# Patient Record
Sex: MALE | Race: WHITE | NOT HISPANIC OR LATINO | Employment: OTHER | URBAN - METROPOLITAN AREA
[De-identification: names, ages, dates, MRNs, and addresses within clinical notes are randomized per-mention and may not be internally consistent; named-entity substitution may affect disease eponyms.]

---

## 2017-01-04 ENCOUNTER — HOSPITAL ENCOUNTER (EMERGENCY)
Facility: CLINIC | Age: 82
Discharge: HOME | End: 2017-01-04
Attending: EMERGENCY MEDICINE | Admitting: EMERGENCY MEDICINE
Payer: COMMERCIAL

## 2017-01-04 ENCOUNTER — APPOINTMENT (OUTPATIENT)
Dept: GENERAL RADIOLOGY | Facility: CLINIC | Age: 82
End: 2017-01-04
Attending: EMERGENCY MEDICINE
Payer: COMMERCIAL

## 2017-01-04 VITALS
DIASTOLIC BLOOD PRESSURE: 71 MMHG | HEIGHT: 72 IN | TEMPERATURE: 98 F | HEART RATE: 71 BPM | OXYGEN SATURATION: 95 % | BODY MASS INDEX: 30.61 KG/M2 | RESPIRATION RATE: 20 BRPM | SYSTOLIC BLOOD PRESSURE: 128 MMHG | WEIGHT: 226 LBS

## 2017-01-04 DIAGNOSIS — J18.9 PNEUMONIA OF LEFT LOWER LOBE DUE TO INFECTIOUS ORGANISM: ICD-10-CM

## 2017-01-04 DIAGNOSIS — R07.9 CHEST PAIN, UNSPECIFIED TYPE: ICD-10-CM

## 2017-01-04 LAB
ANION GAP SERPL CALCULATED.3IONS-SCNC: 9 MMOL/L (ref 3–14)
BASOPHILS # BLD AUTO: 0.1 10E9/L (ref 0–0.2)
BASOPHILS NFR BLD AUTO: 0.8 %
BUN SERPL-MCNC: 21 MG/DL (ref 7–30)
CALCIUM SERPL-MCNC: 8.7 MG/DL (ref 8.5–10.1)
CHLORIDE SERPL-SCNC: 105 MMOL/L (ref 94–109)
CO2 SERPL-SCNC: 26 MMOL/L (ref 20–32)
CREAT SERPL-MCNC: 0.94 MG/DL (ref 0.66–1.25)
DIFFERENTIAL METHOD BLD: NORMAL
EOSINOPHIL # BLD AUTO: 0.4 10E9/L (ref 0–0.7)
EOSINOPHIL NFR BLD AUTO: 4.4 %
ERYTHROCYTE [DISTWIDTH] IN BLOOD BY AUTOMATED COUNT: 13.1 % (ref 10–15)
GFR SERPL CREATININE-BSD FRML MDRD: 77 ML/MIN/1.7M2
GLUCOSE SERPL-MCNC: 227 MG/DL (ref 70–99)
HCT VFR BLD AUTO: 48.1 % (ref 40–53)
HGB BLD-MCNC: 16.2 G/DL (ref 13.3–17.7)
IMM GRANULOCYTES # BLD: 0 10E9/L (ref 0–0.4)
IMM GRANULOCYTES NFR BLD: 0.2 %
INTERPRETATION ECG - MUSE: NORMAL
LYMPHOCYTES # BLD AUTO: 2.3 10E9/L (ref 0.8–5.3)
LYMPHOCYTES NFR BLD AUTO: 27.1 %
MCH RBC QN AUTO: 32 PG (ref 26.5–33)
MCHC RBC AUTO-ENTMCNC: 33.7 G/DL (ref 31.5–36.5)
MCV RBC AUTO: 95 FL (ref 78–100)
MONOCYTES # BLD AUTO: 1 10E9/L (ref 0–1.3)
MONOCYTES NFR BLD AUTO: 12.1 %
NEUTROPHILS # BLD AUTO: 4.6 10E9/L (ref 1.6–8.3)
NEUTROPHILS NFR BLD AUTO: 55.4 %
NRBC # BLD AUTO: 0 10*3/UL
NRBC BLD AUTO-RTO: 0 /100
PLATELET # BLD AUTO: 204 10E9/L (ref 150–450)
POTASSIUM SERPL-SCNC: 4.1 MMOL/L (ref 3.4–5.3)
RBC # BLD AUTO: 5.06 10E12/L (ref 4.4–5.9)
SODIUM SERPL-SCNC: 140 MMOL/L (ref 133–144)
TROPONIN I BLD-MCNC: 0 UG/L (ref 0–0.1)
TROPONIN I BLD-MCNC: 0.01 UG/L (ref 0–0.1)
WBC # BLD AUTO: 8.3 10E9/L (ref 4–11)

## 2017-01-04 PROCEDURE — 25000132 ZZH RX MED GY IP 250 OP 250 PS 637: Performed by: EMERGENCY MEDICINE

## 2017-01-04 PROCEDURE — 93005 ELECTROCARDIOGRAM TRACING: CPT

## 2017-01-04 PROCEDURE — 99285 EMERGENCY DEPT VISIT HI MDM: CPT | Mod: 25

## 2017-01-04 PROCEDURE — 84484 ASSAY OF TROPONIN QUANT: CPT

## 2017-01-04 PROCEDURE — 85025 COMPLETE CBC W/AUTO DIFF WBC: CPT | Performed by: EMERGENCY MEDICINE

## 2017-01-04 PROCEDURE — 80048 BASIC METABOLIC PNL TOTAL CA: CPT | Performed by: EMERGENCY MEDICINE

## 2017-01-04 PROCEDURE — 71020 XR CHEST 2 VW: CPT

## 2017-01-04 RX ORDER — LEVOFLOXACIN 750 MG/1
750 TABLET, FILM COATED ORAL DAILY
Qty: 5 TABLET | Refills: 0 | Status: SHIPPED | OUTPATIENT
Start: 2017-01-04 | End: 2017-03-18

## 2017-01-04 RX ORDER — ASPIRIN 325 MG
325 TABLET ORAL ONCE
Status: DISCONTINUED | OUTPATIENT
Start: 2017-01-04 | End: 2017-01-04 | Stop reason: HOSPADM

## 2017-01-04 RX ORDER — NITROGLYCERIN 0.4 MG/1
0.4 TABLET SUBLINGUAL EVERY 5 MIN PRN
Status: DISCONTINUED | OUTPATIENT
Start: 2017-01-04 | End: 2017-01-04 | Stop reason: HOSPADM

## 2017-01-04 RX ADMIN — NITROGLYCERIN 0.4 MG: 0.4 TABLET SUBLINGUAL at 13:43

## 2017-01-04 ASSESSMENT — ENCOUNTER SYMPTOMS
VOMITING: 0
SHORTNESS OF BREATH: 0
ABDOMINAL PAIN: 0
COUGH: 1
NAUSEA: 0

## 2017-01-04 NOTE — ED AVS SNAPSHOT
[facility] Emergency Department    [address]    [facility] [address]    Phone:  [phone]    Fax:  [phone]                                       Adam Huber   MRN: [id number]    Department:  [facility] Emergency Department   Date of Visit:  1/4/2017           After Visit Summary Signature Page     I have received my discharge instructions, and my questions have been answered. I have discussed any challenges I see with this plan with the nurse or doctor.    ..........................................................................................................................................  Patient/Patient Representative Signature      ..........................................................................................................................................  Patient Representative Print Name and Relationship to Patient    ..................................................               ................................................  Date                                            Time    ..........................................................................................................................................  Reviewed by Signature/Title    ...................................................              ..............................................  Date                                                            Time

## 2017-01-04 NOTE — ED PROVIDER NOTES
History     Chief Complaint:  Chest Pain      HPI   The patient's daughter in law is serving as a interpretor at the bedside as the patient's primary language is French    Adam Huber is an 81 year old male with a history of coronary artery disease, acute coronary syndrome, and NSTEMI who presents to the emergency department via EMS for evaluation of chest pain. The patient states that he has had constant anterior substernal chest pain for the last two days, which he describes as a pressure. There are no exacerbating or alleviating factors. He notes that he has had chest pain in the past, but notes that his chest pain today is more intense than it has been in the past. The patient states that he has not taken any medication for this chest pain. The patient notes that he has had a nonbloody dry cough and chest congestion for the last two days as well. Given the patient's history, he was concerned and decided to seek evaluation at [facility]. At this visit, he was given aspirin and one dose of Nitro. No response to nitro.  He was then referred here for further evaluation.  He denies any shortness of breath, pain with deep breathing, abdominal pain, nausea, vomiting, leg swelling, or calf pain as of late. He denies any changes in his bladder or bowel habits as of late and notes that he has been eating and drinking well.     Cardiac/PE/DVT Risk Factors:  The patient has a history of hypertension, hyperlipidemia, coronary artery disease, and acute coronary syndrome. The patient denies any personal or familial history of PE, DVT, or clotting disorder. The patient reports no recent travel, surgery, or other immobilizations.     Allergies:  Lisinopril     Medications:    clopidogrel (PLAVIX) 75 MG tablet  aspirin 81 MG EC tablet  Docusate Sodium (DOC-Q-LACE PO)  losartan (COZAAR) 25 MG tablet  atorvastatin (LIPITOR) 40 MG tablet  carvedilol (COREG) 6.25 MG tablet  nitroglycerin (NITROSTAT) 0.4 MG  SL tablet  TRAMADOL HCL PO  butalbital-acetaminophen-caffeine (FIORICET, ESGIC) -40 MG per tablet  AMITRIPTYLINE HCL PO    Past Medical History:    coronary artery disease  NSTEMI  CVA  hypertension  hyperlipidemia  acute coronary syndrome  Depression     Past Surgical History:    Heart catheterization x2  Stent Placement  cholecystectomy  Prostatectomy     Family History:    Unknown family history     Social History:  Presents with his daughter in law and wife.  Negative for tobacco use.  Negative for alcohol use.  Marital Status:   [2]    Review of Systems   HENT: Positive for congestion.    Respiratory: Positive for cough. Negative for shortness of breath.    Cardiovascular: Positive for chest pain. Negative for leg swelling.   Gastrointestinal: Negative for nausea, vomiting and abdominal pain.   All other systems reviewed and are negative.    Physical Exam     Patient Vitals for the past 24 hrs:   BP Temp Temp src Pulse Heart Rate Resp SpO2 Height Weight   01/04/17 1606 128/71 mmHg - - - 68 - - - -   01/04/17 1600 - - - - 68 - 94 % - -   01/04/17 1545 - - - - 69 - 95 % - -   01/04/17 1530 154/70 mmHg - - - 72 - 95 % - -   01/04/17 1515 128/83 mmHg - - - 69 - 95 % - -   01/04/17 1500 145/83 mmHg - - - 73 - 97 % - -   01/04/17 1445 115/78 mmHg - - - 72 - 95 % - -   01/04/17 1430 113/73 mmHg - - - 70 - 92 % - -   01/04/17 1423 - - - - 72 - 96 % - -   01/04/17 1422 114/70 mmHg - - - 72 - - - -   01/04/17 1401 - - - - 78 - 93 % - -   01/04/17 1400 128/73 mmHg - - - 76 - - - -   01/04/17 1345 95/58 mmHg - - - 76 - 97 % - -   01/04/17 1339 - - - - 70 - 96 % - -   01/04/17 1336 148/75 mmHg - - - 72 - - - -   01/04/17 1321 - - - - - - - 1.829 m (6') 102.513 kg (226 lb)   01/04/17 1320 128/78 mmHg 98  F (36.7  C) Temporal 81 - 22 96 % - -       Physical Exam  Gen: alert  HEENT: PERRL, oropharynx clear  Neck: normal ROM  CV: RRR, no murmurs, 2+ distal pulses in all 4 extremities  Chest: no tenderness over the  chest wall  Pulm: breath sounds equal, lungs clear  Abd: Soft, nontender  Back: no evidence of injury  MSK: no lower extremity edema, no calf tenderness  Skin: no rash  Neuro: alert, appropriate conversation and interaction    Emergency Department Course   ECG:  Indication: Chest Pain  Time: 1313  Vent. Rate 71 bpm. MO interval 164. QRS duration 98. QT/QTc 398/432. P-R-T axis 67 85 96.  Normal sinus rhythm. Cannot rule out inferior infarct, age undetermined. Abnormal ECG. No significant change compared to EKG dated 11/6/2015. Read time: 1327    Imaging:  Radiographic findings were communicated with the patient who voiced understanding of the findings.    XR Chest 2 views:   Cardiac silhouette and pulmonary vasculature are within  normal limits. Mild left basilar atelectasis/consolidation. No  significant pleural effusion. No pneumothorax. As per radiology.     Laboratory:  CBC: WBC: 8.3, HGB: 16.2, PLT: 204  BMP: Glucose 227 (H), o/w WNL (Creatinine: 0.94)    1338 Troponin POCT:0.01  1555 Troponin POCT: 0.00    Interventions:  1343 Nitroglycerin 0.4 mg Sublingual    Emergency Department Course:  Nursing notes and vitals reviewed. I performed an exam of the patient as documented above.     EKG obtained in the ED, see results above.     IV inserted. Medicine administered as documented above. Blood drawn. This was sent to the lab for further testing, results above.    The patient was sent for a Chest XR while in the emergency department, findings above.     1614 I reevaluated the patient and provided an update in regards to his ED course.      Findings and plan explained to the Patient. Patient discharged home with instructions regarding supportive care, medications, and reasons to return. The importance of close follow-up was reviewed. The patient was prescribed Levaquin.    I personally reviewed the laboratory results with the Patient and answered all related questions prior to discharge.     Impression & Plan     Medical Decision Making:  Adam Huber is a 81 year old male who presents for chest pain. Patient has a past cardiac history of NSTEMI, stent placement noted. He has had two days of constant chest discomfort. Pain is atypical in that it is not exertional. It has not responded to nitroglycerin at urgent care nor here. The patient reports cough and chest congestion. XR shows left lower lobe infiltrate. This is consistent with pneumonia. No leucocytosis, fever, tachycardia, hypoxia suggestive of respiratory failure. Patient does requite close outpatient follow up. Levaquin prescribed. Follow up with primary care in two days for recheck. EKG shows no change from previous. Troponin and delta troponin both negative in the setting of two days of ongoing symptoms. This is more likely pneumonia. Therefore, no further cardiac evaluation indicated at this time.  Will defer stress testing to PCP pending course of PNA. Plan for discharge home.  Fluoroquinolone warning and tendinopathy discussed.    Diagnosis:    ICD-10-CM    1. Pneumonia of left lower lobe due to infectious organism J18.9    2. Chest pain, unspecified type R07.9        Discharge Medications:  New Prescriptions    LEVOFLOXACIN (LEVAQUIN) 750 MG TABLET    Take 1 tablet (750 mg) by mouth daily       Luz MATA, am serving as a scribe on 1/4/2017 at 1:24 PM to personally document services performed by Darline Ortega MD based on my observations and the provider's statements to me.     Luz Santos  1/4/2017   [facility] EMERGENCY DEPARTMENT        Darline Ortega MD  01/04/17 0548

## 2017-01-04 NOTE — ED AVS SNAPSHOT
[facility] Emergency Department    [address] [facility] [address]    Phone:  [phone]    Fax:  [phone]                                       Adam Huber   MRN: [id number]    Department:  [facility] Emergency Department   Date of Visit:  1/4/2017           Patient Information     Date Of Birth          1935        Your diagnoses for this visit were:     Pneumonia of left lower lobe due to infectious organism     Chest pain, unspecified type        You were seen by Darline Ortega MD.      Follow-up Information     Follow up with Oralia Forrest In 2 days.    Specialty:  Family Practice    Why:  recheck of lungs    Contact information:    [facility]  [address] [facility] [id number] [phone]          Follow up with [facility] Emergency Department.    Specialty:  EMERGENCY MEDICINE    Why:  If symptoms worsen    Contact information:    [address] [facility] [address] [phone]        Discharge Instructions       Discharge Instructions  Bronchitis, Pneumonia, Bronchospasm    You were seen today for a chest infection or inflammation. If your doctor decided this was due to a bacterial infection, you may need an antibiotic. Sometimes these are caused by a virus, and then an antibiotic will not help.     Return to the Emergency Department if:    Your breathing gets much worse.    You are very weak, or feel much more ill.    You develop new symptoms, such as chest pain.    You cough up blood.    You are vomiting enough that you can t keep fluids or your medicine down.    What can I do to help myself?    Fill any prescriptions the doctor gave you and take them right away--especially antibiotics. Be sure to finish the whole antibiotic prescription.    You may be given a prescription for an inhaler, which can help loosen tight air passages.  Use this as needed, but not more often than  "directed. Inhalers work much better when used with a spacer.     You may be given a prescription for a steroid to reduce inflammation. Used long-term, these can have many serious side effects, but for short courses these do not happen. You may notice restlessness or increased appetite.        You may use non-prescription cough or cold medicines. Cough medicines may help, but don t make the cough go away completely.     Avoid smoke, because this can make your symptoms worse. If you smoke, this may be a good time to quit! Consider using nicotine lozenges, gum, or patches to reduce cravings.     If you have a fever, Tylenol  (acetaminophen), Motrin  (ibuprofen), or Advil  (ibuprofen) may help bring fever down and may help you feel more comfortable. Be sure to read and follow the package directions, and ask your doctor if you have questions.    Be sure to get your flu shot each year.  The pneumonia shot can help prevent pneumonia.  Probiotics: If you have been given an antibiotic, you may want to also take a probiotic pill or eat yogurt with live cultures. Probiotics have \"good bacteria\" to help your intestines stay healthy. Studies have shown that probiotics help prevent diarrhea and other intestine problems (including C. diff infection) when you take antibiotics. You can buy these without a prescription in the pharmacy section of the store.     If your doctor has told you to follow-up at your clinic, be sure to call right away and go to your appointment.  If there is any problem with keeping your appointment, call your doctor or return to the Emergency Department.    If you were given a prescription for medicine here today, be sure to read all of the information (including the package insert) that comes with your prescription.  This will include important information about the medicine, its side effects, and any warnings that you need to know about.  The pharmacist who fills the prescription can provide more " information and answer questions you may have about the medicine.  If you have questions or concerns that the pharmacist cannot address, please call or return to the Emergency Department.     Opioid Medication Information    Pain medications are among the most commonly prescribed medicines, so we are including this information for all our patients. If you did not receive pain medication or get a prescription for pain medicine, you can ignore it.     You may have been given a prescription for an opioid (narcotic) pain medicine and/or have received a pain medicine while here in the Emergency Department. These medicines can make you drowsy or impaired. You must not drive, operate dangerous equipment, or engage in any other dangerous activities while taking these medications. If you drive while taking these medications, you could be arrested for DUI, or driving under the influence. Do not drink any alcohol while you are taking these medications.     Opioid pain medications can cause addiction. If you have a history of chemical dependency of any type, you are at a higher risk of becoming addicted to pain medications.  Only take these prescribed medications to treat your pain when all other options have been tried. Take it for as short a time and as few doses as possible. Store your pain pills in a secure place, as they are frequently stolen and provide a dangerous opportunity for children or visitors in your house to start abusing these powerful medications. We will not replace any lost or stolen medicine.  As soon as your pain is better, you should flush all your remaining medication.     Many prescription pain medications contain Tylenol  (acetaminophen), including Vicodin , Tylenol #3 , Norco , Lortab , and Percocet .  You should not take any extra pills of Tylenol  if you are using these prescription medications or you can get very sick.  Do not ever take more than 3000 mg of acetaminophen in any 24 hour  period.    All opioids tend to cause constipation. Drink plenty of water and eat foods that have a lot of fiber, such as fruits, vegetables, prune juice, apple juice and high fiber cereal.  Take a laxative if you don t move your bowels at least every other day. Miralax , Milk of Magnesia, Colace , or Senna  can be used to keep you regular.      Remember that you can always come back to the Emergency Department if you are not able to see your regular doctor in the amount of time listed above, if you get any new symptoms, or if there is anything that worries you.      For your infection,an antibiotic from the fluoroquinolone family was prescribed. All medications can cause side effects or adverse reactions.  It is thought that there is a small chance that this medication can affect your nerves.  If you experience numbness, tingling or weakness, stop taking this medication and call your doctor.  It is thought that there is a small chance of tendon injuries/inflammation/rupture associated with this particular family of medications. It is concerning enough that the FDA has a specific warning about it. In choosing this medication, I ve considered alternatives but, given your infection, past history, allergies, and other factors, I think this is the best possible choice and that the risk to you is small. If you were to develop pain around your tendons/joints, stop taking the medication and contact a doctor.  If you develop joint or extremity pain, avoid doing heavy lifting or strenuous activities with the affected area.  Secondly, all antibiotics increase your risk of diarrhea by changing that bacteria in the intestines. This family of medications also increases your risk of specific bacteral diarrheal infections such as C. dificile.  If you develop diarrhea or bloody stools, contact your doctor as you may need stool testing.      24 Hour Appointment Hotline       To make an appointment at any [facility], call  [phone] ([phone]). If you don't have a family doctor or clinic, we will help you find one. [place] clinics are conveniently located to serve the needs of you and your family.             Review of your medicines      START taking        Dose / Directions Last dose taken    levofloxacin 750 MG tablet   Commonly known as:  LEVAQUIN   Dose:  750 mg   Quantity:  5 tablet        Take 1 tablet (750 mg) by mouth daily   Refills:  0          Our records show that you are taking the medicines listed below. If these are incorrect, please call your family doctor or clinic.        Dose / Directions Last dose taken    AMITRIPTYLINE HCL PO   Dose:  150 mg        Take 150 mg by mouth At Bedtime   Refills:  0        aspirin 81 MG EC tablet   Quantity:  90 tablet        Take one tablet daily   Refills:  3        atorvastatin 40 MG tablet   Commonly known as:  LIPITOR   Dose:  40 mg   Quantity:  90 tablet        Take 1 tablet (40 mg) by mouth daily   Refills:  3        butalbital-acetaminophen-caffeine -40 MG per tablet   Commonly known as:  FIORICET/ESGIC   Dose:  1-2 tablet        Take 1-2 tablets by mouth every 4 hours as needed for headaches   Refills:  0        carvedilol 6.25 MG tablet   Commonly known as:  COREG   Dose:  6.25 mg   Quantity:  180 tablet        Take 1 tablet (6.25 mg) by mouth 2 times daily   Refills:  3        clopidogrel 75 MG tablet   Commonly known as:  PLAVIX   Dose:  75 mg   Quantity:  90 tablet        Take 1 tablet (75 mg) by mouth daily   Refills:  1        DOC-Q-LACE PO   Dose:  100 mg        Take 100 mg by mouth 2 times daily   Refills:  0        losartan 25 MG tablet   Commonly known as:  COZAAR   Dose:  25 mg   Quantity:  90 tablet        Take 1 tablet (25 mg) by mouth daily   Refills:  3        nitroglycerin 0.4 MG sublingual tablet   Commonly known as:  NITROSTAT   Dose:  0.4 mg   Quantity:  25 tablet        Place 1 tablet (0.4 mg) under the tongue every 5 minutes as needed for  chest pain   Refills:  1        polyethylene glycol powder   Commonly known as:  MIRALAX/GLYCOLAX   Dose:  1 capful        Take 1 capful by mouth daily as needed   Refills:  0        TRAMADOL HCL PO   Dose:  100 mg        Take 100 mg by mouth 2 times daily as needed   Refills:  0                Prescriptions were sent or printed at these locations (1 Prescription)                   Other Prescriptions                Printed at Department/Unit printer (1 of 1)         levofloxacin (LEVAQUIN) 750 MG tablet                Procedures and tests performed during your visit     Procedure/Test Number of Times Performed    Basic metabolic panel 1    CBC + differential 1    EKG 12 lead 1    ISTAT troponin 2    Troponin POCT 2    XR Chest 2 Views 1      Orders Needing Specimen Collection     None      Pending Results     No orders found from 1/3/2017 to 1/5/2017.            Pending Culture Results     No orders found from 1/3/2017 to 1/5/2017.       Test Results from your hospital stay           1/4/2017  1:47 PM - Interface, Chip Estimate Results      Component Results     Component Value Ref Range & Units Status    WBC 8.3 4.0 - 11.0 10e9/L Final    RBC Count 5.06 4.4 - 5.9 10e12/L Final    Hemoglobin 16.2 13.3 - 17.7 g/dL Final    Hematocrit 48.1 40.0 - 53.0 % Final    MCV 95 78 - 100 fl Final    MCH 32.0 26.5 - 33.0 pg Final    MCHC 33.7 31.5 - 36.5 g/dL Final    RDW 13.1 10.0 - 15.0 % Final    Platelet Count 204 150 - 450 10e9/L Final    Diff Method Automated Method  Final    % Neutrophils 55.4 % Final    % Lymphocytes 27.1 % Final    % Monocytes 12.1 % Final    % Eosinophils 4.4 % Final    % Basophils 0.8 % Final    % Immature Granulocytes 0.2 % Final    Nucleated RBCs 0 0 /100 Final    Absolute Neutrophil 4.6 1.6 - 8.3 10e9/L Final    Absolute Lymphocytes 2.3 0.8 - 5.3 10e9/L Final    Absolute Monocytes 1.0 0.0 - 1.3 10e9/L Final    Absolute Eosinophils 0.4 0.0 - 0.7 10e9/L Final    Absolute Basophils 0.1 0.0 - 0.2 10e9/L  Final    Abs Immature Granulocytes 0.0 0 - 0.4 10e9/L Final    Absolute Nucleated RBC 0.0  Final         1/4/2017  2:03 PM - Interface, Flexilab Results      Component Results     Component Value Ref Range & Units Status    Sodium 140 133 - 144 mmol/L Final    Potassium 4.1 3.4 - 5.3 mmol/L Final    Chloride 105 94 - 109 mmol/L Final    Carbon Dioxide 26 20 - 32 mmol/L Final    Anion Gap 9 3 - 14 mmol/L Final    Glucose 227 (H) 70 - 99 mg/dL Final    Urea Nitrogen 21 7 - 30 mg/dL Final    Creatinine 0.94 0.66 - 1.25 mg/dL Final    GFR Estimate 77 >60 mL/min/1.7m2 Final    Non  GFR Calc    GFR Estimate If Black >90   GFR Calc   >60 mL/min/1.7m2 Final    Calcium 8.7 8.5 - 10.1 mg/dL Final         1/4/2017  2:40 PM - Interface, Radiant Ib      Narrative     XR CHEST 2 VW 1/4/2017 2:19 PM    COMPARISON: 10/9/2015    HISTORY: Cough and chest pain.        Impression     IMPRESSION: Cardiac silhouette and pulmonary vasculature are within  normal limits. Mild left basilar atelectasis/consolidation. No  significant pleural effusion. No pneumothorax.    DERIC FRIEDMAN         1/4/2017  1:51 PM - Interface, Flexilab Results      Component Results     Component Value Ref Range & Units Status    Troponin I 0.01 0.00 - 0.10 ug/L Final         1/4/2017  3:55 PM - Interface, Flexilab Results      Component Results     Component Value Ref Range & Units Status    Troponin I 0.00 0.00 - 0.10 ug/L Final                Clinical Quality Measure: Blood Pressure Screening     Your blood pressure was checked while you were in the emergency department today. The last reading we obtained was  BP: 128/71 mmHg . Please read the guidelines below about what these numbers mean and what you should do about them.  If your systolic blood pressure (the top number) is less than 120 and your diastolic blood pressure (the bottom number) is less than 80, then your blood pressure is normal. There is nothing more that you need  "to do about it.  If your systolic blood pressure (the top number) is 120-139 or your diastolic blood pressure (the bottom number) is 80-89, your blood pressure may be higher than it should be. You should have your blood pressure rechecked within a year by a primary care provider.  If your systolic blood pressure (the top number) is 140 or greater or your diastolic blood pressure (the bottom number) is 90 or greater, you may have high blood pressure. High blood pressure is treatable, but if left untreated over time it can put you at risk for heart attack, stroke, or kidney failure. You should have your blood pressure rechecked by a primary care provider within the next 4 weeks.  If your provider in the emergency department today gave you specific instructions to follow-up with your doctor or provider even sooner than that, you should follow that instruction and not wait for up to 4 weeks for your follow-up visit.        Thank you for choosing [facility]       Thank you for choosing [facility] for your care. Our goal is always to provide you with excellent care. Hearing back from our patients is one way we can continue to improve our services. Please take a few minutes to complete the written survey that you may receive in the mail after you visit with us. Thank you!        [facility] Information     [facility] lets you send messages to your doctor, view your test results, renew your prescriptions, schedule appointments and more. To sign up, go to [email or web address] . Click on \"Log in\" on the left side of the screen, which will take you to the Welcome page. Then click on \"Sign up Now\" on the right side of the page.     You will be asked to enter the access code listed below, as well as some personal information. Please follow the directions to create your username and password.     Your access code is: [email or web address]  Expires: 2017  4:39 PM     Your access code will  in 90 days. If you need help or a new code, " please call your [place] clinic or [phone].        Care EveryWhere ID     This is your Care EveryWhere ID. This could be used by other organizations to access your [place] medical records  [id number]        After Visit Summary       This is your record. Keep this with you and show to your community pharmacist(s) and doctor(s) at your next visit.

## 2017-01-04 NOTE — DISCHARGE INSTRUCTIONS
Discharge Instructions  Bronchitis, Pneumonia, Bronchospasm    You were seen today for a chest infection or inflammation. If your doctor decided this was due to a bacterial infection, you may need an antibiotic. Sometimes these are caused by a virus, and then an antibiotic will not help.     Return to the Emergency Department if:    Your breathing gets much worse.    You are very weak, or feel much more ill.    You develop new symptoms, such as chest pain.    You cough up blood.    You are vomiting enough that you can t keep fluids or your medicine down.    What can I do to help myself?    Fill any prescriptions the doctor gave you and take them right away--especially antibiotics. Be sure to finish the whole antibiotic prescription.    You may be given a prescription for an inhaler, which can help loosen tight air passages.  Use this as needed, but not more often than directed. Inhalers work much better when used with a spacer.     You may be given a prescription for a steroid to reduce inflammation. Used long-term, these can have many serious side effects, but for short courses these do not happen. You may notice restlessness or increased appetite.        You may use non-prescription cough or cold medicines. Cough medicines may help, but don t make the cough go away completely.     Avoid smoke, because this can make your symptoms worse. If you smoke, this may be a good time to quit! Consider using nicotine lozenges, gum, or patches to reduce cravings.     If you have a fever, Tylenol  (acetaminophen), Motrin  (ibuprofen), or Advil  (ibuprofen) may help bring fever down and may help you feel more comfortable. Be sure to read and follow the package directions, and ask your doctor if you have questions.    Be sure to get your flu shot each year.  The pneumonia shot can help prevent pneumonia.  Probiotics: If you have been given an antibiotic, you may want to also take a probiotic pill or eat yogurt with live cultures.  "Probiotics have \"good bacteria\" to help your intestines stay healthy. Studies have shown that probiotics help prevent diarrhea and other intestine problems (including C. diff infection) when you take antibiotics. You can buy these without a prescription in the pharmacy section of the store.     If your doctor has told you to follow-up at your clinic, be sure to call right away and go to your appointment.  If there is any problem with keeping your appointment, call your doctor or return to the Emergency Department.    If you were given a prescription for medicine here today, be sure to read all of the information (including the package insert) that comes with your prescription.  This will include important information about the medicine, its side effects, and any warnings that you need to know about.  The pharmacist who fills the prescription can provide more information and answer questions you may have about the medicine.  If you have questions or concerns that the pharmacist cannot address, please call or return to the Emergency Department.     Opioid Medication Information    Pain medications are among the most commonly prescribed medicines, so we are including this information for all our patients. If you did not receive pain medication or get a prescription for pain medicine, you can ignore it.     You may have been given a prescription for an opioid (narcotic) pain medicine and/or have received a pain medicine while here in the Emergency Department. These medicines can make you drowsy or impaired. You must not drive, operate dangerous equipment, or engage in any other dangerous activities while taking these medications. If you drive while taking these medications, you could be arrested for DUI, or driving under the influence. Do not drink any alcohol while you are taking these medications.     Opioid pain medications can cause addiction. If you have a history of chemical dependency of any type, you are at a " higher risk of becoming addicted to pain medications.  Only take these prescribed medications to treat your pain when all other options have been tried. Take it for as short a time and as few doses as possible. Store your pain pills in a secure place, as they are frequently stolen and provide a dangerous opportunity for children or visitors in your house to start abusing these powerful medications. We will not replace any lost or stolen medicine.  As soon as your pain is better, you should flush all your remaining medication.     Many prescription pain medications contain Tylenol  (acetaminophen), including Vicodin , Tylenol #3 , Norco , Lortab , and Percocet .  You should not take any extra pills of Tylenol  if you are using these prescription medications or you can get very sick.  Do not ever take more than 3000 mg of acetaminophen in any 24 hour period.    All opioids tend to cause constipation. Drink plenty of water and eat foods that have a lot of fiber, such as fruits, vegetables, prune juice, apple juice and high fiber cereal.  Take a laxative if you don t move your bowels at least every other day. Miralax , Milk of Magnesia, Colace , or Senna  can be used to keep you regular.      Remember that you can always come back to the Emergency Department if you are not able to see your regular doctor in the amount of time listed above, if you get any new symptoms, or if there is anything that worries you.      For your infection,an antibiotic from the fluoroquinolone family was prescribed. All medications can cause side effects or adverse reactions.  It is thought that there is a small chance that this medication can affect your nerves.  If you experience numbness, tingling or weakness, stop taking this medication and call your doctor.  It is thought that there is a small chance of tendon injuries/inflammation/rupture associated with this particular family of medications. It is concerning enough that the FDA has a  specific warning about it. In choosing this medication, I ve considered alternatives but, given your infection, past history, allergies, and other factors, I think this is the best possible choice and that the risk to you is small. If you were to develop pain around your tendons/joints, stop taking the medication and contact a doctor.  If you develop joint or extremity pain, avoid doing heavy lifting or strenuous activities with the affected area.  Secondly, all antibiotics increase your risk of diarrhea by changing that bacteria in the intestines. This family of medications also increases your risk of specific bacteral diarrheal infections such as C. dificile.  If you develop diarrhea or bloody stools, contact your doctor as you may need stool testing.

## 2017-01-04 NOTE — ED NOTES
Bed: ED30  Expected date: 1/4/17  Expected time: 1:21 PM  Means of arrival: Ambulance  Comments:  HIRA 52M

## 2017-01-24 DIAGNOSIS — I21.4 NSTEMI (NON-ST ELEVATED MYOCARDIAL INFARCTION) (H): Primary | ICD-10-CM

## 2017-01-27 DIAGNOSIS — I21.4 NSTEMI (NON-ST ELEVATED MYOCARDIAL INFARCTION) (H): Primary | ICD-10-CM

## 2017-01-27 RX ORDER — ATORVASTATIN CALCIUM 40 MG/1
40 TABLET, FILM COATED ORAL DAILY
Qty: 90 TABLET | Refills: 1 | Status: SHIPPED | OUTPATIENT
Start: 2017-01-27 | End: 2017-07-19

## 2017-03-18 ENCOUNTER — APPOINTMENT (OUTPATIENT)
Dept: GENERAL RADIOLOGY | Facility: CLINIC | Age: 82
DRG: 193 | End: 2017-03-18
Attending: EMERGENCY MEDICINE
Payer: COMMERCIAL

## 2017-03-18 ENCOUNTER — HOSPITAL ENCOUNTER (INPATIENT)
Facility: CLINIC | Age: 82
LOS: 13 days | Discharge: NURSING, REHAB OR LONG-TERM CARE | DRG: 193 | End: 2017-03-31
Attending: EMERGENCY MEDICINE | Admitting: INTERNAL MEDICINE
Payer: COMMERCIAL

## 2017-03-18 DIAGNOSIS — M54.9 BACK PAIN, UNSPECIFIED BACK LOCATION, UNSPECIFIED BACK PAIN LATERALITY, UNSPECIFIED CHRONICITY: ICD-10-CM

## 2017-03-18 DIAGNOSIS — Z86.73 HISTORY OF CVA (CEREBROVASCULAR ACCIDENT): ICD-10-CM

## 2017-03-18 DIAGNOSIS — I21.4 NSTEMI (NON-ST ELEVATED MYOCARDIAL INFARCTION) (H): ICD-10-CM

## 2017-03-18 DIAGNOSIS — G93.41 ACUTE METABOLIC ENCEPHALOPATHY: ICD-10-CM

## 2017-03-18 DIAGNOSIS — E11.8 TYPE 2 DIABETES MELLITUS WITH COMPLICATION, WITHOUT LONG-TERM CURRENT USE OF INSULIN (H): ICD-10-CM

## 2017-03-18 DIAGNOSIS — E86.0 DEHYDRATION: ICD-10-CM

## 2017-03-18 DIAGNOSIS — M62.81 GENERALIZED MUSCLE WEAKNESS: Primary | ICD-10-CM

## 2017-03-18 DIAGNOSIS — R79.89 ELEVATED TROPONIN: ICD-10-CM

## 2017-03-18 PROBLEM — J10.1 INFLUENZA B: Status: ACTIVE | Noted: 2017-03-18

## 2017-03-18 PROBLEM — R53.1 GENERALIZED WEAKNESS: Status: ACTIVE | Noted: 2017-03-18

## 2017-03-18 LAB
ALBUMIN UR-MCNC: 30 MG/DL
ANION GAP SERPL CALCULATED.3IONS-SCNC: 11 MMOL/L (ref 3–14)
APPEARANCE UR: ABNORMAL
BACTERIA #/AREA URNS HPF: ABNORMAL /HPF
BASOPHILS # BLD AUTO: 0 10E9/L (ref 0–0.2)
BASOPHILS NFR BLD AUTO: 0.3 %
BILIRUB UR QL STRIP: NEGATIVE
BUN SERPL-MCNC: 26 MG/DL (ref 7–30)
CALCIUM SERPL-MCNC: 8 MG/DL (ref 8.5–10.1)
CHLORIDE SERPL-SCNC: 97 MMOL/L (ref 94–109)
CO2 SERPL-SCNC: 24 MMOL/L (ref 20–32)
COLOR UR AUTO: YELLOW
CREAT SERPL-MCNC: 1.5 MG/DL (ref 0.66–1.25)
DIFFERENTIAL METHOD BLD: ABNORMAL
EOSINOPHIL # BLD AUTO: 0 10E9/L (ref 0–0.7)
EOSINOPHIL NFR BLD AUTO: 0 %
ERYTHROCYTE [DISTWIDTH] IN BLOOD BY AUTOMATED COUNT: 12.9 % (ref 10–15)
FLUAV+FLUBV AG SPEC QL: ABNORMAL
FLUAV+FLUBV AG SPEC QL: NEGATIVE
GFR SERPL CREATININE-BSD FRML MDRD: 45 ML/MIN/1.7M2
GLUCOSE BLDC GLUCOMTR-MCNC: 241 MG/DL (ref 70–99)
GLUCOSE SERPL-MCNC: 269 MG/DL (ref 70–99)
GLUCOSE UR STRIP-MCNC: >499 MG/DL
HBA1C MFR BLD: 7.1 % (ref 4.3–6)
HCT VFR BLD AUTO: 46.8 % (ref 40–53)
HGB BLD-MCNC: 16.1 G/DL (ref 13.3–17.7)
HGB UR QL STRIP: ABNORMAL
IMM GRANULOCYTES # BLD: 0 10E9/L (ref 0–0.4)
IMM GRANULOCYTES NFR BLD: 0.3 %
INTERPRETATION ECG - MUSE: NORMAL
KETONES UR STRIP-MCNC: NEGATIVE MG/DL
LEUKOCYTE ESTERASE UR QL STRIP: ABNORMAL
LYMPHOCYTES # BLD AUTO: 1.5 10E9/L (ref 0.8–5.3)
LYMPHOCYTES NFR BLD AUTO: 23.5 %
MCH RBC QN AUTO: 31.8 PG (ref 26.5–33)
MCHC RBC AUTO-ENTMCNC: 34.4 G/DL (ref 31.5–36.5)
MCV RBC AUTO: 93 FL (ref 78–100)
MONOCYTES # BLD AUTO: 0.9 10E9/L (ref 0–1.3)
MONOCYTES NFR BLD AUTO: 13.2 %
MUCOUS THREADS #/AREA URNS LPF: PRESENT /LPF
NEUTROPHILS # BLD AUTO: 4.1 10E9/L (ref 1.6–8.3)
NEUTROPHILS NFR BLD AUTO: 62.7 %
NITRATE UR QL: POSITIVE
NRBC # BLD AUTO: 0 10*3/UL
NRBC BLD AUTO-RTO: 0 /100
NT-PROBNP SERPL-MCNC: 367 PG/ML (ref 0–1800)
PH UR STRIP: 5 PH (ref 5–7)
PLATELET # BLD AUTO: 142 10E9/L (ref 150–450)
POTASSIUM SERPL-SCNC: 3.9 MMOL/L (ref 3.4–5.3)
RBC # BLD AUTO: 5.06 10E12/L (ref 4.4–5.9)
RBC #/AREA URNS AUTO: 80 /HPF (ref 0–2)
SODIUM SERPL-SCNC: 132 MMOL/L (ref 133–144)
SP GR UR STRIP: 1.01 (ref 1–1.03)
SPECIMEN SOURCE: ABNORMAL
SQUAMOUS #/AREA URNS AUTO: <1 /HPF (ref 0–1)
TROPONIN I SERPL-MCNC: 0.05 UG/L (ref 0–0.04)
TROPONIN I SERPL-MCNC: 0.06 UG/L (ref 0–0.04)
URN SPEC COLLECT METH UR: ABNORMAL
UROBILINOGEN UR STRIP-MCNC: 0 MG/DL (ref 0–2)
WBC # BLD AUTO: 6.5 10E9/L (ref 4–11)
WBC #/AREA URNS AUTO: 109 /HPF (ref 0–2)
WBC CLUMPS #/AREA URNS HPF: PRESENT /HPF

## 2017-03-18 PROCEDURE — 87186 SC STD MICRODIL/AGAR DIL: CPT | Performed by: PHYSICIAN ASSISTANT

## 2017-03-18 PROCEDURE — 25000131 ZZH RX MED GY IP 250 OP 636 PS 637: Performed by: PHYSICIAN ASSISTANT

## 2017-03-18 PROCEDURE — 80048 BASIC METABOLIC PNL TOTAL CA: CPT | Performed by: EMERGENCY MEDICINE

## 2017-03-18 PROCEDURE — 25000132 ZZH RX MED GY IP 250 OP 250 PS 637: Performed by: PHYSICIAN ASSISTANT

## 2017-03-18 PROCEDURE — 25000125 ZZHC RX 250: Performed by: EMERGENCY MEDICINE

## 2017-03-18 PROCEDURE — 71020 XR CHEST 2 VW: CPT

## 2017-03-18 PROCEDURE — 96374 THER/PROPH/DIAG INJ IV PUSH: CPT

## 2017-03-18 PROCEDURE — 36415 COLL VENOUS BLD VENIPUNCTURE: CPT | Performed by: EMERGENCY MEDICINE

## 2017-03-18 PROCEDURE — 84484 ASSAY OF TROPONIN QUANT: CPT | Performed by: PHYSICIAN ASSISTANT

## 2017-03-18 PROCEDURE — 12000007 ZZH R&B INTERMEDIATE

## 2017-03-18 PROCEDURE — 94640 AIRWAY INHALATION TREATMENT: CPT

## 2017-03-18 PROCEDURE — 00000146 ZZHCL STATISTIC GLUCOSE BY METER IP

## 2017-03-18 PROCEDURE — 87086 URINE CULTURE/COLONY COUNT: CPT | Performed by: PHYSICIAN ASSISTANT

## 2017-03-18 PROCEDURE — G0378 HOSPITAL OBSERVATION PER HR: HCPCS

## 2017-03-18 PROCEDURE — 87804 INFLUENZA ASSAY W/OPTIC: CPT | Performed by: PHYSICIAN ASSISTANT

## 2017-03-18 PROCEDURE — 85025 COMPLETE CBC W/AUTO DIFF WBC: CPT | Performed by: EMERGENCY MEDICINE

## 2017-03-18 PROCEDURE — 99223 1ST HOSP IP/OBS HIGH 75: CPT | Mod: AI | Performed by: PHYSICIAN ASSISTANT

## 2017-03-18 PROCEDURE — 25000128 H RX IP 250 OP 636: Performed by: PHYSICIAN ASSISTANT

## 2017-03-18 PROCEDURE — 36415 COLL VENOUS BLD VENIPUNCTURE: CPT | Performed by: PHYSICIAN ASSISTANT

## 2017-03-18 PROCEDURE — 87088 URINE BACTERIA CULTURE: CPT | Performed by: PHYSICIAN ASSISTANT

## 2017-03-18 PROCEDURE — 81001 URINALYSIS AUTO W/SCOPE: CPT | Performed by: EMERGENCY MEDICINE

## 2017-03-18 PROCEDURE — 83036 HEMOGLOBIN GLYCOSYLATED A1C: CPT | Performed by: PHYSICIAN ASSISTANT

## 2017-03-18 PROCEDURE — 93005 ELECTROCARDIOGRAM TRACING: CPT

## 2017-03-18 PROCEDURE — 83880 ASSAY OF NATRIURETIC PEPTIDE: CPT | Performed by: EMERGENCY MEDICINE

## 2017-03-18 PROCEDURE — 25000128 H RX IP 250 OP 636: Performed by: EMERGENCY MEDICINE

## 2017-03-18 PROCEDURE — 99285 EMERGENCY DEPT VISIT HI MDM: CPT | Mod: 25

## 2017-03-18 PROCEDURE — 84484 ASSAY OF TROPONIN QUANT: CPT | Performed by: EMERGENCY MEDICINE

## 2017-03-18 RX ORDER — LOSARTAN POTASSIUM 25 MG/1
50 TABLET ORAL DAILY
Status: DISCONTINUED | OUTPATIENT
Start: 2017-03-19 | End: 2017-03-31 | Stop reason: HOSPADM

## 2017-03-18 RX ORDER — ASPIRIN 81 MG/1
81 TABLET ORAL DAILY
Status: DISCONTINUED | OUTPATIENT
Start: 2017-03-19 | End: 2017-03-31 | Stop reason: HOSPADM

## 2017-03-18 RX ORDER — ALBUTEROL SULFATE 0.83 MG/ML
2.5 SOLUTION RESPIRATORY (INHALATION)
Status: DISCONTINUED | OUTPATIENT
Start: 2017-03-18 | End: 2017-03-18

## 2017-03-18 RX ORDER — DEXTROSE MONOHYDRATE 25 G/50ML
25-50 INJECTION, SOLUTION INTRAVENOUS
Status: DISCONTINUED | OUTPATIENT
Start: 2017-03-18 | End: 2017-03-31 | Stop reason: HOSPADM

## 2017-03-18 RX ORDER — LEVOFLOXACIN 5 MG/ML
750 INJECTION, SOLUTION INTRAVENOUS EVERY 24 HOURS
Status: DISCONTINUED | OUTPATIENT
Start: 2017-03-19 | End: 2017-03-18

## 2017-03-18 RX ORDER — SODIUM CHLORIDE 9 MG/ML
INJECTION, SOLUTION INTRAVENOUS CONTINUOUS
Status: DISCONTINUED | OUTPATIENT
Start: 2017-03-18 | End: 2017-03-20

## 2017-03-18 RX ORDER — TRAMADOL HYDROCHLORIDE 50 MG/1
50 TABLET ORAL 2 TIMES DAILY PRN
Status: DISCONTINUED | OUTPATIENT
Start: 2017-03-18 | End: 2017-03-31 | Stop reason: HOSPADM

## 2017-03-18 RX ORDER — NICOTINE POLACRILEX 4 MG
15-30 LOZENGE BUCCAL
Status: DISCONTINUED | OUTPATIENT
Start: 2017-03-18 | End: 2017-03-31 | Stop reason: HOSPADM

## 2017-03-18 RX ORDER — NITROGLYCERIN 0.4 MG/1
0.4 TABLET SUBLINGUAL EVERY 5 MIN PRN
Status: DISCONTINUED | OUTPATIENT
Start: 2017-03-18 | End: 2017-03-31 | Stop reason: HOSPADM

## 2017-03-18 RX ORDER — NORTRIPTYLINE HYDROCHLORIDE 50 MG/1
50 CAPSULE ORAL DAILY
Status: DISCONTINUED | OUTPATIENT
Start: 2017-03-19 | End: 2017-03-31 | Stop reason: HOSPADM

## 2017-03-18 RX ORDER — LEVOFLOXACIN 5 MG/ML
750 INJECTION, SOLUTION INTRAVENOUS
Status: DISCONTINUED | OUTPATIENT
Start: 2017-03-20 | End: 2017-03-19

## 2017-03-18 RX ORDER — LOSARTAN POTASSIUM 25 MG/1
50 TABLET ORAL DAILY
Status: DISCONTINUED | OUTPATIENT
Start: 2017-03-19 | End: 2017-03-18

## 2017-03-18 RX ORDER — ACETAMINOPHEN 325 MG/1
650 TABLET ORAL EVERY 4 HOURS PRN
Status: DISCONTINUED | OUTPATIENT
Start: 2017-03-18 | End: 2017-03-31 | Stop reason: HOSPADM

## 2017-03-18 RX ORDER — NALOXONE HYDROCHLORIDE 0.4 MG/ML
.1-.4 INJECTION, SOLUTION INTRAMUSCULAR; INTRAVENOUS; SUBCUTANEOUS
Status: DISCONTINUED | OUTPATIENT
Start: 2017-03-18 | End: 2017-03-31 | Stop reason: HOSPADM

## 2017-03-18 RX ORDER — CARVEDILOL 25 MG/1
25 TABLET ORAL 2 TIMES DAILY
Status: DISCONTINUED | OUTPATIENT
Start: 2017-03-19 | End: 2017-03-31 | Stop reason: HOSPADM

## 2017-03-18 RX ORDER — ALUMINA, MAGNESIA, AND SIMETHICONE 2400; 2400; 240 MG/30ML; MG/30ML; MG/30ML
15-30 SUSPENSION ORAL EVERY 4 HOURS PRN
Status: DISCONTINUED | OUTPATIENT
Start: 2017-03-18 | End: 2017-03-31 | Stop reason: HOSPADM

## 2017-03-18 RX ORDER — ATORVASTATIN CALCIUM 40 MG/1
40 TABLET, FILM COATED ORAL DAILY
Status: DISCONTINUED | OUTPATIENT
Start: 2017-03-19 | End: 2017-03-31 | Stop reason: HOSPADM

## 2017-03-18 RX ORDER — LEVOFLOXACIN 5 MG/ML
750 INJECTION, SOLUTION INTRAVENOUS ONCE
Status: COMPLETED | OUTPATIENT
Start: 2017-03-18 | End: 2017-03-18

## 2017-03-18 RX ORDER — ACETAMINOPHEN 650 MG/1
650 SUPPOSITORY RECTAL EVERY 4 HOURS PRN
Status: DISCONTINUED | OUTPATIENT
Start: 2017-03-18 | End: 2017-03-31 | Stop reason: HOSPADM

## 2017-03-18 RX ORDER — LIDOCAINE 40 MG/G
CREAM TOPICAL
Status: DISCONTINUED | OUTPATIENT
Start: 2017-03-18 | End: 2017-03-31 | Stop reason: HOSPADM

## 2017-03-18 RX ORDER — CLOPIDOGREL BISULFATE 75 MG/1
75 TABLET ORAL DAILY
Status: DISCONTINUED | OUTPATIENT
Start: 2017-03-19 | End: 2017-03-31 | Stop reason: HOSPADM

## 2017-03-18 RX ORDER — CARVEDILOL 25 MG/1
25 TABLET ORAL 2 TIMES DAILY
Status: DISCONTINUED | OUTPATIENT
Start: 2017-03-18 | End: 2017-03-18

## 2017-03-18 RX ADMIN — SODIUM CHLORIDE: 9 INJECTION, SOLUTION INTRAVENOUS at 20:47

## 2017-03-18 RX ADMIN — LEVOFLOXACIN 750 MG: 5 INJECTION, SOLUTION INTRAVENOUS at 18:36

## 2017-03-18 RX ADMIN — CARVEDILOL 25 MG: 25 TABLET, FILM COATED ORAL at 20:47

## 2017-03-18 RX ADMIN — INSULIN ASPART 1 UNITS: 100 INJECTION, SOLUTION INTRAVENOUS; SUBCUTANEOUS at 23:26

## 2017-03-18 RX ADMIN — ALBUTEROL SULFATE 2.5 MG: 2.5 SOLUTION RESPIRATORY (INHALATION) at 16:28

## 2017-03-18 ASSESSMENT — ENCOUNTER SYMPTOMS
DYSURIA: 0
NAUSEA: 0
HEADACHES: 0
LIGHT-HEADEDNESS: 0
COUGH: 1
BLOOD IN STOOL: 0
WEAKNESS: 1
ABDOMINAL PAIN: 0
MYALGIAS: 1
VOMITING: 0
HEMATURIA: 0
CHILLS: 0
SHORTNESS OF BREATH: 1
DIFFICULTY URINATING: 0
SORE THROAT: 0
DIARRHEA: 0
FEVER: 0
NUMBNESS: 0

## 2017-03-18 NOTE — ED NOTES
Patient presents with generalized weakness, specifically in his legs.  These symptoms have been occurring for the past three days.    Patient also has a frequent cough and some difficulty breathing.

## 2017-03-18 NOTE — IP AVS SNAPSHOT
` `     [place] [address] MEDICAL SURGICAL: [phone]            Medication Administration Report for Adam Huber as of 03/31/17 1505   Legend:    Given Hold Not Given Due Canceled Entry Other Actions    Time Time (Time) Time  Time-Action       Inactive    Active    Linked        Medications 03/25/17 03/26/17 03/27/17 03/28/17 03/29/17 03/30/17 03/31/17    acetaminophen (TYLENOL) Suppository 650 mg  Dose: 650 mg Freq: EVERY 4 HOURS PRN Route: RE  PRN Reason: mild pain  Start: 03/18/17 1957   Admin Instructions: Alternate ibuprofen (if ordered) with acetaminophen.  Not to exceed 4 gram/day.  Maximum acetaminophen dose from all sources = 75 mg/kg/day not to exceed 4 grams/day.               acetaminophen (TYLENOL) tablet 650 mg  Dose: 650 mg Freq: EVERY 4 HOURS PRN Route: PO  PRN Reason: mild pain  Start: 03/18/17 1957   Admin Instructions: Alternate ibuprofen (if ordered) with acetaminophen  Maximum acetaminophen dose from all sources = 75 mg/kg/day not to exceed 4 grams/day.     1127 (650 mg)-Given        1642 (650 mg)-Given         1753 (650 mg)-Given        1052 (650 mg)-Given       2040 (650 mg)-Given        2055 (650 mg)-Given            alum & mag hydroxide-simethicone (MYLANTA ES/MAALOX  ES) suspension 15-30 mL  Dose: 15-30 mL Freq: EVERY 4 HOURS PRN Route: PO  PRN Reasons: indigestion,heartburn  Start: 03/18/17 1957   Admin Instructions: Shake well.               amitriptyline (ELAVIL) tablet 100 mg  Dose: 100 mg Freq: AT BEDTIME PRN Route: PO  PRN Reason: sleep  Start: 03/26/17 1802     1944 (100 mg)-Given [C]        1924 (100 mg)-Given       (1942)-Not Given [C]        2251 (100 mg)-Given        2235 (100 mg)-Given             aspirin EC EC tablet 81 mg  Dose: 81 mg Freq: DAILY Route: PO  Start: 03/19/17 0900   Admin Instructions: DO NOT CRUSH.     0904 (81 mg)-Given        0747 (81 mg)-Given               1102 (81 mg)-Given        1056 (81 mg)-Given        0846 (81 mg)-Given        0835 (81  mg)-Given        0849 (81 mg)-Given           atorvastatin (LIPITOR) tablet 40 mg  Dose: 40 mg Freq: DAILY Route: PO  Start: 03/19/17 0900    0904 (40 mg)-Given        0747 (40 mg)-Given               1102 (40 mg)-Given        1106 (40 mg)-Given        0846 (40 mg)-Given        0835 (40 mg)-Given        0849 (40 mg)-Given           benzocaine-menthol (CHLORASEPTIC) 6-10 MG lozenge 1 lozenge  Dose: 1 lozenge Freq: EVERY 1 HOUR PRN Route: BU  PRN Reason: sore throat  Start: 03/24/17 1403    1127 (1 lozenge)-Given                 benztropine (COGENTIN) tablet 1-2 mg  Dose: 1-2 mg Freq: 3 TIMES DAILY PRN Route: PO  PRN Reason: other  PRN Comment: for extrapyramidal effects  Start: 03/27/17 1127              bisacodyl (DULCOLAX) Suppository 10 mg  Dose: 10 mg Freq: DAILY PRN Route: RE  PRN Reason: constipation  Start: 03/21/17 1148              carvedilol (COREG) tablet 25 mg  Dose: 25 mg Freq: 2 TIMES DAILY Route: PO  Start: 03/19/17 0900   Admin Instructions: Hold for sBP <110     0904 (25 mg)-Given       2052 (25 mg)-Given [C]        0747 (25 mg)-Given              1944 (25 mg)-Given [C]               1104 (25 mg)-Given       1924 (25 mg)-Given               1056 (25 mg)-Given       1937 (25 mg)-Given               0846 (25 mg)-Given       2031 (25 mg)-Given        0835 (25 mg)-Given       2051 (25 mg)-Given        0849 (25 mg)-Given       [ ] 2100           clopidogrel (PLAVIX) tablet 75 mg  Dose: 75 mg Freq: DAILY Route: PO  Start: 03/19/17 0900    0904 (75 mg)-Given        0748 (75 mg)-Given               1104 (75 mg)-Given        1055 (75 mg)-Given        0846 (75 mg)-Given        0835 (75 mg)-Given        0849 (75 mg)-Given           glipiZIDE (GLUCOTROL) tablet 5 mg  Dose: 5 mg Freq: EVERY MORNING BEFORE BREAKFAST Route: PO  Start: 03/30/17 0730   Admin Instructions: Take before or with meals.          0835 (5 mg)-Given        0849 (5 mg)-Given           glucose 40 % gel 15-30 g  Dose: 15-30 g Freq: EVERY 15 MIN  PRN Route: PO  PRN Reason: low blood sugar  Start: 03/18/17 2219   Admin Instructions: Give 15 g for BG 51 to 69 mg/dL IF patient is conscious and able to swallow. Give 30 g for BG less than or equal to 50 mg/dL IF patient is conscious and able to swallow. Do NOT give glucose gel via enteral tube.  IF patient has enteral tube: give apple juice 120 mL (4 oz or 15 g of CHO) via enteral tube for BG 51 to 69 mg/dL.  Give apple juice 240 mL (8 oz or 30 g of CHO) via enteral tube for BG less than or equal to 50 mg/dL.              Or  dextrose 50 % injection 25-50 mL  Dose: 25-50 mL Freq: EVERY 15 MIN PRN Route: IV  PRN Reason: low blood sugar  Start: 03/18/17 2219   Admin Instructions: Use if have IV access, BG less than 70 mg/dL and meet dose criteria below:  Dose if conscious and alert (or disorientated) and NPO = 25 mL  Dose if unconscious / not alert = 50 mL  Vesicant.              Or  glucagon injection 1 mg  Dose: 1 mg Freq: EVERY 15 MIN PRN Route: SC  PRN Reason: low blood sugar  PRN Comment: May repeat x 1 only  Start: 03/18/17 2219   Admin Instructions: May give SQ or IM. IF BG less than or equal to 50 mg/dL and no IV access.  ONLY use glucagon IF patient has NO IV access AND is UNABLE to swallow.               haloperidol lactate (HALDOL) injection 2 mg  Dose: 2 mg Freq: EVERY 6 HOURS PRN Route: IV/IM  PRN Reason: agitation  Start: 03/27/17 0318              insulin aspart (NovoLOG) inj (RAPID ACTING)  Dose: 1-5 Units Freq: AT BEDTIME Route: SC  Start: 03/18/17 2230   Admin Instructions: MEDIUM INSULIN RESISTANCE DOSING    Do Not give Bedtime Correction Insulin if BG less than  200.   For  - 249 give 1 units.   For  - 299 give 2 units.   For  - 349 give 3 units.   For  -399 give 4 units.   For BG greater than or equal to 400 give 5 units.  Notify provider if glucose greater than or equal to 350 mg/dL after administration of correction dose.  If given at mealtime, must be administered 5  "min before meal or immediately after.             (2120)-Not Given [C]        (2232)-Not Given [C]        (2106)-Not Given        (2245)-Not Given        2223 (2 Units)-Given [C]        [ ] 2200           insulin aspart (NovoLOG) inj (RAPID ACTING)  Dose: 1-7 Units Freq: 3 TIMES DAILY BEFORE MEALS Route: SC  Start: 03/19/17 0730   Admin Instructions: Correction Scale - MEDIUM INSULIN RESISTANCE DOSING     Do Not give Correction Insulin if Pre-Meal BG less than 140.   For Pre-Meal  - 189 give 1 unit.   For Pre-Meal  - 239 give 2 units.   For Pre-Meal  - 289 give 3 units.   For Pre-Meal  - 339 give 4 units.   For Pre-Meal - 399 give 5 units.   For Pre-Meal -449 give 6 units  For Pre-Meal BG greater than or equal to 450 give 7 units.   To be given with prandial insulin, and based on pre-meal blood glucose.    Notify provider if glucose greater than or equal to 350 mg/dL after administration of correction dose.  If given at mealtime, must be administered 5 min before meal or immediately after.     (0922)-Not Given       (1312)-Not Given       (1708)-Not Given [C]        (0811)-Not Given       (1241)-Not Given [C]       (1705)-Not Given [C]        (1053)-Not Given [C]       (1344)-Not Given [C]       (1728)-Not Given [C]        (0911)-Not Given [C]       (1227)-Not Given [C]       (1708)-Not Given [C]        (0848)-Not Given       1309 (1 Units)-Given       1753 (1 Units)-Given        (0829)-Not Given [C]       1253 (1 Units)-Given [C]       (1945)-Not Given        (0850)-Not Given [C]       1159 (2 Units)-Given [C]       [ ] 1700           lidocaine (LMX4) kit  Freq: EVERY 1 HOUR PRN Route: Top  PRN Reason: mild pain  PRN Comment: with VAD insertion or accessing implanted port,  Start: 03/18/17 1957   Admin Instructions: Do NOT give if patient has a history of allergy to any local anesthetic or any \"cassi\" product.   Apply 30 min prior to VAD insertion or port access.  MAX Dose:  2.5 " "gm (  of 5 gm tube)               lidocaine 1 % 1 mL  Dose: 1 mL Freq: EVERY 1 HOUR PRN Route: OTHER  PRN Comment: mild pain with VAD insertion or accessing implanted port,  Start: 03/18/17 1957   Admin Instructions: Do NOT give if patient has a history of allergy to any local anesthetic or any \"cassi\" product. MAX dose 1 mL subcutaneous OR intradermal in divided doses.               LORazepam (ATIVAN) tablet 0.5 mg  Dose: 0.5 mg Freq: EVERY 6 HOURS PRN Route: PO  PRN Reason: other  PRN Comment: restlessness  Start: 03/22/17 1215    0101 (0.5 mg)-Given        0207 (0.5 mg)-Given       2338 (0.5 mg)-Given        0710 (0.5 mg)-Given         1706 (0.5 mg)-Given             losartan (COZAAR) tablet 50 mg  Dose: 50 mg Freq: DAILY Route: PO  Start: 03/19/17 0900   Admin Instructions: Hold for SBP <120     0904 (50 mg)-Given        0747 (50 mg)-Given               1102 (50 mg)-Given        1055 (50 mg)-Given        0846 (50 mg)-Given        0835 (50 mg)-Given        0850 (50 mg)-Given           magnesium hydroxide (MILK OF MAGNESIA) suspension 15-30 mL  Dose: 15-30 mL Freq: DAILY PRN Route: PO  PRN Reason: constipation  Start: 03/21/17 1148         1023 (30 mL)-Given            melatonin tablet 1 mg  Dose: 1 mg Freq: AT BEDTIME PRN Route: PO  PRN Reasons: sleep,Insomnia  Start: 03/23/17 1830    0101 (1 mg)-Given                 naloxone (NARCAN) injection 0.1-0.4 mg  Dose: 0.1-0.4 mg Freq: EVERY 2 MIN PRN Route: IV  PRN Reason: opioid reversal  Start: 03/18/17 1957   Admin Instructions: For respiratory rate LESS than or EQUAL to 8.  Partial reversal dose:  0.1 mg titrated q 2 minutes for Analgesia Side Effects Monitoring Sedation Level of 3 (frequently drowsy, arousable, drifts to sleep during conversation).Full reversal dose:  0.4 mg bolus for Analgesia Side Effects Monitoring Sedation Level of 4 (somnolent, minimal or no response to stimulation).               nitroglycerin (NITROSTAT) sublingual tablet 0.4 mg  Dose: 0.4 " mg Freq: EVERY 5 MIN PRN Route: SL  PRN Reason: chest pain  Start: 03/18/17 1957   Admin Instructions: Maximum 3 doses in 15 minutes.  Notify MD if no relief after 3 doses.    Do NOT give nitroglycerin SL IF patient has received sildenafil (Viagra/Revatio) within the last 8 hours, avanafil (Stendra) within the last 8 hours, vardenafil (Levitra/Staxyn) with the last 18 hours, or tadalafil (Cialis/Adcirca) within the last 36 hours               nortriptyline (PAMELOR) capsule 50 mg  Dose: 50 mg Freq: DAILY Route: PO  Start: 03/19/17 0900    0935 (50 mg)-Given        0747 (50 mg)-Given               1101 (50 mg)-Given        1056 (50 mg)-Given        0846 (50 mg)-Given        0835 (50 mg)-Given        0849 (50 mg)-Given           QUEtiapine (SEROquel) quarter-tab 6.25 mg  Dose: 6.25 mg Freq: 2 TIMES DAILY PRN Route: PO  PRN Comment: agitation  Start: 03/30/17 1600              QUEtiapine (SEROquel) tablet 25 mg  Dose: 25 mg Freq: AT BEDTIME Route: PO  Start: 03/25/17 2200    2052 (25 mg)-Given [C]                       2132 (25 mg)-Given        1938 (25 mg)-Given [C]               2032 (25 mg)-Given               2051 (25 mg)-Given               [ ] 2200           sodium chloride (PF) 0.9% PF flush 3 mL  Dose: 3 mL Freq: EVERY 8 HOURS Route: IK  Start: 03/18/17 2015   Admin Instructions: And Q1H PRN, to lock peripheral IV dormant line.     (0248)-Not Given [C]       (0509)-Not Given       (0922)-Not Given       (1711)-Not Given        (0251)-Not Given [C]       (0840)-Not Given       (1642)-Not Given        (0049)-Not Given       (1109)-Not Given       (1654)-Not Given               (0912)-Not Given               (0441)-Not Given       (0847)-Not Given               (0211)-Not Given       (0735)-Not Given       (1650)-Not Given        (0301)-Not Given       0850 (3 mL)-Given       [ ] 1600           sodium chloride (PF) 0.9% PF flush 3 mL  Dose: 3 mL Freq: EVERY 1 HOUR PRN Route: IK  PRN Reasons: line flush,post meds  or blood draw  Start: 03/18/17 1957   Admin Instructions: for peripheral IV flush post IV meds               traMADol (ULTRAM) tablet 50 mg  Dose: 50 mg Freq: 2 TIMES DAILY PRN Route: PO  PRN Reason: moderate pain  Start: 03/18/17 1956             Discontinued Medications  Medications 03/25/17 03/26/17 03/27/17 03/28/17 03/29/17 03/30/17 03/31/17         Dose: 5 mg Freq: 2 TIMES DAILY BEFORE MEALS Route: PO  Start: 03/19/17 1630   End: 03/29/17 1352   Admin Instructions: Take before or with meals.     0904 (5 mg)-Given       (1559)-Not Given [C]       1604 (5 mg)-Given [C]        0746 (5 mg)-Given       1642 (5 mg)-Given        1103 (5 mg)-Given       1537 (5 mg)-Given        1056 (5 mg)-Given       1629 (5 mg)-Given        0846 (5 mg)-Given       1352-Med Discontinued           Dose: 2-4 mg Freq: EVERY 6 HOURS PRN Route: IV  PRN Reason: agitation  Start: 03/21/17 0133   End: 03/30/17 0847         0847-Med Discontinued          Dose: 6.25 mg Freq: 2 TIMES DAILY Route: PO  Start: 03/27/17 1130   End: 03/30/17 1545      1419 (6.25 mg)-Given       1725 (6.25 mg)-Given [C]               1108 (6.25 mg)-Given       1936 (6.25 mg)-Given               0846 (6.25 mg)-Given       2031 (6.25 mg)-Given        0835 (6.25 mg)-Given       1545-Med Discontinued

## 2017-03-18 NOTE — IP AVS SNAPSHOT
` [place] [address] MEDICAL SURGICAL: [phone]                                              INTERAGENCY TRANSFER FORM - NURSING   3/18/2017                    Hospital Admission Date: 3/18/2017  CHANI ELENA   : 1935  Sex: Male        Attending Provider: Eugene George MD     Allergies:  Lisinopril    Infection:  None   Service:  GENERAL MEDI    Ht:  1.829 m (6')   Wt:  97.6 kg (215 lb 3.2 oz)   Admission Wt:  97 kg (213 lb 13.5 oz)    BMI:  29.19 kg/m 2   BSA:  2.23 m 2            Patient PCP Information     Provider PCP Type    [facility]      Current Code Status     Date Active Code Status Order ID Comments User Context       Prior      Code Status History     Date Active Date Inactive Code Status Order ID Comments User Context    3/28/2017  3:16 PM  Full Code [id number]  Elias Boswell MD Outpatient    3/18/2017  8:00 PM 3/28/2017  3:16 PM Full Code [id number]  Ana Michelle PA-C Inpatient    10/9/2015 10:23 PM 10/13/2015  3:34 PM Full Code [id number]  Magdalena Braga PA-C Inpatient      Advance Directives        Does patient have a scanned Advance Directive/ACP document in EPIC?           No        Hospital Problems as of 3/31/2017              Priority Class Noted POA    Dehydration Medium  3/18/2017 Unknown    Elevated troponin Medium  3/18/2017 Unknown    Generalized muscle weakness Medium  3/18/2017 Unknown    Generalized weakness Medium  3/18/2017 Yes    Influenza B Medium  3/18/2017 Yes    Physical deconditioning Medium  3/28/2017 Unknown    Acute metabolic encephalopathy Medium  3/28/2017 Unknown      Non-Hospital Problems as of 3/31/2017              Priority Class Noted    ACS (acute coronary syndrome) (H) Medium  10/9/2015    NSTEMI (non-ST elevated myocardial infarction) (H) Medium  10/23/2015    History of CVA (cerebrovascular accident) Medium  10/23/2015    Essential hypertension Medium  10/23/2015    Acute chest pain Medium  10/23/2015    CAD  (coronary artery disease)~LEXI x2 placed proximal to mid RCA Medium  10/23/2015    Bleeding of blood vessel Medium  11/6/2015      Immunizations     Name Date      Influenza (High Dose) 3 valent vaccine 03/23/17     Pneumococcal 23 valent 03/23/17          END      ASSESSMENT     Discharge Profile Flowsheet     EXPECTED DISCHARGE      services offered to the patient? (Install  services phone or TTY, if applicable)  yes 03/18/17 2042    Expected Discharge Date  03/29/17 (OVER<DRG 2.7<home/wife- apartment) 03/27/17 1300   Did the patient decline [place]  and sign paper waiver form? (Place signed waiver form on chart)  no 03/18/17 2042    DISCHARGE NEEDS ASSESSMENT     FINAL RESOURCES      Patient/family verbalizes understanding of discharge plan recommendations?  Yes 03/21/17 1500   PAS Number  [id number] 03/26/17 0949    Anticipated Changes Related to Illness  -- (Inability to ambulate due to weakness) 03/18/17 2042   Referrals Placed  TCU 03/26/17 0949    Equipment Currently Used at Home  cane, straight 03/19/17 1842   SKIN      Transportation Available  car;family or friend will provide 03/19/17 1842   Inspection  Full 03/31/17 0945    GASTROINTESTINAL (ADULT,PEDIATRIC,OB)     Skin WDL  ex 03/31/17 0945    GI WDL  WDL 03/31/17 0945   Skin Color/Characteristics  bruised (ecchymotic) 03/30/17 1658    Abdominal Appearance  obese 03/31/17 0945   Skin Temperature  warm 03/31/17 0945    Last Bowel Movement  03/30/17 03/31/17 0945   Skin Moisture  dry 03/31/17 0945    GI Signs/Symptoms  constipation 03/21/17 2112   Skin Integrity  bruise(s) 03/30/17 1658    Passing flatus  yes 03/30/17 1658   Skin areas NOT inspected  Coccyx;Sacrum (pt refused) 03/30/17 1417    COMMUNICATION ASSESSMENT     SAFETY      Patient's communication style  spoken language (non-English) (Barbadian speaking) 03/18/17 2042   Safety WDL  WDL 03/31/17 0945    Patient's primary language  Barbadian 03/18/17 2042   Safety  "Factors  bed in low position 03/30/17 2225                 Assessment WDL (Within Defined Limits) Definitions           Safety WDL     Effective: 09/28/15    Row Information: <b>WDL Definition:</b> Bed in low position, wheels locked; call light in reach; upper side rails up x 2; ID band on<br> <font color=\"gray\"><i>Item=AS safety wdl>>List=AS safety wdl>>Version=F14</i></font>      Skin WDL     Effective: 09/28/15    Row Information: <b>WDL Definition:</b> Warm; dry; intact; elastic; without discoloration; pressure points without redness<br> <font color=\"gray\"><i>Item=AS skin wdl>>List=AS skin wdl>>Version=F14</i></font>      Vitals     Vital Signs Flowsheet     VITAL SIGNS     ANALGESIA SIDE EFFECTS MONITORING      Temp  98.6  F (37  C) 03/31/17 0830   Side Effects Monitoring: Respiratory Quality  R 03/31/17 0835    Temp src  Oral 03/31/17 0830   Side Effects Monitoring: Respiratory Depth  N 03/31/17 0835    Resp  16 03/31/17 0830   Side Effects Monitoring: Sedation Level  1 03/31/17 0835    Pulse  81 03/29/17 2337   POINT OF CARE TESTING      Heart Rate  94 03/31/17 0830   Puncture Site  fingertip 03/28/17 0923    Pulse/Heart Rate Source  Monitor 03/31/17 0830   Bedside Glucose (mg/dl )   69 mg/dl 03/28/17 0923    BP  164/86 03/31/17 0830   HEIGHT AND WEIGHT      BP Location  Right arm 03/31/17 0830   Height  1.829 m (6') 03/18/17 1532    OXYGEN THERAPY     Height Method  Stated 03/18/17 1532    SpO2  95 % 03/31/17 0830   Weight  97.6 kg (215 lb 3.2 oz) (bedscale) 03/31/17 0640    O2 Device  None (Room air) 03/31/17 0830   BSA (Calculated - sq m)  2.22 03/18/17 1532    Oxygen Delivery  2 LPM 03/26/17 1943   BMI (Calculated)  29.06 03/18/17 1532    PAIN/COMFORT     POSITIONING      Patient Currently in Pain  no 03/31/17 0835   Body Position  independently positioning 03/31/17 0640    Preferred Pain Scale  word (verbal rating pain scale) 03/31/17 0835   Head of Bed (HOB)  HOB at 15 degrees 03/31/17 0640    " Patient's Stated Pain Goal  No pain 03/31/17 0835   Positioning/Transfer Devices  pillows;in use 03/30/17 2225    0-10 Pain Scale  0 03/31/17 0835   Chair  Recline and up in chair 03/28/17 0921    Word Pain Scale  0 03/28/17 1012   DAILY CARE      Pain Location  Head 03/26/17 1642   Activity Type  ambulated to bathroom;up in chair 03/31/17 0945    Pain Descriptors  Headache 03/24/17 1548   Activity Level of Assistance  assistance, 1 person 03/31/17 0945    Pain Intervention(s)  Medication (See eMAR) 03/24/17 2049   Activity Assistive Device  gait belt;walker 03/31/17 0945    Response to Interventions  Relief 03/24/17 2049                 Patient Lines/Drains/Airways Status    Active LINES/DRAINS/AIRWAYS     Name: Placement date: Placement time: Site: Days: Last dressing change:    Incision/Surgical Site 10/13/15 Right Groin 10/13/15   0751    535             Patient Lines/Drains/Airways Status    Active PICC/CVC     None            Intake/Output Detail Report     Date Intake     Output Net    Shift P.O. I.V. IV Piggyback Total Urine Total       Day 03/30/17 0700 - 03/30/17 1459 -- -- -- -- -- -- 0    Ute 03/30/17 1500 - 03/30/17 2259 200 -- -- 200 -- -- 200    Noc 03/30/17 2300 - 03/31/17 0659 -- -- -- -- 100 100 -100    Day 03/31/17 0700 - 03/31/17 1459 120 -- -- 120 -- -- 120    Ute 03/31/17 1500 - 03/31/17 2259 -- -- -- -- -- -- 0      Last Void/BM       Most Recent Value    Urine Occurrence 1 at 03/31/2017 1227    Stool Occurrence 1 at 03/30/2017 0838      Case Management/Discharge Planning     Case Management/Discharge Planning Flowsheet     REFERRAL INFORMATION     Patient/family verbalizes understanding of discharge plan recommendations?  Yes 03/21/17 1500    Did the Initial Social Work Assessment result in a Social Work Case?  Yes 03/21/17 1445   Anticipated Changes Related to Illness  -- (Inability to ambulate due to weakness) 03/18/17 2042    Arrived From  home or self-care 03/18/17 2042   Transportation  Available  car;family or friend will provide 03/19/17 1842     Assigned to Case  Raissa Cabral 03/21/17 1445   PATIENT PLACEMENT INFORMATION      LIVING ENVIRONMENT     Did the patient choose [facility]?  No 03/26/17 0922    Lives With  spouse 03/19/17 1842   Placement Choice Reason  location 03/26/17 0922    Living Arrangements  apartment 03/19/17 1842   [facility]  [facility] 03/26/17 0924    Quality Of Family Relationships  supportive;involved 03/21/17 1500   [facility] 2 Name  [facility] 03/26/17 0922    ASSESSMENT OF FAMILY/SOCIAL SUPPORT     Facility 3 Name  [facility] 03/26/17 0922    Marital Status   03/21/17 1500   Facility 3 Reason for Decline  Bed not available 03/26/17 0922    Who is your support system?  Wife;Children 03/21/17 1500   FINAL RESOURCES      Description of Support System  Supportive;Involved 03/21/17 1500   Equipment Currently Used at Home  cane, straight 03/19/17 1842    Quality of Family Relationships  supportive;involved 03/21/17 1500   PAS Number  [id number] 03/26/17 0949    EMPLOYMENT     Referrals Placed  [facility] 03/26/17 0949    Do you work full or part-time?  no 03/21/17 1500   ABUSE RISK SCREEN      COPING/STRESS     QUESTION TO PATIENT:  Has a member of your family or a partner(now or in the past) intimidated, hurt, manipulated, or controlled you in any way?  no 03/18/17 2042    Major Change/Loss/Stressor  medical condition/diagnosis 03/18/17 2046   QUESTION TO PATIENT: Do you feel safe going back to the place where you are living?  yes 03/18/17 2042    COPING/STRESS CAREGIVER     OBSERVATION: Is there reason to believe there has been maltreatment of a vulnerable adult (ie. Physical/Sexual/Emotional abuse, self neglect, lack of adequate food, shelter, medical care, or financial exploitation)?  no 03/18/17 2042    Sources Of Support  adult child(isabella) 03/21/17 1500   (R) MENTAL HEALTH SUICIDE RISK      EXPECTED DISCHARGE     Are you  depressed or being treated for depression?  No 03/18/17 2042    Expected Discharge Date  03/29/17 (OVER<DRG 2.7<home/wife- apartment) 03/27/17 1300   HOMICIDE RISK      DISCHARGE PLANNING     Homicidal Ideation  no 03/18/17 8672

## 2017-03-18 NOTE — IP AVS SNAPSHOT
[place] [address] [facility]    [address]    [facility] [address]    Phone:  [phone]    Fax:  [phone]                                       After Visit Summary   3/18/2017    Adam Huber    MRN: [id number]           After Visit Summary Signature Page     I have received my discharge instructions, and my questions have been answered. I have discussed any challenges I see with this plan with the nurse or doctor.    ..........................................................................................................................................  Patient/Patient Representative Signature      ..........................................................................................................................................  Patient Representative Print Name and Relationship to Patient    ..................................................               ................................................  Date                                            Time    ..........................................................................................................................................  Reviewed by Signature/Title    ...................................................              ..............................................  Date                                                            Time

## 2017-03-18 NOTE — IP AVS SNAPSHOT
[place] [address] [facility]: [phone]                                              INTERAGENCY TRANSFER FORM - PHYSICIAN ORDERS   3/18/2017                    Hospital Admission Date: 3/18/2017  CHANI ELENA   : 1935  Sex: Male        Attending Provider: Eugene George MD     Allergies:  Lisinopril    Infection:  None   Service:  GENERAL MEDI    Ht:  1.829 m (6')   Wt:  97.6 kg (215 lb 3.2 oz)   Admission Wt:  97 kg (213 lb 13.5 oz)    BMI:  29.19 kg/m 2   BSA:  2.23 m 2            Patient PCP Information     Provider PCP Type    [facility]      ED Clinical Impression     Diagnosis Description Comment Added By Time Added    Generalized muscle weakness [M62.81] Generalized muscle weakness [M62.81]  Javy Krishnamurthy MD 3/18/2017  5:43 PM    Dehydration [E86.0] Dehydration [E86.0]  Javy Krishnamurthy MD 3/18/2017  5:58 PM    Elevated troponin [R79.89] Elevated troponin [R79.89]  Javy Krishnamurthy MD 3/18/2017  5:59 PM      Hospital Problems as of 3/31/2017              Priority Class Noted POA    Dehydration Medium  3/18/2017 Unknown    Elevated troponin Medium  3/18/2017 Unknown    Generalized muscle weakness Medium  3/18/2017 Unknown    Generalized weakness Medium  3/18/2017 Yes    Influenza B Medium  3/18/2017 Yes    Physical deconditioning Medium  3/28/2017 Unknown    Acute metabolic encephalopathy Medium  3/28/2017 Unknown      Non-Hospital Problems as of 3/31/2017              Priority Class Noted    ACS (acute coronary syndrome) (H) Medium  10/9/2015    NSTEMI (non-ST elevated myocardial infarction) (H) Medium  10/23/2015    History of CVA (cerebrovascular accident) Medium  10/23/2015    Essential hypertension Medium  10/23/2015    Acute chest pain Medium  10/23/2015    CAD (coronary artery disease)~LEXI x2 placed proximal to mid RCA Medium  10/23/2015    Bleeding of blood vessel Medium  2015      Code Status History     Date Active Date Inactive Code Status  Order ID Comments User Context    3/28/2017  3:16 PM  Full Code [id number]  Elias Boswell MD Outpatient    3/18/2017  8:00 PM 3/28/2017  3:16 PM Full Code [id number]  Ana Michelle PA-C Inpatient    10/9/2015 10:23 PM 10/13/2015  3:34 PM Full Code [id number]  Magdalena Braga PA-C Inpatient         Medication Review      START taking        Dose / Directions Comments    acetaminophen 325 MG tablet   Commonly known as:  TYLENOL   Used for:  History of CVA (cerebrovascular accident)        Dose:  650 mg   Take 2 tablets (650 mg) by mouth every 4 hours as needed for mild pain   Quantity:  100 tablet   Refills:  0        glipiZIDE 2.5 MG 24 hr tablet   Commonly known as:  glipiZIDE XL   Used for:  Type 2 diabetes mellitus with complication, without long-term current use of insulin (H)        Dose:  2.5 mg   Take 1 tablet (2.5 mg) by mouth daily   Refills:  0        melatonin 1 MG Tabs tablet        Dose:  1 mg   Take 1 tablet (1 mg) by mouth nightly as needed   Quantity:  30 tablet   Refills:  0        * QUEtiapine 25 MG tablet   Commonly known as:  SEROquel        Dose:  25 mg   Take 1 tablet (25 mg) by mouth At Bedtime for 10 days   Refills:  0        * QUEtiapine 25 MG tablet   Commonly known as:  SEROquel        Dose:  6.25 mg   Take 0.25 tablets (6.25 mg) by mouth 2 times daily as needed   Refills:  0        * Notice:  This list has 2 medication(s) that are the same as other medications prescribed for you. Read the directions carefully, and ask your doctor or other care provider to review them with you.      CONTINUE these medications which may have CHANGED, or have new prescriptions. If we are uncertain of the size of tablets/capsules you have at home, strength may be listed as something that might have changed.        Dose / Directions Comments    amitriptyline 50 MG tablet   Commonly known as:  ELAVIL   This may have changed:  how much to take        Dose:  100 mg   Take 2 tablets (100 mg) by mouth At  Bedtime Per Family, usually takes 1 or 2 tabs every night.  Rarely 3 tabs - if 3 tabs are used pt is too drowsy following morning.   Quantity:  30 tablet   Refills:  0        carvedilol 25 MG tablet   Commonly known as:  COREG   This may have changed:    - medication strength  - Another medication with the same name was removed. Continue taking this medication, and follow the directions you see here.   Used for:  NSTEMI (non-ST elevated myocardial infarction) (H)        Dose:  25 mg   Take 1 tablet (25 mg) by mouth 2 times daily   Quantity:  60 tablet   Refills:  0        traMADol 50 MG tablet   Commonly known as:  ULTRAM   This may have changed:    - how much to take  - additional instructions   Used for:  Back pain, unspecified back location, unspecified back pain laterality, unspecified chronicity        Dose:  25 mg   Take 0.5 tablets (25 mg) by mouth 2 times daily as needed For headache   Quantity:  15 tablet   Refills:  0          CONTINUE these medications which have NOT CHANGED        Dose / Directions Comments    aspirin 81 MG EC tablet   Used for:  NSTEMI (non-ST elevated myocardial infarction) (H)        Take one tablet daily   Quantity:  90 tablet   Refills:  3        atorvastatin 40 MG tablet   Commonly known as:  LIPITOR   Used for:  NSTEMI (non-ST elevated myocardial infarction) (H)        Dose:  40 mg   Take 1 tablet (40 mg) by mouth daily   Quantity:  90 tablet   Refills:  1        clopidogrel 75 MG tablet   Commonly known as:  PLAVIX   Used for:  NSTEMI (non-ST elevated myocardial infarction) (H)        Dose:  75 mg   Take 1 tablet (75 mg) by mouth daily   Quantity:  90 tablet   Refills:  1        COZAAR PO        Dose:  50 mg   Take 50 mg by mouth daily   Refills:  0        DOC-Q-LACE PO        Dose:  100 mg   Take 100 mg by mouth 2 times daily   Refills:  0        nitroglycerin 0.4 MG sublingual tablet   Commonly known as:  NITROSTAT   Used for:  NSTEMI (non-ST elevated myocardial infarction)  (H), Essential hypertension, Postsurgical percutaneous transluminal coronary angioplasty status        Dose:  0.4 mg   Place 1 tablet (0.4 mg) under the tongue every 5 minutes as needed for chest pain   Quantity:  25 tablet   Refills:  1        NORTRIPTYLINE HCL PO        Dose:  50 mg   Take 50 mg by mouth daily   Refills:  0        polyethylene glycol powder   Commonly known as:  MIRALAX/GLYCOLAX        Dose:  1 capful   Take 1 capful by mouth daily as needed   Refills:  0                Summary of Visit     Reason for your hospital stay       Influenza B infection, generalized deconditioning, dysphagia             After Care     Activity - Up with nursing assistance           Advance Diet as Tolerated       Follow this diet upon discharge: Orders Placed This Encounter      Room Service      Combination Diet Dysphagia Diet Level 2: Mechan Altered; Thin Liquids (water, ice chips, juice, milk gelatin, ice cream, etc); Low Saturated Fat Na <2400mg Diet, No Caffeine Diet       Fall precautions           General info for SNF       Length of Stay Estimate: Short Term Care: Estimated # of Days <30  Condition at Discharge: Stable  Level of care:skilled   Rehabilitation Potential: Good  Admission H&P remains valid and up-to-date: Yes  Recent Chemotherapy: N/A  Use Nursing Home Standing Orders: Yes       Glucose monitor nursing POCT       Before meals and at bedtime       Mantoux instructions       Give two-step Mantoux (PPD) Per Facility Policy Yes       Mantoux instructions       Give two-step Mantoux (PPD) Per Facility Policy Yes             Referrals     Occupational Therapy Adult Consult       Evaluate and treat as clinically indicated.       Physical Therapy Adult Consult       Evaluate and treat as clinically indicated.       Speech Language Path Adult Consult       Evaluate and treat as clinically indicated.             Follow-Up Appointment Instructions     Future Labs/Procedures    Follow Up and recommended labs  and tests     Comments:    Follow up with Nursing home physician.  No follow up labs or test are needed.  Follow up with primary care provider in 14 days.  No follow up labs or test are needed.      Follow-Up Appointment Instructions     Follow Up and recommended labs and tests       Follow up with Nursing home physician.  No follow up labs or test are needed.  Follow up with primary care provider in 14 days.  No follow up labs or test are needed.             Statement of Approval     Ordered          03/31/17 1141  I have reviewed and agree with all the recommendations and orders detailed in this document.  EFFECTIVE NOW     Approved and electronically signed by:  Eveline Nix MD           03/28/17 1516  I have reviewed and agree with all the recommendations and orders detailed in this document.  EFFECTIVE NOW     Approved and electronically signed by:  Elias Boswell MD

## 2017-03-18 NOTE — IP AVS SNAPSHOT
` ` Patient Information     Patient Name Sex Adam Liao ([id number]) Male 1935       Room Bed    347      Patient Demographics     Address Phone    [address]  [place] [address] [phone] (Home) *Preferred*  none (Work)  none (Mobile)      Patient Ethnicity & Race     Ethnic Group Patient Race    Croatian White      Emergency Contact(s)     Name Relation Home Work Mobile    Josefa Maldonado [phone] none [phone]      Documents on File        Status Date Received Description       Documents for the Patient    Affiliate Privacy placeholder   phase3    Consent for Services - Hospital/Clinic Received () 10/09/15     Consent for EHR Access Received 10/09/15     Privacy Notice - [place] Received 10/09/15     Insurance Card Received () 10/09/15     External Medication Information Consent Accepted 10/26/15     Patient ID Received 17 [place] ID Lifetime    [facility] Specified Other       Consent for Services/Privacy Notice - Hospital/Clinic Received () 16     Insurance Card Received 17     HIM MEL Authorization  16 [facility]    HIM MEL Authorization  10/06/16 [facility]    Consent for Services/Privacy Notice - Hospital/Clinic Received 17     Insurance Card Received (Deleted) 16        Documents for the Encounter    CMS IM for Patient Signature         Admission Information     Attending Provider Admitting Provider Admission Type Admission Date/Time    Eugene George MD Galindez, Al Gilbert, MD Emergency 17  1524    Discharge Date Hospital Service Auth/Cert Status Service Area     General Medicine [facility]    Unit Room/Bed Admission Status        3 MEDICAL SURGICAL  Admission (Confirmed)       Admission     Complaint    Generalized weakness, Influenza B      Hospital Account     Name Acct ID Class Status Primary Coverage     Adam Huber [id number] Inpatient [facility]            Guarantor Account (for Hospital Account #[id number])     Name Relation to Pt Service Area Active? Acct Type    Adam Huber  FCS Yes Personal/Family    Address Phone          [address]  [place] [address] [phone](H)  none(O)              Coverage Information (for Hospital Account #[id number])     F/O Payor/Plan Precert #    [employer]     Subscriber Subscriber #    Adma Huber [id number]    Address Phone    [address]  [place] [address] [phone]

## 2017-03-18 NOTE — PHARMACY-ADMISSION MEDICATION HISTORY
ADDENDUM #2    Please note, pt takes Carvedilol 6.25mg BID    _________________________________________________________________________________________________________________    ADDENDUM:    Post clarification with family, pt does take Amitriptyline 50mg tabs: Per Family, usually takes 1 or 2 tabs every night.  Rarely 3 tabs - if 3 tabs are used pt is too drowsy following morning.      Gabe Jlaloh, [facility]    __________________________________________________________________________________________________________    Admission medication history interview status for this patient is complete. See [facility] admission navigator for allergy information, prior to admission medications and immunization status.     Medication history interview source(s):Family  Medication history resources (including written lists, pill bottles, clinic record): written list  Primary pharmacy: [facility]    Changes made to [facility] medication list:  Added: nortriptyline  Deleted: amitriptyline, fioricet, levofloxacin  Changed: carvedilol (inc from 6.25 mg -> 25 mg), losartan (inc from 25 mg -> 50 mg)    Actions taken by pharmacist (provider contacted, etc): Reviewed med list with wife using [employer]  services.     Additional medication history information:None    Medication reconciliation/reorder completed by provider prior to medication history? No    Prior to Admission medications    Medication Sig Last Dose Taking? Auth Provider   Carvedilol (COREG PO) Take 25 mg by mouth 2 times daily 3/18/2017 at 1100 Yes Reported, Patient   Losartan Potassium (COZAAR PO) Take 50 mg by mouth daily 3/18/2017 at 1100 Yes Reported, Patient   NORTRIPTYLINE HCL PO Take 50 mg by mouth daily 3/18/2017 at 1100 Yes Reported, Patient   atorvastatin (LIPITOR) 40 MG tablet Take 1 tablet (40 mg) by mouth daily 3/18/2017 at 1100 Yes Eric Avalos MD   aspirin 81 MG EC tablet Take one tablet daily 3/18/2017 at 1100 Yes Eric Avalos MD    clopidogrel (PLAVIX) 75 MG tablet Take 1 tablet (75 mg) by mouth daily 3/18/2017 at 1100 Yes Eric Avalos MD   Docusate Sodium (DOC-Q-LACE PO) Take 100 mg by mouth 2 times daily Unknown  Reported, Patient   nitroglycerin (NITROSTAT) 0.4 MG SL tablet Place 1 tablet (0.4 mg) under the tongue every 5 minutes as needed for chest pain More than a month at Unknown time  Laura Valerio, APRN CNP   TRAMADOL HCL PO Take 100 mg by mouth 2 times daily as needed  Unknown  Reported, Patient   polyethylene glycol (MIRALAX/GLYCOLAX) powder Take 1 capful by mouth daily as needed  Unknown  Unknown, Entered By History

## 2017-03-18 NOTE — Clinical Note
Admitting Physician: WILBERT AVILA [[id number]]   [id number]   Bed Type: Adult Med/Surg [46]   Special needs: Fall Risk [8]   Bed request comments: 2/PER TX, [place] SPEAKING

## 2017-03-18 NOTE — ED PROVIDER NOTES
History     Chief Complaint:  Generalized Weakness and Cough    HPI   Patient history translated by the patient's grandson present at bedside.     Adam Huber is a 81 year old male with a history of stoke in November 2015 with residual generalized weakness and NSTEMI s/p stent placement x2 who presents with a cough and generalized weakness. The patient's wife reports that the patient developed a cough and some difficulty breathing a week ago. She states that his cough has been gradually worsening and now he is generally weak, prompting him to come to the ED for evaluation. On arrival to the ED, the patient's wife reports that the patient has a wet productive cough and has been a little short of breath. She states that the patient is generally weak, worse in his lower extremities. He has had difficulty getting around and has not been ambulatory due to the weakness. The patient's wife notes that the patient had pneumonia in January and had similar cough and breathing difficulties. The patient denies any chest pain, fever, diarrhea, headache, or numbness.    Allergies:  Lisinopril     Medications:    atorvastatin (LIPITOR) 40 MG tablet  aspirin 81 MG EC tablet  clopidogrel (PLAVIX) 75 MG tablet  Docusate Sodium (DOC-Q-LACE PO)  losartan (COZAAR) 25 MG tablet  carvedilol (COREG) 6.25 MG tablet  TRAMADOL HCL PO  butalbital-acetaminophen-caffeine (FIORICET, ESGIC) -40 MG per tablet  polyethylene glycol (MIRALAX/GLYCOLAX) powder  AMITRIPTYLINE HCL PO  nitroglycerin (NITROSTAT) 0.4 MG SL tablet    Past Medical History:    CAD  Hypertension  CVA  Hyperlipidemia  NSTEMI    Past Surgical History:    Heart cath right and left   Cholecystectomy  Prostate removal     Family History:    History reviewed. No pertinent family history.     Social History:  Smoking status: No  Alcohol use: No  Marital Status:   [2]     Review of Systems   Constitutional: Negative for chills and fever.   HENT: Negative for sore  throat.    Respiratory: Positive for cough and shortness of breath.    Cardiovascular: Negative for chest pain and leg swelling.   Gastrointestinal: Negative for abdominal pain, blood in stool, diarrhea, nausea and vomiting.   Genitourinary: Negative for difficulty urinating, dysuria and hematuria.   Musculoskeletal: Positive for myalgias.   Neurological: Positive for weakness. Negative for light-headedness, numbness and headaches.   All other systems reviewed and are negative.      Physical Exam     Patient Vitals for the past 24 hrs:   Temp Temp src Pulse Heart Rate Resp SpO2 Height Weight   03/18/17 1530 98.1  F (36.7  C) Oral 89 89 18 95 % 1.829 m (6') 97 kg (213 lb 13.5 oz)       Physical Exam  Constitutional: Patient is well appearing. No distress.  Head: Atraumatic.  Mouth/Throat: Oropharynx is clear and moist. No oropharyngeal exudate.  Eyes: Conjunctivae and EOM are normal. No scleral icterus.  Neck: Normal range of motion. Neck supple.   Cardiovascular: Normal rate, regular rhythm, normal heart sounds and intact distal pulses.   Pulmonary/Chest: Diminished right lung base. No respiratory distress.  Abdominal: Soft. Bowel sounds are normal. No distension. No tenderness. No rebound or guarding.   Musculoskeletal: Normal range of motion. No edema or tenderness.   Neurological: Alert and orientated to person, place, and time. No observable focal neuro deficit  Skin: Warm and dry. No rash noted. Not diaphoretic.     Emergency Department Course   ECG (15:57:56):  Rate 83 bpm. ND interval 160. QRS duration 94. QT/QTc 378/444. P-R-T axes 45 84 80. Normal sins rhythm.Normal ECG   Interpreted at 1601 by Javy Krishnamurthy MD.    Imaging:  Radiographic findings were communicated with the patient who voiced understanding of the findings.    X-ray Chest, 2 views:  No acute infiltrates identified. No significant change.    Result per radiology.     Laboratory:  Troponin 0.051(H)  Nt probnp: 367  CBC: (L), o/w WNL  (WBC 6.5, HGB 16.1)   BMP: (L), Glucose 269(H), Creatinine 1.50(H), GFR 45(L), Calcium 8.0(L),o/w WNL     Interventions:  1628: Albuterol neb 2.5 mg nebulization   Levaquin 750 mg IV    Emergency Department Course:  Past medical records, nursing notes, and vitals reviewed.  1534: I performed an exam of the patient and obtained history, as documented above.  IV inserted and blood drawn.  ECG ordered, results above.  The patient was sent for a chest x-ray while in the emergency department, findings above.    1742: I spoke to ROZINA Saeed of the hospitalist service who accepts the patient for admission on behalf of Dr. Jacob.     1751: I rechecked the patient. Explained findings to the patient and family.    Findings and plan explained to the Patient who consents to admission.  Discussed the patient with ROZINA Saeed, who will admit the patient to a med tele bed for further monitoring, evaluation, and treatment.     Impression & Plan      Medical Decision Making:  Deconditioned smells of incontinence of urine with reported hematuria at times per wife and a possible source of infection to attribute to weakness.  Also cough for one week without any chest pain.  Dehydration and acute renal failure with elevated trop.  Already on daily aspirin.  Needs admit for multisystem monitoring and fluid hydration and IV abx.    I spoke to Ana Michelle of the hospitalist service who agrees to admit the patient for continued evaluation and treatment.     Diagnosis:  1.Generalized muscle weakness  2.Dehydration  3.Elevated troponin     Disposition: Admitted to a med tele bed under the care of Dr. Jacob and ROZINA Saeed.    Damaris Dunn  3/18/2017   [facility] EMERGENCY DEPARTMENT    I, Damaris Dunn, am serving as a scribe at 3:34 PM on 3/18/2017 to document services personally performed by Javy Krishnamurthy MD based on my observations and the provider's statements to me.        Raghu  Javy ROSEN MD  03/18/17 8105

## 2017-03-18 NOTE — IP AVS SNAPSHOT
[place] [address] [facility]: [phone]                                              INTERAGENCY TRANSFER FORM - LAB / IMAGING / EKG / EMG RESULTS   3/18/2017                    Hospital Admission Date: 3/18/2017  CHANI ELENA   : 1935  Sex: Male        Attending Provider: Eugene George MD     Allergies:  Lisinopril    Infection:  None   Service:  GENERAL MEDI    Ht:  1.829 m (6')   Wt:  97.6 kg (215 lb 3.2 oz)   Admission Wt:  97 kg (213 lb 13.5 oz)    BMI:  29.19 kg/m 2   BSA:  2.23 m 2            Patient PCP Information     Provider PCP Type    [facility]         Lab Results - 3 Days      Glucose by meter [[id number]] (Abnormal)  Resulted: 17 1241, Result status: Final result    Ordering provider: Eugene George MD  17 1149 Resulting lab: POINT OF CARE TEST, GLUCOSE    Specimen Information    Type Source Collected On     17 1149          Components       Value Reference Range Flag Lab   Glucose 228 70 - 99 mg/dL H 170            Glucose by meter [[id number]]  Resulted: 17 0846, Result status: Final result    Ordering provider: Eugene George MD  17 0842 Resulting lab: POINT OF CARE TEST, GLUCOSE    Specimen Information    Type Source Collected On     17 0842          Components       Value Reference Range Flag Lab   Glucose 75 70 - 99 mg/dL  170            Glucose by meter [[id number]] (Abnormal)  Resulted: 17 2216, Result status: Final result    Ordering provider: Eugene George MD  17 Resulting lab: POINT OF CARE TEST, GLUCOSE    Specimen Information    Type Source Collected On     17          Components       Value Reference Range Flag Lab   Glucose 255 70 - 99 mg/dL H 170            Glucose by meter [[id number]]  Resulted: 17 1731, Result status: Final result    Ordering provider: Eugene George MD  17 172 Resulting lab: POINT OF CARE TEST, GLUCOSE    Specimen  Information    Type Source Collected On     03/30/17 1721          Components       Value Reference Range Flag Lab   Glucose 77 70 - 99 mg/dL  170            Glucose by meter [[id number]] (Abnormal)  Resulted: 03/30/17 1301, Result status: Final result    Ordering provider: Eugene George MD  03/30/17 1252 Resulting lab: POINT OF CARE TEST, GLUCOSE    Specimen Information    Type Source Collected On     03/30/17 1252          Components       Value Reference Range Flag Lab   Glucose 174 70 - 99 mg/dL H 170            Glucose by meter [[id number]] (Abnormal)  Resulted: 03/30/17 0846, Result status: Final result    Ordering provider: Eugene George MD  03/30/17 0824 Resulting lab: POINT OF CARE TEST, GLUCOSE    Specimen Information    Type Source Collected On     03/30/17 0824          Components       Value Reference Range Flag Lab   Glucose 100 70 - 99 mg/dL H 170            Glucose by meter [[id number]] (Abnormal)  Resulted: 03/30/17 0241, Result status: Final result    Ordering provider: Eugene George MD  03/30/17 0234 Resulting lab: POINT OF CARE TEST, GLUCOSE    Specimen Information    Type Source Collected On     03/30/17 0234          Components       Value Reference Range Flag Lab   Glucose 100 70 - 99 mg/dL H 170            Glucose by meter [[id number]] (Abnormal)  Resulted: 03/29/17 2041, Result status: Final result    Ordering provider: Eugene George MD  03/29/17 2037 Resulting lab: POINT OF CARE TEST, GLUCOSE    Specimen Information    Type Source Collected On     03/29/17 2037          Components       Value Reference Range Flag Lab   Glucose 118 70 - 99 mg/dL H 170            Glucose by meter [[id number]] (Abnormal)  Resulted: 03/29/17 1721, Result status: Final result    Ordering provider: Eugene George MD  03/29/17 1714 Resulting lab: POINT OF CARE TEST, GLUCOSE    Specimen Information    Type Source Collected On     03/29/17 1714          Components       Value  Reference Range Flag Lab   Glucose 144 70 - 99 mg/dL H 170            Glucose by meter [[id number]] (Abnormal)  Resulted: 03/29/17 1316, Result status: Final result    Ordering provider: Eugene George MD  03/29/17 1308 Resulting lab: POINT OF CARE TEST, GLUCOSE    Specimen Information    Type Source Collected On     03/29/17 1308          Components       Value Reference Range Flag Lab   Glucose 155 70 - 99 mg/dL H 170            Glucose by meter [[id number]] (Abnormal)  Resulted: 03/29/17 1050, Result status: Final result    Ordering provider: Eugene George MD  03/29/17 1046 Resulting lab: POINT OF CARE TEST, GLUCOSE    Specimen Information    Type Source Collected On     03/29/17 1046          Components       Value Reference Range Flag Lab   Glucose 230 70 - 99 mg/dL H 170   Comment:  /RN Notified            Glucose by meter [[id number]] (Abnormal)  Resulted: 03/29/17 0901, Result status: Final result    Ordering provider: Eugene George MD  03/29/17 0848 Resulting lab: POINT OF CARE TEST, GLUCOSE    Specimen Information    Type Source Collected On     03/29/17 0848          Components       Value Reference Range Flag Lab   Glucose 65 70 - 99 mg/dL L 170            Glucose by meter [[id number]]  Resulted: 03/29/17 0310, Result status: Final result    Ordering provider: Eugene George MD  03/29/17 0236 Resulting lab: POINT OF CARE TEST, GLUCOSE    Specimen Information    Type Source Collected On     03/29/17 0236          Components       Value Reference Range Flag Lab   Glucose 77 70 - 99 mg/dL  170            Glucose by meter [[id number]]  Resulted: 03/29/17 0130, Result status: Final result    Ordering provider: Eugene George MD  03/29/17 0117 Resulting lab: POINT OF CARE TEST, GLUCOSE    Specimen Information    Type Source Collected On     03/29/17 0117          Components       Value Reference Range Flag Lab   Glucose 75 70 - 99 mg/dL  170            Glucose by meter  [[id number]] (Abnormal)  Resulted: 03/28/17 2111, Result status: Final result    Ordering provider: Eugene George MD  03/28/17 2102 Resulting lab: POINT OF CARE TEST, GLUCOSE    Specimen Information    Type Source Collected On     03/28/17 2102          Components       Value Reference Range Flag Lab   Glucose 104 70 - 99 mg/dL H 170            Glucose by meter [[id number]] (Abnormal)  Resulted: 03/28/17 1711, Result status: Final result    Ordering provider: Eugene George MD  03/28/17 1706 Resulting lab: POINT OF CARE TEST, GLUCOSE    Specimen Information    Type Source Collected On     03/28/17 1706          Components       Value Reference Range Flag Lab   Glucose 117 70 - 99 mg/dL H 170            Glucose by meter [[id number]] (Abnormal)  Resulted: 03/28/17 1231, Result status: Final result    Ordering provider: Eugene George MD  03/28/17 1224 Resulting lab: POINT OF CARE TEST, GLUCOSE    Specimen Information    Type Source Collected On     03/28/17 1224          Components       Value Reference Range Flag Lab   Glucose 136 70 - 99 mg/dL H 170            Testing Performed By     Lab - Abbreviation Name Director Address Valid Date Range    170 - Unknown POINT OF CARE TEST, GLUCOSE Unknown Unknown 10/31/11 1114 - Present            Unresulted Labs     None      Encounter-Level Documents:     There are no encounter-level documents.      Order-Level Documents:     There are no order-level documents.

## 2017-03-18 NOTE — IP AVS SNAPSHOT
` `     [place] [address] MEDICAL SURGICAL: [phone]                 INTERAGENCY TRANSFER FORM - NOTES (H&P, Discharge Summary, Consults, Procedures, Therapies)   3/18/2017                    Hospital Admission Date: 3/18/2017  CHANI ELENA   : 1935  Sex: Male        Patient PCP Information     Provider PCP Type    [facility]         History & Physicals      H&P by Ana Michelle PA-C at 3/18/2017  7:55 PM     Author:  Ana Michelle PA-C Service:  Internal Medicine Author Type:  Physician Assistant - C    Filed:  3/18/2017 10:24 PM Date of Service:  3/18/2017  7:55 PM Note Created:  3/18/2017  7:55 PM    Status:  Attested :  Ana Michelle PA-C (Physician Assistant - C)    Cosigner:  Eugene George MD at 3/19/2017 12:12 AM        Attestation signed by Eugene George MD at 3/19/2017 12:12 AM        Physician Attestation   IEugene MD, have reviewed and discussed with the advanced practice provider their history, physical and plan for Chani Elena. I did not participate in a shared visit by interviewing or examining the patient and this should be billed as an advanced practice provider only visit.    Earlier under OBS status but changed to inpatient  Troponin trend  Monitor infection markers.  Continue his DAPT for CAD.  Tele-monitor  Agree with outlined plan of RENETTA Sims MD  Date of Service (when I saw the patient): I did not personally see this patient today.                               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   Work up in ED reveals:[AK1.1] VSS. BMP - Na 132, Cr 1.5, glucose 269. , troponin 0.051. CBC unremarkable. UA - positive nitrites, moderate LE, 109 WBCs in clumps, large blood, 80 RBCs, and >499 glucose. CXR clear. EKG SR, unchanged, urine culture pending. Received IV Levaquin and albuterol neb in ED.[AK1.2]  Patient is being registered to observation for further evaluation and to rule out possible ACS.     1. Generalized weakness[AK1.1] - likely due to UTI. Does complain of cough, subjective fever, chills and body aches. Afebrile initially here, CXR clear. Will add on influenza swab. Will rehydration, supportive cares and management per below. Unable to ambulate at home, PT consult for discharge planning.[AK1.2]   2. Elevated troponin - 0.051. Possibly due to YOVANA, pt denies chest pain. Has complained of cough and subjective fever and chills. Hx of NSTEMI in 10/2015, continues on ASA and Plavix per cardiology recommendations. Will monitor on tele and trend troponins. Hold off on heparin at this time. If troponin continues to elevate, consider heparin and switch to IP status.  3. YOVANA - Cr 1.50 today, previously normal. Likely due to UTI and dehydration. IVFs and recheck in AM. If does not improve tomorrow, consider alternative abx and consider holding Losartan.  4. UTI - UA grossly abnormal, positive nitrites, moderate LE, 109 WBCs in clumps, large blood and 80 RBCs. Given IV Levaquin in ED, will continue.   5. Hyperglycemia - borderline DM in past, last HgbA1c was 6.2 in 2015[AK1.1]. Glucose 269 on arrival, >499 glucose in urine. Likely diabetic. Will add on HgbA1c, treat accordingly.[AK1.3]  6. Hyponatremia[AK1.1] - Na 132. Rehydrate and recheck in AM  7. CAD - NSTEMI in 10/2015. Stents in staged procedure, RCA x2 on 10/12/15 then circumflex stent on 11/6/15. Had other areas of disease that were felt to  be best managed medically. Denies chest pain at this time, EKG is unchanged, minimal troponin elevation on admission. Work up per above.   8. HTN - resume home meds with parameters  9. HLP - on statin, resume  10. Hx of TBI - memory difficulties, R sided deficits   11. Hx of CVA - mild right sided weakness and speech is somewhat slow.[AK1.2]         Plan:     1. [place] to Observation  2. Continue telemetry  3. Follow serial troponins  4.[AK1.1] IV Levaquin[AK1.2]  5.[AK1.1] Rapid influenza swab[AK1.2]  6.[AK1.1] IVFs NS at 100 mL/hr[AK1.2]  7.[AK1.1] Add on rapid influenza swab[AK1.2]    8.[AK1.1] Cardiac[AK1.2] diet, No caffeine if Nuclear testing selected  9. DVT prophylaxis: pt at low risk, encourage ambulation  10. Code Status:[AK1.1] full code[AK1.2]  11. Dispo:[AK1.1] initially admitted as OBS. Before completing H&P, rapid influenza returned positive for Influenza B and he spiked a fever. 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 The patient's wife reports that the patient developed a cough and some difficulty breathing a week ago. She states that his cough has been gradually worsening and now he is generally weak, prompting him to come to the ED for evaluation. On arrival to the ED, the patient's wife reports that the patient has a wet productive cough and has been a little short of breath. She states that the patient is generally weak, worse in his lower extremities.  He has had difficulty getting around and has not been ambulatory due to the weakness. The patient's wife notes that the patient had pneumonia in January and had similar cough and breathing difficulties. She also notes incontinence of urine for the past week or so which is new. She reports subjective fever and chills but nothing measured, and endorses body aches. The patient denies any chest pain, abd pain, nausea, vomiting, diarrhea, headache, numbness, or dysuria.[AK1.4]             Past Medical History:     Past Medical History   Diagnosis Date     Acute chest pain 10/23/2015     CAD (coronary artery disease)      Essential hypertension 10/23/2015     History of CVA (cerebrovascular accident) 10/23/2015     HLD (hyperlipidemia)      HTN (hypertension)      NSTEMI (non-ST elevated myocardial infarction) (H) 10/23/2015     Post PTCA 10-     Successful PCI of culprit proximal to mid RCA with placement of a     Post PTCA 11-6-2015     pci of complex mid CFX-hector               Past Surgical History:     Past Surgical History   Procedure Laterality Date     Heart cath right and left heart cath  11/6/2015     PCI w/ HECTOR to mid-CFX     Cholecystectomy       Genitourinary surgery       prostate removal      Heart cath left heart cath  10/12/2015     PCI w/ HECTOR to RCA               Social History:     Social History     Social History     Marital status:      Spouse name: N/A     Number of children: N/A     Years of education: N/A     Occupational History     Not on file.     Social History Main Topics     Smoking status: Never Smoker     Smokeless tobacco: Not on file     Alcohol use No     Drug use: No     Sexual activity: Not on file     Other Topics Concern     Caffeine Concern No     seldom     Sleep Concern Yes     does not sleep well     Special Diet Yes     lower sodium and fat     Exercise No     Social History Narrative               Family History:     Family History   Problem Relation Age of Onset      Unknown/Adopted No family hx of               Allergies:      Allergies   Allergen Reactions     Lisinopril                Medications:     Prior to Admission medications    Medication Sig Last Dose Taking? Auth Provider   Carvedilol (COREG PO) Take 25 mg by mouth 2 times daily 3/18/2017 at 1100 Yes Reported, Patient   Losartan Potassium (COZAAR PO) Take 50 mg by mouth daily 3/18/2017 at 1100 Yes Reported, Patient   NORTRIPTYLINE HCL PO Take 50 mg by mouth daily 3/18/2017 at 1100 Yes Reported, Patient   atorvastatin (LIPITOR) 40 MG tablet Take 1 tablet (40 mg) by mouth daily 3/18/2017 at 1100 Yes Eric Avalos MD   aspirin 81 MG EC tablet Take one tablet daily 3/18/2017 at 1100 Yes Eric Avalos MD   clopidogrel (PLAVIX) 75 MG tablet Take 1 tablet (75 mg) by mouth daily 3/18/2017 at 1100 Yes Eric Avalos MD   Docusate Sodium (DOC-Q-LACE PO) Take 100 mg by mouth 2 times daily Unknown  Reported, Patient   nitroglycerin (NITROSTAT) 0.4 MG SL tablet Place 1 tablet (0.4 mg) under the tongue every 5 minutes as needed for chest pain More than a month at Unknown time  Laura Valerio, APRN CNP   TRAMADOL HCL PO Take 100 mg by mouth 2 times daily as needed  Unknown  Reported, Patient   polyethylene glycol (MIRALAX/GLYCOLAX) powder Take 1 capful by mouth daily as needed  Unknown  Unknown, Entered By History              Review of Systems:   A Comprehensive greater than 10 system review of systems was carried out.  Pertinent positives and negatives are noted above.  Otherwise negative for contributory information.[AK1.1]     Constitutional, neuro, ENT, endocrine, pulmonary, cardiac, gastrointestinal, genitourinary, musculoskeletal, integument and psychiatric systems are negative, except as otherwise noted.[AK1.2]           Physical Exam:   Blood pressure 138/88, pulse 91, temperature 98.1  F (36.7  C), temperature source Oral, resp. rate 18, height 1.829 m (6'), weight 97 kg (213 lb 13.5 oz), SpO2 95  %.[AK1.1]    GENERAL:  Comfortable.  PSYCH:  No acute distress.  HEENT:  Atraumatic, normocephalic. Normal conjunctiva, normal hearing, and oropharynx is normal.  NECK:  Supple, no neck vein distention  HEART:  Normal S1, S2 with no murmur, no pericardial rub, gallops or S3 or S4.  LUNGS:  Course cough, clear to auscultation, appears somewhat dyspneic. No wheezing, rales or ronchi.  GI:  Soft, normal bowel sounds. Non-tender, non distended.   EXTREMITIES:  No pedal edema, +2 pulses bilateral and equal.  SKIN:  Dry to touch, No rash, wound or ulcerations.  NEUROLOGIC:  CN 2-12 intact, BL 5/5 symmetric upper and lower extremity strength, sensation is intact with no focal deficits.[AK1.2]              Data:     EKG demonstrates:[AK1.1]  Sinus Rhythm, unchanged from previous tracings[AK1.2].[AK1.1]      Recent Labs  Lab 03/18/17  1700   WBC 6.5   HGB 16.1   HCT 46.8   MCV 93   *       Recent Labs  Lab 03/18/17  1700   *   POTASSIUM 3.9   CHLORIDE 97   CO2 24   ANIONGAP 11   *   BUN 26   CR 1.50*   GFRESTIMATED 45*   GFRESTBLACK 54*   LUZ 8.0*       Recent Labs  Lab 03/18/17  1700   NTBNPI 367       Recent Labs  Lab 03/18/17  1700   TROPI 0.051*       Recent Labs  Lab 03/18/17  1908   COLOR Yellow   APPEARANCE Cloudy   URINEGLC >499*   URINEBILI Negative   URINEKETONE Negative   SG 1.011   UBLD Large*   URINEPH 5.0   PROTEIN 30*   NITRITE Positive*   LEUKEST Moderate*   RBCU 80*   WBCU 109*       Recent Results (from the past 48 hour(s))   XR Chest 2 Views    Narrative    XR CHEST 2 VW   3/18/2017 5:41 PM     HISTORY: cough SOB    COMPARISON: Film dated 1/4/2017    FINDINGS: The heart is negative.  Mild linear fibrotic changes again  seen at the left base. No new focal alveolar-type infiltrates. The  pulmonary vasculature is normal.  The bones and soft tissues are  unremarkable.      Impression    IMPRESSION: No acute infiltrates identified. No significant change.         BULMARO SAMS MD[AK1.5]          Ana Michelle PA-C[AK1.1]     Revision History        User Key Date/Time User Provider Type Action    > AK1.4 3/18/2017 10:24 PM Ana Michelle PA-C Physician Assistant - SILAS Sign     AK1.5 3/18/2017  8:49 PM Ana Michelle PA-C Physician Assistant - C      AK1.2 3/18/2017  8:48 PM Ana Micehlle PA-C Physician Assistant - C      AK1.3 3/18/2017  8:11 PM Ana Michelle PA-C Physician Assistant - C      AK1.1 3/18/2017  7:55 PM Ana Michelle PA-C Physician Assistant - SILAS                      Discharge Summaries      Discharge Summaries by Elias Boswell MD at 3/18/2017  3:24 PM     Author:  Elias Boswell MD Service:  Hospitalist Author Type:  Physician    Filed:  3/28/2017  3:27 PM Date of Service:  3/18/2017  3:24 PM Note Created:  3/28/2017  3:19 PM    Status:  Signed :  Elias Boswell MD (Physician)         Physician Discharge Summary     Name: Adam Huber    MRN: [id number]     YOB: 1935    Age: 81 year old                                                 Primary care provider: Oralia Forrest      Admit date:  3/18/2017      Discharge date and time: 03/29/2017       Discharge Physician:  Elias Boswell        Discharge Diagnosis:       #1.  Influenza B infection    #2.  Acute bacterial bronchitis    #3.  Generalized physical deconditioning    #4.  Mild elevated troponin due to demand ischemia    #5.  Hyperglycemia: Suspect type 2 diabetes    #6.  Acute encephalopathy: Acute delirium likely toxic metabolic and infectious in the setting of baseline cognitive dysfunction    #7.  Dysphagia    #8.  Urinary tract infection    #9.  Mild acute renal failure improved      Past Medical History and comorbid conditions:     Past Medical History:   Diagnosis Date     Acute chest pain 10/23/2015     CAD (coronary artery disease)      Essential hypertension 10/23/2015     History of CVA (cerebrovascular accident) 10/23/2015     HLD (hyperlipidemia)       HTN (hypertension)      NSTEMI (non-ST elevated myocardial infarction) (H) 10/23/2015     Post PTCA 10-    Successful PCI of culprit proximal to mid RCA with placement of a     Post PTCA 11-6-2015    pci of complex mid CFX-hector       Past Surgical History:  Past Surgical History:   Procedure Laterality Date     CHOLECYSTECTOMY       GENITOURINARY SURGERY      prostate removal      HEART CATH LEFT HEART CATH  10/12/2015    PCI w/ HECTOR to RCA     HEART CATH RIGHT AND LEFT HEART CATH  11/6/2015    PCI w/ HECTOR to mid-CFX                   Brief Summary of Hospital stay :       Please refer to  Admission H&P note for full details of patient presentation.    Admission Condition: poor    Discharged Condition: stable    /75 (BP Location: Left arm)  Pulse 69  Temp 97.3  F (36.3  C) (Oral)  Resp 16  Ht 1.829 m (6')  Wt 90.7 kg (200 lb)  SpO2 91%  BMI 27.12 kg/m2       Presenting problem/signs and symptoms:    Generalized weakness    Brief Hospital Summary:    Patient is 81-year-old male with significant past medical history including coronary artery disease, non-ST segment elevation MI in 2015 status post two stents, hypertension, dementia, benign prostatic hypertrophy, depression, history of CVA, traumatic brain injury in 1987 who presented with generalized weakness, cough and urinary incontinence.  Patient was admitted to [facility] and closely monitored.  Patient was treated for influenza B infection, urinary tract infection and acute bronchitis with antibiotic and completed treatment.  Patient was initially dehydrated with acute kidney injury which has resolved.  Patient had no evidence of superimposed pneumonia but apparently had bacterial superimposed infection with acute bronchitis.  Elevated troponin was three and there was no evidence of acute coronary syndrome.  Patient's pain problem stay was that patient was agitated and delirious.  This was likely secondary to acute  encephalopathy in the setting of acute infection.  His condition improved with close monitoring, Seroquel and improved a day before discharge.  Patient continues to have generalized physical deconditioning requiring rehabilitation placement for rehabilitation.  Patient had hyperglycemia and concern for possible underlying diabetes but as patient was not eating here at risk of getting hypoglycemia and metformin and glipizide which was initially started with discontinued on discharge.  Patient blood sugar needs to be closely monitored and patient may benefit from low-dose oral diabetic medication.            Consultations during hospital stay       Physical therapy, question of therapy, social service        Major procedure performed/  Significant Diagnostic Studies[MA1.1]              Results for orders placed or performed during the hospital encounter of 03/18/17   XR Chest 2 Views    Narrative    XR CHEST 2 VW   3/18/2017 5:41 PM     HISTORY: cough SOB    COMPARISON: Film dated 1/4/2017    FINDINGS: The heart is negative.  Mild linear fibrotic changes again  seen at the left base. No new focal alveolar-type infiltrates. The  pulmonary vasculature is normal.  The bones and soft tissues are  unremarkable.      Impression    IMPRESSION: No acute infiltrates identified. No significant change.         BULMARO SAMS MD   XR Chest Port 1 View    Narrative    CHEST ONE VIEW PORTABLE   3/23/2017 2:33 PM     HISTORY: Bronchitis. Rule out pneumonia.    COMPARISON: 3/18/2017.      Impression    IMPRESSION: No acute cardiopulmonary disease.    TOBIAS JACKSON MD   CT Head w/o Contrast    Narrative    CT SCAN OF THE HEAD WITHOUT CONTRAST March 25, 2017 1:35 PM     HISTORY: Confusion, loss of balance history of brain in jury in the  past.    TECHNIQUE: 4 mm thick axial images of the head without IV contrast  material. Radiation dose for this scan was reduced using automated  exposure  control, adjustment of the mA and/or kV according to  patient size, or  iterative reconstruction technique.    COMPARISON: CT/PET dated 10/11/2015.    FINDINGS: Encephalomalacia in the bilateral medial anterior frontal  lobes and in the anterior right temporal lobe are stable in appearance  since the prior study dated 10/11/2015. Focal lacunar infarct is seen  in the right basal ganglia, similar to the prior study. Probable focal  lacunar infarct in the left centrum semiovale is stable in appearance.  No new areas of abnormal decreased attenuation to suggest acute  infarct are seen. No acute intracranial hemorrhage, mass effect, or  mass is identified. There is mild ex vacuo dilatation of the anterior  horns of the bilateral lateral ventricles. There is generalized  atrophy of the brain. Areas of low attenuation are present in the  white matter of the cerebral hemispheres that are consistent with  small vessel ischemic disease in this age patient.    Probable mucus retention cyst is seen in the medial right maxillary  sinus. This is only seen on one image. Otherwise, the visualized  portions of the orbits, paranasal sinuses, mastoid air spaces, and  calvarium are grossly unremarkable.      Impression    IMPRESSION:  1. Age-related atrophy and small vessel white matter ischemic changes.  2. There are areas of encephalization in the bilateral frontal lobes  and in the right temporal lobe are similar to the prior study and  consistent with chronic infarct or traumatic injury.  3. Probable lacunar infarcts in the right basal ganglia and possibly  in the left centrum semiovale are again noted.  4. No acute infarct, mass, or intracranial hemorrhage is identified.  If there is clinical concern for acute extension of chronic infarcts,  further evaluation with MRI maybe helpful.    HOWARD REMY MD[MA1.2]       No results for input(s): WBC, HGB, HCT, MCV, PLT in the last 168 hours.  No results for input(s): CULT in the last 168 hours.    Recent Labs  Lab 03/25/17  4815  03/24/17  0958     --    POTASSIUM 4.2  --    CHLORIDE 107  --    CO2 23  --    ANIONGAP 9  --    GLC 98  --    BUN 15  --    CR 0.94 0.88   GFRESTIMATED 77 82   GFRESTBLACK >90African American GFR Calc >90African American GFR Calc   LUZ 8.5  --          Recent Labs  Lab 03/28/17  1224 03/28/17  0209 03/27/17  2210 03/27/17  1728 03/27/17  1340  03/25/17  1344   GLC  --   --   --   --   --   --  98   * 113* 85 114* 132*  < >  --    < > = values in this interval not displayed.        No results for input(s): INR in the last 168 hours.        No results for input(s): TROPONIN, TROPI, TROPR in the last 168 hours.    Invalid input(s): TROP, TROPONINIES              Pending Results           Unresulted Labs Ordered in the Past 30 Days of this Admission     No orders found from 1/17/2017 to 3/19/2017.              Disposition         SNF      Allergies       Allergies   Allergen Reactions     Lisinopril             Patient Instructions and Discharge Medications              Review of your medicines      START taking       Dose / Directions    acetaminophen 325 MG tablet   Commonly known as:  TYLENOL   Used for:  History of CVA (cerebrovascular accident)        Dose:  650 mg   Take 2 tablets (650 mg) by mouth every 4 hours as needed for mild pain   Quantity:  100 tablet   Refills:  0       melatonin 1 MG Tabs tablet        Dose:  1 mg   Take 1 tablet (1 mg) by mouth nightly as needed   Quantity:  30 tablet   Refills:  0       * QUEtiapine 25 MG tablet   Commonly known as:  SEROquel        Dose:  6.25 mg   Take 0.25 tablets (6.25 mg) by mouth 2 times daily   Refills:  0       * QUEtiapine 25 MG tablet   Commonly known as:  SEROquel        Dose:  25 mg   Take 1 tablet (25 mg) by mouth At Bedtime   Quantity:  60 tablet   Refills:  0       * Notice:  This list has 2 medication(s) that are the same as other medications prescribed for you. Read the directions carefully, and ask your doctor or other care provider to  review them with you.      CONTINUE these medicines which may have CHANGED, or have new prescriptions. If we are uncertain of the size of tablets/capsules you have at home, strength may be listed as something that might have changed.       Dose / Directions    carvedilol 25 MG tablet   Commonly known as:  COREG   This may have changed:    - medication strength  - Another medication with the same name was removed. Continue taking this medication, and follow the directions you see here.   Used for:  NSTEMI (non-ST elevated myocardial infarction) (H)        Dose:  25 mg   Take 1 tablet (25 mg) by mouth 2 times daily   Quantity:  60 tablet   Refills:  0         CONTINUE these medicines which have NOT CHANGED       Dose / Directions    amitriptyline 50 MG tablet   Commonly known as:  ELAVIL        Dose:   mg   Take  mg by mouth At Bedtime Per Family, usually takes 1 or 2 tabs every night.  Rarely 3 tabs - if 3 tabs are used pt is too drowsy following morning.   Refills:  0       aspirin 81 MG EC tablet   Used for:  NSTEMI (non-ST elevated myocardial infarction) (H)        Take one tablet daily   Quantity:  90 tablet   Refills:  3       atorvastatin 40 MG tablet   Commonly known as:  LIPITOR   Used for:  NSTEMI (non-ST elevated myocardial infarction) (H)        Dose:  40 mg   Take 1 tablet (40 mg) by mouth daily   Quantity:  90 tablet   Refills:  1       clopidogrel 75 MG tablet   Commonly known as:  PLAVIX   Used for:  NSTEMI (non-ST elevated myocardial infarction) (H)        Dose:  75 mg   Take 1 tablet (75 mg) by mouth daily   Quantity:  90 tablet   Refills:  1       COZAAR PO        Dose:  50 mg   Take 50 mg by mouth daily   Refills:  0       DOC-Q-LACE PO        Dose:  100 mg   Take 100 mg by mouth 2 times daily   Refills:  0       nitroglycerin 0.4 MG sublingual tablet   Commonly known as:  NITROSTAT   Used for:  NSTEMI (non-ST elevated myocardial infarction) (H), Essential hypertension, Postsurgical  percutaneous transluminal coronary angioplasty status        Dose:  0.4 mg   Place 1 tablet (0.4 mg) under the tongue every 5 minutes as needed for chest pain   Quantity:  25 tablet   Refills:  1       NORTRIPTYLINE HCL PO        Dose:  50 mg   Take 50 mg by mouth daily   Refills:  0       polyethylene glycol powder   Commonly known as:  MIRALAX/GLYCOLAX        Dose:  1 capful   Take 1 capful by mouth daily as needed   Refills:  0       TRAMADOL HCL PO        Dose:  100 mg   Take 100 mg by mouth 2 times daily as needed   Refills:  0            Where to get your medicines      Some of these will need a paper prescription and others can be bought over the counter. Ask your nurse if you have questions.     You don't need a prescription for these medications      acetaminophen 325 MG tablet     carvedilol 25 MG tablet     melatonin 1 MG Tabs tablet     QUEtiapine 25 MG tablet     QUEtiapine 25 MG tablet              Discharge diet:  Active Diet Order      Combination Diet Dysphagia Diet Level 2: Mechan Altered; Thin Liquids (water, ice chips, juice, milk gelatin, ice cream, etc); Low Saturated Fat Na <2400mg Diet, No Caffeine Diet      Advance Diet as Tolerated        Discharge activity:Activity as tolerated        Discharge follow-up:    Follow up with primary care provider in 14 days or earlier if symptoms return or gets worse.    Follow up with consultant as instructed     Other instructions:    We discussed with Patient/family about detail discharge instructions as well as discharge medications above including potential risks,side effects and benefits.Patient/family understood benefits and potential serious side effects of taking these medications and need to follow up with PCP if the patient develops complications.  Patient is also advised to see a doctor immediately for severe symptoms.        I saw and evaluated the patient today and I also reviewed the discharge instructions and answered all the patient  questions.Over 30 minutes spend on discharge and coordination of discharge process for this patient.          Disclaimer: This note consists of symbols derived from keyboarding, dictation and/or voice recognition software. As a result, there may be errors in the script that have gone undetected. Please consider this when interpreting information found in this martín[MA1.1]       Revision History        User Key Date/Time User Provider Type Action    > MA1.2 3/28/2017  3:27 PM Elias Boswell MD Physician Sign     MA1.1 3/28/2017  3:19 PM Elias Boswell MD Physician                      Consult Notes      Consults by Laurie Mills RD at 3/24/2017  1:30 PM     Author:  Laurie Mills RD Service:  Nutrition Author Type:  Registered Dietitian    Filed:  3/24/2017  1:30 PM Date of Service:  3/24/2017  1:30 PM Note Created:  3/24/2017  1:21 PM    Status:  Signed :  Laurie Mills RD ([place] Dietitian)         CLINICAL NUTRITION SERVICES  -  ASSESSMENT NOTE      Recommendations Ordered by Registered Dietitian (RD):   -Low saturated fat / <2400 mg sodium diet  -Room service not appropriate     Malnutrition: Patient does not meet criteria for malnutrition at this time          REASON FOR ASSESSMENT  Adam Huber is a 81 year old male seen by Registered Dietitian for [facility]      NUTRITION HISTORY  - Information obtained from chart review (pt Kuwaiti speaking / no interpretor)  - Patient is on a ?regular diet at home  - Patient with PMH of CAD, STEMI x 2 stents, HTN, CVA and TBI    CURRENT NUTRITION ORDERS  Diet Order:     Low Saturated Fat/2400 mg Sodium + no caffeine diet   Not appropriate for room service     Current Intake/Tolerance:  -Patient with fair appetite/intake over the course of admission. Per nutrition flowsheet patient eating % of standard meals TID       PHYSICAL FINDINGS  Observed  No nutrition-related physical findings observed  Obtained from Chart/Interdisciplinary  "Team  -Right-sided deficits from previous CVA  -Obese   -Last BM = 3/21/2017    ANTHROPOMETRICS  Height: 6' 0\"  Weight: 217 lbs 3.2 oz (98.5 kg)  Body mass index is 29.46 kg/(m^2).  Weight Status:  Overweight BMI 25-29.9  IBW: 80.91 kg   % IBW: 122%  Weight History: The data below suggests that patient's weight has trended down 4 kg (3.9%) x 2 months (not significant)  Wt Readings from Last 10 Encounters:   03/24/17 98.5 kg (217 lb 3.2 oz)   01/04/17 102.5 kg (226 lb)   06/21/16 102.7 kg (226 lb 8 oz)   12/16/15 103.9 kg (229 lb)   11/13/15 104.8 kg (231 lb)   11/07/15 102.1 kg (225 lb 1.4 oz)   10/26/15 103.3 kg (227 lb 12.8 oz)   10/13/15 103 kg (227 lb 1.2 oz)   ]    LABS  Labs reviewed    MEDICATIONS  Medications reviewed  -Medium SSI before meals TID + at bedtime  -Metformin- 500 mg BID, glipizide     Dosing Weight: 85.3 kg (adjusted weight)    ASSESSED NUTRITION NEEDS:  Estimated Energy Needs: 9300-9380 kcals (25-30 Kcal/Kg)  Justification: maintenance  Estimated Protein Needs:  grams protein (1-1.2 g pro/Kg)  Justification: maintenance  Estimated Fluid Needs: 0529-4253  mL (1 mL/Kcal)  Justification: maintenance    MALNUTRITION:  % Weight Loss:  Weight loss does not meet criteria for malnutrition   % Intake:  Unable to adequately assess   Subcutaneous Fat Loss:  None observed  Muscle Loss:  None observed  Fluid Retention:  None noted    Malnutrition Diagnosis: Patient does not meet two of the above criteria necessary for diagnosing malnutrition    NUTRITION DIAGNOSIS:  No nutrition diagnosis identified at this time     NUTRITION INTERVENTIONS  Recommendations / Nutrition Prescription  1. Continue low saturated fat / <2400 mg sodium diet. Caffeine restriction per Cardiology / MD    2. Room service not appropriate     Implementation  Nutrition education: Per Provider order if indicated     Collaboration and Referral of Nutrition care - Discussed patient during interdisciplinary rounds. Also spoke with RN " regarding intake/appetite   .  Nutrition Goals  Pt to consistently consume >/=75% of meals TID       MONITORING AND EVALUATION:  Food intake - Adequacy of intake (% of meals)    Laurie Mills RD, LD  Clinical Dietitian  3rd Floor/ICU Pager: [phone]  All Other Floors Pager: [phone]  Weekend/Holiday Pager: [phone][KJ1.1]                     Revision History        User Key Date/Time User Provider Type Action    > KJ1.1 3/24/2017  1:30 PM Laurie Mills RD Registered Dietitian Sign                     Progress Notes - Physician (Notes from 03/28/17 through 03/31/17)      Progress Notes by Ellie Baron LSW at 3/31/2017 12:07 PM     Author:  Ellie Baron LSW Service:  (none) Author Type:      Filed:  3/31/2017 12:10 PM Date of Service:  3/31/2017 12:07 PM Note Created:  3/31/2017 12:07 PM    Status:  Signed :  Ellie Baron LSW ()         SWS:  D: discharge planning  A/P: Pt accepted at [facility]. Family requesting wc transport. [facility] contacted and transport will be here at 1800.  informed pt. Wife will be here later this afternoon.  RN updated.[LH1.1]     Revision History        User Key Date/Time User Provider Type Action    > LH1.1 3/31/2017 12:10 PM Ellie Baron LSW  Sign            Progress Notes by Reilly Taylor RN at 3/31/2017  3:12 AM     Author:  Reilly Taylor RN Service:  (none) Author Type:  Registered Nurse    Filed:  3/31/2017  3:12 AM Date of Service:  3/31/2017  3:12 AM Note Created:  3/31/2017  3:12 AM    Status:  Signed :  Reilly Taylor RN (Registered Nurse)         Pt refusing vitals and blood sugar this shift.[TE1.1]     Revision History        User Key Date/Time User Provider Type Action    > TE1.1 3/31/2017  3:12 AM Reilly Taylor RN Registered Nurse Sign            Progress Notes by Ellie Baron LSW at 3/30/2017  1:48 PM     Author:  Ellie Baron LSW Service:  (none) Author Type:  Social  Worker    Filed:  3/30/2017  1:49 PM Date of Service:  3/30/2017  1:48 PM Note Created:  3/30/2017  1:48 PM    Status:  Signed :  Ellie Baron LSW ()         SWS:  SW received phone call from [facility].  They have accepted pt and can admit to their facility tomorrow.[LH1.1]     Revision History        User Key Date/Time User Provider Type Action    > LH1.1 3/30/2017  1:49 PM Ellie Baron LSW  Sign            Progress Notes by Eveline Nix MD at 3/30/2017  1:05 PM     Author:  Eveline Nix MD Service:  (none) Author Type:  Physician    Filed:  3/30/2017  1:08 PM Date of Service:  3/30/2017  1:05 PM Note Created:  3/30/2017  1:05 PM    Status:  Signed :  Eveline Nix MD (Physician)         [facility]  Hospitalist Progress  Eveline Nix MD   03/30/2017    Reason for Stay (Diagnosis): Influenza and acute delirium            Assessment and Plan:      Adam Huber is a 81 year old male with a PMH significant for CAD, NSTEMI 2015 with stent x2 in staged procedure, HTN, HLP, BPH, depression hx 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 Found to have UTI and influenza B.       1. Influenza B and suspicion for bronchitis with possible superimposed bacterial infection -  -- Completed course of Oseltamivir and also Augmentin.   -- Has mild cough which will likely persist for some time, no hypoxia, denies SOB     2. Trivial troponin elevation.   --Likely due to demand ischemia from dehydration and acute illness in a patient with known CAD. Has had no chest pain or clinical signs of angina. Defer further work up for now      4. UTI - UA grossly abnormal, positive nitrites, UC grew E.coli, pansensitive. Received, Rocephin and Levaquin initially, completed course of Augmentin      5. Hyperglycemia - Borderline DM in past, and not on meds but HgbA1c is now 7.1,  Glipizide 5 mg  BID started.  -- Decreased glipizide to 5 mg daily to avoid hypoglycemia in this elderly frail gentleman      6. Generalized weakness- Likely due to UTI and influenza. Reports his legs feel weak- PT/OT DAILY.     7. HTN -  home meds with parameters     8. HLP - On statin     9. Hx of TBI and CVA - Memory difficulties, R sided deficits      10. Delirium on top of underlying dementia - improving now  -- started on Seroquel HS and BID, continue, awake and alert during today's conversation     11.Dysphagia , Speech and swallow evaluation done and recommended  diet to dysphagia diet level 2 w/ thin liquids. Pt to take small bites/sips, alternate solids and liquids be fully alert and upright for all PO.       DVT Prophylaxis: Pneumatic Compression Devices  Code Status: Full Code  Discharge Dispo: TCU pending bed availability , medically stable         Interval History (Subjective):      Patient is seen and examined and medical record reviewed .Overnight events noted and care discussed with nursing staff.  Patient seen with professional russian . Pt denies any complaints, strength is coming back, no cp, sob, mild cough. He feels improving day by day                        Physical Exam:      Vital Signs:  Temp: 96.7  F (35.9  C) Temp src: Oral BP: 160/80 Pulse: 81 Heart Rate: 86 Resp: 16 SpO2: 97 % O2 Device: None (Room air)    Vitals:    03/24/17 0604 03/28/17 0441 03/29/17 0700   Weight: 98.5 kg (217 lb 3.2 oz) 90.7 kg (200 lb) 91.6 kg (202 lb)     Vital Signs with Ranges  Temp:  [96.1  F (35.6  C)-97.7  F (36.5  C)] 96.7  F (35.9  C)  Pulse:  [81] 81  Heart Rate:  [81-86] 86  Resp:  [16] 16  BP: (126-160)/(69-80) 160/80  SpO2:  [93 %-97 %] 97 %  I/O last 3 completed shifts:  In: 240 [P.O.:240]  Out: -     GENERAL: Awake, calm, NAD  CVS: regular rate and rhythm, no loud murmurs or rubs  CHEST:  Clear bilaterally,  normal respiratory effort  HEENT: neck is supple  ABD: Soft, nontender,  nondistended  EXT: No pitting edema  NEUR: diffuse weakness, able to lift his legs         Medications:      All current medications were reviewed with changes reflected in problem list.         Data:      All new lab, EKGs, telemetry strips, and imaging data was personally reviewed.   Labs:    Recent Labs  Lab 03/25/17  1344 03/24/17  0958     --    POTASSIUM 4.2  --    CHLORIDE 107  --    CO2 23  --    ANIONGAP 9  --    GLC 98  --    BUN 15  --    CR 0.94 0.88   GFRESTIMATED 77 82   GFRESTBLACK >90African American GFR Calc >90African American GFR Calc   LUZ 8.5  --      Imaging (past 24 hours):   No results found for this or any previous visit (from the past 24 hour(s)).[TR1.1]       Revision History        User Key Date/Time User Provider Type Action    > TR1.1 3/30/2017  1:08 PM Eveline Nix MD Physician Sign            Progress Notes by Eveline Nix MD at 3/29/2017  1:48 PM     Author:  Eveline Nix MD Service:  (none) Author Type:  Physician    Filed:  3/29/2017  1:58 PM Date of Service:  3/29/2017  1:48 PM Note Created:  3/29/2017  1:48 PM    Status:  Signed :  Eveline Nix MD (Physician)         [facility]  Hospitalist Progress[TR1.1]  Eveline Nix MD[TR1.2]   03/29/2017    Reason for Stay (Diagnosis): Influenza and acute delirium            Assessment and Plan:      Adam Huber is a 81 year old male with a PMH significant for CAD, NSTEMI 2015 with stent x2 in staged procedure, HTN, HLP, BPH, depression hx 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 Found to have UTI and influenza B.       1. Influenza B and suspicion for bronchitis with possible superimposed bacterial infection -  -- Completed course of Oseltamivir and also Augmentin.   -- Has mild cough which likely persist for some time, no hypoxia, deconditioned     2. Trivial troponin elevation.   --Likely due to  demand ischemia from dehydration and acute illness in a patient with known CAD. Has had no chest pain or clinical signs of angina. Defer further work up for now      4. UTI - UA grossly abnormal, positive nitrites, UC grew E.coli, pansensitive. Received, Rocephin and Levaquin initially, completed course of Augmentin      5. Hyperglycemia - Borderline DM in past, and not on meds but HgbA1c is now 7.1,  Glipizide 5 mg BID started.  -- Decreased glipizide to 5 mg daily to avoid hypoglycemia in this elderly frail gentleman      6. Generalized weakness- Likely due to UTI and influenza. Reports his legs feel weak- PT/OT DAILY.     7. HTN -  home meds with parameters     8. HLP - On statin     9. Hx of TBI and CVA - Memory difficulties, R sided deficits      10. Delirium on top of underlying dementia - improving now  -- started on Seroquel HS and BID, continue, awake and alert during today's conversation     11.Dysphagia , Speech and swallow evaluation done and recommended  diet to dysphagia diet level 2 w/ thin liquids. Pt to take small bites/sips, alternate solids and liquids be fully alert and upright for all PO.       DVT Prophylaxis: Pneumatic Compression Devices  Code Status: Full Code  Discharge Dispo: TCU pending bed availability         Interval History (Subjective):      Patient is seen and examined and medical record reviewed .Overnight events noted and care discussed with nursing staff.  Patient seen with professional russian . Family present at bedside and their questions were answered.   Pt denies any complaints, strength is coming back, no cp, sob, mild cough.                        Physical Exam:      Vital Signs:  Temp: 96.5  F (35.8  C) Temp src: Axillary BP: 118/60 Pulse: 79 Heart Rate: 76 Resp: 16 SpO2: 95 % O2 Device: None (Room air)    Vitals:    03/24/17 0604 03/28/17 0441 03/29/17 0700   Weight: 98.5 kg (217 lb 3.2 oz) 90.7 kg (200 lb) 91.6 kg (202 lb)     Vital Signs with  Ranges  Temp:  [95.2  F (35.1  C)-98.5  F (36.9  C)] 96.5  F (35.8  C)  Pulse:  [79] 79  Heart Rate:  [74-82] 76  Resp:  [16] 16  BP: (118-167)/(60-94) 118/60  SpO2:  [92 %-96 %] 95 %  I/O last 3 completed shifts:  In: 1200 [P.O.:1200]  Out: -     GENERAL: Awake, calm, NAD  CVS: regular rate and rhythm, no loud murmurs or rubs  CHEST: some coarse sounds at lung base, mostly clear, normal respiratory effort  HEENT: neck is supple  ABD: Soft, nontender, nondistended  EXT: No pitting edema  NEUR: diffuse weakness         Medications:      All current medications were reviewed with changes reflected in problem list.         Data:      All new lab, EKGs, telemetry strips, and imaging data was personally reviewed.   Labs:    Recent Labs  Lab 03/25/17  1344 03/24/17  0958     --    POTASSIUM 4.2  --    CHLORIDE 107  --    CO2 23  --    ANIONGAP 9  --    GLC 98  --    BUN 15  --    CR 0.94 0.88   GFRESTIMATED 77 82   GFRESTBLACK >90African American GFR Calc >90African American GFR Calc   LUZ 8.5  --      Imaging (past 24 hours):   No results found for this or any previous visit (from the past 24 hour(s)).[TR1.1]       Revision History        User Key Date/Time User Provider Type Action    > TR1.2 3/29/2017  1:58 PM Eveline Nix MD Physician Sign     TR1.1 3/29/2017  1:48 PM Eveline Nix MD Physician             Progress Notes by Ellie Baron LSW at 3/29/2017 10:22 AM     Author:  Ellie Baron LSW Service:  (none) Author Type:      Filed:  3/29/2017 10:26 AM Date of Service:  3/29/2017 10:22 AM Note Created:  3/29/2017 10:22 AM    Status:  Signed :  Ellie Baron LSW ()         SWS:  D: discharge planning  A: SW notified that [facility] is no longer able to accept at this time. [facility] may have a bed on Friday but cannot guarantee bed at this time. [facility]ATA met with pt wife and Mongolian . Wife agreeable to [facility] sending TCU referrals to [facility], [place]  [facility] and [facility].  Wife requests a private room and states that family will pay the private room fee.    P: Referrals sent. SW following.[LH1.1]     Revision History        User Key Date/Time User Provider Type Action    > LH1.1 3/29/2017 10:26 AM Ellie Baron LSW  Sign            Progress Notes by Elias Boswell MD at 3/28/2017  3:28 PM     Author:  Elias Boswell MD Service:  Hospitalist Author Type:  Physician    Filed:  3/28/2017  3:29 PM Date of Service:  3/28/2017  3:28 PM Note Created:  3/28/2017  3:28 PM    Status:  Signed :  Elias Boswell MD (Physician)         Patient was seen and examined by me today.  He is Ghanaian only speaking and  was used to communicate with him.  He appears to be comfortable, no new complaints.  Patient is weak and has been following therapy.  Afebrile, hemodynamically stable and was stable for discharge to TCU when bed is available[MA1.1]     Revision History        User Key Date/Time User Provider Type Action    > MA1.1 3/28/2017  3:29 PM Elias Boswell MD Physician Sign                  Procedure Notes     No notes of this type exist for this encounter.      Progress Notes - Therapies (Notes from 03/28/17 through 03/31/17)     No notes of this type exist for this encounter.

## 2017-03-18 NOTE — IP AVS SNAPSHOT
MRN:[id number]                      After Visit Summary   3/18/2017    Adam Huber    MRN: [id number]           Thank you!     Thank you for choosing [facility] for your care. Our goal is always to provide you with excellent care. Hearing back from our patients is one way we can continue to improve our services. Please take a few minutes to complete the written survey that you may receive in the mail after you visit. If you would like to speak to someone directly about your visit please contact Patient Relations at [phone]. Thank you!          Patient Information     Date Of Birth          1935        About your hospital stay     You were admitted on:  March 18, 2017 You last received care in the:  [place] [facility] [address] [facility]    You were discharged on:  March 31, 2017        Reason for your hospital stay       Influenza B infection, generalized deconditioning, dysphagia                  Who to Call     For medical emergencies, please call 911.  For non-urgent questions about your medical care, please call your primary care provider or clinic, [phone]          Attending Provider     Provider Specialty    Javy Krishnamurthy MD Emergency Medicine    Cecil, Remy Cortes MD Internal Medicine    Eugene George MD Internal Medicine       Primary Care Provider Office Phone # Fax #    Oralia Forrest [phone]       [facility] [id number] [place] [id number]        After Care Instructions     Activity - Up with nursing assistance           Advance Diet as Tolerated       Follow this diet upon discharge: Orders Placed This Encounter      Room Service      Combination Diet Dysphagia Diet Level 2: Mechan Altered; Thin Liquids (water, ice chips, juice, milk gelatin, ice cream, etc); Low Saturated Fat Na <2400mg Diet, No Caffeine Diet            Fall precautions           General info for SNF       Length of Stay Estimate: Short  Term Care: Estimated # of Days <30  Condition at Discharge: Stable  Level of care:skilled   Rehabilitation Potential: Good  Admission H&P remains valid and up-to-date: Yes  Recent Chemotherapy: N/A  Use Nursing Home Standing Orders: Yes            Glucose monitor nursing POCT       Before meals and at bedtime            Mantoux instructions       Give two-step Mantoux (PPD) Per Facility Policy Yes            Mantoux instructions       Give two-step Mantoux (PPD) Per Facility Policy Yes                  Follow-up Appointments     Follow Up and recommended labs and tests       Follow up with Nursing home physician.  No follow up labs or test are needed.  Follow up with primary care provider in 14 days.  No follow up labs or test are needed.                  Additional Services     Occupational Therapy Adult Consult       Evaluate and treat as clinically indicated.            Physical Therapy Adult Consult       Evaluate and treat as clinically indicated.            Speech Language Path Adult Consult       Evaluate and treat as clinically indicated.                  Pending Results     No orders found from 3/16/2017 to 3/19/2017.            Statement of Approval     Ordered          03/31/17 1141  I have reviewed and agree with all the recommendations and orders detailed in this document.  EFFECTIVE NOW     Approved and electronically signed by:  Eveline Nix MD           03/28/17 9876  I have reviewed and agree with all the recommendations and orders detailed in this document.  EFFECTIVE NOW     Approved and electronically signed by:  Elias Boswell MD             Admission Information     Date & Time Provider Department Dept. Phone    3/18/2017 Eugene George MD [place] [address] [facility] [phone]      Your Vitals Were     Blood Pressure Pulse Temperature Respirations Height Weight    127/69 (BP Location: Left arm) 81 98  F (36.7  C) (Oral) 16 1.829 m (6') 97.6 kg (215 lb 3.2 oz)     "Pulse Oximetry BMI (Body Mass Index)                93% 29.19 kg/m2          LP Aminah[employer] Key Information     [employer] lets you send messages to your doctor, view your test results, renew your prescriptions, schedule appointments and more. To sign up, go to [email or web address] . Click on \"Log in\" on the left side of the screen, which will take you to the Welcome page. Then click on \"Sign up Now\" on the right side of the page.     You will be asked to enter the access code listed below, as well as some personal information. Please follow the directions to create your username and password.     Your access code is: [email or web address]  Expires: 2017  5:39 PM     Your access code will  in 90 days. If you need help or a new code, please call your [place] clinic or [phone].        Care EveryWhere ID     This is your Care EveryWhere ID. This could be used by other organizations to access your [place] medical records  [id number]           Review of your medicines      START taking        Dose / Directions    acetaminophen 325 MG tablet   Commonly known as:  TYLENOL   Used for:  History of CVA (cerebrovascular accident)        Dose:  650 mg   Take 2 tablets (650 mg) by mouth every 4 hours as needed for mild pain   Quantity:  100 tablet   Refills:  0       glipiZIDE 2.5 MG 24 hr tablet   Commonly known as:  glipiZIDE XL   Used for:  Type 2 diabetes mellitus with complication, without long-term current use of insulin (H)        Dose:  2.5 mg   Take 1 tablet (2.5 mg) by mouth daily   Refills:  0       melatonin 1 MG Tabs tablet        Dose:  1 mg   Take 1 tablet (1 mg) by mouth nightly as needed   Quantity:  30 tablet   Refills:  0       * QUEtiapine 25 MG tablet   Commonly known as:  SEROquel        Dose:  25 mg   Take 1 tablet (25 mg) by mouth At Bedtime for 10 days   Refills:  0       * QUEtiapine 25 MG tablet   Commonly known as:  SEROquel        Dose:  6.25 mg   Take 0.25 tablets (6.25 mg) by mouth 2 times daily as " needed   Refills:  0       * Notice:  This list has 2 medication(s) that are the same as other medications prescribed for you. Read the directions carefully, and ask your doctor or other care provider to review them with you.      CONTINUE these medicines which may have CHANGED, or have new prescriptions. If we are uncertain of the size of tablets/capsules you have at home, strength may be listed as something that might have changed.        Dose / Directions    amitriptyline 50 MG tablet   Commonly known as:  ELAVIL   This may have changed:  how much to take        Dose:  100 mg   Take 2 tablets (100 mg) by mouth At Bedtime Per Family, usually takes 1 or 2 tabs every night.  Rarely 3 tabs - if 3 tabs are used pt is too drowsy following morning.   Quantity:  30 tablet   Refills:  0       carvedilol 25 MG tablet   Commonly known as:  COREG   This may have changed:    - medication strength  - Another medication with the same name was removed. Continue taking this medication, and follow the directions you see here.   Used for:  NSTEMI (non-ST elevated myocardial infarction) (H)        Dose:  25 mg   Take 1 tablet (25 mg) by mouth 2 times daily   Quantity:  60 tablet   Refills:  0       traMADol 50 MG tablet   Commonly known as:  ULTRAM   This may have changed:    - how much to take  - additional instructions   Used for:  Back pain, unspecified back location, unspecified back pain laterality, unspecified chronicity        Dose:  25 mg   Take 0.5 tablets (25 mg) by mouth 2 times daily as needed For headache   Quantity:  15 tablet   Refills:  0         CONTINUE these medicines which have NOT CHANGED        Dose / Directions    aspirin 81 MG EC tablet   Used for:  NSTEMI (non-ST elevated myocardial infarction) (H)        Take one tablet daily   Quantity:  90 tablet   Refills:  3       atorvastatin 40 MG tablet   Commonly known as:  LIPITOR   Used for:  NSTEMI (non-ST elevated myocardial infarction) (H)        Dose:  40 mg    Take 1 tablet (40 mg) by mouth daily   Quantity:  90 tablet   Refills:  1       clopidogrel 75 MG tablet   Commonly known as:  PLAVIX   Used for:  NSTEMI (non-ST elevated myocardial infarction) (H)        Dose:  75 mg   Take 1 tablet (75 mg) by mouth daily   Quantity:  90 tablet   Refills:  1       COZAAR PO        Dose:  50 mg   Take 50 mg by mouth daily   Refills:  0       DOC-Q-LACE PO        Dose:  100 mg   Take 100 mg by mouth 2 times daily   Refills:  0       nitroglycerin 0.4 MG sublingual tablet   Commonly known as:  NITROSTAT   Used for:  NSTEMI (non-ST elevated myocardial infarction) (H), Essential hypertension, Postsurgical percutaneous transluminal coronary angioplasty status        Dose:  0.4 mg   Place 1 tablet (0.4 mg) under the tongue every 5 minutes as needed for chest pain   Quantity:  25 tablet   Refills:  1       NORTRIPTYLINE HCL PO        Dose:  50 mg   Take 50 mg by mouth daily   Refills:  0       polyethylene glycol powder   Commonly known as:  MIRALAX/GLYCOLAX        Dose:  1 capful   Take 1 capful by mouth daily as needed   Refills:  0            Where to get your medicines      Some of these will need a paper prescription and others can be bought over the counter. Ask your nurse if you have questions.     Bring a paper prescription for each of these medications     traMADol 50 MG tablet       You don't need a prescription for these medications     acetaminophen 325 MG tablet    amitriptyline 50 MG tablet    carvedilol 25 MG tablet    glipiZIDE 2.5 MG 24 hr tablet    melatonin 1 MG Tabs tablet    QUEtiapine 25 MG tablet    QUEtiapine 25 MG tablet                Protect others around you: Learn how to safely use, store and throw away your medicines at www.disposemymeds.org.             Medication List: This is a list of all your medications and when to take them. Check marks below indicate your daily home schedule. Keep this list as a reference.      Medications           Morning Afternoon  Evening Bedtime As Needed    acetaminophen 325 MG tablet   Commonly known as:  TYLENOL   Take 2 tablets (650 mg) by mouth every 4 hours as needed for mild pain   Last time this was given:  650 mg on 3/30/2017  8:55 PM                                   amitriptyline 50 MG tablet   Commonly known as:  ELAVIL   Take 2 tablets (100 mg) by mouth At Bedtime Per Family, usually takes 1 or 2 tabs every night.  Rarely 3 tabs - if 3 tabs are used pt is too drowsy following morning.   Last time this was given:  100 mg on 3/29/2017 10:35 PM                                   aspirin 81 MG EC tablet   Take one tablet daily   Last time this was given:  81 mg on 3/31/2017  8:49 AM                                   atorvastatin 40 MG tablet   Commonly known as:  LIPITOR   Take 1 tablet (40 mg) by mouth daily   Last time this was given:  40 mg on 3/31/2017  8:49 AM                                   carvedilol 25 MG tablet   Commonly known as:  COREG   Take 1 tablet (25 mg) by mouth 2 times daily   Last time this was given:  25 mg on 3/31/2017  8:49 AM                                      clopidogrel 75 MG tablet   Commonly known as:  PLAVIX   Take 1 tablet (75 mg) by mouth daily   Last time this was given:  75 mg on 3/31/2017  8:49 AM                                   COZAAR PO   Take 50 mg by mouth daily   Last time this was given:  50 mg on 3/31/2017  8:50 AM                                   DOC-Q-LACE PO   Take 100 mg by mouth 2 times daily                                      glipiZIDE 2.5 MG 24 hr tablet   Commonly known as:  glipiZIDE XL   Take 1 tablet (2.5 mg) by mouth daily                                   melatonin 1 MG Tabs tablet   Take 1 tablet (1 mg) by mouth nightly as needed   Last time this was given:  1 mg on 3/25/2017  1:01 AM                                   nitroglycerin 0.4 MG sublingual tablet   Commonly known as:  NITROSTAT   Place 1 tablet (0.4 mg) under the tongue every 5 minutes as needed for chest pain                                    NORTRIPTYLINE HCL PO   Take 50 mg by mouth daily   Last time this was given:  50 mg on 3/31/2017  8:49 AM                                   polyethylene glycol powder   Commonly known as:  MIRALAX/GLYCOLAX   Take 1 capful by mouth daily as needed                                   * QUEtiapine 25 MG tablet   Commonly known as:  SEROquel   Take 1 tablet (25 mg) by mouth At Bedtime for 10 days   Last time this was given:  25 mg on 3/30/2017  8:51 PM                                   * QUEtiapine 25 MG tablet   Commonly known as:  SEROquel   Take 0.25 tablets (6.25 mg) by mouth 2 times daily as needed   Last time this was given:  25 mg on 3/30/2017  8:51 PM                                   traMADol 50 MG tablet   Commonly known as:  ULTRAM   Take 0.5 tablets (25 mg) by mouth 2 times daily as needed For headache   Last time this was given:  50 mg on 3/22/2017  8:03 PM                                   * Notice:  This list has 2 medication(s) that are the same as other medications prescribed for you. Read the directions carefully, and ask your doctor or other care provider to review them with you.              More Information        ???????? ????????????? ??????????? [Uri, Viral Respiratory Illness, Adult, No Abx]  ? ??? ?????????? ??????????? ??????? ??????????? ????? (Upper Respiratory Illness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ylenol ( ???????????? ) ??? ????????? (Motrin, Advil) ??????? ??????? ??? , ???????? ? ???????? ???? . ( ????? ?????? 18 ??? ?? ??????? ????????? ??????? ??? ??????????? , ????????? ??? ????? ???????? ? ????????????? ?????? .)  4. ???????? ???????? ???????? , ??????? ????????? ?? ????? ?????? ???? . ???????????? 6-8 ???????? ???????? ( ???? , ?????????????? ???????? , ???? , ??? , ???? ? ? . ? .) ? ???? , ????? ???????? ????????????? . ?????????????? ???????? ???????????? ?????????? ????????? ?? ???? ? ?????? .  5. ????????? ?? ???????? , ??????????? ??? ??????? , ?? ???????? ????????????????? ??????? , ?? ??????? ????????? ????????? ???????? : ?????? (Robitussin DM), ???? ? ????? (Chloraseptic lozenges ??? spray), ????????? ????? ? ???? ? ? ??????? (Actifed, Sudafed, Chlortrimeton).  ?????????? ?????? ??????????  ? ?????? ????? ??? ? ???????????? ? ??????????? ??????????, ???? ? ??????? ????????? ?????? ?? ?????????? ?????????.  ??????????????? ?????????? ?? ??????????? ???????  ??? ????? ?? ????????????? ???? ?????????:  -- ?????? ? ??????? ??????????? ?????????? ??????? ??? ??????? ????? ? ???????  -- ???? ? ????? , ?????? , ????????? ??? ???????????? ???????  -- ?????? ???????? ????, ???? ? ??????? ????, ??? ??? ????  -- ??????????? ???? 100,4 ? F (38,0 ?C) ? ??????? ????? ??? ???? ????  -- ????????? ??? ???????? ??-?? ???? ? ?????    [id number] [employer]. [address] [place] [address], [place] [id number]. All rights reserved. This information is not intended as a substitute for professional medical care. Always follow your healthcare professional's instructions.                ??? ????? ?????? 2-?? ????? (What Is Type 2 Diabetes?)  ?????? 2-?? ???? ???????? ??????????? (?????????????? ??? ?????) ????????????. ??? ??????? ?????????? ????? ??????? ??????? ?????? ?  ?????. ??? ??????????? ???????????? ???????? ??????????? ??????? ?? ????. ?????? ??????? ?? ?????? ??????????? ????????? ? ????????, ???????? ????? ???. ????? ?????????????? ??????, ??? ??? ???????? ?????? ? ???? ????? ????????? ?????????, ? ??????? ???????? ????? ?????? ??????????. ? ???? ??? ??????? ??????????? ?????????.  ?????????? ??????? ?????? ?????    ?????? ????? ??? ???????? ?????? ???? ????????? ??????? ?????? ?????. ??? ???????? ??? ?????, ????????? ??????? ?????? ????? ? ??????????? ???????? ??? ???.    ??????????? ????????? ???????, ??? ????? ? ? ????? ??????? ??? ?????????? ????????? ????????.    ??? ?????????? ?????? ?????? ? ??????????? ???????? ?????, ???????? ?????????? ?????????? ? ????????? ???????????? ?????????? ????????? ?????????.    ??? ??????? ?????? ??? ??????? ??????? ?????? ?????? ????? ??????????? ????????? ????? ?????? ????????? ? ???? ?????, ???????? ?????????? ?????????? ??? ??????????????? ????????????? ???????.  ?????????? ?????  ?????????? ????? ??????? ??????? ??? ????????? ??????? ?????? ?????. ??? ????? ??????? ??? ?????????????? ???? ???. ?????????? ??? ?????? ????????? ???????????? ??????????? ??????? ??? ??????????? ??????? ?? ????.    ????? ??????? ????????? ?????????? ??? ??? ?????.    ??? ?? ????? ???????????? ?? ???? ??????? ????. ?????? ???, ????????, ???????? ?????????? ???????????? ???????????? ????. ????? ???????????????? ????, ?????????? ?????, ??????, ???????? ???? ? ???????? ?? ???????? ?????, ??????? ??????? ??? ????????? ??????? ?????? ?????.    ??? ??????? ??????????? ???????????? ?????????? ????. ????? ?????? ???? ???????? ? ???? ? ?? ?? ?????. ?? ??????????? ?????? ????.  ?????? ????????? ?????????  ?????????? ?????????? ???????????? ???????? ?????? ?????? ?????. ??? ???????????? ????????????? ???????? ?????????? ??? ??????????? ??????? ?? ????. ?????????? ?????????? ????? ??????? ??? ?????????????? ???? ???.    ??????????? ????????? ??????? ?????????  ?????????? ??? ??? ???????? ?????????? ??????????.    ????? ???????? ?????? ???? ????????? ? ?????? ?????? ????????, ?????? ????????? ???????? ? ????? ????????????. ?????? ????? ??? ?????? ??????? ???????? ???? ?? ???????? ????? ?????? ????.  ??????????? ?????????? ? ?????? ????????  ??? ??????? ??????? ?????????? ???? ???????? ? ??? ?????? ???????????. ? ??? ????????? ??????????? ????, ????, ?????? ? ?????.    ??????????? ????????? ????? ????????????, ??????? ??????? ??? ????????????? ???????? ???? ???????????.    ??? ???????? ????????? ?????? ????????, ??????? ???????????? ???? ? ????, ? ????? ??????? ??????? ?????. ??????? ????? ????????? ??? ???? A1C ?? ????? ???? ??? ? ???. ???? ???? ???????????? ????? ?????? ?????, ??????? ??????????, ????????? ?????? ?? ????????? 2 ??? 3 ?????? ????????????? ???????? ?????? ?????.    ???? ?? ??????, ???????! ??????? ???????? ? ????????? ??????? ??????? ? ???????????, ????????? ? ???. ??????????????????? ? ?????? ???????????? ??????? ?????? ?? ???????.    [id number] [employer], [place]. [address] [place] [address], [place] [id number]. All rights reserved. This information is not intended as a substitute for professional medical care. Always follow your healthcare professional's instructions.                * ???????? ???????? ??????: ? ?????? [ Bladder Infection: Male, adult]  ???????? ???????? ?????? (?????? (cystitis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yridium ( ?????? - ????????????? ). ??? ????????????? ????? ???????? ???? ????? ?????????? ???? - ????????? ???? , ??? ????? ???????? ? ?????? ?? ?????? .  ?????????? ?????? ?????????? ? ?????? ????? ??? ? ?????? ??????????? ?????????? , ???? ? ??????? ???? ???? ?? ??????? ??? ???????? . ????? ?? ?????????? ??????????? ????? ? ????? , ????? ???????? ????????????? ??????????? ???????????? ??????? ????? .  ??????????????? ?????????? ?? ??????????? ??????? ??? ????? ?? ????????????? ???? ?????????:    ??? ??????????? 101?F (38.3?C) ??? ???? ??? ? ???????????? ? ?????????????? ????? ;    ?????????? ????????? ? ???????? ??? ??????? ;    ????????????? ???? ? ????? ??? ?????? ;    ???????????????? ????? ; ????????????? ?????? ????????????? ???????;    ????????, ?????????????? ??? ?????? ???????? .    [id number] [facility], [address] [place] [address] Road, [place] [address]. All rights reserved. This information is not intended as a substitute for professional medical care. Always follow your healthcare professional's instructions.

## 2017-03-19 ENCOUNTER — APPOINTMENT (OUTPATIENT)
Dept: PHYSICAL THERAPY | Facility: CLINIC | Age: 82
DRG: 193 | End: 2017-03-19
Attending: PHYSICIAN ASSISTANT
Payer: COMMERCIAL

## 2017-03-19 LAB
ANION GAP SERPL CALCULATED.3IONS-SCNC: 11 MMOL/L (ref 3–14)
BASOPHILS # BLD AUTO: 0 10E9/L (ref 0–0.2)
BASOPHILS NFR BLD AUTO: 0.2 %
BUN SERPL-MCNC: 23 MG/DL (ref 7–30)
CALCIUM SERPL-MCNC: 7.5 MG/DL (ref 8.5–10.1)
CHLORIDE SERPL-SCNC: 102 MMOL/L (ref 94–109)
CO2 SERPL-SCNC: 22 MMOL/L (ref 20–32)
CREAT SERPL-MCNC: 1.15 MG/DL (ref 0.66–1.25)
DIFFERENTIAL METHOD BLD: ABNORMAL
EOSINOPHIL # BLD AUTO: 0 10E9/L (ref 0–0.7)
EOSINOPHIL NFR BLD AUTO: 0 %
ERYTHROCYTE [DISTWIDTH] IN BLOOD BY AUTOMATED COUNT: 12.8 % (ref 10–15)
GFR SERPL CREATININE-BSD FRML MDRD: 61 ML/MIN/1.7M2
GLUCOSE BLDC GLUCOMTR-MCNC: 115 MG/DL (ref 70–99)
GLUCOSE BLDC GLUCOMTR-MCNC: 164 MG/DL (ref 70–99)
GLUCOSE BLDC GLUCOMTR-MCNC: 189 MG/DL (ref 70–99)
GLUCOSE BLDC GLUCOMTR-MCNC: 222 MG/DL (ref 70–99)
GLUCOSE BLDC GLUCOMTR-MCNC: 225 MG/DL (ref 70–99)
GLUCOSE SERPL-MCNC: 211 MG/DL (ref 70–99)
HCT VFR BLD AUTO: 43.6 % (ref 40–53)
HGB BLD-MCNC: 14.9 G/DL (ref 13.3–17.7)
IMM GRANULOCYTES # BLD: 0 10E9/L (ref 0–0.4)
IMM GRANULOCYTES NFR BLD: 0.2 %
LYMPHOCYTES # BLD AUTO: 1.8 10E9/L (ref 0.8–5.3)
LYMPHOCYTES NFR BLD AUTO: 40 %
MCH RBC QN AUTO: 31.3 PG (ref 26.5–33)
MCHC RBC AUTO-ENTMCNC: 34.2 G/DL (ref 31.5–36.5)
MCV RBC AUTO: 92 FL (ref 78–100)
MONOCYTES # BLD AUTO: 0.7 10E9/L (ref 0–1.3)
MONOCYTES NFR BLD AUTO: 14.8 %
NEUTROPHILS # BLD AUTO: 2 10E9/L (ref 1.6–8.3)
NEUTROPHILS NFR BLD AUTO: 44.8 %
NRBC # BLD AUTO: 0 10*3/UL
NRBC BLD AUTO-RTO: 0 /100
PLATELET # BLD AUTO: 104 10E9/L (ref 150–450)
POTASSIUM SERPL-SCNC: 3.7 MMOL/L (ref 3.4–5.3)
RBC # BLD AUTO: 4.76 10E12/L (ref 4.4–5.9)
SODIUM SERPL-SCNC: 135 MMOL/L (ref 133–144)
TROPONIN I SERPL-MCNC: 0.07 UG/L (ref 0–0.04)
WBC # BLD AUTO: 4.5 10E9/L (ref 4–11)

## 2017-03-19 PROCEDURE — 25000128 H RX IP 250 OP 636: Performed by: INTERNAL MEDICINE

## 2017-03-19 PROCEDURE — 12000007 ZZH R&B INTERMEDIATE

## 2017-03-19 PROCEDURE — 40000193 ZZH STATISTIC PT WARD VISIT

## 2017-03-19 PROCEDURE — 84484 ASSAY OF TROPONIN QUANT: CPT | Performed by: PHYSICIAN ASSISTANT

## 2017-03-19 PROCEDURE — 25000132 ZZH RX MED GY IP 250 OP 250 PS 637: Performed by: PHYSICIAN ASSISTANT

## 2017-03-19 PROCEDURE — 00000146 ZZHCL STATISTIC GLUCOSE BY METER IP

## 2017-03-19 PROCEDURE — 97530 THERAPEUTIC ACTIVITIES: CPT | Mod: GP

## 2017-03-19 PROCEDURE — 25000132 ZZH RX MED GY IP 250 OP 250 PS 637: Performed by: INTERNAL MEDICINE

## 2017-03-19 PROCEDURE — 97110 THERAPEUTIC EXERCISES: CPT | Mod: GP

## 2017-03-19 PROCEDURE — 85025 COMPLETE CBC W/AUTO DIFF WBC: CPT | Performed by: PHYSICIAN ASSISTANT

## 2017-03-19 PROCEDURE — 97161 PT EVAL LOW COMPLEX 20 MIN: CPT | Mod: GP

## 2017-03-19 PROCEDURE — 36415 COLL VENOUS BLD VENIPUNCTURE: CPT | Performed by: PHYSICIAN ASSISTANT

## 2017-03-19 PROCEDURE — 25000128 H RX IP 250 OP 636: Performed by: PHYSICIAN ASSISTANT

## 2017-03-19 PROCEDURE — 97116 GAIT TRAINING THERAPY: CPT | Mod: GP

## 2017-03-19 PROCEDURE — 99232 SBSQ HOSP IP/OBS MODERATE 35: CPT | Performed by: INTERNAL MEDICINE

## 2017-03-19 PROCEDURE — 80048 BASIC METABOLIC PNL TOTAL CA: CPT | Performed by: PHYSICIAN ASSISTANT

## 2017-03-19 RX ORDER — GLIPIZIDE 5 MG/1
5 TABLET ORAL
Status: DISCONTINUED | OUTPATIENT
Start: 2017-03-19 | End: 2017-03-29

## 2017-03-19 RX ORDER — OSELTAMIVIR PHOSPHATE 75 MG/1
75 CAPSULE ORAL 2 TIMES DAILY
Status: DISCONTINUED | OUTPATIENT
Start: 2017-03-19 | End: 2017-03-24

## 2017-03-19 RX ORDER — LEVOFLOXACIN 5 MG/ML
750 INJECTION, SOLUTION INTRAVENOUS EVERY 24 HOURS
Status: DISCONTINUED | OUTPATIENT
Start: 2017-03-19 | End: 2017-03-20

## 2017-03-19 RX ADMIN — LOSARTAN POTASSIUM 50 MG: 25 TABLET, FILM COATED ORAL at 10:35

## 2017-03-19 RX ADMIN — GLIPIZIDE 5 MG: 5 TABLET ORAL at 16:49

## 2017-03-19 RX ADMIN — CLOPIDOGREL 75 MG: 75 TABLET, FILM COATED ORAL at 10:35

## 2017-03-19 RX ADMIN — OSELTAMIVIR PHOSPHATE 75 MG: 75 CAPSULE ORAL at 10:45

## 2017-03-19 RX ADMIN — CARVEDILOL 25 MG: 25 TABLET, FILM COATED ORAL at 10:35

## 2017-03-19 RX ADMIN — ASPIRIN 81 MG: 81 TABLET, COATED ORAL at 10:34

## 2017-03-19 RX ADMIN — TRAMADOL HYDROCHLORIDE 50 MG: 50 TABLET, COATED ORAL at 16:03

## 2017-03-19 RX ADMIN — SODIUM CHLORIDE: 9 INJECTION, SOLUTION INTRAVENOUS at 16:03

## 2017-03-19 RX ADMIN — ATORVASTATIN CALCIUM 40 MG: 40 TABLET, FILM COATED ORAL at 10:35

## 2017-03-19 RX ADMIN — NORTRIPTYLINE HYDROCHLORIDE 50 MG: 50 CAPSULE ORAL at 10:35

## 2017-03-19 RX ADMIN — CARVEDILOL 25 MG: 25 TABLET, FILM COATED ORAL at 21:10

## 2017-03-19 RX ADMIN — LEVOFLOXACIN 750 MG: 5 INJECTION, SOLUTION INTRAVENOUS at 17:27

## 2017-03-19 RX ADMIN — OSELTAMIVIR PHOSPHATE 75 MG: 75 CAPSULE ORAL at 21:10

## 2017-03-19 RX ADMIN — SODIUM CHLORIDE: 9 INJECTION, SOLUTION INTRAVENOUS at 05:59

## 2017-03-19 NOTE — PLAN OF CARE
Problem: Goal Outcome Summary  Goal: Goal Outcome Summary  Outcome: No Change  VSS. IVF infusing. Tele: SR/SA with occasional PVC's. HR 60-80. Up with assist x2 and walker-PT eval this afternoon with  present. , 222-wife refuses for pt to receive insulin. Tamiflu started. Trops elevated-demand.   IV Levaquin for UTI.

## 2017-03-19 NOTE — ED NOTES
Patient and spoke kept updated regarding bed availability with the aid of an interpretor.  VSS.  LS:  Diminished right bases.  Telephone report to receiving RN on 3rd Floor.   Plan:  Admit to 3rd Floor for further care.

## 2017-03-19 NOTE — PLAN OF CARE
Problem: Goal Outcome Summary  Goal: Goal Outcome Summary  Outcome: No Change  Patient admitted for cough, weakness +trop. Found to have UTI and + flu. Droplet iso. Used  phone and [place]une[place] speaking LPN to explain safety, POC with wife /pt. Pt confused and has some difficulty following commands and is impulsive. Bed alarm on. Incontinent but occ tries to sit up/stand to void. Pt is very weak and legs can buckle. Levaquin in ED. Iv fluids, taking small amt of po flds. Skin intact, small amt of redness in groin area. POC reviewed with son also.    MELANIE page to RENETTA for trop 0.057. Recd order to insulin/BG monitoring d/t elevated BG in ED

## 2017-03-19 NOTE — PROGRESS NOTES
Troponin trending up but still lower   -- no cp  -- suspect demand ischemia  -- defer to am rounder to consider echo/ cards

## 2017-03-19 NOTE — PLAN OF CARE
Problem: Goal Outcome Summary  Goal: Goal Outcome Summary  Outcome: No Change  Pt. A&O, Trinidadian speaking. Son here at bedside providing much of care and translation. VSS, afebrile, RA. Denies pain. Small scratches to lower extremities. Pt. Used urinal at bedside. Assist x2, possible teresita steady. Tele- NSR. NS infusing at 100ml/hr. PT to consult today.

## 2017-03-19 NOTE — PLAN OF CARE
Problem: Goal Outcome Summary  Goal: Goal Outcome Summary     PT: Patient seen by physical therapy for evaluation and treatment.  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  Patient s wife present, participating in session.   present for session.  Patient lives in apartment with wife, no stairs present.  Patient uses SEC at baseline for mobility.  Patient has a history of balance issues with falls (per wife, last fall several months ago) after CVAs.  Patient with right sided weakness from CVA.  Patient and wife report sudden onset weakness over past week coinciding with onset of illness; required 2 person assist to stand from chair at home, max A x2 to bring patient to car to come to ED.  Patient transferred to sitting at EOB with min A x1.  Repeated sit to stand transfers from EOB with 2WW with graded assist from Min A x1 to CGA.  Patient experienced one LOB posteriorly in standing, required mod A to recover.  Patient amb 25, 60 feet with 2WW min A x 2 (to advance walker and for balance).  Patient with very slow gait speed with shuffling steps; minimal heel strike and limited foot clearance.  Patient fatigued at end of session.  Recommend assist x2 with 2WW with nursing to access bathroom and bedside chair.  Recommend DC to TCU secondary to current level of assist required with all mobility.

## 2017-03-19 NOTE — PROGRESS NOTES
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   Found to have UTI and influenza B.       1.  Influenza B-  Likely causing cough and weakness.  Tx with tamiflu..     2. Elevated troponin - likely due to demand ischemia from dehydration and acute illness.  Has had no chest pain.  Defer further work up for now.  Has hx of CAD with previous stents- did have other areas of disease that were managed medically.    4. UTI - UA grossly abnormal, positive nitrites, moderate LE, 109 WBCs in clumps.  E coli in urine cx.  Continue IV Levaquin and await sensitivities.     5. Hyperglycemia - borderline DM in past, and not on meds but HgbA1c is now 7.1 in 2015. Glucose 269 on arrival, >499 glucose in urine.  On ISS.  Will add glipizide.       6.  Generalized weakness-  Likely due to UTI and influenza.  Reports his legs feel weak- no focal neurologic findings.  PT/OT.    7. HTN - resume home meds with parameters    8. HLP - on statin, resume    9. Hx of TBI - memory difficulties, R sided deficits     10. Hx of CVA - mild right sided weakness and speech is somewhat slow.    DVT Prophylaxis: Pneumatic Compression Devices  Code Status: Full Code  Discharge Dispo: home vs TCU depending on progress- likely 2 days more          Interval History (Subjective):      Son helped interpret at patient's request.  Patient feels tired and legs are weak.  Has rattling cough                   Physical Exam:      Last Vital Signs:  /79  Pulse 91  Temp 98.7  F (37.1  C) (Oral)  Resp 20  Ht 1.829 m (6')  Wt 97 kg (213 lb 13.5 oz)  SpO2 93%  BMI 29  kg/m2      Vital signs reviewed  General:  Alert, calm, NAD  CV: regular rate and rhythm, no murmurs or rubs  Lungs:  Clear to ascultation bilaterally, normal respiratory effort  HEENT:  Pupil round, equal, conjuctivae, sclerae and lids normal, neck is supple  Abdomen:  Soft, nontender, nondistended, no masses, normal bowel sounds  Extremities:  No edema  Neuro: normal strength and sensation in all 4 extremities, cranial nerves grossly intact  Psychiatric:  Mood and affect within normal limits           Medications:      All current medications were reviewed with changes reflected in problem list.         Data:      All new lab and imaging data was reviewed.   Labs:  bg 211  Trop .068 from 0.05 from 0.05  hgb A1C 7.1  Creatinine 1.1 from 1.5

## 2017-03-19 NOTE — PROGRESS NOTES
03/19/17 1523   Quick Adds   Type of Visit Initial PT Evaluation   Living Environment   Lives With spouse   Living Arrangements apartment   Home Accessibility no concerns   Number of Stairs to Enter Home 0   Number of Stairs Within Home 0   Transportation Available car;family or friend will provide   Living Environment Comment Lives with wife in apartment with elevator.  Children and grandchildren live near by   Self-Care   Usual Activity Tolerance moderate   Current Activity Tolerance poor   Regular Exercise no   Equipment Currently Used at Home cane, straight   Activity/Exercise/Self-Care Comment Uses SEC for all ambulation   Functional Level Prior   Ambulation 1-->assistive equipment   Transferring 1-->assistive equipment   Toileting 1-->assistive equipment   Bathing 1-->assistive equipment   Dressing 2-->assistive person   Eating 0-->independent   Communication 0-->understands/communicates without difficulty   Swallowing 0-->swallows foods/liquids without difficulty   Cognition 0 - no cognition issues reported   Fall history within last six months yes   Number of times patient has fallen within last six months 2   Which of the above functional risks had a recent onset or change? ambulation;transferring   Prior Functional Level Comment Patient with right LE weakness after CVA.  Decreased balance reported   General Information   Patient/Family Goals Statement Increase strength and endurance.   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    Precautions/Limitations fall precautions   Cognitive Status Examination   Orientation orientation to person, place and time   Personal Safety and Judgment intact   Pain Assessment   Patient Currently in Pain No  "  Integumentary/Edema   Integumentary/Edema no deficits were identifed   Posture    Posture Forward head position;Protracted shoulders   Range of Motion (ROM)   ROM Comment WFL   Strength   Strength Comments moderate strength deficits   Bed Mobility   Bed Mobility Comments min A   Transfer Skills   Transfer Comments Repeated sit to stand transfers from EOB with 2WW with graded assist from Min A x1 to CGA.     Gait   Gait Comments Patient amb 25, 60 feet with 2WW min A x 2 (to advance walker and for balance).  Patient with very slow gait speed with shuffling steps; minimal heel strike and limited foot clearance.     Balance   Balance Comments Patient experienced one LOB posteriorly in standing, required mod A to recover.  Provided min A during amb for safety   Coordination   Coordination Comments shuffling steps   Muscle Tone   Muscle Tone no deficits were identified   General Therapy Interventions   Planned Therapy Interventions balance training;bed mobility training;gait training;strengthening;transfer training;home program guidelines;progressive activity/exercise   Clinical Impression   Criteria for Skilled Therapeutic Intervention yes, treatment indicated   PT Diagnosis decreased independence with mobility   Clinical Presentation Stable/Uncomplicated   Clinical Decision Making (Complexity) Low complexity   Therapy Frequency` daily   Predicted Duration of Therapy Intervention (days/wks) 5 days   Anticipated Equipment Needs at Discharge (possibly a walker)   Anticipated Discharge Disposition Transitional Care Facility   Risk & Benefits of therapy have been explained Yes   Patient, Family & other staff in agreement with plan of care Yes   [facility] [employer] TM \"6 Clicks\"   2016, Trustees of [facility], under license to [employer].  All rights reserved.   6 Clicks Short Forms Basic Mobility Inpatient Short Form   [facility] AM-PAC  \"6 Clicks\" V.2 Basic Mobility Inpatient Short Form   1. Turning " from your back to your side while in a flat bed without using bedrails? 3 - A Little   2. Moving from lying on your back to sitting on the side of a flat bed without using bedrails? 3 - A Little   3. Moving to and from a bed to a chair (including a wheelchair)? 3 - A Little   4. Standing up from a chair using your arms (e.g., wheelchair, or bedside chair)? 3 - A Little   5. To walk in hospital room? 2 - A Lot   6. Climbing 3-5 steps with a railing? 2 - A Lot   Basic Mobility Raw Score (Score out of 24.Lower scores equate to lower levels of function) 16   Total Evaluation Time   Total Evaluation Time (Minutes) 5

## 2017-03-19 NOTE — H&P
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   Work up in ED reveals: VSS. BMP - Na 132, Cr 1.5, glucose 269. , troponin 0.051. CBC unremarkable. UA - positive nitrites, moderate LE, 109 WBCs in clumps, large blood, 80 RBCs, and >499 glucose. CXR clear. EKG SR, unchanged, urine culture pending. Received IV Levaquin and albuterol neb in ED.  Patient is being registered to observation for further evaluation and to rule out possible ACS.     1. Generalized weakness - likely due to UTI. Does complain of cough, subjective fever, chills and body aches. Afebrile initially here, CXR clear. Will add on influenza swab. Will rehydration, supportive cares and management per below. Unable to ambulate at home, PT consult for discharge planning.   2. Elevated troponin - 0.051. Possibly due to YOVANA, pt denies chest pain. Has complained of cough and subjective fever and chills. Hx of NSTEMI in 10/2015, continues on ASA and Plavix per cardiology recommendations. Will monitor on tele and trend troponins. Hold off on heparin at this time. If troponin continues to elevate, consider heparin and switch to IP status.  3. YOVANA - Cr 1.50 today, previously normal. Likely due to UTI and dehydration. IVFs and recheck in AM. If does not improve tomorrow, consider alternative abx and consider holding Losartan.  4. UTI - UA grossly abnormal, positive nitrites, moderate LE, 109 WBCs in clumps, large blood and 80 RBCs. Given IV Levaquin in ED, will continue.   5. Hyperglycemia - borderline DM in  past, last HgbA1c was 6.2 in 2015. Glucose 269 on arrival, >499 glucose in urine. Likely diabetic. Will add on HgbA1c, treat accordingly.  6. Hyponatremia - Na 132. Rehydrate and recheck in AM  7. CAD - NSTEMI in 10/2015. Stents in staged procedure, RCA x2 on 10/12/15 then circumflex stent on 11/6/15. Had other areas of disease that were felt to be best managed medically. Denies chest pain at this time, EKG is unchanged, minimal troponin elevation on admission. Work up per above.   8. HTN - resume home meds with parameters  9. HLP - on statin, resume  10. Hx of TBI - memory difficulties, R sided deficits   11. Hx of CVA - mild right sided weakness and speech is somewhat slow.         Plan:     1. [place] to Observation  2. Continue telemetry  3. Follow serial troponins  4. IV Levaquin  5. Rapid influenza swab  6. IVFs NS at 100 mL/hr  7. Add on rapid influenza swab    8. Cardiac diet, No caffeine if Nuclear testing selected  9. DVT prophylaxis: pt at low risk, encourage ambulation  10. Code Status: full code  11. Dispo: initially admitted as OBS. Before completing H&P, rapid influenza returned positive for Influenza B and he spiked a fever. Will flip to IP as we anticipate greater than 2 night stay.    Add on SSI as HgbA1c is 7.1.                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 the patient's wife provides much of the history. The patient's wife reports that the patient developed a cough and some difficulty breathing a week ago. She states that his cough has been gradually worsening and now he is generally weak, prompting him to come to the ED for evaluation. On arrival to the ED, the  patient's wife reports that the patient has a wet productive cough and has been a little short of breath. She states that the patient is generally weak, worse in his lower extremities. He has had difficulty getting around and has not been ambulatory due to the weakness. The patient's wife notes that the patient had pneumonia in January and had similar cough and breathing difficulties. She also notes incontinence of urine for the past week or so which is new. She reports subjective fever and chills but nothing measured, and endorses body aches. The patient denies any chest pain, abd pain, nausea, vomiting, diarrhea, headache, numbness, or dysuria.             Past Medical History:     Past Medical History   Diagnosis Date     Acute chest pain 10/23/2015     CAD (coronary artery disease)      Essential hypertension 10/23/2015     History of CVA (cerebrovascular accident) 10/23/2015     HLD (hyperlipidemia)      HTN (hypertension)      NSTEMI (non-ST elevated myocardial infarction) (H) 10/23/2015     Post PTCA 10-     Successful PCI of culprit proximal to mid RCA with placement of a     Post PTCA 11-6-2015     pci of complex mid CFX-hector               Past Surgical History:     Past Surgical History   Procedure Laterality Date     Heart cath right and left heart cath  11/6/2015     PCI w/ HECTOR to mid-CFX     Cholecystectomy       Genitourinary surgery       prostate removal      Heart cath left heart cath  10/12/2015     PCI w/ HECTOR to RCA               Social History:     Social History     Social History     Marital status:      Spouse name: N/A     Number of children: N/A     Years of education: N/A     Occupational History     Not on file.     Social History Main Topics     Smoking status: Never Smoker     Smokeless tobacco: Not on file     Alcohol use No     Drug use: No     Sexual activity: Not on file     Other Topics Concern     Caffeine Concern No     seldom     Sleep Concern Yes     does not sleep  well     Special Diet Yes     lower sodium and fat     Exercise No     Social History Narrative               Family History:     Family History   Problem Relation Age of Onset     Unknown/Adopted No family hx of               Allergies:      Allergies   Allergen Reactions     Lisinopril                Medications:     Prior to Admission medications    Medication Sig Last Dose Taking? Auth Provider   Carvedilol (COREG PO) Take 25 mg by mouth 2 times daily 3/18/2017 at 1100 Yes Reported, Patient   Losartan Potassium (COZAAR PO) Take 50 mg by mouth daily 3/18/2017 at 1100 Yes Reported, Patient   NORTRIPTYLINE HCL PO Take 50 mg by mouth daily 3/18/2017 at 1100 Yes Reported, Patient   atorvastatin (LIPITOR) 40 MG tablet Take 1 tablet (40 mg) by mouth daily 3/18/2017 at 1100 Yes Eric Avalos MD   aspirin 81 MG EC tablet Take one tablet daily 3/18/2017 at 1100 Yes Eric Avalos MD   clopidogrel (PLAVIX) 75 MG tablet Take 1 tablet (75 mg) by mouth daily 3/18/2017 at 1100 Yes Eric Avalos MD   Docusate Sodium (DOC-Q-LACE PO) Take 100 mg by mouth 2 times daily Unknown  Reported, Patient   nitroglycerin (NITROSTAT) 0.4 MG SL tablet Place 1 tablet (0.4 mg) under the tongue every 5 minutes as needed for chest pain More than a month at Unknown time  Laura Valerio, APRN CNP   TRAMADOL HCL PO Take 100 mg by mouth 2 times daily as needed  Unknown  Reported, Patient   polyethylene glycol (MIRALAX/GLYCOLAX) powder Take 1 capful by mouth daily as needed  Unknown  Unknown, Entered By History              Review of Systems:   A Comprehensive greater than 10 system review of systems was carried out.  Pertinent positives and negatives are noted above.  Otherwise negative for contributory information.     Constitutional, neuro, ENT, endocrine, pulmonary, cardiac, gastrointestinal, genitourinary, musculoskeletal, integument and psychiatric systems are negative, except as otherwise noted.           Physical Exam:   Blood pressure  138/88, pulse 91, temperature 98.1  F (36.7  C), temperature source Oral, resp. rate 18, height 1.829 m (6'), weight 97 kg (213 lb 13.5 oz), SpO2 95 %.    GENERAL:  Comfortable.  PSYCH:  No acute distress.  HEENT:  Atraumatic, normocephalic. Normal conjunctiva, normal hearing, and oropharynx is normal.  NECK:  Supple, no neck vein distention  HEART:  Normal S1, S2 with no murmur, no pericardial rub, gallops or S3 or S4.  LUNGS:  Course cough, clear to auscultation, appears somewhat dyspneic. No wheezing, rales or ronchi.  GI:  Soft, normal bowel sounds. Non-tender, non distended.   EXTREMITIES:  No pedal edema, +2 pulses bilateral and equal.  SKIN:  Dry to touch, No rash, wound or ulcerations.  NEUROLOGIC:  CN 2-12 intact, BL 5/5 symmetric upper and lower extremity strength, sensation is intact with no focal deficits.              Data:     EKG demonstrates:  Sinus Rhythm, unchanged from previous tracings.      Recent Labs  Lab 03/18/17  1700   WBC 6.5   HGB 16.1   HCT 46.8   MCV 93   *       Recent Labs  Lab 03/18/17  1700   *   POTASSIUM 3.9   CHLORIDE 97   CO2 24   ANIONGAP 11   *   BUN 26   CR 1.50*   GFRESTIMATED 45*   GFRESTBLACK 54*   LUZ 8.0*       Recent Labs  Lab 03/18/17  1700   NTBNPI 367       Recent Labs  Lab 03/18/17  1700   TROPI 0.051*       Recent Labs  Lab 03/18/17  1908   COLOR Yellow   APPEARANCE Cloudy   URINEGLC >499*   URINEBILI Negative   URINEKETONE Negative   SG 1.011   UBLD Large*   URINEPH 5.0   PROTEIN 30*   NITRITE Positive*   LEUKEST Moderate*   RBCU 80*   WBCU 109*       Recent Results (from the past 48 hour(s))   XR Chest 2 Views    Narrative    XR CHEST 2 VW   3/18/2017 5:41 PM     HISTORY: cough SOB    COMPARISON: Film dated 1/4/2017    FINDINGS: The heart is negative.  Mild linear fibrotic changes again  seen at the left base. No new focal alveolar-type infiltrates. The  pulmonary vasculature is normal.  The bones and soft tissues are  unremarkable.       Impression    IMPRESSION: No acute infiltrates identified. No significant change.         MD Ana LINDER PA-C

## 2017-03-20 ENCOUNTER — APPOINTMENT (OUTPATIENT)
Dept: PHYSICAL THERAPY | Facility: CLINIC | Age: 82
DRG: 193 | End: 2017-03-20
Payer: COMMERCIAL

## 2017-03-20 LAB
BACTERIA SPEC CULT: ABNORMAL
GLUCOSE BLDC GLUCOMTR-MCNC: 135 MG/DL (ref 70–99)
GLUCOSE BLDC GLUCOMTR-MCNC: 161 MG/DL (ref 70–99)
GLUCOSE BLDC GLUCOMTR-MCNC: 166 MG/DL (ref 70–99)
GLUCOSE BLDC GLUCOMTR-MCNC: 175 MG/DL (ref 70–99)
GLUCOSE BLDC GLUCOMTR-MCNC: 250 MG/DL (ref 70–99)
Lab: ABNORMAL
MICRO REPORT STATUS: ABNORMAL
MICROORGANISM SPEC CULT: ABNORMAL
SPECIMEN SOURCE: ABNORMAL

## 2017-03-20 PROCEDURE — 25000128 H RX IP 250 OP 636: Performed by: INTERNAL MEDICINE

## 2017-03-20 PROCEDURE — 25000132 ZZH RX MED GY IP 250 OP 250 PS 637: Performed by: PHYSICIAN ASSISTANT

## 2017-03-20 PROCEDURE — 00000146 ZZHCL STATISTIC GLUCOSE BY METER IP

## 2017-03-20 PROCEDURE — 97116 GAIT TRAINING THERAPY: CPT | Mod: GP | Performed by: PHYSICAL THERAPY ASSISTANT

## 2017-03-20 PROCEDURE — 25000132 ZZH RX MED GY IP 250 OP 250 PS 637: Performed by: INTERNAL MEDICINE

## 2017-03-20 PROCEDURE — 12000007 ZZH R&B INTERMEDIATE

## 2017-03-20 PROCEDURE — 40000914 ZZH STATISTIC SITTER, DAY HOURS

## 2017-03-20 PROCEDURE — 99232 SBSQ HOSP IP/OBS MODERATE 35: CPT | Performed by: INTERNAL MEDICINE

## 2017-03-20 PROCEDURE — 97530 THERAPEUTIC ACTIVITIES: CPT | Mod: GP | Performed by: PHYSICAL THERAPY ASSISTANT

## 2017-03-20 PROCEDURE — 40000193 ZZH STATISTIC PT WARD VISIT: Performed by: PHYSICAL THERAPY ASSISTANT

## 2017-03-20 PROCEDURE — 40000916 ZZH STATISTIC SITTER, NIGHT HOURS

## 2017-03-20 PROCEDURE — 40000915 ZZH STATISTIC SITTER, EVENING HOURS

## 2017-03-20 RX ORDER — CEFTRIAXONE 1 G/1
1 INJECTION, POWDER, FOR SOLUTION INTRAMUSCULAR; INTRAVENOUS EVERY 24 HOURS
Status: DISCONTINUED | OUTPATIENT
Start: 2017-03-20 | End: 2017-03-22

## 2017-03-20 RX ORDER — QUETIAPINE FUMARATE 25 MG/1
25 TABLET, FILM COATED ORAL ONCE
Status: COMPLETED | OUTPATIENT
Start: 2017-03-20 | End: 2017-03-20

## 2017-03-20 RX ADMIN — CARVEDILOL 25 MG: 25 TABLET, FILM COATED ORAL at 21:40

## 2017-03-20 RX ADMIN — ASPIRIN 81 MG: 81 TABLET, COATED ORAL at 09:25

## 2017-03-20 RX ADMIN — ATORVASTATIN CALCIUM 40 MG: 40 TABLET, FILM COATED ORAL at 09:25

## 2017-03-20 RX ADMIN — LOSARTAN POTASSIUM 50 MG: 25 TABLET, FILM COATED ORAL at 09:24

## 2017-03-20 RX ADMIN — NORTRIPTYLINE HYDROCHLORIDE 50 MG: 50 CAPSULE ORAL at 09:24

## 2017-03-20 RX ADMIN — ACETAMINOPHEN 650 MG: 325 TABLET, FILM COATED ORAL at 00:00

## 2017-03-20 RX ADMIN — ACETAMINOPHEN 650 MG: 325 TABLET, FILM COATED ORAL at 19:44

## 2017-03-20 RX ADMIN — OSELTAMIVIR PHOSPHATE 75 MG: 75 CAPSULE ORAL at 09:24

## 2017-03-20 RX ADMIN — GLIPIZIDE 5 MG: 5 TABLET ORAL at 09:25

## 2017-03-20 RX ADMIN — CARVEDILOL 25 MG: 25 TABLET, FILM COATED ORAL at 09:24

## 2017-03-20 RX ADMIN — QUETIAPINE FUMARATE 25 MG: 25 TABLET, FILM COATED ORAL at 03:24

## 2017-03-20 RX ADMIN — OSELTAMIVIR PHOSPHATE 75 MG: 75 CAPSULE ORAL at 21:40

## 2017-03-20 RX ADMIN — CEFTRIAXONE 1 G: 1 INJECTION, POWDER, FOR SOLUTION INTRAMUSCULAR; INTRAVENOUS at 15:20

## 2017-03-20 RX ADMIN — GLIPIZIDE 5 MG: 5 TABLET ORAL at 17:27

## 2017-03-20 RX ADMIN — CLOPIDOGREL 75 MG: 75 TABLET, FILM COATED ORAL at 09:25

## 2017-03-20 NOTE — PLAN OF CARE
Problem: Goal Outcome Summary  Goal: Goal Outcome Summary  Outcome: No Change   VS stable, denies pain. Patient confused to situation and forgetful, BA on. Marysol Bermudez used to up patient to BR. Continue IV fluid and Levaquin IV. Continue current POC.

## 2017-03-20 NOTE — PLAN OF CARE
"Problem: Goal Outcome Summary  Goal: Goal Outcome Summary    PT - Pt transfers supine to sit with mod - min assist with pt initially sitting on EOB with min - CGA - note suddenly pt to lean posteriorly requiring mod assist to maintain balance with vcs thru the  to put weight thru both UEs. Pt sat on EOB x 5 min with mod - min assist. Pt transfers sit to stand with mod assist of 2 with vcs along with tactile cues for hand placement to push off and to place on ww. Pt transfers stand to sit with min assist of 1 with vcs for hand placement. Pt was able to stand up with min assist and sidestep up towards HOB with CGA. Pt transfers sit to supine with mod assist of 2. Pt amb 80' with ww with mod assist of 1 and min assist of 1 with mod assist with manuvering ww for safer distance for pt. Note pt to push ww too far ahead during amb. Note pt to amb with shuffle gait pattern majority of the time with vcs for pt to take \"BIG\" steps. Recommend assist x2 with 2WW with nursing to access bathroom and bedside chair. Recommend DC to TCU secondary to current level of assist required with all mobility.                  "

## 2017-03-20 NOTE — PLAN OF CARE
Problem: Goal Outcome Summary  Goal: Goal Outcome Summary  Outcome: No Change  Pt. Increased confusion, restlessness, anxiety throughout shift. Climbing out of bed every 30-45min throughout the night. MD paged and one time dose Seroquel 25 mg ordered and administered, with little effectiveness. Pt. continued restless behaviors.  phone used with some effectiveness. Transfers with heavy assist of x2, gait belt, attempted to use urinal , and bedside commode but pt. Refused. Incontinent of urine x2. . Tele - NSR. Abx- Rocephin. LS congested upon assessment, pt increased work of breathing requiring oxygen for sats at 85%. MD paged, fluids on hold currently. Initially pt. Complained of leg pain, PRN tylenol administered. Pt. Pulled IV line out at 0700, bleeding moderately, pressure dressing applied.

## 2017-03-20 NOTE — PROGRESS NOTES
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   Found to have e. Coli UTI and influenza B.       1.  Influenza B-  Likely causing cough and weakness.  Tx with tamiflu..     2. Elevated troponin - likely due to demand ischemia from dehydration and acute illness.  Has had no chest pain and remains asymptomatic.  Defer further work up for now.  Has hx of CAD with previous stents- did have other areas of disease that were managed medically.    4. UTI - UA grossly abnormal, positive nitrites, moderate LE, 109 WBCs in clumps.  E coli in urine cx which is resistant to quinolones.  Was on empiric IV Levaquin and switched to ceftriaxone.      5. Hyperglycemia - borderline DM in past, and not on meds but HgbA1c is now 7.1 in 2015. Glucose 269 on arrival, >499 glucose in urine.  On ISS.  Added glipizide and bg better.       6.  Generalized weakness-  Likely due to UTI and influenza.  Reports his legs feel weak- no focal neurologic findings.  PT consulting and currently recommending TCU.  Will see if strength improves as infections resolve.    7. HTN - resume home meds with parameters    8. HLP - on statin, resume    9. Hx of TBI - memory difficulties, R sided deficits.  Oriented to self and city only.      10. Hx of CVA - mild right sided weakness and speech is somewhat slow.    DVT Prophylaxis: Pneumatic Compression Devices  Code Status: Full Code  Discharge Dispo: home vs TCU depending on progress- 1-2  days more          Interval History (Subjective):      Interviewed with professional . He says he feels ok.                      Physical Exam:      Last Vital Signs:  /51 (BP Location: Right arm)  Pulse 91  Temp 97.7  F (36.5  C) (Oral)  Resp 18  Ht 1.829 m (6')  Wt 97 kg (213 lb 13.5 oz)  SpO2 92%  BMI 29 kg/m2      Vital signs reviewed  General:  Alert, calm, NAD  CV: regular rate and rhythm, no murmurs or rubs  Lungs:  Clear to ascultation bilaterally, normal respiratory effort  HEENT:  Pupil round, equal, conjuctivae, sclerae and lids normal, neck is supple  Abdomen:  Soft, nontender, nondistended, no masses, normal bowel sounds  Extremities:  No edema  Neuro: normal strength and sensation in all 4 extremities, cranial nerves grossly intact  Psychiatric:  Mood and affect within normal limits.  Oriented to self and city only.             Medications:      All current medications were reviewed with changes reflected in problem list.         Data:      All new lab and imaging data was reviewed.   Labs:  hgb A1C 7.1

## 2017-03-20 NOTE — PLAN OF CARE
Problem: Goal Outcome Summary  Goal: Goal Outcome Summary  Outcome: No Change  VSS. Tele: SB/ST with rare PVC, rare PAC. PSC at bedside-pt impulsive, restless, high fall risk and Nepalese speaking-VPM not appropriate to redirect pt for safety concerns. Continues on abx. New PIV placed. SW consult ordered. Continues on Tamiflu.

## 2017-03-20 NOTE — PROVIDER NOTIFICATION
"MD paged \"Pt. here with +Influenza B, generalized weakness. Tonight pt. LS are gurgly, pt. also has increased O2 demand, requiring 2lpm NC. NS infusing at 100ml/hr. Please advise.\"   "

## 2017-03-21 ENCOUNTER — APPOINTMENT (OUTPATIENT)
Dept: PHYSICAL THERAPY | Facility: CLINIC | Age: 82
DRG: 193 | End: 2017-03-21
Payer: COMMERCIAL

## 2017-03-21 LAB
GLUCOSE BLDC GLUCOMTR-MCNC: 121 MG/DL (ref 70–99)
GLUCOSE BLDC GLUCOMTR-MCNC: 129 MG/DL (ref 70–99)
GLUCOSE BLDC GLUCOMTR-MCNC: 139 MG/DL (ref 70–99)
GLUCOSE BLDC GLUCOMTR-MCNC: 158 MG/DL (ref 70–99)
GLUCOSE BLDC GLUCOMTR-MCNC: 167 MG/DL (ref 70–99)

## 2017-03-21 PROCEDURE — 40000915 ZZH STATISTIC SITTER, EVENING HOURS

## 2017-03-21 PROCEDURE — 25000132 ZZH RX MED GY IP 250 OP 250 PS 637: Performed by: INTERNAL MEDICINE

## 2017-03-21 PROCEDURE — 97530 THERAPEUTIC ACTIVITIES: CPT | Mod: GP | Performed by: PHYSICAL THERAPY ASSISTANT

## 2017-03-21 PROCEDURE — 40000916 ZZH STATISTIC SITTER, NIGHT HOURS

## 2017-03-21 PROCEDURE — 25000132 ZZH RX MED GY IP 250 OP 250 PS 637: Performed by: PHYSICIAN ASSISTANT

## 2017-03-21 PROCEDURE — 25000128 H RX IP 250 OP 636: Performed by: INTERNAL MEDICINE

## 2017-03-21 PROCEDURE — 12000007 ZZH R&B INTERMEDIATE

## 2017-03-21 PROCEDURE — 25000128 H RX IP 250 OP 636: Performed by: HOSPITALIST

## 2017-03-21 PROCEDURE — 40000193 ZZH STATISTIC PT WARD VISIT: Performed by: PHYSICAL THERAPY ASSISTANT

## 2017-03-21 PROCEDURE — 00000146 ZZHCL STATISTIC GLUCOSE BY METER IP

## 2017-03-21 PROCEDURE — 40000914 ZZH STATISTIC SITTER, DAY HOURS

## 2017-03-21 PROCEDURE — 99233 SBSQ HOSP IP/OBS HIGH 50: CPT | Performed by: INTERNAL MEDICINE

## 2017-03-21 PROCEDURE — 97116 GAIT TRAINING THERAPY: CPT | Mod: GP | Performed by: PHYSICAL THERAPY ASSISTANT

## 2017-03-21 RX ORDER — HALOPERIDOL 5 MG/ML
2-4 INJECTION INTRAMUSCULAR EVERY 6 HOURS PRN
Status: DISCONTINUED | OUTPATIENT
Start: 2017-03-21 | End: 2017-03-30

## 2017-03-21 RX ORDER — BISACODYL 10 MG
10 SUPPOSITORY, RECTAL RECTAL DAILY PRN
Status: DISCONTINUED | OUTPATIENT
Start: 2017-03-21 | End: 2017-03-31 | Stop reason: HOSPADM

## 2017-03-21 RX ADMIN — CARVEDILOL 25 MG: 25 TABLET, FILM COATED ORAL at 09:09

## 2017-03-21 RX ADMIN — ATORVASTATIN CALCIUM 40 MG: 40 TABLET, FILM COATED ORAL at 09:10

## 2017-03-21 RX ADMIN — BISACODYL 10 MG: 10 SUPPOSITORY RECTAL at 12:31

## 2017-03-21 RX ADMIN — ASPIRIN 81 MG: 81 TABLET, COATED ORAL at 09:10

## 2017-03-21 RX ADMIN — NORTRIPTYLINE HYDROCHLORIDE 50 MG: 50 CAPSULE ORAL at 09:09

## 2017-03-21 RX ADMIN — LOSARTAN POTASSIUM 50 MG: 25 TABLET, FILM COATED ORAL at 09:10

## 2017-03-21 RX ADMIN — GLIPIZIDE 5 MG: 5 TABLET ORAL at 16:38

## 2017-03-21 RX ADMIN — CLOPIDOGREL 75 MG: 75 TABLET, FILM COATED ORAL at 09:09

## 2017-03-21 RX ADMIN — OSELTAMIVIR PHOSPHATE 75 MG: 75 CAPSULE ORAL at 09:10

## 2017-03-21 RX ADMIN — GLIPIZIDE 5 MG: 5 TABLET ORAL at 09:09

## 2017-03-21 RX ADMIN — TRAMADOL HYDROCHLORIDE 50 MG: 50 TABLET, COATED ORAL at 01:13

## 2017-03-21 RX ADMIN — OSELTAMIVIR PHOSPHATE 75 MG: 75 CAPSULE ORAL at 22:23

## 2017-03-21 RX ADMIN — HALOPERIDOL LACTATE 2 MG: 5 INJECTION, SOLUTION INTRAMUSCULAR at 04:19

## 2017-03-21 RX ADMIN — HALOPERIDOL LACTATE 2 MG: 5 INJECTION, SOLUTION INTRAMUSCULAR at 21:57

## 2017-03-21 RX ADMIN — CEFTRIAXONE 1 G: 1 INJECTION, POWDER, FOR SOLUTION INTRAMUSCULAR; INTRAVENOUS at 15:00

## 2017-03-21 RX ADMIN — ACETAMINOPHEN 650 MG: 325 TABLET, FILM COATED ORAL at 22:23

## 2017-03-21 RX ADMIN — CARVEDILOL 25 MG: 25 TABLET, FILM COATED ORAL at 22:23

## 2017-03-21 NOTE — PLAN OF CARE
Problem: Goal Outcome Summary  Goal: Goal Outcome Summary  Oriented to person, speaks Mauritanian. Used  phone bedside to communicate, pt aggressive/combative with staff, pushing away phone. PRN 2mg haldol given with no change in behavior. Needing frequent redirection. Tele SR. Tx: IV rocephin and Tamiflu. Transfers heavy assist of 2-3 to bedside commode using gait belt and walker, unsteady on feet. Incontinent of urine and used urinal. [facility] bedside for safety.

## 2017-03-21 NOTE — PROVIDER NOTIFICATION
Pt oriented to person, combative, trying to climb out of bed, falls risk, unable to reorient, could we try PRN anti anxiety?    -New orders PRN haldol.

## 2017-03-21 NOTE — PLAN OF CARE
Problem: Goal Outcome Summary  Goal: Goal Outcome Summary  PT- continue to plan for TCU per conversation 2nd hand spouse feels he is doing better today and may not need TCU, may do better at home vs TCU but is a falls risk currently as needs assist for standing and appears to not be fully aware of his safety issues.

## 2017-03-21 NOTE — PROGRESS NOTES
Infection Prevention:    Patient requires Droplet precautions because of Influenza. Please contact Infection Prevention with any questions/concerns at [phone].    Luzma Lyon, ICP

## 2017-03-21 NOTE — PLAN OF CARE
Problem: Goal Outcome Summary  Goal: Goal Outcome Summary  Outcome: No Change     VSS, except low grade temp this evening. PRN Tylenol given for headache, assessed via  phone. Pt is alert to self only, Somali speaking/no english. /phone utilized for communication. Up to beside for dinner, refused transfer to chair. Ambulated in halls with staff/PT.  PSC at bedside. Pt requires frequent redirecting. On rocephin for UTI, Tamiflu for Influenza B.  scheduled for 9am tomorrow morning.

## 2017-03-21 NOTE — PROGRESS NOTES
Care Transition Initial Assessment - SW     Met with: Pt's spouse and [place]ese .    Active Problems:    Dehydration    Elevated troponin    Generalized muscle weakness    Generalized weakness    Influenza B         DATA  Pt lives in apartment with spouse with adult children living locally.  Spouse states doctor talked with her about TCU for spouse and she is in agreement with the plan.  Spouse said she is not familiar with any TCU's so she prefers something near [place] or [place].  SW discussed and answered questions spouse had regarding rehab and assured her SW is available on ongoing basis while  is hospitalized.           Transportation Available: car, family or friend will provide      ASSESSMENT  Cognitive Status:  Pt currently has a sitter due to confusion.   PLAN  Referrals made to ER and [facility] in [place] per spouse request for location.    Pt's spouse in agreement with plan.  Pt's goal is to go to TCU and then return home.  Patient anticipates discharging to:  TCU.  Phone contact can be made to pt's son Mario at [phone] for follow up on TCU.

## 2017-03-21 NOTE — PROGRESS NOTES
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   Found to have UTI and influenza B.       1.  Influenza B - Likely causing cough and generalized weakness.  Continue Tamiflu, treat for total of 5 days.     2. Elevated troponin -    Likely due to demand ischemia from dehydration and acute illness.  Has had no chest pain.  Defer further work up for now.  Has hx of CAD with previous stents, he did have other areas of disease that were managed medically.    4. UTI - UA grossly abnormal, positive nitrites, UC grew E.coli, pansensitive.  On Rocephin 1 gm IV daily( Started on 03/20). Noted he was on Levaquin for two days.     5. Hyperglycemia - Borderline DM in past, and not on meds but HgbA1c is now 7.1, no change from 2 year ago.  Glucose 167 on arrival. On ISS. Glipizide 5 mg BID started. He may benefit from metformin, will restart it if glucose remains high.       6.  Generalized weakness-  Likely due to UTI and influenza.  Reports his legs feel weak- no focal neurologic findings.  PT/OT.    7. HTN - Resume home meds with parameters    8. HLP - On statin, resume    9. Hx of TBI - Memory difficulties, R sided deficits     10. Hx of CVA - Mild right sided weakness and speech is somewhat slow.    DVT Prophylaxis: Pneumatic Compression Devices  Code Status: Full Code  Discharge Dispo: Likely needs TCU if no improvement in 1-2 days.    I discussed with patient, his wife and nursing staff.        Interval History (Subjective):      Patient seen and examined, assumed care today. Used [employer]  at bedside. His wife at bedside. Patient  feels tired and legs are weak.  Cough better.                    Physical Exam:      Last Vital Signs:  /71 (BP Location: Right arm)  Pulse 91  Temp 98.4  F (36.9  C) (Oral)  Resp 20  Ht 1.829 m (6')  Wt 102.1 kg (225 lb)  SpO2 94%  BMI 30.52 kg/m2    Vital signs reviewed  GENERAL:  Alert, calm, NAD  CVS: regular rate and rhythm, no murmurs or rubs  CHEST:  Clear to ascultation bilaterally, normal respiratory effort  HEENT:  Pupil round, equal, conjuctivae, sclerae and lids normal, neck is supple  ABD:  Soft, nontender, nondistended, no masses, normal bowel sounds  EXT:  No edema  NEUR: normal strength and sensation in all 4 extremities, cranial nerves grossly intact  PSY:  Mood and affect within normal limits         Medications:      All current medications were reviewed with changes reflected in problem list.  Current Facility-Administered Medications   Medication     haloperidol lactate (HALDOL) injection 2-4 mg     magnesium hydroxide (MILK OF MAGNESIA) suspension 15-30 mL     bisacodyl (DULCOLAX) Suppository 10 mg     cefTRIAXone (ROCEPHIN) 1 g vial to attach to  mL bag for ADULTS or NS 50 mL bag for PEDS     oseltamivir (TAMIFLU) capsule 75 mg     glipiZIDE (GLUCOTROL) tablet 5 mg     aspirin EC EC tablet 81 mg     atorvastatin (LIPITOR) tablet 40 mg     clopidogrel (PLAVIX) tablet 75 mg     nortriptyline (PAMELOR) capsule 50 mg     traMADol (ULTRAM) tablet 50 mg     lidocaine 1 % 1 mL     lidocaine (LMX4) kit     sodium chloride (PF) 0.9% PF flush 3 mL     sodium chloride (PF) 0.9% PF flush 3 mL     nitroglycerin (NITROSTAT) sublingual tablet 0.4 mg     alum & mag hydroxide-simethicone (MYLANTA ES/MAALOX  ES) suspension 15-30 mL     acetaminophen (TYLENOL) tablet 650 mg     acetaminophen (TYLENOL) Suppository 650 mg     naloxone (NARCAN) injection 0.1-0.4 mg     pneumococcal vaccine (PNEUMOVAX 23-evelyn) injection 0.5 mL     influenza Vac Split High-Dose (FLUZONE) injection 0.5 mL     carvedilol  (COREG) tablet 25 mg     losartan (COZAAR) tablet 50 mg     glucose 40 % gel 15-30 g    Or     dextrose 50 % injection 25-50 mL    Or     glucagon injection 1 mg     insulin aspart (NovoLOG) inj (RAPID ACTING)     insulin aspart (NovoLOG) inj (RAPID ACTING)          Data:      All new lab and imaging data was reviewed.   Labs:    Recent Labs  Lab 03/19/17  0615 03/18/17  1700   WBC 4.5 6.5   HGB 14.9 16.1   HCT 43.6 46.8   MCV 92 93   * 142*       Recent Labs  Lab 03/19/17  0615 03/18/17  1700    132*   POTASSIUM 3.7 3.9   CHLORIDE 102 97   CO2 22 24   ANIONGAP 11 11   * 269*   BUN 23 26   CR 1.15 1.50*   GFRESTIMATED 61 45*   GFRESTBLACK 74 54*   ULZ 7.5* 8.0*       Recent Labs  Lab 03/21/17  1226 03/21/17  0922 03/21/17  0254 03/20/17  2147 03/20/17  1655  03/19/17  0615  03/18/17  1700   GLC  --   --   --   --   --   --  211*  --  269*   * 139* 129* 161* 250*  < >  --   < >  --    < > = values in this interval not displayed.

## 2017-03-22 ENCOUNTER — OFFICE VISIT (OUTPATIENT)
Dept: INTERPRETER SERVICES | Facility: CLINIC | Age: 82
End: 2017-03-22

## 2017-03-22 ENCOUNTER — APPOINTMENT (OUTPATIENT)
Dept: PHYSICAL THERAPY | Facility: CLINIC | Age: 82
DRG: 193 | End: 2017-03-22
Payer: COMMERCIAL

## 2017-03-22 LAB
GLUCOSE BLDC GLUCOMTR-MCNC: 109 MG/DL (ref 70–99)
GLUCOSE BLDC GLUCOMTR-MCNC: 111 MG/DL (ref 70–99)
GLUCOSE BLDC GLUCOMTR-MCNC: 199 MG/DL (ref 70–99)
GLUCOSE BLDC GLUCOMTR-MCNC: 256 MG/DL (ref 70–99)

## 2017-03-22 PROCEDURE — 40000914 ZZH STATISTIC SITTER, DAY HOURS

## 2017-03-22 PROCEDURE — 99232 SBSQ HOSP IP/OBS MODERATE 35: CPT | Performed by: INTERNAL MEDICINE

## 2017-03-22 PROCEDURE — T1013 SIGN LANG/ORAL INTERPRETER: HCPCS | Mod: U3

## 2017-03-22 PROCEDURE — 25000132 ZZH RX MED GY IP 250 OP 250 PS 637: Performed by: INTERNAL MEDICINE

## 2017-03-22 PROCEDURE — 12000007 ZZH R&B INTERMEDIATE

## 2017-03-22 PROCEDURE — 97116 GAIT TRAINING THERAPY: CPT | Mod: GP | Performed by: PHYSICAL THERAPY ASSISTANT

## 2017-03-22 PROCEDURE — 00000146 ZZHCL STATISTIC GLUCOSE BY METER IP

## 2017-03-22 PROCEDURE — 25000132 ZZH RX MED GY IP 250 OP 250 PS 637: Performed by: PHYSICIAN ASSISTANT

## 2017-03-22 PROCEDURE — 97530 THERAPEUTIC ACTIVITIES: CPT | Mod: GP | Performed by: PHYSICAL THERAPY ASSISTANT

## 2017-03-22 PROCEDURE — 40000193 ZZH STATISTIC PT WARD VISIT: Performed by: PHYSICAL THERAPY ASSISTANT

## 2017-03-22 RX ORDER — LORAZEPAM 2 MG/ML
0.5 INJECTION INTRAMUSCULAR EVERY 6 HOURS PRN
Status: DISCONTINUED | OUTPATIENT
Start: 2017-03-22 | End: 2017-03-22

## 2017-03-22 RX ORDER — LEVOFLOXACIN 500 MG/1
500 TABLET, FILM COATED ORAL DAILY
Status: DISCONTINUED | OUTPATIENT
Start: 2017-03-22 | End: 2017-03-23

## 2017-03-22 RX ORDER — LORAZEPAM 0.5 MG/1
0.5 TABLET ORAL EVERY 6 HOURS PRN
Status: DISCONTINUED | OUTPATIENT
Start: 2017-03-22 | End: 2017-03-31 | Stop reason: HOSPADM

## 2017-03-22 RX ADMIN — OSELTAMIVIR PHOSPHATE 75 MG: 75 CAPSULE ORAL at 09:10

## 2017-03-22 RX ADMIN — LORAZEPAM 0.5 MG: 0.5 TABLET ORAL at 20:04

## 2017-03-22 RX ADMIN — LEVOFLOXACIN 500 MG: 500 TABLET, FILM COATED ORAL at 13:52

## 2017-03-22 RX ADMIN — TRAMADOL HYDROCHLORIDE 50 MG: 50 TABLET, COATED ORAL at 06:20

## 2017-03-22 RX ADMIN — GLIPIZIDE 5 MG: 5 TABLET ORAL at 15:51

## 2017-03-22 RX ADMIN — OSELTAMIVIR PHOSPHATE 75 MG: 75 CAPSULE ORAL at 20:03

## 2017-03-22 RX ADMIN — NORTRIPTYLINE HYDROCHLORIDE 50 MG: 50 CAPSULE ORAL at 09:10

## 2017-03-22 RX ADMIN — GLIPIZIDE 5 MG: 5 TABLET ORAL at 09:10

## 2017-03-22 RX ADMIN — LORAZEPAM 0.5 MG: 0.5 TABLET ORAL at 13:52

## 2017-03-22 RX ADMIN — CLOPIDOGREL 75 MG: 75 TABLET, FILM COATED ORAL at 09:10

## 2017-03-22 RX ADMIN — LOSARTAN POTASSIUM 50 MG: 25 TABLET, FILM COATED ORAL at 09:10

## 2017-03-22 RX ADMIN — ATORVASTATIN CALCIUM 40 MG: 40 TABLET, FILM COATED ORAL at 09:10

## 2017-03-22 RX ADMIN — CARVEDILOL 25 MG: 25 TABLET, FILM COATED ORAL at 20:04

## 2017-03-22 RX ADMIN — CARVEDILOL 25 MG: 25 TABLET, FILM COATED ORAL at 09:10

## 2017-03-22 RX ADMIN — TRAMADOL HYDROCHLORIDE 50 MG: 50 TABLET, COATED ORAL at 20:03

## 2017-03-22 RX ADMIN — ASPIRIN 81 MG: 81 TABLET, COATED ORAL at 09:10

## 2017-03-22 NOTE — PLAN OF CARE
Problem: Goal Outcome Summary  Goal: Goal Outcome Summary  PT- continue to plan for TCU once able, is weaker at current status than normal per family. Was able to amb with RW 75 feet with CGA.

## 2017-03-22 NOTE — PLAN OF CARE
Problem: Goal Outcome Summary  Goal: Goal Outcome Summary  Outcome: Improving  VSS. Oriented to person, place, and situation. Answers questions appropriately. Behaves appropriately. Sitter discontinued. Oral Ativan given for anticipates behaviors. Lung sounds diminished with crackles in bases. Sats are 89% while sleeping, and 95% while awake.  utilized this morning for assessment and rounds. Pt ambulates with assist of one and walker. Blood sugars were 109 and 256. IV rocephin discontinued and switched to oral Levaquin.

## 2017-03-22 NOTE — PLAN OF CARE
Problem: Goal Outcome Summary  Goal: Goal Outcome Summary  Oriented to person. PRN tramadol given for headache and backache. Continues Tamiflu, IV Rocephin for UTI.  PSC bedside, frequently redirecting patient. SW following, plan to DC to TCU.

## 2017-03-22 NOTE — PROGRESS NOTES
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   Found to have UTI and influenza B.       1. Influenza B and bronchitis- possible superimposed bacterial infection - Likely causing cough and generalized weakness.  Continue Tamiflu, treat for total of 5 days. Levaquin started which could also cover UTI.    2. Elevated troponin -    Likely due to demand ischemia from dehydration and acute illness.  Has had no chest pain.  Defer further work up for now.  Has hx of CAD with previous stents, he did have other areas of disease that were managed medically.    4. UTI - UA grossly abnormal, positive nitrites, UC grew E.coli, pansensitive.  Stopped Rocephin 1 gm IV daily( Started on 03/20). Noted he was on Levaquin for two days, restart Levaquin which could cover the infection.     5. Hyperglycemia - Borderline DM in past, and not on meds but HgbA1c is now 7.1, no change from 2 year ago.  Glucose 167 on arrival. On ISS. Glipizide 5 mg BID started. Start Metformin today at 500 mg BID.       6.  Generalized weakness-  Likely due to UTI and influenza.  Reports his legs feel weak- no focal neurologic findings.  PT/OT.    7. HTN - Resume home meds with parameters    8. HLP - On statin, resume    9. Hx of TBI - Memory difficulties, R sided deficits     10. Hx of CVA - Mild right sided weakness and speech is somewhat slow.    11. Encephalopathy- Metabolic vs infectious- Better today, but impulsive.    DVT Prophylaxis: Pneumatic Compression Devices  Code Status: Full Code  Discharge Dispo: Likely needs TCU if no improvement in 1-2 days.    I discussed  with patient with the help of an . All his questions and concerns addressed.        Interval History (Subjective):      Patient seen and examined, no new issues, feels better, more alert, not in distress. Used [employer]  at bedside. Patient feels tired and legs are weak. Cough still there.                    Physical Exam:      Last Vital Signs:  /56 (BP Location: Left arm)  Pulse 91  Temp 97.4  F (36.3  C) (Oral)  Resp 16  Ht 1.829 m (6')  Wt 102.6 kg (226 lb 1.6 oz)  SpO2 95%  BMI 30.66 kg/m2    Vital signs reviewed  GENERAL:  Alert, calm, NAD  CVS: regular rate and rhythm, no murmurs or rubs  CHEST:  Clear to ascultation bilaterally, normal respiratory effort  HEENT:  Pupil round, equal, conjuctivae, sclerae and lids normal, neck is supple  ABD:  Soft, nontender, nondistended, no masses, normal bowel sounds  EXT:  No edema  NEUR: normal strength and sensation in all 4 extremities, cranial nerves grossly intact  PSY:  Mood and affect within normal limits         Medications:      All current medications were reviewed with changes reflected in problem list.  Current Facility-Administered Medications   Medication     levofloxacin (LEVAQUIN) tablet 500 mg     LORazepam (ATIVAN) tablet 0.5 mg     haloperidol lactate (HALDOL) injection 2-4 mg     magnesium hydroxide (MILK OF MAGNESIA) suspension 15-30 mL     bisacodyl (DULCOLAX) Suppository 10 mg     oseltamivir (TAMIFLU) capsule 75 mg     glipiZIDE (GLUCOTROL) tablet 5 mg     aspirin EC EC tablet 81 mg     atorvastatin (LIPITOR) tablet 40 mg     clopidogrel (PLAVIX) tablet 75 mg     nortriptyline (PAMELOR) capsule 50 mg     traMADol (ULTRAM) tablet 50 mg     lidocaine 1 % 1 mL     lidocaine (LMX4) kit     sodium chloride (PF) 0.9% PF flush 3 mL     sodium chloride (PF) 0.9% PF flush 3 mL     nitroglycerin (NITROSTAT) sublingual tablet 0.4 mg     alum & mag hydroxide-simethicone (MYLANTA ES/MAALOX  ES) suspension 15-30 mL      acetaminophen (TYLENOL) tablet 650 mg     acetaminophen (TYLENOL) Suppository 650 mg     naloxone (NARCAN) injection 0.1-0.4 mg     pneumococcal vaccine (PNEUMOVAX 23-evelyn) injection 0.5 mL     influenza Vac Split High-Dose (FLUZONE) injection 0.5 mL     carvedilol (COREG) tablet 25 mg     losartan (COZAAR) tablet 50 mg     glucose 40 % gel 15-30 g    Or     dextrose 50 % injection 25-50 mL    Or     glucagon injection 1 mg     insulin aspart (NovoLOG) inj (RAPID ACTING)     insulin aspart (NovoLOG) inj (RAPID ACTING)          Data:      All new lab and imaging data was reviewed.   Labs:    Recent Labs  Lab 03/19/17  0615 03/18/17  1700   WBC 4.5 6.5   HGB 14.9 16.1   HCT 43.6 46.8   MCV 92 93   * 142*       Recent Labs  Lab 03/19/17  0615 03/18/17  1700    132*   POTASSIUM 3.7 3.9   CHLORIDE 102 97   CO2 22 24   ANIONGAP 11 11   * 269*   BUN 23 26   CR 1.15 1.50*   GFRESTIMATED 61 45*   GFRESTBLACK 74 54*   LUZ 7.5* 8.0*       Recent Labs  Lab 03/22/17  1216 03/22/17  0813 03/21/17  2222 03/21/17  1714 03/21/17  1226  03/19/17  0615  03/18/17  1700   GLC  --   --   --   --   --   --  211*  --  269*   * 109* 121* 158* 167*  < >  --   < >  --    < > = values in this interval not displayed.

## 2017-03-22 NOTE — PLAN OF CARE
Problem: Infection, Risk/Actual (Adult)  Goal: Identify Related Risk Factors and Signs and Symptoms  Related risk factors and signs and symptoms are identified upon initiation of Human Response Clinical Practice Guideline (CPG)   Outcome: No Change  VSS, afebrile and O2 sats low 90's on 2 L.  Pt c./o HA-given prn Tylenol, results pending.  Pt confused and speaks only Czech.  Sitter with pt.  Pt can be uncooperative.  Pt knelt down on floor and woundn't get up.  We tried to speak to pt with  phone, but were unsuccessful.  There is a nurse in the hospital who speaks Czech and was able to help us get pt up and back to bed.  Haldol IV x 1.  Resting in bed at this time.  Son was here earlier and POC reviewed, questions answered.

## 2017-03-23 ENCOUNTER — APPOINTMENT (OUTPATIENT)
Dept: PHYSICAL THERAPY | Facility: CLINIC | Age: 82
DRG: 193 | End: 2017-03-23
Payer: COMMERCIAL

## 2017-03-23 ENCOUNTER — OFFICE VISIT (OUTPATIENT)
Dept: INTERPRETER SERVICES | Facility: CLINIC | Age: 82
End: 2017-03-23

## 2017-03-23 ENCOUNTER — APPOINTMENT (OUTPATIENT)
Dept: GENERAL RADIOLOGY | Facility: CLINIC | Age: 82
DRG: 193 | End: 2017-03-23
Attending: INTERNAL MEDICINE
Payer: COMMERCIAL

## 2017-03-23 LAB
GLUCOSE BLDC GLUCOMTR-MCNC: 121 MG/DL (ref 70–99)
GLUCOSE BLDC GLUCOMTR-MCNC: 170 MG/DL (ref 70–99)
GLUCOSE BLDC GLUCOMTR-MCNC: 244 MG/DL (ref 70–99)
GLUCOSE BLDC GLUCOMTR-MCNC: 68 MG/DL (ref 70–99)
GLUCOSE BLDC GLUCOMTR-MCNC: 78 MG/DL (ref 70–99)
GLUCOSE BLDC GLUCOMTR-MCNC: 83 MG/DL (ref 70–99)

## 2017-03-23 PROCEDURE — 90732 PPSV23 VACC 2 YRS+ SUBQ/IM: CPT | Performed by: INTERNAL MEDICINE

## 2017-03-23 PROCEDURE — 71010 XR CHEST PORT 1 VW: CPT

## 2017-03-23 PROCEDURE — 25000128 H RX IP 250 OP 636: Performed by: INTERNAL MEDICINE

## 2017-03-23 PROCEDURE — 99232 SBSQ HOSP IP/OBS MODERATE 35: CPT | Performed by: INTERNAL MEDICINE

## 2017-03-23 PROCEDURE — 97116 GAIT TRAINING THERAPY: CPT | Mod: GP | Performed by: PHYSICAL THERAPY ASSISTANT

## 2017-03-23 PROCEDURE — 25000132 ZZH RX MED GY IP 250 OP 250 PS 637: Performed by: INTERNAL MEDICINE

## 2017-03-23 PROCEDURE — 25000132 ZZH RX MED GY IP 250 OP 250 PS 637: Performed by: PHYSICIAN ASSISTANT

## 2017-03-23 PROCEDURE — 40000193 ZZH STATISTIC PT WARD VISIT: Performed by: PHYSICAL THERAPY ASSISTANT

## 2017-03-23 PROCEDURE — 00000146 ZZHCL STATISTIC GLUCOSE BY METER IP

## 2017-03-23 PROCEDURE — 97530 THERAPEUTIC ACTIVITIES: CPT | Mod: GP | Performed by: PHYSICAL THERAPY ASSISTANT

## 2017-03-23 PROCEDURE — T1013 SIGN LANG/ORAL INTERPRETER: HCPCS | Mod: U3

## 2017-03-23 PROCEDURE — 25000132 ZZH RX MED GY IP 250 OP 250 PS 637: Performed by: HOSPITALIST

## 2017-03-23 PROCEDURE — 90662 IIV NO PRSV INCREASED AG IM: CPT | Performed by: INTERNAL MEDICINE

## 2017-03-23 PROCEDURE — 12000000 ZZH R&B MED SURG/OB

## 2017-03-23 RX ORDER — AMOXICILLIN AND CLAVULANATE POTASSIUM 500; 125 MG/1; MG/1
1 TABLET, FILM COATED ORAL EVERY 8 HOURS SCHEDULED
Status: DISCONTINUED | OUTPATIENT
Start: 2017-03-23 | End: 2017-03-27

## 2017-03-23 RX ADMIN — OSELTAMIVIR PHOSPHATE 75 MG: 75 CAPSULE ORAL at 21:31

## 2017-03-23 RX ADMIN — METFORMIN HYDROCHLORIDE 500 MG: 500 TABLET ORAL at 15:46

## 2017-03-23 RX ADMIN — DEXTROSE 15 G: 15 GEL ORAL at 23:08

## 2017-03-23 RX ADMIN — Medication 12.5 MG: at 22:15

## 2017-03-23 RX ADMIN — LORAZEPAM 0.5 MG: 0.5 TABLET ORAL at 01:57

## 2017-03-23 RX ADMIN — CARVEDILOL 25 MG: 25 TABLET, FILM COATED ORAL at 10:34

## 2017-03-23 RX ADMIN — LORAZEPAM 0.5 MG: 0.5 TABLET ORAL at 19:41

## 2017-03-23 RX ADMIN — CLOPIDOGREL 75 MG: 75 TABLET, FILM COATED ORAL at 10:33

## 2017-03-23 RX ADMIN — ACETAMINOPHEN 650 MG: 325 TABLET, FILM COATED ORAL at 01:57

## 2017-03-23 RX ADMIN — ATORVASTATIN CALCIUM 40 MG: 40 TABLET, FILM COATED ORAL at 10:33

## 2017-03-23 RX ADMIN — ASPIRIN 81 MG: 81 TABLET, COATED ORAL at 10:34

## 2017-03-23 RX ADMIN — Medication 1 MG: at 21:31

## 2017-03-23 RX ADMIN — Medication 12.5 MG: at 03:58

## 2017-03-23 RX ADMIN — OSELTAMIVIR PHOSPHATE 75 MG: 75 CAPSULE ORAL at 10:33

## 2017-03-23 RX ADMIN — AMOXICILLIN AND CLAVULANATE POTASSIUM 1 TABLET: 500; 125 TABLET, FILM COATED ORAL at 13:37

## 2017-03-23 RX ADMIN — GLIPIZIDE 5 MG: 5 TABLET ORAL at 17:57

## 2017-03-23 RX ADMIN — AMOXICILLIN AND CLAVULANATE POTASSIUM 1 TABLET: 500; 125 TABLET, FILM COATED ORAL at 21:31

## 2017-03-23 RX ADMIN — LOSARTAN POTASSIUM 50 MG: 25 TABLET, FILM COATED ORAL at 10:33

## 2017-03-23 RX ADMIN — PNEUMOCOCCAL VACCINE POLYVALENT 0.5 ML
25; 25; 25; 25; 25; 25; 25; 25; 25; 25; 25; 25; 25; 25; 25; 25; 25; 25; 25; 25; 25; 25; 25 INJECTION, SOLUTION INTRAMUSCULAR; SUBCUTANEOUS at 12:48

## 2017-03-23 RX ADMIN — CARVEDILOL 25 MG: 25 TABLET, FILM COATED ORAL at 21:31

## 2017-03-23 RX ADMIN — METFORMIN HYDROCHLORIDE 500 MG: 500 TABLET ORAL at 17:57

## 2017-03-23 RX ADMIN — INFLUENZA A VIRUS A/CALIFORNIA/7/2009 X-179A (H1N1) ANTIGEN (FORMALDEHYDE INACTIVATED), INFLUENZA A VIRUS A/HONG KONG/4801/2014 X-263B (H3N2) ANTIGEN (FORMALDEHYDE INACTIVATED), AND INFLUENZA B VIRUS B/BRISBANE/60/2008 ANTIGEN (FORMALDEHYDE INACTIVATED) 0.5 ML: 60; 60; 60 INJECTION, SUSPENSION INTRAMUSCULAR at 12:49

## 2017-03-23 RX ADMIN — GLIPIZIDE 5 MG: 5 TABLET ORAL at 10:34

## 2017-03-23 RX ADMIN — ACETAMINOPHEN 650 MG: 325 TABLET, FILM COATED ORAL at 16:10

## 2017-03-23 RX ADMIN — LEVOFLOXACIN 500 MG: 500 TABLET, FILM COATED ORAL at 10:33

## 2017-03-23 RX ADMIN — NORTRIPTYLINE HYDROCHLORIDE 50 MG: 50 CAPSULE ORAL at 10:34

## 2017-03-23 NOTE — PLAN OF CARE
Problem: Goal Outcome Summary  Goal: Goal Outcome Summary  Outcome: Improving  VSS. A&OX3. Disoriented to situation. Lung sounds diminished. Non-productive cough. Shortness of breath when walking. Complains of mid-sternal chest pain. MD is aware. Oral Levaquin switched or oral Augmentin. Remains on droplet isolation. Tamiflu for influenza B. Blood sugars were 121 and 244. Ambulates with assist of 1-2 and walker.

## 2017-03-23 NOTE — PLAN OF CARE
Problem: Goal Outcome Summary  Goal: Goal Outcome Summary  Pt- continue to plan for TCU once able. Gait pattern is similar to Parkinson shuffle and does beter with cues for bigger steps but need frequent cues to do this.

## 2017-03-23 NOTE — PLAN OF CARE
Problem: Goal Outcome Summary  Goal: Goal Outcome Summary  Outcome: Improving  Assist of one to BR, Tramadol for R leg pain given. Ativan per PRN order given. Confused, bed alarm on. Continue to monitor.

## 2017-03-23 NOTE — PLAN OF CARE
Problem: Goal Outcome Summary  Goal: Goal Outcome Summary  Outcome: No Change  Pt. Alert to self and place, but confused to situation and time, Nigerian speaking,  phone used but pt. Has difficulty following directions. Pt. Up several times tonight, not able to state what he needs even with  phone used. Pt. Incontinent of urine, up with assist of 2 and walker. Ativan was given due to restlessness but did not help. New x1 order for seroquel given with decreased in restlessness. Lung sounds diminished with infrequent nonproductive cough, Room air sat's at 91%. Trace LE edema present. Plan: Continue Droplet Isolation for Influenza, Tamiflu, Levaquin for UTI, DC to TCU soon. Pt. Denies any needs at this time. Will continue with POC.

## 2017-03-23 NOTE — PROGRESS NOTES
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   Found to have UTI and influenza B.       1. Influenza B and bronchitis-  Possible superimposed bacterial infection - Likely causing cough and generalized weakness.  Continue Tamiflu, treat for total of 5 days. Levaquin started which could also cover UTI.    2. Elevated troponin -  Likely due to demand ischemia from dehydration and acute illness.  Has had no chest pain.  Defer further work up for now.  Has hx of CAD with previous stents, he did have other areas of disease that were managed medically.    4. UTI - UA grossly abnormal, positive nitrites, UC grew E.coli, pansensitive.  Stopped Rocephin 1 gm IV daily( Started on 03/20). Noted he was on Levaquin for two days, Augmentin started which could cover the infection.     5. Hyperglycemia - Borderline DM in past, and not on meds but HgbA1c is now 7.1, no change from 2 year ago.  Glucose 167 on arrival. On ISS. Glipizide 5 mg BID started. Started Metformin today at 500 mg BID (03/23).       6.  Generalized weakness-  Likely due to UTI and influenza.  Reports his legs feel weak- no focal neurologic findings.  PT/OT DAILY.    7. HTN - Resume home meds with parameters    8. HLP - On statin, resume    9. Hx of TBI - Memory difficulties, R sided deficits     10. Hx of CVA - Mild right sided weakness and speech is somewhat slow.    11. Encephalopathy- Metabolic vs infectious- Better today, but impulsive.    DVT Prophylaxis: Pneumatic Compression Devices  Code Status: Full Code  Discharge Dispo: TCU in 1 days if placement is available for  him.    I discussed with patient and his wife and daughter at bedside with the help of an . All his questions and concerns addressed.        Interval History (Subjective):      Patient seen and examined, mild SOB, claims he feel weaker today. Alert, sitting on a chair, not in distress. Used [email or web address] . Patient feels tired and legs are weak. Cough still there.                    Physical Exam:      Last Vital Signs:  /53  Pulse 61  Temp 97.8  F (36.6  C) (Oral)  Resp 18  Ht 1.829 m (6')  Wt 102.6 kg (226 lb 1.6 oz)  SpO2 92%  BMI 30.66 kg/m2    Vital signs reviewed  GENERAL:  Alert, calm, NAD  CVS: regular rate and rhythm, no murmurs or rubs  CHEST:  scattered crackles bilaterally, normal respiratory effort  HEENT:  Pupil round, equal, conjuctivae, sclerae and lids normal, neck is supple  ABD:  Soft, nontender, nondistended, no masses, normal bowel sounds  EXT:  No edema  NEUR: normal strength and sensation in all 4 extremities, cranial nerves grossly intact  PSY:  Mood and affect within normal limits         Medications:      All current medications were reviewed with changes reflected in problem list.  Current Facility-Administered Medications   Medication     amoxicillin-clavulanate (AUGMENTIN) 500-125 MG per tablet 1 tablet     LORazepam (ATIVAN) tablet 0.5 mg     haloperidol lactate (HALDOL) injection 2-4 mg     magnesium hydroxide (MILK OF MAGNESIA) suspension 15-30 mL     bisacodyl (DULCOLAX) Suppository 10 mg     oseltamivir (TAMIFLU) capsule 75 mg     glipiZIDE (GLUCOTROL) tablet 5 mg     aspirin EC EC tablet 81 mg     atorvastatin (LIPITOR) tablet 40 mg     clopidogrel (PLAVIX) tablet 75 mg     nortriptyline (PAMELOR) capsule 50 mg     traMADol (ULTRAM) tablet 50 mg     lidocaine 1 % 1 mL     lidocaine (LMX4) kit     sodium chloride (PF) 0.9% PF flush 3 mL     sodium chloride (PF) 0.9% PF flush 3 mL     nitroglycerin (NITROSTAT) sublingual tablet 0.4 mg     alum & mag  hydroxide-simethicone (MYLANTA ES/MAALOX  ES) suspension 15-30 mL     acetaminophen (TYLENOL) tablet 650 mg     acetaminophen (TYLENOL) Suppository 650 mg     naloxone (NARCAN) injection 0.1-0.4 mg     carvedilol (COREG) tablet 25 mg     losartan (COZAAR) tablet 50 mg     glucose 40 % gel 15-30 g    Or     dextrose 50 % injection 25-50 mL    Or     glucagon injection 1 mg     insulin aspart (NovoLOG) inj (RAPID ACTING)     insulin aspart (NovoLOG) inj (RAPID ACTING)          Data:      All new lab and imaging data was reviewed.   Labs:    Recent Labs  Lab 03/19/17  0615 03/18/17  1700   WBC 4.5 6.5   HGB 14.9 16.1   HCT 43.6 46.8   MCV 92 93   * 142*       Recent Labs  Lab 03/19/17  0615 03/18/17  1700    132*   POTASSIUM 3.7 3.9   CHLORIDE 102 97   CO2 22 24   ANIONGAP 11 11   * 269*   BUN 23 26   CR 1.15 1.50*   GFRESTIMATED 61 45*   GFRESTBLACK 74 54*   LUZ 7.5* 8.0*       Recent Labs  Lab 03/23/17  1325 03/23/17  0846 03/23/17  0138 03/22/17  2138 03/22/17  1752  03/19/17  0615  03/18/17  1700   GLC  --   --   --   --   --   --  211*  --  269*   * 121* 170* 199* 111*  < >  --   < >  --    < > = values in this interval not displayed.

## 2017-03-24 ENCOUNTER — APPOINTMENT (OUTPATIENT)
Dept: PHYSICAL THERAPY | Facility: CLINIC | Age: 82
DRG: 193 | End: 2017-03-24
Payer: COMMERCIAL

## 2017-03-24 LAB
CREAT SERPL-MCNC: 0.88 MG/DL (ref 0.66–1.25)
GFR SERPL CREATININE-BSD FRML MDRD: 82 ML/MIN/1.7M2
GLUCOSE BLDC GLUCOMTR-MCNC: 118 MG/DL (ref 70–99)
GLUCOSE BLDC GLUCOMTR-MCNC: 121 MG/DL (ref 70–99)
GLUCOSE BLDC GLUCOMTR-MCNC: 156 MG/DL (ref 70–99)
GLUCOSE BLDC GLUCOMTR-MCNC: 80 MG/DL (ref 70–99)
GLUCOSE BLDC GLUCOMTR-MCNC: 84 MG/DL (ref 70–99)

## 2017-03-24 PROCEDURE — 25000132 ZZH RX MED GY IP 250 OP 250 PS 637: Performed by: PHYSICIAN ASSISTANT

## 2017-03-24 PROCEDURE — 12000000 ZZH R&B MED SURG/OB

## 2017-03-24 PROCEDURE — 40000193 ZZH STATISTIC PT WARD VISIT: Performed by: PHYSICAL THERAPY ASSISTANT

## 2017-03-24 PROCEDURE — 99232 SBSQ HOSP IP/OBS MODERATE 35: CPT | Performed by: INTERNAL MEDICINE

## 2017-03-24 PROCEDURE — 82565 ASSAY OF CREATININE: CPT | Performed by: INTERNAL MEDICINE

## 2017-03-24 PROCEDURE — 00000146 ZZHCL STATISTIC GLUCOSE BY METER IP

## 2017-03-24 PROCEDURE — 97530 THERAPEUTIC ACTIVITIES: CPT | Mod: GP | Performed by: PHYSICAL THERAPY ASSISTANT

## 2017-03-24 PROCEDURE — 36415 COLL VENOUS BLD VENIPUNCTURE: CPT | Performed by: INTERNAL MEDICINE

## 2017-03-24 PROCEDURE — 25000132 ZZH RX MED GY IP 250 OP 250 PS 637: Performed by: INTERNAL MEDICINE

## 2017-03-24 RX ORDER — CARVEDILOL 6.25 MG/1
6.25 TABLET ORAL 2 TIMES DAILY WITH MEALS
Status: ON HOLD | COMMUNITY
End: 2017-03-28

## 2017-03-24 RX ORDER — AMITRIPTYLINE HYDROCHLORIDE 50 MG/1
50-150 TABLET ORAL AT BEDTIME
Status: ON HOLD | COMMUNITY
End: 2017-03-31

## 2017-03-24 RX ORDER — AMITRIPTYLINE HYDROCHLORIDE 50 MG/1
50 TABLET ORAL
Status: DISCONTINUED | OUTPATIENT
Start: 2017-03-24 | End: 2017-03-26

## 2017-03-24 RX ADMIN — ACETAMINOPHEN 650 MG: 325 TABLET, FILM COATED ORAL at 15:49

## 2017-03-24 RX ADMIN — CLOPIDOGREL 75 MG: 75 TABLET, FILM COATED ORAL at 08:23

## 2017-03-24 RX ADMIN — LORAZEPAM 0.5 MG: 0.5 TABLET ORAL at 01:32

## 2017-03-24 RX ADMIN — Medication 1 LOZENGE: at 15:54

## 2017-03-24 RX ADMIN — LOSARTAN POTASSIUM 50 MG: 25 TABLET, FILM COATED ORAL at 08:23

## 2017-03-24 RX ADMIN — GLIPIZIDE 5 MG: 5 TABLET ORAL at 15:49

## 2017-03-24 RX ADMIN — AMOXICILLIN AND CLAVULANATE POTASSIUM 1 TABLET: 500; 125 TABLET, FILM COATED ORAL at 13:12

## 2017-03-24 RX ADMIN — NORTRIPTYLINE HYDROCHLORIDE 50 MG: 50 CAPSULE ORAL at 08:23

## 2017-03-24 RX ADMIN — METFORMIN HYDROCHLORIDE 500 MG: 500 TABLET ORAL at 08:24

## 2017-03-24 RX ADMIN — GLIPIZIDE 5 MG: 5 TABLET ORAL at 08:23

## 2017-03-24 RX ADMIN — AMOXICILLIN AND CLAVULANATE POTASSIUM 1 TABLET: 500; 125 TABLET, FILM COATED ORAL at 21:07

## 2017-03-24 RX ADMIN — AMITRIPTYLINE HYDROCHLORIDE 50 MG: 50 TABLET, FILM COATED ORAL at 21:07

## 2017-03-24 RX ADMIN — CARVEDILOL 25 MG: 25 TABLET, FILM COATED ORAL at 21:07

## 2017-03-24 RX ADMIN — ATORVASTATIN CALCIUM 40 MG: 40 TABLET, FILM COATED ORAL at 08:23

## 2017-03-24 RX ADMIN — ASPIRIN 81 MG: 81 TABLET, COATED ORAL at 08:23

## 2017-03-24 RX ADMIN — CARVEDILOL 25 MG: 25 TABLET, FILM COATED ORAL at 08:23

## 2017-03-24 RX ADMIN — OSELTAMIVIR PHOSPHATE 75 MG: 75 CAPSULE ORAL at 08:23

## 2017-03-24 NOTE — PLAN OF CARE
Problem: Goal Outcome Summary  Goal: Goal Outcome Summary  Outcome: No Change  Restless most of the night, out of bed numerous times.  Redirectable.  Does try to sit on the floor.  Uses walker if you remind him and set him in front of it.  Did not sleep until 0400.  PRN ativan given.

## 2017-03-24 NOTE — CONSULTS
"CLINICAL NUTRITION SERVICES  -  ASSESSMENT NOTE      Recommendations Ordered by Registered Dietitian (RD):   -Low saturated fat / <2400 mg sodium diet  -Room service not appropriate     Malnutrition: Patient does not meet criteria for malnutrition at this time          REASON FOR ASSESSMENT  Adam Huber is a 81 year old male seen by Registered Dietitian for LOS      NUTRITION HISTORY  - Information obtained from chart review (pt [place]inean speaking / no interpretor)  - Patient is on a ?regular diet at home  - Patient with PMH of CAD, STEMI x 2 stents, HTN, CVA and TBI    CURRENT NUTRITION ORDERS  Diet Order:     Low Saturated Fat/2400 mg Sodium + no caffeine diet   Not appropriate for room service     Current Intake/Tolerance:  -Patient with fair appetite/intake over the course of admission. Per nutrition flowsheet patient eating % of standard meals TID       PHYSICAL FINDINGS  Observed  No nutrition-related physical findings observed  Obtained from Chart/Interdisciplinary Team  -Right-sided deficits from previous CVA  -Obese   -Last BM = 3/21/2017    ANTHROPOMETRICS  Height: 6' 0\"  Weight: 217 lbs 3.2 oz (98.5 kg)  Body mass index is 29.46 kg/(m^2).  Weight Status:  Overweight BMI 25-29.9  IBW: 80.91 kg   % IBW: 122%  Weight History: The data below suggests that patient's weight has trended down 4 kg (3.9%) x 2 months (not significant)  Wt Readings from Last 10 Encounters:   03/24/17 98.5 kg (217 lb 3.2 oz)   01/04/17 102.5 kg (226 lb)   06/21/16 102.7 kg (226 lb 8 oz)   12/16/15 103.9 kg (229 lb)   11/13/15 104.8 kg (231 lb)   11/07/15 102.1 kg (225 lb 1.4 oz)   10/26/15 103.3 kg (227 lb 12.8 oz)   10/13/15 103 kg (227 lb 1.2 oz)   ]    LABS  Labs reviewed    MEDICATIONS  Medications reviewed  -Medium SSI before meals TID + at bedtime  -Metformin- 500 mg BID, glipizide     Dosing Weight: 85.3 kg (adjusted weight)    ASSESSED NUTRITION NEEDS:  Estimated Energy Needs: 8419-4942 kcals (25-30 " Kcal/Kg)  Justification: maintenance  Estimated Protein Needs:  grams protein (1-1.2 g pro/Kg)  Justification: maintenance  Estimated Fluid Needs: 7438-1088  mL (1 mL/Kcal)  Justification: maintenance    MALNUTRITION:  % Weight Loss:  Weight loss does not meet criteria for malnutrition   % Intake:  Unable to adequately assess   Subcutaneous Fat Loss:  None observed  Muscle Loss:  None observed  Fluid Retention:  None noted    Malnutrition Diagnosis: Patient does not meet two of the above criteria necessary for diagnosing malnutrition    NUTRITION DIAGNOSIS:  No nutrition diagnosis identified at this time     NUTRITION INTERVENTIONS  Recommendations / Nutrition Prescription  1. Continue low saturated fat / <2400 mg sodium diet. Caffeine restriction per Cardiology / MD    2. Room service not appropriate     Implementation  Nutrition education: Per Provider order if indicated     Collaboration and Referral of Nutrition care - Discussed patient during interdisciplinary rounds. Also spoke with RN regarding intake/appetite   .  Nutrition Goals  Pt to consistently consume >/=75% of meals TID       MONITORING AND EVALUATION:  Food intake - Adequacy of intake (% of meals)    Laurie Mills RD, LD  Clinical Dietitian  3rd Floor/ICU Pager: [phone]  All Other Floors Pager: [phone]  Weekend/Holiday Pager: [phone]

## 2017-03-24 NOTE — PLAN OF CARE
Problem: Goal Outcome Summary  Goal: Goal Outcome Summary  Outcome: No Change  Pain: No c/o pain  LOC: Restless and confused at times. Micronesian-speaking.   Mobility: Unsteady gait.  Assist of 1-2 and walker  Lungs: coarse crackles.  94% RA  Tele: No tele  GI: low fat low chol diet. <2400mg NA.  No caffeine.  : Icont  IV: No IV access  Other: Tamiflu was d/c today.  D/C to TCU when ready.  Blood sugar checks 121 and 84 this shift.

## 2017-03-24 NOTE — PLAN OF CARE
Problem: Individualization  Goal: Patient Preferences  Outcome: No Change  Pt confused, oriented to self. Frequent attempts to GOB.  Assist x 1/2/walker to transfer, unsteady gait. BA PA activated.   LS coarse with crackles VSS afebrile.  Frequent void

## 2017-03-24 NOTE — PLAN OF CARE
Problem: Goal Outcome Summary  Goal: Goal Outcome Summary  Pt- on arrival Adam was up in the bathroom with the alarm sounding. Assist for mobility in bathroom as a falls risk due to cognition. May do better and home with 24/7 assist and supervision vs TCU as he is frequently getting him self up and OOB. Sw alerted and will speak with spouse.

## 2017-03-24 NOTE — PROGRESS NOTES
[facility]  Hospitalist Progress Note  David Ovalle MD 03/24/2017    Reason for Stay (Diagnosis): General weakness         Assessment and Plan:      Adam Huber is a 81 year old male with a PMH significant for CAD, NSTEMI 2015 with stent x2 in staged procedure, HTN, HLP, BPH, depression hx 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   Found to have UTI and influenza B.       1. Influenza B and bronchitis-  Possible superimposed bacterial infection - Likely causing cough and generalized weakness.  Continue Tamiflu, treated for  total of 5 days and stopped today. Levaquin started which could also cover UTI, and changed to Augmentin to cover both infection. CXR done no infiltrate seen.    2. Elevated troponin -  Likely due to demand ischemia from dehydration and acute illness.  Has had no chest pain.  Defer further work up for now.  Has hx of CAD with previous stents, he did have other areas of disease that were managed medically.    4. UTI - UA grossly abnormal, positive nitrites, UC grew E.coli, pansensitive.  Stopped Rocephin 1 gm IV daily( Started on 03/20). Noted he was on Levaquin for two days, Augmentin started which could cover the infection.     5. Hyperglycemia - Borderline DM in past, and not on meds but HgbA1c is now 7.1, no change from 2 year ago.  Glucose 167 on arrival. On ISS. Glipizide 5 mg BID started. Started Metformin today at 500 mg BID (03/23).       6.  Generalized weakness-  Likely due to UTI and influenza.  Reports his legs feel weak- no focal neurologic findings.  PT/OT DAILY.    7. HTN - Resume home meds with parameters    8. HLP - On statin, resume    9. Hx of TBI - Memory difficulties, R sided deficits     10. Hx of CVA - Mild right sided weakness and speech is somewhat slow.    11. Encephalopathy- Patient has on and off confusion, decreased cognitive function, restless earlier this  morning, now stable. He was conversing and answering questions when  was in the room. This is likely Metabolic vs infectious in the setting of possible underlying cognitive dysfunction.    DVT Prophylaxis: Pneumatic Compression Devices  Code Status: Full Code  Discharge Dispo: TCU when placement is available likely tomorrow..    I discussed with patient at bedside, no family member today but discussed with his wife and daughter yesterday.  All his questions and concerns addressed.        Interval History (Subjective):      Patient seen and examined, mild SOB, claims he feel weaker today. Alert, sitting on a chair, not in distress. Used IM-Sense . Patient feels tired and legs are weak. Cough still there.                    Physical Exam:      Last Vital Signs:  /69 (BP Location: Right arm)  Pulse 75  Temp 98.7  F (37.1  C) (Oral)  Resp 18  Ht 1.829 m (6')  Wt 98.5 kg (217 lb 3.2 oz)  SpO2 94%  BMI 29.46 kg/m2    Vital signs reviewed  GENERAL:  Alert, calm, NAD  CVS: regular rate and rhythm, no murmurs or rubs  CHEST:  scattered crackles bilaterally, normal respiratory effort  HEENT:  Pupil round, equal, conjuctivae, sclerae and lids normal, neck is supple  ABD:  Soft, nontender, nondistended, no masses, normal bowel sounds  EXT:  No edema  NEUR: normal strength and sensation in all 4 extremities, cranial nerves grossly intact  PSY:  Mood and affect within normal limits         Medications:      All current medications were reviewed with changes reflected in problem list.  Current Facility-Administered Medications   Medication     amoxicillin-clavulanate (AUGMENTIN) 500-125 MG per tablet 1 tablet     metFORMIN (GLUCOPHAGE) tablet 500 mg     melatonin tablet 1 mg     LORazepam (ATIVAN) tablet 0.5 mg     haloperidol lactate (HALDOL) injection 2-4 mg     magnesium hydroxide (MILK OF MAGNESIA) suspension 15-30 mL     bisacodyl (DULCOLAX) Suppository 10 mg     glipiZIDE (GLUCOTROL) tablet  5 mg     aspirin EC EC tablet 81 mg     atorvastatin (LIPITOR) tablet 40 mg     clopidogrel (PLAVIX) tablet 75 mg     nortriptyline (PAMELOR) capsule 50 mg     traMADol (ULTRAM) tablet 50 mg     lidocaine 1 % 1 mL     lidocaine (LMX4) kit     sodium chloride (PF) 0.9% PF flush 3 mL     sodium chloride (PF) 0.9% PF flush 3 mL     nitroglycerin (NITROSTAT) sublingual tablet 0.4 mg     alum & mag hydroxide-simethicone (MYLANTA ES/MAALOX  ES) suspension 15-30 mL     acetaminophen (TYLENOL) tablet 650 mg     acetaminophen (TYLENOL) Suppository 650 mg     naloxone (NARCAN) injection 0.1-0.4 mg     carvedilol (COREG) tablet 25 mg     losartan (COZAAR) tablet 50 mg     glucose 40 % gel 15-30 g    Or     dextrose 50 % injection 25-50 mL    Or     glucagon injection 1 mg     insulin aspart (NovoLOG) inj (RAPID ACTING)     insulin aspart (NovoLOG) inj (RAPID ACTING)          Data:      All new lab and imaging data was reviewed.   Labs:    Recent Labs  Lab 03/19/17  0615 03/18/17  1700   WBC 4.5 6.5   HGB 14.9 16.1   HCT 43.6 46.8   MCV 92 93   * 142*       Recent Labs  Lab 03/24/17  0958 03/19/17  0615 03/18/17  1700   NA  --  135 132*   POTASSIUM  --  3.7 3.9   CHLORIDE  --  102 97   CO2  --  22 24   ANIONGAP  --  11 11   GLC  --  211* 269*   BUN  --  23 26   CR 0.88 1.15 1.50*   GFRESTIMATED 82 61 45*   GFRESTBLACK >90African American GFR Calc 74 54*   LUZ  --  7.5* 8.0*       Recent Labs  Lab 03/24/17  0751 03/24/17  0302 03/23/17  2337 03/23/17  2217 03/23/17  1639  03/19/17  0615  03/18/17  1700   GLC  --   --   --   --   --   --  211*  --  269*   * 156* 78 68* 83  < >  --   < >  --    < > = values in this interval not displayed.

## 2017-03-24 NOTE — PROGRESS NOTES
SWS:  D: discharge planning  A/P: SW continues to follow to assist with dc planning needs. Oscar Platt is assessing for possible admission tomorrow.  SW attempted to meet with pt wife to discuss dc plan but wife states that she does not speak English and  is not available at this time. Per RN  will be back tomorrow morning     ADDENDUM- [facility] declined. SW also received message from [employer] at [facility]. Requesting return call. SW attempted to reach [employer] but unable to speak with her at this time. SW left message requesting return call.

## 2017-03-25 ENCOUNTER — APPOINTMENT (OUTPATIENT)
Dept: CT IMAGING | Facility: CLINIC | Age: 82
DRG: 193 | End: 2017-03-25
Attending: INTERNAL MEDICINE
Payer: COMMERCIAL

## 2017-03-25 ENCOUNTER — OFFICE VISIT (OUTPATIENT)
Dept: INTERPRETER SERVICES | Facility: CLINIC | Age: 82
End: 2017-03-25

## 2017-03-25 ENCOUNTER — APPOINTMENT (OUTPATIENT)
Dept: PHYSICAL THERAPY | Facility: CLINIC | Age: 82
DRG: 193 | End: 2017-03-25
Payer: COMMERCIAL

## 2017-03-25 LAB
ANION GAP SERPL CALCULATED.3IONS-SCNC: 9 MMOL/L (ref 3–14)
BUN SERPL-MCNC: 15 MG/DL (ref 7–30)
CALCIUM SERPL-MCNC: 8.5 MG/DL (ref 8.5–10.1)
CHLORIDE SERPL-SCNC: 107 MMOL/L (ref 94–109)
CO2 SERPL-SCNC: 23 MMOL/L (ref 20–32)
CREAT SERPL-MCNC: 0.94 MG/DL (ref 0.66–1.25)
GFR SERPL CREATININE-BSD FRML MDRD: 77 ML/MIN/1.7M2
GLUCOSE BLDC GLUCOMTR-MCNC: 100 MG/DL (ref 70–99)
GLUCOSE BLDC GLUCOMTR-MCNC: 109 MG/DL (ref 70–99)
GLUCOSE BLDC GLUCOMTR-MCNC: 112 MG/DL (ref 70–99)
GLUCOSE BLDC GLUCOMTR-MCNC: 126 MG/DL (ref 70–99)
GLUCOSE BLDC GLUCOMTR-MCNC: 129 MG/DL (ref 70–99)
GLUCOSE SERPL-MCNC: 98 MG/DL (ref 70–99)
POTASSIUM SERPL-SCNC: 4.2 MMOL/L (ref 3.4–5.3)
SODIUM SERPL-SCNC: 139 MMOL/L (ref 133–144)

## 2017-03-25 PROCEDURE — 25000132 ZZH RX MED GY IP 250 OP 250 PS 637: Performed by: INTERNAL MEDICINE

## 2017-03-25 PROCEDURE — T1013 SIGN LANG/ORAL INTERPRETER: HCPCS | Mod: U3

## 2017-03-25 PROCEDURE — 36415 COLL VENOUS BLD VENIPUNCTURE: CPT | Performed by: INTERNAL MEDICINE

## 2017-03-25 PROCEDURE — 97116 GAIT TRAINING THERAPY: CPT | Mod: GP | Performed by: PHYSICAL THERAPY ASSISTANT

## 2017-03-25 PROCEDURE — 70450 CT HEAD/BRAIN W/O DYE: CPT

## 2017-03-25 PROCEDURE — 00000146 ZZHCL STATISTIC GLUCOSE BY METER IP

## 2017-03-25 PROCEDURE — 40000916 ZZH STATISTIC SITTER, NIGHT HOURS

## 2017-03-25 PROCEDURE — 80048 BASIC METABOLIC PNL TOTAL CA: CPT | Performed by: INTERNAL MEDICINE

## 2017-03-25 PROCEDURE — 25000132 ZZH RX MED GY IP 250 OP 250 PS 637: Performed by: PHYSICIAN ASSISTANT

## 2017-03-25 PROCEDURE — 12000000 ZZH R&B MED SURG/OB

## 2017-03-25 PROCEDURE — 40000193 ZZH STATISTIC PT WARD VISIT: Performed by: PHYSICAL THERAPY ASSISTANT

## 2017-03-25 PROCEDURE — 99232 SBSQ HOSP IP/OBS MODERATE 35: CPT | Performed by: INTERNAL MEDICINE

## 2017-03-25 RX ORDER — QUETIAPINE FUMARATE 25 MG/1
25 TABLET, FILM COATED ORAL AT BEDTIME
Status: DISCONTINUED | OUTPATIENT
Start: 2017-03-25 | End: 2017-03-31 | Stop reason: HOSPADM

## 2017-03-25 RX ORDER — CARVEDILOL 3.12 MG/1
6.25 TABLET ORAL 2 TIMES DAILY WITH MEALS
Status: DISCONTINUED | OUTPATIENT
Start: 2017-03-25 | End: 2017-03-25

## 2017-03-25 RX ADMIN — AMITRIPTYLINE HYDROCHLORIDE 50 MG: 50 TABLET, FILM COATED ORAL at 20:53

## 2017-03-25 RX ADMIN — AMOXICILLIN AND CLAVULANATE POTASSIUM 1 TABLET: 500; 125 TABLET, FILM COATED ORAL at 13:48

## 2017-03-25 RX ADMIN — CLOPIDOGREL 75 MG: 75 TABLET, FILM COATED ORAL at 09:04

## 2017-03-25 RX ADMIN — Medication 1 LOZENGE: at 11:27

## 2017-03-25 RX ADMIN — ACETAMINOPHEN 650 MG: 325 TABLET, FILM COATED ORAL at 11:27

## 2017-03-25 RX ADMIN — METFORMIN HYDROCHLORIDE 500 MG: 500 TABLET ORAL at 09:04

## 2017-03-25 RX ADMIN — GLIPIZIDE 5 MG: 5 TABLET ORAL at 09:04

## 2017-03-25 RX ADMIN — LOSARTAN POTASSIUM 50 MG: 25 TABLET, FILM COATED ORAL at 09:04

## 2017-03-25 RX ADMIN — AMOXICILLIN AND CLAVULANATE POTASSIUM 1 TABLET: 500; 125 TABLET, FILM COATED ORAL at 20:53

## 2017-03-25 RX ADMIN — LORAZEPAM 0.5 MG: 0.5 TABLET ORAL at 01:01

## 2017-03-25 RX ADMIN — Medication 1 MG: at 01:01

## 2017-03-25 RX ADMIN — AMOXICILLIN AND CLAVULANATE POTASSIUM 1 TABLET: 500; 125 TABLET, FILM COATED ORAL at 05:09

## 2017-03-25 RX ADMIN — ATORVASTATIN CALCIUM 40 MG: 40 TABLET, FILM COATED ORAL at 09:04

## 2017-03-25 RX ADMIN — CARVEDILOL 25 MG: 25 TABLET, FILM COATED ORAL at 20:52

## 2017-03-25 RX ADMIN — GLIPIZIDE 5 MG: 5 TABLET ORAL at 16:04

## 2017-03-25 RX ADMIN — CARVEDILOL 25 MG: 25 TABLET, FILM COATED ORAL at 09:04

## 2017-03-25 RX ADMIN — QUETIAPINE FUMARATE 25 MG: 25 TABLET, FILM COATED ORAL at 20:52

## 2017-03-25 RX ADMIN — NORTRIPTYLINE HYDROCHLORIDE 50 MG: 50 CAPSULE ORAL at 09:35

## 2017-03-25 RX ADMIN — ASPIRIN 81 MG: 81 TABLET, COATED ORAL at 09:04

## 2017-03-25 NOTE — PROGRESS NOTES
[facility]  Hospitalist Progress Note  David Ovalle MD 03/25/2017    Reason for Stay (Diagnosis): General weakness         Assessment and Plan:      Adam Huber is a 81 year old male with a PMH significant for CAD, NSTEMI 2015 with stent x2 in staged procedure, HTN, HLP, BPH, depression hx 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   Found to have UTI and influenza B.       1. Influenza B and bronchitis-  Possible superimposed bacterial infection - Likely causing cough and generalized weakness.  Continue Tamiflu, treated for  total of 5 days and stopped today. Levaquin started which could also cover UTI, and changed to Augmentin to cover both infection. CXR done no infiltrate seen.    2. Elevated troponin -  Likely due to demand ischemia from dehydration and acute illness.  Has had no chest pain.  Defer further work up for now.  Has hx of CAD with previous stents, he did have other areas of disease that were managed medically.    4. UTI - UA grossly abnormal, positive nitrites, UC grew E.coli, pansensitive.  Stopped Rocephin 1 gm IV daily( Started on 03/20). Noted he was on Levaquin for two days, Augmentin started which could cover the infection.   - Treat for 2 more days and stop the antibiotics on 03/27.    5. Hyperglycemia - Borderline DM in past, and not on meds but HgbA1c is now 7.1, no change from 2 year ago.  Glucose 167 on arrival. On ISS. Glipizide 5 mg BID started. Started Metformin today at 500 mg BID (03/23).     - Glucose better controlled, he is no eating enough and coughing. Monitor closely.    6.  Generalized weakness-  Likely due to UTI and influenza.  Reports his legs feel weak- no focal neurologic findings.  PT/OT DAILY.    7. HTN - Resume home meds with parameters    8. HLP - On statin, resume    9. Hx of TBI - Memory difficulties, R sided deficits     10. Hx of CVA - Mild right sided weakness  and speech is somewhat slow.    11. Encephalopathy- Patient has on and off confusion, decreased cognitive function, restless two days ago, now stable. He was conversing and answering questions when  was in the room. This is likely Metabolic vs infectious in the setting of possible underlying cognitive dysfunction. Patient has significant encephalomalacia on CT scan 2 years ago. Family concerned about this and also issues with his weakness and loss of balance.  - CT head repeated today, follow up the result.  - Seroquel 25 mg at bedtime.  - Speech and swallow evaluation, to see if there is any subtle aspiration, as he coughs intermittently.    DVT Prophylaxis: Pneumatic Compression Devices  Code Status: Full Code  Discharge Dispo: TCU when placement is available.    I discussed with patient at bedside, His son and wife at bedside agreed with the plan of care. All their question and concerns addressed.        Interval History (Subjective):      Patient seen and examined, mild SOB, claims he feel weaker today. Alert, sitting on a chair, not in distress. Used [employer] . Patient feels tired and legs are weak. Cough still there.                    Physical Exam:      Last Vital Signs:  /84 (BP Location: Right arm)  Pulse 75  Temp 98.6  F (37  C) (Oral)  Resp 18  Ht 1.829 m (6')  Wt 98.5 kg (217 lb 3.2 oz)  SpO2 95%  BMI 29.46 kg/m2    Vital signs reviewed  GENERAL:  Awake, calm, NAD  CVS: regular rate and rhythm, no murmurs or rubs  CHEST:  scattered crackles bilaterally, normal respiratory effort  HEENT:  Pupil round, equal, conjuctivae, sclerae and lids normal, neck is supple  ABD:  Soft, nontender, nondistended, no masses, normal bowel sounds  EXT:  No edema  NEUR: normal strength and sensation in all 4 extremities, cranial nerves grossly intact  PSY:  Mood and affect within normal limits         Medications:      All current medications were reviewed with changes reflected in problem  list.  Current Facility-Administered Medications   Medication     QUEtiapine (SEROquel) tablet 25 mg     benzocaine-menthol (CHLORASEPTIC) 6-10 MG lozenge 1 lozenge     amitriptyline (ELAVIL) tablet 50 mg     amoxicillin-clavulanate (AUGMENTIN) 500-125 MG per tablet 1 tablet     metFORMIN (GLUCOPHAGE) tablet 500 mg     melatonin tablet 1 mg     LORazepam (ATIVAN) tablet 0.5 mg     haloperidol lactate (HALDOL) injection 2-4 mg     magnesium hydroxide (MILK OF MAGNESIA) suspension 15-30 mL     bisacodyl (DULCOLAX) Suppository 10 mg     glipiZIDE (GLUCOTROL) tablet 5 mg     aspirin EC EC tablet 81 mg     atorvastatin (LIPITOR) tablet 40 mg     clopidogrel (PLAVIX) tablet 75 mg     nortriptyline (PAMELOR) capsule 50 mg     traMADol (ULTRAM) tablet 50 mg     lidocaine 1 % 1 mL     lidocaine (LMX4) kit     sodium chloride (PF) 0.9% PF flush 3 mL     sodium chloride (PF) 0.9% PF flush 3 mL     nitroglycerin (NITROSTAT) sublingual tablet 0.4 mg     alum & mag hydroxide-simethicone (MYLANTA ES/MAALOX  ES) suspension 15-30 mL     acetaminophen (TYLENOL) tablet 650 mg     acetaminophen (TYLENOL) Suppository 650 mg     naloxone (NARCAN) injection 0.1-0.4 mg     carvedilol (COREG) tablet 25 mg     losartan (COZAAR) tablet 50 mg     glucose 40 % gel 15-30 g    Or     dextrose 50 % injection 25-50 mL    Or     glucagon injection 1 mg     insulin aspart (NovoLOG) inj (RAPID ACTING)     insulin aspart (NovoLOG) inj (RAPID ACTING)          Data:      All new lab and imaging data was reviewed.   Labs:    Recent Labs  Lab 03/19/17  0615 03/18/17  1700   WBC 4.5 6.5   HGB 14.9 16.1   HCT 43.6 46.8   MCV 92 93   * 142*       Recent Labs  Lab 03/24/17  0958 03/19/17  0615 03/18/17  1700   NA  --  135 132*   POTASSIUM  --  3.7 3.9   CHLORIDE  --  102 97   CO2  --  22 24   ANIONGAP  --  11 11   GLC  --  211* 269*   BUN  --  23 26   CR 0.88 1.15 1.50*   GFRESTIMATED 82 61 45*   GFRESTBLACK >90African American GFR Calc 74 54*   LUZ  --   7.5* 8.0*       Recent Labs  Lab 03/25/17  0855 03/25/17  0508 03/24/17  2058 03/24/17  1708 03/24/17  1203  03/19/17  0615  03/18/17  1700   GLC  --   --   --   --   --   --  211*  --  269*   * 126* 118* 80 84  < >  --   < >  --    < > = values in this interval not displayed.

## 2017-03-25 NOTE — PROGRESS NOTES
D: BRIDGETTE is following to coordinate d/c to TCU. [facility]   I: SW went to meet with pt. Pt's son Anuj was present. SW informed them of the TCU status and inquired about additional preferences.  A: Anuj would like a TCU nearby. His preference is a private room in [place] or [place]. 1. [facility]. CHATA discussed transportation options and cost, its likely that CHATA will arrange through Health system.   CHATA sent the referral to [facility], [facility] and [facility].   P: SWS will continue to follow to coordinate d/c.     Addendum: [facility] has no beds available. Pt was clinically accepted at [facility], but can't be admitted until Jar[place] 3/26. [facility] will call SW on 3/26 re: bed availability.     KOFI Kelley  Casual  [phone]

## 2017-03-25 NOTE — PLAN OF CARE
Problem: Goal Outcome Summary  Goal: Goal Outcome Summary  Value Information       PT - Pt transfers sit to/from stand with supervision with vcs for hand placement. Pt transfers bed to bed with ww with min assist. Pt amb 75' with ww with min assist with vcs for amb closer to ww for safety. Min assist was needed for manuvering ww korin. with turns. Vcs for pt not to let go of ww when turning to sit into chair. Continue to recommend TCU.

## 2017-03-25 NOTE — PLAN OF CARE
Problem: Goal Outcome Summary  Goal: Goal Outcome Summary  Alert, confused. Cook Islander speaking. Very restless and set off alarms, melatonin and ativan given with results. Assist of 1 and walker. Incont of urine but up to the bathroom several times. Lung sounds coarse- on RA.

## 2017-03-25 NOTE — PROGRESS NOTES
X-cover    Called to resume amitriptyline at bedtime.  Home dose 50 mg-100 mg.  Resumed 50 mg qhs prn due to episodic infectious encephalopathy in the setting of influenza b

## 2017-03-25 NOTE — PLAN OF CARE
Problem: Individualization  Goal: Patient Preferences  Outcome: No Change  Confused. Family at bedside updated. Family has concerns, pt not sleeping. Requesting pt restart Amitriptyline, MD notified. Orders for Amitriptyline.  Assist x 1/walker to transfer.  LS coarse with crackles.  91% RA.  VSS afebrile.

## 2017-03-25 NOTE — PLAN OF CARE
Problem: Goal Outcome Summary  Goal: Goal Outcome Summary  Outcome: No Change  Pain: No c/o pain  LOC: Restless and confused at times. Greek-speaking.   Mobility: Unsteady gait.  Assist of 1-2 and walker  Lungs: coarse crackles.  95% RA  Tele: No tele  GI: low fat low chol diet. <2400mg NA.  No caffeine.  : Icont  IV: No IV access  Other:   D/C to TCU when bed available.  Blood sugar checks 129 and 100 this shift. PT/ST following.  CT ordered today for increased confusion. Tylenol and lozenge given for dry/sore throat

## 2017-03-26 ENCOUNTER — APPOINTMENT (OUTPATIENT)
Dept: SPEECH THERAPY | Facility: CLINIC | Age: 82
DRG: 193 | End: 2017-03-26
Payer: COMMERCIAL

## 2017-03-26 ENCOUNTER — APPOINTMENT (OUTPATIENT)
Dept: PHYSICAL THERAPY | Facility: CLINIC | Age: 82
DRG: 193 | End: 2017-03-26
Payer: COMMERCIAL

## 2017-03-26 LAB
GLUCOSE BLDC GLUCOMTR-MCNC: 119 MG/DL (ref 70–99)
GLUCOSE BLDC GLUCOMTR-MCNC: 123 MG/DL (ref 70–99)
GLUCOSE BLDC GLUCOMTR-MCNC: 134 MG/DL (ref 70–99)
GLUCOSE BLDC GLUCOMTR-MCNC: 182 MG/DL (ref 70–99)
GLUCOSE BLDC GLUCOMTR-MCNC: 92 MG/DL (ref 70–99)

## 2017-03-26 PROCEDURE — 97116 GAIT TRAINING THERAPY: CPT | Mod: GP | Performed by: PHYSICAL THERAPY ASSISTANT

## 2017-03-26 PROCEDURE — 40000193 ZZH STATISTIC PT WARD VISIT: Performed by: PHYSICAL THERAPY ASSISTANT

## 2017-03-26 PROCEDURE — 25000132 ZZH RX MED GY IP 250 OP 250 PS 637: Performed by: PHYSICIAN ASSISTANT

## 2017-03-26 PROCEDURE — 25000132 ZZH RX MED GY IP 250 OP 250 PS 637: Performed by: HOSPITALIST

## 2017-03-26 PROCEDURE — 12000000 ZZH R&B MED SURG/OB

## 2017-03-26 PROCEDURE — 00000146 ZZHCL STATISTIC GLUCOSE BY METER IP

## 2017-03-26 PROCEDURE — 25000132 ZZH RX MED GY IP 250 OP 250 PS 637: Performed by: INTERNAL MEDICINE

## 2017-03-26 PROCEDURE — 92526 ORAL FUNCTION THERAPY: CPT | Mod: GN | Performed by: SPEECH-LANGUAGE PATHOLOGIST

## 2017-03-26 PROCEDURE — 97530 THERAPEUTIC ACTIVITIES: CPT | Mod: GP | Performed by: PHYSICAL THERAPY ASSISTANT

## 2017-03-26 PROCEDURE — 92610 EVALUATE SWALLOWING FUNCTION: CPT | Mod: GN | Performed by: SPEECH-LANGUAGE PATHOLOGIST

## 2017-03-26 PROCEDURE — 40000225 ZZH STATISTIC SLP WARD VISIT: Performed by: SPEECH-LANGUAGE PATHOLOGIST

## 2017-03-26 PROCEDURE — 99232 SBSQ HOSP IP/OBS MODERATE 35: CPT | Performed by: HOSPITALIST

## 2017-03-26 RX ORDER — AMITRIPTYLINE HYDROCHLORIDE 50 MG/1
100 TABLET ORAL
Status: DISCONTINUED | OUTPATIENT
Start: 2017-03-26 | End: 2017-03-31 | Stop reason: HOSPADM

## 2017-03-26 RX ADMIN — AMOXICILLIN AND CLAVULANATE POTASSIUM 1 TABLET: 500; 125 TABLET, FILM COATED ORAL at 14:31

## 2017-03-26 RX ADMIN — GLIPIZIDE 5 MG: 5 TABLET ORAL at 07:46

## 2017-03-26 RX ADMIN — AMOXICILLIN AND CLAVULANATE POTASSIUM 1 TABLET: 500; 125 TABLET, FILM COATED ORAL at 19:44

## 2017-03-26 RX ADMIN — AMITRIPTYLINE HYDROCHLORIDE 100 MG: 50 TABLET, FILM COATED ORAL at 19:44

## 2017-03-26 RX ADMIN — GLIPIZIDE 5 MG: 5 TABLET ORAL at 16:42

## 2017-03-26 RX ADMIN — NORTRIPTYLINE HYDROCHLORIDE 50 MG: 50 CAPSULE ORAL at 07:47

## 2017-03-26 RX ADMIN — AMOXICILLIN AND CLAVULANATE POTASSIUM 1 TABLET: 500; 125 TABLET, FILM COATED ORAL at 07:46

## 2017-03-26 RX ADMIN — CARVEDILOL 25 MG: 25 TABLET, FILM COATED ORAL at 07:47

## 2017-03-26 RX ADMIN — LORAZEPAM 0.5 MG: 0.5 TABLET ORAL at 23:38

## 2017-03-26 RX ADMIN — ASPIRIN 81 MG: 81 TABLET, COATED ORAL at 07:47

## 2017-03-26 RX ADMIN — CARVEDILOL 25 MG: 25 TABLET, FILM COATED ORAL at 19:44

## 2017-03-26 RX ADMIN — CLOPIDOGREL 75 MG: 75 TABLET, FILM COATED ORAL at 07:48

## 2017-03-26 RX ADMIN — LOSARTAN POTASSIUM 50 MG: 25 TABLET, FILM COATED ORAL at 07:47

## 2017-03-26 RX ADMIN — LORAZEPAM 0.5 MG: 0.5 TABLET ORAL at 02:07

## 2017-03-26 RX ADMIN — ATORVASTATIN CALCIUM 40 MG: 40 TABLET, FILM COATED ORAL at 07:47

## 2017-03-26 RX ADMIN — METFORMIN HYDROCHLORIDE 500 MG: 500 TABLET ORAL at 07:47

## 2017-03-26 RX ADMIN — ACETAMINOPHEN 650 MG: 325 TABLET, FILM COATED ORAL at 16:42

## 2017-03-26 NOTE — PLAN OF CARE
Problem: Individualization  Goal: Patient Preferences  Outcome: No Change  Confused.  Family at bedside, updated.  , 109,  Metformin held.   LS coarse,diminished with crackles.  VSS afebrile.  FR, unsteady gait, assist x 1-2/walker. BA Activated.

## 2017-03-26 NOTE — PROGRESS NOTES
Your information has been submitted on March 26th, 2017 at 09:46:50 AM CDT. The confirmation number is [id number]

## 2017-03-26 NOTE — PLAN OF CARE
Problem: Goal Outcome Summary  Goal: Goal Outcome Summary  Outcome: No Change  Pain: No c/o pain  LOC: Restless and confused at times. Turkish-speaking. PSC at bedside early this morning, then family was here so sitter was d/c.  Mobility: Unsteady gait. Assist of 1 and walker.    Lungs: coarse crackles. 94% RA  Tele: No tele  GI: Seen by speech.  Diet was changed to DD1 with thin liquids.  : Icont  IV: No IV access  Other: D/C to TCU when bed available and is sitter-free for 48 hours. Blood sugar checks 114 and 134 this shift. PT/ST following.

## 2017-03-26 NOTE — PROGRESS NOTES
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 Found to have UTI and influenza B.       1. Influenza B and bronchitis- Possible superimposed bacterial infection - Likely causing cough and generalized weakness. Continue Tamiflu, treated for total of 5 days. Levaquin started which could also cover UTI, and changed to Augmentin to cover both infection. CXR done no infiltrate seen.     2. Elevated troponin - Likely due to demand ischemia from dehydration and acute illness. Has had no chest pain. Defer further work up for now. Has hx of CAD with previous stents, he did have other areas of disease that were managed medically.     4. UTI - UA grossly abnormal, positive nitrites, UC grew E.coli, pansensitive. Stopped Rocephin 1 gm IV daily (Started on 03/20). Noted he was on Levaquin for two days, Augmentin started which could cover the infection.   - Treat for 2 more days and stop the antibiotics on 03/27.     5. Hyperglycemia - Borderline DM in past, and not on meds but HgbA1c is now 7.1, no change from 2 year ago. Glucose 167 on arrival. On ISS. Glipizide 5 mg BID started. Started Metformin today at 500 mg BID (03/23).   - Glucose better controlled, he is no eating enough and coughing. Monitor closely.     6. Generalized weakness- Likely due to UTI and influenza. Reports his legs feel weak- no focal neurologic findings. PT/OT DAILY.     7. HTN - Resume home meds with parameters     8. HLP - On statin, resume     9. Hx of TBI - Memory difficulties, R sided deficits      10. Hx of CVA - Mild right sided weakness and speech is somewhat slow.     11.  Encephalopathy- Patient has on and off confusion, decreased cognitive function, restlessness at times in context of only speaking Chinese. He was conversing and answering questions appropriately today when  was in the room. This is likely metabolic vs infectious in the setting of possible underlying cognitive dysfunction. Patient has significant encephalomalacia and small vessel disease.   - CT head repeated and no significant change  - Seroquel 25 mg at bedtime.  - Speech and swallow evaluation, to see if there is any subtle aspiration, as he coughs intermittently.     DVT Prophylaxis: Pneumatic Compression Devices  Code Status: Full Code  Discharge Dispo: TCU when placement is available.        Interval History (Subjective):      Assumed care from previous hospitalist. The history was fully reviewed.  Pt doing well. No chest pain or shortness of breath. No nausea, vomiting, diarrhea, constipation. No fevers. No other complaints identified. Denies confusion. Overnight had fall, resulting in placement of sitter.  Formal  used.                  Physical Exam:      Vital Signs:  Temp: 97.2  F (36.2  C) Temp src: Oral BP: 162/87   Heart Rate: 88 Resp: 16 SpO2: 94 % O2 Device: None (Room air)    Vitals:    03/21/17 0548 03/22/17 0701 03/24/17 0604   Weight: 102.1 kg (225 lb) 102.6 kg (226 lb 1.6 oz) 98.5 kg (217 lb 3.2 oz)     Vital Signs with Ranges  Temp:  [95.2  F (35.1  C)-98.8  F (37.1  C)] 97.2  F (36.2  C)  Heart Rate:  [76-94] 88  Resp:  [16-20] 16  BP: (136-169)/(66-87) 162/87  SpO2:  [93 %-95 %] 94 %  I/O last 3 completed shifts:  In: 360 [P.O.:360]  Out: -     GENERAL: Awake, calm, NAD  CVS: regular rate and rhythm, no murmurs or rubs  CHEST: scattered crackles bilaterally, normal respiratory effort  HEENT: Pupil round, equal, conjuctivae, sclerae and lids normal, neck is supple  ABD: Soft, nontender, nondistended, no masses, normal bowel sounds  EXT: No edema  NEUR: normal strength and  sensation in all 4 extremities, cranial nerves grossly intact  PSY: Mood and affect within normal limits         Medications:      All current medications were reviewed with changes reflected in problem list.         Data:      All new lab, EKGs, telemetry strips, and imaging data was personally reviewed.   Labs:    Recent Labs  Lab 03/25/17  1344 03/24/17  0958     --    POTASSIUM 4.2  --    CHLORIDE 107  --    CO2 23  --    ANIONGAP 9  --    GLC 98  --    BUN 15  --    CR 0.94 0.88   GFRESTIMATED 77 82   GFRESTBLACK >90African American GFR Calc >90African American GFR Calc   LUZ 8.5  --      Imaging (past 24 hours):   Recent Results (from the past 24 hour(s))   CT Head w/o Contrast    Narrative    CT SCAN OF THE HEAD WITHOUT CONTRAST March 25, 2017 1:35 PM     HISTORY: Confusion, loss of balance history of brain in jury in the  past.    TECHNIQUE: 4 mm thick axial images of the head without IV contrast  material. Radiation dose for this scan was reduced using automated  exposure  control, adjustment of the mA and/or kV according to patient size, or  iterative reconstruction technique.    COMPARISON: CT/PET dated 10/11/2015.    FINDINGS: Encephalomalacia in the bilateral medial anterior frontal  lobes and in the anterior right temporal lobe are stable in appearance  since the prior study dated 10/11/2015. Focal lacunar infarct is seen  in the right basal ganglia, similar to the prior study. Probable focal  lacunar infarct in the left centrum semiovale is stable in appearance.  No new areas of abnormal decreased attenuation to suggest acute  infarct are seen. No acute intracranial hemorrhage, mass effect, or  mass is identified. There is mild ex vacuo dilatation of the anterior  horns of the bilateral lateral ventricles. There is generalized  atrophy of the brain. Areas of low attenuation are present in the  white matter of the cerebral hemispheres that are consistent with  small vessel ischemic disease in  this age patient.    Probable mucus retention cyst is seen in the medial right maxillary  sinus. This is only seen on one image. Otherwise, the visualized  portions of the orbits, paranasal sinuses, mastoid air spaces, and  calvarium are grossly unremarkable.      Impression    IMPRESSION:  1. Age-related atrophy and small vessel white matter ischemic changes.  2. There are areas of encephalization in the bilateral frontal lobes  and in the right temporal lobe are similar to the prior study and  consistent with chronic infarct or traumatic injury.  3. Probable lacunar infarcts in the right basal ganglia and possibly  in the left centrum semiovale are again noted.  4. No acute infarct, mass, or intracranial hemorrhage is identified.  If there is clinical concern for acute extension of chronic infarcts,  further evaluation with MRI maybe helpful.    MD Enrique BECKER DO MPH  [facility] Hospitalist  [address] [place] [address]  Pager: [phone]  03/26/2017

## 2017-03-26 NOTE — PLAN OF CARE
Problem: Goal Outcome Summary  Goal: Goal Outcome Summary  SLP-  Pt seen with Bermudian .  He sat on edge of bed for evaluation.  He reports he has a sore throat.  He also has Influenza B.  Pt independent with eating.  Has a sitter now as he can get confused and difficult to redirect, impulsive.  Pt did not want to participate in oral [facility] evaluation, was able to pucker but refused to smile as he says he never smiles.  Unable or refused to follow tongue directions cannot demonstrate due to wearing mask and  not sure if refusal or unable.  Pt sipped water from cup lip with good seal and oral transit times were wfl.  No overt s/s aspiration with thin liquids from cup.  Pt ate pudding in 1/2 t boluses with mildly increased oral prep and transit times due to sensitive teeth.  No oral residue and no overt s/s aspiration on pureed.  Pt chewed cracker with severely increased oral prep time and was able to swallow it without overt s/s aspiration.  This was due to very sensitive teeth.  He had oral residue throughout his mouth and automatically drank several sips of water to clear it.  Spouse came into room and observed.  Discussed best food texture for him, they agreed that pureed would be easiest to swallow due to not having to chew it.  Pt has refused food for 2 days but today ate all pureed breakfast cereal and applesauce.  Due to patient comfort and safe swallowing will recommend DD1 with thin liquids, up in chair or edge of bed all meals, small bites and sips and alternate liquids and solids, upright one hour after oral intake.  SLP to follow for diet tolerance and training on safe swallow strategies.  Pt may only need 1-2 SLP sessions as dentition is his problem for chewing and he would stay on DD1 for comfort.

## 2017-03-26 NOTE — PROGRESS NOTES
D: SW has been following pt to coordinate d/c planning. Pt was clinically accepted at [facility] TCU.   I/A: CHATA spoke with Charge RN and MD. Pt had a fall overnight and now has a sitter due to the fall.   CHATA spoke to Tamiko at [facility], pt can be admitted when he is sitter free for 48 hours. CHATA informed MD.   P: SWS will continue to follow to coordinate d/c.     KOFI Kelley  Casual SW [id number]

## 2017-03-26 NOTE — PROGRESS NOTES
03/26/17 1100   General Information   Onset Date 03/25/17   Start of Care Date 03/26/17   Referring Physician Vasquez   Patient Profile Review/OT: Additional Occupational Profile Info See Profile for full history and prior level of function   Patient/Family Goals Statement eat food that doesn't hurt his teeth   Swallowing Evaluation Bedside swallow evaluation   Behaviorial Observations Confused   Mode of current nutrition Oral diet   Type of oral diet Regular;Thin liquid   Respiratory Status Room air   Comments Per chart:Influenza B and bronchitis- Possible superimposed bacterial infection - Likely causing cough and generalized weakness. Continue Tamiflu, treated for total of 5 days. Levaquin started which could also cover UTI, and changed to Augmentin to cover both infection. CXR done no infiltrate seen.   Clinical Swallow Evaluation   Oral Musculature other (see comments)  (not cooperative)   Dentition other (see comments)  (poor dentition, very sensitive, limits oral intake)   Mandibular Strength and Mobility intact   Oral Labial Strength and Mobility WFL   Buccal Strength and Mobility intact   Laryngeal Function Swallow;Voicing initiated   Oral Musculature Comments limited eval due to non participation   Clinical Swallow Eval: Thin Liquid Texture Trial   Mode of Presentation, Thin Liquids cup;self-fed   Volume of Liquid or Food Presented 1/2 c   Oral Phase of Swallow WFL   Pharyngeal Phase of Swallow intact   Diagnostic Statement No overt s/s aspiration   Clinical Swallow Eval: Puree Solid Texture Trial   Mode of Presentation, Puree spoon;fed by clinician   Volume of Puree Presented 8 t   Oral Phase, Puree other (see comments)  (mildly increased oral prep and transit due to teeth sensitiv)   Pharyngeal Phase, Puree intact   Diagnostic Statement No overt s/s aspiration with pureed   Clinical Swallow Eval: Solid Food Texture Trial   Mode of Presentation, Solid self-fed   Volume of Solid Food Presented syed  cracker 1 piece   Oral Phase, Solid other (see comments)  (difficulty chewing due to sensitive teeth very long prep mary)   Oral Residue, Solid other (see comments)  (sever all over mouth, stuck in teeth)   Pharyngeal Phase, Solid intact   Diagnostic Statement RIsk of aspiration due to difficulty with chewing   General Therapy Interventions   Planned Therapy Interventions Dysphagia Treatment   Dysphagia treatment Modified diet education;Instruction of safe swallow strategies;Compensatory strategies for swallowing   Intervention Comments does not follow instruction well, strategy use, diet tolerande   Swallow Eval: Clinical Impressions   Skilled Criteria for Therapy Intervention Skilled criteria met.  Treatment indicated.   Functional Assessment Scale (FAS) 4   Treatment Diagnosis oral dyshagia   Diet texture recommendations Dysphagia diet level 1;Thin liquids   Recommended Feeding/Eating Techniques alternate between small bites and sips of food/liquid;maintain upright posture during/after eating for 30 mins;small sips/bites   Therapy Frequency other (see comments)  (1-2/week)   Predicted Duration of Therapy Intervention (days/wks) 3 days   Anticipated Discharge Disposition other (see comments)  (Per PT TCU.  Do not anticipate he will need SLP at next leve)   Risks and Benefits of Treatment have been explained. Yes   Patient, family and/or staff in agreement with Plan of Care Yes   Clinical Impression Comments SLP-  Pt seen with Azerbaijani .  He sat on edge of bed for evaluation.  He reports he has a sore throat.  He also has Influenza B.  Pt independent with eating.  Has a sitter now as he can get confused and difficult to redirect, impulsive.  Pt did not want to participate in oral [facility] evaluation, was able to pucker but refused to smile as he says he never smiles.  Unable or refused to follow tongue directions cannot demonstrate due to wearing mask and  not sure if refusal or unable.  Pt sipped  water from cup lip with good seal and oral transit times were wfl.  No overt s/s aspiration with thin liquids from cup.  Pt ate pudding in 1/2 t boluses with mildly increased oral prep and transit times due to sensitive teeth.  No oral residue and no overt s/s aspiration on pureed.  Pt chewed cracker with severely increased oral prep time and was able to swallow it without overt s/s aspiration.  This was due to very sensitive teeth.  He had oral residue throughout his mouth and automatically drank several sips of water to clear it.  Spouse came into room and observed.  Discussed best food texture for him, they agreed that pureed would be easiest to swallow due to not having to chew it.  Pt has refused food for 2 days but today ate all pureed breakfast cereal and applesauce.  Due to patient comfort and safe swallowing will recommend DD1 with thin liquids, up in chair or edge of bed all meals, small bites and sips and alternate liquids and solids, upright one hour after oral intake.  SLP to follow for diet tolerance and training on safe swallow strategies.  Pt may only need 1-2 SLP sessions as dentition is his problem for chewing and he would stay on DD1 for comfort   Total Evaluation Time   Total Evaluation Time (Minutes) 23

## 2017-03-26 NOTE — PLAN OF CARE
Problem: Goal Outcome Summary  Goal: Goal Outcome Summary  PT - Pt transfers supine to/from sit with min assist with vcs for direction. Pt transfers sit to/from stand with min assist with vcs for hand placement. Pt transfers bed to/from bed with use of ww with mod assist. Pt amb 80' with ww with mod assist & mod assist with manuvering ww. Vcs were needed for ambulating closer ww. Note pt to push ww out too far in front making gait unsafe. Note pt to amb with flexed B hips and knees. Continue to recommend TCU.

## 2017-03-26 NOTE — PLAN OF CARE
Problem: Goal Outcome Summary  Goal: Goal Outcome Summary  Pt set off bed alarm, when staff entered room the pt was on his knees next to his bed. Pt unable to speak English. No sign of injury. Pt was helped back into bed. Bed alarm was reactivated. MD notified. PSC at bedside.     Pt confused and restless during shift. Assist of 2 with gait belt and walker. Incont of urine. Lung sounds coarse with crackles. . Droplet iso.

## 2017-03-27 ENCOUNTER — APPOINTMENT (OUTPATIENT)
Dept: SPEECH THERAPY | Facility: CLINIC | Age: 82
DRG: 193 | End: 2017-03-27
Payer: COMMERCIAL

## 2017-03-27 ENCOUNTER — APPOINTMENT (OUTPATIENT)
Dept: PHYSICAL THERAPY | Facility: CLINIC | Age: 82
DRG: 193 | End: 2017-03-27
Payer: COMMERCIAL

## 2017-03-27 LAB
GLUCOSE BLDC GLUCOMTR-MCNC: 114 MG/DL (ref 70–99)
GLUCOSE BLDC GLUCOMTR-MCNC: 132 MG/DL (ref 70–99)
GLUCOSE BLDC GLUCOMTR-MCNC: 173 MG/DL (ref 70–99)
GLUCOSE BLDC GLUCOMTR-MCNC: 85 MG/DL (ref 70–99)

## 2017-03-27 PROCEDURE — 25000132 ZZH RX MED GY IP 250 OP 250 PS 637: Performed by: INTERNAL MEDICINE

## 2017-03-27 PROCEDURE — 40000225 ZZH STATISTIC SLP WARD VISIT: Performed by: SPEECH-LANGUAGE PATHOLOGIST

## 2017-03-27 PROCEDURE — 40000193 ZZH STATISTIC PT WARD VISIT: Performed by: PHYSICAL THERAPY ASSISTANT

## 2017-03-27 PROCEDURE — 25000132 ZZH RX MED GY IP 250 OP 250 PS 637: Performed by: PHYSICIAN ASSISTANT

## 2017-03-27 PROCEDURE — 99232 SBSQ HOSP IP/OBS MODERATE 35: CPT | Performed by: INTERNAL MEDICINE

## 2017-03-27 PROCEDURE — 25000132 ZZH RX MED GY IP 250 OP 250 PS 637: Performed by: HOSPITALIST

## 2017-03-27 PROCEDURE — 92526 ORAL FUNCTION THERAPY: CPT | Mod: GN | Performed by: SPEECH-LANGUAGE PATHOLOGIST

## 2017-03-27 PROCEDURE — 12000000 ZZH R&B MED SURG/OB

## 2017-03-27 PROCEDURE — 97530 THERAPEUTIC ACTIVITIES: CPT | Mod: GP | Performed by: PHYSICAL THERAPY ASSISTANT

## 2017-03-27 PROCEDURE — 00000146 ZZHCL STATISTIC GLUCOSE BY METER IP

## 2017-03-27 PROCEDURE — 97116 GAIT TRAINING THERAPY: CPT | Mod: GP | Performed by: PHYSICAL THERAPY ASSISTANT

## 2017-03-27 RX ORDER — BENZTROPINE MESYLATE 1 MG/1
1-2 TABLET ORAL 3 TIMES DAILY PRN
Status: DISCONTINUED | OUTPATIENT
Start: 2017-03-27 | End: 2017-03-31 | Stop reason: HOSPADM

## 2017-03-27 RX ORDER — HALOPERIDOL 5 MG/ML
2 INJECTION INTRAMUSCULAR EVERY 6 HOURS PRN
Status: DISCONTINUED | OUTPATIENT
Start: 2017-03-27 | End: 2017-03-31 | Stop reason: HOSPADM

## 2017-03-27 RX ADMIN — CARVEDILOL 25 MG: 25 TABLET, FILM COATED ORAL at 19:24

## 2017-03-27 RX ADMIN — ATORVASTATIN CALCIUM 40 MG: 40 TABLET, FILM COATED ORAL at 11:02

## 2017-03-27 RX ADMIN — AMOXICILLIN AND CLAVULANATE POTASSIUM 1 TABLET: 500; 125 TABLET, FILM COATED ORAL at 07:10

## 2017-03-27 RX ADMIN — Medication 6.25 MG: at 14:19

## 2017-03-27 RX ADMIN — GLIPIZIDE 5 MG: 5 TABLET ORAL at 11:03

## 2017-03-27 RX ADMIN — Medication 6.25 MG: at 17:25

## 2017-03-27 RX ADMIN — LOSARTAN POTASSIUM 50 MG: 25 TABLET, FILM COATED ORAL at 11:02

## 2017-03-27 RX ADMIN — CLOPIDOGREL 75 MG: 75 TABLET, FILM COATED ORAL at 11:04

## 2017-03-27 RX ADMIN — AMOXICILLIN AND CLAVULANATE POTASSIUM 1 TABLET: 500; 125 TABLET, FILM COATED ORAL at 14:19

## 2017-03-27 RX ADMIN — ASPIRIN 81 MG: 81 TABLET, COATED ORAL at 11:02

## 2017-03-27 RX ADMIN — LORAZEPAM 0.5 MG: 0.5 TABLET ORAL at 07:10

## 2017-03-27 RX ADMIN — QUETIAPINE FUMARATE 25 MG: 25 TABLET, FILM COATED ORAL at 21:32

## 2017-03-27 RX ADMIN — NORTRIPTYLINE HYDROCHLORIDE 50 MG: 50 CAPSULE ORAL at 11:01

## 2017-03-27 RX ADMIN — GLIPIZIDE 5 MG: 5 TABLET ORAL at 15:37

## 2017-03-27 RX ADMIN — AMITRIPTYLINE HYDROCHLORIDE 100 MG: 50 TABLET, FILM COATED ORAL at 19:24

## 2017-03-27 RX ADMIN — CARVEDILOL 25 MG: 25 TABLET, FILM COATED ORAL at 11:04

## 2017-03-27 RX ADMIN — METFORMIN HYDROCHLORIDE 500 MG: 500 TABLET ORAL at 11:03

## 2017-03-27 NOTE — PLAN OF CARE
Problem: Goal Outcome Summary  Goal: Goal Outcome Summary  Outcome: No Change  Dx: Generalized weakness, influenza B+, UTI     VS: See VS flowsheet      Pain: Constant chronic HA-tolerable per pt  Neuro: Oriented x4 this shift.   Cardiac: No telemetry ordered.   Resp: RA  GI/: Voiding-incontinent at times, multiple BM's today.   Diet: Speech treatment today-diet changed from DD1 to DD2 with thin liquids.   Labs: , 132  Skin: WNL  Activity: Up with assist x2, gait belt and walker.   IV: No PIV in place.      PLAN: SW following for dc planning. Continue POC. Safety.

## 2017-03-27 NOTE — PROGRESS NOTES
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 Found to have UTI and influenza B.       1. Influenza B and bronchitis-   --Possible superimposed bacterial infection - Likely causing cough and generalized weakness. Continue Tamiflu, treated for total of 5 days.   --Levaquin started which could also cover UTI, and changed to Augmentin to cover both infection. CXR done no infiltrate seen.     2. Elevated troponin -   --Likely due to demand ischemia from dehydration and acute illness. Has had no chest pain. Defer further work up for now. Has hx of CAD with previous stents, he did have other areas of disease that were managed medically.     4. UTI - UA grossly abnormal, positive nitrites, UC grew E.coli, pansensitive. Stopped Rocephin 1 gm IV daily (Started on 03/20). Noted he was on Levaquin for two days, Augmentin started which could cover the infection.   - may stop abx      5. Hyperglycemia - Borderline DM in past, and not on meds but HgbA1c is now 7.1, no change from 2 year ago. Glucose 167 on arrival. On ISS.   Glipizide 5 mg BID started.  -- may  Not need  metformin        6. Generalized weakness- Likely due to UTI and influenza. Reports his legs feel weak- no focal neurologic findings. PT/OT DAILY.     7. HTN -  home meds with parameters     8. HLP - On statin     9. Hx of TBI - Memory difficulties, R sided deficits      10. Hx of CVA - Mild right sided weakness and speech is somewhat slow.     11. Encephalopathy-   Patient has on and off confusion, decreased cognitive function, restlessness at times in context of only  speaking New Zealander. He was conversing and answering questions appropriately today when  was in the room. This is likely metabolic vs infectious in the setting of possible underlying cognitive dysfunction. Patient has significant encephalomalacia and small vessel disease.   - CT head repeated and no significant change  - Seroquel 25 mg at bedtime.  -- agitation at times   -- will schedule Seroquel  Bid     12.Dysphagia     - Speech and swallow evaluation done and recommended  diet to dysphagia diet level 2 w/ thin liquids. Pt to take small bites/sips, alternate solids and liquids be fully alert and upright for all PO.       DVT Prophylaxis: Pneumatic Compression Devices  Code Status: Full Code  Discharge Dispo: TCU  If he remains sitter free for 48h         Interval History (Subjective):      Patient is seen and examined by me today and medical record reviewed.Overnight events noted and care discussed with nursing staff.  Agitation last night   This morning calm and comfortable                        Physical Exam:      Vital Signs:  Temp: 98.6  F (37  C) Temp src: Oral BP: 154/82   Heart Rate: 93 Resp: 18 SpO2: 95 % O2 Device: None (Room air) Oxygen Delivery: 2 LPM  Vitals:    03/21/17 0548 03/22/17 0701 03/24/17 0604   Weight: 102.1 kg (225 lb) 102.6 kg (226 lb 1.6 oz) 98.5 kg (217 lb 3.2 oz)     Vital Signs with Ranges  Temp:  [97.1  F (36.2  C)-98.7  F (37.1  C)] 98.6  F (37  C)  Heart Rate:  [76-93] 93  Resp:  [16-18] 18  BP: (112-154)/(56-82) 154/82  SpO2:  [91 %-95 %] 95 %  I/O last 3 completed shifts:  In: 240 [P.O.:240]  Out: -     GENERAL: Awake, calm, NAD  CVS: regular rate and rhythm, no murmurs or rubs  CHEST: scattered crackles bilaterally, normal respiratory effort  HEENT: Pupil round, equal, conjuctivae, sclerae and lids normal, neck is supple  ABD: Soft, nontender, nondistended, no masses, normal bowel sounds  EXT: No edema  NEUR: normal strength and sensation in all 4 extremities, cranial  nerves grossly intact  PSY: Mood and affect within normal limits         Medications:      All current medications were reviewed with changes reflected in problem list.         Data:      All new lab, EKGs, telemetry strips, and imaging data was personally reviewed.   Labs:    Recent Labs  Lab 03/25/17  1344 03/24/17  0958     --    POTASSIUM 4.2  --    CHLORIDE 107  --    CO2 23  --    ANIONGAP 9  --    GLC 98  --    BUN 15  --    CR 0.94 0.88   GFRESTIMATED 77 82   GFRESTBLACK >90African American GFR Calc >90African American GFR Calc   LUZ 8.5  --      Imaging (past 24 hours):   No results found for this or any previous visit (from the past 24 hour(s)).

## 2017-03-27 NOTE — PLAN OF CARE
Problem: Goal Outcome Summary  Goal: Goal Outcome Summary  Code 21 called on pt after pt set of bed alarm. Staff responded, attempted to assist pt to bathroom to change brief. Pt became agitated, squeezing staff's wrist, trying to sit on floor, and biting at staff.  phone was attempted but pt pushed phone away. When security arrived, able to get patient to bathroom and back to bed. Pt has continued to be restless, getting out of bed and setting off bed alarm. MD paged about code 21- IM Haldol ordered but not given.    Alert. Vietnamese speaking, refusing  phone. Assist x2, walker and gait belt. Very unsteady, has been trying to sit on floor. Lung sounds coarse. Dysphagia pureed diet. Incont of bowel and bladder this shift. No IV access. PRN ativan given for restlessness. Droplet iso.

## 2017-03-27 NOTE — PLAN OF CARE
Problem: Goal Outcome Summary  Goal: Goal Outcome Summary  Outcome: Therapy, progress toward functional goals as expected  SLP: Pt seen to f/u for swallowing with  and family present.  Pt tolerating current diet well w/o outward s/sx of aspiration.  Pt seen w/ trial of adv diet textures. Pt note to take extra time for mastication due to teeth pain but it was adequate. Pt requesting his diet be advanced if safe to do so.  No outward s/sx of aspiration with soft solids or with thin liquids.  Recommend advancing Pt's diet to dysphagia diet level 2 w/ thin liquids. Pt to take small bites/sips, alternate solids and liquids be fully alert and upright for all PO.  ST to f/u for diet tolerance.  Teeth pain w/ solids is primary barrier with progress.

## 2017-03-27 NOTE — PLAN OF CARE
Problem: Goal Outcome Summary  Goal: Goal Outcome Summary  PT - Pt in bed upon arrival. Pt transfers supine to sit with CGA & CGA with sit to/from stand with vcs for hand placement. Vcs for pt to back all the way up to the chair prior to sitting. Pt transfers bed to bathroom to chair with ww with min assist of 1 and SBA of 1. Pt required assist with doffing and donning attends along with hygiene.  Pt amb 50' with ww with min assist of 1 and SBA of 1 with vcs for pt to stand erect. Pt reported during the walk that he needed to use the bathroom. Continue to recommend TCU.

## 2017-03-27 NOTE — PLAN OF CARE
Problem: Individualization  Goal: Patient Preferences  Outcome: No Change  Confused. Family at bedside, updated. Family notified of pt fall on 3-25-17.   VSS afebrile.  HFR, unsteady gait. Pt does not use call light to make needs known. BA activated. Frequent attempts to GOB. Restless.  Seroquel held, caused increased agitation on 2-25-17.  Amitriptyline 100mg given for sleep, pt less agitated. Incontinent of stool, urine.   BS 92, Metformin held.   LS clear, diminished with crackles.  Tylenol given for HA.  Increased agitation at 2300, pt threatening staff, attempting to hit staff.

## 2017-03-28 ENCOUNTER — APPOINTMENT (OUTPATIENT)
Dept: SPEECH THERAPY | Facility: CLINIC | Age: 82
DRG: 193 | End: 2017-03-28
Payer: COMMERCIAL

## 2017-03-28 PROBLEM — G93.41 ACUTE METABOLIC ENCEPHALOPATHY: Status: ACTIVE | Noted: 2017-03-28

## 2017-03-28 PROBLEM — R53.81 PHYSICAL DECONDITIONING: Status: ACTIVE | Noted: 2017-03-28

## 2017-03-28 LAB
GLUCOSE BLDC GLUCOMTR-MCNC: 104 MG/DL (ref 70–99)
GLUCOSE BLDC GLUCOMTR-MCNC: 113 MG/DL (ref 70–99)
GLUCOSE BLDC GLUCOMTR-MCNC: 117 MG/DL (ref 70–99)
GLUCOSE BLDC GLUCOMTR-MCNC: 136 MG/DL (ref 70–99)

## 2017-03-28 PROCEDURE — 25000132 ZZH RX MED GY IP 250 OP 250 PS 637: Performed by: INTERNAL MEDICINE

## 2017-03-28 PROCEDURE — 40000225 ZZH STATISTIC SLP WARD VISIT: Performed by: SPEECH-LANGUAGE PATHOLOGIST

## 2017-03-28 PROCEDURE — 00000146 ZZHCL STATISTIC GLUCOSE BY METER IP

## 2017-03-28 PROCEDURE — 99232 SBSQ HOSP IP/OBS MODERATE 35: CPT | Performed by: INTERNAL MEDICINE

## 2017-03-28 PROCEDURE — 25000132 ZZH RX MED GY IP 250 OP 250 PS 637: Performed by: HOSPITALIST

## 2017-03-28 PROCEDURE — 92526 ORAL FUNCTION THERAPY: CPT | Mod: GN | Performed by: SPEECH-LANGUAGE PATHOLOGIST

## 2017-03-28 PROCEDURE — 12000000 ZZH R&B MED SURG/OB

## 2017-03-28 PROCEDURE — 25000132 ZZH RX MED GY IP 250 OP 250 PS 637: Performed by: PHYSICIAN ASSISTANT

## 2017-03-28 RX ORDER — ACETAMINOPHEN 325 MG/1
650 TABLET ORAL EVERY 4 HOURS PRN
Qty: 100 TABLET | Status: ON HOLD | COMMUNITY
Start: 2017-03-28 | End: 2021-09-24

## 2017-03-28 RX ORDER — QUETIAPINE FUMARATE 25 MG/1
6.25 TABLET, FILM COATED ORAL 2 TIMES DAILY
DISCHARGE
Start: 2017-03-28 | End: 2017-03-31

## 2017-03-28 RX ORDER — QUETIAPINE FUMARATE 25 MG/1
25 TABLET, FILM COATED ORAL AT BEDTIME
Qty: 60 TABLET | DISCHARGE
Start: 2017-03-28 | End: 2017-03-31

## 2017-03-28 RX ORDER — CARVEDILOL 25 MG/1
25 TABLET ORAL 2 TIMES DAILY
Qty: 60 TABLET | Status: ON HOLD | DISCHARGE
Start: 2017-03-28 | End: 2018-05-19

## 2017-03-28 RX ADMIN — NORTRIPTYLINE HYDROCHLORIDE 50 MG: 50 CAPSULE ORAL at 10:56

## 2017-03-28 RX ADMIN — ATORVASTATIN CALCIUM 40 MG: 40 TABLET, FILM COATED ORAL at 11:06

## 2017-03-28 RX ADMIN — CLOPIDOGREL 75 MG: 75 TABLET, FILM COATED ORAL at 10:55

## 2017-03-28 RX ADMIN — QUETIAPINE FUMARATE 25 MG: 25 TABLET, FILM COATED ORAL at 19:38

## 2017-03-28 RX ADMIN — ASPIRIN 81 MG: 81 TABLET, COATED ORAL at 10:56

## 2017-03-28 RX ADMIN — CARVEDILOL 25 MG: 25 TABLET, FILM COATED ORAL at 19:37

## 2017-03-28 RX ADMIN — ACETAMINOPHEN 650 MG: 325 TABLET, FILM COATED ORAL at 17:53

## 2017-03-28 RX ADMIN — Medication 6.25 MG: at 11:08

## 2017-03-28 RX ADMIN — LOSARTAN POTASSIUM 50 MG: 25 TABLET, FILM COATED ORAL at 10:55

## 2017-03-28 RX ADMIN — GLIPIZIDE 5 MG: 5 TABLET ORAL at 16:29

## 2017-03-28 RX ADMIN — Medication 6.25 MG: at 19:36

## 2017-03-28 RX ADMIN — CARVEDILOL 25 MG: 25 TABLET, FILM COATED ORAL at 10:56

## 2017-03-28 RX ADMIN — GLIPIZIDE 5 MG: 5 TABLET ORAL at 10:56

## 2017-03-28 RX ADMIN — AMITRIPTYLINE HYDROCHLORIDE 100 MG: 50 TABLET, FILM COATED ORAL at 22:51

## 2017-03-28 NOTE — DISCHARGE SUMMARY
Physician Discharge Summary     Name: Adam Huber    MRN: [id number]     YOB: 1935    Age: 81 year old                                                 Primary care provider: Oralia Forrest      Admit date:  3/18/2017      Discharge date and time: 03/29/2017       Discharge Physician:  Elias Boswell        Discharge Diagnosis:       #1.  Influenza B infection    #2.  Acute bacterial bronchitis    #3.  Generalized physical deconditioning    #4.  Mild elevated troponin due to demand ischemia    #5.  Hyperglycemia: Suspect type 2 diabetes    #6.  Acute encephalopathy: Acute delirium likely toxic metabolic and infectious in the setting of baseline cognitive dysfunction    #7.  Dysphagia    #8.  Urinary tract infection    #9.  Mild acute renal failure improved      Past Medical History and comorbid conditions:     Past Medical History:   Diagnosis Date     Acute chest pain 10/23/2015     CAD (coronary artery disease)      Essential hypertension 10/23/2015     History of CVA (cerebrovascular accident) 10/23/2015     HLD (hyperlipidemia)      HTN (hypertension)      NSTEMI (non-ST elevated myocardial infarction) (H) 10/23/2015     Post PTCA 10-    Successful PCI of culprit proximal to mid RCA with placement of a     Post PTCA 11-6-2015    pci of complex mid CFX-hector       Past Surgical History:  Past Surgical History:   Procedure Laterality Date     CHOLECYSTECTOMY       GENITOURINARY SURGERY      prostate removal      HEART CATH LEFT HEART CATH  10/12/2015    PCI w/ HECTOR to RCA     HEART CATH RIGHT AND LEFT HEART CATH  11/6/2015    PCI w/ HECTOR to mid-CFX                   Brief Summary of Hospital stay :       Please refer to  Admission H&P note for full details of patient presentation.    Admission Condition: poor    Discharged Condition: stable    /75 (BP Location: Left arm)  Pulse 69  Temp 97.3  F (36.3  C) (Oral)  Resp 16  Ht 1.829 m (6')  Wt 90.7 kg (200 lb)  SpO2 91%  BMI 27.12  kg/m2       Presenting problem/signs and symptoms:    Generalized weakness    Brief Hospital Summary:    Patient is 81-year-old male with significant past medical history including coronary artery disease, non-ST segment elevation MI in 2015 status post two stents, hypertension, dementia, benign prostatic hypertrophy, depression, history of CVA, traumatic brain injury in 1987 who presented with generalized weakness, cough and urinary incontinence.  Patient was admitted to [facility] and closely monitored.  Patient was treated for influenza B infection, urinary tract infection and acute bronchitis with antibiotic and completed treatment.  Patient was initially dehydrated with acute kidney injury which has resolved.  Patient had no evidence of superimposed pneumonia but apparently had bacterial superimposed infection with acute bronchitis.  Elevated troponin was three and there was no evidence of acute coronary syndrome.  Patient's pain problem stay was that patient was agitated and delirious.  This was likely secondary to acute encephalopathy in the setting of acute infection.  His condition improved with close monitoring, Seroquel and improved a day before discharge.  Patient continues to have generalized physical deconditioning requiring rehabilitation placement for rehabilitation.  Patient had hyperglycemia and concern for possible underlying diabetes but as patient was not eating here at risk of getting hypoglycemia and metformin and glipizide which was initially started with discontinued on discharge.  Patient blood sugar needs to be closely monitored and patient may benefit from low-dose oral diabetic medication.            Consultations during hospital stay       Physical therapy, question of therapy, social service        Major procedure performed/  Significant Diagnostic Studies              Results for orders placed or performed during the hospital encounter of 03/18/17   XR Chest 2 Views     Narrative    XR CHEST 2 VW   3/18/2017 5:41 PM     HISTORY: cough SOB    COMPARISON: Film dated 1/4/2017    FINDINGS: The heart is negative.  Mild linear fibrotic changes again  seen at the left base. No new focal alveolar-type infiltrates. The  pulmonary vasculature is normal.  The bones and soft tissues are  unremarkable.      Impression    IMPRESSION: No acute infiltrates identified. No significant change.         BULMARO SAMS MD   XR Chest Port 1 View    Narrative    CHEST ONE VIEW PORTABLE   3/23/2017 2:33 PM     HISTORY: Bronchitis. Rule out pneumonia.    COMPARISON: 3/18/2017.      Impression    IMPRESSION: No acute cardiopulmonary disease.    TOBIAS JACKSON MD   CT Head w/o Contrast    Narrative    CT SCAN OF THE HEAD WITHOUT CONTRAST March 25, 2017 1:35 PM     HISTORY: Confusion, loss of balance history of brain in jury in the  past.    TECHNIQUE: 4 mm thick axial images of the head without IV contrast  material. Radiation dose for this scan was reduced using automated  exposure  control, adjustment of the mA and/or kV according to patient size, or  iterative reconstruction technique.    COMPARISON: CT/PET dated 10/11/2015.    FINDINGS: Encephalomalacia in the bilateral medial anterior frontal  lobes and in the anterior right temporal lobe are stable in appearance  since the prior study dated 10/11/2015. Focal lacunar infarct is seen  in the right basal ganglia, similar to the prior study. Probable focal  lacunar infarct in the left centrum semiovale is stable in appearance.  No new areas of abnormal decreased attenuation to suggest acute  infarct are seen. No acute intracranial hemorrhage, mass effect, or  mass is identified. There is mild ex vacuo dilatation of the anterior  horns of the bilateral lateral ventricles. There is generalized  atrophy of the brain. Areas of low attenuation are present in the  white matter of the cerebral hemispheres that are consistent with  small vessel ischemic disease in this  age patient.    Probable mucus retention cyst is seen in the medial right maxillary  sinus. This is only seen on one image. Otherwise, the visualized  portions of the orbits, paranasal sinuses, mastoid air spaces, and  calvarium are grossly unremarkable.      Impression    IMPRESSION:  1. Age-related atrophy and small vessel white matter ischemic changes.  2. There are areas of encephalization in the bilateral frontal lobes  and in the right temporal lobe are similar to the prior study and  consistent with chronic infarct or traumatic injury.  3. Probable lacunar infarcts in the right basal ganglia and possibly  in the left centrum semiovale are again noted.  4. No acute infarct, mass, or intracranial hemorrhage is identified.  If there is clinical concern for acute extension of chronic infarcts,  further evaluation with MRI maybe helpful.    HOWARD REMY MD       No results for input(s): WBC, HGB, HCT, MCV, PLT in the last 168 hours.  No results for input(s): CULT in the last 168 hours.    Recent Labs  Lab 03/25/17  1344 03/24/17  0958     --    POTASSIUM 4.2  --    CHLORIDE 107  --    CO2 23  --    ANIONGAP 9  --    GLC 98  --    BUN 15  --    CR 0.94 0.88   GFRESTIMATED 77 82   GFRESTBLACK >90African American GFR Calc >90African American GFR Calc   LUZ 8.5  --          Recent Labs  Lab 03/28/17  1224 03/28/17  0209 03/27/17  2210 03/27/17  1728 03/27/17  1340  03/25/17  1344   GLC  --   --   --   --   --   --  98   * 113* 85 114* 132*  < >  --    < > = values in this interval not displayed.        No results for input(s): INR in the last 168 hours.        No results for input(s): TROPONIN, TROPI, TROPR in the last 168 hours.    Invalid input(s): TROP, TROPONINIES              Pending Results           Unresulted Labs Ordered in the Past 30 Days of this Admission     No orders found from 1/17/2017 to 3/19/2017.              Disposition         SNF      Allergies       Allergies   Allergen Reactions      Lisinopril             Patient Instructions and Discharge Medications              Review of your medicines      START taking       Dose / Directions    acetaminophen 325 MG tablet   Commonly known as:  TYLENOL   Used for:  History of CVA (cerebrovascular accident)        Dose:  650 mg   Take 2 tablets (650 mg) by mouth every 4 hours as needed for mild pain   Quantity:  100 tablet   Refills:  0       melatonin 1 MG Tabs tablet        Dose:  1 mg   Take 1 tablet (1 mg) by mouth nightly as needed   Quantity:  30 tablet   Refills:  0       * QUEtiapine 25 MG tablet   Commonly known as:  SEROquel        Dose:  6.25 mg   Take 0.25 tablets (6.25 mg) by mouth 2 times daily   Refills:  0       * QUEtiapine 25 MG tablet   Commonly known as:  SEROquel        Dose:  25 mg   Take 1 tablet (25 mg) by mouth At Bedtime   Quantity:  60 tablet   Refills:  0       * Notice:  This list has 2 medication(s) that are the same as other medications prescribed for you. Read the directions carefully, and ask your doctor or other care provider to review them with you.      CONTINUE these medicines which may have CHANGED, or have new prescriptions. If we are uncertain of the size of tablets/capsules you have at home, strength may be listed as something that might have changed.       Dose / Directions    carvedilol 25 MG tablet   Commonly known as:  COREG   This may have changed:    - medication strength  - Another medication with the same name was removed. Continue taking this medication, and follow the directions you see here.   Used for:  NSTEMI (non-ST elevated myocardial infarction) (H)        Dose:  25 mg   Take 1 tablet (25 mg) by mouth 2 times daily   Quantity:  60 tablet   Refills:  0         CONTINUE these medicines which have NOT CHANGED       Dose / Directions    amitriptyline 50 MG tablet   Commonly known as:  ELAVIL        Dose:   mg   Take  mg by mouth At Bedtime Per Family, usually takes 1 or 2 tabs every night.   Rarely 3 tabs - if 3 tabs are used pt is too drowsy following morning.   Refills:  0       aspirin 81 MG EC tablet   Used for:  NSTEMI (non-ST elevated myocardial infarction) (H)        Take one tablet daily   Quantity:  90 tablet   Refills:  3       atorvastatin 40 MG tablet   Commonly known as:  LIPITOR   Used for:  NSTEMI (non-ST elevated myocardial infarction) (H)        Dose:  40 mg   Take 1 tablet (40 mg) by mouth daily   Quantity:  90 tablet   Refills:  1       clopidogrel 75 MG tablet   Commonly known as:  PLAVIX   Used for:  NSTEMI (non-ST elevated myocardial infarction) (H)        Dose:  75 mg   Take 1 tablet (75 mg) by mouth daily   Quantity:  90 tablet   Refills:  1       COZAAR PO        Dose:  50 mg   Take 50 mg by mouth daily   Refills:  0       DOC-Q-LACE PO        Dose:  100 mg   Take 100 mg by mouth 2 times daily   Refills:  0       nitroglycerin 0.4 MG sublingual tablet   Commonly known as:  NITROSTAT   Used for:  NSTEMI (non-ST elevated myocardial infarction) (H), Essential hypertension, Postsurgical percutaneous transluminal coronary angioplasty status        Dose:  0.4 mg   Place 1 tablet (0.4 mg) under the tongue every 5 minutes as needed for chest pain   Quantity:  25 tablet   Refills:  1       NORTRIPTYLINE HCL PO        Dose:  50 mg   Take 50 mg by mouth daily   Refills:  0       polyethylene glycol powder   Commonly known as:  MIRALAX/GLYCOLAX        Dose:  1 capful   Take 1 capful by mouth daily as needed   Refills:  0       TRAMADOL HCL PO        Dose:  100 mg   Take 100 mg by mouth 2 times daily as needed   Refills:  0            Where to get your medicines      Some of these will need a paper prescription and others can be bought over the counter. Ask your nurse if you have questions.     You don't need a prescription for these medications      acetaminophen 325 MG tablet     carvedilol 25 MG tablet     melatonin 1 MG Tabs tablet     QUEtiapine 25 MG tablet     QUEtiapine 25 MG tablet               Discharge diet:  Active Diet Order      Combination Diet Dysphagia Diet Level 2: Mechan Altered; Thin Liquids (water, ice chips, juice, milk gelatin, ice cream, etc); Low Saturated Fat Na <2400mg Diet, No Caffeine Diet      Advance Diet as Tolerated        Discharge activity:Activity as tolerated        Discharge follow-up:    Follow up with primary care provider in 14 days or earlier if symptoms return or gets worse.    Follow up with consultant as instructed     Other instructions:    We discussed with Patient/family about detail discharge instructions as well as discharge medications above including potential risks,side effects and benefits.Patient/family understood benefits and potential serious side effects of taking these medications and need to follow up with PCP if the patient develops complications.  Patient is also advised to see a doctor immediately for severe symptoms.        I saw and evaluated the patient today and I also reviewed the discharge instructions and answered all the patient questions.Over 30 minutes spend on discharge and coordination of discharge process for this patient.          Disclaimer: This note consists of symbols derived from keyboarding, dictation and/or voice recognition software. As a result, there may be errors in the script that have gone undetected. Please consider this when interpreting information found in this martín

## 2017-03-28 NOTE — PLAN OF CARE
Problem: Goal Outcome Summary  Goal: Goal Outcome Summary  Outcome: Therapy, progress toward functional goals as expected  SLP: Pt seen to f/u for swallowing while eating breakfast. Pt noted to continue to have tooth pain when eating and drink but pt indicated was tolerable w/ use of .  Pt tolerated solids well w/o outward s/sx of aspiration. Pt noted to have difficulty with thin liquids when drinking from a carton with head extended. Pt indicated he likes to drink w/ head extended as it helps reduced incidence of pain in his teeth. Pt given education re: keeping head in neutral position to avoid aspiration risks. Recommend continue with current diet of dysphagia diet level 2 with thin liquids, pt to take small bites/sips, keep head in neutral or tuck position, no straws.  This is likely pt's baseline diet. ST will plan to f/u for diet tolerance over the next few days.

## 2017-03-28 NOTE — PLAN OF CARE
Problem: Goal Outcome Summary  Goal: Goal Outcome Summary     PT: Attempted to see patient at set time, however no  present; limited ability to use  phone for ambulation in hallway.  Will reschedule for tomorrow when  can be present for improved patient participation.

## 2017-03-28 NOTE — PROGRESS NOTES
Patient was seen and examined by me today.  He is Bahamian only speaking and  was used to communicate with him.  He appears to be comfortable, no new complaints.  Patient is weak and has been following therapy.  Afebrile, hemodynamically stable and was stable for discharge to TCU when bed is available

## 2017-03-29 ENCOUNTER — APPOINTMENT (OUTPATIENT)
Dept: PHYSICAL THERAPY | Facility: CLINIC | Age: 82
DRG: 193 | End: 2017-03-29
Payer: COMMERCIAL

## 2017-03-29 LAB
GLUCOSE BLDC GLUCOMTR-MCNC: 118 MG/DL (ref 70–99)
GLUCOSE BLDC GLUCOMTR-MCNC: 144 MG/DL (ref 70–99)
GLUCOSE BLDC GLUCOMTR-MCNC: 155 MG/DL (ref 70–99)
GLUCOSE BLDC GLUCOMTR-MCNC: 230 MG/DL (ref 70–99)
GLUCOSE BLDC GLUCOMTR-MCNC: 65 MG/DL (ref 70–99)
GLUCOSE BLDC GLUCOMTR-MCNC: 75 MG/DL (ref 70–99)
GLUCOSE BLDC GLUCOMTR-MCNC: 77 MG/DL (ref 70–99)

## 2017-03-29 PROCEDURE — 97110 THERAPEUTIC EXERCISES: CPT | Mod: GP

## 2017-03-29 PROCEDURE — 25000132 ZZH RX MED GY IP 250 OP 250 PS 637: Performed by: INTERNAL MEDICINE

## 2017-03-29 PROCEDURE — 97530 THERAPEUTIC ACTIVITIES: CPT | Mod: GP

## 2017-03-29 PROCEDURE — 00000146 ZZHCL STATISTIC GLUCOSE BY METER IP

## 2017-03-29 PROCEDURE — 25000132 ZZH RX MED GY IP 250 OP 250 PS 637: Performed by: PHYSICIAN ASSISTANT

## 2017-03-29 PROCEDURE — 25000132 ZZH RX MED GY IP 250 OP 250 PS 637: Performed by: HOSPITALIST

## 2017-03-29 PROCEDURE — 12000000 ZZH R&B MED SURG/OB

## 2017-03-29 PROCEDURE — 40000193 ZZH STATISTIC PT WARD VISIT

## 2017-03-29 PROCEDURE — 99231 SBSQ HOSP IP/OBS SF/LOW 25: CPT | Performed by: INTERNAL MEDICINE

## 2017-03-29 PROCEDURE — 97116 GAIT TRAINING THERAPY: CPT | Mod: GP

## 2017-03-29 RX ORDER — GLIPIZIDE 5 MG/1
5 TABLET ORAL
Status: DISCONTINUED | OUTPATIENT
Start: 2017-03-30 | End: 2017-03-31 | Stop reason: HOSPADM

## 2017-03-29 RX ADMIN — AMITRIPTYLINE HYDROCHLORIDE 100 MG: 50 TABLET, FILM COATED ORAL at 22:35

## 2017-03-29 RX ADMIN — LOSARTAN POTASSIUM 50 MG: 25 TABLET, FILM COATED ORAL at 08:46

## 2017-03-29 RX ADMIN — GLIPIZIDE 5 MG: 5 TABLET ORAL at 08:46

## 2017-03-29 RX ADMIN — ACETAMINOPHEN 650 MG: 325 TABLET, FILM COATED ORAL at 10:52

## 2017-03-29 RX ADMIN — CARVEDILOL 25 MG: 25 TABLET, FILM COATED ORAL at 08:46

## 2017-03-29 RX ADMIN — ATORVASTATIN CALCIUM 40 MG: 40 TABLET, FILM COATED ORAL at 08:46

## 2017-03-29 RX ADMIN — CARVEDILOL 25 MG: 25 TABLET, FILM COATED ORAL at 20:31

## 2017-03-29 RX ADMIN — Medication 6.25 MG: at 08:46

## 2017-03-29 RX ADMIN — NORTRIPTYLINE HYDROCHLORIDE 50 MG: 50 CAPSULE ORAL at 08:46

## 2017-03-29 RX ADMIN — ACETAMINOPHEN 650 MG: 325 TABLET, FILM COATED ORAL at 20:40

## 2017-03-29 RX ADMIN — Medication 6.25 MG: at 20:31

## 2017-03-29 RX ADMIN — LORAZEPAM 0.5 MG: 0.5 TABLET ORAL at 17:06

## 2017-03-29 RX ADMIN — ASPIRIN 81 MG: 81 TABLET, COATED ORAL at 08:46

## 2017-03-29 RX ADMIN — CLOPIDOGREL 75 MG: 75 TABLET, FILM COATED ORAL at 08:46

## 2017-03-29 RX ADMIN — QUETIAPINE FUMARATE 25 MG: 25 TABLET, FILM COATED ORAL at 20:32

## 2017-03-29 NOTE — PROGRESS NOTES
[facility]  Hospitalist Progress  Eveline Nix MD   03/29/2017    Reason for Stay (Diagnosis): Influenza and acute delirium            Assessment and Plan:      Adam Huber is a 81 year old male with a PMH significant for CAD, NSTEMI 2015 with stent x2 in staged procedure, HTN, HLP, BPH, depression hx 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 Found to have UTI and influenza B.       1. Influenza B and suspicion for bronchitis with possible superimposed bacterial infection -  -- Completed course of Oseltamivir and also Augmentin.   -- Has mild cough which likely persist for some time, no hypoxia, deconditioned     2. Trivial troponin elevation.   --Likely due to demand ischemia from dehydration and acute illness in a patient with known CAD. Has had no chest pain or clinical signs of angina. Defer further work up for now      4. UTI - UA grossly abnormal, positive nitrites, UC grew E.coli, pansensitive. Received, Rocephin and Levaquin initially, completed course of Augmentin      5. Hyperglycemia - Borderline DM in past, and not on meds but HgbA1c is now 7.1,  Glipizide 5 mg BID started.  -- Decreased glipizide to 5 mg daily to avoid hypoglycemia in this elderly frail gentleman      6. Generalized weakness- Likely due to UTI and influenza. Reports his legs feel weak- PT/OT DAILY.     7. HTN -  home meds with parameters     8. HLP - On statin     9. Hx of TBI and CVA - Memory difficulties, R sided deficits      10. Delirium on top of underlying dementia - improving now  -- started on Seroquel HS and BID, continue, awake and alert during today's conversation     11.Dysphagia , Speech and swallow evaluation done and recommended  diet to dysphagia diet level 2 w/ thin liquids. Pt to take small bites/sips, alternate solids and liquids be fully alert and upright for all PO.       DVT Prophylaxis: Pneumatic Compression  Devices  Code Status: Full Code  Discharge Dispo: TCU pending bed availability         Interval History (Subjective):      Patient is seen and examined and medical record reviewed .Overnight events noted and care discussed with nursing staff.  Patient seen with professional russian . Family present at bedside and their questions were answered.   Pt denies any complaints, strength is coming back, no cp, sob, mild cough.                        Physical Exam:      Vital Signs:  Temp: 96.5  F (35.8  C) Temp src: Axillary BP: 118/60 Pulse: 79 Heart Rate: 76 Resp: 16 SpO2: 95 % O2 Device: None (Room air)    Vitals:    03/24/17 0604 03/28/17 0441 03/29/17 0700   Weight: 98.5 kg (217 lb 3.2 oz) 90.7 kg (200 lb) 91.6 kg (202 lb)     Vital Signs with Ranges  Temp:  [95.2  F (35.1  C)-98.5  F (36.9  C)] 96.5  F (35.8  C)  Pulse:  [79] 79  Heart Rate:  [74-82] 76  Resp:  [16] 16  BP: (118-167)/(60-94) 118/60  SpO2:  [92 %-96 %] 95 %  I/O last 3 completed shifts:  In: 1200 [P.O.:1200]  Out: -     GENERAL: Awake, calm, NAD  CVS: regular rate and rhythm, no loud murmurs or rubs  CHEST: some coarse sounds at lung base, mostly clear, normal respiratory effort  HEENT: neck is supple  ABD: Soft, nontender, nondistended  EXT: No pitting edema  NEUR: diffuse weakness         Medications:      All current medications were reviewed with changes reflected in problem list.         Data:      All new lab, EKGs, telemetry strips, and imaging data was personally reviewed.   Labs:    Recent Labs  Lab 03/25/17  1344 03/24/17  0958     --    POTASSIUM 4.2  --    CHLORIDE 107  --    CO2 23  --    ANIONGAP 9  --    GLC 98  --    BUN 15  --    CR 0.94 0.88   GFRESTIMATED 77 82   GFRESTBLACK >90African American GFR Calc >90African American GFR Calc   LUZ 8.5  --      Imaging (past 24 hours):   No results found for this or any previous visit (from the past 24 hour(s)).

## 2017-03-29 NOTE — PROGRESS NOTES
SWS:  D: discharge planning  A: [facility] notified that [facility] is no longer able to accept at this time. [facility] may have a bed on Friday but cannot guarantee bed at this time. CHATA met with pt wife and Lebanese . Wife agreeable to SW sending TCU referrals to [facility], [facility] and [facility].  Wife requests a private room and states that family will pay the private room fee.    P: Referrals sent. SW following.

## 2017-03-29 NOTE — PLAN OF CARE
Problem: Goal Outcome Summary  Goal: Goal Outcome Summary     PT: Patient seen by physical therapy.  Patient sitting at bedside chair upon arrival.  Patient participated in seated LE exercises; amb 250 feet with 2WW with CGA and verbal cueing for posture; required 2 seated rest breaks.  Rest breaks requested by patient secondary to increased head ache with dizziness.  Patient required significant verbal and tactile cueing (hand over hand) for correct technique for safe transfer to chair; patient attempting to push walker out of the way and side sitting on edge of chair, improved with repeated transfers.  Continue to recommend DC to TCU for strengthening, to increase activity tolerance and to improve safety with transfers.

## 2017-03-29 NOTE — PLAN OF CARE
Problem: Goal Outcome Summary  Goal: Goal Outcome Summary  Outcome: Improving  VSS, up with A-2 with walker and gait belt, Slovak speaking, phone  used at assessment, prn Tylenol for Headache with relief, falls precaution in place r/t trying to get up intermittently setting alarm tabs off, on scheduled Seroquel, plan to discharge to TCU when bed available, will continue with POC.

## 2017-03-29 NOTE — PLAN OF CARE
Alert, VSS, up with Assist of 2, walker and gait belt for toiletting. Bed alarm for safety on gets out of bed on own setting of bed alarm despite call light within reach. Afghan speaking. Easily redirected.

## 2017-03-29 NOTE — PLAN OF CARE
Problem: Goal Outcome Summary  Goal: Goal Outcome Summary  Alert and disoriented to situation, A1 w/walker, LS dim, BS+, skin C/D/I, CMS intact, incontinent at times, PT/OT following. BG 65 and 155. Daily glipizide increased to BID before meals. Plan is D/C to TCU pending placement. POC reviewed with patient and family at bedside with assistance of , questions answered.

## 2017-03-30 ENCOUNTER — APPOINTMENT (OUTPATIENT)
Dept: SPEECH THERAPY | Facility: CLINIC | Age: 82
DRG: 193 | End: 2017-03-30
Payer: COMMERCIAL

## 2017-03-30 LAB
GLUCOSE BLDC GLUCOMTR-MCNC: 100 MG/DL (ref 70–99)
GLUCOSE BLDC GLUCOMTR-MCNC: 100 MG/DL (ref 70–99)
GLUCOSE BLDC GLUCOMTR-MCNC: 174 MG/DL (ref 70–99)
GLUCOSE BLDC GLUCOMTR-MCNC: 255 MG/DL (ref 70–99)
GLUCOSE BLDC GLUCOMTR-MCNC: 77 MG/DL (ref 70–99)

## 2017-03-30 PROCEDURE — 25000132 ZZH RX MED GY IP 250 OP 250 PS 637: Performed by: INTERNAL MEDICINE

## 2017-03-30 PROCEDURE — 40000225 ZZH STATISTIC SLP WARD VISIT

## 2017-03-30 PROCEDURE — 99231 SBSQ HOSP IP/OBS SF/LOW 25: CPT | Performed by: INTERNAL MEDICINE

## 2017-03-30 PROCEDURE — 25000132 ZZH RX MED GY IP 250 OP 250 PS 637: Performed by: PHYSICIAN ASSISTANT

## 2017-03-30 PROCEDURE — 12000000 ZZH R&B MED SURG/OB

## 2017-03-30 PROCEDURE — 92526 ORAL FUNCTION THERAPY: CPT | Mod: GN

## 2017-03-30 PROCEDURE — 00000146 ZZHCL STATISTIC GLUCOSE BY METER IP

## 2017-03-30 RX ADMIN — ATORVASTATIN CALCIUM 40 MG: 40 TABLET, FILM COATED ORAL at 08:35

## 2017-03-30 RX ADMIN — CARVEDILOL 25 MG: 25 TABLET, FILM COATED ORAL at 20:51

## 2017-03-30 RX ADMIN — ACETAMINOPHEN 650 MG: 325 TABLET, FILM COATED ORAL at 20:55

## 2017-03-30 RX ADMIN — CARVEDILOL 25 MG: 25 TABLET, FILM COATED ORAL at 08:35

## 2017-03-30 RX ADMIN — ASPIRIN 81 MG: 81 TABLET, COATED ORAL at 08:35

## 2017-03-30 RX ADMIN — Medication 6.25 MG: at 08:35

## 2017-03-30 RX ADMIN — QUETIAPINE FUMARATE 25 MG: 25 TABLET, FILM COATED ORAL at 20:51

## 2017-03-30 RX ADMIN — NORTRIPTYLINE HYDROCHLORIDE 50 MG: 50 CAPSULE ORAL at 08:35

## 2017-03-30 RX ADMIN — INSULIN ASPART 2 UNITS: 100 INJECTION, SOLUTION INTRAVENOUS; SUBCUTANEOUS at 22:23

## 2017-03-30 RX ADMIN — GLIPIZIDE 5 MG: 5 TABLET ORAL at 08:35

## 2017-03-30 RX ADMIN — MAGNESIUM HYDROXIDE 30 ML: 400 SUSPENSION ORAL at 10:23

## 2017-03-30 RX ADMIN — LOSARTAN POTASSIUM 50 MG: 25 TABLET, FILM COATED ORAL at 08:35

## 2017-03-30 RX ADMIN — CLOPIDOGREL 75 MG: 75 TABLET, FILM COATED ORAL at 08:35

## 2017-03-30 NOTE — PLAN OF CARE
Problem: Goal Outcome Summary  Goal: Goal Outcome Summary  Alert but disoriented to situation, up with A-1 with gait belt and walker, prn Ativan for restless, Tylenol for Headache, tolerating DD2 thin liquid diet, prn Amitriptyline @hs for sleep, paln to dishcarge to TCU pending bed availability.

## 2017-03-30 NOTE — PLAN OF CARE
Alert, VSS, up with Assist of 1, walker and gait belt for toiletting. Does not use call light. Bed alarm for safety on gets out of bed on own setting off alarm serveral times night.  Easily redirected.

## 2017-03-30 NOTE — PLAN OF CARE
Problem: Goal Outcome Summary  Goal: Goal Outcome Summary  PT - PT cx'd d/t pt not having Interpretor present.  Will see pt for PT on 3/31/17.

## 2017-03-30 NOTE — PHARMACY
Pharmacy Delirium Chart Review    Upon chart review, the following medications may contribute to possible patient delirium: Amitriptyline, nortriptyline.  Please consult unit pharmacist with further questions.

## 2017-03-30 NOTE — PHARMACY - DISCHARGE MEDICATION RECONCILIATION
Discharge medication review for this patient is complete.   Patient was not counseled or given any education materials as discharged to LTC, TCU facility, Memory Care Facility, etc.  See EPIC for allergy information, prior to admission medications and immunization status.   Pharmacist assisted with medication reconciliation of discharge medications with PTA medications.    MD was contacted with any questions/concerns:None    Additional medication history information:None    Discharge Medication List     Review of your medicines      START taking       Dose / Directions    acetaminophen 325 MG tablet   Commonly known as:  TYLENOL   Used for:  History of CVA (cerebrovascular accident)        Dose:  650 mg   Take 2 tablets (650 mg) by mouth every 4 hours as needed for mild pain   Quantity:  100 tablet   Refills:  0       melatonin 1 MG Tabs tablet        Dose:  1 mg   Take 1 tablet (1 mg) by mouth nightly as needed   Quantity:  30 tablet   Refills:  0       * QUEtiapine 25 MG tablet   Commonly known as:  SEROquel        Dose:  6.25 mg   Take 0.25 tablets (6.25 mg) by mouth 2 times daily   Refills:  0       * QUEtiapine 25 MG tablet   Commonly known as:  SEROquel        Dose:  25 mg   Take 1 tablet (25 mg) by mouth At Bedtime   Quantity:  60 tablet   Refills:  0       * Notice:  This list has 2 medication(s) that are the same as other medications prescribed for you. Read the directions carefully, and ask your doctor or other care provider to review them with you.      CONTINUE these medicines which may have CHANGED, or have new prescriptions. If we are uncertain of the size of tablets/capsules you have at home, strength may be listed as something that might have changed.       Dose / Directions    carvedilol 25 MG tablet   Commonly known as:  COREG   This may have changed:    - medication strength  - Another medication with the same name was removed. Continue taking this medication, and follow the directions you see  here.   Used for:  NSTEMI (non-ST elevated myocardial infarction) (H)        Dose:  25 mg   Take 1 tablet (25 mg) by mouth 2 times daily   Quantity:  60 tablet   Refills:  0         CONTINUE these medicines which have NOT CHANGED       Dose / Directions    amitriptyline 50 MG tablet   Commonly known as:  ELAVIL        Dose:   mg   Take  mg by mouth At Bedtime Per Family, usually takes 1 or 2 tabs every night.  Rarely 3 tabs - if 3 tabs are used pt is too drowsy following morning.   Refills:  0       aspirin 81 MG EC tablet   Used for:  NSTEMI (non-ST elevated myocardial infarction) (H)        Take one tablet daily   Quantity:  90 tablet   Refills:  3       atorvastatin 40 MG tablet   Commonly known as:  LIPITOR   Used for:  NSTEMI (non-ST elevated myocardial infarction) (H)        Dose:  40 mg   Take 1 tablet (40 mg) by mouth daily   Quantity:  90 tablet   Refills:  1       clopidogrel 75 MG tablet   Commonly known as:  PLAVIX   Used for:  NSTEMI (non-ST elevated myocardial infarction) (H)        Dose:  75 mg   Take 1 tablet (75 mg) by mouth daily   Quantity:  90 tablet   Refills:  1       COZAAR PO        Dose:  50 mg   Take 50 mg by mouth daily   Refills:  0       DOC-Q-LACE PO        Dose:  100 mg   Take 100 mg by mouth 2 times daily   Refills:  0       nitroglycerin 0.4 MG sublingual tablet   Commonly known as:  NITROSTAT   Used for:  NSTEMI (non-ST elevated myocardial infarction) (H), Essential hypertension, Postsurgical percutaneous transluminal coronary angioplasty status        Dose:  0.4 mg   Place 1 tablet (0.4 mg) under the tongue every 5 minutes as needed for chest pain   Quantity:  25 tablet   Refills:  1       NORTRIPTYLINE HCL PO        Dose:  50 mg   Take 50 mg by mouth daily   Refills:  0       polyethylene glycol powder   Commonly known as:  MIRALAX/GLYCOLAX        Dose:  1 capful   Take 1 capful by mouth daily as needed   Refills:  0       TRAMADOL HCL PO        Dose:  100 mg   Take  100 mg by mouth 2 times daily as needed   Refills:  0            Where to get your medicines      Some of these will need a paper prescription and others can be bought over the counter. Ask your nurse if you have questions.     You don't need a prescription for these medications      acetaminophen 325 MG tablet     carvedilol 25 MG tablet     melatonin 1 MG Tabs tablet     QUEtiapine 25 MG tablet     QUEtiapine 25 MG tablet

## 2017-03-30 NOTE — PLAN OF CARE
Problem: Individualization  Goal: Patient Preferences  Outcome: Improving  BM x 1. Denies pain. Unsteady gait, up with 1-2 assist in room. Alarms on and active. Denies shortness of breath. Lungs diminished. Assessment done with [place]  present.

## 2017-03-30 NOTE — PROGRESS NOTES
[facility]  Hospitalist Progress  Eveline Nix MD   03/30/2017    Reason for Stay (Diagnosis): Influenza and acute delirium            Assessment and Plan:      Adam Huber is a 81 year old male with a PMH significant for CAD, NSTEMI 2015 with stent x2 in staged procedure, HTN, HLP, BPH, depression hx 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 Found to have UTI and influenza B.       1. Influenza B and suspicion for bronchitis with possible superimposed bacterial infection -  -- Completed course of Oseltamivir and also Augmentin.   -- Has mild cough which will likely persist for some time, no hypoxia, denies SOB     2. Trivial troponin elevation.   --Likely due to demand ischemia from dehydration and acute illness in a patient with known CAD. Has had no chest pain or clinical signs of angina. Defer further work up for now      4. UTI - UA grossly abnormal, positive nitrites, UC grew E.coli, pansensitive. Received, Rocephin and Levaquin initially, completed course of Augmentin      5. Hyperglycemia - Borderline DM in past, and not on meds but HgbA1c is now 7.1,  Glipizide 5 mg BID started.  -- Decreased glipizide to 5 mg daily to avoid hypoglycemia in this elderly frail gentleman      6. Generalized weakness- Likely due to UTI and influenza. Reports his legs feel weak- PT/OT DAILY.     7. HTN -  home meds with parameters     8. HLP - On statin     9. Hx of TBI and CVA - Memory difficulties, R sided deficits      10. Delirium on top of underlying dementia - improving now  -- started on Seroquel HS and BID, continue, awake and alert during today's conversation     11.Dysphagia , Speech and swallow evaluation done and recommended  diet to dysphagia diet level 2 w/ thin liquids. Pt to take small bites/sips, alternate solids and liquids be fully alert and upright for all PO.       DVT Prophylaxis: Pneumatic Compression  Devices  Code Status: Full Code  Discharge Dispo: TCU pending bed availability , medically stable         Interval History (Subjective):      Patient is seen and examined and medical record reviewed .Overnight events noted and care discussed with nursing staff.  Patient seen with professional russian . Pt denies any complaints, strength is coming back, no cp, sob, mild cough. He feels improving day by day                        Physical Exam:      Vital Signs:  Temp: 96.7  F (35.9  C) Temp src: Oral BP: 160/80 Pulse: 81 Heart Rate: 86 Resp: 16 SpO2: 97 % O2 Device: None (Room air)    Vitals:    03/24/17 0604 03/28/17 0441 03/29/17 0700   Weight: 98.5 kg (217 lb 3.2 oz) 90.7 kg (200 lb) 91.6 kg (202 lb)     Vital Signs with Ranges  Temp:  [96.1  F (35.6  C)-97.7  F (36.5  C)] 96.7  F (35.9  C)  Pulse:  [81] 81  Heart Rate:  [81-86] 86  Resp:  [16] 16  BP: (126-160)/(69-80) 160/80  SpO2:  [93 %-97 %] 97 %  I/O last 3 completed shifts:  In: 240 [P.O.:240]  Out: -     GENERAL: Awake, calm, NAD  CVS: regular rate and rhythm, no loud murmurs or rubs  CHEST:  Clear bilaterally,  normal respiratory effort  HEENT: neck is supple  ABD: Soft, nontender, nondistended  EXT: No pitting edema  NEUR: diffuse weakness, able to lift his legs         Medications:      All current medications were reviewed with changes reflected in problem list.         Data:      All new lab, EKGs, telemetry strips, and imaging data was personally reviewed.   Labs:    Recent Labs  Lab 03/25/17  1344 03/24/17  0958     --    POTASSIUM 4.2  --    CHLORIDE 107  --    CO2 23  --    ANIONGAP 9  --    GLC 98  --    BUN 15  --    CR 0.94 0.88   GFRESTIMATED 77 82   GFRESTBLACK >90African American GFR Calc >90African American GFR Calc   LUZ 8.5  --      Imaging (past 24 hours):   No results found for this or any previous visit (from the past 24 hour(s)).

## 2017-03-30 NOTE — PLAN OF CARE
Problem: Goal Outcome Summary  Goal: Goal Outcome Summary  Outcome: Therapy, progress toward functional goals as expected  SLP: Pt seen for dysphagia f/u with  present. Pt tolerated thin liquids via cup and straw and semisolid textures with no overt s/sx of aspiration. Pt required prolonged but functional time for mastication on semisolid textures. Recommend continue dysphagia diet 2 and thin liquids. Pt should be fully upright for all PO, take small sips/bties, and alternate consistencies. Per discussion with pt, pt current diet is likely pts baseline diet. ST to sign off as pt is tolerating baseline diet level.      Speech Language Therapy Discharge Summary     Reason for therapy discharge:    All goals and outcomes met, no further needs identified.     Progress towards therapy goal(s). See goals on Care Plan in [facility] electronic health record for goal details.  Goals met     Therapy recommendation(s):    No further therapy is recommended.

## 2017-03-30 NOTE — PROGRESS NOTES
SWS:  SW received phone call from [facility].  They have accepted pt and can admit to their facility tomorrow.

## 2017-03-31 ENCOUNTER — APPOINTMENT (OUTPATIENT)
Dept: PHYSICAL THERAPY | Facility: CLINIC | Age: 82
DRG: 193 | End: 2017-03-31
Payer: COMMERCIAL

## 2017-03-31 VITALS
RESPIRATION RATE: 16 BRPM | HEART RATE: 81 BPM | OXYGEN SATURATION: 93 % | TEMPERATURE: 98 F | BODY MASS INDEX: 29.15 KG/M2 | SYSTOLIC BLOOD PRESSURE: 127 MMHG | HEIGHT: 72 IN | DIASTOLIC BLOOD PRESSURE: 69 MMHG | WEIGHT: 215.2 LBS

## 2017-03-31 LAB
GLUCOSE BLDC GLUCOMTR-MCNC: 117 MG/DL (ref 70–99)
GLUCOSE BLDC GLUCOMTR-MCNC: 228 MG/DL (ref 70–99)
GLUCOSE BLDC GLUCOMTR-MCNC: 75 MG/DL (ref 70–99)

## 2017-03-31 PROCEDURE — 25000132 ZZH RX MED GY IP 250 OP 250 PS 637: Performed by: PHYSICIAN ASSISTANT

## 2017-03-31 PROCEDURE — 25000132 ZZH RX MED GY IP 250 OP 250 PS 637: Performed by: INTERNAL MEDICINE

## 2017-03-31 PROCEDURE — 97116 GAIT TRAINING THERAPY: CPT | Mod: GP

## 2017-03-31 PROCEDURE — 97110 THERAPEUTIC EXERCISES: CPT | Mod: GP

## 2017-03-31 PROCEDURE — 97530 THERAPEUTIC ACTIVITIES: CPT | Mod: GP

## 2017-03-31 PROCEDURE — 99238 HOSP IP/OBS DSCHRG MGMT 30/<: CPT | Performed by: INTERNAL MEDICINE

## 2017-03-31 PROCEDURE — 40000193 ZZH STATISTIC PT WARD VISIT

## 2017-03-31 PROCEDURE — 00000146 ZZHCL STATISTIC GLUCOSE BY METER IP

## 2017-03-31 RX ORDER — QUETIAPINE FUMARATE 25 MG/1
6.25 TABLET, FILM COATED ORAL 2 TIMES DAILY PRN
Status: ON HOLD | DISCHARGE
Start: 2017-03-31 | End: 2018-05-19

## 2017-03-31 RX ORDER — AMITRIPTYLINE HYDROCHLORIDE 50 MG/1
100 TABLET ORAL AT BEDTIME
Qty: 30 TABLET | DISCHARGE
Start: 2017-03-31 | End: 2017-07-19

## 2017-03-31 RX ORDER — QUETIAPINE FUMARATE 25 MG/1
25 TABLET, FILM COATED ORAL AT BEDTIME
DISCHARGE
Start: 2017-03-31 | End: 2017-04-10

## 2017-03-31 RX ORDER — GLIPIZIDE 2.5 MG/1
2.5 TABLET, EXTENDED RELEASE ORAL DAILY
DISCHARGE
Start: 2017-03-31 | End: 2017-07-19

## 2017-03-31 RX ORDER — TRAMADOL HYDROCHLORIDE 50 MG/1
25 TABLET ORAL 2 TIMES DAILY PRN
Qty: 15 TABLET | Refills: 0 | Status: ON HOLD | OUTPATIENT
Start: 2017-03-31 | End: 2018-05-19

## 2017-03-31 RX ADMIN — CLOPIDOGREL 75 MG: 75 TABLET, FILM COATED ORAL at 08:49

## 2017-03-31 RX ADMIN — ASPIRIN 81 MG: 81 TABLET, COATED ORAL at 08:49

## 2017-03-31 RX ADMIN — ATORVASTATIN CALCIUM 40 MG: 40 TABLET, FILM COATED ORAL at 08:49

## 2017-03-31 RX ADMIN — CARVEDILOL 25 MG: 25 TABLET, FILM COATED ORAL at 08:49

## 2017-03-31 RX ADMIN — GLIPIZIDE 5 MG: 5 TABLET ORAL at 08:49

## 2017-03-31 RX ADMIN — NORTRIPTYLINE HYDROCHLORIDE 50 MG: 50 CAPSULE ORAL at 08:49

## 2017-03-31 RX ADMIN — LOSARTAN POTASSIUM 50 MG: 25 TABLET, FILM COATED ORAL at 08:50

## 2017-03-31 NOTE — PLAN OF CARE
Problem: Infection, Risk/Actual (Adult)  Goal: Identify Related Risk Factors and Signs and Symptoms  Related risk factors and signs and symptoms are identified upon initiation of Human Response Clinical Practice Guideline (CPG)   Outcome: Improving  Ambulation improving from previous report. Does well with 1, gait belt and walker to bathroom and chair. Wife at bedside through the afternoon and left with patient transporting to [facility]. Spoke with their son Adolfo-to verify someone would pick her up at [facility].

## 2017-03-31 NOTE — PLAN OF CARE
Problem: Goal Outcome Summary  Goal: Goal Outcome Summary     PT: Patient seen by physical therapy.  Patient sitting at bedside chair upon arrival.  Patient participated in seated LE exercises; amb 250 feet with 2WW with CGA and verbal cueing for posture; required 2 seated rest breaks.  Rest breaks requested by patient secondary to increased head ache with dizziness.  Patient required significant verbal and tactile cueing (hand over hand) for correct technique for safe transfer to chair; patient attempting to push walker out of the way and side sitting on edge of chair, improved with repeated transfers.  Continue to recommend DC to TCU for strengthening, to increase activity tolerance and to improve safety with transfers.    Physical Therapy Discharge Summary    Reason for therapy discharge:    Anticipated DC to TCU    Progress towards therapy goal(s). See goals on Care Plan in [facility] electronic health record for goal details.  Goals partially met.  Barriers to achieving goals:   Anticipated to DC to TCU.    Therapy recommendation(s):    Continued therapy is recommended.  Rationale/Recommendations:  decreased independence with mobility.

## 2017-03-31 NOTE — PROGRESS NOTES
SWS:  D: discharge planning  A/P: Pt accepted at [facility]. Family requesting wc transport. [facility] contacted and transport will be here at 1800.  informed pt. Wife will be here later this afternoon.  RN updated.

## 2017-03-31 NOTE — DISCHARGE SUMMARY
PRIMARY CARE PHYSICIAN:  Dr. Oralia Forrest.      DATE OF ADMISSION:  03/18/2017.       DATE OF DISCHARGE:  03/31/2017.       DISCHARGE DISPOSITION:  Transitional care unit.      DISCHARGE CONDITION:  Stable.      PHYSICAL EXAMINATION:   GENERAL:  The patient is awake, he is alert, comfortable appearing in no acute distress.   LUNGS:  Clear to auscultation bilaterally with good air entry on both sides.     EXTREMITIES:  He has quite an improvement in strength in his lower extremities, up to 5/5.     NEUROLOGIC:  There are no gross focal deficits.   HEART:  Reveals regular rate and rhythm, normal S1, S2.   ABDOMEN:  Soft and nontender.      DISCHARGE DIAGNOSES:   1.  Acute influenza B infection.   2.  Suspicion for superimposed bacterial bronchitis.   3.  Urinary tract infection with Escherichia coli.   4.  Trivial troponin elevation, likely demand supply mismatch ischemia.   5.  Hyperglycemia, diabetes mellitus type 2 diagnosis.   6.  Generalized weakness.   7.  Deconditioning.   8.  Hypertension.   9.  Hyperlipidemia.   10.  Previous history of traumatic brain injury and stroke.   11.  Delirium on top of underlying dementia.   12.  Dysphagia.      CONSULTATIONS:   1.  Physical therapy.    2.  Occupational therapy.   3.  Speech Language Pathology.      IMAGING:  Chest x-ray, CT head.      PENDING LAB TESTS:  None.      HISTORY OF PRESENTING ILLNESS:  Please refer the severe full details.  In brief, Adam Huber is an 81-year-old gentleman who presented to the hospital with concerns for generalized weakness, cough.      HOSPITAL COURSE:  The patient was found to have acute influenza infection, which is likely the major culprit here.  There was also concern for superimposed bacterial bronchitis and urinary tract infection.  There is no obvious pneumonia.  He completed a course of antibiotic treatment with initially IV agents and was subsequently narrowed to Augmentin.  He also received a course of Tamiflu.      Over  the course of the hospital stay, the major issue has been his delirium and altered mental status.  Likely worsened by these acute infectious issues.  He was started on Seroquel with good benefit from that.  Over the course of the hospital stay, his symptoms of weakness have improved.  He is progressing well with therapy and will be discharged to a skilled nursing facility for ongoing rehabilitation.  We will continue the Seroquel for a few more days, although do not anticipate he will need that longer term with improvement in his mentation and improvement in his delirium.      The patient also was noted to have elevated blood sugars and A1C of 7.1 with likely diagnosis of type 2 diabetes mellitus.  Given his age and risk of hypoglycemia; for now we will start him on very low dose of glipizide 2.5 mg with further monitoring at nursing facility and adjustment of medication as needed.  I anticipate that as his oral intake improves, his sugars may actually go further up.  So far they have been very well controlled with the oral glipizide.      Overall, the patient is doing much better at this time.  He appears stable for discharge to a skilled nursing facility for ongoing rehabilitation.      DISCHARGE DIET:  Dysphagia level 2 diet with thin liquids.  Continuous SLP evaluation in the nursing facility.      DISCHARGE FOLLOWUP:   1.  Follow up with the nursing home physician within next week.   2.  Follow up with the primary care provider in 14 days.      Total time spent in face-to-face contact with the patient and coordinating discharge was less than 30 minutes.        ALLERGIES:  Lisinopril.      Plan of care has been discussed with the patient in great detail with the help of an .  All of his questions were answered.      Discharge Medication List as of 3/31/2017  5:23 PM      START taking these medications    Details   glipiZIDE (GLIPIZIDE XL) 2.5 MG 24 hr tablet Take 1 tablet (2.5 mg) by mouth daily,  Transitional      melatonin 1 MG TABS tablet Take 1 tablet (1 mg) by mouth nightly as needed, Disp-30 tablet, Transitional      acetaminophen (TYLENOL) 325 MG tablet Take 2 tablets (650 mg) by mouth every 4 hours as needed for mild pain, Disp-100 tablet, OTC         CONTINUE these medications which have CHANGED    Details   amitriptyline (ELAVIL) 50 MG tablet Take 2 tablets (100 mg) by mouth At Bedtime Per Family, usually takes 1 or 2 tabs every night.  Rarely 3 tabs - if 3 tabs are used pt is too drowsy following morning., Disp-30 tablet, Transitional      !! QUEtiapine (SEROQUEL) 25 MG tablet Take 1 tablet (25 mg) by mouth At Bedtime for 10 days, Transitional      traMADol (ULTRAM) 50 MG tablet Take 0.5 tablets (25 mg) by mouth 2 times daily as needed For headache, Disp-15 tablet, R-0, Local Print      !! QUEtiapine (SEROQUEL) 25 MG tablet Take 0.25 tablets (6.25 mg) by mouth 2 times daily as needed, Transitional      carvedilol (COREG) 25 MG tablet Take 1 tablet (25 mg) by mouth 2 times daily, Disp-60 tablet, Transitional       !! - Potential duplicate medications found. Please discuss with provider.      CONTINUE these medications which have NOT CHANGED    Details   Losartan Potassium (COZAAR PO) Take 50 mg by mouth daily, Historical      NORTRIPTYLINE HCL PO Take 50 mg by mouth daily, Historical      atorvastatin (LIPITOR) 40 MG tablet Take 1 tablet (40 mg) by mouth daily, Disp-90 tablet, R-1, E-Prescribe      aspirin 81 MG EC tablet Take one tablet daily, Disp-90 tablet, R-3, E-Prescribe      clopidogrel (PLAVIX) 75 MG tablet Take 1 tablet (75 mg) by mouth daily, Disp-90 tablet, R-1, E-Prescribe      Docusate Sodium (DOC-Q-LACE PO) Take 100 mg by mouth 2 times daily, Historical      nitroglycerin (NITROSTAT) 0.4 MG SL tablet Place 1 tablet (0.4 mg) under the tongue every 5 minutes as needed for chest pain, Disp-25 tablet, R-1, E-Prescribe      polyethylene glycol (MIRALAX/GLYCOLAX) powder Take 1 capful by  mouth daily as needed , Historical           Results for orders placed or performed during the hospital encounter of 03/18/17   XR Chest 2 Views    Narrative    XR CHEST 2 VW   3/18/2017 5:41 PM     HISTORY: cough SOB    COMPARISON: Film dated 1/4/2017    FINDINGS: The heart is negative.  Mild linear fibrotic changes again  seen at the left base. No new focal alveolar-type infiltrates. The  pulmonary vasculature is normal.  The bones and soft tissues are  unremarkable.      Impression    IMPRESSION: No acute infiltrates identified. No significant change.         BULMARO SAMS MD   XR Chest Port 1 View    Narrative    CHEST ONE VIEW PORTABLE   3/23/2017 2:33 PM     HISTORY: Bronchitis. Rule out pneumonia.    COMPARISON: 3/18/2017.      Impression    IMPRESSION: No acute cardiopulmonary disease.    TOBIAS JACKSON MD   CT Head w/o Contrast    Narrative    CT SCAN OF THE HEAD WITHOUT CONTRAST March 25, 2017 1:35 PM     HISTORY: Confusion, loss of balance history of brain in jury in the  past.    TECHNIQUE: 4 mm thick axial images of the head without IV contrast  material. Radiation dose for this scan was reduced using automated  exposure  control, adjustment of the mA and/or kV according to patient size, or  iterative reconstruction technique.    COMPARISON: CT/PET dated 10/11/2015.    FINDINGS: Encephalomalacia in the bilateral medial anterior frontal  lobes and in the anterior right temporal lobe are stable in appearance  since the prior study dated 10/11/2015. Focal lacunar infarct is seen  in the right basal ganglia, similar to the prior study. Probable focal  lacunar infarct in the left centrum semiovale is stable in appearance.  No new areas of abnormal decreased attenuation to suggest acute  infarct are seen. No acute intracranial hemorrhage, mass effect, or  mass is identified. There is mild ex vacuo dilatation of the anterior  horns of the bilateral lateral ventricles. There is generalized  atrophy of the brain.  Areas of low attenuation are present in the  white matter of the cerebral hemispheres that are consistent with  small vessel ischemic disease in this age patient.    Probable mucus retention cyst is seen in the medial right maxillary  sinus. This is only seen on one image. Otherwise, the visualized  portions of the orbits, paranasal sinuses, mastoid air spaces, and  calvarium are grossly unremarkable.      Impression    IMPRESSION:  1. Age-related atrophy and small vessel white matter ischemic changes.  2. There are areas of encephalization in the bilateral frontal lobes  and in the right temporal lobe are similar to the prior study and  consistent with chronic infarct or traumatic injury.  3. Probable lacunar infarcts in the right basal ganglia and possibly  in the left centrum semiovale are again noted.  4. No acute infarct, mass, or intracranial hemorrhage is identified.  If there is clinical concern for acute extension of chronic infarcts,  further evaluation with MRI maybe helpful.    HOWARD REMY MD          Allergies   Allergen Reactions     Lisinopril               LITO CORTEZ MD             D: 2017 14:06   T: 2017 14:23   MT: [id number]      Name:     CHANI ELENA   MRN:      [id number]        Account:        [id number]   :      1935           Admit Date:                                       Discharge Date:       Document: [id number]       cc: Oralia Forrest MD

## 2017-03-31 NOTE — PLAN OF CARE
Problem: Goal Outcome Summary  Goal: Goal Outcome Summary  Outcome: Improving  VSS, up with A-2 to toilet, Tylenol for Headache, LS clear, scheduled Seroquel at , plan to d/c tomorrow to [facility].

## 2017-04-14 DIAGNOSIS — I21.4 NSTEMI (NON-ST ELEVATED MYOCARDIAL INFARCTION) (H): ICD-10-CM

## 2017-04-17 NOTE — TELEPHONE ENCOUNTER
"Received faxed refill request from [facility] [address] for Coreg 6.25mg tabs Take 1 tablet BID. Pt last seen in clinic 6/2016, has recently been hospitalized and was discharged to either TCU/LTCF on higher dose of Coreg 25mg BID per EPIC discharge note on 3/31/17. Pt has not been seen here since dose was increased and no future appointment scheduled. Returned refill request via fax to [phone] as \"denied\" (since the TCU is managing the pt and his meds).  "

## 2017-07-14 ENCOUNTER — HOSPITAL ENCOUNTER (OUTPATIENT)
Dept: CARDIOLOGY | Facility: CLINIC | Age: 82
Discharge: HOME | End: 2017-07-14
Attending: INTERNAL MEDICINE | Admitting: INTERNAL MEDICINE
Payer: COMMERCIAL

## 2017-07-14 DIAGNOSIS — I71.20 THORACIC AORTIC ANEURYSM WITHOUT RUPTURE (H): ICD-10-CM

## 2017-07-14 DIAGNOSIS — I21.4 NSTEMI (NON-ST ELEVATED MYOCARDIAL INFARCTION) (H): ICD-10-CM

## 2017-07-14 LAB
ALT SERPL W P-5'-P-CCNC: 13 U/L (ref 5–30)
ANION GAP SERPL CALCULATED.3IONS-SCNC: 11.4 MMOL/L (ref 6–17)
BUN SERPL-MCNC: 18 MG/DL (ref 7–30)
CALCIUM SERPL-MCNC: 9.5 MG/DL (ref 8.5–10.5)
CHLORIDE SERPL-SCNC: 103 MMOL/L (ref 98–107)
CHOLEST SERPL-MCNC: 120 MG/DL
CO2 SERPL-SCNC: 27 MMOL/L (ref 23–29)
CREAT SERPL-MCNC: 0.96 MG/DL (ref 0.7–1.3)
GFR SERPL CREATININE-BSD FRML MDRD: 75 ML/MIN/1.7M2
GLUCOSE SERPL-MCNC: 149 MG/DL (ref 70–105)
HDLC SERPL-MCNC: 51 MG/DL
LDLC SERPL CALC-MCNC: 51 MG/DL
NONHDLC SERPL-MCNC: 69 MG/DL
POTASSIUM SERPL-SCNC: 4.4 MMOL/L (ref 3.5–5.1)
SODIUM SERPL-SCNC: 137 MMOL/L (ref 136–145)
TRIGL SERPL-MCNC: 90 MG/DL

## 2017-07-14 PROCEDURE — 93306 TTE W/DOPPLER COMPLETE: CPT | Mod: 26 | Performed by: INTERNAL MEDICINE

## 2017-07-14 PROCEDURE — 25500064 ZZH RX 255 OP 636: Performed by: INTERNAL MEDICINE

## 2017-07-14 PROCEDURE — 40000264 ECHO COMPLETE WITH LUMASON

## 2017-07-14 PROCEDURE — 80048 BASIC METABOLIC PNL TOTAL CA: CPT | Performed by: INTERNAL MEDICINE

## 2017-07-14 PROCEDURE — 80061 LIPID PANEL: CPT | Performed by: INTERNAL MEDICINE

## 2017-07-14 PROCEDURE — 84460 ALANINE AMINO (ALT) (SGPT): CPT | Performed by: INTERNAL MEDICINE

## 2017-07-14 PROCEDURE — 36415 COLL VENOUS BLD VENIPUNCTURE: CPT | Performed by: INTERNAL MEDICINE

## 2017-07-14 RX ADMIN — SULFUR HEXAFLUORIDE 5 ML: KIT at 11:18

## 2017-07-19 ENCOUNTER — OFFICE VISIT (OUTPATIENT)
Dept: CARDIOLOGY | Facility: CLINIC | Age: 82
End: 2017-07-19
Attending: INTERNAL MEDICINE
Payer: COMMERCIAL

## 2017-07-19 VITALS
HEART RATE: 80 BPM | WEIGHT: 220 LBS | BODY MASS INDEX: 29.8 KG/M2 | HEIGHT: 72 IN | SYSTOLIC BLOOD PRESSURE: 136 MMHG | DIASTOLIC BLOOD PRESSURE: 77 MMHG

## 2017-07-19 DIAGNOSIS — I21.4 NSTEMI (NON-ST ELEVATED MYOCARDIAL INFARCTION) (H): ICD-10-CM

## 2017-07-19 DIAGNOSIS — I71.20 THORACIC AORTIC ANEURYSM WITHOUT RUPTURE (H): ICD-10-CM

## 2017-07-19 DIAGNOSIS — I25.10 CORONARY ARTERY DISEASE INVOLVING NATIVE CORONARY ARTERY OF NATIVE HEART WITHOUT ANGINA PECTORIS: Primary | ICD-10-CM

## 2017-07-19 PROCEDURE — 99214 OFFICE O/P EST MOD 30 MIN: CPT | Performed by: INTERNAL MEDICINE

## 2017-07-19 NOTE — LETTER
7/19/2017    Oralia Forrest  [facility]   [address] [facility] [address]    RE: Adam Huber       Dear Colleague,    I had the pleasure of seeing Adam Huber in the [facility].    HPI and Plan:    Mr. Huber is a very pleasant 82-year-old Omani-speaking gentleman who I saw in 10/2015 when he was admitted at [facility] with chest discomfort with elevated troponin and was found to have non-ST elevation myocardial infarction.  Coronary angiogram showed 3-vessel coronary artery disease and patient preferred percutaneous intervention, initially underwent successful drug-eluting stent PCI of the culprit proximal to mid RCA with 3.5x12 and 3x33 mm Alpine drug-eluting stent.  Additionally, he was also found to have long stenosis of the mid circumflex, which was intervened in a staged manner about 4 weeks later with 2.7x30 mm and 2.5x18 mm Alpine drug-eluting stent. He had moderate LAD disease with severe diagonal disease involving both the diagonal branches.  The first diagonal had 99% ostial stenosis and second diagonal had 70% proximal followed by 80% mid stenosis and they both appeared small and it was felt that they are best managed medically, especially in the absence of symptoms.  He was also found to have mildly dilated ascending aorta and aortic root and underwent a repeat echocardiogram a few days ago that showed stable size of the aorta and normal LV function. In March 2017 patient had influenza bronchitis and UTI. Today's coming for routine follow-up accompanied by his wife. An official O[place]i  is used. Patient tells me cardiac status-wise he feels quite well. No chest discomfort or shortness of breath or dizziness or presyncope or syncope. Patient is on aspirin 81 mg q. daily, Crestor 5 mg q. daily, losartan and carvedilol. To be noted he was on Lipitor and at some point since last clinic visit was changed to Crestor I'm  not sure the reason but could be due to leg cramps/leg weakness patient was complaining in the past. He had a repeat lipid panel done a few days ago that shows LDL continues to be quite well controlled at 51. BMP is also normal. To be noted patient has completed more than a year of dual antiplatelet therapy and recently there wmild thrombocytopenia and I think is reasonable to continue only single antiplatelet therapy that is aspirin 81 mg q. daily    Assessment and plan  A very delightful 82-year-old gentleman with history of non-STEMI status post recent PCI of the RCA followed by circumflex in a staged manner.  He has residual moderate LAD disease with severe disease of the diagonal which are small vessels and best managed medically.  Clinically, he does not appear to have any anginal symptoms. LV function is normal, blood pressure and LDL are both well controlled. Aorta size is stable and is mildly enlarged. He is an appropriate CAD medical regimen therapy of aspirin, statin, beta blocker and angiotensin receptor blocker. If he continues to feel well cardiac status-wise we can see him back in one year, sooner if any change in clinical status especially exertion related symptoms.    Orders Placed This Encounter   Procedures     Lipid Profile     ALT     Basic metabolic panel     Follow-Up with Cardiologist       Orders Placed This Encounter   Medications     ROSUVASTATIN CALCIUM PO     Sig: Take 5 mg by mouth       Medications Discontinued During This Encounter   Medication Reason     amitriptyline (ELAVIL) 50 MG tablet Discontinued by another Health Care Provider     atorvastatin (LIPITOR) 40 MG tablet Discontinued by another Health Care Provider     glipiZIDE (GLIPIZIDE XL) 2.5 MG 24 hr tablet Discontinued by another Health Care Provider     clopidogrel (PLAVIX) 75 MG tablet          Encounter Diagnoses   Name Primary?     NSTEMI (non-ST elevated myocardial infarction) (H)      Thoracic aortic aneurysm without  rupture (H)      Coronary artery disease involving native coronary artery of native heart without angina pectoris Yes       CURRENT MEDICATIONS:  Current Outpatient Prescriptions   Medication Sig Dispense Refill     ROSUVASTATIN CALCIUM PO Take 5 mg by mouth       traMADol (ULTRAM) 50 MG tablet Take 0.5 tablets (25 mg) by mouth 2 times daily as needed For headache 15 tablet 0     melatonin 1 MG TABS tablet Take 1 tablet (1 mg) by mouth nightly as needed (Patient taking differently: Take 3 mg by mouth nightly as needed ) 30 tablet      acetaminophen (TYLENOL) 325 MG tablet Take 2 tablets (650 mg) by mouth every 4 hours as needed for mild pain 100 tablet      Losartan Potassium (COZAAR PO) Take 50 mg by mouth daily       NORTRIPTYLINE HCL PO Take 50 mg by mouth daily       aspirin 81 MG EC tablet Take one tablet daily 90 tablet 3     Docusate Sodium (DOC-Q-LACE PO) Take 100 mg by mouth 2 times daily       nitroglycerin (NITROSTAT) 0.4 MG SL tablet Place 1 tablet (0.4 mg) under the tongue every 5 minutes as needed for chest pain 25 tablet 1     polyethylene glycol (MIRALAX/GLYCOLAX) powder Take 1 capful by mouth daily as needed        QUEtiapine (SEROQUEL) 25 MG tablet Take 0.25 tablets (6.25 mg) by mouth 2 times daily as needed (Patient not taking: Reported on 7/19/2017)       carvedilol (COREG) 25 MG tablet Take 1 tablet (25 mg) by mouth 2 times daily 60 tablet        ALLERGIES     Allergies   Allergen Reactions     Lisinopril        PAST MEDICAL HISTORY:  Past Medical History:   Diagnosis Date     Acute chest pain 10/23/2015     CAD (coronary artery disease)      Essential hypertension 10/23/2015     History of CVA (cerebrovascular accident) 10/23/2015     HLD (hyperlipidemia)      HTN (hypertension)      NSTEMI (non-ST elevated myocardial infarction) (H) 10/23/2015     Post PTCA 10-    Successful PCI of culprit proximal to mid RCA with placement of a     Post PTCA 11-6-2015    pci of complex mid CFX-hector  "      PAST SURGICAL HISTORY:  Past Surgical History:   Procedure Laterality Date     CHOLECYSTECTOMY       GENITOURINARY SURGERY      prostate removal      HEART CATH LEFT HEART CATH  10/12/2015    PCI w/ LEXI to RCA     HEART CATH RIGHT AND LEFT HEART CATH  11/6/2015    PCI w/ LEXI to mid-CFX       FAMILY HISTORY:  Family History   Problem Relation Age of Onset     Unknown/Adopted No family hx of        SOCIAL HISTORY:  Social History     Social History     Marital status:      Spouse name: N/A     Number of children: N/A     Years of education: N/A     Social History Main Topics     Smoking status: Never Smoker     Smokeless tobacco: None     Alcohol use No     Drug use: No     Sexual activity: Not Asked     Other Topics Concern     Caffeine Concern No     seldom     Sleep Concern Yes     does not sleep well     Special Diet Yes     lower sodium and fat     Exercise No     Social History Narrative       Review of Systems:  Skin:  Negative       Eyes:  Positive for glasses no changes in vision  ENT:  Positive for hearing loss    Respiratory:  Negative       Cardiovascular:    Positive for;chest pain;fatigue off and on pains in chest ,   Gastroenterology: Positive for constipation    Genitourinary:  Positive for dysuria    Musculoskeletal:  Positive for   leg pain  Neurologic:  Positive for headaches;stroke    Psychiatric:  Positive for depression    Heme/Lymph/Imm:  Negative      Endocrine:  Negative        Physical Exam:  Vitals: /77  Pulse 80  Ht 1.829 m (6' 0.01\")  Wt 99.8 kg (220 lb)  BMI 29.83 kg/m2    On examination  Vitals- see chart  General- appears comfortable  Neck- normal JVP, no bruit  Cardiovascular system- s1s2 normal, no m/r/g  Respiratory system- CTA b/l  Abdomen- soft, non tender  Extremities- no pedal edema  Neurological - alert, oriented  Psych- normal affect  HEENT- no pallor  Thank you for allowing me to participate in the care of your patient.    Sincerely,     Eric Avalos, " MD     [facility]

## 2017-07-19 NOTE — MR AVS SNAPSHOT
After Visit Summary   7/19/2017    Adam Huber    MRN: [id number]           Patient Information     Date Of Birth          1935        Visit Information        Provider Department      7/19/2017 11:15 AM Eric Avalos MD; LANGUAGE BANC [facility]        Today's Diagnoses     Coronary artery disease involving native coronary artery of native heart without angina pectoris    -  1    NSTEMI (non-ST elevated myocardial infarction) (H)        Thoracic aortic aneurysm without rupture (H)           Follow-ups after your visit        Additional Services     Follow-Up with Cardiologist                 Future tests that were ordered for you today     Open Future Orders        Priority Expected Expires Ordered    Lipid Profile Routine 7/19/2018 8/23/2018 7/19/2017    ALT Routine 7/19/2018 8/23/2018 7/19/2017    Basic metabolic panel Routine 7/19/2018 8/23/2018 7/19/2017    Follow-Up with Cardiologist Routine 7/19/2018 12/1/2018 7/19/2017            Who to contact     If you have questions or need follow up information about today's clinic visit or your schedule please contact [facility] directly at [phone].  Normal or non-critical lab and imaging results will be communicated to you by MyChart, letter or phone within 4 business days after the clinic has received the results. If you do not hear from us within 7 days, please contact the clinic through [employer] or phone. If you have a critical or abnormal lab result, we will notify you by phone as soon as possible.  Submit refill requests through [employer] or call your pharmacy and they will forward the refill request to us. Please allow 3 business days for your refill to be completed.          Additional Information About Your Visit        MyChart Information     [employer] lets you send messages to your doctor, view your test results, renew your prescriptions, schedule  "appointments and more. To sign up, go to [email or web address] . Click on \"Log in\" on the left side of the screen, which will take you to the Welcome page. Then click on \"Sign up Now\" on the right side of the page.     You will be asked to enter the access code listed below, as well as some personal information. Please follow the directions to create your username and password.     Your access code is: [email or web address]  Expires: 10/17/2017 11:45 AM     Your access code will  in 90 days. If you need help or a new code, please call your [place] clinic or [phone].        Care EveryWhere ID     This is your Care EveryWhere ID. This could be used by other organizations to access your [place] medical records  [id number]        Your Vitals Were     Pulse Height BMI (Body Mass Index)             80 1.829 m (6' 0.01\") 29.83 kg/m2          Blood Pressure from Last 3 Encounters:   17 136/77   17 127/69   17 128/71    Weight from Last 3 Encounters:   17 99.8 kg (220 lb)   17 97.6 kg (215 lb 3.2 oz)   17 102.5 kg (226 lb)              We Performed the Following     Follow-Up with Cardiologist          Today's Medication Changes          These changes are accurate as of: 17 11:45 AM.  If you have any questions, ask your nurse or doctor.               These medicines have changed or have updated prescriptions.        Dose/Directions    melatonin 1 MG Tabs tablet   This may have changed:  how much to take   Used for:  Generalized muscle weakness        Dose:  1 mg   Take 1 tablet (1 mg) by mouth nightly as needed   Quantity:  30 tablet   Refills:  0         Stop taking these medicines if you haven't already. Please contact your care team if you have questions.     clopidogrel 75 MG tablet   Commonly known as:  PLAVIX   Stopped by:  Eric Avalos MD                    Primary Care Provider Office Phone # Fax #    Oralia MONK Forrest [phone]       [place] " [facility] [address] [facility] [address]        Equal Access to Services     MICHELLEKANDICE NICOLAS : Hadii chintan beltrán hadnona Socarissaali, waaxda luqadaha, qaybta kaalmada jaunjanicesyd, waxay idiin hayfayvipul blantonsaidaveronique plascencia. So [facility] [phone].    ATENCIÓN: Si habla español, tiene a banks disposición servicios gratuitos de asistencia lingüística. [facility] [phone].    We comply with applicable federal civil rights laws and [place] laws. We do not discriminate on the basis of race, color, national origin, age, disability sex, sexual orientation or gender identity.            Thank you!     Thank you for choosing [facility]  for your care. Our goal is always to provide you with excellent care. Hearing back from our patients is one way we can continue to improve our services. Please take a few minutes to complete the written survey that you may receive in the mail after your visit with us. Thank you!             Your Updated Medication List - Protect others around you: Learn how to safely use, store and throw away your medicines at www.disposemymeds.org.          This list is accurate as of: 7/19/17 11:45 AM.  Always use your most recent med list.                   Brand Name Dispense Instructions for use Diagnosis    acetaminophen 325 MG tablet    TYLENOL    100 tablet    Take 2 tablets (650 mg) by mouth every 4 hours as needed for mild pain    History of CVA (cerebrovascular accident)       aspirin 81 MG EC tablet     90 tablet    Take one tablet daily    NSTEMI (non-ST elevated myocardial infarction) (H)       carvedilol 25 MG tablet    COREG    60 tablet    Take 1 tablet (25 mg) by mouth 2 times daily    NSTEMI (non-ST elevated myocardial infarction) (H)       COZAAR PO      Take 50 mg by mouth daily        DOC-Q-LACE PO      Take 100 mg by mouth 2 times daily        melatonin 1 MG Tabs tablet     30 tablet    Take 1 tablet (1 mg) by mouth nightly as needed    Generalized muscle  weakness       nitroGLYcerin 0.4 MG sublingual tablet    NITROSTAT    25 tablet    Place 1 tablet (0.4 mg) under the tongue every 5 minutes as needed for chest pain    NSTEMI (non-ST elevated myocardial infarction) (H), Essential hypertension, Postsurgical percutaneous transluminal coronary angioplasty status       NORTRIPTYLINE HCL PO      Take 50 mg by mouth daily        polyethylene glycol powder    MIRALAX/GLYCOLAX     Take 1 capful by mouth daily as needed        QUEtiapine 25 MG tablet    SEROquel     Take 0.25 tablets (6.25 mg) by mouth 2 times daily as needed    Acute metabolic encephalopathy       ROSUVASTATIN CALCIUM PO      Take 5 mg by mouth        traMADol 50 MG tablet    ULTRAM    15 tablet    Take 0.5 tablets (25 mg) by mouth 2 times daily as needed For headache    Back pain, unspecified back location, unspecified back pain laterality, unspecified chronicity

## 2017-07-19 NOTE — PROGRESS NOTES
HPI and Plan:    Mr. Huber is a very pleasant 82-year-old Martiniquais-speaking gentleman who I saw in 10/2015 when he was admitted at [facility] with chest discomfort with elevated troponin and was found to have non-ST elevation myocardial infarction.  Coronary angiogram showed 3-vessel coronary artery disease and patient preferred percutaneous intervention, initially underwent successful drug-eluting stent PCI of the culprit proximal to mid RCA with 3.5x12 and 3x33 mm Alpine drug-eluting stent.  Additionally, he was also found to have long stenosis of the mid circumflex, which was intervened in a staged manner about 4 weeks later with 2.7x30 mm and 2.5x18 mm Alpine drug-eluting stent. He had moderate LAD disease with severe diagonal disease involving both the diagonal branches.  The first diagonal had 99% ostial stenosis and second diagonal had 70% proximal followed by 80% mid stenosis and they both appeared small and it was felt that they are best managed medically, especially in the absence of symptoms.  He was also found to have mildly dilated ascending aorta and aortic root and underwent a repeat echocardiogram a few days ago that showed stable size of the aorta and normal LV function. In March 2017 patient had influenza bronchitis and UTI. Today's coming for routine follow-up accompanied by his wife. An official Martiniquais  is used. Patient tells me cardiac status-wise he feels quite well. No chest discomfort or shortness of breath or dizziness or presyncope or syncope. Patient is on aspirin 81 mg q. daily, Crestor 5 mg q. daily, losartan and carvedilol. To be noted he was on Lipitor and at some point since last clinic visit was changed to Crestor I'm not sure the reason but could be due to leg cramps/leg weakness patient was complaining in the past. He had a repeat lipid panel done a few days ago that shows LDL continues to be quite well controlled at 51. BMP is also normal. To be noted  patient has completed more than a year of dual antiplatelet therapy and recently there wmild thrombocytopenia and I think is reasonable to continue only single antiplatelet therapy that is aspirin 81 mg q. daily    Assessment and plan  A very delightful 82-year-old gentleman with history of non-STEMI status post recent PCI of the RCA followed by circumflex in a staged manner.  He has residual moderate LAD disease with severe disease of the diagonal which are small vessels and best managed medically.  Clinically, he does not appear to have any anginal symptoms. LV function is normal, blood pressure and LDL are both well controlled. Aorta size is stable and is mildly enlarged. He is an appropriate CAD medical regimen therapy of aspirin, statin, beta blocker and angiotensin receptor blocker. If he continues to feel well cardiac status-wise we can see him back in one year, sooner if any change in clinical status especially exertion related symptoms.    Orders Placed This Encounter   Procedures     Lipid Profile     ALT     Basic metabolic panel     Follow-Up with Cardiologist       Orders Placed This Encounter   Medications     ROSUVASTATIN CALCIUM PO     Sig: Take 5 mg by mouth       Medications Discontinued During This Encounter   Medication Reason     amitriptyline (ELAVIL) 50 MG tablet Discontinued by another Health Care Provider     atorvastatin (LIPITOR) 40 MG tablet Discontinued by another Health Care Provider     glipiZIDE (GLIPIZIDE XL) 2.5 MG 24 hr tablet Discontinued by another Health Care Provider     clopidogrel (PLAVIX) 75 MG tablet          Encounter Diagnoses   Name Primary?     NSTEMI (non-ST elevated myocardial infarction) (H)      Thoracic aortic aneurysm without rupture (H)      Coronary artery disease involving native coronary artery of native heart without angina pectoris Yes       CURRENT MEDICATIONS:  Current Outpatient Prescriptions   Medication Sig Dispense Refill     ROSUVASTATIN CALCIUM PO  Take 5 mg by mouth       traMADol (ULTRAM) 50 MG tablet Take 0.5 tablets (25 mg) by mouth 2 times daily as needed For headache 15 tablet 0     melatonin 1 MG TABS tablet Take 1 tablet (1 mg) by mouth nightly as needed (Patient taking differently: Take 3 mg by mouth nightly as needed ) 30 tablet      acetaminophen (TYLENOL) 325 MG tablet Take 2 tablets (650 mg) by mouth every 4 hours as needed for mild pain 100 tablet      Losartan Potassium (COZAAR PO) Take 50 mg by mouth daily       NORTRIPTYLINE HCL PO Take 50 mg by mouth daily       aspirin 81 MG EC tablet Take one tablet daily 90 tablet 3     Docusate Sodium (DOC-Q-LACE PO) Take 100 mg by mouth 2 times daily       nitroglycerin (NITROSTAT) 0.4 MG SL tablet Place 1 tablet (0.4 mg) under the tongue every 5 minutes as needed for chest pain 25 tablet 1     polyethylene glycol (MIRALAX/GLYCOLAX) powder Take 1 capful by mouth daily as needed        QUEtiapine (SEROQUEL) 25 MG tablet Take 0.25 tablets (6.25 mg) by mouth 2 times daily as needed (Patient not taking: Reported on 7/19/2017)       carvedilol (COREG) 25 MG tablet Take 1 tablet (25 mg) by mouth 2 times daily 60 tablet        ALLERGIES     Allergies   Allergen Reactions     Lisinopril        PAST MEDICAL HISTORY:  Past Medical History:   Diagnosis Date     Acute chest pain 10/23/2015     CAD (coronary artery disease)      Essential hypertension 10/23/2015     History of CVA (cerebrovascular accident) 10/23/2015     HLD (hyperlipidemia)      HTN (hypertension)      NSTEMI (non-ST elevated myocardial infarction) (H) 10/23/2015     Post PTCA 10-    Successful PCI of culprit proximal to mid RCA with placement of a     Post PTCA 11-6-2015    pci of complex mid CFX-hector       PAST SURGICAL HISTORY:  Past Surgical History:   Procedure Laterality Date     CHOLECYSTECTOMY       GENITOURINARY SURGERY      prostate removal      HEART CATH LEFT HEART CATH  10/12/2015    PCI w/ HECTOR to RCA     HEART CATH RIGHT AND  "LEFT HEART CATH  11/6/2015    PCI w/ LEXI to mid-CFX       FAMILY HISTORY:  Family History   Problem Relation Age of Onset     Unknown/Adopted No family hx of        SOCIAL HISTORY:  Social History     Social History     Marital status:      Spouse name: N/A     Number of children: N/A     Years of education: N/A     Social History Main Topics     Smoking status: Never Smoker     Smokeless tobacco: None     Alcohol use No     Drug use: No     Sexual activity: Not Asked     Other Topics Concern     Caffeine Concern No     seldom     Sleep Concern Yes     does not sleep well     Special Diet Yes     lower sodium and fat     Exercise No     Social History Narrative       Review of Systems:  Skin:  Negative       Eyes:  Positive for glasses no changes in vision  ENT:  Positive for hearing loss    Respiratory:  Negative       Cardiovascular:    Positive for;chest pain;fatigue off and on pains in chest ,   Gastroenterology: Positive for constipation    Genitourinary:  Positive for dysuria    Musculoskeletal:  Positive for   leg pain  Neurologic:  Positive for headaches;stroke    Psychiatric:  Positive for depression    Heme/Lymph/Imm:  Negative      Endocrine:  Negative        Physical Exam:  Vitals: /77  Pulse 80  Ht 1.829 m (6' 0.01\")  Wt 99.8 kg (220 lb)  BMI 29.83 kg/m2    On examination  Vitals- see chart  General- appears comfortable  Neck- normal JVP, no bruit  Cardiovascular system- s1s2 normal, no m/r/g  Respiratory system- CTA b/l  Abdomen- soft, non tender  Extremities- no pedal edema  Neurological - alert, oriented  Psych- normal affect  HEENT- no pallor          LAMAR Avalos MD   [facility]  [address]  [place] [address]              "

## 2017-11-07 ENCOUNTER — TRANSFERRED RECORDS (OUTPATIENT)
Dept: HEALTH INFORMATION MANAGEMENT | Facility: CLINIC | Age: 82
End: 2017-11-07

## 2018-05-08 DIAGNOSIS — I21.4 NSTEMI (NON-ST ELEVATED MYOCARDIAL INFARCTION) (H): ICD-10-CM

## 2018-05-18 ENCOUNTER — HOSPITAL ENCOUNTER (INPATIENT)
Facility: CLINIC | Age: 83
LOS: 13 days | Discharge: NURSING, REHAB OR LONG-TERM CARE | DRG: 552 | End: 2018-06-01
Attending: EMERGENCY MEDICINE | Admitting: INTERNAL MEDICINE
Payer: COMMERCIAL

## 2018-05-18 ENCOUNTER — APPOINTMENT (OUTPATIENT)
Dept: GENERAL RADIOLOGY | Facility: CLINIC | Age: 83
DRG: 552 | End: 2018-05-18
Attending: EMERGENCY MEDICINE
Payer: COMMERCIAL

## 2018-05-18 DIAGNOSIS — F02.80 LATE ONSET ALZHEIMER'S DISEASE WITHOUT BEHAVIORAL DISTURBANCE (H): ICD-10-CM

## 2018-05-18 DIAGNOSIS — S22.080A T12 COMPRESSION FRACTURE (H): Primary | ICD-10-CM

## 2018-05-18 DIAGNOSIS — S22.080A CLOSED WEDGE COMPRESSION FRACTURE OF TWELFTH THORACIC VERTEBRA, INITIAL ENCOUNTER: ICD-10-CM

## 2018-05-18 DIAGNOSIS — N39.0 URINARY TRACT INFECTION WITHOUT HEMATURIA, SITE UNSPECIFIED: ICD-10-CM
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
## 2018-05-18 DIAGNOSIS — G30.1 LATE ONSET ALZHEIMER'S DISEASE WITHOUT BEHAVIORAL DISTURBANCE (H): ICD-10-CM

## 2018-05-18 DIAGNOSIS — I10 ESSENTIAL HYPERTENSION: ICD-10-CM

## 2018-05-18 DIAGNOSIS — R41.0 ACUTE DELIRIUM: ICD-10-CM

## 2018-05-18 LAB
ALBUMIN UR-MCNC: NEGATIVE MG/DL
ANION GAP SERPL CALCULATED.3IONS-SCNC: 8 MMOL/L (ref 3–14)
APPEARANCE UR: CLEAR
BACTERIA #/AREA URNS HPF: ABNORMAL /HPF
BASOPHILS # BLD AUTO: 0 10E9/L (ref 0–0.2)
BASOPHILS NFR BLD AUTO: 0.3 %
BILIRUB UR QL STRIP: NEGATIVE
BUN SERPL-MCNC: 23 MG/DL (ref 7–30)
CALCIUM SERPL-MCNC: 8.9 MG/DL (ref 8.5–10.1)
CHLORIDE SERPL-SCNC: 105 MMOL/L (ref 94–109)
CO2 SERPL-SCNC: 23 MMOL/L (ref 20–32)
COLOR UR AUTO: YELLOW
CREAT SERPL-MCNC: 0.97 MG/DL (ref 0.66–1.25)
DIFFERENTIAL METHOD BLD: ABNORMAL
EOSINOPHIL # BLD AUTO: 0.1 10E9/L (ref 0–0.7)
EOSINOPHIL NFR BLD AUTO: 0.4 %
ERYTHROCYTE [DISTWIDTH] IN BLOOD BY AUTOMATED COUNT: 12.9 % (ref 10–15)
GFR SERPL CREATININE-BSD FRML MDRD: 74 ML/MIN/1.7M2
GLUCOSE SERPL-MCNC: 247 MG/DL (ref 70–99)
GLUCOSE UR STRIP-MCNC: >499 MG/DL
HCT VFR BLD AUTO: 48.4 % (ref 40–53)
HGB BLD-MCNC: 16.5 G/DL (ref 13.3–17.7)
HGB UR QL STRIP: ABNORMAL
HYALINE CASTS #/AREA URNS LPF: 1 /LPF (ref 0–2)
IMM GRANULOCYTES # BLD: 0.1 10E9/L (ref 0–0.4)
IMM GRANULOCYTES NFR BLD: 0.6 %
KETONES UR STRIP-MCNC: 5 MG/DL
LEUKOCYTE ESTERASE UR QL STRIP: ABNORMAL
LYMPHOCYTES # BLD AUTO: 0.9 10E9/L (ref 0.8–5.3)
LYMPHOCYTES NFR BLD AUTO: 7.9 %
MCH RBC QN AUTO: 32 PG (ref 26.5–33)
MCHC RBC AUTO-ENTMCNC: 34.1 G/DL (ref 31.5–36.5)
MCV RBC AUTO: 94 FL (ref 78–100)
MONOCYTES # BLD AUTO: 0.8 10E9/L (ref 0–1.3)
MONOCYTES NFR BLD AUTO: 6.6 %
MUCOUS THREADS #/AREA URNS LPF: PRESENT /LPF
NEUTROPHILS # BLD AUTO: 10.1 10E9/L (ref 1.6–8.3)
NEUTROPHILS NFR BLD AUTO: 84.2 %
NITRATE UR QL: POSITIVE
NRBC # BLD AUTO: 0 10*3/UL
NRBC BLD AUTO-RTO: 0 /100
PH UR STRIP: 5 PH (ref 5–7)
PLATELET # BLD AUTO: 211 10E9/L (ref 150–450)
POTASSIUM SERPL-SCNC: 5.2 MMOL/L (ref 3.4–5.3)
RBC # BLD AUTO: 5.15 10E12/L (ref 4.4–5.9)
RBC #/AREA URNS AUTO: 79 /HPF (ref 0–2)
SODIUM SERPL-SCNC: 136 MMOL/L (ref 133–144)
SOURCE: ABNORMAL
SP GR UR STRIP: 1.02 (ref 1–1.03)
SQUAMOUS #/AREA URNS AUTO: <1 /HPF (ref 0–1)
UROBILINOGEN UR STRIP-MCNC: 4 MG/DL (ref 0–2)
WBC # BLD AUTO: 12 10E9/L (ref 4–11)
WBC #/AREA URNS AUTO: 58 /HPF (ref 0–5)

## 2018-05-18 PROCEDURE — 72100 X-RAY EXAM L-S SPINE 2/3 VWS: CPT

## 2018-05-18 PROCEDURE — 99285 EMERGENCY DEPT VISIT HI MDM: CPT | Mod: 25

## 2018-05-18 PROCEDURE — 71045 X-RAY EXAM CHEST 1 VIEW: CPT

## 2018-05-18 PROCEDURE — 87186 SC STD MICRODIL/AGAR DIL: CPT | Performed by: INTERNAL MEDICINE

## 2018-05-18 PROCEDURE — 80048 BASIC METABOLIC PNL TOTAL CA: CPT | Performed by: EMERGENCY MEDICINE

## 2018-05-18 PROCEDURE — 85025 COMPLETE CBC W/AUTO DIFF WBC: CPT | Performed by: EMERGENCY MEDICINE

## 2018-05-18 PROCEDURE — 72072 X-RAY EXAM THORAC SPINE 3VWS: CPT

## 2018-05-18 PROCEDURE — 51798 US URINE CAPACITY MEASURE: CPT

## 2018-05-18 PROCEDURE — 81001 URINALYSIS AUTO W/SCOPE: CPT | Performed by: EMERGENCY MEDICINE

## 2018-05-18 PROCEDURE — 87086 URINE CULTURE/COLONY COUNT: CPT | Performed by: INTERNAL MEDICINE

## 2018-05-18 PROCEDURE — 87088 URINE BACTERIA CULTURE: CPT | Performed by: INTERNAL MEDICINE

## 2018-05-18 PROCEDURE — 96365 THER/PROPH/DIAG IV INF INIT: CPT

## 2018-05-18 PROCEDURE — 25000128 H RX IP 250 OP 636: Performed by: EMERGENCY MEDICINE

## 2018-05-18 RX ORDER — CEFTRIAXONE 1 G/1
1 INJECTION, POWDER, FOR SOLUTION INTRAMUSCULAR; INTRAVENOUS ONCE
Status: COMPLETED | OUTPATIENT
Start: 2018-05-18 | End: 2018-05-18

## 2018-05-18 RX ADMIN — SODIUM CHLORIDE 1000 ML: 9 INJECTION, SOLUTION INTRAVENOUS at 19:12

## 2018-05-18 RX ADMIN — CEFTRIAXONE SODIUM 1 G: 1 INJECTION, POWDER, FOR SOLUTION INTRAMUSCULAR; INTRAVENOUS at 17:29

## 2018-05-18 ASSESSMENT — ENCOUNTER SYMPTOMS
PALPITATIONS: 0
APPETITE CHANGE: 1
RESPIRATORY NEGATIVE: 1
ABDOMINAL PAIN: 0
DIFFICULTY URINATING: 1
VOMITING: 0
NAUSEA: 0
CONSTIPATION: 1
BACK PAIN: 1
NECK STIFFNESS: 0
WOUND: 0
HEADACHES: 1
NECK PAIN: 0
DIARRHEA: 0
NUMBNESS: 1

## 2018-05-18 NOTE — IP AVS SNAPSHOT
MRN:[id number]                      After Visit Summary   5/18/2018    Adam Huber    MRN: [id number]           Thank you!     Thank you for choosing [facility] for your care. Our goal is always to provide you with excellent care. Hearing back from our patients is one way we can continue to improve our services. Please take a few minutes to complete the written survey that you may receive in the mail after you visit. If you would like to speak to someone directly about your visit please contact Patient Relations at [phone]. Thank you!          Patient Information     Date Of Birth          1935        Designated Caregiver       Most Recent Value    Caregiver    Will someone help with your care after discharge? yes    Name of designated caregiver Anya     Phone number of caregiver [phone]    Caregiver address [place]      About your hospital stay     You were admitted on:  May 19, 2018 You last received care in the:  [place] [address] [facility]    You were discharged on:  June 1, 2018       Who to Call     For medical emergencies, please call 911.  For non-urgent questions about your medical care, please call your primary care provider or clinic, [phone]          Attending Provider     Provider Specialty    Liza Melissa MD Emergency Medicine    [facility],  Internal Medicine       Primary Care Provider Office Phone # Fax #    Oralia Forrest [phone]      After Care Instructions     Activity       Your activity upon discharge:    - continue to wear TLSO at all times when out of bed  - Repeat XR of thoracic spine AP and Lateral in 6 weeks.   - Return to clinic following repeat XR spine in 6 weeks   - Call the [facility] for any questions or concerns during interim, at [phone]  - Seek medical attention immediately if any paresthesias, weakness, gait instability, bowel/bladder incontinence,  new pain.            Activity - Up with assistive device           Activity - Up with nursing assistance           Additional Discharge Instructions       Wear TLSO whenever out of bed.            Advance Diet as Tolerated       Follow this diet upon discharge:       High Consistent CHO Diet            General info for SNF       Length of Stay Estimate: Short Term Care: Estimated # of Days <30  Condition at Discharge: Stable  Level of care:skilled   Rehabilitation Potential: Fair  Admission H&P remains valid and up-to-date: Yes  Recent Chemotherapy: N/A  Use Nursing Home Standing Orders: Yes            Mantoux instructions       Give two-step Mantoux (PPD) Per Facility Policy Yes                  Follow-up Appointments     Follow Up and recommended labs and tests       As needed with NH Physician.            Follow-up and recommended labs and tests        Please follow up at the [facility] in 6 weeks with a thoracic xray .  Please call the clinic at [phone] to schedule your appointment with Sangeetha Mireles CNP or Sachi Tolentino CNP                  Additional Services     Occupational Therapy Adult Consult       Evaluate and treat as clinically indicated.    Reason:   Generalized weakness, confusion.            Physical Therapy Adult Consult       Evaluate and treat as clinically indicated.    Reason:  Generalized weakness, confusion.                  Future tests that were ordered for you     XR Thoracic Spine 2 Views                 Pending Results     No orders found from 5/16/2018 to 5/19/2018.            Statement of Approval     Ordered          06/01/18 1244  I have reviewed and agree with all the recommendations and orders detailed in this document.  EFFECTIVE NOW     Approved and electronically signed by:  Papo Lopez MD           05/30/18 2564  I have reviewed and agree with all the recommendations and orders detailed in this document.  EFFECTIVE NOW     Approved and electronically  "signed by:  Papo Lopez MD             Admission Information     Date & Time Provider Department Dept. Phone    2018 Jose Manuel Wade, DO [place] [address] [facility] [phone]      Your Vitals Were     Blood Pressure Pulse Temperature Respirations Height Weight    165/73 (BP Location: Right arm) 71 97  F (36.1  C) (Oral) 16 1.829 m (6') 101.5 kg (223 lb 12.3 oz)    Pulse Oximetry BMI (Body Mass Index)                94% 30.35 kg/m2          [employer] Information     [employer] lets you send messages to your doctor, view your test results, renew your prescriptions, schedule appointments and more. To sign up, go to [email or web address] . Click on \"Log in\" on the left side of the screen, which will take you to the Welcome page. Then click on \"Sign up Now\" on the right side of the page.     You will be asked to enter the access code listed below, as well as some personal information. Please follow the directions to create your username and password.     Your access code is: [email or web address]  Expires: 2018  7:53 AM     Your access code will  in 90 days. If you need help or a new code, please call your [place] clinic or [phone].        Care EveryWhere ID     This is your Care EveryWhere ID. This could be used by other organizations to access your [place] medical records  [id number]        Equal Access to Services     BOB VALENZUELA AH: Hadii chintan beltrán hadkobio Socarissaali, waaxda luqadaha, qaybta kaalmada marcia, belén nolasco ademino keating . So [facility] [phone].    ATENCIÓN: Si habla español, tiene a banks disposición servicios gratuitos de asistencia lingüística. Llame al [phone].    We comply with applicable federal civil rights laws and [place] laws. We do not discriminate on the basis of race, color, national origin, age, disability, sex, sexual orientation, or gender identity.               Review of your medicines      START taking        Dose / Directions    carvedilol " 25 MG tablet   Commonly known as:  COREG   Used for:  Essential hypertension        Dose:  25 mg   Take 1 tablet (25 mg) by mouth 2 times daily (with meals)   Quantity:  60 tablet   Refills:  0       gabapentin 100 MG capsule   Commonly known as:  NEURONTIN        Dose:  100 mg   Take 1 capsule (100 mg) by mouth 3 times daily   Quantity:  90 capsule   Refills:  0       * QUEtiapine 25 MG tablet   Commonly known as:  SEROquel        Dose:  25 mg   Take 1 tablet (25 mg) by mouth At Bedtime   Quantity:  60 tablet   Refills:  0       * QUEtiapine 25 MG tablet   Commonly known as:  SEROquel        Dose:  6.25 mg   Take 0.25 tablets (6.25 mg) by mouth 2 times daily (with meals)   Quantity:  60 tablet   Refills:  0       traMADol 50 MG tablet   Commonly known as:  ULTRAM        Dose:  50 mg   Take 1 tablet (50 mg) by mouth every 4 hours as needed for moderate pain   Quantity:  10 tablet   Refills:  0       * Notice:  This list has 2 medication(s) that are the same as other medications prescribed for you. Read the directions carefully, and ask your doctor or other care provider to review them with you.      CONTINUE these medicines which may have CHANGED, or have new prescriptions. If we are uncertain of the size of tablets/capsules you have at home, strength may be listed as something that might have changed.        Dose / Directions    losartan 100 MG tablet   Commonly known as:  COZAAR   This may have changed:  medication strength   Used for:  Essential hypertension        Dose:  50 mg   Take 0.5 tablets (50 mg) by mouth daily   Quantity:  30 tablet   Refills:  0       nortriptyline 50 MG capsule   Commonly known as:  PAMELOR   This may have changed:    - medication strength  - how much to take        Dose:  50 mg   Take 1 capsule (50 mg) by mouth At Bedtime   Quantity:  30 capsule   Refills:  0         CONTINUE these medicines which have NOT CHANGED        Dose / Directions    acetaminophen 325 MG tablet   Commonly  known as:  TYLENOL   Used for:  History of CVA (cerebrovascular accident)        Dose:  650 mg   Take 2 tablets (650 mg) by mouth every 4 hours as needed for mild pain   Quantity:  100 tablet   Refills:  0       aspirin 81 MG EC tablet   Used for:  NSTEMI (non-ST elevated myocardial infarction) (H)        Take one tablet daily   Quantity:  90 tablet   Refills:  0       melatonin 3 MG tablet        Dose:  3-6 mg   Take 3-6 mg by mouth At Bedtime   Refills:  0       nitroGLYcerin 0.4 MG sublingual tablet   Commonly known as:  NITROSTAT   Used for:  NSTEMI (non-ST elevated myocardial infarction) (H), Essential hypertension, Postsurgical percutaneous transluminal coronary angioplasty status        Dose:  0.4 mg   Place 1 tablet (0.4 mg) under the tongue every 5 minutes as needed for chest pain   Quantity:  25 tablet   Refills:  1       olopatadine HCl 0.2 % Soln   Commonly known as:  PATADAY        Dose:  1 drop   Place 1 drop into both eyes daily as needed   Refills:  0       polyethylene glycol powder   Commonly known as:  MIRALAX/GLYCOLAX        Dose:  1 capful   Take 1 capful by mouth daily as needed   Refills:  0            Where to get your medicines      Some of these will need a paper prescription and others can be bought over the counter. Ask your nurse if you have questions.     You don't need a prescription for these medications     carvedilol 25 MG tablet    gabapentin 100 MG capsule    losartan 100 MG tablet    nortriptyline 50 MG capsule    QUEtiapine 25 MG tablet    QUEtiapine 25 MG tablet         Information about where to get these medications is not yet available     ! Ask your nurse or doctor about these medications     traMADol 50 MG tablet                Protect others around you: Learn how to safely use, store and throw away your medicines at www.disposemymeds.org.        Information about OPIOIDS     PRESCRIPTION OPIOIDS: WHAT YOU NEED TO KNOW   You have a prescription for an opioid (narcotic)  pain medicine. Opioids can cause addiction. If you have a history of chemical dependency of any type, you are at a higher risk of becoming addicted to opioids. Only take this medicine after all other options have been tried. Take it for as short a time and as few doses as possible.     Do not:    Drive. If you drive while taking these medicines, you could be arrested for driving under the influence (DUI).    Operate heavy machinery    Do any other dangerous activities while taking these medicines.     Drink any alcohol while taking these medicines.      Take with any other medicines that contain acetaminophen. Read all labels carefully. Look for the word  acetaminophen  or  Tylenol.  Ask your pharmacist if you have questions or are unsure.    Store your pills in a secure place, locked if possible. We will not replace any lost or stolen medicine. If you don t finish your medicine, please throw away (dispose) as directed by your pharmacist. The [place] Pollution [employer] has more information about safe disposal: https://www.pca.[facility].[place].us/living-green/managing-unwanted-medications    All opioids tend to cause constipation. Drink plenty of water and eat foods that have a lot of fiber, such as fruits, vegetables, prune juice, apple juice and high-fiber cereal. Take a laxative (Miralax, milk of magnesia, Colace, Senna) if you don t move your bowels at least every other day.              Medication List: This is a list of all your medications and when to take them. Check marks below indicate your daily home schedule. Keep this list as a reference.      Medications           Morning Afternoon Evening Bedtime As Needed    acetaminophen 325 MG tablet   Commonly known as:  TYLENOL   Take 2 tablets (650 mg) by mouth every 4 hours as needed for mild pain   Last time this was given:  975 mg on 6/1/2018  8:11 AM                                aspirin 81 MG EC tablet   Take one tablet daily   Last time this was given:  81  mg on 6/1/2018  8:11 AM                                carvedilol 25 MG tablet   Commonly known as:  COREG   Take 1 tablet (25 mg) by mouth 2 times daily (with meals)   Last time this was given:  25 mg on 6/1/2018  8:11 AM                                gabapentin 100 MG capsule   Commonly known as:  NEURONTIN   Take 1 capsule (100 mg) by mouth 3 times daily   Last time this was given:  100 mg on 6/1/2018  8:12 AM                                losartan 100 MG tablet   Commonly known as:  COZAAR   Take 0.5 tablets (50 mg) by mouth daily   Last time this was given:  100 mg on 6/1/2018  8:11 AM                                melatonin 3 MG tablet   Take 3-6 mg by mouth At Bedtime   Last time this was given:  6 mg on 5/31/2018  9:27 PM                                nitroGLYcerin 0.4 MG sublingual tablet   Commonly known as:  NITROSTAT   Place 1 tablet (0.4 mg) under the tongue every 5 minutes as needed for chest pain                                nortriptyline 50 MG capsule   Commonly known as:  PAMELOR   Take 1 capsule (50 mg) by mouth At Bedtime   Last time this was given:  50 mg on 5/31/2018  9:26 PM                                olopatadine HCl 0.2 % Soln   Commonly known as:  PATADAY   Place 1 drop into both eyes daily as needed                                polyethylene glycol powder   Commonly known as:  MIRALAX/GLYCOLAX   Take 1 capful by mouth daily as needed                                * QUEtiapine 25 MG tablet   Commonly known as:  SEROquel   Take 1 tablet (25 mg) by mouth At Bedtime   Last time this was given:  6.25 mg on 6/1/2018  8:12 AM                                * QUEtiapine 25 MG tablet   Commonly known as:  SEROquel   Take 0.25 tablets (6.25 mg) by mouth 2 times daily (with meals)   Last time this was given:  6.25 mg on 6/1/2018  8:12 AM                                traMADol 50 MG tablet   Commonly known as:  ULTRAM   Take 1 tablet (50 mg) by mouth every 4 hours as needed for moderate  pain   Last time this was given:  50 mg on 5/29/2018  6:52 PM                                * Notice:  This list has 2 medication(s) that are the same as other medications prescribed for you. Read the directions carefully, and ask your doctor or other care provider to review them with you.

## 2018-05-18 NOTE — ED PROVIDER NOTES
"  History     Chief Complaint:  Fall and Back Pain    History limited secondary to the language barrier subsequently provided by the    HPI   Adam Huber is a 82 year old male who presents after a fall last night (12 hours prior to arrival). The patient's grandson states he fell backwards after standing up from bed, falling to the floor and striking his back and head. No loss of consciousness. He has been \"having trouble with his legs like something is pulling him backwards\" for the last week, and it takes great concentration for him to walk. 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 No other new neuro sxs.. There have been no changes to these symptoms. He states his back hurt \"in his kidneys\" after the fall, and he continues to have reproducible pain with movement of his back. Breathing causes no pain. He also has not urinated since the fall, but feels he needs to here. He normally has no problem with urination. His wife states he has been drinking less than normal. No BM for the last 3 days, but this is normal. The patient denies loss of consciousness, abdominal pain, and states no other concerns at this time.      Allergies:  Lisinopril     Medications:    acetaminophen (TYLENOL) 325 MG tablet  aspirin 81 MG EC tablet  carvedilol (COREG) 25 MG tablet  Docusate Sodium (DOC-Q-LACE PO)  Losartan Potassium (COZAAR PO)  melatonin 1 MG TABS tablet  nitroglycerin (NITROSTAT) 0.4 MG SL tablet  NORTRIPTYLINE HCL PO  polyethylene glycol (MIRALAX/GLYCOLAX) powder  QUEtiapine (SEROQUEL) 25 MG tablet  ROSUVASTATIN CALCIUM PO  traMADol (ULTRAM) 50 MG tablet     Past Medical History:    Acute chest pain   CAD (coronary artery disease)   Essential hypertension   History of CVA (cerebrovascular accident)   HLD (hyperlipidemia)   HTN (hypertension)   NSTEMI (non-ST elevated " myocardial infarction) (H)  Post PTCA     Past Surgical History:    Cholecystectomy  Prostate removal  Heart cath left 10/12/2015  Heart cath right and left 11/6/2015    Family History:    Adopted/unknown     Social History:  Presents to the emergency department with his wife and grandson.    Marital Status:     Smoking status: Never smoker  Alcohol use: No     Review of Systems   Constitutional: Positive for appetite change (decreased liquids).   HENT: Negative.    Respiratory: Negative.    Cardiovascular: Negative for chest pain, palpitations and leg swelling.   Gastrointestinal: Positive for constipation. Negative for abdominal pain, diarrhea, nausea and vomiting.   Genitourinary: Positive for decreased urine volume and difficulty urinating.   Musculoskeletal: Positive for back pain and gait problem. Negative for neck pain and neck stiffness.   Skin: Negative for wound.   Neurological: Positive for numbness (chronic) and headaches. Negative for syncope.   All other systems reviewed and are negative.    Physical Exam     Patient Vitals for the past 24 hrs:   BP Temp Temp src Heart Rate Resp SpO2   05/18/18 2015 168/89 - - - - -   05/18/18 2000 (!) 167/93 - - - - 93 %   05/18/18 1830 164/80 - - - - 93 %   05/18/18 1815 160/80 - - - - 92 %   05/18/18 1800 156/78 - - - - 92 %   05/18/18 1730 161/82 - - - - 93 %   05/18/18 1715 179/88 - - - - 92 %   05/18/18 1645 - - - - - 93 %   05/18/18 1630 151/76 - - - - 93 %   05/18/18 1615 159/82 - - - - 91 %   05/18/18 1545 149/84 - - - - 96 %   05/18/18 1438 144/73 97.7  F (36.5  C) Temporal 92 24 94 %      Physical Exam  General/Appearance: appears stated age, well-groomed, appears mildly uncomfortable especially with movement  Eyes: EOMI, no scleral injection, no icterus  ENT: MMM  Neck: supple, nl ROM, no stiffness  Cardiovascular: RRR, nl S1S2, no m/r/g, 2+ pulses in all 4 extremities, cap refill <2sec  Respiratory: CTAB, good air movement throughout, no  wheezes/rhonchi/rales, no increased WOB, no retractions  Back: no lesions, midline lumbar>thoracic ttp with reproducible discomfort over bilateral flanks and paraspinous mm to lumbar region  GI: abd soft, non-distended though slight fullness in suprapubic region, nttp,  no HSM, no rebound, no guarding, nl BS  MSK: HERNANDEZ, good tone, no bony abnormality  Skin: warm and well-perfused, no rash, no edema, no ecchymosis, nl turgor  Neuro: GCS 15, alert and oriented, no gross focal neuro deficits -- nl strenth  Psych: interacts appropriately  Heme: no petechia, no purpura, no active bleeding        Emergency Department Course     Imaging:  Radiology findings were communicated with the patient and family and admitting MD who voiced understanding of the findings.   XR Chest 1 View   Final Result   IMPRESSION: Minimal linear scarring or atelectasis in left base. No   infiltrates. Heart size and pulmonary vasculature are normal. No   evidence for pneumothorax.      JENNY DOTY MD      Thoracic spine XR, 3 views   Preliminary Result   IMPRESSION: Minimal anterior wedging of T12, fracture here not   excluded. Otherwise no acute process demonstrated.      Lumbar spine XR, 2-3 views   Preliminary Result   IMPRESSION: Very mild anterior wedging of T12, fracture here are not   excluded. Otherwise no acute process demonstrated        Laboratory:  Laboratory findings were communicated with the patient, family and Admitting MD who voiced understanding of the findings.  Labs Ordered and Resulted from Time of ED Arrival Up to the Time of Departure from the ED   CBC WITH PLATELETS DIFFERENTIAL - Abnormal; Notable for the following:        Result Value    WBC 12.0 (*)     Absolute Neutrophil 10.1 (*)     All other components within normal limits   BASIC METABOLIC PANEL - Abnormal; Notable for the following:     Glucose 247 (*)     All other components within normal limits   ROUTINE UA WITH MICROSCOPIC - Abnormal; Notable for the following:      Glucose Urine >499 (*)     Ketones Urine 5 (*)     Blood Urine Moderate (*)     Urobilinogen mg/dL 4.0 (*)     Nitrite Urine Positive (*)     Leukocyte Esterase Urine Moderate (*)     WBC Urine 58 (*)     RBC Urine 79 (*)     Bacteria Urine Many (*)     Mucous Urine Present (*)     All other components within normal limits     Interventions:  Medications   cefTRIAXone (ROCEPHIN) 1 g vial to attach to  mL bag for ADULTS or NS 50 mL bag for PEDS (0 g Intravenous Stopped 5/18/18 1801)   0.9% sodium chloride BOLUS (1,000 mLs Intravenous New Bag 5/18/18 1912)      Emergency Department Course:  Nursing notes and vitals reviewed.  I performed an exam of the patient as documented above.   1840: Patient rechecked and updated using an .      2125: Patient rechecked and updated. Patient was able to ambulate with use of a walker. He and his wife feel he should come in.   2204: I spoke with Dr. Wade of the hospitalist service regarding patient's presentation, findings, and plan of care.  I discussed the treatment plan with the patient. They expressed understanding of this plan and consented to admission. I discussed the patient with Dr. Wade, who will admit the patient to a monitored bed for further evaluation and treatment.     I personally reviewed the laboratory and imaging results with the patient and his family and answered all related questions prior to admission.     Impression & Plan      Medical Decision Making:   Adam Huber is a 82 year old male who presents to the emergency department for evaluation of pain following a mechanical fall.  There is no chest pain, lightheadedness, shortness of breath, palpitations prior to or following the fall.  He had significant increased weakness, which led to difficulty fully standing up from his bed.  He did hit his head he thinks however has had no headache, dizziness, neurologic abnormalities.  He has no neurologic deficit on physical exam.  I  have low suspicion for intracranial bleed or fracture that would benefit from imaging.  He did have significant back pain in both midline as well as in his bilateral flanks.  I was concerned for possible kidney injury from the fall, fracture.  Additionally complained of inability to urinate since the fall.  He did not have any significant abdominal pain that did have a little bit of suprapubic fullness.  Neurologically he was intact to his lower extremities.  I considered cauda equina however without significant fracture feel this to be very unlikely.  Similarly the urine came back as grossly positive for a UTI.  I suspect that this is the cause of both his increasing weakness and mechanical fall, as well as his bladder retention.  He has been started on ceftriaxone.  I have low concern at this point that there is kidney injury given that there was not significant louisa blood in the urine.  X-rays do show a likely T12 compression fracture, also likely contributing to the pain.  Despite pain control, fluids, IV antibiotics the patient was really too weak to get out of bed without a large amount of assistance that is not present at home.  He also was unable to ambulate well without a walker and normally does not use a walker.  He will be brought in the hospital for pain control, IV antibiotics, IV fluids.    Diagnosis:    ICD-10-CM    1. Urinary tract infection without hematuria, site unspecified N39.0    2. Closed wedge compression fracture of twelfth thoracic vertebra, initial encounter (H) S22.080A    3. Fall, initial encounter W19.XXXA       Disposition:  Admitted     Scribe Disclosure:  I, Stan Noland, am serving as a scribe at 3:11 PM on 5/18/2018 to document services personally performed by Liza Melissa*, based on my observations and the provider's statements to me.       Liza Melissa MD  05/18/18 3186

## 2018-05-18 NOTE — ED TRIAGE NOTES
Patient had a fall last night.  Complaining of low back pain that is hindering walking and ability to stand today.  Has been unable to get up and urinate today so hasn't urinated today at all but feels like he has to go.  Also complaining of bilateral flank pain.      ABCs intact.  Alert and oriented x 3.

## 2018-05-18 NOTE — IP AVS SNAPSHOT
` ` Patient Information     Patient Name Sex     Adam Elena ([id number]) Male 1935       Room Bed    1180 0350-42      Patient Demographics     Address Phone    [address]  [place] [address] [phone] (Home) *Preferred*      Patient Ethnicity & Race     Ethnic Group Patient Race    Green[place]ic White      Emergency Contact(s)     Name Relation Home Work Mobile    Reynaldo Bowers [phone]    KASH ELENA [phone] NONE [phone]      Documents on File        Status Date Received Description       Documents for the Patient    Affiliate Privacy placeholder   [email or web address]    Consent for Services - Hospital/Clinic Received () 10/09/15     Consent for EHR Access Received 10/09/15     Privacy Notice - [place] Received 10/09/15     Insurance Card Received () 10/09/15     External Medication Information Consent Accepted 10/26/15     Patient ID Received 18 [place] ID Lifetime    [facility] Specified Other       Consent for Services/Privacy Notice - Hospital/Clinic Received () 16     Insurance Card Received 17 HP    HIM MEL Authorization  16 [facility]    HIM MEL Authorization  10/06/16 [facility]    Consent for Services/Privacy Notice - Hospital/Clinic Received () 17     HIM MEL Authorization  17 [facility]    Consent for Services - Hospital and Clinic Received 18     HIE Auth Received 18     Insurance Card Received 18 hp    Insurance Card Received (Deleted) 16        Documents for the Encounter    CMS IM for Patient Signature Received 18     CMS IM for Patient Signature Received 18 2nd      Admission Information     Attending Provider Admitting Provider Admission Type Admission Date/Time    Jose Manuel Wade, DO Jose Manuel Wade, DO Emergency 18  1451    Discharge Date Hospital Service Auth/Cert Status Service Area      General Medicine Incomplete [facility]    Unit Room/Bed Admission Status        3 MEDICAL SURGICAL 0350/0350-01 Admission (Confirmed)       Admission     Complaint    UTI (urinary tract infection)      Hospital Account     Name Acct ID Class Status Primary Coverage    Adam Huber [id number] Inpatient [facility] [employer]            Guarantor Account (for Hospital Account #[id number])     Name Relation to Pt Service Area Active? Acct Type    Adam Huber  FCS Yes Personal/Family    Address Phone          [address]  [place] [address] [phone](H)  none(O)              Coverage Information (for Hospital Account #[id number])     F/O Payor/Plan Precert #    [employer]/[employer] [facility]     Subscriber Subscriber #    Adam Huber [id number]    Address Phone    [address]  [place] [address] [phone]

## 2018-05-18 NOTE — IP AVS SNAPSHOT
` `     [place] [address] MEDICAL SURGICAL: [phone]                 INTERAGENCY TRANSFER FORM - NOTES (H&P, Discharge Summary, Consults, Procedures, Therapies)   2018                    Hospital Admission Date: 2018  CHANI ELENA   : 1935  Sex: Male        Patient PCP Information     Provider PCP Type    [facility]         History & Physicals      H&P by Jose Manuel Wade DO at 2018  1:08 AM     Author:  Jose Manuel Wade DO Service:  Hospitalist Author Type:  Physician    Filed:  2018  1:08 AM Date of Service:  2018  1:08 AM Creation Time:  2018 12:59 AM    Status:  Signed :  Jose Manuel Wade DO (Physician)         [facility]  Hospitalist H&P    Name: Chani Elena      MRN: [id number]  YOB: 1935    Age: 82 year old  Date of admission: 2018  Primary care provider: Oralia Forrest            Assessment and Plan:   Chani Elena is a 82 year old male with a history of hypertension, hyperlipidemia, coronary artery disease, and stroke who presents with back pain after a fall.    1.  Urinary tract infection.  Continue IV ceftriaxone.  Continue IV fluids.  Add urine culture.    2.  T12 compression fracture.  Pain medications as needed.  Work with therapy.    3.  Fall.  Check CT scan of the head and neck for fractures.  Monitor on telemetry.  Work with physical therapy.  Pain medications as needed.    4.  Hyperglycemia.  Most recent hemoglobin A1c elevated at 7.1.  Start moderate consistent carbohydrate diet.  NovoLog sliding scale.    5.  Hypertension.  IV hydralazine as needed.  Coreg 12.5 mg twice a day.  Losartan 25 mg daily.  Doses will likely need to be adjusted once pharmacy has reconciled home medications.    6.  Atelectasis.  Use incentive spirometry.      Code status: Full code.  Admit to inpatient.  Prophylaxis: Pneumatic compression devices.              Chief Complaint:   Back pain.         History of  Present Illness:   Adam Huber is a 82 year old male who presents with back pain after a fall.  All information is obtained through , through chart review, and in discussion with Dr. Melissa of the emergency room.  Patient is complaining of back pain.  Apparently had a fall on 5/17/18.  Immediately having some pain in his back after fall.  Pain seems to have worsened on 5/18/18.  He presented to emergency room for further evaluation.  Pain medications at home are not helping pain.  Was having trouble ambulating because of pain.  Medical history is difficult to get from the patient at this time even with the help of .  Does not have any other complaints at this time.            Past Medical History:     Past Medical History:   Diagnosis Date     Acute chest pain 10/23/2015     CAD (coronary artery disease)      Essential hypertension 10/23/2015     History of CVA (cerebrovascular accident) 10/23/2015     HLD (hyperlipidemia)      HTN (hypertension)      NSTEMI (non-ST elevated myocardial infarction) (H) 10/23/2015     Post PTCA 10-    Successful PCI of culprit proximal to mid RCA with placement of a     Post PTCA 11-6-2015    pci of complex mid CFX-hector             Past Surgical History:     Past Surgical History:   Procedure Laterality Date     CHOLECYSTECTOMY       GENITOURINARY SURGERY      prostate removal      HEART CATH LEFT HEART CATH  10/12/2015    PCI w/ HECTOR to RCA     HEART CATH RIGHT AND LEFT HEART CATH  11/6/2015    PCI w/ HECTOR to mid-CFX             Social History:     Social History   Substance Use Topics     Smoking status: Never Smoker     Smokeless tobacco: Not on file     Alcohol use No             Family History:   The family history was fully reviewed and non-contributory in this case.         Allergies:     Allergies   Allergen Reactions     Lisinopril              Medications:     Prior to Admission medications    Medication Sig Last Dose Taking? Auth Provider    acetaminophen (TYLENOL) 325 MG tablet Take 2 tablets (650 mg) by mouth every 4 hours as needed for mild pain   Elias Boswell MD   aspirin 81 MG EC tablet Take one tablet daily   Eric Avalos MD   carvedilol (COREG) 25 MG tablet Take 1 tablet (25 mg) by mouth 2 times daily   Elias Bosewll MD   Docusate Sodium (DOC-Q-LACE PO) Take 100 mg by mouth 2 times daily   Reported, Patient   Losartan Potassium (COZAAR PO) Take 50 mg by mouth daily   Reported, Patient   melatonin 1 MG TABS tablet Take 1 tablet (1 mg) by mouth nightly as needed  Patient taking differently: Take 3 mg by mouth nightly as needed    Elias Boswell MD   nitroglycerin (NITROSTAT) 0.4 MG SL tablet Place 1 tablet (0.4 mg) under the tongue every 5 minutes as needed for chest pain   Laura Valerio, APRN CNP   NORTRIPTYLINE HCL PO Take 50 mg by mouth daily   Reported, Patient   polyethylene glycol (MIRALAX/GLYCOLAX) powder Take 1 capful by mouth daily as needed    Unknown, Entered By History   QUEtiapine (SEROQUEL) 25 MG tablet Take 0.25 tablets (6.25 mg) by mouth 2 times daily as needed  Patient not taking: Reported on 7/19/2017   Eveline Nix MD   ROSUVASTATIN CALCIUM PO Take 5 mg by mouth   Reported, Patient   traMADol (ULTRAM) 50 MG tablet Take 0.5 tablets (25 mg) by mouth 2 times daily as needed For headache   Eveline Nix MD             Review of Systems:   A Comprehensive greater than 10 system review of systems was carried out.  Pertinent positives and negatives are noted above.  Otherwise negative for contributory information.           Physical Exam:   Blood pressure 189/85, temperature 97.1  F (36.2  C), temperature source Oral, resp. rate 22, height 1.829 m (6'), weight 101.5 kg (223 lb 12.3 oz), SpO2 93 %.  Wt Readings from Last 1 Encounters:   05/19/18 101.5 kg (223 lb 12.3 oz)     Exam:  GENERAL: All information through .  No apparent distress. Awake, alert, and seems oriented to person and place.   Difficult to determine his orientation to time.  HEENT: Normocephalic, atraumatic. Extraocular movements intact.  CARDIOVASCULAR: Regular rate and rhythm without murmurs or rubs. No S3.  PULMONARY: Clear to auscultation bilaterally.  ABDOMINAL: Soft, non-tender, non-distended. Bowel sounds normoactive.   EXTREMITIES: No cyanosis or clubbing. No appreciable edema.  NEUROLOGICAL: CN 2-12 grossly intact, no focal neurological deficits.  DERMATOLOGICAL: No rash, ulcer, bruising, nor jaundice.          Data:       Laboratory:[KR1.1]    Recent Labs  Lab 05/18/18  1540   WBC 12.0*   HGB 16.5   HCT 48.4   MCV 94          Recent Labs  Lab 05/18/18  1540      POTASSIUM 5.2   CHLORIDE 105   CO2 23   ANIONGAP 8   *   BUN 23   CR 0.97   GFRESTIMATED 74   GFRESTBLACK 90   LUZ 8.9[KR1.2]     No results for input(s): CULT in the last 168 hours.    Imaging:  Recent Results (from the past 24 hour(s))   Lumbar spine XR, 2-3 views    Narrative    LUMBAR SPINE TWO-THREE VIEWS  5/18/2018 5:17 PM     HISTORY: Fall with midline pain    COMPARISON: None.    FINDINGS: Mild multilevel degenerative changes. Normal lumbar  lordosis. No spondylolysis or spondylolisthesis. Minimal anterior  wedging of T12, a very mild compression deformity would be difficult  to exclude in this setting. There are no worrisome bony lesions.      Impression    IMPRESSION: Very mild anterior wedging of T12, fracture here are not  excluded. Otherwise no acute process demonstrated   Thoracic spine XR, 3 views    Narrative    THORACIC SPINE THREE VIEWS 5/18/2018 5:17 PM     HISTORY: Fall with midline pain.    COMPARISON: None.    FINDINGS: Mild multilevel degenerative change. Unremarkable thoracic  kyphosis. Minimal anterior wedging of T12, fracture here would be  difficult to exclude. There are no destructive or worrisome sclerotic  bony lesions.      Impression    IMPRESSION: Minimal anterior wedging of T12, fracture here not  excluded. Otherwise  no acute process demonstrated.   XR Chest 1 View    Narrative    CHEST ONE VIEW  5/18/2018 5:19 PM     HISTORY: Fall. Pain in posterior lower ribs.    COMPARISON: 3/23/2017      Impression    IMPRESSION: Minimal linear scarring or atelectasis in left base. No  infiltrates. Heart size and pulmonary vasculature are normal. No  evidence for pneumothorax.    JENNY DOTY MD[KR1.1]          Revision History        User Key Date/Time User Provider Type Action    > KR1.2 5/19/2018  1:08 AM Jose Manuel Wade DO Physician Sign     KR1.1 5/19/2018 12:59 AM Jose Manuel Wade DO Physician                   Discharge Summaries     No notes of this type exist for this encounter.         Consult Notes      Consults by Alva Barrera APRN CNS at 5/21/2018 10:17 AM     Author:  Alva Barrera APRN CNS Service:  Pain Service Author Type:  Clinical Nurse Specialist    Filed:  5/29/2018 11:03 AM Date of Service:  5/21/2018 10:17 AM Creation Time:  5/21/2018  8:36 AM    Status:  Addendum :  Alva Barrera APRN CNS (Clinical Nurse Specialist)         [facility]  Pain Service Consultation   Text Page    Date of Admission:  5/18/2018    Assessment & Plan   Adam Huber is a 82 year old male who was admitted on 5/18/2018. I was asked by Hospitalist Babar Morfin DO to see the patient for[AM1.1] back pain, compression fracture[AM1.2].[AM1.1]    Patient resting in bed staring at the ceiling during assessment. His wife and daughter at bedside. Daughter acting as  for both parents indicates her father has been having hallucinations for the past day. In asking about previous opiate use his wife offered that he had morphine about seven years ago with a urological procedure and also had confusion. She was able to explain that he has chronic headaches for which he takes Nortriptyline nightly. She offered her concern that he is taking more medication as she has not known him to take  any medication for his heart.[AM1.3]       1)[AM1.1] Acute back pain s/p fall Thoracic spine X-ray demonstrate minimal anterior wedging of T12[AM1.2]    2)  Patient[AM1.1] does not have history of[AM1.2] chronic pain[AM1.1] and is not on[AM1.2] chronic opioid therapy   [place] [facility] database review:  No prescription in the past year   0 mg Daily Morphine Equivalent  Patient[AM1.1] is[AM1.2] opioid[AM1.1] naive[AM1.3].     Patient's opioid use thus far:[AM1.1] 5/19/2018 used 0.2 mg IV Dilaudid and 20 mg Oxycodone and yesterday use 12.5 mg Oxycodone[AM1.2] =[AM1.1] 34 to 18.75[AM1.2] mg Daily Morphine Equivalent    3)  Opioid induced side-effects:[AM1.1]  -Constipation[AM1.2] - No -[AM1.3] per family input[AM1.4]  -Nausea/Vomit[AM1.2] - No[AM1.3]  -Sedation[AM1.2] - patient having hallucinations[AM1.3]  -Urinary Retention[AM1.2]- no[AM1.3]    4[AM1.2]) Other/Related:[AM1.1]  History of CAD with non STEMI and LEXI x 2 and CVA[AM1.2]       PLAN:   1)[AM1.1]  Reasonable to rotate opiate given the patient is having hallucinations. Schedule Dilaudid 1 mg every 4 hours and use additional doses for pain flare.[AM1.3]     2)[AM1.1] Tylenol 975 mg TID[AM1.2]    3)  Multimodal Medication Therapy  Topical:[AM1.1] Lidocaine and Icy Hot patch[AM1.2]  NSAIDS:   Muscle Relaxants:  Adjuvants:[AM1.1]Atarax 25 mg every 6 hours prn[AM1.2]  Antidepressants[AM1.3]/anxiolytics:[AM1.1] Chronic use of[AM1.3] Nortriptyline 50 mg at bedtime[AM1.2] for chronic headache[AM1.3]  Opioids:[AM1.1] Dilaudid 1 mg every 4 hours and use additional doses for pain flare.[AM1.3]     4) Non-medication interventions[AM1.1]  TLSO at all time out of bed  Rest alternating with physical therapy[AM1.2]    5)Constipation Prophylaxis[AM1.1]  Senna-S at bedtime and[AM1.3] Shiraz lax prn[AM1.2]    6) DC safety[AM1.1]  -Patient discharge to a controlled setting for medication dispensing such as TCU     or  -Discharge with family member or friend managing pain  medications[AM1.3]    Time Spent on this Encounter   I spent[AM1.1] from 3:55 until 4:30 PM[AM1.3] in assessment of the patient and discussion with the family.  Another[AM1.1] 35[AM1.3] minutes in review of chart, documentation and discussion with the health care team.[AM1.1]    Alva Barrera MS, RN, CNS, APRN, ACHPN, FAACVPR  Pain and Palliative Care  Pager [phone]  Office [phone][AM1.2]     Reason for Consult   Reason for consult:[AM1.1] I was asked by[AM1.2] Hospitalist Babar Morfin DO to see the patient for[AM1.1] back pain, compression fracture[AM1.2].    Primary Care Physician   Primary Care Physician:Oralia Forrest  Pain Specialist:[AM1.1] None[AM1.3]    Chief Complaint[AM1.1]   Fall and Back Pain[AM1.5]    History is obtained from the electronic health record, patient's daughter and spouse[AM1.3]    History of Present Illness   Adam Huber is a 82 year old male who[AM1.1] was admitted on 5/18/2018 following a fall and admitted for back pain and UTI found to be Klebsiella pneumoniae and was started on antibiotics. The patient has been seen in consultation by Neurosurgery and TLSO brace recommended.[AM1.3]     CURRENT PAIN:[AM1.1]  Unfortunately the patient is having hallucinations and unable to give insight into current discomfort. Assumed to be in mid back given T12 fracture.[AM1.3]     Past Medical History   I have reviewed this patient's medical history and updated it with pertinent information if needed.   Past Medical History:   Diagnosis Date     Acute chest pain 10/23/2015     CAD (coronary artery disease)      Essential hypertension 10/23/2015     History of CVA (cerebrovascular accident) 10/23/2015     HLD (hyperlipidemia)      HTN (hypertension)      NSTEMI (non-ST elevated myocardial infarction) (H) 10/23/2015     Post PTCA 10-    Successful PCI of culprit proximal to mid RCA with placement of a     Post PTCA 11-6-2015    pci of complex mid CFX-hector       Past Surgical  History[AM1.1]   I have reviewed this patient's surgical history and updated it with pertinent information if needed.[AM1.2]  Past Surgical History:   Procedure Laterality Date     CHOLECYSTECTOMY       GENITOURINARY SURGERY      prostate removal      HEART CATH LEFT HEART CATH  10/12/2015    PCI w/ LEXI to RCA     HEART CATH RIGHT AND LEFT HEART CATH  11/6/2015    PCI w/ LEXI to mid-CFX[AM1.6]         Prior to Admission Medications   Prior to Admission Medications   Prescriptions Last Dose Informant Patient Reported? Taking?   Losartan Potassium (COZAAR PO) 5/17/2018 Spouse/Significant Other Yes No   Sig: Take 50 mg by mouth daily   Nortriptyline HCl (NORTRIPYTLINE HCL PO) 5/17/2018  Yes Yes   Sig: Take 100 mg by mouth At Bedtime   acetaminophen (TYLENOL) 325 MG tablet prn  No No   Sig: Take 2 tablets (650 mg) by mouth every 4 hours as needed for mild pain   aspirin 81 MG EC tablet 5/17/2018  No No   Sig: Take one tablet daily   melatonin 3 MG tablet 5/17/2018  Yes Yes   Sig: Take 3-6 mg by mouth At Bedtime    nitroglycerin (NITROSTAT) 0.4 MG SL tablet prn  No No   Sig: Place 1 tablet (0.4 mg) under the tongue every 5 minutes as needed for chest pain   olopatadine HCl (PATADAY) 0.2 % SOLN prn  Yes Yes   Sig: Place 1 drop into both eyes daily as needed   polyethylene glycol (MIRALAX/GLYCOLAX) powder prn Pharmacy Yes No   Sig: Take 1 capful by mouth daily as needed       Facility-Administered Medications: None     Allergies   Allergies   Allergen Reactions     Lisinopril        Social History[AM1.1]   I have reviewed this patient's social history and updated it with pertinent information if needed. Adam Huber[AM1.2]  reports that he has never smoked. He does not have any smokeless tobacco history on file. He reports that he does not drink alcohol or use illicit drugs.[AM1.6]    Family History[AM1.1]   I have reviewed this patient's family history and updated it with pertinent information if needed.[AM1.2]    Family History   Problem Relation Age of Onset     Unknown/Adopted No family hx of[AM1.6]      Family history of addiction[AM1.1]  Unable to obtain[AM1.2]    Review of Systems[AM1.1]   Patient unable to give insights due to hallucinations.[AM1.3]     Physical Exam   Temp:  [96.2  F (35.7  C)-98.3  F (36.8  C)] 97.4  F (36.3  C)  Heart Rate:  [77-83] 80  Resp:  [18] 18  BP: (114-151)/(57-81) 151/78  SpO2:  [92 %] 92 %  223 lbs 12.27 oz  GEN:[AM1.1]  Elderly well nourished confused male. Wife and daughter translating and both indicating he has been watching hallucinations above his head. A[AM1.3]ppears comfortable.  HEENT:  Normocephalic/atraumatic, no scleral icterus, no nasal discharge, mouth moist.  CV:  RRR, S1, S2; no murmurs or other irregularities noted.  +3 DP/PT pulses[AM1.1] bilaterally[AM1.3]; no edema BLE.  RESP:  Clear to auscultation bilaterally without rales/rhonchi/wheezing/retractions.  Symmetric chest rise on inhalation noted.  Normal respiratory effort.  ABD:  Rounded, soft, non-tender/non-distended.  +BS  EXT:[AM1.1]  Spontaneously moves all extremities, becomes irritated in asking to perform one step commands (per wife and daughter translating)[AM1.3] .     M/S:[AM1.1]   Mid back is t[AM1.3]sam to palpation[AM1.1] (patient grimaces with touch)[AM1.3]   SKIN:[AM1.1]  Warm and d[AM1.3]ry to touch, no exanthems noted in the visualized areas.    NEURO:[AM1.1] Deferred as patient does not follow commands.[AM1.3]  PAIN BEHAVIOR: C[AM1.1]onfused an d hallucinating.[AM1.3]   Psych:[AM1.1]  F[AM1.3]l[AM1.1]at[AM1.3] affect[AM1.1], tangential[AM1.3] conversati[AM1.1]on per family.[AM1.3]       Data   Results for orders placed or performed during the hospital encounter of 05/18/18   Lumbar spine XR, 2-3 views    Narrative    LUMBAR SPINE TWO-THREE VIEWS  5/18/2018 5:17 PM     HISTORY: Fall with midline pain    COMPARISON: None.    FINDINGS: Mild multilevel degenerative changes. Normal lumbar  lordosis.  No spondylolysis or spondylolisthesis. Minimal anterior  wedging of T12, a very mild compression deformity would be difficult  to exclude in this setting. There are no worrisome bony lesions.      Impression    IMPRESSION: Very mild anterior wedging of T12, fracture here are not  excluded. Otherwise no acute process demonstrated    KIKI MARTINEZ MD   Thoracic spine XR, 3 views    Narrative    THORACIC SPINE THREE VIEWS 5/18/2018 5:17 PM     HISTORY: Fall with midline pain.    COMPARISON: None.    FINDINGS: Mild multilevel degenerative change. Unremarkable thoracic  kyphosis. Minimal anterior wedging of T12, fracture here would be  difficult to exclude. There are no destructive or worrisome sclerotic  bony lesions.      Impression    IMPRESSION: Minimal anterior wedging of T12, fracture here not  excluded. Otherwise no acute process demonstrated.    KIKI MARTINEZ MD   XR Chest 1 View    Narrative    CHEST ONE VIEW  5/18/2018 5:19 PM     HISTORY: Fall. Pain in posterior lower ribs.    COMPARISON: 3/23/2017      Impression    IMPRESSION: Minimal linear scarring or atelectasis in left base. No  infiltrates. Heart size and pulmonary vasculature are normal. No  evidence for pneumothorax.    JENNY DOTY MD   CT Head w/o Contrast    Narrative    CT HEAD W/O CONTRAST 5/19/2018 2:24 AM    HISTORY: Fall.    TECHNIQUE: CT imaging of the head is performed without IV contrast.    Routine assessment includes evaluation for acute intracranial  hemorrhage, midline shift, mass, acute cortical infarct, abnormal  extra-axial collection, and hydrocephalus. The calvarium and  visualized paranasal sinuses are also assessed.    COMPARISON: March 25, 2017.    FINDINGS: No significant change from prior examination. Stable  bilateral anterior frontal and right temporal encephalomalacia along  with focal right basal ganglia infarct. No evidence of acute  hemorrhage or infarct. Mild cerebral atrophy.    The visualized bilateral paranasal  sinuses are clear. Normal mastoid  air cells.      Impression    IMPRESSION: No evidence of acute intracranial hemorrhage or infarct.  Stable encephalomalacia, as above.    SONIA LAIRD MD   CT Cervical Spine w/o Contrast    Narrative    CT OF THE CERVICAL SPINE WITHOUT CONTRAST   5/19/2018 2:25 AM     HISTORY: Fall.     TECHNIQUE: Axial images of the cervical spine were acquired without  intravenous contrast. Multiplanar reformations were created.   Radiation dose for this scan was reduced using automated exposure  control, adjustment of the mA and/or kV according to patient size, or  iterative reconstruction technique.    COMPARISON: None.    FINDINGS: Lateral view extends down to the level of T2. No evidence of  acute fracture or malalignment. The prevertebral soft tissues are  unremarkable. Mild degenerative changes.    SONIA LAIRD MD   CBC with platelets differential   Result Value Ref Range    WBC 12.0 (H) 4.0 - 11.0 10e9/L    RBC Count 5.15 4.4 - 5.9 10e12/L    Hemoglobin 16.5 13.3 - 17.7 g/dL    Hematocrit 48.4 40.0 - 53.0 %    MCV 94 78 - 100 fl    MCH 32.0 26.5 - 33.0 pg    MCHC 34.1 31.5 - 36.5 g/dL    RDW 12.9 10.0 - 15.0 %    Platelet Count 211 150 - 450 10e9/L    Diff Method Automated Method     % Neutrophils 84.2 %    % Lymphocytes 7.9 %    % Monocytes 6.6 %    % Eosinophils 0.4 %    % Basophils 0.3 %    % Immature Granulocytes 0.6 %    Nucleated RBCs 0 0 /100    Absolute Neutrophil 10.1 (H) 1.6 - 8.3 10e9/L    Absolute Lymphocytes 0.9 0.8 - 5.3 10e9/L    Absolute Monocytes 0.8 0.0 - 1.3 10e9/L    Absolute Eosinophils 0.1 0.0 - 0.7 10e9/L    Absolute Basophils 0.0 0.0 - 0.2 10e9/L    Abs Immature Granulocytes 0.1 0 - 0.4 10e9/L    Absolute Nucleated RBC 0.0    Basic metabolic panel   Result Value Ref Range    Sodium 136 133 - 144 mmol/L    Potassium 5.2 3.4 - 5.3 mmol/L    Chloride 105 94 - 109 mmol/L    Carbon Dioxide 23 20 - 32 mmol/L    Anion Gap 8 3 - 14 mmol/L    Glucose 247 (H) 70 - 99 mg/dL     Urea Nitrogen 23 7 - 30 mg/dL    Creatinine 0.97 0.66 - 1.25 mg/dL    GFR Estimate 74 >60 mL/min/1.7m2    GFR Estimate If Black 90 >60 mL/min/1.7m2    Calcium 8.9 8.5 - 10.1 mg/dL   UA with Microscopic   Result Value Ref Range    Color Urine Yellow     Appearance Urine Clear     Glucose Urine >499 (A) NEG^Negative mg/dL    Bilirubin Urine Negative NEG^Negative    Ketones Urine 5 (A) NEG^Negative mg/dL    Specific Gravity Urine 1.017 1.003 - 1.035    Blood Urine Moderate (A) NEG^Negative    pH Urine 5.0 5.0 - 7.0 pH    Protein Albumin Urine Negative NEG^Negative mg/dL    Urobilinogen mg/dL 4.0 (H) 0.0 - 2.0 mg/dL    Nitrite Urine Positive (A) NEG^Negative    Leukocyte Esterase Urine Moderate (A) NEG^Negative    Source Midstream Urine     WBC Urine 58 (H) 0 - 5 /HPF    RBC Urine 79 (H) 0 - 2 /HPF    Bacteria Urine Many (A) NEG^Negative /HPF    Squamous Epithelial /HPF Urine <1 0 - 1 /HPF    Mucous Urine Present (A) NEG^Negative /LPF    Hyaline Casts 1 0 - 2 /LPF   Basic metabolic panel   Result Value Ref Range    Sodium 138 133 - 144 mmol/L    Potassium 4.2 3.4 - 5.3 mmol/L    Chloride 107 94 - 109 mmol/L    Carbon Dioxide 24 20 - 32 mmol/L    Anion Gap 7 3 - 14 mmol/L    Glucose 180 (H) 70 - 99 mg/dL    Urea Nitrogen 16 7 - 30 mg/dL    Creatinine 0.82 0.66 - 1.25 mg/dL    GFR Estimate >90 >60 mL/min/1.7m2    GFR Estimate If Black >90 >60 mL/min/1.7m2    Calcium 8.1 (L) 8.5 - 10.1 mg/dL   Glucose by meter   Result Value Ref Range    Glucose 171 (H) 70 - 99 mg/dL   Glucose by meter   Result Value Ref Range    Glucose 236 (H) 70 - 99 mg/dL   Glucose by meter   Result Value Ref Range    Glucose 161 (H) 70 - 99 mg/dL   Glucose by meter   Result Value Ref Range    Glucose 190 (H) 70 - 99 mg/dL   Glucose by meter   Result Value Ref Range    Glucose 139 (H) 70 - 99 mg/dL   Glucose by meter   Result Value Ref Range    Glucose 159 (H) 70 - 99 mg/dL   Glucose by meter   Result Value Ref Range    Glucose 137 (H) 70 - 99 mg/dL    Glucose by meter   Result Value Ref Range    Glucose 276 (H) 70 - 99 mg/dL   Glucose by meter   Result Value Ref Range    Glucose 259 (H) 70 - 99 mg/dL   Glucose by meter   Result Value Ref Range    Glucose 248 (H) 70 - 99 mg/dL   Glucose by meter   Result Value Ref Range    Glucose 201 (H) 70 - 99 mg/dL   Spine Surgery Adult IP Consult: Comp Fracture, pain; Consultant may enter orders: Yes; Patient to be seen: Routine - within 24 hours    Jose Alberto Jimenez MD     5/20/2018 10:25 AM  82M w/ fall, back pain, T12 compression fracture with minimal   loss of height, reported neuro intact    Recommend Orthotics consult for TLSO brace  OK to be OOB in brace  Full consult and recs to follow tomorrow   Urine Culture Aerobic Bacterial   Result Value Ref Range    Specimen Description Midstream Urine     Special Requests Specimen received in preservative     Culture Micro (A)      50,000 to 100,000 colonies/mL  Klebsiella pneumoniae         Susceptibility    Klebsiella pneumoniae - PERRY     AMPICILLIN* >=32 Resistant ug/mL      * Intrinsically Resistant     CEFAZOLIN* <=4 Sensitive ug/mL      * Cefazolin PERRY breakpoints are for the treatment of uncomplicated urinary tract infections.  For the treatment of systemic infections, please contact the laboratory for additional testing.     CEFOXITIN 8 Sensitive ug/mL     CEFTAZIDIME <=1 Sensitive ug/mL     CEFTRIAXONE <=1 Sensitive ug/mL     CIPROFLOXACIN <=0.25 Sensitive ug/mL     GENTAMICIN <=1 Sensitive ug/mL     LEVOFLOXACIN <=0.12 Sensitive ug/mL     NITROFURANTOIN 64 Intermediate ug/mL     TOBRAMYCIN <=1 Sensitive ug/mL     Trimethoprim/Sulfa <=1/19 Sensitive ug/mL     AMPICILLIN/SULBACTAM 8 Sensitive ug/mL     Piperacillin/Tazo <=4 Sensitive ug/mL     CEFEPIME <=1 Sensitive ug/mL[AM1.1]                  Revision History        User Key Date/Time User Provider Type Action    > [N/A] 5/29/2018 11:03 AM Alva Barrera APRN CNS Clinical Nurse Specialist  Addend     AM1.4 5/22/2018 10:25 AM Alva Barrera APRN CNS Clinical Nurse Specialist Addend     AM1.5 5/22/2018 10:21 AM Alva Barrera APRN CNS Clinical Nurse Specialist Sign     AM1.3 5/22/2018  9:50 AM Alva Barrera APRN CNS Clinical Nurse Specialist      AM1.6 5/21/2018  2:15 PM Alva Barrera APRN CNS Clinical Nurse Specialist      AM1.2 5/21/2018  1:57 PM Alva Barrera APRN CNS Clinical Nurse Specialist      AM1.1 5/21/2018  8:36 AM Alva Barrera APRN CNS Clinical Nurse Specialist             Consults signed by Devika Martinez MD at 5/25/2018  4:07 PM      Author:  Devika Martinez MD Service:  Psychiatry Author Type:  Physician    Filed:  5/25/2018  4:07 PM Date of Service:  5/25/2018  3:08 PM Creation Time:  5/25/2018  3:57 PM    Status:  Signed :  Devika Martinez MD (Physician)         Consult Date:  05/25/2018      IDENTIFICATION:  The patient is an 82-year-old  [place] immigrant who was seen via  and seen at request of Dr. Nix for psychiatric medication evaluation.      HISTORY OF PRESENT ILLNESS:  The patient was admitted from his home where he lives with his wife in [place] with acute back pain following a fall.  He had a CT scan of head and neck that showed a compression fracture of T12.  He was admitted to general medical floor.  Pain management and supportive care.  He was initially hypertensive and started on IV hydralazine as well as b.i.d. Coreg and daily Losartan.  He had received opioid regimen and was noted to be intermittently acutely confused, combative, tried at least once to bite a nurse.  He does not speak English, has had  daily.  He was started on Seroquel and followed by palliative care who has been managing his acute on chronic pain, although he is not on opioid regimen at home.  He had begun having hallucinations 24 hours at admission.  He is currently being  "weaned.  Family did note he has some chronic memory problems, generally been able to be managed at home, although they state concern about him going to a nearby nursing home or rehab as wife is unable to manage him any further.  They have been frustrated in the past with extended hospitalization, not looking for a bed.  He is a limited historian, but nursing reports he is doing much better.  He was eating with the .  He states he had difficulty sleeping and apparently was hallucinating last night and trying to get out of bed.  He was not taking Seroquel at home, just nortriptyline for headaches per daughter.  Adamantly denies any alcohol use and wife had confirmed they are Taoist and do not drink or smoke.  He is still hypertensive.  He has variable complaints of pain in abdomen, \"kidney\".  He does appear to have spontaneous stabbing pain at times with movement.      REVIEW OF SYSTEMS:  Negative for vomiting, diarrhea, chest pain, shortness of breath.  Reports pain is \"better\".       PAST PSYCHIATRIC HISTORY:  No hospitalizations, suicide attempts, manic or psychotic episodes.  He does have preexisting history of stroke and likely vascular dementia.  No chemical dependency.      FAMILY HISTORY:  No known mental illness, chemical dependency or suicide.      SOCIAL HISTORY:  The patient is a retired [place] immigrant with 3 sons and 2 daughters.  Reports some of his sons are priests.  He and his wife reside in [facility].      PAST MEDICAL HISTORY:  No known history of closed head trauma or seizure.  He does have history of CVA with unclear sequellae,  Hypertension and history of falls. He has a history of prior delirium.     LABORATORY:  CT scan of head showed no acute bleed or change.  Only mild cerebral atrophy.  He has normal creatinine and electrolyte panel currently normal CBC.      ALLERGIES:  LISINOPRIL AND MORPHINE SULFATE.      MENTAL STATUS EXAM:  The patient is a tall, large-boned " "[place] immigrant male with no English fluency.  He had accepted being fed by .  Appeared to have good ability to chew and swallow.  Appetite reasonable.  He thanked me and was polite.  He did give the month as February and year as \"18\".  He gave day of week as Tuesday rather than Friday.  He was able to be reoriented to the summer weather and looking out the window.  He knew he was in hospital.  He has no recall of hallucinations.  Longterm memory more intact.  At least moderate short-term memory difficulty, although more difficult to gauge given recent delirium.  Thought content negative for current hallucinations, suicidal or homicidal ideation, hopelessness or death wish.  Denied depressed mood.  Denies panic attack symptoms.  Insight and judgment limited, although agrees to abide by  medical recommendations.  He had limited attention span and concentration, but improved.  No obvious tic or tremor.  His gait has been improved with staff.  History of falls.  Poor historian of recent events.  His affect appeared in no distress and somewhat blunted. He is Religion and reports he leaves \"all to God\" and is resigned, denying worries.  He is proud of him family.     PSYCHIATRIC DIAGNOSES:  Delirium, likely secondary to urinary tract infection and opioid use, chronic pain disorder, neurocognitive disorder, unspecified (dementia).      RECOMMENDATION:  The patient is currently improving as his infection has improved and pain regimen minimized.  He had been continued on his Seroquel 6.25 mg b.i.d. and 25 mg at bedtime.  He may not be taking this at home.  If he continues to improve, would advise that he be discharged on this regimen.  Family is requesting placement and  following to look at TCU or longterm care placement.  If his symptoms do not continue to improve or confusion worsens, may change his daytime Seroquel to Haldol 1 mg t.i.d.with meals and for agitation may be given gabapentin 100 " mg t.i.d.  He may be continued on his Seroquel at night, which could be increased to 50 mg if he has further problems.  Call as needed until released.  Thank you for consultation.         KIT HOWARD MD             D: 2018   T: 2018   MT: MORRO      Name:     CHANI ELENA   MRN:      [id number]        Account:       [id number]   :      1935           Consult Date:  2018      Document: [id number][KW1.1]         Revision History        User Key Date/Time User Provider Type Action    > KW1.1 2018  4:07 PM Kit Howard MD Physician Sign     [N/A] 2018  3:57 PM Kit Howard MD Physician Edit            Consults by Kit Howard MD at 2018  3:51 PM     Author:  Kit Howard MD Service:  Psychiatry Author Type:  Physician    Filed:  2018  3:51 PM Date of Service:  2018  3:51 PM Creation Time:  2018  3:45 PM    Status:  Signed :  Kit Howard MD (Physician)     Consult Orders:    1. Psychiatry IP Consult: ongoing hallucination, delirium; Consultant may enter orders: Yes; Patient to be seen: Routine; Call back #: [phone] [[id number]] ordered by Eveline Nix MD at 18 0743                See dictation. #[id number]   Initial Consultation Psychiatry  Patient seen with . He is diagnosed with Delirium (due to UTI and opiods)  superimposed on senile onset Dementia, likely vascular vs concomittant alzheimer's. His pain intermittent and with movement.  Per nursing improved today, has walked, is eating, poor historian, is polite and calm. Oriented to place and year only. Longterm memory mostly preserved.  Tolerating seroquel currently.  As in dictation if he does not continue to improve, can shift to haldol 1mgTID and may use neurontin 100mg TID for augmentation/anxiolytic.  Seroquel can be used at hs 25-50mg.  Please call prn.  He needs placement, family unable to manage  him at home now.   Devika Martinez MD  5/25/2018[KW1.1]         Revision History        User Key Date/Time User Provider Type Action    > KW1.1 5/25/2018  3:51 PM Devika Martinez MD Physician Sign            Consults by Ellie Baron LSW at 5/25/2018  3:12 PM     Author:  Ellie Baron LSW Service:  (none) Author Type:      Filed:  5/25/2018  3:12 PM Date of Service:  5/25/2018  3:12 PM Creation Time:  5/25/2018  3:07 PM    Status:  Signed :  Ellie Baron LSW ()     Consult Orders:    1. Care Transition RN/SW IP Consult [[id number]] ordered by Eugene George MD at 05/21/18 1258                Care Transition Initial Assessment - SW  Reason For Consult: discharge planning  Met with: Patient and daughters   Active Problems:    UTI (urinary tract infection)    Pain    T12 compression fracture (H)    Acute delirium       DATA  Lives With: spouse  Living Arrangements:  ([facility] home)  Description of Support System: Supportive, Involved  Who is your support system?: Children, Wife   Resources List: Transitional Care     Quality Of Family Relationships: supportive, helpful, involved  Transportation Available: van, wheelchair accessible    ASSESSMENT  Concerns to be addressed: CTS RN/SW consult placed to assist with dc planning. Pt admitted from home where he lives with his wife. Chart reviewed and therapies are recommending TCU. SW met with pt daughters who report that pt lives with his wife and that she is unable to care for him at this time.  Pt has a new TLSO brace and family feels that pt will need to be stronger before returning home.  Daughter hopeful that pt can stay in the [place] area and request that SW send TCU referrals to [facility].  Daughters also agreeable to SW sending T[facility] referrals to [facility].  SW sent referrals to: [facility], [facility] [place] [facility] and [facility].  Daughters report that they do not feel that pt will  "be able to transport by car and at this time requesting wc transport.     PLAN  Patient/Family given options and choices for discharge: Yes .  Patient/family is agreeable to the plan? Yes  Patient/Family Goals and Preferences: Home .  Patient/Family anticipates discharging to:  TCU .[LH1.1]             Revision History        User Key Date/Time User Provider Type Action    > LH1.1 5/25/2018  3:12 PM Ellie Baron LSW  Sign            Consults by Kimmie Fisher RD, LD at 5/25/2018  9:57 AM     Author:  Kimmie Fisher RD, LD Service:  Nutrition Author Type:  Registered Dietitian    Filed:  5/25/2018  1:29 PM Date of Service:  5/25/2018  9:57 AM Creation Time:  5/25/2018  9:52 AM    Status:  Signed :  Kimmie Fisher RD, LD (Registered Dietitian)         CLINICAL NUTRITION SERVICES  -  ASSESSMENT NOTE      Malnutrition:[MS1.1] Patient does not meet malnutrition criteria at this time[MS1.2]        REASON FOR ASSESSMENT  Adam Huber is a 82 year old male seen by Registered Dietitian for LOS.      NUTRITION HISTORY[MS1.1]  - Information obtained from sitter in room.[MS1.2]    - Patient with a h/o CAD, DMII, dementia.  Admitted 2/2 fall.[MS1.1]  - NKFA.[MS1.2]      CURRENT NUTRITION ORDERS  Diet Order:[MS1.1]     Mod CHO[MS1.3]    Current Intake/Tolerance:[MS1.1]  Noted to be confused at times and also biting/swinging at nursing staff.  Also noted that family bringing food at times.  % meal consumption since admission.[MS1.3]  Sitter observed breakfast meal, 100% consumption.[MS1.2]        PHYSICAL FINDINGS  Observed[MS1.1]  No fat or muscle los observed[MS1.2]  Obtained from Chart/Interdisciplinary Team  [facility]    ANTHROPOMETRICS  Height: 6' 0\"  Weight:[MS1.1] 101.4 kg ([MS1.3]223[MS1.1]#)[MS1.3]  Body mass index is 30.35 kg/(m^2).  Weight Status:[MS1.1]  Obesity Grade I BMI 30-34.9[MS1.3]  IBW:[MS1.1] 80.9 kg (178#)[MS1.3]  % IBW:[MS1.1] 125%[MS1.3]  Weight " History:[MS1.1]  Wt Readings from Last 10 Encounters:   05/19/18 101.5 kg (223 lb 12.3 oz)   07/19/17 99.8 kg (220 lb)   03/31/17 97.6 kg (215 lb 3.2 oz)   01/04/17 102.5 kg (226 lb)   06/21/16 102.7 kg (226 lb 8 oz)   12/16/15 103.9 kg (229 lb)   11/13/15 104.8 kg (231 lb)   11/07/15 102.1 kg (225 lb 1.4 oz)   10/26/15 103.3 kg (227 lb 12.8 oz)   10/13/15 103 kg (227 lb 1.2 oz)[MS1.4]   - Current wt consistent with trending over the past ~1 year.[MS1.3]      LABS  Labs reviewed    MEDICATIONS  Medications reviewed      ASSESSED NUTRITION NEEDS PER APPROVED PRACTICE GUIDELINES:    Dosing Weight[MS1.1] 86 kg[MS1.3]   Estimated Energy Needs:[MS1.1] 4826-8810[MS1.3] kcals ([MS1.1]25-30 Kcal/Kg[MS1.3])  Justification:[MS1.1] maintenance[MS1.3]  Estimated Protein Needs:[MS1.1] 103-129[MS1.3] grams protein ([MS1.1]1.2-1.5 g pro/Kg[MS1.3])  Justification:[MS1.1] preservation of lean body mass[MS1.3]  Estimated Fluid Needs:[MS1.1] 4186-0403[MS1.3] mL ([MS1.1]1 mL/Kcal[MS1.3])  Justification:[MS1.1] maintenance and per provider pending fluid status[MS1.3]    MALNUTRITION:  % Weight Loss:[MS1.1]  None noted[MS1.3]  % Intake:[MS1.1]  Decreased intake does not meet criteria for malnutrition[MS1.3]   Subcutaneous Fat Loss:[MS1.1]  None observed[MS1.2]  Muscle Loss:[MS1.1]  None observed[MS1.2]  Fluid Retention:[MS1.1]  None noted[MS1.2]    Malnutrition Diagnosis:[MS1.1] Patient does not meet two of the above criteria necessary for diagnosing malnutrition[MS1.2]    NUTRITION DIAGNOSIS:[MS1.1]  No nutrition diagnosis at this time.[MS1.2]        NUTRITION INTERVENTIONS  Recommendations / Nutrition Prescription[MS1.1]  Change to high CHO diet order to meet assessed needs as above.[MS1.3]       Implementation  Nutrition education:[MS1.1] Per Provider order if indicated.    Collaboration and Referral of Nutrition care: Discussed POC with team during rounds, oral intakes with sitter.[MS1.2]       MONITORING AND  "EVALUATION:[MS1.1]  Progress towards goals will be monitored and evaluated per protocol and Practice Guidelines[MS1.2]        Kimmie Fisher RD, MATI  Clinical Dietitian  3rd floor/ICU: [phone]  All other floors: [phone]  Weekend/holiday: [phone][MS1.1]     Revision History        User Key Date/Time User Provider Type Action    > MS1.2 5/25/2018  1:29 PM Kimmie Fisher RD, LD Registered Dietitian Sign     MS1.4 5/25/2018 12:59 PM Kimmie Fisher RD, LD Registered Dietitian      MS1.3 5/25/2018 12:55 PM Kimmie Fisher RD, LD Registered Dietitian      MS1.1 5/25/2018  9:52 AM Kimmie Fisher RD, LD Registered Dietitian             Consults by Sangeetha Mireles APRN CNP at 5/21/2018  8:46 AM     Author:  Sangeetha Mireles APRN CNP Service:  Neurosurgery Author Type:  Nurse Practitioner    Filed:  5/21/2018  8:52 AM Date of Service:  5/21/2018  8:46 AM Creation Time:  5/21/2018  8:46 AM    Status:  Attested :  Sangeetha Mireles APRN CNP (Nurse Practitioner)    Cosigner:  Jose Alberto Hubbard MD at 5/21/2018  9:23 AM        Attestation signed by Jose Alberto Hubbard MD at 5/21/2018  9:23 AM        Physician Attestation   I agree with the information in this note.    Jose Alberto Hubbard                               [facility]    Neurosurgery Consultation     Date of Admission:  5/18/2018  Date of Consult (When I saw the patient): 05/21/18    Assessment & Plan   Adam Huber is a 82 year old male who was admitted on 5/18/2018. I was asked to see the patient for T12 compression fracture post fall.  The pt is sitting up in bed today eating breakfast. He is Albanian speaking. He has a sitter in the room due to being impulsive.  The pt is refusing to use the  phone. He is able to point to his back and say \"pain\". He does move both legs when asked.  Per nursing he does moan when asked to reposition in bed or roll over.  The pts thoracic xray was also reviewed " by Dr. Jose Alberto Hubbard.  Pt has a T12 compression fracture with minimal loss of height. Recommend TLSO for a minimum of 12 weeks when out of bed.  We will have him return to clinic in 6 weeks with a repeat xray.    Active Problems:    UTI (urinary tract infection)    Assessment: ongoing    Plan: per hospital service      T12 compression fracture    Assessment: stable   Plan: -  wear TLSO at all times when out of bed  - Repeat XR of Thoracic spine AP and Lateral in 6 weeks.   - Return to clinic following repeat XR spine in 6 weeks   - Call the [facility] for any questions or concerns during interim, at [phone]  - Seek medical attention immediately if any paresthesias, weakness, gait instability, bowel/bladder incontinence, new pain.          I have discussed the following assessment and plan with Dr. Jose Alberto Hubbard  who is in agreement with initial plan and will follow up with further consultation recommendations.    Sangeetha Mireles [facility]  [facility]  [address] [address]    Tel [phone]  Pager [phone]        Code Status    Full Code    Reason for Consult   Reason for consult: I was asked by Dr. Morfin to evaluate this patient for T12 compression fracture.    Primary Care Physician   Oralia Forrest    Chief Complaint   Back pain     History is obtained from the EMR and RN    History of Present Illness   Adam Huber is a 82 year old male who presents with thoracic fracture post fall     Past Medical History   I have reviewed this patient's medical history and updated it with pertinent information if needed.[NS1.1]   Past Medical History:   Diagnosis Date     Acute chest pain 10/23/2015     CAD (coronary artery disease)      Essential hypertension 10/23/2015     History of CVA (cerebrovascular accident) 10/23/2015     HLD (hyperlipidemia)      HTN (hypertension)      NSTEMI (non-ST elevated myocardial infarction) (H)  10/23/2015     Post PTCA 10-    Successful PCI of culprit proximal to mid RCA with placement of a     Post PTCA 11-6-2015    pci of complex mid CFX-hector[NS1.2]       Past Surgical History   I have reviewed this patient's surgical history and updated it with pertinent information if needed.[NS1.1]  Past Surgical History:   Procedure Laterality Date     CHOLECYSTECTOMY       GENITOURINARY SURGERY      prostate removal      HEART CATH LEFT HEART CATH  10/12/2015    PCI w/ HECTOR to RCA     HEART CATH RIGHT AND LEFT HEART CATH  11/6/2015    PCI w/ HECTOR to mid-CFX[NS1.2]       Prior to Admission Medications   Prior to Admission Medications   Prescriptions Last Dose Informant Patient Reported? Taking?   Losartan Potassium (COZAAR PO) 5/17/2018 Spouse/Significant Other Yes No   Sig: Take 50 mg by mouth daily   Nortriptyline HCl (NORTRIPYTLINE HCL PO) 5/17/2018  Yes Yes   Sig: Take 100 mg by mouth At Bedtime   acetaminophen (TYLENOL) 325 MG tablet prn  No No   Sig: Take 2 tablets (650 mg) by mouth every 4 hours as needed for mild pain   aspirin 81 MG EC tablet 5/17/2018  No No   Sig: Take one tablet daily   melatonin 3 MG tablet 5/17/2018  Yes Yes   Sig: Take 3-6 mg by mouth At Bedtime    nitroglycerin (NITROSTAT) 0.4 MG SL tablet prn  No No   Sig: Place 1 tablet (0.4 mg) under the tongue every 5 minutes as needed for chest pain   olopatadine HCl (PATADAY) 0.2 % SOLN prn  Yes Yes   Sig: Place 1 drop into both eyes daily as needed   polyethylene glycol (MIRALAX/GLYCOLAX) powder prn Pharmacy Yes No   Sig: Take 1 capful by mouth daily as needed       Facility-Administered Medications: None     Allergies   Allergies   Allergen Reactions     Lisinopril        Social History   I have reviewed this patient's social history and updated it with pertinent information if needed. Adam Huber[NS1.1]  reports that he has never smoked. He does not have any smokeless tobacco history on file. He reports that he does not drink  alcohol or use illicit drugs.[NS1.2]    Family History   I have reviewed this patient's family history and updated it with pertinent information if needed.[NS1.1]   Family History   Problem Relation Age of Onset     Unknown/Adopted No family hx of[NS1.2]        Review of Systems   CONSTITUTIONAL: NEGATIVE for fever, chills, change in weight  INTEGUMENTARY/SKIN: NEGATIVE for worrisome rashes, moles or lesions  EYES: NEGATIVE for vision changes or irritation  ENT/MOUTH: NEGATIVE for ear, mouth and throat problems  RESP: NEGATIVE for significant cough or SOB  CV: NEGATIVE for chest pain, palpitations or peripheral edema  GI: NEGATIVE for nausea, abdominal pain, heartburn, or change in bowel habits  : UTI  MUSCULOSKELETAL:T12 compression fracture   NEURO: recent fall  ENDOCRINE: NEGATIVE for temperature intolerance, skin/hair changes  HEME: NEGATIVE for bleeding problems  PSYCHIATRIC: impulsive  Physical Exam   Temp: 97.4  F (36.3  C) Temp src: Oral BP: 151/78   Heart Rate: 80 Resp: 18 SpO2: 92 % O2 Device: None (Room air)    Vital Signs with Ranges  Temp:  [96.2  F (35.7  C)-98.3  F (36.8  C)] 97.4  F (36.3  C)  Heart Rate:  [77-83] 80  Resp:  [18] 18  BP: (114-151)/(57-81) 151/78  SpO2:  [92 %] 92 %  223 lbs 12.27 oz    Heart Rate: 80, Blood pressure 151/78, temperature 97.4  F (36.3  C), temperature source Oral, resp. rate 18, height 6' (1.829 m), weight 223 lb 12.3 oz (101.5 kg), SpO2 92 %.  223 lbs 12.27 oz  HEENT:  Normocephalic, atraumatic.  PERRLA.  EOM s intact.   Neck:  Supple, non-tender, without lymphadenopathy.  Heart:  No peripheral edema  Lungs:  No SOB  Abdomen:  Soft, non-tender, non-distended.  Normal bowel sounds.  Skin:  Warm and dry, good capillary refill.  Extremities:  Good radial and dorsalis pedis pulses bilaterally, no edema, cyanosis or clubbing.    NEUROLOGICAL EXAMINATION:     Mental status:  Alert and Oriented x 3, speech is fluent.  Cranial nerves:  II-XII intact.   Motor:  Strength is  5/5 throughout the upper and lower extremities  Shoulder Abduction:  Right:  5/5   Left:  5/5  Biceps:                      Right:  5/5   Left:  5/5  Triceps:                     Right:  5/5   Left:  5/5  Wrist Extensors:       Right:  5/5   Left:  5/5  Wrist Flexors:           Right:  5/5   Left:  5/5  interosseus :            Right:  5/5   Left:  5/5   Hip Flexor:                Right: 5/5  Left:  5/5  Hip Adductor:             Right:  5/5  Left:  5/5  Hip Abductor:             Right:  5/5  Left:  5/5  Gastroc Soleus:        Right:  5/5  Left:  5/5  Tib/Ant:                      Right:  5/5  Left:  5/5  EHL:                     Right:  5/5  Left:  5/5  Sensation:  intact  Reflexes:   Negative Babinski.  Negative Clonus.      Data   All new lab and imaging data was personally reviewed by me.  Thoracic Xray:    IMPRESSION: Minimal anterior wedging of T12, fracture here not  excluded. Otherwise no acute process demonstrated.       CBC RESULTS:   Recent Labs   Lab Test  05/18/18   1540   WBC  12.0*   RBC  5.15   HGB  16.5   HCT  48.4   MCV  94   MCH  32.0   MCHC  34.1   RDW  12.9   PLT  211     Basic Metabolic Panel:  Lab Results   Component Value Date     05/19/2018      Lab Results   Component Value Date    POTASSIUM 4.2 05/19/2018     Lab Results   Component Value Date    CHLORIDE 107 05/19/2018     Lab Results   Component Value Date    LUZ 8.1 05/19/2018     Lab Results   Component Value Date    CO2 24 05/19/2018     Lab Results   Component Value Date    BUN 16 05/19/2018     Lab Results   Component Value Date    CR 0.82 05/19/2018     Lab Results   Component Value Date     05/19/2018     INR:  Lab Results   Component Value Date    INR 1.07 11/02/2015    INR 1.12 10/09/2015[NS1.1]            Revision History        User Key Date/Time User Provider Type Action    > NS1.2 5/21/2018  8:52 AM Sangeetha Mireles APRN CNP Nurse Practitioner Sign     NS1.1 5/21/2018  8:46 AM Sangeetha [place] APRN CNP  Nurse Practitioner             Consults by Jose Alberto Hubbard MD at 5/20/2018 10:25 AM     Author:  Jose Alberto Hubbard MD Service:  Neurosurgery Author Type:  Physician    Filed:  5/20/2018 10:25 AM Date of Service:  5/20/2018 10:25 AM Creation Time:  5/20/2018 10:24 AM    Status:  Signed :  Jose Alberto Hubbard MD (Physician)     Consult Orders:    1. Spine Surgery Adult IP Consult: Comp Fracture, pain; Consultant may enter orders: Yes; Patient to be seen: Routine - within 24 hours [[id number]] ordered by Babar Morfin DO at 05/20/18 0952                82M w/ fall, back pain, T12 compression fracture with minimal loss of height, reported neuro intact    Recommend Orthotics consult for TLSO brace  OK to be OOB in brace  Full consult and recs to follow tomorrow[RL1.1]     Revision History        User Key Date/Time User Provider Type Action    > RL1.1 5/20/2018 10:25 AM Jose Alberto Hubbard MD Physician Sign                     Progress Notes - Physician (Notes from 05/29/18 through 06/01/18)      Progress Notes by Gambucci, Gerladine, RN at 6/1/2018 10:30 AM     Author:  Gambucci, Gerladine, RN Service:  (none) Author Type:      Filed:  6/1/2018 10:32 AM Date of Service:  6/1/2018 10:30 AM Creation Time:  6/1/2018 10:30 AM    Status:  Signed :  Gambucci, Gerladine, RN ()         Discharge Planner   Discharge Plans in progress: T[facility] pending insurance authorization. HE transport arranged for 1545 6/1/18.  Barriers to discharge plan: Insurance authorization  Follow up plan: will continue to follow with ins auth for TCU placement.       Entered by: Gerladine Gambucci 06/01/2018 10:30 AM[GG1.1]          Revision History        User Key Date/Time User Provider Type Action    > GG1.1 6/1/2018 10:32 AM Gambucci, Gerladine, RN Case Manager Sign            Progress Notes by Gambucci, Gerladine, RN at 6/1/2018 10:26 AM     Author:  Gambucci, Gerladine, RN Service:  (none) Author  Type:      Filed:  6/1/2018 10:30 AM Date of Service:  6/1/2018 10:26 AM Creation Time:  6/1/2018 10:26 AM    Status:  Signed :  Gambucci, Gerladine, RN ()         Pt accepted at [facility] pending insurance auth. pts spouse requesting transportation to be arranged. [facility] has [facility]. [facility] was called [phone] SW spoke with Keegan at [facility] who arranged HE WC transport for 1545 today to [facility]. Spouse is aware that insurance will cover transportation cost.    Joyce Pham RN, BSN CTS  Care Coordinator  [phone][GG1.1]         Revision History        User Key Date/Time User Provider Type Action    > GG1.1 6/1/2018 10:30 AM Gambucci, Gerladine, RN Case Manager Sign            Progress Notes by Papo Lopez MD at 5/31/2018  9:45 PM     Author:  Papo Lopez MD Service:  Hospitalist Author Type:  Physician    Filed:  5/31/2018  9:49 PM Date of Service:  5/31/2018  9:45 PM Creation Time:  5/31/2018  9:45 PM    Status:  Signed :  Papo Lopez MD (Physician)         [facility]  Hospitalist Progress Note  Papo Lopez MD   05/31/2018    Reason for Stay (Diagnosis): UTI, mechanical fall with T12 compression fracture         Assessment and Plan:      Summary of Stay: Adam Huber is a 82 year old (non-English-speaking) Romansh man with prior history of dementia admitted on 5/18/2018 with fall, back pain and UTI.     Problem List:   1. UTI present on admission with isolated Klebsiella pneumoniae sensitive to current ceftriaxone  2. Recent mechanical fall with T12 compression fracture earlier issues with pain control  3. Delirium in the setting of underlying dementia.  After about 2 weeks of hospitalization patient clearly is significantly demented.  We are waiting for disposition to transitional care.  I have discussed with the patient's family that they have to start looking for a more permanent solution for his care as he is likely  to decline from this point.  4. Diabetes mellitus non-insulin-requiring, not on meds, A1c is less than 7. Pt is not a good candidate for insulin now, and he would not be considered a candidate for that upon discharge. Will monitor without ac/qhs glucoses.  5. History of coronary artery disease  6. Hypertension, coreg 25 BID, Losartan 50 mg daily    I have been anticipating discharge today.  However 1 of the daughters apparently visited the TCU where the patient had been off of the room and she refused.  For that reason  again sought additional options.  The patient is evidently accepted at the [facility] pending insurance authorizations.    I again spoke with the family directly about the need for them to make better arrangements for Mr. Huber in anticipation of further cognitive decline.        Interval History (Subjective):      Mr. Huber is alert and interactive today. I interviewed him with the nurse, Anya Cummings.  He is very positive and anticipating discharge tomorrow to TCU, as has been discussed.     # Pain Assessment:  Current Pain Score 5/31/2018   Patient currently in pain? -   Pain score (0-10) 0   Pain location -   Pain descriptors -   - Adam is experiencing pain due to T12 compression fracture secondary to recent mechanical fall. Pain management was discussed and the plan was created in a collaborative fashion.  Adam's response to the current recommendations: unable to completely comprehend due to the delirium   We will defer pain regimen with pain service              Physical Exam:      Last Vital Signs:  /70 (BP Location: Left arm)  Pulse 71  Temp 97.6  F (36.4  C) (Oral)  Resp 20  Ht 1.829 m (6')  Wt 101.5 kg (223 lb 12.3 oz)  SpO2 97%  BMI 30.35 kg/m2    I/O last 3 completed shifts:  In: -   Out: 325 [Urine:325]  Wt Readings from Last 1 Encounters:   05/19/18 101.5 kg (223 lb 12.3 oz)       Constitutional: Awake, alert, no apparent distress.   The patient is pleasant and interactive with his family, the  and with myself.   Respiratory: Clear to auscultation bilaterally, no crackles or wheezing   Cardiovascular: Regular rate and rhythm, normal S1 and S2, and no murmur noted   Abdomen: Normal bowel sounds, soft, non-distended, non-tender   Skin: No rashes, no cyanosis, dry to touch   Neuro: Alert and oriented to self and situation. No focal weakness, spontaneous speech   Extremities: Nonpitting edema at both ankles, normal range of motion   Other(s): Cooperative, calm       All other systems: Negative          Medications:      All current medications were reviewed with changes reflected in problem list.         Data:      All new lab and imaging data was reviewed.   Labs:  No results for input(s): CULT in the last 168 hours.  No results for input(s): NA, POTASSIUM, CHLORIDE, CO2, ANIONGAP, GLC, BUN, CR, GFRESTIMATED, GFRESTBLACK, LUZ in the last 168 hours.    Recent Labs  Lab 05/30/18  0644 05/27/18  0602    215     No results for input(s): CR in the last 168 hours.    Recent Labs  Lab 05/31/18  0852 05/30/18  1211 05/29/18  0825   * 224* 152*     No results for input(s): INR in the last 168 hours.  No results for input(s): COLOR, APPEARANCE, URINEGLC, URINEBILI, URINEKETONE, SG, UBLD, URINEPH, PROTEIN, UROBILINOGEN, NITRITE, LEUKEST, RBCU, WBCU in the last 168 hours.   Imaging:   No results found for this or any previous visit (from the past 48 hour(s)).[KP1.1]       Revision History        User Key Date/Time User Provider Type Action    > KP1.1 5/31/2018  9:49 PM Papo Lopez MD Physician Sign            Progress Notes by Ellie Baron LSW at 5/31/2018  8:45 AM     Author:  Ellie Baron LSW Service:  (none) Author Type:      Filed:  5/31/2018  3:42 PM Date of Service:  5/31/2018  8:45 AM Creation Time:  5/31/2018  8:45 AM    Status:  Addendum :  Ellie Baron LSW ()         SWS:  D:  discharge planning  A: SW received call from daughter Hattie stating that family is requesting SW cancel dc and send referrals to [facility] and other facilities close to home.  SW discussed other locations and sent referrals to [facility], [facility], [facility], [facility] and [facility].  SW cancelled dc to [facility] and updated RN and MD.  P: SW following.[LH1.1]    ADDENDUM- SW notified that pt has been declined at [facility], [facility]. [facility] and [facility].  Pt daughters later present at the hospital and they toured [facility] and request that SW send information to facility. SW explained that referral had already been sent. SW contacted [facility] and they have a bed available. Referral resent. SW later received a call that facility has accepted pending insurance authorizations.  Anticipate dc tomorrow.  Family updated. Family requested private room and is aware and agreeable to private room cost. [facility] updated.[LH1.2]     Revision History        User Key Date/Time User Provider Type Action    > LH1.2 5/31/2018  3:42 PM Ellie Baron LSW  Addend     LH1.1 5/31/2018  8:52 AM Ellie Baron LSW  Sign            Progress Notes by Papo Lopez MD at 5/30/2018  7:03 AM     Author:  Papo Lopez MD Service:  Hospitalist Author Type:  Physician    Filed:  5/30/2018 10:16 PM Date of Service:  5/30/2018  7:03 AM Creation Time:  5/30/2018  7:03 AM    Status:  Signed :  Papo Lopez MD (Physician)         [facility]  Hospitalist Progress Note[KP1.1]  Papo Lopez MD   05/30/2018[KP1.2]    Reason for Stay (Diagnosis): UTI, mechanical fall with T12 compression fracture         Assessment and Plan:      Summary of Stay: Adam Huber is a 82 year old (non-English-speaking) Italian man with prior history of dementia admitted on 5/18/2018 with fall, back pain and UTI.     Problem List:    1. UTI present on admission with isolated Klebsiella pneumoniae sensitive to current ceftriaxone  2. Recent mechanical fall with T12 compression fracture earlier issues with pain control  3. Delirium in the setting of underlying dementia.  After about 2 weeks of hospitalization patient clearly is significantly demented.  We are waiting for disposition to transitional care.  I have discussed with the patient's family that they have to start looking for a more permanent solution for his care as he is likely to decline from this point.  4. Diabetes mellitus non-insulin-requiring, not on meds, A1c is less than 7. Pt is not a good candidate for insulin now, and he would not be considered a candidate for that upon discharge. Will monitor without ac/qhs glucoses.  5. History of coronary artery disease  6. Hypertension, coreg 25 BID, Losartan 50 mg daily    Continue inpatient care  -- Completed 7 days of IV ceftriaxone  -- Appreciate neurosurgery evaluation - orthotic brace  -- major ongoing issues here is the delirium, had significant issues with delirium during previous hospitalization as well, On Seroquel 6.25 mg BID scheduled during day and 25 mg HS. PRN available , increase PRN to 12.5 mg BID PRN,  -- Delirium protocol, frequent re-direction, minimize interruptions at night  -- Stopped scheduled narcotics and change to PRN, added gabapentin 100 mg TID. With these measures he seems to be better now. Sitter off since 5/26  -- Appreciate psychiatry consult.  -- Elevated BP, increase losartan to 100 mg, BP better now     DVT Prophylaxis: Enoxaparin (Lovenox) SQ and Pneumatic Compression Devices  Code Status: Full Code  Discharge Dispo: Likely TCU[KP1.1] tomorrow.[KP1.3]  Estimated Disch Date / # of Days until Disch: pending placement.  Patient has been stable without a sitter now for 48 hours.        Interval History (Subjective):[KP1.1]      Mr. Huber is alert and interactive today. I interviewed him with the nurse,  Vera Peripelitsa.  He is very positive and anticipating discharge tomorrow to TCU, as has been discussed.[KP1.3]     # Pain Assessment:[KP1.1]  Current Pain Score 5/29/2018   Patient currently in pain? sleeping: patient not able to self report   Pain score (0-10) -   Pain location -   Pain descriptors -[KP1.2]   - Adam is experiencing pain due to T12 compression fracture secondary to recent mechanical fall. Pain management was discussed and the plan was created in a collaborative fashion.  Adam's response to the current recommendations: unable to completely comprehend due to the delirium   We will defer pain regimen with pain service              Physical Exam:      Last Vital Signs:[KP1.1]  /69 (BP Location: Left arm)  Pulse 71  Temp 96.1  F (35.6  C) (Oral)  Resp 18  Ht 1.829 m (6')  Wt 101.5 kg (223 lb 12.3 oz)  SpO2 95%  BMI 30.35 kg/m2    I/O last 3 completed shifts:  In: 720 [P.O.:720]  Out: 1325 [Urine:1325]  Wt Readings from Last 1 Encounters:   05/19/18 101.5 kg (223 lb 12.3 oz)[KP1.2]       Constitutional: Awake, alert, no apparent distress.  The patient is pleasant and interactive with his family, the  and with myself.   Respiratory: Clear to auscultation bilaterally, no crackles or wheezing   Cardiovascular: Regular rate and rhythm, normal S1 and S2, and no murmur noted   Abdomen: Normal bowel sounds, soft, non-distended, non-tender   Skin: No rashes, no cyanosis, dry to touch   Neuro: Alert and oriented to self and situation. No focal weakness, spontaneous speech   Extremities: Nonpitting edema at both ankles, normal range of motion   Other(s): Cooperative, calm       All other systems: Negative          Medications:      All current medications were reviewed with changes reflected in problem list.         Data:      All new lab and imaging data was reviewed.   Labs:[KP1.1]  No results for input(s): CULT in the last 168 hours.    Recent Labs  Lab 05/24/18  0700       POTASSIUM 4.8   CHLORIDE 105   CO2 27   ANIONGAP 6   *   BUN 19   CR 0.82   GFRESTIMATED 90   GFRESTBLACK >90   LUZ 8.9       Recent Labs  Lab 05/30/18  0644 05/27/18  0602 05/24/18  0700   WBC  --   --  9.1   HGB  --   --  16.5   HCT  --   --  47.8   MCV  --   --  94    215 208  208       Recent Labs  Lab 05/24/18  0700   CR 0.82       Recent Labs  Lab 05/29/18  0825 05/24/18  1639 05/24/18  0936 05/24/18  0700 05/23/18  1649 05/23/18  1219   GLC  --   --   --  158*  --   --    * 160* 270*  --  185* 225*     No results for input(s): INR in the last 168 hours.  No results for input(s): COLOR, APPEARANCE, URINEGLC, URINEBILI, URINEKETONE, SG, UBLD, URINEPH, PROTEIN, UROBILINOGEN, NITRITE, LEUKEST, RBCU, WBCU in the last 168 hours.[KP1.2]   Imaging:[KP1.1]   No results found for this or any previous visit (from the past 48 hour(s)).[KP1.2]       Revision History        User Key Date/Time User Provider Type Action    > KP1.3 5/30/2018 10:16 PM Papo Lopez MD Physician Sign     KP1.2 5/30/2018  7:04 AM Papo Lopez MD Physician      KP1.1 5/30/2018  7:03 AM Papo Lopez MD Physician             Progress Notes by Ellie Baron LSW at 5/30/2018  2:50 PM     Author:  Ellie Baron LSW Service:  (none) Author Type:      Filed:  5/30/2018  2:51 PM Date of Service:  5/30/2018  2:50 PM Creation Time:  5/30/2018  2:50 PM    Status:  Signed :  Ellie Baron LSW ()         Discharge Planner   Discharge Plans in progress:   Pt discharging to [facility].   Barriers to discharge plan:  services.  Facility will have  available on 5/31/18.         Entered by: KENNETH Fitzgerald 05/30/2018 2:50 PM[LH1.1]            Revision History        User Key Date/Time User Provider Type Action    > LH1.1 5/30/2018  2:51 PM Ellie Baron LSW  Sign            Progress Notes by Ellie Baron LSW at 5/30/2018 11:05 AM      Author:  Ellie Baron LSW Service:  (none) Author Type:      Filed:  5/30/2018  2:26 PM Date of Service:  5/30/2018 11:05 AM Creation Time:  5/30/2018 11:05 AM    Status:  Addendum :  Ellie Baron LSW ()         SWS:  SW following to assist with dc planning needs. Pt has been without a sitter for 48 hours and ready for DC. [facility] is assessing.  SW also resent referrals to [facility] and [facility].  Anticipate pt may be able to dc later today.[LH1.1]      ADDENDUM- pt has been declined at [facility] and [facility]. Pt has been accepted at [facility].  CHATA updated pt son Mario. Mario states that he is contacting his mother re: dc plan and will contact this writer later this afternoon to finalize.[LH1.2]      ADDENDUM- CHATA received call from Mario. Family unable to transport and request SW arrange transport.  Mario also reports that family is unable to interpret for pt on admission. [facility] calling  Services to arrange .  [facility] transport will be here at 5:00pm to transport. CHATA later received phone call stating that [facility] is unable to get an  this evening and it was decided to cancel dc today and have pt dc tomorrow when  available.  Irene from [facility] will contact this writer once  time is know and this writer will arrange transport.  CHATA updated son Mario who is agreeable to plan.[LH1.3]       Revision History        User Key Date/Time User Provider Type Action    > LH1.3 5/30/2018  2:26 PM Ellie Baron LSW  Addend     LH1.2 5/30/2018 11:32 AM Ellie Baron LSW  Addend     LH1.1 5/30/2018 11:14 AM Ellie Baron LSW  Sign            Progress Notes by Darline Perla at 5/30/2018 12:08 PM     Author:  Darline Perla Service:  (none) Author Type:  Coordinator    Filed:  5/30/2018 12:08 PM Date of Service:  5/30/2018 12:08 PM Creation Time:   5/30/2018 12:08 PM    Status:  Signed :  Darline Perla (Coordinator)         Your information has been submitted on May 30th, 2018 at 12:08:01 PM CDT. The confirmation number is [id number][KO1.1]       Revision History        User Key Date/Time User Provider Type Action    > KO1.1 5/30/2018 12:08 PM Darline Perla Coordinator Sign            Progress Notes by Papo Lopez MD at 5/29/2018  7:24 AM     Author:  Papo Lopez MD Service:  Hospitalist Author Type:  Physician    Filed:  5/29/2018  9:43 PM Date of Service:  5/29/2018  7:24 AM Creation Time:  5/29/2018  7:24 AM    Status:  Signed :  Papo Lopez MD (Physician)         [facility]  Hospitalist Progress Note[KP1.1]  Papo Lopez MD   05/29/2018[KP1.2]    Reason for Stay (Diagnosis): UTI, mechanical fall with T12 compression fracture         Assessment and Plan:      Summary of Stay: Adam Huber is a 82 year old (non-English-speaking) Haitian man with prior history of dementia admitted on 5/18/2018 with fall, back pain and UTI.     Problem List:   1. UTI present on admission with isolated Klebsiella pneumoniae sensitive to current ceftriaxone  2. Recent mechanical fall with T12 compression fracture earlier issues with pain control  3. Delirium in the setting of underlying dementia.[KP1.1]  After about 2 weeks of hospitalization patient clearly is significantly demented.  We are waiting for disposition to transitional care.  I have discussed with the patient's family that they have to start looking for a more permanent solution for his care as he is likely to decline from this point.[KP1.3]  4. Diabetes mellitus non-insulin-requiring, not on meds, A1c is less than 7[KP1.1]. Pt is not a good candidate for insulin now, and he would not be considered a candidate for that upon discharge. Will monitor without ac/qhs glucoses.[KP1.4]  5. History of coronary artery disease  6. Hypertension, coreg 25 BID,  Losartan 50 mg daily    Continue inpatient care  -- Completed 7 days of IV ceftriaxone  -- Appreciate neurosurgery evaluation - orthotic brace  -- major ongoing issues here is the delirium, had significant issues with delirium during previous hospitalization as well, On Seroquel 6.25 mg BID scheduled during day and 25 mg HS. PRN available , increase PRN to 12.5 mg BID PRN,  -- Delirium protocol, frequent re-direction, minimize interruptions at night  -- Stopped scheduled narcotics and change to PRN, added gabapentin 100 mg TID. With these measures he seems to be better now. Sitter off since 5/26  -- Appreciate psychiatry consult[KP1.1].[KP1.3]  -- Elevated BP, increase losartan to 100 mg, BP better now   -- PT/OT evaluation     DVT Prophylaxis: Enoxaparin (Lovenox) SQ and Pneumatic Compression Devices  Code Status: Full Code  Discharge Dispo: Likely TCU[KP1.1].  Awaiting further review from various care centers.[KP1.3]  Estimated Disch Date / # of Days until Disch: pending placement[KP1.1].  Patient has been stable without a sitter now for 48 hours.[KP1.3]        Interval History (Subjective):[KP1.1]      The patient remains pleasant and interactive today.  The  is present is clearly helping the patient to remain active and oriented.  He is able to behave appropriately if he is in the presence of other people who are helping him to remain calm.    As noted I spoke with the family yesterday including the son Adolfo about anticipated needs for this gentleman.  I think reasonably soon he will need to be an a memory care in order to keep him safe at night.[KP1.3]     # Pain Assessment:[KP1.1]  Current Pain Score 5/29/2018   Patient currently in pain? yes   Pain score (0-10) -   Pain location Head   Pain descriptors -[KP1.2]   - Adam is experiencing pain due to T12 compression fracture secondary to recent mechanical fall. Pain management was discussed and the plan was created in a collaborative fashion.   Adam's response to the current recommendations: unable to completely comprehend due to the delirium   We will defer pain regimen with pain service              Physical Exam:      Last Vital Signs:[KP1.1]  /58 (BP Location: Left arm)  Pulse 71  Temp 97.4  F (36.3  C) (Oral)  Resp 18  Ht 1.829 m (6')  Wt 101.5 kg (223 lb 12.3 oz)  SpO2 93%  BMI 30.35 kg/m2    I/O last 3 completed shifts:  In: 480 [P.O.:480]  Out: 700 [Urine:700]  Wt Readings from Last 1 Encounters:   05/19/18 101.5 kg (223 lb 12.3 oz)[KP1.2]       Constitutional: Awake, alert, no apparent distress[KP1.1].  The patient is pleasant and interactive with his family, the  and with myself.[KP1.3]   Respiratory: Clear to auscultation bilaterally, no crackles or wheezing   Cardiovascular: Regular rate and rhythm, normal S1 and S2, and no murmur noted   Abdomen: Normal bowel sounds, soft, non-distended, non-tender   Skin: No rashes, no cyanosis, dry to touch   Neuro: Alert and oriented to self and situation. No focal weakness, spontaneous speech   Extremities: Nonpitting edema at both ankles, normal range of motion   Other(s): Cooperative, calm       All other systems: Negative          Medications:      All current medications were reviewed with changes reflected in problem list.         Data:      All new lab and imaging data was reviewed.   Labs:[KP1.1]  No results for input(s): CULT in the last 168 hours.    Recent Labs  Lab 05/24/18  0700      POTASSIUM 4.8   CHLORIDE 105   CO2 27   ANIONGAP 6   *   BUN 19   CR 0.82   GFRESTIMATED 90   GFRESTBLACK >90   LUZ 8.9       Recent Labs  Lab 05/27/18  0602 05/24/18  0700   WBC  --  9.1   HGB  --  16.5   HCT  --  47.8   MCV  --  94    208  208       Recent Labs  Lab 05/24/18  0700   CR 0.82       Recent Labs  Lab 05/29/18  0825 05/24/18  1639 05/24/18  0936 05/24/18  0700 05/23/18  1649 05/23/18  1219   GLC  --   --   --  158*  --   --    * 160* 270*  --   185* 225*     No results for input(s): INR in the last 168 hours.  No results for input(s): COLOR, APPEARANCE, URINEGLC, URINEBILI, URINEKETONE, SG, UBLD, URINEPH, PROTEIN, UROBILINOGEN, NITRITE, LEUKEST, RBCU, WBCU in the last 168 hours.[KP1.2]   Imaging:[KP1.1]   No results found for this or any previous visit (from the past 48 hour(s)).[KP1.2]       Revision History        User Key Date/Time User Provider Type Action    > KP1.2 5/29/2018  9:43 PM Papo Lopez MD Physician Sign     KP1.3 5/29/2018  9:39 PM Papo Lopez MD Physician      KP1.4 5/29/2018  7:40 AM Papo Lopez MD Physician      KP1.1 5/29/2018  7:24 AM Papo Lopez MD Physician             Progress Notes by Alva Barrera APRN CNS at 5/29/2018 11:04 AM     Author:  Alva Barrera APRN CNS Service:  Pain Service Author Type:  Clinical Nurse Specialist    Filed:  5/29/2018 11:33 AM Date of Service:  5/29/2018 11:04 AM Creation Time:  5/29/2018 11:04 AM    Status:  Signed :  Alva Barrera APRN CNS (Clinical Nurse Specialist)         [facility]  Pain Management Progress Note  Text Page     Assessment & Plan   Adam Huber is a 82 year old male who was admitted on 5/18/2018.[AM1.1]     Appreciate assistance from our  in meeting with the patient as he was resting in bed. Adam indicates his pain is improved. He has not used an opiate during the past day. Would be reasonable to continue with Tramadol PRN. Given the patient's underlining dementia any medications should be given by responsible family or medical provider.[AM1.2]    1) Acute back pain s/p fall Thoracic spine X-ray demonstrates minimal anterior wedging of T12.       2)  Patient does not have history of chronic pain and is not on chronic opioid therapy   [place] [facility] database review:  No prescription in the past year   0 mg Daily Morphine Equivalent  Patient is opioid naive.       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   Rest alternating with physical therapy      5)  Constipation Prophylaxis  Senna-S at bedtime and[AM1.1] Elaine lax[AM1.2] prn      6) DC safety  -Patient discharge to a controlled setting for medication dispensing such as TCU     or  -Discharge with family member or friend managing pain medications    Alva Barrera MS, RN, CNS, APRN, ACHPN, FAACVPR  Pain and Palliative Care  Pager [phone]  Office [phone]       Time Spent on this Encounter   I spent[AM1.1] from 11:15 until 11:30 AM in[AM1.2] assessment and discussion with the patien[AM1.1]t[AM1.2].  Another[AM1.1] 15[AM1.2] minutes in review of chart, documentation and discussion with the health care team.      Interval History[AM1.1]   Chart reviewed[AM1.2]    Review of Systems[AM1.1]    CONSTITUTIONAL: NEGATIVE for fever, chills, change in weight  ENT/MOUTH: NEGATIVE for ear, mouth and throat problems  RESP: NEGATIVE for significant cough or SOB  CV: NEGATIVE for chest pain, palpitations or peripheral edema[AM1.2]    Physical Exam   Temp:  [96.8  F (36  C)-98.1  F (36.7  C)] 98.1  F (36.7  C)  Heart Rate:  [59-87] 87  Resp:  [20-24] 24  BP: (150-171)/(68-74) 171/74  SpO2:  [93 %-96 %] 96 %  223 lbs 12.27  oz  GEN:  Alert, oriented[AM1.1] self,[AM1.2] appears comfortable[AM1.1] and anting to rest in bed[AM1.2].  HEENT:  Normocephalic/atraumatic, no scleral icterus, no nasal discharge, mouth moist.  RESP:  Symmetric chest rise on inhalation noted.  Normal respiratory effort.  PAIN BEHAVIOR: Cooperative  Psych:  Normal affect.  Calm, cooperative, conversant appropriately.    Medications       acetaminophen  975 mg Oral TID     aspirin  81 mg Oral Daily     bacitracin   Topical BID     carvedilol  25 mg Oral BID w/meals     enoxaparin  40 mg Subcutaneous Q24H     gabapentin  100 mg Oral TID     lidocaine  1 patch Transdermal Q24h    And     lidocaine   Transdermal Q24H    And     lidocaine   Transdermal Q8H     losartan (COZAAR) tablet 100 mg  100 mg Oral Daily     melatonin  3-6 mg Oral At Bedtime     menthol  1 each Transdermal Q8H     nortriptyline  50 mg Oral At Bedtime     QUEtiapine  6.25 mg Oral BID w/meals     QUEtiapine  25 mg Oral At Bedtime     senna-docusate  1 tablet Oral At Bedtime       Data   Results for orders placed or performed during the hospital encounter of 05/18/18 (from the past 24 hour(s))   Glucose by meter   Result Value Ref Range    Glucose 152 (H) 70 - 99 mg/dL[AM1.1]          Revision History        User Key Date/Time User Provider Type Action    > AM1.2 5/29/2018 11:33 AM Alva Barrera APRN CNS Clinical Nurse Specialist Sign     AM1.1 5/29/2018 11:04 AM Alva Barrera APRN CNS Clinical Nurse Specialist             Progress Notes by Alva Barrera APRN CNS at 5/22/2018 10:21 AM     Author:  Alva Barrera APRN CNS Service:  Pain Service Author Type:  Clinical Nurse Specialist    Filed:  5/29/2018 11:03 AM Date of Service:  5/22/2018 10:21 AM Creation Time:  5/22/2018 10:21 AM    Status:  Addendum :  Alva Barrera APRN CNS (Clinical Nurse Specialist)         [facility]  Pain Management Progress Note  Text Page     Assessment & Plan    Adam Huber is a 82 year old male who was admitted on 5/18/2018.[AM1.1]     Appreciate assistance of  in meeting with the patient. Fortunately the patient was able to give some insight into his pain experience. He did have a dose of 6.25 mg Seroquel at 09:32 this morning which is probably improving his behavior. Continue to observe with scheduled Dilaudid for pain, as his most recent dose of Oxycodone was at 13:38 yesterday.[AM1.2]        1) Acute back pain s/p fall Thoracic spine X-ray demonstrate[AM1.1]s[AM1.2] minimal anterior wedging of T12     2)  Patient does not have history of chronic pain and is not on chronic opioid therapy   [place] [facility] database review:  No prescription in the past year   0 mg Daily Morphine Equivalent  Patient is opioid naive.      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      2)  Tylenol 975 mg TID     3)  Multimodal Medication Therapy  Topical: Lidocaine and Icy Hot patch  NSAIDS:   Muscle Relaxants:  Adjuvants:Atarax 25 mg every 6 hours prn  Antidepressants/anxiolytics: Chronic use of Nortriptyline 50 mg at bedtime for chronic headache  Opioids: Dilaudid 1 mg every 4 hours and use additional doses for pain flare.      4) Non-medication interventions  TLSO at all time out of bed  Rest alternating with physical therapy     5)  Constipation Prophylaxis  Senna-S at bedtime and Shiraz lax prn     6) DC safety  -Patient discharge to a controlled setting for  medication dispensing such as TCU     or  -Discharge with family member or friend managing pain medications    Alva Barrera MS, RN, CNS, APRN, ACHPN, FAACVPR  Pain and Palliative Care  Pager [phone]  Office [phone]       Time Spent on this Encounter   I spen[AM1.1]t from 10:35 until 10:55 AM in[AM1.2] assessment and discussion with the patient.  Another[AM1.1] 20[AM1.2] minutes in review of chart, documentation and discussion with the health care team.      Interval History[AM1.1]   Chart reviewed[AM1.2]    Physical Exam   Temp:  [95.3  F (35.2  C)-97.3  F (36.3  C)] 97.3  F (36.3  C)  Heart Rate:  [66-87] 87  Resp:  [20] 20  BP: (134-185)/(69-91) 178/91  SpO2:  [91 %-92 %] 91 %  223 lbs 12.27 oz  GEN:  Alert, oriented[AM1.1] to self[AM1.2], appears comfortable[AM1.1] resting in bed.[AM1.2]  HEENT:  Normocephalic/atraumatic, no scleral icterus, no nasal discharge, mouth moist.  CV:  RRR, S1, S2; no murmurs or other irregularities noted.  +3 DP/PT pulses[AM1.1] bilaterally[AM1.2]; no edema BLE.  RESP:  Clear to auscultation bilaterally without rales/rhonchi/wheezing/retractions.  Symmetric chest rise on inhalation noted.  Normal respiratory effort.  ABD:  Rounded, soft, non-tender/non-distended.  +BS  EXT:  Edema & pulses as noted above.  CMS intact x 4.     M/S:[AM1.1]   Mid back is t[AM1.2]sam to palpation.    SKIN:[AM1.1]  Warm and d[AM1.2]ry to touch, no exanthems noted in the visualized areas.    NEURO: Symmetric strength +5/5.  Sensation to touch intact all extremities.   There is no area of allodynia or hyperesthesia.  PAIN BEHAVIOR: Cooperative  Psych:  Normal affect.  Calm, cooperative, conversant[AM1.1], but confused and changing the subject with many questions[AM1.2].    Medications       acetaminophen  975 mg Oral TID     aspirin  81 mg Oral Daily     bacitracin   Topical BID     carvedilol  25 mg Oral BID w/meals     cefTRIAXone  1 g Intravenous Q24H     enoxaparin  40 mg Subcutaneous Q24H      HYDROmorphone  1 mg Oral Q4H     insulin aspart  1-7 Units Subcutaneous TID AC     insulin aspart  1-5 Units Subcutaneous At Bedtime     lidocaine  1 patch Transdermal Q24h    And     lidocaine   Transdermal Q24H    And     lidocaine   Transdermal Q8H     losartan (COZAAR) tablet 50 mg  50 mg Oral Daily     melatonin  3-6 mg Oral At Bedtime     menthol  1 each Transdermal Q8H     nortriptyline  50 mg Oral At Bedtime     QUEtiapine  25 mg Oral At Bedtime     senna-docusate  1 tablet Oral At Bedtime     sodium chloride (PF)  3 mL Intracatheter Q8H       Data   Results for orders placed or performed during the hospital encounter of 05/18/18 (from the past 24 hour(s))   Glucose by meter   Result Value Ref Range    Glucose 175 (H) 70 - 99 mg/dL   Glucose by meter   Result Value Ref Range    Glucose 252 (H) 70 - 99 mg/dL   Glucose by meter   Result Value Ref Range    Glucose 250 (H) 70 - 99 mg/dL   Glucose by meter   Result Value Ref Range    Glucose 120 (H) 70 - 99 mg/dL   Glucose by meter   Result Value Ref Range    Glucose 134 (H) 70 - 99 mg/dL[AM1.1]          Revision History        User Key Date/Time User Provider Type Action    > [N/A] 5/29/2018 11:03 AM Alva Barrera APRN CNS Clinical Nurse Specialist Addend     AM1.2 5/22/2018 11:07 AM Alva Barrera APRN CNS Clinical Nurse Specialist Sign     AM1.1 5/22/2018 10:21 AM Alva Barrera APRN CNS Clinical Nurse Specialist             Progress Notes by Ellei Baron LSW at 5/29/2018 10:49 AM     Author:  Ellie Baron LSW Service:  (none) Author Type:      Filed:  5/29/2018 10:51 AM Date of Service:  5/29/2018 10:49 AM Creation Time:  5/29/2018 10:49 AM    Status:  Signed :  Ellie Baron LSW ()         SWS:  D: discharge planning  A: [facility] received call from Irene at [facility]. Irene reports that they may have a bed for pt at their [place] location. Irene requests that CHATA send over new referral  information and they will assess for admission tomorrow.    P: SW sent referral information and will continue to follow.[LH1.1]     Revision History        User Key Date/Time User Provider Type Action    > LH1.1 5/29/2018 10:51 AM Ellie Baron LSW  Sign                  Procedure Notes     No notes of this type exist for this encounter.      Progress Notes - Therapies (Notes from 05/29/18 through 06/01/18)     No notes of this type exist for this encounter.

## 2018-05-18 NOTE — IP AVS SNAPSHOT
[place] [address] MEDICAL SURGICAL: [phone]                                              INTERAGENCY TRANSFER FORM - LAB / IMAGING / EKG / EMG RESULTS   2018                    Hospital Admission Date: 2018  CHANI ELENA   : 1935  Sex: Male        Attending Provider: Jose Manuel Wade DO     Allergies:  Lisinopril, Morphine    Infection:  None   Service:  GENERAL MEDI    Ht:  1.829 m (6')   Wt:  101.5 kg (223 lb 12.3 oz)   Admission Wt:  101.5 kg (223 lb 12.3 oz)    BMI:  30.35 kg/m 2   BSA:  2.27 m 2            Patient PCP Information     Provider PCP Type    [facility]         Lab Results - 3 Days      Glucose by meter [[id number]] (Abnormal)  Resulted: 18 0909, Result status: Final result    Ordering provider: Jose Manuel Wade,   18 0852 Resulting lab: POINT OF CARE TEST, GLUCOSE    Specimen Information    Type Source Collected On     18 0852          Components       Value Reference Range Flag Lab   Glucose 211 70 - 99 mg/dL H 170            Glucose by meter [[id number]] (Abnormal)  Resulted: 18 1254, Result status: Final result    Ordering provider: Jose Manuel Wade,   18 1211 Resulting lab: POINT OF CARE TEST, GLUCOSE    Specimen Information    Type Source Collected On     18 1211          Components       Value Reference Range Flag Lab   Glucose 224 70 - 99 mg/dL H 170            Platelet count [[id number]]  Resulted: 18 0658, Result status: Final result    Ordering provider: Eugene George MD  18 0000 Resulting lab: [facility]    Specimen Information    Type Source Collected On   Blood  18 0644          Components       Value Reference Range Flag Lab   Platelet Count 246 150 - 450 10e9/L  FrRdHs            Glucose by meter [[id number]] (Abnormal)  Resulted: 18 0901, Result status: Final result    Ordering provider: Jose Manuel Wade,   18 0825 Resulting lab: POINT OF  CARE TEST, GLUCOSE    Specimen Information    Type Source Collected On     05/29/18 0825          Components       Value Reference Range Flag Lab   Glucose 152 70 - 99 mg/dL H 170            Testing Performed By     Lab - Abbreviation Name Director Address Valid Date Range    12 - [facility] Unknown [address] [facility] [address] 05/08/15 1057 - Present    170 - Unknown POINT OF CARE TEST, GLUCOSE Unknown Unknown 10/31/11 1114 - Present            Unresulted Labs (24h ago through future)    Start       Ordered    05/24/18 0600  Platelet count  EVERY THREE DAYS,   Routine     Comments:  If no result is listed, this lab has not been done the past 365 days. LATEST LAB RESULT: Platelet Count (10e9/L)       Date                     Value                 05/18/2018               211              ----------    05/21/18 1027      Encounter-Level Documents:     There are no encounter-level documents.      Order-Level Documents:     There are no order-level documents.

## 2018-05-18 NOTE — IP AVS SNAPSHOT
` `     [place] [address] [facility]: [phone]            Medication Administration Report for Adam Huber as of 06/01/18 1353   Legend:    Given Hold Not Given Due Canceled Entry Other Actions    Time Time (Time) Time  Time-Action       Inactive    Active    Linked        Medications 05/26/18 05/27/18 05/28/18 05/29/18 05/30/18 05/31/18 06/01/18    acetaminophen (TYLENOL) tablet 975 mg  Dose: 975 mg  Freq: 3 TIMES DAILY Route: PO  Start: 05/20/18 0800   Admin Instructions: Maximum acetaminophen dose from all sources = 75 mg/kg/day not to exceed 4 grams/day.    Admin. Amount: 3 tablet (3 × 325 mg tablet)  Last Admin: 06/01/18 0811  Dispense Loc:  ADS MS3[facility]     0934 (975 mg)-Given       1659 (975 mg)-Given       2024 (975 mg)-Given        0944 (975 mg)-Given       1716 (975 mg)-Given       2056 (975 mg)-Given        0838 (975 mg)-Given       1724 (975 mg)-Given       2145 (975 mg)-Given        0902 (975 mg)-Given       1606 (975 mg)-Given       2055 (975 mg)-Given        0844 (975 mg)-Given       1633 (975 mg)-Given       2023 (975 mg)-Given        1002 (975 mg)-Given       1622 (975 mg)-Given       2127 (975 mg)-Given        0811 (975 mg)-Given       [ ] 1600       [ ] 2100           aspirin EC tablet 81 mg  Dose: 81 mg  Freq: DAILY Route: PO  Start: 05/19/18 1045   Admin Instructions: DO NOT CRUSH.    Admin. Amount: 1 tablet (1 × 81 mg tablet)  Last Admin: 06/01/18 0811  Dispense Loc:  [facility]     0933 (81 mg)-Given        0944 (81 mg)-Given        0840 (81 mg)-Given        0902 (81 mg)-Given        0845 (81 mg)-Given        1002 (81 mg)-Given        0811 (81 mg)-Given           bacitracin ointment  Freq: 2 TIMES DAILY Route: Top  Start: 05/21/18 2100   Admin Instructions: Apply to affected area L arm    Last Admin: 06/01/18 0812  Dispense Loc:  [facility]     0934 ( )-Given       2024 ( )-Given        0944 ( )-Given       2056 ( )-Given        0840 ( )-Given       (2150)-Not Given        0906  ( )-Given       (2055)-Not Given        0847 ( )-Given       2022 ( )-Given        1002 ( )-Given       2127 ( )-Given        0812 ( )-Given       [ ] 2100           bisacodyl (DULCOLAX) Suppository 10 mg  Dose: 10 mg  Freq: DAILY PRN Route: RE  PRN Reason: constipation  Start: 05/19/18 0059   Admin Instructions: Hold for loose stools.  This is the third step of a three step constipation treatment.    Admin. Amount: 1 suppository (1 × 10 mg suppository)  Dispense Loc: [facility] ADS [facility]               carvedilol (COREG) tablet 25 mg  Dose: 25 mg  Freq: 2 TIMES DAILY WITH MEALS Route: PO  Start: 05/20/18 1800   Admin. Amount: 1 tablet (1 × 25 mg tablet)  Last Admin: 06/01/18 0811  Dispense Loc: [facility] ADS [facility]     0933 (25 mg)-Given       1909 (25 mg)-Given        0944 (25 mg)-Given       1716 (25 mg)-Given        0840 (25 mg)-Given       1724 (25 mg)-Given        0906 (25 mg)-Given       1832 (25 mg)-Given        0844 (25 mg)-Given       1845 (25 mg)-Given        1002 (25 mg)-Given       1846 (25 mg)-Given        0811 (25 mg)-Given       [ ] 1800           cloNIDine (CATAPRES) tablet 0.1 mg  Dose: 0.1 mg  Freq: EVERY 6 HOURS PRN Route: PO  PRN Comment: for SBP>180  Start: 05/31/18 2005   Admin. Amount: 1 tablet (1 × 0.1 mg tablet)  Dispense Loc: [facility]               enoxaparin (LOVENOX) injection 40 mg  Dose: 40 mg  Freq: EVERY 24 HOURS Route: SC  Start: 05/21/18 1030   Admin. Amount: 40 mg = 0.4 mL Conc: 40 mg/0.4 mL  Last Admin: 06/01/18 1054  Dispense Loc: [facility] ADS [facility]  Volume: 0.4 mL     0933 (40 mg)-Given        1102 (40 mg)-Given        1115 (40 mg)-Given        1022 (40 mg)-Given        1130 (40 mg)-Given        1002 (40 mg)-Given        1054 (40 mg)-Given           gabapentin (NEURONTIN) capsule 100 mg  Dose: 100 mg  Freq: 3 TIMES DAILY Route: PO  Start: 05/25/18 1630   Admin. Amount: 1 capsule (1 × 100 mg capsule)  Last Admin: 06/01/18 0812  Dispense Loc:  [facility]     0933 (100 mg)-Given       4350 (100  mg)-Given       2024 (100 mg)-Given        0944 (100 mg)-Given       1610 (100 mg)-Given       2057 (100 mg)-Given        0839 (100 mg)-Given       1724 (100 mg)-Given       2145 (100 mg)-Given        0902 (100 mg)-Given       1606 (100 mg)-Given       2055 (100 mg)-Given        0845 (100 mg)-Given       1633 (100 mg)-Given       2023 (100 mg)-Given        1002 (100 mg)-Given       1623 (100 mg)-Given       2127 (100 mg)-Given        0812 (100 mg)-Given       [ ] 1600       [ ] 2100           glucose gel 15-30 g  Dose: 15-30 g  Freq: EVERY 15 MIN PRN Route: PO  PRN Reason: low blood sugar  Start: 05/19/18 0057   Admin Instructions: Give 15 g for BG 51 to 69 mg/dL IF patient is conscious and able to swallow. Give 30 g for BG less than or equal to 50 mg/dL IF patient is conscious and able to swallow. Do NOT give glucose gel via enteral tube.  IF patient has enteral tube: give apple juice 120 mL (4 oz or 15 g of CHO) via enteral tube for BG 51 to 69 mg/dL.  Give apple juice 240 mL (8 oz or 30 g of CHO) via enteral tube for BG less than or equal to 50 mg/dL.    ~Oral gel is preferable for conscious and able to swallow patient.   ~IF gel unavailable or patient refuses may provide apple juice 120 mL (4 oz or 15 g of CHO). Document juice on I and O flowsheet.    Admin. Amount: 15-30 g  Dispense Loc: [facility] ADS [facility]  Volume: 93.75 mL              Or  dextrose 50 % injection 25-50 mL  Dose: 25-50 mL  Freq: EVERY 15 MIN PRN Route: IV  PRN Reason: low blood sugar  Start: 05/19/18 0057   Admin Instructions: Use if have IV access, BG less than 70 mg/dL and meet dose criteria below:  Dose if conscious and alert (or disorientated) and NPO = 25 mL  Dose if unconscious / not alert = 50 mL  Vesicant. For ordered doses up to 25 g, give IV Push undiluted. Give each 5g over 1 minute.    Admin. Amount: 25-50 mL  Dispense Loc: [facility] ADS [facility]  Infused Over: 1-5 Minutes  Volume: 50 mL              Or  glucagon injection 1 mg  Dose: 1 mg  Freq:  EVERY 15 MIN PRN Route: SC  PRN Reason: low blood sugar  PRN Comment: May repeat x 1 only  Start: 05/19/18 0057   Admin Instructions: May give SQ or IM. ONLY use glucagon IF patient has NO IV access AND is UNABLE to swallow AND blood glucose is LESS than or EQUAL to 50 mg/dL.  If ordered IV, give IV Push over 1 minute. Reconstitute with 1mL sterile water.    Admin. Amount: 1 mg  Dispense Loc: [facility] ADS [facility]               hydrALAZINE (APRESOLINE) injection 10 mg  Dose: 10 mg  Freq: EVERY 4 HOURS PRN Route: IV  PRN Reason: high blood pressure  PRN Comment: give for SBP > 180  Start: 05/19/18 0102   Admin Instructions: For ordered doses up to 40 mg, give IV Push undiluted over 1 minute.    Admin. Amount: 10 mg = 0.5 mL Conc: 20 mg/mL  Last Admin: 05/24/18 0819  Dispense Loc: [facility] ADS [facility]  Volume: 0.5 mL               Lidocaine (LIDOCARE) 4 % Patch 1 patch  Dose: 1 patch  Freq: EVERY 24 HOURS 2000 Route: TD  Start: 05/20/18 2000   Admin Instructions: Apply patch(s) to painful back area. To prevent lidocaine toxicity, patient should be patch free for 12 hrs daily. Patches may be cut to smaller size prior to removing release liner.  NEVER APPLY HEAT OVER PATCH which increases absorption and risk of local anesthetic toxicity. Do not apply over area where liposomal bupivacaine was injected for 96 hours post injection.    Admin. Amount: 1 patch  Last Admin: 05/31/18 2128  Dispense Loc: [facility] ADS [facility]  Infused Over: 12 Hours     (2012)-Not Given        2055 (1 patch)-Given        (2149)-Not Given        (2056)-Not Given        2022 (1 patch)-Given [C]        2128 (1 patch)-Given [C]        [ ] 2000          And  lidocaine patch REMOVAL  Freq: EVERY 24 HOURS 0800 Route: TD  Start: 05/21/18 0800   Admin Instructions: Remove lidocaine Patch.    Last Admin: 06/01/18 0817  Dispense Loc:  Main Pharm[facility]     0938 ( )-Negative        0945 ( )-Negative        0840 ( )-Patch Removed        0920 ( )-Negative [C]        0846 ( )-Negative      "   1003 ( )-Negative        0817 ( )-Negative          And  lidocaine patch in PLACE  Freq: EVERY 8 HOURS Route: TD  Start: 05/20/18 2000   Admin Instructions: Chart every shift, confirming that patch is still in place on patient (no barcode scan needed). See patch order for dose information.  NEVER APPLY HEAT OVER PATCH which will increase absorption and may lead to risk of local anesthetic toxicity. Do not apply over area where liposomal bupivacaine injected for 96 hours.    Last Admin: 06/01/18 1122  Dispense Loc:  Main Pharmacy     0308 ( )-Patch in Place       1259 ( )-Negative       2027 ( )-Negative        0333 ( )-Negative       1349 ( )-Negative       2108 ( )-Patch in Place        0524 ( )-Patch in Place       (1111)-Not Given [C]               0338 ( )-Negative       1106 ( )-Negative               0411 ( )-Negative [C]       1226 ( )-Negative       2043 ( )-Patch in Place        0339 ( )-Negative       1136 ( )-Negative       2134 ( )-Given        0327 ( )-Patch in Place       1122 ( )-Negative       [ ] 2000           lidocaine 1 % 1 mL  Dose: 1 mL  Freq: EVERY 1 HOUR PRN Route: OTHER  PRN Comment: mild pain with VAD insertion or accessing implanted port  Start: 05/19/18 0057   Admin Instructions: Do NOT give if patient has a history of allergy to any local anesthetic or any \"cassi\" product. MAX dose 1 mL subcutaneous OR intradermal in divided doses.    Admin. Amount: 1 mL  Dispense Loc: [facility]  Volume: 2 mL               losartan (COZAAR) tablet 100 mg  Dose: 100 mg  Freq: DAILY Route: PO  Start: 05/25/18 0900   Admin. Amount: 1 tablet (1 × 100 mg tablet)  Last Admin: 06/01/18 0811  Dispense Loc:  [facility]     0934 (100 mg)-Given        0944 (100 mg)-Given        0839 (100 mg)-Given        0903 (100 mg)-Given        0845 (100 mg)-Given        1002 (100 mg)-Given        0811 (100 mg)-Given           melatonin tablet 3-6 mg  Dose: 3-6 mg  Freq: AT BEDTIME Route: PO  Start: 05/19/18 2015 "   Admin. Amount: 1-2 tablet (1-2 × 3 mg tablet)  Last Admin: 05/31/18 2127  Dispense Loc:  [facility]     2024 (6 mg)-Given        2054 (6 mg)-Given        2145 (6 mg)-Given        2055 (6 mg)-Given        2022 (6 mg)-Given        2127 (6 mg)-Given        [ ] 2000           menthol (ICY HOT) 5 % patch 1 patch  Dose: 1 patch  Freq: EVERY 8 HOURS PRN Route: Top  PRN Reason: muscle soreness  Start: 05/20/18 1354   Admin Instructions: Apply to Skin.  Remove 'old patch' and chart on Medication Patch Removal order when new patch is applied. Avoid placing heating pad over the patch.    Admin. Amount: 1 patch  Dispense Loc:  Main Pharm[facility]              And  menthol (ICY HOT) Patch in Place  Dose: 1 each  Freq: EVERY 8 HOURS Route: TD  Start: 05/20/18 1400   Admin Instructions: Chart every shift, confirming that patch is still in place on patient (no barcode scan needed). See patch order for dose information.    Admin. Amount: 1 each  Last Admin: 06/01/18 1315  Dispense Loc:  Main Pharm[facility]     0555 (1 each)-Negative                      0545 (1 each)-Negative       1350 (1 each)-Negative       2223 (1 each)-Patch Removed        0525 (1 each)-Patch Removed       (1444)-Not Given [C]               0657 (1 each)-Negative       1323 (1 each)-Negative [C]               0538 (1 each)-Negative [C]       1356 (1 each)-Negative       2206 (1 each)-Negative [C]        0626 (1 each)-Negative       1401 (1 each)-Negative       2138 (1 each)-Negative [C]        0508 (1 each)-Negative       1315 (1 each)-Negative       [ ] 2200          And  menthol (ICY HOT) patch REMOVAL  Freq: EVERY 8 HOURS PRN Route: TD  PRN Comment: for patch removal  Start: 05/20/18 1354   Admin Instructions: Remove patch when new patch is applied or patch is discontinued.    Dispense Loc:  Main Pharm[facility]               naloxone (NARCAN) injection 0.1-0.4 mg  Dose: 0.1-0.4 mg  Freq: EVERY 2 MIN PRN Route: IV  PRN Reason: opioid reversal  Start: 05/19/18 0057    Admin Instructions: For respiratory rate LESS than or EQUAL to 8.  Partial reversal dose:  0.1 mg titrated q 2 minutes for Analgesia Side Effects Monitoring Sedation Level of 3 (frequently drowsy, arousable, drifts to sleep during conversation).Full reversal dose:  0.4 mg bolus for Analgesia Side Effects Monitoring Sedation Level of 4 (somnolent, minimal or no response to stimulation).  For ordered doses up to 2mg give IVP. Give each 0.4mg over 15 seconds in emergency situations. For non-emergent situations further dilute in 9mL of NS to facilitate titration of response.    Admin. Amount: 0.1-0.4 mg = 0.25-1 mL Conc: 0.4 mg/mL  Dispense Loc: [facility] ADS [facility]  Volume: 1 mL               nitroGLYcerin (NITROSTAT) sublingual tablet 0.4 mg  Dose: 0.4 mg  Freq: EVERY 5 MIN PRN Route: SL  PRN Reason: chest pain  Start: 05/19/18 0057   Admin Instructions: Maximum 3 doses in 15 minutes.  Notify provider if no relief after 3 doses.    Do NOT give nitroglycerin SL IF the patient has taken avanafil (STENDRA), sildenafil (VIAGRA) or (REVATIO) within the last 8 hours, vardenafil (LEVITRA) or (STAXYN) within the last 18 hours, tadalafil (CIALIS) or (ADCIRCA) within the last 36 hours. Inform provider if patient has taken one of these medications.  If patient is still having acute angina requiring treatment, an alternative treatment option may be used such as: IV beta-blocker [2.5 mg - 5 mg metoprolol (LOPRESSOR)] if ordered by a provider.    Admin. Amount: 1 tablet (1 × 0.4 mg tablet)  Dispense Loc: RH ADS MS3[facility]               nortriptyline (PAMELOR) capsule 50 mg  Dose: 50 mg  Freq: AT BEDTIME Route: PO  Start: 05/19/18 2045   Admin. Amount: 2 capsule (2 × 25 mg capsule)  Last Admin: 05/31/18 2126  Dispense Loc: [facility] ADS MS3[facility]     2024 (50 mg)-Given        2054 (50 mg)-Given        2145 (50 mg)-Given        2055 (50 mg)-Given        2022 (50 mg)-Given        2126 (50 mg)-Given        [ ] 2000           ondansetron (ZOFRAN-ODT) ODT tab 4  mg  Dose: 4 mg  Freq: EVERY 6 HOURS PRN Route: PO  PRN Reasons: nausea,vomiting  Start: 05/19/18 0059   Admin Instructions: This is Step 1 of nausea and vomiting management.  If nausea not resolved in 15 minutes, go to Step 2 prochlorperazine (COMPAZINE). Do not push through foil backing. Peel back foil and gently remove. Place on tongue immediately. Administration with liquid unnecessary  With dry hands, peel back foil backing and gently remove tablet; do not push oral disintegrating tablet through foil backing; administer immediately on tongue and oral disintegrating tablet dissolves in seconds; then swallow with saliva; liquid not required.    Admin. Amount: 1 tablet (1 × 4 mg tablet)  Dispense Loc: RH ADS MS3[facility]              Or  ondansetron (ZOFRAN) injection 4 mg  Dose: 4 mg  Freq: EVERY 6 HOURS PRN Route: IV  PRN Reasons: nausea,vomiting  Start: 05/19/18 0059   Admin Instructions: This is Step 1 of nausea and vomiting management.  If nausea not resolved in 15 minutes, go to Step 2 prochlorperazine (COMPAZINE).  Irritant. For ordered doses up to 4 mg, give IV Push undiluted over 2-5 minutes.    Admin. Amount: 4 mg = 2 mL Conc: 4 mg/2 mL  Dispense Loc: RH ADS [facility]  Infused Over: 2-5 Minutes  Volume: 2 mL               polyethylene glycol (MIRALAX/GLYCOLAX) Packet 17 g  Dose: 17 g  Freq: DAILY PRN Route: PO  PRN Reason: constipation  Start: 05/19/18 0059   Admin Instructions: Give in 8oz of  water, juice, or soda. Hold for loose stools.  This is the second step of a three step constipation treatment.  1 Packet = 17 grams. Mixed prescribed dose in 8 ounces of water. Follow with 8 oz. of water.    Admin. Amount: 17 g  Last Admin: 05/22/18 1320  Dispense Loc: RH ADS MS3W               QUEtiapine (SEROquel) quarter-tab 12.5 mg  Dose: 12.5 mg  Freq: 2 TIMES DAILY PRN Route: PO  PRN Comment: Agitation  Start: 05/24/18 1556   Admin. Amount: 2 quarter-tab (2 × 6.25 mg quarter-tab)  Last Admin: 06/01/18 0043  Dispense Loc:  [facility] ADS [facility]      0946 (12.5 mg)-Given            0043 (12.5 mg)-Given           QUEtiapine (SEROquel) quarter-tab 6.25 mg  Dose: 6.25 mg  Freq: 2 TIMES DAILY WITH MEALS Route: PO  Start: 05/23/18 1800   Admin. Amount: 1 quarter-tab (1 × 6.25 mg quarter-tab)  Last Admin: 06/01/18 0812  Dispense Loc: RH ADS MS3W     0933 (6.25 mg)-Given       1909 (6.25 mg)-Given        0945 (6.25 mg)-Given       1717 (6.25 mg)-Given        0838 (6.25 mg)-Given       1724 (6.25 mg)-Given        0902 (6.25 mg)-Given       1832 (6.25 mg)-Given        0845 (6.25 mg)-Given       1844 (6.25 mg)-Given        1002 (6.25 mg)-Given       1846 (6.25 mg)-Given        0812 (6.25 mg)-Given       [ ] 1800           QUEtiapine (SEROquel) tablet 25 mg  Dose: 25 mg  Freq: AT BEDTIME Route: PO  Start: 05/20/18 2200   Admin. Amount: 1 tablet (1 × 25 mg tablet)  Last Admin: 05/31/18 2126  Dispense Loc: RH ADS MS3W     2024 (25 mg)-Given        2056 (25 mg)-Given        2145 (25 mg)-Given        2055 (25 mg)-Given        2023 (25 mg)-Given        2126 (25 mg)-Given        [ ] 2100           senna-docusate (SENOKOT-S;PERICOLACE) 8.6-50 MG per tablet 1 tablet  Dose: 1 tablet  Freq: AT BEDTIME Route: PO  Start: 05/21/18 2200   Admin. Amount: 1 tablet  Last Admin: 05/30/18 2023  Dispense Loc: RH ADS MS3W     2024 (1 tablet)-Given        2057 (1 tablet)-Given        2145 (1 tablet)-Given        2055 (1 tablet)-Given        2023 (1 tablet)-Given        (2117)-Not Given [C]        [ ] 2100           senna-docusate (SENOKOT-S;PERICOLACE) 8.6-50 MG per tablet 1-2 tablet  Dose: 1-2 tablet  Freq: 2 TIMES DAILY PRN Route: PO  PRN Reason: constipation  Start: 05/24/18 1217   Admin Instructions: If the patient has multiple PO bowel stimulant agents ordered PRN, offer in the following order per policy.  Move to the next available step if the earlier step is ineffective.<br>Step 1 - senna; Step 2 - bisacodyl; Step 3 - milk of magnesia; Step 4 - polyethylene glycol; Step  5 - magnesium citrate.<br>    Admin. Amount: 1-2 tablet  Last Admin: 05/24/18 1348  Dispense Loc:  ADS MS3[facility]               sodium chloride (PF) 0.9% PF flush 3 mL  Dose: 3 mL  Freq: EVERY 1 HOUR PRN Route: IK  PRN Reason: line flush  PRN Comment: for peripheral IV flush post IV meds  Start: 05/19/18 0057   Admin. Amount: 3 mL  Dispense Loc: [facility]  Volume: 4 mL               traMADol (ULTRAM) tablet 50 mg  Dose: 50 mg  Freq: EVERY 4 HOURS PRN Route: PO  PRN Reason: moderate pain  Start: 05/29/18 0900   Admin. Amount: 1 tablet (1 × 50 mg tablet)  Last Admin: 05/29/18 1852  Dispense Loc:  ADS MS3[facility]        1852 (50 mg)-Given           Medications 05/26/18 05/27/18 05/28/18 05/29/18 05/30/18 05/31/18 06/01/18

## 2018-05-18 NOTE — IP AVS SNAPSHOT
[place] [address] [facility]    [address]    [facility] [address]    Phone:  [phone]    Fax:  [phone]                                       After Visit Summary   5/18/2018    Adam Huber    MRN: [id number]           After Visit Summary Signature Page     I have received my discharge instructions, and my questions have been answered. I have discussed any challenges I see with this plan with the nurse or doctor.    ..........................................................................................................................................  Patient/Patient Representative Signature      ..........................................................................................................................................  Patient Representative Print Name and Relationship to Patient    ..................................................               ................................................  Date                                            Time    ..........................................................................................................................................  Reviewed by Signature/Title    ...................................................              ..............................................  Date                                                            Time

## 2018-05-18 NOTE — IP AVS SNAPSHOT
[place] [address] MEDICAL SURGICAL: [phone]                                              INTERAGENCY TRANSFER FORM - PHYSICIAN ORDERS   2018                    Hospital Admission Date: 2018  CHANI ELENA   : 1935  Sex: Male        Attending Provider: Jose Manuel Wade DO     Allergies:  Lisinopril, Morphine    Infection:  None   Service:  GENERAL MEDI    Ht:  1.829 m (6')   Wt:  101.5 kg (223 lb 12.3 oz)   Admission Wt:  101.5 kg (223 lb 12.3 oz)    BMI:  30.35 kg/m 2   BSA:  2.27 m 2            Patient PCP Information     Provider PCP Type    [facility]      ED Clinical Impression     Diagnosis Description Comment Added By Time Added    Urinary tract infection without hematuria, site unspecified [N39.0] Urinary tract infection without hematuria, site unspecified [N39.0]  Liza Melissa MD 2018  9:27 PM    Closed wedge compression fracture of twelfth thoracic vertebra, initial encounter (H) [S22.080A] Closed wedge compression fracture of twelfth thoracic vertebra, initial encounter (H) [S22.080A]  Liza Melissa MD 2018  9:27 PM    Fall, initial encounter [W19.XXXA] Fall, initial encounter [W19.XXXA]  Liza Melissa MD 2018  9:27 PM      Hospital Problems as of 2018              Priority Class Noted POA    Generalized muscle weakness Medium  3/18/2017 Yes    * (Principal)UTI (urinary tract infection) Medium  2018 Yes    T12 compression fracture (H) Medium  Unknown Unknown    Acute delirium Medium  Unknown Unknown    Late 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 behavioral disturbance Medium  2018 Unknown    Type 2 diabetes mellitus without complication (H) Medium  2018 Unknown    Closed wedge compression fracture of twelfth thoracic vertebra, initial encounter (H) Medium  2018 Unknown      Non-Hospital Problems as of 2018              Priority Class Noted    ACS (acute coronary syndrome) (H) Medium   10/9/2015    NSTEMI (non-ST elevated myocardial infarction) (H) Medium  10/23/2015    History of CVA (cerebrovascular accident) Medium  10/23/2015    Essential hypertension Medium  10/23/2015    Acute chest pain Medium  10/23/2015    CAD (coronary artery disease)~LEXI x2 placed proximal to mid RCA Medium  10/23/2015    Bleeding of blood vessel Medium  11/6/2015    Dehydration Medium  3/18/2017    Elevated troponin Medium  3/18/2017    Generalized weakness Medium  3/18/2017    Influenza B Medium  3/18/2017    Physical deconditioning Medium  3/28/2017    Acute metabolic encephalopathy Medium  3/28/2017    Pain Medium  Unknown      Code Status History     Date Active Date Inactive Code Status Order ID Comments User Context    5/30/2018  1:02 PM  Full Code [id number]  Papo Lopez MD Outpatient    5/19/2018 12:59 AM 5/30/2018  1:02 PM Full Code [id number]  Jose Manuel Wade DO Inpatient    3/28/2017  3:16 PM 5/19/2018 12:59 AM Full Code [id number]  Elias Boswell MD Outpatient    3/18/2017  8:00 PM 3/28/2017  3:16 PM Full Code [id number]  Ana Michelle PA-C Inpatient    10/9/2015 10:23 PM 10/13/2015  3:34 PM Full Code [id number]  Magdalena Braga PA-C Inpatient         Medication Review      START taking        Dose / Directions Comments    carvedilol 25 MG tablet   Commonly known as:  COREG   Used for:  Essential hypertension        Dose:  25 mg   Take 1 tablet (25 mg) by mouth 2 times daily (with meals)   Quantity:  60 tablet   Refills:  0        gabapentin 100 MG capsule   Commonly known as:  NEURONTIN        Dose:  100 mg   Take 1 capsule (100 mg) by mouth 3 times daily   Quantity:  90 capsule   Refills:  0        * QUEtiapine 25 MG tablet   Commonly known as:  SEROquel        Dose:  25 mg   Take 1 tablet (25 mg) by mouth At Bedtime   Quantity:  60 tablet   Refills:  0        * QUEtiapine 25 MG tablet   Commonly known as:  SEROquel        Dose:  6.25 mg   Take 0.25 tablets (6.25 mg) by mouth 2 times  daily (with meals)   Quantity:  60 tablet   Refills:  0        traMADol 50 MG tablet   Commonly known as:  ULTRAM        Dose:  50 mg   Take 1 tablet (50 mg) by mouth every 4 hours as needed for moderate pain   Quantity:  10 tablet   Refills:  0        * Notice:  This list has 2 medication(s) that are the same as other medications prescribed for you. Read the directions carefully, and ask your doctor or other care provider to review them with you.      CONTINUE these medications which may have CHANGED, or have new prescriptions. If we are uncertain of the size of tablets/capsules you have at home, strength may be listed as something that might have changed.        Dose / Directions Comments    losartan 100 MG tablet   Commonly known as:  COZAAR   This may have changed:  medication strength   Used for:  Essential hypertension        Dose:  50 mg   Take 0.5 tablets (50 mg) by mouth daily   Quantity:  30 tablet   Refills:  0        nortriptyline 50 MG capsule   Commonly known as:  PAMELOR   This may have changed:    - medication strength  - how much to take        Dose:  50 mg   Take 1 capsule (50 mg) by mouth At Bedtime   Quantity:  30 capsule   Refills:  0          CONTINUE these medications which have NOT CHANGED        Dose / Directions Comments    acetaminophen 325 MG tablet   Commonly known as:  TYLENOL   Used for:  History of CVA (cerebrovascular accident)        Dose:  650 mg   Take 2 tablets (650 mg) by mouth every 4 hours as needed for mild pain   Quantity:  100 tablet   Refills:  0        aspirin 81 MG EC tablet   Used for:  NSTEMI (non-ST elevated myocardial infarction) (H)        Take one tablet daily   Quantity:  90 tablet   Refills:  0        melatonin 3 MG tablet        Dose:  3-6 mg   Take 3-6 mg by mouth At Bedtime   Refills:  0        nitroGLYcerin 0.4 MG sublingual tablet   Commonly known as:  NITROSTAT   Used for:  NSTEMI (non-ST elevated myocardial infarction) (H), Essential hypertension,  Postsurgical percutaneous transluminal coronary angioplasty status        Dose:  0.4 mg   Place 1 tablet (0.4 mg) under the tongue every 5 minutes as needed for chest pain   Quantity:  25 tablet   Refills:  1        olopatadine HCl 0.2 % Soln   Commonly known as:  PATADAY        Dose:  1 drop   Place 1 drop into both eyes daily as needed   Refills:  0        polyethylene glycol powder   Commonly known as:  MIRALAX/GLYCOLAX        Dose:  1 capful   Take 1 capful by mouth daily as needed   Refills:  0                After Care     Activity       Your activity upon discharge:    - continue to wear TLSO at all times when out of bed  - Repeat XR of thoracic spine AP and Lateral in 6 weeks.   - Return to clinic following repeat XR spine in 6 weeks   - Call the [facility] for any questions or concerns during interim, at [phone]  - Seek medical attention immediately if any paresthesias, weakness, gait instability, bowel/bladder incontinence, new pain.       Activity - Up with assistive device           Activity - Up with nursing assistance           Additional Discharge Instructions       Wear TLSO whenever out of bed.       Advance Diet as Tolerated       Follow this diet upon discharge:       High Consistent CHO Diet       General info for SNF       Length of Stay Estimate: Short Term Care: Estimated # of Days <30  Condition at Discharge: Stable  Level of care:skilled   Rehabilitation Potential: Fair  Admission H&P remains valid and up-to-date: Yes  Recent Chemotherapy: N/A  Use Nursing Home Standing Orders: Yes       Mantoux instructions       Give two-step Mantoux (PPD) Per Facility Policy Yes             Referrals     Occupational Therapy Adult Consult       Evaluate and treat as clinically indicated.    Reason:   Generalized weakness, confusion.       Physical Therapy Adult Consult       Evaluate and treat as clinically indicated.    Reason:  Generalized weakness, confusion.             Radiology  & Cardiology Orders     Future Labs/Procedures Complete By Expires    XR Thoracic Spine 2 Views  7/2/2018 (Approximate) 5/21/2019      Radiology & Cardiology Orders     XR Thoracic Spine 2 Views                 Follow-Up Appointment Instructions     Future Labs/Procedures    Follow Up and recommended labs and tests     Comments:    As needed with NH Physician.    Follow-up and recommended labs and tests      Comments:    Please follow up at the [facility] in 6 weeks with a thoracic xray .  Please call the clinic at [phone] to schedule your appointment with Sangeetha Mireles CNP or Sachi Tolentino CNP      Follow-Up Appointment Instructions     Follow Up and recommended labs and tests       As needed with NH Physician.       Follow-up and recommended labs and tests        Please follow up at the [facility] in 6 weeks with a thoracic xray .  Please call the clinic at [phone] to schedule your appointment with Sangeetha Mireles CNP or Sachi Tolentino CNP             Statement of Approval     Ordered          06/01/18 1244  I have reviewed and agree with all the recommendations and orders detailed in this document.  EFFECTIVE NOW     Approved and electronically signed by:  Papo Lopez MD           05/30/18 130  I have reviewed and agree with all the recommendations and orders detailed in this document.  EFFECTIVE NOW     Approved and electronically signed by:  Papo Lopez MD

## 2018-05-19 ENCOUNTER — APPOINTMENT (OUTPATIENT)
Dept: CT IMAGING | Facility: CLINIC | Age: 83
DRG: 552 | End: 2018-05-19
Attending: INTERNAL MEDICINE
Payer: COMMERCIAL

## 2018-05-19 ENCOUNTER — APPOINTMENT (OUTPATIENT)
Dept: PHYSICAL THERAPY | Facility: CLINIC | Age: 83
DRG: 552 | End: 2018-05-19
Attending: INTERNAL MEDICINE
Payer: COMMERCIAL

## 2018-05-19 PROBLEM — N39.0 UTI (URINARY TRACT INFECTION): Status: ACTIVE | Noted: 2018-05-19

## 2018-05-19 LAB
ANION GAP SERPL CALCULATED.3IONS-SCNC: 7 MMOL/L (ref 3–14)
BUN SERPL-MCNC: 16 MG/DL (ref 7–30)
CALCIUM SERPL-MCNC: 8.1 MG/DL (ref 8.5–10.1)
CHLORIDE SERPL-SCNC: 107 MMOL/L (ref 94–109)
CO2 SERPL-SCNC: 24 MMOL/L (ref 20–32)
CREAT SERPL-MCNC: 0.82 MG/DL (ref 0.66–1.25)
GFR SERPL CREATININE-BSD FRML MDRD: >90 ML/MIN/1.7M2
GLUCOSE BLDC GLUCOMTR-MCNC: 161 MG/DL (ref 70–99)
GLUCOSE BLDC GLUCOMTR-MCNC: 171 MG/DL (ref 70–99)
GLUCOSE BLDC GLUCOMTR-MCNC: 190 MG/DL (ref 70–99)
GLUCOSE BLDC GLUCOMTR-MCNC: 236 MG/DL (ref 70–99)
GLUCOSE SERPL-MCNC: 180 MG/DL (ref 70–99)
POTASSIUM SERPL-SCNC: 4.2 MMOL/L (ref 3.4–5.3)
SODIUM SERPL-SCNC: 138 MMOL/L (ref 133–144)

## 2018-05-19 PROCEDURE — 40000193 ZZH STATISTIC PT WARD VISIT: Performed by: PHYSICAL THERAPIST

## 2018-05-19 PROCEDURE — 97162 PT EVAL MOD COMPLEX 30 MIN: CPT | Mod: GP | Performed by: PHYSICAL THERAPIST

## 2018-05-19 PROCEDURE — 25000128 H RX IP 250 OP 636: Performed by: INTERNAL MEDICINE

## 2018-05-19 PROCEDURE — 72125 CT NECK SPINE W/O DYE: CPT

## 2018-05-19 PROCEDURE — 00000146 ZZHCL STATISTIC GLUCOSE BY METER IP

## 2018-05-19 PROCEDURE — 80048 BASIC METABOLIC PNL TOTAL CA: CPT | Performed by: INTERNAL MEDICINE

## 2018-05-19 PROCEDURE — 12000007 ZZH R&B INTERMEDIATE

## 2018-05-19 PROCEDURE — 36415 COLL VENOUS BLD VENIPUNCTURE: CPT | Performed by: INTERNAL MEDICINE

## 2018-05-19 PROCEDURE — 97530 THERAPEUTIC ACTIVITIES: CPT | Mod: GP | Performed by: PHYSICAL THERAPIST

## 2018-05-19 PROCEDURE — 25000132 ZZH RX MED GY IP 250 OP 250 PS 637: Performed by: INTERNAL MEDICINE

## 2018-05-19 PROCEDURE — 25000131 ZZH RX MED GY IP 250 OP 636 PS 637: Performed by: INTERNAL MEDICINE

## 2018-05-19 PROCEDURE — 70450 CT HEAD/BRAIN W/O DYE: CPT

## 2018-05-19 PROCEDURE — 97116 GAIT TRAINING THERAPY: CPT | Mod: GP | Performed by: PHYSICAL THERAPIST

## 2018-05-19 PROCEDURE — 99223 1ST HOSP IP/OBS HIGH 75: CPT | Mod: AI | Performed by: INTERNAL MEDICINE

## 2018-05-19 PROCEDURE — 83036 HEMOGLOBIN GLYCOSYLATED A1C: CPT | Performed by: INTERNAL MEDICINE

## 2018-05-19 RX ORDER — AMOXICILLIN 250 MG
2 CAPSULE ORAL 2 TIMES DAILY PRN
Status: DISCONTINUED | OUTPATIENT
Start: 2018-05-19 | End: 2018-05-21

## 2018-05-19 RX ORDER — ACETAMINOPHEN 325 MG/1
650 TABLET ORAL EVERY 4 HOURS PRN
Status: DISCONTINUED | OUTPATIENT
Start: 2018-05-19 | End: 2018-05-20

## 2018-05-19 RX ORDER — CARVEDILOL 12.5 MG/1
12.5 TABLET ORAL 2 TIMES DAILY WITH MEALS
Status: DISCONTINUED | OUTPATIENT
Start: 2018-05-19 | End: 2018-05-20

## 2018-05-19 RX ORDER — PROCHLORPERAZINE MALEATE 5 MG
5 TABLET ORAL EVERY 6 HOURS PRN
Status: DISCONTINUED | OUTPATIENT
Start: 2018-05-19 | End: 2018-05-29 | Stop reason: ALTCHOICE

## 2018-05-19 RX ORDER — DEXTROSE MONOHYDRATE 25 G/50ML
25-50 INJECTION, SOLUTION INTRAVENOUS
Status: DISCONTINUED | OUTPATIENT
Start: 2018-05-19 | End: 2018-06-01 | Stop reason: HOSPADM

## 2018-05-19 RX ORDER — ASPIRIN 81 MG/1
81 TABLET ORAL DAILY
Status: DISCONTINUED | OUTPATIENT
Start: 2018-05-19 | End: 2018-06-01 | Stop reason: HOSPADM

## 2018-05-19 RX ORDER — HYDROMORPHONE HYDROCHLORIDE 1 MG/ML
0.2 INJECTION, SOLUTION INTRAMUSCULAR; INTRAVENOUS; SUBCUTANEOUS
Status: DISCONTINUED | OUTPATIENT
Start: 2018-05-19 | End: 2018-05-24

## 2018-05-19 RX ORDER — LANOLIN ALCOHOL/MO/W.PET/CERES
3-6 CREAM (GRAM) TOPICAL AT BEDTIME
COMMUNITY
End: 2018-11-08

## 2018-05-19 RX ORDER — NORTRIPTYLINE HCL 25 MG
50 CAPSULE ORAL AT BEDTIME
Status: DISCONTINUED | OUTPATIENT
Start: 2018-05-19 | End: 2018-05-19

## 2018-05-19 RX ORDER — BISACODYL 10 MG
10 SUPPOSITORY, RECTAL RECTAL DAILY PRN
Status: DISCONTINUED | OUTPATIENT
Start: 2018-05-19 | End: 2018-06-01 | Stop reason: HOSPADM

## 2018-05-19 RX ORDER — NITROGLYCERIN 0.4 MG/1
0.4 TABLET SUBLINGUAL EVERY 5 MIN PRN
Status: DISCONTINUED | OUTPATIENT
Start: 2018-05-19 | End: 2018-05-19

## 2018-05-19 RX ORDER — LANOLIN ALCOHOL/MO/W.PET/CERES
3-6 CREAM (GRAM) TOPICAL AT BEDTIME
Status: DISCONTINUED | OUTPATIENT
Start: 2018-05-19 | End: 2018-06-01 | Stop reason: HOSPADM

## 2018-05-19 RX ORDER — SODIUM CHLORIDE 9 MG/ML
INJECTION, SOLUTION INTRAVENOUS CONTINUOUS
Status: DISCONTINUED | OUTPATIENT
Start: 2018-05-19 | End: 2018-05-21

## 2018-05-19 RX ORDER — MELATONIN 3 MG
3 TABLET ORAL AT BEDTIME
Status: DISCONTINUED | OUTPATIENT
Start: 2018-05-19 | End: 2018-05-19

## 2018-05-19 RX ORDER — LIDOCAINE 40 MG/G
CREAM TOPICAL
Status: DISCONTINUED | OUTPATIENT
Start: 2018-05-19 | End: 2018-05-29

## 2018-05-19 RX ORDER — HYDROXYZINE HYDROCHLORIDE 25 MG/1
25 TABLET, FILM COATED ORAL EVERY 6 HOURS PRN
Status: DISCONTINUED | OUTPATIENT
Start: 2018-05-19 | End: 2018-05-24

## 2018-05-19 RX ORDER — OLOPATADINE HYDROCHLORIDE 2 MG/ML
1 SOLUTION/ DROPS OPHTHALMIC DAILY PRN
Status: ON HOLD | COMMUNITY
End: 2021-09-23

## 2018-05-19 RX ORDER — PROCHLORPERAZINE 25 MG
12.5 SUPPOSITORY, RECTAL RECTAL EVERY 12 HOURS PRN
Status: DISCONTINUED | OUTPATIENT
Start: 2018-05-19 | End: 2018-05-29 | Stop reason: ALTCHOICE

## 2018-05-19 RX ORDER — AMOXICILLIN 250 MG
1 CAPSULE ORAL 2 TIMES DAILY PRN
Status: DISCONTINUED | OUTPATIENT
Start: 2018-05-19 | End: 2018-05-21

## 2018-05-19 RX ORDER — CEFTRIAXONE 1 G/1
1 INJECTION, POWDER, FOR SOLUTION INTRAMUSCULAR; INTRAVENOUS EVERY 24 HOURS
Status: DISCONTINUED | OUTPATIENT
Start: 2018-05-19 | End: 2018-05-25

## 2018-05-19 RX ORDER — ONDANSETRON 2 MG/ML
4 INJECTION INTRAMUSCULAR; INTRAVENOUS EVERY 6 HOURS PRN
Status: DISCONTINUED | OUTPATIENT
Start: 2018-05-19 | End: 2018-06-01 | Stop reason: HOSPADM

## 2018-05-19 RX ORDER — NORTRIPTYLINE HCL 25 MG
50 CAPSULE ORAL AT BEDTIME
Status: DISCONTINUED | OUTPATIENT
Start: 2018-05-19 | End: 2018-06-01 | Stop reason: HOSPADM

## 2018-05-19 RX ORDER — NALOXONE HYDROCHLORIDE 0.4 MG/ML
.1-.4 INJECTION, SOLUTION INTRAMUSCULAR; INTRAVENOUS; SUBCUTANEOUS
Status: DISCONTINUED | OUTPATIENT
Start: 2018-05-19 | End: 2018-06-01 | Stop reason: HOSPADM

## 2018-05-19 RX ORDER — LOSARTAN POTASSIUM 25 MG/1
50 TABLET ORAL DAILY
Status: DISCONTINUED | OUTPATIENT
Start: 2018-05-19 | End: 2018-05-24

## 2018-05-19 RX ORDER — POLYETHYLENE GLYCOL 3350 17 G/17G
17 POWDER, FOR SOLUTION ORAL DAILY PRN
Status: DISCONTINUED | OUTPATIENT
Start: 2018-05-19 | End: 2018-06-01 | Stop reason: HOSPADM

## 2018-05-19 RX ORDER — NICOTINE POLACRILEX 4 MG
15-30 LOZENGE BUCCAL
Status: DISCONTINUED | OUTPATIENT
Start: 2018-05-19 | End: 2018-06-01 | Stop reason: HOSPADM

## 2018-05-19 RX ORDER — ONDANSETRON 4 MG/1
4 TABLET, ORALLY DISINTEGRATING ORAL EVERY 6 HOURS PRN
Status: DISCONTINUED | OUTPATIENT
Start: 2018-05-19 | End: 2018-06-01 | Stop reason: HOSPADM

## 2018-05-19 RX ORDER — HYDRALAZINE HYDROCHLORIDE 20 MG/ML
10 INJECTION INTRAMUSCULAR; INTRAVENOUS EVERY 4 HOURS PRN
Status: DISCONTINUED | OUTPATIENT
Start: 2018-05-19 | End: 2018-06-01 | Stop reason: HOSPADM

## 2018-05-19 RX ORDER — OXYCODONE HYDROCHLORIDE 5 MG/1
5-10 TABLET ORAL
Status: DISCONTINUED | OUTPATIENT
Start: 2018-05-19 | End: 2018-05-20

## 2018-05-19 RX ORDER — NITROGLYCERIN 0.4 MG/1
0.4 TABLET SUBLINGUAL EVERY 5 MIN PRN
Status: DISCONTINUED | OUTPATIENT
Start: 2018-05-19 | End: 2018-06-01 | Stop reason: HOSPADM

## 2018-05-19 RX ORDER — ACETAMINOPHEN 650 MG/1
650 SUPPOSITORY RECTAL EVERY 4 HOURS PRN
Status: DISCONTINUED | OUTPATIENT
Start: 2018-05-19 | End: 2018-05-20

## 2018-05-19 RX ADMIN — INSULIN ASPART 1 UNITS: 100 INJECTION, SOLUTION INTRAVENOUS; SUBCUTANEOUS at 16:58

## 2018-05-19 RX ADMIN — OXYCODONE HYDROCHLORIDE 5 MG: 5 TABLET ORAL at 14:53

## 2018-05-19 RX ADMIN — HYDROMORPHONE HYDROCHLORIDE 0.2 MG: 1 INJECTION, SOLUTION INTRAMUSCULAR; INTRAVENOUS; SUBCUTANEOUS at 01:34

## 2018-05-19 RX ADMIN — ASPIRIN 81 MG: 81 TABLET, COATED ORAL at 11:48

## 2018-05-19 RX ADMIN — Medication 1 MG: at 21:04

## 2018-05-19 RX ADMIN — OXYCODONE HYDROCHLORIDE 5 MG: 5 TABLET ORAL at 16:32

## 2018-05-19 RX ADMIN — SODIUM CHLORIDE: 9 INJECTION, SOLUTION INTRAVENOUS at 14:37

## 2018-05-19 RX ADMIN — SODIUM CHLORIDE: 9 INJECTION, SOLUTION INTRAVENOUS at 03:43

## 2018-05-19 RX ADMIN — ACETAMINOPHEN 650 MG: 325 TABLET ORAL at 14:53

## 2018-05-19 RX ADMIN — LOSARTAN POTASSIUM 50 MG: 25 TABLET, FILM COATED ORAL at 11:48

## 2018-05-19 RX ADMIN — NORTRIPTYLINE HYDROCHLORIDE 50 MG: 25 CAPSULE ORAL at 21:03

## 2018-05-19 RX ADMIN — OXYCODONE HYDROCHLORIDE 5 MG: 5 TABLET ORAL at 19:36

## 2018-05-19 RX ADMIN — INSULIN ASPART 1 UNITS: 100 INJECTION, SOLUTION INTRAVENOUS; SUBCUTANEOUS at 09:19

## 2018-05-19 RX ADMIN — CARVEDILOL 12.5 MG: 12.5 TABLET, FILM COATED ORAL at 08:31

## 2018-05-19 RX ADMIN — CARVEDILOL 12.5 MG: 12.5 TABLET, FILM COATED ORAL at 19:36

## 2018-05-19 RX ADMIN — CEFTRIAXONE SODIUM 1 G: 1 INJECTION, POWDER, FOR SOLUTION INTRAMUSCULAR; INTRAVENOUS at 16:33

## 2018-05-19 RX ADMIN — INSULIN ASPART 2 UNITS: 100 INJECTION, SOLUTION INTRAVENOUS; SUBCUTANEOUS at 11:49

## 2018-05-19 RX ADMIN — HYDROMORPHONE HYDROCHLORIDE 0.2 MG: 1 INJECTION, SOLUTION INTRAMUSCULAR; INTRAVENOUS; SUBCUTANEOUS at 09:16

## 2018-05-19 RX ADMIN — HYDROMORPHONE HYDROCHLORIDE 0.2 MG: 1 INJECTION, SOLUTION INTRAMUSCULAR; INTRAVENOUS; SUBCUTANEOUS at 03:42

## 2018-05-19 ASSESSMENT — ACTIVITIES OF DAILY LIVING (ADL)
ADLS_ACUITY_SCORE: 20
ADLS_ACUITY_SCORE: 22
ADLS_ACUITY_SCORE: 20
ADLS_ACUITY_SCORE: 20
ADLS_ACUITY_SCORE: 22

## 2018-05-19 NOTE — PLAN OF CARE
Problem: Patient Care Overview  Goal: Plan of Care/Patient Progress Review  Outcome: No Change  Pt admitted from ED. Orientated to room and safety check complete. VSS, pain in back. o2 sats down to 88%, put on 2L via NC, sats back up to 92%. Dilaudid. IV NS at 100ml/hr. Pt unable to turn or move without crying out. CT scan. [place] speaking. Interp phone at bedside. DM SS. Assist x2 w/lift. Tele shows SR. Will continue with current POC. Possible discharge 2 to 3 days.

## 2018-05-19 NOTE — PROGRESS NOTES
" 05/19/18 0900   Quick Adds   Type of Visit Initial PT Evaluation   Living Environment   Lives With spouse   Living Arrangements (town home)   Number of Stairs to Enter Home 4   Number of Stairs Within Home 0   Transportation Available family or friend will provide   Self-Care   Usual Activity Tolerance good   Current Activity Tolerance fair   Equipment Currently Used at Home cane, straight   Activity/Exercise/Self-Care Comment states he has been falling a lot.  No exercise program   Functional Level Prior   Ambulation 1-->assistive equipment  (cane)   Transferring 0-->independent   Toileting 0-->independent   Bathing 1-->assistive equipment   Fall history within last six months yes   Number of times patient has fallen within last six months (\"a lot\"  not able to state how many)   Which of the above functional risks had a recent onset or change? ambulation;transferring   Prior Functional Level Comment uses a cane, reports having several falls   General Information   Onset of Illness/Injury or Date of Surgery - Date 05/18/18   Referring Physician Dr. Wade   Patient/Family Goals Statement states he feels like he is being pulled backwards when standing   Pertinent History of Current Problem (include personal factors and/or comorbidities that impact the POC) Pt fell 5/17/18 at home per notes son had stated pt fell backwards when going sit to stnad from his bed, pt states he fell on the stairs.  Imaging negative neck and head,  thoracic spine T12 compressino fracture. UTI noted and felt to be cause of back pain and urinary retention.  Reports several falls.  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    Precautions/Limitations fall precautions  (oxygen as needed to keep sats 92% or greater)   General Observations pt Georgian " speaking,  present   Cognitive Status Examination   Orientation person   Level of Consciousness (come possible confusion)   Follows Commands and Answers Questions 75% of the time;able to follow single-step instructions   Personal Safety and Judgment at risk behaviors demonstrated   Pain Assessment   Patient Currently in Pain Yes, see Vital Sign flowsheet   Posture    Posture Comments flat back, tightness hamstrings   Range of Motion (ROM)   ROM Comment seated back pain increase with R knee extension, AA knee extension WNL sitting, supine ROM WFL, dorsiflexion to neutral B.    Strength   Strength Comments able to support self with LE B , move LE independently.  weakness generalized   Bed Mobility   Bed Mobility Comments min assist supine to log roll to sit.    Transfer Skills   Transfer Comments sit to and from stand with min assist.    Gait   Gait Comments ambulation with FWW,difficulty with coordinating use of walker   Balance   Balance Comments posterior COM , loses balance posterior   Sensory Examination   Sensory Perception Comments denies numbness or tingling B LE   Coordination   Coordination Comments decreased motor coordination with walker and motor planning   Muscle Tone   Muscle Tone no deficits were identified   Modality Interventions   Planned Modality Interventions Comments per therapist discretion   General Therapy Interventions   Planned Therapy Interventions balance training;gait training;neuromuscular re-education;strengthening;stretching;transfer training;home program guidelines;progressive activity/exercise   Clinical Impression   Criteria for Skilled Therapeutic Intervention yes, treatment indicated   PT Diagnosis decline in functional independence , fall risk   Influenced by the following impairments impaired balance, generalized weakness, pain, muscle tightness/hamstrings and gastrocs   Functional limitations due to impairments fall risk, assist with mobility and gait, limited  "ability to perform stairs.    Clinical Presentation Evolving/Changing   Clinical Presentation Rationale co morbidities, UTI   Clinical Decision Making (Complexity) Moderate complexity   Therapy Frequency` 5 times/week   Predicted Duration of Therapy Intervention (days/wks) 5   Anticipated Equipment Needs at Discharge (to be determined)   Anticipated Discharge Disposition Home with Home Therapy;Home with Assist;Transitional Care Facility   Risk & Benefits of therapy have been explained Yes   Patient, Family & other staff in agreement with plan of care Other (comment)  (unable to fully determine due to language barrier )   [facility] AM-PAC  \"6 Clicks\" V.2 Basic Mobility Inpatient Short Form   1. Turning from your back to your side while in a flat bed without using bedrails? 3 - A Little   2. Moving from lying on your back to sitting on the side of a flat bed without using bedrails? 3 - A Little   3. Moving to and from a bed to a chair (including a wheelchair)? 3 - A Little   4. Standing up from a chair using your arms (e.g., wheelchair, or bedside chair)? 3 - A Little   5. To walk in hospital room? 3 - A Little   6. Climbing 3-5 steps with a railing? 2 - A Lot   Basic Mobility Raw Score (Score out of 24.Lower scores equate to lower levels of function) 17   Total Evaluation Time   Total Evaluation Time (Minutes) 10     "

## 2018-05-19 NOTE — ED NOTES
Pt ambulated with walker and one person assist. Pt had difficulty getting into and out of bed, needed a lot of help. Ambulated slowly with a steady gait. Pt denied dizziness but complained of lower back pain.

## 2018-05-19 NOTE — PROGRESS NOTES
Seen and examined.  Bedside [place]  utilized.Adam is feeling better with not much back pain.  He is hard of hearing but conversant, cooperative during the time exam.  He is currently afebrile.  Blood pressure still being optimized remained elevated.  On Rocephin for accompanying UTI.  Had multiple falls at home follow with compression fracture, physical therapy evaluation requested earlier during H&P process.  Working diagnosis, plan of care and ongoing hospitalization processes were discussed in detail with her patient and he was given the opportunity to ask questions and have answered the best of my ability.    Ariel

## 2018-05-19 NOTE — PLAN OF CARE
Problem: Patient Care Overview  Goal: Plan of Care/Patient Progress Review  Outcome: No Change  Vss ex elevated BP (see MAR for meds given), no co pain/cp/sob.   Tele SR.  , 236.  IVF/JAIME continue, alarm on for safety, up with Ax2, PT following, up to chair this shift.  DC to TCU vs home with therapy when cleared by md.  Continue poc and monitoring.

## 2018-05-19 NOTE — ED NOTES
"[facility]  ED Nurse Handoff Report    Adam Huber is a 82 year old male   ED Chief complaint: Fall and Back Pain  . ED Diagnosis:   Final diagnoses:   Urinary tract infection without hematuria, site unspecified   Closed wedge compression fracture of twelfth thoracic vertebra, initial encounter (H)   Fall, initial encounter     Allergies:   Allergies   Allergen Reactions     Lisinopril        Code Status: Full Code  Activity level - Baseline/Home:  Independent. Activity Level - Current:   Stand with Assist. Lift room needed: No. Bariatric: No   Needed: Yes   Isolation: No. Infection: Not Applicable.     Vital Signs:   Vitals:    05/18/18 1815 05/18/18 1830 05/18/18 2000 05/18/18 2015   BP: 160/80 164/80 (!) 167/93 168/89   Resp:       Temp:       TempSrc:       SpO2: 92% 93% 93%        Cardiac Rhythm:  ,      Pain level: 0-10 Pain Scale: 8  Patient confused: No. Patient Falls Risk: Yes.   Elimination Status: Has voided   Patient Report - Initial Complaint: Adam Huber is a 82 year old male who presents after a fall last night (12 hours prior to arrival). The patient's grandson states he fell backwards after standing up from bed, falling to the floor and striking his back and head. No loss of consciousness. He has been \"having trouble with his legs like something is pulling him backwards\" for the last week, and it takes great concentration for him to walk. 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 No other new neuro sxs.. There have been no changes to these symptoms. He states his back hurt \"in his kidneys\" after the fall, and he continues to have reproducible pain with movement of his back. Breathing causes no pain. He also has not urinated since the fall, but feels he needs to here. He normally has no problem with urination. His wife states he has " been drinking less than normal. No BM for the last 3 days, but this is normal. The patient denies loss of consciousness, abdominal pain, and states no other concerns at this time. Focused Assessment:   Musculoskeletal Musculoskeletal - Musculoskeletal WDL:  WDL except Musculoskeletal Comment: low back pain after fall. Difficutly ambulating per pt.        Tests Performed: labs, Scans. Abnormal Results:   Labs Ordered and Resulted from Time of ED Arrival Up to the Time of Departure from the ED   CBC WITH PLATELETS DIFFERENTIAL - Abnormal; Notable for the following:        Result Value    WBC 12.0 (*)     Absolute Neutrophil 10.1 (*)     All other components within normal limits   BASIC METABOLIC PANEL - Abnormal; Notable for the following:     Glucose 247 (*)     All other components within normal limits   ROUTINE UA WITH MICROSCOPIC - Abnormal; Notable for the following:     Glucose Urine >499 (*)     Ketones Urine 5 (*)     Blood Urine Moderate (*)     Urobilinogen mg/dL 4.0 (*)     Nitrite Urine Positive (*)     Leukocyte Esterase Urine Moderate (*)     WBC Urine 58 (*)     RBC Urine 79 (*)     Bacteria Urine Many (*)     Mucous Urine Present (*)     All other components within normal limits   BLADDER SCAN   PERIPHERAL IV CATHETER     XR Chest 1 View   Final Result   IMPRESSION: Minimal linear scarring or atelectasis in left base. No   infiltrates. Heart size and pulmonary vasculature are normal. No   evidence for pneumothorax.      JENNY DOTY MD      Thoracic spine XR, 3 views   Preliminary Result   IMPRESSION: Minimal anterior wedging of T12, fracture here not   excluded. Otherwise no acute process demonstrated.      Lumbar spine XR, 2-3 views   Preliminary Result   IMPRESSION: Very mild anterior wedging of T12, fracture here are not   excluded. Otherwise no acute process demonstrated         Treatments provided: Antibiotics and Fluids  Family Comments: NA  OBS brochure/video discussed/provided to patient:   N/A  ED Medications:   Medications   cefTRIAXone (ROCEPHIN) 1 g vial to attach to  mL bag for ADULTS or NS 50 mL bag for PEDS (0 g Intravenous Stopped 5/18/18 1801)   0.9% sodium chloride BOLUS (1,000 mLs Intravenous New Bag 5/18/18 1912)     Drips infusing:  No  For the majority of the shift, the patient's behavior Green. Interventions performed were NA.     Severe Sepsis OR Septic Shock Diagnosis Present: No      ED Nurse Name/Phone Number: Mario Crouch,   10:29 PM    RECEIVING UNIT ED HANDOFF REVIEW    Above ED Nurse Handoff Report was reviewed: Yes  Reviewed by: Mary Knowles on May 18, 2018 at 11:44 PM

## 2018-05-19 NOTE — ED NOTES
Pt c/o of increased back pain with movement. Attempted to stand pt at end of bed with urinal. Pt states hurt to much to stand. Able to slide back in bed with Ax1. Hung fluids.

## 2018-05-19 NOTE — PHARMACY-ADMISSION MEDICATION HISTORY
Admission medication history interview status for this patient is complete. See [facility] admission navigator for allergy information, prior to admission medications and immunization status.     Medication history interview source(s):Family (wife Tanika over the phone [phone]) through  in room.  Medication history resources (including written lists, pill bottles, clinic record): Care Everywhere records.  Primary pharmacy: [facility] in [place]    Changes made to PTA medication list:  Added:  Pataday  Deleted:  Coreg, Docusate, Seroquel, Crestor  Changed:   - Melatonin 1mg qhs prn ---> 3mg qhs  - Nortriptyline 50mg daily ---> 100mg qhs    Actions taken by pharmacist (provider contacted, etc):None     Additional medication history information:None    Medication reconciliation/reorder completed by provider prior to medication history? No    Do you take OTC medications (eg tylenol, ibuprofen, fish oil, eye/ear drops, etc)? Yes, as listed.    For patients on insulin therapy:  No     Prior to Admission medications    Medication Sig Last Dose Taking? Auth Provider   melatonin 3 MG tablet Take 3 mg by mouth At Bedtime 5/17/2018 Yes Unknown, Entered By History   Nortriptyline HCl (NORTRIPYTLINE HCL PO) Take 100 mg by mouth At Bedtime 5/17/2018 Yes Unknown, Entered By History   olopatadine HCl (PATADAY) 0.2 % SOLN Place 1 drop into both eyes daily as needed prn Yes Unknown, Entered By History   acetaminophen (TYLENOL) 325 MG tablet Take 2 tablets (650 mg) by mouth every 4 hours as needed for mild pain prn  Elias Boswell MD   aspirin 81 MG EC tablet Take one tablet daily 5/17/2018  Eric Avalos MD   Losartan Potassium (COZAAR PO) Take 50 mg by mouth daily 5/17/2018  Reported, Patient   nitroglycerin (NITROSTAT) 0.4 MG SL tablet Place 1 tablet (0.4 mg) under the tongue every 5 minutes as needed for chest pain prn  Laura Valerio, APRN CNP   polyethylene glycol (MIRALAX/GLYCOLAX) powder Take 1 capful by  mouth daily as needed  prn  Unknown, Entered By History

## 2018-05-19 NOTE — PLAN OF CARE
"Problem: Patient Care Overview  Goal: Plan of Care/Patient Progress Review  Discharge Planner PT   Patient plan for discharge: home  Current status: Pt s/p fall , UTI.  See chart for history.  Pt currently needing min assist to transfer supine to sit via log roll, sit to and from stand, Gait with FWW with assist 2 with assist with walker management.  Difficulty coordinating use of walker. Uses cane at baseline.  Intermittent c/o back pain with gait.   Poor balance with posterior LOB and resistance to bring COM forward.  Possibly preexisting with history of Head Trauma many years ago. Pt mentions prior head trauma several times during the session.    Difficult to fully assess baseline as pt not certain on how long has had issues with balance and falls.     Pt states that he has fallen \"a lot\"  He has had difficulties with balance, states he is being pulled backwards.      Barriers to return to prior living situation: Lives with wife.  4 stairs to enter the home.  He has reported falling on the stairs.   Recommendations for discharge: Possible need for TCU vs home with HHPT assessment  Rationale for recommendations: recurrent falls, poor balance raising safety concern for more falls.  Uses cane at baseline, may need walker for assist.    Recommend ambulation with nursing staff with walker or HHA, assist of 2 at this time for safety primarily.        Entered by: Sachi Wells 05/19/2018 10:36 AM         "

## 2018-05-19 NOTE — H&P
[facility]  Hospitalist H&P    Name: Adam Huber      MRN: [id number]  YOB: 1935    Age: 82 year old  Date of admission: 5/18/2018  Primary care provider: Oralia Forrest            Assessment and Plan:   Adam Huber is a 82 year old male with a history of hypertension, hyperlipidemia, coronary artery disease, and stroke who presents with back pain after a fall.    1.  Urinary tract infection.  Continue IV ceftriaxone.  Continue IV fluids.  Add urine culture.    2.  T12 compression fracture.  Pain medications as needed.  Work with therapy.    3.  Fall.  Check CT scan of the head and neck for fractures.  Monitor on telemetry.  Work with physical therapy.  Pain medications as needed.    4.  Hyperglycemia.  Most recent hemoglobin A1c elevated at 7.1.  Start moderate consistent carbohydrate diet.  NovoLog sliding scale.    5.  Hypertension.  IV hydralazine as needed.  Coreg 12.5 mg twice a day.  Losartan 25 mg daily.  Doses will likely need to be adjusted once pharmacy has reconciled home medications.    6.  Atelectasis.  Use incentive spirometry.      Code status: Full code.  Admit to inpatient.  Prophylaxis: Pneumatic compression devices.              Chief Complaint:   Back pain.         History of Present Illness:   Adam Huber is a 82 year old male who presents with back pain after a fall.  All information is obtained through , through chart review, and in discussion with Dr. Melissa of the emergency room.  Patient is complaining of back pain.  Apparently had a fall on 5/17/18.  Immediately having some pain in his back after fall.  Pain seems to have worsened on 5/18/18.  He presented to emergency room for further evaluation.  Pain medications at home are not helping pain.  Was having trouble ambulating because of pain.  Medical history is difficult to get from the patient at this time even with the help of .  Does not have any other complaints at  this time.            Past Medical History:     Past Medical History:   Diagnosis Date     Acute chest pain 10/23/2015     CAD (coronary artery disease)      Essential hypertension 10/23/2015     History of CVA (cerebrovascular accident) 10/23/2015     HLD (hyperlipidemia)      HTN (hypertension)      NSTEMI (non-ST elevated myocardial infarction) (H) 10/23/2015     Post PTCA 10-    Successful PCI of culprit proximal to mid RCA with placement of a     Post PTCA 11-6-2015    pci of complex mid CFX-hector             Past Surgical History:     Past Surgical History:   Procedure Laterality Date     CHOLECYSTECTOMY       GENITOURINARY SURGERY      prostate removal      HEART CATH LEFT HEART CATH  10/12/2015    PCI w/ HECTOR to RCA     HEART CATH RIGHT AND LEFT HEART CATH  11/6/2015    PCI w/ HECTOR to mid-CFX             Social History:     Social History   Substance Use Topics     Smoking status: Never Smoker     Smokeless tobacco: Not on file     Alcohol use No             Family History:   The family history was fully reviewed and non-contributory in this case.         Allergies:     Allergies   Allergen Reactions     Lisinopril              Medications:     Prior to Admission medications    Medication Sig Last Dose Taking? Auth Provider   acetaminophen (TYLENOL) 325 MG tablet Take 2 tablets (650 mg) by mouth every 4 hours as needed for mild pain   Elias Boswell MD   aspirin 81 MG EC tablet Take one tablet daily   Eric Avalos MD   carvedilol (COREG) 25 MG tablet Take 1 tablet (25 mg) by mouth 2 times daily   Elias Boswell MD   Docusate Sodium (DOC-Q-LACE PO) Take 100 mg by mouth 2 times daily   Reported, Patient   Losartan Potassium (COZAAR PO) Take 50 mg by mouth daily   Reported, Patient   melatonin 1 MG TABS tablet Take 1 tablet (1 mg) by mouth nightly as needed  Patient taking differently: Take 3 mg by mouth nightly as needed    Elias Boswell MD   nitroglycerin (NITROSTAT) 0.4 MG SL tablet Place  1 tablet (0.4 mg) under the tongue every 5 minutes as needed for chest pain   Laura Valerio Sm, APRN CNP   NORTRIPTYLINE HCL PO Take 50 mg by mouth daily   Reported, Patient   polyethylene glycol (MIRALAX/GLYCOLAX) powder Take 1 capful by mouth daily as needed    Unknown, Entered By History   QUEtiapine (SEROQUEL) 25 MG tablet Take 0.25 tablets (6.25 mg) by mouth 2 times daily as needed  Patient not taking: Reported on 7/19/2017   Eveline Nix MD   ROSUVASTATIN CALCIUM PO Take 5 mg by mouth   Reported, Patient   traMADol (ULTRAM) 50 MG tablet Take 0.5 tablets (25 mg) by mouth 2 times daily as needed For headache   Eveline Nix MD             Review of Systems:   A Comprehensive greater than 10 system review of systems was carried out.  Pertinent positives and negatives are noted above.  Otherwise negative for contributory information.           Physical Exam:   Blood pressure 189/85, temperature 97.1  F (36.2  C), temperature source Oral, resp. rate 22, height 1.829 m (6'), weight 101.5 kg (223 lb 12.3 oz), SpO2 93 %.  Wt Readings from Last 1 Encounters:   05/19/18 101.5 kg (223 lb 12.3 oz)     Exam:  GENERAL: All information through .  No apparent distress. Awake, alert, and seems oriented to person and place.  Difficult to determine his orientation to time.  HEENT: Normocephalic, atraumatic. Extraocular movements intact.  CARDIOVASCULAR: Regular rate and rhythm without murmurs or rubs. No S3.  PULMONARY: Clear to auscultation bilaterally.  ABDOMINAL: Soft, non-tender, non-distended. Bowel sounds normoactive.   EXTREMITIES: No cyanosis or clubbing. No appreciable edema.  NEUROLOGICAL: CN 2-12 grossly intact, no focal neurological deficits.  DERMATOLOGICAL: No rash, ulcer, bruising, nor jaundice.          Data:       Laboratory:    Recent Labs  Lab 05/18/18  1540   WBC 12.0*   HGB 16.5   HCT 48.4   MCV 94          Recent Labs  Lab 05/18/18  1540      POTASSIUM 5.2   CHLORIDE 105    CO2 23   ANIONGAP 8   *   BUN 23   CR 0.97   GFRESTIMATED 74   GFRESTBLACK 90   LUZ 8.9     No results for input(s): CULT in the last 168 hours.    Imaging:  Recent Results (from the past 24 hour(s))   Lumbar spine XR, 2-3 views    Narrative    LUMBAR SPINE TWO-THREE VIEWS  5/18/2018 5:17 PM     HISTORY: Fall with midline pain    COMPARISON: None.    FINDINGS: Mild multilevel degenerative changes. Normal lumbar  lordosis. No spondylolysis or spondylolisthesis. Minimal anterior  wedging of T12, a very mild compression deformity would be difficult  to exclude in this setting. There are no worrisome bony lesions.      Impression    IMPRESSION: Very mild anterior wedging of T12, fracture here are not  excluded. Otherwise no acute process demonstrated   Thoracic spine XR, 3 views    Narrative    THORACIC SPINE THREE VIEWS 5/18/2018 5:17 PM     HISTORY: Fall with midline pain.    COMPARISON: None.    FINDINGS: Mild multilevel degenerative change. Unremarkable thoracic  kyphosis. Minimal anterior wedging of T12, fracture here would be  difficult to exclude. There are no destructive or worrisome sclerotic  bony lesions.      Impression    IMPRESSION: Minimal anterior wedging of T12, fracture here not  excluded. Otherwise no acute process demonstrated.   XR Chest 1 View    Narrative    CHEST ONE VIEW  5/18/2018 5:19 PM     HISTORY: Fall. Pain in posterior lower ribs.    COMPARISON: 3/23/2017      Impression    IMPRESSION: Minimal linear scarring or atelectasis in left base. No  infiltrates. Heart size and pulmonary vasculature are normal. No  evidence for pneumothorax.    JENNY DOTY MD

## 2018-05-20 LAB
GLUCOSE BLDC GLUCOMTR-MCNC: 137 MG/DL (ref 70–99)
GLUCOSE BLDC GLUCOMTR-MCNC: 139 MG/DL (ref 70–99)
GLUCOSE BLDC GLUCOMTR-MCNC: 159 MG/DL (ref 70–99)
GLUCOSE BLDC GLUCOMTR-MCNC: 248 MG/DL (ref 70–99)
GLUCOSE BLDC GLUCOMTR-MCNC: 259 MG/DL (ref 70–99)
GLUCOSE BLDC GLUCOMTR-MCNC: 276 MG/DL (ref 70–99)

## 2018-05-20 PROCEDURE — 99207 ZZC CDG-MDM COMPONENT: MEETS LOW - DOWN CODED: CPT | Performed by: INTERNAL MEDICINE

## 2018-05-20 PROCEDURE — 25000132 ZZH RX MED GY IP 250 OP 250 PS 637: Performed by: INTERNAL MEDICINE

## 2018-05-20 PROCEDURE — 25000128 H RX IP 250 OP 636: Performed by: INTERNAL MEDICINE

## 2018-05-20 PROCEDURE — 00000146 ZZHCL STATISTIC GLUCOSE BY METER IP

## 2018-05-20 PROCEDURE — 99232 SBSQ HOSP IP/OBS MODERATE 35: CPT | Performed by: INTERNAL MEDICINE

## 2018-05-20 PROCEDURE — 12000007 ZZH R&B INTERMEDIATE

## 2018-05-20 RX ORDER — OLANZAPINE 2.5 MG/1
2.5 TABLET, FILM COATED ORAL AT BEDTIME
Status: DISCONTINUED | OUTPATIENT
Start: 2018-05-20 | End: 2018-05-20

## 2018-05-20 RX ORDER — LIDOCAINE 4 G/G
1 PATCH TOPICAL
Status: DISCONTINUED | OUTPATIENT
Start: 2018-05-20 | End: 2018-06-01 | Stop reason: HOSPADM

## 2018-05-20 RX ORDER — QUETIAPINE FUMARATE 25 MG/1
25 TABLET, FILM COATED ORAL AT BEDTIME
Status: DISCONTINUED | OUTPATIENT
Start: 2018-05-20 | End: 2018-06-01 | Stop reason: HOSPADM

## 2018-05-20 RX ORDER — CARVEDILOL 25 MG/1
25 TABLET ORAL 2 TIMES DAILY WITH MEALS
Status: DISCONTINUED | OUTPATIENT
Start: 2018-05-20 | End: 2018-06-01 | Stop reason: HOSPADM

## 2018-05-20 RX ORDER — ACETAMINOPHEN 325 MG/1
975 TABLET ORAL 3 TIMES DAILY
Status: DISCONTINUED | OUTPATIENT
Start: 2018-05-20 | End: 2018-06-01 | Stop reason: HOSPADM

## 2018-05-20 RX ADMIN — ACETAMINOPHEN 975 MG: 325 TABLET ORAL at 08:05

## 2018-05-20 RX ADMIN — OXYCODONE HYDROCHLORIDE 5 MG: 5 TABLET ORAL at 08:11

## 2018-05-20 RX ADMIN — CEFTRIAXONE SODIUM 1 G: 1 INJECTION, POWDER, FOR SOLUTION INTRAMUSCULAR; INTRAVENOUS at 16:41

## 2018-05-20 RX ADMIN — OXYCODONE HYDROCHLORIDE 2.5 MG: 5 TABLET ORAL at 21:03

## 2018-05-20 RX ADMIN — OXYCODONE HYDROCHLORIDE 5 MG: 5 TABLET ORAL at 01:33

## 2018-05-20 RX ADMIN — QUETIAPINE FUMARATE 25 MG: 25 TABLET ORAL at 20:40

## 2018-05-20 RX ADMIN — HYDROXYZINE HYDROCHLORIDE 25 MG: 25 TABLET ORAL at 21:03

## 2018-05-20 RX ADMIN — CARVEDILOL 25 MG: 25 TABLET, FILM COATED ORAL at 18:54

## 2018-05-20 RX ADMIN — OLANZAPINE 2.5 MG: 2.5 TABLET, FILM COATED ORAL at 02:19

## 2018-05-20 RX ADMIN — ASPIRIN 81 MG: 81 TABLET, COATED ORAL at 08:05

## 2018-05-20 RX ADMIN — SODIUM CHLORIDE: 9 INJECTION, SOLUTION INTRAVENOUS at 18:44

## 2018-05-20 RX ADMIN — CARVEDILOL 12.5 MG: 12.5 TABLET, FILM COATED ORAL at 08:05

## 2018-05-20 RX ADMIN — ACETAMINOPHEN 975 MG: 325 TABLET ORAL at 16:04

## 2018-05-20 RX ADMIN — ACETAMINOPHEN 975 MG: 325 TABLET ORAL at 20:38

## 2018-05-20 RX ADMIN — LIDOCAINE 1 PATCH: 560 PATCH PERCUTANEOUS; TOPICAL; TRANSDERMAL at 20:36

## 2018-05-20 RX ADMIN — MELATONIN TAB 3 MG 3 MG: 3 TAB at 20:39

## 2018-05-20 RX ADMIN — INSULIN ASPART 3 UNITS: 100 INJECTION, SOLUTION INTRAVENOUS; SUBCUTANEOUS at 18:38

## 2018-05-20 RX ADMIN — MELATONIN TAB 3 MG 6 MG: 3 TAB at 01:33

## 2018-05-20 RX ADMIN — LOSARTAN POTASSIUM 50 MG: 25 TABLET, FILM COATED ORAL at 08:05

## 2018-05-20 RX ADMIN — NORTRIPTYLINE HYDROCHLORIDE 50 MG: 25 CAPSULE ORAL at 20:37

## 2018-05-20 RX ADMIN — POLYETHYLENE GLYCOL 3350 17 G: 17 POWDER, FOR SOLUTION ORAL at 14:07

## 2018-05-20 ASSESSMENT — ACTIVITIES OF DAILY LIVING (ADL)
ADLS_ACUITY_SCORE: 21
ADLS_ACUITY_SCORE: 20
ADLS_ACUITY_SCORE: 21
ADLS_ACUITY_SCORE: 21

## 2018-05-20 NOTE — CONSULTS
82M w/ fall, back pain, T12 compression fracture with minimal loss of height, reported neuro intact    Recommend Orthotics consult for TLSO brace  OK to be OOB in brace  Full consult and recs to follow tomorrow
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
## 2018-05-20 NOTE — PLAN OF CARE
Problem: Patient Care Overview  Goal: Plan of Care/Patient Progress Review  Outcome: Improving  Pt A/O, Iraqi speaking, family at bedside interpreting for him, C/O low back pain and medicated with Oxycodone for pain, alarm in place due to impulsivity. HR reg, SR on tele, pulses palpable, +1 edema, tingling to BLE's, HERNANDEZ weak. RA, no SOB, lungs dim, afebrile. BS+, no N/V, tolerating diet, passing gas, LBM x days-per pt, this is normal, educated narcotics may cause constipation, BG stable. Incontinet of urine and uses urinal, no ABX for UTI, IVF infusing in new IV to L arm. Up to chair with assist of 2 and walker.

## 2018-05-20 NOTE — PLAN OF CARE
Problem: Patient Care Overview  Goal: Plan of Care/Patient Progress Review  Outcome: No Change  VSS, no IV access. Ax2 with belt and walker. Patient is Marshallese speaking, refuses to use  line, however, his Marshallese speaking wife will use the  line, and translate to him. Patient states abdominal discomfort. Miralax administered at 1415.    Has pulled out 4 IV's during this stay. Per MD, a new IV may need to be put in for 1600 Rocephen administration, depending on lab cultures. Gave 5mg Oxy at 8am due to c/o pain in back. Pain relief was achieved as evidenced by being able to turn side to side without too much difficulty. Spine surgery consulted to fit a TLSO. Lidocaine patches ordered for pain at night.   Incontinent of urine.   Alarms on while in bed and chair.

## 2018-05-21 LAB
BACTERIA SPEC CULT: ABNORMAL
GLUCOSE BLDC GLUCOMTR-MCNC: 173 MG/DL (ref 70–99)
GLUCOSE BLDC GLUCOMTR-MCNC: 175 MG/DL (ref 70–99)
GLUCOSE BLDC GLUCOMTR-MCNC: 201 MG/DL (ref 70–99)
GLUCOSE BLDC GLUCOMTR-MCNC: 250 MG/DL (ref 70–99)
GLUCOSE BLDC GLUCOMTR-MCNC: 252 MG/DL (ref 70–99)
HBA1C MFR BLD: 6.8 % (ref 0–5.6)
Lab: ABNORMAL
SPECIMEN SOURCE: ABNORMAL

## 2018-05-21 PROCEDURE — 00000146 ZZHCL STATISTIC GLUCOSE BY METER IP

## 2018-05-21 PROCEDURE — 99223 1ST HOSP IP/OBS HIGH 75: CPT | Performed by: CLINICAL NURSE SPECIALIST

## 2018-05-21 PROCEDURE — 99232 SBSQ HOSP IP/OBS MODERATE 35: CPT | Performed by: INTERNAL MEDICINE

## 2018-05-21 PROCEDURE — 25000132 ZZH RX MED GY IP 250 OP 250 PS 637: Performed by: INTERNAL MEDICINE

## 2018-05-21 PROCEDURE — 12000007 ZZH R&B INTERMEDIATE

## 2018-05-21 PROCEDURE — 25000128 H RX IP 250 OP 636: Performed by: INTERNAL MEDICINE

## 2018-05-21 PROCEDURE — 25000125 ZZHC RX 250: Performed by: INTERNAL MEDICINE

## 2018-05-21 PROCEDURE — 40000916 ZZH STATISTIC SITTER, NIGHT HOURS

## 2018-05-21 PROCEDURE — 25000132 ZZH RX MED GY IP 250 OP 250 PS 637: Performed by: CLINICAL NURSE SPECIALIST

## 2018-05-21 PROCEDURE — 99221 1ST HOSP IP/OBS SF/LOW 40: CPT | Performed by: NURSE PRACTITIONER

## 2018-05-21 PROCEDURE — 40000914 ZZH STATISTIC SITTER, DAY HOURS

## 2018-05-21 RX ORDER — GINSENG 100 MG
CAPSULE ORAL 2 TIMES DAILY
Status: DISCONTINUED | OUTPATIENT
Start: 2018-05-21 | End: 2018-06-01 | Stop reason: HOSPADM

## 2018-05-21 RX ORDER — AMOXICILLIN 250 MG
1 CAPSULE ORAL AT BEDTIME
Status: DISCONTINUED | OUTPATIENT
Start: 2018-05-21 | End: 2018-06-01 | Stop reason: HOSPADM

## 2018-05-21 RX ADMIN — CEFTRIAXONE SODIUM 1 G: 1 INJECTION, POWDER, FOR SOLUTION INTRAMUSCULAR; INTRAVENOUS at 16:34

## 2018-05-21 RX ADMIN — ENOXAPARIN SODIUM 40 MG: 40 INJECTION SUBCUTANEOUS at 10:53

## 2018-05-21 RX ADMIN — QUETIAPINE FUMARATE 25 MG: 25 TABLET ORAL at 20:48

## 2018-05-21 RX ADMIN — ACETAMINOPHEN 975 MG: 325 TABLET ORAL at 21:52

## 2018-05-21 RX ADMIN — OXYCODONE HYDROCHLORIDE 2.5 MG: 5 TABLET ORAL at 13:38

## 2018-05-21 RX ADMIN — ACETAMINOPHEN 975 MG: 325 TABLET ORAL at 16:34

## 2018-05-21 RX ADMIN — LOSARTAN POTASSIUM 50 MG: 25 TABLET, FILM COATED ORAL at 08:19

## 2018-05-21 RX ADMIN — BACITRACIN: 500 OINTMENT TOPICAL at 21:21

## 2018-05-21 RX ADMIN — HYDROMORPHONE HYDROCHLORIDE 1 MG: 2 TABLET ORAL at 23:44

## 2018-05-21 RX ADMIN — OXYCODONE HYDROCHLORIDE 2.5 MG: 5 TABLET ORAL at 02:32

## 2018-05-21 RX ADMIN — CARVEDILOL 25 MG: 25 TABLET, FILM COATED ORAL at 18:38

## 2018-05-21 RX ADMIN — NORTRIPTYLINE HYDROCHLORIDE 50 MG: 25 CAPSULE ORAL at 20:48

## 2018-05-21 RX ADMIN — OXYCODONE HYDROCHLORIDE 2.5 MG: 5 TABLET ORAL at 10:53

## 2018-05-21 RX ADMIN — OXYCODONE HYDROCHLORIDE 2.5 MG: 5 TABLET ORAL at 06:35

## 2018-05-21 RX ADMIN — ACETAMINOPHEN 975 MG: 325 TABLET ORAL at 08:19

## 2018-05-21 RX ADMIN — HYDROXYZINE HYDROCHLORIDE 25 MG: 25 TABLET ORAL at 08:23

## 2018-05-21 RX ADMIN — INSULIN ASPART 3 UNITS: 100 INJECTION, SOLUTION INTRAVENOUS; SUBCUTANEOUS at 18:33

## 2018-05-21 RX ADMIN — LIDOCAINE 1 PATCH: 560 PATCH PERCUTANEOUS; TOPICAL; TRANSDERMAL at 20:47

## 2018-05-21 RX ADMIN — SENNOSIDES AND DOCUSATE SODIUM 1 TABLET: 8.6; 5 TABLET ORAL at 21:52

## 2018-05-21 RX ADMIN — HYDROMORPHONE HYDROCHLORIDE 1 MG: 2 TABLET ORAL at 20:48

## 2018-05-21 RX ADMIN — INSULIN ASPART 1 UNITS: 100 INJECTION, SOLUTION INTRAVENOUS; SUBCUTANEOUS at 13:52

## 2018-05-21 RX ADMIN — MELATONIN TAB 3 MG 6 MG: 3 TAB at 20:48

## 2018-05-21 RX ADMIN — CARVEDILOL 25 MG: 25 TABLET, FILM COATED ORAL at 08:19

## 2018-05-21 RX ADMIN — HYDROMORPHONE HYDROCHLORIDE 1 MG: 2 TABLET ORAL at 16:35

## 2018-05-21 RX ADMIN — INSULIN ASPART 1 UNITS: 100 INJECTION, SOLUTION INTRAVENOUS; SUBCUTANEOUS at 08:18

## 2018-05-21 RX ADMIN — ASPIRIN 81 MG: 81 TABLET, COATED ORAL at 08:19

## 2018-05-21 ASSESSMENT — ACTIVITIES OF DAILY LIVING (ADL)
ADLS_ACUITY_SCORE: 21
ADLS_ACUITY_SCORE: 20

## 2018-05-21 NOTE — PLAN OF CARE
Problem: Patient Care Overview  Goal: Plan of Care/Patient Progress Review  PT - PT cx'd d/t neurosurgery ordering TLSO. Arrived @ pt's room with Orthotics there to measure for custom TLSO.  Brace will not be ready until tomorrow. Orthotics will call PT to inform when brace will arrive. Orders to wear TLSO @ all times when up per note.

## 2018-05-21 NOTE — PLAN OF CARE
Problem: Patient Care Overview  Goal: Plan of Care/Patient Progress Review  Outcome: No Change  VSS, Alert, oriented to self and month/year. Australian speaking, pt does not use  phone but wife has been very helpful in communicating with him and she uses  phone to communicate with us. Assist x2 with walker and gait belt.  Pt incontinent of urine, some hematuria noted today.  Pt on IV rocephin for  UTI, agreed to having IV inserted this afternoon and tolerating NS @ 100ml/h.  Family stated that in the past MD has approved to not disturb patient and allow him to rest to help with confusion at night.  Would like IVF d/c'd at bedtime so patient can rest.       Oxy dose decreased due to confusion last night.  Pt on scheduled tylenol and lidocaine patch scheduled for this evening.  Heating pad ordered for pt.  Pt appears to be more comfortable and confirmed pain has improved.  NEuro sx consulted and orthotics consulted for TLSO.  Waiting on UCX susceptibilities to possible transition to PO abx.     Pt has been up in chair.  Pt has been calm and cooperative today.

## 2018-05-21 NOTE — CONSULTS
[facility]  Pain Service Consultation   Text Page    Date of Admission:  5/18/2018    Assessment & Plan   Adam Huber is a 82 year old male who was admitted on 5/18/2018. I was asked by Hospitalist Babar Morfin DO to see the patient for back pain, compression fracture.    Patient resting in bed staring at the ceiling during assessment. His wife and daughter at bedside. Daughter acting as  for both parents indicates her father has been having hallucinations for the past day. In asking about previous opiate use his wife offered that he had morphine about seven years ago with a urological procedure and also had confusion. She was able to explain that he has chronic headaches for which he takes Nortriptyline nightly. She offered her concern that he is taking more medication as she has not known him to take any medication for his heart.       1) Acute back pain s/p fall Thoracic spine X-ray demonstrate minimal anterior wedging of T12    2)  Patient does not have history of chronic pain and is not on chronic opioid therapy   [facility] database review:  No prescription in the past year   0 mg Daily Morphine Equivalent  Patient is opioid naive.     Patient's opioid use thus far: 5/19/2018 used 0.2 mg IV Dilaudid and 20 mg Oxycodone and yesterday use 12.5 mg Oxycodone = 34 to 18.75 mg Daily Morphine Equivalent    3)  Opioid induced side-effects:  -Constipation - No - per family input  -Nausea/Vomit - No  -Sedation - patient having hallucinations  -Urinary Retention- no    4) Other/Related:  History of CAD with non STEMI and LEXI x 2 and CVA       PLAN:   1)  Reasonable to rotate opiate given the patient is having hallucinations. Schedule Dilaudid 1 mg every 4 hours and use additional doses for pain flare.     2) Tylenol 975 mg TID    3)  Multimodal Medication Therapy  Topical: Lidocaine and Icy Hot patch  NSAIDS:   Muscle Relaxants:  Adjuvants:Atarax 25 mg every 6 hours  prn  Antidepressants/anxiolytics: Chronic use of Nortriptyline 50 mg at bedtime for chronic headache  Opioids: Dilaudid 1 mg every 4 hours and use additional doses for pain flare.     4) Non-medication interventions  TLSO at all time out of bed  Rest alternating with physical therapy    5)Constipation Prophylaxis  Senna-S at bedtime and Shiraz lax prn    6) DC safety  -Patient discharge to a controlled setting for medication dispensing such as TCU     or  -Discharge with family member or friend managing pain medications    Time Spent on this Encounter   I spent from 3:55 until 4:30 PM in assessment of the patient and discussion with the family.  Another 35 minutes in review of chart, documentation and discussion with the health care team.    Alva Barrera MS, RN, CNS, APRN, ACHPN, FAACVPR  Pain and Palliative Care  Pager [phone]  Office [phone]     Reason for Consult   Reason for consult: I was asked by Hospitalist Babar Morfin DO to see the patient for back pain, compression fracture.    Primary Care Physician   Primary Care Physician:Oralia Forrest  Pain Specialist: None    Chief Complaint   Fall and Back Pain    History is obtained from the electronic health record, patient's daughter and spouse    History of Present Illness   Adam Huber is a 82 year old male who was admitted on 5/18/2018 following a fall and admitted for back pain and UTI found to be Klebsiella pneumoniae and was started on antibiotics. The patient has been seen in consultation by Neurosurgery and TLSO brace recommended.     CURRENT PAIN:  Unfortunately the patient is having hallucinations and unable to give insight into current discomfort. Assumed to be in mid back given T12 fracture.     Past Medical History   I have reviewed this patient's medical history and updated it with pertinent information if needed.   Past Medical History:   Diagnosis Date     Acute chest pain 10/23/2015     CAD (coronary artery disease)      Essential  hypertension 10/23/2015     History of CVA (cerebrovascular accident) 10/23/2015     HLD (hyperlipidemia)      HTN (hypertension)      NSTEMI (non-ST elevated myocardial infarction) (H) 10/23/2015     Post PTCA 10-    Successful PCI of culprit proximal to mid RCA with placement of a     Post PTCA 11-6-2015    pci of complex mid CFX-hector       Past Surgical History   I have reviewed this patient's surgical history and updated it with pertinent information if needed.  Past Surgical History:   Procedure Laterality Date     CHOLECYSTECTOMY       GENITOURINARY SURGERY      prostate removal      HEART CATH LEFT HEART CATH  10/12/2015    PCI w/ HECTOR to RCA     HEART CATH RIGHT AND LEFT HEART CATH  11/6/2015    PCI w/ HECTOR to mid-CFX         Prior to Admission Medications   Prior to Admission Medications   Prescriptions Last Dose Informant Patient Reported? Taking?   Losartan Potassium (COZAAR PO) 5/17/2018 Spouse/Significant Other Yes No   Sig: Take 50 mg by mouth daily   Nortriptyline HCl (NORTRIPYTLINE HCL PO) 5/17/2018  Yes Yes   Sig: Take 100 mg by mouth At Bedtime   acetaminophen (TYLENOL) 325 MG tablet prn  No No   Sig: Take 2 tablets (650 mg) by mouth every 4 hours as needed for mild pain   aspirin 81 MG EC tablet 5/17/2018  No No   Sig: Take one tablet daily   melatonin 3 MG tablet 5/17/2018  Yes Yes   Sig: Take 3-6 mg by mouth At Bedtime    nitroglycerin (NITROSTAT) 0.4 MG SL tablet prn  No No   Sig: Place 1 tablet (0.4 mg) under the tongue every 5 minutes as needed for chest pain   olopatadine HCl (PATADAY) 0.2 % SOLN prn  Yes Yes   Sig: Place 1 drop into both eyes daily as needed   polyethylene glycol (MIRALAX/GLYCOLAX) powder prn Pharmacy Yes No   Sig: Take 1 capful by mouth daily as needed       Facility-Administered Medications: None     Allergies   Allergies   Allergen Reactions     Lisinopril        Social History   I have reviewed this patient's social history and updated it with pertinent information  if needed. Adam Huber  reports that he has never smoked. He does not have any smokeless tobacco history on file. He reports that he does not drink alcohol or use illicit drugs.    Family History   I have reviewed this patient's family history and updated it with pertinent information if needed.   Family History   Problem Relation Age of Onset     Unknown/Adopted No family hx of      Family history of addiction  Unable to obtain    Review of Systems   Patient unable to give insights due to hallucinations.     Physical Exam   Temp:  [96.2  F (35.7  C)-98.3  F (36.8  C)] 97.4  F (36.3  C)  Heart Rate:  [77-83] 80  Resp:  [18] 18  BP: (114-151)/(57-81) 151/78  SpO2:  [92 %] 92 %  223 lbs 12.27 oz  GEN:  Elderly well nourished confused male. Wife and daughter translating and both indicating he has been watching hallucinations above his head. Appears comfortable.  HEENT:  Normocephalic/atraumatic, no scleral icterus, no nasal discharge, mouth moist.  CV:  RRR, S1, S2; no murmurs or other irregularities noted.  +3 DP/PT pulses bilaterally; no edema BLE.  RESP:  Clear to auscultation bilaterally without rales/rhonchi/wheezing/retractions.  Symmetric chest rise on inhalation noted.  Normal respiratory effort.  ABD:  Rounded, soft, non-tender/non-distended.  +BS  EXT:  Spontaneously moves all extremities, becomes irritated in asking to perform one step commands (per wife and daughter translating) .     M/S:   Mid back is tender to palpation (patient grimaces with touch)   SKIN:  Warm and dry to touch, no exanthems noted in the visualized areas.    NEURO: Deferred as patient does not follow commands.  PAIN BEHAVIOR: Confused an d hallucinating.   Psych:  Flat affect, tangential conversation per family.       Data   Results for orders placed or performed during the hospital encounter of 05/18/18   Lumbar spine XR, 2-3 views    Narrative    LUMBAR SPINE TWO-THREE VIEWS  5/18/2018 5:17 PM     HISTORY: Fall with midline  pain    COMPARISON: None.    FINDINGS: Mild multilevel degenerative changes. Normal lumbar  lordosis. No spondylolysis or spondylolisthesis. Minimal anterior  wedging of T12, a very mild compression deformity would be difficult  to exclude in this setting. There are no worrisome bony lesions.      Impression    IMPRESSION: Very mild anterior wedging of T12, fracture here are not  excluded. Otherwise no acute process demonstrated    KIKI MARTINEZ MD   Thoracic spine XR, 3 views    Narrative    THORACIC SPINE THREE VIEWS 5/18/2018 5:17 PM     HISTORY: Fall with midline pain.    COMPARISON: None.    FINDINGS: Mild multilevel degenerative change. Unremarkable thoracic  kyphosis. Minimal anterior wedging of T12, fracture here would be  difficult to exclude. There are no destructive or worrisome sclerotic  bony lesions.      Impression    IMPRESSION: Minimal anterior wedging of T12, fracture here not  excluded. Otherwise no acute process demonstrated.    KIKI MARTINEZ MD   XR Chest 1 View    Narrative    CHEST ONE VIEW  5/18/2018 5:19 PM     HISTORY: Fall. Pain in posterior lower ribs.    COMPARISON: 3/23/2017      Impression    IMPRESSION: Minimal linear scarring or atelectasis in left base. No  infiltrates. Heart size and pulmonary vasculature are normal. No  evidence for pneumothorax.    JENNY DOTY MD   CT Head w/o Contrast    Narrative    CT HEAD W/O CONTRAST 5/19/2018 2:24 AM    HISTORY: Fall.    TECHNIQUE: CT imaging of the head is performed without IV contrast.    Routine assessment includes evaluation for acute intracranial  hemorrhage, midline shift, mass, acute cortical infarct, abnormal  extra-axial collection, and hydrocephalus. The calvarium and  visualized paranasal sinuses are also assessed.    COMPARISON: March 25, 2017.    FINDINGS: No significant change from prior examination. Stable  bilateral anterior frontal and right temporal encephalomalacia along  with focal right basal ganglia infarct. No  evidence of acute  hemorrhage or infarct. Mild cerebral atrophy.    The visualized bilateral paranasal sinuses are clear. Normal mastoid  air cells.      Impression    IMPRESSION: No evidence of acute intracranial hemorrhage or infarct.  Stable encephalomalacia, as above.    SONIA LAIRD MD   CT Cervical Spine w/o Contrast    Narrative    CT OF THE CERVICAL SPINE WITHOUT CONTRAST   5/19/2018 2:25 AM     HISTORY: Fall.     TECHNIQUE: Axial images of the cervical spine were acquired without  intravenous contrast. Multiplanar reformations were created.   Radiation dose for this scan was reduced using automated exposure  control, adjustment of the mA and/or kV according to patient size, or  iterative reconstruction technique.    COMPARISON: None.    FINDINGS: Lateral view extends down to the level of T2. No evidence of  acute fracture or malalignment. The prevertebral soft tissues are  unremarkable. Mild degenerative changes.    SONIA LAIRD MD   CBC with platelets differential   Result Value Ref Range    WBC 12.0 (H) 4.0 - 11.0 10e9/L    RBC Count 5.15 4.4 - 5.9 10e12/L    Hemoglobin 16.5 13.3 - 17.7 g/dL    Hematocrit 48.4 40.0 - 53.0 %    MCV 94 78 - 100 fl    MCH 32.0 26.5 - 33.0 pg    MCHC 34.1 31.5 - 36.5 g/dL    RDW 12.9 10.0 - 15.0 %    Platelet Count 211 150 - 450 10e9/L    Diff Method Automated Method     % Neutrophils 84.2 %    % Lymphocytes 7.9 %    % Monocytes 6.6 %    % Eosinophils 0.4 %    % Basophils 0.3 %    % Immature Granulocytes 0.6 %    Nucleated RBCs 0 0 /100    Absolute Neutrophil 10.1 (H) 1.6 - 8.3 10e9/L    Absolute Lymphocytes 0.9 0.8 - 5.3 10e9/L    Absolute Monocytes 0.8 0.0 - 1.3 10e9/L    Absolute Eosinophils 0.1 0.0 - 0.7 10e9/L    Absolute Basophils 0.0 0.0 - 0.2 10e9/L    Abs Immature Granulocytes 0.1 0 - 0.4 10e9/L    Absolute Nucleated RBC 0.0    Basic metabolic panel   Result Value Ref Range    Sodium 136 133 - 144 mmol/L    Potassium 5.2 3.4 - 5.3 mmol/L    Chloride 105 94 - 109  mmol/L    Carbon Dioxide 23 20 - 32 mmol/L    Anion Gap 8 3 - 14 mmol/L    Glucose 247 (H) 70 - 99 mg/dL    Urea Nitrogen 23 7 - 30 mg/dL    Creatinine 0.97 0.66 - 1.25 mg/dL    GFR Estimate 74 >60 mL/min/1.7m2    GFR Estimate If Black 90 >60 mL/min/1.7m2    Calcium 8.9 8.5 - 10.1 mg/dL   UA with Microscopic   Result Value Ref Range    Color Urine Yellow     Appearance Urine Clear     Glucose Urine >499 (A) NEG^Negative mg/dL    Bilirubin Urine Negative NEG^Negative    Ketones Urine 5 (A) NEG^Negative mg/dL    Specific Gravity Urine 1.017 1.003 - 1.035    Blood Urine Moderate (A) NEG^Negative    pH Urine 5.0 5.0 - 7.0 pH    Protein Albumin Urine Negative NEG^Negative mg/dL    Urobilinogen mg/dL 4.0 (H) 0.0 - 2.0 mg/dL    Nitrite Urine Positive (A) NEG^Negative    Leukocyte Esterase Urine Moderate (A) NEG^Negative    Source Midstream Urine     WBC Urine 58 (H) 0 - 5 /HPF    RBC Urine 79 (H) 0 - 2 /HPF    Bacteria Urine Many (A) NEG^Negative /HPF    Squamous Epithelial /HPF Urine <1 0 - 1 /HPF    Mucous Urine Present (A) NEG^Negative /LPF    Hyaline Casts 1 0 - 2 /LPF   Basic metabolic panel   Result Value Ref Range    Sodium 138 133 - 144 mmol/L    Potassium 4.2 3.4 - 5.3 mmol/L    Chloride 107 94 - 109 mmol/L    Carbon Dioxide 24 20 - 32 mmol/L    Anion Gap 7 3 - 14 mmol/L    Glucose 180 (H) 70 - 99 mg/dL    Urea Nitrogen 16 7 - 30 mg/dL    Creatinine 0.82 0.66 - 1.25 mg/dL    GFR Estimate >90 >60 mL/min/1.7m2    GFR Estimate If Black >90 >60 mL/min/1.7m2    Calcium 8.1 (L) 8.5 - 10.1 mg/dL   Glucose by meter   Result Value Ref Range    Glucose 171 (H) 70 - 99 mg/dL   Glucose by meter   Result Value Ref Range    Glucose 236 (H) 70 - 99 mg/dL   Glucose by meter   Result Value Ref Range    Glucose 161 (H) 70 - 99 mg/dL   Glucose by meter   Result Value Ref Range    Glucose 190 (H) 70 - 99 mg/dL   Glucose by meter   Result Value Ref Range    Glucose 139 (H) 70 - 99 mg/dL   Glucose by meter   Result Value Ref Range     Glucose 159 (H) 70 - 99 mg/dL   Glucose by meter   Result Value Ref Range    Glucose 137 (H) 70 - 99 mg/dL   Glucose by meter   Result Value Ref Range    Glucose 276 (H) 70 - 99 mg/dL   Glucose by meter   Result Value Ref Range    Glucose 259 (H) 70 - 99 mg/dL   Glucose by meter   Result Value Ref Range    Glucose 248 (H) 70 - 99 mg/dL   Glucose by meter   Result Value Ref Range    Glucose 201 (H) 70 - 99 mg/dL   Spine Surgery Adult IP Consult: Comp Fracture, pain; Consultant may enter orders: Yes; Patient to be seen: Routine - within 24 hours    Jose Alberto Jimenez MD     5/20/2018 10:25 AM  82M w/ fall, back pain, T12 compression fracture with minimal   loss of height, reported neuro intact    Recommend Orthotics consult for TLSO brace  OK to be OOB in brace  Full consult and recs to follow tomorrow   Urine Culture Aerobic Bacterial   Result Value Ref Range    Specimen Description Midstream Urine     Special Requests Specimen received in preservative     Culture Micro (A)      50,000 to 100,000 colonies/mL  Klebsiella pneumoniae         Susceptibility    Klebsiella pneumoniae - PERRY     AMPICILLIN* >=32 Resistant ug/mL      * Intrinsically Resistant     CEFAZOLIN* <=4 Sensitive ug/mL      * Cefazolin PERRY breakpoints are for the treatment of uncomplicated urinary tract infections.  For the treatment of systemic infections, please contact the laboratory for additional testing.     CEFOXITIN 8 Sensitive ug/mL     CEFTAZIDIME <=1 Sensitive ug/mL     CEFTRIAXONE <=1 Sensitive ug/mL     CIPROFLOXACIN <=0.25 Sensitive ug/mL     GENTAMICIN <=1 Sensitive ug/mL     LEVOFLOXACIN <=0.12 Sensitive ug/mL     NITROFURANTOIN 64 Intermediate ug/mL     TOBRAMYCIN <=1 Sensitive ug/mL     Trimethoprim/Sulfa <=1/19 Sensitive ug/mL     AMPICILLIN/SULBACTAM 8 Sensitive ug/mL     Piperacillin/Tazo <=4 Sensitive ug/mL     CEFEPIME <=1 Sensitive ug/mL

## 2018-05-21 NOTE — PLAN OF CARE
Problem: Patient Care Overview  Goal: Plan of Care/Patient Progress Review  Outcome: No Change  Pt VSS, c/o pain in back. Oxy. Lidoderm patches. Pt was more lucid this evening. Turkmen speaking. Was able to communicate with communication board as pt refuses  phone. Telemetry box was removed, due to pt repeatedly removing it. IV fluids are to be restarted in AM when pt fully wakes up. Inc of urine. No BM. DM, SS. . Assist x2 w/mechanical lift or walker. PT following. Spine consult ordered. Tele showed SR. Will continue with current POC. Discharge TBD.    At end of shift, pt became increasingly agitated. Yelling out and wanting to leave. Sitter has been assigned for AM shift.

## 2018-05-21 NOTE — CONSULTS
"[facility]    Neurosurgery Consultation     Date of Admission:  5/18/2018  Date of Consult (When I saw the patient): 05/21/18    Assessment & Plan   Adam Huber is a 82 year old male who was admitted on 5/18/2018. I was asked to see the patient for T12 compression fracture post fall.  The pt is sitting up in bed today eating breakfast. He is Barbadian speaking. He has a sitter in the room due to being impulsive.  The pt is refusing to use the  phone. He is able to point to his back and say \"pain\". He does move both legs when asked.  Per nursing he does moan when asked to reposition in bed or roll over.  The pts thoracic xray was also reviewed by Dr. Jose Alberto Hubbard.  Pt has a T12 compression fracture with minimal loss of height. Recommend TLSO for a minimum of 12 weeks when out of bed.  We will have him return to clinic in 6 weeks with a repeat xray.    Active Problems:    UTI (urinary tract infection)    Assessment: ongoing    Plan: per hospital service      T12 compression fracture    Assessment: stable   Plan: -  wear TLSO at all times when out of bed  - Repeat XR of Thoracic spine AP and Lateral in 6 weeks.   - Return to clinic following repeat XR spine in 6 weeks   - Call the [facility] clinic for any questions or concerns during interim, at [phone]  - Seek medical attention immediately if any paresthesias, weakness, gait instability, bowel/bladder incontinence, new pain.          I have discussed the following assessment and plan with Dr. Jose Alberto Hubbard  who is in agreement with initial plan and will follow up with further consultation recommendations.    [facility]  [facility]  [address] [address]    Tel [phone]  Pager [phone]        Code Status    Full Code    Reason for Consult   Reason for consult: I was asked by Dr. Morfin to evaluate this patient for T12 compression " fracture.    Primary Care Physician   Oralia Forrest    Chief Complaint   Back pain     History is obtained from the EMR and RN    History of Present Illness   Adam Huber is a 82 year old male who presents with thoracic fracture post fall     Past Medical History   I have reviewed this patient's medical history and updated it with pertinent information if needed.   Past Medical History:   Diagnosis Date     Acute chest pain 10/23/2015     CAD (coronary artery disease)      Essential hypertension 10/23/2015     History of CVA (cerebrovascular accident) 10/23/2015     HLD (hyperlipidemia)      HTN (hypertension)      NSTEMI (non-ST elevated myocardial infarction) (H) 10/23/2015     Post PTCA 10-    Successful PCI of culprit proximal to mid RCA with placement of a     Post PTCA 11-6-2015    pci of complex mid CFX-hector       Past Surgical History   I have reviewed this patient's surgical history and updated it with pertinent information if needed.  Past Surgical History:   Procedure Laterality Date     CHOLECYSTECTOMY       GENITOURINARY SURGERY      prostate removal      HEART CATH LEFT HEART CATH  10/12/2015    PCI w/ HECTOR to RCA     HEART CATH RIGHT AND LEFT HEART CATH  11/6/2015    PCI w/ HECTOR to mid-CFX       Prior to Admission Medications   Prior to Admission Medications   Prescriptions Last Dose Informant Patient Reported? Taking?   Losartan Potassium (COZAAR PO) 5/17/2018 Spouse/Significant Other Yes No   Sig: Take 50 mg by mouth daily   Nortriptyline HCl (NORTRIPYTLINE HCL PO) 5/17/2018  Yes Yes   Sig: Take 100 mg by mouth At Bedtime   acetaminophen (TYLENOL) 325 MG tablet prn  No No   Sig: Take 2 tablets (650 mg) by mouth every 4 hours as needed for mild pain   aspirin 81 MG EC tablet 5/17/2018  No No   Sig: Take one tablet daily   melatonin 3 MG tablet 5/17/2018  Yes Yes   Sig: Take 3-6 mg by mouth At Bedtime    nitroglycerin (NITROSTAT) 0.4 MG SL tablet prn  No No   Sig: Place 1 tablet (0.4 mg) under  the tongue every 5 minutes as needed for chest pain   olopatadine HCl (PATADAY) 0.2 % SOLN prn  Yes Yes   Sig: Place 1 drop into both eyes daily as needed   polyethylene glycol (MIRALAX/GLYCOLAX) powder prn Pharmacy Yes No   Sig: Take 1 capful by mouth daily as needed       Facility-Administered Medications: None     Allergies   Allergies   Allergen Reactions     Lisinopril        Social History   I have reviewed this patient's social history and updated it with pertinent information if needed. Adam Huber  reports that he has never smoked. He does not have any smokeless tobacco history on file. He reports that he does not drink alcohol or use illicit drugs.    Family History   I have reviewed this patient's family history and updated it with pertinent information if needed.   Family History   Problem Relation Age of Onset     Unknown/Adopted No family hx of        Review of Systems   CONSTITUTIONAL: NEGATIVE for fever, chills, change in weight  INTEGUMENTARY/SKIN: NEGATIVE for worrisome rashes, moles or lesions  EYES: NEGATIVE for vision changes or irritation  ENT/MOUTH: NEGATIVE for ear, mouth and throat problems  RESP: NEGATIVE for significant cough or SOB  CV: NEGATIVE for chest pain, palpitations or peripheral edema  GI: NEGATIVE for nausea, abdominal pain, heartburn, or change in bowel habits  : UTI  MUSCULOSKELETAL:T12 compression fracture   NEURO: recent fall  ENDOCRINE: NEGATIVE for temperature intolerance, skin/hair changes  HEME: NEGATIVE for bleeding problems  PSYCHIATRIC: impulsive  Physical Exam   Temp: 97.4  F (36.3  C) Temp src: Oral BP: 151/78   Heart Rate: 80 Resp: 18 SpO2: 92 % O2 Device: None (Room air)    Vital Signs with Ranges  Temp:  [96.2  F (35.7  C)-98.3  F (36.8  C)] 97.4  F (36.3  C)  Heart Rate:  [77-83] 80  Resp:  [18] 18  BP: (114-151)/(57-81) 151/78  SpO2:  [92 %] 92 %  223 lbs 12.27 oz    Heart Rate: 80, Blood pressure 151/78, temperature 97.4  F (36.3  C), temperature  source Oral, resp. rate 18, height 6' (1.829 m), weight 223 lb 12.3 oz (101.5 kg), SpO2 92 %.  223 lbs 12.27 oz  HEENT:  Normocephalic, atraumatic.  PERRLA.  EOM s intact.   Neck:  Supple, non-tender, without lymphadenopathy.  Heart:  No peripheral edema  Lungs:  No SOB  Abdomen:  Soft, non-tender, non-distended.  Normal bowel sounds.  Skin:  Warm and dry, good capillary refill.  Extremities:  Good radial and dorsalis pedis pulses bilaterally, no edema, cyanosis or clubbing.    NEUROLOGICAL EXAMINATION:     Mental status:  Alert and Oriented x 3, speech is fluent.  Cranial nerves:  II-XII intact.   Motor:  Strength is 5/5 throughout the upper and lower extremities  Shoulder Abduction:  Right:  5/5   Left:  5/5  Biceps:                      Right:  5/5   Left:  5/5  Triceps:                     Right:  5/5   Left:  5/5  Wrist Extensors:       Right:  5/5   Left:  5/5  Wrist Flexors:           Right:  5/5   Left:  5/5  interosseus :            Right:  5/5   Left:  5/5   Hip Flexor:                Right: 5/5  Left:  5/5  Hip Adductor:             Right:  5/5  Left:  5/5  Hip Abductor:             Right:  5/5  Left:  5/5  Gastroc Soleus:        Right:  5/5  Left:  5/5  Tib/Ant:                      Right:  5/5  Left:  5/5  EHL:                     Right:  5/5  Left:  5/5  Sensation:  intact  Reflexes:   Negative Babinski.  Negative Clonus.      Data   All new lab and imaging data was personally reviewed by me.  Thoracic Xray:    IMPRESSION: Minimal anterior wedging of T12, fracture here not  excluded. Otherwise no acute process demonstrated.       CBC RESULTS:   Recent Labs   Lab Test  05/18/18   1540   WBC  12.0*   RBC  5.15   HGB  16.5   HCT  48.4   MCV  94   MCH  32.0   MCHC  34.1   RDW  12.9   PLT  211     Basic Metabolic Panel:  Lab Results   Component Value Date     05/19/2018      Lab Results   Component Value Date    POTASSIUM 4.2 05/19/2018     Lab Results   Component Value Date    CHLORIDE 107 05/19/2018      Lab Results   Component Value Date    LUZ 8.1 05/19/2018     Lab Results   Component Value Date    CO2 24 05/19/2018     Lab Results   Component Value Date    BUN 16 05/19/2018     Lab Results   Component Value Date    CR 0.82 05/19/2018     Lab Results   Component Value Date     05/19/2018     INR:  Lab Results   Component Value Date    INR 1.07 11/02/2015    INR 1.12 10/09/2015

## 2018-05-21 NOTE — PROGRESS NOTES
S: Patient with  were seen today at [facility] with an order from Dr. Babar Morfin DO to have the patient fitted for a TLSO. Patient suffered a T12 compression fracture due to a fall.    O: Patient has dementia and had trouble answer general health history information. Patient was in bed for the evaluation appointment.    A: Multiple measurements were taken of the patient's torso. Measurements will be sent to [employer] to fabricate a custom bivalve TLSO , lined. Custom TLSO is needed due to patient's injury being unsupported by an OTS TLSO and patient's body structure (long torso) not being accommodated by an OTS TLSO     G: Custom TLSO will treat patient's current condition by restricting flexion/extension/rotation of the thoracolumbar spine and providing abdominal compression to offload the fracture site to facilitate healing.    P: Patient will be seen tomorrow to be fitted for the TLSO.

## 2018-05-21 NOTE — PROGRESS NOTES
[facility]  Hospitalist Progress Note  Eugene George MD, MD 05/21/2018  (Text Page)  Reason for Stay (Diagnosis): UTI, mechanical fall with T12 compression fracture         Assessment and Plan:      Summary of Stay: Adam Huber is a 82 year old male with prior history of dementia admitted on 5/18/2018 with fall, back pain and UTI    Problem List:   1. UTI present on admission with isolated Klebsiella pneumoniae sensitive to current ceftriaxone  2. Recent mechanical fall with T12 compression fracture earlier issues with pain control  3. Delirium in the setting of history of dementia.  Likely multifactorial from ongoing UTI, uncontrolled pain earlier.  4. Diabetes mellitus non-insulin-requiring  5. History of coronary artery disease  6. Hypertension    Continue inpatient care.  Remain on antibiotics.  Stop IV fluids.  Appreciate neurosurgery evaluation with orthosis brace pending.  He is pain medication at all.  This was decreased earlier given concerns with confusion and agitation.  Pain service formal evaluation requested.  Seroquel was started earlier.    DVT Prophylaxis: Enoxaparin (Lovenox) SQ and Pneumatic Compression Devices  Code Status: Full Code  Discharge Dispo: To be determined, stable placement given numerous fall events  Estimated Disch Date / # of Days until Disch: Likely will be needing at least 1-2 more days.        Interval History (Subjective):      Seen and examined.  Bedside  utilized.  Case discussed with nursing service.  Earlier Maynor had episodes of agitation and was requiring a bedside .  Over night he seems to be much more cooperative, redirectable.  This morning Nicolay is very pleasant, following simple commands, and stated no current complaints of abdominal pain, chest pain, shortness of breath, palpitations.  He still has intermittent back pain.     # Pain Assessment:  Current Pain Score 5/21/2018   Patient currently in pain? yes    Pain score (0-10) -   Pain location -   Pain descriptors -   - Adam is experiencing pain due to T12 compression fracture secondary to recent mechanical fall. Pain management was discussed and the plan was created in a collaborative fashion.  Adam's response to the current recommendations: engaged  -Currently has scheduled Tylenol, Lidoderm patches, prior oxycodone was decreased.  Pending pain service evaluation.              Physical Exam:      Last Vital Signs:  /78 (BP Location: Right arm)  Temp 97.4  F (36.3  C) (Oral)  Resp 18  Ht 1.829 m (6')  Wt 101.5 kg (223 lb 12.3 oz)  SpO2 92%  BMI 30.35 kg/m2    I/O last 3 completed shifts:  In: 830 [P.O.:830]  Out: -   Wt Readings from Last 1 Encounters:   05/19/18 101.5 kg (223 lb 12.3 oz)       Constitutional: Awake, alert, cooperative, no apparent distress   Respiratory: Clear to auscultation bilaterally, no crackles or wheezing   Cardiovascular: Regular rate and rhythm, normal S1 and S2, and no murmur noted   Abdomen: Normal bowel sounds, soft, non-distended, non-tender   Skin: No rashes, no cyanosis, dry to touch   Neuro: Alert and oriented x2, no weakness, spontaneous and coherent speech   Extremities:  Nonpitting edema at both ankles, normal range of motion   Other(s): Euthymic mood, not agitated       All other systems: Negative          Medications:      All current medications were reviewed with changes reflected in problem list.         Data:      All new lab and imaging data was reviewed.   Labs:    Recent Labs  Lab 05/18/18  1547   CULT 50,000 to 100,000 colonies/mLKlebsiella pneumoniae*       Recent Labs  Lab 05/19/18  0726 05/18/18  1540    136   POTASSIUM 4.2 5.2   CHLORIDE 107 105   CO2 24 23   ANIONGAP 7 8   * 247*   BUN 16 23   CR 0.82 0.97   GFRESTIMATED >90 74   GFRESTBLACK >90 90   LUZ 8.1* 8.9       Recent Labs  Lab 05/18/18  1540   WBC 12.0*   HGB 16.5   HCT 48.4   MCV 94          Recent Labs  Lab  05/19/18  0726 05/18/18  1540   CR 0.82 0.97       Recent Labs  Lab 05/21/18  0809 05/21/18  0218 05/20/18  2045 05/20/18  1837 05/20/18  1655  05/19/18  0726  05/18/18  1540   GLC  --   --   --   --   --   --  180*  --  247*   * 201* 248* 259* 276*  < >  --   < >  --    < > = values in this interval not displayed.  No results for input(s): INR in the last 168 hours.    Recent Labs  Lab 05/18/18  1547   COLOR Yellow   APPEARANCE Clear   URINEGLC >499*   URINEBILI Negative   URINEKETONE 5*   SG 1.017   UBLD Moderate*   URINEPH 5.0   PROTEIN Negative   NITRITE Positive*   LEUKEST Moderate*   RBCU 79*   WBCU 58*      Imaging:   Results for orders placed or performed during the hospital encounter of 05/18/18   Lumbar spine XR, 2-3 views    Narrative    LUMBAR SPINE TWO-THREE VIEWS  5/18/2018 5:17 PM     HISTORY: Fall with midline pain    COMPARISON: None.    FINDINGS: Mild multilevel degenerative changes. Normal lumbar  lordosis. No spondylolysis or spondylolisthesis. Minimal anterior  wedging of T12, a very mild compression deformity would be difficult  to exclude in this setting. There are no worrisome bony lesions.      Impression    IMPRESSION: Very mild anterior wedging of T12, fracture here are not  excluded. Otherwise no acute process demonstrated    KIKI MARTINEZ MD   Thoracic spine XR, 3 views    Narrative    THORACIC SPINE THREE VIEWS 5/18/2018 5:17 PM     HISTORY: Fall with midline pain.    COMPARISON: None.    FINDINGS: Mild multilevel degenerative change. Unremarkable thoracic  kyphosis. Minimal anterior wedging of T12, fracture here would be  difficult to exclude. There are no destructive or worrisome sclerotic  bony lesions.      Impression    IMPRESSION: Minimal anterior wedging of T12, fracture here not  excluded. Otherwise no acute process demonstrated.    KIKI MARTINEZ MD   XR Chest 1 View    Narrative    CHEST ONE VIEW  5/18/2018 5:19 PM     HISTORY: Fall. Pain in posterior lower  ribs.    COMPARISON: 3/23/2017      Impression    IMPRESSION: Minimal linear scarring or atelectasis in left base. No  infiltrates. Heart size and pulmonary vasculature are normal. No  evidence for pneumothorax.    JENNY DOTY MD   CT Head w/o Contrast    Narrative    CT HEAD W/O CONTRAST 5/19/2018 2:24 AM    HISTORY: Fall.    TECHNIQUE: CT imaging of the head is performed without IV contrast.    Routine assessment includes evaluation for acute intracranial  hemorrhage, midline shift, mass, acute cortical infarct, abnormal  extra-axial collection, and hydrocephalus. The calvarium and  visualized paranasal sinuses are also assessed.    COMPARISON: March 25, 2017.    FINDINGS: No significant change from prior examination. Stable  bilateral anterior frontal and right temporal encephalomalacia along  with focal right basal ganglia infarct. No evidence of acute  hemorrhage or infarct. Mild cerebral atrophy.    The visualized bilateral paranasal sinuses are clear. Normal mastoid  air cells.      Impression    IMPRESSION: No evidence of acute intracranial hemorrhage or infarct.  Stable encephalomalacia, as above.    SONIA LAIRD MD   CT Cervical Spine w/o Contrast    Narrative    CT OF THE CERVICAL SPINE WITHOUT CONTRAST   5/19/2018 2:25 AM     HISTORY: Fall.     TECHNIQUE: Axial images of the cervical spine were acquired without  intravenous contrast. Multiplanar reformations were created.   Radiation dose for this scan was reduced using automated exposure  control, adjustment of the mA and/or kV according to patient size, or  iterative reconstruction technique.    COMPARISON: None.    FINDINGS: Lateral view extends down to the level of T2. No evidence of  acute fracture or malalignment. The prevertebral soft tissues are  unremarkable. Mild degenerative changes.    SONIA LAIRD MD

## 2018-05-21 NOTE — PLAN OF CARE
Problem: Patient Care Overview  Goal: Plan of Care/Patient Progress Review  Outcome: No Change  Oriented to self, Fijian speaking. Refusing to use  phone. VSS on RA. Recent T-12 fracture, assist X2 to reposition/turn. Neurosurgery consult today, fitted for TLSO brace. To remain in bed until brace is placed tomorrow. PRN Oxycodone throughout shift. Pain consult, switched Oxycodone to PO Dilaudid. Patient refusing repositioning and most cares, agitated throughout shift with sitter at bedside. IVFs discontinued, IV Rocephin continued for UTI. , 175. Tele SR. PT following. Discharge 1-2 days to TCU.

## 2018-05-22 ENCOUNTER — APPOINTMENT (OUTPATIENT)
Dept: PHYSICAL THERAPY | Facility: CLINIC | Age: 83
DRG: 552 | End: 2018-05-22
Payer: COMMERCIAL

## 2018-05-22 LAB
GLUCOSE BLDC GLUCOMTR-MCNC: 120 MG/DL (ref 70–99)
GLUCOSE BLDC GLUCOMTR-MCNC: 122 MG/DL (ref 70–99)
GLUCOSE BLDC GLUCOMTR-MCNC: 134 MG/DL (ref 70–99)
GLUCOSE BLDC GLUCOMTR-MCNC: 142 MG/DL (ref 70–99)
GLUCOSE BLDC GLUCOMTR-MCNC: 224 MG/DL (ref 70–99)

## 2018-05-22 PROCEDURE — 25000128 H RX IP 250 OP 636: Performed by: INTERNAL MEDICINE

## 2018-05-22 PROCEDURE — 40000914 ZZH STATISTIC SITTER, DAY HOURS

## 2018-05-22 PROCEDURE — 25000125 ZZHC RX 250: Performed by: INTERNAL MEDICINE

## 2018-05-22 PROCEDURE — 40000193 ZZH STATISTIC PT WARD VISIT: Performed by: PHYSICAL THERAPY ASSISTANT

## 2018-05-22 PROCEDURE — 40000915 ZZH STATISTIC SITTER, EVENING HOURS

## 2018-05-22 PROCEDURE — 00000146 ZZHCL STATISTIC GLUCOSE BY METER IP

## 2018-05-22 PROCEDURE — 99233 SBSQ HOSP IP/OBS HIGH 50: CPT | Performed by: CLINICAL NURSE SPECIALIST

## 2018-05-22 PROCEDURE — L0486 TLSO RIGIDLINED CUST FAB TWO: HCPCS

## 2018-05-22 PROCEDURE — 25000132 ZZH RX MED GY IP 250 OP 250 PS 637: Performed by: INTERNAL MEDICINE

## 2018-05-22 PROCEDURE — 99232 SBSQ HOSP IP/OBS MODERATE 35: CPT | Performed by: INTERNAL MEDICINE

## 2018-05-22 PROCEDURE — 25000132 ZZH RX MED GY IP 250 OP 250 PS 637: Performed by: CLINICAL NURSE SPECIALIST

## 2018-05-22 PROCEDURE — 97530 THERAPEUTIC ACTIVITIES: CPT | Mod: GP | Performed by: PHYSICAL THERAPY ASSISTANT

## 2018-05-22 PROCEDURE — 12000007 ZZH R&B INTERMEDIATE

## 2018-05-22 PROCEDURE — 40000916 ZZH STATISTIC SITTER, NIGHT HOURS

## 2018-05-22 RX ADMIN — BACITRACIN: 500 OINTMENT TOPICAL at 07:51

## 2018-05-22 RX ADMIN — LOSARTAN POTASSIUM 50 MG: 25 TABLET, FILM COATED ORAL at 07:52

## 2018-05-22 RX ADMIN — Medication 6.25 MG: at 09:32

## 2018-05-22 RX ADMIN — ENOXAPARIN SODIUM 40 MG: 40 INJECTION SUBCUTANEOUS at 09:32

## 2018-05-22 RX ADMIN — CARVEDILOL 25 MG: 25 TABLET, FILM COATED ORAL at 17:47

## 2018-05-22 RX ADMIN — ACETAMINOPHEN 975 MG: 325 TABLET ORAL at 17:47

## 2018-05-22 RX ADMIN — CARVEDILOL 25 MG: 25 TABLET, FILM COATED ORAL at 07:53

## 2018-05-22 RX ADMIN — SENNOSIDES AND DOCUSATE SODIUM 1 TABLET: 8.6; 5 TABLET ORAL at 19:37

## 2018-05-22 RX ADMIN — POLYETHYLENE GLYCOL 3350 17 G: 17 POWDER, FOR SOLUTION ORAL at 13:20

## 2018-05-22 RX ADMIN — NORTRIPTYLINE HYDROCHLORIDE 50 MG: 25 CAPSULE ORAL at 19:37

## 2018-05-22 RX ADMIN — CEFTRIAXONE SODIUM 1 G: 1 INJECTION, POWDER, FOR SOLUTION INTRAMUSCULAR; INTRAVENOUS at 17:47

## 2018-05-22 RX ADMIN — ASPIRIN 81 MG: 81 TABLET, COATED ORAL at 07:53

## 2018-05-22 RX ADMIN — ACETAMINOPHEN 975 MG: 325 TABLET ORAL at 07:52

## 2018-05-22 RX ADMIN — HYDROMORPHONE HYDROCHLORIDE 1 MG: 2 TABLET ORAL at 07:52

## 2018-05-22 RX ADMIN — ACETAMINOPHEN 975 MG: 325 TABLET ORAL at 22:14

## 2018-05-22 RX ADMIN — MELATONIN TAB 3 MG 6 MG: 3 TAB at 19:38

## 2018-05-22 RX ADMIN — HYDROMORPHONE HYDROCHLORIDE 1 MG: 2 TABLET ORAL at 10:29

## 2018-05-22 RX ADMIN — HYDROMORPHONE HYDROCHLORIDE 1 MG: 2 TABLET ORAL at 02:54

## 2018-05-22 RX ADMIN — QUETIAPINE FUMARATE 25 MG: 25 TABLET ORAL at 19:38

## 2018-05-22 RX ADMIN — HYDROMORPHONE HYDROCHLORIDE 1 MG: 2 TABLET ORAL at 22:14

## 2018-05-22 RX ADMIN — HYDROMORPHONE HYDROCHLORIDE 1 MG: 2 TABLET ORAL at 06:33

## 2018-05-22 RX ADMIN — HYDROXYZINE HYDROCHLORIDE 25 MG: 25 TABLET ORAL at 14:27

## 2018-05-22 RX ADMIN — HYDROMORPHONE HYDROCHLORIDE 1 MG: 2 TABLET ORAL at 14:27

## 2018-05-22 RX ADMIN — HYDROXYZINE HYDROCHLORIDE 25 MG: 25 TABLET ORAL at 07:53

## 2018-05-22 RX ADMIN — LIDOCAINE 1 PATCH: 560 PATCH PERCUTANEOUS; TOPICAL; TRANSDERMAL at 20:45

## 2018-05-22 RX ADMIN — HYDROMORPHONE HYDROCHLORIDE 1 MG: 2 TABLET ORAL at 19:37

## 2018-05-22 ASSESSMENT — ACTIVITIES OF DAILY LIVING (ADL)
ADLS_ACUITY_SCORE: 13.5
ADLS_ACUITY_SCORE: 21
ADLS_ACUITY_SCORE: 13.5
ADLS_ACUITY_SCORE: 21

## 2018-05-22 ASSESSMENT — PAIN DESCRIPTION - DESCRIPTORS: DESCRIPTORS: PATIENT UNABLE TO DESCRIBE

## 2018-05-22 NOTE — PLAN OF CARE
Problem: Patient Care Overview  Goal: Plan of Care/Patient Progress Review  Patient  Goal: Individualization & Mutuality  Outcome: Improving  Patient confused to situation, restless, anxious. VS stable, Dilaudid PO and Tylenol for pain. Incontinent of urine.PSC in room.

## 2018-05-22 NOTE — PROGRESS NOTES
I trimmed orthosis and checked fit with therapy again.  Tlso is ok to be on.  I will follow tomorrow.

## 2018-05-22 NOTE — PLAN OF CARE
Problem: Patient Care Overview  Goal: Plan of Care/Patient Progress Review  PT-TLSO arrived but unable to be placed by Orthotics as very sleepy. Will plan to see later today as able per staff grand son will be able to translate if needed this aftternoon

## 2018-05-22 NOTE — PROGRESS NOTES
Fit patient with tlso.  Was able to sit patient up with staff to establish trim lines.  I made adjustments and returned with orthosis.  Tlso is ok to wear at this point but may need more adjustments. Please call if questions.

## 2018-05-22 NOTE — PROGRESS NOTES
[facility]  Pain Management Progress Note  Text Page     Assessment & Plan   Adam Huber is a 82 year old male who was admitted on 5/18/2018.     Appreciate assistance of  in meeting with the patient. Fortunately the patient was able to give some insight into his pain experience. He did have a dose of 6.25 mg Seroquel at 09:32 this morning which is probably improving his behavior. Continue to observe with scheduled Dilaudid for pain, as his most recent dose of Oxycodone was at 13:38 yesterday.        1) Acute back pain s/p fall Thoracic spine X-ray demonstrates minimal anterior wedging of T12     2)  Patient does not have history of chronic pain and is not on chronic opioid therapy   [facility] database review:  No prescription in the past year   0 mg Daily Morphine Equivalent  Patient is opioid naive.      Patient's opioid use during the past day: 5 mg oral Dilaudid and 5 mg Oxycodone = 27.5 mg Daily Morphine Equivalent     3)  Opioid induced side-effects:  -Constipation - Patient denies, yet has no documented stool during the past 3 days (since admission)  -Nausea/Vomit - No  -Sedation - Not hallucinating but continues to be impulsive and difficult to redirect  -Urinary Retention- no     4) Other/Related:  History of CAD with non STEMI and LEXI x 2 and CVA       PLAN:   1)  Dilaudid 1 mg every 4 hours and use additional doses for pain flare.      2)  Tylenol 975 mg TID     3)  Multimodal Medication Therapy  Topical: Lidocaine and Icy Hot patch  NSAIDS:   Muscle Relaxants:  Adjuvants:Atarax 25 mg every 6 hours prn  Antidepressants/anxiolytics: Chronic use of Nortriptyline 50 mg at bedtime for chronic headache  Opioids: Dilaudid 1 mg every 4 hours and use additional doses for pain flare.      4) Non-medication interventions  TLSO at all time out of bed  Rest alternating with physical therapy     5)  Constipation Prophylaxis  Senna-S at bedtime and Shiraz lax prn     6) DC safety  -Patient  discharge to a controlled setting for medication dispensing such as TCU     or  -Discharge with family member or friend managing pain medications    Alva Barrera MS, RN, CNS, APRN, ACHPN, FAACVPR  Pain and Palliative Care  Pager [phone]  Office [phone]       Time Spent on this Encounter   I spent from 10:35 until 10:55 AM in assessment and discussion with the patient.  Another 20 minutes in review of chart, documentation and discussion with the health care team.      Interval History   Chart reviewed    Physical Exam   Temp:  [95.3  F (35.2  C)-97.3  F (36.3  C)] 97.3  F (36.3  C)  Heart Rate:  [66-87] 87  Resp:  [20] 20  BP: (134-185)/(69-91) 178/91  SpO2:  [91 %-92 %] 91 %  223 lbs 12.27 oz  GEN:  Alert, oriented to self, appears comfortable resting in bed.  HEENT:  Normocephalic/atraumatic, no scleral icterus, no nasal discharge, mouth moist.  CV:  RRR, S1, S2; no murmurs or other irregularities noted.  +3 DP/PT pulses bilaterally; no edema BLE.  RESP:  Clear to auscultation bilaterally without rales/rhonchi/wheezing/retractions.  Symmetric chest rise on inhalation noted.  Normal respiratory effort.  ABD:  Rounded, soft, non-tender/non-distended.  +BS  EXT:  Edema & pulses as noted above.  CMS intact x 4.     M/S:   Mid back is tender to palpation.    SKIN:  Warm and dry to touch, no exanthems noted in the visualized areas.    NEURO: Symmetric strength +5/5.  Sensation to touch intact all extremities.   There is no area of allodynia or hyperesthesia.  PAIN BEHAVIOR: Cooperative  Psych:  Normal affect.  Calm, cooperative, conversant, but confused and changing the subject with many questions.    Medications       acetaminophen  975 mg Oral TID     aspirin  81 mg Oral Daily     bacitracin   Topical BID     carvedilol  25 mg Oral BID w/meals     cefTRIAXone  1 g Intravenous Q24H     enoxaparin  40 mg Subcutaneous Q24H     HYDROmorphone  1 mg Oral Q4H     insulin aspart  1-7 Units Subcutaneous TID AC      insulin aspart  1-5 Units Subcutaneous At Bedtime     lidocaine  1 patch Transdermal Q24h    And     lidocaine   Transdermal Q24H    And     lidocaine   Transdermal Q8H     losartan (COZAAR) tablet 50 mg  50 mg Oral Daily     melatonin  3-6 mg Oral At Bedtime     menthol  1 each Transdermal Q8H     nortriptyline  50 mg Oral At Bedtime     QUEtiapine  25 mg Oral At Bedtime     senna-docusate  1 tablet Oral At Bedtime     sodium chloride (PF)  3 mL Intracatheter Q8H       Data   Results for orders placed or performed during the hospital encounter of 05/18/18 (from the past 24 hour(s))   Glucose by meter   Result Value Ref Range    Glucose 175 (H) 70 - 99 mg/dL   Glucose by meter   Result Value Ref Range    Glucose 252 (H) 70 - 99 mg/dL   Glucose by meter   Result Value Ref Range    Glucose 250 (H) 70 - 99 mg/dL   Glucose by meter   Result Value Ref Range    Glucose 120 (H) 70 - 99 mg/dL   Glucose by meter   Result Value Ref Range    Glucose 134 (H) 70 - 99 mg/dL

## 2018-05-22 NOTE — PLAN OF CARE
Problem: Patient Care Overview  Goal: Plan of Care/Patient Progress Review  Outcome: No Change  Alert, disoriented to time and situation, confused. Language barrier present, Gabonese speaking. VSS on RA. Tele SR. LS clear. Restless with moaning observed, scheduled dilaudid given. Incontinent. . Sitter at bedside for safety. Up with assist of 2 with mechanical lift. PT, neurosurgery, care transitions following. Plan for TLSO fitting today. Continue with POC.

## 2018-05-22 NOTE — PLAN OF CARE
Problem: Patient Care Overview  Goal: Plan of Care/Patient Progress Review  Outcome: No Change  Oriented to self. Confused, impulsive. Pulled out IV this AM, attempting to climb out of bed. Seroquel given,  at bedside improved confusion and impulsiveness. Agitation and frustration appears to be mainly due to language barrier. Sitter at bedside. Oral Dilaudid Q4H for recent T-12 fracture. Pain following, brace to be placed this afternoon with wife/PT present. Tele SR. IV Rocephin for UTI. , only compliant with eating when /family is present. PT/OT to consult to decide TCU vs. Home. Discharge 1-2 days.

## 2018-05-22 NOTE — PROGRESS NOTES
[facility]  Hospitalist Progress Note  Eugene George MD, MD 05/22/2018  (Text Page)  Reason for Stay (Diagnosis): UTI, mechanical fall with T12 compression fracture         Assessment and Plan:      Summary of Stay: Adam Huber is a 82 year old male with prior history of dementia admitted on 5/18/2018 with fall, back pain and UTI    Problem List:   1. UTI present on admission with isolated Klebsiella pneumoniae sensitive to current ceftriaxone  2. Recent mechanical fall with T12 compression fracture earlier issues with pain control  3. Delirium in the setting of history of dementia.  Likely multifactorial from ongoing UTI, uncontrolled pain earlier.  4. Diabetes mellitus non-insulin-requiring  5. History of coronary artery disease  6. Hypertension    Continue inpatient care.  Remain on antibiotics.  Stop IV fluids.  Appreciate neurosurgery evaluation with orthosis brace pending.  Plan for fitting today   Seroquel was started earlier.  We will need physical therapy/occupational therapy evaluation once TLSO is in place and see what is going to be ultimately his final disposition.    DVT Prophylaxis: Enoxaparin (Lovenox) SQ and Pneumatic Compression Devices  Code Status: Full Code  Discharge Dispo: To be determined, stable placement given numerous fall events  Estimated Disch Date / # of Days until Disch: Likely will be needing at least 1-2 more days.        Interval History (Subjective):      Seen and examined.  Bedside  utilized.  Case discussed with nursing service.  Earlier Maynor had another episodes of agitation and was still requiring a bedside .  This morning he is cooperative during exam is bedside Iraqi  is aiding us.  He is tolerating oral diet with prodding and assistance from the nursing staff and the Iraqi .  Remained afebrile.  No episodes of reported nausea or vomiting.     # Pain Assessment:  Current Pain Score 5/22/2018    Patient currently in pain? yes   Pain score (0-10) -   Pain location -   Pain descriptors -   - Adam is experiencing pain due to T12 compression fracture secondary to recent mechanical fall. Pain management was discussed and the plan was created in a collaborative fashion.  Adam's response to the current recommendations: engaged  We will defer pain regimen with pain service              Physical Exam:      Last Vital Signs:  BP (!) 178/91 (BP Location: Left arm)  Temp 97.3  F (36.3  C) (Axillary)  Resp 20  Ht 1.829 m (6')  Wt 101.5 kg (223 lb 12.3 oz)  SpO2 91%  BMI 30.35 kg/m2    I/O last 3 completed shifts:  In: 300 [P.O.:300]  Out: 200 [Urine:200]  Wt Readings from Last 1 Encounters:   05/19/18 101.5 kg (223 lb 12.3 oz)       Constitutional: Awake, alert, cooperative, no apparent distress   Respiratory: Clear to auscultation bilaterally, no crackles or wheezing   Cardiovascular: Regular rate and rhythm, normal S1 and S2, and no murmur noted   Abdomen: Normal bowel sounds, soft, non-distended, non-tender   Skin: No rashes, no cyanosis, dry to touch   Neuro: Alert and oriented x2, no weakness, spontaneous and coherent speech   Extremities:  Nonpitting edema at both ankles, normal range of motion   Other(s): Euthymic mood, not agitated       All other systems: Negative          Medications:      All current medications were reviewed with changes reflected in problem list.         Data:      All new lab and imaging data was reviewed.   Labs:    Recent Labs  Lab 05/18/18  1547   CULT 50,000 to 100,000 colonies/mLKlebsiella pneumoniae*       Recent Labs  Lab 05/19/18  0726 05/18/18  1540    136   POTASSIUM 4.2 5.2   CHLORIDE 107 105   CO2 24 23   ANIONGAP 7 8   * 247*   BUN 16 23   CR 0.82 0.97   GFRESTIMATED >90 74   GFRESTBLACK >90 90   LUZ 8.1* 8.9       Recent Labs  Lab 05/18/18  1540   WBC 12.0*   HGB 16.5   HCT 48.4   MCV 94          Recent Labs  Lab 05/19/18  0726 05/18/18  1540    CR 0.82 0.97       Recent Labs  Lab 05/22/18  0753 05/22/18  0205 05/21/18  2107 05/21/18  1658 05/21/18  1351  05/19/18  0726  05/18/18  1540   GLC  --   --   --   --   --   --  180*  --  247*   * 120* 250* 252* 175*  < >  --   < >  --    < > = values in this interval not displayed.  No results for input(s): INR in the last 168 hours.    Recent Labs  Lab 05/18/18  1547   COLOR Yellow   APPEARANCE Clear   URINEGLC >499*   URINEBILI Negative   URINEKETONE 5*   SG 1.017   UBLD Moderate*   URINEPH 5.0   PROTEIN Negative   NITRITE Positive*   LEUKEST Moderate*   RBCU 79*   WBCU 58*      Imaging:   Results for orders placed or performed during the hospital encounter of 05/18/18   Lumbar spine XR, 2-3 views    Narrative    LUMBAR SPINE TWO-THREE VIEWS  5/18/2018 5:17 PM     HISTORY: Fall with midline pain    COMPARISON: None.    FINDINGS: Mild multilevel degenerative changes. Normal lumbar  lordosis. No spondylolysis or spondylolisthesis. Minimal anterior  wedging of T12, a very mild compression deformity would be difficult  to exclude in this setting. There are no worrisome bony lesions.      Impression    IMPRESSION: Very mild anterior wedging of T12, fracture here are not  excluded. Otherwise no acute process demonstrated    KIKI MARTINEZ MD   Thoracic spine XR, 3 views    Narrative    THORACIC SPINE THREE VIEWS 5/18/2018 5:17 PM     HISTORY: Fall with midline pain.    COMPARISON: None.    FINDINGS: Mild multilevel degenerative change. Unremarkable thoracic  kyphosis. Minimal anterior wedging of T12, fracture here would be  difficult to exclude. There are no destructive or worrisome sclerotic  bony lesions.      Impression    IMPRESSION: Minimal anterior wedging of T12, fracture here not  excluded. Otherwise no acute process demonstrated.    KIKI MARTINEZ MD   XR Chest 1 View    Narrative    CHEST ONE VIEW  5/18/2018 5:19 PM     HISTORY: Fall. Pain in posterior lower ribs.    COMPARISON: 3/23/2017       Impression    IMPRESSION: Minimal linear scarring or atelectasis in left base. No  infiltrates. Heart size and pulmonary vasculature are normal. No  evidence for pneumothorax.    JENNY DOTY MD   CT Head w/o Contrast    Narrative    CT HEAD W/O CONTRAST 5/19/2018 2:24 AM    HISTORY: Fall.    TECHNIQUE: CT imaging of the head is performed without IV contrast.    Routine assessment includes evaluation for acute intracranial  hemorrhage, midline shift, mass, acute cortical infarct, abnormal  extra-axial collection, and hydrocephalus. The calvarium and  visualized paranasal sinuses are also assessed.    COMPARISON: March 25, 2017.    FINDINGS: No significant change from prior examination. Stable  bilateral anterior frontal and right temporal encephalomalacia along  with focal right basal ganglia infarct. No evidence of acute  hemorrhage or infarct. Mild cerebral atrophy.    The visualized bilateral paranasal sinuses are clear. Normal mastoid  air cells.      Impression    IMPRESSION: No evidence of acute intracranial hemorrhage or infarct.  Stable encephalomalacia, as above.    SONIA LAIRD MD   CT Cervical Spine w/o Contrast    Narrative    CT OF THE CERVICAL SPINE WITHOUT CONTRAST   5/19/2018 2:25 AM     HISTORY: Fall.     TECHNIQUE: Axial images of the cervical spine were acquired without  intravenous contrast. Multiplanar reformations were created.   Radiation dose for this scan was reduced using automated exposure  control, adjustment of the mA and/or kV according to patient size, or  iterative reconstruction technique.    COMPARISON: None.    FINDINGS: Lateral view extends down to the level of T2. No evidence of  acute fracture or malalignment. The prevertebral soft tissues are  unremarkable. Mild degenerative changes.    SONIA LAIRD MD

## 2018-05-22 NOTE — PLAN OF CARE
Problem: Patient Care Overview  Goal: Plan of Care/Patient Progress Review  Discharge Planner PT   Patient plan for discharge: not stated  Current status: 1-2 assist for mobility with TLSO on unable to stand this date due to pain  Barriers to return to prior living situation: mobility  Recommendations for discharge: PT- Per plan established by the Physical Therapist, the discharge recommendation isTCU vs home with HHPT     Rationale for recommendations: currently would need medical transport for TCU if not able to transfer or walk and sit in chair       Entered by: Luciana Steward 05/22/2018 3:44 PM

## 2018-05-23 ENCOUNTER — APPOINTMENT (OUTPATIENT)
Dept: PHYSICAL THERAPY | Facility: CLINIC | Age: 83
DRG: 552 | End: 2018-05-23
Payer: COMMERCIAL

## 2018-05-23 LAB
GLUCOSE BLDC GLUCOMTR-MCNC: 124 MG/DL (ref 70–99)
GLUCOSE BLDC GLUCOMTR-MCNC: 148 MG/DL (ref 70–99)
GLUCOSE BLDC GLUCOMTR-MCNC: 185 MG/DL (ref 70–99)
GLUCOSE BLDC GLUCOMTR-MCNC: 225 MG/DL (ref 70–99)

## 2018-05-23 PROCEDURE — 99233 SBSQ HOSP IP/OBS HIGH 50: CPT | Performed by: NURSE PRACTITIONER

## 2018-05-23 PROCEDURE — 00000146 ZZHCL STATISTIC GLUCOSE BY METER IP

## 2018-05-23 PROCEDURE — 40000916 ZZH STATISTIC SITTER, NIGHT HOURS

## 2018-05-23 PROCEDURE — 25000125 ZZHC RX 250: Performed by: INTERNAL MEDICINE

## 2018-05-23 PROCEDURE — 25000132 ZZH RX MED GY IP 250 OP 250 PS 637: Performed by: CLINICAL NURSE SPECIALIST

## 2018-05-23 PROCEDURE — 25000128 H RX IP 250 OP 636: Performed by: INTERNAL MEDICINE

## 2018-05-23 PROCEDURE — 40000914 ZZH STATISTIC SITTER, DAY HOURS

## 2018-05-23 PROCEDURE — 12000007 ZZH R&B INTERMEDIATE

## 2018-05-23 PROCEDURE — 97530 THERAPEUTIC ACTIVITIES: CPT | Mod: GP | Performed by: PHYSICAL THERAPY ASSISTANT

## 2018-05-23 PROCEDURE — 25000132 ZZH RX MED GY IP 250 OP 250 PS 637: Performed by: INTERNAL MEDICINE

## 2018-05-23 PROCEDURE — 97116 GAIT TRAINING THERAPY: CPT | Mod: GP | Performed by: PHYSICAL THERAPY ASSISTANT

## 2018-05-23 PROCEDURE — 99232 SBSQ HOSP IP/OBS MODERATE 35: CPT | Performed by: INTERNAL MEDICINE

## 2018-05-23 PROCEDURE — 40000193 ZZH STATISTIC PT WARD VISIT: Performed by: PHYSICAL THERAPY ASSISTANT

## 2018-05-23 PROCEDURE — 40000915 ZZH STATISTIC SITTER, EVENING HOURS

## 2018-05-23 RX ADMIN — SENNOSIDES AND DOCUSATE SODIUM 1 TABLET: 8.6; 5 TABLET ORAL at 21:04

## 2018-05-23 RX ADMIN — LOSARTAN POTASSIUM 50 MG: 25 TABLET, FILM COATED ORAL at 09:13

## 2018-05-23 RX ADMIN — BACITRACIN: 500 OINTMENT TOPICAL at 09:15

## 2018-05-23 RX ADMIN — BACITRACIN: 500 OINTMENT TOPICAL at 21:05

## 2018-05-23 RX ADMIN — ACETAMINOPHEN 975 MG: 325 TABLET ORAL at 09:13

## 2018-05-23 RX ADMIN — HYDROMORPHONE HYDROCHLORIDE 1 MG: 2 TABLET ORAL at 23:31

## 2018-05-23 RX ADMIN — HYDROMORPHONE HYDROCHLORIDE 1 MG: 2 TABLET ORAL at 20:21

## 2018-05-23 RX ADMIN — LIDOCAINE 1 PATCH: 560 PATCH PERCUTANEOUS; TOPICAL; TRANSDERMAL at 21:05

## 2018-05-23 RX ADMIN — QUETIAPINE FUMARATE 25 MG: 25 TABLET ORAL at 21:04

## 2018-05-23 RX ADMIN — HYDROMORPHONE HYDROCHLORIDE 1 MG: 2 TABLET ORAL at 06:21

## 2018-05-23 RX ADMIN — Medication 6.25 MG: at 16:56

## 2018-05-23 RX ADMIN — HYDROMORPHONE HYDROCHLORIDE 1 MG: 2 TABLET ORAL at 14:34

## 2018-05-23 RX ADMIN — CARVEDILOL 25 MG: 25 TABLET, FILM COATED ORAL at 09:13

## 2018-05-23 RX ADMIN — HYDROMORPHONE HYDROCHLORIDE 1 MG: 2 TABLET ORAL at 10:54

## 2018-05-23 RX ADMIN — Medication 6.25 MG: at 01:02

## 2018-05-23 RX ADMIN — INSULIN ASPART 1 UNITS: 100 INJECTION, SOLUTION INTRAVENOUS; SUBCUTANEOUS at 16:55

## 2018-05-23 RX ADMIN — ACETAMINOPHEN 975 MG: 325 TABLET ORAL at 16:16

## 2018-05-23 RX ADMIN — ASPIRIN 81 MG: 81 TABLET, COATED ORAL at 09:13

## 2018-05-23 RX ADMIN — ACETAMINOPHEN 975 MG: 325 TABLET ORAL at 21:03

## 2018-05-23 RX ADMIN — NORTRIPTYLINE HYDROCHLORIDE 50 MG: 25 CAPSULE ORAL at 20:21

## 2018-05-23 RX ADMIN — CEFTRIAXONE SODIUM 1 G: 1 INJECTION, POWDER, FOR SOLUTION INTRAMUSCULAR; INTRAVENOUS at 16:16

## 2018-05-23 RX ADMIN — ENOXAPARIN SODIUM 40 MG: 40 INJECTION SUBCUTANEOUS at 09:42

## 2018-05-23 RX ADMIN — INSULIN ASPART 1 UNITS: 100 INJECTION, SOLUTION INTRAVENOUS; SUBCUTANEOUS at 09:12

## 2018-05-23 RX ADMIN — CARVEDILOL 25 MG: 25 TABLET, FILM COATED ORAL at 16:56

## 2018-05-23 RX ADMIN — HYDROMORPHONE HYDROCHLORIDE 1 MG: 2 TABLET ORAL at 16:55

## 2018-05-23 RX ADMIN — HYDROMORPHONE HYDROCHLORIDE 0.2 MG: 1 INJECTION, SOLUTION INTRAMUSCULAR; INTRAVENOUS; SUBCUTANEOUS at 01:16

## 2018-05-23 RX ADMIN — MELATONIN TAB 3 MG 6 MG: 3 TAB at 20:21

## 2018-05-23 RX ADMIN — INSULIN ASPART 2 UNITS: 100 INJECTION, SOLUTION INTRAVENOUS; SUBCUTANEOUS at 12:37

## 2018-05-23 ASSESSMENT — ACTIVITIES OF DAILY LIVING (ADL)
ADLS_ACUITY_SCORE: 13.5

## 2018-05-23 NOTE — PLAN OF CARE
Problem: Patient Care Overview  Goal: Plan of Care/Patient Progress Review  Outcome: No Change  Alert, disoriented to situation and time. Language barrier present, Citizen of the [place] speaking, refuses  phone at bedside. VSS on RA. Tele SR. LS diminished. Pt restless, agitated at 0055-PRN seroquel given. Code 21 called at 0110 and PRN IV dilaudid given-see previous note. Pt calm and sleeping at this time. Sitter at bedside for safety. Up with assist of 2 with TLSO brace, mechanical lift. Pt removed lidocaine patch this shift. . PT, pain management, and care transitions following. Continue with POC.

## 2018-05-23 NOTE — PLAN OF CARE
Problem: Patient Care Overview  Goal: Plan of Care/Patient Progress Review  Discharge Planner PT   Patient plan for discharge:not stated  Current status: better alertness and cooperation with session today, agreeable to session still Mod A for bed mobility but able to assist in placement of TLSO gait with Rw to chair 15 feet and Mod A for balance ith gait did use RW  Barriers to return to prior living situation: mobility and safety  Recommendations for discharge: PT- Per plan established by the Physical Therapist, the discharge recommendation is TCU vs home with HHPT     Rationale for recommendations: better mobility today but per chart was walking much further and less assist a few days ago.       Entered by: Luciana Steward 05/23/2018 12:25 PM

## 2018-05-23 NOTE — PROGRESS NOTES
[facility]  Hospitalist Progress Note  Eveline Nix MD 05/23/2018    Reason for Stay (Diagnosis): UTI, mechanical fall with T12 compression fracture         Assessment and Plan:      Summary of Stay: Adam Huber is a 82 year old male with prior history of dementia admitted on 5/18/2018 with fall, back pain and UTI    Problem List:   1. UTI present on admission with isolated Klebsiella pneumoniae sensitive to current ceftriaxone  2. Recent mechanical fall with T12 compression fracture earlier issues with pain control  3. Delirium in the setting of history of dementia.  Likely exacerbated from ongoing UTI, uncontrolled pain earlier.  4. Diabetes mellitus non-insulin-requiring, not on meds, A1c is less than 7   5. History of coronary artery disease  6. Hypertension    Continue inpatient care.  Remain on antibiotics.  Stop IV fluids.  Appreciate neurosurgery evaluation with orthosis brace  -- Seroquel was started earlier for delirium. Patient had issues with delirium during previous hospitalization as well, increase Seroquel to 6.25 mg BID scheduled during day and 25 mg HS. PRN available   -- Will need physical therapy/occupational therapy evaluation once TLSO is in place and see what is going to be ultimately his final disposition.    DVT Prophylaxis: Enoxaparin (Lovenox) SQ and Pneumatic Compression Devices  Code Status: Full Code  Discharge Dispo: To be determined, stable placement given numerous fall events  Estimated Disch Date / # of Days until Disch: Likely will be needing at least 1-2 more days.        Interval History (Subjective):      Seen and examined.  Bedside  utilized.  Case discussed with nursing service.  Had issues with agitation last night and still requiring a bedside . Cooperative during this exam, knows he is in hospital and seems a lot more clear, reports back pain, tolerating PO, Remained afebrile.  No episodes of reported nausea or  vomiting.     # Pain Assessment:  Current Pain Score 5/23/2018   Patient currently in pain? yes   Pain score (0-10) 7   Pain location Back   Pain descriptors -   - Adam is experiencing pain due to T12 compression fracture secondary to recent mechanical fall. Pain management was discussed and the plan was created in a collaborative fashion.  Adam's response to the current recommendations: compliant  We will defer pain regimen with pain service              Physical Exam:      Last Vital Signs:  /78 (BP Location: Left arm)  Pulse 71  Temp 98.5  F (36.9  C) (Oral)  Resp 18  Ht 1.829 m (6')  Wt 101.5 kg (223 lb 12.3 oz)  SpO2 93%  BMI 30.35 kg/m2    I/O last 3 completed shifts:  In: 803 [P.O.:800; I.V.:3]  Out: 950 [Urine:950]  Wt Readings from Last 1 Encounters:   05/19/18 101.5 kg (223 lb 12.3 oz)       Constitutional: Awake, alert, cooperative, no apparent distress   Respiratory: Clear to auscultation bilaterally, no crackles or wheezing   Cardiovascular: Regular rate and rhythm, normal S1 and S2, and no murmur noted   Abdomen: Normal bowel sounds, soft, non-distended, non-tender   Skin: No rashes, no cyanosis, dry to touch   Neuro: Alert and oriented x2, no focal weakness, spontaneous and clear speech   Extremities:  Nonpitting edema at both ankles, normal range of motion   Other(s): Euthymic mood, not agitated       All other systems: Negative          Medications:      All current medications were reviewed with changes reflected in problem list.         Data:      All new lab and imaging data was reviewed.   Labs:    Recent Labs  Lab 05/18/18  1547   CULT 50,000 to 100,000 colonies/mLKlebsiella pneumoniae*       Recent Labs  Lab 05/19/18  0726 05/18/18  1540    136   POTASSIUM 4.2 5.2   CHLORIDE 107 105   CO2 24 23   ANIONGAP 7 8   * 247*   BUN 16 23   CR 0.82 0.97   GFRESTIMATED >90 74   GFRESTBLACK >90 90   LUZ 8.1* 8.9       Recent Labs  Lab 05/18/18  1540   WBC 12.0*   HGB 16.5    HCT 48.4   MCV 94          Recent Labs  Lab 05/19/18  0726 05/18/18  1540   CR 0.82 0.97       Recent Labs  Lab 05/23/18  1219 05/23/18  0811 05/23/18  0149 05/22/18  2059 05/22/18  1544  05/19/18  0726  05/18/18  1540   GLC  --   --   --   --   --   --  180*  --  247*   * 148* 124* 224* 122*  < >  --   < >  --    < > = values in this interval not displayed.  No results for input(s): INR in the last 168 hours.    Recent Labs  Lab 05/18/18  1547   COLOR Yellow   APPEARANCE Clear   URINEGLC >499*   URINEBILI Negative   URINEKETONE 5*   SG 1.017   UBLD Moderate*   URINEPH 5.0   PROTEIN Negative   NITRITE Positive*   LEUKEST Moderate*   RBCU 79*   WBCU 58*      Imaging:   No results found for this or any previous visit (from the past 48 hour(s)).

## 2018-05-23 NOTE — PROGRESS NOTES
"0110: [facility] requested assistance in patient's room. Pt found standing next to bed and swinging arms at sitter. 3 staff members attempted to assist patient back to bed, and pt began attempting to bite staff and swing arms. Pt stated, \"pain\" and attempted to sit down. Staff slowly assisted pt onto knees on floor. Pt still attempting to bite staff and swinging arms. Code 21 called. IV dilaudid PRN given at 0116 for complaints of pain. Pt refused  phone, [place] speaking staff able to calm patient down and TLSO brace applied. Patient assisted back to bed with 4 staff members, pt currently laying in bed. Sitter at bedside for safety.   "

## 2018-05-23 NOTE — PROGRESS NOTES
[facility]  Pain Management Progress Note  Text Page     Assessment & Plan   Adam Huber is a 82 year old male who was admitted on 5/18/2018.     Appreciate assistance of  in meeting with the patient. Fortunately the patient was able to give some insight into his pain experience. He did have a dose of 6.25 mg Seroquel at 09:32 this morning which is probably improving his behavior. Continue to observe with scheduled Dilaudid for pain, as his most recent dose of Oxycodone was at 13:38 yesterday.        1) Acute back pain s/p fall Thoracic spine X-ray demonstrates minimal anterior wedging of T12     2)  Patient does not have history of chronic pain and is not on chronic opioid therapy   [facility] database review:  No prescription in the past year   0 mg Daily Morphine Equivalent  Patient is opioid naive.      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      2)  Tylenol 975 mg TID     3)  Multimodal Medication Therapy  Topical: Lidocaine and Icy Hot patch  NSAIDS:   Muscle Relaxants:  Adjuvants: Atarax 25 mg every 6 hours prn  Antidepressants/anxiolytics: Chronic use of Nortriptyline 50 mg at bedtime for chronic headache  Opioids: Dilaudid 1 mg every 4 hours and use additional doses for pain flare.      4) Non-medication interventions  TLSO at all time out of bed  Rest alternating with physical therapy     5)  Constipation Prophylaxis  Senna-S at bedtime and Mirlax prn     6) DC safety  -Patient discharge to a controlled setting for medication dispensing such as  TCU     or  -Discharge with family member or friend managing pain medications    Kimmie Ballard, ЮЛИЯ,CNP    Pain and Palliative Care  Pager [phone]  Office [phone]       Time Spent on this Encounter   I spent from 30  in assessment and discussion with the patient via interpretor.  Another 5 minutes in review of chart, documentation and discussion with the health care team.      Interval History   No constipation,  No pain with TLSO on   Pain 7-10/10 with brace off   Pain focal,   C/o feet numbness  No urinary retention  Sitter, Seroquel prn       Physical Exam   Temp:  [93.6  F (34.2  C)-98.5  F (36.9  C)] 97.9  F (36.6  C)  Pulse:  [71] 71  Heart Rate:  [62-82] 82  Resp:  [16-20] 20  BP: (129-197)/(69-92) 170/82  SpO2:  [92 %-93 %] 93 %  223 lbs 12.27 oz  GEN:  Alert, oriented to self and place appears comfortable in chair.  HEENT:  Normocephalic/atraumatic, no scleral icterus, no nasal discharge, mouth moist.  CV:  RRR, S1, S2; no murmurs or other irregularities noted.  +3 DP/PT pulses bilaterally; no edema BLE.  RESP:  Clear to auscultation bilaterally without rales/rhonchi/wheezing/retractions.  Symmetric chest rise on inhalation noted.  Normal respiratory effort.  ABD:  Rounded, soft, non-tender/non-distended.  +BS  EXT:  Edema & pulses as noted above.  CMS intact x 4.     M/S:   Mid back is tender to palpation.    SKIN:  Warm and dry to touch, no exanthems noted in the visualized areas.    NEURO: Symmetric strength +5/5.  Sensation to touch intact all extremities.   There is no area of allodynia or hyperesthesia.  PAIN BEHAVIOR: Cooperative  Psych:  Normal affect.  Calm, cooperative, conversant, but confused and changing the subject with many questions.    Medications       acetaminophen  975 mg Oral TID     aspirin  81 mg Oral Daily     bacitracin   Topical BID     carvedilol  25 mg Oral BID w/meals     cefTRIAXone  1 g Intravenous Q24H     enoxaparin  40 mg Subcutaneous Q24H      HYDROmorphone  1 mg Oral Q4H     insulin aspart  1-7 Units Subcutaneous TID AC     insulin aspart  1-5 Units Subcutaneous At Bedtime     lidocaine  1 patch Transdermal Q24h    And     lidocaine   Transdermal Q24H    And     lidocaine   Transdermal Q8H     losartan (COZAAR) tablet 50 mg  50 mg Oral Daily     melatonin  3-6 mg Oral At Bedtime     menthol  1 each Transdermal Q8H     nortriptyline  50 mg Oral At Bedtime     QUEtiapine  6.25 mg Oral BID w/meals     QUEtiapine  25 mg Oral At Bedtime     senna-docusate  1 tablet Oral At Bedtime     sodium chloride (PF)  3 mL Intracatheter Q8H       Data   Results for orders placed or performed during the hospital encounter of 05/18/18 (from the past 24 hour(s))   Glucose by meter   Result Value Ref Range    Glucose 224 (H) 70 - 99 mg/dL   Glucose by meter   Result Value Ref Range    Glucose 124 (H) 70 - 99 mg/dL   Glucose by meter   Result Value Ref Range    Glucose 148 (H) 70 - 99 mg/dL   Glucose by meter   Result Value Ref Range    Glucose 225 (H) 70 - 99 mg/dL

## 2018-05-23 NOTE — PLAN OF CARE
Problem: Patient Care Overview  Goal: Plan of Care/Patient Progress Review  Pt AOx3-4 w/ noted forgetfulness. Observed pt being calm and cooperative throughout shift with all staff.  present. Discomfort present in lower back r/t fall. Pain managed w/ scheduled tylenol, prn dilaudid and TLSO brace on when OOB. Pt ambulated w/ PT using walker to recliner today. Abrasion to left ancillary area cleansed w/ wound cleanser, bacitracin applied and left ZION.

## 2018-05-24 ENCOUNTER — APPOINTMENT (OUTPATIENT)
Dept: GENERAL RADIOLOGY | Facility: CLINIC | Age: 83
DRG: 552 | End: 2018-05-24
Attending: INTERNAL MEDICINE
Payer: COMMERCIAL

## 2018-05-24 LAB
ANION GAP SERPL CALCULATED.3IONS-SCNC: 6 MMOL/L (ref 3–14)
BASOPHILS # BLD AUTO: 0.1 10E9/L (ref 0–0.2)
BASOPHILS NFR BLD AUTO: 1 %
BUN SERPL-MCNC: 19 MG/DL (ref 7–30)
CALCIUM SERPL-MCNC: 8.9 MG/DL (ref 8.5–10.1)
CHLORIDE SERPL-SCNC: 105 MMOL/L (ref 94–109)
CO2 SERPL-SCNC: 27 MMOL/L (ref 20–32)
CREAT SERPL-MCNC: 0.82 MG/DL (ref 0.66–1.25)
DIFFERENTIAL METHOD BLD: NORMAL
EOSINOPHIL # BLD AUTO: 0.5 10E9/L (ref 0–0.7)
EOSINOPHIL NFR BLD AUTO: 5.4 %
ERYTHROCYTE [DISTWIDTH] IN BLOOD BY AUTOMATED COUNT: 12.7 % (ref 10–15)
GFR SERPL CREATININE-BSD FRML MDRD: 90 ML/MIN/1.7M2
GLUCOSE BLDC GLUCOMTR-MCNC: 160 MG/DL (ref 70–99)
GLUCOSE SERPL-MCNC: 158 MG/DL (ref 70–99)
HCT VFR BLD AUTO: 47.8 % (ref 40–53)
HGB BLD-MCNC: 16.5 G/DL (ref 13.3–17.7)
IMM GRANULOCYTES # BLD: 0 10E9/L (ref 0–0.4)
IMM GRANULOCYTES NFR BLD: 0.2 %
LYMPHOCYTES # BLD AUTO: 1.6 10E9/L (ref 0.8–5.3)
LYMPHOCYTES NFR BLD AUTO: 17.1 %
MCH RBC QN AUTO: 32.4 PG (ref 26.5–33)
MCHC RBC AUTO-ENTMCNC: 34.5 G/DL (ref 31.5–36.5)
MCV RBC AUTO: 94 FL (ref 78–100)
MONOCYTES # BLD AUTO: 1 10E9/L (ref 0–1.3)
MONOCYTES NFR BLD AUTO: 10.9 %
NEUTROPHILS # BLD AUTO: 5.9 10E9/L (ref 1.6–8.3)
NEUTROPHILS NFR BLD AUTO: 65.4 %
NRBC # BLD AUTO: 0 10*3/UL
NRBC BLD AUTO-RTO: 0 /100
PLATELET # BLD AUTO: 208 10E9/L (ref 150–450)
PLATELET # BLD AUTO: 208 10E9/L (ref 150–450)
POTASSIUM SERPL-SCNC: 4.8 MMOL/L (ref 3.4–5.3)
RBC # BLD AUTO: 5.09 10E12/L (ref 4.4–5.9)
SODIUM SERPL-SCNC: 138 MMOL/L (ref 133–144)
WBC # BLD AUTO: 9.1 10E9/L (ref 4–11)

## 2018-05-24 PROCEDURE — 80048 BASIC METABOLIC PNL TOTAL CA: CPT | Performed by: INTERNAL MEDICINE

## 2018-05-24 PROCEDURE — 12000007 ZZH R&B INTERMEDIATE

## 2018-05-24 PROCEDURE — 25000128 H RX IP 250 OP 636: Performed by: INTERNAL MEDICINE

## 2018-05-24 PROCEDURE — 71045 X-RAY EXAM CHEST 1 VIEW: CPT

## 2018-05-24 PROCEDURE — 93010 ELECTROCARDIOGRAM REPORT: CPT | Performed by: INTERNAL MEDICINE

## 2018-05-24 PROCEDURE — 25000132 ZZH RX MED GY IP 250 OP 250 PS 637: Performed by: NURSE PRACTITIONER

## 2018-05-24 PROCEDURE — 25000132 ZZH RX MED GY IP 250 OP 250 PS 637: Performed by: INTERNAL MEDICINE

## 2018-05-24 PROCEDURE — 40000915 ZZH STATISTIC SITTER, EVENING HOURS

## 2018-05-24 PROCEDURE — 40000275 ZZH STATISTIC RCP TIME EA 10 MIN

## 2018-05-24 PROCEDURE — 25000132 ZZH RX MED GY IP 250 OP 250 PS 637: Performed by: CLINICAL NURSE SPECIALIST

## 2018-05-24 PROCEDURE — 99207 ZZC CDG-MDM COMPONENT: MEETS LOW - DOWN CODED: CPT | Performed by: INTERNAL MEDICINE

## 2018-05-24 PROCEDURE — 00000146 ZZHCL STATISTIC GLUCOSE BY METER IP

## 2018-05-24 PROCEDURE — 99232 SBSQ HOSP IP/OBS MODERATE 35: CPT | Performed by: INTERNAL MEDICINE

## 2018-05-24 PROCEDURE — 40000916 ZZH STATISTIC SITTER, NIGHT HOURS

## 2018-05-24 PROCEDURE — 85025 COMPLETE CBC W/AUTO DIFF WBC: CPT | Performed by: INTERNAL MEDICINE

## 2018-05-24 PROCEDURE — 99233 SBSQ HOSP IP/OBS HIGH 50: CPT | Performed by: NURSE PRACTITIONER

## 2018-05-24 PROCEDURE — 36415 COLL VENOUS BLD VENIPUNCTURE: CPT | Performed by: INTERNAL MEDICINE

## 2018-05-24 PROCEDURE — 93005 ELECTROCARDIOGRAM TRACING: CPT

## 2018-05-24 PROCEDURE — 40000914 ZZH STATISTIC SITTER, DAY HOURS

## 2018-05-24 PROCEDURE — 25000125 ZZHC RX 250: Performed by: INTERNAL MEDICINE

## 2018-05-24 RX ORDER — LOSARTAN POTASSIUM 100 MG/1
100 TABLET ORAL DAILY
Status: DISCONTINUED | OUTPATIENT
Start: 2018-05-25 | End: 2018-06-01 | Stop reason: HOSPADM

## 2018-05-24 RX ORDER — LOSARTAN POTASSIUM 25 MG/1
50 TABLET ORAL ONCE
Status: COMPLETED | OUTPATIENT
Start: 2018-05-24 | End: 2018-05-24

## 2018-05-24 RX ORDER — CLONIDINE HYDROCHLORIDE 0.1 MG/1
0.2 TABLET ORAL ONCE
Status: COMPLETED | OUTPATIENT
Start: 2018-05-24 | End: 2018-05-24

## 2018-05-24 RX ORDER — AMOXICILLIN 250 MG
1-2 CAPSULE ORAL 2 TIMES DAILY PRN
Status: DISCONTINUED | OUTPATIENT
Start: 2018-05-24 | End: 2018-06-01 | Stop reason: HOSPADM

## 2018-05-24 RX ORDER — POLYETHYLENE GLYCOL 3350 17 G/17G
17 POWDER, FOR SOLUTION ORAL DAILY PRN
Status: DISCONTINUED | OUTPATIENT
Start: 2018-05-24 | End: 2018-05-24

## 2018-05-24 RX ADMIN — LOSARTAN POTASSIUM 50 MG: 25 TABLET, FILM COATED ORAL at 08:18

## 2018-05-24 RX ADMIN — CLONIDINE HYDROCHLORIDE 0.2 MG: 0.1 TABLET ORAL at 09:00

## 2018-05-24 RX ADMIN — QUETIAPINE FUMARATE 25 MG: 25 TABLET ORAL at 21:11

## 2018-05-24 RX ADMIN — CEFTRIAXONE SODIUM 1 G: 1 INJECTION, POWDER, FOR SOLUTION INTRAMUSCULAR; INTRAVENOUS at 16:39

## 2018-05-24 RX ADMIN — ENOXAPARIN SODIUM 40 MG: 40 INJECTION SUBCUTANEOUS at 11:11

## 2018-05-24 RX ADMIN — CARVEDILOL 25 MG: 25 TABLET, FILM COATED ORAL at 18:39

## 2018-05-24 RX ADMIN — HYDRALAZINE HYDROCHLORIDE 10 MG: 20 INJECTION INTRAMUSCULAR; INTRAVENOUS at 08:19

## 2018-05-24 RX ADMIN — CARVEDILOL 25 MG: 25 TABLET, FILM COATED ORAL at 08:17

## 2018-05-24 RX ADMIN — NORTRIPTYLINE HYDROCHLORIDE 50 MG: 25 CAPSULE ORAL at 21:10

## 2018-05-24 RX ADMIN — ACETAMINOPHEN 975 MG: 325 TABLET ORAL at 08:17

## 2018-05-24 RX ADMIN — HYDROMORPHONE HYDROCHLORIDE 0.2 MG: 1 INJECTION, SOLUTION INTRAMUSCULAR; INTRAVENOUS; SUBCUTANEOUS at 05:56

## 2018-05-24 RX ADMIN — SENNOSIDES AND DOCUSATE SODIUM 2 TABLET: 8.6; 5 TABLET ORAL at 13:48

## 2018-05-24 RX ADMIN — LOSARTAN POTASSIUM 50 MG: 25 TABLET, FILM COATED ORAL at 16:40

## 2018-05-24 RX ADMIN — ACETAMINOPHEN 975 MG: 325 TABLET ORAL at 21:11

## 2018-05-24 RX ADMIN — SENNOSIDES AND DOCUSATE SODIUM 1 TABLET: 8.6; 5 TABLET ORAL at 21:15

## 2018-05-24 RX ADMIN — BACITRACIN: 500 OINTMENT TOPICAL at 21:11

## 2018-05-24 RX ADMIN — ACETAMINOPHEN 975 MG: 325 TABLET ORAL at 16:40

## 2018-05-24 RX ADMIN — Medication 6.25 MG: at 18:39

## 2018-05-24 RX ADMIN — ASPIRIN 81 MG: 81 TABLET, COATED ORAL at 08:18

## 2018-05-24 RX ADMIN — HYDROMORPHONE HYDROCHLORIDE 1 MG: 2 TABLET ORAL at 22:29

## 2018-05-24 RX ADMIN — Medication 6.25 MG: at 08:17

## 2018-05-24 RX ADMIN — MELATONIN TAB 3 MG 6 MG: 3 TAB at 21:11

## 2018-05-24 RX ADMIN — HYDROMORPHONE HYDROCHLORIDE 1 MG: 2 TABLET ORAL at 06:31

## 2018-05-24 RX ADMIN — BACITRACIN: 500 OINTMENT TOPICAL at 08:18

## 2018-05-24 ASSESSMENT — ACTIVITIES OF DAILY LIVING (ADL)
RETIRED_COMMUNICATION: 0-->UNDERSTANDS/COMMUNICATES WITHOUT DIFFICULTY
AMBULATION: 1-->ASSISTIVE EQUIPMENT
ADLS_ACUITY_SCORE: 25
SWALLOWING: 0-->SWALLOWS FOODS/LIQUIDS WITHOUT DIFFICULTY
TOILETING: 1-->ASSISTIVE EQUIPMENT
RETIRED_EATING: 2-->ASSISTIVE PERSON
ADLS_ACUITY_SCORE: 13.5
BATHING: 3-->ASSISTIVE EQUIPMENT AND PERSON
ADLS_ACUITY_SCORE: 13.5
COGNITION: 0 - NO COGNITION ISSUES REPORTED
ADLS_ACUITY_SCORE: 21
ADLS_ACUITY_SCORE: 24
WHICH_OF_THE_ABOVE_FUNCTIONAL_RISKS_HAD_A_RECENT_ONSET_OR_CHANGE?: AMBULATION;TRANSFERRING;TOILETING;BATHING;DRESSING;EATING;COGNITION
DRESS: 3-->ASSISTIVE EQUIPMENT AND PERSON
FALL_HISTORY_WITHIN_LAST_SIX_MONTHS: YES
ADLS_ACUITY_SCORE: 13.5
TRANSFERRING: 1-->ASSISTIVE EQUIPMENT

## 2018-05-24 NOTE — PROGRESS NOTES
[facility]  Hospitalist Progress Note  Eveline Nix MD 05/24/2018    Reason for Stay (Diagnosis): UTI, mechanical fall with T12 compression fracture         Assessment and Plan:      Summary of Stay: Adam Huber is a 82 year old male with prior history of dementia admitted on 5/18/2018 with fall, back pain and UTI    Problem List:   1. UTI present on admission with isolated Klebsiella pneumoniae sensitive to current ceftriaxone  2. Recent mechanical fall with T12 compression fracture earlier issues with pain control  3. Delirium in the setting of history of dementia.  Likely exacerbated from ongoing UTI, uncontrolled pain earlier.  4. Diabetes mellitus non-insulin-requiring, not on meds, A1c is less than 7   5. History of coronary artery disease  6. Hypertension, coreg 25 BID, Losartan 50 mg daily    Continue inpatient care  -- On IV ceftriaxone, day 7 today, stop after today   -- Appreciate neurosurgery evaluation - orthosis brace  -- major ongoing issues here is the delirium, had significant issues with delirium during previous hospitalization as well, On Seroquel 6.25 mg BID scheduled during day and 25 mg HS. PRN available , increase PRN to 12.5 mg BID PRN  -- Delirium protocol, frequent re-direction, minimize interruptions at night  -- Stop scheduled narcotics and change to PRN to see if helps mentation   -- Elevated BP, increase losartan to 100 mg   -- PT/OT evaluation once TLSO is in place and see what is going to be ultimately his final disposition.    DVT Prophylaxis: Enoxaparin (Lovenox) SQ and Pneumatic Compression Devices  Code Status: Full Code  Discharge Dispo: To be determined, probably will need TCU   Estimated Disch Date / # of Days until Disch: Likely will be needing at least 2 more days.until delirium is better         Interval History (Subjective):      Seen and examined.  Bedside  utilized.  Case discussed with nursing service.  Ongoing issues with agitation  last night, not as conversant today in day time, denies any pain to me, had high BP in morning, expressed paranoia to other staff numbers,  Remained afebrile.  No episodes of reported nausea or vomiting.     # Pain Assessment:  Current Pain Score 5/24/2018   Patient currently in pain? yes   Pain score (0-10) 4   Pain location Back   Pain descriptors Patient unable to describe   - Adam is experiencing pain due to T12 compression fracture secondary to recent mechanical fall. Pain management was discussed and the plan was created in a collaborative fashion.  Adam's response to the current recommendations: unable to completely comprehend due to the delirium   We will defer pain regimen with pain service              Physical Exam:      Last Vital Signs:  /79 (BP Location: Right arm)  Pulse 71  Temp 97  F (36.1  C) (Axillary)  Resp 20  Ht 1.829 m (6')  Wt 101.5 kg (223 lb 12.3 oz)  SpO2 94%  BMI 30.35 kg/m2    I/O last 3 completed shifts:  In: 366 [P.O.:360; I.V.:6]  Out: 1625 [Urine:1625]  Wt Readings from Last 1 Encounters:   05/19/18 101.5 kg (223 lb 12.3 oz)       Constitutional: Awake, alert, no apparent distress   Respiratory: Clear to auscultation bilaterally, no crackles or wheezing   Cardiovascular: Regular rate and rhythm, normal S1 and S2, and no murmur noted   Abdomen: Normal bowel sounds, soft, non-distended, non-tender   Skin: No rashes, no cyanosis, dry to touch   Neuro: Alert and oriented x2, no focal weakness, spontaneous and clear speech   Extremities:  Nonpitting edema at both ankles, normal range of motion   Other(s): Euthymic mood, flat affect today       All other systems: Negative          Medications:      All current medications were reviewed with changes reflected in problem list.         Data:      All new lab and imaging data was reviewed.   Labs:    Recent Labs  Lab 05/18/18  1547   CULT 50,000 to 100,000 colonies/mLKlebsiella pneumoniae*       Recent Labs  Lab  05/24/18  0700 05/19/18  0726 05/18/18  1540    138 136   POTASSIUM 4.8 4.2 5.2   CHLORIDE 105 107 105   CO2 27 24 23   ANIONGAP 6 7 8   * 180* 247*   BUN 19 16 23   CR 0.82 0.82 0.97   GFRESTIMATED 90 >90 74   GFRESTBLACK >90 >90 90   LUZ 8.9 8.1* 8.9       Recent Labs  Lab 05/24/18  0700 05/18/18  1540   WBC 9.1 12.0*   HGB 16.5 16.5   HCT 47.8 48.4   MCV 94 94     208 211       Recent Labs  Lab 05/24/18  0700 05/19/18  0726 05/18/18  1540   CR 0.82 0.82 0.97       Recent Labs  Lab 05/24/18  0700 05/23/18  1649 05/23/18  1219 05/23/18  0811 05/23/18  0149 05/22/18  2059  05/19/18  0726 05/18/18  1540   *  --   --   --   --   --   --  180*  --  247*   BGM  --  185* 225* 148* 124* 224*  < >  --   < >  --    < > = values in this interval not displayed.  No results for input(s): INR in the last 168 hours.    Recent Labs  Lab 05/18/18  1547   COLOR Yellow   APPEARANCE Clear   URINEGLC >499*   URINEBILI Negative   URINEKETONE 5*   SG 1.017   UBLD Moderate*   URINEPH 5.0   PROTEIN Negative   NITRITE Positive*   LEUKEST Moderate*   RBCU 79*   WBCU 58*      Imaging:   Recent Results (from the past 48 hour(s))   XR Chest Port 1 View    Narrative    XR CHEST PORT 1 VW 5/24/2018 1:35 PM     HISTORY: hypoxia;     COMPARISON: 5/18/2018      Impression    IMPRESSION: The cardiac size and pulmonary vasculature are normal.  There is increasing opacity in the left lung base which could be  atelectasis or infiltrate. The right lung is clear.    BRYON VASQUEZ MD

## 2018-05-24 NOTE — PROGRESS NOTES
[facility]  Pain Management Progress Note  Text Page     Assessment & Plan   Adam Huber is a 82 year old male who was admitted on 5/18/2018.     Appreciate assistance of  in meeting with the patient. Unfortunately the patient was unable to give some insight into his pain experience given his current state of delirium and paranoid thoughts.  It is unlikely related to his opioid use, but will change opioids to prn and d/c IV route. It is more likely his underlying memory problems have accelerated with new environment. Periodic desaturations in O2 may also be contributing and metabolic causes should be excluded.  If his behavior continues to be problematic should consider a mental health consult for medication management.   His lack of independence with mobility will make it challenging for d/c to home and TCU should be considered.   Discussed with nursing and Dr. Nix today      1) Acute back pain s/p fall Thoracic spine X-ray demonstrates minimal anterior wedging of T12     2)  Patient does not have history of chronic pain and is not on chronic opioid therapy   [place] [facility] database review:  No prescription in the past year   0 mg Daily Morphine Equivalent  Patient is opioid naive.      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      2)  Tylenol 975 mg TID     3)  Multimodal Medication Therapy  Topical: Lidocaine and Icy Hot patch  NSAIDS:   Muscle Relaxants:  Adjuvants: d/c  Antidepressants/anxiolytics: Chronic use of  Nortriptyline 50 mg at bedtime for chronic headache  Opioids: Dilaudid 1 mg every 4 hours prn      4) Non-medication interventions  TLSO at all time out of bed with abdominal binder when in bed.   Rest alternating with physical therapy     5)  Constipation Prophylaxis  Senna-S at bedtime and Mirlax prn     6) DC safety  -Patient discharge to a controlled setting for medication dispensing such as TCU     or  -Discharge with family member or friend managing pain medications    ЮЛИЯ Pittman,CNP    Pain and Palliative Care  Pager [phone]  Office [phone]       Time Spent on this Encounter   I spent from 25  in assessment and discussion with the patient via interpretor.  Another 15 minutes in review of chart, documentation and discussion with the health care team.      Interval History   No constipation LAST bm 5/23.  No walking well with brace on or independent with BRP's  No pain with TLSO on   Pain 7-10/10 with brace off   Pain focal,   Paranoid, thinks he is a spy all of you are against the [place].  I have plan, it it different and I know all the exits. Refusing to get oob  Sitter, Seroquel prn increased on Delirium protocol  Norman Martin states episodic confusion at home. Poorly controlled confusion with last hospital stay.  He does not feel patient has ever been paranoid      Physical Exam   Temp:  [97.2  F (36.2  C)-97.9  F (36.6  C)] 97.2  F (36.2  C)  Heart Rate:  [64-82] 64  Resp:  [16-20] 20  BP: (123-203)/(52-94) 123/52  SpO2:  [90 %-96 %] 90 %  223 lbs 12.27 oz  GEN:  Alert, oriented to self and place appears comfortable in chair.  HEENT:  Normocephalic/atraumatic, no scleral icterus, no nasal discharge, mouth moist.  CV:  RRR, S1, S2; no murmurs or other irregularities noted.  +3 DP/PT pulses bilaterally; no edema BLE.  RESP:  Clear to auscultation bilaterally without rales/rhonchi/wheezing/retractions.  Symmetric chest rise on inhalation noted.  Normal respiratory effort.  ABD:   Rounded, soft, non-tender/non-distended.  +BS  EXT:  Edema & pulses as noted above.  CMS intact x 4.     M/S:   Mid back is tender to palpation.    SKIN:  Warm and dry to touch, no exanthems noted in the visualized areas.    NEURO: Symmetric strength +5/5.  Sensation to touch intact all extremities.   There is no area of allodynia or hyperesthesia.  PAIN BEHAVIOR: Cooperative  Psych:  Normal affect.  Calm, cooperative, conversant, but confused and changing the subject with many questions.    Medications       acetaminophen  975 mg Oral TID     aspirin  81 mg Oral Daily     bacitracin   Topical BID     carvedilol  25 mg Oral BID w/meals     cefTRIAXone  1 g Intravenous Q24H     enoxaparin  40 mg Subcutaneous Q24H     lidocaine  1 patch Transdermal Q24h    And     lidocaine   Transdermal Q24H    And     lidocaine   Transdermal Q8H     losartan (COZAAR) tablet 50 mg  50 mg Oral Daily     melatonin  3-6 mg Oral At Bedtime     menthol  1 each Transdermal Q8H     nortriptyline  50 mg Oral At Bedtime     QUEtiapine  6.25 mg Oral BID w/meals     QUEtiapine  25 mg Oral At Bedtime     senna-docusate  1 tablet Oral At Bedtime     sodium chloride (PF)  3 mL Intracatheter Q8H       Data   Results for orders placed or performed during the hospital encounter of 05/18/18 (from the past 24 hour(s))   Glucose by meter   Result Value Ref Range    Glucose 185 (H) 70 - 99 mg/dL   Platelet count   Result Value Ref Range    Platelet Count 208 150 - 450 10e9/L

## 2018-05-24 NOTE — PLAN OF CARE
"Problem: Patient Care Overview  Goal: Plan of Care/Patient Progress Review  Outcome: Improving  Disorientated to time place and situation, sitter at bedside for patient safety, pt is Senegalese speaking - refused  and repeatedly stated \"I speak [place]e not english\", VSS, Tele NSR, Asst x2 mechanical lift, moderate carb diet, IV rocefin for UTI, occasional incontinence - tolerates urinal well, R PIV SL, intermittent complaints of back pain, lidocaine patch in place, patient aggressive with staff when attempting to administer PO dilaudid - swinging at staff and swatted medication from med cup onto floor - med wasted, PT and OT following, patient refusing tele - Pulled off leads/stickers 3+ times, discharge 1-2 more days, continue with POC.      "

## 2018-05-24 NOTE — PLAN OF CARE
Problem: Patient Care Overview  Goal: Plan of Care/Patient Progress Review  PT- attempted to see, had higher BP this AM, will hold and recheck later today as able. Per staff and  will now have  In person translation services 24/7.

## 2018-05-24 NOTE — PLAN OF CARE
Problem: Infection, Risk/Actual (Adult)  Goal: Identify Related Risk Factors and Signs and Symptoms  Related risk factors and signs and symptoms are identified upon initiation of Human Response Clinical Practice Guideline (CPG).   Outcome: Declining  B/p elevated this am 203/86 hr 81 SR. Pt diaphoretic and fatigued. Denies cp. Dr. Nix paged with these results. He came to see pt. Pt loss iv access so po clonidine was ordered and given with sched po b/p meds. B/p did come down to 123/52. No other orders at that time. Pt very confused today and paranoid. desats 88-92% on RA. Appears apneic while falling asleep.  MD notified cxr was ordered. Pain team consulted here and changed narc to prn. Pt told pain team he had bm 5/22. Smear today. Laxatives ordered. Incont. Back pain with movement. Appears comfortable at rest.  at bedside all day. Pt bs elevated. Confirmed With Hospitalist  No need to continue glucose checks. md paged to see cxr results. incont at times bladder scan 191. Voided after. Senna started

## 2018-05-24 NOTE — PLAN OF CARE
RN Shift Summary:  Diagnosis/Presentation: Pt admitted 5/18 due to fall and back pain. Pt diagnosed with T12 fracture and UTI.   History: DM 2, chronic headaches, CAD, CVA, HTN, NSTEMI, HLD, dementia.   Labs/Protocols: Glucose: 185 and .   Telemetry: Normal sinus rhythm.   Assessment: A&Ox1. Pt is French speaking.  used to assess pt at beginning of shift. VSS on RA. Pt complains of mild pain to back at fracture site. Pt has scheduled tylenol, dilaudid, lidocaine, and menthol. PRN PO dilaudid 1mg also available, given once this shift. Lidocaine patch in place to back. PIV to R FA, SL. Moderate carb diet in place, good appetite. Pt Ax2 with lift for transfers and ambulation. TLSO brace must be in place when out of bed. Family requesting that brace remains in place during day even when pt is in bed. Pt not out of bed this shift. Sitter in place for safety. Pt became agitated and attempting to get out of bed around 2130, pt able to be redirected verbally. Will continue to monitor.   Teaching: Pt educated on plan of care with assistance of .   Plan: IV rocephin for UTI. TLSO brace with ambulation. PT/OT to consult to determine discharge plan.

## 2018-05-25 ENCOUNTER — APPOINTMENT (OUTPATIENT)
Dept: PHYSICAL THERAPY | Facility: CLINIC | Age: 83
DRG: 552 | End: 2018-05-25
Payer: COMMERCIAL

## 2018-05-25 LAB — INTERPRETATION ECG - MUSE: NORMAL

## 2018-05-25 PROCEDURE — 25000132 ZZH RX MED GY IP 250 OP 250 PS 637: Performed by: INTERNAL MEDICINE

## 2018-05-25 PROCEDURE — 12000007 ZZH R&B INTERMEDIATE

## 2018-05-25 PROCEDURE — 40000193 ZZH STATISTIC PT WARD VISIT: Performed by: PHYSICAL THERAPY ASSISTANT

## 2018-05-25 PROCEDURE — 97530 THERAPEUTIC ACTIVITIES: CPT | Mod: GP | Performed by: PHYSICAL THERAPY ASSISTANT

## 2018-05-25 PROCEDURE — 25000128 H RX IP 250 OP 636: Performed by: INTERNAL MEDICINE

## 2018-05-25 PROCEDURE — 25000132 ZZH RX MED GY IP 250 OP 250 PS 637: Performed by: CLINICAL NURSE SPECIALIST

## 2018-05-25 PROCEDURE — 25000125 ZZHC RX 250: Performed by: INTERNAL MEDICINE

## 2018-05-25 PROCEDURE — 99232 SBSQ HOSP IP/OBS MODERATE 35: CPT | Performed by: INTERNAL MEDICINE

## 2018-05-25 PROCEDURE — 25000132 ZZH RX MED GY IP 250 OP 250 PS 637: Performed by: NURSE PRACTITIONER

## 2018-05-25 PROCEDURE — 99221 1ST HOSP IP/OBS SF/LOW 40: CPT | Performed by: PSYCHIATRY & NEUROLOGY

## 2018-05-25 PROCEDURE — 99232 SBSQ HOSP IP/OBS MODERATE 35: CPT | Performed by: NURSE PRACTITIONER

## 2018-05-25 RX ORDER — GABAPENTIN 100 MG/1
100 CAPSULE ORAL 3 TIMES DAILY
Status: DISCONTINUED | OUTPATIENT
Start: 2018-05-25 | End: 2018-06-01 | Stop reason: HOSPADM

## 2018-05-25 RX ADMIN — CARVEDILOL 25 MG: 25 TABLET, FILM COATED ORAL at 09:17

## 2018-05-25 RX ADMIN — HYDROMORPHONE HYDROCHLORIDE 1 MG: 2 TABLET ORAL at 03:38

## 2018-05-25 RX ADMIN — SENNOSIDES AND DOCUSATE SODIUM 1 TABLET: 8.6; 5 TABLET ORAL at 21:27

## 2018-05-25 RX ADMIN — Medication 6.25 MG: at 09:17

## 2018-05-25 RX ADMIN — NORTRIPTYLINE HYDROCHLORIDE 50 MG: 25 CAPSULE ORAL at 20:29

## 2018-05-25 RX ADMIN — HYDROMORPHONE HYDROCHLORIDE 1 MG: 2 TABLET ORAL at 09:17

## 2018-05-25 RX ADMIN — ACETAMINOPHEN 975 MG: 325 TABLET ORAL at 16:30

## 2018-05-25 RX ADMIN — GABAPENTIN 100 MG: 100 CAPSULE ORAL at 16:38

## 2018-05-25 RX ADMIN — BACITRACIN: 500 OINTMENT TOPICAL at 20:36

## 2018-05-25 RX ADMIN — ASPIRIN 81 MG: 81 TABLET, COATED ORAL at 09:17

## 2018-05-25 RX ADMIN — ENOXAPARIN SODIUM 40 MG: 40 INJECTION SUBCUTANEOUS at 11:38

## 2018-05-25 RX ADMIN — HYDROMORPHONE HYDROCHLORIDE 1 MG: 2 TABLET ORAL at 13:57

## 2018-05-25 RX ADMIN — ACETAMINOPHEN 975 MG: 325 TABLET ORAL at 21:32

## 2018-05-25 RX ADMIN — MELATONIN TAB 3 MG 6 MG: 3 TAB at 20:33

## 2018-05-25 RX ADMIN — GABAPENTIN 100 MG: 100 CAPSULE ORAL at 21:31

## 2018-05-25 RX ADMIN — QUETIAPINE FUMARATE 25 MG: 25 TABLET ORAL at 21:31

## 2018-05-25 RX ADMIN — Medication 6.25 MG: at 17:35

## 2018-05-25 RX ADMIN — ACETAMINOPHEN 975 MG: 325 TABLET ORAL at 09:16

## 2018-05-25 RX ADMIN — LIDOCAINE 1 PATCH: 560 PATCH PERCUTANEOUS; TOPICAL; TRANSDERMAL at 20:37

## 2018-05-25 RX ADMIN — LOSARTAN POTASSIUM 100 MG: 100 TABLET, FILM COATED ORAL at 09:16

## 2018-05-25 RX ADMIN — BACITRACIN: 500 OINTMENT TOPICAL at 09:17

## 2018-05-25 RX ADMIN — CARVEDILOL 25 MG: 25 TABLET, FILM COATED ORAL at 17:34

## 2018-05-25 ASSESSMENT — ACTIVITIES OF DAILY LIVING (ADL)
ADLS_ACUITY_SCORE: 24

## 2018-05-25 NOTE — PROGRESS NOTES
Discharge Planner   Discharge Plans in progress: Anticipate TCU at dc. SW sent referrals.   Barriers to discharge plan: pt recent behaviors may be a barrier to his dc.  Follow up plan: SW will continue to follow.       Entered by: KENNETH Fitzgerald 05/25/2018 3:13 PM

## 2018-05-25 NOTE — PLAN OF CARE
Problem: Patient Care Overview  Goal: Plan of Care/Patient Progress Review  Outcome: No Change  /83 (BP Location: Left arm)  Pulse 71  Temp 96.1  F (35.6  C) (Oral)  Resp 20  Ht 1.829 m (6')  Wt 101.5 kg (223 lb 12.3 oz)  SpO2 94%  BMI 30.35 kg/m2    Neuro: Disoriented x3  Pain: 10/10 - Mid back bi lateral due to T12 fx. Dilaudid has been increased by pain & palliative   Resp: WDL   Cardiac: WDL  GI/: WDL  Diet: Mod Carb  Skin/Mobility: 2w/ walker. Abd binder and TSL brace when walking  Plan: Social consult/Pain and Palliative consult. Family wants pt to go to TCU. Pt did well walking in hallways today. Continue with POC.

## 2018-05-25 NOTE — CONSULTS
See dictation. #[id number]   Initial Consultation Psychiatry  Patient seen with . He is diagnosed with Delirium (due to UTI and opiods)  superimposed on senile onset Dementia, likely vascular vs concomittant alzheimer's. His pain intermittent and with movement.  Per nursing improved today, has walked, is eating, poor historian, is polite and calm. Oriented to place and year only. Longterm memory mostly preserved.  Tolerating seroquel currently.  As in dictation if he does not continue to improve, can shift to haldol 1mgTID and may use neurontin 100mg TID for augmentation/anxiolytic.  Seroquel can be used at hs 25-50mg.  Please call prn.  He needs placement, family unable to manage him at home now.   Devika Martinez MD  5/25/2018

## 2018-05-25 NOTE — CONSULTS
Care Transition Initial Assessment - SW  Reason For Consult: discharge planning  Met with: Patient and daughters   Active Problems:    UTI (urinary tract infection)    Pain    T12 compression fracture (H)    Acute delirium       DATA  Lives With: spouse  Living Arrangements:  (town home)  Description of Support System: Supportive, Involved  Who is your support system?: Children, Wife   Resources List: Transitional Care     Quality Of Family Relationships: supportive, helpful, involved  Transportation Available: van, wheelchair accessible    ASSESSMENT  Concerns to be addressed: CTS RN/SW consult placed to assist with dc planning. Pt admitted from home where he lives with his wife. Chart reviewed and therapies are recommending TCU. SW met with pt daughters who report that pt lives with his wife and that she is unable to care for him at this time.  Pt has a new TLSO brace and family feels that pt will need to be stronger before returning home.  Daughter hopeful that pt can stay in the [place] area and request that SW send TCU referrals to [facility].  Daughters also agreeable to SW sending TCU referrals to [facility].  SW sent referrals to: [facility], [place], [place] [facility].  Daughters report that they do not feel that pt will be able to transport by car and at this time requesting wc transport.     PLAN  Patient/Family given options and choices for discharge: Yes .  Patient/family is agreeable to the plan? Yes  Patient/Family Goals and Preferences: Home .  Patient/Family anticipates discharging to:  TCU .

## 2018-05-25 NOTE — PLAN OF CARE
"Problem: Patient Care Overview  Goal: Plan of Care/Patient Progress Review  Pt disoriented/confused - alert to self. Up with assist x2, walker and with TLSO brace on. Abd binder on for comfort. Pt became restless overnight and trying to get out to bed and pulling at clothing. On-call  called to talk with patient. Per  the pt stated \"he was afraid he was stuck in a suitcase\" and he had pain in his back. Pt repositioned in bed and PRN dilaudid given. Pt currently resting comfortably. Will continue to monitor.        "

## 2018-05-25 NOTE — CONSULTS
"Consult Date:  05/25/2018      IDENTIFICATION:  The patient is an 82-year-old  [place] immigrant who was seen via  and seen at request of Dr. Nix for psychiatric medication evaluation.      HISTORY OF PRESENT ILLNESS:  The patient was admitted from his home where he lives with his wife in [place] with acute back pain following a fall.  He had a CT scan of head and neck that showed a compression fracture of T12.  He was admitted to general medical floor.  Pain management and supportive care.  He was initially hypertensive and started on IV hydralazine as well as b.i.d. Coreg and daily Losartan.  He had received opioid regimen and was noted to be intermittently acutely confused, combative, tried at least once to bite a nurse.  He does not speak English, has had  daily.  He was started on Seroquel and followed by palliative care who has been managing his acute on chronic pain, although he is not on opioid regimen at home.  He had begun having hallucinations 24 hours at admission.  He is currently being weaned.  Family did note he has some chronic memory problems, generally been able to be managed at home, although they state concern about him going to a nearby nursing home or rehab as wife is unable to manage him any further.  They have been frustrated in the past with extended hospitalization, not looking for a bed.  He is a limited historian, but nursing reports he is doing much better.  He was eating with the .  He states he had difficulty sleeping and apparently was hallucinating last night and trying to get out of bed.  He was not taking Seroquel at home, just nortriptyline for headaches per daughter.  Adamantly denies any alcohol use and wife had confirmed they are [employer] and do not drink or smoke.  He is still hypertensive.  He has variable complaints of pain in abdomen, \"kidney\".  He does appear to have spontaneous stabbing pain at times with movement.    " "  REVIEW OF SYSTEMS:  Negative for vomiting, diarrhea, chest pain, shortness of breath.  Reports pain is \"better\".       PAST PSYCHIATRIC HISTORY:  No hospitalizations, suicide attempts, manic or psychotic episodes.  He does have preexisting history of stroke and likely vascular dementia.  No chemical dependency.      FAMILY HISTORY:  No known mental illness, chemical dependency or suicide.      SOCIAL HISTORY:  The patient is a retired [place] immigrant with 3 sons and 2 daughters.  Reports some of his sons are priests.  He and his wife reside in [place].   off.      PAST MEDICAL HISTORY:  No known history of closed head trauma or seizure.  He does have history of CVA with unclear sequellae,  Hypertension and history of falls. He has a history of prior delirium.     LABORATORY:  CT scan of head showed no acute bleed or change.  Only mild cerebral atrophy.  He has normal creatinine and electrolyte panel currently normal CBC.      ALLERGIES:  LISINOPRIL AND MORPHINE SULFATE.      MENTAL STATUS EXAM:  The patient is a tall, large-boned [place] immigrant male with no English fluency.  He had accepted being fed by .  Appeared to have good ability to chew and swallow.  Appetite reasonable.  He thanked me and was polite.  He did give the month as February and year as \"18\".  He gave day of week as Tuesday rather than Friday.  He was able to be reoriented to the summer weather and looking out the window.  He knew he was in hospital.  He has no recall of hallucinations.  Longterm memory more intact.  At least moderate short-term memory difficulty, although more difficult to gauge given recent delirium.  Thought content negative for current hallucinations, suicidal or homicidal ideation, hopelessness or death wish.  Denied depressed mood.  Denies panic attack symptoms.  Insight and judgment limited, although agrees to abide by  medical recommendations.  He had limited attention span and concentration, but " "improved.  No obvious tic or tremor.  His gait has been improved with staff.  History of falls.  Poor historian of recent events.  His affect appeared in no distress and somewhat blunted. He is Jew and reports he leaves \"all to God\" and is resigned, denying worries.  He is proud of him family.     PSYCHIATRIC DIAGNOSES:  Delirium, likely secondary to urinary tract infection and opioid use, chronic pain disorder, neurocognitive disorder, unspecified (dementia).      RECOMMENDATION:  The patient is currently improving as his infection has improved and pain regimen minimized.  He had been continued on his Seroquel 6.25 mg b.i.d. and 25 mg at bedtime.  He may not be taking this at home.  If he continues to improve, would advise that he be discharged on this regimen.  Family is requesting placement and  following to look at TCU or longterm care placement.  If his symptoms do not continue to improve or confusion worsens, may change his daytime Seroquel to Haldol 1 mg t.i.d.with meals and for agitation may be given gabapentin 100 mg t.i.d.  He may be continued on his Seroquel at night, which could be increased to 50 mg if he has further problems.  Call as needed until released.  Thank you for consultation.         KIT HOWARD MD             D: 2018   T: 2018   MT: MORRO      Name:     CHANI ELENA   MRN:      [id number]        Account:       [id number]   :      1935           Consult Date:  2018      Document: [id number]      "

## 2018-05-25 NOTE — PLAN OF CARE
"Problem: Infection, Risk/Actual (Adult)  Goal: Identify Related Risk Factors and Signs and Symptoms  Related risk factors and signs and symptoms are identified upon initiation of Human Response Clinical Practice Guideline (CPG).   Outcome: No change  Oriented to self and year only. BP elevated Losartan given, otherwise vitally stable. Assist of 2 with TLSO brace on. Abdominal binder in place for comfort with good results. Ghanaian speaking with  on call for 24 hour interpreting. PCS at bedside for safety. , PRN Glucose checks only. Continue POC. Plan to discharge to TCU.     Pt very confused and trying to get out of bed,  called and pt claims \"wants to pray\", pt heard voices in the room saying it was \"time to get up and pray\". Pt was not receptive to the  phone,  came back from offsite to the pt room to help calm. Dilaudid given for back pain.   "

## 2018-05-25 NOTE — PROGRESS NOTES
[facility]  Hospitalist Progress Note  Eveline Nix MD 05/25/2018    Reason for Stay (Diagnosis): UTI, mechanical fall with T12 compression fracture         Assessment and Plan:      Summary of Stay: Adam Huber is a 82 year old male with prior history of dementia admitted on 5/18/2018 with fall, back pain and UTI    Problem List:   1. UTI present on admission with isolated Klebsiella pneumoniae sensitive to current ceftriaxone  2. Recent mechanical fall with T12 compression fracture earlier issues with pain control  3. Delirium in the setting of history of dementia.  Likely exacerbated from ongoing UTI, uncontrolled pain earlier.  4. Diabetes mellitus non-insulin-requiring, not on meds, A1c is less than 7   5. History of coronary artery disease  6. Hypertension, coreg 25 BID, Losartan 50 mg daily    Continue inpatient care  -- Completed 7 days of IV ceftriaxone  -- Appreciate neurosurgery evaluation - orthosis brace  -- major ongoing issues here is the delirium, had significant issues with delirium during previous hospitalization as well, On Seroquel 6.25 mg BID scheduled during day and 25 mg HS. PRN available , increase PRN to 12.5 mg BID PRN,  -- Delirium protocol, frequent re-direction, minimize interruptions at night  -- Stop scheduled narcotics and change to PRN to see if helps mentation  --> slightly better today  -- Will ask psychiatry to see   -- Elevated BP, increase losartan to 100 mg   -- PT/OT evaluation once TLSO is in place and see what is going to be ultimately his final disposition.    DVT Prophylaxis: Enoxaparin (Lovenox) SQ and Pneumatic Compression Devices  Code Status: Full Code  Discharge Dispo: To be determined, probably will need TCU   Estimated Disch Date / # of Days until Disch: Until delirium is better, and not needing sitter if going to TCU, 2-3 days           Interval History (Subjective):      Seen and examined.  Bedside  utilized.  Case discussed  with nursing service.  Ongoing issues with agitation, although more alert and cooperative today, slightly better, still having issues with confusion and paranoia, BP better, denies back pain, some pain in side , Remained afebrile.  No episodes of reported nausea or vomiting.     # Pain Assessment:  Current Pain Score 5/25/2018   Patient currently in pain? yes   Pain score (0-10) -   Pain location Back   Pain descriptors Sharp   - Adam is experiencing pain due to T12 compression fracture secondary to recent mechanical fall. Pain management was discussed and the plan was created in a collaborative fashion.  Adam's response to the current recommendations: unable to completely comprehend due to the delirium   We will defer pain regimen with pain service              Physical Exam:      Last Vital Signs:  /83 (BP Location: Left arm)  Pulse 71  Temp 96.1  F (35.6  C) (Oral)  Resp 20  Ht 1.829 m (6')  Wt 101.5 kg (223 lb 12.3 oz)  SpO2 94%  BMI 30.35 kg/m2    I/O last 3 completed shifts:  In: 663 [P.O.:660; I.V.:3]  Out: 765 [Urine:765]  Wt Readings from Last 1 Encounters:   05/19/18 101.5 kg (223 lb 12.3 oz)       Constitutional: Awake, alert, no apparent distress   Respiratory: Clear to auscultation bilaterally, no crackles or wheezing   Cardiovascular: Regular rate and rhythm, normal S1 and S2, and no murmur noted   Abdomen: Normal bowel sounds, soft, non-distended, non-tender   Skin: No rashes, no cyanosis, dry to touch   Neuro: Alert and oriented x2, no focal weakness, spontaneous speech   Extremities:  Nonpitting edema at both ankles, normal range of motion   Other(s): confused, some paranoia        All other systems: Negative          Medications:      All current medications were reviewed with changes reflected in problem list.         Data:      All new lab and imaging data was reviewed.   Labs:    Recent Labs  Lab 05/18/18  1547   CULT 50,000 to 100,000 colonies/mLKlebsiella pneumoniae*        Recent Labs  Lab 05/24/18  0700 05/19/18  0726 05/18/18  1540    138 136   POTASSIUM 4.8 4.2 5.2   CHLORIDE 105 107 105   CO2 27 24 23   ANIONGAP 6 7 8   * 180* 247*   BUN 19 16 23   CR 0.82 0.82 0.97   GFRESTIMATED 90 >90 74   GFRESTBLACK >90 >90 90   LUZ 8.9 8.1* 8.9       Recent Labs  Lab 05/24/18  0700 05/18/18  1540   WBC 9.1 12.0*   HGB 16.5 16.5   HCT 47.8 48.4   MCV 94 94     208 211       Recent Labs  Lab 05/24/18  0700 05/19/18  0726 05/18/18  1540   CR 0.82 0.82 0.97       Recent Labs  Lab 05/24/18  1639 05/24/18  0700 05/23/18  1649 05/23/18  1219 05/23/18  0811 05/23/18  0149  05/19/18  0726  05/18/18  1540   GLC  --  158*  --   --   --   --   --  180*  --  247*   *  --  185* 225* 148* 124*  < >  --   < >  --    < > = values in this interval not displayed.  No results for input(s): INR in the last 168 hours.    Recent Labs  Lab 05/18/18  1547   COLOR Yellow   APPEARANCE Clear   URINEGLC >499*   URINEBILI Negative   URINEKETONE 5*   SG 1.017   UBLD Moderate*   URINEPH 5.0   PROTEIN Negative   NITRITE Positive*   LEUKEST Moderate*   RBCU 79*   WBCU 58*      Imaging:   Recent Results (from the past 48 hour(s))   XR Chest Port 1 View    Narrative    XR CHEST PORT 1 VW 5/24/2018 1:35 PM     HISTORY: hypoxia;     COMPARISON: 5/18/2018      Impression    IMPRESSION: The cardiac size and pulmonary vasculature are normal.  There is increasing opacity in the left lung base which could be  atelectasis or infiltrate. The right lung is clear.    BRYON VASQUEZ MD

## 2018-05-25 NOTE — PLAN OF CARE
"Problem: Patient Care Overview  Goal: Plan of Care/Patient Progress Review  Discharge Planner PT   Patient plan for discharge: not stated by him but understand need to \"rehab\"  Current status: much improved mobility still 1 assist but easier not Mod A. Able to get to EOB with cues and CGA and TLSO donned without vocal co pain. Amb with Rw and CGA to chair 20 feet declined further due to wanting to eat.  Barriers to return to prior living situation: safety with mobility and inconsistent physical performance  Recommendations for discharge: PT- Per plan established by the Physical Therapist, the discharge recommendation is TCU vs home with HHPT     Rationale for recommendations: currently TCU would be better choice and per  family does not feel they can manage his needs       Entered by: Luciana Steward 05/25/2018 9:18 AM           "

## 2018-05-25 NOTE — CONSULTS
"CLINICAL NUTRITION SERVICES  -  ASSESSMENT NOTE      Malnutrition: Patient does not meet malnutrition criteria at this time        REASON FOR ASSESSMENT  Adam Huber is a 82 year old male seen by Registered Dietitian for LOS.      NUTRITION HISTORY  - Information obtained from sitter in room.    - Patient with a h/o CAD, DMII, dementia.  Admitted 2/2 fall.  - NKFA.      CURRENT NUTRITION ORDERS  Diet Order:     Mod CHO    Current Intake/Tolerance:  Noted to be confused at times and also biting/swinging at nursing staff.  Also noted that family bringing food at times.  % meal consumption since admission.  Sitter observed breakfast meal, 100% consumption.        PHYSICAL FINDINGS  Observed  No fat or muscle los observed  Obtained from Chart/Interdisciplinary Team  [facility]    ANTHROPOMETRICS  Height: 6' 0\"  Weight: 101.4 kg (223#)  Body mass index is 30.35 kg/(m^2).  Weight Status:  Obesity Grade I BMI 30-34.9  IBW: 80.9 kg (178#)  % IBW: 125%  Weight History:  Wt Readings from Last 10 Encounters:   05/19/18 101.5 kg (223 lb 12.3 oz)   07/19/17 99.8 kg (220 lb)   03/31/17 97.6 kg (215 lb 3.2 oz)   01/04/17 102.5 kg (226 lb)   06/21/16 102.7 kg (226 lb 8 oz)   12/16/15 103.9 kg (229 lb)   11/13/15 104.8 kg (231 lb)   11/07/15 102.1 kg (225 lb 1.4 oz)   10/26/15 103.3 kg (227 lb 12.8 oz)   10/13/15 103 kg (227 lb 1.2 oz)   - Current wt consistent with trending over the past ~1 year.      LABS  Labs reviewed    MEDICATIONS  Medications reviewed      ASSESSED NUTRITION NEEDS PER APPROVED PRACTICE GUIDELINES:    Dosing Weight 86 kg   Estimated Energy Needs: 3920-0618 kcals (25-30 Kcal/Kg)  Justification: maintenance  Estimated Protein Needs: 103-129 grams protein (1.2-1.5 g pro/Kg)  Justification: preservation of lean body mass  Estimated Fluid Needs: 1695-8914 mL (1 mL/Kcal)  Justification: maintenance and per provider pending fluid status    MALNUTRITION:  % Weight Loss:  None noted  % Intake:  Decreased intake " does not meet criteria for malnutrition   Subcutaneous Fat Loss:  None observed  Muscle Loss:  None observed  Fluid Retention:  None noted    Malnutrition Diagnosis: Patient does not meet two of the above criteria necessary for diagnosing malnutrition    NUTRITION DIAGNOSIS:  No nutrition diagnosis at this time.        NUTRITION INTERVENTIONS  Recommendations / Nutrition Prescription  Change to high CHO diet order to meet assessed needs as above.       Implementation  Nutrition education: Per Provider order if indicated.    Collaboration and Referral of Nutrition care: Discussed POC with team during rounds, oral intakes with sitter.       MONITORING AND EVALUATION:  Progress towards goals will be monitored and evaluated per protocol and Practice Guidelines        Kimmie Fisher RD, LD  Clinical Dietitian  3rd floor/ICU: [phone]  All other floors: [phone]  Weekend/holiday: [phone]

## 2018-05-25 NOTE — PROGRESS NOTES
"SPIRITUAL HEALTH SERVICES Progress Note  [facility]    Visited pt Adam in response to pt's request for chaplaincy support. His interpretor, Susu was present. Pt Adam is originally from [facility]. He lived in [place] for 50 years, and then [place] the [place] been [place]. He named his wife, five sons, and two daughters. Pt Adam expressed his love toward his sons and daughters. Especially, he said that \"My three sons are priests in the [place].\" He also added that his wife's father was a . He met his wife when he was [age] years old. He shared a story about his wife's father who knew his last day. I brought a Peruvian Caodaism song to pt, \"All to [place] I surrender.\" He expressed thankfulness after the song. The patient welcomed reading the scripture. I informed him how to request other  visit.     I and other chaplains remain available per pt's request.         La Hernandez   Intern  [phone]  "

## 2018-05-25 NOTE — PROGRESS NOTES
[facility]  Pain Management Progress Note  Text Page     Assessment & Plan   Adam Huber is a 82 year old male who was admitted on 5/18/2018.     Appreciate assistance of  in meeting with the patient.Fortunately the patient was able to give some insight into his pain experience today.  He is less confused and combative, but very tearful  His pain is worse today knife like and around the rib cage. After 40 minute post oral narcotics pain is better but still at 7-8/10.  He was up once w/o his brace.  He was cautioned to no be oob w/o hard brace.  It is more likely his underlying memory problems have accelerated with new environment. Periodic desaturations in O2 may also be contributing.  If his behavior continues to be problematic should consider a mental health consult for medication management.   His lack of independence with mobility will make it challenging for d/c to home and TCU should be considered.   Discussed with nursing and Dr. Nix today      1) Acute back pain s/p fall Thoracic spine X-ray demonstrates minimal anterior wedging of T12. Poor pain control with uscheduled oral opioids      2)  Patient does not have history of chronic pain and is not on chronic opioid therapy   [facility] database review:  No prescription in the past year   0 mg Daily Morphine Equivalent  Patient is opioid naive.      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   Rest alternating with physical therapy     5)  Constipation Prophylaxis  Senna-S at bedtime and Mirlax prn     6) DC safety  -Patient discharge to a controlled setting for medication dispensing such as TCU     or  -Discharge with family member or friend managing pain medications    ЮЛИЯ Pittman,CNP    Pain and Palliative Care  Pager [phone]  Office [phone]       Time Spent on this Encounter   I spent from 20  in assessment and discussion with the patient via interpretor.  Another 5 minutes in review of chart, documentation and discussion with the health care team.      Interval History   No constipation LAST bm 5/22.  Not walking well independantly  pain with TLSO on or off 6-8/10   Pain focal, knife like under inertcostal  Confused, not paranoid, tearful   Sitter, Seroquel prn increased on Delirium protocol  Daughters in room     Physical Exam   Temp:  [96.1  F (35.6  C)-97.6  F (36.4  C)] 96.1  F (35.6  C)  Heart Rate:  [62-73] 73  Resp:  [20] 20  BP: (146-184)/(58-83) 184/83  SpO2:  [94 %-95 %] 94 %  223 lbs 12.27 oz  GEN:  Alert, oriented to self and place appears comfortable in chair.  HEENT:  Normocephalic/atraumatic, no scleral icterus, no nasal discharge, mouth moist.  CV:  RRR, S1, S2; no murmurs or other irregularities noted.  +3 DP/PT pulses bilaterally; no edema BLE.  RESP:  Clear to auscultation bilaterally without rales/rhonchi/wheezing/retractions.  Symmetric chest rise on inhalation noted.  Normal respiratory effort.  ABD:  Rounded, soft, non-tender/non-distended.  +BS  EXT:  Edema & pulses as noted above.  CMS intact x 4.   M/S:   Mid back is tender to palpation.    SKIN:  Warm and dry to touch, no exanthems  noted in the visualized areas.    NEURO: Symmetric strength +5/5.  Sensation to touch intact all extremities.   There is no area of allodynia or hyperesthesia.  PAIN BEHAVIOR: Cooperative  Psych:  Normal affect.  Calm, cooperative, conversant, but confused and changing the subject with many questions.    Medications       acetaminophen  975 mg Oral TID     aspirin  81 mg Oral Daily     bacitracin   Topical BID     carvedilol  25 mg Oral BID w/meals     enoxaparin  40 mg Subcutaneous Q24H     lidocaine  1 patch Transdermal Q24h    And     lidocaine   Transdermal Q24H    And     lidocaine   Transdermal Q8H     losartan (COZAAR) tablet 100 mg  100 mg Oral Daily     melatonin  3-6 mg Oral At Bedtime     menthol  1 each Transdermal Q8H     nortriptyline  50 mg Oral At Bedtime     QUEtiapine  6.25 mg Oral BID w/meals     QUEtiapine  25 mg Oral At Bedtime     senna-docusate  1 tablet Oral At Bedtime     sodium chloride (PF)  3 mL Intracatheter Q8H       Data   Results for orders placed or performed during the hospital encounter of 05/18/18 (from the past 24 hour(s))   Glucose by meter   Result Value Ref Range    Glucose 160 (H) 70 - 99 mg/dL

## 2018-05-26 ENCOUNTER — APPOINTMENT (OUTPATIENT)
Dept: PHYSICAL THERAPY | Facility: CLINIC | Age: 83
DRG: 552 | End: 2018-05-26
Payer: COMMERCIAL

## 2018-05-26 PROCEDURE — 40000916 ZZH STATISTIC SITTER, NIGHT HOURS

## 2018-05-26 PROCEDURE — 40000193 ZZH STATISTIC PT WARD VISIT: Performed by: PHYSICAL THERAPY ASSISTANT

## 2018-05-26 PROCEDURE — 25000125 ZZHC RX 250: Performed by: INTERNAL MEDICINE

## 2018-05-26 PROCEDURE — 25000132 ZZH RX MED GY IP 250 OP 250 PS 637: Performed by: INTERNAL MEDICINE

## 2018-05-26 PROCEDURE — 25000132 ZZH RX MED GY IP 250 OP 250 PS 637: Performed by: NURSE PRACTITIONER

## 2018-05-26 PROCEDURE — 40000914 ZZH STATISTIC SITTER, DAY HOURS

## 2018-05-26 PROCEDURE — 99232 SBSQ HOSP IP/OBS MODERATE 35: CPT | Performed by: INTERNAL MEDICINE

## 2018-05-26 PROCEDURE — 12000007 ZZH R&B INTERMEDIATE

## 2018-05-26 PROCEDURE — 97116 GAIT TRAINING THERAPY: CPT | Mod: GP | Performed by: PHYSICAL THERAPY ASSISTANT

## 2018-05-26 PROCEDURE — 25000132 ZZH RX MED GY IP 250 OP 250 PS 637: Performed by: CLINICAL NURSE SPECIALIST

## 2018-05-26 PROCEDURE — 25000128 H RX IP 250 OP 636: Performed by: INTERNAL MEDICINE

## 2018-05-26 PROCEDURE — 97530 THERAPEUTIC ACTIVITIES: CPT | Mod: GP | Performed by: PHYSICAL THERAPY ASSISTANT

## 2018-05-26 RX ADMIN — GABAPENTIN 100 MG: 100 CAPSULE ORAL at 09:33

## 2018-05-26 RX ADMIN — GABAPENTIN 100 MG: 100 CAPSULE ORAL at 20:24

## 2018-05-26 RX ADMIN — ACETAMINOPHEN 975 MG: 325 TABLET ORAL at 20:24

## 2018-05-26 RX ADMIN — SENNOSIDES AND DOCUSATE SODIUM 1 TABLET: 8.6; 5 TABLET ORAL at 20:24

## 2018-05-26 RX ADMIN — ACETAMINOPHEN 975 MG: 325 TABLET ORAL at 16:59

## 2018-05-26 RX ADMIN — CARVEDILOL 25 MG: 25 TABLET, FILM COATED ORAL at 19:09

## 2018-05-26 RX ADMIN — CARVEDILOL 25 MG: 25 TABLET, FILM COATED ORAL at 09:33

## 2018-05-26 RX ADMIN — HYDROMORPHONE HYDROCHLORIDE 1 MG: 2 TABLET ORAL at 03:48

## 2018-05-26 RX ADMIN — NORTRIPTYLINE HYDROCHLORIDE 50 MG: 25 CAPSULE ORAL at 20:24

## 2018-05-26 RX ADMIN — Medication 6.25 MG: at 19:09

## 2018-05-26 RX ADMIN — Medication 6.25 MG: at 09:33

## 2018-05-26 RX ADMIN — BACITRACIN: 500 OINTMENT TOPICAL at 20:24

## 2018-05-26 RX ADMIN — GABAPENTIN 100 MG: 100 CAPSULE ORAL at 16:59

## 2018-05-26 RX ADMIN — HYDROMORPHONE HYDROCHLORIDE 1 MG: 2 TABLET ORAL at 04:51

## 2018-05-26 RX ADMIN — BACITRACIN: 500 OINTMENT TOPICAL at 09:34

## 2018-05-26 RX ADMIN — QUETIAPINE FUMARATE 25 MG: 25 TABLET ORAL at 20:24

## 2018-05-26 RX ADMIN — ASPIRIN 81 MG: 81 TABLET, COATED ORAL at 09:33

## 2018-05-26 RX ADMIN — MELATONIN TAB 3 MG 6 MG: 3 TAB at 20:24

## 2018-05-26 RX ADMIN — ACETAMINOPHEN 975 MG: 325 TABLET ORAL at 09:34

## 2018-05-26 RX ADMIN — LOSARTAN POTASSIUM 100 MG: 100 TABLET, FILM COATED ORAL at 09:34

## 2018-05-26 RX ADMIN — ENOXAPARIN SODIUM 40 MG: 40 INJECTION SUBCUTANEOUS at 09:33

## 2018-05-26 ASSESSMENT — ACTIVITIES OF DAILY LIVING (ADL)
ADLS_ACUITY_SCORE: 25
ADLS_ACUITY_SCORE: 25
ADLS_ACUITY_SCORE: 24
ADLS_ACUITY_SCORE: 26

## 2018-05-26 NOTE — PROGRESS NOTES
[facility]  Hospitalist Progress Note  Eveline Nix MD   05/26/2018    Reason for Stay (Diagnosis): UTI, mechanical fall with T12 compression fracture         Assessment and Plan:      Summary of Stay: Adam Huber is a 82 year old male with prior history of dementia admitted on 5/18/2018 with fall, back pain and UTI    Problem List:   1. UTI present on admission with isolated Klebsiella pneumoniae sensitive to current ceftriaxone  2. Recent mechanical fall with T12 compression fracture earlier issues with pain control  3. Delirium in the setting of history of dementia.  Likely exacerbated from ongoing UTI, uncontrolled pain earlier.  4. Diabetes mellitus non-insulin-requiring, not on meds, A1c is less than 7   5. History of coronary artery disease  6. Hypertension, coreg 25 BID, Losartan 50 mg daily    Continue inpatient care  -- Completed 7 days of IV ceftriaxone  -- Appreciate neurosurgery evaluation - orthosis brace  -- major ongoing issues here is the delirium, had significant issues with delirium during previous hospitalization as well, On Seroquel 6.25 mg BID scheduled during day and 25 mg HS. PRN available , increase PRN to 12.5 mg BID PRN,  -- Delirium protocol, frequent re-direction, minimize interruptions at night  -- Stop scheduled narcotics and change to PRN   -- Appreciate psychiatry consult, added gabapentin 100 mg TID   -- Elevated BP, increase losartan to 100 mg, BP better now   -- PT/OT evaluation once TLSO is in place and see what is going to be ultimately his final disposition.    DVT Prophylaxis: Enoxaparin (Lovenox) SQ and Pneumatic Compression Devices  Code Status: Full Code  Discharge Dispo: To be determined, probably will need TCU   Estimated Disch Date / # of Days until Disch: Until delirium is better, and not needing sitter, 1-2d    Updated dtr at bedside           Interval History (Subjective):      Seen and examined.  Bedside  utilized.  Case discussed  with nursing service.  Much better today, agitation resolved, cooperative, had a good night after a while as well, BP better, denies back pain, , Remained afebrile.  No episodes of reported nausea or vomiting.     # Pain Assessment:  Current Pain Score 5/26/2018   Patient currently in pain? sleeping: patient not able to self report   Pain score (0-10) -   Pain location -   Pain descriptors -   - Adam is experiencing pain due to T12 compression fracture secondary to recent mechanical fall. Pain management was discussed and the plan was created in a collaborative fashion.  Adam's response to the current recommendations: unable to completely comprehend due to the delirium   We will defer pain regimen with pain service              Physical Exam:      Last Vital Signs:  /52  Pulse 71  Temp 97.7  F (36.5  C) (Oral)  Resp 18  Ht 1.829 m (6')  Wt 101.5 kg (223 lb 12.3 oz)  SpO2 92%  BMI 30.35 kg/m2    I/O last 3 completed shifts:  In: 240 [P.O.:240]  Out: 225 [Urine:225]  Wt Readings from Last 1 Encounters:   05/19/18 101.5 kg (223 lb 12.3 oz)       Constitutional: Awake, alert, no apparent distress   Respiratory: Clear to auscultation bilaterally, no crackles or wheezing   Cardiovascular: Regular rate and rhythm, normal S1 and S2, and no murmur noted   Abdomen: Normal bowel sounds, soft, non-distended, non-tender   Skin: No rashes, no cyanosis, dry to touch   Neuro: Alert and oriented x2, no focal weakness, spontaneous speech   Extremities:  Nonpitting edema at both ankles, normal range of motion   Other(s): confused, but cooperative         All other systems: Negative          Medications:      All current medications were reviewed with changes reflected in problem list.         Data:      All new lab and imaging data was reviewed.   Labs:  No results for input(s): CULT in the last 168 hours.    Recent Labs  Lab 05/24/18  0700      POTASSIUM 4.8   CHLORIDE 105   CO2 27   ANIONGAP 6   *    BUN 19   CR 0.82   GFRESTIMATED 90   GFRESTBLACK >90   LUZ 8.9       Recent Labs  Lab 05/24/18  0700   WBC 9.1   HGB 16.5   HCT 47.8   MCV 94     208       Recent Labs  Lab 05/24/18  0700   CR 0.82       Recent Labs  Lab 05/24/18  1639 05/24/18  0700 05/23/18  1649 05/23/18  1219 05/23/18  0811 05/23/18  0149   GLC  --  158*  --   --   --   --    *  --  185* 225* 148* 124*     No results for input(s): INR in the last 168 hours.  No results for input(s): COLOR, APPEARANCE, URINEGLC, URINEBILI, URINEKETONE, SG, UBLD, URINEPH, PROTEIN, UROBILINOGEN, NITRITE, LEUKEST, RBCU, WBCU in the last 168 hours.   Imaging:   No results found for this or any previous visit (from the past 48 hour(s)).

## 2018-05-26 NOTE — PLAN OF CARE
Problem: Patient Care Overview  Goal: Plan of Care/Patient Progress Review  Outcome: Improving  /52  Pulse 71  Temp 97.7  F (36.5  C) (Oral)  Resp 18  Ht 1.829 m (6')  Wt 101.5 kg (223 lb 12.3 oz)  SpO2 92%  BMI 30.35 kg/m2    Neuro: WDL  Pain: Diluadid/Tylenol PRN for pain. Currently pain is tolerable according to pt.  Resp: WDL  Cardiac: WDL  GI/: WDL - urinal at bedside  Diet: Mod Carb - Do not order food unless  is here or available via phone  Skin/Mobility: Assist 1 w/ walker - pt walked hallways today and tolerated walking very well  Plan: Continue with POC. Do not play music in room - per family request.

## 2018-05-26 NOTE — PLAN OF CARE
Problem: Patient Care Overview  Goal: Plan of Care/Patient Progress Review  Discharge Planner PT   Patient plan for discharge: per daughter to Rehab    Current status: able to walk with RW to 150 feet with TLSO on and CGA cues for step length and pattern. Bed mobility to supine with TLSO on and loose as he does not adhere to precautions with bed mobility  Barriers to return to prior living situation: safety and support needed at home for safe mobility  Recommendations for discharge: PT- Per plan established by the Physical Therapist, the discharge recommendation is  TCU vs home with HHPT     Rationale for recommendations: if continues to progress could return home from mobility standpoint but per conversation with family they are wishing to continue with TCU recommendation.       Entered by: Luciana Steward 05/26/2018 11:35 AM

## 2018-05-26 NOTE — PLAN OF CARE
Problem: Patient Care Overview  Goal: Plan of Care/Patient Progress Review  Outcome: Improving  Pt A&O to self only, pt has been calm and pleasant this shift, sitter at bedside. Congolese speaking. VSS. Ax1 w/walker when oob. LS diminished. No tele. BS audible/active x4, tolerating PO. Voiding, incontinent. Dilaudid given for back pain, lidocaine patch in place. Resting most the night. Plan for TCU at d/c. Continue with plan of care.

## 2018-05-26 NOTE — PLAN OF CARE
Problem: Patient Care Overview  Goal: Plan of Care/Patient Progress Review  Outcome: No Change  VSS, pt has sitter in room,  available at all times, phone number on white board, pt c/o mid lower back pain but has not requested his prn pain meds, pt walked in maki twice this shift, pt up with his abd binder and TSL brace on.

## 2018-05-27 LAB — PLATELET # BLD AUTO: 215 10E9/L (ref 150–450)

## 2018-05-27 PROCEDURE — 99232 SBSQ HOSP IP/OBS MODERATE 35: CPT | Performed by: INTERNAL MEDICINE

## 2018-05-27 PROCEDURE — 36415 COLL VENOUS BLD VENIPUNCTURE: CPT | Performed by: INTERNAL MEDICINE

## 2018-05-27 PROCEDURE — 25000132 ZZH RX MED GY IP 250 OP 250 PS 637: Performed by: CLINICAL NURSE SPECIALIST

## 2018-05-27 PROCEDURE — 25000132 ZZH RX MED GY IP 250 OP 250 PS 637: Performed by: INTERNAL MEDICINE

## 2018-05-27 PROCEDURE — 12000000 ZZH R&B MED SURG/OB

## 2018-05-27 PROCEDURE — 25000125 ZZHC RX 250: Performed by: INTERNAL MEDICINE

## 2018-05-27 PROCEDURE — 25000128 H RX IP 250 OP 636: Performed by: INTERNAL MEDICINE

## 2018-05-27 PROCEDURE — 25000132 ZZH RX MED GY IP 250 OP 250 PS 637: Performed by: NURSE PRACTITIONER

## 2018-05-27 PROCEDURE — 85049 AUTOMATED PLATELET COUNT: CPT | Performed by: INTERNAL MEDICINE

## 2018-05-27 RX ORDER — BUTALBITAL, ACETAMINOPHEN AND CAFFEINE 50; 325; 40 MG/1; MG/1; MG/1
2 TABLET ORAL ONCE
Status: COMPLETED | OUTPATIENT
Start: 2018-05-27 | End: 2018-05-27

## 2018-05-27 RX ADMIN — Medication 6.25 MG: at 09:45

## 2018-05-27 RX ADMIN — BACITRACIN: 500 OINTMENT TOPICAL at 09:44

## 2018-05-27 RX ADMIN — LIDOCAINE 1 PATCH: 560 PATCH PERCUTANEOUS; TOPICAL; TRANSDERMAL at 20:55

## 2018-05-27 RX ADMIN — GABAPENTIN 100 MG: 100 CAPSULE ORAL at 09:44

## 2018-05-27 RX ADMIN — ENOXAPARIN SODIUM 40 MG: 40 INJECTION SUBCUTANEOUS at 11:02

## 2018-05-27 RX ADMIN — GABAPENTIN 100 MG: 100 CAPSULE ORAL at 20:57

## 2018-05-27 RX ADMIN — Medication 12.5 MG: at 09:46

## 2018-05-27 RX ADMIN — NORTRIPTYLINE HYDROCHLORIDE 50 MG: 25 CAPSULE ORAL at 20:54

## 2018-05-27 RX ADMIN — HYDROMORPHONE HYDROCHLORIDE 2 MG: 2 TABLET ORAL at 11:02

## 2018-05-27 RX ADMIN — GABAPENTIN 100 MG: 100 CAPSULE ORAL at 16:10

## 2018-05-27 RX ADMIN — LOSARTAN POTASSIUM 100 MG: 100 TABLET, FILM COATED ORAL at 09:44

## 2018-05-27 RX ADMIN — CARVEDILOL 25 MG: 25 TABLET, FILM COATED ORAL at 17:16

## 2018-05-27 RX ADMIN — MELATONIN TAB 3 MG 6 MG: 3 TAB at 20:54

## 2018-05-27 RX ADMIN — Medication 6.25 MG: at 17:17

## 2018-05-27 RX ADMIN — BACITRACIN: 500 OINTMENT TOPICAL at 20:56

## 2018-05-27 RX ADMIN — ASPIRIN 81 MG: 81 TABLET, COATED ORAL at 09:44

## 2018-05-27 RX ADMIN — HYDROMORPHONE HYDROCHLORIDE 2 MG: 2 TABLET ORAL at 02:27

## 2018-05-27 RX ADMIN — SENNOSIDES AND DOCUSATE SODIUM 1 TABLET: 8.6; 5 TABLET ORAL at 20:57

## 2018-05-27 RX ADMIN — BUTALBITAL, ACETAMINOPHEN, AND CAFFEINE 2 TABLET: 50; 325; 40 TABLET ORAL at 12:45

## 2018-05-27 RX ADMIN — ACETAMINOPHEN 975 MG: 325 TABLET ORAL at 09:44

## 2018-05-27 RX ADMIN — CARVEDILOL 25 MG: 25 TABLET, FILM COATED ORAL at 09:44

## 2018-05-27 RX ADMIN — ACETAMINOPHEN 975 MG: 325 TABLET ORAL at 20:56

## 2018-05-27 RX ADMIN — QUETIAPINE FUMARATE 25 MG: 25 TABLET ORAL at 20:56

## 2018-05-27 RX ADMIN — ACETAMINOPHEN 975 MG: 325 TABLET ORAL at 17:16

## 2018-05-27 ASSESSMENT — ACTIVITIES OF DAILY LIVING (ADL)
ADLS_ACUITY_SCORE: 25

## 2018-05-27 NOTE — PLAN OF CARE
Problem: Patient Care Overview  Goal: Plan of Care/Patient Progress Review  PT-attempted to see did not want to be bothered. Waved me off. Will try to see later today as able. Encourage him to walk if safe with TLSO on yesterday did to elevators and back with RW and 1 assist

## 2018-05-27 NOTE — PLAN OF CARE
"Problem: Patient Care Overview  Goal: Plan of Care/Patient Progress Review  Outcome: Improving  BP (P) 161/70  Pulse 71  Temp 94.8  F (34.9  C) (Axillary)  Resp 20  Ht 1.829 m (6')  Wt 101.5 kg (223 lb 12.3 oz)  SpO2 (P) 96%  BMI 30.35 kg/m2    Neuro: Disoriented x3  Pain: PRN dilaudid @ 1102 for R. Side back pain. PRN fioricet @ 1245 for \"severe headache\".  Resp: WDL  Cardiac: WDL  GI/: WDL  Diet: Mod Carb  Skin/Mobility: Walked hallway today - tolerated very well  Plan: Continue with plan of care.           "

## 2018-05-27 NOTE — PLAN OF CARE
Problem: Patient Care Overview  Goal: Plan of Care/Patient Progress Review  Outcome: No Change  RN- VSS, afeb. Denies pain. Up in room and maki with walker and TLSO. Tolerated activity well. PSC DC'd during day shift today in prep for DC to TCU. Cooperative with staff. Continue current POC.

## 2018-05-27 NOTE — PLAN OF CARE
Problem: Patient Care Overview  Goal: Plan of Care/Patient Progress Review  Outcome: No Change  Alert, disoriented to situation, confused. Language barrier present-24/7  available via phone,  phone at bedside. VSS on RA. LS diminished. C/o back pain at 0227, PRN dilaudid given. Incontinent at times. Up with SBA with gait belt and walker. Pain management and PT following. Pt found kneeling on floor at 0430, bruise to R arm, VSS, no injuried noted, denies pain-see previous note. Continue with POC.

## 2018-05-27 NOTE — PROGRESS NOTES
[facility]  Hospitalist Progress Note  Eveline Nix MD   05/27/2018    Reason for Stay (Diagnosis): UTI, mechanical fall with T12 compression fracture         Assessment and Plan:      Summary of Stay: Adam Huber is a 82 year old male with prior history of dementia admitted on 5/18/2018 with fall, back pain and UTI    Problem List:   1. UTI present on admission with isolated Klebsiella pneumoniae sensitive to current ceftriaxone  2. Recent mechanical fall with T12 compression fracture earlier issues with pain control  3. Delirium in the setting of underlying dementia.  Likely exacerbated from ongoing UTI, uncontrolled pain earlier.  4. Diabetes mellitus non-insulin-requiring, not on meds, A1c is less than 7   5. History of coronary artery disease  6. Hypertension, coreg 25 BID, Losartan 50 mg sarath    Continue inpatient care    -- Completed 7 days of IV ceftriaxone  -- Appreciate neurosurgery evaluation - orthosis brace  -- major ongoing issues here is the delirium, had significant issues with delirium during previous hospitalization as well, On Seroquel 6.25 mg BID scheduled during day and 25 mg HS. PRN available , increase PRN to 12.5 mg BID PRN,  -- Delirium protocol, frequent re-direction, minimize interruptions at night  -- Stopped scheduled narcotics and change to PRN, added gabapentin 100 mg TID. With these measures he seems to be better now. Sitter off since 5/26  -- Appreciate psychiatry consult,   -- Elevated BP, increase losartan to 100 mg, BP better now   -- PT/OT evaluation     DVT Prophylaxis: Enoxaparin (Lovenox) SQ and Pneumatic Compression Devices  Code Status: Full Code  Discharge Dispo: Likely TCU , family feels that his wife will not be able to take care of him in current condition  Estimated Disch Date / # of Days until Disch: pending placement, able to do ok without sitter, 1-2d            Interval History (Subjective):      Seen and examined.  Bedside   "utilized.  Case discussed with nursing service.  Mentation better, did try to get out of bed last night on his own as he was \"trying to save a baby\", found kneeling on floor by staff, no obvious injuries, doing well with ambulation, seems calm this morning, states he feels good, and not much pain. No reported nausea or vomiting.     # Pain Assessment:  Current Pain Score 5/27/2018   Patient currently in pain? yes   Pain score (0-10) -   Pain location -   Pain descriptors -   - Adam is experiencing pain due to T12 compression fracture secondary to recent mechanical fall. Pain management was discussed and the plan was created in a collaborative fashion.  Adam's response to the current recommendations: unable to completely comprehend due to the delirium   We will defer pain regimen with pain service              Physical Exam:      Last Vital Signs:  /70  Pulse 71  Temp 94.8  F (34.9  C) (Axillary)  Resp 20  Ht 1.829 m (6')  Wt 101.5 kg (223 lb 12.3 oz)  SpO2 96%  BMI 30.35 kg/m2    I/O last 3 completed shifts:  In: 240 [P.O.:240]  Out: 250 [Urine:250]  Wt Readings from Last 1 Encounters:   05/19/18 101.5 kg (223 lb 12.3 oz)       Constitutional: Awake, alert, no apparent distress   Respiratory: Clear to auscultation bilaterally, no crackles or wheezing   Cardiovascular: Regular rate and rhythm, normal S1 and S2, and no murmur noted   Abdomen: Normal bowel sounds, soft, non-distended, non-tender   Skin: No rashes, no cyanosis, dry to touch   Neuro: Alert and oriented x2, no focal weakness, spontaneous speech   Extremities:  Nonpitting edema at both ankles, normal range of motion   Other(s): confused, but cooperative , calmer       All other systems: Negative          Medications:      All current medications were reviewed with changes reflected in problem list.         Data:      All new lab and imaging data was reviewed.   Labs:  No results for input(s): CULT in the last 168 hours.    Recent " Labs  Lab 05/24/18  0700      POTASSIUM 4.8   CHLORIDE 105   CO2 27   ANIONGAP 6   *   BUN 19   CR 0.82   GFRESTIMATED 90   GFRESTBLACK >90   LUZ 8.9       Recent Labs  Lab 05/27/18  0602 05/24/18  0700   WBC  --  9.1   HGB  --  16.5   HCT  --  47.8   MCV  --  94    208  208       Recent Labs  Lab 05/24/18  0700   CR 0.82       Recent Labs  Lab 05/24/18  1639 05/24/18  0700 05/23/18  1649 05/23/18  1219 05/23/18  0811 05/23/18  0149   GLC  --  158*  --   --   --   --    *  --  185* 225* 148* 124*     No results for input(s): INR in the last 168 hours.  No results for input(s): COLOR, APPEARANCE, URINEGLC, URINEBILI, URINEKETONE, SG, UBLD, URINEPH, PROTEIN, UROBILINOGEN, NITRITE, LEUKEST, RBCU, WBCU in the last 168 hours.   Imaging:   No results found for this or any previous visit (from the past 48 hour(s)).

## 2018-05-27 NOTE — PROVIDER NOTIFICATION
0430: Patient's bed alarm went off and this RN went to patient's room to assess patient. One staff member and this RN entered patient's room and found patient kneeling on floor next to bed with R arm resting on bedside table and L arm gripping side rail. VSS, T 95.6, RR 20, HR 81, /78, O2 sat 95% on RA. Bruise observed to R forearm, no other injuries observed. Denies pain at this time. Staff member and this RN assisted patient from kneeling position to standing position and ambulated to bathroom with gait belt and walker. Pt ambulated back to bed with gait belt and walker by staff and this RN. Patient currently laying in bed with bed alarm on and call light within reach.     0442: MD notified, no new orders at this time. Charge nurse notified.    0609: Patient's son, Mario notified.

## 2018-05-28 LAB — GLUCOSE BLDC GLUCOMTR-MCNC: 270 MG/DL (ref 70–99)

## 2018-05-28 PROCEDURE — 12000000 ZZH R&B MED SURG/OB

## 2018-05-28 PROCEDURE — 25000132 ZZH RX MED GY IP 250 OP 250 PS 637: Performed by: CLINICAL NURSE SPECIALIST

## 2018-05-28 PROCEDURE — 25000132 ZZH RX MED GY IP 250 OP 250 PS 637: Performed by: INTERNAL MEDICINE

## 2018-05-28 PROCEDURE — 99207 ZZC CDG-MDM COMPONENT: MEETS LOW - DOWN CODED: CPT | Performed by: INTERNAL MEDICINE

## 2018-05-28 PROCEDURE — 25000128 H RX IP 250 OP 636: Performed by: INTERNAL MEDICINE

## 2018-05-28 PROCEDURE — 25000125 ZZHC RX 250: Performed by: INTERNAL MEDICINE

## 2018-05-28 PROCEDURE — 99232 SBSQ HOSP IP/OBS MODERATE 35: CPT | Performed by: INTERNAL MEDICINE

## 2018-05-28 RX ADMIN — ASPIRIN 81 MG: 81 TABLET, COATED ORAL at 08:40

## 2018-05-28 RX ADMIN — Medication 6.25 MG: at 17:24

## 2018-05-28 RX ADMIN — ACETAMINOPHEN 975 MG: 325 TABLET ORAL at 17:24

## 2018-05-28 RX ADMIN — ACETAMINOPHEN 975 MG: 325 TABLET ORAL at 08:38

## 2018-05-28 RX ADMIN — CARVEDILOL 25 MG: 25 TABLET, FILM COATED ORAL at 08:40

## 2018-05-28 RX ADMIN — GABAPENTIN 100 MG: 100 CAPSULE ORAL at 17:24

## 2018-05-28 RX ADMIN — GABAPENTIN 100 MG: 100 CAPSULE ORAL at 21:45

## 2018-05-28 RX ADMIN — LOSARTAN POTASSIUM 100 MG: 100 TABLET, FILM COATED ORAL at 08:39

## 2018-05-28 RX ADMIN — GABAPENTIN 100 MG: 100 CAPSULE ORAL at 08:39

## 2018-05-28 RX ADMIN — MELATONIN TAB 3 MG 6 MG: 3 TAB at 21:45

## 2018-05-28 RX ADMIN — ENOXAPARIN SODIUM 40 MG: 40 INJECTION SUBCUTANEOUS at 11:15

## 2018-05-28 RX ADMIN — CARVEDILOL 25 MG: 25 TABLET, FILM COATED ORAL at 17:24

## 2018-05-28 RX ADMIN — Medication 6.25 MG: at 08:38

## 2018-05-28 RX ADMIN — NORTRIPTYLINE HYDROCHLORIDE 50 MG: 25 CAPSULE ORAL at 21:45

## 2018-05-28 RX ADMIN — BACITRACIN: 500 OINTMENT TOPICAL at 08:40

## 2018-05-28 RX ADMIN — SENNOSIDES AND DOCUSATE SODIUM 1 TABLET: 8.6; 5 TABLET ORAL at 21:45

## 2018-05-28 RX ADMIN — QUETIAPINE FUMARATE 25 MG: 25 TABLET ORAL at 21:45

## 2018-05-28 RX ADMIN — ACETAMINOPHEN 975 MG: 325 TABLET ORAL at 21:45

## 2018-05-28 ASSESSMENT — ACTIVITIES OF DAILY LIVING (ADL)
ADLS_ACUITY_SCORE: 21

## 2018-05-28 NOTE — PLAN OF CARE
Problem: Patient Care Overview  Goal: Plan of Care/Patient Progress Review  Outcome: Improving  Patient A&Ox3, VSS ex hypertensive, LS diminished, BS audible and active - BM 5/23. Up A1 with walker, high CHO diet. ABx completed.  at bedside until 6pm. Possible DC to TCU in 1-2 days.

## 2018-05-28 NOTE — PLAN OF CARE
Problem: Patient Care Overview  Goal: Plan of Care/Patient Progress Review  Outcome: Improving  Patient is alert to self, assist of one with a walker. Denies chest pain and shortness of breath. Patient will frequently set off bed alarm to go to the bathroom; flash cards made to help with communication; patient is redirectable. Lidocaine patch in place, vital signs stable. Plan for discharge to TCU when placement is found.

## 2018-05-28 NOTE — PLAN OF CARE
Problem: Patient Care Overview  Goal: Plan of Care/Patient Progress Review  Outcome: Improving  Up with assist of one and walker with TSLO brace. Voids frequently. Appetite good. C/o right hip/low back pain, scheduled tylenol given. Walked in maki with staff.     , Susu, is available 24/7 at [phone].

## 2018-05-28 NOTE — PROGRESS NOTES
[facility]  Hospitalist Progress Note  Papo Lopez MD   05/28/2018    Reason for Stay (Diagnosis): UTI, mechanical fall with T12 compression fracture         Assessment and Plan:      Summary of Stay: Adam Huber is a 82 year old (non-English-speaking) [place] man with prior history of dementia admitted on 5/18/2018 with fall, back pain and UTI.     Problem List:   1. UTI present on admission with isolated Klebsiella pneumoniae sensitive to current ceftriaxone  2. Recent mechanical fall with T12 compression fracture earlier issues with pain control  3. Delirium in the setting of underlying dementia.  Likely exacerbated from ongoing UTI, uncontrolled pain earlier. However, he is now on hospital day #11 and no longer is requiring acute care.   4. Diabetes mellitus non-insulin-requiring, not on meds, A1c is less than 7   5. History of coronary artery disease  6. Hypertension, coreg 25 BID, Losartan 50 mg daily    Continue inpatient care  -- Completed 7 days of IV ceftriaxone  -- Appreciate neurosurgery evaluation - orthotic brace  -- major ongoing issues here is the delirium, had significant issues with delirium during previous hospitalization as well, On Seroquel 6.25 mg BID scheduled during day and 25 mg HS. PRN available , increase PRN to 12.5 mg BID PRN,  -- Delirium protocol, frequent re-direction, minimize interruptions at night  -- Stopped scheduled narcotics and change to PRN, added gabapentin 100 mg TID. With these measures he seems to be better now. Sitter off since 5/26  -- Appreciate psychiatry consult,   -- Elevated BP, increase losartan to 100 mg, BP better now   -- PT/OT evaluation     DVT Prophylaxis: Enoxaparin (Lovenox) SQ and Pneumatic Compression Devices  Code Status: Full Code  Discharge Dispo: Likely TCU , family feels that his wife will not be able to take care of him in current condition  Estimated Disch Date / # of Days until Disch: pending placement, unclear that pt  will be stable without sitter        Interval History (Subjective):      Chart reviewed, pt interviewed with .     Pt continues to be somewhat impulsive, does not follow directions (particularly in the nights), getting up without calling. At risk of falls. Pt has been declined from both [facility] and [facility].     I put a call out to daughter Anya. Pt is no longer acutely ill and needs a dispo plan. It seems likely that either pt will need higher level of care at discharge, or home with care. I was told then to talk with the pt's wife, who has previously also been reluctant to commit to discharge plan. They will come later this afternoon for conversation.      # Pain Assessment:  Current Pain Score 5/28/2018   Patient currently in pain? -   Pain score (0-10) 8   Pain location -   Pain descriptors -   - Adam is experiencing pain due to T12 compression fracture secondary to recent mechanical fall. Pain management was discussed and the plan was created in a collaborative fashion.  Adam's response to the current recommendations: unable to completely comprehend due to the delirium   We will defer pain regimen with pain service              Physical Exam:      Last Vital Signs:  BP (!) 206/93 (BP Location: Left arm)  Pulse 71  Temp 97.7  F (36.5  C) (Oral)  Resp 20  Ht 1.829 m (6')  Wt 101.5 kg (223 lb 12.3 oz)  SpO2 93%  BMI 30.35 kg/m2    I/O last 3 completed shifts:  In: 400 [P.O.:400]  Out: -   Wt Readings from Last 1 Encounters:   05/19/18 101.5 kg (223 lb 12.3 oz)       Constitutional: Awake, alert, no apparent distress   Respiratory: Clear to auscultation bilaterally, no crackles or wheezing   Cardiovascular: Regular rate and rhythm, normal S1 and S2, and no murmur noted   Abdomen: Normal bowel sounds, soft, non-distended, non-tender   Skin: No rashes, no cyanosis, dry to touch   Neuro: Alert and oriented to self and situation. No focal weakness, spontaneous speech   Extremities: Nonpitting edema  at both ankles, normal range of motion   Other(s): Cooperative, calm       All other systems: Negative          Medications:      All current medications were reviewed with changes reflected in problem list.         Data:      All new lab and imaging data was reviewed.   Labs:  No results for input(s): CULT in the last 168 hours.    Recent Labs  Lab 05/24/18  0700      POTASSIUM 4.8   CHLORIDE 105   CO2 27   ANIONGAP 6   *   BUN 19   CR 0.82   GFRESTIMATED 90   GFRESTBLACK >90   LUZ 8.9       Recent Labs  Lab 05/27/18  0602 05/24/18  0700   WBC  --  9.1   HGB  --  16.5   HCT  --  47.8   MCV  --  94    208  208       Recent Labs  Lab 05/24/18  0700   CR 0.82       Recent Labs  Lab 05/24/18  1639 05/24/18  0936 05/24/18  0700 05/23/18  1649 05/23/18  1219 05/23/18  0811   GLC  --   --  158*  --   --   --    * 270*  --  185* 225* 148*     No results for input(s): INR in the last 168 hours.  No results for input(s): COLOR, APPEARANCE, URINEGLC, URINEBILI, URINEKETONE, SG, UBLD, URINEPH, PROTEIN, UROBILINOGEN, NITRITE, LEUKEST, RBCU, WBCU in the last 168 hours.   Imaging:   No results found for this or any previous visit (from the past 48 hour(s)).

## 2018-05-29 ENCOUNTER — APPOINTMENT (OUTPATIENT)
Dept: PHYSICAL THERAPY | Facility: CLINIC | Age: 83
DRG: 552 | End: 2018-05-29
Payer: COMMERCIAL

## 2018-05-29 LAB — GLUCOSE BLDC GLUCOMTR-MCNC: 152 MG/DL (ref 70–99)

## 2018-05-29 PROCEDURE — 25000132 ZZH RX MED GY IP 250 OP 250 PS 637: Performed by: INTERNAL MEDICINE

## 2018-05-29 PROCEDURE — 25000128 H RX IP 250 OP 636: Performed by: INTERNAL MEDICINE

## 2018-05-29 PROCEDURE — 99232 SBSQ HOSP IP/OBS MODERATE 35: CPT | Performed by: CLINICAL NURSE SPECIALIST

## 2018-05-29 PROCEDURE — 00000146 ZZHCL STATISTIC GLUCOSE BY METER IP

## 2018-05-29 PROCEDURE — 99232 SBSQ HOSP IP/OBS MODERATE 35: CPT | Performed by: INTERNAL MEDICINE

## 2018-05-29 PROCEDURE — 40000193 ZZH STATISTIC PT WARD VISIT: Performed by: PHYSICAL THERAPY ASSISTANT

## 2018-05-29 PROCEDURE — 25000132 ZZH RX MED GY IP 250 OP 250 PS 637: Performed by: CLINICAL NURSE SPECIALIST

## 2018-05-29 PROCEDURE — 97530 THERAPEUTIC ACTIVITIES: CPT | Mod: GP | Performed by: PHYSICAL THERAPY ASSISTANT

## 2018-05-29 PROCEDURE — 97116 GAIT TRAINING THERAPY: CPT | Mod: GP | Performed by: PHYSICAL THERAPY ASSISTANT

## 2018-05-29 PROCEDURE — 12000000 ZZH R&B MED SURG/OB

## 2018-05-29 PROCEDURE — 25000125 ZZHC RX 250: Performed by: INTERNAL MEDICINE

## 2018-05-29 RX ORDER — TRAMADOL HYDROCHLORIDE 50 MG/1
50 TABLET ORAL EVERY 4 HOURS PRN
Status: DISCONTINUED | OUTPATIENT
Start: 2018-05-29 | End: 2018-06-01 | Stop reason: HOSPADM

## 2018-05-29 RX ADMIN — CARVEDILOL 25 MG: 25 TABLET, FILM COATED ORAL at 09:06

## 2018-05-29 RX ADMIN — LOSARTAN POTASSIUM 100 MG: 100 TABLET, FILM COATED ORAL at 09:03

## 2018-05-29 RX ADMIN — GABAPENTIN 100 MG: 100 CAPSULE ORAL at 16:06

## 2018-05-29 RX ADMIN — GABAPENTIN 100 MG: 100 CAPSULE ORAL at 09:02

## 2018-05-29 RX ADMIN — GABAPENTIN 100 MG: 100 CAPSULE ORAL at 20:55

## 2018-05-29 RX ADMIN — BACITRACIN: 500 OINTMENT TOPICAL at 09:06

## 2018-05-29 RX ADMIN — TRAMADOL HYDROCHLORIDE 50 MG: 50 TABLET, COATED ORAL at 18:52

## 2018-05-29 RX ADMIN — MELATONIN TAB 3 MG 6 MG: 3 TAB at 20:55

## 2018-05-29 RX ADMIN — Medication 6.25 MG: at 18:32

## 2018-05-29 RX ADMIN — CARVEDILOL 25 MG: 25 TABLET, FILM COATED ORAL at 18:32

## 2018-05-29 RX ADMIN — NORTRIPTYLINE HYDROCHLORIDE 50 MG: 25 CAPSULE ORAL at 20:55

## 2018-05-29 RX ADMIN — ACETAMINOPHEN 975 MG: 325 TABLET ORAL at 16:06

## 2018-05-29 RX ADMIN — Medication 6.25 MG: at 09:02

## 2018-05-29 RX ADMIN — ACETAMINOPHEN 975 MG: 325 TABLET ORAL at 09:02

## 2018-05-29 RX ADMIN — ACETAMINOPHEN 975 MG: 325 TABLET ORAL at 20:55

## 2018-05-29 RX ADMIN — ASPIRIN 81 MG: 81 TABLET, COATED ORAL at 09:02

## 2018-05-29 RX ADMIN — QUETIAPINE FUMARATE 25 MG: 25 TABLET ORAL at 20:55

## 2018-05-29 RX ADMIN — ENOXAPARIN SODIUM 40 MG: 40 INJECTION SUBCUTANEOUS at 10:22

## 2018-05-29 RX ADMIN — SENNOSIDES AND DOCUSATE SODIUM 1 TABLET: 8.6; 5 TABLET ORAL at 20:55

## 2018-05-29 ASSESSMENT — ACTIVITIES OF DAILY LIVING (ADL)
ADLS_ACUITY_SCORE: 25
ADLS_ACUITY_SCORE: 25
ADLS_ACUITY_SCORE: 24
ADLS_ACUITY_SCORE: 21
ADLS_ACUITY_SCORE: 25
ADLS_ACUITY_SCORE: 24

## 2018-05-29 ASSESSMENT — PAIN DESCRIPTION - DESCRIPTORS: DESCRIPTORS: DISCOMFORT

## 2018-05-29 NOTE — PLAN OF CARE
Problem: Infection, Risk/Actual (Adult)  Goal: Identify Related Risk Factors and Signs and Symptoms  Related risk factors and signs and symptoms are identified upon initiation of Human Response Clinical Practice Guideline (CPG).   Outcome: Improving  VSS, afeb., LS are clear,95% on RA. Pt has not c/o pain all shift, refused his lido patches. Good appetite, ate 100% of dinner. Pt up in room, ambulated to BR, and ambulated in hallways this shift w assist x1 and walker.Brace on when up. Family and pt updated on POC. Plan to dc to TCU for therapies.

## 2018-05-29 NOTE — PROGRESS NOTES
SWS:  D: discharge planning  A: CHATA received call from Irene at [facility]. Irene reports that they may have a bed for pt at their [place] location. Irene requests that CHATA send over new referral information and they will assess for admission tomorrow.    P: CHATA sent referral information and will continue to follow.

## 2018-05-29 NOTE — PROGRESS NOTES
[facility]  Pain Management Progress Note  Text Page     Assessment & Plan   Adam Huber is a 82 year old male who was admitted on 5/18/2018.     Appreciate assistance from our  in meeting with the patient as he was resting in bed. Adam indicates his pain is improved. He has not used an opiate during the past day. Would be reasonable to continue with Tramadol PRN. Given the patient's underlining dementia any medications should be given by responsible family or medical provider.    1) Acute back pain s/p fall Thoracic spine X-ray demonstrates minimal anterior wedging of T12.       2)  Patient does not have history of chronic pain and is not on chronic opioid therapy   [place]  database review:  No prescription in the past year   0 mg Daily Morphine Equivalent  Patient is opioid naive.       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   Rest alternating with physical therapy      5)  Constipation Prophylaxis  Senna-S at bedtime and Elaine lax prn      6) DC safety  -Patient discharge to a controlled setting for medication dispensing such as TCU     or  -Discharge with family member or friend managing pain medications    Alva Barrera  MS, RN, CNS, APRN, ACHPN, FAACVPR  Pain and Palliative Care  Pager [phone]  Office [phone]       Time Spent on this Encounter   I spent from 11:15 until 11:30 AM in assessment and discussion with the patient.  Another 15 minutes in review of chart, documentation and discussion with the health care team.      Interval History   Chart reviewed    Review of Systems    CONSTITUTIONAL: NEGATIVE for fever, chills, change in weight  ENT/MOUTH: NEGATIVE for ear, mouth and throat problems  RESP: NEGATIVE for significant cough or SOB  CV: NEGATIVE for chest pain, palpitations or peripheral edema    Physical Exam   Temp:  [96.8  F (36  C)-98.1  F (36.7  C)] 98.1  F (36.7  C)  Heart Rate:  [59-87] 87  Resp:  [20-24] 24  BP: (150-171)/(68-74) 171/74  SpO2:  [93 %-96 %] 96 %  223 lbs 12.27 oz  GEN:  Alert, oriented self, appears comfortable and anting to rest in bed.  HEENT:  Normocephalic/atraumatic, no scleral icterus, no nasal discharge, mouth moist.  RESP:  Symmetric chest rise on inhalation noted.  Normal respiratory effort.  PAIN BEHAVIOR: Cooperative  Psych:  Normal affect.  Calm, cooperative, conversant appropriately.    Medications       acetaminophen  975 mg Oral TID     aspirin  81 mg Oral Daily     bacitracin   Topical BID     carvedilol  25 mg Oral BID w/meals     enoxaparin  40 mg Subcutaneous Q24H     gabapentin  100 mg Oral TID     lidocaine  1 patch Transdermal Q24h    And     lidocaine   Transdermal Q24H    And     lidocaine   Transdermal Q8H     losartan (COZAAR) tablet 100 mg  100 mg Oral Daily     melatonin  3-6 mg Oral At Bedtime     menthol  1 each Transdermal Q8H     nortriptyline  50 mg Oral At Bedtime     QUEtiapine  6.25 mg Oral BID w/meals     QUEtiapine  25 mg Oral At Bedtime     senna-docusate  1 tablet Oral At Bedtime       Data   Results for orders placed or performed during the hospital encounter of 05/18/18 (from the past 24 hour(s))   Glucose by meter   Result Value Ref Range     Glucose 152 (H) 70 - 99 mg/dL

## 2018-05-29 NOTE — PLAN OF CARE
Problem: Patient Care Overview  Goal: Plan of Care/Patient Progress Review  Outcome: Improving  Patient is alert to self, assist of one with a walker. Denies chest pain and shortness of breath. Patient will frequently set off bed alarm to go to the bathroom, but did better with waiting for staff this shift before getting up; flash cards made to help with communication; patient is redirectable. Vital signs stable. Plan for discharge to TCU when placement is found.

## 2018-05-29 NOTE — PLAN OF CARE
Problem: Patient Care Overview  Goal: Plan of Care/Patient Progress Review  Outcome: Improving  /54 (BP Location: Left arm)  Pulse 71  Temp 96  F (35.6  C) (Oral)  Resp 20  Ht 1.829 m (6')  Wt 101.5 kg (223 lb 12.3 oz)  SpO2 93%  BMI 30.35 kg/m2    No telemetry.  present. Spouse here to visit. Up with assist x1 and walker-TLSO brace on when OOB. Pain medications changed by pain management-PRN tramadol and scheduled Tylenol ordered. Disoriented to rmqizqkzs-fhhifwqsr-uxmfzz on. PPX: Lovenox. PO Seroquel BID with meals and at bedtime. Has completed antibiotics for UTI. Continue current POC.

## 2018-05-29 NOTE — PROGRESS NOTES
[facility]  Hospitalist Progress Note  Papo Lopez MD   05/29/2018    Reason for Stay (Diagnosis): UTI, mechanical fall with T12 compression fracture         Assessment and Plan:      Summary of Stay: Adam Huber is a 82 year old (non-English-speaking) [place] man with prior history of dementia admitted on 5/18/2018 with fall, back pain and UTI.     Problem List:   1. UTI present on admission with isolated Klebsiella pneumoniae sensitive to current ceftriaxone  2. Recent mechanical fall with T12 compression fracture earlier issues with pain control  3. Delirium in the setting of underlying dementia.  After about 2 weeks of hospitalization patient clearly is significantly demented.  We are waiting for disposition to transitional care.  I have discussed with the patient's family that they have to start looking for a more permanent solution for his care as he is likely to decline from this point.  4. Diabetes mellitus non-insulin-requiring, not on meds, A1c is less than 7. Pt is not a good candidate for insulin now, and he would not be considered a candidate for that upon discharge. Will monitor without ac/qhs glucoses.  5. History of coronary artery disease  6. Hypertension, coreg 25 BID, Losartan 50 mg daily    Continue inpatient care  -- Completed 7 days of IV ceftriaxone  -- Appreciate neurosurgery evaluation - orthotic brace  -- major ongoing issues here is the delirium, had significant issues with delirium during previous hospitalization as well, On Seroquel 6.25 mg BID scheduled during day and 25 mg HS. PRN available , increase PRN to 12.5 mg BID PRN,  -- Delirium protocol, frequent re-direction, minimize interruptions at night  -- Stopped scheduled narcotics and change to PRN, added gabapentin 100 mg TID. With these measures he seems to be better now. Sitter off since 5/26  -- Appreciate psychiatry consult.  -- Elevated BP, increase losartan to 100 mg, BP better now   -- PT/OT  evaluation     DVT Prophylaxis: Enoxaparin (Lovenox) SQ and Pneumatic Compression Devices  Code Status: Full Code  Discharge Dispo: Likely TCU.  Awaiting further review from various care centers.  Estimated Disch Date / # of Days until Disch: pending placement.  Patient has been stable without a sitter now for 48 hours.        Interval History (Subjective):      The patient remains pleasant and interactive today.  The  is present is clearly helping the patient to remain active and oriented.  He is able to behave appropriately if he is in the presence of other people who are helping him to remain calm.    As noted I spoke with the family yesterday including the son Adolfo about anticipated needs for this gentleman.  I think reasonably soon he will need to be an a memory care in order to keep him safe at night.     # Pain Assessment:  Current Pain Score 5/29/2018   Patient currently in pain? yes   Pain score (0-10) -   Pain location Head   Pain descriptors -   - Adam is experiencing pain due to T12 compression fracture secondary to recent mechanical fall. Pain management was discussed and the plan was created in a collaborative fashion.  Adam's response to the current recommendations: unable to completely comprehend due to the delirium   We will defer pain regimen with pain service              Physical Exam:      Last Vital Signs:  /58 (BP Location: Left arm)  Pulse 71  Temp 97.4  F (36.3  C) (Oral)  Resp 18  Ht 1.829 m (6')  Wt 101.5 kg (223 lb 12.3 oz)  SpO2 93%  BMI 30.35 kg/m2    I/O last 3 completed shifts:  In: 480 [P.O.:480]  Out: 700 [Urine:700]  Wt Readings from Last 1 Encounters:   05/19/18 101.5 kg (223 lb 12.3 oz)       Constitutional: Awake, alert, no apparent distress.  The patient is pleasant and interactive with his family, the  and with myself.   Respiratory: Clear to auscultation bilaterally, no crackles or wheezing   Cardiovascular: Regular rate and rhythm,  normal S1 and S2, and no murmur noted   Abdomen: Normal bowel sounds, soft, non-distended, non-tender   Skin: No rashes, no cyanosis, dry to touch   Neuro: Alert and oriented to self and situation. No focal weakness, spontaneous speech   Extremities: Nonpitting edema at both ankles, normal range of motion   Other(s): Cooperative, calm       All other systems: Negative          Medications:      All current medications were reviewed with changes reflected in problem list.         Data:      All new lab and imaging data was reviewed.   Labs:  No results for input(s): CULT in the last 168 hours.    Recent Labs  Lab 05/24/18  0700      POTASSIUM 4.8   CHLORIDE 105   CO2 27   ANIONGAP 6   *   BUN 19   CR 0.82   GFRESTIMATED 90   GFRESTBLACK >90   LUZ 8.9       Recent Labs  Lab 05/27/18  0602 05/24/18  0700   WBC  --  9.1   HGB  --  16.5   HCT  --  47.8   MCV  --  94    208  208       Recent Labs  Lab 05/24/18  0700   CR 0.82       Recent Labs  Lab 05/29/18  0825 05/24/18  1639 05/24/18  0936 05/24/18  0700 05/23/18  1649 05/23/18  1219   GLC  --   --   --  158*  --   --    * 160* 270*  --  185* 225*     No results for input(s): INR in the last 168 hours.  No results for input(s): COLOR, APPEARANCE, URINEGLC, URINEBILI, URINEKETONE, SG, UBLD, URINEPH, PROTEIN, UROBILINOGEN, NITRITE, LEUKEST, RBCU, WBCU in the last 168 hours.   Imaging:   No results found for this or any previous visit (from the past 48 hour(s)).

## 2018-05-29 NOTE — PLAN OF CARE
Problem: Patient Care Overview  Goal: Plan of Care/Patient Progress Review  Discharge Planner PT   Patient plan for discharge: per family TCU  Current status: better gait and transfer ability, excellent pattern for gait  TLSO still fitting well and per him is more  comfortable  Barriers to return to prior living situation: safety and need for 1 person assist, although beginning to trend to less assist  Recommendations for discharge: PT- Per plan established by the Physical Therapist, the discharge recommendation is TCU vs home with HHPT    Rationale for recommendations: continuing to make progress in all mobility still would benefit from TCU if discharge is in next 1-2 days.       Entered by: Luciana Steward 05/29/2018 9:24 AM

## 2018-05-30 PROBLEM — F02.80 LATE ONSET ALZHEIMER'S DISEASE WITHOUT BEHAVIORAL DISTURBANCE (H): Status: ACTIVE | Noted: 2018-05-30

## 2018-05-30 PROBLEM — G30.1 LATE ONSET ALZHEIMER'S DISEASE WITHOUT BEHAVIORAL DISTURBANCE (H): Status: ACTIVE | Noted: 2018-05-30

## 2018-05-30 PROBLEM — E11.9 TYPE 2 DIABETES MELLITUS WITHOUT COMPLICATION (H): Status: ACTIVE | Noted: 2018-05-30

## 2018-05-30 LAB
GLUCOSE BLDC GLUCOMTR-MCNC: 224 MG/DL (ref 70–99)
PLATELET # BLD AUTO: 246 10E9/L (ref 150–450)

## 2018-05-30 PROCEDURE — 99207 ZZC CDG-MDM COMPONENT: MEETS MODERATE - UP CODED: CPT | Performed by: INTERNAL MEDICINE

## 2018-05-30 PROCEDURE — 25000132 ZZH RX MED GY IP 250 OP 250 PS 637: Performed by: INTERNAL MEDICINE

## 2018-05-30 PROCEDURE — 99232 SBSQ HOSP IP/OBS MODERATE 35: CPT | Performed by: INTERNAL MEDICINE

## 2018-05-30 PROCEDURE — 00000146 ZZHCL STATISTIC GLUCOSE BY METER IP

## 2018-05-30 PROCEDURE — 85049 AUTOMATED PLATELET COUNT: CPT | Performed by: INTERNAL MEDICINE

## 2018-05-30 PROCEDURE — 25000132 ZZH RX MED GY IP 250 OP 250 PS 637: Performed by: CLINICAL NURSE SPECIALIST

## 2018-05-30 PROCEDURE — 36415 COLL VENOUS BLD VENIPUNCTURE: CPT | Performed by: INTERNAL MEDICINE

## 2018-05-30 PROCEDURE — 25000128 H RX IP 250 OP 636: Performed by: INTERNAL MEDICINE

## 2018-05-30 PROCEDURE — 25000125 ZZHC RX 250: Performed by: INTERNAL MEDICINE

## 2018-05-30 PROCEDURE — 12000000 ZZH R&B MED SURG/OB

## 2018-05-30 RX ORDER — GABAPENTIN 100 MG/1
100 CAPSULE ORAL 3 TIMES DAILY
Qty: 90 CAPSULE | Status: ON HOLD | DISCHARGE
Start: 2018-05-30 | End: 2019-01-09

## 2018-05-30 RX ORDER — TRAMADOL HYDROCHLORIDE 50 MG/1
50 TABLET ORAL EVERY 4 HOURS PRN
Qty: 10 TABLET | Refills: 0 | Status: ON HOLD | DISCHARGE
Start: 2018-05-30 | End: 2021-09-23

## 2018-05-30 RX ORDER — CARVEDILOL 25 MG/1
25 TABLET ORAL 2 TIMES DAILY WITH MEALS
Qty: 60 TABLET | DISCHARGE
Start: 2018-05-30 | End: 2018-11-08

## 2018-05-30 RX ORDER — NORTRIPTYLINE HYDROCHLORIDE 50 MG/1
50 CAPSULE ORAL AT BEDTIME
Qty: 30 CAPSULE | Status: ON HOLD | DISCHARGE
Start: 2018-05-30 | End: 2019-01-10

## 2018-05-30 RX ORDER — LOSARTAN POTASSIUM 100 MG/1
50 TABLET ORAL DAILY
Qty: 30 TABLET | Status: ON HOLD | DISCHARGE
Start: 2018-05-30 | End: 2019-01-10

## 2018-05-30 RX ORDER — QUETIAPINE FUMARATE 25 MG/1
25 TABLET, FILM COATED ORAL AT BEDTIME
Qty: 60 TABLET | Status: ON HOLD | DISCHARGE
Start: 2018-05-30 | End: 2019-01-09

## 2018-05-30 RX ADMIN — QUETIAPINE FUMARATE 25 MG: 25 TABLET ORAL at 20:23

## 2018-05-30 RX ADMIN — CARVEDILOL 25 MG: 25 TABLET, FILM COATED ORAL at 18:45

## 2018-05-30 RX ADMIN — ASPIRIN 81 MG: 81 TABLET, COATED ORAL at 08:45

## 2018-05-30 RX ADMIN — LIDOCAINE 1 PATCH: 560 PATCH PERCUTANEOUS; TOPICAL; TRANSDERMAL at 20:22

## 2018-05-30 RX ADMIN — BACITRACIN: 500 OINTMENT TOPICAL at 08:47

## 2018-05-30 RX ADMIN — LOSARTAN POTASSIUM 100 MG: 100 TABLET, FILM COATED ORAL at 08:45

## 2018-05-30 RX ADMIN — GABAPENTIN 100 MG: 100 CAPSULE ORAL at 20:23

## 2018-05-30 RX ADMIN — Medication 6.25 MG: at 18:44

## 2018-05-30 RX ADMIN — GABAPENTIN 100 MG: 100 CAPSULE ORAL at 08:45

## 2018-05-30 RX ADMIN — ACETAMINOPHEN 975 MG: 325 TABLET ORAL at 08:44

## 2018-05-30 RX ADMIN — MELATONIN TAB 3 MG 6 MG: 3 TAB at 20:22

## 2018-05-30 RX ADMIN — ACETAMINOPHEN 975 MG: 325 TABLET ORAL at 20:23

## 2018-05-30 RX ADMIN — ACETAMINOPHEN 975 MG: 325 TABLET ORAL at 16:33

## 2018-05-30 RX ADMIN — Medication 6.25 MG: at 08:45

## 2018-05-30 RX ADMIN — BACITRACIN: 500 OINTMENT TOPICAL at 20:22

## 2018-05-30 RX ADMIN — CARVEDILOL 25 MG: 25 TABLET, FILM COATED ORAL at 08:44

## 2018-05-30 RX ADMIN — SENNOSIDES AND DOCUSATE SODIUM 1 TABLET: 8.6; 5 TABLET ORAL at 20:23

## 2018-05-30 RX ADMIN — GABAPENTIN 100 MG: 100 CAPSULE ORAL at 16:33

## 2018-05-30 RX ADMIN — NORTRIPTYLINE HYDROCHLORIDE 50 MG: 25 CAPSULE ORAL at 20:22

## 2018-05-30 RX ADMIN — ENOXAPARIN SODIUM 40 MG: 40 INJECTION SUBCUTANEOUS at 11:30

## 2018-05-30 ASSESSMENT — ACTIVITIES OF DAILY LIVING (ADL)
ADLS_ACUITY_SCORE: 26
ADLS_ACUITY_SCORE: 25

## 2018-05-30 NOTE — PLAN OF CARE
Problem: Patient Care Overview  Goal: Plan of Care/Patient Progress Review  Outcome: No Change  /59 (BP Location: Left arm)  Pulse 71  Temp 97.8  F (36.6  C) (Oral)  Resp 18  Ht 1.829 m (6')  Wt 101.5 kg (223 lb 12.3 oz)  SpO2 95%  BMI 30.35 kg/m2     present. No tele. Denies pain. Up with assist x1 and walker. TLSO on when OOB. Impulsive-alarms activated. Pills administered whole in applesauce. Discharge pending placement-see SW note.

## 2018-05-30 NOTE — PROGRESS NOTES
Discharge Planner   Discharge Plans in progress:   Pt discharging to [facility].   Barriers to discharge plan:  services.  Facility will have  available on 5/31/18.         Entered by: KENNETH Fitzgerald 05/30/2018 2:50 PM

## 2018-05-30 NOTE — PROGRESS NOTES
SWS:  SW following to assist with dc planning needs. Pt has been without a sitter for 48 hours and ready for DC. [facility] is assessing.  SW also resent referrals to [facility] and [facility].  Anticipate pt may be able to dc later today.      ADDENDUM- pt has been declined at [facility] and [facility]. Pt has been accepted at [facility].  CHATA updated pt son Mario. Mario states that he is contacting his mother re: dc plan and will contact this writer later this afternoon to finalize.      ADDENDUM- CHATA received call from Mario. Family unable to transport and request SW arrange transport.  Mario also reports that family is unable to interpret for pt on admission. hospitals calling  Services to arrange .  [facility] transport will be here at 5:00pm to transport. CHATA later received phone call stating that [facility] is unable to get an  this evening and it was decided to cancel dc today and have pt dc tomorrow when  available.  Irene from [place] will contact this writer once  time is know and this writer will arrange transport.  CHATA updated son Mario who is agreeable to plan.

## 2018-05-30 NOTE — PROGRESS NOTES
[facility]  Hospitalist Progress Note  Papo Lopez MD   05/30/2018    Reason for Stay (Diagnosis): UTI, mechanical fall with T12 compression fracture         Assessment and Plan:      Summary of Stay: Adam Huber is a 82 year old (non-English-speaking) [place] man with prior history of dementia admitted on 5/18/2018 with fall, back pain and UTI.     Problem List:   1. UTI present on admission with isolated Klebsiella pneumoniae sensitive to current ceftriaxone  2. Recent mechanical fall with T12 compression fracture earlier issues with pain control  3. Delirium in the setting of underlying dementia.  After about 2 weeks of hospitalization patient clearly is significantly demented.  We are waiting for disposition to transitional care.  I have discussed with the patient's family that they have to start looking for a more permanent solution for his care as he is likely to decline from this point.  4. Diabetes mellitus non-insulin-requiring, not on meds, A1c is less than 7. Pt is not a good candidate for insulin now, and he would not be considered a candidate for that upon discharge. Will monitor without ac/qhs glucoses.  5. History of coronary artery disease  6. Hypertension, coreg 25 BID, Losartan 50 mg daily    Continue inpatient care  -- Completed 7 days of IV ceftriaxone  -- Appreciate neurosurgery evaluation - orthotic brace  -- major ongoing issues here is the delirium, had significant issues with delirium during previous hospitalization as well, On Seroquel 6.25 mg BID scheduled during day and 25 mg HS. PRN available , increase PRN to 12.5 mg BID PRN,  -- Delirium protocol, frequent re-direction, minimize interruptions at night  -- Stopped scheduled narcotics and change to PRN, added gabapentin 100 mg TID. With these measures he seems to be better now. Sitter off since 5/26  -- Appreciate psychiatry consult.  -- Elevated BP, increase losartan to 100 mg, BP better now     DVT  Prophylaxis: Enoxaparin (Lovenox) SQ and Pneumatic Compression Devices  Code Status: Full Code  Discharge Dispo: Likely TCU tomorrow.  Estimated Disch Date / # of Days until Disch: pending placement.  Patient has been stable without a sitter now for 48 hours.        Interval History (Subjective):      Mr. Huber is alert and interactive today. I interviewed him with the nurse, Anya Cummings.  He is very positive and anticipating discharge tomorrow to TCU, as has been discussed.     # Pain Assessment:  Current Pain Score 5/29/2018   Patient currently in pain? sleeping: patient not able to self report   Pain score (0-10) -   Pain location -   Pain descriptors -   - Adam is experiencing pain due to T12 compression fracture secondary to recent mechanical fall. Pain management was discussed and the plan was created in a collaborative fashion.  Adam's response to the current recommendations: unable to completely comprehend due to the delirium   We will defer pain regimen with pain service              Physical Exam:      Last Vital Signs:  /69 (BP Location: Left arm)  Pulse 71  Temp 96.1  F (35.6  C) (Oral)  Resp 18  Ht 1.829 m (6')  Wt 101.5 kg (223 lb 12.3 oz)  SpO2 95%  BMI 30.35 kg/m2    I/O last 3 completed shifts:  In: 720 [P.O.:720]  Out: 1325 [Urine:1325]  Wt Readings from Last 1 Encounters:   05/19/18 101.5 kg (223 lb 12.3 oz)       Constitutional: Awake, alert, no apparent distress.  The patient is pleasant and interactive with his family, the  and with myself.   Respiratory: Clear to auscultation bilaterally, no crackles or wheezing   Cardiovascular: Regular rate and rhythm, normal S1 and S2, and no murmur noted   Abdomen: Normal bowel sounds, soft, non-distended, non-tender   Skin: No rashes, no cyanosis, dry to touch   Neuro: Alert and oriented to self and situation. No focal weakness, spontaneous speech   Extremities: Nonpitting edema at both ankles, normal range of motion    Other(s): Cooperative, calm       All other systems: Negative          Medications:      All current medications were reviewed with changes reflected in problem list.         Data:      All new lab and imaging data was reviewed.   Labs:  No results for input(s): CULT in the last 168 hours.    Recent Labs  Lab 05/24/18  0700      POTASSIUM 4.8   CHLORIDE 105   CO2 27   ANIONGAP 6   *   BUN 19   CR 0.82   GFRESTIMATED 90   GFRESTBLACK >90   LUZ 8.9       Recent Labs  Lab 05/30/18  0644 05/27/18  0602 05/24/18  0700   WBC  --   --  9.1   HGB  --   --  16.5   HCT  --   --  47.8   MCV  --   --  94    215 208  208       Recent Labs  Lab 05/24/18  0700   CR 0.82       Recent Labs  Lab 05/29/18  0825 05/24/18  1639 05/24/18  0936 05/24/18  0700 05/23/18  1649 05/23/18  1219   GLC  --   --   --  158*  --   --    * 160* 270*  --  185* 225*     No results for input(s): INR in the last 168 hours.  No results for input(s): COLOR, APPEARANCE, URINEGLC, URINEBILI, URINEKETONE, SG, UBLD, URINEPH, PROTEIN, UROBILINOGEN, NITRITE, LEUKEST, RBCU, WBCU in the last 168 hours.   Imaging:   No results found for this or any previous visit (from the past 48 hour(s)).

## 2018-05-30 NOTE — PLAN OF CARE
Problem: Patient Care Overview  Goal: Plan of Care/Patient Progress Review  PT - Pt declined PT d/t wanting to finish his breakfast. Possible discharge to TCU today.

## 2018-05-30 NOTE — PROGRESS NOTES
Your information has been submitted on May 30th, 2018 at 12:08:01 PM CDT. The confirmation number is [id number]

## 2018-05-30 NOTE — DISCHARGE SUMMARY
[facility] Discharge Summary    Adam Huber MRN# [id number]   Age: 82 year old YOB: 1935     Date of Admission:  5/18/2018  Date of Discharge::  6/1/2018  Admitting Physician:  Jose Manuel Wade DO  Discharge Physician:  Papo Lopez MD     Home clinic: [facility]          Admission Diagnoses:   Fall, initial encounter [W19.XXXA]  Urinary tract infection without hematuria, site unspecified [N39.0]  Closed wedge compression fracture of twelfth thoracic vertebra, initial encounter (H) [S22.080A]          Discharge Diagnosis:   Principal Problem:    UTI (urinary tract infection)  Active Problems:    Generalized muscle weakness    T12 compression fracture (H)    Acute delirium    Late 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 behavioral disturbance    Type 2 diabetes mellitus without complication (H)    Closed wedge compression fracture of twelfth thoracic vertebra, initial encounter (H)            Procedures:   CXR  CT Cervical Spine  CT Head without contrast  Thoracic and Lumbar spine films.          Medications Prior to Admission:     Prescriptions Prior to Admission   Medication Sig Dispense Refill Last Dose     melatonin 3 MG tablet Take 3-6 mg by mouth At Bedtime    5/17/2018     olopatadine HCl (PATADAY) 0.2 % SOLN Place 1 drop into both eyes daily as needed   prn     acetaminophen (TYLENOL) 325 MG tablet Take 2 tablets (650 mg) by mouth every 4 hours as needed for mild pain 100 tablet  prn     aspirin 81 MG EC tablet Take one tablet daily 90 tablet 0 5/17/2018     nitroglycerin (NITROSTAT) 0.4 MG SL tablet Place 1 tablet (0.4 mg) under the tongue every 5 minutes as needed for chest pain 25 tablet 1 prn     polyethylene glycol (MIRALAX/GLYCOLAX) powder Take 1 capful by mouth daily as needed    prn     [DISCONTINUED] Nortriptyline HCl (NORTRIPYTLINE HCL PO) Take 100 mg by mouth At Bedtime   5/17/2018             Discharge Medications:     Current Discharge Medication List       START taking these medications    Details   carvedilol (COREG) 25 MG tablet Take 1 tablet (25 mg) by mouth 2 times daily (with meals)  Qty: 60 tablet    Associated Diagnoses: Essential hypertension      gabapentin (NEURONTIN) 100 MG capsule Take 1 capsule (100 mg) by mouth 3 times daily  Qty: 90 capsule    Associated Diagnoses: T12 compression fracture (H)      !! 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 QUEtiapine (SEROQUEL) 25 MG tablet Take 1 tablet (25 mg) by mouth At Bedtime  Qty: 60 tablet    Associated Diagnoses: Acute delirium; Late 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 behavioral disturbance      traMADol (ULTRAM) 50 MG tablet Take 1 tablet (50 mg) by mouth every 4 hours as needed for moderate pain  Qty: 10 tablet, Refills: 0    Associated Diagnoses: T12 compression fracture (H)       !! - Potential duplicate medications found. Please discuss with provider.      CONTINUE these medications which have CHANGED    Details   losartan (COZAAR) 100 MG tablet Take 0.5 tablets (50 mg) by mouth daily  Qty: 30 tablet    Associated Diagnoses: Essential hypertension      nortriptyline (PAMELOR) 50 MG capsule Take 1 capsule (50 mg) by mouth At Bedtime  Qty: 30 capsule    Associated Diagnoses: T12 compression fracture (H)         CONTINUE these medications which have NOT CHANGED    Details   melatonin 3 MG tablet Take 3-6 mg by mouth At Bedtime       olopatadine HCl (PATADAY) 0.2 % SOLN Place 1 drop into both eyes daily as needed      acetaminophen (TYLENOL) 325 MG tablet Take 2 tablets (650 mg) by mouth every 4 hours as needed for mild pain  Qty: 100 tablet    Associated Diagnoses: History of CVA (cerebrovascular accident)      aspirin 81 MG EC tablet Take one tablet daily  Qty: 90 tablet, Refills: 0    Associated Diagnoses: NSTEMI (non-ST elevated  myocardial infarction) (H)      nitroglycerin (NITROSTAT) 0.4 MG SL tablet Place 1 tablet (0.4 mg) under the tongue every 5 minutes as needed for chest pain  Qty: 25 tablet, Refills: 1    Associated Diagnoses: NSTEMI (non-ST elevated myocardial infarction) (H); Essential hypertension; Postsurgical percutaneous transluminal coronary angioplasty status      polyethylene glycol (MIRALAX/GLYCOLAX) powder Take 1 capful by mouth daily as needed                 Consultations:   Consultation during this admission received from Neurosurgery.           Hospital Course:   Adam Huber is a 82 year old (non-English-speaking) [place]ral[place] man with prior history of dementia admitted on 5/18/2018 with fall and backpain. He was found to have a UTI and was admitted to a medical bed on ceftriaxone.  Ultimately Klebsiella pneumoniae sensitive to ceftriaxone was identified.    Unfortunately Mr. Huber's hospitalization was complicated by significant delirium.  For this reason he required a bedside  and close monitoring.  This also slowed his discharge disposition as he needed to not have a bedside  in order to be placed in transitional care.      The patient was also seen by neurosurgery after he was found to have a T12 compression fracture.  A TLSO was recommended and fabricated for him.  He was asked to return to neurosurgery in about 6 weeks.    Please note that a significant amount of time during hospitalization was spent meeting with the family and arranging for follow-up. We had a couple of devoted interpreters who also helped with communicating with the family.  The patient's dementia makes it difficult for his wife to take care of him, and I did indicate to them that this would probably get worse.  They need to be thinking had about getting him into a memory care unit.    /73 (BP Location: Right arm)  Pulse 71  Temp 97  F (36.1  C) (Oral)  Resp 16  Ht 1.829 m (6')  Wt 101.5 kg (223 lb 12.3 oz)   SpO2 94%  BMI 30.35 kg/m2  At the time of discharge, Mr. Huber is alert and pleasant.  He is easily redirectable.  HEENT: Speech is articulate.  No facial muscular asymmetry.  Chest: Clear to auscultation.  No increased work of breathing room air.  Heart: Regular rate and rhythm without rubs, murmurs or gallops.  Abdomen: Soft, nontender, nondistended without mass.  Extremities: No remarkable pitting edema.  Distal perfusion is intact.    Neurologic: The patient stands and ambulates with a walker.          Discharge Instructions and Follow-Up:   Discharge diet: Diabetic (2000 ADA)   Discharge activity: Activity as tolerated with assist and walker (risk of wandering as well as falls)   Discharge follow-up: With Neurosurgery ([facility]) in about 6 weeks.            Discharge Disposition:   Discharged to short-term care facility.  I have discussed at length with multiple family members that pt is likely to need memory care level of assistance very soon.       Attestation:  I have reviewed today's vital signs, notes, medications, labs and imaging.  Total time: 35 minutes    Papo Lopez MD

## 2018-05-30 NOTE — PLAN OF CARE
Problem: Patient Care Overview  Goal: Plan of Care/Patient Progress Review  Outcome: No Change  Alert, disoriented to situation, language barrier present, Kyrgyz speaking.  phone at bedside and  available via phone 24/7. /69, parameters for PRN hydralazine not met. LS clear. Denies pain. Pt calm and cooperative this shift, easily redirected this shift. Pt up in chair at this time with chair alarm on. Pain management, PT following. Up with assist of 1 with gait belt and walker, TLSO brace on when out of bed. Plan to d/c to TCU, continue with POC.

## 2018-05-31 LAB — GLUCOSE BLDC GLUCOMTR-MCNC: 211 MG/DL (ref 70–99)

## 2018-05-31 PROCEDURE — 25000132 ZZH RX MED GY IP 250 OP 250 PS 637: Performed by: INTERNAL MEDICINE

## 2018-05-31 PROCEDURE — 25000128 H RX IP 250 OP 636: Performed by: INTERNAL MEDICINE

## 2018-05-31 PROCEDURE — 00000146 ZZHCL STATISTIC GLUCOSE BY METER IP

## 2018-05-31 PROCEDURE — 99231 SBSQ HOSP IP/OBS SF/LOW 25: CPT | Performed by: INTERNAL MEDICINE

## 2018-05-31 PROCEDURE — 25000125 ZZHC RX 250: Performed by: INTERNAL MEDICINE

## 2018-05-31 PROCEDURE — 12000000 ZZH R&B MED SURG/OB

## 2018-05-31 RX ORDER — CLONIDINE HYDROCHLORIDE 0.1 MG/1
0.1 TABLET ORAL EVERY 6 HOURS PRN
Status: DISCONTINUED | OUTPATIENT
Start: 2018-05-31 | End: 2018-06-01 | Stop reason: HOSPADM

## 2018-05-31 RX ADMIN — LIDOCAINE 1 PATCH: 560 PATCH PERCUTANEOUS; TOPICAL; TRANSDERMAL at 21:28

## 2018-05-31 RX ADMIN — CARVEDILOL 25 MG: 25 TABLET, FILM COATED ORAL at 10:02

## 2018-05-31 RX ADMIN — ACETAMINOPHEN 975 MG: 325 TABLET ORAL at 16:22

## 2018-05-31 RX ADMIN — ASPIRIN 81 MG: 81 TABLET, COATED ORAL at 10:02

## 2018-05-31 RX ADMIN — BACITRACIN: 500 OINTMENT TOPICAL at 21:27

## 2018-05-31 RX ADMIN — GABAPENTIN 100 MG: 100 CAPSULE ORAL at 21:27

## 2018-05-31 RX ADMIN — GABAPENTIN 100 MG: 100 CAPSULE ORAL at 16:23

## 2018-05-31 RX ADMIN — GABAPENTIN 100 MG: 100 CAPSULE ORAL at 10:02

## 2018-05-31 RX ADMIN — QUETIAPINE FUMARATE 25 MG: 25 TABLET ORAL at 21:26

## 2018-05-31 RX ADMIN — ACETAMINOPHEN 975 MG: 325 TABLET ORAL at 21:27

## 2018-05-31 RX ADMIN — Medication 6.25 MG: at 10:02

## 2018-05-31 RX ADMIN — NORTRIPTYLINE HYDROCHLORIDE 50 MG: 25 CAPSULE ORAL at 21:26

## 2018-05-31 RX ADMIN — Medication 6.25 MG: at 18:46

## 2018-05-31 RX ADMIN — LOSARTAN POTASSIUM 100 MG: 100 TABLET, FILM COATED ORAL at 10:02

## 2018-05-31 RX ADMIN — MELATONIN TAB 3 MG 6 MG: 3 TAB at 21:27

## 2018-05-31 RX ADMIN — ACETAMINOPHEN 975 MG: 325 TABLET ORAL at 10:02

## 2018-05-31 RX ADMIN — ENOXAPARIN SODIUM 40 MG: 40 INJECTION SUBCUTANEOUS at 10:02

## 2018-05-31 RX ADMIN — CARVEDILOL 25 MG: 25 TABLET, FILM COATED ORAL at 18:46

## 2018-05-31 RX ADMIN — BACITRACIN: 500 OINTMENT TOPICAL at 10:02

## 2018-05-31 ASSESSMENT — ACTIVITIES OF DAILY LIVING (ADL)
ADLS_ACUITY_SCORE: 25
ADLS_ACUITY_SCORE: 24

## 2018-05-31 NOTE — PLAN OF CARE
Problem: Patient Care Overview  Goal: Individualization & Mutuality  Outcome: Improving  Slept through night. Refuses BS checks.  High calorie carb diet.  Assist x 1 with walker.  Shuffling gait.  TSLO brace with ambulation.  Takes pills whole in applesauce.  No IV.   Plan is to discharge to TCU today pending placement.

## 2018-05-31 NOTE — PLAN OF CARE
Problem: Patient Care Overview  Goal: Plan of Care/Patient Progress Review  Outcome: Adequate for Discharge Date Met: 05/31/18  VSS, Palauan speaking, Wife and daughters at bedside, denies pain, incontinent, TLSO brace but pt ok to ambulated without it per MD and pt request, Ax1 gait belt, SW following, Plan to d/c to TCU tonight/tomorrow once paperwork is in order. Family on board with plan and daughters will return in the morning at time for d/c.

## 2018-05-31 NOTE — PROGRESS NOTES
SWS:  D: discharge planning  A: SW received call from daughter Hattie stating that family is requesting SW cancel dc and send referrals to [facility] and other facilities close to home.  SW discussed other locations and sent referrals to [facility], [facility], [facility], [facility] and [facility].  SW cancelled dc to [facility] and updated RN and MD.  P: SW following.    ADDENDUM- CHATA notified that pt has been declined at [facility]. [facility] and [facility].  Pt daughters later present at the hospital and they toured [facility] and request that SW send information to facility. SW explained that referral had already been sent. SW contacted [facility] and they have a bed available. Referral resent. CHATA later received a call that facility has accepted pending insurance authorizations.  Anticipate dc tomorrow.  Family updated. Family requested private room and is aware and agreeable to private room cost. [facility] updated.

## 2018-05-31 NOTE — PLAN OF CARE
Problem: Patient Care Overview  Goal: Plan of Care/Patient Progress Review  Outcome: Adequate for Discharge Date Met: 05/30/18  Tele: none.  Denies pain. Up with assist x1 and walker. TLSO on when OOB. Impulsive/confused at times-alarms activated. Pills administered whole in applesauce one at a time. Discharge pending placement-see SW note. Family here on eves and supportive.  New lidocaine patch placed to lt lateral low back. Up to BR with walker and assist of 1.

## 2018-05-31 NOTE — PLAN OF CARE
Problem: Infection, Risk/Actual (Adult)  Goal: Identify Related Risk Factors and Signs and Symptoms  Related risk factors and signs and symptoms are identified upon initiation of Human [employer] Clinical Practice Guideline (CPG).   Outcome: Adequate for Discharge Date Met: 05/31/18  Antibiotics completed, no IV access. Wears TLSO brace when up. Daughters x 2 in spoke to  and myself. Given handout for facilities. Daughters toured facilities. Waiting to hear back on acceptance from family approved facilities. No behavior issues, cooperative and pleasant. O x 3.  here for 2 hours this am.

## 2018-06-01 VITALS
TEMPERATURE: 97 F | OXYGEN SATURATION: 94 % | WEIGHT: 223.77 LBS | HEART RATE: 71 BPM | DIASTOLIC BLOOD PRESSURE: 73 MMHG | BODY MASS INDEX: 30.31 KG/M2 | HEIGHT: 72 IN | SYSTOLIC BLOOD PRESSURE: 165 MMHG | RESPIRATION RATE: 16 BRPM

## 2018-06-01 PROCEDURE — 25000128 H RX IP 250 OP 636: Performed by: INTERNAL MEDICINE

## 2018-06-01 PROCEDURE — 99239 HOSP IP/OBS DSCHRG MGMT >30: CPT | Performed by: INTERNAL MEDICINE

## 2018-06-01 PROCEDURE — 25000132 ZZH RX MED GY IP 250 OP 250 PS 637: Performed by: INTERNAL MEDICINE

## 2018-06-01 PROCEDURE — 25000125 ZZHC RX 250: Performed by: INTERNAL MEDICINE

## 2018-06-01 RX ORDER — QUETIAPINE FUMARATE 25 MG/1
6.25 TABLET, FILM COATED ORAL 2 TIMES DAILY WITH MEALS
Qty: 60 TABLET | Status: ON HOLD | DISCHARGE
Start: 2018-06-01 | End: 2019-01-09

## 2018-06-01 RX ADMIN — CARVEDILOL 25 MG: 25 TABLET, FILM COATED ORAL at 08:11

## 2018-06-01 RX ADMIN — BACITRACIN: 500 OINTMENT TOPICAL at 08:12

## 2018-06-01 RX ADMIN — ACETAMINOPHEN 975 MG: 325 TABLET ORAL at 08:11

## 2018-06-01 RX ADMIN — Medication 6.25 MG: at 08:12

## 2018-06-01 RX ADMIN — ASPIRIN 81 MG: 81 TABLET, COATED ORAL at 08:11

## 2018-06-01 RX ADMIN — GABAPENTIN 100 MG: 100 CAPSULE ORAL at 08:12

## 2018-06-01 RX ADMIN — ENOXAPARIN SODIUM 40 MG: 40 INJECTION SUBCUTANEOUS at 10:54

## 2018-06-01 RX ADMIN — LOSARTAN POTASSIUM 100 MG: 100 TABLET, FILM COATED ORAL at 08:11

## 2018-06-01 RX ADMIN — Medication 12.5 MG: at 00:43

## 2018-06-01 ASSESSMENT — ACTIVITIES OF DAILY LIVING (ADL)
ADLS_ACUITY_SCORE: 25

## 2018-06-01 NOTE — PLAN OF CARE
Problem: Patient Care Overview  Goal: Plan of Care/Patient Progress Review  Outcome: No Change  Non-english speaking -  was available 9-10.30 am. Diet - high CHO. No IV access. Pt is wearing an abdominal binder for T12 compression after be. DC today at 1545 with HE transport to [facility]. Pt's wife would like to ride with the transport - [facility] contacted and a note was placed for the transport team.

## 2018-06-01 NOTE — PROGRESS NOTES
Discharge Planner   Discharge Plans in progress: TCU [facility] pending insurance authorization. HE transport arranged for 1545 6/1/18.  Barriers to discharge plan: Insurance authorization  Follow up plan: will continue to follow with ins auth for TCU placement.       Entered by: Gerladine Gambucci 06/01/2018 10:30 AM

## 2018-06-01 NOTE — PLAN OF CARE
Problem: Patient Care Overview  Goal: Plan of Care/Patient Progress Review  Physical Therapy Discharge Summary     Reason for therapy discharge:    Discharged to transitional care facility.     Progress towards therapy goal(s). See goals on Care Plan in Psychiatric electronic health record for goal details.  Goals not met.  Barriers to achieving goals: continued need for A with transitions, good progress 125 feet with FWW     Therapy recommendation(s):    Continued therapy is recommended.  Rationale/Recommendations:  Pt is below baseline with functional mobility and strength and would benefit from continued PT to progress skills.

## 2018-06-01 NOTE — PLAN OF CARE
Problem: Patient Care Overview  Goal: Plan of Care/Patient Progress Review  PT- discharging to TCU later this date goals not met able to amb with TLSO and Rw to 125 feet bed mobility with assist of 1 due to pain with transitions to sitting up.

## 2018-06-01 NOTE — PLAN OF CARE
Pt. Alert, Puerto Rican speaking, up with assist of 1, incontinent of urine.  BP elevated this shift, did get orders for clonidine PO, since pt. Did not have IV access, but upon recheck of BP. BP had improved to 140/70. Pt. To possibly discharge to [facility] tomorrow. Pt. Denies any needs at this time. Will continue with POC.

## 2018-06-01 NOTE — PROGRESS NOTES
Pt accepted at [facility] pending insurance auth. pts spouse requesting transportation to be arranged. Pt has [employer] for insurance. [employer] was called [phone] SW spoke with Keegan at [facility] who arranged HE WC transport for 1545 today to [facility]. Spouse is aware that insurance will cover transportation cost.    Joyce Pham RN, BSN CTS  Care Coordinator  [phone]

## 2018-06-01 NOTE — PROGRESS NOTES
[facility]  Hospitalist Progress Note  Papo Lopez MD   05/31/2018    Reason for Stay (Diagnosis): UTI, mechanical fall with T12 compression fracture         Assessment and Plan:      Summary of Stay: Adam Huber is a 82 year old (non-English-speaking) [place] man with prior history of dementia admitted on 5/18/2018 with fall, back pain and UTI.     Problem List:   1. UTI present on admission with isolated Klebsiella pneumoniae sensitive to current ceftriaxone  2. Recent mechanical fall with T12 compression fracture earlier issues with pain control  3. Delirium in the setting of underlying dementia.  After about 2 weeks of hospitalization patient clearly is significantly demented.  We are waiting for disposition to transitional care.  I have discussed with the patient's family that they have to start looking for a more permanent solution for his care as he is likely to decline from this point.  4. Diabetes mellitus non-insulin-requiring, not on meds, A1c is less than 7. Pt is not a good candidate for insulin now, and he would not be considered a candidate for that upon discharge. Will monitor without ac/qhs glucoses.  5. History of coronary artery disease  6. Hypertension, coreg 25 BID, Losartan 50 mg daily    I have been anticipating discharge today.  However 1 of the daughters apparently visited the TCU where the patient had been off of the room and she refused.  For that reason  again sought additional options.  The patient is evidently accepted at the [facility] pending insurance authorizations.    I again spoke with the family directly about the need for them to make better arrangements for Mr. Huber in anticipation of further cognitive decline.        Interval History (Subjective):      Mr. Huber is alert and interactive today. I interviewed him with the nurse, Anya Cummings.  He is very positive and anticipating discharge tomorrow to TCU, as has been  discussed.     # Pain Assessment:  Current Pain Score 5/31/2018   Patient currently in pain? -   Pain score (0-10) 0   Pain location -   Pain descriptors -   - Adam is experiencing pain due to T12 compression fracture secondary to recent mechanical fall. Pain management was discussed and the plan was created in a collaborative fashion.  Adam's response to the current recommendations: unable to completely comprehend due to the delirium   We will defer pain regimen with pain service              Physical Exam:      Last Vital Signs:  /70 (BP Location: Left arm)  Pulse 71  Temp 97.6  F (36.4  C) (Oral)  Resp 20  Ht 1.829 m (6')  Wt 101.5 kg (223 lb 12.3 oz)  SpO2 97%  BMI 30.35 kg/m2    I/O last 3 completed shifts:  In: -   Out: 325 [Urine:325]  Wt Readings from Last 1 Encounters:   05/19/18 101.5 kg (223 lb 12.3 oz)       Constitutional: Awake, alert, no apparent distress.  The patient is pleasant and interactive with his family, the  and with myself.   Respiratory: Clear to auscultation bilaterally, no crackles or wheezing   Cardiovascular: Regular rate and rhythm, normal S1 and S2, and no murmur noted   Abdomen: Normal bowel sounds, soft, non-distended, non-tender   Skin: No rashes, no cyanosis, dry to touch   Neuro: Alert and oriented to self and situation. No focal weakness, spontaneous speech   Extremities: Nonpitting edema at both ankles, normal range of motion   Other(s): Cooperative, calm       All other systems: Negative          Medications:      All current medications were reviewed with changes reflected in problem list.         Data:      All new lab and imaging data was reviewed.   Labs:  No results for input(s): CULT in the last 168 hours.  No results for input(s): NA, POTASSIUM, CHLORIDE, CO2, ANIONGAP, GLC, BUN, CR, GFRESTIMATED, GFRESTBLACK, LUZ in the last 168 hours.    Recent Labs  Lab 05/30/18  0644 05/27/18  0602    215     No results for input(s): CR in the  last 168 hours.    Recent Labs  Lab 05/31/18  0852 05/30/18  1211 05/29/18  0825   * 224* 152*     No results for input(s): INR in the last 168 hours.  No results for input(s): COLOR, APPEARANCE, URINEGLC, URINEBILI, URINEKETONE, SG, UBLD, URINEPH, PROTEIN, UROBILINOGEN, NITRITE, LEUKEST, RBCU, WBCU in the last 168 hours.   Imaging:   No results found for this or any previous visit (from the past 48 hour(s)).

## 2018-06-01 NOTE — PLAN OF CARE
Problem: Patient Care Overview  Goal: Individualization & Mutuality  Outcome: Improving  Assist x 1. Diet carb count, high calorie. Alarms activated.  Shuffling gait.  Non English speaking.  Refusing TSLO brace when ambulating. Refuses BS checks.  No IV access.  Discharge to [facility] TCU today.

## 2018-06-01 NOTE — DISCHARGE SUMMARY
Pt transferred via [facility] wheelchair transport to [facility]. All belongings sent with pt. Pt info packet sent with [facility] staff.

## 2018-06-29 ENCOUNTER — OFFICE VISIT (OUTPATIENT)
Dept: NEUROSURGERY | Facility: CLINIC | Age: 83
End: 2018-06-29
Attending: NURSE PRACTITIONER
Payer: COMMERCIAL

## 2018-06-29 ENCOUNTER — RADIANT APPOINTMENT (OUTPATIENT)
Dept: GENERAL RADIOLOGY | Facility: CLINIC | Age: 83
End: 2018-06-29
Attending: NURSE PRACTITIONER
Payer: COMMERCIAL

## 2018-06-29 VITALS
HEIGHT: 72 IN | BODY MASS INDEX: 29.8 KG/M2 | DIASTOLIC BLOOD PRESSURE: 87 MMHG | SYSTOLIC BLOOD PRESSURE: 165 MMHG | OXYGEN SATURATION: 95 % | HEART RATE: 97 BPM | WEIGHT: 220 LBS

## 2018-06-29 DIAGNOSIS — S22.080A T12 COMPRESSION FRACTURE (H): ICD-10-CM

## 2018-06-29 DIAGNOSIS — S22.080A T12 COMPRESSION FRACTURE (H): Primary | ICD-10-CM

## 2018-06-29 PROCEDURE — 99213 OFFICE O/P EST LOW 20 MIN: CPT | Performed by: NURSE PRACTITIONER

## 2018-06-29 PROCEDURE — G0463 HOSPITAL OUTPT CLINIC VISIT: HCPCS | Performed by: NURSE PRACTITIONER

## 2018-06-29 PROCEDURE — 72080 X-RAY EXAM THORACOLMB 2/> VW: CPT

## 2018-06-29 ASSESSMENT — PAIN SCALES - GENERAL: PAINLEVEL: SEVERE PAIN (6)

## 2018-06-29 NOTE — MR AVS SNAPSHOT
After Visit Summary   6/29/2018    Adam Huber    MRN: [id number]           Patient Information     Date Of Birth          1935        Visit Information        Provider Department      6/29/2018 11:05 AM Sangeetha Mireles APRN CNP; [place] [employer] [facility]        Today's Diagnoses     T12 compression fracture (H)    -  1      Care Instructions    - continue to wear TLSO at all times when out of bed  - Repeat XR of lumbar spine AP and Lateral in 6 weeks.   - Return to clinic following repeat XR spine in 6 weeks   - Call the [facility] clinic for any questions or concerns during interim, at [phone]  - Seek medical attention immediately if any paresthesias, weakness, gait instability, bowel/bladder incontinence, new pain.              Follow-ups after your visit        Your next 10 appointments already scheduled     Jun 29, 2018 11:05 AM CDT   Return Visit with ЮЛИЯ Horan CNP   [facility] ([facility])    [address] [address] [address] [phone]              Future tests that were ordered for you today     Open Future Orders        Priority Expected Expires Ordered    XR Thoracolumbar Spine 2 Views Routine 8/10/2018 6/29/2019 6/29/2018            Who to contact     If you have questions or need follow up information about today's clinic visit or your schedule please contact [facility] directly at [phone].  Normal or non-critical lab and imaging results will be communicated to you by MyChart, letter or phone within 4 business days after the clinic has received the results. If you do not hear from us within 7 days, please contact the clinic through MyChart or phone. If you have a critical or abnormal lab result, we will notify you by phone as soon as possible.  Submit refill requests through [employer] or call your pharmacy and they  "will forward the refill request to us. Please allow 3 business days for your refill to be completed.          Additional Information About Your Visit        Care EveryWhere ID     This is your Care EveryWhere ID. This could be used by other organizations to access your [facility] medical records  [id number]        Your Vitals Were     Pulse Height Pulse Oximetry BMI (Body Mass Index)          97 6' 0.01\" (1.829 m) 95% 29.83 kg/m2         Blood Pressure from Last 3 Encounters:   06/29/18 (!) 175/92   06/01/18 165/73   07/19/17 136/77    Weight from Last 3 Encounters:   06/29/18 220 lb (99.8 kg)   05/19/18 223 lb 12.3 oz (101.5 kg)   07/19/17 220 lb (99.8 kg)               Primary Care Provider Office Phone # Fax #    Oralia Forrest [phone]       [address] [address]        Equal Access to Services     BOB VALENZUELA : Hadii aad ku hadasho Soomaali, waaxda luqadaha, qaybta kaalmada adeegyada, waxay idiin hayaan adeeg kelsi lajosé miguel . So [facility] [phone].    ATENCIÓN: Si habla español, tiene a banks disposición servicios gratuitos de asistencia lingüística. Llame al [phone].    We comply with applicable federal civil rights laws and [place] laws. We do not discriminate on the basis of race, color, national origin, age, disability, sex, sexual orientation, or gender identity.            Thank you!     Thank you for choosing [facility]  for your care. Our goal is always to provide you with excellent care. Hearing back from our patients is one way we can continue to improve our services. Please take a few minutes to complete the written survey that you may receive in the mail after your visit with us. Thank you!             Your Updated Medication List - Protect others around you: Learn how to safely use, store and throw away your medicines at www.disposemymeds.org.          This list is accurate as of 6/29/18 10:47 AM.  Always use your " most recent med list.                   Brand Name Dispense Instructions for use Diagnosis    acetaminophen 325 MG tablet    TYLENOL    100 tablet    Take 2 tablets (650 mg) by mouth every 4 hours as needed for mild pain    History of CVA (cerebrovascular accident)       aspirin 81 MG EC tablet     90 tablet    Take one tablet daily    NSTEMI (non-ST elevated myocardial infarction) (H)       carvedilol 25 MG tablet    COREG    60 tablet    Take 1 tablet (25 mg) by mouth 2 times daily (with meals)    Essential hypertension       gabapentin 100 MG capsule    NEURONTIN    90 capsule    Take 1 capsule (100 mg) by mouth 3 times daily    T12 compression fracture (H)       losartan 100 MG tablet    COZAAR    30 tablet    Take 0.5 tablets (50 mg) by mouth daily    Essential hypertension       melatonin 3 MG tablet      Take 3-6 mg by mouth At Bedtime        nitroGLYcerin 0.4 MG sublingual tablet    NITROSTAT    25 tablet    Place 1 tablet (0.4 mg) under the tongue every 5 minutes as needed for chest pain    NSTEMI (non-ST elevated myocardial infarction) (H), Essential hypertension, Postsurgical percutaneous transluminal coronary angioplasty status       nortriptyline 50 MG capsule    PAMELOR    30 capsule    Take 1 capsule (50 mg) by mouth At Bedtime    T12 compression fracture (H)       olopatadine HCl 0.2 % Soln    [facility]     Place 1 drop into both eyes daily as needed        polyethylene glycol powder    MIRALAX/GLYCOLAX     Take 1 capful by mouth daily as needed        * QUEtiapine 25 MG tablet    SEROquel    60 tablet    Take 1 tablet (25 mg) by mouth At Bedtime    Acute delirium, Late 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 behavioral disturbance       * QUEtiapine 25 MG tablet    SEROquel    60 tablet    Take 0.25 tablets (6.25 mg) by mouth 2 times daily (with meals)    Late 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 behavioral disturbance       traMADol 50 MG tablet    ULTRAM    10 tablet    Take 1 tablet (50 mg) by mouth every 4  hours as needed for moderate pain    T12 compression fracture (H)       * Notice:  This list has 2 medication(s) that are the same as other medications prescribed for you. Read the directions carefully, and ask your doctor or other care provider to review them with you.

## 2018-06-29 NOTE — NURSING NOTE
"Adam Huber is a 82 year old male who presents for:  Chief Complaint   Patient presents with     Neurologic Problem     T12 compression fracture, ED visit on 05/18/2018, no brace on today he continues to have pain, leg weakness        Vitals:    Vitals:    06/29/18 1037 06/29/18 1050   BP: (!) 175/92 165/87   BP Location: Left arm Left arm   Patient Position: Sitting Sitting   Cuff Size: Adult Regular Adult Regular   Pulse: 97    SpO2: 95%    Weight: 220 lb (99.8 kg)    Height: 6' 0.01\" (1.829 m)        BMI:  Estimated body mass index is 29.83 kg/(m^2) as calculated from the following:    Height as of this encounter: 6' 0.01\" (1.829 m).    Weight as of this encounter: 220 lb (99.8 kg).    Pain Score:  Severe Pain (6)      Do you feel safe in your environment?  Yes      Yvette Aldrich          "

## 2018-06-29 NOTE — LETTER
6/29/2018         RE: Adam Huber  [address]  [place] [address]        Dear Colleague,    Thank you for referring your patient, Adam Huber, to the [facility]. Please see a copy of my visit note below.    [facility]  Neurosurgery followup:    HPI: Mr. Huber is a 82 year old male that returns today 6 weeks post fall and T 12 compression fracture.  He is here with his wife and an . The pt continues to have back pain without radicular pain.  They are adamant they do not want to continue to wear the brace. It was explained mutiple times the reasoning for the brace and the risk of further injury or paralysis if he does not wear it as recommended.       Exam:  Constitutional:  Alert, well nourished, NAD.  HEENT: Normocephalic, atraumatic.   Pulm:  Without shortness of breath   CV:  No pitting edema of BLE.      Neurological:  Awake  Alert  Oriented x 3  Motor exam:        IP Q DF PF EHL  R   5  5   5   5    5  L   5  5   5   5    5     Able to spontaneously move L/E bilaterally  Sensation intact throughout all L/E dermatomes  Pain with palpation to the spine      Imaging: Noted T12 compression fracture    A/P: Mr. Huber is a 82 year old male that returns today 6 weeks post fall and T 12 compression fracture.  He is here with his wife and an . The pt continues to have back pain without radicular pain.  They are adamant they do not want to continue to wear the brace. It was explained mutiple times the reasoning for the brace and the risk of further injury or paralysis if he does not wear it as recommended. They verbalized understanding.  We will have him continue to wear the brace for another 6 weeks.    Patient Instructions   - continue to wear TLSO at all times when out of bed  - Repeat XR of lumbar spine AP and Lateral in 6 weeks.   - Return to clinic following repeat XR spine in 6 weeks   - Call the [facility]  clinic for any questions or concerns during interim, at [phone]  - Seek medical attention immediately if any paresthesias, weakness, gait instability, bowel/bladder incontinence, new pain.             Sangeetha Mireles CNP  [facility]  [address] [address]    Tel [phone]  Pager [phone]        Again, thank you for allowing me to participate in the care of your patient.        Sincerely,        ЮЛИЯ Horan CNP

## 2018-06-29 NOTE — PATIENT INSTRUCTIONS
- continue to wear TLSO at all times when out of bed  - Repeat XR of lumbar spine AP and Lateral in 6 weeks.   - Return to clinic following repeat XR spine in 6 weeks   - Call the [facility] for any questions or concerns during interim, at [phone]  - Seek medical attention immediately if any paresthesias, weakness, gait instability, bowel/bladder incontinence, new pain.

## 2018-06-29 NOTE — PROGRESS NOTES
[facility]  Neurosurgery followup:    HPI: Mr. Huber is a 82 year old male that returns today 6 weeks post fall and T 12 compression fracture.  He is here with his wife and an . The pt continues to have back pain without radicular pain.  They are adamant they do not want to continue to wear the brace. It was explained mutiple times the reasoning for the brace and the risk of further injury or paralysis if he does not wear it as recommended.       Exam:  Constitutional:  Alert, well nourished, NAD.  HEENT: Normocephalic, atraumatic.   Pulm:  Without shortness of breath   CV:  No pitting edema of BLE.      Neurological:  Awake  Alert  Oriented x 3  Motor exam:        IP Q DF PF EHL  R   5  5   5   5    5  L   5  5   5   5    5     Able to spontaneously move L/E bilaterally  Sensation intact throughout all L/E dermatomes  Pain with palpation to the spine      Imaging: Noted T12 compression fracture    A/P: Mr. Huber is a 82 year old male that returns today 6 weeks post fall and T 12 compression fracture.  He is here with his wife and an . The pt continues to have back pain without radicular pain.  They are adamant they do not want to continue to wear the brace. It was explained mutiple times the reasoning for the brace and the risk of further injury or paralysis if he does not wear it as recommended. They verbalized understanding.  We will have him continue to wear the brace for another 6 weeks.    Patient Instructions   - continue to wear TLSO at all times when out of bed  - Repeat XR of lumbar spine AP and Lateral in 6 weeks.   - Return to clinic following repeat XR spine in 6 weeks   - Call the [facility] clinic for any questions or concerns during interim, at [phone]  - Seek medical attention immediately if any paresthesias, weakness, gait instability, bowel/bladder incontinence, new pain.             Sangeetha Mireles [facility]  [facility]  [place]  [address]  [address]  [place] [address]    Tel [phone]  Pager [phone]

## 2018-08-10 ENCOUNTER — RADIANT APPOINTMENT (OUTPATIENT)
Dept: GENERAL RADIOLOGY | Facility: CLINIC | Age: 83
End: 2018-08-10
Attending: NURSE PRACTITIONER
Payer: COMMERCIAL

## 2018-08-10 ENCOUNTER — OFFICE VISIT (OUTPATIENT)
Dept: NEUROSURGERY | Facility: CLINIC | Age: 83
End: 2018-08-10
Attending: NURSE PRACTITIONER
Payer: COMMERCIAL

## 2018-08-10 VITALS
SYSTOLIC BLOOD PRESSURE: 155 MMHG | WEIGHT: 216 LBS | HEIGHT: 73 IN | DIASTOLIC BLOOD PRESSURE: 78 MMHG | OXYGEN SATURATION: 95 % | HEART RATE: 71 BPM | BODY MASS INDEX: 28.63 KG/M2

## 2018-08-10 DIAGNOSIS — S22.080A T12 COMPRESSION FRACTURE (H): ICD-10-CM

## 2018-08-10 DIAGNOSIS — S22.080A T12 COMPRESSION FRACTURE (H): Primary | ICD-10-CM

## 2018-08-10 PROCEDURE — 72080 X-RAY EXAM THORACOLMB 2/> VW: CPT

## 2018-08-10 PROCEDURE — G0463 HOSPITAL OUTPT CLINIC VISIT: HCPCS

## 2018-08-10 PROCEDURE — 99213 OFFICE O/P EST LOW 20 MIN: CPT | Performed by: NURSE PRACTITIONER

## 2018-08-10 ASSESSMENT — PAIN SCALES - GENERAL: PAINLEVEL: MODERATE PAIN (5)

## 2018-08-10 NOTE — PROGRESS NOTES
[facility]  Neurosurgery followup:    HPI: Mr. Huber is a 82 year old male that returns today 12 weeks post fall and T 12 compression fracture.  He is here with his wife and an .  The pts wife admits that he has not been wearing the brace.  He notes that he still has pain 5/10 to his back but denies any radicular pain or weakness.        Exam:  Constitutional:  Alert, well nourished, NAD.  HEENT: Normocephalic, atraumatic.   Pulm:  Without shortness of breath   CV:  No pitting edema of BLE.      Neurological:  Awake  Alert  Oriented x 3  Motor exam:        IP Q DF PF EHL  R   5  5   5   5    5  L   5  5   5   5    5     Able to spontaneously move L/E bilaterally  Sensation intact throughout all L/E dermatomes       Imaging: noted once again T12 compression fracture.      A/P: Mr. Huber is a 82 year old male that returns today 12 weeks post fall and T 12 compression fracture.  He is here with his wife and an .  The pts wife admits that he has not been wearing the brace.  He notes that he still has pain 5/10 to his back but denies any radicular pain or weakness.  We did discuss the use of vertebroplasty and that he would need an MRI of the thoracic spine to see if he is a candidate.  The pt and his wife do not feel he needs it and that he has been active.  We will have him call if he would like to proceed.     Patient Instructions   - Wean from brace per patient comfort. May use as needed for pain.     - followup as needed             Sangeetha Mireles [facility]  [facility]  [address] [address]    Tel [phone]  Pager [phone]

## 2018-08-10 NOTE — NURSING NOTE
"Adam Huber is a 83 year old male who presents for:  Chief Complaint   Patient presents with     Neurologic Problem     12 week follow up T12 compression fracutre         Vitals:    Vitals:    08/10/18 1041   BP: 155/78   BP Location: Right arm   Patient Position: Sitting   Cuff Size: Adult Regular   Pulse: 71   SpO2: 95%   Weight: 216 lb (98 kg)   Height: 6' 0.5\" (1.842 m)       BMI:  Estimated body mass index is 28.89 kg/(m^2) as calculated from the following:    Height as of this encounter: 6' 0.5\" (1.842 m).    Weight as of this encounter: 216 lb (98 kg).    Pain Score:  Moderate Pain (5)      Do you feel safe in your environment?  Yes      Yvette Aldrich          "

## 2018-08-10 NOTE — LETTER
8/10/2018         RE: Adam Huber  [address]  [place] [address]        Dear Colleague,    Thank you for referring your patient, Adam Huber, to the [facility]. Please see a copy of my visit note below.    [facility]  Neurosurgery followup:    HPI: Mr. Huber is a 82 year old male that returns today 12 weeks post fall and T 12 compression fracture.  He is here with his wife and an .  The pts wife admits that he has not been wearing the brace.  He notes that he still has pain 5/10 to his back but denies any radicular pain or weakness.        Exam:  Constitutional:  Alert, well nourished, NAD.  HEENT: Normocephalic, atraumatic.   Pulm:  Without shortness of breath   CV:  No pitting edema of BLE.      Neurological:  Awake  Alert  Oriented x 3  Motor exam:        IP Q DF PF EHL  R   5  5   5   5    5  L   5  5   5   5    5     Able to spontaneously move L/E bilaterally  Sensation intact throughout all L/E dermatomes       Imaging: noted once again T12 compression fracture.      A/P: Mr. Huber is a 82 year old male that returns today 12 weeks post fall and T 12 compression fracture.  He is here with his wife and an .  The pts wife admits that he has not been wearing the brace.  He notes that he still has pain 5/10 to his back but denies any radicular pain or weakness.  We did discuss the use of vertebroplasty and that he would need an MRI of the thoracic spine to see if he is a candidate.  The pt and his wife do not feel he needs it and that he has been active.  We will have him call if he would like to proceed.     Patient Instructions   - Wean from brace per patient comfort. May use as needed for pain.     - followup as needed             Sangeetha Mireles [facility]  [facility]  [address]  [address] [place] [address]    Tel [phone]  Pager [phone]        Again,  thank you for allowing me to participate in the care of your patient.        Sincerely,        ЮЛИЯ Horan CNP

## 2018-08-10 NOTE — MR AVS SNAPSHOT
After Visit Summary   8/10/2018    Adam Huber    MRN: [id number]           Patient Information     Date Of Birth          1935        Visit Information        Provider Department      8/10/2018 10:25 AM Sangeetha Mireles APRN CNP; [place] [employer] [facility]        Care Instructions    - Wean from brace per patient comfort. May use as needed for pain.     - followup as needed            Follow-ups after your visit        Your next 10 appointments already scheduled     Nov 08, 2018  9:50 AM CST   LAB with [facility]   [facility] ([facility])    [address]  [place] [address]   [phone]           Please do not eat 10-12 hours before your appointment if you are coming in fasting for labs on lipids, cholesterol, or glucose (sugar). This does not apply to pregnant women. Water, hot tea and black coffee (with nothing added) are okay. Do not drink other fluids, diet soda or chew gum.            Nov 08, 2018 10:45 AM CST   Return Visit with Eric Avalos MD   [facility] ([facility])    [facility]  [place] [address]   [phone] OPT 2              Who to contact     If you have questions or need follow up information about today's clinic visit or your schedule please contact [facility] directly at [phone].  Normal or non-critical lab and imaging results will be communicated to you by MyChart, letter or phone within 4 business days after the clinic has received the results. If you do not hear from us within 7 days, please contact the clinic through MyChart or phone. If you have a critical or abnormal lab result, we will notify you by phone as soon as possible.  Submit refill requests through [employer] or call your pharmacy and they will forward the refill request to us. Please allow 3 business  "days for your refill to be completed.          Additional Information About Your Visit        Care EveryWhere ID     This is your Care EveryWhere ID. This could be used by other organizations to access your [place] medical records  [id number]        Your Vitals Were     Pulse Height Pulse Oximetry BMI (Body Mass Index)          71 6' 0.5\" (1.842 m) 95% 28.89 kg/m2         Blood Pressure from Last 3 Encounters:   08/10/18 155/78   06/29/18 165/87   06/01/18 165/73    Weight from Last 3 Encounters:   08/10/18 216 lb (98 kg)   06/29/18 220 lb (99.8 kg)   05/19/18 223 lb 12.3 oz (101.5 kg)              Today, you had the following     No orders found for display       Primary Care Provider Office Phone # Fax #    Oralia Forrest [phone]       [facility] [id number] [place] [address]        Equal Access to Services     BOB VALENZUELA : Hadii aad ku hadasho Soomaali, waaxda luqadaha, qaybta kaalmada adeegyada, waxay josrin hayfayn jaun keating . So [facility] [phone].    ATENCIÓN: Si habla español, tiene a banks disposición servicios gratuitos de asistencia lingüística. Llame al [phone].    We comply with applicable federal civil rights laws and [place] laws. We do not discriminate on the basis of race, color, national origin, age, disability, sex, sexual orientation, or gender identity.            Thank you!     Thank you for choosing [facility]  for your care. Our goal is always to provide you with excellent care. Hearing back from our patients is one way we can continue to improve our services. Please take a few minutes to complete the written survey that you may receive in the mail after your visit with us. Thank you!             Your Updated Medication List - Protect others around you: Learn how to safely use, store and throw away your medicines at www.disposemymeds.org.          This list is accurate as of 8/10/18 10:49 AM.  Always use your most " recent med list.                   Brand Name Dispense Instructions for use Diagnosis    acetaminophen 325 MG tablet    TYLENOL    100 tablet    Take 2 tablets (650 mg) by mouth every 4 hours as needed for mild pain    History of CVA (cerebrovascular accident)       aspirin 81 MG EC tablet     90 tablet    Take one tablet daily    NSTEMI (non-ST elevated myocardial infarction) (H)       carvedilol 25 MG tablet    COREG    60 tablet    Take 1 tablet (25 mg) by mouth 2 times daily (with meals)    Essential hypertension       gabapentin 100 MG capsule    NEURONTIN    90 capsule    Take 1 capsule (100 mg) by mouth 3 times daily    T12 compression fracture (H)       losartan 100 MG tablet    COZAAR    30 tablet    Take 0.5 tablets (50 mg) by mouth daily    Essential hypertension       melatonin 3 MG tablet      Take 3-6 mg by mouth At Bedtime        nitroGLYcerin 0.4 MG sublingual tablet    NITROSTAT    25 tablet    Place 1 tablet (0.4 mg) under the tongue every 5 minutes as needed for chest pain    NSTEMI (non-ST elevated myocardial infarction) (H), Essential hypertension, Postsurgical percutaneous transluminal coronary angioplasty status       nortriptyline 50 MG capsule    PAMELOR    30 capsule    Take 1 capsule (50 mg) by mouth At Bedtime    T12 compression fracture (H)       olopatadine HCl 0.2 % Soln    [facility]     Place 1 drop into both eyes daily as needed        polyethylene glycol powder    MIRALAX/GLYCOLAX     Take 1 capful by mouth daily as needed        * QUEtiapine 25 MG tablet    SEROquel    60 tablet    Take 1 tablet (25 mg) by mouth At Bedtime    Acute delirium, Late 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 behavioral disturbance       * QUEtiapine 25 MG tablet    SEROquel    60 tablet    Take 0.25 tablets (6.25 mg) by mouth 2 times daily (with meals)    Late 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 behavioral disturbance       traMADol 50 MG tablet    ULTRAM    10 tablet    Take 1 tablet (50 mg) by mouth every 4  hours as needed for moderate pain    T12 compression fracture (H)       * Notice:  This list has 2 medication(s) that are the same as other medications prescribed for you. Read the directions carefully, and ask your doctor or other care provider to review them with you.

## 2018-11-06 DIAGNOSIS — I25.10 CAD (CORONARY ARTERY DISEASE): Primary | ICD-10-CM

## 2018-11-08 ENCOUNTER — OFFICE VISIT (OUTPATIENT)
Dept: CARDIOLOGY | Facility: CLINIC | Age: 83
End: 2018-11-08
Attending: INTERNAL MEDICINE
Payer: COMMERCIAL

## 2018-11-08 VITALS
HEIGHT: 72 IN | DIASTOLIC BLOOD PRESSURE: 76 MMHG | SYSTOLIC BLOOD PRESSURE: 136 MMHG | HEART RATE: 80 BPM | BODY MASS INDEX: 29.26 KG/M2 | WEIGHT: 216 LBS

## 2018-11-08 DIAGNOSIS — I10 ESSENTIAL HYPERTENSION: Primary | ICD-10-CM

## 2018-11-08 DIAGNOSIS — I21.4 NSTEMI (NON-ST ELEVATED MYOCARDIAL INFARCTION) (H): ICD-10-CM

## 2018-11-08 DIAGNOSIS — S22.080A CLOSED WEDGE COMPRESSION FRACTURE OF TWELFTH THORACIC VERTEBRA, INITIAL ENCOUNTER: ICD-10-CM

## 2018-11-08 DIAGNOSIS — I25.10 CAD (CORONARY ARTERY DISEASE): ICD-10-CM

## 2018-11-08 DIAGNOSIS — I25.10 CORONARY ARTERY DISEASE INVOLVING NATIVE CORONARY ARTERY OF NATIVE HEART WITHOUT ANGINA PECTORIS: ICD-10-CM

## 2018-11-08 DIAGNOSIS — I24.9 ACS (ACUTE CORONARY SYNDROME) (H): ICD-10-CM

## 2018-11-08 LAB
ALT SERPL W P-5'-P-CCNC: 26 U/L (ref 5–30)
ANION GAP SERPL CALCULATED.3IONS-SCNC: 11.3 MMOL/L (ref 6–17)
BUN SERPL-MCNC: 17 MG/DL (ref 7–30)
CALCIUM SERPL-MCNC: 9.1 MG/DL (ref 8.5–10.5)
CHLORIDE SERPL-SCNC: 102 MMOL/L (ref 98–107)
CHOLEST SERPL-MCNC: 174 MG/DL
CO2 SERPL-SCNC: 29 MMOL/L (ref 23–29)
CREAT SERPL-MCNC: 1.07 MG/DL (ref 0.7–1.3)
GFR SERPL CREATININE-BSD FRML MDRD: 66 ML/MIN/1.7M2
GLUCOSE SERPL-MCNC: 182 MG/DL (ref 70–105)
HDLC SERPL-MCNC: 56 MG/DL
LDLC SERPL CALC-MCNC: 91 MG/DL
NONHDLC SERPL-MCNC: 118 MG/DL
POTASSIUM SERPL-SCNC: 4.3 MMOL/L (ref 3.5–5.1)
SODIUM SERPL-SCNC: 138 MMOL/L (ref 136–145)
TRIGL SERPL-MCNC: 133 MG/DL

## 2018-11-08 PROCEDURE — 84460 ALANINE AMINO (ALT) (SGPT): CPT | Performed by: INTERNAL MEDICINE

## 2018-11-08 PROCEDURE — 80061 LIPID PANEL: CPT | Performed by: INTERNAL MEDICINE

## 2018-11-08 PROCEDURE — 99213 OFFICE O/P EST LOW 20 MIN: CPT | Performed by: INTERNAL MEDICINE

## 2018-11-08 PROCEDURE — 80048 BASIC METABOLIC PNL TOTAL CA: CPT | Performed by: INTERNAL MEDICINE

## 2018-11-08 PROCEDURE — 36415 COLL VENOUS BLD VENIPUNCTURE: CPT | Performed by: INTERNAL MEDICINE

## 2018-11-08 RX ORDER — CARVEDILOL 25 MG/1
25 TABLET ORAL 2 TIMES DAILY WITH MEALS
Qty: 180 TABLET | Refills: 3 | Status: SHIPPED | OUTPATIENT
Start: 2018-11-08

## 2018-11-08 RX ORDER — ROSUVASTATIN CALCIUM 5 MG/1
5 TABLET, COATED ORAL DAILY
Qty: 90 TABLET | Refills: 3 | Status: ON HOLD | OUTPATIENT
Start: 2018-11-08 | End: 2019-01-10

## 2018-11-08 NOTE — PROGRESS NOTES
HPI and Plan:   Mr. Huber is a very pleasant 83-year-old Kuwaiti-speaking gentleman who I saw in 10/2015 when he was admitted at [facility] with chest discomfort with elevated troponin and was found to have non-ST elevation myocardial infarction.  Coronary angiogram showed 3-vessel coronary artery disease and patient preferred percutaneous intervention, initially underwent successful drug-eluting stent PCI of the culprit proximal to mid RCA with 3.5x12 and 3x33 mm Alpine drug-eluting stent.  Additionally, he was also found to have long stenosis of the mid circumflex, which was intervened in a staged manner about 4 weeks later with 2.7x30 mm and 2.5x18 mm Alpine drug-eluting stent. He had moderate LAD disease with severe diagonal disease involving both the diagonal branches.  The first diagonal had 99% ostial stenosis and second diagonal had 70% proximal followed by 80% mid stenosis and they both appeared small and it was felt that they are best managed medically, especially in the absence of symptoms.  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  Today patient is coming for routine follow-up.  He is accompanied by his wife.  An official [place]i  is used for this evaluation.  Patient does not have any cardiac complaints.  No chest discomfort or shortness of breath.  He does have underlying dementia and according to wife some days are worse than others sometimes is not able to remember the name of the kids.  Today he was oriented x3 and was appropriately conversant and was comfortable and denies feeling any discomfort like shortness of breath or chest pain.  For some unknown reason patient was on Crestor 5 mg daily and it was discontinued a few months ago probably it looks like patient may have out of prescription.  His LDL was quite  well controlled previously recent LDL she was increased from 51-91.  He is on aspirin, carvedilol, losartan.    Assessment and plan  A pleasant 83-year-old gentleman Kazakh-speaking with history of CAD with residual coronary disease as noted above  In terms of diagonal, small in size overall best managed medically especially in the absence of symptoms and underlying dementia.  Blood pressure is well controlled.  Clinically does not have any anginal symptoms.  Cardiac auscultation was benign.  I do recommend patient restart Crestor 5 mg daily which he was on and on which his LDL was quite well controlled.  He should continue rest of the cardiac medications that include aspirin beta blocker angiotensin was beta-blocker.  At patient request I renewed the prescription of beta-blocker.  We will be happy to see him in cardiology clinic on as needed or annual basis or sooner if he notes any change in clinical symptoms especially if any exertional related symptoms.    Orders Placed This Encounter   Procedures     Lipid Profile     ALT     Follow-Up with Cardiologist       Orders Placed This Encounter   Medications     rosuvastatin (CRESTOR) 5 MG tablet     Sig: Take 1 tablet (5 mg) by mouth daily     Dispense:  90 tablet     Refill:  3     carvedilol (COREG) 25 MG tablet     Sig: Take 1 tablet (25 mg) by mouth 2 times daily (with meals)     Dispense:  180 tablet     Refill:  3       Medications Discontinued During This Encounter   Medication Reason     melatonin 3 MG tablet Stopped by Patient     carvedilol (COREG) 25 MG tablet Reorder         Encounter Diagnoses   Name Primary?     Coronary artery disease involving native coronary artery of native heart without angina pectoris      Essential hypertension Yes     NSTEMI (non-ST elevated myocardial infarction) (H)      ACS (acute coronary syndrome) (H)      Closed wedge compression fracture of twelfth thoracic vertebra, initial encounter (H)        CURRENT  MEDICATIONS:  Current Outpatient Prescriptions   Medication Sig Dispense Refill     acetaminophen (TYLENOL) 325 MG tablet Take 2 tablets (650 mg) by mouth every 4 hours as needed for mild pain 100 tablet      aspirin 81 MG EC tablet Take one tablet daily 90 tablet 0     carvedilol (COREG) 25 MG tablet Take 1 tablet (25 mg) by mouth 2 times daily (with meals) 180 tablet 3     losartan (COZAAR) 100 MG tablet Take 0.5 tablets (50 mg) by mouth daily 30 tablet      nortriptyline (PAMELOR) 50 MG capsule Take 1 capsule (50 mg) by mouth At Bedtime (Patient taking differently: Take 75 mg by mouth At Bedtime ) 30 capsule      rosuvastatin (CRESTOR) 5 MG tablet Take 1 tablet (5 mg) by mouth daily 90 tablet 3     gabapentin (NEURONTIN) 100 MG capsule Take 1 capsule (100 mg) by mouth 3 times daily (Patient not taking: Reported on 11/8/2018) 90 capsule      nitroglycerin (NITROSTAT) 0.4 MG SL tablet Place 1 tablet (0.4 mg) under the tongue every 5 minutes as needed for chest pain 25 tablet 1     olopatadine HCl (PATADAY) 0.2 % SOLN Place 1 drop into both eyes daily as needed       polyethylene glycol (MIRALAX/GLYCOLAX) powder Take 1 capful by mouth daily as needed        QUEtiapine (SEROQUEL) 25 MG tablet Take 0.25 tablets (6.25 mg) by mouth 2 times daily (with meals) (Patient not taking: Reported on 11/8/2018) 60 tablet      QUEtiapine (SEROQUEL) 25 MG tablet Take 1 tablet (25 mg) by mouth At Bedtime (Patient not taking: Reported on 11/8/2018) 60 tablet      traMADol (ULTRAM) 50 MG tablet Take 1 tablet (50 mg) by mouth every 4 hours as needed for moderate pain (Patient not taking: Reported on 11/8/2018) 10 tablet 0     [DISCONTINUED] carvedilol (COREG) 25 MG tablet Take 1 tablet (25 mg) by mouth 2 times daily (with meals) 60 tablet        ALLERGIES     Allergies   Allergen Reactions     Lisinopril      Morphine Other (See Comments)     confusion       PAST MEDICAL HISTORY:  Past Medical History:   Diagnosis Date     Acute chest  pain 10/23/2015     CAD (coronary artery disease)      Essential hypertension 10/23/2015     History of CVA (cerebrovascular accident) 10/23/2015     HLD (hyperlipidemia)      HTN (hypertension)      NSTEMI (non-ST elevated myocardial infarction) (H) 10/23/2015     Post PTCA 10-    Successful PCI of culprit proximal to mid RCA with placement of a     Post PTCA 11-6-2015    pci of complex mid CFX-hector       PAST SURGICAL HISTORY:  Past Surgical History:   Procedure Laterality Date     CHOLECYSTECTOMY       GENITOURINARY SURGERY      prostate removal      HEART CATH LEFT HEART CATH  10/12/2015    PCI w/ HECTOR to RCA     HEART CATH RIGHT AND LEFT HEART CATH  11/6/2015    PCI w/ HECTOR to mid-CFX       FAMILY HISTORY:  Family History   Problem Relation Age of Onset     Unknown/Adopted No family hx of        SOCIAL HISTORY:  Social History     Social History     Marital status:      Spouse name: N/A     Number of children: N/A     Years of education: N/A     Social History Main Topics     Smoking status: Never Smoker     Smokeless tobacco: Never Used     Alcohol use No     Drug use: No     Sexual activity: Not Asked     Other Topics Concern     Caffeine Concern No     seldom     Sleep Concern Yes     does not sleep well     Special Diet Yes     lower sodium and fat     Exercise No     Social History Narrative       Review of Systems:  Skin:  Negative       Eyes:  Positive for glasses    ENT:  Positive for hearing loss    Respiratory:  Negative       Cardiovascular:    Positive for;chest pain (discomfort 2 weeks ago x 1 day)    Gastroenterology: Negative      Genitourinary:  not assessed      Musculoskeletal:  Positive for joint pain;joint stiffness;back pain    Neurologic:  Positive for headaches;stroke    Psychiatric:  Positive for depression    Heme/Lymph/Imm:  Negative      Endocrine:  Negative        Physical Exam:  Vitals: /76  Pulse 80  Ht 1.829 m (6')  Wt 98 kg (216 lb)  BMI 29.29 kg/m2  General  patient appears comfortable  Neck normal JVP  Cardiovascular system S1-S2 normal no murmur rub or gallop  Respiratory system clear to auscultate bilaterally  GI system abdomen soft nontender  Extremities no pitting edema  Neurological alert, oriented x3  Psych normal affect   skin no obvious rash  HEENT no pallor icterus        CC  Eric Avalos MD  [address]  [place] [id number]

## 2018-11-08 NOTE — LETTER
11/8/2018    Oralia Forrest  [facility] [address] [facility] [address]    RE: Adam Huber       Dear Colleague,    I had the pleasure of seeing Adam Huber in the [facility].    HPI and Plan:   Mr. Huber is a very pleasant 83-year-old Greenlandic-speaking gentleman who I saw in 10/2015 when he was admitted at [facility] with chest discomfort with elevated troponin and was found to have non-ST elevation myocardial infarction.  Coronary angiogram showed 3-vessel coronary artery disease and patient preferred percutaneous intervention, initially underwent successful drug-eluting stent PCI of the culprit proximal to mid RCA with 3.5x12 and 3x33 mm Alpine drug-eluting stent.  Additionally, he was also found to have long stenosis of the mid circumflex, which was intervened in a staged manner about 4 weeks later with 2.7x30 mm and 2.5x18 mm Alpine drug-eluting stent. He had moderate LAD disease with severe diagonal disease involving both the diagonal branches.  The first diagonal had 99% ostial stenosis and second diagonal had 70% proximal followed by 80% mid stenosis and they both appeared small and it was felt that they are best managed medically, especially in the absence of symptoms.  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  Today patient is coming for routine follow-up.  He is accompanied by his wife.  An official Green[place]ic  is used for this evaluation.  Patient does not have any cardiac complaints.  No chest discomfort or shortness of breath.  He does have underlying dementia and according to wife some days are worse than others sometimes is not able to remember the name of the kids.  Today he was oriented x3 and was appropriately conversant and was comfortable and denies  feeling any discomfort like shortness of breath or chest pain.  For some unknown reason patient was on Crestor 5 mg daily and it was discontinued a few months ago probably it looks like patient may have out of prescription.  His LDL was quite well controlled previously recent LDL she was increased from 51-91.  He is on aspirin, carvedilol, losartan.    Assessment and plan  A pleasant 83-year-old gentleman Nigerien-speaking with history of CAD with residual coronary disease as noted above  In terms of diagonal, small in size overall best managed medically especially in the absence of symptoms and underlying dementia.  Blood pressure is well controlled.  Clinically does not have any anginal symptoms.  Cardiac auscultation was benign.  I do recommend patient restart Crestor 5 mg daily which he was on and on which his LDL was quite well controlled.  He should continue rest of the cardiac medications that include aspirin beta blocker angiotensin was beta-blocker.  At patient request I renewed the prescription of beta-blocker.  We will be happy to see him in cardiology clinic on as needed or annual basis or sooner if he notes any change in clinical symptoms especially if any exertional related symptoms.    Orders Placed This Encounter   Procedures     Lipid Profile     ALT     Follow-Up with Cardiologist       Orders Placed This Encounter   Medications     rosuvastatin (CRESTOR) 5 MG tablet     Sig: Take 1 tablet (5 mg) by mouth daily     Dispense:  90 tablet     Refill:  3     carvedilol (COREG) 25 MG tablet     Sig: Take 1 tablet (25 mg) by mouth 2 times daily (with meals)     Dispense:  180 tablet     Refill:  3       Medications Discontinued During This Encounter   Medication Reason     melatonin 3 MG tablet Stopped by Patient     carvedilol (COREG) 25 MG tablet Reorder         Encounter Diagnoses   Name Primary?     Coronary artery disease involving native coronary artery of native heart without angina pectoris       Essential hypertension Yes     NSTEMI (non-ST elevated myocardial infarction) (H)      ACS (acute coronary syndrome) (H)      Closed wedge compression fracture of twelfth thoracic vertebra, initial encounter (H)        CURRENT MEDICATIONS:  Current Outpatient Prescriptions   Medication Sig Dispense Refill     acetaminophen (TYLENOL) 325 MG tablet Take 2 tablets (650 mg) by mouth every 4 hours as needed for mild pain 100 tablet      aspirin 81 MG EC tablet Take one tablet daily 90 tablet 0     carvedilol (COREG) 25 MG tablet Take 1 tablet (25 mg) by mouth 2 times daily (with meals) 180 tablet 3     losartan (COZAAR) 100 MG tablet Take 0.5 tablets (50 mg) by mouth daily 30 tablet      nortriptyline (PAMELOR) 50 MG capsule Take 1 capsule (50 mg) by mouth At Bedtime (Patient taking differently: Take 75 mg by mouth At Bedtime ) 30 capsule      rosuvastatin (CRESTOR) 5 MG tablet Take 1 tablet (5 mg) by mouth daily 90 tablet 3     gabapentin (NEURONTIN) 100 MG capsule Take 1 capsule (100 mg) by mouth 3 times daily (Patient not taking: Reported on 11/8/2018) 90 capsule      nitroglycerin (NITROSTAT) 0.4 MG SL tablet Place 1 tablet (0.4 mg) under the tongue every 5 minutes as needed for chest pain 25 tablet 1     olopatadine HCl (PATADAY) 0.2 % SOLN Place 1 drop into both eyes daily as needed       polyethylene glycol (MIRALAX/GLYCOLAX) powder Take 1 capful by mouth daily as needed        QUEtiapine (SEROQUEL) 25 MG tablet Take 0.25 tablets (6.25 mg) by mouth 2 times daily (with meals) (Patient not taking: Reported on 11/8/2018) 60 tablet      QUEtiapine (SEROQUEL) 25 MG tablet Take 1 tablet (25 mg) by mouth At Bedtime (Patient not taking: Reported on 11/8/2018) 60 tablet      traMADol (ULTRAM) 50 MG tablet Take 1 tablet (50 mg) by mouth every 4 hours as needed for moderate pain (Patient not taking: Reported on 11/8/2018) 10 tablet 0     [DISCONTINUED] carvedilol (COREG) 25 MG tablet Take 1 tablet (25 mg) by mouth 2 times  daily (with meals) 60 tablet        ALLERGIES     Allergies   Allergen Reactions     Lisinopril      Morphine Other (See Comments)     confusion       PAST MEDICAL HISTORY:  Past Medical History:   Diagnosis Date     Acute chest pain 10/23/2015     CAD (coronary artery disease)      Essential hypertension 10/23/2015     History of CVA (cerebrovascular accident) 10/23/2015     HLD (hyperlipidemia)      HTN (hypertension)      NSTEMI (non-ST elevated myocardial infarction) (H) 10/23/2015     Post PTCA 10-    Successful PCI of culprit proximal to mid RCA with placement of a     Post PTCA 11-6-2015    pci of complex mid CFX-hector       PAST SURGICAL HISTORY:  Past Surgical History:   Procedure Laterality Date     CHOLECYSTECTOMY       GENITOURINARY SURGERY      prostate removal      HEART CATH LEFT HEART CATH  10/12/2015    PCI w/ HECTOR to RCA     HEART CATH RIGHT AND LEFT HEART CATH  11/6/2015    PCI w/ HECTOR to mid-CFX       FAMILY HISTORY:  Family History   Problem Relation Age of Onset     Unknown/Adopted No family hx of        SOCIAL HISTORY:  Social History     Social History     Marital status:      Spouse name: N/A     Number of children: N/A     Years of education: N/A     Social History Main Topics     Smoking status: Never Smoker     Smokeless tobacco: Never Used     Alcohol use No     Drug use: No     Sexual activity: Not Asked     Other Topics Concern     Caffeine Concern No     seldom     Sleep Concern Yes     does not sleep well     Special Diet Yes     lower sodium and fat     Exercise No     Social History Narrative       Review of Systems:  Skin:  Negative       Eyes:  Positive for glasses    ENT:  Positive for hearing loss    Respiratory:  Negative       Cardiovascular:    Positive for;chest pain (discomfort 2 weeks ago x 1 day)    Gastroenterology: Negative      Genitourinary:  not assessed      Musculoskeletal:  Positive for joint pain;joint stiffness;back pain    Neurologic:  Positive for  headaches;stroke    Psychiatric:  Positive for depression    Heme/Lymph/Imm:  Negative      Endocrine:  Negative        Physical Exam:  Vitals: /76  Pulse 80  Ht 1.829 m (6')  Wt 98 kg (216 lb)  BMI 29.29 kg/m2  General patient appears comfortable  Neck normal JVP  Cardiovascular system S1-S2 normal no murmur rub or gallop  Respiratory system clear to auscultate bilaterally  GI system abdomen soft nontender  Extremities no pitting edema  Neurological alert, oriented x3  Psych normal affect   skin no obvious rash  HEENT no pallor icterus      Thank you for allowing me to participate in the care of your patient.    Sincerely,     Eric Avalos MD     [facility]

## 2018-11-08 NOTE — LETTER
11/8/2018    Oralia Forrest  [facility] [address] [facility] [address]    RE: Adam Huber       Dear Colleague,    I had the pleasure of seeing Adam Huber in the [facility].    HPI and Plan:   Mr. Huber is a very pleasant 83-year-old [place]ian-speaking gentleman who I saw in 10/2015 when he was admitted at [facility] with chest discomfort with elevated troponin and was found to have non-ST elevation myocardial infarction.  Coronary angiogram showed 3-vessel coronary artery disease and patient preferred percutaneous intervention, initially underwent successful drug-eluting stent PCI of the culprit proximal to mid RCA with 3.5x12 and 3x33 mm Alpine drug-eluting stent.  Additionally, he was also found to have long stenosis of the mid circumflex, which was intervened in a staged manner about 4 weeks later with 2.7x30 mm and 2.5x18 mm Alpine drug-eluting stent. He had moderate LAD disease with severe diagonal disease involving both the diagonal branches.  The first diagonal had 99% ostial stenosis and second diagonal had 70% proximal followed by 80% mid stenosis and they both appeared small and it was felt that they are best managed medically, especially in the absence of symptoms.  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  Today patient is coming for routine follow-up.  He is accompanied by his wife.  An official [place]  is used for this evaluation.  Patient does not have any cardiac complaints.  No chest discomfort or shortness of breath.  He does have underlying dementia and according to wife some days are worse than others sometimes is not able to remember the name of the kids.  Today he was oriented x3 and was appropriately conversant and was comfortable and denies  feeling any discomfort like shortness of breath or chest pain.  For some unknown reason patient was on Crestor 5 mg daily and it was discontinued a few months ago probably it looks like patient may have out of prescription.  His LDL was quite well controlled previously recent LDL she was increased from 51-91.  He is on aspirin, carvedilol, losartan.    Assessment and plan  A pleasant 83-year-old gentleman Spanish-speaking with history of CAD with residual coronary disease as noted above  In terms of diagonal, small in size overall best managed medically especially in the absence of symptoms and underlying dementia.  Blood pressure is well controlled.  Clinically does not have any anginal symptoms.  Cardiac auscultation was benign.  I do recommend patient restart Crestor 5 mg daily which he was on and on which his LDL was quite well controlled.  He should continue rest of the cardiac medications that include aspirin beta blocker angiotensin was beta-blocker.  At patient request I renewed the prescription of beta-blocker.  We will be happy to see him in cardiology clinic on as needed or annual basis or sooner if he notes any change in clinical symptoms especially if any exertional related symptoms.    Orders Placed This Encounter   Procedures     Lipid Profile     ALT     Follow-Up with Cardiologist       Orders Placed This Encounter   Medications     rosuvastatin (CRESTOR) 5 MG tablet     Sig: Take 1 tablet (5 mg) by mouth daily     Dispense:  90 tablet     Refill:  3     carvedilol (COREG) 25 MG tablet     Sig: Take 1 tablet (25 mg) by mouth 2 times daily (with meals)     Dispense:  180 tablet     Refill:  3       Medications Discontinued During This Encounter   Medication Reason     melatonin 3 MG tablet Stopped by Patient     carvedilol (COREG) 25 MG tablet Reorder         Encounter Diagnoses   Name Primary?     Coronary artery disease involving native coronary artery of native heart without angina pectoris       Essential hypertension Yes     NSTEMI (non-ST elevated myocardial infarction) (H)      ACS (acute coronary syndrome) (H)      Closed wedge compression fracture of twelfth thoracic vertebra, initial encounter (H)        CURRENT MEDICATIONS:  Current Outpatient Prescriptions   Medication Sig Dispense Refill     acetaminophen (TYLENOL) 325 MG tablet Take 2 tablets (650 mg) by mouth every 4 hours as needed for mild pain 100 tablet      aspirin 81 MG EC tablet Take one tablet daily 90 tablet 0     carvedilol (COREG) 25 MG tablet Take 1 tablet (25 mg) by mouth 2 times daily (with meals) 180 tablet 3     losartan (COZAAR) 100 MG tablet Take 0.5 tablets (50 mg) by mouth daily 30 tablet      nortriptyline (PAMELOR) 50 MG capsule Take 1 capsule (50 mg) by mouth At Bedtime (Patient taking differently: Take 75 mg by mouth At Bedtime ) 30 capsule      rosuvastatin (CRESTOR) 5 MG tablet Take 1 tablet (5 mg) by mouth daily 90 tablet 3     gabapentin (NEURONTIN) 100 MG capsule Take 1 capsule (100 mg) by mouth 3 times daily (Patient not taking: Reported on 11/8/2018) 90 capsule      nitroglycerin (NITROSTAT) 0.4 MG SL tablet Place 1 tablet (0.4 mg) under the tongue every 5 minutes as needed for chest pain 25 tablet 1     olopatadine HCl (PATADAY) 0.2 % SOLN Place 1 drop into both eyes daily as needed       polyethylene glycol (MIRALAX/GLYCOLAX) powder Take 1 capful by mouth daily as needed        QUEtiapine (SEROQUEL) 25 MG tablet Take 0.25 tablets (6.25 mg) by mouth 2 times daily (with meals) (Patient not taking: Reported on 11/8/2018) 60 tablet      QUEtiapine (SEROQUEL) 25 MG tablet Take 1 tablet (25 mg) by mouth At Bedtime (Patient not taking: Reported on 11/8/2018) 60 tablet      traMADol (ULTRAM) 50 MG tablet Take 1 tablet (50 mg) by mouth every 4 hours as needed for moderate pain (Patient not taking: Reported on 11/8/2018) 10 tablet 0     [DISCONTINUED] carvedilol (COREG) 25 MG tablet Take 1 tablet (25 mg) by mouth 2 times  daily (with meals) 60 tablet        ALLERGIES     Allergies   Allergen Reactions     Lisinopril      Morphine Other (See Comments)     confusion       PAST MEDICAL HISTORY:  Past Medical History:   Diagnosis Date     Acute chest pain 10/23/2015     CAD (coronary artery disease)      Essential hypertension 10/23/2015     History of CVA (cerebrovascular accident) 10/23/2015     HLD (hyperlipidemia)      HTN (hypertension)      NSTEMI (non-ST elevated myocardial infarction) (H) 10/23/2015     Post PTCA 10-    Successful PCI of culprit proximal to mid RCA with placement of a     Post PTCA 11-6-2015    pci of complex mid CFX-hector       PAST SURGICAL HISTORY:  Past Surgical History:   Procedure Laterality Date     CHOLECYSTECTOMY       GENITOURINARY SURGERY      prostate removal      HEART CATH LEFT HEART CATH  10/12/2015    PCI w/ HECTOR to RCA     HEART CATH RIGHT AND LEFT HEART CATH  11/6/2015    PCI w/ HECTOR to mid-CFX       FAMILY HISTORY:  Family History   Problem Relation Age of Onset     Unknown/Adopted No family hx of        SOCIAL HISTORY:  Social History     Social History     Marital status:      Spouse name: N/A     Number of children: N/A     Years of education: N/A     Social History Main Topics     Smoking status: Never Smoker     Smokeless tobacco: Never Used     Alcohol use No     Drug use: No     Sexual activity: Not Asked     Other Topics Concern     Caffeine Concern No     seldom     Sleep Concern Yes     does not sleep well     Special Diet Yes     lower sodium and fat     Exercise No     Social History Narrative       Review of Systems:  Skin:  Negative       Eyes:  Positive for glasses    ENT:  Positive for hearing loss    Respiratory:  Negative       Cardiovascular:    Positive for;chest pain (discomfort 2 weeks ago x 1 day)    Gastroenterology: Negative      Genitourinary:  not assessed      Musculoskeletal:  Positive for joint pain;joint stiffness;back pain    Neurologic:  Positive for  headaches;stroke    Psychiatric:  Positive for depression    Heme/Lymph/Imm:  Negative      Endocrine:  Negative        Physical Exam:  Vitals: /76  Pulse 80  Ht 1.829 m (6')  Wt 98 kg (216 lb)  BMI 29.29 kg/m2  General patient appears comfortable  Neck normal JVP  Cardiovascular system S1-S2 normal no murmur rub or gallop  Respiratory system clear to auscultate bilaterally  GI system abdomen soft nontender  Extremities no pitting edema  Neurological alert, oriented x3  Psych normal affect   skin no obvious rash  HEENT no pallor icterus        CC  Eric Avalos MD  [address]  [place] [address]                    Thank you for allowing me to participate in the care of your patient.      Sincerely,     Eric Avalos MD     [facility]    cc:   Eric Avalos MD  [address]  [place] [address]

## 2018-11-08 NOTE — MR AVS SNAPSHOT
After Visit Summary   11/8/2018    Adam Huber    MRN: [id number]           Patient Information     Date Of Birth          1935        Visit Information        Provider Department      11/8/2018 10:45 AM Eric Avalos MD; [place] [employer] [facility]        Today's Diagnoses     Essential hypertension    -  1    Coronary artery disease involving native coronary artery of native heart without angina pectoris        NSTEMI (non-ST elevated myocardial infarction) (H)        ACS (acute coronary syndrome) (H)        Closed wedge compression fracture of twelfth thoracic vertebra, initial encounter (H)           Follow-ups after your visit        Additional Services     Follow-Up with Cardiologist                 Future tests that were ordered for you today     Open Future Orders        Priority Expected Expires Ordered    Lipid Profile Routine 11/8/2019 11/8/2019 11/8/2018    ALT Routine 11/8/2019 11/8/2019 11/8/2018    Follow-Up with Cardiologist Routine 11/8/2019 11/9/2019 11/8/2018            Who to contact     If you have questions or need follow up information about today's clinic visit or your schedule please contact [facility] directly at [phone].  Normal or non-critical lab and imaging results will be communicated to you by MyChart, letter or phone within 4 business days after the clinic has received the results. If you do not hear from us within 7 days, please contact the clinic through MyChart or phone. If you have a critical or abnormal lab result, we will notify you by phone as soon as possible.  Submit refill requests through [employer] or call your pharmacy and they will forward the refill request to us. Please allow 3 business days for your refill to be completed.          Additional Information About Your Visit        Care EveryWhere ID     This is your Care EveryWhere ID. This could be  used by other organizations to access your [facility] medical records  [id number]        Your Vitals Were     Pulse Height BMI (Body Mass Index)             80 1.829 m (6') 29.29 kg/m2          Blood Pressure from Last 3 Encounters:   11/08/18 136/76   08/10/18 155/78   06/29/18 165/87    Weight from Last 3 Encounters:   11/08/18 98 kg (216 lb)   08/10/18 98 kg (216 lb)   06/29/18 99.8 kg (220 lb)              We Performed the Following     Follow-Up with Cardiologist          Today's Medication Changes          These changes are accurate as of 11/8/18 10:58 AM.  If you have any questions, ask your nurse or doctor.               Start taking these medicines.        Dose/Directions    rosuvastatin 5 MG tablet   Commonly known as:  CRESTOR   Used for:  Coronary artery disease involving native coronary artery of native heart without angina pectoris   Started by:  Eric Avalos MD        Dose:  5 mg   Take 1 tablet (5 mg) by mouth daily   Quantity:  90 tablet   Refills:  3         These medicines have changed or have updated prescriptions.        Dose/Directions    nortriptyline 50 MG capsule   Commonly known as:  PAMELOR   This may have changed:  how much to take   Used for:  T12 compression fracture (H)        Dose:  50 mg   Take 1 capsule (50 mg) by mouth At Bedtime   Quantity:  30 capsule   Refills:  0            Where to get your medicines      [facility] [address] [address] AT [facility] [address]  [address] [place] [address] E, [facility] [address]     Phone:  [phone]     carvedilol 25 MG tablet    rosuvastatin 5 MG tablet                Primary Care Provider Office Phone # Fax #    Oralia Delcidd [phone]       [facility] [address] [place] [id number]        Equal Access to Services     BOB VALENZUELA AH: Belem Holt, colleen luqadaha, qaybta kaalelda kennedy, belén dolan  la'fayn temo. So [facility] [phone].    ATENCIÓN: Si habla shona, tiene a banks disposición servicios gratuitos de asistencia lingüística. Sarah hart [phone].    We comply with applicable federal civil rights laws and [place] laws. We do not discriminate on the basis of race, color, national origin, age, disability, sex, sexual orientation, or gender identity.            Thank you!     Thank you for choosing [facility]  for your care. Our goal is always to provide you with excellent care. Hearing back from our patients is one way we can continue to improve our services. Please take a few minutes to complete the written survey that you may receive in the mail after your visit with us. Thank you!             Your Updated Medication List - Protect others around you: Learn how to safely use, store and throw away your medicines at www.disposemymeds.org.          This list is accurate as of 11/8/18 10:58 AM.  Always use your most recent med list.                   Brand Name Dispense Instructions for use Diagnosis    acetaminophen 325 MG tablet    TYLENOL    100 tablet    Take 2 tablets (650 mg) by mouth every 4 hours as needed for mild pain    History of CVA (cerebrovascular accident)       aspirin 81 MG EC tablet     90 tablet    Take one tablet daily    NSTEMI (non-ST elevated myocardial infarction) (H)       carvedilol 25 MG tablet    COREG    180 tablet    Take 1 tablet (25 mg) by mouth 2 times daily (with meals)    Essential hypertension       gabapentin 100 MG capsule    NEURONTIN    90 capsule    Take 1 capsule (100 mg) by mouth 3 times daily    T12 compression fracture (H)       losartan 100 MG tablet    COZAAR    30 tablet    Take 0.5 tablets (50 mg) by mouth daily    Essential hypertension       nitroGLYcerin 0.4 MG sublingual tablet    NITROSTAT    25 tablet    Place 1 tablet (0.4 mg) under the tongue every 5 minutes as needed for chest pain    NSTEMI (non-ST elevated  myocardial infarction) (H), Essential hypertension, Postsurgical percutaneous transluminal coronary angioplasty status       nortriptyline 50 MG capsule    PAMELOR    30 capsule    Take 1 capsule (50 mg) by mouth At Bedtime    T12 compression fracture (H)       olopatadine HCl 0.2 % Soln    [facility]     Place 1 drop into both eyes daily as needed        polyethylene glycol powder    MIRALAX/GLYCOLAX     Take 1 capful by mouth daily as needed        * QUEtiapine 25 MG tablet    SEROquel    60 tablet    Take 1 tablet (25 mg) by mouth At Bedtime    Acute delirium, Late 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 behavioral disturbance       * QUEtiapine 25 MG tablet    SEROquel    60 tablet    Take 0.25 tablets (6.25 mg) by mouth 2 times daily (with meals)    Late 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 behavioral disturbance       rosuvastatin 5 MG tablet    CRESTOR    90 tablet    Take 1 tablet (5 mg) by mouth daily    Coronary artery disease involving native coronary artery of native heart without angina pectoris       traMADol 50 MG tablet    ULTRAM    10 tablet    Take 1 tablet (50 mg) by mouth every 4 hours as needed for moderate pain    T12 compression fracture (H)       * Notice:  This list has 2 medication(s) that are the same as other medications prescribed for you. Read the directions carefully, and ask your doctor or other care provider to review them with you.

## 2018-11-13 ENCOUNTER — TRANSFERRED RECORDS (OUTPATIENT)
Dept: HEALTH INFORMATION MANAGEMENT | Facility: CLINIC | Age: 83
End: 2018-11-13

## 2018-12-31 ENCOUNTER — HOSPITAL ENCOUNTER (EMERGENCY)
Facility: CLINIC | Age: 83
Discharge: HOME | End: 2018-12-31
Attending: EMERGENCY MEDICINE | Admitting: EMERGENCY MEDICINE
Payer: COMMERCIAL

## 2018-12-31 ENCOUNTER — APPOINTMENT (OUTPATIENT)
Dept: CT IMAGING | Facility: CLINIC | Age: 83
End: 2018-12-31
Attending: EMERGENCY MEDICINE
Payer: COMMERCIAL

## 2018-12-31 ENCOUNTER — APPOINTMENT (OUTPATIENT)
Dept: GENERAL RADIOLOGY | Facility: CLINIC | Age: 83
End: 2018-12-31
Attending: EMERGENCY MEDICINE
Payer: COMMERCIAL

## 2018-12-31 VITALS
SYSTOLIC BLOOD PRESSURE: 166 MMHG | RESPIRATION RATE: 18 BRPM | TEMPERATURE: 97.3 F | DIASTOLIC BLOOD PRESSURE: 80 MMHG | OXYGEN SATURATION: 96 % | HEART RATE: 71 BPM

## 2018-12-31 DIAGNOSIS — S32.010A CLOSED COMPRESSION FRACTURE OF FIRST LUMBAR VERTEBRA, INITIAL ENCOUNTER: ICD-10-CM

## 2018-12-31 DIAGNOSIS — R73.9 HYPERGLYCEMIA: ICD-10-CM

## 2018-12-31 DIAGNOSIS — R53.83 OTHER FATIGUE: ICD-10-CM

## 2018-12-31 LAB
ALBUMIN UR-MCNC: NEGATIVE MG/DL
ANION GAP SERPL CALCULATED.3IONS-SCNC: 6 MMOL/L (ref 3–14)
APPEARANCE UR: CLEAR
BASOPHILS # BLD AUTO: 0.1 10E9/L (ref 0–0.2)
BASOPHILS NFR BLD AUTO: 0.6 %
BILIRUB UR QL STRIP: NEGATIVE
BUN SERPL-MCNC: 25 MG/DL (ref 7–30)
CALCIUM SERPL-MCNC: 8.7 MG/DL (ref 8.5–10.1)
CHLORIDE SERPL-SCNC: 104 MMOL/L (ref 94–109)
CO2 SERPL-SCNC: 28 MMOL/L (ref 20–32)
COLOR UR AUTO: YELLOW
CREAT SERPL-MCNC: 0.98 MG/DL (ref 0.66–1.25)
DIFFERENTIAL METHOD BLD: NORMAL
EOSINOPHIL # BLD AUTO: 0.3 10E9/L (ref 0–0.7)
EOSINOPHIL NFR BLD AUTO: 2.6 %
ERYTHROCYTE [DISTWIDTH] IN BLOOD BY AUTOMATED COUNT: 12.8 % (ref 10–15)
GFR SERPL CREATININE-BSD FRML MDRD: 71 ML/MIN/{1.73_M2}
GLUCOSE SERPL-MCNC: 307 MG/DL (ref 70–99)
GLUCOSE UR STRIP-MCNC: >499 MG/DL
HCT VFR BLD AUTO: 50.8 % (ref 40–53)
HGB BLD-MCNC: 16.8 G/DL (ref 13.3–17.7)
HGB UR QL STRIP: NEGATIVE
IMM GRANULOCYTES # BLD: 0 10E9/L (ref 0–0.4)
IMM GRANULOCYTES NFR BLD: 0.2 %
KETONES UR STRIP-MCNC: NEGATIVE MG/DL
LEUKOCYTE ESTERASE UR QL STRIP: NEGATIVE
LYMPHOCYTES # BLD AUTO: 2.4 10E9/L (ref 0.8–5.3)
LYMPHOCYTES NFR BLD AUTO: 25 %
MAGNESIUM SERPL-MCNC: 2 MG/DL (ref 1.6–2.3)
MCH RBC QN AUTO: 32.2 PG (ref 26.5–33)
MCHC RBC AUTO-ENTMCNC: 33.1 G/DL (ref 31.5–36.5)
MCV RBC AUTO: 98 FL (ref 78–100)
MONOCYTES # BLD AUTO: 1.1 10E9/L (ref 0–1.3)
MONOCYTES NFR BLD AUTO: 11.1 %
MUCOUS THREADS #/AREA URNS LPF: PRESENT /LPF
NEUTROPHILS # BLD AUTO: 5.7 10E9/L (ref 1.6–8.3)
NEUTROPHILS NFR BLD AUTO: 60.5 %
NITRATE UR QL: NEGATIVE
NRBC # BLD AUTO: 0 10*3/UL
NRBC BLD AUTO-RTO: 0 /100
NT-PROBNP SERPL-MCNC: 129 PG/ML (ref 0–1800)
PH UR STRIP: 5 PH (ref 5–7)
PLATELET # BLD AUTO: 217 10E9/L (ref 150–450)
POTASSIUM SERPL-SCNC: 4.3 MMOL/L (ref 3.4–5.3)
RBC # BLD AUTO: 5.21 10E12/L (ref 4.4–5.9)
RBC #/AREA URNS AUTO: 1 /HPF (ref 0–2)
SODIUM SERPL-SCNC: 138 MMOL/L (ref 133–144)
SOURCE: ABNORMAL
SP GR UR STRIP: 1.03 (ref 1–1.03)
TROPONIN I SERPL-MCNC: <0.015 UG/L (ref 0–0.04)
UROBILINOGEN UR STRIP-MCNC: 0 MG/DL (ref 0–2)
WBC # BLD AUTO: 9.5 10E9/L (ref 4–11)
WBC #/AREA URNS AUTO: 3 /HPF (ref 0–5)

## 2018-12-31 PROCEDURE — 99285 EMERGENCY DEPT VISIT HI MDM: CPT | Mod: 25

## 2018-12-31 PROCEDURE — 74176 CT ABD & PELVIS W/O CONTRAST: CPT

## 2018-12-31 PROCEDURE — 81001 URINALYSIS AUTO W/SCOPE: CPT | Performed by: EMERGENCY MEDICINE

## 2018-12-31 PROCEDURE — 83880 ASSAY OF NATRIURETIC PEPTIDE: CPT | Performed by: EMERGENCY MEDICINE

## 2018-12-31 PROCEDURE — 93005 ELECTROCARDIOGRAM TRACING: CPT

## 2018-12-31 PROCEDURE — 71046 X-RAY EXAM CHEST 2 VIEWS: CPT

## 2018-12-31 PROCEDURE — 83735 ASSAY OF MAGNESIUM: CPT | Performed by: EMERGENCY MEDICINE

## 2018-12-31 PROCEDURE — 84484 ASSAY OF TROPONIN QUANT: CPT | Performed by: EMERGENCY MEDICINE

## 2018-12-31 PROCEDURE — 85025 COMPLETE CBC W/AUTO DIFF WBC: CPT | Performed by: EMERGENCY MEDICINE

## 2018-12-31 PROCEDURE — 80048 BASIC METABOLIC PNL TOTAL CA: CPT | Performed by: EMERGENCY MEDICINE

## 2018-12-31 ASSESSMENT — ENCOUNTER SYMPTOMS
DIARRHEA: 0
NAUSEA: 0
ABDOMINAL PAIN: 0
FEVER: 0
VOMITING: 0
WEAKNESS: 1
FATIGUE: 1
SHORTNESS OF BREATH: 0

## 2018-12-31 NOTE — ED TRIAGE NOTES
Patient presents to the ED reporting began having weakness in his legs yesterday. Also report patient has fatigue and some confusion. State patient has had similar symptoms in the past with a UTI. Reports some urinary retention.

## 2018-12-31 NOTE — ED AVS SNAPSHOT
[facility]  [address]  [facility] [address]  Phone:  [phone]  Fax:  [phone]                                    Adam Huber   MRN: [id number]    Department:  [facility]   Date of Visit:  12/31/2018           After Visit Summary Signature Page    I have received my discharge instructions, and my questions have been answered. I have discussed any challenges I see with this plan with the nurse or doctor.    ..........................................................................................................................................  Patient/Patient Representative Signature      ..........................................................................................................................................  Patient Representative Print Name and Relationship to Patient    ..................................................               ................................................  Date                                   Time    ..........................................................................................................................................  Reviewed by Signature/Title    ...................................................              ..............................................  Date                                               Time          22EPIC Rev 08/18

## 2018-12-31 NOTE — ED PROVIDER NOTES
History     Chief Complaint:  Generalized Weakness      The history is provided by a relative. A  was used (Patient's daughter, [place] speaking).      Adam Huber is a 83 year old male with a history of coronary artery disease, hypertension, Alzheimers who presents to the emergency department today with generalized weakness. The patient reports having increased weakness in his legs over the past day as well as some urinary urgency.  Patient and family report this happens on occasion when patient has a UTI.  No reported fever, chest pain, vomiting, black/bloody stools.  No reported falls or increasing confusion.    Allergies:  Lisinopril  Morphine     Medications:    Aspirin   Coreg  Neurontin   Cozaar  Nitrostat   Pamelor   Miralax  Seroquel   Crestor   Ultram     Past Medical History:    Acute chest pain   Coronary artery disease  Hypertension  Cerebrovascular accident   Hyperlipidemia   Non-ST elevated myocardial infarction   Metabolic encephalopathy   Urinary tract infection   Delirium   Late onset alzheimer's disease    Past Surgical History:    Cholecystectomy   Prostate removal   Heart cath left heart cath   Heart cath right and left heart cath     Family History:    History reviewed. No pertinent family history.     Social History:  The patient was alone in the emergency department.  Smoking Status: Never smoker   Smokeless Tobacco: Never used   Alcohol Use: No   Drug use: No   Marital Status:       Review of Systems   Constitutional: Positive for fatigue. Negative for fever.   Respiratory: Negative for shortness of breath.    Cardiovascular: Negative for chest pain.   Gastrointestinal: Negative for abdominal pain, diarrhea, nausea and vomiting.   Genitourinary: Positive for urgency.   Neurological: Positive for weakness.       Physical Exam   First Vitals:  Patient Vitals for the past 24 hrs:   BP Temp Temp src Pulse Resp SpO2   12/31/18 1800 166/80 -- -- 71 -- --    12/31/18 1745 170/76 -- -- 71 -- --   12/31/18 1730 153/86 -- -- 72 -- --   12/31/18 1715 161/78 -- -- 70 -- --   12/31/18 1700 149/80 -- -- 71 -- 96 %   12/31/18 1650 164/82 -- -- -- 18 97 %   12/31/18 1530 134/74 97.3  F (36.3  C) Temporal 75 18 95 %       Physical Exam  Nursing note and vitals reviewed.  Constitutional: Well nourished. Resting comfortably. French speaking  Eyes: Conjunctiva normal.  Pupils are equal, round, and reactive to light.   ENT: Nose normal. Mucous membranes pink and moist.    Neck: Normal range of motion.  CVS: Normal rate, regular rhythm.  Normal heart sounds.  No murmur.  Pulmonary: Lungs clear to auscultation bilaterally. No wheezes/rales/rhonchi.  GI: Abdomen soft, mild lower abdominal tenderness. No rigidity or guarding.    MSK: No calf tenderness or swelling.  Neuro: Alert. Follows simple commands.  Moves all extremities equally or symmetrically. Ambulates with minimal assistance, typically uses walker.   Skin: Skin is warm and dry. No rash noted.   Psychiatric: Blunt affect.     Emergency Department Course   ECG:  Indication: Generalized weakness  Completed at 1636.    Normal sinus rhythm   Rate 70 bpm. HI interval 152. QRS duration 98. QT/QTc 422/455. P-R-T axes 60 76 99.    Imaging:  Radiology findings were communicated with the patient who voiced understanding of the findings.    XR Chest:   IMPRESSION: Lungs are slightly hypoinflated with basilar opacities on  the lateral view which are favored to represent atelectasis. No  evidence of pleural effusion. Heart size is unchanged. Upper lumbar  compression fracture (likely L1) is present with approximately 50%  vertebral body height loss, new compared to 3/18/2017.  Report per radiology     Abdomen/Pelvis CT no contrast:   IMPRESSION:  1. No urinary stones or hydronephrosis.  2. No other acute findings within limits of a noncontrast CT.  3. Colonic diverticulosis.  Report per radiology     Laboratory:  Laboratory findings were  communicated with the patient who voiced understanding of the findings.    CBC: WBC 9.5, HGB 16.8,      BMP:  Glucose 307 (H), creatinine 0.98 o/WWNL     Troponin (Collected 1647): <0.015    BNP: 129    Magnesium: 2.0     UA: urine glucose >499, mucous urine present o/w WNL    Emergency Department Course:  Nursing notes and vitals reviewed.  1640: I performed an exam of the patient as documented above.     Findings and plan explained to the Patient. Patient discharged home with instructions regarding supportive care, medications, and reasons to return. The importance of close follow-up was reviewed.    I personally reviewed the laboratory and imaging  results with the Patient and answered all related questions prior to discharge.    Impression & Plan    Medical Decision Making:  Patient is an 83-year-old male presenting with reported fatigue and urinary complaint.  Triage note stated increasing confusion as well though family denies this on my questioning.  They note he has a history of Alzheimer's and is not having increasing confusion.  The patient is nontoxic, well hydrated on arrival.  There are no obvious focal neuro deficits and I discussed with family no indication for emergent headt CT at this time.  He underwent an extensive workup in the ED.  EKG without focal ischemia or underlying arrhythmia.  Screening troponin negative.  Labs without evidence of infection or significant electrolyte abnormality.  Noted mild hyperglycemia though family does report that the patient just ate.  The patient is not in DKA.  H/H stable.  UA without gross infection.  Patient also underwent a CT abdomen which was essentially unremarkable.  On reevaluation, patient appears comfortable.  Family requesting discharge home.  I did discuss my recommendation for close PCP follow-up 2-3 days for reevaluation.  Instructed to return for fever, increasing pain or should symptoms worsen or change.    Diagnosis:    ICD-10-CM    1.  Hyperglycemia R73.9    2. Other fatigue R53.83    3. Closed compression fracture of first lumbar vertebra, initial encounter (H) S32.010A        Disposition:  discharged to home    Rivera Raza  12/31/2018   [facility] EMERGENCY DEPARTMENT  Scribe Disclosure:  I, Rivera Raza, am serving as a scribe at 4:36 PM on 12/31/2018 to document services personally performed by Kiya Borja DO based on my observations and the provider's statements to me.        Kiya Borja DO  12/31/18 7027

## 2019-01-02 LAB — INTERPRETATION ECG - MUSE: NORMAL

## 2019-01-09 ENCOUNTER — HOSPITAL ENCOUNTER (INPATIENT)
Facility: CLINIC | Age: 84
LOS: 10 days | Discharge: HOME HEALTH | DRG: 565 | End: 2019-01-19
Attending: EMERGENCY MEDICINE | Admitting: INTERNAL MEDICINE
Payer: COMMERCIAL

## 2019-01-09 ENCOUNTER — APPOINTMENT (OUTPATIENT)
Dept: CT IMAGING | Facility: CLINIC | Age: 84
DRG: 565 | End: 2019-01-09
Payer: COMMERCIAL

## 2019-01-09 DIAGNOSIS — M62.81 GENERALIZED MUSCLE WEAKNESS: ICD-10-CM

## 2019-01-09 DIAGNOSIS — R41.0 ACUTE DELIRIUM: ICD-10-CM

## 2019-01-09 DIAGNOSIS — R45.1 AGITATION: ICD-10-CM

## 2019-01-09 DIAGNOSIS — R74.8 ELEVATED CK: ICD-10-CM

## 2019-01-09 DIAGNOSIS — R41.0 DELIRIUM: ICD-10-CM

## 2019-01-09 DIAGNOSIS — E11.9 TYPE 2 DIABETES MELLITUS WITHOUT COMPLICATION, UNSPECIFIED WHETHER LONG TERM INSULIN USE (H): ICD-10-CM

## 2019-01-09 DIAGNOSIS — Z86.73 HISTORY OF CVA (CEREBROVASCULAR ACCIDENT): Primary | ICD-10-CM

## 2019-01-09 PROBLEM — M62.82 RHABDOMYOLYSIS: Status: ACTIVE | Noted: 2019-01-09

## 2019-01-09 LAB
ALBUMIN SERPL-MCNC: 3.1 G/DL (ref 3.4–5)
ALBUMIN UR-MCNC: NEGATIVE MG/DL
ALP SERPL-CCNC: 138 U/L (ref 40–150)
ALT SERPL W P-5'-P-CCNC: 59 U/L (ref 0–70)
ANION GAP SERPL CALCULATED.3IONS-SCNC: 9 MMOL/L (ref 3–14)
APPEARANCE UR: CLEAR
AST SERPL W P-5'-P-CCNC: 106 U/L (ref 0–45)
BASOPHILS # BLD AUTO: 0.1 10E9/L (ref 0–0.2)
BASOPHILS NFR BLD AUTO: 0.6 %
BILIRUB SERPL-MCNC: 0.9 MG/DL (ref 0.2–1.3)
BILIRUB UR QL STRIP: NEGATIVE
BUN SERPL-MCNC: 18 MG/DL (ref 7–30)
CALCIUM SERPL-MCNC: 8.6 MG/DL (ref 8.5–10.1)
CHLORIDE SERPL-SCNC: 103 MMOL/L (ref 94–109)
CK SERPL-CCNC: 1275 U/L (ref 30–300)
CO2 SERPL-SCNC: 25 MMOL/L (ref 20–32)
COLOR UR AUTO: YELLOW
CREAT SERPL-MCNC: 0.86 MG/DL (ref 0.66–1.25)
DIFFERENTIAL METHOD BLD: NORMAL
EOSINOPHIL # BLD AUTO: 0.3 10E9/L (ref 0–0.7)
EOSINOPHIL NFR BLD AUTO: 3.2 %
ERYTHROCYTE [DISTWIDTH] IN BLOOD BY AUTOMATED COUNT: 12.6 % (ref 10–15)
ETHANOL SERPL-MCNC: <0.01 G/DL
GFR SERPL CREATININE-BSD FRML MDRD: 80 ML/MIN/{1.73_M2}
GLUCOSE SERPL-MCNC: 194 MG/DL (ref 70–99)
GLUCOSE UR STRIP-MCNC: >499 MG/DL
HBA1C MFR BLD: 7.9 % (ref 0–5.6)
HCT VFR BLD AUTO: 49.3 % (ref 40–53)
HGB BLD-MCNC: 17.1 G/DL (ref 13.3–17.7)
HGB UR QL STRIP: NEGATIVE
IMM GRANULOCYTES # BLD: 0.1 10E9/L (ref 0–0.4)
IMM GRANULOCYTES NFR BLD: 0.5 %
INR PPP: 1.01 (ref 0.86–1.14)
INTERPRETATION ECG - MUSE: NORMAL
KETONES UR STRIP-MCNC: 5 MG/DL
LEUKOCYTE ESTERASE UR QL STRIP: NEGATIVE
LYMPHOCYTES # BLD AUTO: 2.1 10E9/L (ref 0.8–5.3)
LYMPHOCYTES NFR BLD AUTO: 21.6 %
MCH RBC QN AUTO: 32.3 PG (ref 26.5–33)
MCHC RBC AUTO-ENTMCNC: 34.7 G/DL (ref 31.5–36.5)
MCV RBC AUTO: 93 FL (ref 78–100)
MONOCYTES # BLD AUTO: 0.9 10E9/L (ref 0–1.3)
MONOCYTES NFR BLD AUTO: 9.2 %
MUCOUS THREADS #/AREA URNS LPF: PRESENT /LPF
NEUTROPHILS # BLD AUTO: 6.4 10E9/L (ref 1.6–8.3)
NEUTROPHILS NFR BLD AUTO: 64.9 %
NITRATE UR QL: NEGATIVE
NRBC # BLD AUTO: 0 10*3/UL
NRBC BLD AUTO-RTO: 0 /100
PH UR STRIP: 6 PH (ref 5–7)
PLATELET # BLD AUTO: 201 10E9/L (ref 150–450)
POTASSIUM SERPL-SCNC: 4.2 MMOL/L (ref 3.4–5.3)
PROT SERPL-MCNC: 7.3 G/DL (ref 6.8–8.8)
RBC # BLD AUTO: 5.29 10E12/L (ref 4.4–5.9)
RBC #/AREA URNS AUTO: 1 /HPF (ref 0–2)
SODIUM SERPL-SCNC: 137 MMOL/L (ref 133–144)
SOURCE: ABNORMAL
SP GR UR STRIP: 1.03 (ref 1–1.03)
T4 FREE SERPL-MCNC: 1.14 NG/DL (ref 0.76–1.46)
TROPONIN I SERPL-MCNC: 0.02 UG/L (ref 0–0.04)
TSH SERPL DL<=0.005 MIU/L-ACNC: 4.57 MU/L (ref 0.4–4)
UROBILINOGEN UR STRIP-MCNC: 0 MG/DL (ref 0–2)
WBC # BLD AUTO: 9.8 10E9/L (ref 4–11)
WBC #/AREA URNS AUTO: 2 /HPF (ref 0–5)

## 2019-01-09 PROCEDURE — 96372 THER/PROPH/DIAG INJ SC/IM: CPT

## 2019-01-09 PROCEDURE — 83036 HEMOGLOBIN GLYCOSYLATED A1C: CPT | Performed by: EMERGENCY MEDICINE

## 2019-01-09 PROCEDURE — 25000128 H RX IP 250 OP 636: Performed by: PHYSICIAN ASSISTANT

## 2019-01-09 PROCEDURE — 80053 COMPREHEN METABOLIC PANEL: CPT | Performed by: EMERGENCY MEDICINE

## 2019-01-09 PROCEDURE — 70450 CT HEAD/BRAIN W/O DYE: CPT

## 2019-01-09 PROCEDURE — 99207 ZZC CDG-CHARGE REQUIRED MANUAL ENTRY: CPT | Performed by: INTERNAL MEDICINE

## 2019-01-09 PROCEDURE — 71260 CT THORAX DX C+: CPT

## 2019-01-09 PROCEDURE — 80320 DRUG SCREEN QUANTALCOHOLS: CPT | Performed by: EMERGENCY MEDICINE

## 2019-01-09 PROCEDURE — 99222 1ST HOSP IP/OBS MODERATE 55: CPT | Mod: AI | Performed by: INTERNAL MEDICINE

## 2019-01-09 PROCEDURE — 82550 ASSAY OF CK (CPK): CPT | Performed by: EMERGENCY MEDICINE

## 2019-01-09 PROCEDURE — 85025 COMPLETE CBC W/AUTO DIFF WBC: CPT | Performed by: EMERGENCY MEDICINE

## 2019-01-09 PROCEDURE — 84484 ASSAY OF TROPONIN QUANT: CPT | Performed by: EMERGENCY MEDICINE

## 2019-01-09 PROCEDURE — 84439 ASSAY OF FREE THYROXINE: CPT | Performed by: EMERGENCY MEDICINE

## 2019-01-09 PROCEDURE — 25000132 ZZH RX MED GY IP 250 OP 250 PS 637: Performed by: INTERNAL MEDICINE

## 2019-01-09 PROCEDURE — 74177 CT ABD & PELVIS W/CONTRAST: CPT

## 2019-01-09 PROCEDURE — 99207 ZZC APP CREDIT; MD BILLING SHARED VISIT: CPT | Performed by: PHYSICIAN ASSISTANT

## 2019-01-09 PROCEDURE — 81001 URINALYSIS AUTO W/SCOPE: CPT | Performed by: EMERGENCY MEDICINE

## 2019-01-09 PROCEDURE — 96360 HYDRATION IV INFUSION INIT: CPT | Mod: 59

## 2019-01-09 PROCEDURE — 99285 EMERGENCY DEPT VISIT HI MDM: CPT | Mod: 25

## 2019-01-09 PROCEDURE — 84443 ASSAY THYROID STIM HORMONE: CPT | Performed by: EMERGENCY MEDICINE

## 2019-01-09 PROCEDURE — 85610 PROTHROMBIN TIME: CPT | Performed by: EMERGENCY MEDICINE

## 2019-01-09 PROCEDURE — 25000128 H RX IP 250 OP 636: Performed by: EMERGENCY MEDICINE

## 2019-01-09 PROCEDURE — 93005 ELECTROCARDIOGRAM TRACING: CPT

## 2019-01-09 PROCEDURE — 12000000 ZZH R&B MED SURG/OB

## 2019-01-09 RX ORDER — SODIUM CHLORIDE 9 MG/ML
INJECTION, SOLUTION INTRAVENOUS CONTINUOUS
Status: DISCONTINUED | OUTPATIENT
Start: 2019-01-09 | End: 2019-01-11

## 2019-01-09 RX ORDER — BISACODYL 10 MG
10 SUPPOSITORY, RECTAL RECTAL DAILY PRN
Status: DISCONTINUED | OUTPATIENT
Start: 2019-01-09 | End: 2019-01-19 | Stop reason: HOSPADM

## 2019-01-09 RX ORDER — OLANZAPINE 10 MG/2ML
10 INJECTION, POWDER, FOR SOLUTION INTRAMUSCULAR ONCE
Status: COMPLETED | OUTPATIENT
Start: 2019-01-09 | End: 2019-01-10

## 2019-01-09 RX ORDER — OLANZAPINE 10 MG/2ML
5 INJECTION, POWDER, FOR SOLUTION INTRAMUSCULAR ONCE
Status: DISCONTINUED | OUTPATIENT
Start: 2019-01-09 | End: 2019-01-09

## 2019-01-09 RX ORDER — NALOXONE HYDROCHLORIDE 0.4 MG/ML
.1-.4 INJECTION, SOLUTION INTRAMUSCULAR; INTRAVENOUS; SUBCUTANEOUS
Status: DISCONTINUED | OUTPATIENT
Start: 2019-01-09 | End: 2019-01-19 | Stop reason: HOSPADM

## 2019-01-09 RX ORDER — OLANZAPINE 10 MG/2ML
5 INJECTION, POWDER, FOR SOLUTION INTRAMUSCULAR DAILY PRN
Status: DISCONTINUED | OUTPATIENT
Start: 2019-01-09 | End: 2019-01-11

## 2019-01-09 RX ORDER — IOPAMIDOL 755 MG/ML
500 INJECTION, SOLUTION INTRAVASCULAR ONCE
Status: COMPLETED | OUTPATIENT
Start: 2019-01-09 | End: 2019-01-09

## 2019-01-09 RX ORDER — ONDANSETRON 2 MG/ML
4 INJECTION INTRAMUSCULAR; INTRAVENOUS EVERY 6 HOURS PRN
Status: DISCONTINUED | OUTPATIENT
Start: 2019-01-09 | End: 2019-01-19 | Stop reason: HOSPADM

## 2019-01-09 RX ORDER — AMOXICILLIN 250 MG
2 CAPSULE ORAL 2 TIMES DAILY PRN
Status: DISCONTINUED | OUTPATIENT
Start: 2019-01-09 | End: 2019-01-19 | Stop reason: HOSPADM

## 2019-01-09 RX ORDER — AMOXICILLIN 250 MG
1 CAPSULE ORAL 2 TIMES DAILY PRN
Status: DISCONTINUED | OUTPATIENT
Start: 2019-01-09 | End: 2019-01-19 | Stop reason: HOSPADM

## 2019-01-09 RX ORDER — ONDANSETRON 4 MG/1
4 TABLET, ORALLY DISINTEGRATING ORAL EVERY 6 HOURS PRN
Status: DISCONTINUED | OUTPATIENT
Start: 2019-01-09 | End: 2019-01-19 | Stop reason: HOSPADM

## 2019-01-09 RX ORDER — POLYETHYLENE GLYCOL 3350 17 G/17G
17 POWDER, FOR SOLUTION ORAL DAILY PRN
Status: DISCONTINUED | OUTPATIENT
Start: 2019-01-09 | End: 2019-01-19 | Stop reason: HOSPADM

## 2019-01-09 RX ORDER — LIDOCAINE 40 MG/G
CREAM TOPICAL
Status: DISCONTINUED | OUTPATIENT
Start: 2019-01-09 | End: 2019-01-19 | Stop reason: HOSPADM

## 2019-01-09 RX ORDER — DIPHENHYDRAMINE HYDROCHLORIDE 50 MG/ML
25 INJECTION INTRAMUSCULAR; INTRAVENOUS ONCE
Status: DISCONTINUED | OUTPATIENT
Start: 2019-01-09 | End: 2019-01-11

## 2019-01-09 RX ORDER — ACETAMINOPHEN 325 MG/1
650 TABLET ORAL EVERY 4 HOURS PRN
Status: DISCONTINUED | OUTPATIENT
Start: 2019-01-09 | End: 2019-01-10

## 2019-01-09 RX ADMIN — IOPAMIDOL 100 ML: 755 INJECTION, SOLUTION INTRAVENOUS at 11:05

## 2019-01-09 RX ADMIN — OLANZAPINE 5 MG: 10 INJECTION, POWDER, FOR SOLUTION INTRAMUSCULAR at 10:01

## 2019-01-09 RX ADMIN — SODIUM CHLORIDE 65 ML: 9 INJECTION, SOLUTION INTRAVENOUS at 11:05

## 2019-01-09 RX ADMIN — SODIUM CHLORIDE 1000 ML: 9 INJECTION, SOLUTION INTRAVENOUS at 11:18

## 2019-01-09 RX ADMIN — SODIUM CHLORIDE, PRESERVATIVE FREE: 5 INJECTION INTRAVENOUS at 14:38

## 2019-01-09 ASSESSMENT — ACTIVITIES OF DAILY LIVING (ADL)
ADLS_ACUITY_SCORE: 18
ADLS_ACUITY_SCORE: 18

## 2019-01-09 NOTE — ED NOTES
[facility]  ED Nurse Handoff Report    Adam Huber is a 83 year old male   ED Chief complaint: Altered Mental Status  . ED Diagnosis:   Final diagnoses:   Delirium   Agitation   Elevated CK     Allergies:   Allergies   Allergen Reactions     Lisinopril      Morphine Other (See Comments)     confusion       Code Status: Full Code  Activity level - Baseline/Home:  Ind . Activity Level - Current:   Stand with Assist. Lift room needed: No. Bariatric: No   Needed: Yes   Isolation: No. Infection: Not Applicable.     Vital Signs:   Vitals:    01/09/19 0946   BP: (!) 171/91   Resp: 20   Temp: 97.9  F (36.6  C)   TempSrc: Temporal   SpO2: 98%       Cardiac Rhythm:  ,      Pain level:    Patient confused: Yes. Patient Falls Risk: Yes.   Elimination Status: Has voided   Patient Report - Initial Complaint: Son brought patient in after wife states he was found on the ground outside for over 30 mins and hit his head. Son reports pt more confused than normal. Mal[place]ian speaking, interp phone and staff used. Focused Assessment: Confused. Using [place] citizen  staff. Up stand by assist to bedside to use urinal.    Tests Performed: IV, labs, CT, UA. Abnormal Results:   Labs Ordered and Resulted from Time of ED Arrival Up to the Time of Departure from the ED   COMPREHENSIVE METABOLIC PANEL - Abnormal; Notable for the following components:       Result Value    Glucose 194 (*)     Albumin 3.1 (*)      (*)     All other components within normal limits   TSH WITH FREE T4 REFLEX - Abnormal; Notable for the following components:    TSH 4.57 (*)     All other components within normal limits   ROUTINE UA WITH MICROSCOPIC - Abnormal; Notable for the following components:    Glucose Urine >499 (*)     Ketones Urine 5 (*)     Mucous Urine Present (*)     All other components within normal limits   CK TOTAL - Abnormal; Notable for the following components:    CK Total 1,275 (*)     All other components  within normal limits   CBC WITH PLATELETS DIFFERENTIAL   TROPONIN I   ALCOHOL ETHYL   INR   T4 FREE   T4 FREE   .   Treatments provided:   Family Comments: Son at bedside, number on chart.  OBS brochure/video discussed/provided to patient:  N/A  ED Medications:   Medications   OLANZapine (zyPREXA) injection 5 mg (5 mg Intramuscular Given 1/9/19 1001)   iopamidol (ISOVUE-370) solution 500 mL (100 mLs Intravenous Given 1/9/19 1105)   0.9% sodium chloride BOLUS (0 mLs Intravenous Stopped 1/9/19 1108)   0.9% sodium chloride BOLUS (0 mLs Intravenous Stopped 1/9/19 1240)     Drips infusing:  No  For the majority of the shift, the patient's behavior Green. Interventions performed were .     Severe Sepsis OR Septic Shock Diagnosis Present: No      ED Nurse Name/Phone Number: Nano Mota,   1:21 PM  \d      RECEIVING UNIT ED HANDOFF REVIEW    Above ED Nurse Handoff Report was reviewed: Yes  Reviewed by: Radha Rogers on January 9, 2019 at 1:50 PM

## 2019-01-09 NOTE — PROGRESS NOTES
New admit at 1420: Pt is confused but was still oriented to room, use of call light, calling for assistance, safety, poc, etc with French  at bedside.  Pt arrived soaked in urine and was cleaned up.  Vss ex SBP 160s, no co pain/cp/sob, IVF started, code status marked and allergy/falls risk bands on.   Up with Ax2 as pt is weak and his knees bucked on the way into the room. BA on for safety as pt is unsteady and impulsive.  Report to be given, will continue to monitor until evening shift RN assumes care.

## 2019-01-09 NOTE — H&P
[facility]  History and Physical      Patient Name: Adam Huber MRN# [id number]   Age: 83 year old YOB: 1935     Date of Admission:1/9/2019    Primary care provider: Oralia Forrest  Date of Service: 1/9/2019         Assessment and Plan:   Adam Huber is a 83 year old male with a history of T12 compression fracture, HLD, DM Type 2, HTN, CAD, BPH, Gait Instability, Depression, Insomnia, CVA, Prior Head Trauma, Alzheimers Disease, Hx of Delerium who presents to the ED today 2/2 confusion and s/p fall.      S/p Fall, Mild Rhabdomyolysis - unclear if this was a mechanical or syncopal event as it is reported patient fell outside while walking to the store.  He denies any pain and CT head, chest, abd/pelvis negative for acute injuries.  CK 1275.  EKG with no evidence of arhythmia, no signs of infection.  Troponin wnl.  - IVF hydration and repeat CK in am  - check orthostatics  - monitor on telemetry  - serial neuro exams  - PT evaluation    Confusion - pt agitated on arrival to the ED requiring IM Zyprexa.  Now calm and cooperative, but only oriented to self.  Patient does have a hx of Traumatic Brain Injury, prior CVA and Alzheimer's Dementia at baseline.  Per ED staff report, family found him confused after his fall.  Pt denies hitting his head, but per ED documentation family reported he had hit his head.  Work up thus far with no acute abnormality on CT head and no signs of infection.  Possibly related to underlying dementia or post concussive state?   - serial neuro exams  - consider Psych consult    Hx of CAD - NSTEMI in 10/2015. Stents in staged procedure, RCA x2 on 10/12/15 then circumflex stent on 11/6/15. Had other areas of disease that were felt to be best managed medically. Denies chest pain at this time, EKG with no acute ischemic changes.  Troponin wnl.  - pharmacy consulted for assistance with med rec.    HTN - bp elevated on arrival.    Hx of TBI - memory  "difficulties and R sided deficits following that injury in 1987.    Hx of CVA - hx CVA 1999 and 2003 with baseline mild right sided weakness and speech is somewhat slow.    Unclear at this time what home medications patient has been taking.  Pharmacy consulted for assistance with med rec.    CODE: Full  Diet/IVF: regular, NS  DVT ppx: SCD    Patient discussed with Dr. Boyd Jones MS PA-C  Physician Assistant   Hospitalist Service  Pager: [phone]           Chief Complaint:   Fall         HPI:   83 year old male with a history of T12 compression fracture, HLD, DM Type 2, HTN, CAD, BPH, Gait Instability, Depression, Insomnia, CVA, Prior Head Trauma, Alzheimers Disease, Hx of Delerium who presents to the ED today 2/2 confusion and s/p fall.    Interview was conducted with a professional Haitian Interpretor present in the room.  Patient is oriented to person only.  He thinks he is at a fire station and is unable to recall the date or his birthday.  He tells me he is  to Mya and has 5 children.  He reports he fell outside his home today and was picked up by the police at brought here.  He denies any pain at this time.  He denies hitting his head or losing consciousness.  He denies any chest pain, shortness of breath, lightheadedness prior to his fall.  He is unable to provide any past medical history.  I am unable to reach the patient's son at this time.  Patient's son was present in the ED earlier and per ED staff report \"Patient's son reports that he went to walk to the store earlier today for which he fell, and was down for roughly 30 minutes outside. His son found him confused, but says this is not unusual\".     ED work up revealed patient hypertensive 171/91, HR 90, afebrile on room air.  Laboratory work up revealed albumin 3.1, , CK 1275, TSH 4.57, glucose 197 with otherwise normal CMP, FT4, Troponin, CBC, INR, Etoh.  UA with no signs of infection.  EKG NSR with Qtc 483ms.  CT head " w/out contrast revealed no acute pathology with areas of encephalomalcia.  CT chest/abd/pelvis with no acute abnormality.  Stable, subacute T12 compression deformity.  Patient received IVF, Zyprexa and admitted for further management.         Past Medical History:     Past Medical History:   Diagnosis Date     Acute chest pain 10/23/2015     CAD (coronary artery disease)      Essential hypertension 10/23/2015     History of CVA (cerebrovascular accident) 10/23/2015     HLD (hyperlipidemia)      HTN (hypertension)      NSTEMI (non-ST elevated myocardial infarction) (H) 10/23/2015     Post PTCA 10-    Successful PCI of culprit proximal to mid RCA with placement of a     Post PTCA 11-6-2015    pci of complex mid CFX-hector          Past Surgical History:     Past Surgical History:   Procedure Laterality Date     CHOLECYSTECTOMY       GENITOURINARY SURGERY      prostate removal      HEART CATH LEFT HEART CATH  10/12/2015    PCI w/ HECTOR to RCA     HEART CATH RIGHT AND LEFT HEART CATH  11/6/2015    PCI w/ HECTOR to mid-CFX          Social History:     Social History     Socioeconomic History     Marital status:      Spouse name: Not on file     Number of children: Not on file     Years of education: Not on file     Highest education level: Not on file   Social Needs     Financial resource strain: Not on file     Food insecurity - worry: Not on file     Food insecurity - inability: Not on file     Transportation needs - medical: Not on file     Transportation needs - non-medical: Not on file   Occupational History     Not on file   Tobacco Use     Smoking status: Never Smoker     Smokeless tobacco: Never Used   Substance and Sexual Activity     Alcohol use: No     Alcohol/week: 0.0 oz     Drug use: No     Sexual activity: Not on file   Other Topics Concern      Service Not Asked     Blood Transfusions Not Asked     Caffeine Concern No     Comment: seldom     Occupational Exposure Not Asked     Hobby Hazards  Not Asked     Sleep Concern Yes     Comment: does not sleep well     Stress Concern Not Asked     Weight Concern Not Asked     Special Diet Yes     Comment: lower sodium and fat     Back Care Not Asked     Exercise No     Bike Helmet Not Asked     Seat Belt Not Asked     Self-Exams Not Asked     Parent/sibling w/ CABG, MI or angioplasty before 65F 55M? Not Asked   Social History Narrative     Not on file          Family History:     Family History   Problem Relation Age of Onset     Unknown/Adopted No family hx of           Allergies:      Allergies   Allergen Reactions     Lisinopril      Morphine Other (See Comments)     confusion          Medications:     Prior to Admission medications    Medication Sig Last Dose Taking? Auth Provider   acetaminophen (TYLENOL) 325 MG tablet Take 2 tablets (650 mg) by mouth every 4 hours as needed for mild pain   Elias Boswell MD   aspirin 81 MG EC tablet Take one tablet daily   Eric Avalos MD   carvedilol (COREG) 25 MG tablet Take 1 tablet (25 mg) by mouth 2 times daily (with meals)   Eric Avalos MD   gabapentin (NEURONTIN) 100 MG capsule Take 1 capsule (100 mg) by mouth 3 times daily  Patient not taking: Reported on 11/8/2018   Papo Lopez MD   losartan (COZAAR) 100 MG tablet Take 0.5 tablets (50 mg) by mouth daily   Papo Loepz MD   nitroglycerin (NITROSTAT) 0.4 MG SL tablet Place 1 tablet (0.4 mg) under the tongue every 5 minutes as needed for chest pain   Laura Valerio, APRN CNP   nortriptyline (PAMELOR) 50 MG capsule Take 1 capsule (50 mg) by mouth At Bedtime  Patient taking differently: Take 75 mg by mouth At Bedtime    Papo Lopez MD   olopatadine HCl (PATADAY) 0.2 % SOLN Place 1 drop into both eyes daily as needed   Unknown, Entered By History   polyethylene glycol (MIRALAX/GLYCOLAX) powder Take 1 capful by mouth daily as needed    Unknown, Entered By History   QUEtiapine (SEROQUEL) 25 MG tablet Take 0.25 tablets (6.25 mg) by mouth 2 times daily  (with meals)  Patient not taking: Reported on 11/8/2018   Papo Lopez MD   QUEtiapine (SEROQUEL) 25 MG tablet Take 1 tablet (25 mg) by mouth At Bedtime  Patient not taking: Reported on 11/8/2018   Papo Lopez MD   rosuvastatin (CRESTOR) 5 MG tablet Take 1 tablet (5 mg) by mouth daily   Eric Avalos MD   traMADol (ULTRAM) 50 MG tablet Take 1 tablet (50 mg) by mouth every 4 hours as needed for moderate pain  Patient not taking: Reported on 11/8/2018   Papo Lopez MD          Review of Systems:   A complete ROS was performed and is negative other than what is stated in the HPI.       Physical Exam:   Blood pressure (!) 171/91, temperature 97.9  F (36.6  C), temperature source Temporal, resp. rate 20, SpO2 98 %. on room air  General: Alert, interactive, NAD, sitting up in bed, calm and cooperative with [place]ian interpretor at the bedside  HEENT: AT/NC, sclera anicteric, PERRL, EOMI  Chest/Resp: clear to auscultation bilaterally, no crackles or wheezes  Heart/CV: regular rate and rhythm, no murmur  Abdomen/GI: Soft, nontender, nondistended. +BS.  No rebound or guarding.  Extremities/MSK: No LE edema  Skin: Warm and dry  Neuro: Alert & oriented x 1, no focal deficits, moves all extremities equally         Labs:   ROUTINE ICU LABS (Last four results)  CMP  Recent Labs   Lab 01/09/19  1008      POTASSIUM 4.2   CHLORIDE 103   CO2 25   ANIONGAP 9   *   BUN 18   CR 0.86   GFRESTIMATED 80   GFRESTBLACK >90   LUZ 8.6   PROTTOTAL 7.3   ALBUMIN 3.1*   BILITOTAL 0.9   ALKPHOS 138   *   ALT 59     CBC  Recent Labs   Lab 01/09/19  1008   WBC 9.8   RBC 5.29   HGB 17.1   HCT 49.3   MCV 93   MCH 32.3   MCHC 34.7   RDW 12.6        INR  Recent Labs   Lab 01/09/19  1008   INR 1.01     Arterial Blood GasNo lab results found in last 7 days.       Imaging/Procedures:     Results for orders placed or performed during the hospital encounter of 01/09/19   CT Head w/o Contrast    Narrative    CT SCAN OF  THE HEAD WITHOUT CONTRAST   1/9/2019 11:15 AM     HISTORY: Altered level of consciousness (LOC), unexplained    TECHNIQUE:  Axial images of the head and coronal reformations without  IV contrast material. Radiation dose for this scan was reduced using  automated exposure control, adjustment of the mA and/or kV according  to patient size, or iterative reconstruction technique.    COMPARISON: Scan dated 5/19/2018    FINDINGS:  There is diffuse parenchymal volume loss.  White matter  changes are present in the cerebral hemispheres that are consistent  with small vessel ischemic disease in this age patient. Chronic areas  of encephalomalacia are seen in the anterior aspect of the right  temporal lobe and inferior aspects of both frontal lobes. There is no  evidence of intracranial hemorrhage, mass, acute infarct or anomaly.  The visualized portions of the sinuses and mastoids appear normal.  There is no evidence of trauma.      Impression    IMPRESSION:   1. No acute pathology. No bleed, mass, or acute infarcts. No change.  2. Chronic areas of encephalomalacia are seen in the anterior aspect  of the right temporal lobe and inferior aspects of both frontal lobes.  There is also an area of encephalomalacia in the right basal ganglia.      BULMARO SAMS MD   CT Chest/Abdomen/Pelvis w Contrast    Narrative    CT CHEST, ABDOMEN AND PELVIS WITH CONTRAST  1/9/2019 11:20 AM    HISTORY: Chest, abdomen and pelvis trauma, minor, blunt.    TECHNIQUE: CT scan obtained of the chest, abdomen, and pelvis with  oral and IV contrast. 100 mL Isovue-370 injected. Radiation dose for  this scan was reduced using automated exposure control, adjustment of  the mA and/or kV according to patient size, or iterative  reconstruction technique.    COMPARISON: CT abdomen and pelvis 12/31/2018. Lumbar spine radiograph  5/18/2018.    FINDINGS:  Chest: No pneumothorax. No acute airspace disease. Mild bibasilar  atelectasis. No acute thoracic aortic  abnormality. Diffuse coronary  artery calcifications. No effusions. This exam was not tailored for  specific assessment of pulmonary embolism. No large central pulmonary  embolism is seen, but the small branch vessels are nondiagnostic. No  enlarged lymph nodes. No convincing acute displaced fracture noted.  There is a T12 compression deformity that is stable compared to the  prior exams.    Abdomen/pelvis: No convincing acute displaced fracture is seen in the  abdomen or pelvis. Both hips are located. Vascular calcifications. No  acute abdominal aortic abnormality is seen. Cholecystectomy. Liver,  adrenals, spleen, pancreas, and kidneys show no acute abnormalities. A  few renal cysts noted bilaterally. No hydronephrosis. No acute bowel  abnormality. Distal colonic diverticula. No free fluid. No suspicious  enlarged lymph nodes are seen.      Impression    IMPRESSION:  1. No acute abnormality is seen.  2. Diffuse vascular calcifications.  3. Compression deformity of T12 is stable and is subacute.

## 2019-01-09 NOTE — ED TRIAGE NOTES
Pt arrives to ed with son in his truck after report that his wife found him outside on the ground after falling and hitting his head. Son reports it took multiple people to lift him in his truck to get him here and that he is confused which is not his baseline.

## 2019-01-09 NOTE — ED PROVIDER NOTES
History     Chief Complaint:  Altered Mental Status and Fall     History limited due to language barrier and Alzheimer's disease. History obtained by son and interpreted using Haitian iPad .     BRUNA Huber is a 83 year old male with a history of Alzheimer's, type II diabetes, hypertension, coronary artery disease, and previous CVA who presents to the emergency department today for evaluation of altered mental status after a fall.  Per report, patient lives at home with his wife.  He left his home earlier this morning to walk to the store.  His wife reportedly found him lying down next to the side of the road, seeming confused.  Son was contacted.  They required multiple personnel to lift the patient to his truck.  Son subsequently brought the patient to the emergency department for further evaluation.  Son describes the patient's behavior to be confused from his baseline.  He does acknowledge periods of waxing and waning mental status in the past.  Son reports a previous history of TBI as well as previous brain surgery.  He also acknowledges a history of dementia as well as previous CVA.  Son does not believe patient is on chronic anticoagulation.  Here in the emergency department, through the use of a professional telephone Haitian , patient denies any complaints.  He does report he does not wish to be here.  He denies any symptoms of pain.  He states he needs to get home as he does have 2 plane tickets to [employer].  His son confirms that this is not true.    Allergies:  Lisinopril   Morphine      Medications:    Aspirin   Coreg  Gabapentin  Cozaar   Pamelor   Seroquel  Crestor     Past Medical History:    Alzheimer's   CAD (coronary artery disease)   Essential hypertension   CVA (cerebrovascular accident)   HLD (hyperlipidemia)   HTN (hypertension)   NSTEMI (non-ST elevated myocardial infarction)    Type II diabetes     Past Surgical History:    Cholecystectomy  Prostatectomy    Heart cath x2    Family History:    No family history of eye disorders.     Social History:  The patient was accompanied to the ED by son.  Smoking Status: Never Smoker  Smokeless Tobacco: Never Used  Alcohol Use: Negative  Drug Use: Negative    Marital Status:       Review of Systems   Unable to perform ROS: Mental status change     Physical Exam     Patient Vitals for the past 24 hrs:   BP Temp Temp src Heart Rate Resp SpO2   01/09/19 0946 (!) 171/91 97.9  F (36.6  C) Temporal 90 20 98 %     Physical Exam  General:              Well-nourished              Swazi speaking              Confused speech (through )              Agitated  Head:              Well healed right sided surgical scar              No hematoma or step-off  Eyes:              Conjunctiva without injection or scleral icterus              Pupils 3 mm bilaterally  ENT:              Moist mucous membranes              Nares patent              Pinnae normal  Neck:              Full ROM              No stiffness appreciated  Resp:              Lungs CTAB              No crackles, wheezing or audible rubs              Good air movement  CV:                    Normal rate, regular rhythm              S1 and S2 present              No murmur, gallop or rub  GI:              BS present              Abdomen soft without distention              Non-tender to light and deep palpation              No guarding or rebound tenderness  Skin:              Warm, dry, well perfused              No rashes or open wounds on exposed skin  MSK:              Moves all extremities              No focal deformities or swelling  Neuro:              Alert              Answers questions appropriately              Moves all extremities equally   Weightbearing  Psych:              Confused              Difficult to assess     Emergency Department Course     ECG:  ECG taken at 1135, ECG read at 1146  Normal sinus rhythm  Prolonged QT  Abnormal ECG  No  significant change compared to EKG dated 12/31/18.  Rate 79 bpm. OH interval 184 ms. QRS duration 108 ms. QT/QTc 422/483 ms. P-R-T axes 56 86 64.    Imaging:  Radiology findings were communicated with the patient and patient's son who voiced understanding of the findings.    CT Chest/Abdomen/Pelvis w Contrast  1. No acute abnormality is seen.  2. Diffuse vascular calcifications.  3. Compression deformity of T12 is stable and is subacute.  Reading per radiology     CT Head w/o Contrast  1. No acute pathology. No bleed, mass, or acute infarcts. No change.  2. Chronic areas of encephalomalacia are seen in the anterior aspect of the right temporal lobe and inferior aspects of both frontal lobes. There is also an area of encephalomalacia in the right basal ganglia.  BULMARO SAMS MD  Reading per radiology     Laboratory:  Laboratory findings were communicated with the patient and patient's son who voiced understanding of the findings.    CBC: WBC 9.8, HGB 17.1,   CMP: Glucose 194(H), Albumin 3.1(L), (H) o/w WNL (Creatinine 0.86)  Troponin (Collected 1008): <0.015   TSH with free T4 reflex: 4.57(H)   Alcohol ethyl: <0.01  INR: 1.01  CK total: 1,275(HH)   T4 free: 1.14   UA with Microscopic: Glucose >499(A), Ketones 5(A), Mucous Present(A) o/w WNL     Interventions:  1001 Zyprexa 5 mg IM   1118 NS 1,000 mL IV    Emergency Department Course:    0936 I performed an exam of the patient as documented above.     0951 Nursing notes and vitals reviewed.    1008 IV was inserted and blood was drawn for laboratory testing, results above.    1042 The patient was sent for a CT while in the emergency department, results above.     1135 EKG taken as noted above.     1200 The patient provided a urine sample here in the emergency department. This was sent for laboratory testing, findings above.    1210 Rechecked and updated.      1234 I spoke with Nano Jones PA-C of the hospitalist service from [facility] regarding  patient's presentation, findings, and plan of care.    1320 I personally reviewed the lab and imaging results with the patient and patient's son and answered all related questions prior to admit.    Impression & Plan      Medical Decision Making:  Adam Huber is a 83 year old male who presents to the emergency department today for evaluation of altered mental status.  VS on presentation reveal elevated BP though otherwise are unremarkable.  Differential diagnosis includes infectious etiologies, electrolyte/metabolic derangements, trauma, hypo/hyperthermia, hypoxia, ischemia, uremia, acidosis, substance abuse, toxicologic etiologies, CVA/TIA, intra-cranial hemorrhage, intracranial mass, medication side effect / polypharmacy, as well as primary psychiatric causes.    Multiple staff members assisted son and helping the patient out of son's truck, part in the ambulance bay.  On my initial evaluation, through the use of a professional telephone Guinean  as well as utilizing the patient's son, who also speaks fluent English, the patient was noted to be confused.  Given reports of fall and potential head injury, advanced imaging was performed.  Given the patient's underlying agitation, he was provided 5 mg of IM Zyprexa.  This resulted in improvements in the patient's degree of combativeness, and facilitated further workup.  Patient was sent for a CT of the head, which reveals no evidence of acute intracranial hemorrhage, or acute abnormality.  Patient is noted to have areas of chronic appearing encephalomalacia, particularly to the inferior aspects of bilateral frontal lobes.  I question if this is contributed the patient's underlying dementia.  CT of the chest abdomen and pelvis was additionally obtained, which does not reveal evidence of acute traumatic injury or other acute pathology.  He is noted to have a T12 compression fracture, which appears stable and has been noted previously.  By history and  exam, I do not appreciate evidence of acute infectious process.  Specifically, there is no evidence of pneumonia, nor urinary tract infection.  WBC count is presently normal.  There is no history of reported fever.  Suspicion for meningitis/encephalitis is very low, and at this point I feel lumbar puncture can be deferred safely.  EKG demonstrates sinus rhythm without findings of acute ischemia compared with previous.  Troponin returned within normal limits.  Patient's TSH is found to be elevated though free T4 is within normal limits.  Patient's CK is noted to be elevated at 1275, consistent with rhabdomyolysis, likely secondary to having been found down.  IV fluids were provided, though this will require further monitoring.  Here in the emergency department, patient has been without cardiac dysrhythmia.  EKG demonstrates mildly prolonged QTc interval, which will need to be monitored closely, especially should patient require additional pharmacologic intervention for underlying agitation.  During the remainder of patient's emergency department course, he remained cooperative.  A professional Algerian  was present at bedside to assist with updating the patient and evaluation.  Given the patient's persistence and confusion, I do feel he requires hospitalization for close monitoring.  Family members were updated via telephone.  Case discussed with the accepting hospitalist team.  He will require a sitter on the inpatient unit.  Questions were answered prior to admission.    Diagnosis:    ICD-10-CM    1. Delirium R41.0    2. Agitation R45.1    3. Elevated CK R74.8      Disposition:   The patient is admitted into the care of Dr. Nix.    Scribe Disclosure:  Kavita MATA, am serving as a scribe at 9:51 AM on 1/9/2019 to document services personally performed by Kai Mello MD based on my observations and the provider's statements to me.       [facility] EMERGENCY DEPARTMENT        Kai Mello MD  01/09/19 1822

## 2019-01-10 ENCOUNTER — APPOINTMENT (OUTPATIENT)
Dept: PHYSICAL THERAPY | Facility: CLINIC | Age: 84
DRG: 565 | End: 2019-01-10
Payer: COMMERCIAL

## 2019-01-10 LAB
ANION GAP SERPL CALCULATED.3IONS-SCNC: 8 MMOL/L (ref 3–14)
BUN SERPL-MCNC: 11 MG/DL (ref 7–30)
CALCIUM SERPL-MCNC: 8.4 MG/DL (ref 8.5–10.1)
CHLORIDE SERPL-SCNC: 108 MMOL/L (ref 94–109)
CK SERPL-CCNC: 404 U/L (ref 30–300)
CO2 SERPL-SCNC: 23 MMOL/L (ref 20–32)
CREAT SERPL-MCNC: 0.71 MG/DL (ref 0.66–1.25)
ERYTHROCYTE [DISTWIDTH] IN BLOOD BY AUTOMATED COUNT: 12.6 % (ref 10–15)
GFR SERPL CREATININE-BSD FRML MDRD: 87 ML/MIN/{1.73_M2}
GLUCOSE SERPL-MCNC: 153 MG/DL (ref 70–99)
HCT VFR BLD AUTO: 49.1 % (ref 40–53)
HGB BLD-MCNC: 17.1 G/DL (ref 13.3–17.7)
MCH RBC QN AUTO: 32.6 PG (ref 26.5–33)
MCHC RBC AUTO-ENTMCNC: 34.8 G/DL (ref 31.5–36.5)
MCV RBC AUTO: 94 FL (ref 78–100)
PLATELET # BLD AUTO: 189 10E9/L (ref 150–450)
POTASSIUM SERPL-SCNC: 3.9 MMOL/L (ref 3.4–5.3)
RBC # BLD AUTO: 5.25 10E12/L (ref 4.4–5.9)
SODIUM SERPL-SCNC: 139 MMOL/L (ref 133–144)
WBC # BLD AUTO: 8.9 10E9/L (ref 4–11)

## 2019-01-10 PROCEDURE — 36415 COLL VENOUS BLD VENIPUNCTURE: CPT | Performed by: PHYSICIAN ASSISTANT

## 2019-01-10 PROCEDURE — 25000132 ZZH RX MED GY IP 250 OP 250 PS 637: Performed by: INTERNAL MEDICINE

## 2019-01-10 PROCEDURE — 25000128 H RX IP 250 OP 636: Performed by: INTERNAL MEDICINE

## 2019-01-10 PROCEDURE — 40000556 ZZH STATISTIC PERIPHERAL IV START W US GUIDANCE

## 2019-01-10 PROCEDURE — 85027 COMPLETE CBC AUTOMATED: CPT | Performed by: PHYSICIAN ASSISTANT

## 2019-01-10 PROCEDURE — 80048 BASIC METABOLIC PNL TOTAL CA: CPT | Performed by: PHYSICIAN ASSISTANT

## 2019-01-10 PROCEDURE — 40000193 ZZH STATISTIC PT WARD VISIT

## 2019-01-10 PROCEDURE — 99232 SBSQ HOSP IP/OBS MODERATE 35: CPT | Performed by: INTERNAL MEDICINE

## 2019-01-10 PROCEDURE — 25000128 H RX IP 250 OP 636: Performed by: PHYSICIAN ASSISTANT

## 2019-01-10 PROCEDURE — 82550 ASSAY OF CK (CPK): CPT | Performed by: PHYSICIAN ASSISTANT

## 2019-01-10 PROCEDURE — 97116 GAIT TRAINING THERAPY: CPT | Mod: GP

## 2019-01-10 PROCEDURE — 97161 PT EVAL LOW COMPLEX 20 MIN: CPT | Mod: GP

## 2019-01-10 PROCEDURE — 12000000 ZZH R&B MED SURG/OB

## 2019-01-10 RX ORDER — ACETAMINOPHEN 325 MG/1
650 TABLET ORAL EVERY 4 HOURS PRN
Status: DISCONTINUED | OUTPATIENT
Start: 2019-01-10 | End: 2019-01-19 | Stop reason: HOSPADM

## 2019-01-10 RX ORDER — OLANZAPINE 5 MG/1
5 TABLET, ORALLY DISINTEGRATING ORAL 2 TIMES DAILY
Status: DISCONTINUED | OUTPATIENT
Start: 2019-01-10 | End: 2019-01-14

## 2019-01-10 RX ORDER — ASPIRIN 81 MG/1
81 TABLET ORAL DAILY
Status: DISCONTINUED | OUTPATIENT
Start: 2019-01-10 | End: 2019-01-19 | Stop reason: HOSPADM

## 2019-01-10 RX ORDER — CARVEDILOL 25 MG/1
25 TABLET ORAL 2 TIMES DAILY WITH MEALS
Status: DISCONTINUED | OUTPATIENT
Start: 2019-01-10 | End: 2019-01-14

## 2019-01-10 RX ORDER — LOSARTAN POTASSIUM 50 MG/1
50 TABLET ORAL DAILY
Status: ON HOLD | COMMUNITY
End: 2023-06-05

## 2019-01-10 RX ORDER — LOSARTAN POTASSIUM 50 MG/1
50 TABLET ORAL DAILY
Status: DISCONTINUED | OUTPATIENT
Start: 2019-01-10 | End: 2019-01-19 | Stop reason: HOSPADM

## 2019-01-10 RX ORDER — OLANZAPINE 5 MG/1
5 TABLET, ORALLY DISINTEGRATING ORAL AT BEDTIME
Status: DISCONTINUED | OUTPATIENT
Start: 2019-01-10 | End: 2019-01-10

## 2019-01-10 RX ORDER — NORTRIPTYLINE HYDROCHLORIDE 75 MG/1
75 CAPSULE ORAL AT BEDTIME
Status: ON HOLD | COMMUNITY
End: 2019-01-19

## 2019-01-10 RX ADMIN — SODIUM CHLORIDE, PRESERVATIVE FREE: 5 INJECTION INTRAVENOUS at 09:35

## 2019-01-10 RX ADMIN — CARVEDILOL 25 MG: 25 TABLET, FILM COATED ORAL at 17:18

## 2019-01-10 RX ADMIN — ASPIRIN 81 MG: 81 TABLET, COATED ORAL at 14:44

## 2019-01-10 RX ADMIN — OLANZAPINE 10 MG: 10 INJECTION, POWDER, FOR SOLUTION INTRAMUSCULAR at 00:47

## 2019-01-10 RX ADMIN — LOSARTAN POTASSIUM 50 MG: 50 TABLET, FILM COATED ORAL at 14:43

## 2019-01-10 RX ADMIN — Medication 12.5 MG: at 15:38

## 2019-01-10 RX ADMIN — OLANZAPINE 5 MG: 5 TABLET, ORALLY DISINTEGRATING ORAL at 20:18

## 2019-01-10 RX ADMIN — ACETAMINOPHEN 650 MG: 325 TABLET, FILM COATED ORAL at 21:01

## 2019-01-10 RX ADMIN — NORTRIPTYLINE HYDROCHLORIDE 75 MG: 50 CAPSULE ORAL at 20:17

## 2019-01-10 ASSESSMENT — ACTIVITIES OF DAILY LIVING (ADL)
ADLS_ACUITY_SCORE: 14
ADLS_ACUITY_SCORE: 15
ADLS_ACUITY_SCORE: 14

## 2019-01-10 NOTE — PLAN OF CARE
VSS. Alert but confused to place time and situation. Pt became very aggressive and agitated at around 1700. Pulled off tele and Iv and all clothing. Tried to give Seroquel but refused anything by mouth. Given IM Zyprexa and after about an hour he started to calm down. PRN IM's available. Sitter present. Less agitation and aggression once Zyprexa started working.

## 2019-01-10 NOTE — PLAN OF CARE
VSS, tele SR when monitor on.  Pt refusing tele most of shift.  Required IM Zyprexa, did not respond to this dose.  Pt agitated most of shift, attempting to stand and requiring staff assistance to sit in chair and then needing lift back to bed.  Pt verbalized to staff that he is uncomfortable in crowds and does not like the sensation of lift.  LS dim.  Denying pain through  service.  Pt resting intermittently.  PSC at bedside for impulsivity.

## 2019-01-10 NOTE — PHARMACY-ADMISSION MEDICATION HISTORY
Admission medication history interview status for this patient is complete. See [facility] admission navigator for allergy information, prior to admission medications and immunization status.     Medication history interview source(s):Patient & wife with   Medication history resources (including written lists, pill bottles, clinic record): Verified carvedilol + losartan with darion  [facility]    Changes made to PTA medication list:  Added: none  Deleted: gabapentin, seroquel, rosuvastatin  Changed: nortriptyline 50mg --> 75mg    Actions taken by pharmacist (provider contacted, etc):None     Additional medication history information:None    Medication reconciliation/reorder completed by provider prior to medication history? Yes    Do you take OTC medications (eg tylenol, ibuprofen, fish oil, eye/ear drops, etc)? N(Y/N)    For patients on insulin therapy: N (Y/N)      Prior to Admission medications    Medication Sig Last Dose Taking? Auth Provider   acetaminophen (TYLENOL) 325 MG tablet Take 2 tablets (650 mg) by mouth every 4 hours as needed for mild pain Past Month at Unknown time Yes Elias Boswell MD   aspirin 81 MG EC tablet Take one tablet daily 1/9/2019 at Unknown time Yes Eric Avalos MD   carvedilol (COREG) 25 MG tablet Take 1 tablet (25 mg) by mouth 2 times daily (with meals) 1/9/2019 at Unknown time Yes Eric Avaols MD   losartan (COZAAR) 50 MG tablet Take 50 mg by mouth daily 1/9/2019 at Unknown time Yes Unknown, Entered By History   nortriptyline (PAMELOR) 75 MG capsule Take 75 mg by mouth At Bedtime Past Week at Unknown time Yes Unknown, Entered By History   olopatadine HCl (PATADAY) 0.2 % SOLN Place 1 drop into both eyes daily as needed Past Month at Unknown time Yes Unknown, Entered By History   polyethylene glycol (MIRALAX/GLYCOLAX) powder Take 1 capful by mouth daily as needed  Past Month at Unknown time Yes Unknown, Entered By History   traMADol  (ULTRAM) 50 MG tablet Take 1 tablet (50 mg) by mouth every 4 hours as needed for moderate pain Past Month at Unknown time Yes Papo Lopez MD   nitroglycerin (NITROSTAT) 0.4 MG SL tablet Place 1 tablet (0.4 mg) under the tongue every 5 minutes as needed for chest pain Unknown at Unknown time  Laura Valerio, APRN CNP

## 2019-01-10 NOTE — PROGRESS NOTES
01/10/19 1000   Quick Adds   Type of Visit Initial PT Evaluation       Present yes   Living Environment   Lives With spouse   Living Arrangements house   Living Environment Comment Per chart review from previous admission (5/2018) pt has 4 stairs to enter. Also attempted to call english speaking son, Adolfo. He reports there are 10 stairs to enter the home and pt will alternate between using a cane/walker. Adolfo reports he is not sure of pt's typical mobility status or if there have been recent falls.    Self-Care   Equipment Currently Used at Home cane, straight;walker, rolling   Functional Level Prior   Ambulation 1-->assistive equipment   Transferring 1-->assistive equipment   Cognition 1 - attention or memory deficits  (Baseline dementia, prior CVA/TBI)   General Information   Onset of Illness/Injury or Date of Surgery - Date 01/09/19   Referring Physician Karen HUBBARD   Patient/Family Goals Statement Return home   Pertinent History of Current Problem (include personal factors and/or comorbidities that impact the POC) 83 year old male admitted after a fall with confusion. PMH significant for dementia, CVA and TBI.    Precautions/Limitations fall precautions   Cognitive Status Examination   Orientation person   Level of Consciousness alert;confused   Follows Commands and Answers Questions able to follow single-step instructions   Personal Safety and Judgment impaired;impulsive  (Needs assist and cues for safe mobility)   Memory impaired  (Baseline dementia )   Pain Assessment   Patient Currently in Pain No   Posture    Posture Forward head position;Protracted shoulders   Range of Motion (ROM)   ROM Comment UE/LE AROM WFLs.    Strength   Strength Comments Decreased generally; needs physical assist with mobility as noted below.    Bed Mobility   Bed Mobility Comments Supine to sit with mod A.    Transfer Skills   Transfer Comments Sit to/from stand with min to CGA A   Gait   Gait Comments  "Ambulates 5' with FWW and CGA.    Balance   Balance Comments Requires bilat UE support on FWW for safe dynamic mobility with FWW    General Therapy Interventions   Planned Therapy Interventions bed mobility training;gait training;strengthening;transfer training;progressive activity/exercise   Clinical Impression   Criteria for Skilled Therapeutic Intervention yes, treatment indicated   PT Diagnosis Impaired gait   Influenced by the following impairments Decreased activity tolerance, weakness, impaired balance   Functional limitations due to impairments Decreased IND with bed mobility, transfers and ambulation   Clinical Presentation Evolving/Changing   Clinical Presentation Rationale Unclear PLOF, confusion/cognition   Clinical Decision Making (Complexity) Low complexity   Therapy Frequency` daily   Predicted Duration of Therapy Intervention (days/wks) 3 days   Anticipated Discharge Disposition Transitional Care Facility  (vs. Home with 24 hour supervision/assist)   Risk & Benefits of therapy have been explained Yes   Patient, Family & other staff in agreement with plan of care Yes   [facility] TM \"6 Clicks\"   2016, Trustees of [facility], under license to [employer].  All rights reserved.   6 Clicks Short Forms Basic Mobility Inpatient Short Form   [facility]  \"6 Clicks\" V.2 Basic Mobility Inpatient Short Form   1. Turning from your back to your side while in a flat bed without using bedrails? 3 - A Little   2. Moving from lying on your back to sitting on the side of a flat bed without using bedrails? 2 - A Lot   3. Moving to and from a bed to a chair (including a wheelchair)? 3 - A Little   4. Standing up from a chair using your arms (e.g., wheelchair, or bedside chair)? 3 - A Little   5. To walk in hospital room? 3 - A Little   6. Climbing 3-5 steps with a railing? 3 - A Little   Basic Mobility Raw Score (Score out of 24.Lower scores equate to lower levels of function) 17 "   Total Evaluation Time   Total Evaluation Time (Minutes) 10

## 2019-01-10 NOTE — PROGRESS NOTES
[facility]    Hospitalist Progress Note    Date of Service (when I saw the patient): 01/10/2019    Assessment & Plan     Adam Huber is a 83 year old male with a history of T12 compression fracture, HLD, DM Type 2, HTN, CAD, BPH, Gait Instability, Depression, Insomnia, CVA, Prior Head Trauma, Alzheimers Disease, hx of Delerium who presents to the ED 2/2 confusion and s/p fall.        # S/P Fall, Mild Rhabdomyolysis - suspect mechanical fall. it is reported patient fell outside while walking to the store.  He denies any pain and CT head, chest, abd/pelvis negative for acute injuries.  CK 1275 on admission  EKG with no evidence of arhythmia, no signs of infection.  Troponin wnl.  - IVF hydration, repeat CK is improving   - check orthostatics  - monitor on telemetry  - serial neuro exams  - PT evaluation     # Confusion - pt agitated on arrival to the ED requiring IM Zyprexa.  Now somewhat better, although did require another dose of IM zyprexa overnight. Patient does have a hx of Traumatic Brain Injury, prior CVA and Alzheimer's Dementia at baseline.  Work up thus far with no acute abnormality on CT head and no signs of infection.  Suspect most likely related to underlying dementia   - Discussed with family, lives at home with his wife.  She does mention that the patient has been having increasing issues with agitation and getting angry.  They are wondering about placement to TCU.  Will request social work consult  --We will schedule Zyprexa 5 mg BID      Hx of CAD - NSTEMI in 10/2015. Stents in staged procedure, RCA x2 on 10/12/15 then circumflex stent on 11/6/15. Had other areas of disease that were felt to be best managed medically. Denies chest pain at this time, EKG with no acute ischemic changes.  Troponin wnl.  - resume asa, losartan, Coreg     HTN - resume home meds      Hx of TBI - memory difficulties and R sided deficits following that injury in 1987.     Hx of CVA - hx CVA 1999 and 2003  with baseline mild right sided weakness and speech is somewhat slow.    DVT Prophylaxis: Pneumatic Compression Devices    Code Status: Full Code    Disposition: Disposition Plan   Expected discharge in 1-2 days to transitional care unit once agitation and behavioral symptoms better.     Entered: Eveline Nix 01/10/2019, 4:09 PM       Updated wife at bedside with      Eveline Nix    Interval History   Chart reviewed and patient seen. Case discussed with nursing staff. Patient seen with help of professional Belizean      Patient feels well, Denies any chest pain, shortness of breath, some confusion, had episode of agitation last night but cooperative currently, No other complaints voiced.     -Data reviewed today: I reviewed all new labs and imaging results over the last 24 hours. I personally reviewed     Physical Exam   Temp: 97.3  F (36.3  C) Temp src: Oral BP: 149/68 Pulse: 80 Heart Rate: 84 Resp: 16 SpO2: 95 % O2 Device: None (Room air)    There were no vitals filed for this visit.  Vital Signs with Ranges  Temp:  [96.4  F (35.8  C)-97.8  F (36.6  C)] 97.3  F (36.3  C)  Pulse:  [80] 80  Heart Rate:  [74-84] 84  Resp:  [16-20] 16  BP: (142-179)/(68-87) 149/68  SpO2:  [93 %-96 %] 95 %  I/O last 3 completed shifts:  In: 120 [P.O.:120]  Out: 1100 [Urine:1100]    GENERAL:  Awake and alert, No acute distress.  PSYCH: no acute agitation  HEENT:  Neck is Supple, trachea is midline, EOMI, conjunctiva clear  CARDIOVASCULAR: Regular rate and rhythm, Normal S1, S2, no loud murmurs  PULMONARY:  Clear to auscultation bilaterally with good entry on both sides  GI: Abdomen is soft, non tender, non-distended, bowel sounds present. No rebound or guarding   SKIN:  No cyanosis or clubbing, no obvious exanthems on exposed areas   MSK: Extremities are warm and well perfused. No pitting edema   Neuro: Awake, oriented to place, conversant, Moving all extremities      Medications     sodium chloride 100  mL/hr at 01/10/19 0935       aspirin  81 mg Oral Daily     carvedilol  25 mg Oral BID w/meals     diphenhydrAMINE  25 mg Intramuscular Once     losartan  50 mg Oral Daily     nortriptyline  75 mg Oral At Bedtime     sodium chloride (PF)  3 mL Intracatheter Q8H       Data   All new lab data and imaging results from today have been reviewed     Recent Labs   Lab 01/10/19  0801 01/09/19  1008   WBC 8.9 9.8   HGB 17.1 17.1   MCV 94 93    201   INR  --  1.01    137   POTASSIUM 3.9 4.2   CHLORIDE 108 103   CO2 23 25   BUN 11 18   CR 0.71 0.86   ANIONGAP 8 9   LUZ 8.4* 8.6   * 194*   ALBUMIN  --  3.1*   PROTTOTAL  --  7.3   BILITOTAL  --  0.9   ALKPHOS  --  138   ALT  --  59   AST  --  106*   TROPI  --  0.018       No results found for this or any previous visit (from the past 24 hour(s)).

## 2019-01-10 NOTE — PLAN OF CARE
Pt alert to self only, sitter in room. VSS, pt denies pain, headache, dizziness, n/v & SOB. Pt very confused, but is cooperative. New IV established, NS @100 mL/hr. Tele: SR. Will continue with plan of care.

## 2019-01-10 NOTE — PLAN OF CARE
PT: Orders received, evaluation completed and treatment initiated. 83 year old male admitted after a fall with confusion. PMH significant for dementia, CVA and TBI. Pt not able to provide PLOF information other than that he uses a cane at home. Per chart review from previous admission (5/2018) pt has 4 stairs to enter. Also attempted to call english speaking son, Adolfo. He reports there are 10 stairs to enter the home and pt will alternate between using a cane/walker. Adolfo reports he is not sure of pt's typical mobility status or if there have been recent falls. At this time it is unclear of how much supervision/assist pt typically needs or what the pt's wife can provide.     Discharge Planner PT   Patient plan for discharge: Not stated.   Current status: Alert to self. Able to follow one step commands. Supine to sit with mod A x 1. STS initially with min A x 1, progresses to CGA x 1 (but is bracing LEs on the bed). Pt needs CGA for standing static balance with UE support on FWW to use the urinal. Ambulates 150' with FWW and CGA to min A. PT cues to stay close to walker, assist needed to keep walker turning during ambulation.   Barriers to return to prior living situation: Unclear supports, confusion, fall risk  Recommendations for discharge: Hard to state as PLOF/supports are not clear. Anticipate pt will need TCU. If he were to return home would need 24/7 supervision/assist and HH PT/OT likely.   Rationale for recommendations: At this time pt/son are not able to provide all PLOF information. Pt reports his wife does not speak english and she is currently not present in the hospital to assist via interpretor. Will continue to follow and assist with discharge planning as able.        Entered by: Regina Vick 01/10/2019 10:57 AM

## 2019-01-11 LAB — GLUCOSE BLDC GLUCOMTR-MCNC: 177 MG/DL (ref 70–99)

## 2019-01-11 PROCEDURE — 00000146 ZZHCL STATISTIC GLUCOSE BY METER IP

## 2019-01-11 PROCEDURE — 25000132 ZZH RX MED GY IP 250 OP 250 PS 637: Performed by: INTERNAL MEDICINE

## 2019-01-11 PROCEDURE — 25000132 ZZH RX MED GY IP 250 OP 250 PS 637: Performed by: PHYSICIAN ASSISTANT

## 2019-01-11 PROCEDURE — 25000128 H RX IP 250 OP 636: Performed by: INTERNAL MEDICINE

## 2019-01-11 PROCEDURE — 25000128 H RX IP 250 OP 636

## 2019-01-11 PROCEDURE — 25000128 H RX IP 250 OP 636: Performed by: HOSPITALIST

## 2019-01-11 PROCEDURE — 25000128 H RX IP 250 OP 636: Performed by: EMERGENCY MEDICINE

## 2019-01-11 PROCEDURE — 12000000 ZZH R&B MED SURG/OB

## 2019-01-11 PROCEDURE — 99232 SBSQ HOSP IP/OBS MODERATE 35: CPT | Performed by: INTERNAL MEDICINE

## 2019-01-11 RX ORDER — LORAZEPAM 2 MG/ML
.5-1 INJECTION INTRAMUSCULAR EVERY 4 HOURS PRN
Status: COMPLETED | OUTPATIENT
Start: 2019-01-11 | End: 2019-01-11

## 2019-01-11 RX ORDER — LORAZEPAM 2 MG/ML
INJECTION INTRAMUSCULAR
Status: COMPLETED
Start: 2019-01-11 | End: 2019-01-11

## 2019-01-11 RX ORDER — OLANZAPINE 10 MG/2ML
10 INJECTION, POWDER, FOR SOLUTION INTRAMUSCULAR DAILY PRN
Status: DISCONTINUED | OUTPATIENT
Start: 2019-01-11 | End: 2019-01-14

## 2019-01-11 RX ORDER — LANOLIN ALCOHOL/MO/W.PET/CERES
6 CREAM (GRAM) TOPICAL AT BEDTIME
Status: DISCONTINUED | OUTPATIENT
Start: 2019-01-11 | End: 2019-01-19 | Stop reason: HOSPADM

## 2019-01-11 RX ADMIN — MELATONIN TAB 3 MG 6 MG: 3 TAB at 21:24

## 2019-01-11 RX ADMIN — OLANZAPINE 5 MG: 5 TABLET, ORALLY DISINTEGRATING ORAL at 21:24

## 2019-01-11 RX ADMIN — CARVEDILOL 25 MG: 25 TABLET, FILM COATED ORAL at 09:05

## 2019-01-11 RX ADMIN — ASPIRIN 81 MG: 81 TABLET, COATED ORAL at 09:06

## 2019-01-11 RX ADMIN — ACETAMINOPHEN 650 MG: 325 TABLET, FILM COATED ORAL at 09:06

## 2019-01-11 RX ADMIN — NORTRIPTYLINE HYDROCHLORIDE 75 MG: 50 CAPSULE ORAL at 21:24

## 2019-01-11 RX ADMIN — OLANZAPINE 10 MG: 10 INJECTION, POWDER, FOR SOLUTION INTRAMUSCULAR at 23:02

## 2019-01-11 RX ADMIN — Medication 1 MG: at 00:16

## 2019-01-11 RX ADMIN — LORAZEPAM 1 MG: 2 INJECTION, SOLUTION INTRAMUSCULAR; INTRAVENOUS at 09:05

## 2019-01-11 RX ADMIN — LORAZEPAM 1 MG: 2 INJECTION, SOLUTION INTRAMUSCULAR; INTRAVENOUS at 03:52

## 2019-01-11 RX ADMIN — OLANZAPINE 5 MG: 5 TABLET, ORALLY DISINTEGRATING ORAL at 09:06

## 2019-01-11 RX ADMIN — LOSARTAN POTASSIUM 50 MG: 50 TABLET, FILM COATED ORAL at 09:06

## 2019-01-11 RX ADMIN — ACETAMINOPHEN 650 MG: 325 TABLET, FILM COATED ORAL at 15:58

## 2019-01-11 RX ADMIN — OLANZAPINE 5 MG: 10 INJECTION, POWDER, FOR SOLUTION INTRAMUSCULAR at 01:58

## 2019-01-11 ASSESSMENT — ACTIVITIES OF DAILY LIVING (ADL)
ADLS_ACUITY_SCORE: 16
ADLS_ACUITY_SCORE: 26
ADLS_ACUITY_SCORE: 16
ADLS_ACUITY_SCORE: 26
ADLS_ACUITY_SCORE: 16
ADLS_ACUITY_SCORE: 26

## 2019-01-11 NOTE — PROVIDER NOTIFICATION
MD paged: Pt continues to be agitated and combative trying to get out of bed, PRN IM zyprexa given @ 0200, no other PRNs available. Please advise.      Orders for IV ativan PRN - given.

## 2019-01-11 NOTE — PLAN OF CARE
VVS. Pt alert to self. Reg diet, good appetite. Up with one assist, GB and walker. Sitter at bedside. Saline locked. Tylenol for pain. K 3.9. Tele SR.

## 2019-01-11 NOTE — PLAN OF CARE
Pt Cypriot speaking and speaks no english, will not use the blue phone to communicate with staff, Cypriot speaking staff assisted with patient communication this shift, Pt only alert to self and place, q4hr neuro checks completed, up w/ A2 walker, sitter at bedside, Tele SR, VSS on RA, right PIV SL - orders for NS infusion, but pt refused fluids even after explanation of why fluids were needed, pt was alert at this time, incontinent of urine, pt became agitated and combative - trying get out of bed and tried hit staff with his cane, PRN IM zyprexa given x1 with no improvement, MD paged and IV ativan given x1 with improvement, plan for discharge 1-2 days pending TCU placement, continue with current POC.

## 2019-01-11 NOTE — PLAN OF CARE
Pt Samoan speaking, refuses to use blue phone. Need to have  present whenever possible.  Pt alert to self only, sitter in room. VSS, pt denies pain, headache, dizziness, n/v & SOB. Pt up w/asst 2 w/Marysol steady. Pt very confused, more agitated today, but is more cooperative with  present. Tele: SR.  Will continue with plan of care.

## 2019-01-11 NOTE — PLAN OF CARE
PT- attempted was sleeping, per chart review has been more confused this date, up with 2 and teresita Steady but per conversation was leaning heavily to one side, may need to change to safe mode of transfer ( LIKO sling)

## 2019-01-12 LAB
ANION GAP SERPL CALCULATED.3IONS-SCNC: 5 MMOL/L (ref 3–14)
BUN SERPL-MCNC: 14 MG/DL (ref 7–30)
CALCIUM SERPL-MCNC: 8.6 MG/DL (ref 8.5–10.1)
CHLORIDE SERPL-SCNC: 106 MMOL/L (ref 94–109)
CO2 SERPL-SCNC: 27 MMOL/L (ref 20–32)
CREAT SERPL-MCNC: 0.76 MG/DL (ref 0.66–1.25)
ERYTHROCYTE [DISTWIDTH] IN BLOOD BY AUTOMATED COUNT: 12.5 % (ref 10–15)
GFR SERPL CREATININE-BSD FRML MDRD: 84 ML/MIN/{1.73_M2}
GLUCOSE BLDC GLUCOMTR-MCNC: 191 MG/DL (ref 70–99)
GLUCOSE BLDC GLUCOMTR-MCNC: 193 MG/DL (ref 70–99)
GLUCOSE SERPL-MCNC: 186 MG/DL (ref 70–99)
HCT VFR BLD AUTO: 53.3 % (ref 40–53)
HGB BLD-MCNC: 18 G/DL (ref 13.3–17.7)
MCH RBC QN AUTO: 32 PG (ref 26.5–33)
MCHC RBC AUTO-ENTMCNC: 33.8 G/DL (ref 31.5–36.5)
MCV RBC AUTO: 95 FL (ref 78–100)
PLATELET # BLD AUTO: 197 10E9/L (ref 150–450)
POTASSIUM SERPL-SCNC: 4 MMOL/L (ref 3.4–5.3)
RBC # BLD AUTO: 5.62 10E12/L (ref 4.4–5.9)
SODIUM SERPL-SCNC: 138 MMOL/L (ref 133–144)
WBC # BLD AUTO: 9.7 10E9/L (ref 4–11)

## 2019-01-12 PROCEDURE — 12000000 ZZH R&B MED SURG/OB

## 2019-01-12 PROCEDURE — 99223 1ST HOSP IP/OBS HIGH 75: CPT | Performed by: PSYCHIATRY & NEUROLOGY

## 2019-01-12 PROCEDURE — 99232 SBSQ HOSP IP/OBS MODERATE 35: CPT | Performed by: INTERNAL MEDICINE

## 2019-01-12 PROCEDURE — 80048 BASIC METABOLIC PNL TOTAL CA: CPT | Performed by: INTERNAL MEDICINE

## 2019-01-12 PROCEDURE — 99207 ZZC CDG-MDM COMPONENT: MEETS LOW - DOWN CODED: CPT | Performed by: INTERNAL MEDICINE

## 2019-01-12 PROCEDURE — 25000132 ZZH RX MED GY IP 250 OP 250 PS 637: Performed by: INTERNAL MEDICINE

## 2019-01-12 PROCEDURE — 25000132 ZZH RX MED GY IP 250 OP 250 PS 637: Performed by: PSYCHIATRY & NEUROLOGY

## 2019-01-12 PROCEDURE — 36415 COLL VENOUS BLD VENIPUNCTURE: CPT | Performed by: INTERNAL MEDICINE

## 2019-01-12 PROCEDURE — 85027 COMPLETE CBC AUTOMATED: CPT | Performed by: INTERNAL MEDICINE

## 2019-01-12 PROCEDURE — 00000146 ZZHCL STATISTIC GLUCOSE BY METER IP

## 2019-01-12 RX ORDER — MIRTAZAPINE 15 MG/1
15 TABLET, ORALLY DISINTEGRATING ORAL AT BEDTIME
Status: DISCONTINUED | OUTPATIENT
Start: 2019-01-12 | End: 2019-01-19 | Stop reason: HOSPADM

## 2019-01-12 RX ORDER — GABAPENTIN 300 MG/1
300 CAPSULE ORAL AT BEDTIME
Status: DISCONTINUED | OUTPATIENT
Start: 2019-01-12 | End: 2019-01-19 | Stop reason: HOSPADM

## 2019-01-12 RX ORDER — GABAPENTIN 100 MG/1
100 CAPSULE ORAL 2 TIMES DAILY PRN
Status: DISCONTINUED | OUTPATIENT
Start: 2019-01-12 | End: 2019-01-19 | Stop reason: HOSPADM

## 2019-01-12 RX ORDER — HYDRALAZINE HYDROCHLORIDE 20 MG/ML
10 INJECTION INTRAMUSCULAR; INTRAVENOUS EVERY 4 HOURS PRN
Status: DISCONTINUED | OUTPATIENT
Start: 2019-01-12 | End: 2019-01-19 | Stop reason: HOSPADM

## 2019-01-12 RX ADMIN — OLANZAPINE 5 MG: 5 TABLET, ORALLY DISINTEGRATING ORAL at 07:54

## 2019-01-12 RX ADMIN — MELATONIN TAB 3 MG 6 MG: 3 TAB at 19:18

## 2019-01-12 RX ADMIN — ASPIRIN 81 MG: 81 TABLET, COATED ORAL at 07:55

## 2019-01-12 RX ADMIN — OLANZAPINE 5 MG: 5 TABLET, ORALLY DISINTEGRATING ORAL at 19:19

## 2019-01-12 RX ADMIN — GABAPENTIN 100 MG: 100 CAPSULE ORAL at 15:17

## 2019-01-12 RX ADMIN — CARVEDILOL 25 MG: 25 TABLET, FILM COATED ORAL at 07:54

## 2019-01-12 RX ADMIN — ACETAMINOPHEN 650 MG: 325 TABLET, FILM COATED ORAL at 07:54

## 2019-01-12 RX ADMIN — ACETAMINOPHEN 650 MG: 325 TABLET, FILM COATED ORAL at 14:46

## 2019-01-12 RX ADMIN — MIRTAZAPINE 15 MG: 15 TABLET, ORALLY DISINTEGRATING ORAL at 19:19

## 2019-01-12 RX ADMIN — CARVEDILOL 25 MG: 25 TABLET, FILM COATED ORAL at 18:21

## 2019-01-12 RX ADMIN — GABAPENTIN 300 MG: 300 CAPSULE ORAL at 19:19

## 2019-01-12 RX ADMIN — LOSARTAN POTASSIUM 50 MG: 50 TABLET, FILM COATED ORAL at 07:54

## 2019-01-12 ASSESSMENT — ACTIVITIES OF DAILY LIVING (ADL)
ADLS_ACUITY_SCORE: 28
ADLS_ACUITY_SCORE: 17
ADLS_ACUITY_SCORE: 28
ADLS_ACUITY_SCORE: 17
ADLS_ACUITY_SCORE: 26
ADLS_ACUITY_SCORE: 17

## 2019-01-12 NOTE — PLAN OF CARE
Pt oriented to self only. Speaks Central African,  used while available and to update wife last night. Pt continues to be restless and attempting to get out of bed at times. Did attempt to swing and kick staff with cares at times. Tele SR. Refused to keep telemetry on most of this shift. Pt incontinent multiple times. Blood pressures elevated 180's-190s systolic.   Leisa John RN on 1/12/2019 at 5:26 AM

## 2019-01-12 NOTE — PLAN OF CARE
ORIENTATION: Alert to self  VS: Stable  NEURO: Intact  CV: Tele SR  LUNGS: 97% RA; Diminished  : Incontinent  GI: Last BM 1/9/19 - per notes, unable to get from pt  IV: Saline Locked  PAIN: Denies  PLAN: Pt stable; Assist x 2; Sitter at bedside; zyprexa x 1 for agitation; SW following; poss discharge 2-3 days; continue w/POC

## 2019-01-12 NOTE — CONSULTS
See dictation. #[id number]  Psychiatry   Initial Consultation  Orders changed include change of nortriptyline to remeron for better insomnia and anxiety coverage.  It has tricyclic like muscle relaxant effects also.  I will reinstate gabapentin 200mgqhs with a qday prn.  Continue zyprexa BID. Has prn zyprexa and seroquel ordered.   Devika Martinez MD  1/12/2019

## 2019-01-12 NOTE — PROGRESS NOTES
[facility]    Hospitalist Progress Note    Date of Service (when I saw the patient): 01/11/2019    Assessment & Plan     Adam Huber is a 83 year old male with a history of T12 compression fracture, HLD, DM Type 2, HTN, CAD, BPH, Gait Instability, Depression, Insomnia, CVA, Prior Head Trauma, Alzheimers Disease, hx of Delerium who presents to the ED 2/2 confusion and s/p fall.        # S/P Fall, Mild Rhabdomyolysis - suspect mechanical fall. it is reported patient fell outside while walking to the store.  He denies any pain and CT head, chest, abd/pelvis negative for acute injuries.  CK 1275 on admission  EKG with no evidence of arhythmia, no signs of infection.  Troponin wnl.  -  improving , stop ivf  - PT evaluation     # Confusion/Delirium in the setting of underlying dementia - pt agitated on arrival to the ED requiring IM Zyprexa.  Patient does have a hx of Traumatic Brain Injury, prior CVA and Alzheimer's Dementia at baseline.  Work up thus far with no acute abnormality on CT head and no signs of infection.  Suspect most likely related to underlying dementia . Discussed with family, lives at home with his wife.  She does mention that the patient has been having increasing issues with agitation and getting angry.  He was in a TCU for about 3 weeks after a previous hospitalization for compression fracture.   --   Will request social work consult  -- We will schedule Zyprexa 5 mg BID , increase PRN zyprexa to 10 mg IM daily PRN  -- If symptoms not better then consider psych consult, had similar significant issues with delirium in past as well      Hx of CAD - NSTEMI in 10/2015. Stents in staged procedure, RCA x2 on 10/12/15 then circumflex stent on 11/6/15. Had other areas of disease that were felt to be best managed medically. Denies chest pain at this time, EKG with no acute ischemic changes.  Troponin wnl.  - resume asa, losartan, Coreg     HTN - resume home meds      Hx of TBI - memory  difficulties and R sided deficits following that injury in 1987.     Hx of CVA - hx CVA 1999 and 2003 with baseline mild right sided weakness and speech is somewhat slow.    DVT Prophylaxis: Pneumatic Compression Devices    Code Status: Full Code    Disposition: Disposition Plan   Expected discharge in 1-2 days to transitional care unit or memory care vs home with family assist once agitation and behavioral symptoms better.     Entered: Eveline Nogueira Boyd 01/11/2019, 7:34 PM       Updated daughter Gianna, discussed with her that it will be helpful to have one family spokesperson who can coordinate and communicate with rest of family. They are not sure about what they want for placement and encouraged to discuss with CHATA Nix    Interval History   Chart reviewed and patient seen. Case discussed with nursing staff. Patient seen with help of professional Northern Irish      Patient feels well, Denies any chest pain, shortness of breath, some confusion, had episode of agitation last night but cooperative currently, a little sedated from meds, no back pain, No other complaints voiced.     -Data reviewed today: I reviewed all new labs and imaging results over the last 24 hours. I personally reviewed     Physical Exam   Temp: 97.5  F (36.4  C) Temp src: Oral BP: 117/56 Pulse: 65 Heart Rate: 72 Resp: 20 SpO2: 92 % O2 Device: None (Room air)    There were no vitals filed for this visit.  Vital Signs with Ranges  Temp:  [96.1  F (35.6  C)-97.5  F (36.4  C)] 97.5  F (36.4  C)  Pulse:  [65] 65  Heart Rate:  [71-81] 72  Resp:  [18-20] 20  BP: (117-148)/(56-62) 117/56  SpO2:  [90 %-98 %] 92 %  I/O last 3 completed shifts:  In: 1066 [P.O.:1060; I.V.:6]  Out: -     GENERAL:  Awake, No acute distress.  PSYCH: no acute agitation  HEENT:  Neck is Supple, trachea is midline, EOMI, conjunctiva clear  CARDIOVASCULAR: Regular rate and rhythm, Normal S1, S2, no loud murmurs  PULMONARY:  Clear to auscultation bilaterally  with good entry on both sides  GI: Abdomen is soft, non tender, non-distended, bowel sounds present. No rebound or guarding   SKIN:  No cyanosis or clubbing, no obvious exanthems on exposed areas   MSK: Extremities are warm and well perfused. No pitting edema   Neuro: Awake, oriented to place, conversant, Moving all extremities      Medications       aspirin  81 mg Oral Daily     carvedilol  25 mg Oral BID w/meals     losartan  50 mg Oral Daily     nortriptyline  75 mg Oral At Bedtime     OLANZapine zydis  5 mg Oral BID     sodium chloride (PF)  3 mL Intracatheter Q8H       Data   All new lab data and imaging results from today have been reviewed     Recent Labs   Lab 01/10/19  0801 01/09/19  1008   WBC 8.9 9.8   HGB 17.1 17.1   MCV 94 93    201   INR  --  1.01    137   POTASSIUM 3.9 4.2   CHLORIDE 108 103   CO2 23 25   BUN 11 18   CR 0.71 0.86   ANIONGAP 8 9   LUZ 8.4* 8.6   * 194*   ALBUMIN  --  3.1*   PROTTOTAL  --  7.3   BILITOTAL  --  0.9   ALKPHOS  --  138   ALT  --  59   AST  --  106*   TROPI  --  0.018       No results found for this or any previous visit (from the past 24 hour(s)).

## 2019-01-12 NOTE — CONSULTS
"Consult Date:  01/12/2019      IDENTIFICATION:  The patient is an 83-year-old  Nepalese immigrant male, previously known to service who is seen at request of Dr. Nix for medication evaluation in this patient admitted with recent fall, and increasing confusion.      HISTORY OF PRESENT ILLNESS:  The patient was admitted to general medical unit and has a Nepalese .  The history had been obtained by son, Adolfo, who had reported that he had fallen near his home, apparently trying to go to a store.  He had apparently returned to his home he shares with his wife in [place] after a 3-week TCU stay in May/June, in which he had had a fall at that time and a compression fracture.  He had been released on Seroquel, Gabapentin at that time.  His baseline has mild to moderate confusion.  He has been more acutely confused in hospital.   He had minimal ability to be interviewed with , he was pulling on his lines, is lying in diaper in bed, stating \"I don't want that.\"  He had been fed breakfast this morning per staff.  Family was unavailable for further interview after an attempt to call son.  He does have a sitter.  Apparently, is up with assistance with two in a Marysol Stedy.  He had been noted by staff to be hallucinating \"monkeys in the room.\"  His laboratory studies indicate a CT to head with no evidence of acute changes.  Chronic encephalomalacia seen consistent with his previous CVAs in area of the right basal ganglia, right temporal and bilateral inferior aspects of frontal lobes.  His chemistry panel was normal.  Mild elevation in his CK to 400.  He also had a CT to abdomen and pelvis looking for trauma and these were unremarkable.  His previous compression fracture of thoracic vertebra appeared stable.  He was noted to have a very mildly elevated TSH of 4.57, likely consistent with chronic health issues.  He had not been reported to be consuming alcohol or any abuse of substance, but does " have a history of refusing medications at times.      MEDICATIONS:  Aspirin 81 mg q. day, Coreg, Cozaar, Pamelor, recently increased from 50 to 75 mg at bedtime, Ultram p.r.n., nitroglycerin p.r.n. Tylenol Regular Strength p.r.n.      ALLERGIES:  LISINOPRIL AND MORPHINE SULFATE.      PAST PSYCHIATRIC HISTORY:  No history of alcoholism, psychiatric hospitalizations, suicide attempts, manic or psychotic episodes.  He does have a history of senile onset Alzheimer dementia, likely concomitant with vascular dementia.  He has shown delirium on previous hospitalizations.      PAST MEDICAL HISTORY:  Significant for recurrent falls, compression fracture with admission May 2018, hypertension, type 2 diabetes mellitus, cholecystectomy, hypercholesterolemia, ASCVD status post stent placement and MI.  He has history of unclear TBI in 1987.  He is also status post CVA in 1999 and in 2003.      VITAL SIGNS:  Temperature 96.2, respiration 18, pulse 84, /88 (down from 193/92).      REVIEW OF SYSTEMS:  No clear pain complaint, although there is recent fall.  He was ambulatory prior to admission.  Otherwise, systems negative.      MENTAL STATUS EXAM:  Appears well-nourished, confused, elderly , lying in bed in diaper, restless and confused and is disoriented.  No English fluency.  The  had minimal ability to have him answer any questions and thus appeared confused.  He has been noted to be hallucinating.  He is pulling at his IV and bed clothes.  Insight and judgment impaired.  He has been unsteady with staff also. Previously of normal intelligence. Currently nonambulatory, although ambulatory prior to admissioon with history of falls and unsteadiness.     PSYCHIATRIC  DIAGNOSES:   1.  Neurocognitive disorder, senile onset, Alzheimer's type with likely concomitant vascular insufficiency.   2.  Hypertensive.   3.  Rule out superimposed delirium.         IMPRESSION/PLAN:  The patient has had 2 significant falls  in the past in a little over a 6-month period.  He appears to have some progression in his dementia and cognitive status and is currently total nursing care.  He has poor rehabilitation potential and palliative care consultation may be helpful and  is following for possible longterm placement.  Agree with continuing the ordered Zyprexa at this time.  It is unclear as to why gabapentin was discontinued.  I will add this as p.r.n and lower evening dose.  Nortriptyline will be changed to Remeron.  Adjustment made in Seroquel.  Family conference may be helpful.  Consideration for hospice and nursing home placement also.        Please call with questions and concerns.         KIT HOWARD MD             D: 2019   T: 2019   MT: EFRAÍN      Name:     CHANI ELENA   MRN:      [id number]        Account:       [id number]   :      1935           Consult Date:  2019      Document: [id number]

## 2019-01-12 NOTE — PROGRESS NOTES
SWS is aware of the consult for discharge planning. SW awaiting PT/OT recommendations.     Krystal Callahan, SW  Casual SW [phone]

## 2019-01-12 NOTE — PLAN OF CARE
Pt Grenadian speaking, refuses to use blue phone. Need to have  present whenever possible.  Pt alert to self only, sitter in room. VSS, pt denies pain, headache, dizziness, n/v & SOB. Pt up w/asst 2 w/Marysol steady. Pt very confused, hallucinating (grabbing at the air, reports monkeys in the room) more agitated today, but is more cooperative with  present. Tele: SR/pt refused/removed. Social Work consult to discuss memory care placement with family. Will continue with plan of care.    Interpreters scheduled daily:  06-12 14-19 21-00

## 2019-01-12 NOTE — PROGRESS NOTES
[facility]    Hospitalist Progress Note    Date of Service (when I saw the patient): 01/12/2019    Assessment & Plan     Adam Huber is a 83 year old male with a history of T12 compression fracture, HLD, DM Type 2, HTN, CAD, BPH, Gait Instability, Depression, Insomnia, CVA, Prior Head Trauma, Alzheimers Disease, hx of Delerium who presents to the ED 2/2 confusion and s/p fall.     # S/P Fall, Mild Rhabdomyolysis - suspect mechanical fall. it is reported patient fell outside while walking to the store.  He denies any pain and CT head, chest, abd/pelvis negative for acute injuries.  CK 1275 on admission  EKG with no evidence of arhythmia, no signs of infection.  Troponin wnl.  -  Resolved , stop ivf     # Confusion/Delirium in the setting of underlying dementia - pt agitated on arrival to the ED requiring IM Zyprexa.  Patient does have a hx of Traumatic Brain Injury, prior CVA and Alzheimer's Dementia at baseline.  Work up thus far with no acute abnormality on CT head and no signs of infection.  Suspect most likely related to progress underlying dementia . Discussed with family, lives at home with his wife. He has many kids in the area who also help out and has PCA. Family mention that the patient has been having increasing issues with agitation and wife has been having more difficulty.  He was in a TCU for about 3 weeks after a previous hospitalization for compression fracture. Of note, he had similar significant issues with delirium in past as well during hospitalizations  --   Will request social work consult  -- We will schedule Zyprexa 5 mg BID , increase PRN zyprexa to 10 mg IM daily PRN  -- appreciate psych consult, stopped TCA and started on Remeron and gabapentin   -- Melatonin at bedtime    Hx of CAD - NSTEMI in 10/2015. Stents in staged procedure, RCA x2 on 10/12/15 then circumflex stent on 11/6/15. Had other areas of disease that were felt to be best managed medically. Denies chest pain  at this time, EKG with no acute ischemic changes.  Troponin wnl.  - resume asa, losartan, Coreg     HTN - resume home meds      Hx of TBI - memory difficulties and R sided deficits following injury in 1987.     Hx of CVA - hx CVA 1999 and 2003 with baseline mild right sided weakness and speech is somewhat slow.    DVT Prophylaxis: Pneumatic Compression Devices    Code Status: Full Code     Placement; I discussed this in detail with patient's daughter at bedside. I discussed with them at some point, as his behavioral symptoms worsen, they may need to look at placement into a memory care. So far, their preference is to take him home with increased PCA hours and additional help from family. (pt lives at home with his wife). I discussed with her that the plan is to try the new medications as recommended by psych to get a somewhat better control of his symptoms, promote better sleep pattern and then plan for discharge.     Disposition: Disposition Plan   Expected discharge in 2 days to Home vs memory care , with family assist once agitation and behavioral symptoms better.     Entered: Eveline Nix 01/12/2019, 4:44 PM           Eveline Nix    Interval History   Chart reviewed and patient seen. Case discussed with nursing staff. Patient seen with help of professional Cook Islander      Patient feels ok, more confused today, Denies any chest pain, shortness of breath, no back pain, No other complaints voiced.     -Data reviewed today: I reviewed all new labs and imaging results over the last 24 hours. I personally reviewed     Physical Exam   Temp: 95.6  F (35.3  C) Temp src: Oral BP: 161/68   Heart Rate: 75 Resp: 20 SpO2: 93 % O2 Device: None (Room air)    There were no vitals filed for this visit.  Vital Signs with Ranges  Temp:  [95.6  F (35.3  C)-97.7  F (36.5  C)] 95.6  F (35.3  C)  Heart Rate:  [63-99] 75  Resp:  [18-20] 20  BP: (133-193)/(61-92) 161/68  SpO2:  [91 %-97 %] 93 %  I/O last 3 completed  shifts:  In: 880 [P.O.:880]  Out: -     GENERAL:  Awake, No acute distress.  PSYCH: no acute agitation  HEENT:  Neck is Supple, trachea is midline, EOMI, conjunctiva clear  CARDIOVASCULAR: Regular rate and rhythm, Normal S1, S2, no loud murmurs  PULMONARY:  Clear to auscultation bilaterally with good entry on both sides  GI: Abdomen is soft, non tender, non-distended, bowel sounds present. No rebound or guarding   SKIN:  No cyanosis or clubbing, no obvious exanthems on exposed areas   MSK: Extremities are warm and well perfused. No pitting edema   Neuro: Awake, oriented to place, conversant, Moving all extremities, confused       Medications       aspirin  81 mg Oral Daily     carvedilol  25 mg Oral BID w/meals     gabapentin  300 mg Oral At Bedtime     losartan  50 mg Oral Daily     melatonin  6 mg Oral At Bedtime     mirtazapine  15 mg Orally disintegrating tablet At Bedtime     OLANZapine zydis  5 mg Oral BID     sodium chloride (PF)  3 mL Intracatheter Q8H       Data   All new lab data and imaging results from today have been reviewed     Recent Labs   Lab 01/12/19  0747 01/10/19  0801 01/09/19  1008   WBC 9.7 8.9 9.8   HGB 18.0* 17.1 17.1   MCV 95 94 93    189 201   INR  --   --  1.01    139 137   POTASSIUM 4.0 3.9 4.2   CHLORIDE 106 108 103   CO2 27 23 25   BUN 14 11 18   CR 0.76 0.71 0.86   ANIONGAP 5 8 9   LUZ 8.6 8.4* 8.6   * 153* 194*   ALBUMIN  --   --  3.1*   PROTTOTAL  --   --  7.3   BILITOTAL  --   --  0.9   ALKPHOS  --   --  138   ALT  --   --  59   AST  --   --  106*   TROPI  --   --  0.018       No results found for this or any previous visit (from the past 24 hour(s)).

## 2019-01-13 ENCOUNTER — APPOINTMENT (OUTPATIENT)
Dept: PHYSICAL THERAPY | Facility: CLINIC | Age: 84
DRG: 565 | End: 2019-01-13
Payer: COMMERCIAL

## 2019-01-13 LAB — GLUCOSE BLDC GLUCOMTR-MCNC: 202 MG/DL (ref 70–99)

## 2019-01-13 PROCEDURE — 25000132 ZZH RX MED GY IP 250 OP 250 PS 637: Performed by: INTERNAL MEDICINE

## 2019-01-13 PROCEDURE — 99207 ZZC CDG-MDM COMPONENT: MEETS LOW - DOWN CODED: CPT | Performed by: INTERNAL MEDICINE

## 2019-01-13 PROCEDURE — 25000128 H RX IP 250 OP 636: Performed by: INTERNAL MEDICINE

## 2019-01-13 PROCEDURE — 40000193 ZZH STATISTIC PT WARD VISIT: Performed by: PHYSICAL THERAPY ASSISTANT

## 2019-01-13 PROCEDURE — 25000132 ZZH RX MED GY IP 250 OP 250 PS 637: Performed by: PSYCHIATRY & NEUROLOGY

## 2019-01-13 PROCEDURE — 99232 SBSQ HOSP IP/OBS MODERATE 35: CPT | Performed by: INTERNAL MEDICINE

## 2019-01-13 PROCEDURE — 97530 THERAPEUTIC ACTIVITIES: CPT | Mod: GP | Performed by: PHYSICAL THERAPY ASSISTANT

## 2019-01-13 PROCEDURE — 00000146 ZZHCL STATISTIC GLUCOSE BY METER IP

## 2019-01-13 PROCEDURE — 12000000 ZZH R&B MED SURG/OB

## 2019-01-13 RX ADMIN — MIRTAZAPINE 15 MG: 15 TABLET, ORALLY DISINTEGRATING ORAL at 19:00

## 2019-01-13 RX ADMIN — CARVEDILOL 25 MG: 25 TABLET, FILM COATED ORAL at 19:00

## 2019-01-13 RX ADMIN — LOSARTAN POTASSIUM 50 MG: 50 TABLET, FILM COATED ORAL at 07:39

## 2019-01-13 RX ADMIN — MELATONIN TAB 3 MG 6 MG: 3 TAB at 19:00

## 2019-01-13 RX ADMIN — OLANZAPINE 5 MG: 5 TABLET, ORALLY DISINTEGRATING ORAL at 07:40

## 2019-01-13 RX ADMIN — GABAPENTIN 300 MG: 300 CAPSULE ORAL at 19:00

## 2019-01-13 RX ADMIN — OLANZAPINE 10 MG: 10 INJECTION, POWDER, FOR SOLUTION INTRAMUSCULAR at 16:48

## 2019-01-13 RX ADMIN — OLANZAPINE 5 MG: 5 TABLET, ORALLY DISINTEGRATING ORAL at 18:59

## 2019-01-13 RX ADMIN — CARVEDILOL 25 MG: 25 TABLET, FILM COATED ORAL at 07:40

## 2019-01-13 RX ADMIN — ACETAMINOPHEN 650 MG: 325 TABLET, FILM COATED ORAL at 07:39

## 2019-01-13 ASSESSMENT — ACTIVITIES OF DAILY LIVING (ADL)
ADLS_ACUITY_SCORE: 17
ADLS_ACUITY_SCORE: 16.5
ADLS_ACUITY_SCORE: 17.5
ADLS_ACUITY_SCORE: 17
ADLS_ACUITY_SCORE: 16.5
ADLS_ACUITY_SCORE: 17

## 2019-01-13 NOTE — CONSULTS
Care Transition Initial Assessment - SW  Met with: PATIENT  Active Problems:    Rhabdomyolysis       DATA  Lives With: facility resident, spouse   Living Arrangements: house  Quality of Family Relationships: involved, supportive  Description of Support System: Supportive, Involved  Who is your support system?: Wife, Children  Support Assessment: Adequate family and caregiver support.   Identified issues/concerns regarding health management: SW to assist with TCU vs MC placement at discharge.   Quality of Family Relationships: involved, supportive       ASSESSMENT  Cognitive Status:  Pt presented with confusion. Pt mumbled and interpretor wasn't able to make out what he was saying several times.   Concerns to be addressed: SW to assist with TCU vs MC placement at discharge.  Pt is declining and not doing well with PT. Pt still has a sitter so would not be able to go to TCU at this time. Pt must be sitter free for 24-48 hours in order for a TCU to accept. SW inquired about who to contact to discuss his discharge plan, pt stated not his wife. Pt's daughter Gianna and son Adolfo were listed on the board. SW called Gianna and basil requesting a return call to discuss a discharge plan.     PLAN  Patient anticipates discharging to: Memory care.    SHONDA Kelley  Casual SW [phone]

## 2019-01-13 NOTE — PROGRESS NOTES
[facility]    Hospitalist Progress Note    Date of Service (when I saw the patient): 01/13/2019    Assessment & Plan     Adam Huber is a 83 year old male with a history of T12 compression fracture, HLD, DM Type 2, HTN, CAD, BPH, Gait Instability, Depression, Insomnia, CVA, Prior Head Trauma, Alzheimers Disease, hx of Delerium who presents to the ED 2/2 confusion and s/p fall.     # S/P Fall, Mild Rhabdomyolysis - suspect mechanical fall. it is reported patient fell outside while walking to the store.  He denies any pain and CT head, chest, abd/pelvis negative for acute injuries.  CK 1275 on admission  EKG with no evidence of arhythmia, no signs of infection.  Troponin wnl.  -  Resolved , off IV fluids      # Confusion/Delirium in the setting of underlying dementia - pt agitated on arrival to the ED requiring IM Zyprexa.  Patient does have a hx of Traumatic Brain Injury, prior CVA and Alzheimer's Dementia at baseline.  Work up thus far with no acute abnormality on CT head and no signs of infection.  Suspect most likely related to progress underlying dementia . Discussed with family, lives at home with his wife. He has many kids in the area who also help out and has PCA. Family mention that the patient has been having increasing issues with agitation and wife has been having more difficulty.  He was in a TCU for about 3 weeks after a previous hospitalization for compression fracture. Of note, he had similar significant issues with delirium in past as well during hospitalizations  --  social work consult  -- We will schedule Zyprexa 5 mg BID , increase PRN zyprexa to 10 mg IM daily PRN.  Has 15 mg scheduled hs   -- appreciate psych consult, stopped TCA and started on Remeron and gabapentin   -- Melatonin at bedtime  -- currently a mod/max assist with most cares    Hx of CAD - NSTEMI in 10/2015. Stents in staged procedure, RCA x2 on 10/12/15 then circumflex stent on 11/6/15. Had other areas of  disease that were felt to be best managed medically. Denies chest pain at this time, EKG with no acute ischemic changes.  Troponin wnl.  - resume asa, losartan, Coreg     HTN - resume home meds      Hx of TBI - memory difficulties and R sided deficits following injury in 1987.     Hx of CVA - hx CVA 1999 and 2003 with baseline mild right sided weakness and speech is somewhat slow.    DIABETES MELLITUS TYPE 2:  Has had for over one year but on no treatment.  Current A1c is 7.9.  Glucoses are better and 190 or less.  Consider low dose metformin, will need to discussed with wife.     DVT Prophylaxis: Pneumatic Compression Devices    Code Status: Full Code     Placement; Dr. Nix discussed this in detail with patient's daughter at bedside. As his behavioral symptoms worsen, they may need to look at placement into a memory care. So far, their preference is to take him home with increased PCA hours and additional help from family. (pt lives at home with his wife).  Discussed with her that the plan is to try the new medications as recommended by psych to get a somewhat better control of his symptoms, promote better sleep pattern and then plan for discharge.     Disposition: Disposition Plan   Expected discharge in 1-2 days to Home vs memory care vs. Tranisitional care unit.  Symptoms need to improve and if going to facility needs to be off sitter.      Entered: Anuj Lozada 01/13/2019, 10:48 AM           Anuj Lozada    Interval History   Chart reviewed and patient seen. Case discussed with nursing staff. Patient seen with help of professional Trinidadian .    Patient feels ok, Denies any chest pain, shortness of breath, no back pain, No other complaints voiced. Appetite normal per patient.      -Data reviewed today: I reviewed all new labs and imaging results over the last 24 hours. I personally reviewed     Physical Exam   Temp: 95.4  F (35.2  C) Temp src: Axillary BP: (!) 115/92   Heart Rate: 86 Resp: 17  SpO2: 92 % O2 Device: None (Room air)    There were no vitals filed for this visit.  Vital Signs with Ranges  Temp:  [95.4  F (35.2  C)-96.7  F (35.9  C)] 95.4  F (35.2  C)  Heart Rate:  [63-86] 86  Resp:  [16-20] 17  BP: (115-170)/(51-92) 115/92  SpO2:  [92 %-94 %] 92 %  I/O last 3 completed shifts:  In: 480 [P.O.:480]  Out: -     GENERAL:  Awake, No acute distress. Sitting in chair.  PSYCH: no acute agitation  HEENT:  Neck is Supple, trachea is midline, EOMI, conjunctiva clear  CARDIOVASCULAR: Regular rate and rhythm, Normal S1, S2, no loud murmurs  PULMONARY:  Clear to auscultation bilaterallys  GI: Abdomen is soft, non tender, non-distended, bowel sounds present. No rebound or guarding   SKIN:  No cyanosis or clubbing, no obvious exanthems on exposed areas   MSK: Extremities are warm and well perfused. No pitting edema   Neuro: Awake,  confused       Medications       aspirin  81 mg Oral Daily     carvedilol  25 mg Oral BID w/meals     gabapentin  300 mg Oral At Bedtime     losartan  50 mg Oral Daily     melatonin  6 mg Oral At Bedtime     mirtazapine  15 mg Orally disintegrating tablet At Bedtime     OLANZapine zydis  5 mg Oral BID     sodium chloride (PF)  3 mL Intracatheter Q8H       Data   All new lab data and imaging results from today have been reviewed     Recent Labs   Lab 01/12/19  0747 01/10/19  0801 01/09/19  1008   WBC 9.7 8.9 9.8   HGB 18.0* 17.1 17.1   MCV 95 94 93    189 201   INR  --   --  1.01    139 137   POTASSIUM 4.0 3.9 4.2   CHLORIDE 106 108 103   CO2 27 23 25   BUN 14 11 18   CR 0.76 0.71 0.86   ANIONGAP 5 8 9   LUZ 8.6 8.4* 8.6   * 153* 194*   ALBUMIN  --   --  3.1*   PROTTOTAL  --   --  7.3   BILITOTAL  --   --  0.9   ALKPHOS  --   --  138   ALT  --   --  59   AST  --   --  106*   TROPI  --   --  0.018       No results found for this or any previous visit (from the past 24 hour(s)).

## 2019-01-13 NOTE — PLAN OF CARE
Discharge Planner PT   Patient plan for discharge: Not stated.   Current status: PT - Pt up in chair upon arrival. Vcs for pt to lean forward - mod assist for leaning forward of 1 and max assist for keeping trunk upright without support of chair. Pt was able to initiate flexing B knees in seated position. Mod assist with repositioning B LEs in seated position and pt dependent on donning slipper socks. Pt unable to scoot back in chair - liko lift was utilized to reposition pt in chair. Pillows were placed behind pt and under R UE for improved sitting posture. Note pt unable to assist in moving B LE in add/abd or flexion with LEs elevated.   Barriers to return to prior living situation: Unclear supports, confusion, fall risk  Recommendations for discharge: Hard to state as PLOF/supports are not clear. Anticipate pt will need TCU. per plan established by the PT.  Decrease frequency @ this time 3x's/wk    Rationale for recommendations: Note a decline with patient @ this time with pt unable &/or difficulty participating with PT. Pt is below baseline.        Entered by: Mercedez Lopez 01/13/2019 10:34 AM

## 2019-01-13 NOTE — PROGRESS NOTES
D: SW is following to coordinate discharge to TCU vs Memory Care.   A: SW spoke to pt's daughter Gianna. SW expressed that TCU is unlikely. Gianna and pt's wife would like to take pt home to the [facility] and increase services. There is a desire for pt and wife to live together. Gianna is considering LTC [facility] in the future. Pt currently gets 2 hours of PCA services per day. Gianna plans to increase services with HHA services.  CHATA left a list of facilities and HHA in pt's room.   P: SW will continue to coordinate discharge.     Krystal Callahan, SHONDA  Casual SW [id number]

## 2019-01-13 NOTE — PLAN OF CARE
/64 (BP Location: Left arm)   Pulse 65   Temp 97  F (36.1  C) (Oral)   Resp 20   SpO2 92%   Pt alert - disoriented x3. Up with A2 with lift.   Pt restless this morning - scheduled medications given.  Up to chair today - tolerated well.  No family present at bedside.   SW following for discharge plan.   Discharge TBD.

## 2019-01-13 NOTE — PHARMACY-CONSULT NOTE
Pharmacist reviewed pts meds for potentially high risk for development of delirium. None were identified specifically to cause delirium. However following meds may contribute to delirium by causing Dizziness, Incoordination, Somnolence, Abnormal gait:   - Gabapentin   - Tramadol    - Mirtazapine.     I addition, Nortriptyline (home med) may have following side effects:   -Confusional states with hallucinations, especially in the elderly, have been reported with tricyclic antidepressant use and should be considered when nortriptyline is administered.  - Insomnia, Peripheral neuropathy, Restlessness has been reported with tricyclic antidepressant use and should be considered when nortriptyline is administered.        Gabe Jalloh Rph      .

## 2019-01-13 NOTE — PLAN OF CARE
ORIENTATION: Alert to self  VS: Stable  NEURO: Intact  CV: WDL  LUNGS: 93% RA; diminished  : Incontinent  GI: Last BM 1/11/19  IV: Saline Locked  PAIN: Denies  PLAN: Pt stable; Assist x 2 w/lift; sitter at bedside; poss discharge 2 days; continue w/poc

## 2019-01-13 NOTE — PROVIDER NOTIFICATION
1911:Family is insistent that when pt is asleep they do not want him woken up for anything. Can we please get an order for ok to not do assessment or vitals if pt is sleeping, thanks!

## 2019-01-13 NOTE — PROGRESS NOTES
D: SWS is following pt to coordinate d.c to TCU vs Memory Care.   A: Pt still has a sitter. Pt will not be accepted at a TCU with a sitter.   P: SW will continue to follow to coordinate discharge.     SHONDA Kelley  Casual  [id number]

## 2019-01-13 NOTE — PLAN OF CARE
Pt restless at times. Sitter at bedside. VSS. Care clustered when pt awake per family. No PRN needed. Will continue to monitor

## 2019-01-14 LAB
GLUCOSE BLDC GLUCOMTR-MCNC: 168 MG/DL (ref 70–99)
GLUCOSE BLDC GLUCOMTR-MCNC: 191 MG/DL (ref 70–99)
GLUCOSE BLDC GLUCOMTR-MCNC: 194 MG/DL (ref 70–99)
GLUCOSE BLDC GLUCOMTR-MCNC: 219 MG/DL (ref 70–99)

## 2019-01-14 PROCEDURE — 00000146 ZZHCL STATISTIC GLUCOSE BY METER IP

## 2019-01-14 PROCEDURE — 25000132 ZZH RX MED GY IP 250 OP 250 PS 637: Performed by: INTERNAL MEDICINE

## 2019-01-14 PROCEDURE — 25000132 ZZH RX MED GY IP 250 OP 250 PS 637: Performed by: PHYSICIAN ASSISTANT

## 2019-01-14 PROCEDURE — 25000128 H RX IP 250 OP 636: Performed by: INTERNAL MEDICINE

## 2019-01-14 PROCEDURE — 99232 SBSQ HOSP IP/OBS MODERATE 35: CPT | Performed by: INTERNAL MEDICINE

## 2019-01-14 PROCEDURE — 99207 ZZC CDG-MDM COMPONENT: MEETS LOW - DOWN CODED: CPT | Performed by: INTERNAL MEDICINE

## 2019-01-14 PROCEDURE — 12000000 ZZH R&B MED SURG/OB

## 2019-01-14 PROCEDURE — 25000132 ZZH RX MED GY IP 250 OP 250 PS 637: Performed by: PSYCHIATRY & NEUROLOGY

## 2019-01-14 RX ORDER — CARVEDILOL 12.5 MG/1
12.5 TABLET ORAL 2 TIMES DAILY WITH MEALS
Status: DISCONTINUED | OUTPATIENT
Start: 2019-01-14 | End: 2019-01-15

## 2019-01-14 RX ORDER — OLANZAPINE 10 MG/2ML
5 INJECTION, POWDER, FOR SOLUTION INTRAMUSCULAR DAILY PRN
Status: DISCONTINUED | OUTPATIENT
Start: 2019-01-14 | End: 2019-01-15

## 2019-01-14 RX ADMIN — OLANZAPINE 5 MG: 10 INJECTION, POWDER, FOR SOLUTION INTRAMUSCULAR at 23:09

## 2019-01-14 RX ADMIN — GABAPENTIN 300 MG: 300 CAPSULE ORAL at 17:43

## 2019-01-14 RX ADMIN — POLYETHYLENE GLYCOL 3350 17 G: 17 POWDER, FOR SOLUTION ORAL at 08:02

## 2019-01-14 RX ADMIN — LOSARTAN POTASSIUM 50 MG: 50 TABLET, FILM COATED ORAL at 07:49

## 2019-01-14 RX ADMIN — OLANZAPINE 5 MG: 5 TABLET, ORALLY DISINTEGRATING ORAL at 07:48

## 2019-01-14 RX ADMIN — METFORMIN HYDROCHLORIDE 500 MG: 500 TABLET ORAL at 17:42

## 2019-01-14 RX ADMIN — ASPIRIN 81 MG: 81 TABLET, COATED ORAL at 07:47

## 2019-01-14 RX ADMIN — MELATONIN TAB 3 MG 6 MG: 3 TAB at 17:42

## 2019-01-14 RX ADMIN — PHENOL 1 ML: 1.5 LIQUID ORAL at 08:08

## 2019-01-14 RX ADMIN — CARVEDILOL 12.5 MG: 12.5 TABLET, FILM COATED ORAL at 17:42

## 2019-01-14 RX ADMIN — ACETAMINOPHEN 650 MG: 325 TABLET, FILM COATED ORAL at 07:49

## 2019-01-14 RX ADMIN — Medication 2.5 MG: at 20:06

## 2019-01-14 RX ADMIN — MIRTAZAPINE 15 MG: 15 TABLET, ORALLY DISINTEGRATING ORAL at 17:42

## 2019-01-14 RX ADMIN — CARVEDILOL 25 MG: 25 TABLET, FILM COATED ORAL at 07:48

## 2019-01-14 ASSESSMENT — ACTIVITIES OF DAILY LIVING (ADL)
ADLS_ACUITY_SCORE: 16.5
ADLS_ACUITY_SCORE: 17
ADLS_ACUITY_SCORE: 17
ADLS_ACUITY_SCORE: 16.5
ADLS_ACUITY_SCORE: 16.5
ADLS_ACUITY_SCORE: 17

## 2019-01-14 NOTE — PLAN OF CARE
With the help of Ivorian , assessed pt and educated pt /wife this shift.  Vitals stable, with exception of BP soft after am medications given.  Sleepy but easily awakens to voice.  Pt is confused, hallucinating at times.  He needs assistance with eating and personal cares.  Incontinent of urine.  Wife concerned about constipation and his sore throat.  Gave Miralax for constipation and Tylenol and Cepacol Spray for sore throat.  Pt unable to open mouth wide enough to get full exam of throat.  PJ if red or patchy.  Takes pills whole in apple sauce.  Bottom is red, blanchable.  Turned a minimum of q 2 hours and barrier cream applied.  When pt is more alert, he tries to climb out of bed.  Unsteady.  PJ gait at this time.  Ceiling lift available if needed.  Continue to monitor and implement plan of care.  Discharge disposition pending.  SW following for placement.

## 2019-01-14 NOTE — PROGRESS NOTES
[facility]    Hospitalist Progress Note    Date of Service (when I saw the patient): 01/14/2019    Assessment & Plan     Adam Huber is a 83 year old male with a history of T12 compression fracture, HLD, DM Type 2, HTN, CAD, BPH, Gait Instability, Depression, Insomnia, CVA, Prior Head Trauma, Alzheimers Disease, hx of Delerium who presents to the ED 2/2 confusion and s/p fall.     # S/P Fall, Mild Rhabdomyolysis - suspect mechanical fall. it is reported patient fell outside while walking to the store.  He denies any pain and CT head, chest, abd/pelvis negative for acute injuries.  CK 1275 on admission  EKG with no evidence of arhythmia, no signs of infection.  Troponin wnl.  -  Resolved , off IV fluids      # Confusion/Delirium in the setting of underlying dementia - pt agitated on arrival to the ED requiring IM Zyprexa.  Patient does have a hx of Traumatic Brain Injury, prior CVA and Alzheimer's Dementia at baseline.  Work up thus far with no acute abnormality on CT head and no signs of infection.  Suspect most likely related to progress underlying dementia . Discussed with family, lives at home with his wife. He has many kids in the area who also help out and has PCA. Family mention that the patient has been having increasing issues with agitation and wife has been having more difficulty.  He was in a TCU for about 3 weeks after a previous hospitalization for compression fracture. Of note, he had similar significant issues with delirium in past as well during hospitalizations  --  social work consult  -- We will schedule Zyprexa and decrease to 2.5 mg BID due to increased somnolence   -- appreciate psych consult, stopped TCA and started on Remeron and gabapentin   -- Melatonin at bedtime  -- currently a mod/max assist with most cares.     Hx of CAD - NSTEMI in 10/2015. Stents in staged procedure, RCA x2 on 10/12/15 then circumflex stent on 11/6/15. Had other areas of disease that were felt to  be best managed medically. Denies chest pain at this time, EKG with no acute ischemic changes.  Troponin wnl.  - resume asa, losartan, Coreg     HTN - resume home medications. Decrease Coreg to 12.5 mg b.i.d. Due to soft blood pressures.     Hx of TBI - memory difficulties and R sided deficits following injury in 1987.     Hx of CVA - hx CVA 1999 and 2003 with baseline mild right sided weakness and speech is somewhat slow.    DIABETES MELLITUS TYPE 2:  Has had for over one year but on no treatment.  Current A1c is 7.9.  Glucoses are better and 190 or less. - We'll start metformin 500 mg b.i.d. Discussed with wife    DVT Prophylaxis: Pneumatic Compression Devices    Code Status: Full Code     Placement; Dr. Nix discussed this in detail with patient's daughter at bedside. As his behavioral symptoms worsen, they may need to look at placement into a memory care. So far, their preference is to take him home with increased PCA hours and additional help from family. (pt lives at home with his wife).  Discussed with her that the plan is to try the new medications as recommended by psych to get a somewhat better control of his symptoms, promote better sleep pattern and then plan for discharge.     Disposition: Disposition Plan   Expected discharge in 1-2 days to Home vs memory care vs. Tranisitional care unit.  Symptoms need to improve and if going to facility needs to be off sitter.      Entered: Anuj Lozada 01/14/2019, 11:40 AM           Anuj Lozada    Interval History   Chart reviewed and patient seen. Case discussed with nursing staff. Patient seen with help of professional Cym[place] .    Patient feels ok, Denies shortness of breath or chest pain.  Appetite normal.  Wife states that he seemed to be more out of it after one of the medicines he received last night.    -Data reviewed today: I reviewed all new labs and imaging results over the last 24 hours. I personally reviewed     Physical Exam   Temp:  96.6  F (35.9  C) Temp src: Axillary BP: 96/45 Pulse: 70 Heart Rate: 68 Resp: 20 SpO2: 92 % O2 Device: None (Room air)    There were no vitals filed for this visit.  Vital Signs with Ranges  Temp:  [95.5  F (35.3  C)-97.2  F (36.2  C)] 96.6  F (35.9  C)  Pulse:  [70] 70  Heart Rate:  [64-84] 68  Resp:  [16-24] 20  BP: ()/(45-77) 96/45  SpO2:  [87 %-96 %] 92 %  I/O last 3 completed shifts:  In: 3 [I.V.:3]  Out: -     GENERAL:  Very drowsy this morning.  No acute distress.   PSYCH: no acute agitation  HEENT:  Neck is Supple, trachea is midline, EOMI, conjunctiva clear  CARDIOVASCULAR: Regular rate and rhythm, Normal S1, S2, no loud murmurs  PULMONARY:  Clear to auscultation bilaterallys  GI: Abdomen is soft, non tender, non-distended, bowel sounds present. No rebound or guarding   SKIN:  No cyanosis or clubbing, no obvious exanthems on exposed areas   MSK: Extremities are warm and well perfused. No pitting edema   Neuro: Awake,  confused       Medications       aspirin  81 mg Oral Daily     carvedilol  12.5 mg Oral BID w/meals     gabapentin  300 mg Oral At Bedtime     losartan  50 mg Oral Daily     melatonin  6 mg Oral At Bedtime     mirtazapine  15 mg Orally disintegrating tablet At Bedtime     OLANZapine zydis  2.5 mg Oral BID     sodium chloride (PF)  3 mL Intracatheter Q8H       Data   All new lab data and imaging results from today have been reviewed     Recent Labs   Lab 01/12/19  0747 01/10/19  0801 01/09/19  1008   WBC 9.7 8.9 9.8   HGB 18.0* 17.1 17.1   MCV 95 94 93    189 201   INR  --   --  1.01    139 137   POTASSIUM 4.0 3.9 4.2   CHLORIDE 106 108 103   CO2 27 23 25   BUN 14 11 18   CR 0.76 0.71 0.86   ANIONGAP 5 8 9   LUZ 8.6 8.4* 8.6   * 153* 194*   ALBUMIN  --   --  3.1*   PROTTOTAL  --   --  7.3   BILITOTAL  --   --  0.9   ALKPHOS  --   --  138   ALT  --   --  59   AST  --   --  106*   TROPI  --   --  0.018       No results found for this or any previous visit (from the past  24 hour(s)).

## 2019-01-14 NOTE — PLAN OF CARE
Orientation: Alert and oriented x self. Disoriented x 4.  VSS. 96% on RA.   HR 72.   LS: Diminished and clear  GI:  Passing gas. No BM. Denies N/V.   : Adequate urine output.   Skin: clean and dry, pale.  Activity: 2 assist. lift. Pt slept comfortably throughout shift.   Pain: 0/10. Denies  Plan: Continue with current cares.  Hx of DM, HTN, HLD. Bhutanese speaking, has . Incontinence. Pt has reported hallucinations. Placement is being figured out with SW to either memory care or home.

## 2019-01-14 NOTE — PLAN OF CARE
ORIENTATION: Disoriented x 4  VS: Stable  NEURO: Intact  CV: WDL  LUNGS: 93% RA; Clear, Diminished  : Incontinent  GI: Last BM 1/11/19  IV: Saline Locked  PAIN: Denies  PLAN: Pt stable; Assist x 2 w/lift; SW following; Zyprexa x 1 for agitation/anxiety; possible discharge 1-2 days, continue w/poc

## 2019-01-14 NOTE — PROGRESS NOTES
"D:  Per record review, pt's discharge recommendation is TCU vs [facility] vs Home.    I:  Sw met with pt, pt's wife, pt's dtr Anya, and pt's son Adolfo.  They have talked and reported that they would like the pt to discharge home with assistance.  Adolfo noted that the pt currently receives 6 PCA hours a day.  They are wondering if he could get more hours because the physician says the pt needs 24 hour care.  Sw asked if they have the information for the pt's county worker, they did not at the time of the visit, but they are going to call sw once they have that information.  Sw will see if the pt will qualify for any more PCA hours/day.  They did have the name and number of the pt's \"\" Yvrose Esposito [phone] in case it is needed.    A/P:  Sw will continue with discharge planning and will be available as needed until discharge.  "

## 2019-01-15 LAB
GLUCOSE BLDC GLUCOMTR-MCNC: 175 MG/DL (ref 70–99)
GLUCOSE BLDC GLUCOMTR-MCNC: 198 MG/DL (ref 70–99)

## 2019-01-15 PROCEDURE — 25000132 ZZH RX MED GY IP 250 OP 250 PS 637: Performed by: INTERNAL MEDICINE

## 2019-01-15 PROCEDURE — 99232 SBSQ HOSP IP/OBS MODERATE 35: CPT | Performed by: INTERNAL MEDICINE

## 2019-01-15 PROCEDURE — 00000146 ZZHCL STATISTIC GLUCOSE BY METER IP

## 2019-01-15 PROCEDURE — 25000132 ZZH RX MED GY IP 250 OP 250 PS 637: Performed by: PSYCHIATRY & NEUROLOGY

## 2019-01-15 PROCEDURE — 99207 ZZC CDG-MDM COMPONENT: MEETS LOW - DOWN CODED: CPT | Performed by: INTERNAL MEDICINE

## 2019-01-15 PROCEDURE — 25000132 ZZH RX MED GY IP 250 OP 250 PS 637: Performed by: PHYSICIAN ASSISTANT

## 2019-01-15 PROCEDURE — 12000000 ZZH R&B MED SURG/OB

## 2019-01-15 RX ORDER — CARVEDILOL 25 MG/1
25 TABLET ORAL 2 TIMES DAILY WITH MEALS
Status: DISCONTINUED | OUTPATIENT
Start: 2019-01-15 | End: 2019-01-19 | Stop reason: HOSPADM

## 2019-01-15 RX ORDER — QUETIAPINE FUMARATE 25 MG/1
25 TABLET, FILM COATED ORAL AT BEDTIME
Status: DISCONTINUED | OUTPATIENT
Start: 2019-01-15 | End: 2019-01-19 | Stop reason: HOSPADM

## 2019-01-15 RX ADMIN — CARVEDILOL 25 MG: 25 TABLET, FILM COATED ORAL at 20:03

## 2019-01-15 RX ADMIN — BISACODYL 10 MG: 10 SUPPOSITORY RECTAL at 17:47

## 2019-01-15 RX ADMIN — ASPIRIN 81 MG: 81 TABLET, COATED ORAL at 08:15

## 2019-01-15 RX ADMIN — MIRTAZAPINE 15 MG: 15 TABLET, ORALLY DISINTEGRATING ORAL at 20:04

## 2019-01-15 RX ADMIN — METFORMIN HYDROCHLORIDE 500 MG: 500 TABLET ORAL at 20:04

## 2019-01-15 RX ADMIN — GABAPENTIN 300 MG: 300 CAPSULE ORAL at 20:04

## 2019-01-15 RX ADMIN — MELATONIN TAB 3 MG 6 MG: 3 TAB at 21:59

## 2019-01-15 RX ADMIN — Medication 2.5 MG: at 20:04

## 2019-01-15 RX ADMIN — QUETIAPINE FUMARATE 25 MG: 25 TABLET ORAL at 21:59

## 2019-01-15 RX ADMIN — Medication 2.5 MG: at 08:15

## 2019-01-15 RX ADMIN — CARVEDILOL 12.5 MG: 12.5 TABLET, FILM COATED ORAL at 08:15

## 2019-01-15 RX ADMIN — LOSARTAN POTASSIUM 50 MG: 50 TABLET, FILM COATED ORAL at 08:15

## 2019-01-15 RX ADMIN — METFORMIN HYDROCHLORIDE 500 MG: 500 TABLET ORAL at 08:15

## 2019-01-15 ASSESSMENT — ACTIVITIES OF DAILY LIVING (ADL)
ADLS_ACUITY_SCORE: 27
ADLS_ACUITY_SCORE: 16.5

## 2019-01-15 NOTE — PROGRESS NOTES
D:  Per record review, pt's discharge recommendation is TCU.    I:  Candis spoke with Lauryn ([phone]. [phone]) the pt's county worker.  Lauryn confirmed that the pt receives 6 PCA hours/day. Candis informed Lauryn that the family would like to take the pt home and they would like to know if the pt will qualify for any more PCA hours to help with the cares.  Lauryn said that if there are any openings, they may be able to do an assessment of the pt on Thursday, but if not, the next available time would be next Thursday.  Lauryn was going to put in a request for the pt to see if the county will approve more hours.  Candis spoke with the pt, pt's wife, pt's dtr Anya, and the  was present.  The  did not speak much as Anya did most of talking and interpreting.  Anya said that the family needs to discuss if the family can take care of the pt at home if he does not qualify for more PCA services.  Candis informed her that the pt will likely be discharge ready tomorrow and that a back-up plan needs to be put in place in case the family decides they cannot take care of the pt and he needs placement.  Anya said that they do not want to make a decision until they know if the pt will get more PCA hours or not. Candis reminded Anya that the pt will likely be ready for discharge tomorrow and there may not be an answer by then if the pt will be approved for more hours on not and that is why a back-up plan needs to be put into place.  Anya said they will call candis and let them know.    A/P:  Sw will continue with discharge planning and will be available as needed until discharge.

## 2019-01-15 NOTE — PLAN OF CARE
Pt disoriented x3, up with A 1-2, sometimes lift depending on time and situation. Kittitian speaking,  currently here.  .  PT/SW/Psych following.  Wife currently at bedside.  Will continue POC.      Addendum: I was wrong to tell therapy not to wake pt if sleeping, it is important for him to have therapy while here, I did ask if therapy could come back.

## 2019-01-15 NOTE — PLAN OF CARE
PT- attempted to see, spoke with RN and if sleeping would prefer that he rest. He was sleeping at time of check in.

## 2019-01-15 NOTE — PROGRESS NOTES
[facility]    Hospitalist Progress Note    Date of Service (when I saw the patient): 01/15/2019    Assessment & Plan     Adam Huber is a 83 year old male with a history of T12 compression fracture, HLD, DM Type 2, HTN, CAD, BPH, Gait Instability, Depression, Insomnia, CVA, Prior Head Trauma, Alzheimers Disease, hx of Delerium who presents to the ED 2/2 confusion and s/p fall.     # S/P Fall, Mild Rhabdomyolysis - suspect mechanical fall. it is reported patient fell outside while walking to the store.  He denies any pain and CT head, chest, abd/pelvis negative for acute injuries.  CK 1275 on admission  EKG with no evidence of arhythmia, no signs of infection.  Troponin wnl.  -  Resolved , off IV fluids      # Confusion/Delirium in the setting of underlying dementia - pt agitated on arrival to the ED requiring IM Zyprexa.  Patient does have a hx of Traumatic Brain Injury, prior CVA and Alzheimer's Dementia at baseline.  Work up thus far with no acute abnormality on CT head and no signs of infection.  Suspect most likely related to progress underlying dementia . Discussed with family, lives at home with his wife. He has many kids in the area who also help out and has PCA. Family mention that the patient has been having increasing issues with agitation and wife has been having more difficulty.  He was in a TCU for about 3 weeks after a previous hospitalization for compression fracture. Of note, he had similar significant issues with delirium in past as well during hospitalizations  --  social work consult  -- We will schedule Zyprexa and decrease to 2.5 mg BID (am and 6 pm) due to increased somnolence.  Improved.   -- still very restless at night.  Add Seroquel 25 mg at bedtime.   -- appreciate psych consult, stopped TCA and started on Remeron and gabapentin   -- Melatonin at bedtime  -- currently a mod/max assist with most cares.  Family declining tranisitional care unit and wants to take home with  home care and PCA (wanting to increase hours)    Hx of CAD - NSTEMI in 10/2015. Stents in staged procedure, RCA x2 on 10/12/15 then circumflex stent on 11/6/15. Had other areas of disease that were felt to be best managed medically. Denies chest pain at this time, EKG with no acute ischemic changes.  Troponin wnl.  - resume asa, losartan, Coreg     HTN - resume home medications as above .     Hx of TBI - memory difficulties and R sided deficits following injury in 1987.     Hx of CVA - hx CVA 1999 and 2003 with baseline mild right sided weakness and speech is somewhat slow.    DIABETES MELLITUS TYPE 2:  Has had for over one year but on no treatment.  Current A1c is 7.9.  Glucoses are better and 190 or less.   - We'll start metformin 500 mg b.i.d. Discussed with wife    DVT Prophylaxis: Pneumatic Compression Devices    Code Status: Full Code     Placement; Dr. Nix discussed this in detail with patient's daughter at bedside. As his behavioral symptoms worsen, they may need to look at placement into a memory care. So far, their preference is to take him home with increased PCA hours and additional help from family. (pt lives at home with his wife).  I Discussed with her that the plan is to try the new medications as recommended by psych to get a somewhat better control of his symptoms, promote better sleep pattern and then plan for discharge.  I recommend tranisitional care unit, let wife know that this plan may fail and will then need to look at these other options.     Disposition: Disposition Plan   Expected discharge in 1-2 days to home with home care and PCA   Symptoms need to improve and if going to facility needs to be off sitter.   More alert today.  Still confused.      Entered: Anuj Lozada 01/15/2019, 2:16 PM       Anuj Lozada    Interval History   Chart reviewed and patient seen. Case discussed with nursing staff. Patient seen with help of professional Yemeni .    Patient feels ok,  Denies shortness of breath or chest pain.  Appetite good, ate 100% of breakfast.   Wife states at baseline he usually uses a cane, sometimes when his legs hurt he does not walk.  Wife states he is more alert today, still confused.  Talking about his time in [place].      -Data reviewed today: I reviewed all new labs and imaging results over the last 24 hours. I personally reviewed     Physical Exam   Temp: 97.6  F (36.4  C) Temp src: Oral BP: 143/68 Pulse: 62 Heart Rate: 92 Resp: 18 SpO2: 98 % O2 Device: None (Room air)    There were no vitals filed for this visit.  Vital Signs with Ranges  Temp:  [95.4  F (35.2  C)-97.6  F (36.4  C)] 97.6  F (36.4  C)  Pulse:  [62] 62  Heart Rate:  [75-92] 92  Resp:  [18-20] 18  BP: (138-143)/(62-76) 143/68  SpO2:  [93 %-98 %] 98 %  I/O last 3 completed shifts:  In: 360 [P.O.:360]  Out: -     GENERAL:  More alert this am.  Cooperative.   No acute distress.   PSYCH: no acute agitation  HEENT:  Neck is Supple,EOMI, conjunctiva clear  CARDIOVASCULAR: Regular rate and rhythm, Normal S1, S2, no loud murmurs  PULMONARY:  Clear to auscultation bilaterally  GI: Abdomen is soft, non tender, non-distended, bowel sounds present. No rebound or guarding   SKIN:  No cyanosis or clubbing, no obvious exanthems on exposed areas   MSK: Extremities are warm and well perfused. No pitting edema   Neuro: Awake,  Confused.      Medications       aspirin  81 mg Oral Daily     carvedilol  25 mg Oral BID w/meals     gabapentin  300 mg Oral At Bedtime     losartan  50 mg Oral Daily     melatonin  6 mg Oral At Bedtime     metFORMIN  500 mg Oral BID w/meals     mirtazapine  15 mg Orally disintegrating tablet At Bedtime     OLANZapine zydis  2.5 mg Oral two times daily     QUEtiapine  25 mg Oral At Bedtime     sodium chloride (PF)  3 mL Intracatheter Q8H       Data   All new lab data and imaging results from today have been reviewed     Recent Labs   Lab 01/12/19  0747 01/10/19  0801 01/09/19  1008   WBC 9.7 8.9  9.8   HGB 18.0* 17.1 17.1   MCV 95 94 93    189 201   INR  --   --  1.01    139 137   POTASSIUM 4.0 3.9 4.2   CHLORIDE 106 108 103   CO2 27 23 25   BUN 14 11 18   CR 0.76 0.71 0.86   ANIONGAP 5 8 9   LUZ 8.6 8.4* 8.6   * 153* 194*   ALBUMIN  --   --  3.1*   PROTTOTAL  --   --  7.3   BILITOTAL  --   --  0.9   ALKPHOS  --   --  138   ALT  --   --  59   AST  --   --  106*   TROPI  --   --  0.018       No results found for this or any previous visit (from the past 24 hour(s)).

## 2019-01-15 NOTE — PLAN OF CARE
RN - VSS. Pt does not appear in pain. Appears neurologically at baseline although family stating he sounds more garbled this afternoon. Noting increased swallowing difficulty with thin liquids this afternoon despite pt being upright in chair. Tolerating meal intake with assistance - appetite remains poor. BG remains elevated - pt taking first metformin dose tonight. Pt continues to be a heavy assist of 2-3/lift dependent for activity. Pt continues to deflect cares and assistance - reluctantly taking scheduled ordered medications. Remains incontinent. Bedside attendant remains. Pt intermittently restless in bed when asleep - difficult to re-establish back in bed due to pt's strength. Planning for ongoing follow up for home services. Family preferring pt return to home. Communication assistance provided by pt son.

## 2019-01-15 NOTE — PLAN OF CARE
RN - VSS. Pt does not appear in pain. Patient is intermittently restless in bed while sleeping.  Patient with elevated blood sugars yesterday, took first dose of metformin last night. Pt continues to be a heavy assist of 2-3/lift dependent for activity. Pt continues to deflect cares and assistance at times, but did allow bed side attendent to change and do perineal care. Remains incontinent.  Social work following for discharge planning.

## 2019-01-16 ENCOUNTER — APPOINTMENT (OUTPATIENT)
Dept: PHYSICAL THERAPY | Facility: CLINIC | Age: 84
DRG: 565 | End: 2019-01-16
Payer: COMMERCIAL

## 2019-01-16 LAB
ANION GAP SERPL CALCULATED.3IONS-SCNC: 5 MMOL/L (ref 3–14)
BUN SERPL-MCNC: 33 MG/DL (ref 7–30)
CALCIUM SERPL-MCNC: 8.5 MG/DL (ref 8.5–10.1)
CHLORIDE SERPL-SCNC: 105 MMOL/L (ref 94–109)
CO2 SERPL-SCNC: 27 MMOL/L (ref 20–32)
CREAT SERPL-MCNC: 0.93 MG/DL (ref 0.66–1.25)
ERYTHROCYTE [DISTWIDTH] IN BLOOD BY AUTOMATED COUNT: 12.4 % (ref 10–15)
GFR SERPL CREATININE-BSD FRML MDRD: 76 ML/MIN/{1.73_M2}
GLUCOSE BLDC GLUCOMTR-MCNC: 146 MG/DL (ref 70–99)
GLUCOSE BLDC GLUCOMTR-MCNC: 265 MG/DL (ref 70–99)
GLUCOSE SERPL-MCNC: 168 MG/DL (ref 70–99)
HCT VFR BLD AUTO: 52 % (ref 40–53)
HGB BLD-MCNC: 17.7 G/DL (ref 13.3–17.7)
MCH RBC QN AUTO: 32.2 PG (ref 26.5–33)
MCHC RBC AUTO-ENTMCNC: 34 G/DL (ref 31.5–36.5)
MCV RBC AUTO: 95 FL (ref 78–100)
PLATELET # BLD AUTO: 211 10E9/L (ref 150–450)
POTASSIUM SERPL-SCNC: 4.2 MMOL/L (ref 3.4–5.3)
RBC # BLD AUTO: 5.49 10E12/L (ref 4.4–5.9)
SODIUM SERPL-SCNC: 137 MMOL/L (ref 133–144)
WBC # BLD AUTO: 9.1 10E9/L (ref 4–11)

## 2019-01-16 PROCEDURE — 99232 SBSQ HOSP IP/OBS MODERATE 35: CPT | Performed by: INTERNAL MEDICINE

## 2019-01-16 PROCEDURE — 00000146 ZZHCL STATISTIC GLUCOSE BY METER IP

## 2019-01-16 PROCEDURE — 40000193 ZZH STATISTIC PT WARD VISIT: Performed by: PHYSICAL THERAPY ASSISTANT

## 2019-01-16 PROCEDURE — 80048 BASIC METABOLIC PNL TOTAL CA: CPT | Performed by: INTERNAL MEDICINE

## 2019-01-16 PROCEDURE — 25000132 ZZH RX MED GY IP 250 OP 250 PS 637: Performed by: INTERNAL MEDICINE

## 2019-01-16 PROCEDURE — 25000132 ZZH RX MED GY IP 250 OP 250 PS 637: Performed by: PSYCHIATRY & NEUROLOGY

## 2019-01-16 PROCEDURE — 97530 THERAPEUTIC ACTIVITIES: CPT | Mod: GP | Performed by: PHYSICAL THERAPY ASSISTANT

## 2019-01-16 PROCEDURE — 85027 COMPLETE CBC AUTOMATED: CPT | Performed by: INTERNAL MEDICINE

## 2019-01-16 PROCEDURE — 12000000 ZZH R&B MED SURG/OB

## 2019-01-16 PROCEDURE — 36415 COLL VENOUS BLD VENIPUNCTURE: CPT | Performed by: INTERNAL MEDICINE

## 2019-01-16 RX ADMIN — Medication 2.5 MG: at 17:50

## 2019-01-16 RX ADMIN — CARVEDILOL 25 MG: 25 TABLET, FILM COATED ORAL at 17:50

## 2019-01-16 RX ADMIN — ASPIRIN 81 MG: 81 TABLET, COATED ORAL at 09:19

## 2019-01-16 RX ADMIN — MIRTAZAPINE 15 MG: 15 TABLET, ORALLY DISINTEGRATING ORAL at 21:03

## 2019-01-16 RX ADMIN — QUETIAPINE FUMARATE 25 MG: 25 TABLET ORAL at 21:03

## 2019-01-16 RX ADMIN — METFORMIN HYDROCHLORIDE 500 MG: 500 TABLET ORAL at 09:19

## 2019-01-16 RX ADMIN — MELATONIN TAB 3 MG 6 MG: 3 TAB at 21:03

## 2019-01-16 RX ADMIN — CARVEDILOL 25 MG: 25 TABLET, FILM COATED ORAL at 09:19

## 2019-01-16 RX ADMIN — LOSARTAN POTASSIUM 50 MG: 50 TABLET, FILM COATED ORAL at 09:19

## 2019-01-16 RX ADMIN — METFORMIN HYDROCHLORIDE 500 MG: 500 TABLET ORAL at 17:50

## 2019-01-16 RX ADMIN — Medication 2.5 MG: at 07:44

## 2019-01-16 RX ADMIN — GABAPENTIN 300 MG: 300 CAPSULE ORAL at 21:03

## 2019-01-16 ASSESSMENT — ACTIVITIES OF DAILY LIVING (ADL)
ADLS_ACUITY_SCORE: 25
ADLS_ACUITY_SCORE: 25
ADLS_ACUITY_SCORE: 27
ADLS_ACUITY_SCORE: 27
ADLS_ACUITY_SCORE: 25
ADLS_ACUITY_SCORE: 25

## 2019-01-16 NOTE — PLAN OF CARE
Pt had a sitter for the shift. Complained of constipation and received suppository with good results. Used ceiling lift to commode as pt was unable to stand. Took meds with applesauce. Reynaldo Mora RN on 1/15/2019 at 11:11 PM

## 2019-01-16 NOTE — PROGRESS NOTES
D:  Per record review, pt's discharge recommendation is TCU.    I:  Pt was sleeping at time of visit.  Candis spoke with the pt's wife via the .  She said that they have decided that the pt should go to a TCU.  They want a referral sent to [facility] in [place] - candis sent the referral.  They have requested a shared room.    Candis spoke with [facility]. ([phone]) regarding pt's discharge.  They had left candis a vm asking when and where the pt will discharge.  Candis informed them that the pt will potentially discharge tomorrow to a TCU if placement is found.    A/P:  Sw will continue with discharge planning and will be available as needed until discharge.

## 2019-01-16 NOTE — PROGRESS NOTES
"BRIEF NUTRITION ASSESSMENT    REASON FOR ASSESSMENT:  LOS  History of T12 compression fracture, HLD, DM Type 2, HTN, CAD, BPH, Gait Instability, Depression, Insomnia, CVA, Prior Head Trauma, Alzheimer's Disease, hx of Delirium  CURRENT DIET AND NOURISHMENT ORDER:  Information obtained from chart review given medical history. TCU when placement found - ?Tomorrow  Food allergies/intolerances: NKFA    Diet: Regular  Current Intake/Tolerance: Per flow sheet review, % intake for majority of documented meals.    ANTHROPOMETRICS  Height: 6'0\"  Weight: 98 kg   BMI: 29.3 kg/m^2  Weight Status:  Overweight BMI 25-29.9  Ideal body weight: 80.9 kg +/- 10%, 121% of IBW   Weight History:  Weight appears relatively stable, as noted below  Wt Readings from Last 10 Encounters:   11/08/18 98 kg (216 lb)   08/10/18 98 kg (216 lb)   06/29/18 99.8 kg (220 lb)   05/19/18 101.5 kg (223 lb 12.3 oz)   07/19/17 99.8 kg (220 lb)   03/31/17 97.6 kg (215 lb 3.2 oz)   01/04/17 102.5 kg (226 lb)   06/21/16 102.7 kg (226 lb 8 oz)   12/16/15 103.9 kg (229 lb)   11/13/15 104.8 kg (231 lb)       LABS/MEDS/PHYSICAL FINDINGS:  Meds reviewed  Labs reviewed  Muscle loss consistent with sarcopenia    Malnutrition:  Patient does not meet two of the following criteria necessary for diagnosing malnutrition: significant weight loss, reduced intake, subcutaneous fat loss, muscle loss or fluid retention    INTERVENTION:  Nutrition Diagnosis:  No nutrition diagnosis at this time.    Implementation:  Nutrition Education: not appropriate for diet education  Collaboration and Referral of care: Discussed patient during interdisciplinary care rounds this morning    Follow Up/Monitoring:   Progress towards goals will be monitored and evaluated per protocol and Practice Guidelines        Radha Chavez RDN, MATI, CNSC  Pager - 3rd floor/ICU: [phone]  Pager - All other floors: [phone]  Pager - Weekend/holiday: [phone]  Office: [phone]  "

## 2019-01-16 NOTE — PLAN OF CARE
Pt. Is confused. Interpretor and family at bedside most of shift. Sitter removed at 11:00 am. Takes meds crushed in applesauce. Incontinent of urine. Awaiting placement for discharge. SW following. Up with lift. Will continue with POC.

## 2019-01-16 NOTE — PROGRESS NOTES
[facility]  Hospitalist Progress Note  Luz Mas MD 01/16/19  Text Page  Pager: [phone] (7am-6pm)    Reason for Stay (Diagnosis): Fall, confusion         Assessment and Plan:      Summary of Stay: Adam Huber is a 83 year old male with past medical history of T12 compression fracture, HLD, DM Type 2, HTN, CAD, BPH, Gait Instability, Depression, Insomnia, CVA, Prior Head Trauma, Alzheimer's Disease, hx of Delirium who was admitted on 1/9/2019 with confusion and s/p fall and found to have mild rhabdomyolysis in the setting of progressive confusion/delirium in the setting of known dementia.  Continues to be below baseline status, family agreeable to TCU.    Problem List/Assessment and Plan:     S/P Fall, Mild Rhabdomyolysis - Resolved - suspect mechanical fall. it is reported patient fell outside while walking to the store.  He denies any pain and CT head, chest, abd/pelvis negative for acute injuries.  CK 1275 on admission.  EKG with no evidence of arhythmia, no signs of infection.  Troponin WNL.  Treated with IVF and this has now resolved.     Confusion/Delirium in the setting of underlying dementia - pt agitated on arrival to the ED requiring IM Zyprexa.  Patient does have a hx of Traumatic Brain Injury, prior CVA and Alzheimer's Dementia at baseline.  Work up thus far with no acute abnormality on CT head and no signs of infection.  Suspect most likely related to progress underlying dementia . Discussed with family, lives at home with his wife. He has many kids in the area who also help out and has PCA. Family mention that the patient has been having increasing issues with agitation and wife has been having more difficulty.  He was in a TCU for about 3 weeks after a previous hospitalization for compression fracture. Of note, he had similar significant issues with delirium in past as well during hospitalizations.  Overall improving but still requiring significant cares.  - social work  following  - Zyprexa 2.5 mg BID (am and 6 pm)  - Seroquel 25 mg at bedtime. (added as he was still quite restless at night)  - appreciate psych consult, stopped TCA and started on Remeron and gabapentin   - Melatonin at bedtime  - still requiring significant support, family now agreeable to TCU, SW working on placement (family just agreed to TCU this afternoon)     Hx of CAD - NSTEMI in 10/2015. Stents in staged procedure, RCA x2 on 10/12/15 then circumflex stent on 11/6/15. Had other areas of disease that were felt to be best managed medically. Denies chest pain at this time, EKG with no acute ischemic changes.  Troponin WNL.  - resume asa, losartan, Coreg     HTN - resume home medications as above .     Hx of TBI - memory difficulties and R sided deficits following injury in 1987.     Hx of CVA - hx CVA 1999 and 2003 with baseline mild right sided weakness and speech is somewhat slow.     DIABETES MELLITUS TYPE 2:  Has had for over one year but on no treatment.  Current A1c is 7.9.  Glucoses are better and 190 or less.   - metformin 500 mg b.i.d. Was started this admission (this was discussed with wife)     DVT Prophylaxis: Pneumatic Compression Devices     Code Status: Full Code     Diet: Combination Diet Regular Diet Adult    DVT Prophylaxis: Pneumatic Compression Devices  Villa Catheter: not present  Code Status: Full Code      Disposition Plan   Expected discharge: Tomorrow, recommended to transitional care unit once safe disposition plan/ TCU bed available.  Entered: Luz Mas MD 01/16/2019, 2:04 PM       The patient's care was discussed with the Bedside Nurse, Care Coordinator/ and Patient.    Luz Mas MD  Hospitalist Service  [facility]          Interval History (Subjective):      Patient was seen with a English  today.  He states he is fine.  Denies any pain.      SW had several discussions with family today regarding discharge plans.  He is a heavy  assist of 2.  Family had originally wanted to bring him home but now are agreeable to TCU given the level of cares he is currently requiring.                    Physical Exam:      Last Vital Signs:  /48 (BP Location: Right arm)   Pulse 57   Temp 97.7  F (36.5  C) (Temporal)   Resp 18   SpO2 97%     General: Alert, awake, no acute distress. Answering questions through Finnish   HEENT: Normocephalic and atraumatic, eyes anicteric and without scleral injection, EOMI, face symmetric, MMM.  Cardiac: RRR, normal S1, S2. No m/g/r, no LE edema.  Pulmonary: Normal chest rise, normal work of breathing.  Lungs CTAB without crackles or wheezing.  Abdomen: soft, non-tender, non-distended.  Normoactive bowel sounds, no guarding or rebound tenderness.  Extremities: no deformities.  Warm, well perfused.  Skin: no rashes or lesions.  Warm and Dry.  Neuro: No focal deficits.  Speech clear. Moving extremities in bed  Psych: Alert and oriented to person, aware he is in the hospital but no aware of situation. Calm and no agitation         Medications:      All current medications were reviewed with changes reflected in problem list.         Data:      All new lab and imaging data was reviewed.   Labs:  Recent Labs   Lab 01/16/19  0644 01/12/19  0747 01/10/19  0801    138 139   POTASSIUM 4.2 4.0 3.9   CHLORIDE 105 106 108   CO2 27 27 23   ANIONGAP 5 5 8   * 186* 153*   BUN 33* 14 11   CR 0.93 0.76 0.71   GFRESTIMATED 76 84 87   GFRESTBLACK 88 >90 >90   LUZ 8.5 8.6 8.4*     Recent Labs   Lab 01/16/19  0644 01/12/19  0747 01/10/19  0801   WBC 9.1 9.7 8.9   HGB 17.7 18.0* 17.1   HCT 52.0 53.3* 49.1   MCV 95 95 94    197 189      Imaging:   No results found for this or any previous visit (from the past 24 hour(s)).    Luz Mas MD.

## 2019-01-16 NOTE — PLAN OF CARE
Discharge Planner PT   Patient plan for discharge: not able to state per notes family wants to take him home possibly  Current status: 2 assist for static standing unsafe to try gait was able to march in place but needed Max A to not fall backwards with any unsupported standing. Did stand x 2 multiple LOB noted and standing posture in somewhat hip flexed and shlrds forward posture  Barriers to return to prior living situation: 2 assist for mobility or lift dependants  Recommendations for discharge: TCU per plan established by the PT.    Rationale for recommendations: if were to return home would need 24/7 direct assist  as able to stand without need  of another but not retain balance and a huge falls risk  and 2 strong assist for any non mechanical mobility. RW, wheelchair, hospital bed, mechanical lift        Entered by: Luciana Steward 01/16/2019 10:28 AM

## 2019-01-16 NOTE — PLAN OF CARE
Pt slept well through the night, sitter at bedside. Hx of CVA, TBI. Pt takes meds whole in apple sauce. No complaints of pain on this shift. VSS, lung sounds diminished. PIV Right arm SL. Plan is for possible discharge today.

## 2019-01-17 LAB
GLUCOSE BLDC GLUCOMTR-MCNC: 157 MG/DL (ref 70–99)
GLUCOSE BLDC GLUCOMTR-MCNC: 164 MG/DL (ref 70–99)
GLUCOSE BLDC GLUCOMTR-MCNC: 332 MG/DL (ref 70–99)

## 2019-01-17 PROCEDURE — 25000132 ZZH RX MED GY IP 250 OP 250 PS 637: Performed by: INTERNAL MEDICINE

## 2019-01-17 PROCEDURE — 99232 SBSQ HOSP IP/OBS MODERATE 35: CPT | Performed by: INTERNAL MEDICINE

## 2019-01-17 PROCEDURE — 25000132 ZZH RX MED GY IP 250 OP 250 PS 637: Performed by: PSYCHIATRY & NEUROLOGY

## 2019-01-17 PROCEDURE — 00000146 ZZHCL STATISTIC GLUCOSE BY METER IP

## 2019-01-17 PROCEDURE — 12000000 ZZH R&B MED SURG/OB

## 2019-01-17 RX ADMIN — METFORMIN HYDROCHLORIDE 500 MG: 500 TABLET ORAL at 08:31

## 2019-01-17 RX ADMIN — ASPIRIN 81 MG: 81 TABLET, COATED ORAL at 08:31

## 2019-01-17 RX ADMIN — QUETIAPINE FUMARATE 25 MG: 25 TABLET ORAL at 20:12

## 2019-01-17 RX ADMIN — LOSARTAN POTASSIUM 50 MG: 50 TABLET, FILM COATED ORAL at 08:31

## 2019-01-17 RX ADMIN — CARVEDILOL 25 MG: 25 TABLET, FILM COATED ORAL at 18:48

## 2019-01-17 RX ADMIN — MIRTAZAPINE 15 MG: 15 TABLET, ORALLY DISINTEGRATING ORAL at 20:12

## 2019-01-17 RX ADMIN — Medication 2.5 MG: at 08:31

## 2019-01-17 RX ADMIN — MELATONIN TAB 3 MG 6 MG: 3 TAB at 20:12

## 2019-01-17 RX ADMIN — METFORMIN HYDROCHLORIDE 850 MG: 850 TABLET, FILM COATED ORAL at 18:48

## 2019-01-17 RX ADMIN — ACETAMINOPHEN 650 MG: 325 TABLET, FILM COATED ORAL at 02:57

## 2019-01-17 RX ADMIN — CARVEDILOL 25 MG: 25 TABLET, FILM COATED ORAL at 08:31

## 2019-01-17 RX ADMIN — GABAPENTIN 300 MG: 300 CAPSULE ORAL at 20:12

## 2019-01-17 RX ADMIN — Medication 2.5 MG: at 18:48

## 2019-01-17 ASSESSMENT — ACTIVITIES OF DAILY LIVING (ADL)
ADLS_ACUITY_SCORE: 29
ADLS_ACUITY_SCORE: 17.5
ADLS_ACUITY_SCORE: 17.5
ADLS_ACUITY_SCORE: 27
ADLS_ACUITY_SCORE: 31
ADLS_ACUITY_SCORE: 29

## 2019-01-17 NOTE — PROGRESS NOTES
Pt's bed alarm was on and 3 side rails up. Pt climbed over side rails and alarm went off. Pt was found on his knees by the bed between the rails. Pt was uninjured and helped back to bed. Charge RN notified. Reynaldo Mora RN on 1/16/2019 at 9:26 PM

## 2019-01-17 NOTE — PLAN OF CARE
Pt is incontinent of urine, able to turn in bed. Good PO intake, took his pills crushed in apple sauce. Possible discharge tomorrow to TCU. Reynaldo Mora RN on 1/16/2019 at 9:29 PM

## 2019-01-17 NOTE — PROVIDER NOTIFICATION
REASON FOR CONTACT: , do you want to add sliding scale insulin?   PROVIDER CONTACTED: Dr. Mckeon   MODE OF CONTACT: Talked in person with provider

## 2019-01-17 NOTE — PROGRESS NOTES
D:  Per record review, pt's discharge recommendation is TCU.  Referral was sent to [facility] yesterday.    I:  Pt was sleeping during visit.  Candis spoke with pt's spouse with the .  Sitter was present.  Candis updated the spouse that a TCU will not accept the pt until he is sitter free for at least 24 hours.  She asked when the sitter would be off because he slept all night without any issues.  Candis spoke with the bedside nurse who said they spoke with the charge nurse who said that they are going to leave the sitter on into the evening and see how the pt does.  The spouse identified [place], [place], [place], and [place] as areas to send TCU referrals to.  Candis updated pt's spouse that [facility] was contacted this morning by candis to see if they have any openings and that sw still hasn't gotten an answer from them.    A/P:  Sw will continue with discharge planning and will be available as needed until discharge.

## 2019-01-17 NOTE — PROGRESS NOTES
[facility]  Hospitalist Progress Note  Patient Name: Adam Huber    MRN: [id number]  Provider: Zacarias Mckeon MD  01/17/19    Initial presenting complaint/issue to hospital (Diagnosis): confusion and fall         Assessment and Plan:      Adam Huber is an 83 year old Kittitian speaking male with history of T12 compression fracture, HLD, DM Type 2, HTN, CAD, BPH, Gait Instability, Depression, Insomnia, CVA, Prior Head Trauma, Alzheimer's Disease, and hx of Delirium.  He presented to the ED on 1/9/2019 with confusion after a fall.  He was found to have mild rhabdomyolysis in the setting of progressive confusion/delirium with known underlying dementia.  Rhabdomyolysis resolved with conservative treatment.  Confusion and agitation persisted and was thought to be mostly due to progression of dementia.  He was seen by Psychiatry and his medications were adjusted.  Transitional care was recommended and family is agreeable.     Problem List/Assessment and Plan:      1.  Rhabdomyolysis after fall, resolved. CK was 1275 on admission.  EKG showed no evidence of arhythmia.  Work up showed no evidence of infection.  Troponin was within normal limits.  Adam was treated with IVF and this has now resolved.     2.  Confusion/Delirium in the setting of underlying dementia - pt was agitated on arrival to the ED requiring IM Zyprexa.  Adam does have a hx of Traumatic Brain Injury, prior CVA, and Alzheimer's Dementia at baseline.  Work up thus far has shown no acute abnormality on CT head and no signs of infection.  Suspect most likely related to progress underlying dementia . My colleague discussed with family- Adam lives at home with his wife. He has many kids in the area who also help out and he has a PCA. The patient has been having increasing issues with agitation and his wife has been having more difficulty managing him lately.  He was in a TCU for about 3 weeks after a previous hospitalization  for compression fracture. Of note, he had similar issues with delirium in past.  Family is agreeable to TCU at discharge. Social work is following. Continue Zyprexa 2.5 mg BID (am and 6 pm) and Seroquel 25 mg at bedtime. Appreciate psych consult (stopped TCA and started on Remeron and gabapentin).  Continue Melatonin at bedtime.      3.  Hx of CAD - Stable.  Continue PTA asa, losartan, and Coreg.     4.  HTN - Stable.  Continue PTA Coreg, Losartan, and prn Hydralazine.      5.  Hx of TBI      6.  Hx of CVA.   CVA in 1999 and 2003 with baseline mild right sided weakness and somewhat slow speech.      7.  Diabetes mellitus, type .   Metformin was started here- Increase from 500 to 850 mg BID.          DVT Prophylaxis:  -  PCD's  Code Status: Full Code  Discharge Dispo: TCU once bed found and no sitter for 24 hours (likely 1-2 days)         Interval History:      Some agitation last night- fell out of bed trying to crawl over rails.                    Physical Exam:      Last Vital Signs:  /62   Pulse 69   Temp 97.6  F (36.4  C) (Oral)   Resp 20   SpO2 91%     Intake/Output Summary (Last 24 hours) at 1/17/2019 1721  Last data filed at 1/17/2019 1211  Gross per 24 hour   Intake 720 ml   Output --   Net 720 ml     GENERAL:  Comfortable. Disoriented and confused  PSYCH: pleasant, oriented, No acute distress.  EYES: PERRLA, Normal conjunctiva.  HEART:  Regular rate and rhythm. No JVD. Pulses normal. No edema.  LUNGS:  Clear to auscultation, normal Respiratory effort.  ABDOMEN:  Soft, no hepatosplenomegaly, normal bowel sounds.  EXTREMETIES: No clubbing, cyanosis or ischemia  SKIN:  Dry to touch, No rash.           Medications:      All current medications were reviewed.         Data:      All new lab and imaging data was reviewed.   Labs:       Lab Results   Component Value Date     01/16/2019     01/12/2019     01/10/2019    Lab Results   Component Value Date    CHLORIDE 105 01/16/2019     CHLORIDE 106 01/12/2019    CHLORIDE 108 01/10/2019    Lab Results   Component Value Date    BUN 33 01/16/2019    BUN 14 01/12/2019    BUN 11 01/10/2019      Lab Results   Component Value Date    POTASSIUM 4.2 01/16/2019    POTASSIUM 4.0 01/12/2019    POTASSIUM 3.9 01/10/2019    Lab Results   Component Value Date    CO2 27 01/16/2019    CO2 27 01/12/2019    CO2 23 01/10/2019    Lab Results   Component Value Date    CR 0.93 01/16/2019    CR 0.76 01/12/2019    CR 0.71 01/10/2019        Recent Labs   Lab 01/16/19  0644 01/12/19  0747   WBC 9.1 9.7   HGB 17.7 18.0*   HCT 52.0 53.3*   MCV 95 95    197

## 2019-01-17 NOTE — PLAN OF CARE
VS: Temp: 97.1  F (36.2  C) Temp src: Oral BP: 120/60 Pulse: 69 Heart Rate: 59 Resp: 18 SpO2: 93 % O2 Device: None (Room air)    Cardio: SB/SR (50-60), denies chest pain   Neuro: Disoriented x4, CMS intact, unable to follow commands   Resp: LS clear/diminished, RA, denies SOB   GI/: Incontinent of bowel and bladder   Skin: Bruised   Activity: A2 w/ lift, up in chair   Diet: Reg, needs assist; ordered PM meal and breakfast 1/18  IVs/lines: SL   Labs: , 332; paged MD about adding sliding scale insulin  Misc: Sitter at bedside d/t being impulsive at times    Plan: Wean sitter as tolerated, discharge to TCU when sitter free for 24+ hrs; continue plan of care

## 2019-01-17 NOTE — PLAN OF CARE
VSS. Tylenol given x1 d/t increased agitation and rubbing head. Pt incontinent, turns self. VPM remains in room. Discharge pending pt placement.   Leisa John RN on 1/17/2019 at 5:08 AM

## 2019-01-18 LAB
GLUCOSE BLDC GLUCOMTR-MCNC: 124 MG/DL (ref 70–99)
GLUCOSE BLDC GLUCOMTR-MCNC: 158 MG/DL (ref 70–99)
GLUCOSE BLDC GLUCOMTR-MCNC: 303 MG/DL (ref 70–99)
GLUCOSE BLDC GLUCOMTR-MCNC: 58 MG/DL (ref 70–99)

## 2019-01-18 PROCEDURE — 00000146 ZZHCL STATISTIC GLUCOSE BY METER IP

## 2019-01-18 PROCEDURE — 25000132 ZZH RX MED GY IP 250 OP 250 PS 637: Performed by: INTERNAL MEDICINE

## 2019-01-18 PROCEDURE — 99232 SBSQ HOSP IP/OBS MODERATE 35: CPT | Performed by: INTERNAL MEDICINE

## 2019-01-18 PROCEDURE — 25000132 ZZH RX MED GY IP 250 OP 250 PS 637: Performed by: PSYCHIATRY & NEUROLOGY

## 2019-01-18 PROCEDURE — 25000131 ZZH RX MED GY IP 250 OP 636 PS 637: Performed by: INTERNAL MEDICINE

## 2019-01-18 PROCEDURE — 12000000 ZZH R&B MED SURG/OB

## 2019-01-18 RX ORDER — DEXTROSE MONOHYDRATE 25 G/50ML
25-50 INJECTION, SOLUTION INTRAVENOUS
Status: DISCONTINUED | OUTPATIENT
Start: 2019-01-18 | End: 2019-01-19 | Stop reason: HOSPADM

## 2019-01-18 RX ORDER — GLIPIZIDE 5 MG/1
5 TABLET, FILM COATED, EXTENDED RELEASE ORAL
Status: DISCONTINUED | OUTPATIENT
Start: 2019-01-18 | End: 2019-01-19 | Stop reason: HOSPADM

## 2019-01-18 RX ORDER — NICOTINE POLACRILEX 4 MG
15-30 LOZENGE BUCCAL
Status: DISCONTINUED | OUTPATIENT
Start: 2019-01-18 | End: 2019-01-19 | Stop reason: HOSPADM

## 2019-01-18 RX ADMIN — MIRTAZAPINE 15 MG: 15 TABLET, ORALLY DISINTEGRATING ORAL at 19:51

## 2019-01-18 RX ADMIN — GABAPENTIN 100 MG: 100 CAPSULE ORAL at 23:42

## 2019-01-18 RX ADMIN — Medication 2.5 MG: at 18:01

## 2019-01-18 RX ADMIN — QUETIAPINE FUMARATE 25 MG: 25 TABLET ORAL at 19:50

## 2019-01-18 RX ADMIN — CARVEDILOL 25 MG: 25 TABLET, FILM COATED ORAL at 10:02

## 2019-01-18 RX ADMIN — ASPIRIN 81 MG: 81 TABLET, COATED ORAL at 10:02

## 2019-01-18 RX ADMIN — MELATONIN TAB 3 MG 6 MG: 3 TAB at 19:50

## 2019-01-18 RX ADMIN — METFORMIN HYDROCHLORIDE 850 MG: 850 TABLET, FILM COATED ORAL at 10:02

## 2019-01-18 RX ADMIN — Medication 2.5 MG: at 10:01

## 2019-01-18 RX ADMIN — GABAPENTIN 300 MG: 300 CAPSULE ORAL at 19:50

## 2019-01-18 RX ADMIN — INSULIN ASPART 4 UNITS: 100 INJECTION, SOLUTION INTRAVENOUS; SUBCUTANEOUS at 12:56

## 2019-01-18 RX ADMIN — GLIPIZIDE 5 MG: 5 TABLET, FILM COATED, EXTENDED RELEASE ORAL at 12:54

## 2019-01-18 RX ADMIN — LOSARTAN POTASSIUM 50 MG: 50 TABLET, FILM COATED ORAL at 10:02

## 2019-01-18 RX ADMIN — CARVEDILOL 25 MG: 25 TABLET, FILM COATED ORAL at 18:01

## 2019-01-18 ASSESSMENT — ACTIVITIES OF DAILY LIVING (ADL)
ADLS_ACUITY_SCORE: 16.5
ADLS_ACUITY_SCORE: 16.5
ADLS_ACUITY_SCORE: 18.5
ADLS_ACUITY_SCORE: 31
ADLS_ACUITY_SCORE: 18.5
ADLS_ACUITY_SCORE: 16.5

## 2019-01-18 NOTE — PLAN OF CARE
Pt appeared to sundown and tried to get out of bed. Was very relaxed during the early part of the shift. Pt believes that he is in [place]. RN used blue phone and Lithuanian  to re orient the patient with minimal results. Pt had good PO intake, has been incontinent of urine. Was up in the chair for his supper. Staff used the lift to move the patient. Reynaldo Mora RN on 1/17/2019 at 9:41 PM

## 2019-01-18 NOTE — PROGRESS NOTES
D:  Per record review, pt's discharge recommendation is TCU vs home with PCA services and resumption of HC.    I:  Geoffery spoke with pt's spouse.   was not present, but bedside nurse was able to interpret.  Sw received a note that the pt's family would like to take the pt home at discharge and rent a hospital bed.  Geoffrey gave the pt's spouse the resources needed to get a hospital bed and suggested that they follow-up with the pt's county worker regarding the PCA hours.  Geoffrey spoke with Candy at [facility]. ([phone] F:[phone]) to update her that the pt will likely discharge home in the next 1-2 days.  Pt will need a resumption of HC orders.  Candy asked that the HC orders be faxed to them.    A/P:  Sw will continue to be available as needed until discharge.  Transportation will need to be discussed.

## 2019-01-18 NOTE — PLAN OF CARE
Pt restless and removed IV at start of shift. Not willing to let staff place a new one. Sitter remains at bedside. VSS. Incontinent. Plan to try without a sitter when appropriate to find TCU placement.   Leisa John RN on 1/18/2019 at 5:36 AM

## 2019-01-18 NOTE — PROGRESS NOTES
[facility]  Hospitalist Progress Note  Patient Name: Adam Huber    MRN: [id number]  Admit 1/9/2019  9:49 AM    Today's 01/17/19    Initial presenting complaint/issue to hospital (Diagnosis): confusion and fall         Assessment and Plan:      Adam Huber is an 83 year old Maltese speaking male with history of T12 compression fracture, HLD, DM Type 2, HTN, CAD, BPH, Gait Instability, Depression, Insomnia, CVA, Prior Head Trauma, Alzheimer's Disease, and hx of Delirium.  He presented to the ED on 1/9/2019 with confusion after a fall.  He was found to have mild rhabdomyolysis in the setting of progressive confusion/delirium with known underlying dementia.  Rhabdomyolysis resolved with conservative treatment.  Confusion and agitation persisted and was thought to be mostly due to progression of dementia.  He was seen by Psychiatry and his medications were adjusted.       Problem List/Assessment and Plan:      1.  Rhabdomyolysis after fall, resolved. CK was 1275 on admission.  EKG showed no evidence of arhythmia.  Work up showed no evidence of infection.  Troponin was within normal limits.  Adam was treated with IVF and this has now resolved.     2.  Confusion/Delirium in the setting of underlying dementia - pt was agitated on arrival to the ED requiring IM Zyprexa.  Adam does have a hx of Traumatic Brain Injury, prior CVA, and Alzheimer's Dementia at baseline.  Work up thus far has shown no acute abnormality on CT head and no signs of infection.  Suspect most likely related to progress underlying dementia . My colleague discussed with family- Adam lives at home with his wife. He has many kids in the area who also help out and he has a PCA. The patient has been having increasing issues with agitation and his wife has been having more difficulty managing him lately.  He was in a TCU for about 3 weeks after a previous hospitalization for compression fracture. Of note, he had similar  issues with delirium in past.   Continue Zyprexa 2.5 mg BID (am and 6 pm) and Seroquel 25 mg at bedtime. Appreciate psych consult (stopped TCA and started on Remeron and gabapentin).  Continue Melatonin at bedtime.  Care discussed with the wife and the son at the bedside, it seems last time patient went to transitional care he did not do well, his language barrier was a problem and the staff could not understand him even when he needed to go to the bathroom.  The family is exploring also possibility of a PCA at home but was social service.     3.  Hx of CAD - Stable.  Continue PTA asa, losartan, and Coreg.     4.  HTN - Stable.  Continue PTA Coreg, Losartan, and prn Hydralazine.      5.  Hx of TBI      6.  Hx of CVA.   CVA in 1999 and 2003 with baseline mild right sided weakness and somewhat slow speech.      7.  Diabetes mellitus, type .   Metformin was started here- Increase from 500 to 850 mg BID.  Blood sugar still elevated added the glipizide 5 mg XL daily and insulin sliding scale         DVT Prophylaxis:-  PCD's  Code Status: Full Code  Discharge Dispo: Hopefully in the next 1-2 days once a discharge plan is clear either transitional care or home with a PCA and family meanwhile will be working on long-term plan for their parent including possibility of a memory care unit or assisted living.        Interval History:        Pleasant sitting eating breakfast, seen with the help of an , denies any pain, denies any chest pain or shortness of breath.  He is confused he think is in 1919, wants to go home, seems comfortable                   Physical Exam:      Last Vital Signs:  /82 (BP Location: Right arm)   Pulse 56   Temp 96  F (35.6  C) (Axillary)   Resp 18   SpO2 94%     GENERAL:  Comfortable. Disoriented  PSYCH: pleasant, No acute distress.  EYES: PERRLA, Normal conjunctiva.  HEART:  Regular rate and rhythm. No JVD. Pulses normal. No edema.  LUNGS:  Clear to auscultation, normal  Respiratory effort.  ABDOMEN:  Soft, no hepatosplenomegaly, normal bowel sounds.  EXTREMETIES: No clubbing, cyanosis or ischemia  SKIN:  Dry to touch, No rash.           Medications:      All current medications were reviewed.         Data:      All new lab and imaging data was reviewed.   Labs:       Lab Results   Component Value Date     01/16/2019     01/12/2019     01/10/2019    Lab Results   Component Value Date    CHLORIDE 105 01/16/2019    CHLORIDE 106 01/12/2019    CHLORIDE 108 01/10/2019    Lab Results   Component Value Date    BUN 33 01/16/2019    BUN 14 01/12/2019    BUN 11 01/10/2019      Lab Results   Component Value Date    POTASSIUM 4.2 01/16/2019    POTASSIUM 4.0 01/12/2019    POTASSIUM 3.9 01/10/2019    Lab Results   Component Value Date    CO2 27 01/16/2019    CO2 27 01/12/2019    CO2 23 01/10/2019    Lab Results   Component Value Date    CR 0.93 01/16/2019    CR 0.76 01/12/2019    CR 0.71 01/10/2019        Recent Labs   Lab 01/16/19  0644 01/12/19  0747   WBC 9.1 9.7   HGB 17.7 18.0*   HCT 52.0 53.3*   MCV 95 95    197

## 2019-01-19 VITALS
SYSTOLIC BLOOD PRESSURE: 145 MMHG | RESPIRATION RATE: 18 BRPM | HEART RATE: 57 BPM | TEMPERATURE: 96.2 F | OXYGEN SATURATION: 96 % | DIASTOLIC BLOOD PRESSURE: 62 MMHG

## 2019-01-19 LAB
GLUCOSE BLDC GLUCOMTR-MCNC: 124 MG/DL (ref 70–99)
GLUCOSE BLDC GLUCOMTR-MCNC: 156 MG/DL (ref 70–99)
GLUCOSE BLDC GLUCOMTR-MCNC: 266 MG/DL (ref 70–99)

## 2019-01-19 PROCEDURE — 25000132 ZZH RX MED GY IP 250 OP 250 PS 637: Performed by: INTERNAL MEDICINE

## 2019-01-19 PROCEDURE — 00000146 ZZHCL STATISTIC GLUCOSE BY METER IP

## 2019-01-19 PROCEDURE — 99239 HOSP IP/OBS DSCHRG MGMT >30: CPT | Performed by: INTERNAL MEDICINE

## 2019-01-19 RX ORDER — MIRTAZAPINE 15 MG/1
15 TABLET, ORALLY DISINTEGRATING ORAL AT BEDTIME
Qty: 30 TABLET | Refills: 0 | Status: ON HOLD | OUTPATIENT
Start: 2019-01-19 | End: 2019-12-03

## 2019-01-19 RX ORDER — LANOLIN ALCOHOL/MO/W.PET/CERES
3 CREAM (GRAM) TOPICAL AT BEDTIME
Qty: 30 TABLET | Refills: 0 | Status: SHIPPED | OUTPATIENT
Start: 2019-01-19 | End: 2019-02-18

## 2019-01-19 RX ORDER — OLANZAPINE 2.5 MG/1
2.5 TABLET, FILM COATED ORAL 2 TIMES DAILY
Qty: 60 TABLET | Refills: 0 | Status: ON HOLD | OUTPATIENT
Start: 2019-01-19 | End: 2021-09-23

## 2019-01-19 RX ORDER — GLIPIZIDE 5 MG/1
5 TABLET, FILM COATED, EXTENDED RELEASE ORAL
Qty: 30 TABLET | Refills: 0 | Status: ON HOLD | OUTPATIENT
Start: 2019-01-20 | End: 2019-12-03

## 2019-01-19 RX ORDER — QUETIAPINE FUMARATE 25 MG/1
25 TABLET, FILM COATED ORAL AT BEDTIME
Qty: 30 TABLET | Refills: 0 | Status: ON HOLD | OUTPATIENT
Start: 2019-01-19 | End: 2021-09-23

## 2019-01-19 RX ORDER — GABAPENTIN 300 MG/1
300 CAPSULE ORAL AT BEDTIME
Qty: 30 CAPSULE | Refills: 0 | Status: ON HOLD | OUTPATIENT
Start: 2019-01-19 | End: 2019-12-03

## 2019-01-19 RX ADMIN — Medication 2.5 MG: at 09:23

## 2019-01-19 RX ADMIN — INSULIN ASPART 3 UNITS: 100 INJECTION, SOLUTION INTRAVENOUS; SUBCUTANEOUS at 13:20

## 2019-01-19 RX ADMIN — LOSARTAN POTASSIUM 50 MG: 50 TABLET, FILM COATED ORAL at 09:24

## 2019-01-19 RX ADMIN — ASPIRIN 81 MG: 81 TABLET, COATED ORAL at 09:24

## 2019-01-19 RX ADMIN — METFORMIN HYDROCHLORIDE 850 MG: 850 TABLET, FILM COATED ORAL at 09:24

## 2019-01-19 RX ADMIN — GLIPIZIDE 5 MG: 5 TABLET, FILM COATED, EXTENDED RELEASE ORAL at 09:24

## 2019-01-19 RX ADMIN — CARVEDILOL 25 MG: 25 TABLET, FILM COATED ORAL at 09:24

## 2019-01-19 ASSESSMENT — ACTIVITIES OF DAILY LIVING (ADL)
ADLS_ACUITY_SCORE: 27
ADLS_ACUITY_SCORE: 27
ADLS_ACUITY_SCORE: 16.5
ADLS_ACUITY_SCORE: 27
ADLS_ACUITY_SCORE: 27

## 2019-01-19 NOTE — PLAN OF CARE
VSS. Alert to person, hard to follow commands due to language barrier. Incontinent of urine x2 this shift. Poor appetite this PM. BG 58, apple juice given and eating dinner, recheck 124. Wife at bedside and spoke with SW about discharge plan. SW will follow up tomorrow.

## 2019-01-19 NOTE — DISCHARGE SUMMARY
[facility]  Discharge Summary  Hospitalist      Date of Admission:  1/9/2019  Date of Discharge:  1/19/2019  Provider:  Dillon Chatterjee MD. [facility]  Date of Service (when I last saw the patient): 01/19/19      Primary Provider: Oralia Forrest          Discharge Diagnosis:     Discharge Diagnoses   Rhabdomyolysis after fall  Confusion/Acute Delirium in the setting of underlying dementia   Coronary artery disease  Hypertension  History of TBI  History of CVA  Diabetes mellitus type 2            Other medical issues:  Past Medical History:   Diagnosis Date     Acute chest pain 10/23/2015     CAD (coronary artery disease)      Essential hypertension 10/23/2015     History of CVA (cerebrovascular accident) 10/23/2015     HLD (hyperlipidemia)      HTN (hypertension)      NSTEMI (non-ST elevated myocardial infarction) (H) 10/23/2015     Post PTCA 10-    Successful PCI of culprit proximal to mid RCA with placement of a     Post PTCA 11-6-2015    pci of complex mid CFX-hector         Please see the admission history and physical for full details.     Hospital Course     Adam Huber was admitted on 1/9/2019.      83 year old [place]ian speaking male with history of T12 compression fracture, HLD, DM Type 2, HTN, CAD, BPH, Gait Instability, Depression, Insomnia, CVA, Prior Head Trauma, Alzheimer's Disease, and hx of Delirium.  He presented to the ED on 1/9/2019 with confusion after a fall.  He was found to have mild rhabdomyolysis in the setting of progressive confusion/delirium with known underlying dementia.  Rhabdomyolysis resolved with conservative treatment.  Confusion and agitation persisted and was thought to be mostly due to progression of dementia.  He was seen by Psychiatry and his medications were adjusted.         1.  Rhabdomyolysis after fall, resolved. CK was 1275 on admission.  EKG showed no evidence of arhythmia.  Work up showed no evidence of infection.  Troponin was within normal  limits.  Adam was treated with IVF and this has now resolved.     2.  Confusion/Delirium in the setting of underlying dementia - pt was agitated on arrival to the ED requiring IM Zyprexa.  Adam does have a hx of Traumatic Brain Injury, prior CVA, and Alzheimer's Dementia at baseline.  Work up thus far has shown no acute abnormality on CT head and no signs of infection.  Suspect most likely related to progress underlying dementia . My colleague discussed with family- Adam lives at home with his wife. He has many kids in the area who also help out and he has a PCA. The patient has been having increasing issues with agitation and his wife has been having more difficulty managing him lately.  He was in a TCU for about 3 weeks after a previous hospitalization for compression fracture. Of note, he had similar issues with delirium in past.   Continue Zyprexa 2.5 mg BID (am and 6 pm) and Seroquel 25 mg at bedtime. Appreciate psych consult (stopped TCA and started on Remeron and gabapentin).  Continue Melatonin at bedtime.  Care discussed with the wife and the son at the bedside, it seems last time patient went to transitional care he did not do well, his language barrier was a problem and the staff could not understand him even when he needed to go to the bathroom.    I met with his wife with the help with an  and his grandson was present he wants to take him home, she feels she has enough help and his PCA would be at home today, I did order a bedside commode and hospital bed for him.  I do believe this is the best option for the patient is a high risk for falling and the hospitalization whether he goes home or he goes to a facility.  His chance of staying out of the hospital is much higher if he is at home with his family.    3.  Hx of CAD - Stable.  Continue PTA asa, losartan, and Coreg.   4.  HTN - Stable.  Continue PTA Coreg, Losartan, and prn Hydralazine.    5.  Hx of TBI    6.  Hx of CVA.   CVA in  1999 and 2003 with baseline mild right sided weakness and somewhat slow speech.    7.  Diabetes mellitus, type .   Metformin was started here- Increase from 500 to 850 mg BID.  Blood sugar still elevated added the glipizide 5 mg XL daily and insulin sliding scale        Pending Results   Unresulted Labs Ordered in the Past 30 Days of this Admission     Date and Time Order Name Status Description    1/9/2019 1008 T4 free In process           Discharge Orders      Reason for your hospital stay    Fall, rhabdomyolysis, acute delirium, severe dementia     Follow-up and recommended labs and tests     Follow-up with primary care physician in 1 week     Discharge Equipment: Bed (bed and bedside commode)    The patient's condition requires changes to the body position to i.e.: alleviate pain, promote good body alignment, prevent contractures, avoid respiratory infections, in a way not feasible in an ordinary bed.   A semi-electric bed would be required for patients who require frequent changes in body positioning or patients with immediate need for body changes.  Side rails or safety enclosures are needed due to (medical reason) severe dementia  Treatment Diagnosis: Severe advanced dementia     Full Code     Diet    Follow this diet upon discharge: Orders Placed This Encounter      Combination Diet Regular Diet Adult       Code Status   Full Code       Primary Care Physician   Oralia Forrest    Physical Exam   Temp: 96.2  F (35.7  C) Temp src: Axillary BP: 145/62 Pulse: 57 Heart Rate: 63 Resp: 18 SpO2: 96 % O2 Device: None (Room air)    There were no vitals filed for this visit.  Vital Signs with Ranges  Temp:  [95.7  F (35.4  C)-97.9  F (36.6  C)] 96.2  F (35.7  C)  Pulse:  [57] 57  Heart Rate:  [63-75] 63  Resp:  [16-18] 18  BP: (125-145)/(53-62) 145/62  SpO2:  [96 %-97 %] 96 %  I/O last 3 completed shifts:  In: 640 [P.O.:640]  Out: -     Constitutional:  alert, cooperative, no apparent distress  Respiratory: No increased  work of breathing, good air exchange, no crackles or wheezing.  Cardiovascular: apical impulse,normal S1 and S2  GI: bowel sounds present, soft, non-distended, non-tender      Discharge Disposition   Discharged to home    Consultations This Hospital Stay   PHYSICAL THERAPY ADULT IP CONSULT  PHARMACY IP CONSULT  SOCIAL WORK IP CONSULT  SOCIAL WORK IP CONSULT  PSYCHIATRY IP CONSULT  PHARMACY IP CONSULT    Time Spent on this Encounter   IDillon, personally saw the patient today and spent greater than 30 minutes discharging this patient.      Discharge Medications   Current Discharge Medication List      START taking these medications    Details   gabapentin (NEURONTIN) 300 MG capsule Take 1 capsule (300 mg) by mouth At Bedtime  Qty: 30 capsule, Refills: 0    Associated Diagnoses: History of CVA (cerebrovascular accident)      glipiZIDE (GLUCOTROL XL) 5 MG 24 hr tablet Take 1 tablet (5 mg) by mouth daily (with breakfast)  Qty: 30 tablet, Refills: 0    Associated Diagnoses: Type 2 diabetes mellitus without complication, unspecified whether long term insulin use (H)      melatonin 3 MG tablet Take 1 tablet (3 mg) by mouth At Bedtime  Qty: 30 tablet, Refills: 0    Associated Diagnoses: Acute delirium      metFORMIN (GLUCOPHAGE) 850 MG tablet Take 1 tablet (850 mg) by mouth 2 times daily (with meals)  Qty: 60 tablet, Refills: 0    Associated Diagnoses: Type 2 diabetes mellitus without complication, unspecified whether long term insulin use (H)      mirtazapine (REMERON SOL-TAB) 15 MG ODT 1 tablet (15 mg) by Orally disintegrating tablet route At Bedtime  Qty: 30 tablet, Refills: 0    Associated Diagnoses: Acute delirium      OLANZapine (ZYPREXA) 2.5 MG tablet Take 1 tablet (2.5 mg) by mouth 2 times daily 8AM AND 6PM  Qty: 60 tablet, Refills: 0    Associated Diagnoses: Acute delirium      order for DME Equipment being ordered: Hospital bed and bedside commode  Qty: 1 Units, Refills: 0    Associated Diagnoses:  Acute delirium      QUEtiapine (SEROQUEL) 25 MG tablet Take 1 tablet (25 mg) by mouth At Bedtime  Qty: 30 tablet, Refills: 0    Associated Diagnoses: Acute delirium         CONTINUE these medications which have NOT CHANGED    Details   acetaminophen (TYLENOL) 325 MG tablet Take 2 tablets (650 mg) by mouth every 4 hours as needed for mild pain  Qty: 100 tablet    Associated Diagnoses: History of CVA (cerebrovascular accident)      aspirin 81 MG EC tablet Take one tablet daily  Qty: 90 tablet, Refills: 0    Associated Diagnoses: NSTEMI (non-ST elevated myocardial infarction) (H)      carvedilol (COREG) 25 MG tablet Take 1 tablet (25 mg) by mouth 2 times daily (with meals)  Qty: 180 tablet, Refills: 3    Associated Diagnoses: Essential hypertension      losartan (COZAAR) 50 MG tablet Take 50 mg by mouth daily      olopatadine HCl (PATADAY) 0.2 % SOLN Place 1 drop into both eyes daily as needed      polyethylene glycol (MIRALAX/GLYCOLAX) powder Take 1 capful by mouth daily as needed       traMADol (ULTRAM) 50 MG tablet Take 1 tablet (50 mg) by mouth every 4 hours as needed for moderate pain  Qty: 10 tablet, Refills: 0    Associated Diagnoses: T12 compression fracture (H)      nitroglycerin (NITROSTAT) 0.4 MG SL tablet Place 1 tablet (0.4 mg) under the tongue every 5 minutes as needed for chest pain  Qty: 25 tablet, Refills: 1    Associated Diagnoses: NSTEMI (non-ST elevated myocardial infarction) (H); Essential hypertension; Postsurgical percutaneous transluminal coronary angioplasty status         STOP taking these medications       nortriptyline (PAMELOR) 75 MG capsule Comments:   Reason for Stopping:             Allergies   Allergies   Allergen Reactions     Lisinopril      Morphine Other (See Comments)     confusion     Data   Most Recent 3 CBC's:  Recent Labs   Lab Test 01/16/19  0644 01/12/19  0747 01/10/19  0801   WBC 9.1 9.7 8.9   HGB 17.7 18.0* 17.1   MCV 95 95 94    197 189      Most Recent 3  BMP's:  Recent Labs   Lab Test 01/16/19  0644 01/12/19  0747 01/10/19  0801    138 139   POTASSIUM 4.2 4.0 3.9   CHLORIDE 105 106 108   CO2 27 27 23   BUN 33* 14 11   CR 0.93 0.76 0.71   ANIONGAP 5 5 8   LUZ 8.5 8.6 8.4*   * 186* 153*     Most Recent 2 LFT's:  Recent Labs   Lab Test 01/09/19  1008 11/08/18  0913  10/11/15  0620   *  --   --  41   ALT 59 26   < > 48   ALKPHOS 138  --   --  130   BILITOTAL 0.9  --   --  0.7    < > = values in this interval not displayed.     Most Recent INR's and Anticoagulation Dosing History:  Anticoagulation Dose History     Recent Dosing and Labs Latest Ref Rng & Units 10/9/2015 11/2/2015 1/9/2019    INR 0.86 - 1.14 1.12 1.07 1.01        Most Recent 3 Troponin's:  Recent Labs   Lab Test 01/09/19  1008 12/31/18  1647 03/19/17  0050  01/04/17  1542 01/04/17  1338   TROPI 0.018 <0.015 0.068*   < >  --   --    TROPONIN  --   --   --   --  0.00 0.01    < > = values in this interval not displayed.     Most Recent Cholesterol Panel:  Recent Labs   Lab Test 11/08/18  0913   CHOL 174   LDL 91   HDL 56   TRIG 133     Most Recent 6 Bacteria Isolates From Any Culture (See EPIC Reports for Culture Details):  Recent Labs   Lab Test 05/18/18  1547 03/18/17  1908 10/12/15  1358 10/09/15  2259 10/09/15  2258   CULT 50,000 to 100,000 colonies/mL  Klebsiella pneumoniae  * >100,000 colonies/mL Escherichia coli* No MRSA isolated Cultured on the 2nd day of incubation: from anaerobic culture vial Streptococcus   constellatus  Critical Value/Significant Value, preliminary result only, called to and read   back by Shirley Riddle RN at 0643 10/11/15. hd  Cultured on the 3rd day of incubation: from aerobic culture vial Gram positive   bacilli resembling diphtheroids No further identification  Critical Value/Significant Value, preliminary result only, called to and read   back by Rachell Valerio RN at 7:45am 10/12/2015 ()  (Note)  From anaerobic culture vial:    POSITIVE for STREPTOCOCCUS  ANGINOSUS group by Verigene multiplex  nucleic acid test. Final identification and antimicrobial  susceptibility testing will be verified by standard methods.    Specimen tested with Verigene multiplex, gram-positive blood culture  nucleic acid test for the following targets: Staph aureus, Staph  epidermidis, Staph lugdunensis, other Staph species, Enterococcus  faecalis, Enterococcus faecium, Streptococcus species, S. agalactiae,  S.  anginosus grp., S. pneumoniae, S. pyogenes, Listeria sp., mecA  (methicillin resistance) and Marcos/B (vancomycin resistance).    Critical Value/Significant Value called to and read back by Remy Valerio RN on 10.11.15 at 0922. KG      From aerobic culture vial:    NEGATIVE for the following: Staphylococcus spp., Staph aureus, Staph  epidermidis, Staph lugdunensis, Streptococcus spp., Strep pneumoniae,  Strep pyogenes, Strep agalactiae, Strep anginosus group, Enterococcus  faecalis, Enterococcus faecium, and Listeria spp. by Verigene  multiplex nucleic acid test. Final identification and antimicrobial  susceptibility testing will be verified by standard methods.    Critical Value/Significant Value called to and read back by Jennifer Dennis RN at 10:55am 10/12/2015 ()    * No growth     Most Recent TSH, T4 and A1c Labs:  Recent Labs   Lab Test 01/09/19  1008   TSH 4.57*   T4 1.14   A1C 7.9*     Results for orders placed or performed during the hospital encounter of 01/09/19   CT Head w/o Contrast    Narrative    CT SCAN OF THE HEAD WITHOUT CONTRAST   1/9/2019 11:15 AM     HISTORY: Altered level of consciousness (LOC), unexplained    TECHNIQUE:  Axial images of the head and coronal reformations without  IV contrast material. Radiation dose for this scan was reduced using  automated exposure control, adjustment of the mA and/or kV according  to patient size, or iterative reconstruction technique.    COMPARISON: Scan dated 5/19/2018    FINDINGS:  There is diffuse parenchymal volume loss.   White matter  changes are present in the cerebral hemispheres that are consistent  with small vessel ischemic disease in this age patient. Chronic areas  of encephalomalacia are seen in the anterior aspect of the right  temporal lobe and inferior aspects of both frontal lobes. There is no  evidence of intracranial hemorrhage, mass, acute infarct or anomaly.  The visualized portions of the sinuses and mastoids appear normal.  There is no evidence of trauma.      Impression    IMPRESSION:   1. No acute pathology. No bleed, mass, or acute infarcts. No change.  2. Chronic areas of encephalomalacia are seen in the anterior aspect  of the right temporal lobe and inferior aspects of both frontal lobes.  There is also an area of encephalomalacia in the right basal ganglia.      BULMARO SAMS MD   CT Chest/Abdomen/Pelvis w Contrast    Narrative    CT CHEST, ABDOMEN AND PELVIS WITH CONTRAST  1/9/2019 11:20 AM    HISTORY: Chest, abdomen and pelvis trauma, minor, blunt.    TECHNIQUE: CT scan obtained of the chest, abdomen, and pelvis with  oral and IV contrast. 100 mL Isovue-370 injected. Radiation dose for  this scan was reduced using automated exposure control, adjustment of  the mA and/or kV according to patient size, or iterative  reconstruction technique.    COMPARISON: CT abdomen and pelvis 12/31/2018. Lumbar spine radiograph  5/18/2018.    FINDINGS:  Chest: No pneumothorax. No acute airspace disease. Mild bibasilar  atelectasis. No acute thoracic aortic abnormality. Diffuse coronary  artery calcifications. No effusions. This exam was not tailored for  specific assessment of pulmonary embolism. No large central pulmonary  embolism is seen, but the small branch vessels are nondiagnostic. No  enlarged lymph nodes. No convincing acute displaced fracture noted.  There is a T12 compression deformity that is stable compared to the  prior exams.    Abdomen/pelvis: No convincing acute displaced fracture is seen in the  abdomen or  pelvis. Both hips are located. Vascular calcifications. No  acute abdominal aortic abnormality is seen. Cholecystectomy. Liver,  adrenals, spleen, pancreas, and kidneys show no acute abnormalities. A  few renal cysts noted bilaterally. No hydronephrosis. No acute bowel  abnormality. Distal colonic diverticula. No free fluid. No suspicious  enlarged lymph nodes are seen.      Impression    IMPRESSION:  1. No acute abnormality is seen.  2. Diffuse vascular calcifications.  3. Compression deformity of T12 is stable and is subacute.    TOBIAS JACKSON MD           Disclaimer: This note consists of symbols derived from keyboarding, dictation and/or voice recognition software. As a result, there may be errors in the script that have gone undetected. Please consider this when interpreting information found in this chart.

## 2019-01-19 NOTE — PLAN OF CARE
Patient discharged home with his son and wife. Wife and son verbalized understanding of discharge instruction.

## 2019-01-19 NOTE — PLAN OF CARE
Orientation:Alert. Orientated to self and place.  Vss afebrile. 96%  LS:clear  GI: bs+. Tolerating regular diet.  :incont of urine  Skin: bruised.  Activity: up to chair with ceiling lift  Pain: denies pain  Plan: discharge to home with family this afternoon. Wife will need to get hospital bed/bsc on Monday.  present this morning.  phone at beside.

## 2019-01-19 NOTE — PLAN OF CARE
Citizen of the [place] speaking, A&O to self only, pt confused, bed alarm on, assist x 2, regular diet, , plan is to discharge 1-2 days to home with PCA or TCU, will continue with POC.

## 2019-01-20 NOTE — PROGRESS NOTES
Per home care need    Resume home care orders-->nursing to evaluate and treat  Fax to [phone]    Called answering service [phone] with above request.     informed

## 2019-12-03 ENCOUNTER — APPOINTMENT (OUTPATIENT)
Dept: GENERAL RADIOLOGY | Facility: CLINIC | Age: 84
End: 2019-12-03
Attending: EMERGENCY MEDICINE
Payer: COMMERCIAL

## 2019-12-03 ENCOUNTER — HOSPITAL ENCOUNTER (OUTPATIENT)
Facility: CLINIC | Age: 84
Setting detail: OBSERVATION
Discharge: HOME | End: 2019-12-04
Attending: EMERGENCY MEDICINE | Admitting: INTERNAL MEDICINE
Payer: COMMERCIAL

## 2019-12-03 ENCOUNTER — APPOINTMENT (OUTPATIENT)
Dept: CT IMAGING | Facility: CLINIC | Age: 84
End: 2019-12-03
Attending: EMERGENCY MEDICINE
Payer: COMMERCIAL

## 2019-12-03 ENCOUNTER — APPOINTMENT (OUTPATIENT)
Dept: MRI IMAGING | Facility: CLINIC | Age: 84
End: 2019-12-03
Attending: EMERGENCY MEDICINE
Payer: COMMERCIAL

## 2019-12-03 DIAGNOSIS — M62.81 GENERALIZED MUSCLE WEAKNESS: ICD-10-CM

## 2019-12-03 DIAGNOSIS — N30.00 ACUTE CYSTITIS WITHOUT HEMATURIA: Primary | ICD-10-CM

## 2019-12-03 DIAGNOSIS — R07.9 ACUTE CHEST PAIN: ICD-10-CM

## 2019-12-03 DIAGNOSIS — R41.89 EPISODE OF ALTERED COGNITION: ICD-10-CM

## 2019-12-03 DIAGNOSIS — N39.0 URINARY TRACT INFECTION IN MALE: ICD-10-CM

## 2019-12-03 PROBLEM — R41.82 ALTERED MENTAL STATUS: Status: ACTIVE | Noted: 2019-12-03

## 2019-12-03 LAB
ALBUMIN SERPL-MCNC: 3.1 G/DL (ref 3.4–5)
ALBUMIN UR-MCNC: 10 MG/DL
ALP SERPL-CCNC: 118 U/L (ref 40–150)
ALT SERPL W P-5'-P-CCNC: 40 U/L (ref 0–70)
ANION GAP SERPL CALCULATED.3IONS-SCNC: 7 MMOL/L (ref 3–14)
ANION GAP SERPL CALCULATED.3IONS-SCNC: 9 MMOL/L (ref 6–17)
APAP SERPL-MCNC: <2 MG/L (ref 10–20)
APPEARANCE UR: CLEAR
AST SERPL W P-5'-P-CCNC: 39 U/L (ref 0–45)
BASOPHILS # BLD AUTO: 0.1 10E9/L (ref 0–0.2)
BASOPHILS NFR BLD AUTO: 0.9 %
BILIRUB SERPL-MCNC: 0.3 MG/DL (ref 0.2–1.3)
BILIRUB UR QL STRIP: NEGATIVE
BUN SERPL-MCNC: 27 MG/DL (ref 7–30)
BUN SERPL-MCNC: 27 MG/DL (ref 7–30)
CA-I BLD-SCNC: 4.9 MG/DL (ref 4.4–5.2)
CALCIUM SERPL-MCNC: 9 MG/DL (ref 8.5–10.1)
CHLORIDE BLD-SCNC: 104 MMOL/L (ref 94–109)
CHLORIDE SERPL-SCNC: 106 MMOL/L (ref 94–109)
CO2 BLD-SCNC: 25 MMOL/L (ref 20–32)
CO2 BLDCOV-SCNC: 26 MMOL/L (ref 21–28)
CO2 SERPL-SCNC: 25 MMOL/L (ref 20–32)
COLOR UR AUTO: YELLOW
CREAT BLD-MCNC: 1.3 MG/DL (ref 0.66–1.25)
CREAT SERPL-MCNC: 1.15 MG/DL (ref 0.66–1.25)
DIFFERENTIAL METHOD BLD: NORMAL
EOSINOPHIL # BLD AUTO: 0.3 10E9/L (ref 0–0.7)
EOSINOPHIL NFR BLD AUTO: 3.5 %
ERYTHROCYTE [DISTWIDTH] IN BLOOD BY AUTOMATED COUNT: 13 % (ref 10–15)
ETHANOL SERPL-MCNC: <0.01 G/DL
GFR SERPL CREATININE-BSD FRML MDRD: 53 ML/MIN/{1.73_M2}
GFR SERPL CREATININE-BSD FRML MDRD: 58 ML/MIN/{1.73_M2}
GLUCOSE BLD-MCNC: 218 MG/DL (ref 70–99)
GLUCOSE BLDC GLUCOMTR-MCNC: 205 MG/DL (ref 70–99)
GLUCOSE BLDC GLUCOMTR-MCNC: 211 MG/DL (ref 70–99)
GLUCOSE SERPL-MCNC: 215 MG/DL (ref 70–99)
GLUCOSE UR STRIP-MCNC: 300 MG/DL
HCT VFR BLD AUTO: 43.7 % (ref 40–53)
HCT VFR BLD CALC: 44 %PCV (ref 40–53)
HGB BLD CALC-MCNC: 15 G/DL (ref 13.3–17.7)
HGB BLD-MCNC: 15 G/DL (ref 13.3–17.7)
HGB UR QL STRIP: NEGATIVE
HYALINE CASTS #/AREA URNS LPF: 8 /LPF (ref 0–2)
IMM GRANULOCYTES # BLD: 0 10E9/L (ref 0–0.4)
IMM GRANULOCYTES NFR BLD: 0.3 %
INR PPP: 1.08 (ref 0.86–1.14)
KETONES UR STRIP-MCNC: NEGATIVE MG/DL
LACTATE BLD-SCNC: 2.4 MMOL/L (ref 0.7–2.1)
LEUKOCYTE ESTERASE UR QL STRIP: ABNORMAL
LYMPHOCYTES # BLD AUTO: 2.9 10E9/L (ref 0.8–5.3)
LYMPHOCYTES NFR BLD AUTO: 31.1 %
MAGNESIUM SERPL-MCNC: 1.8 MG/DL (ref 1.6–2.3)
MCH RBC QN AUTO: 32.6 PG (ref 26.5–33)
MCHC RBC AUTO-ENTMCNC: 34.3 G/DL (ref 31.5–36.5)
MCV RBC AUTO: 95 FL (ref 78–100)
MONOCYTES # BLD AUTO: 1 10E9/L (ref 0–1.3)
MONOCYTES NFR BLD AUTO: 10.7 %
MUCOUS THREADS #/AREA URNS LPF: PRESENT /LPF
NEUTROPHILS # BLD AUTO: 4.9 10E9/L (ref 1.6–8.3)
NEUTROPHILS NFR BLD AUTO: 53.5 %
NITRATE UR QL: NEGATIVE
NRBC # BLD AUTO: 0 10*3/UL
NRBC BLD AUTO-RTO: 0 /100
PCO2 BLDV: 48 MM HG (ref 40–50)
PH BLDV: 7.34 PH (ref 7.32–7.43)
PH UR STRIP: 5 PH (ref 5–7)
PLATELET # BLD AUTO: 223 10E9/L (ref 150–450)
PO2 BLDV: 24 MM HG (ref 25–47)
POTASSIUM BLD-SCNC: 5 MMOL/L (ref 3.4–5.3)
POTASSIUM SERPL-SCNC: 5 MMOL/L (ref 3.4–5.3)
PROT SERPL-MCNC: 7.2 G/DL (ref 6.8–8.8)
RBC # BLD AUTO: 4.6 10E12/L (ref 4.4–5.9)
RBC #/AREA URNS AUTO: <1 /HPF (ref 0–2)
SAO2 % BLDV FROM PO2: 39 %
SODIUM BLD-SCNC: 138 MMOL/L (ref 133–144)
SODIUM SERPL-SCNC: 138 MMOL/L (ref 133–144)
SOURCE: ABNORMAL
SP GR UR STRIP: 1.01 (ref 1–1.03)
TROPONIN I SERPL-MCNC: <0.015 UG/L (ref 0–0.04)
UROBILINOGEN UR STRIP-MCNC: NORMAL MG/DL (ref 0–2)
WBC # BLD AUTO: 9.2 10E9/L (ref 4–11)
WBC #/AREA URNS AUTO: 27 /HPF (ref 0–5)
WBC CLUMPS #/AREA URNS HPF: PRESENT /HPF

## 2019-12-03 PROCEDURE — 87086 URINE CULTURE/COLONY COUNT: CPT | Performed by: EMERGENCY MEDICINE

## 2019-12-03 PROCEDURE — 93005 ELECTROCARDIOGRAM TRACING: CPT | Mod: 76

## 2019-12-03 PROCEDURE — 82803 BLOOD GASES ANY COMBINATION: CPT

## 2019-12-03 PROCEDURE — 25800030 ZZH RX IP 258 OP 636: Performed by: INTERNAL MEDICINE

## 2019-12-03 PROCEDURE — 96374 THER/PROPH/DIAG INJ IV PUSH: CPT | Mod: 59

## 2019-12-03 PROCEDURE — A9585 GADOBUTROL INJECTION: HCPCS | Performed by: EMERGENCY MEDICINE

## 2019-12-03 PROCEDURE — 93005 ELECTROCARDIOGRAM TRACING: CPT

## 2019-12-03 PROCEDURE — 12000000 ZZH R&B MED SURG/OB

## 2019-12-03 PROCEDURE — 25800030 ZZH RX IP 258 OP 636: Performed by: EMERGENCY MEDICINE

## 2019-12-03 PROCEDURE — 80047 BASIC METABLC PNL IONIZED CA: CPT

## 2019-12-03 PROCEDURE — 85025 COMPLETE CBC W/AUTO DIFF WBC: CPT | Performed by: EMERGENCY MEDICINE

## 2019-12-03 PROCEDURE — 80329 ANALGESICS NON-OPIOID 1 OR 2: CPT | Performed by: EMERGENCY MEDICINE

## 2019-12-03 PROCEDURE — 00000146 ZZHCL STATISTIC GLUCOSE BY METER IP

## 2019-12-03 PROCEDURE — 71275 CT ANGIOGRAPHY CHEST: CPT

## 2019-12-03 PROCEDURE — 85610 PROTHROMBIN TIME: CPT | Performed by: EMERGENCY MEDICINE

## 2019-12-03 PROCEDURE — 70450 CT HEAD/BRAIN W/O DYE: CPT

## 2019-12-03 PROCEDURE — 71045 X-RAY EXAM CHEST 1 VIEW: CPT

## 2019-12-03 PROCEDURE — 80053 COMPREHEN METABOLIC PANEL: CPT | Performed by: EMERGENCY MEDICINE

## 2019-12-03 PROCEDURE — 83605 ASSAY OF LACTIC ACID: CPT

## 2019-12-03 PROCEDURE — 25000132 ZZH RX MED GY IP 250 OP 250 PS 637: Performed by: EMERGENCY MEDICINE

## 2019-12-03 PROCEDURE — 25000131 ZZH RX MED GY IP 250 OP 636 PS 637: Performed by: INTERNAL MEDICINE

## 2019-12-03 PROCEDURE — 70553 MRI BRAIN STEM W/O & W/DYE: CPT

## 2019-12-03 PROCEDURE — 83735 ASSAY OF MAGNESIUM: CPT | Performed by: EMERGENCY MEDICINE

## 2019-12-03 PROCEDURE — 87088 URINE BACTERIA CULTURE: CPT | Performed by: EMERGENCY MEDICINE

## 2019-12-03 PROCEDURE — 85014 HEMATOCRIT: CPT

## 2019-12-03 PROCEDURE — 80320 DRUG SCREEN QUANTALCOHOLS: CPT | Performed by: EMERGENCY MEDICINE

## 2019-12-03 PROCEDURE — 84484 ASSAY OF TROPONIN QUANT: CPT | Mod: 91 | Performed by: INTERNAL MEDICINE

## 2019-12-03 PROCEDURE — 36415 COLL VENOUS BLD VENIPUNCTURE: CPT | Performed by: INTERNAL MEDICINE

## 2019-12-03 PROCEDURE — 84484 ASSAY OF TROPONIN QUANT: CPT | Performed by: EMERGENCY MEDICINE

## 2019-12-03 PROCEDURE — 99223 1ST HOSP IP/OBS HIGH 75: CPT | Mod: AI | Performed by: INTERNAL MEDICINE

## 2019-12-03 PROCEDURE — 25500064 ZZH RX 255 OP 636: Performed by: EMERGENCY MEDICINE

## 2019-12-03 PROCEDURE — 25000125 ZZHC RX 250: Performed by: EMERGENCY MEDICINE

## 2019-12-03 PROCEDURE — 81001 URINALYSIS AUTO W/SCOPE: CPT | Mod: XU | Performed by: EMERGENCY MEDICINE

## 2019-12-03 PROCEDURE — 99285 EMERGENCY DEPT VISIT HI MDM: CPT | Mod: 25

## 2019-12-03 PROCEDURE — 25000128 H RX IP 250 OP 636: Performed by: EMERGENCY MEDICINE

## 2019-12-03 PROCEDURE — 96375 TX/PRO/DX INJ NEW DRUG ADDON: CPT | Mod: 59

## 2019-12-03 PROCEDURE — 74177 CT ABD & PELVIS W/CONTRAST: CPT

## 2019-12-03 PROCEDURE — 87186 SC STD MICRODIL/AGAR DIL: CPT | Performed by: EMERGENCY MEDICINE

## 2019-12-03 RX ORDER — CARVEDILOL 25 MG/1
25 TABLET ORAL 2 TIMES DAILY WITH MEALS
Status: DISCONTINUED | OUTPATIENT
Start: 2019-12-03 | End: 2019-12-04 | Stop reason: HOSPADM

## 2019-12-03 RX ORDER — OLANZAPINE 2.5 MG/1
2.5 TABLET, FILM COATED ORAL 2 TIMES DAILY WITH MEALS
Status: DISCONTINUED | OUTPATIENT
Start: 2019-12-04 | End: 2019-12-04 | Stop reason: HOSPADM

## 2019-12-03 RX ORDER — AMOXICILLIN 250 MG
2 CAPSULE ORAL 2 TIMES DAILY PRN
Status: DISCONTINUED | OUTPATIENT
Start: 2019-12-03 | End: 2019-12-04 | Stop reason: HOSPADM

## 2019-12-03 RX ORDER — IOPAMIDOL 755 MG/ML
500 INJECTION, SOLUTION INTRAVASCULAR ONCE
Status: COMPLETED | OUTPATIENT
Start: 2019-12-03 | End: 2019-12-03

## 2019-12-03 RX ORDER — ACETAMINOPHEN 325 MG/1
650 TABLET ORAL EVERY 4 HOURS PRN
Status: DISCONTINUED | OUTPATIENT
Start: 2019-12-03 | End: 2019-12-04 | Stop reason: HOSPADM

## 2019-12-03 RX ORDER — ONDANSETRON 2 MG/ML
4 INJECTION INTRAMUSCULAR; INTRAVENOUS EVERY 6 HOURS PRN
Status: DISCONTINUED | OUTPATIENT
Start: 2019-12-03 | End: 2019-12-04 | Stop reason: HOSPADM

## 2019-12-03 RX ORDER — FINASTERIDE 5 MG/1
5 TABLET, FILM COATED ORAL DAILY
Status: ON HOLD | COMMUNITY
End: 2024-02-04

## 2019-12-03 RX ORDER — ONDANSETRON 4 MG/1
4 TABLET, ORALLY DISINTEGRATING ORAL EVERY 6 HOURS PRN
Status: DISCONTINUED | OUTPATIENT
Start: 2019-12-03 | End: 2019-12-04 | Stop reason: HOSPADM

## 2019-12-03 RX ORDER — CEFTRIAXONE 1 G/1
1 INJECTION, POWDER, FOR SOLUTION INTRAMUSCULAR; INTRAVENOUS EVERY 24 HOURS
Status: DISCONTINUED | OUTPATIENT
Start: 2019-12-04 | End: 2019-12-04 | Stop reason: HOSPADM

## 2019-12-03 RX ORDER — AMOXICILLIN 250 MG
1 CAPSULE ORAL 2 TIMES DAILY PRN
Status: DISCONTINUED | OUTPATIENT
Start: 2019-12-03 | End: 2019-12-04 | Stop reason: HOSPADM

## 2019-12-03 RX ORDER — NALOXONE HYDROCHLORIDE 1 MG/ML
1 INJECTION INTRAMUSCULAR; INTRAVENOUS; SUBCUTANEOUS ONCE
Status: COMPLETED | OUTPATIENT
Start: 2019-12-03 | End: 2019-12-03

## 2019-12-03 RX ORDER — TAMSULOSIN HYDROCHLORIDE 0.4 MG/1
0.4 CAPSULE ORAL DAILY
Status: ON HOLD | COMMUNITY
End: 2023-06-05

## 2019-12-03 RX ORDER — ASPIRIN 81 MG/1
81 TABLET ORAL DAILY
Status: DISCONTINUED | OUTPATIENT
Start: 2019-12-04 | End: 2019-12-04 | Stop reason: HOSPADM

## 2019-12-03 RX ORDER — LOSARTAN POTASSIUM 50 MG/1
50 TABLET ORAL DAILY
Status: DISCONTINUED | OUTPATIENT
Start: 2019-12-04 | End: 2019-12-04 | Stop reason: HOSPADM

## 2019-12-03 RX ORDER — GADOBUTROL 604.72 MG/ML
7.5 INJECTION INTRAVENOUS ONCE
Status: COMPLETED | OUTPATIENT
Start: 2019-12-03 | End: 2019-12-03

## 2019-12-03 RX ORDER — LIDOCAINE 40 MG/G
CREAM TOPICAL
Status: DISCONTINUED | OUTPATIENT
Start: 2019-12-03 | End: 2019-12-04 | Stop reason: HOSPADM

## 2019-12-03 RX ORDER — POTASSIUM CHLORIDE 7.45 MG/ML
10 INJECTION INTRAVENOUS
Status: DISCONTINUED | OUTPATIENT
Start: 2019-12-03 | End: 2019-12-04 | Stop reason: HOSPADM

## 2019-12-03 RX ORDER — POTASSIUM CHLORIDE 1500 MG/1
20-40 TABLET, EXTENDED RELEASE ORAL
Status: DISCONTINUED | OUTPATIENT
Start: 2019-12-03 | End: 2019-12-04 | Stop reason: HOSPADM

## 2019-12-03 RX ORDER — SODIUM CHLORIDE 9 MG/ML
INJECTION, SOLUTION INTRAVENOUS CONTINUOUS
Status: ACTIVE | OUTPATIENT
Start: 2019-12-03 | End: 2019-12-04

## 2019-12-03 RX ORDER — POTASSIUM CL/LIDO/0.9 % NACL 10MEQ/0.1L
10 INTRAVENOUS SOLUTION, PIGGYBACK (ML) INTRAVENOUS
Status: DISCONTINUED | OUTPATIENT
Start: 2019-12-03 | End: 2019-12-04 | Stop reason: HOSPADM

## 2019-12-03 RX ORDER — POTASSIUM CHLORIDE 1.5 G/1.58G
20-40 POWDER, FOR SOLUTION ORAL
Status: DISCONTINUED | OUTPATIENT
Start: 2019-12-03 | End: 2019-12-04 | Stop reason: HOSPADM

## 2019-12-03 RX ORDER — MAGNESIUM SULFATE HEPTAHYDRATE 40 MG/ML
4 INJECTION, SOLUTION INTRAVENOUS EVERY 4 HOURS PRN
Status: DISCONTINUED | OUTPATIENT
Start: 2019-12-03 | End: 2019-12-04 | Stop reason: HOSPADM

## 2019-12-03 RX ORDER — NICOTINE POLACRILEX 4 MG
15-30 LOZENGE BUCCAL
Status: DISCONTINUED | OUTPATIENT
Start: 2019-12-03 | End: 2019-12-04 | Stop reason: HOSPADM

## 2019-12-03 RX ORDER — DEXTROSE MONOHYDRATE 25 G/50ML
25-50 INJECTION, SOLUTION INTRAVENOUS
Status: DISCONTINUED | OUTPATIENT
Start: 2019-12-03 | End: 2019-12-04 | Stop reason: HOSPADM

## 2019-12-03 RX ORDER — CEFTRIAXONE 1 G/1
1 INJECTION, POWDER, FOR SOLUTION INTRAMUSCULAR; INTRAVENOUS ONCE
Status: COMPLETED | OUTPATIENT
Start: 2019-12-03 | End: 2019-12-03

## 2019-12-03 RX ORDER — MIRTAZAPINE 15 MG/1
15 TABLET, FILM COATED ORAL AT BEDTIME
Status: ON HOLD | COMMUNITY
End: 2023-09-18

## 2019-12-03 RX ORDER — GABAPENTIN 300 MG/1
300 CAPSULE ORAL 2 TIMES DAILY
Status: ON HOLD | COMMUNITY
End: 2023-09-18

## 2019-12-03 RX ORDER — POTASSIUM CHLORIDE 29.8 MG/ML
20 INJECTION INTRAVENOUS
Status: DISCONTINUED | OUTPATIENT
Start: 2019-12-03 | End: 2019-12-04 | Stop reason: HOSPADM

## 2019-12-03 RX ORDER — AMMONIA INHALANTS 0.04 G/.3ML
0.3 INHALANT RESPIRATORY (INHALATION) ONCE
Status: COMPLETED | OUTPATIENT
Start: 2019-12-03 | End: 2019-12-03

## 2019-12-03 RX ORDER — MIRTAZAPINE 15 MG/1
15 TABLET, FILM COATED ORAL AT BEDTIME
Status: DISCONTINUED | OUTPATIENT
Start: 2019-12-03 | End: 2019-12-04 | Stop reason: HOSPADM

## 2019-12-03 RX ORDER — QUETIAPINE FUMARATE 25 MG/1
25 TABLET, FILM COATED ORAL AT BEDTIME
Status: DISCONTINUED | OUTPATIENT
Start: 2019-12-03 | End: 2019-12-04 | Stop reason: HOSPADM

## 2019-12-03 RX ORDER — NALOXONE HYDROCHLORIDE 0.4 MG/ML
.1-.4 INJECTION, SOLUTION INTRAMUSCULAR; INTRAVENOUS; SUBCUTANEOUS
Status: DISCONTINUED | OUTPATIENT
Start: 2019-12-03 | End: 2019-12-04 | Stop reason: HOSPADM

## 2019-12-03 RX ADMIN — SODIUM CHLORIDE 90 ML: 9 INJECTION, SOLUTION INTRAVENOUS at 18:25

## 2019-12-03 RX ADMIN — SODIUM CHLORIDE: 9 INJECTION, SOLUTION INTRAVENOUS at 23:07

## 2019-12-03 RX ADMIN — SODIUM CHLORIDE, POTASSIUM CHLORIDE, SODIUM LACTATE AND CALCIUM CHLORIDE 1000 ML: 600; 310; 30; 20 INJECTION, SOLUTION INTRAVENOUS at 19:15

## 2019-12-03 RX ADMIN — INSULIN ASPART 2 UNITS: 100 INJECTION, SOLUTION INTRAVENOUS; SUBCUTANEOUS at 23:06

## 2019-12-03 RX ADMIN — CEFTRIAXONE SODIUM 1 G: 1 INJECTION, POWDER, FOR SOLUTION INTRAMUSCULAR; INTRAVENOUS at 20:58

## 2019-12-03 RX ADMIN — NALOXONE HYDROCHLORIDE 1 MG: 1 INJECTION PARENTERAL at 19:17

## 2019-12-03 RX ADMIN — GADOBUTROL 7.5 ML: 604.72 INJECTION INTRAVENOUS at 20:13

## 2019-12-03 RX ADMIN — AMMONIA INHALANTS 1 EACH: 0.04 INHALANT RESPIRATORY (INHALATION) at 18:05

## 2019-12-03 RX ADMIN — IOPAMIDOL 100 ML: 755 INJECTION, SOLUTION INTRAVENOUS at 18:25

## 2019-12-04 VITALS
BODY MASS INDEX: 29.5 KG/M2 | RESPIRATION RATE: 20 BRPM | SYSTOLIC BLOOD PRESSURE: 169 MMHG | OXYGEN SATURATION: 96 % | WEIGHT: 217.81 LBS | HEIGHT: 72 IN | TEMPERATURE: 97.8 F | DIASTOLIC BLOOD PRESSURE: 78 MMHG | HEART RATE: 69 BPM

## 2019-12-04 LAB
GLUCOSE BLDC GLUCOMTR-MCNC: 143 MG/DL (ref 70–99)
GLUCOSE BLDC GLUCOMTR-MCNC: 184 MG/DL (ref 70–99)
GLUCOSE SERPL-MCNC: 177 MG/DL (ref 70–99)
INTERPRETATION ECG - MUSE: NORMAL
TROPONIN I SERPL-MCNC: <0.015 UG/L (ref 0–0.04)
TROPONIN I SERPL-MCNC: <0.015 UG/L (ref 0–0.04)

## 2019-12-04 PROCEDURE — 84484 ASSAY OF TROPONIN QUANT: CPT | Mod: 91 | Performed by: INTERNAL MEDICINE

## 2019-12-04 PROCEDURE — G0378 HOSPITAL OBSERVATION PER HR: HCPCS

## 2019-12-04 PROCEDURE — 25000132 ZZH RX MED GY IP 250 OP 250 PS 637: Performed by: INTERNAL MEDICINE

## 2019-12-04 PROCEDURE — 00000146 ZZHCL STATISTIC GLUCOSE BY METER IP

## 2019-12-04 PROCEDURE — 96372 THER/PROPH/DIAG INJ SC/IM: CPT

## 2019-12-04 PROCEDURE — 36415 COLL VENOUS BLD VENIPUNCTURE: CPT | Performed by: INTERNAL MEDICINE

## 2019-12-04 PROCEDURE — 82947 ASSAY GLUCOSE BLOOD QUANT: CPT | Performed by: INTERNAL MEDICINE

## 2019-12-04 PROCEDURE — 40000893 ZZH STATISTIC PT IP EVAL DEFER: Performed by: PHYSICAL THERAPIST

## 2019-12-04 PROCEDURE — 99217 ZZC OBSERVATION CARE DISCHARGE: CPT | Performed by: INTERNAL MEDICINE

## 2019-12-04 RX ORDER — SULFAMETHOXAZOLE/TRIMETHOPRIM 800-160 MG
1 TABLET ORAL 2 TIMES DAILY
Qty: 14 TABLET | Refills: 0 | Status: SHIPPED | OUTPATIENT
Start: 2019-12-04 | End: 2019-12-11

## 2019-12-04 RX ADMIN — MIRTAZAPINE 15 MG: 15 TABLET, FILM COATED ORAL at 00:29

## 2019-12-04 RX ADMIN — ASPIRIN 81 MG: 81 TABLET, COATED ORAL at 09:38

## 2019-12-04 RX ADMIN — QUETIAPINE FUMARATE 25 MG: 25 TABLET ORAL at 00:29

## 2019-12-04 RX ADMIN — ACETAMINOPHEN 650 MG: 325 TABLET, FILM COATED ORAL at 01:42

## 2019-12-04 RX ADMIN — CARVEDILOL 25 MG: 25 TABLET, FILM COATED ORAL at 09:38

## 2019-12-04 RX ADMIN — LOSARTAN POTASSIUM 50 MG: 50 TABLET, FILM COATED ORAL at 09:41

## 2019-12-04 RX ADMIN — OLANZAPINE 2.5 MG: 2.5 TABLET, FILM COATED ORAL at 09:50

## 2019-12-04 RX ADMIN — INSULIN ASPART 1 UNITS: 100 INJECTION, SOLUTION INTRAVENOUS; SUBCUTANEOUS at 09:51

## 2019-12-04 RX ADMIN — METFORMIN HYDROCHLORIDE 500 MG: 500 TABLET, FILM COATED ORAL at 09:39

## 2019-12-04 RX ADMIN — INSULIN ASPART 1 UNITS: 100 INJECTION, SOLUTION INTRAVENOUS; SUBCUTANEOUS at 01:52

## 2019-12-04 RX ADMIN — CARVEDILOL 25 MG: 25 TABLET, FILM COATED ORAL at 00:29

## 2019-12-04 ASSESSMENT — ACTIVITIES OF DAILY LIVING (ADL)
ADLS_ACUITY_SCORE: 27

## 2019-12-04 ASSESSMENT — MIFFLIN-ST. JEOR: SCORE: 1710.51

## 2019-12-04 NOTE — DISCHARGE SUMMARY
[facility]  Discharge Summary  Name: Adam Huber    MRN: [id number]  YOB: 1935    Age: 84 year old  Date of Discharge:  12/4/2019  Date of Admission: 12/3/2019  Primary Care Provider: Oralia Forrest  Discharge Physician:  Segundo Lovett MD  Discharging Service:  Hospitalist      Hospital Course/Discharge Diagnoses:  Adam Huber is a 84 year old male with PMH including CAD with stents, Alzheimer's dementia, hypertension, CVA who presents with altered mental status as well as an episode of chest pain at home.  Currently he is chest pain-free and mental status is improving.  Extensive work-up in the ER suggested possible UTI and reassuringly negative troponin, negative CT head, negative MRI brain.  EKG showed what appears to be likely 1/2 to 1 mm of elevation in the inferior leads which resolved on subsequent check.  He has chest pain-free and serial troponins were negative.  He was started on ceftriaxone for suspected UTI.  Overnight he did have some issues with delirium similar to prior hospital stays.    The morning after admission Adam was calm and comfortable.  He denies any complaints at this time.  I met with his son at the bedside who feels his father is at baseline.  He is requesting that we discharge him home given recurrent issues with worsened delirium when in the hospital setting.  He feels his father is near the end of his life and notes his dementia has advanced significantly.  At this point he would prefer to transition to oral antibiotics get his father back home where he is more comfortable/familiar and less prone to delirium.  He notes that family will be able to keep a close eye on him and monitor for any recurrence of symptoms.  Given goals of care and the likelihood that the patient will have ongoing issues with delirium here in an unfamiliar hospital setting and that he is currently at his baseline per family I think it is reasonable to discharge him  home in their care.     Assessment and Plan:   1. Altered mental status:  Resolved to baseline.  Is prone to delirium at baseline in the setting of Alzheimer's.  Cause somewhat unclear, did have a chest pain episode which apparently is now resolved.  Troponins negative.  Urine analysis is abnormal so infection could be a cause of toxic/metabolic encephalopathy.  A relatively abrupt onset could suggest a seizure episode as well but no further activity was noted here in the hospital.  At this point would favor discharging home with family for close monitoring rather than adding any new medications aside from treatment for UTI.    --MRI negative for acute process as well as head CT  --His wife requested that we resume his usual home nighttime sedating medications including Seroquel, Zyprexa and Remeron.  She did bring these with to the ER and verify that he was taking them and has issues with agitation/delirium at night if he does not get them.  --Transition from ceftriaxone to empiric Bactrim for 7 days for UTI.     2.   Chest pain, now resolved: CT chest/abdomen/pelvis negative for acute pathology.  He does have known coronary disease and EKG upon presentation was abnormal though given normal troponin and resolution of chest pain would favor rechecking a troponin in the ER prior to admission and starting heparin if this is rising.  Certainly if he has a recurrent episode of chest pain he should be reevaluated.  Plan on medical management at this time as I do not think he would tolerate invasive procedures well due to dementia/intermittent delirium.  --Continue home cardiac medications as outlined below     3.   History of CAD with prior non-ST elevation MI:   --Continue home aspirin, blood pressure regimen including Coreg, losartan.     4.   Alzheimer's dementia:  Verified that his wife that he is chronically on Zyprexa, Seroquel, Remeron.  I note he did have issues with confusion and acute delirium during his last  stay here in January 2019 and again at night here in the hospital.  He was seen by psychiatry during his last stay.  Family reports that he often will not recognize even his son, is prone to delirium which is worse in the hospital and better at home.     5.   Hypertension: Resume home Coreg, losartan.     6.  History of CVA, TBI: Reportedly suffered strokes in 1999 and 2003 with some baseline mild residual right-sided weakness.  Speech reportedly slow at baseline.  No acute infarct noted.  Continue home aspirin.     7.  Type 2 diabetes:  Resume home oral medications upon discharge.     8.  BPH: Continue home meds.       Discharge Disposition:  Discharged to home     Allergies:  Allergies   Allergen Reactions     Lisinopril      Morphine Other (See Comments)     confusion        Discharge Medications:        Review of your medicines      START taking      Dose / Directions   sulfamethoxazole-trimethoprim 800-160 MG tablet  Commonly known as:  BACTRIM DS/SEPTRA DS  Used for:  Acute cystitis without hematuria      Dose:  1 tablet  Take 1 tablet by mouth 2 times daily for 7 days  Quantity:  14 tablet  Refills:  0        CONTINUE these medicines which have NOT CHANGED      Dose / Directions   acetaminophen 325 MG tablet  Commonly known as:  TYLENOL  Used for:  History of CVA (cerebrovascular accident)      Dose:  650 mg  Take 2 tablets (650 mg) by mouth every 4 hours as needed for mild pain  Quantity:  100 tablet  Refills:  0     aspirin 81 MG EC tablet  Commonly known as:  ASA  Used for:  NSTEMI (non-ST elevated myocardial infarction) (H)      Take one tablet daily  Quantity:  90 tablet  Refills:  0     carvedilol 25 MG tablet  Commonly known as:  COREG  Used for:  Essential hypertension      Dose:  25 mg  Take 1 tablet (25 mg) by mouth 2 times daily (with meals)  Quantity:  180 tablet  Refills:  3     finasteride 5 MG tablet  Commonly known as:  PROSCAR      Dose:  5 mg  Take 5 mg by mouth daily  Refills:  0     FLOMAX  0.4 MG capsule  Generic drug:  tamsulosin      Dose:  0.4 mg  Take 0.4 mg by mouth daily  Refills:  0     gabapentin 300 MG capsule  Commonly known as:  NEURONTIN      Dose:  300 mg  Take 300 mg by mouth 2 times daily  Refills:  0     losartan 50 MG tablet  Commonly known as:  COZAAR      Dose:  50 mg  Take 50 mg by mouth daily  Refills:  0     metFORMIN 500 MG tablet  Commonly known as:  GLUCOPHAGE      Dose:  500 mg  Take 500 mg by mouth 2 times daily (with meals)  Refills:  0     mirtazapine 15 MG tablet  Commonly known as:  REMERON      Dose:  15 mg  Take 15 mg by mouth At Bedtime  Refills:  0     nitroGLYcerin 0.4 MG sublingual tablet  Commonly known as:  NITROSTAT  Used for:  NSTEMI (non-ST elevated myocardial infarction) (H), Essential hypertension, Postsurgical percutaneous transluminal coronary angioplasty status      Dose:  0.4 mg  Place 1 tablet (0.4 mg) under the tongue every 5 minutes as needed for chest pain  Quantity:  25 tablet  Refills:  1     OLANZapine 2.5 MG tablet  Commonly known as:  ZYPREXA  Used for:  Acute delirium      Dose:  2.5 mg  Take 1 tablet (2.5 mg) by mouth 2 times daily 8AM AND 6PM  Quantity:  60 tablet  Refills:  0     olopatadine 0.2 % ophthalmic solution  Commonly known as:  PATADAY      Dose:  1 drop  Place 1 drop into both eyes daily as needed  Refills:  0     order for DME  Used for:  Acute delirium      Equipment being ordered: Hospital bed and bedside commode  Quantity:  1 Units  Refills:  0     polyethylene glycol powder  Commonly known as:  MIRALAX/GLYCOLAX      Dose:  1 capful  Take 1 capful by mouth daily as needed  Refills:  0     QUEtiapine 25 MG tablet  Commonly known as:  SEROquel  Used for:  Acute delirium      Dose:  25 mg  Take 1 tablet (25 mg) by mouth At Bedtime  Quantity:  30 tablet  Refills:  0     traMADol 50 MG tablet  Commonly known as:  ULTRAM  Used for:  T12 compression fracture (H)      Dose:  50 mg  Take 1 tablet (50 mg) by mouth every 4 hours as  needed for moderate pain  Quantity:  10 tablet  Refills:  0           Where to get your medicines      These medications were sent to [facility] [place], [place] - [address] [facility]  [id number] [place] [id number]    Phone:  [phone]     sulfamethoxazole-trimethoprim 800-160 MG tablet         Condition on Discharge:  Discharge condition: Stable       Code status on discharge: DNR     History of Illness:  See detailed admission note for full details.    Physical Exam:  Vital signs:  Temp: 97.8  F (36.6  C) Temp src: Temporal BP: 139/63(Laying) Pulse: 71 Heart Rate: 56 Resp: 20 SpO2: 96 % O2 Device: None (Room air)     Weight: 98.8 kg (217 lb 12.8 oz)  Estimated body mass index is 29.54 kg/m  as calculated from the following:    Height as of 11/8/18: 1.829 m (6').    Weight as of this encounter: 98.8 kg (217 lb 12.8 oz).    Wt Readings from Last 1 Encounters:   12/04/19 98.8 kg (217 lb 12.8 oz)     General: Alert, awake, no acute distress.  HEENT: NC/AT, eyes anicteric, external occular movements intact, face symmetric.  Dentition WNL, MM moist.  Cardiac: RRR, S1, S2.  No murmurs appreciated.  Pulmonary: Normal chest rise, normal work of breathing.  Lungs CTA BL  Abdomen: soft, non-tender, non-distended.  Bowel Sounds Present.  No guarding.  Extremities: no deformities.  Warm, well perfused.  Skin: no rashes or lesions noted.  Warm and Dry.  Neuro: No focal deficits noted.  Speech clear.  Coordination and strength grossly normal.  Psych: Appropriate affect.    Procedures other than Imaging:  None.     Imaging:  Results for orders placed or performed during the hospital encounter of 12/03/19   XR Chest Port 1 View    Narrative    EXAM: XR CHEST PORT 1 VW  LOCATION: [facility]  DATE/TIME: 12/3/2019 6:11 PM    INDICATION: Shortness of breath.  COMPARISON: None.      Impression    IMPRESSION: Prominence of the interstitial markings. Left lower lobe consolidation. Heart  size upper limits of normal. ASCVD aorta.   Head CT w/o contrast    Narrative    EXAM: CT HEAD W/O CONTRAST  LOCATION: [facility]  DATE/TIME: 12/3/2019 6:22 PM    INDICATION: Altered level of consciousness (LOC), unexplained.  COMPARISON: 01/09/2019.  TECHNIQUE: Routine without IV contrast. Multiplanar reformats. Dose reduction techniques were used.    FINDINGS:  INTRACRANIAL CONTENTS: No intracranial hemorrhage, extraaxial collection, or mass effect.  No CT evidence of acute infarct.     Continued extensive encephalomalacia most prominent in the frontal lobes anteriorly and the anterior aspect of the right temporal lobe with lesser changes in the anterior left temporal lobe consistent with old contusions.     Minimal diffuse white matter basal ganglia low-density in a pattern consistent with small vessel vascular disease and old lacunar-type infarcts. Normal ventricles and sulci.     VISUALIZED ORBITS/SINUSES/MASTOIDS: No intraorbital abnormality. No paranasal sinus mucosal disease. No middle ear or mastoid effusion.    BONES/SOFT TISSUES: No acute abnormality.      Impression    IMPRESSION:  1.  Extensive encephalomalacia from old contusions and minimal changes of small vessel vascular disease and old lacunar-type infarcts.   CT Chest (PE) Abdomen Pelvis w Contrast    Narrative    EXAM: CT CHEST PE ABDOMEN PELVIS W CONTRAST  LOCATION: [facility]  DATE/TIME: 12/3/2019 6:22 PM    INDICATION: Acute onset of chest pain, shortness of breath, abdominal pain and altered mental status.  COMPARISON: 01/09/2019.  TECHNIQUE: CT angiogram chest and routine CT abdomen pelvis with IV contrast. Arterial phase through the chest and venous phase through the abdomen and pelvis. 2D and 3D MIP reconstructions were preformed by the CT technologist. Dose reduction techniques   were used.   CONTRAST: 100mL Isovue-370.    FINDINGS:  ANGIOGRAM CHEST: Pulmonary arteries are normal caliber and negative for  pulmonary emboli. Thoracic aorta is negative for dissection. No CT evidence of right heart strain.     LUNGS AND PLEURA: Compressive atelectasis in the lung bases. No pleural accumulation.    MEDIASTINUM/AXILLAE: Dense coronary artery calcifications in the LAD and circumflex. No pericardial effusion. No mediastinal adenopathy.    HEPATOBILIARY: Cholecystectomy. No focal hepatic lesion.    PANCREAS: Normal.    SPLEEN: Normal.    ADRENAL GLANDS: Normal.    KIDNEYS/BLADDER: Right kidney is normal with no mass, stones, or hydronephrosis.   Left kidney is normal with no mass, stones, or hydronephrosis.  Bladder is normal.    BOWEL: Appendix is normal on image 167. Moderate amount of stool in the ascending colon. No inflammatory change demonstrated in the colon. The small bowel is of normal caliber without obstruction. Moderate fluid distended stomach.    LYMPH NODES: Normal.    PELVIC ORGANS: Moderate prostate gland enlargement.    OTHER: None.    MUSCULOSKELETAL: Normal.      Impression    IMPRESSION:  1.  Negative for pulmonary embolism.  2.  Compressive atelectasis in the lung bases.  3.  Coronary artery calcifications.  4.  Cholecystectomy.  5.  Normal appendix.       MR Brain w/o & w Contrast    Narrative    EXAM: MR BRAIN W/O and W CONTRAST  LOCATION: [facility]  DATE/TIME: 12/3/2019 8:13 PM    INDICATION: Altered mental status, history of CVA.  COMPARISON: Head CT from earlier in the evening  CONTRAST: 7 mL Gadavist  TECHNIQUE: Routine multiplanar multisequence head MRI without and with intravenous contrast.    FINDINGS:  INTRACRANIAL CONTENTS: No evidence for acute or subacute infarction based on diffusion-weighted imaging. There is encephalomalacia and gliosis in the anterior/inferior frontal lobes, right greater than left and anterior temporal lobes, right greater than   left, in a pattern most consistent with prior traumatic injury. Small amount of chronic hemosiderin deposition along the right  frontal and temporal lobes. Punctate chronic lacunar infarctions in the bilateral basal ganglia and corona radiata white   matter, with chronic hemosiderin deposition in the right basal ganglia. No mass, acute hemorrhage, or extra-axial fluid collections. Patchy nonspecific T2/FLAIR hyperintensities within the cerebral white matter most consistent with mild to moderate   chronic microvascular ischemic change. Mild generalized cerebral atrophy with ex vacuo dilatation of the frontal horn the right lateral ventricle. No hydrocephalus. Normal position of the cerebellar tonsils. No pathologic brain parenchymal or meningeal   contrast enhancement.    SELLA: No abnormality accounting for technique.    OSSEOUS STRUCTURES/SOFT TISSUES: Normal marrow signal. The major intracranial vascular flow voids are maintained.     ORBITS: No abnormality accounting for technique.     SINUSES/MASTOIDS: Mild mucosal thickening scattered about the paranasal sinuses greatest in the maxillary sinuses. Small right mastoid effusion.         Impression    IMPRESSION:  1.  No acute/subacute infarction, acute intracranial hemorrhage, or mass effect.  2.  Encephalomalacia and gliosis along the anterior/inferior frontal lobes and anterior temporal lobes, right greater than left, in a pattern most consistent with prior traumatic injury.  3.  Punctate chronic lacunar infarctions in the bilateral basal ganglia and corona radiata white matter.  4.  Mild global brain parenchymal volume loss with additional presumed sequelae of mild to moderate chronic small vessel ischemic disease.              Consultations:  No consultations were requested during this admission.       Recent Lab Results:  Recent Labs   Lab 12/03/19 1817 12/03/19 1810   WBC 9.2  --    HGB 15.0 15.0   HCT 43.7  --    MCV 95  --      --           Lab Results   Component Value Date     12/03/2019     12/03/2019     01/16/2019    Lab Results   Component Value  Date    CHLORIDE 106 12/03/2019    CHLORIDE 104 12/03/2019    CHLORIDE 105 01/16/2019    Lab Results   Component Value Date    BUN 27 12/03/2019    BUN 27 12/03/2019    BUN 33 01/16/2019      Lab Results   Component Value Date    POTASSIUM 5.0 12/03/2019    POTASSIUM 5.0 12/03/2019    POTASSIUM 4.2 01/16/2019    Lab Results   Component Value Date    CO2 25 12/03/2019    CO2 27 01/16/2019    CO2 27 01/12/2019    Lab Results   Component Value Date    CR 1.15 12/03/2019    CR 0.93 01/16/2019    CR 0.76 01/12/2019        Recent Labs   Lab 12/03/19  1909   CULT PENDING          Pending Results:    Unresulted Labs Ordered in the Past 30 Days of this Admission     Date and Time Order Name Status Description    12/3/2019 1931 Urine Culture Preliminary            Discharge Instructions and Follow-Up:   Discharge Procedure Orders   Reason for your hospital stay   Order Comments: Chest pain, confusion     Follow-up and recommended labs and tests    Order Comments: Follow up with primary care provider, Oralia Forrest, within 7 days for hospital follow- up.     Activity   Order Comments: Your activity upon discharge: activity as tolerated     Order Specific Question Answer Comments   Is discharge order? Yes      Diet   Order Comments: Follow this diet upon discharge:regular     Order Specific Question Answer Comments   Is discharge order? Yes        Total time spent in face to face contact with the patient and coordinating discharge was:  40 Minutes.

## 2019-12-04 NOTE — H&P
[facility]  Hospitalist Admission Note  Name: Adam Huber    MRN: [id number]  YOB: 1935    Age: 84 year old  Date of admission: 12/3/2019  Primary care provider: Oralia Forrest    Chief Complaint: Altered mental status, chest pain    Adam Huber is a 84 year old male with PMH including CAD with stents, Alzheimer's dementia, hypertension, CVA who presents with altered mental status as well as an episode of chest pain at home.  Currently he is chest pain-free and mental status is improving.  Extensive work-up in the ER suggested possible UTI and reassuringly negative troponin, negative CT head, negative MRI brain.  EKG showed what appears to be likely 1/2 to 1 mm of elevation in the inferior leads which resolved on subsequent check.  He is chest pain-free and given negative troponin no indication to activate the Cath Lab at this time though he is being admitted for close monitoring, further cardiac work-up and treatment of possible UTI.    Assessment and Plan:   1. Altered mental status: Improving.  Is prone to delirium at baseline in the setting of Alzheimer's.  Cause somewhat unclear, did have a chest pain episode which apparently is now resolved.  Troponins negative.  Urine analysis is abnormal so infection could be a cause of toxic/metabolic encephalopathy.  A relatively abrupt onset could suggest a seizure episode as well as will monitor closely and maintain seizure precautions.    --Admit inpatient status  --Neurochecks  --MRI negative for acute process as well as head CT  --His wife requests that we resume his usual home nighttime sedating medications including Seroquel, Zyprexa and Remeron.  She did bring these with to the ER and verify that he was taking them and has issues with agitation/delirium at night if he does not get them.  --Continue IV ceftriaxone for possible UTI, await culture results  --Consider EEG in the morning if not clearing.    2.   Chest pain, now  resolved: CT chest/abdomen/pelvis negative for acute pathology.  He does have known coronary disease and EKG upon presentation was abnormal though given normal troponin and resolution of chest pain would favor rechecking a troponin in the ER prior to admission and starting heparin if this is rising.  Certainly if he has a recurrent episode of chest pain he needs a stat EKG overnight.  --Serial troponins, in this case would start heparin if troponins rise  --Monitor on telemetry  --Stat EKG if chest pain returns  --Continue home cardiac medications as outlined below    3.   History of CAD with prior non-ST elevation MI:   --Continue home aspirin, blood pressure regimen including Coreg, losartan as tolerated.    4.   Alzheimer's dementia: Resume home meds once verified by pharmacy.  Previously has been on Zyprexa, Seroquel, Remeron.  I note he did have issues with confusion and acute delirium during his last stay here in January 2019.  He was seen by psychiatry.    5.   Hypertension: Resume home Coreg, losartan as tolerated.    6.  History of CVA, TBI: Reportedly suffered strokes in 1999 and 2003 with some baseline mild residual right-sided weakness.  Speech reportedly slow at baseline.  No acute infarct noted.  Continue home aspirin.    7.  Type 2 diabetes: Pending stability overnight we will treat with sliding scale insulin.  Eventually can likely resume home orals.    8.  BPH: Continue home meds, would monitor for retention.    DVT Prophylaxis: Pneumatic Compression Devices  Code Status: Full Code  Discharge Dispo: Admit inpatient status      History of Present Illness:  Adam Huber is a 84 year old male with PMH including CAD with stents, Alzheimer's dementia, hypertension, CVA who presents with altered mental status as well as an episode of chest pain at home.    His son reported in the ER while having dinner at around 4 PM pointing towards his chest and then seemed to develop confusion/decreased level of  consciousness.  They called EMS and upon arrival he was essentially unresponsive and only responsive to painful stimuli.  He was brought emergently to the ER and en route his blood pressure was stable and oxygen saturations were in the 90s.  Blood sugar was 276.  Family reported that he has been his usual self for the past few days until symptoms started this afternoon.    Here in the ER he underwent extensive work-up.  Lab work-up was notable for normal troponin, grossly normal basic labs and mild hyperglycemia.  Imaging was notable for noncontrast head CT showing extensive encephalomalacia and old lacunar type infarcts.  CT chest/abdomen/pelvis was negative for PE.  X-ray was concerning for left lower lobe consolidation but on CT scan this was noted to be compressive atelectasis.     Past Medical History:  Past Medical History:   Diagnosis Date     Acute chest pain 10/23/2015     CAD (coronary artery disease)      Essential hypertension 10/23/2015     History of CVA (cerebrovascular accident) 10/23/2015     HLD (hyperlipidemia)      HTN (hypertension)      NSTEMI (non-ST elevated myocardial infarction) (H) 10/23/2015     Post PTCA 10-    Successful PCI of culprit proximal to mid RCA with placement of a     Post PTCA 11-6-2015    pci of complex mid CFX-hector     Past Surgical History:  Past Surgical History:   Procedure Laterality Date     CHOLECYSTECTOMY       GENITOURINARY SURGERY      prostate removal      HEART CATH LEFT HEART CATH  10/12/2015    PCI w/ HECTOR to RCA     HEART CATH RIGHT AND LEFT HEART CATH  11/6/2015    PCI w/ HECTOR to mid-CFX     Social History:  Social History     Tobacco Use     Smoking status: Never Smoker     Smokeless tobacco: Never Used   Substance Use Topics     Alcohol use: No     Alcohol/week: 0.0 standard drinks     Social History     Social History Narrative     Not on file     Family History:  Family History   Problem Relation Age of Onset     Unknown/Adopted No family hx of       Allergies:  Allergies   Allergen Reactions     Lisinopril      Morphine Other (See Comments)     confusion     Medications:  No current facility-administered medications on file prior to encounter.   acetaminophen (TYLENOL) 325 MG tablet, Take 2 tablets (650 mg) by mouth every 4 hours as needed for mild pain  aspirin 81 MG EC tablet, Take one tablet daily  carvedilol (COREG) 25 MG tablet, Take 1 tablet (25 mg) by mouth 2 times daily (with meals)  gabapentin (NEURONTIN) 300 MG capsule, Take 1 capsule (300 mg) by mouth At Bedtime  glipiZIDE (GLUCOTROL XL) 5 MG 24 hr tablet, Take 1 tablet (5 mg) by mouth daily (with breakfast)  losartan (COZAAR) 50 MG tablet, Take 50 mg by mouth daily  metFORMIN (GLUCOPHAGE) 850 MG tablet, Take 1 tablet (850 mg) by mouth 2 times daily (with meals)  mirtazapine (REMERON SOL-TAB) 15 MG ODT, 1 tablet (15 mg) by Orally disintegrating tablet route At Bedtime  nitroglycerin (NITROSTAT) 0.4 MG SL tablet, Place 1 tablet (0.4 mg) under the tongue every 5 minutes as needed for chest pain  OLANZapine (ZYPREXA) 2.5 MG tablet, Take 1 tablet (2.5 mg) by mouth 2 times daily 8AM AND 6PM  olopatadine HCl (PATADAY) 0.2 % SOLN, Place 1 drop into both eyes daily as needed  order for DME, Equipment being ordered: Hospital bed and bedside commode  polyethylene glycol (MIRALAX/GLYCOLAX) powder, Take 1 capful by mouth daily as needed   QUEtiapine (SEROQUEL) 25 MG tablet, Take 1 tablet (25 mg) by mouth At Bedtime  traMADol (ULTRAM) 50 MG tablet, Take 1 tablet (50 mg) by mouth every 4 hours as needed for moderate pain        Review of Systems:  A Comprehensive greater than 10 system review of systems was carried out.  Pertinent positives and negatives are noted above.  Otherwise negative for contributory information.     Physical Exam:  Blood pressure (!) 140/83, pulse 71, temperature 98  F (36.7  C), temperature source Temporal, resp. rate 10, SpO2 98 %.  Wt Readings from Last 1 Encounters:   11/08/18 98  kg (216 lb)     Exam:  General: Alert, awake, no acute distress.  Pleasantly confused elderly man, nontoxic.  HEENT: NC/AT, eyes anicteric, external occular movements intact, face symmetric.  Dentition WNL, MM moist.  Cardiac: RRR, S1, S2.  No murmurs appreciated.  No tenderness on palpation of his precordium.  Pulmonary: Normal chest rise, normal work of breathing.  Lungs CTA BL  Abdomen: soft, non-tender, non-distended.  Bowel Sounds Present.  No guarding.  Extremities: no deformities.  Warm, well perfused.  Skin: no rashes or lesions noted.  Warm and Dry.  Neuro: No focal deficits noted.  Speech clear.  Coordination and strength grossly normal.  Psych: Appropriate affect.    Data:  EK.5-1 mm of ST elevation in leads II, III and AVF, resolved on subsequent EKG  Imaging:  Recent Results (from the past 48 hour(s))   XR Chest Port 1 View    Narrative    EXAM: XR CHEST PORT 1 VW  LOCATION: [facility]  DATE/TIME: 12/3/2019 6:11 PM    INDICATION: Shortness of breath.  COMPARISON: None.      Impression    IMPRESSION: Prominence of the interstitial markings. Left lower lobe consolidation. Heart size upper limits of normal. ASCVD aorta.   Head CT w/o contrast    Narrative    EXAM: CT HEAD W/O CONTRAST  LOCATION: [facility]  DATE/TIME: 12/3/2019 6:22 PM    INDICATION: Altered level of consciousness (LOC), unexplained.  COMPARISON: 2019.  TECHNIQUE: Routine without IV contrast. Multiplanar reformats. Dose reduction techniques were used.    FINDINGS:  INTRACRANIAL CONTENTS: No intracranial hemorrhage, extraaxial collection, or mass effect.  No CT evidence of acute infarct.     Continued extensive encephalomalacia most prominent in the frontal lobes anteriorly and the anterior aspect of the right temporal lobe with lesser changes in the anterior left temporal lobe consistent with old contusions.     Minimal diffuse white matter basal ganglia low-density in a pattern consistent with small  vessel vascular disease and old lacunar-type infarcts. Normal ventricles and sulci.     VISUALIZED ORBITS/SINUSES/MASTOIDS: No intraorbital abnormality. No paranasal sinus mucosal disease. No middle ear or mastoid effusion.    BONES/SOFT TISSUES: No acute abnormality.      Impression    IMPRESSION:  1.  Extensive encephalomalacia from old contusions and minimal changes of small vessel vascular disease and old lacunar-type infarcts.   CT Chest (PE) Abdomen Pelvis w Contrast    Narrative    EXAM: CT CHEST PE ABDOMEN PELVIS W CONTRAST  LOCATION: [facility]  DATE/TIME: 12/3/2019 6:22 PM    INDICATION: Acute onset of chest pain, shortness of breath, abdominal pain and altered mental status.  COMPARISON: 01/09/2019.  TECHNIQUE: CT angiogram chest and routine CT abdomen pelvis with IV contrast. Arterial phase through the chest and venous phase through the abdomen and pelvis. 2D and 3D MIP reconstructions were preformed by the CT technologist. Dose reduction techniques   were used.   CONTRAST: 100mL Isovue-370.    FINDINGS:  ANGIOGRAM CHEST: Pulmonary arteries are normal caliber and negative for pulmonary emboli. Thoracic aorta is negative for dissection. No CT evidence of right heart strain.     LUNGS AND PLEURA: Compressive atelectasis in the lung bases. No pleural accumulation.    MEDIASTINUM/AXILLAE: Dense coronary artery calcifications in the LAD and circumflex. No pericardial effusion. No mediastinal adenopathy.    HEPATOBILIARY: Cholecystectomy. No focal hepatic lesion.    PANCREAS: Normal.    SPLEEN: Normal.    ADRENAL GLANDS: Normal.    KIDNEYS/BLADDER: Right kidney is normal with no mass, stones, or hydronephrosis.   Left kidney is normal with no mass, stones, or hydronephrosis.  Bladder is normal.    BOWEL: Appendix is normal on image 167. Moderate amount of stool in the ascending colon. No inflammatory change demonstrated in the colon. The small bowel is of normal caliber without obstruction.  Moderate fluid distended stomach.    LYMPH NODES: Normal.    PELVIC ORGANS: Moderate prostate gland enlargement.    OTHER: None.    MUSCULOSKELETAL: Normal.      Impression    IMPRESSION:  1.  Negative for pulmonary embolism.  2.  Compressive atelectasis in the lung bases.  3.  Coronary artery calcifications.  4.  Cholecystectomy.  5.  Normal appendix.            Labs:  Recent Labs   Lab 12/03/19 1817 12/03/19  1810   WBC 9.2  --    HGB 15.0 15.0   HCT 43.7  --    MCV 95  --      --           Lab Results   Component Value Date     12/03/2019     12/03/2019     01/16/2019    Lab Results   Component Value Date    CHLORIDE 106 12/03/2019    CHLORIDE 104 12/03/2019    CHLORIDE 105 01/16/2019    Lab Results   Component Value Date    BUN 27 12/03/2019    BUN 27 12/03/2019    BUN 33 01/16/2019      Lab Results   Component Value Date    POTASSIUM 5.0 12/03/2019    POTASSIUM 5.0 12/03/2019    POTASSIUM 4.2 01/16/2019    Lab Results   Component Value Date    CO2 25 12/03/2019    CO2 27 01/16/2019    CO2 27 01/12/2019    Lab Results   Component Value Date    CR 1.15 12/03/2019    CR 0.93 01/16/2019    CR 0.76 01/12/2019        Recent Labs   Lab 12/03/19  1817   TROPI <0.015     Recent Labs   Lab 12/03/19  1909   COLOR Yellow   APPEARANCE Clear   URINEGLC 300*   URINEBILI Negative   URINEKETONE Negative   SG 1.015   UBLD Negative   URINEPH 5.0   PROTEIN 10*   NITRITE Negative   LEUKEST Small*   RBCU <1   WBCU 27*         Segundo Lovett MD  Hospitalist  [facility]

## 2019-12-04 NOTE — ED PROVIDER NOTES
History   Chief Complaint:  Altered Mental Status    HPI   History limited- patient is unresponsive. History provided by EMS and patient's son.     Adam Huber is a 84 year old male with a complex medical history notable for prior NSTEMI and Alzheimer's disease who presents for evaluation of altered mental status. The patient's son reports that while eating dinner at 1600 the patient suddenly pointed towards his chest after which he developed a decreased level of consciousness. The patient remained in the state, prompting family to contact EMS. EMS reports that upon arrival, the patient was unresponsive, responding only to pain. He felt febrile to them and had episodes of shallow breathing which resolved when they aroused him. En route, the patient's pressure was 109/54, his sugar was 276, and his sats were around 94 %. He was noted to have a MI one year ago. The patient's son notes that the patient has been normal the past few days and was normal today up until 1600 when his symptoms started. The patient does have a history of Alzheimer's dementia though his mental status is decreased from baseline. He does not use drugs.    Allergies:  Lisinopril  Morphine     Medications:    aspirin 81 MG EC tablet  carvedilol (COREG) 25 MG tablet  gabapentin (NEURONTIN) 300 MG capsule  glipiZIDE (GLUCOTROL XL) 5 MG 24 hr tablet  losartan (COZAAR) 50 MG tablet  metFORMIN (GLUCOPHAGE) 850 MG tablet  mirtazapine (REMERON SOL-TAB) 15 MG ODT  nitroglycerin (NITROSTAT) 0.4 MG SL tablet  OLANZapine (ZYPREXA) 2.5 MG tablet  polyethylene glycol (MIRALAX/GLYCOLAX) powder  QUEtiapine (SEROQUEL) 25 MG tablet  traMADol (ULTRAM) 50 MG tablet    Past Medical History:    CAD (coronary artery disease)   Essential hypertension  CVA (cerebrovascular accident)   HLD (hyperlipidemia)  HTN (hypertension)  NSTEMI (non-ST elevated myocardial infarction   Bleeding of blood vessel  Generalized weakness  UTI  T12 compression fracture  Type II  diabetes without complication  Late 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 behavioral disturbance  Rhabdomyolysis     Past Surgical History:    Cholecystectomy  Prostatectomy  Left heart cath (PCI with LEXI to RCA)  Right heart cath and left heart cath (PCI with LEXI to mid-CFX)    Family History:    History reviewed. No pertinent family history.     Social History:  Smoking status: Never smoker  Alcohol use: No  Drug use: No  Marital Status:   [2]     Review of Systems   Unable to perform ROS: Patient unresponsive       Physical Exam     Patient Vitals for the past 24 hrs:   BP Temp Temp src Pulse Heart Rate Resp SpO2   12/03/19 2000 -- -- -- -- 72 10 98 %   12/03/19 1945 (!) 140/83 -- -- 71 70 16 98 %   12/03/19 1930 (!) 141/81 -- -- 71 72 18 98 %   12/03/19 1915 138/74 -- -- 74 74 14 99 %   12/03/19 1900 (!) 142/74 -- -- 70 71 15 99 %   12/03/19 1845 132/66 -- -- 69 70 23 100 %   12/03/19 1815 135/65 -- -- -- 55 11 97 %   12/03/19 1814 127/65 98  F (36.7  C) Temporal 57 -- 15 98 %     Physical Exam  Gen: Lethargic, localizing to noxious stimuli on arrival but not participating in conversation  HEENT:  mmm, no rhinorrhea, 3 mm bilaterally no scleral icterus no nystagmus no gaze deviation  Neck: supple, no abnormal swelling  Lungs:  CTAB,  no resp distress  CV: rrr, no m/r/g, ppi  Abd: soft, nontender, nondistended, no rebound/masses/guarding/hsm  Ext: no peripheral edema, skeletal survey negative for obvious soft tissue injury or deformity and no bony tenderness palpation  Skin: warm, dry, well perfused, no rashes/bruising/lesions on exposed skin  Neuro: Initially lethargic and only localizing to noxious stimuli after he comes back from the CT scan he is more awake able to answer simple yes/no questions and follows simple commands without localizing motor or sensory deficits and cranial nerves grossly symmetric        Emergency Department Course   ECG (18:05:24):  Rate 57 bpm. [place] interval 160. QRS duration 94.  QT/QTc 452/439. P-R-T axes 31 76 92. Sinus bradycardia. Possible Inferior infarct, age undetermined is new compared to prior EKG obtained 01/09/2019. Interpreted at by Rene Moore MD.    ECG (18:07:14):  Rate 56 bpm. [place] interval 164. QRS duration 96. QT/QTc 454/438. P-R-T axes 24 77 94. Sinus bradycardia. Possible Inferior infarct, age undetermined. No significant changes compared to EKG obtained 12/03/2019. Interpreted at 1808 by Rene Moore MD.    ECG (18:39:53):  Rate 73 bpm. KS interval 172. QRS duration 96. QT/QTc 422/464. P-R-T axes 59 81 87. Sinus rhythm with PAC's. No significant changes compared to EKGs obtained 12/03/2019. Interpreted at 1840 by Rene Moore MD.    Imaging:  Radiographic findings were communicated with the patient who voiced understanding of the findings.    Head CT w/o Contrast:  Extensive encephalomalacia from old contusions and minimal changes of small vessel vascular disease and old lacunar-type infarcts.  As read by Radiology.    CT Chest (PE)/Abdomen/Pelvis w Contrast:  1.  Negative for pulmonary embolism.  2.  Compressive atelectasis in the lung bases.  3.  Coronary artery calcifications.  4.  Cholecystectomy.  5.  Normal appendix.  As read by Radiology.    XR Chest Port 1 View:  Prominence of the interstitial markings. Left lower lobe consolidation. Heart size upper limits of normal. ASCVD aorta.  As read by Radiology.    MR Brain w & w/o Contrast:  1. No acute/subacute infarction, acute intracranial hemorrhage, or mass effect.  2. Encephalomalacia and gliosis along the anterior/inferior frontal lobes and anterior temporal lobes, right greater than left, in a pattern most consistent with prior traumatic injury.  3. Punctate chronic lacunar infarctions in the bilateral basal ganglia and corona radiata white matter.  4. Mild global brain parenchymal volume loss with additional presumed sequelae of mild to moderate chronic small vessel ischemic  disease.  As read by Radiology.    Laboratory:  CBC: WNL (WBC 9.2, HGB 15.0, )  CMP: Glucose 215 (H), GFR 58 (L), Albumin 3.1 (L) o/w WNL (Creatinine 1.15)  INR: 1.08  Alcohol ethyl: <0.01  Acetaminophen Level: <2  UA with Microscopic: Glucose 300, Protein Albumin 10, Leukocyte Esterase- small, WBC 27 (H), WBC Clumps- present, Mucous- present, Hyaline Casts 8 (H) o/w negative    1810 ISTAT Basic Met ICa HCT: Glucose 218 (H), Creatinine 1.3 (H), GFR 53 (L) o/w WNL (HGB 15.0)   Glucose by Meter: 205 (H)  1811 ISTAT venous gases and lactate: pH 7.34, PCO2 48, PO2 24 (L), Bicarbonate 26, O2 sat 39, Lactic acid 2.4 (H)  1817 Troponin: <0.015  2050 Troponin: <0.015    Urine Culture: pending    Interventions:  1805 ammonia inhaler 1 each Inhalation  1915 LR 1L IV Bolus  1917 Narcan 1 mg IV  2013 Gadavist 7.5 mL IV  2058 Rocephin 1 g IV    Emergency Department Course:  1801 The patient presents to the ED via EMS. I performed an exam of the patient and obtained history, as documented above. Past medical records, nursing notes, and vitals reviewed.     1802 Patient placed on oxygen via nasal canula    1804 IV inserted and blood drawn. The patient was placed on continuous cardiac monitoring and pulse oximetry.    1805 Bedside ultrasound performed, results above.    1807 EKG obtained, results above.    1808 Narcan administered.    1839 EKG obtained, results above.    1902 I rechecked the patient. Patient's wife and grandchildren have now arrived in the ED. Grandchildren attemoted to translate for the patient. Explained findings to the patient's family.    The patient was sent for a head CT while in the emergency department, findings above. The patient provided a urine sample here in the emergency department. This was sent for laboratory testing, findings above. The patient was sent for a brain MRI while in the emergency department, findings above.    2034 I spoke to Dr. Lovett of the hospitalist service who  accepts the patient for admission.     Findings and plan explained to the patient who consents to admission. Discussed the patient with Dr. Lovett, who will admit the patient to a medical bed for further monitoring, evaluation, and treatment.    Impression & Plan      CMS Diagnoses: The Lactic acid level is elevated due to Dehydration, possible TIA versus atypical seizure, at this time there is no sign of severe sepsis or septic shock.    Medical Decision Makin-year-old gentleman presenting with multiple complaints found to have urinary tract infection.  Initial presenting symptoms were mental status change preceded by chest pain and shortness of breath.  Arrived as a critical patient via EMS and only alert to noxious stimuli initially.  This spontaneously cleared after he returned from imaging and no significant cerebrovascular etiology was found by CT or MR.  We also did a CT of the chest abdomen pelvis as well as serial EKGs and delta troponins all of which were negative for an acute occlusive coronary process PE or otherwise.  Plan at this point will be admission for monitoring of his mental status treatment of likely urinary tract infection.  Initial EKGs had similar 1/2 box elevation in the inferior leads and a T wave inversion in aVL.  This resolved on its own by the third EKG and showed no significant dynamic change and a fourth to meet STEMI criteria and then a 0 and 2-hour troponin were also negative.  He has had similar changes on reviewing previous EKGs.  Does not benefit from emergent revascularization.            Diagnosis:    ICD-10-CM    1. Urinary tract infection in male N39.0    2. Generalized muscle weakness M62.81    3. Episode of altered cognition R41.89        Disposition:  Admitted to medicine in the care of Dr. Slick Alvarez  12/3/2019   [facility] EMERGENCY DEPARTMENT  I, Enrique Alvarez, am serving as a scribe at 6:04 PM on 12/3/2019 to document  services personally performed by Rene Moore MD based on my observations and the provider's statements to me.        Rene Moore MD  12/03/19 [id number]

## 2019-12-04 NOTE — ED NOTES
Patient is now responding to voice and able to follow commands. Is able to point to chest when asked where he is having pain. ABCs intact.

## 2019-12-04 NOTE — PLAN OF CARE
Pt arrived to floor @ 2145. Ambulated into room with walker and A2 for safety. Faroese speaking.  phone in room and  scheduled for AM. VSS. NPO except meds. IVF @ 100 ml/hr. BG checks q4h, covered with sliding scale.  Denies chest pain at this time. Will be on tele monitoring. Seizure pads in place. Will continue to monitor.

## 2019-12-04 NOTE — ED NOTES
Bed: ED02  Expected date: 12/3/19  Expected time: 5:59 PM  Means of arrival: Ambulance  Comments:  BV- red pt

## 2019-12-04 NOTE — PLAN OF CARE
Pt confused and pulling off tele leads, ID band, pulled out IV, removing gown. Pulled seizure pads off bed.  phone used numerous times. Per  pt thinks he is in [place]. Attempts made to orient and explain that he needs to leave the IV and telemetry on. No-no  placed over IV site and pt left it alone. Pt c/o right back pain- Tylenol given and pt  did sleep for a while. Incontinent large amount urine. Assisted to bathroom- A2/walker- requires fair amount of guidance with walker. Pt awoke at 0400 and refused to go back to bed. Sat in chair for approx 1/2 hour and then wanted to get up. Ambulated in halls extensively with A1/walker. Tolerated well. Calls placed to family- messages left.

## 2019-12-04 NOTE — ED NOTES
[facility]  ED Nurse Handoff Report    Adam Huber is a 84 year old male   ED Chief complaint: Altered Mental Status  . ED Diagnosis:   Final diagnoses:   None     Allergies:   Allergies   Allergen Reactions     Lisinopril      Morphine Other (See Comments)     confusion       Code Status: Full Code  Activity level - Baseline/Home:  Independent with walker. Activity Level - Current:   Assist X 2. Lift room needed: No. Bariatric: No   Needed: Yes   Isolation: No. Infection: Not Applicable.     Vital Signs:   Vitals:    12/03/19 1915 12/03/19 1930 12/03/19 1945 12/03/19 2000   BP: 138/74 (!) 141/81 (!) 140/83    Pulse: 74 71 71    Resp: 14 18 16 10   Temp:       TempSrc:       SpO2: 99% 98% 98% 98%       Cardiac Rhythm:  ,      Pain level:    Patient confused: Yes. Patient Falls Risk: Yes.   Elimination Status: Has voided   Patient Report - Initial Complaint: altered mental status. Focused Assessment:Patient comes in via EMS for evaluation of change in mental status. Per report, patient was with son and clutched his heart, started having a decrease in level of consciousness and having trouble breathing. Upon arrival patient is responsive to painful stimuli and is maintaining own airway. ABCs intact. After return from CT patient was alert, answering questions and able to follow commands.   Tests Performed: labs, ct, mri. Abnormal Results:   Labs Ordered and Resulted from Time of ED Arrival Up to the Time of Departure from the ED   COMPREHENSIVE METABOLIC PANEL - Abnormal; Notable for the following components:       Result Value    Glucose 215 (*)     GFR Estimate 58 (*)     Albumin 3.1 (*)     All other components within normal limits   ROUTINE UA WITH MICROSCOPIC - Abnormal; Notable for the following components:    Glucose Urine 300 (*)     Protein Albumin Urine 10 (*)     Leukocyte Esterase Urine Small (*)     WBC Urine 27 (*)     WBC Clumps Present (*)     Mucous Urine Present (*)      Hyaline Casts 8 (*)     All other components within normal limits   GLUCOSE BY METER - Abnormal; Notable for the following components:    Glucose 205 (*)     All other components within normal limits   ISTAT BASIC MET ICA HCT POCT - Abnormal; Notable for the following components:    Glucose 218 (*)     Creatinine 1.3 (*)     GFR Estimate 53 (*)     All other components within normal limits   ISTAT  GASES LACTATE FLAVIA POCT - Abnormal; Notable for the following components:    PO2 Venous 24 (*)     Lactic Acid 2.4 (*)     All other components within normal limits   CBC WITH PLATELETS DIFFERENTIAL   TROPONIN I   INR   ACETAMINOPHEN LEVEL   ALCOHOL ETHYL   TROPONIN I   NURSING DRAW AND HOLD   URINE CULTURE AEROBIC BACTERIAL     MR Brain w/o & w Contrast   Final Result   IMPRESSION:   1.  No acute/subacute infarction, acute intracranial hemorrhage, or mass effect.   2.  Encephalomalacia and gliosis along the anterior/inferior frontal lobes and anterior temporal lobes, right greater than left, in a pattern most consistent with prior traumatic injury.   3.  Punctate chronic lacunar infarctions in the bilateral basal ganglia and corona radiata white matter.   4.  Mild global brain parenchymal volume loss with additional presumed sequelae of mild to moderate chronic small vessel ischemic disease.            CT Chest (PE) Abdomen Pelvis w Contrast   Final Result   IMPRESSION:   1.  Negative for pulmonary embolism.   2.  Compressive atelectasis in the lung bases.   3.  Coronary artery calcifications.   4.  Cholecystectomy.   5.  Normal appendix.            Head CT w/o contrast   Final Result   IMPRESSION:   1.  Extensive encephalomalacia from old contusions and minimal changes of small vessel vascular disease and old lacunar-type infarcts.      XR Chest Port 1 View   Final Result   IMPRESSION: Prominence of the interstitial markings. Left lower lobe consolidation. Heart size upper limits of normal. ASCVD aorta.        .    Treatments provided: meds per MAR  Family Comments: wife and grandsons at bedside  OBS brochure/video discussed/provided to patient:  N/A  ED Medications:   Medications   CT Scan Flush (90 mLs Intravenous Given 12/3/19 1825)   iopamidol (ISOVUE-370) solution 500 mL (100 mLs Intravenous Given 12/3/19 1825)   lactated ringers BOLUS 1,000 mL (1,000 mLs Intravenous New Bag 12/3/19 1915)   ammonia Inhaler 1 each (1 each Inhalation Given 12/3/19 1805)   naloxone (NARCAN) injection 1 mg (1 mg Intravenous Given 12/3/19 1917)   cefTRIAXone (ROCEPHIN) 1 g vial to attach to  mL bag for ADULTS or NS 50 mL bag for PEDS (1 g Intravenous New Bag 12/3/19 2058)   gadobutrol (GADAVIST) injection 7.5 mL (7.5 mLs Intravenous Given 12/3/19 2013)     Drips infusing:  No  For the majority of the shift, the patient's behavior Green. Interventions performed were standard.     Severe Sepsis OR Septic Shock Diagnosis Present: No      ED Nurse Name/Phone Number: Juliana CARTER RN,   9:25 PM    RECEIVING UNIT ED HANDOFF REVIEW    Above ED Nurse Handoff Report was reviewed: Yes  Reviewed by: Little Marrero RN on December 3, 2019 at 9:38 PM

## 2019-12-04 NOTE — ED NOTES
Patient comes in via EMS for evaluation of change in mental status. Per report, patient was with son and clutched his heart, started having a decrease in level of consciousness and having trouble breathing. Upon arrival patient is responsive to painful stimuli and is maintaining own airway. ABCs intact.

## 2019-12-04 NOTE — PLAN OF CARE
"Discharge Planner PT   Patient plan for discharge: patient has been discharged to home with family  Current status: PT: Order received; Per chart review, MD note reports \" I met with his son at the bedside who feels his father is at baseline.  He is requesting that we discharge him home. Nurse indicates patient was up all night and was mobilizing with nursing staff in hallways and up to bathroom. Chart indicates family able to provide cares needed. Patient has dementia; No PT needs identified at this time. Will complete order. Discussed with RN.  Barriers to return to prior living situation: none anticipated  Recommendations for discharge: Home with assist for all mobility and cares as prior  Rationale for recommendations: Patient has been discharged by MD as son felt patient is at his baseline  (per chart review) and family is able to manage cares at home. No current PT needs identified at this time.        Entered by: Kayleigh Solomon 12/04/2019 10:43 AM      "

## 2019-12-04 NOTE — ED NOTES
Assisted with patient triage (ambulance). Applied monitoring devices (EKG, BP, and pulse ox) onto Patient. Took blood glucose. Hooked Pt. Up to [employer] monitor to go to CT.

## 2019-12-05 LAB
BACTERIA SPEC CULT: ABNORMAL
Lab: ABNORMAL
SPECIMEN SOURCE: ABNORMAL

## 2021-08-05 ENCOUNTER — APPOINTMENT (OUTPATIENT)
Dept: GENERAL RADIOLOGY | Facility: CLINIC | Age: 86
End: 2021-08-05
Attending: EMERGENCY MEDICINE
Payer: COMMERCIAL

## 2021-08-05 ENCOUNTER — HOSPITAL ENCOUNTER (EMERGENCY)
Facility: CLINIC | Age: 86
Discharge: HOME | End: 2021-08-05
Attending: EMERGENCY MEDICINE | Admitting: EMERGENCY MEDICINE
Payer: COMMERCIAL

## 2021-08-05 VITALS
RESPIRATION RATE: 16 BRPM | DIASTOLIC BLOOD PRESSURE: 88 MMHG | SYSTOLIC BLOOD PRESSURE: 168 MMHG | OXYGEN SATURATION: 99 % | TEMPERATURE: 98.9 F | HEART RATE: 72 BPM

## 2021-08-05 DIAGNOSIS — J20.9 ACUTE BRONCHITIS, UNSPECIFIED ORGANISM: ICD-10-CM

## 2021-08-05 DIAGNOSIS — R73.9 HYPERGLYCEMIA: ICD-10-CM

## 2021-08-05 DIAGNOSIS — I10 ESSENTIAL HYPERTENSION: ICD-10-CM

## 2021-08-05 LAB
ANION GAP SERPL CALCULATED.3IONS-SCNC: 6 MMOL/L (ref 3–14)
BASOPHILS # BLD AUTO: 0.1 10E3/UL (ref 0–0.2)
BASOPHILS NFR BLD AUTO: 1 %
BUN SERPL-MCNC: 14 MG/DL (ref 7–30)
CALCIUM SERPL-MCNC: 9 MG/DL (ref 8.5–10.1)
CHLORIDE BLD-SCNC: 109 MMOL/L (ref 94–109)
CO2 SERPL-SCNC: 25 MMOL/L (ref 20–32)
CREAT SERPL-MCNC: 0.74 MG/DL (ref 0.66–1.25)
EOSINOPHIL # BLD AUTO: 0.2 10E3/UL (ref 0–0.7)
EOSINOPHIL NFR BLD AUTO: 3 %
ERYTHROCYTE [DISTWIDTH] IN BLOOD BY AUTOMATED COUNT: 12.8 % (ref 10–15)
GFR SERPL CREATININE-BSD FRML MDRD: 84 ML/MIN/1.73M2
GLUCOSE BLD-MCNC: 225 MG/DL (ref 70–99)
HCT VFR BLD AUTO: 49.4 % (ref 40–53)
HGB BLD-MCNC: 16.6 G/DL (ref 13.3–17.7)
IMM GRANULOCYTES # BLD: 0 10E3/UL
IMM GRANULOCYTES NFR BLD: 0 %
LYMPHOCYTES # BLD AUTO: 1.5 10E3/UL (ref 0.8–5.3)
LYMPHOCYTES NFR BLD AUTO: 24 %
MCH RBC QN AUTO: 32.4 PG (ref 26.5–33)
MCHC RBC AUTO-ENTMCNC: 33.6 G/DL (ref 31.5–36.5)
MCV RBC AUTO: 97 FL (ref 78–100)
MONOCYTES # BLD AUTO: 0.8 10E3/UL (ref 0–1.3)
MONOCYTES NFR BLD AUTO: 12 %
NEUTROPHILS # BLD AUTO: 3.8 10E3/UL (ref 1.6–8.3)
NEUTROPHILS NFR BLD AUTO: 60 %
NRBC # BLD AUTO: 0 10E3/UL
NRBC BLD AUTO-RTO: 0 /100
PLATELET # BLD AUTO: 138 10E3/UL (ref 150–450)
POTASSIUM BLD-SCNC: 4.8 MMOL/L (ref 3.4–5.3)
RBC # BLD AUTO: 5.12 10E6/UL (ref 4.4–5.9)
SARS-COV-2 RNA RESP QL NAA+PROBE: NEGATIVE
SODIUM SERPL-SCNC: 140 MMOL/L (ref 133–144)
WBC # BLD AUTO: 6.3 10E3/UL (ref 4–11)

## 2021-08-05 PROCEDURE — 80048 BASIC METABOLIC PNL TOTAL CA: CPT | Performed by: EMERGENCY MEDICINE

## 2021-08-05 PROCEDURE — 71046 X-RAY EXAM CHEST 2 VIEWS: CPT

## 2021-08-05 PROCEDURE — 87635 SARS-COV-2 COVID-19 AMP PRB: CPT | Performed by: EMERGENCY MEDICINE

## 2021-08-05 PROCEDURE — C9803 HOPD COVID-19 SPEC COLLECT: HCPCS

## 2021-08-05 PROCEDURE — 99284 EMERGENCY DEPT VISIT MOD MDM: CPT | Mod: 25

## 2021-08-05 PROCEDURE — 36415 COLL VENOUS BLD VENIPUNCTURE: CPT | Performed by: EMERGENCY MEDICINE

## 2021-08-05 PROCEDURE — 85025 COMPLETE CBC W/AUTO DIFF WBC: CPT | Performed by: EMERGENCY MEDICINE

## 2021-08-05 RX ORDER — BENZONATATE 200 MG/1
200 CAPSULE ORAL 3 TIMES DAILY PRN
Qty: 15 CAPSULE | Refills: 0 | Status: ON HOLD | OUTPATIENT
Start: 2021-08-05 | End: 2021-09-23

## 2021-08-05 ASSESSMENT — ENCOUNTER SYMPTOMS
SHORTNESS OF BREATH: 0
COUGH: 1
VOMITING: 0
FEVER: 0

## 2021-08-05 NOTE — ED PROVIDER NOTES
History     Chief Complaint:  Cough       HPI     Adma Huber is a 86 year old male who presents with cough.  Patient's family member is interpreting for the patient.  1 week ago the patient developed an intermittent nonproductive cough.  There is no associated shortness of breath, chest pain, fever, vomiting, diarrhea or body aches.  He has not been taking any medications for the cough.  He has no history of underlying lung disease including COPD or asthma.  There are no sick contacts.  Family member does not believe that he is vaccinated for COVID.  No other complaints or concerns at this time.    Allergies:  Lisinopril  Morphine    Medications:    Tylenol  ASA  Coreg  Proscar  Losartan   Metformin  Mirtazipine  Olanzapine  Olopatadine  Miralax  Quetiapine  Tamsulosin  Tramadol    Past Medical History:    CAD  HTN  HLD  Alzheimer's disease  CVA    Past Surgical History:    Cholecystectomy  Left heart cath    Family History:    Non-contributory    Social History:  Patient lives independently with his wife  PCP: Oralia Forrest     Review of Systems   Constitutional: Negative for fever.   Respiratory: Positive for cough. Negative for shortness of breath.    Cardiovascular: Negative for chest pain.   Gastrointestinal: Negative for vomiting.   Skin: Negative for rash.   All other systems reviewed and are negative.      Physical Exam     Patient Vitals for the past 24 hrs:   BP Temp Temp src Pulse Resp SpO2   08/05/21 1730 (!) 178/78 -- -- 76 16 97 %   08/05/21 1714 (!) 172/79 98.9  F (37.2  C) Oral 95 18 95 %        Physical Exam    Eyes:    Conjunctiva normal  Neck:    Supple, no meningismus.     CV:     Regular rate and rhythm.      No murmurs, rubs or gallops.       No unilateral leg swelling.       2+ radial pulses bilateral.       No lower extremity edema.  PULM:    Clear to auscultation bilateral.       No respiratory distress.      Good air exchange.     No rales or wheezing.     No  stridor.     Intermittent cough  ABD:    Soft, non-tender, non-distended.       No pulsatile masses.       No rebound, guarding or rigidity.  MSK:     No gross deformity to all four extremities.   LYMPH:   No cervical lymphadenopathy.  NEURO:   Alert. Good muscle tone, no atrophy.  Skin:    Warm, dry and intact.    Psych:    Mood is good and affect is appropriate.        Emergency Department Course       Imaging:  XR Chest 2 Views   Final Result   IMPRESSION: Negative chest.           Laboratory:    Labs Ordered and Resulted from Time of ED Arrival Up to the Time of Departure from the ED   BASIC METABOLIC PANEL - Abnormal; Notable for the following components:       Result Value    Glucose 225 (*)     All other components within normal limits   CBC WITH PLATELETS AND DIFFERENTIAL - Abnormal; Notable for the following components:    Platelet Count 138 (*)     All other components within normal limits   SARS-COV2 (COVID-19) VIRUS RT-PCR - Normal    Narrative:     Testing was performed using the [facility]rod  SARS-CoV-2 & Influenza A/B Assay on the [employer]  [place]  System.  This test should be ordered for the detection of SARS-COV-2 in individuals who meet SARS-CoV-2 clinical and/or epidemiological criteria. Test performance is unknown in asymptomatic patients.  This test is for in vitro diagnostic use under the FDA EUA for laboratories certified under CLIA to perform moderate and/or high complexity testing. This test has not been FDA cleared or approved.  A negative test does not rule out the presence of PCR inhibitors in the specimen or target RNA in concentration below the limit of detection for the assay. The possibility of a false negative should be considered if the patient's recent exposure or clinical presentation suggests COVID-19.  [facility] are certified under the Clinical Laboratory Improvement Amendments of 1988 (CLIA-88) as qualified to perform moderate and/or high complexity laboratory testing.    CBC WITH PLATELETS & DIFFERENTIAL    Narrative:     The following orders were created for panel order CBC + differential.  Procedure                               Abnormality         Status                     ---------                               -----------         ------                     CBC with platelets and d...[[id number]]  Abnormal            Final result                 Please view results for these tests on the individual orders.   COVID-19 VIRUS (CORONAVIRUS) BY PCR    Narrative:     The following orders were created for panel order Symptomatic COVID-19 Virus (Coronavirus) by PCR Nasopharyngeal.  Procedure                               Abnormality         Status                     ---------                               -----------         ------                     SARS-COV2 (COVID-19) Vir...[[id number]]  Normal              Final result                 Please view results for these tests on the individual orders.              Emergency Department Course:  Past medical records, nursing notes, and vitals reviewed.  I performed an exam of the patient and obtained history, as documented above.    I rechecked the patient. Findings and plan explained to the Patient and family member. Patient was discharged home.    Impression & Plan      Medical Decision Makin-year-old male presented to the ED with a 1 week history of cough but was otherwise asymptomatic.  No evidence of bronchospasm on exam.  Chest x-ray negative for pneumonia and pulmonary edema.  COVID swab negative.  Evaluation most consistent with viral bronchitis.  Supportive measures indicated.  Patient safe for discharge home with supportive measures and return to the ED for any worsening symptoms.    Diagnosis:    ICD-10-CM    1. Acute bronchitis, unspecified organism  J20.9    2. Hyperglycemia  R73.9    3. Essential hypertension  I10         Discharge Medications:  New Prescriptions    BENZONATATE (TESSALON) 200 MG CAPSULE    Take 1  capsule (200 mg) by mouth 3 times daily as needed for cough        8/5/2021   Slick Bailey MD Matthews, Jeremiah R, MD  08/05/21 0131

## 2021-08-05 NOTE — DISCHARGE INSTRUCTIONS
Your blood pressure and blood sugar were elevated in the ER. Please have this rechecked in the clinic to ensure improvement.    Discharge Instructions  Bronchitis, Pneumonia, Bronchospasm    You were seen today for a chest infection or inflammation. If your provider decided this was due to a bacterial infection, you may need an antibiotic. Sometimes these are caused by a virus, and then an antibiotic will not help.     Generally, every Emergency Department visit should have a follow-up clinic visit with either a primary or a specialty clinic/provider. Please follow-up as instructed by your emergency provider today.    Return to the Emergency Department if:  Your breathing gets much worse.  You are very weak, or feel much more ill.  You develop new symptoms, such as chest pain.  You cough up blood.  You are vomiting (throwing up) enough that you cannot keep fluids or your medicine down.    What can I do to help myself?  Fill any prescriptions the provider gave you and take them right away--especially antibiotics. Be sure to finish the whole antibiotic prescription.  You may be given a prescription for an inhaler, which can help loosen tight air passages.  Use this as needed, but not more often than directed. Inhalers work much better when used with a spacer.   You may be given a prescription for a steroid to reduce inflammation. Used long-term, these can have side effects, but for short-term use they are safe. You may notice restlessness or increased appetite.      You may use non-prescription cough or cold medicines. Cough medicines may help, but don t make the cough go away completely.   Avoid smoke, because this can make your symptoms worse. If you smoke, this may be a good time to quit! Consider using nicotine lozenges, gum, or patches to reduce cravings.   If you have a fever, Tylenol  (acetaminophen), Motrin  (ibuprofen), or Advil  (ibuprofen) may help bring fever down and may help you feel more comfortable. Be  sure to read and follow the package directions, and ask your provider if you have questions.  Be sure to get your flu shot each year.  For certain ages, the pneumonia shot can help prevent pneumonia.  If you were given a prescription for medicine here today, be sure to read all of the information (including the package insert) that comes with your prescription.  This will include important information about the medicine, its side effects, and any warnings that you need to know about.  The pharmacist who fills the prescription can provide more information and answer questions you may have about the medicine.  If you have questions or concerns that the pharmacist cannot address, please call or return to the Emergency Department.     Remember that you can always come back to the Emergency Department if you are not able to see your regular provider in the amount of time listed above, if you get any new symptoms, or if there is anything that worries you.

## 2021-09-22 ENCOUNTER — APPOINTMENT (OUTPATIENT)
Dept: CT IMAGING | Facility: CLINIC | Age: 86
DRG: 183 | End: 2021-09-22
Attending: EMERGENCY MEDICINE
Payer: COMMERCIAL

## 2021-09-22 ENCOUNTER — HOSPITAL ENCOUNTER (INPATIENT)
Facility: CLINIC | Age: 86
LOS: 2 days | Discharge: HOME HEALTH | DRG: 183 | End: 2021-09-24
Attending: EMERGENCY MEDICINE | Admitting: INTERNAL MEDICINE
Payer: COMMERCIAL

## 2021-09-22 DIAGNOSIS — R55 SYNCOPE, UNSPECIFIED SYNCOPE TYPE: ICD-10-CM

## 2021-09-22 DIAGNOSIS — J18.9 PNEUMONIA OF LEFT LOWER LOBE DUE TO INFECTIOUS ORGANISM: ICD-10-CM

## 2021-09-22 DIAGNOSIS — N40.0 PROSTATE ENLARGEMENT: ICD-10-CM

## 2021-09-22 DIAGNOSIS — R91.8 PULMONARY NODULES: ICD-10-CM

## 2021-09-22 DIAGNOSIS — F02.80 LATE ONSET ALZHEIMER'S DISEASE WITHOUT BEHAVIORAL DISTURBANCE (H): Primary | ICD-10-CM

## 2021-09-22 DIAGNOSIS — S22.42XA CLOSED FRACTURE OF MULTIPLE RIBS OF LEFT SIDE, INITIAL ENCOUNTER: ICD-10-CM

## 2021-09-22 DIAGNOSIS — G30.1 LATE ONSET ALZHEIMER'S DISEASE WITHOUT BEHAVIORAL DISTURBANCE (H): Primary | ICD-10-CM

## 2021-09-22 DIAGNOSIS — R53.81 PHYSICAL DECONDITIONING: ICD-10-CM

## 2021-09-22 DIAGNOSIS — S22.41XA CLOSED FRACTURE OF MULTIPLE RIBS OF RIGHT SIDE, INITIAL ENCOUNTER: ICD-10-CM

## 2021-09-22 DIAGNOSIS — R73.9 HYPERGLYCEMIA: ICD-10-CM

## 2021-09-22 LAB
ANION GAP SERPL CALCULATED.3IONS-SCNC: 8 MMOL/L (ref 3–14)
BASOPHILS # BLD AUTO: 0.1 10E3/UL (ref 0–0.2)
BASOPHILS NFR BLD AUTO: 1 %
BUN SERPL-MCNC: 17 MG/DL (ref 7–30)
CALCIUM SERPL-MCNC: 8.9 MG/DL (ref 8.5–10.1)
CHLORIDE BLD-SCNC: 107 MMOL/L (ref 94–109)
CO2 SERPL-SCNC: 24 MMOL/L (ref 20–32)
CREAT SERPL-MCNC: 0.71 MG/DL (ref 0.66–1.25)
EOSINOPHIL # BLD AUTO: 0.2 10E3/UL (ref 0–0.7)
EOSINOPHIL NFR BLD AUTO: 2 %
ERYTHROCYTE [DISTWIDTH] IN BLOOD BY AUTOMATED COUNT: 13.2 % (ref 10–15)
GFR SERPL CREATININE-BSD FRML MDRD: 85 ML/MIN/1.73M2
GLUCOSE BLD-MCNC: 416 MG/DL (ref 70–99)
GLUCOSE BLDC GLUCOMTR-MCNC: 410 MG/DL (ref 70–99)
HCT VFR BLD AUTO: 47.5 % (ref 40–53)
HGB BLD-MCNC: 16.3 G/DL (ref 13.3–17.7)
HOLD SPECIMEN: NORMAL
IMM GRANULOCYTES # BLD: 0 10E3/UL
IMM GRANULOCYTES NFR BLD: 0 %
LYMPHOCYTES # BLD AUTO: 1.4 10E3/UL (ref 0.8–5.3)
LYMPHOCYTES NFR BLD AUTO: 16 %
MCH RBC QN AUTO: 32.9 PG (ref 26.5–33)
MCHC RBC AUTO-ENTMCNC: 34.3 G/DL (ref 31.5–36.5)
MCV RBC AUTO: 96 FL (ref 78–100)
MONOCYTES # BLD AUTO: 0.9 10E3/UL (ref 0–1.3)
MONOCYTES NFR BLD AUTO: 10 %
NEUTROPHILS # BLD AUTO: 6.1 10E3/UL (ref 1.6–8.3)
NEUTROPHILS NFR BLD AUTO: 71 %
NRBC # BLD AUTO: 0 10E3/UL
NRBC BLD AUTO-RTO: 0 /100
PLATELET # BLD AUTO: 164 10E3/UL (ref 150–450)
POTASSIUM BLD-SCNC: 4.3 MMOL/L (ref 3.4–5.3)
RBC # BLD AUTO: 4.95 10E6/UL (ref 4.4–5.9)
SODIUM SERPL-SCNC: 139 MMOL/L (ref 133–144)
TROPONIN I SERPL-MCNC: <0.015 UG/L (ref 0–0.04)
WBC # BLD AUTO: 8.7 10E3/UL (ref 4–11)

## 2021-09-22 PROCEDURE — 84484 ASSAY OF TROPONIN QUANT: CPT | Performed by: EMERGENCY MEDICINE

## 2021-09-22 PROCEDURE — 71260 CT THORAX DX C+: CPT

## 2021-09-22 PROCEDURE — 120N000001 HC R&B MED SURG/OB

## 2021-09-22 PROCEDURE — 250N000011 HC RX IP 250 OP 636: Performed by: EMERGENCY MEDICINE

## 2021-09-22 PROCEDURE — 36415 COLL VENOUS BLD VENIPUNCTURE: CPT | Performed by: EMERGENCY MEDICINE

## 2021-09-22 PROCEDURE — 72125 CT NECK SPINE W/O DYE: CPT

## 2021-09-22 PROCEDURE — 96367 TX/PROPH/DG ADDL SEQ IV INF: CPT

## 2021-09-22 PROCEDURE — C9803 HOPD COVID-19 SPEC COLLECT: HCPCS

## 2021-09-22 PROCEDURE — 99285 EMERGENCY DEPT VISIT HI MDM: CPT | Mod: 25

## 2021-09-22 PROCEDURE — 80048 BASIC METABOLIC PNL TOTAL CA: CPT | Performed by: EMERGENCY MEDICINE

## 2021-09-22 PROCEDURE — 93005 ELECTROCARDIOGRAM TRACING: CPT

## 2021-09-22 PROCEDURE — 70450 CT HEAD/BRAIN W/O DYE: CPT

## 2021-09-22 PROCEDURE — 85025 COMPLETE CBC W/AUTO DIFF WBC: CPT | Performed by: EMERGENCY MEDICINE

## 2021-09-22 PROCEDURE — 96365 THER/PROPH/DIAG IV INF INIT: CPT | Mod: 59

## 2021-09-22 PROCEDURE — 250N000009 HC RX 250: Performed by: EMERGENCY MEDICINE

## 2021-09-22 PROCEDURE — 83036 HEMOGLOBIN GLYCOSYLATED A1C: CPT | Performed by: INTERNAL MEDICINE

## 2021-09-22 RX ORDER — CEFTRIAXONE 1 G/1
1 INJECTION, POWDER, FOR SOLUTION INTRAMUSCULAR; INTRAVENOUS ONCE
Status: COMPLETED | OUTPATIENT
Start: 2021-09-22 | End: 2021-09-23

## 2021-09-22 RX ORDER — IOPAMIDOL 755 MG/ML
500 INJECTION, SOLUTION INTRAVASCULAR ONCE
Status: COMPLETED | OUTPATIENT
Start: 2021-09-22 | End: 2021-09-22

## 2021-09-22 RX ORDER — LIDOCAINE 4 G/G
1 PATCH TOPICAL ONCE
Status: COMPLETED | OUTPATIENT
Start: 2021-09-22 | End: 2021-09-23

## 2021-09-22 RX ORDER — AZITHROMYCIN 500 MG/5ML
500 INJECTION, POWDER, LYOPHILIZED, FOR SOLUTION INTRAVENOUS ONCE
Status: COMPLETED | OUTPATIENT
Start: 2021-09-22 | End: 2021-09-23

## 2021-09-22 RX ORDER — ACETAMINOPHEN 325 MG/1
650 TABLET ORAL ONCE
Status: COMPLETED | OUTPATIENT
Start: 2021-09-22 | End: 2021-09-23

## 2021-09-22 RX ADMIN — CEFTRIAXONE 1 G: 1 INJECTION, POWDER, FOR SOLUTION INTRAMUSCULAR; INTRAVENOUS at 23:45

## 2021-09-22 RX ADMIN — SODIUM CHLORIDE 65 ML: 9 INJECTION, SOLUTION INTRAVENOUS at 21:58

## 2021-09-22 RX ADMIN — IOPAMIDOL 100 ML: 755 INJECTION, SOLUTION INTRAVENOUS at 21:58

## 2021-09-22 ASSESSMENT — ENCOUNTER SYMPTOMS
NUMBNESS: 0
ARTHRALGIAS: 1
WEAKNESS: 0
ABDOMINAL PAIN: 0

## 2021-09-23 ENCOUNTER — APPOINTMENT (OUTPATIENT)
Dept: CARDIOLOGY | Facility: CLINIC | Age: 86
DRG: 183 | End: 2021-09-23
Attending: INTERNAL MEDICINE
Payer: COMMERCIAL

## 2021-09-23 ENCOUNTER — APPOINTMENT (OUTPATIENT)
Dept: PHYSICAL THERAPY | Facility: CLINIC | Age: 86
DRG: 183 | End: 2021-09-23
Attending: INTERNAL MEDICINE
Payer: COMMERCIAL

## 2021-09-23 LAB
ALBUMIN UR-MCNC: 10 MG/DL
APPEARANCE UR: CLEAR
ATRIAL RATE - MUSE: 82 BPM
BILIRUB UR QL STRIP: NEGATIVE
COLOR UR AUTO: YELLOW
DIASTOLIC BLOOD PRESSURE - MUSE: NORMAL MMHG
GLUCOSE BLDC GLUCOMTR-MCNC: 159 MG/DL (ref 70–99)
GLUCOSE BLDC GLUCOMTR-MCNC: 221 MG/DL (ref 70–99)
GLUCOSE BLDC GLUCOMTR-MCNC: 241 MG/DL (ref 70–99)
GLUCOSE BLDC GLUCOMTR-MCNC: 264 MG/DL (ref 70–99)
GLUCOSE BLDC GLUCOMTR-MCNC: 323 MG/DL (ref 70–99)
GLUCOSE UR STRIP-MCNC: >=1000 MG/DL
HBA1C MFR BLD: 7.5 % (ref 0–5.6)
HGB UR QL STRIP: NEGATIVE
INTERPRETATION ECG - MUSE: NORMAL
KETONES UR STRIP-MCNC: NEGATIVE MG/DL
LEUKOCYTE ESTERASE UR QL STRIP: ABNORMAL
LVEF ECHO: NORMAL
MUCOUS THREADS #/AREA URNS LPF: PRESENT /LPF
NITRATE UR QL: NEGATIVE
P AXIS - MUSE: 41 DEGREES
PH UR STRIP: 5.5 [PH] (ref 5–7)
PR INTERVAL - MUSE: 162 MS
QRS DURATION - MUSE: 96 MS
QT - MUSE: 398 MS
QTC - MUSE: 464 MS
R AXIS - MUSE: 65 DEGREES
RBC URINE: 17 /HPF
SARS-COV-2 RNA RESP QL NAA+PROBE: NEGATIVE
SP GR UR STRIP: 1.03 (ref 1–1.03)
SQUAMOUS EPITHELIAL: <1 /HPF
SYSTOLIC BLOOD PRESSURE - MUSE: NORMAL MMHG
T AXIS - MUSE: 69 DEGREES
UROBILINOGEN UR STRIP-MCNC: NORMAL MG/DL
VENTRICULAR RATE- MUSE: 82 BPM
WBC URINE: 31 /HPF

## 2021-09-23 PROCEDURE — 250N000013 HC RX MED GY IP 250 OP 250 PS 637: Performed by: EMERGENCY MEDICINE

## 2021-09-23 PROCEDURE — 81001 URINALYSIS AUTO W/SCOPE: CPT | Performed by: INTERNAL MEDICINE

## 2021-09-23 PROCEDURE — 99223 1ST HOSP IP/OBS HIGH 75: CPT | Mod: AI | Performed by: INTERNAL MEDICINE

## 2021-09-23 PROCEDURE — 97110 THERAPEUTIC EXERCISES: CPT | Mod: GP | Performed by: PHYSICAL THERAPIST

## 2021-09-23 PROCEDURE — 999N000208 ECHOCARDIOGRAM COMPLETE

## 2021-09-23 PROCEDURE — 97116 GAIT TRAINING THERAPY: CPT | Mod: GP | Performed by: PHYSICAL THERAPIST

## 2021-09-23 PROCEDURE — 258N000003 HC RX IP 258 OP 636: Performed by: EMERGENCY MEDICINE

## 2021-09-23 PROCEDURE — 250N000013 HC RX MED GY IP 250 OP 250 PS 637: Performed by: INTERNAL MEDICINE

## 2021-09-23 PROCEDURE — 250N000012 HC RX MED GY IP 250 OP 636 PS 637: Performed by: INTERNAL MEDICINE

## 2021-09-23 PROCEDURE — 250N000011 HC RX IP 250 OP 636: Performed by: INTERNAL MEDICINE

## 2021-09-23 PROCEDURE — 258N000003 HC RX IP 258 OP 636: Performed by: INTERNAL MEDICINE

## 2021-09-23 PROCEDURE — 97161 PT EVAL LOW COMPLEX 20 MIN: CPT | Mod: GP | Performed by: PHYSICAL THERAPIST

## 2021-09-23 PROCEDURE — 87635 SARS-COV-2 COVID-19 AMP PRB: CPT | Performed by: EMERGENCY MEDICINE

## 2021-09-23 PROCEDURE — 250N000011 HC RX IP 250 OP 636: Performed by: EMERGENCY MEDICINE

## 2021-09-23 PROCEDURE — 93306 TTE W/DOPPLER COMPLETE: CPT | Mod: 26 | Performed by: INTERNAL MEDICINE

## 2021-09-23 PROCEDURE — 97530 THERAPEUTIC ACTIVITIES: CPT | Mod: GP | Performed by: PHYSICAL THERAPIST

## 2021-09-23 PROCEDURE — 255N000002 HC RX 255 OP 636: Performed by: INTERNAL MEDICINE

## 2021-09-23 PROCEDURE — 120N000001 HC R&B MED SURG/OB

## 2021-09-23 PROCEDURE — 99207 PR APP CREDIT; MD BILLING SHARED VISIT: CPT | Performed by: INTERNAL MEDICINE

## 2021-09-23 PROCEDURE — 87086 URINE CULTURE/COLONY COUNT: CPT | Performed by: INTERNAL MEDICINE

## 2021-09-23 PROCEDURE — 99204 OFFICE O/P NEW MOD 45 MIN: CPT | Performed by: SURGERY

## 2021-09-23 RX ORDER — QUETIAPINE FUMARATE 25 MG/1
25 TABLET, FILM COATED ORAL DAILY PRN
COMMUNITY
End: 2023-05-04

## 2021-09-23 RX ORDER — QUETIAPINE FUMARATE 25 MG/1
25 TABLET, FILM COATED ORAL AT BEDTIME
Status: ON HOLD | COMMUNITY
End: 2023-05-30

## 2021-09-23 RX ORDER — CARVEDILOL 12.5 MG/1
12.5 TABLET ORAL 2 TIMES DAILY WITH MEALS
Status: DISCONTINUED | OUTPATIENT
Start: 2021-09-23 | End: 2021-09-23

## 2021-09-23 RX ORDER — CEFTRIAXONE 1 G/1
1 INJECTION, POWDER, FOR SOLUTION INTRAMUSCULAR; INTRAVENOUS EVERY 24 HOURS
Status: DISCONTINUED | OUTPATIENT
Start: 2021-09-23 | End: 2021-09-24 | Stop reason: HOSPADM

## 2021-09-23 RX ORDER — LIDOCAINE 40 MG/G
CREAM TOPICAL
Status: DISCONTINUED | OUTPATIENT
Start: 2021-09-23 | End: 2021-09-24 | Stop reason: HOSPADM

## 2021-09-23 RX ORDER — AMOXICILLIN 250 MG
2 CAPSULE ORAL 2 TIMES DAILY PRN
Status: DISCONTINUED | OUTPATIENT
Start: 2021-09-23 | End: 2021-09-24 | Stop reason: HOSPADM

## 2021-09-23 RX ORDER — LOSARTAN POTASSIUM 50 MG/1
50 TABLET ORAL DAILY
Status: DISCONTINUED | OUTPATIENT
Start: 2021-09-23 | End: 2021-09-24 | Stop reason: HOSPADM

## 2021-09-23 RX ORDER — ACETAMINOPHEN 325 MG/1
975 TABLET ORAL 3 TIMES DAILY
Status: DISCONTINUED | OUTPATIENT
Start: 2021-09-23 | End: 2021-09-24 | Stop reason: HOSPADM

## 2021-09-23 RX ORDER — DEXTROSE MONOHYDRATE 25 G/50ML
25-50 INJECTION, SOLUTION INTRAVENOUS
Status: DISCONTINUED | OUTPATIENT
Start: 2021-09-23 | End: 2021-09-24 | Stop reason: HOSPADM

## 2021-09-23 RX ORDER — ONDANSETRON 4 MG/1
4 TABLET, ORALLY DISINTEGRATING ORAL EVERY 6 HOURS PRN
Status: DISCONTINUED | OUTPATIENT
Start: 2021-09-23 | End: 2021-09-24 | Stop reason: HOSPADM

## 2021-09-23 RX ORDER — TAMSULOSIN HYDROCHLORIDE 0.4 MG/1
0.4 CAPSULE ORAL DAILY
Status: DISCONTINUED | OUTPATIENT
Start: 2021-09-23 | End: 2021-09-24 | Stop reason: HOSPADM

## 2021-09-23 RX ORDER — ONDANSETRON 2 MG/ML
4 INJECTION INTRAMUSCULAR; INTRAVENOUS EVERY 6 HOURS PRN
Status: DISCONTINUED | OUTPATIENT
Start: 2021-09-23 | End: 2021-09-24 | Stop reason: HOSPADM

## 2021-09-23 RX ORDER — FINASTERIDE 5 MG/1
5 TABLET, FILM COATED ORAL DAILY
Status: DISCONTINUED | OUTPATIENT
Start: 2021-09-23 | End: 2021-09-24 | Stop reason: HOSPADM

## 2021-09-23 RX ORDER — NALOXONE HYDROCHLORIDE 0.4 MG/ML
0.2 INJECTION, SOLUTION INTRAMUSCULAR; INTRAVENOUS; SUBCUTANEOUS
Status: DISCONTINUED | OUTPATIENT
Start: 2021-09-23 | End: 2021-09-24 | Stop reason: HOSPADM

## 2021-09-23 RX ORDER — NALOXONE HYDROCHLORIDE 0.4 MG/ML
0.4 INJECTION, SOLUTION INTRAMUSCULAR; INTRAVENOUS; SUBCUTANEOUS
Status: DISCONTINUED | OUTPATIENT
Start: 2021-09-23 | End: 2021-09-24 | Stop reason: HOSPADM

## 2021-09-23 RX ORDER — NICOTINE POLACRILEX 4 MG
15-30 LOZENGE BUCCAL
Status: DISCONTINUED | OUTPATIENT
Start: 2021-09-23 | End: 2021-09-24 | Stop reason: HOSPADM

## 2021-09-23 RX ORDER — DEXAMETHASONE SODIUM PHOSPHATE 10 MG/ML
10 INJECTION, SOLUTION INTRAMUSCULAR; INTRAVENOUS EVERY 6 HOURS
Status: DISCONTINUED | OUTPATIENT
Start: 2021-09-23 | End: 2021-09-23

## 2021-09-23 RX ORDER — ASPIRIN 81 MG/1
81 TABLET ORAL DAILY
Status: DISCONTINUED | OUTPATIENT
Start: 2021-09-23 | End: 2021-09-24 | Stop reason: HOSPADM

## 2021-09-23 RX ORDER — QUETIAPINE FUMARATE 25 MG/1
25 TABLET, FILM COATED ORAL AT BEDTIME
Status: DISCONTINUED | OUTPATIENT
Start: 2021-09-23 | End: 2021-09-24 | Stop reason: HOSPADM

## 2021-09-23 RX ORDER — QUETIAPINE FUMARATE 25 MG/1
25 TABLET, FILM COATED ORAL DAILY PRN
Status: DISCONTINUED | OUTPATIENT
Start: 2021-09-23 | End: 2021-09-24 | Stop reason: HOSPADM

## 2021-09-23 RX ORDER — OLANZAPINE 5 MG/1
5 TABLET, ORALLY DISINTEGRATING ORAL AT BEDTIME
Status: DISCONTINUED | OUTPATIENT
Start: 2021-09-23 | End: 2021-09-23

## 2021-09-23 RX ORDER — GABAPENTIN 300 MG/1
300 CAPSULE ORAL 2 TIMES DAILY
Status: DISCONTINUED | OUTPATIENT
Start: 2021-09-23 | End: 2021-09-24 | Stop reason: HOSPADM

## 2021-09-23 RX ORDER — POLYETHYLENE GLYCOL 3350 17 G/17G
17 POWDER, FOR SOLUTION ORAL DAILY PRN
Status: DISCONTINUED | OUTPATIENT
Start: 2021-09-23 | End: 2021-09-24 | Stop reason: HOSPADM

## 2021-09-23 RX ORDER — AMOXICILLIN 250 MG
1 CAPSULE ORAL 2 TIMES DAILY PRN
Status: DISCONTINUED | OUTPATIENT
Start: 2021-09-23 | End: 2021-09-24 | Stop reason: HOSPADM

## 2021-09-23 RX ORDER — MIRTAZAPINE 15 MG/1
15 TABLET, FILM COATED ORAL AT BEDTIME
Status: DISCONTINUED | OUTPATIENT
Start: 2021-09-23 | End: 2021-09-24 | Stop reason: HOSPADM

## 2021-09-23 RX ORDER — BENZONATATE 100 MG/1
200 CAPSULE ORAL 3 TIMES DAILY PRN
Status: DISCONTINUED | OUTPATIENT
Start: 2021-09-23 | End: 2021-09-24 | Stop reason: HOSPADM

## 2021-09-23 RX ORDER — CARVEDILOL 25 MG/1
25 TABLET ORAL 2 TIMES DAILY WITH MEALS
Status: DISCONTINUED | OUTPATIENT
Start: 2021-09-23 | End: 2021-09-24 | Stop reason: HOSPADM

## 2021-09-23 RX ORDER — AZITHROMYCIN 250 MG/1
250 TABLET, FILM COATED ORAL DAILY
Status: DISCONTINUED | OUTPATIENT
Start: 2021-09-23 | End: 2021-09-24 | Stop reason: HOSPADM

## 2021-09-23 RX ORDER — SODIUM CHLORIDE 9 MG/ML
INJECTION, SOLUTION INTRAVENOUS CONTINUOUS
Status: ACTIVE | OUTPATIENT
Start: 2021-09-23 | End: 2021-09-23

## 2021-09-23 RX ADMIN — SODIUM CHLORIDE: 9 INJECTION, SOLUTION INTRAVENOUS at 11:31

## 2021-09-23 RX ADMIN — GABAPENTIN 300 MG: 300 CAPSULE ORAL at 08:31

## 2021-09-23 RX ADMIN — TRAMADOL HYDROCHLORIDE 25 MG: 50 TABLET ORAL at 05:28

## 2021-09-23 RX ADMIN — ACETAMINOPHEN 975 MG: 325 TABLET, FILM COATED ORAL at 19:57

## 2021-09-23 RX ADMIN — FINASTERIDE 5 MG: 5 TABLET, FILM COATED ORAL at 08:31

## 2021-09-23 RX ADMIN — ASPIRIN 81 MG: 81 TABLET ORAL at 08:31

## 2021-09-23 RX ADMIN — SODIUM CHLORIDE: 9 INJECTION, SOLUTION INTRAVENOUS at 02:13

## 2021-09-23 RX ADMIN — AZITHROMYCIN MONOHYDRATE 500 MG: 500 INJECTION, POWDER, LYOPHILIZED, FOR SOLUTION INTRAVENOUS at 00:48

## 2021-09-23 RX ADMIN — METFORMIN HYDROCHLORIDE 500 MG: 500 TABLET, FILM COATED ORAL at 18:47

## 2021-09-23 RX ADMIN — METFORMIN HYDROCHLORIDE 500 MG: 500 TABLET, FILM COATED ORAL at 08:31

## 2021-09-23 RX ADMIN — AZITHROMYCIN MONOHYDRATE 250 MG: 250 TABLET ORAL at 08:31

## 2021-09-23 RX ADMIN — QUETIAPINE FUMARATE 25 MG: 25 TABLET ORAL at 19:58

## 2021-09-23 RX ADMIN — TRAMADOL HYDROCHLORIDE 25 MG: 50 TABLET ORAL at 13:06

## 2021-09-23 RX ADMIN — CEFTRIAXONE 1 G: 1 INJECTION, POWDER, FOR SOLUTION INTRAMUSCULAR; INTRAVENOUS at 19:56

## 2021-09-23 RX ADMIN — TRAMADOL HYDROCHLORIDE 25 MG: 50 TABLET ORAL at 19:58

## 2021-09-23 RX ADMIN — MIRTAZAPINE 15 MG: 15 TABLET, FILM COATED ORAL at 01:56

## 2021-09-23 RX ADMIN — LIDOCAINE 1 PATCH: 560 PATCH PERCUTANEOUS; TOPICAL; TRANSDERMAL at 00:48

## 2021-09-23 RX ADMIN — CARVEDILOL 25 MG: 25 TABLET, FILM COATED ORAL at 22:00

## 2021-09-23 RX ADMIN — HUMAN ALBUMIN MICROSPHERES AND PERFLUTREN 3 ML: 10; .22 INJECTION, SOLUTION INTRAVENOUS at 11:05

## 2021-09-23 RX ADMIN — CARVEDILOL 12.5 MG: 12.5 TABLET, FILM COATED ORAL at 13:06

## 2021-09-23 RX ADMIN — INSULIN ASPART 2 UNITS: 100 INJECTION, SOLUTION INTRAVENOUS; SUBCUTANEOUS at 22:17

## 2021-09-23 RX ADMIN — ACETAMINOPHEN 975 MG: 325 TABLET, FILM COATED ORAL at 08:31

## 2021-09-23 RX ADMIN — LOSARTAN POTASSIUM 50 MG: 50 TABLET, FILM COATED ORAL at 13:06

## 2021-09-23 RX ADMIN — GABAPENTIN 300 MG: 300 CAPSULE ORAL at 19:58

## 2021-09-23 RX ADMIN — ACETAMINOPHEN 975 MG: 325 TABLET, FILM COATED ORAL at 13:30

## 2021-09-23 RX ADMIN — OLANZAPINE 5 MG: 5 TABLET, ORALLY DISINTEGRATING ORAL at 01:55

## 2021-09-23 RX ADMIN — INSULIN GLARGINE 10 UNITS: 100 INJECTION, SOLUTION SUBCUTANEOUS at 01:54

## 2021-09-23 RX ADMIN — ACETAMINOPHEN 650 MG: 325 TABLET, FILM COATED ORAL at 00:48

## 2021-09-23 RX ADMIN — INSULIN ASPART 1 UNITS: 100 INJECTION, SOLUTION INTRAVENOUS; SUBCUTANEOUS at 02:11

## 2021-09-23 RX ADMIN — TAMSULOSIN HYDROCHLORIDE 0.4 MG: 0.4 CAPSULE ORAL at 08:31

## 2021-09-23 RX ADMIN — MIRTAZAPINE 15 MG: 15 TABLET, FILM COATED ORAL at 19:58

## 2021-09-23 ASSESSMENT — ACTIVITIES OF DAILY LIVING (ADL)
ADLS_ACUITY_SCORE: 28
ADLS_ACUITY_SCORE: 30
ADLS_ACUITY_SCORE: 28

## 2021-09-23 NOTE — ED TRIAGE NOTES
Pt arrives via EMS from home multiple falls past 3 days, pt reports LOC and chest pain he arrives with c/o pain in left rib and up into upper chest and arm. Pt a/ox 4, speaks Tajik translation line used. Pt is hypertensive, Blood glucose 410. Hx cath NSTEMI 2015. Pt reports he hasnt taken meds today but usually does.

## 2021-09-23 NOTE — PLAN OF CARE
Pt oriented to person. VS stable on room air. HTN in 170's. Pain controlled with tramadol. CMS intact. Ax2 w/ GBW. Using urinal with assistance of 2 standing at bedside. Regular diet. Continue to monitor until provider can reasses in AM and make plan. Citizen of [place]  needed.

## 2021-09-23 NOTE — H&P
[facility]  Hospitalist Admission Note  Name: Adam Huber    MRN: [id number]  YOB: 1935    Age: 86 year old  Date of admission: 9/22/2021  Primary care provider: Oralia Forrest    Chief Complaint: Falls, rib fractures, pneumonia    Adam Huber is a 86 year old male with PMH including Alzheimer's dementia, CAD, hypertension, history of CVA and TBI who presents with falls and weakness found to have multiple left-sided rib fractures as well as evidence of pneumonia.      Here in the emergency room he was hyperglycemic to about 400.  Otherwise lab work-up including basic metabolic panel and complete blood count were relatively unremarkable.  EKG showed normal sinus rhythm.  CT head and cervical spine were negative for acute fractures.  CT chest abdomen pelvis however showed evidence of rib fractures of ribs 4 through 8 and some scattered areas of nodular infiltrate in the lingula and left lower lobe suggesting possible pneumonia.    He was given Tylenol and started on ceftriaxone and azithromycin and I am asked to admit him for further care.    Assessment and Plan:   1. Multiple falls, possible syncopal episodes: In the setting of deconditioning, severe hyperglycemia which may have led to dehydration.  He also has a history of coronary artery disease and is at risk for arrhythmias among other cardiac complications.  --Admit under inpatient status  --Monitor on telemetry  --Check echocardiogram  --PT consult  --IV hydration overnight  --Treat hyperglycemia and suspected pneumonia as below    2.   Left-sided rib fractures: Ribs 4 through 8.  We will schedule Tylenol and have tramadol available as needed.  Should be encouraged to do incentive spirometry.  Treating for pneumonia as below.    3.   Community-acquired versus aspiration pneumonia: Setting of multiple falls.  Possible he could have a component of pulmonary contusion.  Currently afebrile and not hypoxic.  --Tracks on  azithromycin, monitor on oximetry  --Follow up imaging recommended by radiology    4.   Type 2 diabetes with severe hyperglycemia: Sugar currently 400.  It is unclear to me if he is taking anything for his diabetes though I believe he is prescribed Metformin.  --Lantus 10 units once now  --Metformin 500 mg twice daily ordered  --Adding scale insulin  --We will saline 100 cc/h ordered for 20 hours, revisit fluids tomorrow.  --Check A1c    5.   Alzheimer's dementia:  previously was chronically on Zyprexa, Seroquel, Remeron.  Will await med rec.family reports he is prone to delirium and wandering.  --Schedule Zyprexa 5 mg in the evening and Remeron pending med rec.     6.   Hypertension: Restart home meds once verified.  I believe he is on Coreg, losartan.     7.  History of CVA, TBI: Reportedly suffered strokes in 1999 and 2003 with some baseline mild residual right-sided weakness.  Speech reportedly slow at baseline.  Continue home aspirin.     8.  BPH: Continue home meds including Flomax and finasteride.  Consider bladder scans if he is not voiding.  His bladder does appear trabeculated on CT scan which per radiology may suggest chronic outflow obstruction.    DVT Prophylaxis: Pneumatic Compression Devices  Code Status: DNR  Discharge Dispo: Need under inpatient status      History of Present Illness:  Adam Huber is a 86 year old male with PMH including Alzheimer's dementia, CAD, hypertension, history of CVA and TBI who presents with falls and weakness found to have multiple left-sided rib fractures as well as evidence of pneumonia.  He is accompanied by his son who primarily is serving to provide history and act as [place]  though I did offer to set up the [place]  as well which he declined.    The patient himself is generally apparently disoriented but does live with his wife independently in an apartment with some support from family.  Over the past few days he apparently has lost  consciousness and fell while walking.  He did fall on his knees and had some knee pain and also fell out of bed and was found by his wife.  When his son heard about all of these episodes he sent additional family to the apartment to check on them and EMS was called.  The patient was complaining of left-sided rib/chest pain.    Per his son the patient seems to be at his cognitive baseline at this time.  There is no history of recent illness, fevers, chills or other issues.    Here in the emergency room he was hyperglycemic to about 400.  Otherwise lab work-up including basic metabolic panel and complete blood count were relatively unremarkable.  EKG showed normal sinus rhythm.  CT head and cervical spine were negative for acute fractures.  CT chest abdomen pelvis however showed evidence of rib fractures of ribs 4 through 8 and some scattered areas of nodular infiltrate in the lingula and left lower lobe suggesting possible pneumonia.     Past Medical History:  Past Medical History:   Diagnosis Date     Acute chest pain 10/23/2015     CAD (coronary artery disease)      Essential hypertension 10/23/2015     History of CVA (cerebrovascular accident) 10/23/2015     HLD (hyperlipidemia)      HTN (hypertension)      NSTEMI (non-ST elevated myocardial infarction) (H) 10/23/2015     Post PTCA 10-    Successful PCI of culprit proximal to mid RCA with placement of a     Post PTCA 11-6-2015    pci of complex mid CFX-hector     Past Surgical History:  Past Surgical History:   Procedure Laterality Date     CHOLECYSTECTOMY       GENITOURINARY SURGERY      prostate removal      HEART CATH LEFT HEART CATH  10/12/2015    PCI w/ HECTOR to RCA     HEART CATH RIGHT AND LEFT HEART CATH  11/6/2015    PCI w/ HECTOR to mid-CFX     Social History:  Social History     Tobacco Use     Smoking status: Never Smoker     Smokeless tobacco: Never Used   Substance Use Topics     Alcohol use: No     Alcohol/week: 0.0 standard drinks     Social History      Social History Narrative     Not on file     Family History:  Family History   Problem Relation Age of Onset     Unknown/Adopted No family hx of      Allergies:  Allergies   Allergen Reactions     Lisinopril      Morphine Other (See Comments)     confusion     Medications:  (Not in a hospital admission)    Review of Systems:  A Comprehensive greater than 10 system review of systems was carried out.  Pertinent positives and negatives are noted above.  Otherwise negative for contributory information.     Physical Exam:  Blood pressure (!) 190/84, pulse 70, temperature 99.5  F (37.5  C), temperature source Oral, resp. rate 29, weight 96 kg (211 lb 10.3 oz), SpO2 94 %.  Wt Readings from Last 1 Encounters:   09/22/21 96 kg (211 lb 10.3 oz)     Exam:  General: Alert, awake, no acute distress.  Stoic appearing man lying in bed.  HEENT: NC/AT, eyes anicteric, external occular movements intact, face symmetric.   Cardiac: RRR, S1, S2.    Pulmonary: Left-sided fine crackles.  Normal chest rise, normal work of breathing.  Lungs CTA BL  Abdomen: soft, non-tender, non-distended.  Bowel Sounds Present.  No guarding.  Extremities: no deformities.  Warm, well perfused.  Skin: no rashes or lesions noted.  Warm and Dry.  Neuro: No focal deficits noted.  Speech clear.    Psych: Appropriate affect.    Data:  EKG: Normal sinus rhythm  Imaging:  Results for orders placed or performed during the hospital encounter of 09/22/21   Head CT w/o contrast    Narrative    EXAM: CT HEAD W/O CONTRAST, CT CERVICAL SPINE W/O CONTRAST  LOCATION: [facility]  DATE/TIME: 9/22/2021 9:54 PM    INDICATION: Loss of consciousness, falls, confusion.  COMPARISON: Brain MRI of 12/03/2019.  TECHNIQUE:   1) Routine CT Head without IV contrast. Multiplanar reformats. Dose reduction techniques were used.  2) Routine CT Cervical Spine without IV contrast. Multiplanar reformats. Dose reduction techniques were used.    FINDINGS:   HEAD CT:    INTRACRANIAL CONTENTS: No acute intracranial hemorrhage or mass effect. Bifrontal and right temporal encephalomalacia again noted. Moderate volume loss and presumed chronic small vessel ischemia. Small chronic lacunar infarcts in each basal ganglia again   noted. No CT evidence of acute infarct. Normal parenchymal attenuation. Normal ventricles and sulci.     VISUALIZED ORBITS/SINUSES/MASTOIDS: No intraorbital abnormality. No paranasal sinus mucosal disease. No middle ear or mastoid effusion.    BONES/SOFT TISSUES: No acute abnormality.    CERVICAL SPINE CT:   VERTEBRA: Patient is osteopenic. Alignment is normal. No acute fracture or posttraumatic subluxation. Moderate loss of disc space height C6-C7. Mild to moderate multilevel facet arthropathy.     CANAL/FORAMINA: No significant canal narrowing. Moderate to marked left C3-C4, C5-C6 and right C6-C7 degenerative foraminal narrowing.    PARASPINAL: No extraspinal abnormality. Visualized lung fields are clear.      Impression    IMPRESSION:  HEAD CT:  1. No acute intracranial abnormality.    2. Age-related and chronic ischemic changes again noted.    3. Bifrontal and right temporal encephalomalacia likely related to prior trauma.     CERVICAL SPINE CT:  1.  No CT evidence for acute fracture or post traumatic subluxation.   Cervical spine CT w/o contrast    Narrative    EXAM: CT HEAD W/O CONTRAST, CT CERVICAL SPINE W/O CONTRAST  LOCATION: [facility]  DATE/TIME: 9/22/2021 9:54 PM    INDICATION: Loss of consciousness, falls, confusion.  COMPARISON: Brain MRI of 12/03/2019.  TECHNIQUE:   1) Routine CT Head without IV contrast. Multiplanar reformats. Dose reduction techniques were used.  2) Routine CT Cervical Spine without IV contrast. Multiplanar reformats. Dose reduction techniques were used.    FINDINGS:   HEAD CT:   INTRACRANIAL CONTENTS: No acute intracranial hemorrhage or mass effect. Bifrontal and right temporal encephalomalacia again  noted. Moderate volume loss and presumed chronic small vessel ischemia. Small chronic lacunar infarcts in each basal ganglia again   noted. No CT evidence of acute infarct. Normal parenchymal attenuation. Normal ventricles and sulci.     VISUALIZED ORBITS/SINUSES/MASTOIDS: No intraorbital abnormality. No paranasal sinus mucosal disease. No middle ear or mastoid effusion.    BONES/SOFT TISSUES: No acute abnormality.    CERVICAL SPINE CT:   VERTEBRA: Patient is osteopenic. Alignment is normal. No acute fracture or posttraumatic subluxation. Moderate loss of disc space height C6-C7. Mild to moderate multilevel facet arthropathy.     CANAL/FORAMINA: No significant canal narrowing. Moderate to marked left C3-C4, C5-C6 and right C6-C7 degenerative foraminal narrowing.    PARASPINAL: No extraspinal abnormality. Visualized lung fields are clear.      Impression    IMPRESSION:  HEAD CT:  1. No acute intracranial abnormality.    2. Age-related and chronic ischemic changes again noted.    3. Bifrontal and right temporal encephalomalacia likely related to prior trauma.     CERVICAL SPINE CT:  1.  No CT evidence for acute fracture or post traumatic subluxation.   CT Chest/Abdomen/Pelvis w Contrast    Narrative    EXAM: CT CHEST/ABDOMEN/PELVIS W CONTRAST  LOCATION: [facility]  DATE/TIME: 9/22/2021 9:55 PM    INDICATION: Syncope, falls, pain.  COMPARISON: CT from 12/03/2019.  TECHNIQUE: CT scan of the chest, abdomen, and pelvis was performed following injection of IV contrast. Multiplanar reformats were obtained. Dose reduction techniques were used.   CONTRAST: 100mL [id number]-[id number]    FINDINGS:   LUNGS AND PLEURA: Scattered areas of subsegmental atelectasis in the lung bases. There are a few small pulmonary nodules in the inferior segment of the lingula and in the basal segments of the left lower lobe measuring up to 5 mm in the left lower lobe   on image 202 of series 5. No pneumothorax. Trace left pleural  thickening and effusion.    MEDIASTINUM/AXILLAE: Heart size is normal. Densely calcified mitral annulus. Extensive coronary artery calcifications. No adenopathy.    CORONARY ARTERY CALCIFICATION: Severe.    HEPATOBILIARY: Cirrhotic liver morphology. Central portal veins are patent. Gallbladder is absent.    PANCREAS: Severe parenchymal atrophy. No mass or inflammatory change.    SPLEEN: Normal.    ADRENAL GLANDS: Normal.    KIDNEYS/BLADDER: Multiple cortical cysts requiring no follow-up. No hydronephrosis. Mild bladder wall thickening and trabeculation.    BOWEL: Normal appendix. No bowel obstruction or free air. Colonic diverticulosis.    LYMPH NODES: Normal.    VASCULATURE: Moderate to severe aortoiliac atherosclerotic change.    PELVIC ORGANS: Mild prostatomegaly.    MUSCULOSKELETAL: There are 6 nonrib-bearing lumbar-type vertebral bodies. There is a stable chronic compression deformity at L1. There are minimally displaced fractures of the left lateral fourth, fifth, sixth, seventh, and eighth ribs.      Impression    IMPRESSION:  1.  Acute fractures of the left fourth-eighth ribs. Trace left pleural thickening and effusion.    2.  Scattered areas of nodular infiltrate in the lingula and left lower lobe. Clustered nature of infiltrate suggests infectious or inflammatory process. Follow-up chest CT could be performed as a conservative measure to ensure resolution if clinically   warranted.    3.  No solid organ injury.    4.  Cirrhosis.    5.  Colonic diverticulosis.    6.  Severe coronary artery disease.    7.  Mildly thick-walled and trabeculated urinary bladder potentially due to chronic outlet obstruction.     Labs:  Recent Labs   Lab 09/22/21 2116   WBC 8.7   HGB 16.3   HCT 47.5   MCV 96             Lab Results   Component Value Date     09/22/2021     08/05/2021     12/03/2019     12/03/2019     01/16/2019    Lab Results   Component Value Date    CHLORIDE 107 09/22/2021     CHLORIDE 109 08/05/2021    CHLORIDE 106 12/03/2019    CHLORIDE 104 12/03/2019    CHLORIDE 105 01/16/2019    Lab Results   Component Value Date    BUN 17 09/22/2021    BUN 14 08/05/2021    BUN 27 12/03/2019    BUN 27 12/03/2019    BUN 33 01/16/2019      Lab Results   Component Value Date    POTASSIUM 4.3 09/22/2021    POTASSIUM 4.8 08/05/2021    POTASSIUM 5.0 12/03/2019    POTASSIUM 5.0 12/03/2019    POTASSIUM 4.2 01/16/2019    Lab Results   Component Value Date    CO2 24 09/22/2021    CO2 25 08/05/2021    CO2 25 12/03/2019    CO2 27 01/16/2019    CO2 27 01/12/2019    Lab Results   Component Value Date    CR 0.71 09/22/2021    CR 0.74 08/05/2021    CR 1.15 12/03/2019    CR 0.93 01/16/2019    CR 0.76 01/12/2019            Segundo Lovett MD  Hospitalist  [facility]

## 2021-09-23 NOTE — ED PROVIDER NOTES
History     Chief Complaint:  Fall and Rib Pain      HPI The patient's primary language is [place] and his son was at bedside to translate his history. Declined professional .    Adam Huber is a 86 year old male with a medical history of CAD, hypertension, late onset Alzheimer's disease, physical deconditioning, and type 2 diabetes mellitus who presents with left sided rib pain after several falls. The patient is not oriented to date or year; he currently lives with his wife in an independent apartment. Over the past few days, the patient has experienced several episodes of loss of consciousness while walking and subsequently falling down. He reports an episode of left knee pain, though this has since resolved. Last night, the patient fell out of bed and he was found by his wife. Upon hearing of this fall, his son sent the patient's granddaughter to their apartment and she called EMS. Here, he describes left sided rib and chest pain that occurred after these falls. He denies any extremity numbness or weakness. The patient also denies any abdominal pain. Moreover, his son states that the patient does not seem altered at this time and otherwise appears to be at baseline.  He is not on anticoagulation.     Review of Systems   Cardiovascular: Positive for chest pain (right sided).   Gastrointestinal: Negative for abdominal pain.   Musculoskeletal: Positive for arthralgias (left knee).        Right sided rib pain   Neurological: Positive for syncope (while ambulating). Negative for weakness and numbness.   All other systems reviewed and are negative.    Allergies:  Lisinopril  Morphine      Medications:    acetaminophen (TYLENOL) 325 MG tablet  aspirin 81 MG EC tablet  benzonatate (TESSALON) 200 MG capsule  carvedilol (COREG) 25 MG tablet  finasteride (PROSCAR) 5 MG tablet  gabapentin (NEURONTIN) 300 MG capsule  losartan (COZAAR) 50 MG tablet  metFORMIN (GLUCOPHAGE) 500 MG tablet  mirtazapine  (REMERON) 15 MG tablet  nitroglycerin (NITROSTAT) 0.4 MG SL tablet  OLANZapine (ZYPREXA) 2.5 MG tablet  olopatadine HCl (PATADAY) 0.2 % SOLN  polyethylene glycol (MIRALAX/GLYCOLAX) powder  QUEtiapine (SEROQUEL) 25 MG tablet  tamsulosin (FLOMAX) 0.4 MG capsule  traMADol (ULTRAM) 50 MG tablet      Past Medical History:    CAD   Essential hypertension   History of CVA  Hyperlipidemia   Post PTCA x2  Encephalopathy   Altered mental status  Rhabdomyolysis   Closed wedge compression fracture of twelfth thoracic vertebra, initial encounter  Late 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 behavioral disturbance  Type 2 diabetes mellitus without complication  Acute delirium   Pain   T12 compression fracture  UTI (urinary tract infection)  Physical deconditioning  Acute metabolic encephalopathy  Dehydration  Elevated troponin  Generalized muscle weakness  Influenza B  Bleeding of blood vessel   NSTEMI (non-ST elevated myocardial infarction)  Essential hypertension  Acute chest pain   ACS (acute coronary syndrome)    Social History:  The patient's primary language is [place].     Physical Exam     Patient Vitals for the past 24 hrs:   BP Temp Temp src Pulse Resp SpO2 Weight   09/23/21 0138 (!) 171/83 97.9  F (36.6  C) Temporal 69 24 96 % --   09/23/21 0106 -- -- -- 72 26 -- --   09/23/21 0105 -- -- -- 68 17 94 % --   09/23/21 0104 -- -- -- -- -- 95 % --   09/23/21 0101 -- -- -- 72 25 95 % --   09/23/21 0100 (!) 186/77 -- -- 73 27 96 % --   09/23/21 0053 -- -- -- 73 19 95 % --   09/23/21 0052 -- -- -- 74 25 -- --   09/23/21 0039 -- -- -- 72 23 94 % --   09/23/21 0038 -- -- -- 72 16 94 % --   09/23/21 0030 (!) 187/87 -- -- 72 -- -- --   09/23/21 0025 -- -- -- 78 24 94 % --   09/23/21 0024 -- -- -- 72 24 96 % --   09/23/21 0023 -- -- -- 71 26 94 % --   09/23/21 0015 (!) 183/91 -- -- 71 24 95 % --   09/22/21 2348 -- -- -- 70 29 -- --   09/22/21 2347 -- -- -- 71 26 -- --   09/22/21 2346 -- -- -- 71 25 -- --   09/22/21 2345 (!) 190/84 -- --  71 24 -- --   09/22/21 2343 -- -- -- 70 19 -- --   09/22/21 2332 -- -- -- 71 25 -- --   09/22/21 2331 -- -- -- 73 27 -- --   09/22/21 2330 (!) 194/80 -- -- 74 -- -- --   09/22/21 2245 (!) 191/90 -- -- 76 22 94 % --   09/22/21 2120 (!) 170/82 -- -- 80 21 95 % --   09/22/21 2103 (!) 191/88 99.5  F (37.5  C) Oral 80 16 95 % 96 kg (211 lb 10.3 oz)   09/22/21 2100 (!) 191/88 -- -- 82 -- -- --       Physical Exam    General:              Well-nourished              Speaking in full sentences  Eyes:              Conjunctiva without injection or scleral icterus  ENT:              Moist mucous membranes              Nares patent              Pinnae normal  Neck:              Full ROM              No stiffness appreciated  Resp:              Lungs CTAB              No crackles, wheezing or audible rubs              Good air movement  CV:                    Normal rate, regular rhythm              S1 and S2 present              No murmur, gallop or rub  GI:              BS present              Abdomen soft without distention              Mild LUQ pain              No guarding or rebound tenderness  Skin:              Warm, dry, well perfused              No rashes or open wounds on exposed skin  MSK:              Moves all extremities              Tenderness to palpation over left chest wall              No crepitance              No flail segment  Neuro:              Alert              Answers questions appropriately              Moves all extremities equally              Gait stable  Psych:              Normal affect, normal mood    Emergency Department Course   ECG:  ECG taken at 2112, ECG read at 2116  Normal sinus rhythm  Normal ECG    Rate 82 bpm. DE interval 162 ms. QRS duration 96 ms. QT/QTc 398/464 ms. P-R-T axes 41 65 69.     Imaging:  CT Chest/Abdomen/Pelvis w Contrast   Final Result   IMPRESSION:   1.  Acute fractures of the left fourth-eighth ribs. Trace left pleural thickening and effusion.      2.  Scattered  areas of nodular infiltrate in the lingula and left lower lobe. Clustered nature of infiltrate suggests infectious or inflammatory process. Follow-up chest CT could be performed as a conservative measure to ensure resolution if clinically    warranted.      3.  No solid organ injury.      4.  Cirrhosis.      5.  Colonic diverticulosis.      6.  Severe coronary artery disease.      7.  Mildly thick-walled and trabeculated urinary bladder potentially due to chronic outlet obstruction.      Cervical spine CT w/o contrast   Final Result   IMPRESSION:   HEAD CT:   1. No acute intracranial abnormality.      2. Age-related and chronic ischemic changes again noted.      3. Bifrontal and right temporal encephalomalacia likely related to prior trauma.       CERVICAL SPINE CT:   1.  No CT evidence for acute fracture or post traumatic subluxation.      Head CT w/o contrast   Final Result   IMPRESSION:   HEAD CT:   1. No acute intracranial abnormality.      2. Age-related and chronic ischemic changes again noted.      3. Bifrontal and right temporal encephalomalacia likely related to prior trauma.       CERVICAL SPINE CT:   1.  No CT evidence for acute fracture or post traumatic subluxation.      Echocardiogram Complete    (Results Pending)       Laboratory:  UA with Microscopic Reflex to Culture: Acknowledged  Glucose by meter: 410 (high)   CBC: WNL (WBC 8.7, HGB 16.3, )  BMP: Glu 416 (high), o/w WNL (Cr 0.71)  2116: Troponin: <0.015 (negative)      Emergency Department Course:    Reviewed:  I reviewed nursing notes, vitals, past history and care everywhere    Assessments:  2118 I obtained history and examined the patient as noted above.   2321 I rechecked the patient and explained findings.     Interventions:  2345: cefTRIAXone (ROCEPHIN) 1 g vial, IV   2340: Azithromycin    Disposition:  The patient was admitted to the hospital under the care of Dr. Lovett.    Impression & Plan      Medical Decision  Making:  Adam Huber is an 86-year-old male with a complex PMH significant for CAD, CVA, dementia, presenting to the emergency department via EMS for evaluation of a fall and rib pain.  History obtained from the patient and son present at bedside.  VS on presentation reveal elevated BP though otherwise are unremarkable.  With regards to patient's fall, he exhibits tenderness to palpation about his left chest wall and upper abdomen.  Given advanced age, language barrier, and somewhat difficult history, further advanced imaging performed including CT of the head, cervical spine, as well as chest/abdomen.  CT of the head negative for acute intracranial hemorrhage nor skull fracture.  CT cervical spine with unremarkable alignment, and no evidence of acute fracture or posttraumatic subluxation.  CT of the chest abdomen and pelvis demonstrates acute fractures of the left fourth through eighth ribs, with a trace left-sided pleural effusion as well as scattered areas of nodular infiltrate and left lower lobe.  This is suspicious for possible infectious etiologies and in light of his rib fractures, concern for splinting and subsequent early pneumonia.  Patient will be started on antibiotics for treatment.  No other solid organ injuries are noted.  I discussed with the patient's son and patient the incidentally noted findings of liver cirrhosis, prostate enlargement, and pulmonary nodules, which should be followed as an outpatient.    Precise etiology for patient's recurrent falls not entirely clear.  It is unclear if patient experienced syncopal episodes resulting in the falls, or experience loss of consciousness following his falls.  He does have a history of known cardiac disease, with associated increased risk and cardiac arrhythmia.  EKG demonstrates sinus rhythm, without evidence of acute arrhythmia, or prolonged QT interval.  No findings of acute ischemia are noted, and troponin has returned normal, arguing against  ACS.  He is without tachycardia, hypoxia, and I feel this is unlikely to represent PE.  His neurologic exam is nonfocal arguing against acute CVA/TIA.  He likely has a component of deconditioning.  UA ordered though patient unable to provide sample.  Labs notable for hyperglycemia, though no evidence of concurrent acidosis.    Results and clinical impression discussed with patient and son at bedside.  Given his advanced age, identified injuries, concern for superimposed developing pneumonia, patient will require hospitalization for further treatment and care.  He will likely also require trauma evaluation given his associated rib fractures.  We will plan admission to the hospital service under the care of Dr. Lovett, who has seen and evaluated the patient in the ED.  Questions answered prior to admission.    Critical Care time:  none    Diagnosis:    ICD-10-CM    1. Syncope, unspecified syncope type  R55    2. Closed fracture of multiple ribs of right side, initial encounter  S22.41XA    3. Pulmonary nodules  R91.8    4. Prostate enlargement  N40.0    5. Hyperglycemia  R73.9        Scribe Disclosure:  I, Saundra Sanderson, am serving as a scribe at 9:18 PM on 9/22/2021 to document services personally performed by Kai Mello MD based on my observations and the provider's statements to me.      Kai Mello MD  09/23/21 2306

## 2021-09-23 NOTE — PLAN OF CARE
Afeb, 's systolic home BP meds finally approved by pharmacy so given at 1300. Left chest pain in rib area rated 7-8, improved with tramadol and ice. Up with assist of 2 and walker, up in chair for both meals, ambulated in the hallway with PT. Cardiac echo done this am. General surgery consult completed with no recommendation for any surgery. IVF continue though pt ate and drank well w/o any swallow problems. Abx continue for the pneumonia. Unsure of discharge plans yet.

## 2021-09-23 NOTE — PROGRESS NOTES
[facility]  Hospitalist Progress Note  Armando Rajan MD 09/23/21    Reason for Stay (Diagnosis): fall, rib fx's, pna         Assessment and Plan:      Summary of Stay:  Adam Huber is a 86 year old [place]ymra[place]-speaking male with history of Alzheimer's dementia, type II DM, HTN, CVA, TBI, and BPH who was admitted on 9/22/2021 after a fall.  Possible loss of consciousness.  Hypertensive in the ER otherwise vitally stable.  CT of the chest/ab/pelvis shows continuous rib fracture on the left 4-8th along with nodular opacities in the lingula and left lower lobe which could be infectious.  He was started on IV ceftriaxone and azithromycin for pneumonia.  He was provided IV fluids.  Initially hyperglycemic above 400 despite A1c 7.5.  PT has been consulted, initial recommendations for TCU.  Working on pain control.  Not hypoxic currently.  General surgery consulted as a part of trauma evaluation.    Problem List/Assessment and Plan:   Multiple falls, possible syncopal episodes: In the setting of deconditioning, severe hyperglycemia which may have led to dehydration.  He also has a history of coronary artery disease and is at risk for arrhythmias among other cardiac complications.  Few very short episodes of PSVT on telemetry lasting only a few seconds.  Troponin undetectable.  TTE shows preserved EF and no etiology for any potential syncope.  Received IV fluids for hydration.  -PT consult, current recommendation TCU     Left-sided rib fractures: Ribs 4 through 8.    No pneumothorax.  We will schedule Tylenol and have tramadol available as needed.  Should be encouraged to do incentive spirometry.  Treating for pneumonia as below.  General surgery consulted due to continuous rib fractures for trauma evaluation.     CAP:  CT chest shows scattered areas of nodular infiltrate in the lingula and left lower lobe possible infectious etiology, community-acquired pneumonia.  He does not have a leukocytosis.  He may  have a pulmonary contusion as alternate explanation.  Temp at 99 range on admission, not hypoxic currently.  COVID-19 PCR is negative.  -Continue ceftriaxone and azithromycin  -Monitor pulse oximetry  -Follow up imaging recommended by radiology    Abnormal urinalysis: 31 WBCs with small LE and 17 RBCs.  Denies any dysuria or frequency.  -Is on ceftriaxone for possible pneumonia  -Follow urine culture results     Type 2 diabetes with severe hyperglycemia:  Blood glucose 400 on admission.  he is on Metformin 500 mg daily at baseline.  A1c is only 7.5 so this degree of hyperglycemia surprising unless elevated due to stress from his fractures and possible pneumonia.  -Received 10 units Lantus admission, hold for now  -Continue PTA Metformin five 1 mg twice daily  -High dose sliding scale insulin     Alzheimer's dementia: Chronically on Remeron and Seroquel now, no longer on Zyprexa.    -Resumed PTA Remeron and Seroquel at night, as needed Seroquel for agitation     HTN: Hypertensive here likely exacerbated by pain.  Resume carvedilol 25 mg twice daily and losartan 50 mg daily.     History of CVA, TBI: Reportedly suffered strokes in 1999 and 2003 with some baseline mild residual right-sided weakness.  Speech reportedly slow at baseline.  Continue home aspirin.     BPH: Continue home meds including Flomax and finasteride.  Consider bladder scans if he is not voiding.  His bladder does appear trabeculated on CT scan which per radiology may suggest chronic outflow obstruction.    DVT Prophylaxis: Pneumatic Compression Devices  Code Status: DNR  FEN: regular diet  Discharge Dispo: PT/SW consult.  Lives with family.  Initial PT consult rec TCU  Estimated Disch Date / # of Days until Disch: likely by tomorrow if pain controlled, no hypoxia, stable on abx, and appropriate disposition    Entire encounter completed with assistance of professional  via iPad.        Interval History (Subjective):      Admitted overnight  for multiple rib fractures and possible pneumonia.  The patient reports moderate pain along the left side of his chest wall.  Denies any significant cough or shortness of breath.  Denies pain anywhere else.  Denies any recent nausea or vomiting.  Tolerating diet.                  Physical Exam:      Last Vital Signs:  BP (!) 158/70   Pulse 66   Temp 97.3  F (36.3  C) (Temporal)   Resp 18   Wt 96 kg (211 lb 10.3 oz)   SpO2 97%   BMI 28.98 kg/m        Intake/Output Summary (Last 24 hours) at 9/23/2021 1527  Last data filed at 9/23/2021 1448  Gross per 24 hour   Intake 1297 ml   Output 450 ml   Net 847 ml       Constitutional: Awake, NAD   Eyes: sclera white   HEENT:   MMM  Respiratory: Crackles left base, no wheeze, appears comfortable on room air  Cardiovascular: RRR.  1/6 systolic murmur  GI: non-tender, not distended, bowel sounds present  Skin: no rash    Musculoskeletal/extremities: atraumatic, no major deformities. No lower extremity edema  Neurologic: Alert, answering symptom based questions appropriately via   Psychiatric: calm, cooperative          Medications:      All current medications were reviewed with changes reflected in problem list.         Data:      All new lab and imaging data was reviewed.   Labs:  Recent Labs   Lab 09/23/21  1223 09/23/21  0735 09/23/21  0147 09/22/21 2116 09/22/21 2115   NA  --   --   --  139  --    POTASSIUM  --   --   --  4.3  --    CHLORIDE  --   --   --  107  --    CO2  --   --   --  24  --    ANIONGAP  --   --   --  8  --    * 159* 221* 416*   < >   BUN  --   --   --  17  --    CR  --   --   --  0.71  --    GFRESTIMATED  --   --   --  85  --    LUZ  --   --   --  8.9  --     < > = values in this interval not displayed.     Recent Labs   Lab 09/22/21 2116   WBC 8.7   HGB 16.3   HCT 47.5   MCV 96        Recent Labs   Lab 09/23/21  0523   COLOR Yellow   APPEARANCE Clear   URINEGLC >=1000*   URINEBILI Negative   URINEKETONE Negative   SG  1.030   UBLD Negative   URINEPH 5.5   PROTEIN 10 *   NITRITE Negative   LEUKEST Small*   RBCU 17*   WBCU 31*      Imaging:   Recent Results (from the past 24 hour(s))   Head CT w/o contrast    Narrative    EXAM: CT HEAD W/O CONTRAST, CT CERVICAL SPINE W/O CONTRAST  LOCATION: [facility]  DATE/TIME: 9/22/2021 9:54 PM    INDICATION: Loss of consciousness, falls, confusion.  COMPARISON: Brain MRI of 12/03/2019.  TECHNIQUE:   1) Routine CT Head without IV contrast. Multiplanar reformats. Dose reduction techniques were used.  2) Routine CT Cervical Spine without IV contrast. Multiplanar reformats. Dose reduction techniques were used.    FINDINGS:   HEAD CT:   INTRACRANIAL CONTENTS: No acute intracranial hemorrhage or mass effect. Bifrontal and right temporal encephalomalacia again noted. Moderate volume loss and presumed chronic small vessel ischemia. Small chronic lacunar infarcts in each basal ganglia again   noted. No CT evidence of acute infarct. Normal parenchymal attenuation. Normal ventricles and sulci.     VISUALIZED ORBITS/SINUSES/MASTOIDS: No intraorbital abnormality. No paranasal sinus mucosal disease. No middle ear or mastoid effusion.    BONES/SOFT TISSUES: No acute abnormality.    CERVICAL SPINE CT:   VERTEBRA: Patient is osteopenic. Alignment is normal. No acute fracture or posttraumatic subluxation. Moderate loss of disc space height C6-C7. Mild to moderate multilevel facet arthropathy.     CANAL/FORAMINA: No significant canal narrowing. Moderate to marked left C3-C4, C5-C6 and right C6-C7 degenerative foraminal narrowing.    PARASPINAL: No extraspinal abnormality. Visualized lung fields are clear.      Impression    IMPRESSION:  HEAD CT:  1. No acute intracranial abnormality.    2. Age-related and chronic ischemic changes again noted.    3. Bifrontal and right temporal encephalomalacia likely related to prior trauma.     CERVICAL SPINE CT:  1.  No CT evidence for acute fracture or  post traumatic subluxation.   Cervical spine CT w/o contrast    Narrative    EXAM: CT HEAD W/O CONTRAST, CT CERVICAL SPINE W/O CONTRAST  LOCATION: [facility]  DATE/TIME: 9/22/2021 9:54 PM    INDICATION: Loss of consciousness, falls, confusion.  COMPARISON: Brain MRI of 12/03/2019.  TECHNIQUE:   1) Routine CT Head without IV contrast. Multiplanar reformats. Dose reduction techniques were used.  2) Routine CT Cervical Spine without IV contrast. Multiplanar reformats. Dose reduction techniques were used.    FINDINGS:   HEAD CT:   INTRACRANIAL CONTENTS: No acute intracranial hemorrhage or mass effect. Bifrontal and right temporal encephalomalacia again noted. Moderate volume loss and presumed chronic small vessel ischemia. Small chronic lacunar infarcts in each basal ganglia again   noted. No CT evidence of acute infarct. Normal parenchymal attenuation. Normal ventricles and sulci.     VISUALIZED ORBITS/SINUSES/MASTOIDS: No intraorbital abnormality. No paranasal sinus mucosal disease. No middle ear or mastoid effusion.    BONES/SOFT TISSUES: No acute abnormality.    CERVICAL SPINE CT:   VERTEBRA: Patient is osteopenic. Alignment is normal. No acute fracture or posttraumatic subluxation. Moderate loss of disc space height C6-C7. Mild to moderate multilevel facet arthropathy.     CANAL/FORAMINA: No significant canal narrowing. Moderate to marked left C3-C4, C5-C6 and right C6-C7 degenerative foraminal narrowing.    PARASPINAL: No extraspinal abnormality. Visualized lung fields are clear.      Impression    IMPRESSION:  HEAD CT:  1. No acute intracranial abnormality.    2. Age-related and chronic ischemic changes again noted.    3. Bifrontal and right temporal encephalomalacia likely related to prior trauma.     CERVICAL SPINE CT:  1.  No CT evidence for acute fracture or post traumatic subluxation.   CT Chest/Abdomen/Pelvis w Contrast    Narrative    EXAM: CT CHEST/ABDOMEN/PELVIS W  CONTRAST  LOCATION: [facility]  DATE/TIME: 9/22/2021 9:55 PM    INDICATION: Syncope, falls, pain.  COMPARISON: CT from 12/03/2019.  TECHNIQUE: CT scan of the chest, abdomen, and pelvis was performed following injection of IV contrast. Multiplanar reformats were obtained. Dose reduction techniques were used.   CONTRAST: 100mL Isovue-370    FINDINGS:   LUNGS AND PLEURA: Scattered areas of subsegmental atelectasis in the lung bases. There are a few small pulmonary nodules in the inferior segment of the lingula and in the basal segments of the left lower lobe measuring up to 5 mm in the left lower lobe   on image 202 of series 5. No pneumothorax. Trace left pleural thickening and effusion.    MEDIASTINUM/AXILLAE: Heart size is normal. Densely calcified mitral annulus. Extensive coronary artery calcifications. No adenopathy.    CORONARY ARTERY CALCIFICATION: Severe.    HEPATOBILIARY: Cirrhotic liver morphology. Central portal veins are patent. Gallbladder is absent.    PANCREAS: Severe parenchymal atrophy. No mass or inflammatory change.    SPLEEN: Normal.    ADRENAL GLANDS: Normal.    KIDNEYS/BLADDER: Multiple cortical cysts requiring no follow-up. No hydronephrosis. Mild bladder wall thickening and trabeculation.    BOWEL: Normal appendix. No bowel obstruction or free air. Colonic diverticulosis.    LYMPH NODES: Normal.    VASCULATURE: Moderate to severe aortoiliac atherosclerotic change.    PELVIC ORGANS: Mild prostatomegaly.    MUSCULOSKELETAL: There are 6 nonrib-bearing lumbar-type vertebral bodies. There is a stable chronic compression deformity at L1. There are minimally displaced fractures of the left lateral fourth, fifth, sixth, seventh, and eighth ribs.      Impression    IMPRESSION:  1.  Acute fractures of the left fourth-eighth ribs. Trace left pleural thickening and effusion.    2.  Scattered areas of nodular infiltrate in the lingula and left lower lobe. Clustered nature of  infiltrate suggests infectious or inflammatory process. Follow-up chest CT could be performed as a conservative measure to ensure resolution if clinically   warranted.    3.  No solid organ injury.    4.  Cirrhosis.    5.  Colonic diverticulosis.    6.  Severe coronary artery disease.    7.  Mildly thick-walled and trabeculated urinary bladder potentially due to chronic outlet obstruction.   Echocardiogram Complete   Result Value    LVEF  60-65%    [facility]    [id number]  [id number]  [id number]  [id number]^CHIDI^PATRICIO^YOGI     [facility]  Echocardiography Laboratory  [address] [place] [address]     Name: CHANI ELENA  MRN: [id number]  : 1935  Study Date: 2021 10:47 AM  Age: 86 yrs  Gender: Male  Patient Location: [facility]  Reason For Study: Syncope  Ordering Physician: PATRICIO MURILLO  Performed By: Hattie Sepulveda     BSA: 2.2 m2  Height: 72 in  Weight: 211 lb  BP: 186/77 mmHg  ______________________________________________________________________________  Procedure  Complete Portable Echo Adult. Optison (NDC #[id number]) given intravenously.  ______________________________________________________________________________  Interpretation Summary     Left ventricular systolic function is normal.The visual ejection fraction is  60-65%.Proximal septal thickening is noted.Left ventricular hypertrophy:  asymmetric with no LVOT obstruction  The right ventricular systolic function is normal.  There is trace aortic regurgitation.Aortic valve sclerosis noted.No aortic  stenosis is present.  The ascending aorta is Mildly dilated.  The inferior vena cava was normal in size with preserved respiratory  variability.     Compared to echo dated 2017 no significant changes.  ______________________________________________________________________________  Left Ventricle  The left ventricle is normal in size. Left ventricular hypertrophy: asymmetric  with no LVOT  obstruction. Proximal septal thickening is noted. Diastolic  Doppler findings (E/E' ratio and/or other parameters) suggest left ventricular  filling pressures are increased. Left ventricular systolic function is normal.  The visual ejection fraction is 60-65%. No regional wall motion abnormalities  noted.     Right Ventricle  The right ventricle is normal size. The right ventricular systolic function is  normal.     Atria  The left atrium is mildly dilated. Right atrial size is normal. There is no  color Doppler evidence of an atrial shunt.     Mitral Valve  There is mild mitral annular calcification. There is trace mitral  regurgitation. There is no mitral valve stenosis.     Tricuspid Valve  There is trace tricuspid regurgitation. Right ventricular systolic pressure  could not be approximated due to inadequate tricuspid regurgitation.     Aortic Valve  Aortic valve sclerosis noted. There is trace aortic regurgitation. No aortic  stenosis is present.     Pulmonic Valve  The pulmonic valve is not well visualized. There is no pulmonic valvular  stenosis.     Vessels  Mild aortic root dilatation. 3.7 cm. The ascending aorta is Mildly dilated.  3.8 cm. The inferior vena cava was normal in size with preserved respiratory  variability.     Pericardium  There is no pericardial effusion.     ______________________________________________________________________________  MMode/2D Measurements & Calculations  IVSd: 1.4 cm  LVIDd: 4.4 cm  LVIDs: 3.0 cm  LVPWd: 0.76 cm     FS: 32.8 %  LV mass(C)d: 171.3 grams  LV mass(C)dI: 78.6 grams/m2  Ao root diam: 3.7 cm  asc Aorta Diam: 3.8 cm  LVOT diam: 2.1 cm  LVOT area: 3.5 cm2  LA Volume (BP): 78.2 ml  LA Volume Index (BP): 35.9 ml/m2  RWT: 0.34     Doppler Measurements & Calculations  MV E max jeevan: 83.6 cm/sec  MV A max jeevan: 117.8 cm/sec  MV E/A: 0.71  MV max P.1 mmHg  MV mean P.4 mmHg  MV V2 VTI: 31.9 cm  MV dec time: 0.29 sec  PA acc time: 0.14 sec  E/E' avg:  17.3  Lateral E/e': 15.7  Medial E/e': 18.8     ______________________________________________________________________________  Report approved by: Jodi Teague 09/23/2021 12:23 PM                 Armando Rajan MD

## 2021-09-23 NOTE — ED NOTES
Bed: ED24  Expected date: 9/22/21  Expected time: 8:47 PM  Means of arrival: Ambulance  Comments:  BV2-fall/rib pain

## 2021-09-23 NOTE — CONSULTS
Consult Date: 09/23/2021    SURGERY CONSULTATION    REASON FOR CONSULTATION:  Five left-sided rib fractures.    HISTORY OF PRESENT ILLNESS:  Mr. Huber is a [place]-speaking 86-year-old gentleman who was seen with an iPad  at the request of Dr. Lovett regarding left-sided chest wall injury.  He has a medical history notable for Alzheimer's dementia, coronary artery disease, hypertension, who had 2 episodes of a fall, which sounds like they may have been related to syncope.  He relates 1 event where he was walking in from the garage at his apartment, was going up some stairs and fell, and also had a similar episode falling out of bed not long after.  Because of the pain in his chest, the patient was brought to our ER for further evaluation and workup.  Trauma survey showed a normal head and neck CT. Subsequent chest CT showed 5 nondisplaced left-sided rib fractures without pneumothorax.  The patient reports no other sites of pain or discomfort at present.      PAST MEDICAL AND SURGICAL HISTORY:  This is reviewed from the chart and includes coronary artery disease, hypertension, prior CVA about 6 years ago, hyperlipidemia, prior MI in the same timeframe.  He has had a previous percutaneous stent.  He has had a prior cholecystectomy and prostatectomy.    CURRENT MEDICATIONS:  At the time of admission, the patient was on Seroquel, Tylenol as well as a baby aspirin, Coreg, Proscar, Neurontin, Cozaar, Glucophage, Remeron, MiraLax, and Flomax as well as Nitrostat p.r.n.    ALLERGIES:  THE PATIENT HAS 2 REPORTED DRUG ALLERGIES TO LISINOPRIL AND MORPHINE, THE LATTER OF WHICH CAUSES CONFUSION.    SOCIAL HISTORY:  The patient is . He and his wife live together in an apartment independently.  He was never a smoker.    PHYSICAL EXAMINATION:    VITAL SIGNS:  Mr. Huber is afebrile, current temperature is 97.3, pulse 66 with a blood pressure 158/70, respiratory rate is 18 with 97% saturations on room  air.  GENERAL:  He is alert and appropriate, in no acute distress.  He does have a slight slur to his speech/dysarthria.  He has no outward trauma to the occiput.  His cranial nerves are grossly intact.  He has no cervical spine tenderness.  CARDIOVASCULAR:  His heart sounds are regular.  LUNGS:  His breath sounds are clear.  CHEST:  He has some focal tenderness to the left chest wall with reduced excursion upon attempting deep inspiration.  There is no bruising/ecchymosis nor crepitus in the left chest wall.    ABDOMEN:  His abdomen is benign.  EXTREMITIES:  Without trauma or tenderness.    LABORATORY EXAM:  Notable for white blood cell count of 8,700, hemoglobin 16.3.  Electrolytes are normal.  Initial glucose was 416, currently 323.  Asymptomatic COVID swab was negative.    IMAGING:  The patient's head and neck CT were negative for acute trauma.  Chest CT was personally reviewed by me and shows 5 nondisplaced acute fractures of the ribs on the left laterally.  There is no underlying pneumothorax.  There do seem to be some infiltrates in the left lower lobe as well.    IMPRESSION AND RECOMMENDATIONS:  This is an 86-year-old gentleman who sustained at least 2 falls related to what may have been syncopal events, who now presents with left chest wall pain and multiple rib fractures without underlying pneumothorax.  He does appear to be with concomitant potential pneumonia as well.  He is currently nontoxic and has reasonable respiratory excursion.  He is being appropriately managed for his pain and pulmonary hygiene.  I agree with empiric treatment for pneumonia as well.  I will leave the additional medical workup for his syncope to the admitting hospitalist service.    Thank you very much for alerting us to his admission.    Jace Villalta MD        D: 2021   T: 2021   MT: GAURAV    Name:     CHANI ELENA  MRN:      [id number]        Account:      [id number]   :      1935            Consult Date: 09/23/2021     Document: [id number]    cc:  Oralia Forrest MD

## 2021-09-23 NOTE — PROGRESS NOTES
09/23/21 1000   Quick Adds   Type of Visit Initial PT Evaluation   Living Environment   People in home spouse   Current Living Arrangements apartment   Home Accessibility stairs to enter home   Number of Stairs, Main Entrance 5   Number of Stairs, Within Home, Primary 5   Stair Railings, Within Home, Primary railings safe and in good condition   Living Environment Comments lives with spouse who uses walker for mobility, pt states he has stairs, chart review indicates they live in apartment   Self-Care   Usual Activity Tolerance good   Current Activity Tolerance fair   Regular Exercise No   Equipment Currently Used at Home cane, straight;walker, standard   Activity/Exercise/Self-Care Comment chart reporting pt uses WW, pt denies, states his wife does, endorses using a cane   Disability/Function   Hearing Difficulty or Deaf yes   Patient's preferred means of communication    Describe hearing loss bilateral hearing loss   Use of hearing assistive devices bilateral hearing aids   Wear Glasses or Blind yes   Concentrating, Remembering or Making Decisions Difficulty yes   Difficulty Communicating no   Difficulty Eating/Swallowing yes   Eating/Swallowing eating   Walking or Climbing Stairs Difficulty yes   Walking or Climbing Stairs ambulation difficulty, requires equipment   Dressing/Bathing Difficulty no   Toileting issues no   Doing Errands Independently Difficulty (such as shopping) no   Fall history within last six months yes   Number of times patient has fallen within last six months 5  (within the past few days)   General Information   Onset of Illness/Injury or Date of Surgery 09/22/21   Referring Physician Jakub Lovett MD   Pertinent History of Current Problem (include personal factors and/or comorbidities that impact the POC) 86 year old male with PMH including Alzheimer's dementia, CAD, hypertension, history of CVA and TBI who presents with falls and weakness found to have multiple  left-sided rib fractures as well as evidence of pneumonia.     Existing Precautions/Restrictions fall   Cognition   Orientation Status (Cognition) oriented to;person;place   Affect/Mental Status (Cognition) confused;flat/blunted affect   Follows Commands (Cognition) follows one-step commands;50-74% accuracy;repetition of directions required;physical/tactile prompts required;verbal cues/prompting required   Safety Deficit (Cognition) moderate deficit;ability to follow commands;at risk behavior observed;insight into deficits/self-awareness;safety precautions follow-through/compliance;safety precautions awareness   Memory Deficit (Cognition) moderate deficit   Cognitive Status Comments [facility] Alzheimers dementia   Pain Assessment   Patient Currently in Pain Yes, see Vital Sign flowsheet   Posture    Posture Forward head position;Protracted shoulders   Range of Motion (ROM)   ROM Comment UE/ LE ROM is WFL   Strength   Strength Comments B HF 3+/5 remaining LE strength 4/5   Bed Mobility   Comment (Bed Mobility) mod A   Transfers   Transfer Safety Comments mod A w Ww   Gait/Stairs (Locomotion)   Comment (Gait/Stairs) mod A w/ WW 1+1 for equip/ipad, dec step length slow pace flexed posture, flexed knees through swing/stance   Balance   Balance Comments impaired, fall hx, need A and equip for mobility   Clinical Impression   Criteria for Skilled Therapeutic Intervention yes, treatment indicated   PT Diagnosis (PT) Weakness   Influenced by the following impairments impaired gait, dec indep w transfers   Functional limitations due to impairments impaired mobility   Clinical Presentation Evolving/Changing   Clinical Presentation Rationale [facility] clinical assessment   Clinical Decision Making (Complexity) low complexity   Therapy Frequency (PT) Daily   Predicted Duration of Therapy Intervention (days/wks) 4 days   Planned Therapy Interventions (PT) balance training;bed mobility training;gait training;strengthening;stair  training;transfer training   Anticipated Equipment Needs at Discharge (PT) walker, rolling   Risk & Benefits of therapy have been explained evaluation/treatment results reviewed;care plan/treatment goals reviewed;risks/benefits reviewed   PT Discharge Planning    PT Discharge Recommendation (DC Rec) Transitional Care Facility   PT Rationale for DC Rec Pt below reported baseline for mobility, pt poor historian, addition info gained from chart review, pt req mod A x 1 for mobility will benefiti from skilled intervention. Will need to confirm if there are stairs pt needs to negotiate to access home. At this point anticipate pt will need TCU placement for cont therapy, if family able to provide 24/7 support and there are no stairs home with home care could be considered, wife uses [facility] so she would be unable to provided assist pt will need. Recommend SW to consult family on needs for more supportive living situation for patient given Alzheimers.

## 2021-09-24 VITALS
DIASTOLIC BLOOD PRESSURE: 65 MMHG | HEART RATE: 58 BPM | TEMPERATURE: 97.6 F | OXYGEN SATURATION: 95 % | SYSTOLIC BLOOD PRESSURE: 147 MMHG | RESPIRATION RATE: 18 BRPM | BODY MASS INDEX: 28.98 KG/M2 | WEIGHT: 211.64 LBS

## 2021-09-24 LAB
ANION GAP SERPL CALCULATED.3IONS-SCNC: 8 MMOL/L (ref 3–14)
BUN SERPL-MCNC: 16 MG/DL (ref 7–30)
CALCIUM SERPL-MCNC: 8.4 MG/DL (ref 8.5–10.1)
CHLORIDE BLD-SCNC: 102 MMOL/L (ref 94–109)
CO2 SERPL-SCNC: 26 MMOL/L (ref 20–32)
CREAT SERPL-MCNC: 0.84 MG/DL (ref 0.66–1.25)
ERYTHROCYTE [DISTWIDTH] IN BLOOD BY AUTOMATED COUNT: 13.2 % (ref 10–15)
GFR SERPL CREATININE-BSD FRML MDRD: 79 ML/MIN/1.73M2
GLUCOSE BLD-MCNC: 354 MG/DL (ref 70–99)
GLUCOSE BLDC GLUCOMTR-MCNC: 226 MG/DL (ref 70–99)
HCT VFR BLD AUTO: 47.2 % (ref 40–53)
HGB BLD-MCNC: 16.1 G/DL (ref 13.3–17.7)
MCH RBC QN AUTO: 32.9 PG (ref 26.5–33)
MCHC RBC AUTO-ENTMCNC: 34.1 G/DL (ref 31.5–36.5)
MCV RBC AUTO: 96 FL (ref 78–100)
PLATELET # BLD AUTO: 163 10E3/UL (ref 150–450)
POTASSIUM BLD-SCNC: 5.2 MMOL/L (ref 3.4–5.3)
RBC # BLD AUTO: 4.9 10E6/UL (ref 4.4–5.9)
SODIUM SERPL-SCNC: 136 MMOL/L (ref 133–144)
WBC # BLD AUTO: 9.3 10E3/UL (ref 4–11)

## 2021-09-24 PROCEDURE — 85014 HEMATOCRIT: CPT | Performed by: INTERNAL MEDICINE

## 2021-09-24 PROCEDURE — 250N000013 HC RX MED GY IP 250 OP 250 PS 637: Performed by: INTERNAL MEDICINE

## 2021-09-24 PROCEDURE — 80048 BASIC METABOLIC PNL TOTAL CA: CPT | Performed by: INTERNAL MEDICINE

## 2021-09-24 PROCEDURE — 250N000009 HC RX 250: Performed by: INTERNAL MEDICINE

## 2021-09-24 PROCEDURE — 999N000157 HC STATISTIC RCP TIME EA 10 MIN

## 2021-09-24 PROCEDURE — 99239 HOSP IP/OBS DSCHRG MGMT >30: CPT | Performed by: INTERNAL MEDICINE

## 2021-09-24 PROCEDURE — 250N000011 HC RX IP 250 OP 636

## 2021-09-24 PROCEDURE — 94640 AIRWAY INHALATION TREATMENT: CPT

## 2021-09-24 PROCEDURE — 94640 AIRWAY INHALATION TREATMENT: CPT | Mod: 76

## 2021-09-24 PROCEDURE — 36415 COLL VENOUS BLD VENIPUNCTURE: CPT | Performed by: INTERNAL MEDICINE

## 2021-09-24 RX ORDER — IBUPROFEN 400 MG/1
400 TABLET, FILM COATED ORAL EVERY 8 HOURS PRN
Qty: 50 TABLET | Refills: 0 | Status: ON HOLD | OUTPATIENT
Start: 2021-09-24 | End: 2023-05-30

## 2021-09-24 RX ORDER — TRAMADOL HYDROCHLORIDE 50 MG/1
25 TABLET ORAL EVERY 6 HOURS PRN
Qty: 15 TABLET | Refills: 0 | Status: SHIPPED | OUTPATIENT
Start: 2021-09-24 | End: 2021-09-27

## 2021-09-24 RX ORDER — HYDROMORPHONE HCL IN WATER/PF 6 MG/30 ML
0.2 PATIENT CONTROLLED ANALGESIA SYRINGE INTRAVENOUS EVERY 4 HOURS PRN
Status: DISCONTINUED | OUTPATIENT
Start: 2021-09-24 | End: 2021-09-24 | Stop reason: HOSPADM

## 2021-09-24 RX ORDER — ACETAMINOPHEN 325 MG/1
975 TABLET ORAL EVERY 6 HOURS PRN
Qty: 100 TABLET | Refills: 0 | Status: ON HOLD | OUTPATIENT
Start: 2021-09-24 | End: 2023-05-30

## 2021-09-24 RX ORDER — IBUPROFEN 400 MG/1
400 TABLET, FILM COATED ORAL EVERY 6 HOURS PRN
Status: DISCONTINUED | OUTPATIENT
Start: 2021-09-24 | End: 2021-09-24 | Stop reason: HOSPADM

## 2021-09-24 RX ORDER — CEFDINIR 300 MG/1
300 CAPSULE ORAL 2 TIMES DAILY
Qty: 14 CAPSULE | Refills: 0 | Status: SHIPPED | OUTPATIENT
Start: 2021-09-24 | End: 2021-10-01

## 2021-09-24 RX ORDER — IPRATROPIUM BROMIDE AND ALBUTEROL SULFATE 2.5; .5 MG/3ML; MG/3ML
3 SOLUTION RESPIRATORY (INHALATION)
Status: DISCONTINUED | OUTPATIENT
Start: 2021-09-24 | End: 2021-09-24 | Stop reason: HOSPADM

## 2021-09-24 RX ORDER — HALOPERIDOL 5 MG/ML
INJECTION INTRAMUSCULAR
Status: COMPLETED
Start: 2021-09-24 | End: 2021-09-24

## 2021-09-24 RX ORDER — AZITHROMYCIN 250 MG/1
250 TABLET, FILM COATED ORAL DAILY
Qty: 2 TABLET | Refills: 0 | Status: SHIPPED | OUTPATIENT
Start: 2021-09-25 | End: 2021-09-27

## 2021-09-24 RX ORDER — HALOPERIDOL 0.5 MG/1
.5-1 TABLET ORAL EVERY 6 HOURS PRN
Status: DISCONTINUED | OUTPATIENT
Start: 2021-09-24 | End: 2021-09-24

## 2021-09-24 RX ORDER — HALOPERIDOL 5 MG/ML
2 INJECTION INTRAMUSCULAR EVERY 6 HOURS PRN
Status: DISCONTINUED | OUTPATIENT
Start: 2021-09-24 | End: 2021-09-24 | Stop reason: HOSPADM

## 2021-09-24 RX ADMIN — HALOPERIDOL 2 MG: 5 INJECTION INTRAMUSCULAR at 02:24

## 2021-09-24 RX ADMIN — HALOPERIDOL LACTATE 2 MG: 5 INJECTION, SOLUTION INTRAMUSCULAR at 02:24

## 2021-09-24 RX ADMIN — IPRATROPIUM BROMIDE AND ALBUTEROL SULFATE 3 ML: .5; 3 SOLUTION RESPIRATORY (INHALATION) at 11:58

## 2021-09-24 RX ADMIN — METFORMIN HYDROCHLORIDE 500 MG: 500 TABLET, FILM COATED ORAL at 07:57

## 2021-09-24 RX ADMIN — AZITHROMYCIN MONOHYDRATE 250 MG: 250 TABLET ORAL at 07:57

## 2021-09-24 RX ADMIN — GABAPENTIN 300 MG: 300 CAPSULE ORAL at 07:57

## 2021-09-24 RX ADMIN — ACETAMINOPHEN 975 MG: 325 TABLET, FILM COATED ORAL at 07:57

## 2021-09-24 RX ADMIN — TRAMADOL HYDROCHLORIDE 25 MG: 50 TABLET ORAL at 10:15

## 2021-09-24 RX ADMIN — TAMSULOSIN HYDROCHLORIDE 0.4 MG: 0.4 CAPSULE ORAL at 07:57

## 2021-09-24 RX ADMIN — IBUPROFEN 400 MG: 400 TABLET, FILM COATED ORAL at 11:49

## 2021-09-24 RX ADMIN — CARVEDILOL 25 MG: 25 TABLET, FILM COATED ORAL at 07:57

## 2021-09-24 RX ADMIN — FINASTERIDE 5 MG: 5 TABLET, FILM COATED ORAL at 07:57

## 2021-09-24 RX ADMIN — LOSARTAN POTASSIUM 50 MG: 50 TABLET, FILM COATED ORAL at 07:56

## 2021-09-24 RX ADMIN — ACETAMINOPHEN 975 MG: 325 TABLET, FILM COATED ORAL at 13:49

## 2021-09-24 RX ADMIN — ASPIRIN 81 MG: 81 TABLET ORAL at 07:57

## 2021-09-24 ASSESSMENT — ACTIVITIES OF DAILY LIVING (ADL)
ADLS_ACUITY_SCORE: 28

## 2021-09-24 NOTE — DISCHARGE SUMMARY
[facility]  Discharge Summary  Name: Adam Huber    MRN: [id number]  YOB: 1935    Age: 86 year old  Date of Discharge:  9/24/2021  2:21 PM  Date of Admission: 9/22/2021  Primary Care Provider: Oralia Forrest  Discharge Physician:  Armando Rajan MD  Discharging Service:  Hospitalist      Discharge Diagnoses:  Fall  Left-sided 4-eighth rib fractures  CAP  Possible UTI  Alzheimer's dementia  Type II DM, hyperglycemia  HTN  History of CVA  History of TBI  BPH     Hospital Course:  Summary of Stay:   Adam Huber is a 86 year old [place]i-speaking male with history of Alzheimer's dementia, type II DM, HTN, CVA, TBI, and BPH who was admitted on 9/22/2021 after a fall.  Possible loss of consciousness.  Hypertensive in the ER otherwise vitally stable.  CT of the chest/ab/pelvis shows continuous rib fracture on the left 4-8th along with nodular opacities in the lingula and left lower lobe which could be infectious.  He was started on IV ceftriaxone and azithromycin for pneumonia.  He was provided IV fluids.  Initially hyperglycemic above 400 despite A1c 7.5.  PT has been consulted, initial recommendations for TCU.  Working on pain control.  Not hypoxic currently.  General surgery consulted as a part of trauma evaluation.  Patient had issues with agitation overnight, sundowning.  Back to baseline during the day today.  Remains on room air.  Ambulated much better in the maki today.  Patient wants to go home.  Spoke with son Mario.  He agrees to have the patient go home to his spouse.  He reports they have people coming in already to help and home care RN/PT/OT/SW has been ordered for discharge.  Mario confirm that his father has not done well in TCU's in the past due to sundowning and would be better served at home.  Prescribed azithromycin and cefdinir for his pneumonia.  Also prescribed acetaminophen, ibuprofen as needed, and small supply of tramadol as needed at home.  Patient spouse  manages his medications.  They report he has a walker at home and they will encourage him to use it.     Problem List/Assessment and Plan:   Multiple falls, possible syncopal episodes: In the setting of deconditioning, severe hyperglycemia which may have led to dehydration.  He also has a history of coronary artery disease and is at risk for arrhythmias among other cardiac complications.  Few very short episodes of PSVT on telemetry lasting only a few seconds.  Troponin undetectable.  TTE shows preserved EF and no etiology for any potential syncope.  Received IV fluids for hydration.  -PT consult, initial recommendation TCU otherwise home with 24-hour supervision and home therapy  -Patient has not done well in TCU before in part due to sundowning.  Discussed with patient's son at length.  He will go home with home care including RN/PT/OT/SW and they will encourage him to use his walker.     Left-sided rib fractures: Ribs 4 through 8.    No pneumothorax.  We will schedule Tylenol and have tramadol available as needed.  Should be encouraged to do incentive spirometry.  Treating for pneumonia as below.  General surgery consulted due to continuous rib fractures for trauma evaluation.  Ambulating in the maki on room air without hypoxia.  Acetaminophen, ibuprofen, tramadol as needed for pain at home.     CAP:  CT chest shows scattered areas of nodular infiltrate in the lingula and left lower lobe possible infectious etiology, community-acquired pneumonia.  He does not have a leukocytosis.  He may have a pulmonary contusion as alternate explanation.  Temp at 99 range on admission, not hypoxic currently.  COVID-19 PCR is negative.  -Received IV ceftriaxone and azithromycin.  Oral cefdinir and azithromycin on discharge  -Follow up imaging recommended by radiology     Possible UTI: 31 WBCs with small LE and 17 RBCs.  Denies any dysuria or frequency.  Staph epidermidis grew in the urine.  Received ceftriaxone here, going home  on cefdinir.     Type 2 diabetes with severe hyperglycemia:  Blood glucose 400 on admission.  he is on Metformin 500 mg daily at baseline.  A1c is only 7.5 so this degree of hyperglycemia surprising unless elevated due to stress from his fractures and possible pneumonia.  Resume Metformin at home and follow-up with primary care doctor.     Alzheimer's dementia: Chronically on Remeron and Seroquel now, no longer on Zyprexa.  Home medications resumed.  Had some agitation overnight, likely sundowning.  Back to baseline mental status during the day.     HTN: Hypertensive here likely exacerbated by pain.  Resumed carvedilol 25 mg twice daily and losartan 50 mg daily.     History of CVA, TBI: Reportedly suffered strokes in 1999 and 2003 with some baseline mild residual right-sided weakness.  Speech reportedly slow at baseline.  Continue home aspirin.     BPH: Continue home meds including Flomax and finasteride.  Consider bladder scans if he is not voiding.  His bladder does appear trabeculated on CT scan which per radiology may suggest chronic outflow obstruction.     Discharge Disposition:  Discharged to home     Allergies:  Allergies   Allergen Reactions     Lisinopril      Morphine Other (See Comments)     confusion        Discharge Medications:   Discharge Medication List as of 9/24/2021  1:25 PM      START taking these medications    Details   azithromycin (ZITHROMAX) 250 MG tablet Take 1 tablet (250 mg) by mouth daily for 2 days, Disp-2 tablet, R-0, E-Prescribe      cefdinir (OMNICEF) 300 MG capsule Take 1 capsule (300 mg) by mouth 2 times daily for 7 days, Disp-14 capsule, R-0, E-Prescribe      ibuprofen (ADVIL/MOTRIN) 400 MG tablet Take 1 tablet (400 mg) by mouth every 8 hours as needed for moderate pain, Disp-50 tablet, R-0, E-Prescribe      traMADol (ULTRAM) 50 MG tablet Take 0.5 tablets (25 mg) by mouth every 6 hours as needed for severe pain, Disp-15 tablet, R-0, Local Print         CONTINUE these medications  which have CHANGED    Details   acetaminophen (TYLENOL) 325 MG tablet Take 3 tablets (975 mg) by mouth every 6 hours as needed for mild pain, Disp-100 tablet, R-0, E-Prescribe         CONTINUE these medications which have NOT CHANGED    Details   aspirin 81 MG EC tablet Take one tablet daily, Disp-90 tablet, R-0, E-Prescribe      carvedilol (COREG) 25 MG tablet Take 1 tablet (25 mg) by mouth 2 times daily (with meals), Disp-180 tablet, R-3, E-Prescribe      finasteride (PROSCAR) 5 MG tablet Take 5 mg by mouth daily, Historical      gabapentin (NEURONTIN) 300 MG capsule Take 300 mg by mouth 2 times daily, Historical      losartan (COZAAR) 50 MG tablet Take 50 mg by mouth daily, Historical      metFORMIN (GLUCOPHAGE) 500 MG tablet Take 500 mg by mouth 2 times daily (with meals), Historical      mirtazapine (REMERON) 15 MG tablet Take 15 mg by mouth At Bedtime, Historical      polyethylene glycol (MIRALAX/GLYCOLAX) powder Take 1 capful by mouth daily as needed , Historical      !! QUEtiapine (SEROQUEL) 25 MG tablet Take 25 mg by mouth At Bedtime, Historical      !! QUEtiapine (SEROQUEL) 25 MG tablet Take 25 mg by mouth daily as needed In addition to scheduled bedtime dose., Historical      tamsulosin (FLOMAX) 0.4 MG capsule Take 0.4 mg by mouth daily, Historical      nitroglycerin (NITROSTAT) 0.4 MG SL tablet Place 1 tablet (0.4 mg) under the tongue every 5 minutes as needed for chest pain, Disp-25 tablet, R-1, E-Prescribe       !! - Potential duplicate medications found. Please discuss with provider.      STOP taking these medications       benzonatate (TESSALON) 200 MG capsule Comments:   Reason for Stopping:                Condition on Discharge:  Discharge condition: Stable   Discharge vitals: Blood pressure (!) 147/65, pulse 58, temperature 97.6  F (36.4  C), temperature source Temporal, resp. rate 18, weight 96 kg (211 lb 10.3 oz), SpO2 95 %.   Code status on discharge: DNR     History of Illness:  See detailed  admission note for full details.    Physical Exam:  Blood pressure (!) 147/65, pulse 58, temperature 97.6  F (36.4  C), temperature source Temporal, resp. rate 18, weight 96 kg (211 lb 10.3 oz), SpO2 95 %.  Wt Readings from Last 1 Encounters:   09/22/21 96 kg (211 lb 10.3 oz)       Constitutional: Awake, NAD   Eyes: sclera white   HEENT:   MMM  Respiratory:  Few crackles left base, no wheeze, appears comfortable on room air  Cardiovascular: RRR.  1/6 systolic murmur  GI: non-tender, not distended, bowel sounds present  Skin: no rash    Musculoskeletal/extremities: Mild left chest wall tenderness to palpation, no lower extremity edema  Neurologic: Alert, answering symptom based questions appropriately via   Psychiatric: calm, cooperative     Procedures other than Imaging:  None     Imaging:  Results for orders placed or performed during the hospital encounter of 09/22/21   Head CT w/o contrast    Narrative    EXAM: CT HEAD W/O CONTRAST, CT CERVICAL SPINE W/O CONTRAST  LOCATION: [facility]  DATE/TIME: 9/22/2021 9:54 PM    INDICATION: Loss of consciousness, falls, confusion.  COMPARISON: Brain MRI of 12/03/2019.  TECHNIQUE:   1) Routine CT Head without IV contrast. Multiplanar reformats. Dose reduction techniques were used.  2) Routine CT Cervical Spine without IV contrast. Multiplanar reformats. Dose reduction techniques were used.    FINDINGS:   HEAD CT:   INTRACRANIAL CONTENTS: No acute intracranial hemorrhage or mass effect. Bifrontal and right temporal encephalomalacia again noted. Moderate volume loss and presumed chronic small vessel ischemia. Small chronic lacunar infarcts in each basal ganglia again   noted. No CT evidence of acute infarct. Normal parenchymal attenuation. Normal ventricles and sulci.     VISUALIZED ORBITS/SINUSES/MASTOIDS: No intraorbital abnormality. No paranasal sinus mucosal disease. No middle ear or mastoid effusion.    BONES/SOFT TISSUES: No acute  abnormality.    CERVICAL SPINE CT:   VERTEBRA: Patient is osteopenic. Alignment is normal. No acute fracture or posttraumatic subluxation. Moderate loss of disc space height C6-C7. Mild to moderate multilevel facet arthropathy.     CANAL/FORAMINA: No significant canal narrowing. Moderate to marked left C3-C4, C5-C6 and right C6-C7 degenerative foraminal narrowing.    PARASPINAL: No extraspinal abnormality. Visualized lung fields are clear.      Impression    IMPRESSION:  HEAD CT:  1. No acute intracranial abnormality.    2. Age-related and chronic ischemic changes again noted.    3. Bifrontal and right temporal encephalomalacia likely related to prior trauma.     CERVICAL SPINE CT:  1.  No CT evidence for acute fracture or post traumatic subluxation.   Cervical spine CT w/o contrast    Narrative    EXAM: CT HEAD W/O CONTRAST, CT CERVICAL SPINE W/O CONTRAST  LOCATION: [facility]  DATE/TIME: 9/22/2021 9:54 PM    INDICATION: Loss of consciousness, falls, confusion.  COMPARISON: Brain MRI of 12/03/2019.  TECHNIQUE:   1) Routine CT Head without IV contrast. Multiplanar reformats. Dose reduction techniques were used.  2) Routine CT Cervical Spine without IV contrast. Multiplanar reformats. Dose reduction techniques were used.    FINDINGS:   HEAD CT:   INTRACRANIAL CONTENTS: No acute intracranial hemorrhage or mass effect. Bifrontal and right temporal encephalomalacia again noted. Moderate volume loss and presumed chronic small vessel ischemia. Small chronic lacunar infarcts in each basal ganglia again   noted. No CT evidence of acute infarct. Normal parenchymal attenuation. Normal ventricles and sulci.     VISUALIZED ORBITS/SINUSES/MASTOIDS: No intraorbital abnormality. No paranasal sinus mucosal disease. No middle ear or mastoid effusion.    BONES/SOFT TISSUES: No acute abnormality.    CERVICAL SPINE CT:   VERTEBRA: Patient is osteopenic. Alignment is normal. No acute fracture or posttraumatic  subluxation. Moderate loss of disc space height C6-C7. Mild to moderate multilevel facet arthropathy.     CANAL/FORAMINA: No significant canal narrowing. Moderate to marked left C3-C4, C5-C6 and right C6-C7 degenerative foraminal narrowing.    PARASPINAL: No extraspinal abnormality. Visualized lung fields are clear.      Impression    IMPRESSION:  HEAD CT:  1. No acute intracranial abnormality.    2. Age-related and chronic ischemic changes again noted.    3. Bifrontal and right temporal encephalomalacia likely related to prior trauma.     CERVICAL SPINE CT:  1.  No CT evidence for acute fracture or post traumatic subluxation.   CT Chest/Abdomen/Pelvis w Contrast    Narrative    EXAM: CT CHEST/ABDOMEN/PELVIS W CONTRAST  LOCATION: [facility]  DATE/TIME: 9/22/2021 9:55 PM    INDICATION: Syncope, falls, pain.  COMPARISON: CT from 12/03/2019.  TECHNIQUE: CT scan of the chest, abdomen, and pelvis was performed following injection of IV contrast. Multiplanar reformats were obtained. Dose reduction techniques were used.   CONTRAST: 100mL Isovue-370    FINDINGS:   LUNGS AND PLEURA: Scattered areas of subsegmental atelectasis in the lung bases. There are a few small pulmonary nodules in the inferior segment of the lingula and in the basal segments of the left lower lobe measuring up to 5 mm in the left lower lobe   on image 202 of series 5. No pneumothorax. Trace left pleural thickening and effusion.    MEDIASTINUM/AXILLAE: Heart size is normal. Densely calcified mitral annulus. Extensive coronary artery calcifications. No adenopathy.    CORONARY ARTERY CALCIFICATION: Severe.    HEPATOBILIARY: Cirrhotic liver morphology. Central portal veins are patent. Gallbladder is absent.    PANCREAS: Severe parenchymal atrophy. No mass or inflammatory change.    SPLEEN: Normal.    ADRENAL GLANDS: Normal.    KIDNEYS/BLADDER: Multiple cortical cysts requiring no follow-up. No hydronephrosis. Mild bladder wall  thickening and trabeculation.    BOWEL: Normal appendix. No bowel obstruction or free air. Colonic diverticulosis.    LYMPH NODES: Normal.    VASCULATURE: Moderate to severe aortoiliac atherosclerotic change.    PELVIC ORGANS: Mild prostatomegaly.    MUSCULOSKELETAL: There are 6 nonrib-bearing lumbar-type vertebral bodies. There is a stable chronic compression deformity at L1. There are minimally displaced fractures of the left lateral fourth, fifth, sixth, seventh, and eighth ribs.      Impression    IMPRESSION:  1.  Acute fractures of the left fourth-eighth ribs. Trace left pleural thickening and effusion.    2.  Scattered areas of nodular infiltrate in the lingula and left lower lobe. Clustered nature of infiltrate suggests infectious or inflammatory process. Follow-up chest CT could be performed as a conservative measure to ensure resolution if clinically   warranted.    3.  No solid organ injury.    4.  Cirrhosis.    5.  Colonic diverticulosis.    6.  Severe coronary artery disease.    7.  Mildly thick-walled and trabeculated urinary bladder potentially due to chronic outlet obstruction.   Echocardiogram Complete     Value    LVEF  60-65%    Narrative    [id number]  [id number]  [id number]  [id number]^[email or web address]^PATRICIO^YOGI     [facility]  Echocardiography Laboratory  [address] [place] [address]     Name: CHANI ELENA  MRN: [id number]  : 1935  Study Date: 2021 10:47 AM  Age: 86 yrs  Gender: Male  Patient Location: [facility]  Reason For Study: Syncope  Ordering Physician: PATRICIO MURILLO  Performed By: Hattie Sepulveda     BSA: 2.2 m2  Height: 72 in  Weight: 211 lb  BP: 186/77 mmHg  ______________________________________________________________________________  Procedure  Complete Portable Echo Adult. Optison (NDC #[id number]) given intravenously.  ______________________________________________________________________________  Interpretation Summary     Left  ventricular systolic function is normal.The visual ejection fraction is  60-65%.Proximal septal thickening is noted.Left ventricular hypertrophy:  asymmetric with no LVOT obstruction  The right ventricular systolic function is normal.  There is trace aortic regurgitation.Aortic valve sclerosis noted.No aortic  stenosis is present.  The ascending aorta is Mildly dilated.  The inferior vena cava was normal in size with preserved respiratory  variability.     Compared to echo dated 07/14/2017 no significant changes.  ______________________________________________________________________________  Left Ventricle  The left ventricle is normal in size. Left ventricular hypertrophy: asymmetric  with no LVOT obstruction. Proximal septal thickening is noted. Diastolic  Doppler findings (E/E' ratio and/or other parameters) suggest left ventricular  filling pressures are increased. Left ventricular systolic function is normal.  The visual ejection fraction is 60-65%. No regional wall motion abnormalities  noted.     Right Ventricle  The right ventricle is normal size. The right ventricular systolic function is  normal.     Atria  The left atrium is mildly dilated. Right atrial size is normal. There is no  color Doppler evidence of an atrial shunt.     Mitral Valve  There is mild mitral annular calcification. There is trace mitral  regurgitation. There is no mitral valve stenosis.     Tricuspid Valve  There is trace tricuspid regurgitation. Right ventricular systolic pressure  could not be approximated due to inadequate tricuspid regurgitation.     Aortic Valve  Aortic valve sclerosis noted. There is trace aortic regurgitation. No aortic  stenosis is present.     Pulmonic Valve  The pulmonic valve is not well visualized. There is no pulmonic valvular  stenosis.     Vessels  Mild aortic root dilatation. 3.7 cm. The ascending aorta is Mildly dilated.  3.8 cm. The inferior vena cava was normal in size with preserved  respiratory  variability.     Pericardium  There is no pericardial effusion.     ______________________________________________________________________________  MMode/2D Measurements & Calculations  IVSd: 1.4 cm  LVIDd: 4.4 cm  LVIDs: 3.0 cm  LVPWd: 0.76 cm     FS: 32.8 %  LV mass(C)d: 171.3 grams  LV mass(C)dI: 78.6 grams/m2  Ao root diam: 3.7 cm  asc Aorta Diam: 3.8 cm  LVOT diam: 2.1 cm  LVOT area: 3.5 cm2  LA Volume (BP): 78.2 ml  LA Volume Index (BP): 35.9 ml/m2  RWT: 0.34     Doppler Measurements & Calculations  MV E max jeevan: 83.6 cm/sec  MV A max jeevan: 117.8 cm/sec  MV E/A: 0.71  MV max P.1 mmHg  MV mean P.4 mmHg  MV V2 VTI: 31.9 cm  MV dec time: 0.29 sec  PA acc time: 0.14 sec  E/E' av.3  Lateral E/e': 15.7  Medial E/e': 18.8     ______________________________________________________________________________  Report approved by: Jodi Teague 2021 12:23 PM                Consultations:  Consultation during this admission received from surgery for trauma evaluation.       Recent Lab Results:  Recent Labs   Lab 21  11121  2116   WBC 9.3 8.7   HGB 16.1 16.3   HCT 47.2 47.5   MCV 96 96    164     Recent Labs   Lab 21  1110 21  0818 0921  0147 21     --   --   --  139   POTASSIUM 5.2  --   --   --  4.3   CHLORIDE 102  --   --   --  107   CO2 26  --   --   --  24   ANIONGAP 8  --   --   --  8   * 226* 241*   < > 416*   BUN 16  --   --   --  17   CR 0.84  --   --   --  0.71   GFRESTIMATED 79  --   --   --  85   LUZ 8.4*  --   --   --  8.9    < > = values in this interval not displayed.     No results for input(s): CULT in the last 168 hours.  Recent Labs   Lab 21   TROPONIN <0.015     Recent Labs   Lab 21  0523   COLOR Yellow   APPEARANCE Clear   URINEGLC >=1000*   URINEBILI Negative   URINEKETONE Negative   SG 1.030   UBLD Negative   URINEPH 5.5   PROTEIN 10 *   NITRITE Negative   LEUKEST Small*    RBCU 17*   WBCU 31*     A1c 7.5       Pending Results:    Unresulted Labs Ordered in the Past 30 Days of this Admission     Date and Time Order Name Status Description    9/23/2021  5:52 AM Urine Culture Preliminary          These results will be followed up by patient's primary care provider.    Discharge Instructions and Follow-Up:   Discharge Procedure Orders   Home care nursing referral   Referral Priority: Routine Referral Type: Home Health Therapies & Aides   Number of Visits Requested: 1     Home Care PT Referral for Hospital Discharge   Referral Priority: Routine Referral Type: Home Health Therapies & Aides   Number of Visits Requested: 1     Home Care OT Referral for Hospital Discharge   Referral Priority: Routine Referral Type: Consultation   Number of Visits Requested: 1     Home Care Social Service Referral for Hospital Discharge   Referral Priority: Routine Referral Type: Consultation   Number of Visits Requested: 1     Reason for your hospital stay   Order Comments: You were hospitalized following a fall resulting in 5 rib fractures on the left.  These will heal in time and you can use acetaminophen and ibuprofen as needed for pain.  Additionally, tramadol has been prescribed for pain, do not take more than prescribed.  You will finish a course of antibiotics for pneumonia.  It is important to continue to use a walker to prevent future falls.   Home care therapy has been ordered to work on strengthening and for home safety.     Follow-up and recommended labs and tests    Order Comments: Follow up with primary care provider, Oralia Forrest, within 7 days for hospital follow- up.  No follow up labs or test are needed.     Activity   Order Comments: Your activity upon discharge: activity as tolerated with a walker     Order Specific Question Answer Comments   Is discharge order? Yes      Incentive Spirometry   Order Comments: Continue to use incentive spirometer 6 times a day For 1 month     MD face to  face encounter   Order Comments: Documentation of Face to Face and Certification for Home Health Services    I certify that patient: Adam Huber is under my care and that I, or a nurse practitioner or physician's assistant working with me, had a face-to-face encounter that meets the physician face-to-face encounter requirements with this patient on: 9/24/2021.    This encounter with the patient was in whole, or in part, for the following medical condition, which is the primary reason for home health care: alzheimer's dementia, physical deconditioning with falls, multiple rib fractures.    I certify that, based on my findings, the following services are medically necessary home health services: Nursing, Occupational Therapy, Physical Therapy, and Social Work.    My clinical findings support the need for the above services because: Nurse is needed: To provide assessment and oversight required in the home to assure adherence to the medical plan due to: dementia and recent fall with rib fractures, home safety evaluation, incentive spirometry use.., Occupational Therapy Services are needed to assess and treat cognitive ability and address ADL safety due to impairment in cognition from dementia, deconditioning and falls., Physical Therapy Services are needed to assess and treat the following functional impairments: deconditioning and falls resulting in rib fractures., and Social work is needed due to advancing dementia at home with spouse and having falls    Further, I certify that my clinical findings support that this patient is homebound (i.e. absences from home require considerable and taxing effort and are for medical reasons or Gn[employer] services or infrequently or of short duration when for other reasons) because: Requires assistance of another person or specialized equipment to access medical services because patient: Is prone to wander/get lost without assistance...    Based on the above findings. I certify  that this patient is confined to the home and needs intermittent skilled nursing care, physical therapy and/or speech therapy.  The patient is under my care, and I have initiated the establishment of the plan of care.  This patient will be followed by a physician who will periodically review the plan of care.  Physician/Provider to provide follow up care: Oralia Forrest    Attending hospital physician (the Medicare certified [facility] provider): Armando Rajan MD  Physician Signature: See electronic signature associated with these discharge orders.  Date: 9/24/2021     No CPR- Pre-arrest intubation OK     Order Specific Question Answer Comments   Code status determined by: Discussion with patient/ legal decision maker      Diet   Order Comments: Follow this diet upon discharge:  Regular Diet Adult     Order Specific Question Answer Comments   Is discharge order? Yes          I, Armando Rajan MD, personally saw the patient today and spent greater than 30 minutes discharging this patient.    Armando Rajan MD

## 2021-09-24 NOTE — CONSULTS
Care Management Discharge Note    Discharge Date: 09/24/2021       Discharge Disposition: Home Care    Discharge Services: HC RN/PT/OT/SW     Discharge DME:      Discharge Transportation: family or friend will provide      Persons Notified of Discharge Plans: Pt's sonMario  Patient/Family in Agreement with the Plan: yes    Handoff Referral Completed: No    Additional Information:  Pt discharging today with family. Home care RN/PT/OT/SW has been ordered. CM working on an excepting facility. Waiting for hear back from [facility]. CM also spoke to pts son Mario about the discharge plan and that he should be the point of contact for the home care agency. Mario is wondering what time his father can discharge, I told him I would have the bedside nurse call him when he is ready to be picked up.    Will follow up when accepting home care agency is found.     Joyce Pham RN, BSN CTS  Care Coordinator  [facility]   [phone]    Update 1313: [facility] will be able to service this pt as early as Monday or Tuesday of next. They will call Mario to arranged the intial visit.

## 2021-09-24 NOTE — PROVIDER NOTIFICATION
Paged provider re: pt refusing to wear tele, woke up agitated and removed tele. Pt tried to hit and bite staff when attempting to get tele box from patient. Used  to try to communicate w/pt but pt was not making sense at times. Family was called but did not help. Pt refused to take medication. Several staff were able to get pt safely back in bed and get tele box. Called tele tech, pt has be SR/SB 50-60's, nothing abnormal, no PVC's.     Spoke w/Dr. Mas, tele will be discontinued.

## 2021-09-24 NOTE — PROGRESS NOTES
Patient agitated and confused around 1730, trying to pull at lines and setting off alarms trying to get OOB. RN and support stayed with patient. Was incontinent of urine x1. Given meal tray and chatted with him and he noticeable relaxed. Patient has remains relaxed with no further interventions. Using Nigerien . Blood sugars remain elevated, given sliding scale insulin and scheduled metformin. Bps remain elevated. Will continue to monitor and follow plan of care.    Irene Juan RN on 9/23/2021 at 8:19 PM

## 2021-09-24 NOTE — DISCHARGE INSTRUCTIONS
Your home care referral was sent to [facility]. If you haven't heard from them within the next 24-48 hours,  Please call them at [phone]

## 2021-09-24 NOTE — PROGRESS NOTES
"Cross Cover    Patient was extremely agitated, he took his telemetry off and it took 4 nurses to get the tele box back from him.  He was sinus rhythm, sinus waqas in the 50s-60s.  I will discontinue the order for telemetry as I think this will lead to further agitation at this time.    Luz Mas MD     Addendum:    Code 21 called.  Patient was extremely agitated.  He was trying to get out of bed.  He developed hypoxia but was trying to pull the oxygen off repeatedly.  I evaluated him and he initially was quite agitated but did calm (had received Haldol shortly before I saw him).  He was hypoxic and had course breath sounds most notable in the upper airways.  Via  iPad he did apparently state \"I am dying, I am dying, help me, help me\".  He did respond yes when I asked if he was short of breath.  He was given a Duoneb.  Will schedule duonebs QID.  Continue oxygen support.  Patient was more calm when I left.    Luz Mas MD   "

## 2021-09-24 NOTE — PROVIDER NOTIFICATION
Paged Provider: Pt is trying to get up,  states pt is not making any sense. Pt will not take PO meds, can we get something IV?

## 2021-09-24 NOTE — PLAN OF CARE
Patient vital signs are at baseline: Yes  Patient able to ambulate as they were prior to admission or with assist devices provided by therapies during their stay:  Yes  Patient MUST void prior to discharge:  Yes  Patient able to tolerate oral intake:  Yes  Pain has adequate pain control using Oral analgesics:  Yes    Pt oriented to self, VSS, afebrile, on RA. LS range from diminished to wheezing throughout depending on pt's activity level. Pt removed telemetry and refused to put back on, paged provider, telemetry order d/c'd. Up A2, walkerCLEM. Regular diet w/carb count, denies nausea. Voiding in adequate amounts, incontinent at times. CMS intact, +1 pp, +2 radial pulses. Pain on left side upper body, administered 1 dose tramadol. Pt had several episodes of agitation and trying to climb out of bed which resulted in exacerbated SOB and wheezing. Provider was paged several times for behaviors and medications, Code 21 called one time. Plans to discharge to TCU.

## 2021-09-24 NOTE — PROGRESS NOTES
"[facility]  General Surgery Progress Note           Assessment and Plan:   Assessment:   -Fall with left-sided rib fractures (4/5/6/7/8, minimally displaced), no pneumothorax  -Scattered atelectasis bilateral lung bases, nodular infiltrate at LLL  -Pain management/pulmonary toilet  -occasional hypoxia, no oxygen needed; respiratory treatments      Plan:   -Pain management: multimodal: tramadol, ibuprofen, acetaminophen, neurontin, lidoderm patches, ice/heat prn  -oral azithromycin, IV ceftriaxone  -IS hourly, needs to work on this; [place]  -PT/OT following  -Will sign off, contact if needed         Interval History:   **Seen with Ipad **  Comfortable in bed, not moving.  Pain at left lower anterior chest with deep breaths and \"entire back\".  Discussed medications and adjustments.  Many attempts to get out of bed.  \"I need to leave this room\".  Demonstrates difficulty properly using IS.    RN notes he was OOB and walked halls this morning.  PT to see today.  Planning TCU placement.         Physical Exam:   Blood pressure (!) 147/65, pulse 66, temperature 97.6  F (36.4  C), temperature source Temporal, resp. rate 16, weight 96 kg (211 lb 10.3 oz), SpO2 91 %.    I/O last 3 completed shifts:  In: 840 [P.O.:840]  Out: 100 [Urine:100]    Constitutional:   awake, alert, semi-cooperative, no apparent distress, and appears stated age     Lungs:   No increased work of breathing, good air exchange, clear to auscultation bilaterally, no crackles or wheezing     Abdomen:   Normal bowel sounds, soft, non-distended, non-tender     No skin abrasions or bruising.  Chest tenderness at left anterior chest, no specific tenderness throughout the back.          Data:     Recent Labs   Lab 09/22/21 2116   WBC 8.7   HGB 16.3   HCT 47.5   MCV 96        Recent Labs   Lab 09/24/21  0818 09/23/21 2202 09/23/21  1818 09/23/21  0147 09/22/21 2116   NA  --   --   --   --  139   POTASSIUM  --   --   --   --  4.3 "   CHLORIDE  --   --   --   --  107   CO2  --   --   --   --  24   ANIONGAP  --   --   --   --  8   * 241* 264*   < > 416*   BUN  --   --   --   --  17   CR  --   --   --   --  0.71   GFRESTIMATED  --   --   --   --  85   LUZ  --   --   --   --  8.9    < > = values in this interval not displayed.     9/22 CT CHEST/ABDOMEN/PELVIS W CONTRAST  IMPRESSION:  1.  Acute fractures of the left fourth-eighth ribs. Trace left pleural thickening and effusion.  2.  Scattered areas of nodular infiltrate in the lingula and left lower lobe. Clustered nature of infiltrate suggests infectious or inflammatory process. Follow-up chest CT could be performed as a conservative measure to ensure resolution if clinically   warranted.  3.  No solid organ injury.  4.  Cirrhosis.  5.  Colonic diverticulosis.  6.  Severe coronary artery disease.  7.  Mildly thick-walled and trabeculated urinary bladder potentially due to chronic outlet obstruction.    9/22 CT HEAD W/O CONTRAST, CT CERVICAL SPINE W/O CONTRAST  IMPRESSION:  HEAD CT:  1. No acute intracranial abnormality.  2. Age-related and chronic ischemic changes again noted.  3. Bifrontal and right temporal encephalomalacia likely related to prior trauma.   CERVICAL SPINE CT:  1.  No CT evidence for acute fracture or post traumatic subluxation.    Eli Finley PA-C     Agree with above, please call with any questions or concerns.  [phone]

## 2021-09-24 NOTE — PLAN OF CARE
Physical Therapy Discharge Summary    Reason for therapy discharge:    Discharged to home with home therapy.    Progress towards therapy goal(s). See goals on Care Plan in [facility] electronic health record for goal details.  Goals partially met.  Barriers to achieving goals:   discharge from facility.    Therapy recommendation(s):    Continued therapy is recommended.  Rationale/Recommendations:  Pt will benefit from continued therapy to increase strength, endurance and independence with functional mobility.

## 2021-09-24 NOTE — SIGNIFICANT EVENT
Pt continued to be agitated, combative and restless. Pt had increase to work of breathing and drop in O2 Sats, attempted to place oxygen via NC on pt but pt was yelling, hitting and grabbing at staff. O2 sats were not improving, attempted oxy mask but pt continued to remove and hit at staff, called code 21 d/t increased aggression of pt and interference while attempting to get his O2 sats stable.

## 2021-09-25 ENCOUNTER — PATIENT OUTREACH (OUTPATIENT)
Dept: CARE COORDINATION | Facility: CLINIC | Age: 86
End: 2021-09-25

## 2021-09-25 DIAGNOSIS — Z71.89 OTHER SPECIFIED COUNSELING: ICD-10-CM

## 2021-09-25 NOTE — PROGRESS NOTES
"Clinic Care Coordination Contact  [facility]/Voicemail       Clinical Data: Care Coordinator Outreach  Outreach attempted x 1.   someone sounding to be a female picked up and refused to hand the phone over, even after I informed him/her I was calling from [facility]. The individual responded with a \"whatever\" when I said I would call back later if I am unable to speak with Adam at the time. I was unable to leave call back information.  Plan: Care Coordinator will try to reach patient again in 1-2 business days.    .Saige MONK Community Health Worker  Clinic Care Coordination  [facility]  Phone: [phone]    "

## 2021-09-26 LAB — BACTERIA UR CULT: ABNORMAL

## 2021-09-26 NOTE — PROGRESS NOTES
Clinic Care Coordination Contact  [facility]/Voicemail       Clinical Data: Care Coordinator Outreach  Outreach attempted x 2.  Unable to leave voicemail and leave call back information.  Plan:  Care Coordinator will do no further outreaches at this time.    Saige MONK Community Health Worker  Clinic Care Coordination  [facility]  Phone: [phone]

## 2022-02-17 PROBLEM — S22.080A: Status: ACTIVE | Noted: 2018-06-01

## 2022-11-29 ENCOUNTER — LAB REQUISITION (OUTPATIENT)
Dept: LAB | Facility: CLINIC | Age: 87
End: 2022-11-29
Payer: COMMERCIAL

## 2022-11-29 DIAGNOSIS — I25.10 ATHEROSCLEROTIC HEART DISEASE OF NATIVE CORONARY ARTERY WITHOUT ANGINA PECTORIS: ICD-10-CM

## 2022-11-29 DIAGNOSIS — I63.9 CEREBRAL INFARCTION, UNSPECIFIED (H): ICD-10-CM

## 2022-11-29 DIAGNOSIS — N40.1 BENIGN PROSTATIC HYPERPLASIA WITH LOWER URINARY TRACT SYMPTOMS: ICD-10-CM

## 2022-11-29 DIAGNOSIS — E11.9 TYPE 2 DIABETES MELLITUS WITHOUT COMPLICATIONS (H): ICD-10-CM

## 2022-11-29 LAB
ALBUMIN SERPL BCG-MCNC: 3.8 G/DL (ref 3.5–5.2)
ALP SERPL-CCNC: 173 U/L (ref 40–129)
ALT SERPL W P-5'-P-CCNC: 51 U/L (ref 10–50)
ANION GAP SERPL CALCULATED.3IONS-SCNC: 13 MMOL/L (ref 7–15)
AST SERPL W P-5'-P-CCNC: 48 U/L (ref 10–50)
BILIRUB SERPL-MCNC: 0.9 MG/DL
BUN SERPL-MCNC: 23.1 MG/DL (ref 8–23)
CALCIUM SERPL-MCNC: 9.2 MG/DL (ref 8.8–10.2)
CHLORIDE SERPL-SCNC: 100 MMOL/L (ref 98–107)
CHOLEST SERPL-MCNC: 189 MG/DL
CREAT SERPL-MCNC: 0.89 MG/DL (ref 0.67–1.17)
DEPRECATED HCO3 PLAS-SCNC: 21 MMOL/L (ref 22–29)
ERYTHROCYTE [DISTWIDTH] IN BLOOD BY AUTOMATED COUNT: 12.4 % (ref 10–15)
GFR SERPL CREATININE-BSD FRML MDRD: 83 ML/MIN/1.73M2
GLUCOSE SERPL-MCNC: 414 MG/DL (ref 70–99)
HBA1C MFR BLD: 8.7 %
HCT VFR BLD AUTO: 47.6 % (ref 40–53)
HDLC SERPL-MCNC: 63 MG/DL
HGB BLD-MCNC: 16.7 G/DL (ref 13.3–17.7)
LDLC SERPL CALC-MCNC: 89 MG/DL
MCH RBC QN AUTO: 32.3 PG (ref 26.5–33)
MCHC RBC AUTO-ENTMCNC: 35.1 G/DL (ref 31.5–36.5)
MCV RBC AUTO: 92 FL (ref 78–100)
NONHDLC SERPL-MCNC: 126 MG/DL
PLATELET # BLD AUTO: 221 10E3/UL (ref 150–450)
POTASSIUM SERPL-SCNC: 4.7 MMOL/L (ref 3.4–5.3)
PROT SERPL-MCNC: 7 G/DL (ref 6.4–8.3)
RBC # BLD AUTO: 5.17 10E6/UL (ref 4.4–5.9)
SODIUM SERPL-SCNC: 134 MMOL/L (ref 136–145)
TRIGL SERPL-MCNC: 186 MG/DL
WBC # BLD AUTO: 7.8 10E3/UL (ref 4–11)

## 2022-11-29 PROCEDURE — 80053 COMPREHEN METABOLIC PANEL: CPT | Mod: ORL | Performed by: REGISTERED NURSE

## 2022-11-29 PROCEDURE — 83036 HEMOGLOBIN GLYCOSYLATED A1C: CPT | Mod: ORL | Performed by: REGISTERED NURSE

## 2022-11-29 PROCEDURE — 80061 LIPID PANEL: CPT | Mod: ORL | Performed by: REGISTERED NURSE

## 2022-11-29 PROCEDURE — 85027 COMPLETE CBC AUTOMATED: CPT | Mod: ORL | Performed by: REGISTERED NURSE

## 2023-05-04 ENCOUNTER — APPOINTMENT (OUTPATIENT)
Dept: MRI IMAGING | Facility: CLINIC | Age: 88
DRG: 065 | End: 2023-05-04
Attending: EMERGENCY MEDICINE
Payer: COMMERCIAL

## 2023-05-04 ENCOUNTER — APPOINTMENT (OUTPATIENT)
Dept: CT IMAGING | Facility: CLINIC | Age: 88
DRG: 065 | End: 2023-05-04
Attending: EMERGENCY MEDICINE
Payer: COMMERCIAL

## 2023-05-04 ENCOUNTER — HOSPITAL ENCOUNTER (INPATIENT)
Facility: CLINIC | Age: 88
LOS: 32 days | Discharge: HOME | DRG: 065 | End: 2023-06-05
Attending: EMERGENCY MEDICINE | Admitting: HOSPITALIST
Payer: COMMERCIAL

## 2023-05-04 DIAGNOSIS — N40.0 BENIGN PROSTATIC HYPERPLASIA, UNSPECIFIED WHETHER LOWER URINARY TRACT SYMPTOMS PRESENT: ICD-10-CM

## 2023-05-04 DIAGNOSIS — R53.81 PHYSICAL DECONDITIONING: ICD-10-CM

## 2023-05-04 DIAGNOSIS — F02.80 LATE ONSET ALZHEIMER'S DISEASE WITHOUT BEHAVIORAL DISTURBANCE (H): ICD-10-CM

## 2023-05-04 DIAGNOSIS — I10 ESSENTIAL HYPERTENSION: ICD-10-CM

## 2023-05-04 DIAGNOSIS — Z86.73 HISTORY OF CVA (CEREBROVASCULAR ACCIDENT): Primary | ICD-10-CM

## 2023-05-04 DIAGNOSIS — G30.1 LATE ONSET ALZHEIMER'S DISEASE WITHOUT BEHAVIORAL DISTURBANCE (H): ICD-10-CM

## 2023-05-04 DIAGNOSIS — E11.9 TYPE 2 DIABETES MELLITUS WITHOUT COMPLICATION, UNSPECIFIED WHETHER LONG TERM INSULIN USE (H): ICD-10-CM

## 2023-05-04 DIAGNOSIS — R52 PAIN: ICD-10-CM

## 2023-05-04 DIAGNOSIS — R47.81 SLURRED SPEECH: ICD-10-CM

## 2023-05-04 LAB
ANION GAP SERPL CALCULATED.3IONS-SCNC: 9 MMOL/L (ref 7–15)
APTT PPP: 30 SECONDS (ref 22–38)
ATRIAL RATE - MUSE: 55 BPM
BASOPHILS # BLD AUTO: 0.1 10E3/UL (ref 0–0.2)
BASOPHILS NFR BLD AUTO: 1 %
BUN SERPL-MCNC: 22.4 MG/DL (ref 8–23)
CALCIUM SERPL-MCNC: 9.2 MG/DL (ref 8.8–10.2)
CHLORIDE SERPL-SCNC: 105 MMOL/L (ref 98–107)
CREAT SERPL-MCNC: 0.91 MG/DL (ref 0.67–1.17)
DEPRECATED HCO3 PLAS-SCNC: 24 MMOL/L (ref 22–29)
DIASTOLIC BLOOD PRESSURE - MUSE: NORMAL MMHG
EOSINOPHIL # BLD AUTO: 0.3 10E3/UL (ref 0–0.7)
EOSINOPHIL NFR BLD AUTO: 4 %
ERYTHROCYTE [DISTWIDTH] IN BLOOD BY AUTOMATED COUNT: 13.5 % (ref 10–15)
GFR SERPL CREATININE-BSD FRML MDRD: 82 ML/MIN/1.73M2
GLUCOSE BLDC GLUCOMTR-MCNC: 175 MG/DL (ref 70–99)
GLUCOSE SERPL-MCNC: 232 MG/DL (ref 70–99)
HBA1C MFR BLD: 9.6 %
HCT VFR BLD AUTO: 47.7 % (ref 40–53)
HGB BLD-MCNC: 16 G/DL (ref 13.3–17.7)
IMM GRANULOCYTES # BLD: 0 10E3/UL
IMM GRANULOCYTES NFR BLD: 0 %
INR PPP: 1.07 (ref 0.85–1.15)
INTERPRETATION ECG - MUSE: NORMAL
LYMPHOCYTES # BLD AUTO: 2.2 10E3/UL (ref 0.8–5.3)
LYMPHOCYTES NFR BLD AUTO: 31 %
MCH RBC QN AUTO: 31.7 PG (ref 26.5–33)
MCHC RBC AUTO-ENTMCNC: 33.5 G/DL (ref 31.5–36.5)
MCV RBC AUTO: 95 FL (ref 78–100)
MONOCYTES # BLD AUTO: 0.6 10E3/UL (ref 0–1.3)
MONOCYTES NFR BLD AUTO: 8 %
NEUTROPHILS # BLD AUTO: 3.9 10E3/UL (ref 1.6–8.3)
NEUTROPHILS NFR BLD AUTO: 56 %
NRBC # BLD AUTO: 0 10E3/UL
NRBC BLD AUTO-RTO: 0 /100
P AXIS - MUSE: 68 DEGREES
PLATELET # BLD AUTO: 192 10E3/UL (ref 150–450)
POTASSIUM SERPL-SCNC: 4.8 MMOL/L (ref 3.4–5.3)
PR INTERVAL - MUSE: 190 MS
QRS DURATION - MUSE: 96 MS
QT - MUSE: 458 MS
QTC - MUSE: 438 MS
R AXIS - MUSE: 78 DEGREES
RBC # BLD AUTO: 5.04 10E6/UL (ref 4.4–5.9)
SODIUM SERPL-SCNC: 138 MMOL/L (ref 136–145)
SYSTOLIC BLOOD PRESSURE - MUSE: NORMAL MMHG
T AXIS - MUSE: 91 DEGREES
TROPONIN T SERPL HS-MCNC: 17 NG/L
TROPONIN T SERPL HS-MCNC: 17 NG/L
VENTRICULAR RATE- MUSE: 55 BPM
WBC # BLD AUTO: 6.9 10E3/UL (ref 4–11)

## 2023-05-04 PROCEDURE — 85025 COMPLETE CBC W/AUTO DIFF WBC: CPT | Performed by: EMERGENCY MEDICINE

## 2023-05-04 PROCEDURE — 84484 ASSAY OF TROPONIN QUANT: CPT | Performed by: HOSPITALIST

## 2023-05-04 PROCEDURE — 85730 THROMBOPLASTIN TIME PARTIAL: CPT | Performed by: EMERGENCY MEDICINE

## 2023-05-04 PROCEDURE — 36415 COLL VENOUS BLD VENIPUNCTURE: CPT | Performed by: EMERGENCY MEDICINE

## 2023-05-04 PROCEDURE — 70450 CT HEAD/BRAIN W/O DYE: CPT

## 2023-05-04 PROCEDURE — 82962 GLUCOSE BLOOD TEST: CPT

## 2023-05-04 PROCEDURE — 250N000009 HC RX 250: Performed by: EMERGENCY MEDICINE

## 2023-05-04 PROCEDURE — 70498 CT ANGIOGRAPHY NECK: CPT

## 2023-05-04 PROCEDURE — 80061 LIPID PANEL: CPT | Performed by: HOSPITALIST

## 2023-05-04 PROCEDURE — 70496 CT ANGIOGRAPHY HEAD: CPT

## 2023-05-04 PROCEDURE — 250N000013 HC RX MED GY IP 250 OP 250 PS 637: Performed by: EMERGENCY MEDICINE

## 2023-05-04 PROCEDURE — 80048 BASIC METABOLIC PNL TOTAL CA: CPT | Performed by: EMERGENCY MEDICINE

## 2023-05-04 PROCEDURE — 36415 COLL VENOUS BLD VENIPUNCTURE: CPT | Performed by: HOSPITALIST

## 2023-05-04 PROCEDURE — 99292 CRITICAL CARE ADDL 30 MIN: CPT

## 2023-05-04 PROCEDURE — 85610 PROTHROMBIN TIME: CPT | Performed by: EMERGENCY MEDICINE

## 2023-05-04 PROCEDURE — 99222 1ST HOSP IP/OBS MODERATE 55: CPT | Performed by: HOSPITALIST

## 2023-05-04 PROCEDURE — 70551 MRI BRAIN STEM W/O DYE: CPT

## 2023-05-04 PROCEDURE — 84484 ASSAY OF TROPONIN QUANT: CPT | Performed by: EMERGENCY MEDICINE

## 2023-05-04 PROCEDURE — 258N000003 HC RX IP 258 OP 636: Performed by: HOSPITALIST

## 2023-05-04 PROCEDURE — 83036 HEMOGLOBIN GLYCOSYLATED A1C: CPT | Performed by: HOSPITALIST

## 2023-05-04 PROCEDURE — 93005 ELECTROCARDIOGRAM TRACING: CPT

## 2023-05-04 PROCEDURE — 120N000001 HC R&B MED SURG/OB

## 2023-05-04 PROCEDURE — 250N000011 HC RX IP 250 OP 636: Performed by: EMERGENCY MEDICINE

## 2023-05-04 PROCEDURE — 99291 CRITICAL CARE FIRST HOUR: CPT | Mod: 25

## 2023-05-04 RX ORDER — ASPIRIN 300 MG/1
300 SUPPOSITORY RECTAL ONCE
Status: COMPLETED | OUTPATIENT
Start: 2023-05-04 | End: 2023-05-04

## 2023-05-04 RX ORDER — HYDRALAZINE HYDROCHLORIDE 20 MG/ML
10-20 INJECTION INTRAMUSCULAR; INTRAVENOUS
Status: DISCONTINUED | OUTPATIENT
Start: 2023-05-04 | End: 2023-06-05 | Stop reason: HOSPADM

## 2023-05-04 RX ORDER — ACETAMINOPHEN 650 MG/1
650 SUPPOSITORY RECTAL EVERY 6 HOURS PRN
Status: DISCONTINUED | OUTPATIENT
Start: 2023-05-04 | End: 2023-06-05 | Stop reason: HOSPADM

## 2023-05-04 RX ORDER — AMOXICILLIN 250 MG
1 CAPSULE ORAL 2 TIMES DAILY PRN
Status: DISCONTINUED | OUTPATIENT
Start: 2023-05-04 | End: 2023-06-05 | Stop reason: HOSPADM

## 2023-05-04 RX ORDER — ONDANSETRON 2 MG/ML
4 INJECTION INTRAMUSCULAR; INTRAVENOUS EVERY 6 HOURS PRN
Status: DISCONTINUED | OUTPATIENT
Start: 2023-05-04 | End: 2023-06-05 | Stop reason: HOSPADM

## 2023-05-04 RX ORDER — IOPAMIDOL 755 MG/ML
75 INJECTION, SOLUTION INTRAVASCULAR ONCE
Status: COMPLETED | OUTPATIENT
Start: 2023-05-04 | End: 2023-05-04

## 2023-05-04 RX ORDER — ASPIRIN 81 MG/1
324 TABLET, CHEWABLE ORAL DAILY
Status: DISCONTINUED | OUTPATIENT
Start: 2023-05-05 | End: 2023-06-05 | Stop reason: HOSPADM

## 2023-05-04 RX ORDER — LABETALOL HYDROCHLORIDE 5 MG/ML
10-20 INJECTION, SOLUTION INTRAVENOUS EVERY 10 MIN PRN
Status: DISCONTINUED | OUTPATIENT
Start: 2023-05-04 | End: 2023-06-05 | Stop reason: HOSPADM

## 2023-05-04 RX ORDER — OLANZAPINE 2.5 MG/1
2.5 TABLET, FILM COATED ORAL 2 TIMES DAILY
Status: ON HOLD | COMMUNITY
End: 2023-05-30

## 2023-05-04 RX ORDER — ASPIRIN 300 MG/1
300 SUPPOSITORY RECTAL DAILY
Status: DISCONTINUED | OUTPATIENT
Start: 2023-05-05 | End: 2023-06-05 | Stop reason: HOSPADM

## 2023-05-04 RX ORDER — BISACODYL 10 MG
10 SUPPOSITORY, RECTAL RECTAL DAILY PRN
Status: DISCONTINUED | OUTPATIENT
Start: 2023-05-04 | End: 2023-06-05 | Stop reason: HOSPADM

## 2023-05-04 RX ORDER — AMOXICILLIN 250 MG
2 CAPSULE ORAL 2 TIMES DAILY PRN
Status: DISCONTINUED | OUTPATIENT
Start: 2023-05-04 | End: 2023-06-05 | Stop reason: HOSPADM

## 2023-05-04 RX ORDER — POLYETHYLENE GLYCOL 3350 17 G/17G
17 POWDER, FOR SOLUTION ORAL DAILY PRN
Status: DISCONTINUED | OUTPATIENT
Start: 2023-05-04 | End: 2023-06-05 | Stop reason: HOSPADM

## 2023-05-04 RX ORDER — FLUTICASONE PROPIONATE 50 MCG
1 SPRAY, SUSPENSION (ML) NASAL DAILY PRN
Status: ON HOLD | COMMUNITY
End: 2023-09-18

## 2023-05-04 RX ORDER — LIDOCAINE 40 MG/G
CREAM TOPICAL
Status: DISCONTINUED | OUTPATIENT
Start: 2023-05-04 | End: 2023-06-05 | Stop reason: HOSPADM

## 2023-05-04 RX ORDER — ONDANSETRON 4 MG/1
4 TABLET, ORALLY DISINTEGRATING ORAL EVERY 6 HOURS PRN
Status: DISCONTINUED | OUTPATIENT
Start: 2023-05-04 | End: 2023-06-05 | Stop reason: HOSPADM

## 2023-05-04 RX ORDER — SODIUM CHLORIDE 9 MG/ML
INJECTION, SOLUTION INTRAVENOUS CONTINUOUS
Status: DISCONTINUED | OUTPATIENT
Start: 2023-05-04 | End: 2023-05-05

## 2023-05-04 RX ORDER — ACETAMINOPHEN 325 MG/1
650 TABLET ORAL EVERY 6 HOURS PRN
Status: DISCONTINUED | OUTPATIENT
Start: 2023-05-04 | End: 2023-06-05 | Stop reason: HOSPADM

## 2023-05-04 RX ORDER — LIDOCAINE 40 MG/G
CREAM TOPICAL
Status: DISCONTINUED | OUTPATIENT
Start: 2023-05-04 | End: 2023-05-05

## 2023-05-04 RX ADMIN — IOPAMIDOL 75 ML: 755 INJECTION, SOLUTION INTRAVENOUS at 19:00

## 2023-05-04 RX ADMIN — ASPIRIN 300 MG: 300 SUPPOSITORY RECTAL at 19:59

## 2023-05-04 RX ADMIN — SODIUM CHLORIDE 100 ML: 9 INJECTION, SOLUTION INTRAVENOUS at 19:00

## 2023-05-04 RX ADMIN — SODIUM CHLORIDE: 9 INJECTION, SOLUTION INTRAVENOUS at 22:23

## 2023-05-04 ASSESSMENT — ACTIVITIES OF DAILY LIVING (ADL)
ADLS_ACUITY_SCORE: 37

## 2023-05-04 NOTE — ED NOTES
Bed: ST03  Expected date:   Expected time:   Means of arrival:   Comments:  Gabriela 87 m stroke alert eta 1850

## 2023-05-04 NOTE — ED PROVIDER NOTES
History     Chief Complaint:  Slurred speech    HPI   Adam Huber is a 87 year old male with a history of CAD, hypertension, CVA, hyperlipidemia, NSTEMI, type 2 diabetes, encephalopathy, and late onset Alzheimer's disease, who presents with onset of slurred speech and difficulty walking with a last known well time of 1600, around 3 hours ago. The patient had been seen before 1600 with no acute issues or symptoms. Family members stated the patient complained to them of intermittent headaches. BG en route was 170. Patient lives in an apartment with his wife. Patient denies pain. History is otherwise limited.    Independent Historian:   EMS - They report as above    Review of External Notes: I reviewed the patient's hospital discharge summary from 9/24/2021    ROS:  Review of Systems   Unable to perform ROS: Mental status change     Allergies:  Lisinopril  Morphine     Medications:    Aspirin 81 mg  Carvedilol  Finasteride  Gabapentin  Losartan  Metformin  Mirtazapine  Nitroglycerine  Polyethylene glycol  Quetiapine  Tamsulosin     Past Medical History:    CAD  Hypertension  CVA  Hyperlipidemia  NSTEMI  ACS  UTI  Type 2 diabetes  Encephalopathy   Late onset Alzheimer's disease    Past Surgical History:    Cholecystectomy  Prostate removal  PCTA     Social History:  Patient presents via EMS.  PCP: Oralia Forrest     Physical Exam     Patient Vitals for the past 24 hrs:   BP Temp Temp src Pulse Resp SpO2 Weight   05/04/23 2200 (!) 166/74 -- -- 55 24 96 % --   05/04/23 2030 (!) 179/82 97.8  F (36.6  C) Temporal 63 (!) 31 95 % --   05/04/23 1945 (!) 149/69 -- -- 53 10 94 % --   05/1935 (!) 159/75 -- -- 50 14 93 % --   05/04/23 1915 (!) 157/71 -- -- 55 20 96 % --   05/04/23 1914 (!) 145/73 97.3  F (36.3  C) Oral 53 20 97 % 96.7 kg (213 lb 3 oz)        Physical Exam  General: Alert and cooperative with exam. Patient in mild distress.   Head:  Scalp is NC/AT  Eyes:  No scleral icterus, PERRL, EOMI   ENT:  The  external nose and ears are normal. The oropharynx is normal and without erythema; mucus membranes are moist. Uvula midline, no evidence of deep space infection.  Neck:  Normal range of motion without rigidity.  CV:  Regular rate and rhythm  Resp:  Breath sounds are clear bilaterally    Non-labored, no retractions or accessory muscle use  GI:  Abdomen is soft, no distension, no tenderness. No peritoneal signs  MS:  No lower extremity edema   Skin:  Warm and dry, No rash or lesions noted.  Neuro: Slurred Speech    No gross motor deficits.    Strength and sensation grossly intact in all 4 extremities.      Cranial nerves 2-12 intact.      Emergency Department Course   ECG  ECG taken at 1911, ECG read at 1945  Sinus bradycardia  Otherwise normal ECG   Rate 55 bpm. OR interval 190 ms. QRS duration 96 ms. QT/QTc 458/438 ms. P-R-T axes 68 78 91.     Imaging:  MR Brain w/o Contrast   Final Result   IMPRESSION:   1.  Motion limited examination equivocal DWI hyperintensity at the right asia. This may represent artifact or acute ischemia.   2.  Generalized brain atrophy and presumed microvascular ischemic changes as detailed above.   3.  Severe bifrontal encephalomalacia.      CTA Head Neck with Contrast   Final Result   IMPRESSION:    HEAD CT:   1.  Extensive areas of encephalomalacic change and gliosis are again seen within the anterior aspects of both frontal lobes and right temporal lobe. These are stable compared to prior.      2.  Stable chronic infarcts are seen within both basal ganglia and corona radiata.      3.  Moderate volume loss and presumed sequela chronic microvascular ischemic change.      HEAD CTA:       1. Multifocal areas of moderate or high-grade stenosis are seen throughout the anterior and posterior circulation. Areas of narrowing are nonspecific but favored to be atherosclerotic in etiology. Heavy atherosclerotic calcifications are seen within both    carotid siphons. No vascular cutoff.      NECK CTA:    1.  Heavy atherosclerotic plaque left carotid bulb/bifurcation with at least 50% stenosis by modified NASCET criteria. Moderate predominantly noncalcified plaque right carotid bulb/bifurcation without significant stenosis by modified NASCET criteria. No    findings for vertebral artery dissection.         Noncontrast head CT findings and CTA vascular findings were called to Dr. Diaz at 1907 and 1919 hours respectively.      CT Head w/o Contrast   Final Result   IMPRESSION:    HEAD CT:   1.  Extensive areas of encephalomalacic change and gliosis are again seen within the anterior aspects of both frontal lobes and right temporal lobe. These are stable compared to prior.      2.  Stable chronic infarcts are seen within both basal ganglia and corona radiata.      3.  Moderate volume loss and presumed sequela chronic microvascular ischemic change.      HEAD CTA:       1. Multifocal areas of moderate or high-grade stenosis are seen throughout the anterior and posterior circulation. Areas of narrowing are nonspecific but favored to be atherosclerotic in etiology. Heavy atherosclerotic calcifications are seen within both    carotid siphons. No vascular cutoff.      NECK CTA:   1.  Heavy atherosclerotic plaque left carotid bulb/bifurcation with at least 50% stenosis by modified NASCET criteria. Moderate predominantly noncalcified plaque right carotid bulb/bifurcation without significant stenosis by modified NASCET criteria. No    findings for vertebral artery dissection.         Noncontrast head CT findings and CTA vascular findings were called to Dr. Diaz at 1907 and 1919 hours respectively.         Report per radiology    Laboratory:  Labs Ordered and Resulted from Time of ED Arrival to Time of ED Departure   BASIC METABOLIC PANEL - Abnormal       Result Value    Sodium 138      Potassium 4.8      Chloride 105      Carbon Dioxide (CO2) 24      Anion Gap 9      Urea Nitrogen 22.4      Creatinine 0.91      Calcium  9.2      Glucose 232 (*)     GFR Estimate 82     HEMOGLOBIN A1C - Abnormal    Hemoglobin A1C 9.6 (*)    INR - Normal    INR 1.07     PARTIAL THROMBOPLASTIN TIME - Normal    aPTT 30     TROPONIN T, HIGH SENSITIVITY - Normal    Troponin T, High Sensitivity 17     CBC WITH PLATELETS AND DIFFERENTIAL    WBC Count 6.9      RBC Count 5.04      Hemoglobin 16.0      Hematocrit 47.7      MCV 95      MCH 31.7      MCHC 33.5      RDW 13.5      Platelet Count 192      % Neutrophils 56      % Lymphocytes 31      % Monocytes 8      % Eosinophils 4      % Basophils 1      % Immature Granulocytes 0      NRBCs per 100 WBC 0      Absolute Neutrophils 3.9      Absolute Lymphocytes 2.2      Absolute Monocytes 0.6      Absolute Eosinophils 0.3      Absolute Basophils 0.1      Absolute Immature Granulocytes 0.0      Absolute NRBCs 0.0     GLUCOSE MONITOR NURSING POCT   UA MACROSCOPIC WITH REFLEX TO MICRO AND CULTURE   TROPONIN I   TROPONIN T, HIGH SENSITIVITY   LIPID PROFILE        Emergency Department Course & Assessments:       Interventions:  Medications   aspirin (ASA) Suppository 300 mg (300 mg Rectal $Given 5/4/23 1959)        Assessments:  1850 I entered the patient's room and obtained history from EMS.  1852 Tier 1 code stroke was called.    Independent Interpretation (X-rays, CTs, rhythm strip):  Head CT reviewed; no evidence of acute intracranial bleeding    Consultations/Discussion of Management or Tests:  1901    I consulted with Dr. Murray, neurosurgery, regarding the patient's history and presentation here in the emergency department.   1919   I consulted with [facility] regarding the patient's history and presentation here in the emergency department.  1937    I consulted with Dr. Murray, neurosurgery, regarding the patient's history and presentation here in the emergency department.  2020 I consulted with Dr. Chatman, hospitalist, regarding the patient's history and presentation here in the emergency department  who accepted the patient for admission.       Social Determinants of Health affecting care:   None    Disposition:  The patient was admitted to the hospital under the care of Dr. Chatman.     Impression & Plan      Medical Decision Making:  Patient is a 87-year-old male who presents with report of sudden onset slurred speech; Botswanan-speaking with history of dementia.  Patient's medical history and records were reviewed.  On evaluation patient was seen in the stabilization room and tier 1 code stroke was initiated.  CT/CTA imaging of the head and neck demonstrates multifocal areas of moderate to high-grade stenosis, heavy atherosclerotic calcifications, stable chronic infarcts, and extensive areas of encephalomalacia malacia and gliosis as noted above.  Basic labs and EKG without significant findings as above.  Case was discussed with stroke neurology service who determined last known well time was outside window for TNK.  Presentation concerning for potential CVA.  Patient was provided aspirin suppository will be admitted to the hospital service for MRI and further evaluation of slurred speech.  MRI later returned with evidence of artifact versus acute ischemia as above.  Remained stable throughout my care.    Diagnosis:    ICD-10-CM    1. Slurred speech  R47.81            Scribe Disclosure:  Chandu MATA Hired, am serving as a scribe at 7:06 PM on 5/4/2023 to document services personally performed by Hank Diaz DO based on my observations and the provider's statements to me.     5/4/2023   Hank Diaz DO O'Neill, Christopher Warren, DO  05/04/23 2223

## 2023-05-05 ENCOUNTER — APPOINTMENT (OUTPATIENT)
Dept: SPEECH THERAPY | Facility: CLINIC | Age: 88
DRG: 065 | End: 2023-05-05
Attending: HOSPITALIST
Payer: COMMERCIAL

## 2023-05-05 ENCOUNTER — APPOINTMENT (OUTPATIENT)
Dept: OCCUPATIONAL THERAPY | Facility: CLINIC | Age: 88
DRG: 065 | End: 2023-05-05
Attending: HOSPITALIST
Payer: COMMERCIAL

## 2023-05-05 ENCOUNTER — HOSPITAL ENCOUNTER (INPATIENT)
Dept: NEUROLOGY | Facility: CLINIC | Age: 88
Discharge: HOME | DRG: 065 | End: 2023-05-05
Attending: NURSE PRACTITIONER
Payer: COMMERCIAL

## 2023-05-05 ENCOUNTER — APPOINTMENT (OUTPATIENT)
Dept: CARDIOLOGY | Facility: CLINIC | Age: 88
DRG: 065 | End: 2023-05-05
Attending: HOSPITALIST
Payer: COMMERCIAL

## 2023-05-05 LAB
ALBUMIN UR-MCNC: NEGATIVE MG/DL
ANION GAP SERPL CALCULATED.3IONS-SCNC: 13 MMOL/L (ref 7–15)
APPEARANCE UR: CLEAR
BACTERIA #/AREA URNS HPF: ABNORMAL /HPF
BILIRUB UR QL STRIP: NEGATIVE
BUN SERPL-MCNC: 18.4 MG/DL (ref 8–23)
CALCIUM SERPL-MCNC: 9 MG/DL (ref 8.8–10.2)
CHLORIDE SERPL-SCNC: 104 MMOL/L (ref 98–107)
CHOLEST SERPL-MCNC: 185 MG/DL
COLOR UR AUTO: ABNORMAL
CREAT SERPL-MCNC: 0.8 MG/DL (ref 0.67–1.17)
DEPRECATED HCO3 PLAS-SCNC: 22 MMOL/L (ref 22–29)
ERYTHROCYTE [DISTWIDTH] IN BLOOD BY AUTOMATED COUNT: 13.2 % (ref 10–15)
GFR SERPL CREATININE-BSD FRML MDRD: 86 ML/MIN/1.73M2
GLUCOSE BLDC GLUCOMTR-MCNC: 165 MG/DL (ref 70–99)
GLUCOSE BLDC GLUCOMTR-MCNC: 188 MG/DL (ref 70–99)
GLUCOSE BLDC GLUCOMTR-MCNC: 190 MG/DL (ref 70–99)
GLUCOSE BLDC GLUCOMTR-MCNC: 205 MG/DL (ref 70–99)
GLUCOSE BLDC GLUCOMTR-MCNC: 238 MG/DL (ref 70–99)
GLUCOSE SERPL-MCNC: 170 MG/DL (ref 70–99)
GLUCOSE UR STRIP-MCNC: >=1000 MG/DL
HCT VFR BLD AUTO: 49.1 % (ref 40–53)
HDLC SERPL-MCNC: 66 MG/DL
HGB BLD-MCNC: 16.4 G/DL (ref 13.3–17.7)
HGB UR QL STRIP: NEGATIVE
KETONES UR STRIP-MCNC: NEGATIVE MG/DL
LDLC SERPL CALC-MCNC: 90 MG/DL
LEUKOCYTE ESTERASE UR QL STRIP: ABNORMAL
LVEF ECHO: NORMAL
MAGNESIUM SERPL-MCNC: 1.6 MG/DL (ref 1.7–2.3)
MCH RBC QN AUTO: 31.2 PG (ref 26.5–33)
MCHC RBC AUTO-ENTMCNC: 33.4 G/DL (ref 31.5–36.5)
MCV RBC AUTO: 93 FL (ref 78–100)
MUCOUS THREADS #/AREA URNS LPF: PRESENT /LPF
NITRATE UR QL: NEGATIVE
NONHDLC SERPL-MCNC: 119 MG/DL
PH UR STRIP: 5 [PH] (ref 5–7)
PLATELET # BLD AUTO: 192 10E3/UL (ref 150–450)
POTASSIUM SERPL-SCNC: 3.9 MMOL/L (ref 3.4–5.3)
RBC # BLD AUTO: 5.26 10E6/UL (ref 4.4–5.9)
RBC URINE: 0 /HPF
SODIUM SERPL-SCNC: 139 MMOL/L (ref 136–145)
SP GR UR STRIP: 1.01 (ref 1–1.03)
SQUAMOUS EPITHELIAL: <1 /HPF
TRIGL SERPL-MCNC: 146 MG/DL
UROBILINOGEN UR STRIP-MCNC: NORMAL MG/DL
WBC # BLD AUTO: 7.5 10E3/UL (ref 4–11)
WBC URINE: 26 /HPF

## 2023-05-05 PROCEDURE — 85027 COMPLETE CBC AUTOMATED: CPT | Performed by: HOSPITALIST

## 2023-05-05 PROCEDURE — 87086 URINE CULTURE/COLONY COUNT: CPT | Performed by: EMERGENCY MEDICINE

## 2023-05-05 PROCEDURE — 250N000013 HC RX MED GY IP 250 OP 250 PS 637: Performed by: NURSE PRACTITIONER

## 2023-05-05 PROCEDURE — 99207 PR APP CREDIT; MD BILLING SHARED VISIT: CPT | Performed by: NURSE PRACTITIONER

## 2023-05-05 PROCEDURE — 95718 EEG PHYS/QHP 2-12 HR W/VEEG: CPT | Performed by: PSYCHIATRY & NEUROLOGY

## 2023-05-05 PROCEDURE — 99231 SBSQ HOSP IP/OBS SF/LOW 25: CPT | Performed by: NURSE PRACTITIONER

## 2023-05-05 PROCEDURE — 255N000002 HC RX 255 OP 636: Performed by: HOSPITALIST

## 2023-05-05 PROCEDURE — 99222 1ST HOSP IP/OBS MODERATE 55: CPT | Mod: FS | Performed by: NURSE PRACTITIONER

## 2023-05-05 PROCEDURE — 120N000001 HC R&B MED SURG/OB

## 2023-05-05 PROCEDURE — 93306 TTE W/DOPPLER COMPLETE: CPT | Mod: 26 | Performed by: INTERNAL MEDICINE

## 2023-05-05 PROCEDURE — 97166 OT EVAL MOD COMPLEX 45 MIN: CPT | Mod: GO | Performed by: OCCUPATIONAL THERAPIST

## 2023-05-05 PROCEDURE — 250N000013 HC RX MED GY IP 250 OP 250 PS 637: Performed by: INTERNAL MEDICINE

## 2023-05-05 PROCEDURE — 250N000013 HC RX MED GY IP 250 OP 250 PS 637: Performed by: HOSPITALIST

## 2023-05-05 PROCEDURE — 92526 ORAL FUNCTION THERAPY: CPT | Mod: GN | Performed by: SPEECH-LANGUAGE PATHOLOGIST

## 2023-05-05 PROCEDURE — 81001 URINALYSIS AUTO W/SCOPE: CPT | Performed by: EMERGENCY MEDICINE

## 2023-05-05 PROCEDURE — 80048 BASIC METABOLIC PNL TOTAL CA: CPT | Performed by: HOSPITALIST

## 2023-05-05 PROCEDURE — 36415 COLL VENOUS BLD VENIPUNCTURE: CPT | Performed by: HOSPITALIST

## 2023-05-05 PROCEDURE — 999N000052 EEG VIDEO 2-12 HRS UNMONITORED

## 2023-05-05 PROCEDURE — 97530 THERAPEUTIC ACTIVITIES: CPT | Mod: GO | Performed by: OCCUPATIONAL THERAPIST

## 2023-05-05 PROCEDURE — 92610 EVALUATE SWALLOWING FUNCTION: CPT | Mod: GN | Performed by: SPEECH-LANGUAGE PATHOLOGIST

## 2023-05-05 PROCEDURE — 999N000208 ECHOCARDIOGRAM COMPLETE

## 2023-05-05 PROCEDURE — 83735 ASSAY OF MAGNESIUM: CPT | Performed by: STUDENT IN AN ORGANIZED HEALTH CARE EDUCATION/TRAINING PROGRAM

## 2023-05-05 PROCEDURE — 99207 EEG VIDEO 2-12 HRS UNMONITORED: CPT | Performed by: PSYCHIATRY & NEUROLOGY

## 2023-05-05 PROCEDURE — 250N000012 HC RX MED GY IP 250 OP 636 PS 637: Performed by: NURSE PRACTITIONER

## 2023-05-05 RX ORDER — CARVEDILOL 25 MG/1
25 TABLET ORAL 2 TIMES DAILY WITH MEALS
Status: DISCONTINUED | OUTPATIENT
Start: 2023-05-05 | End: 2023-06-05 | Stop reason: HOSPADM

## 2023-05-05 RX ORDER — DEXTROSE MONOHYDRATE 25 G/50ML
25-50 INJECTION, SOLUTION INTRAVENOUS
Status: DISCONTINUED | OUTPATIENT
Start: 2023-05-05 | End: 2023-06-05 | Stop reason: HOSPADM

## 2023-05-05 RX ORDER — ASPIRIN 81 MG/1
81 TABLET ORAL DAILY
Status: DISCONTINUED | OUTPATIENT
Start: 2023-05-05 | End: 2023-05-05

## 2023-05-05 RX ORDER — NICOTINE POLACRILEX 4 MG
15-30 LOZENGE BUCCAL
Status: DISCONTINUED | OUTPATIENT
Start: 2023-05-05 | End: 2023-06-05 | Stop reason: HOSPADM

## 2023-05-05 RX ORDER — OLANZAPINE 2.5 MG/1
2.5 TABLET, FILM COATED ORAL
Status: COMPLETED | OUTPATIENT
Start: 2023-05-05 | End: 2023-05-05

## 2023-05-05 RX ORDER — GABAPENTIN 300 MG/1
300 CAPSULE ORAL 2 TIMES DAILY
Status: DISCONTINUED | OUTPATIENT
Start: 2023-05-05 | End: 2023-06-05 | Stop reason: HOSPADM

## 2023-05-05 RX ORDER — FINASTERIDE 5 MG/1
5 TABLET, FILM COATED ORAL DAILY
Status: DISCONTINUED | OUTPATIENT
Start: 2023-05-05 | End: 2023-06-05 | Stop reason: HOSPADM

## 2023-05-05 RX ORDER — LOSARTAN POTASSIUM 50 MG/1
50 TABLET ORAL DAILY
Status: DISCONTINUED | OUTPATIENT
Start: 2023-05-05 | End: 2023-05-06

## 2023-05-05 RX ORDER — QUETIAPINE FUMARATE 25 MG/1
25 TABLET, FILM COATED ORAL AT BEDTIME
Status: DISCONTINUED | OUTPATIENT
Start: 2023-05-05 | End: 2023-05-09

## 2023-05-05 RX ORDER — OLANZAPINE 2.5 MG/1
2.5 TABLET, FILM COATED ORAL 2 TIMES DAILY
Status: DISCONTINUED | OUTPATIENT
Start: 2023-05-05 | End: 2023-05-11

## 2023-05-05 RX ADMIN — OLANZAPINE 2.5 MG: 2.5 TABLET, FILM COATED ORAL at 10:44

## 2023-05-05 RX ADMIN — LOSARTAN POTASSIUM 50 MG: 50 TABLET, FILM COATED ORAL at 10:44

## 2023-05-05 RX ADMIN — FINASTERIDE 5 MG: 5 TABLET, FILM COATED ORAL at 10:40

## 2023-05-05 RX ADMIN — OLANZAPINE 2.5 MG: 2.5 TABLET, FILM COATED ORAL at 20:00

## 2023-05-05 RX ADMIN — OLANZAPINE 2.5 MG: 2.5 TABLET, FILM COATED ORAL at 20:28

## 2023-05-05 RX ADMIN — CARVEDILOL 25 MG: 25 TABLET, FILM COATED ORAL at 18:09

## 2023-05-05 RX ADMIN — CARVEDILOL 25 MG: 25 TABLET, FILM COATED ORAL at 10:40

## 2023-05-05 RX ADMIN — INSULIN ASPART 2 UNITS: 100 INJECTION, SOLUTION INTRAVENOUS; SUBCUTANEOUS at 18:10

## 2023-05-05 RX ADMIN — HUMAN ALBUMIN MICROSPHERES AND PERFLUTREN 3 ML: 10; .22 INJECTION, SOLUTION INTRAVENOUS at 10:25

## 2023-05-05 RX ADMIN — GABAPENTIN 300 MG: 300 CAPSULE ORAL at 22:52

## 2023-05-05 RX ADMIN — GABAPENTIN 300 MG: 300 CAPSULE ORAL at 10:44

## 2023-05-05 RX ADMIN — ASPIRIN 81 MG CHEWABLE TABLET 324 MG: 81 TABLET CHEWABLE at 10:48

## 2023-05-05 ASSESSMENT — ACTIVITIES OF DAILY LIVING (ADL)
ADLS_ACUITY_SCORE: 47
ADLS_ACUITY_SCORE: 37
ADLS_ACUITY_SCORE: 47
ADLS_ACUITY_SCORE: 49
ADLS_ACUITY_SCORE: 39
ADLS_ACUITY_SCORE: 39
ADLS_ACUITY_SCORE: 47
ADLS_ACUITY_SCORE: 39
ADLS_ACUITY_SCORE: 49
ADLS_ACUITY_SCORE: 47
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
## 2023-05-05 NOTE — ED NOTES
[facility]  ED Nurse Handoff Report    ED Chief complaint: Slurred Speech      ED Diagnosis:   Final diagnoses:   Slurred speech       Code Status: Per admitting provider     Allergies:   Allergies   Allergen Reactions    Lisinopril     Morphine Other (See Comments)     confusion       Patient Story:    Per family, pt with  intermittent headaches,difficulty walking and  slurred speech  unclear last well-known time.  Code stroke called  and was deescalated. According to neurology team not a candidate for tPA. Hx CVA, DM Type II, CAD.     Focused Assessment:  Language services called:Spoke with [place]oc[place]  ID [id number] utilized. Pt denied pain , speech slurred,  he is confused to place and time, but was able to state his name. He denied pain and was informed regarding MRI. Lungs clear bilaterally.    Treatments and/or interventions provided: CT , MRI, LABs,   Results for orders placed or performed during the hospital encounter of 05/04/23   CT Head w/o Contrast     Status: None    Narrative    EXAM: CT HEAD W/O CONTRAST, CTA HEAD NECK W CONTRAST  LOCATION: [facility]  DATE/TIME: 5/4/2023 7:07 PM CDT    INDICATION: Code Stroke to evaluate for potential thrombolysis and thrombectomy. PLEASE READ IMMEDIATELY.  COMPARISON: CT brain 09/22/2021. MRI brain 12/03/2019.  CONTRAST: 75 mL Isovue 370  TECHNIQUE: Head and neck CT angiogram with IV contrast. Noncontrast head CT followed by axial helical CT images of the head and neck vessels obtained during the arterial phase of intravenous contrast administration. Axial 2D reconstructed images and   multiplanar 3D MIP reconstructed images of the head and neck vessels were performed by the technologist. Dose reduction techniques were used. All stenosis measurements made according to NASCET criteria unless otherwise specified.    FINDINGS:   NONCONTRAST HEAD CT:   INTRACRANIAL CONTENTS: No finding for intracranial hemorrhage, mass, or  convincing finding for acute infarct. Extensive areas of encephalomalacic change and gliosis are seen involving the anterior aspects of both frontal lobes and anterior right temporal   lobe. Distribution of areas of encephalomalacia favors sequela of prior trauma. Chronic infarcts are seen within both basal ganglia and corona radiata.    Moderate prominence lateral and third ventricles and sulci. Mild to moderate presumed sequela chronic microvascular ischemic change.    VISUALIZED ORBITS/SINUSES/MASTOIDS: Prior right-sided cataract surgery. Small retention cyst/mild maxillary sinus mucosal thickening. Middle ear cavities and mastoid air cells are clear.    BONES/SOFT TISSUES: Calvarium is intact, without suspicious lytic or blastic foci.    HEAD CTA:  ANTERIOR CIRCULATION: Heavy atherosclerotic plaque is seen within both carotid siphons with moderate associated luminal narrowing. There is irregularity and moderate to severe narrowing of the M1 segment of the right middle cerebral artery with   multifocal areas of more mild narrowing involving the posterior division of the right MCA. No vascular cutoff. Multifocal areas of high-grade narrowing are seen within the anterior cerebral arteries with good flow more distally within the vessels. No   vascular cutoff. Multifocal areas of moderate to severe narrowing are seen within the left middle cerebral artery without vascular cutoff.    POSTERIOR CIRCULATION: Right vertebral artery is dominant. Atherosclerotic plaque moderately narrows the proximal intradural right vertebral artery. Small caliber left vertebral artery with severe narrowing of the vessel after the takeoff of the   posteroinferior cerebellar artery. Multifocal areas of moderate to severe narrowing are seen within the proximal and mid basilar artery. Multifocal areas of moderate narrowing are seen within both posterior cerebral arteries. P1 segment of the right   posterior cerebral artery is diminutive and  not well seen with flow to the right posterior cerebral artery derived primarily from the right posterior communicating artery. Small caliber incompletely visualized left posterior communicating artery.    DURAL VENOUS SINUSES: No findings for dural venous sinus thrombosis.    NECK CTA:  RIGHT CAROTID: Moderate predominantly noncalcified atherosclerotic plaque right carotid bulb/bifurcation with mild associated luminal narrowing but no significant stenosis by modified NASCET criteria.    LEFT CAROTID: Heavy atherosclerotic plaque left carotid bulb/bifurcation with moderate associated luminal narrowing and at least 50% stenosis by modified NASCET criteria.    VERTEBRAL ARTERIES: Dominant right vertebral artery. Small caliber left vertebral artery. No significant stenosis or findings for dissection.    AORTIC ARCH: Coarse atherosclerotic plaque is seen within the aortic arch and proximal great vessels.    NONVASCULAR STRUCTURES: Visualized lung apices reveal a small amount of dependent atelectasis. Visualized osseous structures are without suspicious lytic or blastic foci.      Impression    IMPRESSION:   HEAD CT:  1.  Extensive areas of encephalomalacic change and gliosis are again seen within the anterior aspects of both frontal lobes and right temporal lobe. These are stable compared to prior.    2.  Stable chronic infarcts are seen within both basal ganglia and corona radiata.    3.  Moderate volume loss and presumed sequela chronic microvascular ischemic change.    HEAD CTA:     1. Multifocal areas of moderate or high-grade stenosis are seen throughout the anterior and posterior circulation. Areas of narrowing are nonspecific but favored to be atherosclerotic in etiology. Heavy atherosclerotic calcifications are seen within both   carotid siphons. No vascular cutoff.    NECK CTA:  1.  Heavy atherosclerotic plaque left carotid bulb/bifurcation with at least 50% stenosis by modified NASCET criteria. Moderate  predominantly noncalcified plaque right carotid bulb/bifurcation without significant stenosis by modified NASCET criteria. No   findings for vertebral artery dissection.      Noncontrast head CT findings and CTA vascular findings were called to Dr. Diaz at 1907 and 1919 hours respectively.   CTA Head Neck with Contrast     Status: None    Narrative    EXAM: CT HEAD W/O CONTRAST, CTA HEAD NECK W CONTRAST  LOCATION: [facility]  DATE/TIME: 5/4/2023 7:07 PM CDT    INDICATION: Code Stroke to evaluate for potential thrombolysis and thrombectomy. PLEASE READ IMMEDIATELY.  COMPARISON: CT brain 09/22/2021. MRI brain 12/03/2019.  CONTRAST: 75 mL Isovue 370  TECHNIQUE: Head and neck CT angiogram with IV contrast. Noncontrast head CT followed by axial helical CT images of the head and neck vessels obtained during the arterial phase of intravenous contrast administration. Axial 2D reconstructed images and   multiplanar 3D MIP reconstructed images of the head and neck vessels were performed by the technologist. Dose reduction techniques were used. All stenosis measurements made according to NASCET criteria unless otherwise specified.    FINDINGS:   NONCONTRAST HEAD CT:   INTRACRANIAL CONTENTS: No finding for intracranial hemorrhage, mass, or convincing finding for acute infarct. Extensive areas of encephalomalacic change and gliosis are seen involving the anterior aspects of both frontal lobes and anterior right temporal   lobe. Distribution of areas of encephalomalacia favors sequela of prior trauma. Chronic infarcts are seen within both basal ganglia and corona radiata.    Moderate prominence lateral and third ventricles and sulci. Mild to moderate presumed sequela chronic microvascular ischemic change.    VISUALIZED ORBITS/SINUSES/MASTOIDS: Prior right-sided cataract surgery. Small retention cyst/mild maxillary sinus mucosal thickening. Middle ear cavities and mastoid air cells are  clear.    BONES/SOFT TISSUES: Calvarium is intact, without suspicious lytic or blastic foci.    HEAD CTA:  ANTERIOR CIRCULATION: Heavy atherosclerotic plaque is seen within both carotid siphons with moderate associated luminal narrowing. There is irregularity and moderate to severe narrowing of the M1 segment of the right middle cerebral artery with   multifocal areas of more mild narrowing involving the posterior division of the right MCA. No vascular cutoff. Multifocal areas of high-grade narrowing are seen within the anterior cerebral arteries with good flow more distally within the vessels. No   vascular cutoff. Multifocal areas of moderate to severe narrowing are seen within the left middle cerebral artery without vascular cutoff.    POSTERIOR CIRCULATION: Right vertebral artery is dominant. Atherosclerotic plaque moderately narrows the proximal intradural right vertebral artery. Small caliber left vertebral artery with severe narrowing of the vessel after the takeoff of the   posteroinferior cerebellar artery. Multifocal areas of moderate to severe narrowing are seen within the proximal and mid basilar artery. Multifocal areas of moderate narrowing are seen within both posterior cerebral arteries. P1 segment of the right   posterior cerebral artery is diminutive and not well seen with flow to the right posterior cerebral artery derived primarily from the right posterior communicating artery. Small caliber incompletely visualized left posterior communicating artery.    DURAL VENOUS SINUSES: No findings for dural venous sinus thrombosis.    NECK CTA:  RIGHT CAROTID: Moderate predominantly noncalcified atherosclerotic plaque right carotid bulb/bifurcation with mild associated luminal narrowing but no significant stenosis by modified NASCET criteria.    LEFT CAROTID: Heavy atherosclerotic plaque left carotid bulb/bifurcation with moderate associated luminal narrowing and at least 50% stenosis by modified NASCET  criteria.    VERTEBRAL ARTERIES: Dominant right vertebral artery. Small caliber left vertebral artery. No significant stenosis or findings for dissection.    AORTIC ARCH: Coarse atherosclerotic plaque is seen within the aortic arch and proximal great vessels.    NONVASCULAR STRUCTURES: Visualized lung apices reveal a small amount of dependent atelectasis. Visualized osseous structures are without suspicious lytic or blastic foci.      Impression    IMPRESSION:   HEAD CT:  1.  Extensive areas of encephalomalacic change and gliosis are again seen within the anterior aspects of both frontal lobes and right temporal lobe. These are stable compared to prior.    2.  Stable chronic infarcts are seen within both basal ganglia and corona radiata.    3.  Moderate volume loss and presumed sequela chronic microvascular ischemic change.    HEAD CTA:     1. Multifocal areas of moderate or high-grade stenosis are seen throughout the anterior and posterior circulation. Areas of narrowing are nonspecific but favored to be atherosclerotic in etiology. Heavy atherosclerotic calcifications are seen within both   carotid siphons. No vascular cutoff.    NECK CTA:  1.  Heavy atherosclerotic plaque left carotid bulb/bifurcation with at least 50% stenosis by modified NASCET criteria. Moderate predominantly noncalcified plaque right carotid bulb/bifurcation without significant stenosis by modified NASCET criteria. No   findings for vertebral artery dissection.      Noncontrast head CT findings and CTA vascular findings were called to Dr. Diaz at 1907 and 1919 hours respectively.   Basic metabolic panel     Status: Abnormal   Result Value Ref Range    Sodium 138 136 - 145 mmol/L    Potassium 4.8 3.4 - 5.3 mmol/L    Chloride 105 98 - 107 mmol/L    Carbon Dioxide (CO2) 24 22 - 29 mmol/L    Anion Gap 9 7 - 15 mmol/L    Urea Nitrogen 22.4 8.0 - 23.0 mg/dL    Creatinine 0.91 0.67 - 1.17 mg/dL    Calcium 9.2 8.8 - 10.2 mg/dL    Glucose 232 (H)  70 - 99 mg/dL    GFR Estimate 82 >60 mL/min/1.73m2   INR     Status: Normal   Result Value Ref Range    INR 1.07 0.85 - 1.15   Partial thromboplastin time     Status: Normal   Result Value Ref Range    aPTT 30 22 - 38 Seconds   Troponin T, High Sensitivity     Status: Normal   Result Value Ref Range    Troponin T, High Sensitivity 17 <=22 ng/L   CBC with platelets and differential     Status: None   Result Value Ref Range    WBC Count 6.9 4.0 - 11.0 10e3/uL    RBC Count 5.04 4.40 - 5.90 10e6/uL    Hemoglobin 16.0 13.3 - 17.7 g/dL    Hematocrit 47.7 40.0 - 53.0 %    MCV 95 78 - 100 fL    MCH 31.7 26.5 - 33.0 pg    MCHC 33.5 31.5 - 36.5 g/dL    RDW 13.5 10.0 - 15.0 %    Platelet Count 192 150 - 450 10e3/uL    % Neutrophils 56 %    % Lymphocytes 31 %    % Monocytes 8 %    % Eosinophils 4 %    % Basophils 1 %    % Immature Granulocytes 0 %    NRBCs per 100 WBC 0 <1 /100    Absolute Neutrophils 3.9 1.6 - 8.3 10e3/uL    Absolute Lymphocytes 2.2 0.8 - 5.3 10e3/uL    Absolute Monocytes 0.6 0.0 - 1.3 10e3/uL    Absolute Eosinophils 0.3 0.0 - 0.7 10e3/uL    Absolute Basophils 0.1 0.0 - 0.2 10e3/uL    Absolute Immature Granulocytes 0.0 <=0.4 10e3/uL    Absolute NRBCs 0.0 10e3/uL   EKG 12-lead, tracing only     Status: None (Preliminary result)   Result Value Ref Range    Systolic Blood Pressure  mmHg    Diastolic Blood Pressure  mmHg    Ventricular Rate 55 BPM    Atrial Rate 55 BPM    OR Interval 190 ms    QRS Duration 96 ms     ms    QTc 438 ms    P Axis 68 degrees    R AXIS 78 degrees    T Axis 91 degrees    Interpretation ECG       Sinus bradycardia  Otherwise normal ECG  When compared with ECG of 22-SEP-2021 21:12,  Vent. rate has decreased BY  27 BPM     CBC with Platelets & Differential     Status: None    Narrative    The following orders were created for panel order CBC with Platelets & Differential.  Procedure                               Abnormality         Status                     ---------                                -----------         ------                     CBC with platelets and d...[[id number]]                      Final result                 Please view results for these tests on the individual orders.      Patient's response to treatments and/or interventions:  Stable     To be done/followed up on inpatient unit:  Monitor     Does this patient have any cognitive concerns?: Pt poor historian     Activity level - Baseline/Home:  Stand with Assist  Activity Level - Current:   Stand with assist x2    Patient's Preferred language: Russian   Needed?: No    Isolation: None  Infection: Not Applicable  Patient tested for COVID 19 prior to admission: YES  Bariatric?: No    Vital Signs:   Vitals:    05/04/23 1915 05/1935 05/04/23 1945 05/04/23 2030   BP: (!) 157/71 (!) 159/75 (!) 149/69 (!) 179/82   Pulse: 55 50 53 63   Resp: 20 14 10 (!) 31   Temp:    97.8  F (36.6  C)   TempSrc:    Temporal   SpO2: 96% 93% 94% 95%   Weight:           Cardiac Rhythm:     Was the PSS-3 completed:   Yes  What interventions are required if any?               Family Comments: Family not present in ED at the time of this documentation   OBS brochure/video discussed/provided to patient/family: Yes              Name of person given brochure if not patient: N/A              Relationship to patient: \N/A    For the majority of the shift this patient's behavior was Green.   Behavioral interventions performed were None .    ED NURSE PHONE NUMBER: [phone]

## 2023-05-05 NOTE — CONSULTS
[facility]    Stroke Consult Note    Reason for Consult:  stroke    Chief Complaint: Slurred Speech       HPI  Aadm Huber is a 87 year old male with PMHx significant for CAD, HTN, CVA (2015), HLD, TBI, dementia. He lives with his wife in an apartment. He is able to ambulate independently but needs assistance with ADLs. He receives PCA services. He is incontinent of bowel and bladder at baseline. He is hard of hearing; he tried hearing aids in the past but they worsened his tinnitus so he doesn't wear them.   He presented to the ED on 5/4 with encephalopathy, generalized weakness, and aphasia. His wife explains that he was sitting out on the balcony. When she went to check on him, he was unable to speak and had roving eye movements. She tried to get him in the apartment but he was weak all over.     Stroke Evaluation Summarized    MRI/Head CT MRI: Motion limited examination equivocal DWI hyperintensity at the right asia. This may represent artifact or acute ischemia. Generalized brain atrophy and presumed microvascular ischemic changes as detailed above. Severe bifrontal encephalomalacia.    CT: Extensive areas of encephalomalacic change and gliosis are again seen within the anterior aspects of both frontal lobes and right temporal lobe. These are stable compared to prior. Stable chronic infarcts are seen within both basal ganglia and corona radiata. Moderate volume loss and presumed sequela chronic microvascular ischemic change.   Intracranial Vasculature Multifocal areas of moderate or high-grade stenosis are seen throughout the anterior and posterior circulation. Areas of narrowing are nonspecific but favored to be atherosclerotic in etiology. Heavy atherosclerotic calcifications are seen within both carotid siphons. No vascular cutoff.   Cervical Vasculature Heavy atherosclerotic plaque left carotid bulb/bifurcation with at least 50% stenosis by modified NASCET criteria.  "Moderate predominantly noncalcified plaque right carotid bulb/bifurcation without significant stenosis by modified NASCET criteria. No   findings for vertebral artery dissection.     Echocardiogram No LV thrombus, mild concentric LVH, EF 60-65%, no WMA, mildly dilated left atrium, normal right atrial size    EKG/Telemetry SB   Other Testing EEG read pending     LDL  5/4/2023: 90 mg/dL   A1C  5/4/2023: 9.6 %   Troponin 5/4/2023: 17 ng/L       Impression  #ICAD  #transient aphasia, generalized weakness, roving eye movements: etiology encephalopathy vs seizure vs stroke/TIA     Recommendations  - q4h neuro checks  - normotension, continue PTA medications  - aspirin 325 mg daily   - atorvastatin 40 mg daily   - limited MRI tomorrow (will order)  - EEG     Patient Follow-up    - final recommendation pending work-up    Thank you for this consult. We will continue to follow.     Mckenzie Woodson NP  Vascular Neurology    To page me or covering stroke neurology team member, click here: [facility]  Choose \"On Call\" tab at top, then select \"NEUROLOGY/ALL SITES\" from middle drop-down box, press Enter, then look for \"stroke\" or \"telestroke\" for your site.    _____________________________________________________    Clinically Significant Risk Factors Present on Admission            # Hypomagnesemia: Lowest Mg = 1.6 mg/dL in last 2 days, will replace as needed       # Hypertension: home medication list includes antihypertensive(s)   # Dementia: noted on problem list   # DMII: A1C = 9.6 % (Ref range: <5.7 %) within past 6 months            Past Medical History   Past Medical History:   Diagnosis Date     Acute chest pain 10/23/2015     CAD (coronary artery disease)      Essential hypertension 10/23/2015     History of CVA (cerebrovascular accident) 10/23/2015     HLD (hyperlipidemia)      HTN (hypertension)      NSTEMI (non-ST elevated myocardial infarction) (H) 10/23/2015     Post PTCA 10-    Successful PCI of culprit proximal " to mid RCA with placement of a     Post PTCA 11-6-2015    pci of complex mid CFX-hector     Past Surgical History   Past Surgical History:   Procedure Laterality Date     CHOLECYSTECTOMY       GENITOURINARY SURGERY      prostate removal      HEART CATH LEFT HEART CATH  10/12/2015    PCI w/ HECTOR to RCA     HEART CATH RIGHT AND LEFT HEART CATH  11/6/2015    PCI w/ HECTOR to mid-CFX     Medications   Home Meds  Prior to Admission medications    Medication Sig Start Date End Date Taking? Authorizing Provider   acetaminophen (TYLENOL) 325 MG tablet Take 3 tablets (975 mg) by mouth every 6 hours as needed for mild pain 9/24/21  Yes Armando Rajan MD   aspirin 81 MG EC tablet Take one tablet daily 5/8/18  Yes Eric Avalos MD   carvedilol (COREG) 25 MG tablet Take 1 tablet (25 mg) by mouth 2 times daily (with meals) 11/8/18  Yes Eric Avalos MD   finasteride (PROSCAR) 5 MG tablet Take 5 mg by mouth daily   Yes Unknown, Entered By History   fluticasone (FLONASE) 50 MCG/ACT nasal spray Spray 1 spray into both nostrils daily as needed for rhinitis or allergies   Yes Unknown, Entered By History   gabapentin (NEURONTIN) 300 MG capsule Take 300 mg by mouth 2 times daily   Yes Unknown, Entered By History   ibuprofen (ADVIL/MOTRIN) 400 MG tablet Take 1 tablet (400 mg) by mouth every 8 hours as needed for moderate pain 9/24/21  Yes Armando Rajan MD   losartan (COZAAR) 50 MG tablet Take 50 mg by mouth daily   Yes Unknown, Entered By History   metFORMIN (GLUCOPHAGE) 500 MG tablet Take 500 mg with breakfast and 1000 mg with evening meal   Yes Unknown, Entered By History   mirtazapine (REMERON) 15 MG tablet Take 15 mg by mouth At Bedtime   Yes Unknown, Entered By History   nitroglycerin (NITROSTAT) 0.4 MG SL tablet Place 1 tablet (0.4 mg) under the tongue every 5 minutes as needed for chest pain 10/26/15  Yes Laura Valerio, APRN CNP   OLANZapine (ZYPREXA) 2.5 MG tablet Take 2.5 mg by mouth 2 times daily   Yes Unknown,  Entered By History   QUEtiapine (SEROQUEL) 25 MG tablet Take 25 mg by mouth At Bedtime   Yes Unknown, Entered By History   tamsulosin (FLOMAX) 0.4 MG capsule Take 0.4 mg by mouth daily   Yes Unknown, Entered By History       Scheduled Meds    aspirin  325 mg Oral Daily    Or     aspirin  324 mg Oral or NG Tube Daily    Or     aspirin  300 mg Rectal Daily     carvedilol  25 mg Oral BID w/meals     finasteride  5 mg Oral Daily     gabapentin  300 mg Oral BID     insulin aspart  1-7 Units Subcutaneous TID AC     insulin aspart  1-5 Units Subcutaneous At Bedtime     losartan  50 mg Oral Daily     OLANZapine  2.5 mg Oral BID     QUEtiapine  25 mg Oral At Bedtime     sodium chloride (PF)  3 mL Intracatheter Q8H       Infusion Meds    - MEDICATION INSTRUCTIONS -       - MEDICATION INSTRUCTIONS -         PRN Meds  acetaminophen **OR** acetaminophen, bisacodyl, glucose **OR** dextrose **OR** glucagon, labetalol **OR** hydrALAZINE, lidocaine 4%, lidocaine (buffered or not buffered), - MEDICATION INSTRUCTIONS -, - MEDICATION INSTRUCTIONS -, melatonin, ondansetron **OR** ondansetron, polyethylene glycol, senna-docusate **OR** senna-docusate, sodium chloride (PF)    Allergies   Allergies   Allergen Reactions     Lisinopril      Morphine Other (See Comments)     confusion     Family History   Family History   Problem Relation Age of Onset     Unknown/Adopted No family hx of      Social History   Social History     Tobacco Use     Smoking status: Never     Smokeless tobacco: Never   Substance Use Topics     Alcohol use: No     Alcohol/week: 0.0 standard drinks of alcohol     Drug use: No       Review of Systems   Review of systems not obtained due to patient factors - confusion       PHYSICAL EXAMINATION   Temp:  [97.3  F (36.3  C)-98.5  F (36.9  C)] 97.3  F (36.3  C)  Pulse:  [49-69] 55  Resp:  [10-31] 16  BP: (110-182)/(69-85) 146/74  SpO2:  [93 %-97 %] 94 %    Neurologic  Mental Status:  alert, follows commands   Cranial  Nerves:  Blinks to threat, PERRL, EOMI with normal smooth pursuit, facial movements symmetric, hard of hearing, mild dysarthria   Motor:  normal muscle tone and bulk, no abnormal movements, able to move all limbs spontaneously, LLE drift    Sensory:  light touch sensation intact and symmetric throughout upper and lower extremities    Dysphagia Screen  Per Nursing    Stroke Scales    NIHSS  1a. Level of Consciousness 0-->Alert, keenly responsive   1b. LOC Questions 1-->Answers one question correctly   1c. LOC Commands 0-->Performs both tasks correctly   2.   Best Gaze 0-->Normal   3.   Visual 0-->No visual loss   4.   Facial Palsy 0-->Normal symmetrical movements   5a. Motor Arm, Left 0-->No drift, limb holds 90 (or 45) degrees for full 10 secs   5b. Motor Arm, Right 0-->No drift, limb holds 90 (or 45) degrees for full 10 secs   6a. Motor Leg, Left 1-->Drift, leg falls by the end of the 5-sec period but does not hit bed   6b. Motor Leg, right 0-->No drift, leg holds 30 degree position for full 5 secs   7.   Limb Ataxia 0-->Absent   8.   Sensory 0-->Normal, no sensory loss   9.   Best Language 0-->No aphasia, normal   10. Dysarthria 1-->Mild-to-moderate dysarthria, patient slurs at least some words and, at worst, can be understood with some difficulty   11. Extinction and Inattention  0-->No abnormality   Total 3 (05/05/23 0450)       Modified Rives Score (Pre-morbid)  4 - Moderately severe disability.  Unable to attend to own bodily needs without assistance or unable to walk unassisted.     Imaging  I personally reviewed all imaging; relevant findings per HPI.    Labs Data   CBC  Recent Labs   Lab 05/05/23  0836 05/04/23  1858   WBC 7.5 6.9   RBC 5.26 5.04   HGB 16.4 16.0   HCT 49.1 47.7    192     Basic Metabolic Panel   Recent Labs   Lab 05/05/23  1439 05/05/23  1005 05/05/23  0836 05/04/23  2221 05/04/23  1858   NA  --   --  139  --  138   POTASSIUM  --   --  3.9  --  4.8   CHLORIDE  --   --  104  --  105    CO2  --   --  22  --  24   BUN  --   --  18.4  --  22.4   CR  --   --  0.80  --  0.91   * 188* 170*   < > 232*   LUZ  --   --  9.0  --  9.2    < > = values in this interval not displayed.     Liver Panel  No results for input(s): PROTTOTAL, ALBUMIN, BILITOTAL, ALKPHOS, AST, ALT, BILIDIRECT in the last 168 hours.  INR    Recent Labs   Lab Test 05/04/23  1858 12/03/19  1817 01/09/19  1008   INR 1.07 1.08 1.01      Lipid Profile    Recent Labs   Lab Test 05/04/23  1858 11/29/22  1330 11/08/18  0913 01/29/16  0831 11/02/15  0959 10/10/15  0625   CHOL 185 189 174   < > Test canceled by PCU/Clinic   Charge credited  CORRECTED ON 11/02 AT 1235: PREVIOUSLY REPORTED  LDL Cholesterol is the   primary guide to therapy.   The NCEP recommends further evaluation of: patients   with cholesterol greater than 200 mg/dL if additional risk factors are present,   cholesterol greater than 240 mg/dL, triglycerides greater than 150 mg/dL, or   HDL less than 40 mg/dL.   168   HDL 66 63 56   < > Test canceled by PCU/Clinic   Charge credited  CORRECTED ON 11/02 AT 1235: PREVIOUSLY REPORTED AS 62   67   LDL 90 89 91   < > Test canceled by PCU/Clinic   Charge credited  CORRECTED ON 11/02 AT 1235: PREVIOUSLY REPORTED AS 40 LDL Cholesterol is the   primary guide to therapy: LDL cholesterol goal in high risk patients is <100   mg/dL and in very high risk patients is <70 mg/dL.   89   TRIG 146 186* 133   < > Test canceled by PCU/Clinic   Charge credited  CORRECTED ON 11/02 AT 1235: PREVIOUSLY REPORTED AS 59   60   CHOLHDLRATIO  --   --   --   --  Test canceled by PCU/Clinic   Charge credited  CORRECTED ON 11/02 AT 1235: PREVIOUSLY REPORTED AS 1.8   2.5    < > = values in this interval not displayed.     A1C    Recent Labs   Lab Test 05/04/23  1858 11/29/22  1330 09/22/21  2116   A1C 9.6* 8.7* 7.5*     Troponin    Recent Labs   Lab 05/04/23  2310 05/04/23  1858   CTROPT 17 17          Stroke Consult Data Data   This was a  non-emergent, non-telestroke consult.  I have personally spent a total of 45 minutes providing care today, time spent in reviewing medical records and reviewing tests, examining the patient and obtaining history, coordination of care, and discussion with the patient and/or family regarding diagnostic results, prognosis, symptom management, risks and benefits of management options, and development of plan of care. Greater than 50% was spent in counseling and coordination of care.

## 2023-05-05 NOTE — PHARMACY-ADMISSION MEDICATION HISTORY
Pharmacist Admission Medication History    Admission medication history is complete. The information provided in this note is only as accurate as the sources available at the time of the update.    Medication reconciliation/reorder completed by provider prior to medication history? Yes    Information Source(s): Medication list, Surescripts via N/A    Pertinent Information: Patient's spouse left a medication list (printed 1/6/23); given AMS, completed medrec with list provided, unable to confirm last dose times     Changes made to PTA medication list:    Added: Flonase, olanzapine    Deleted: PRN quetiapine     Changed: None    Medication Affordability:  Not including over the counter (OTC) medications, was there a time in the past 12 months when you did not take your medications as prescribed because of cost?: Unable to Assess    Allergies reviewed with patient and updates made in EHR: unable to assess    Medication History Completed By: MELI WATSON [facility] 5/4/2023 9:38 PM    Prior to Admission medications    Medication Sig Last Dose Taking? Auth Provider Long Term End Date   acetaminophen (TYLENOL) 325 MG tablet Take 3 tablets (975 mg) by mouth every 6 hours as needed for mild pain  Yes Armando Rajan MD     aspirin 81 MG EC tablet Take one tablet daily  Yes Eric Avalos MD     carvedilol (COREG) 25 MG tablet Take 1 tablet (25 mg) by mouth 2 times daily (with meals)  Yes Eric Avalos MD Yes    finasteride (PROSCAR) 5 MG tablet Take 5 mg by mouth daily  Yes Unknown, Entered By History     fluticasone (FLONASE) 50 MCG/ACT nasal spray Spray 1 spray into both nostrils daily as needed for rhinitis or allergies  Yes Unknown, Entered By History     gabapentin (NEURONTIN) 300 MG capsule Take 300 mg by mouth 2 times daily  Yes Unknown, Entered By History Yes    ibuprofen (ADVIL/MOTRIN) 400 MG tablet Take 1 tablet (400 mg) by mouth every 8 hours as needed for moderate pain Unknown Yes Armando Rajan  MD     losartan (COZAAR) 50 MG tablet Take 50 mg by mouth daily  Yes Unknown, Entered By History No    metFORMIN (GLUCOPHAGE) 500 MG tablet Take 500 mg with breakfast and 1000 mg with evening meal  Yes Unknown, Entered By History No    mirtazapine (REMERON) 15 MG tablet Take 15 mg by mouth At Bedtime  Yes Unknown, Entered By History Yes    nitroglycerin (NITROSTAT) 0.4 MG SL tablet Place 1 tablet (0.4 mg) under the tongue every 5 minutes as needed for chest pain Unknown Yes Laura Valerio, APRN CNP Yes    OLANZapine (ZYPREXA) 2.5 MG tablet Take 2.5 mg by mouth 2 times daily  Yes Unknown, Entered By History No    QUEtiapine (SEROQUEL) 25 MG tablet Take 25 mg by mouth At Bedtime  Yes Unknown, Entered By History No    tamsulosin (FLOMAX) 0.4 MG capsule Take 0.4 mg by mouth daily  Yes Unknown, Entered By History

## 2023-05-05 NOTE — PLAN OF CARE
Reason for Admission: Suspected stroke     Cognitive/Mentation: A&O x2, d/o to time and situation  Neuros/CMS: Intact ex slurred speech, unstable gait  VS: Stable on RA. SBP <220.   GI: BS active, + flatus. Continent.  : Due to void. Continent.  but pt refused to use restroom/urinal  Pulmonary: LS clear.  Pain: Denies     Drains/Lines: R PIV infusing NS @ 50 ml/hr  Skin: Intact ex BR on sacrum  Activity: Assist x2 GB/SS  Diet: NPO     Therapies recs: Pending evaluation  Discharge: Pending workup     Aggression Stoplight Tool: Green     End of shift summary: MRI resulted. Q6H blood sugars. Pulsate mattress ordered. Haitian speaking.

## 2023-05-05 NOTE — PROGRESS NOTES
[facility]    Medicine Progress Note - Hospitalist Service    Date of Admission:  5/4/2023    Assessment & Plan   Adam Huber is a 87 year old male admitted on 5/4/2023 with slurred speech, history of type 2 diabetes and hypertension as well as CAD and CVA history, patient with memory impairment and language barrier.     HEAD CT:  1.  Extensive areas of encephalomalacic change and gliosis are again seen within the anterior aspects of both frontal lobes and right temporal lobe. These are stable compared to prior.   2.  Stable chronic infarcts are seen within both basal ganglia and corona radiata.   3.  Moderate volume loss and presumed sequela chronic microvascular ischemic change.     HEAD CTA:    1. Multifocal areas of moderate or high-grade stenosis are seen throughout the anterior and posterior circulation. Areas of narrowing are nonspecific but favored to be atherosclerotic in etiology. Heavy atherosclerotic calcifications are seen within both carotid siphons. No vascular cutoff.     NECK CTA:  1.  Heavy atherosclerotic plaque left carotid bulb/bifurcation with at least 50% stenosis by modified NASCET criteria. Moderate predominantly noncalcified plaque right carotid bulb/bifurcation without significant stenosis by modified NASCET criteria. No findings for vertebral artery dissection.    MRI brain: IMPRESSION:  1.  Motion limited examination equivocal DWI hyperintensity at the right asia. This may represent artifact or acute ischemia.  2.  Generalized brain atrophy and presumed microvascular ischemic changes as detailed above.  3.  Severe bifrontal encephalomalacia.     Suspected stroke  -neuro onboard  -PT pending   -OT recs: TCU placement   -ST recs: level 4 (puree) with mildly thick liquids  -neuro checks  -tele monitoring  -repeat MRI tomorrow   -EEG today  -ASA and   -defer permissive HTN or per neuro team  -neuro checks   -echo: Proximal septal thickening is noted.  Limited  image quality and limited image windows makes analysis difficult. No  clear regional wall motion abnormalities noted. Diastolic Doppler findings (E/E' ratio and/or other parameters) suggest left  ventricular filling pressures are increased.  There is no thrombus seen in the left ventricle. The mean mitral valve gradient is 3-4mmHg. IVC diameter >2.1 cm collapsing <50% with sniff suggests a high RA pressure estimated at 15 mmHg or greater. Aortic valve is not well seen but it is abnormal and heavily calcified/sclerotic. Likely no significant aortic valve stenosis. Consider REBECA if additonal imaging is needed The study was technically difficult.  -REBECA pending     hypomagnesemia  -magnesium protocol  -monitor    HTN  -resume PTA coreg, losartan     BPH   -resume PTA finasteride and Flomax     diabetes  -PTA metformin (hold)  -sliding scale insulin (med)  -hypoglycemic protocol    Neuropathy  -PTA gabapentin (resume)     Anxiety/depression  -PTA Zyprexa, Seroquel (resume)   -hold Remeron        Diet: Combination Diet Pureed Diet (level 4); Mildly Thick (level 2)  Room Service    DVT Prophylaxis: Pneumatic Compression Devices  Villa Catheter: Not present  Lines: None     Cardiac Monitoring: ACTIVE order. Indication: Stroke, acute (48 hours)  Code Status: No CPR- Pre-arrest intubation OK      Clinically Significant Risk Factors Present on Admission            # Hypomagnesemia: Lowest Mg = 1.6 mg/dL in last 2 days, will replace as needed       # Hypertension: home medication list includes antihypertensive(s)   # Dementia: noted on problem list   # DMII: A1C = 9.6 % (Ref range: <5.7 %) within past 6 months            Disposition Plan      Expected Discharge Date: 05/06/2023                The patient's care was discussed with the Attending Physician, Dr. Montana.    Unique Pang, CNP  Hospitalist Service  [facility]  Securely message with [employer] (more info)  Text page via [employer] Paging/Directory    ______________________________________________________________________    Interval History   -NAD noted.  -EEG pending      Physical Exam   Vital Signs: Temp: 98.5  F (36.9  C) Temp src: Oral BP: (!) 162/75 Pulse: 59   Resp: 16 SpO2: 96 % O2 Device: None (Room air)    Weight: 213 lbs 6.4 oz    Constitutional: no distress, cooperative, alert, oriented, normal mood  Cardiovascular: Regular rate and rhythm, no murmurs  Respiratory: clear bilaterally, no crackles, wheezing or rales  Gastrointestinal: Abdomen soft, non-tender. BS normal. No masses.  Skin: warm, dry, intact, no edema    Medical Decision Making       30 MINUTES SPENT BY ME on the date of service doing chart review, history, exam, documentation & further activities per the note.  MANAGEMENT DISCUSSED with the following over the past 24 hours: patient, patient family member, nurse, and Dr. Montana        Data     I have personally reviewed the following data over the past 24 hrs:    7.5  \   16.4   / 192     139 104 18.4 /  238 (H)   3.9 22 0.80 \       Trop: 17 BNP: N/A       TSH: N/A T4: N/A A1C: 9.6 (H)       INR:  1.07 PTT:  30   D-dimer:  N/A Fibrinogen:  N/A       Imaging results reviewed over the past 24 hrs:   Recent Results (from the past 24 hour(s))   CT Head w/o Contrast    Narrative    EXAM: CT HEAD W/O CONTRAST, CTA HEAD NECK W CONTRAST  LOCATION: [facility]  DATE/TIME: 5/4/2023 7:07 PM CDT    INDICATION: Code Stroke to evaluate for potential thrombolysis and thrombectomy. PLEASE READ IMMEDIATELY.  COMPARISON: CT brain 09/22/2021. MRI brain 12/03/2019.  CONTRAST: 75 mL Isovue 370  TECHNIQUE: Head and neck CT angiogram with IV contrast. Noncontrast head CT followed by axial helical CT images of the head and neck vessels obtained during the arterial phase of intravenous contrast administration. Axial 2D reconstructed images and   multiplanar 3D MIP reconstructed images of the head and neck vessels were performed by the  technologist. Dose reduction techniques were used. All stenosis measurements made according to NASCET criteria unless otherwise specified.    FINDINGS:   NONCONTRAST HEAD CT:   INTRACRANIAL CONTENTS: No finding for intracranial hemorrhage, mass, or convincing finding for acute infarct. Extensive areas of encephalomalacic change and gliosis are seen involving the anterior aspects of both frontal lobes and anterior right temporal   lobe. Distribution of areas of encephalomalacia favors sequela of prior trauma. Chronic infarcts are seen within both basal ganglia and corona radiata.    Moderate prominence lateral and third ventricles and sulci. Mild to moderate presumed sequela chronic microvascular ischemic change.    VISUALIZED ORBITS/SINUSES/MASTOIDS: Prior right-sided cataract surgery. Small retention cyst/mild maxillary sinus mucosal thickening. Middle ear cavities and mastoid air cells are clear.    BONES/SOFT TISSUES: Calvarium is intact, without suspicious lytic or blastic foci.    HEAD CTA:  ANTERIOR CIRCULATION: Heavy atherosclerotic plaque is seen within both carotid siphons with moderate associated luminal narrowing. There is irregularity and moderate to severe narrowing of the M1 segment of the right middle cerebral artery with   multifocal areas of more mild narrowing involving the posterior division of the right MCA. No vascular cutoff. Multifocal areas of high-grade narrowing are seen within the anterior cerebral arteries with good flow more distally within the vessels. No   vascular cutoff. Multifocal areas of moderate to severe narrowing are seen within the left middle cerebral artery without vascular cutoff.    POSTERIOR CIRCULATION: Right vertebral artery is dominant. Atherosclerotic plaque moderately narrows the proximal intradural right vertebral artery. Small caliber left vertebral artery with severe narrowing of the vessel after the takeoff of the   posteroinferior cerebellar artery. Multifocal  areas of moderate to severe narrowing are seen within the proximal and mid basilar artery. Multifocal areas of moderate narrowing are seen within both posterior cerebral arteries. P1 segment of the right   posterior cerebral artery is diminutive and not well seen with flow to the right posterior cerebral artery derived primarily from the right posterior communicating artery. Small caliber incompletely visualized left posterior communicating artery.    DURAL VENOUS SINUSES: No findings for dural venous sinus thrombosis.    NECK CTA:  RIGHT CAROTID: Moderate predominantly noncalcified atherosclerotic plaque right carotid bulb/bifurcation with mild associated luminal narrowing but no significant stenosis by modified NASCET criteria.    LEFT CAROTID: Heavy atherosclerotic plaque left carotid bulb/bifurcation with moderate associated luminal narrowing and at least 50% stenosis by modified NASCET criteria.    VERTEBRAL ARTERIES: Dominant right vertebral artery. Small caliber left vertebral artery. No significant stenosis or findings for dissection.    AORTIC ARCH: Coarse atherosclerotic plaque is seen within the aortic arch and proximal great vessels.    NONVASCULAR STRUCTURES: Visualized lung apices reveal a small amount of dependent atelectasis. Visualized osseous structures are without suspicious lytic or blastic foci.      Impression    IMPRESSION:   HEAD CT:  1.  Extensive areas of encephalomalacic change and gliosis are again seen within the anterior aspects of both frontal lobes and right temporal lobe. These are stable compared to prior.    2.  Stable chronic infarcts are seen within both basal ganglia and corona radiata.    3.  Moderate volume loss and presumed sequela chronic microvascular ischemic change.    HEAD CTA:     1. Multifocal areas of moderate or high-grade stenosis are seen throughout the anterior and posterior circulation. Areas of narrowing are nonspecific but favored to be atherosclerotic in  etiology. Heavy atherosclerotic calcifications are seen within both   carotid siphons. No vascular cutoff.    NECK CTA:  1.  Heavy atherosclerotic plaque left carotid bulb/bifurcation with at least 50% stenosis by modified NASCET criteria. Moderate predominantly noncalcified plaque right carotid bulb/bifurcation without significant stenosis by modified NASCET criteria. No   findings for vertebral artery dissection.      Noncontrast head CT findings and CTA vascular findings were called to Dr. Diaz at 1907 and 1919 hours respectively.   CTA Head Neck with Contrast    Narrative    EXAM: CT HEAD W/O CONTRAST, CTA HEAD NECK W CONTRAST  LOCATION: [facility]  DATE/TIME: 5/4/2023 7:07 PM CDT    INDICATION: Code Stroke to evaluate for potential thrombolysis and thrombectomy. PLEASE READ IMMEDIATELY.  COMPARISON: CT brain 09/22/2021. MRI brain 12/03/2019.  CONTRAST: 75 mL Isovue 370  TECHNIQUE: Head and neck CT angiogram with IV contrast. Noncontrast head CT followed by axial helical CT images of the head and neck vessels obtained during the arterial phase of intravenous contrast administration. Axial 2D reconstructed images and   multiplanar 3D MIP reconstructed images of the head and neck vessels were performed by the technologist. Dose reduction techniques were used. All stenosis measurements made according to NASCET criteria unless otherwise specified.    FINDINGS:   NONCONTRAST HEAD CT:   INTRACRANIAL CONTENTS: No finding for intracranial hemorrhage, mass, or convincing finding for acute infarct. Extensive areas of encephalomalacic change and gliosis are seen involving the anterior aspects of both frontal lobes and anterior right temporal   lobe. Distribution of areas of encephalomalacia favors sequela of prior trauma. Chronic infarcts are seen within both basal ganglia and corona radiata.    Moderate prominence lateral and third ventricles and sulci. Mild to moderate presumed sequela  chronic microvascular ischemic change.    VISUALIZED ORBITS/SINUSES/MASTOIDS: Prior right-sided cataract surgery. Small retention cyst/mild maxillary sinus mucosal thickening. Middle ear cavities and mastoid air cells are clear.    BONES/SOFT TISSUES: Calvarium is intact, without suspicious lytic or blastic foci.    HEAD CTA:  ANTERIOR CIRCULATION: Heavy atherosclerotic plaque is seen within both carotid siphons with moderate associated luminal narrowing. There is irregularity and moderate to severe narrowing of the M1 segment of the right middle cerebral artery with   multifocal areas of more mild narrowing involving the posterior division of the right MCA. No vascular cutoff. Multifocal areas of high-grade narrowing are seen within the anterior cerebral arteries with good flow more distally within the vessels. No   vascular cutoff. Multifocal areas of moderate to severe narrowing are seen within the left middle cerebral artery without vascular cutoff.    POSTERIOR CIRCULATION: Right vertebral artery is dominant. Atherosclerotic plaque moderately narrows the proximal intradural right vertebral artery. Small caliber left vertebral artery with severe narrowing of the vessel after the takeoff of the   posteroinferior cerebellar artery. Multifocal areas of moderate to severe narrowing are seen within the proximal and mid basilar artery. Multifocal areas of moderate narrowing are seen within both posterior cerebral arteries. P1 segment of the right   posterior cerebral artery is diminutive and not well seen with flow to the right posterior cerebral artery derived primarily from the right posterior communicating artery. Small caliber incompletely visualized left posterior communicating artery.    DURAL VENOUS SINUSES: No findings for dural venous sinus thrombosis.    NECK CTA:  RIGHT CAROTID: Moderate predominantly noncalcified atherosclerotic plaque right carotid bulb/bifurcation with mild associated luminal narrowing  but no significant stenosis by modified NASCET criteria.    LEFT CAROTID: Heavy atherosclerotic plaque left carotid bulb/bifurcation with moderate associated luminal narrowing and at least 50% stenosis by modified NASCET criteria.    VERTEBRAL ARTERIES: Dominant right vertebral artery. Small caliber left vertebral artery. No significant stenosis or findings for dissection.    AORTIC ARCH: Coarse atherosclerotic plaque is seen within the aortic arch and proximal great vessels.    NONVASCULAR STRUCTURES: Visualized lung apices reveal a small amount of dependent atelectasis. Visualized osseous structures are without suspicious lytic or blastic foci.      Impression    IMPRESSION:   HEAD CT:  1.  Extensive areas of encephalomalacic change and gliosis are again seen within the anterior aspects of both frontal lobes and right temporal lobe. These are stable compared to prior.    2.  Stable chronic infarcts are seen within both basal ganglia and corona radiata.    3.  Moderate volume loss and presumed sequela chronic microvascular ischemic change.    HEAD CTA:     1. Multifocal areas of moderate or high-grade stenosis are seen throughout the anterior and posterior circulation. Areas of narrowing are nonspecific but favored to be atherosclerotic in etiology. Heavy atherosclerotic calcifications are seen within both   carotid siphons. No vascular cutoff.    NECK CTA:  1.  Heavy atherosclerotic plaque left carotid bulb/bifurcation with at least 50% stenosis by modified NASCET criteria. Moderate predominantly noncalcified plaque right carotid bulb/bifurcation without significant stenosis by modified NASCET criteria. No   findings for vertebral artery dissection.      Noncontrast head CT findings and CTA vascular findings were called to Dr. Diaz at 1907 and 1919 hours respectively.   MR Brain w/o Contrast    Narrative    EXAM: MR BRAIN W/O CONTRAST  LOCATION: [facility]  DATE/TIME: 5/4/2023 9:48 PM  CDT    INDICATION: Slurred speech, evaluate for evidence of stroke  COMPARISON: CT head 05/03/2023  TECHNIQUE: Routine multiplanar multisequence head MRI without intravenous contrast. Contrast not administered secondary to patient cooperation.    FINDINGS: Examination is significantly limited secondary to patient motion artifact.  INTRACRANIAL CONTENTS: Question area of increased midline signal at the right central asia series 1002 image 10 without definite corresponding low ADC for T2 signal. No mass, acute hemorrhage, or extra-axial fluid collections. There is bifrontal and   right temporal encephalomalacia. 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 Confluent nonspecific T2/FLAIR hyperintensities within the cerebral white matter most consistent with advanced microvascular ischemic change. Moderate   generalized cerebral atrophy. No hydrocephalus. Normal position of the cerebellar tonsils.     SELLA: No abnormality accounting for technique.    OSSEOUS STRUCTURES/SOFT TISSUES: Normal marrow signal. The major intracranial vascular flow voids are maintained.     ORBITS: Prior right cataract surgery. Visualized portions of the orbits are otherwise unremarkable.     SINUSES/MASTOIDS: No paranasal sinus mucosal disease. No middle ear or mastoid effusion.       Impression    IMPRESSION:  1.  Motion limited examination equivocal DWI hyperintensity at the right asia. This may represent artifact or acute ischemia.  2.  Generalized brain atrophy and presumed microvascular ischemic changes as detailed above.  3.  Severe bifrontal encephalomalacia.   Echocardiogram Complete - For age > 60 yrs   Result Value    LVEF  60-65%    Narrative    [id number]  [id number]  [id number]^DONTAE^SRINIVASA^DAIN     [facility]  [facility]  [address]  [address]  [place] [address]  Phone [phone]  Fax [phone]     Name:  CHANI ELENA  MRN: [id number]  : 1935  Study Date: 2023 10:02 AM  Age: 87 yrs  Gender: Male  Patient Location: [facility]  Reason For Study: Cerebrovascular Incident  Ordering Physician: SRINIVASA DIGGS  Referring Physician: SRINIVASA DIGGS  Performed By: JOSEPHINE Hector     BSA: 2.2 m2  Height: 71 in  Weight: 213 lb  HR: 59  BP: 182/85 mmHg  ______________________________________________________________________________  Procedure  Complete Portable Echo Adult. Optison (NDC #[id number]) given intravenously.     ______________________________________________________________________________  Interpretation Summary     Proximal septal thickening is noted.  Limited image quality and limited image windows makes analysis difficult. No  clear regional wall motion abnormalities noted  Diastolic Doppler findings (E/E' ratio and/or other parameters) suggest left  ventricular filling pressures are increased.  There is no thrombus seen in the left ventricle.  The mean mitral valve gradient is 3-4mmHg.  IVC diameter >2.1 cm collapsing <50% with sniff suggests a high RA pressure  estimated at 15 mmHg or greater.  Aortic valve is not well seen but it is abnormal and heavily  calcified/sclerotic. Likely no significant aortic valve stenosis. Consider REBECA  if additonal imaging is needed  The study was technically difficult.  ______________________________________________________________________________  Left Ventricle  The left ventricle is normal in size. There is mild concentric left  ventricular hypertrophy. Proximal septal thickening is noted. The visual  ejection fraction is 60-65%. Limited image quality and limited image windows  makes analysis difficult. No clear regional wall motion abnormalities noted.  Grade I or early diastolic dysfunction. Diastolic Doppler findings (E/E' ratio  and/or other parameters) suggest left ventricular filling pressures are  increased. Regional wall motion abnormalities  cannot be excluded due to  limited visualization. There is no thrombus seen in the left ventricle.     Right Ventricle  The right ventricle is normal in size and function.     Atria  The left atrium is mildly dilated. Right atrial size is normal.     Mitral Valve  There is mild to moderate mitral annular calcification. The mean mitral valve  gradient is 3-4mmHg.     Tricuspid Valve  Normal tricuspid valve. Right ventricular systolic pressure could not be  approximated due to inadequate tricuspid regurgitation. IVC diameter >2.1 cm  collapsing <50% with sniff suggests a high RA pressure estimated at 15 mmHg or  greater.     Aortic Valve  The aortic valve is not well visualized. Aortic valve is not well seen but it  is abnormal and heavily calcified/sclerotic. Likely no significant aortic  valve stenosis. Consider REBCEA if additonal imaging is needed. There is trace  aortic regurgitation.     Pulmonic Valve  The pulmonic valve is not well seen, but is grossly normal.     Vessels  The ascending aorta is Borderline dilated.     Pericardium  The pericardium appears normal.     Rhythm  Sinus rhythm was noted.     ______________________________________________________________________________  MMode/2D Measurements & Calculations  IVSd: 1.1 cm  LVIDd: 4.8 cm  LVIDs: 2.4 cm  LVPWd: 1.0 cm  FS: 50.3 %  LV mass(C)d: 188.0 grams  LV mass(C)dI: 86.8 grams/m2  Ao root diam: 3.7 cm  LA dimension: 5.0 cm     asc Aorta Diam: 3.8 cm  LA/Ao: 1.3  LVOT diam: 2.2 cm  LVOT area: 3.8 cm2  LA Volume (BP): 75.1 ml  LA Volume Index (BP): 34.6 ml/m2     LA Volume Indexed (AL/bp): 36.9 ml/m2  RV Base: 3.1 cm  RWT: 0.41  TAPSE: 2.3 cm     Doppler Measurements & Calculations  MV E max rian: 107.0 cm/sec  MV A max rian: 133.0 cm/sec  MV E/A: 0.80  MV max P.5 mmHg  MV mean PG: 3.0 mmHg  MV V2 VTI: 33.6 cm  MVA(VTI): 1.9 cm2  MV dec time: 0.38 sec  LV V1 VTI: 16.6 cm  SV(LVOT): 63.2 ml  SI(LVOT): 29.2 ml/m2  PA acc time: 0.13 sec  Pulm Sys Rian:  71.8 cm/sec  Pulm Calloway Rian: 43.4 cm/sec  Pulm A Revs Rian: 28.8 cm/sec  Pulm S/D: 1.7  E/E' av.9  Lateral E/e': 17.5     Medial E/e': 24.3  RV S Rian: 10.5 cm/sec     ______________________________________________________________________________  Report approved by: Jodi García 2023 02:00 PM

## 2023-05-05 NOTE — ED TRIAGE NOTES
Diff speaking - slurred speech - pt coming from home via EMS - Iranian speaking only - at 1600 per family pt having diff speaking slurred speech diff standing        Triage Assessment     Row Name 05/04/23 1912       Triage Assessment (Adult)    Airway WDL WDL       Respiratory WDL    Respiratory WDL WDL       Cardiac WDL    Cardiac WDL WDL       Cognitive/Neuro/Behavioral WDL    Cognitive/Neuro/Behavioral WDL all    Level of Consciousness lethargic       Pupils (CN II)    Pupil PERRLA yes    Pupil Size Left 2 mm    Pupil Size Right 2 mm       Bharathi Coma Scale    Best Eye Response 4-->(E4) spontaneous    Best Motor Response 6-->(M6) obeys commands    Best Verbal Response 4-->(V4) confused    [place]er Coma Scale Score 14

## 2023-05-05 NOTE — PROVIDER NOTIFICATION
"Paged Unique Sarkar, \"unable to collect UA d/t incontinence-- do you want a straight cath order to obtain?\"    Per Unique yes to straight cath    "

## 2023-05-05 NOTE — PROGRESS NOTES
05/05/23 1354   Appointment Info   Signing Clinician's Name / Credentials (OT) Kenney De La Rosa OTR/L   Living Environment   People in Home spouse   Current Living Arrangements apartment   Home Accessibility no concerns   Transportation Anticipated family or friend will provide   Living Environment Comments Lives in ground floor apartment with spouse. Tub/shower   Self-Care   Usual Activity Tolerance good   Current Activity Tolerance fair   Regular Exercise Yes   Activity/Exercise Type walking   Exercise Amount/Frequency 45 mins;daily   Equipment Currently Used at Home cane, straight;walker, rolling;shower chair;grab bar, tub/shower   Fall history within last six months yes   Activity/Exercise/Self-Care Comment MOD I with self care ADL   Instrumental Activities of Daily Living (IADL)   Previous Responsibilities   (Spouse completes)   IADL Comments Spouse completes all IADL. Pt. helps with taking out trash.   General Information   Onset of Illness/Injury or Date of Surgery 05/04/23   Referring Physician Bernardo Chatman, DO   Patient/Family Therapy Goal Statement (OT) Family would prefer return home. TCU last option   Additional Occupational Profile Info/Pertinent History of Current Problem Adam Huber is a 87 year old male with a history of CAD, hypertension, CVA, hyperlipidemia, NSTEMI, type 2 diabetes, encephalopathy, and late onset Alzheimer's disease, who presents with onset of slurred speech and difficulty walking with a last known well time of 1600, around 3 hours ago. The patient had been seen before 1600 with no acute issues or symptoms. Family members stated the patient complained to them of intermittent headaches. BG en route was 170. Patient lives in an apartment with his wife. Patient denies pain   Existing Precautions/Restrictions fall   Cognitive Status Examination   Orientation Status person;place   Follows Commands follows one-step commands;50-74% accuracy   Cognitive Status Comments  Austrian speaking yet difficulty following commands in rissian via    Pain Assessment   Patient Currently in Pain No   Range of Motion Comprehensive   General Range of Motion bilateral upper extremity ROM WFL   Strength Comprehensive (MMT)   Comment, General Manual Muscle Testing (MMT) Assessment B UE strength WFL   Coordination   Upper Extremity Coordination Left UE impaired;Right UE impaired   Bed Mobility   Bed Mobility supine-sit;sit-supine   Supine-Sit [place] (Bed Mobility) maximum assist (25% patient effort)   Sit-Supine [place] (Bed Mobility) maximum assist (25% patient effort);2 person assist   Transfers   Transfers sit-stand transfer   Sit-Stand Transfer   Sit-Stand [place] (Transfers) moderate assist (50% patient effort)   Assistive Device (Sit-Stand Transfers) walker, front-wheeled   Sit/Stand Transfer Comments Ayala ited coordinated movement   Balance   Balance Assessment standing balance: static   Balance Comments Required MIN external assist   Clinical Impression   Criteria for Skilled Therapeutic Interventions Met (OT) Yes, treatment indicated   OT Diagnosis Weakness and deccreased finctional mobility for ADL   OT Problem List-Impairments impacting ADL problems related to;activity tolerance impaired;balance;cognition;communication;range of motion (ROM);strength;coordination   Assessment of Occupational Performance 5 or more Performance Deficits   Identified Performance Deficits Dressing, G/H, self feeding, Toileting, Bathing   Planned Therapy Interventions (OT) ADL retraining;cognition;ROM;strengthening;progressive activity/exercise   Clinical Decision Making Complexity (OT) moderate complexity   Anticipated Equipment Needs Upon Discharge (OT) commode chair   Risk & Benefits of therapy have been explained evaluation/treatment results reviewed;care plan/treatment goals reviewed;risks/benefits reviewed;current/potential barriers reviewed;participants voiced agreement with care  plan;participants included;patient;spouse/significant other  (nephew)   OT Total Evaluation Time   OT Eval, Moderate Complexity Minutes (30736) 14   OT Goals   Therapy Frequency (OT) 5 times/wk   OT Predicted Duration/Target Date for Goal Attainment 05/12/23   OT Goals Hygiene/Grooming;Upper Body Dressing;Lower Body Dressing;Toilet Transfer/Toileting;Cognition   OT: Hygiene/Grooming minimal assist   OT: Upper Body Dressing Supervision/stand-by assist   OT: Lower Body Dressing Supervision/stand-by assist   OT: Toilet Transfer/Toileting Minimal assist;toilet transfer   OT: Cognitive Patient/caregiver will verbalize understanding of cognitive assessment results/recommendations as needed for safe discharge planning   Interventions   Interventions Quick Adds Therapeutic Activity;Self-Care/Home Management   Therapeutic Activities   Therapeutic Activity Minutes (66986) 25   Symptoms noted during/after treatment fatigue   Treatment Detail/Skilled Intervention Eval completed. Nephew and spouse present. Nephew is Gu[place]ian speaking. Pt/spouse only speak Guamanian. Pt. was given VCs physical cues and MOD A ro roll to right and push to EOB sit. Set up with WW and gait belt. Cued to stand. MOD A sit>stand. MIN A for static standing. Pt. cued to attempt step forward and unable to progress L LE. Small incomplete steps with R LE. Physical and Verbal cues to sterp backwards to EOB and sit. MOD A safe stand >sit. From seated position pt enaged in B UE alternating UE AROM. able to mirror OT movements with translated verbal cues to flex at shoulder x 10 repititions. Pt cued to complete seated marrching with VCs and demonstrattin followed by tactile cues to feet. Once motor pattern intiated pt able to completed seated marching x 10. MOD A return to stand for static marching. Able to partially lift B UE in place x 10 repititions. Return to sit and MAX A to scoot towards HOB. MOD A return to supine for LE management. Bed alarm enaged.   OT  Discharge Planning   OT Plan Progress mobility to EOB for UE g/h activity. LE dressing from EOB.   OT Discharge Recommendation (DC Rec) Transitional Care Facility   OT Rationale for DC Rec Pt is significantly below stated baseline. He requires MOD A for bed mobility. [place] steady lift assist for transfers. Pt is able to follow commands and will benefit from TCU rrehab stay following IP OT rehab for strengtheing and functinal mobility. Pt. lives with spouse in apartment and would require MAX assist with ADL at this time.   OT Brief overview of current status MOD A to EOB sit. Lift dependent for transsfers.   Total Session Time   Timed Code Treatment Minutes 25   Total Session Time (sum of timed and untimed services) 39

## 2023-05-05 NOTE — H&P
[facility]    History and Physical - Hospitalist Service       Date of Admission:  5/4/2023    Assessment & Plan      Adam Huber is a 87 year old male admitted on 5/4/2023.     Suspected stroke: Patient presenting to the emergency department with slurred speech, history of type 2 diabetes and hypertension as well as CAD and CVA history, patient with memory impairment and language barrier therefore history of present illness and review of systems may be limited, unclear last well-known time, according to neurology team not a candidate for tPA, patient with family members reporting intermittent headaches and slurred speech as well as difficulty walking throughout the day, limited history further.  Electrolytes and renal function are normal on admission with a troponin of 17, glucose level is 232 on admission.  Complete blood count shows normal parameters.  INR is 1.07 on admission.  Head CT completed showing extensive areas of encephalomalacia, gliosis, again seen in the anterior aspects of both frontal lobes and right temporal lobes, stable compared to prior, stable chronic infarcts, within both basal ganglia and corona radiata, moderate volume loss, neck CTA with heavy atherosclerotic plaque left carotid bulb and bifurcation with at least 50% stenosis, see reports for further details.  EKG on admission showing sinus bradycardia.  Plan:  -MRI pending.  -Neurology consulted.  -Therapy evaluations.  -Permissive hypertension  -Telemetry.  -IV fluids.  -Defer aspirin to neurology team.    Cardiology, hypertension: Patient previously on carvedilol and losartan.  -Hold prior to admission carvedilol at this time.  -Hold prior to admission losartan at this time.    BPH: Patient previously on finasteride.  -Continue prior to admission finasteride when verified by pharmacy.  -Continue prior to admission tamsulosin when verified by pharmacy.    History of diabetes: Patient previously on  metformin.  -Hold prior to admission metformin at this time.  -Insulin sliding scale.    Neuropathy: Patient previously on gabapentin.  -Continue prior to admission gabapentin when verified by pharmacy.    History of anxiety, depression: Patient previously on mirtazapine and Seroquel.  -Continue prior to admission Remeron when verified by pharmacy.  -Continue prior to admission quetiapine when verified by pharmacy.         DVT Prophylaxis: Pneumatic Compression Devices  Code Status:  Full code.    Clinically Significant Risk Factors Present on Admission                  # Hypertension: home medication list includes antihypertensive(s)   # Dementia: noted on problem list   # DMII: A1C = N/A within past 6 months            Disposition Plan           Bernardo Chatman,   Hospitalist Service  [facility]  Securely message with [employer] (more info)  Text page via [employer] Paging/Directory     ______________________________________________________________________    Chief Complaint   Strokelike symptoms        History of Present Illness   Adam Huber is a 87 year old male admitted on 5/4/2023. Patient presenting to the emergency department with slurred speech, history of type 2 diabetes and hypertension as well as CAD and CVA history, patient with memory impairment and language barrier therefore history of present illness and review of systems may be limited, unclear last well-known time, according to neurology team not a candidate for tPA, patient with family members reporting intermittent headaches and slurred speech as well as difficulty walking throughout the day, limited history further.  Electrolytes and renal function are normal on admission with a troponin of 17, glucose level is 232 on admission.  Complete blood count shows normal parameters.  INR is 1.07 on admission.  Head CT completed showing extensive areas of encephalomalacia, gliosis, again seen in the anterior aspects of both  frontal lobes and right temporal lobes, stable compared to prior, stable chronic infarcts, within both basal ganglia and corona radiata, moderate volume loss, neck CTA with heavy atherosclerotic plaque left carotid bulb and bifurcation with at least 50% stenosis, see reports for further details.  EKG on admission showing sinus bradycardia.      Past Medical History    Past Medical History:   Diagnosis Date     Acute chest pain 10/23/2015     CAD (coronary artery disease)      Essential hypertension 10/23/2015     History of CVA (cerebrovascular accident) 10/23/2015     HLD (hyperlipidemia)      HTN (hypertension)      NSTEMI (non-ST elevated myocardial infarction) (H) 10/23/2015     Post PTCA 10-    Successful PCI of culprit proximal to mid RCA with placement of a     Post PTCA 11-6-2015    pci of complex mid CFX-hector       Past Surgical History   Past Surgical History:   Procedure Laterality Date     CHOLECYSTECTOMY       GENITOURINARY SURGERY      prostate removal      HEART CATH LEFT HEART CATH  10/12/2015    PCI w/ HECTOR to RCA     HEART CATH RIGHT AND LEFT HEART CATH  11/6/2015    PCI w/ HECTOR to mid-CFX       Prior to Admission Medications   Prior to Admission Medications   Prescriptions Last Dose Informant Patient Reported? Taking?   QUEtiapine (SEROQUEL) 25 MG tablet   Yes No   Sig: Take 25 mg by mouth At Bedtime   QUEtiapine (SEROQUEL) 25 MG tablet   Yes No   Sig: Take 25 mg by mouth daily as needed In addition to scheduled bedtime dose.   acetaminophen (TYLENOL) 325 MG tablet   No No   Sig: Take 3 tablets (975 mg) by mouth every 6 hours as needed for mild pain   aspirin 81 MG EC tablet   No No   Sig: Take one tablet daily   carvedilol (COREG) 25 MG tablet   No No   Sig: Take 1 tablet (25 mg) by mouth 2 times daily (with meals)   finasteride (PROSCAR) 5 MG tablet   Yes No   Sig: Take 5 mg by mouth daily   gabapentin (NEURONTIN) 300 MG capsule   Yes No   Sig: Take 300 mg by mouth 2 times daily   ibuprofen  (ADVIL/MOTRIN) 400 MG tablet   No No   Sig: Take 1 tablet (400 mg) by mouth every 8 hours as needed for moderate pain   losartan (COZAAR) 50 MG tablet   Yes No   Sig: Take 50 mg by mouth daily   metFORMIN (GLUCOPHAGE) 500 MG tablet   Yes No   Sig: Take 500 mg by mouth 2 times daily (with meals)   mirtazapine (REMERON) 15 MG tablet   Yes No   Sig: Take 15 mg by mouth At Bedtime   nitroglycerin (NITROSTAT) 0.4 MG SL tablet   No No   Sig: Place 1 tablet (0.4 mg) under the tongue every 5 minutes as needed for chest pain   polyethylene glycol (MIRALAX/GLYCOLAX) powder  Pharmacy Yes No   Sig: Take 1 capful by mouth daily as needed    tamsulosin (FLOMAX) 0.4 MG capsule   Yes No   Sig: Take 0.4 mg by mouth daily      Facility-Administered Medications: None        Physical Exam   Vital Signs: Temp: 97.3  F (36.3  C) Temp src: Oral BP: (!) 149/69 Pulse: 53   Resp: 10 SpO2: 94 % O2 Device: None (Room air)    Weight: 213 lbs 2.96 oz    GENERAL: NAD.   HEENT: Normocephalic.   LUNGS: No dyspnea at rest.   HEART: Extremities perfused.   NEUROLOGIC: Moves extremities spontaneously.     Medical Decision Making       73 MINUTES SPENT BY ME on the date of service doing chart review, history, exam, documentation & further activities per the note.      Data     I have personally reviewed the following data over the past 24 hrs:    6.9  \   16.0   / 192     138 105 22.4 /  232 (H)   4.8 24 0.91 \       Trop: 17 BNP: N/A       INR:  1.07 PTT:  30   D-dimer:  N/A Fibrinogen:  N/A       Imaging results reviewed over the past 24 hrs:   Recent Results (from the past 24 hour(s))   CT Head w/o Contrast    Narrative    EXAM: CT HEAD W/O CONTRAST, CTA HEAD NECK W CONTRAST  LOCATION: [facility]  DATE/TIME: 5/4/2023 7:07 PM CDT    INDICATION: Code Stroke to evaluate for potential thrombolysis and thrombectomy. PLEASE READ IMMEDIATELY.  COMPARISON: CT brain 09/22/2021. MRI brain 12/03/2019.  CONTRAST: 75 mL Isovue  370  TECHNIQUE: Head and neck CT angiogram with IV contrast. Noncontrast head CT followed by axial helical CT images of the head and neck vessels obtained during the arterial phase of intravenous contrast administration. Axial 2D reconstructed images and   multiplanar 3D MIP reconstructed images of the head and neck vessels were performed by the technologist. Dose reduction techniques were used. All stenosis measurements made according to NASCET criteria unless otherwise specified.    FINDINGS:   NONCONTRAST HEAD CT:   INTRACRANIAL CONTENTS: No finding for intracranial hemorrhage, mass, or convincing finding for acute infarct. Extensive areas of encephalomalacic change and gliosis are seen involving the anterior aspects of both frontal lobes and anterior right temporal   lobe. Distribution of areas of encephalomalacia favors sequela of prior trauma. Chronic infarcts are seen within both basal ganglia and corona radiata.    Moderate prominence lateral and third ventricles and sulci. Mild to moderate presumed sequela chronic microvascular ischemic change.    VISUALIZED ORBITS/SINUSES/MASTOIDS: Prior right-sided cataract surgery. Small retention cyst/mild maxillary sinus mucosal thickening. Middle ear cavities and mastoid air cells are clear.    BONES/SOFT TISSUES: Calvarium is intact, without suspicious lytic or blastic foci.    HEAD CTA:  ANTERIOR CIRCULATION: Heavy atherosclerotic plaque is seen within both carotid siphons with moderate associated luminal narrowing. There is irregularity and moderate to severe narrowing of the M1 segment of the right middle cerebral artery with   multifocal areas of more mild narrowing involving the posterior division of the right MCA. No vascular cutoff. Multifocal areas of high-grade narrowing are seen within the anterior cerebral arteries with good flow more distally within the vessels. No   vascular cutoff. Multifocal areas of moderate to severe narrowing are seen within the  left middle cerebral artery without vascular cutoff.    POSTERIOR CIRCULATION: Right vertebral artery is dominant. Atherosclerotic plaque moderately narrows the proximal intradural right vertebral artery. Small caliber left vertebral artery with severe narrowing of the vessel after the takeoff of the   posteroinferior cerebellar artery. Multifocal areas of moderate to severe narrowing are seen within the proximal and mid basilar artery. Multifocal areas of moderate narrowing are seen within both posterior cerebral arteries. P1 segment of the right   posterior cerebral artery is diminutive and not well seen with flow to the right posterior cerebral artery derived primarily from the right posterior communicating artery. Small caliber incompletely visualized left posterior communicating artery.    DURAL VENOUS SINUSES: No findings for dural venous sinus thrombosis.    NECK CTA:  RIGHT CAROTID: Moderate predominantly noncalcified atherosclerotic plaque right carotid bulb/bifurcation with mild associated luminal narrowing but no significant stenosis by modified NASCET criteria.    LEFT CAROTID: Heavy atherosclerotic plaque left carotid bulb/bifurcation with moderate associated luminal narrowing and at least 50% stenosis by modified NASCET criteria.    VERTEBRAL ARTERIES: Dominant right vertebral artery. Small caliber left vertebral artery. No significant stenosis or findings for dissection.    AORTIC ARCH: Coarse atherosclerotic plaque is seen within the aortic arch and proximal great vessels.    NONVASCULAR STRUCTURES: Visualized lung apices reveal a small amount of dependent atelectasis. Visualized osseous structures are without suspicious lytic or blastic foci.      Impression    IMPRESSION:   HEAD CT:  1.  Extensive areas of encephalomalacic change and gliosis are again seen within the anterior aspects of both frontal lobes and right temporal lobe. These are stable compared to prior.    2.  Stable chronic infarcts  are seen within both basal ganglia and corona radiata.    3.  Moderate volume loss and presumed sequela chronic microvascular ischemic change.    HEAD CTA:     1. Multifocal areas of moderate or high-grade stenosis are seen throughout the anterior and posterior circulation. Areas of narrowing are nonspecific but favored to be atherosclerotic in etiology. Heavy atherosclerotic calcifications are seen within both   carotid siphons. No vascular cutoff.    NECK CTA:  1.  Heavy atherosclerotic plaque left carotid bulb/bifurcation with at least 50% stenosis by modified NASCET criteria. Moderate predominantly noncalcified plaque right carotid bulb/bifurcation without significant stenosis by modified NASCET criteria. No   findings for vertebral artery dissection.      Noncontrast head CT findings and CTA vascular findings were called to Dr. Diaz at 1907 and 1919 hours respectively.   CTA Head Neck with Contrast    Narrative    EXAM: CT HEAD W/O CONTRAST, CTA HEAD NECK W CONTRAST  LOCATION: [facility]  DATE/TIME: 5/4/2023 7:07 PM CDT    INDICATION: Code Stroke to evaluate for potential thrombolysis and thrombectomy. PLEASE READ IMMEDIATELY.  COMPARISON: CT brain 09/22/2021. MRI brain 12/03/2019.  CONTRAST: 75 mL Isovue 370  TECHNIQUE: Head and neck CT angiogram with IV contrast. Noncontrast head CT followed by axial helical CT images of the head and neck vessels obtained during the arterial phase of intravenous contrast administration. Axial 2D reconstructed images and   multiplanar 3D MIP reconstructed images of the head and neck vessels were performed by the technologist. Dose reduction techniques were used. All stenosis measurements made according to NASCET criteria unless otherwise specified.    FINDINGS:   NONCONTRAST HEAD CT:   INTRACRANIAL CONTENTS: No finding for intracranial hemorrhage, mass, or convincing finding for acute infarct. Extensive areas of encephalomalacic change and gliosis  are seen involving the anterior aspects of both frontal lobes and anterior right temporal   lobe. Distribution of areas of encephalomalacia favors sequela of prior trauma. Chronic infarcts are seen within both basal ganglia and corona radiata.    Moderate prominence lateral and third ventricles and sulci. Mild to moderate presumed sequela chronic microvascular ischemic change.    VISUALIZED ORBITS/SINUSES/MASTOIDS: Prior right-sided cataract surgery. Small retention cyst/mild maxillary sinus mucosal thickening. Middle ear cavities and mastoid air cells are clear.    BONES/SOFT TISSUES: Calvarium is intact, without suspicious lytic or blastic foci.    HEAD CTA:  ANTERIOR CIRCULATION: Heavy atherosclerotic plaque is seen within both carotid siphons with moderate associated luminal narrowing. There is irregularity and moderate to severe narrowing of the M1 segment of the right middle cerebral artery with   multifocal areas of more mild narrowing involving the posterior division of the right MCA. No vascular cutoff. Multifocal areas of high-grade narrowing are seen within the anterior cerebral arteries with good flow more distally within the vessels. No   vascular cutoff. Multifocal areas of moderate to severe narrowing are seen within the left middle cerebral artery without vascular cutoff.    POSTERIOR CIRCULATION: Right vertebral artery is dominant. Atherosclerotic plaque moderately narrows the proximal intradural right vertebral artery. Small caliber left vertebral artery with severe narrowing of the vessel after the takeoff of the   posteroinferior cerebellar artery. Multifocal areas of moderate to severe narrowing are seen within the proximal and mid basilar artery. Multifocal areas of moderate narrowing are seen within both posterior cerebral arteries. P1 segment of the right   posterior cerebral artery is diminutive and not well seen with flow to the right posterior cerebral artery derived primarily from the  right posterior communicating artery. Small caliber incompletely visualized left posterior communicating artery.    DURAL VENOUS SINUSES: No findings for dural venous sinus thrombosis.    NECK CTA:  RIGHT CAROTID: Moderate predominantly noncalcified atherosclerotic plaque right carotid bulb/bifurcation with mild associated luminal narrowing but no significant stenosis by modified NASCET criteria.    LEFT CAROTID: Heavy atherosclerotic plaque left carotid bulb/bifurcation with moderate associated luminal narrowing and at least 50% stenosis by modified NASCET criteria.    VERTEBRAL ARTERIES: Dominant right vertebral artery. Small caliber left vertebral artery. No significant stenosis or findings for dissection.    AORTIC ARCH: Coarse atherosclerotic plaque is seen within the aortic arch and proximal great vessels.    NONVASCULAR STRUCTURES: Visualized lung apices reveal a small amount of dependent atelectasis. Visualized osseous structures are without suspicious lytic or blastic foci.      Impression    IMPRESSION:   HEAD CT:  1.  Extensive areas of encephalomalacic change and gliosis are again seen within the anterior aspects of both frontal lobes and right temporal lobe. These are stable compared to prior.    2.  Stable chronic infarcts are seen within both basal ganglia and corona radiata.    3.  Moderate volume loss and presumed sequela chronic microvascular ischemic change.    HEAD CTA:     1. Multifocal areas of moderate or high-grade stenosis are seen throughout the anterior and posterior circulation. Areas of narrowing are nonspecific but favored to be atherosclerotic in etiology. Heavy atherosclerotic calcifications are seen within both   carotid siphons. No vascular cutoff.    NECK CTA:  1.  Heavy atherosclerotic plaque left carotid bulb/bifurcation with at least 50% stenosis by modified NASCET criteria. Moderate predominantly noncalcified plaque right carotid bulb/bifurcation without significant stenosis by  modified NASCET criteria. No   findings for vertebral artery dissection.      Noncontrast head CT findings and CTA vascular findings were called to Dr. Diaz at 1907 and 1919 hours respectively.

## 2023-05-05 NOTE — PROGRESS NOTES
RECEIVING UNIT ED HANDOFF REVIEW    ED Nurse Handoff Report was reviewed by: Sabrina Borges RN on May 4, 2023 at 10:20 PM
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
## 2023-05-05 NOTE — PLAN OF CARE
Reason for Admission: R/O CVA pt now being evaluated for seizures    Cognitive/Mentation: A/Ox 2  Neuros/CMS: garbled speech, apraxia of lower extremities  VS: stable.  GI: BS present, passing flatus. continent.  : intermittently incontinent. Pt had urinary incontinence on previous shift.  Pulmonary: LS clear   Pain: denies pain    Drains/Lines: PIV patent and infusing  Skin: CDI   Activity: Assist x two with sarasteady.  Diet: pureed with mildly liquids. Takes pills crushed in apple sauce    Therapies recs: pending  Discharge: pending    Aggression Stoplight Tool: green

## 2023-05-06 ENCOUNTER — APPOINTMENT (OUTPATIENT)
Dept: PHYSICAL THERAPY | Facility: CLINIC | Age: 88
DRG: 065 | End: 2023-05-06
Attending: HOSPITALIST
Payer: COMMERCIAL

## 2023-05-06 ENCOUNTER — APPOINTMENT (OUTPATIENT)
Dept: SPEECH THERAPY | Facility: CLINIC | Age: 88
DRG: 065 | End: 2023-05-06
Attending: NURSE PRACTITIONER
Payer: COMMERCIAL

## 2023-05-06 LAB
GLUCOSE BLDC GLUCOMTR-MCNC: 191 MG/DL (ref 70–99)
GLUCOSE BLDC GLUCOMTR-MCNC: 215 MG/DL (ref 70–99)
GLUCOSE BLDC GLUCOMTR-MCNC: 225 MG/DL (ref 70–99)
GLUCOSE BLDC GLUCOMTR-MCNC: 264 MG/DL (ref 70–99)
MAGNESIUM SERPL-MCNC: 1.7 MG/DL (ref 1.7–2.3)
POTASSIUM SERPL-SCNC: 4.5 MMOL/L (ref 3.4–5.3)

## 2023-05-06 PROCEDURE — 250N000013 HC RX MED GY IP 250 OP 250 PS 637: Performed by: NURSE PRACTITIONER

## 2023-05-06 PROCEDURE — 84132 ASSAY OF SERUM POTASSIUM: CPT | Performed by: INTERNAL MEDICINE

## 2023-05-06 PROCEDURE — 250N000013 HC RX MED GY IP 250 OP 250 PS 637: Performed by: INTERNAL MEDICINE

## 2023-05-06 PROCEDURE — 120N000001 HC R&B MED SURG/OB

## 2023-05-06 PROCEDURE — 97161 PT EVAL LOW COMPLEX 20 MIN: CPT | Mod: GP

## 2023-05-06 PROCEDURE — 92526 ORAL FUNCTION THERAPY: CPT | Mod: GN | Performed by: SPEECH-LANGUAGE PATHOLOGIST

## 2023-05-06 PROCEDURE — 250N000013 HC RX MED GY IP 250 OP 250 PS 637: Performed by: HOSPITALIST

## 2023-05-06 PROCEDURE — 83735 ASSAY OF MAGNESIUM: CPT | Performed by: INTERNAL MEDICINE

## 2023-05-06 PROCEDURE — 99232 SBSQ HOSP IP/OBS MODERATE 35: CPT | Performed by: INTERNAL MEDICINE

## 2023-05-06 PROCEDURE — 36415 COLL VENOUS BLD VENIPUNCTURE: CPT | Performed by: INTERNAL MEDICINE

## 2023-05-06 PROCEDURE — 99232 SBSQ HOSP IP/OBS MODERATE 35: CPT | Performed by: NURSE PRACTITIONER

## 2023-05-06 RX ORDER — ATORVASTATIN CALCIUM 40 MG/1
40 TABLET, FILM COATED ORAL EVERY EVENING
Status: DISCONTINUED | OUTPATIENT
Start: 2023-05-06 | End: 2023-06-05 | Stop reason: HOSPADM

## 2023-05-06 RX ADMIN — QUETIAPINE FUMARATE 25 MG: 25 TABLET ORAL at 02:16

## 2023-05-06 RX ADMIN — FINASTERIDE 5 MG: 5 TABLET, FILM COATED ORAL at 10:32

## 2023-05-06 RX ADMIN — OLANZAPINE 2.5 MG: 2.5 TABLET, FILM COATED ORAL at 19:35

## 2023-05-06 RX ADMIN — INSULIN ASPART 2 UNITS: 100 INJECTION, SOLUTION INTRAVENOUS; SUBCUTANEOUS at 12:31

## 2023-05-06 RX ADMIN — ASPIRIN 81 MG CHEWABLE TABLET 325 MG: 81 TABLET CHEWABLE at 10:31

## 2023-05-06 RX ADMIN — CARVEDILOL 25 MG: 25 TABLET, FILM COATED ORAL at 10:32

## 2023-05-06 RX ADMIN — INSULIN ASPART 3 UNITS: 100 INJECTION, SOLUTION INTRAVENOUS; SUBCUTANEOUS at 17:44

## 2023-05-06 RX ADMIN — QUETIAPINE FUMARATE 25 MG: 25 TABLET ORAL at 19:35

## 2023-05-06 RX ADMIN — GABAPENTIN 300 MG: 300 CAPSULE ORAL at 19:35

## 2023-05-06 RX ADMIN — OLANZAPINE 2.5 MG: 2.5 TABLET, FILM COATED ORAL at 10:32

## 2023-05-06 RX ADMIN — ATORVASTATIN CALCIUM 40 MG: 40 TABLET, FILM COATED ORAL at 19:35

## 2023-05-06 RX ADMIN — LOSARTAN POTASSIUM 50 MG: 50 TABLET, FILM COATED ORAL at 10:31

## 2023-05-06 RX ADMIN — GABAPENTIN 300 MG: 300 CAPSULE ORAL at 10:32

## 2023-05-06 RX ADMIN — CARVEDILOL 25 MG: 25 TABLET, FILM COATED ORAL at 17:44

## 2023-05-06 RX ADMIN — INSULIN ASPART 2 UNITS: 100 INJECTION, SOLUTION INTRAVENOUS; SUBCUTANEOUS at 10:32

## 2023-05-06 RX ADMIN — Medication 1 MG: at 19:35

## 2023-05-06 ASSESSMENT — ACTIVITIES OF DAILY LIVING (ADL)
CHANGE_IN_FUNCTIONAL_STATUS_SINCE_ONSET_OF_CURRENT_ILLNESS/INJURY: NO
ADLS_ACUITY_SCORE: 51
CONCENTRATING,_REMEMBERING_OR_MAKING_DECISIONS_DIFFICULTY: YES
ADLS_ACUITY_SCORE: 51
DOING_ERRANDS_INDEPENDENTLY_DIFFICULTY: NO
ADLS_ACUITY_SCORE: 51
ADLS_ACUITY_SCORE: 51
EATING/SWALLOWING: SWALLOWING SOLID FOOD;SWALLOWING LIQUIDS
WEAR_GLASSES_OR_BLIND: NO
WALKING_OR_CLIMBING_STAIRS_DIFFICULTY: YES
TOILETING_ISSUES: NO
FALL_HISTORY_WITHIN_LAST_SIX_MONTHS: YES
ADLS_ACUITY_SCORE: 45
ADLS_ACUITY_SCORE: 51
DRESSING/BATHING_DIFFICULTY: NO
ADLS_ACUITY_SCORE: 51
WALKING_OR_CLIMBING_STAIRS: AMBULATION DIFFICULTY, DEPENDENT
DIFFICULTY_EATING/SWALLOWING: YES
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
## 2023-05-06 NOTE — PROGRESS NOTES
05/06/23 1300   Appointment Info   Signing Clinician's Name / Credentials (PT) Brittany Dressler, PT   Living Environment   People in Home spouse   Current Living Arrangements apartment   Living Environment Comments Lives in ground floor apartment with spouse. Tub/shower   Self-Care   Usual Activity Tolerance moderate   Current Activity Tolerance fair   Exercise Amount/Frequency 45 mins;daily   Equipment Currently Used at Home cane, straight;walker, rolling;shower chair;grab bar, tub/shower   Fall history within last six months yes   Activity/Exercise/Self-Care Comment MOD I with self care ADL   General Information   Onset of Illness/Injury or Date of Surgery 05/04/23   Referring Physician Bernardo Chatman, DO   Patient/Family Therapy Goals Statement (PT) Unable to state: AMS.   Pertinent History of Current Problem (include personal factors and/or comorbidities that impact the POC) Stroke symptoms MRI pending, Alzheimer's, NSTEMI, DM2, HTN, CAD, hx CVA with memory & language impairments, Anx & Dep, Neuropathy on EVERT   Existing Precautions/Restrictions fall   Cognition   Affect/Mental Status (Cognition) confused   Cognitive Status Comments Resists attempt to move, then fixates on masturbating without redirection.   Pain Assessment   Patient Currently in Pain No   Strength (Manual Muscle Testing)   Strength Comments Gross in all limbs with resistance to movement, able to bring L LE back into bed, uses B UEs functionally.   Bed Mobility   Comment, (Bed Mobility) SBA with nursing this morning for supine-sit, pt resisting PT.   Transfers   Comment, (Transfers) [place]  OT yesterday.   Balance   Balance Comments SBA sit balance with nursing this AM.   Coordination   Coordination Comments WFL B UEs and L LE.   Muscle Tone   Muscle Tone Comments WFL on observation.   Clinical Impression   Criteria for Skilled Therapeutic Intervention Yes, treatment indicated   PT Diagnosis (PT) Impaired mobility   Influenced  by the following impairments Impaired balance, self-management, activity tolerance.   Functional limitations due to impairments Impaired independent mobility & living   Clinical Presentation (PT Evaluation Complexity) Evolving/Changing   Clinical Decision Making (Complexity) low complexity   Planned Therapy Interventions (PT) balance training;bed mobility training;gait training;home exercise program;neuromuscular re-education;patient/family education;postural re-education;stair training;strengthening;transfer training;progressive activity/exercise;risk factor education;home program guidelines   Anticipated Equipment Needs at Discharge (PT)   (TBD)   Risk & Benefits of therapy have been explained current/potential barriers reviewed;patient   PT Total Evaluation Time   PT Eval, Low Complexity Minutes (46958) 10   Physical Therapy Goals   PT Frequency Daily   PT Predicted Duration/Target Date for Goal Attainment 05/12/23   PT Goals Bed Mobility;Transfers;Gait;PT Goal 1   PT: Bed Mobility Modified independent;Supine to/from sit;Rolling   PT: Transfers Supervision/stand-by assist;Sit to/from stand;Bed to/from chair   PT: Gait Supervision/stand-by assist;150 feet   PT: Goal 1 Pt will score >= 20 on the DGI/FGA/MiniBESTest to decrease falls risk.   PT Discharge Planning   PT Plan PT for 1st rx and mobility, finish testing.   PT Discharge Recommendation (DC Rec) Transitional Care Facility   PT Rationale for DC Rec Pt is below Kim baseline, hasn't walked here, [place] to stand. TCU currently warranted, but will see how pt progresses.   PT Brief overview of current status SBA bed mob & sit balance, [place] stand.   Total Session Time   Total Session Time (sum of timed and untimed services) 10

## 2023-05-06 NOTE — PHARMACY-CONSULT NOTE
Pharmacy Consult to evaluate for medication related stroke core measures    Adam Huber, 87 year old male admitted for CVA on 5/4/2023.    Thrombolytic was not given because of Time from onset contraindications    VTE Prophylaxis SCDs /PCDs placed on 5/5, as appropriate prior to end of hospital day 2.    Antithrombotic: aspirin started on 5/5, as appropriate by end of hospital day 2. Continue antithrombotic therapy on discharge to meet quality measures, unless contraindicated.    Anticoagulation if history of A-fib/flutter: Patient does not have history of A-fib/flutter - anticoagulation not required for medication related stroke core measures.     LDL Cholesterol Calculated   Date Value Ref Range Status   05/04/2023 90 <=100 mg/dL Final   11/08/2018 91 <100 mg/dL Final     Comment:     Desirable:       <100 mg/dl       Patient not currently receiving a statin medication. Per neuro recs on 5/5, plan to start atorvastatin 40 mg daily. Communication left with team to initiate. Continue statin on discharge to meet quality measures, unless contraindicated.     Recommendations: Start atorvastatin 40 mg daily     Thank you for the consult.    MELI WATSON RPH 5/6/2023 8:23 AM

## 2023-05-06 NOTE — PLAN OF CARE
Pt here with encephalopathy and roving eye movements. Seizure vs. Stroke    A/Ox2, able to say name and year. Speech slurred, some WFD. Generalized weakness. Followed most commands with . Can be restless at times. VSS RA. Incontinent. No BM this shift. A2/SS if cooperating. Pureed, mildly thickened liquids. Pills crushed in applesauce or whole one at a time. Tele SB. K and Mg recheck in AM. Scheduled seroquel. Will need heart monitor at discharge. Plan TCU.
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
## 2023-05-06 NOTE — PLAN OF CARE
Reason for Admission: Rule-out CVA- Possible seizures  Cognitive/Mentation: A/Ox 2, d/o to time and situation   Neuros/CMS: Stable with generalized weakness. Slow, slurred speech with WFD. Difficult to assess, doesn't follow all commands.   VS: VSS on RA, SBP goal <220   GI: BS audible, + flatus, no BM. Incontinent.  : Voiding adequately. Incontinent.  Pulmonary: LS clear  Pain: Denies- no signs/symptoms of pain   Drains/Lines:   Skin: Intact with scattered bruising, Mepilex on sacrum/coccyx.    Activity: Assist x 2 with GB/SS.  Diet: Pureed diet with mildly thick liquids. Takes pills whole with applesauce.   Therapies recs: Pending  Discharge: Pending  Aggression Stoplight Tool: Yellow for confusion  End of shift summary: No changes this shift. Pt restless throughout shift- scheduled Seroquel given. Canadian speaking-  had difficulty understanding pt at times. Pulled IV out. Plan for MRI.

## 2023-05-06 NOTE — PROVIDER NOTIFICATION
While on cross cover this evening I was paged by RN regarding patient's persistent agitation. RN requesting PRN Zyprexa in addition to scheduled. One time PRN dose ordered.    James Marinelli MD, MPH  Internal Medicine

## 2023-05-07 LAB
BACTERIA UR CULT: NORMAL
GLUCOSE BLDC GLUCOMTR-MCNC: 153 MG/DL (ref 70–99)
GLUCOSE BLDC GLUCOMTR-MCNC: 215 MG/DL (ref 70–99)
GLUCOSE BLDC GLUCOMTR-MCNC: 225 MG/DL (ref 70–99)
GLUCOSE BLDC GLUCOMTR-MCNC: 231 MG/DL (ref 70–99)
GLUCOSE BLDC GLUCOMTR-MCNC: 351 MG/DL (ref 70–99)
MAGNESIUM SERPL-MCNC: 1.7 MG/DL (ref 1.7–2.3)
POTASSIUM SERPL-SCNC: 4.1 MMOL/L (ref 3.4–5.3)

## 2023-05-07 PROCEDURE — 250N000013 HC RX MED GY IP 250 OP 250 PS 637: Performed by: INTERNAL MEDICINE

## 2023-05-07 PROCEDURE — 250N000013 HC RX MED GY IP 250 OP 250 PS 637: Performed by: NURSE PRACTITIONER

## 2023-05-07 PROCEDURE — 84132 ASSAY OF SERUM POTASSIUM: CPT | Performed by: INTERNAL MEDICINE

## 2023-05-07 PROCEDURE — 99232 SBSQ HOSP IP/OBS MODERATE 35: CPT | Performed by: INTERNAL MEDICINE

## 2023-05-07 PROCEDURE — 250N000013 HC RX MED GY IP 250 OP 250 PS 637: Performed by: HOSPITALIST

## 2023-05-07 PROCEDURE — 36415 COLL VENOUS BLD VENIPUNCTURE: CPT | Performed by: INTERNAL MEDICINE

## 2023-05-07 PROCEDURE — 83735 ASSAY OF MAGNESIUM: CPT | Performed by: INTERNAL MEDICINE

## 2023-05-07 PROCEDURE — 120N000001 HC R&B MED SURG/OB

## 2023-05-07 RX ORDER — AMLODIPINE BESYLATE 5 MG/1
5 TABLET ORAL DAILY
Status: DISCONTINUED | OUTPATIENT
Start: 2023-05-07 | End: 2023-06-05 | Stop reason: HOSPADM

## 2023-05-07 RX ORDER — MIRTAZAPINE 15 MG/1
15 TABLET, FILM COATED ORAL AT BEDTIME
Status: DISCONTINUED | OUTPATIENT
Start: 2023-05-07 | End: 2023-06-05 | Stop reason: HOSPADM

## 2023-05-07 RX ORDER — OLANZAPINE 10 MG/2ML
2.5 INJECTION, POWDER, FOR SOLUTION INTRAMUSCULAR ONCE
Status: COMPLETED | OUTPATIENT
Start: 2023-05-07 | End: 2023-05-07

## 2023-05-07 RX ORDER — QUETIAPINE FUMARATE 25 MG/1
25 TABLET, FILM COATED ORAL DAILY PRN
Status: DISCONTINUED | OUTPATIENT
Start: 2023-05-07 | End: 2023-05-11

## 2023-05-07 RX ADMIN — CARVEDILOL 25 MG: 25 TABLET, FILM COATED ORAL at 09:17

## 2023-05-07 RX ADMIN — METFORMIN HYDROCHLORIDE 500 MG: 500 TABLET, FILM COATED ORAL at 09:17

## 2023-05-07 RX ADMIN — QUETIAPINE FUMARATE 25 MG: 25 TABLET ORAL at 20:03

## 2023-05-07 RX ADMIN — INSULIN ASPART 2 UNITS: 100 INJECTION, SOLUTION INTRAVENOUS; SUBCUTANEOUS at 09:33

## 2023-05-07 RX ADMIN — Medication 1 MG: at 20:03

## 2023-05-07 RX ADMIN — GABAPENTIN 300 MG: 300 CAPSULE ORAL at 09:16

## 2023-05-07 RX ADMIN — ASPIRIN 81 MG CHEWABLE TABLET 324 MG: 81 TABLET CHEWABLE at 09:16

## 2023-05-07 RX ADMIN — CARVEDILOL 25 MG: 25 TABLET, FILM COATED ORAL at 17:44

## 2023-05-07 RX ADMIN — AMLODIPINE BESYLATE 5 MG: 5 TABLET ORAL at 09:17

## 2023-05-07 RX ADMIN — OLANZAPINE 2.5 MG: 2.5 TABLET, FILM COATED ORAL at 09:17

## 2023-05-07 RX ADMIN — LOSARTAN POTASSIUM 75 MG: 50 TABLET, FILM COATED ORAL at 09:16

## 2023-05-07 RX ADMIN — ACETAMINOPHEN 650 MG: 325 TABLET ORAL at 17:56

## 2023-05-07 RX ADMIN — MIRTAZAPINE 15 MG: 15 TABLET, FILM COATED ORAL at 20:03

## 2023-05-07 RX ADMIN — INSULIN ASPART 5 UNITS: 100 INJECTION, SOLUTION INTRAVENOUS; SUBCUTANEOUS at 12:56

## 2023-05-07 RX ADMIN — GABAPENTIN 300 MG: 300 CAPSULE ORAL at 20:03

## 2023-05-07 RX ADMIN — METFORMIN HYDROCHLORIDE 500 MG: 500 TABLET, FILM COATED ORAL at 17:44

## 2023-05-07 RX ADMIN — FINASTERIDE 5 MG: 5 TABLET, FILM COATED ORAL at 09:16

## 2023-05-07 RX ADMIN — OLANZAPINE 2.5 MG: 2.5 TABLET, FILM COATED ORAL at 20:03

## 2023-05-07 RX ADMIN — ATORVASTATIN CALCIUM 40 MG: 40 TABLET, FILM COATED ORAL at 20:03

## 2023-05-07 RX ADMIN — INSULIN ASPART 2 UNITS: 100 INJECTION, SOLUTION INTRAVENOUS; SUBCUTANEOUS at 17:38

## 2023-05-07 ASSESSMENT — ACTIVITIES OF DAILY LIVING (ADL)
ADLS_ACUITY_SCORE: 45
ADLS_ACUITY_SCORE: 45
ADLS_ACUITY_SCORE: 41
ADLS_ACUITY_SCORE: 45
ADLS_ACUITY_SCORE: 41
ADLS_ACUITY_SCORE: 45
ADLS_ACUITY_SCORE: 41

## 2023-05-07 NOTE — PLAN OF CARE
Pt here with transient AMS, seizure vs stroke workup    A/Ox1-2. Speech is slurred and garbled. WFD. Generalized weakness. VSS RA. Pureed, mildly thick. Pills whole one at a time or crushed in applesauce. A2/teresita steady when command following. Incontinent. 1 large BM today. Tele SR. K and MG recheck in AM. Skin WDL ex for some blanchable redness on bottom. Pt restless at times, redirectable. Has PRN orders for agitation and restarting other home meds for sleep.     Per family request, give bedtime meds early so patient can sleep through the night. Also requesting he could shave his bear tomorrow in AM.

## 2023-05-07 NOTE — PROGRESS NOTES
[facility]    Medicine Progress Note - Hospitalist Service    Date of Admission:  5/4/2023    Assessment & Plan   Adam Huber is a 87 year old male  With PMH of HTN, CAD, CVA, DM type 2, TBI, dementia- admitted on 5/4/2023 with slurred speech concerning for a stroke.      HEAD CT:  1.  Extensive areas of encephalomalacic change and gliosis are again seen within the anterior aspects of both frontal lobes and right temporal lobe. These are stable compared to prior.   2.  Stable chronic infarcts are seen within both basal ganglia and corona radiata.   3.  Moderate volume loss and presumed sequela chronic microvascular ischemic change.     HEAD CTA:    1. Multifocal areas of moderate or high-grade stenosis are seen throughout the anterior and posterior circulation. Areas of narrowing are nonspecific but favored to be atherosclerotic in etiology. Heavy atherosclerotic calcifications are seen within both carotid siphons. No vascular cutoff.     NECK CTA:  1.  Heavy atherosclerotic plaque left carotid bulb/bifurcation with at least 50% stenosis by modified NASCET criteria. Moderate predominantly noncalcified plaque right carotid bulb/bifurcation without significant stenosis by modified NASCET criteria. No findings for vertebral artery dissection.    MRI brain: IMPRESSION:  1.  Motion limited examination equivocal DWI hyperintensity at the right asia. This may represent artifact or acute ischemia.  2.  Generalized brain atrophy and presumed microvascular ischemic changes as detailed above.  3.  Severe bifrontal encephalomalacia.     Transient alteration of consciousness   - he lives with his wife, has underlying cognitive impairment; has PCA as per family; Canadian speaking  - initial concern for stroke  - Stroke Neuro consulted  - imaging as above  - PTA Aspirin was increased from 81 mg to 324 mg po daily  - lipid profile- LDL 90, total cholest 232, HDL 66  - started on Lipitor 40 mg po at  bedtime  - Tele  - Echo - There is mild concentric left ventricular hypertrophy. Proximal septal thickening is noted. EF 60-65%. No clear regional wall motion abnormalities noted. Grade I or early diastolic dysfunction; no thrombus  - EEG- no seizures   - initial plan was to repeat limited MRI brain but deferred at this time because he would require sedation in order to obtain MRI   - mentation improved today, follows commands  - PT/OT- rec TCU  - SLP rec pureed diet, mildly thick  Liquids  - SW consult  - Neuro recommends cardiac event monitor after discharge   - follow up with PCP in 1-2 weeks  - follow up with General Neuro in 6-8 weeks    Hypomagnesemia  - Mag 1.6  - magnesium replacement protocol    HTN  - resumed PTA Coreg 25 mg po BID and losartan 50 mg po daily   - BP on higher side; BP goal- normotension  - increased Losartan to 75 mg po daily  - BP better today     BPH   - resume PTA finasteride and Flomax     DM type 2, uncontrolled  - PTA metformin 500 mg qam and 1000 mg qpm held on admission  - Hb A1c 9.6  - sliding scale insulin (med)  - BS in mid 200's  - resume Metformin 500 mg po BID for now  - Accuchecks before meals and at bedtime  - hypoglycemic protocol    Neuropathy  - resumed PTA gabapentin     Anxiety/depression  Dementia  H/o TBI  - resumed  PTA Zyprexa 2.5 mg po BID and Seroquel 25 mg po at bedtime  - PTA Remeron held on admission, resume today   - last agitated last nigh and in am, calm in the afternoon  - Seroquel 25 mg po daily prn for agitation ordered.      Diet: Combination Diet Pureed Diet (level 4); Mildly Thick (level 2)  Room Service    DVT Prophylaxis: Pneumatic Compression Devices  Villa Catheter: Not present  Lines: None     Cardiac Monitoring: ACTIVE order. Indication: r/o cardiac arrythmia  Code Status: No CPR- Pre-arrest intubation OK      Clinically Significant Risk Factors                        # DMII: A1C = 9.6 % (Ref range: <5.7 %) within past 6 months, PRESENT ON  "ADMISSION  # Obesity: Estimated body mass index is 30.62 kg/m  as calculated from the following:    Height as of this encounter: 1.778 m (5' 10\").    Weight as of this encounter: 96.8 kg (213 lb 6.4 oz)., PRESENT ON ADMISSION         Disposition Plan      Expected Discharge Date: 05/08/2023    Discharge Delays: 1:1 Sitter still ordered - MD to assess  Placement - TCU            The patient's care was discussed with bedside RN and SW.    Carmen Barry MD  Hospitalist Service  [facility]  Securely message with Intrallect (more info)  Text page via [facility] Paging/Directory   ______________________________________________________________________    Interval History    - last agitated last nigh and in am, calm in the afternoon  - he denies chest pain, no SOB  - no N/V, no abd pain  - ate well for lunch as per the sitter  - no family members present  - discussed with RN and SW.    Physical Exam   Vital Signs: Temp: (!) 96.6  F (35.9  C) Temp src: Oral BP: (!) 158/71 Pulse: 57   Resp: 14 SpO2: 95 % O2 Device: None (Room air)    Weight: 213 lbs 6.4 oz    Constitutional: no distress, cooperative, alert, oriented, normal mood  Cardiovascular: Regular rate and rhythm, no murmurs  Respiratory: clear bilaterally, no crackles, wheezing or rales  Gastrointestinal: Abdomen soft, non-tender. BS normal. No masses.  Skin: warm, dry, intact, no edema  Neuro: awake, alert, speech seems \"thick\", no other FNDs noted    Medical Decision Making       45 MINUTES SPENT BY ME on the date of service doing chart review, history, exam, documentation & further activities per the note.  MANAGEMENT DISCUSSED with the following over the past 24 hours: Neurology NP, family, bedside RN        Data     I have personally reviewed the following data over the past 24 hrs:    N/A  \   N/A   / N/A     N/A N/A N/A /  351 (H)   4.1 N/A N/A \       Imaging results reviewed over the past 24 hrs:   No results found for this or any " previous visit (from the past 24 hour(s)).

## 2023-05-07 NOTE — PROGRESS NOTES
[facility]    Medicine Progress Note - Hospitalist Service    Date of Admission:  5/4/2023    Assessment & Plan   Adam Huber is a 87 year old male  With PMH of HTN, CAD, CVA, DM type 2, TBI, dementia- admitted on 5/4/2023 with slurred speech concerning for a stroke.      HEAD CT:  1.  Extensive areas of encephalomalacic change and gliosis are again seen within the anterior aspects of both frontal lobes and right temporal lobe. These are stable compared to prior.   2.  Stable chronic infarcts are seen within both basal ganglia and corona radiata.   3.  Moderate volume loss and presumed sequela chronic microvascular ischemic change.     HEAD CTA:    1. Multifocal areas of moderate or high-grade stenosis are seen throughout the anterior and posterior circulation. Areas of narrowing are nonspecific but favored to be atherosclerotic in etiology. Heavy atherosclerotic calcifications are seen within both carotid siphons. No vascular cutoff.     NECK CTA:  1.  Heavy atherosclerotic plaque left carotid bulb/bifurcation with at least 50% stenosis by modified NASCET criteria. Moderate predominantly noncalcified plaque right carotid bulb/bifurcation without significant stenosis by modified NASCET criteria. No findings for vertebral artery dissection.    MRI brain: IMPRESSION:  1.  Motion limited examination equivocal DWI hyperintensity at the right asia. This may represent artifact or acute ischemia.  2.  Generalized brain atrophy and presumed microvascular ischemic changes as detailed above.  3.  Severe bifrontal encephalomalacia.     Transient alteration of consciousness   - he lives with his wife, has underlying cognitive impairment; has PCA as per family; Guatemalan speaking  - initial concern for stroke  - Stroke Neuro consulted  - imaging as above  - PTA Aspirin was increased from 81 mg to 324 mg po daily  - lipid profile- LDL 90, total cholest 232, HDL 66  - started on Lipitor 40 mg po at  bedtime  - Tele  - Echo - There is mild concentric left ventricular hypertrophy. Proximal septal thickening is noted. EF 60-65%. No clear regional wall motion abnormalities noted. Grade I or early diastolic dysfunction; no thrombus  - EEG- no seizures   - initial plan was to repeat limited MRI brain but deferred at this time because he would require sedation in order to obtain MRI   - mentation improved today, follows commands  - PT/OT- rec TCU  - SLP rec pureed diet, mildly thick  Liquids  - SW consult  - Neuro recommends cardiac event monitor after discharge   - follow up with PCP in 1-2 weeks  - follow up with General Neuro in 6-8 weeks    Hypomagnesemia  - Mag 1.6  - magnesium replacement protocol    HTN  - resumed PTA coreg 25 mg po BID and losartan 50 mg po daily   - BP on higher side; BP goal- normotension  - increase Losartan to 75 mg po daily    BPH   - resume PTA finasteride and Flomax     DM type 2, uncontrolled  -PTA metformin 500 mg qam and 1000 mg qpm held on admission  - Hb A1c 9.6  - sliding scale insulin (med)  - BS in mid 200's  - resume Metformin 500 mg po BID for now  - Accuchecks before meals and at bedtime  - hypoglycemic protocol    Neuropathy  - resumed PTA gabapentin     Anxiety/depression  - resumed  PTA Zyprexa 2.5 mg po BID and Seroquel 25 mg po at bedtime  - PTA Remeron held on admission       Diet: Combination Diet Pureed Diet (level 4); Mildly Thick (level 2)  Room Service    DVT Prophylaxis: Pneumatic Compression Devices  Villa Catheter: Not present  Lines: None     Cardiac Monitoring: ACTIVE order. Indication: r/o cardiac arrythmia  Code Status: No CPR- Pre-arrest intubation OK      Clinically Significant Risk Factors            # Hypomagnesemia: Lowest Mg = 1.6 mg/dL in last 2 days, will replace as needed             # DMII: A1C = 9.6 % (Ref range: <5.7 %) within past 6 months, PRESENT ON ADMISSION  # Obesity: Estimated body mass index is 30.62 kg/m  as calculated from the  "following:    Height as of this encounter: 1.778 m (5' 10\").    Weight as of this encounter: 96.8 kg (213 lb 6.4 oz)., PRESENT ON ADMISSION         Disposition Plan      Expected Discharge Date: 05/08/2023                The patient's care was discussed with the Attending Physician, Dr. [place].    Carmen Barry MD  Hospitalist Service  [facility]  Securely message with drumbi (more info)  Text page via [employer] Paging/Directory   ______________________________________________________________________    Interval History   Doing better, mentation improved, Filipino speaking, daughter and grandson at bedside  - family feels he improved but his speech is not back to baseline, they describe it as \"thick tongue\"  - he denies chest pain, no SOB  - no N/V, no abd pain  - ate well for lunch  - family states that the patient has PCA at home but daughter would like to talk with his wife regarding TCU   - talked with Mckenzie Woodson NP- Neurology     Physical Exam   Vital Signs: Temp: 98.7  F (37.1  C) Temp src: Oral BP: (!) 153/65 Pulse: 58   Resp: 16 SpO2: 93 % O2 Device: None (Room air)    Weight: 213 lbs 6.4 oz    Constitutional: no distress, cooperative, alert, oriented, normal mood  Cardiovascular: Regular rate and rhythm, no murmurs  Respiratory: clear bilaterally, no crackles, wheezing or rales  Gastrointestinal: Abdomen soft, non-tender. BS normal. No masses.  Skin: warm, dry, intact, no edema    Medical Decision Making       45 MINUTES SPENT BY ME on the date of service doing chart review, history, exam, documentation & further activities per the note.  MANAGEMENT DISCUSSED with the following over the past 24 hours: Neurology NP, family, bedside RN        Data     I have personally reviewed the following data over the past 24 hrs:    N/A  \   N/A   / N/A     N/A N/A N/A /  264 (H)   4.5 N/A N/A \       Imaging results reviewed over the past 24 hrs:   No results found for this or any " previous visit (from the past 24 hour(s)).

## 2023-05-07 NOTE — PLAN OF CARE
Pt here with transient alteration of consciousness . A&Ox2 disorientated to place and situation, baseline TBI and dementia. Neuros difficult to assess, patient unwilling to participate at times, slurred speech with WFD per interpretor, does not follow all commands. VSS. Tele NSR. Pureed diet, mildly thick liquids. Takes pills crushed. Up with assist x2 and teresita steady when alert and following commands, bedrest this shift. No signs of pain noted. Incontinent of urine. Difficult to perform all assessments/ blood sugar checks due to patient's level of agitation. Pt scoring red on the Aggression Stop Light Tool, hitting and kicking staff, scratching and trying to bite. Plan continue sitter. Discharge pending.

## 2023-05-08 ENCOUNTER — APPOINTMENT (OUTPATIENT)
Dept: SPEECH THERAPY | Facility: CLINIC | Age: 88
DRG: 065 | End: 2023-05-08
Attending: HOSPITALIST
Payer: COMMERCIAL

## 2023-05-08 ENCOUNTER — APPOINTMENT (OUTPATIENT)
Dept: OCCUPATIONAL THERAPY | Facility: CLINIC | Age: 88
DRG: 065 | End: 2023-05-08
Attending: HOSPITALIST
Payer: COMMERCIAL

## 2023-05-08 ENCOUNTER — APPOINTMENT (OUTPATIENT)
Dept: PHYSICAL THERAPY | Facility: CLINIC | Age: 88
DRG: 065 | End: 2023-05-08
Attending: HOSPITALIST
Payer: COMMERCIAL

## 2023-05-08 LAB
GLUCOSE BLDC GLUCOMTR-MCNC: 213 MG/DL (ref 70–99)
GLUCOSE BLDC GLUCOMTR-MCNC: 274 MG/DL (ref 70–99)
GLUCOSE BLDC GLUCOMTR-MCNC: 286 MG/DL (ref 70–99)
GLUCOSE BLDC GLUCOMTR-MCNC: 287 MG/DL (ref 70–99)
MAGNESIUM SERPL-MCNC: 1.7 MG/DL (ref 1.7–2.3)
POTASSIUM SERPL-SCNC: 4.2 MMOL/L (ref 3.4–5.3)

## 2023-05-08 PROCEDURE — 99232 SBSQ HOSP IP/OBS MODERATE 35: CPT | Performed by: INTERNAL MEDICINE

## 2023-05-08 PROCEDURE — 97535 SELF CARE MNGMENT TRAINING: CPT | Mod: GO | Performed by: OCCUPATIONAL THERAPIST

## 2023-05-08 PROCEDURE — 250N000011 HC RX IP 250 OP 636: Performed by: HOSPITALIST

## 2023-05-08 PROCEDURE — 84132 ASSAY OF SERUM POTASSIUM: CPT | Performed by: INTERNAL MEDICINE

## 2023-05-08 PROCEDURE — 250N000013 HC RX MED GY IP 250 OP 250 PS 637: Performed by: NURSE PRACTITIONER

## 2023-05-08 PROCEDURE — 92526 ORAL FUNCTION THERAPY: CPT | Mod: GN

## 2023-05-08 PROCEDURE — 250N000013 HC RX MED GY IP 250 OP 250 PS 637: Performed by: INTERNAL MEDICINE

## 2023-05-08 PROCEDURE — 83735 ASSAY OF MAGNESIUM: CPT | Performed by: INTERNAL MEDICINE

## 2023-05-08 PROCEDURE — 97116 GAIT TRAINING THERAPY: CPT | Mod: GP

## 2023-05-08 PROCEDURE — 250N000013 HC RX MED GY IP 250 OP 250 PS 637: Performed by: HOSPITALIST

## 2023-05-08 PROCEDURE — 120N000001 HC R&B MED SURG/OB

## 2023-05-08 PROCEDURE — 36415 COLL VENOUS BLD VENIPUNCTURE: CPT | Performed by: INTERNAL MEDICINE

## 2023-05-08 PROCEDURE — 97530 THERAPEUTIC ACTIVITIES: CPT | Mod: GP

## 2023-05-08 RX ORDER — TAMSULOSIN HYDROCHLORIDE 0.4 MG/1
0.4 CAPSULE ORAL DAILY
Status: DISCONTINUED | OUTPATIENT
Start: 2023-05-08 | End: 2023-05-08

## 2023-05-08 RX ORDER — DOXAZOSIN 1 MG/1
1 TABLET ORAL DAILY
Status: DISCONTINUED | OUTPATIENT
Start: 2023-05-08 | End: 2023-06-05 | Stop reason: HOSPADM

## 2023-05-08 RX ORDER — HALOPERIDOL 5 MG/ML
2 INJECTION INTRAMUSCULAR
Status: COMPLETED | OUTPATIENT
Start: 2023-05-08 | End: 2023-05-08

## 2023-05-08 RX ADMIN — INSULIN ASPART 3 UNITS: 100 INJECTION, SOLUTION INTRAVENOUS; SUBCUTANEOUS at 12:54

## 2023-05-08 RX ADMIN — INSULIN ASPART 3 UNITS: 100 INJECTION, SOLUTION INTRAVENOUS; SUBCUTANEOUS at 18:35

## 2023-05-08 RX ADMIN — ASPIRIN 81 MG CHEWABLE TABLET 324 MG: 81 TABLET CHEWABLE at 10:00

## 2023-05-08 RX ADMIN — HALOPERIDOL LACTATE 2 MG: 5 INJECTION, SOLUTION INTRAMUSCULAR at 20:55

## 2023-05-08 RX ADMIN — AMLODIPINE BESYLATE 5 MG: 5 TABLET ORAL at 10:01

## 2023-05-08 RX ADMIN — CARVEDILOL 25 MG: 25 TABLET, FILM COATED ORAL at 16:51

## 2023-05-08 RX ADMIN — CARVEDILOL 25 MG: 25 TABLET, FILM COATED ORAL at 10:01

## 2023-05-08 RX ADMIN — DOXAZOSIN 1 MG: 1 TABLET ORAL at 10:19

## 2023-05-08 RX ADMIN — FINASTERIDE 5 MG: 5 TABLET, FILM COATED ORAL at 10:01

## 2023-05-08 RX ADMIN — QUETIAPINE FUMARATE 25 MG: 25 TABLET ORAL at 16:51

## 2023-05-08 RX ADMIN — METFORMIN HYDROCHLORIDE 500 MG: 500 TABLET, FILM COATED ORAL at 16:52

## 2023-05-08 RX ADMIN — METFORMIN HYDROCHLORIDE 500 MG: 500 TABLET, FILM COATED ORAL at 10:01

## 2023-05-08 RX ADMIN — INSULIN ASPART 2 UNITS: 100 INJECTION, SOLUTION INTRAVENOUS; SUBCUTANEOUS at 10:07

## 2023-05-08 RX ADMIN — GABAPENTIN 300 MG: 300 CAPSULE ORAL at 10:00

## 2023-05-08 RX ADMIN — LOSARTAN POTASSIUM 75 MG: 50 TABLET, FILM COATED ORAL at 10:01

## 2023-05-08 RX ADMIN — OLANZAPINE 2.5 MG: 2.5 TABLET, FILM COATED ORAL at 10:01

## 2023-05-08 ASSESSMENT — ACTIVITIES OF DAILY LIVING (ADL)
ADLS_ACUITY_SCORE: 43
ADLS_ACUITY_SCORE: 43
ADLS_ACUITY_SCORE: 41
ADLS_ACUITY_SCORE: 43
ADLS_ACUITY_SCORE: 41
ADLS_ACUITY_SCORE: 41
ADLS_ACUITY_SCORE: 47
ADLS_ACUITY_SCORE: 41
ADLS_ACUITY_SCORE: 43
ADLS_ACUITY_SCORE: 43

## 2023-05-08 NOTE — PLAN OF CARE
Pt here with transient alteration of consciousness . A&Ox2 disorientated to place and situation, baseline TBI and dementia. Neuros difficult to assess, patient unwilling to participate at times, slurred speech with WFD per interpretor, does not follow all commands. VSS. Tele SB. Pureed diet, mildly thick liquids. Takes pills crushed. Up with assist x2 and teresita steady when alert and following commands. No signs of pain noted. Incontinent of urine. Slept better overnight, less agitated and restless. Pt scoring yellow on the Aggression Stop Light Tool, confused, not following commands, becomes easily frustrated. Plan continue sitter. Discharge pending.

## 2023-05-08 NOTE — CONSULTS
Care Management Initial Consult    General Information  Assessment completed with: Care Team Member, Children, VM-chart review,    Type of CM/SW Visit: Initial Assessment    Primary Care Provider verified and updated as needed:     Readmission within the last 30 days:        Reason for Consult: discharge planning  Advance Care Planning:  None in chart    Communication Assessment  Patient's communication style: spoken language (non-English)    Hearing Difficulty or Deaf: yes   Wear Glasses or Blind: no    Cognitive  Cognitive/Neuro/Behavioral: .WDL except  Level of Consciousness: alert, confused  Arousal Level: opens eyes spontaneously  Orientation: disoriented to, time, situation  Mood/Behavior: calm  Best Language: 1 - Mild to moderate  Speech: slurred, word-finding difficulty, slow    Living Environment:   People in home: spouse     Current living Arrangements: house      Able to return to prior arrangements: yes     Family/Social Support:  Care provided by: self, spouse/significant other  Provides care for:    Marital Status:   Children, Wife          Description of Support System: Supportive, Involved       Current Resources:   Patient receiving home care services:       Community Resources:    Equipment currently used at home: cane, straight, walker, rolling, shower chair, grab bar, tub/shower  Supplies currently used at home:      Employment/Financial:  Employment Status:          Financial Concerns:   [facility]     Does the patient's insurance plan have a 3 day qualifying hospital stay waiver?  No    Lifestyle & Psychosocial Needs:  Social Determinants of Health     Tobacco Use: Low Risk  (5/4/2023)    Patient History      Smoking Tobacco Use: Never      Smokeless Tobacco Use: Never      Passive Exposure: Not on file   Alcohol Use: Not on file   Financial Resource Strain: Not on file   Food Insecurity: Not on file   Transportation Needs: Not on file   Physical Activity: Not on file    Stress: Not on file   Social Connections: Not on file   Intimate Partner Violence: Not on file   Depression: Not on file   Housing Stability: Not on file       Functional Status:  Prior to admission patient needed assistance:  Pt was independent prior to admission. He uses walker    Mental Health Status:       Chemical Dependency Status:        Values/Beliefs:  Spiritual, Cultural Beliefs, Scientology Practices, Values that affect care:          Mongolian speaking. Pt's speech has slur and family has been very involved with pt as they assist with interpretation while pt here.  Family would prefer pt to return home but wife is not able to provide physical assistance. Strange environment with language barrier may be more difficult for pt to adjust.     Additional Information:   Pt's family would like to consider [facility] when ready for TCU.    KENNETH Minaya  [facility]  Care Transitions  [phone]

## 2023-05-08 NOTE — PLAN OF CARE
[id number]: Pt here with transient AMS. Bermudian speaking. Jabber ineffective for communication due to confusion. Son at bedside, interpreting during assessment. Alert throughout shift, only oriented to self. Generalized weakness with BLE weaker than BUE 4/5 in strength. Mild WFD and sounds of slurred speech. Per son, speech sounds at baseline. VSS. Tele NSR. Tolerating pureed diet with thickened liquids. Meds crushed in applesauce. Patient appeared to sun down at approx. 1645 with restlessness and attempts getting out of chair. PRN Seroquel given and effective. Patient transferred to room 719. Sitter at bedside. Discharge plan pending, likely to [facility] TCU, when 24-hour sit free.

## 2023-05-08 NOTE — PLAN OF CARE
Goal Outcome Evaluation:       Pt here with Slurred speech and altered mental status. HX dementia and TBI, Japanese speaking pt, grand son helped to interpret this morning. A&O to self. Neuros slurred speech, WFD, gen. weakness. VSS. Tele SB. pureed diet, thicken liquids. Takes pills crushed in pudding. Incontinent x2. Up with A2 Marysol S. Denies pain. Pt scoring yellow on the Aggression Stop Light Tool, sitter at bedside. Plan for TCU when pt is off sitter for 24 hours. Discharge to [facility] TCU when medically ready..

## 2023-05-08 NOTE — PROGRESS NOTES
[facility]    Medicine Progress Note - Hospitalist Service    Date of Admission:  5/4/2023    Assessment & Plan   Adam Huber is a 87 year old male  With PMH of HTN, CAD, CVA, DM type 2, TBI, dementia- admitted on 5/4/2023 with slurred speech concerning for a stroke.      HEAD CT:  1.  Extensive areas of encephalomalacic change and gliosis are again seen within the anterior aspects of both frontal lobes and right temporal lobe. These are stable compared to prior.   2.  Stable chronic infarcts are seen within both basal ganglia and corona radiata.   3.  Moderate volume loss and presumed sequela chronic microvascular ischemic change.     HEAD CTA:    1. Multifocal areas of moderate or high-grade stenosis are seen throughout the anterior and posterior circulation. Areas of narrowing are nonspecific but favored to be atherosclerotic in etiology. Heavy atherosclerotic calcifications are seen within both carotid siphons. No vascular cutoff.     NECK CTA:  1.  Heavy atherosclerotic plaque left carotid bulb/bifurcation with at least 50% stenosis by modified NASCET criteria. Moderate predominantly noncalcified plaque right carotid bulb/bifurcation without significant stenosis by modified NASCET criteria. No findings for vertebral artery dissection.    MRI brain: IMPRESSION:  1.  Motion limited examination equivocal DWI hyperintensity at the right asia. This may represent artifact or acute ischemia.  2.  Generalized brain atrophy and presumed microvascular ischemic changes as detailed above.  3.  Severe bifrontal encephalomalacia.     Transient alteration of consciousness   - he lives with his wife, has underlying cognitive impairment; has PCA as per family; Chinese speaking  - initial concern for stroke  - Stroke Neuro consulted  - imaging as above  - PTA Aspirin was increased from 81 mg to 324 mg po daily  - lipid profile- LDL 90, total cholest 232, HDL 66  - started on Lipitor 40 mg po at  bedtime  - Tele  - Echo - There is mild concentric left ventricular hypertrophy. Proximal septal thickening is noted. EF 60-65%. No clear regional wall motion abnormalities noted. Grade I or early diastolic dysfunction; no thrombus  - EEG- no seizures   - initial plan was to repeat limited MRI brain but deferred at this time because he would require sedation in order to obtain MRI   - mentation improved today, follows commands  - PT/OT- rec TCU; discussed with family today 5/8, wife would like him to go to TCU   - SLP rec pureed diet, mildly thick  Liquids  - SW consult  - Neuro recommends cardiac event monitor after discharge   - follow up with PCP in 1-2 weeks  - follow up with General Neuro in 6-8 weeks    Hypomagnesemia  - Mag 1.6  - magnesium replacement protocol    HTN  - resumed PTA Coreg 25 mg po BID and losartan 50 mg po daily   - BP on higher side; BP goal- normotension  - increased Losartan to 75 mg po daily and started Norvasc 5 mg po daily on 5/7  - BP better today     BPH   - resume PTA finasteride and Flomax     DM type 2, uncontrolled  - PTA metformin 500 mg qam and 1000 mg qpm held on admission  - Hb A1c 9.6  - sliding scale insulin (med)  - BS in mid 200's  - resumed Metformin 500 mg po BID for now  - Accuchecks before meals and at bedtime  - hypoglycemic protocol    Neuropathy  - resumed PTA gabapentin     Anxiety/depression  Dementia  H/o TBI  - resumed  PTA Zyprexa 2.5 mg po BID and Seroquel 25 mg po at bedtime  - PTA Remeron held on admission, resumed on 5/7   - intermittently agitated and not willing to participate at times; language barrier likely contributing   - Seroquel 25 mg po daily prn for agitation ordered.      Diet: Room Service  Combination Diet Moderate Consistent Carb (60 g CHO per Meal) Diet; Pureed Diet (level 4); Mildly Thick (level 2)    DVT Prophylaxis: Pneumatic Compression Devices  Villa Catheter: Not present  Lines: None     Cardiac Monitoring: ACTIVE order. Indication:  "possible CVA  Code Status: No CPR- Pre-arrest intubation OK      Clinically Significant Risk Factors                        # DMII: A1C = 9.6 % (Ref range: <5.7 %) within past 6 months, PRESENT ON ADMISSION  # Obesity: Estimated body mass index is 30.62 kg/m  as calculated from the following:    Height as of this encounter: 1.778 m (5' 10\").    Weight as of this encounter: 96.8 kg (213 lb 6.4 oz)., PRESENT ON ADMISSION         Disposition Plan      Expected Discharge Date: 05/10/2023    Discharge Delays: 1:1 Sitter still ordered - MD to assess  Placement - TCU            The patient's care was discussed with bedside RN and SW.    Carmen Barry MD  Hospitalist Service  [facility]  Securely message with [employer] (more info)  Text page via [employer] Paging/Directory   ______________________________________________________________________    Interval History    - intermittently agitated and not willing to participate at times; language barrier likely contributing; doing better when family is present  - wife and nephew visiting today  - as per wife, he is doing better compared with how he was on admission but not at baseline  - she would like him to go to TCU, prefers [facility] \"where he heard there are many [place] citizen-speaking patients\"  - participating in PT session at the time of my visit  - he denies chest pain, no SOB  - no N/V, no abd pain  - discussed with wife, nephew, RN and SW.    Physical Exam   Vital Signs: Temp: 97.5  F (36.4  C) Temp src: Oral BP: (!) 115/91 Pulse: 57   Resp: 16 SpO2: 94 % O2 Device: None (Room air)    Weight: 213 lbs 6.4 oz    Constitutional: no distress, cooperative, alert, oriented, normal mood  Cardiovascular: Regular rate and rhythm, no murmurs  Respiratory: clear bilaterally, no crackles, wheezing or rales  Gastrointestinal: Abdomen soft, non-tender. BS normal. No masses.  Skin: warm, dry, intact, no edema  Neuro: awake, alert, speech seems \"thick\", no other " FNDs noted    Medical Decision Making       45 MINUTES SPENT BY ME on the date of service doing chart review, history, exam, documentation & further activities per the note.  MANAGEMENT DISCUSSED with the following over the past 24 hours: wife, nephew,        Data     I have personally reviewed the following data over the past 24 hrs:    N/A  \   N/A   / N/A     N/A N/A N/A /  287 (H)   4.2 N/A N/A \       Imaging results reviewed over the past 24 hrs:   No results found for this or any previous visit (from the past 24 hour(s)).

## 2023-05-08 NOTE — PROGRESS NOTES
Patient transferred to room 719. All belongings brought to new room. Son at bedside during this time and stated he would inform rest of family with new room number.

## 2023-05-09 ENCOUNTER — APPOINTMENT (OUTPATIENT)
Dept: GENERAL RADIOLOGY | Facility: CLINIC | Age: 88
DRG: 065 | End: 2023-05-09
Attending: INTERNAL MEDICINE
Payer: COMMERCIAL

## 2023-05-09 ENCOUNTER — APPOINTMENT (OUTPATIENT)
Dept: PHYSICAL THERAPY | Facility: CLINIC | Age: 88
DRG: 065 | End: 2023-05-09
Attending: INTERNAL MEDICINE
Payer: COMMERCIAL

## 2023-05-09 ENCOUNTER — APPOINTMENT (OUTPATIENT)
Dept: SPEECH THERAPY | Facility: CLINIC | Age: 88
DRG: 065 | End: 2023-05-09
Attending: INTERNAL MEDICINE
Payer: COMMERCIAL

## 2023-05-09 LAB
GLUCOSE BLDC GLUCOMTR-MCNC: 184 MG/DL (ref 70–99)
GLUCOSE BLDC GLUCOMTR-MCNC: 259 MG/DL (ref 70–99)
GLUCOSE BLDC GLUCOMTR-MCNC: 278 MG/DL (ref 70–99)
GLUCOSE BLDC GLUCOMTR-MCNC: 295 MG/DL (ref 70–99)
MAGNESIUM SERPL-MCNC: 1.6 MG/DL (ref 1.7–2.3)
POTASSIUM SERPL-SCNC: 4.2 MMOL/L (ref 3.4–5.3)

## 2023-05-09 PROCEDURE — 99207 PR NO BILLABLE SERVICE THIS VISIT: CPT

## 2023-05-09 PROCEDURE — 83735 ASSAY OF MAGNESIUM: CPT | Performed by: HOSPITALIST

## 2023-05-09 PROCEDURE — 84132 ASSAY OF SERUM POTASSIUM: CPT | Performed by: HOSPITALIST

## 2023-05-09 PROCEDURE — 93005 ELECTROCARDIOGRAM TRACING: CPT

## 2023-05-09 PROCEDURE — 120N000001 HC R&B MED SURG/OB

## 2023-05-09 PROCEDURE — 250N000013 HC RX MED GY IP 250 OP 250 PS 637: Performed by: INTERNAL MEDICINE

## 2023-05-09 PROCEDURE — 250N000013 HC RX MED GY IP 250 OP 250 PS 637: Performed by: HOSPITALIST

## 2023-05-09 PROCEDURE — 250N000013 HC RX MED GY IP 250 OP 250 PS 637: Performed by: NURSE PRACTITIONER

## 2023-05-09 PROCEDURE — 99232 SBSQ HOSP IP/OBS MODERATE 35: CPT | Performed by: INTERNAL MEDICINE

## 2023-05-09 PROCEDURE — 99207 EKG 12-LEAD, TRACING ONLY: CPT | Performed by: INTERNAL MEDICINE

## 2023-05-09 PROCEDURE — 74230 X-RAY XM SWLNG FUNCJ C+: CPT

## 2023-05-09 PROCEDURE — 36415 COLL VENOUS BLD VENIPUNCTURE: CPT | Performed by: HOSPITALIST

## 2023-05-09 PROCEDURE — 92611 MOTION FLUOROSCOPY/SWALLOW: CPT | Mod: GN | Performed by: SPEECH-LANGUAGE PATHOLOGIST

## 2023-05-09 PROCEDURE — 97530 THERAPEUTIC ACTIVITIES: CPT | Mod: GP | Performed by: PHYSICAL THERAPIST

## 2023-05-09 RX ORDER — NYSTATIN 100000/ML
500000 SUSPENSION, ORAL (FINAL DOSE FORM) ORAL 4 TIMES DAILY
Status: COMPLETED | OUTPATIENT
Start: 2023-05-09 | End: 2023-05-13

## 2023-05-09 RX ORDER — OLANZAPINE 2.5 MG/1
5 TABLET, FILM COATED ORAL ONCE
Status: COMPLETED | OUTPATIENT
Start: 2023-05-09 | End: 2023-05-09

## 2023-05-09 RX ORDER — QUETIAPINE FUMARATE 25 MG/1
25 TABLET, FILM COATED ORAL AT BEDTIME
Status: DISCONTINUED | OUTPATIENT
Start: 2023-05-09 | End: 2023-05-11

## 2023-05-09 RX ORDER — NYSTATIN 100000/ML
500000 SUSPENSION, ORAL (FINAL DOSE FORM) ORAL 4 TIMES DAILY
Status: DISCONTINUED | OUTPATIENT
Start: 2023-05-09 | End: 2023-05-09

## 2023-05-09 RX ORDER — QUETIAPINE FUMARATE 25 MG/1
25 TABLET, FILM COATED ORAL 2 TIMES DAILY
Status: DISCONTINUED | OUTPATIENT
Start: 2023-05-09 | End: 2023-05-09

## 2023-05-09 RX ADMIN — CARVEDILOL 25 MG: 25 TABLET, FILM COATED ORAL at 17:34

## 2023-05-09 RX ADMIN — ASPIRIN 81 MG CHEWABLE TABLET 325 MG: 81 TABLET CHEWABLE at 08:44

## 2023-05-09 RX ADMIN — DOXAZOSIN 1 MG: 1 TABLET ORAL at 08:46

## 2023-05-09 RX ADMIN — INSULIN ASPART 4 UNITS: 100 INJECTION, SOLUTION INTRAVENOUS; SUBCUTANEOUS at 12:19

## 2023-05-09 RX ADMIN — METFORMIN HYDROCHLORIDE 500 MG: 500 TABLET, FILM COATED ORAL at 17:34

## 2023-05-09 RX ADMIN — ATORVASTATIN CALCIUM 40 MG: 40 TABLET, FILM COATED ORAL at 21:25

## 2023-05-09 RX ADMIN — NYSTATIN 500000 UNITS: 100000 SUSPENSION ORAL at 09:50

## 2023-05-09 RX ADMIN — QUETIAPINE FUMARATE 25 MG: 25 TABLET ORAL at 09:48

## 2023-05-09 RX ADMIN — QUETIAPINE FUMARATE 25 MG: 25 TABLET ORAL at 04:15

## 2023-05-09 RX ADMIN — GABAPENTIN 300 MG: 300 CAPSULE ORAL at 21:26

## 2023-05-09 RX ADMIN — AMLODIPINE BESYLATE 5 MG: 5 TABLET ORAL at 08:45

## 2023-05-09 RX ADMIN — QUETIAPINE FUMARATE 25 MG: 25 TABLET ORAL at 21:25

## 2023-05-09 RX ADMIN — CARVEDILOL 25 MG: 25 TABLET, FILM COATED ORAL at 08:42

## 2023-05-09 RX ADMIN — METFORMIN HYDROCHLORIDE 500 MG: 500 TABLET, FILM COATED ORAL at 09:49

## 2023-05-09 RX ADMIN — GABAPENTIN 300 MG: 300 CAPSULE ORAL at 08:45

## 2023-05-09 RX ADMIN — MIRTAZAPINE 15 MG: 15 TABLET, FILM COATED ORAL at 21:26

## 2023-05-09 RX ADMIN — NYSTATIN 500000 UNITS: 100000 SUSPENSION ORAL at 21:26

## 2023-05-09 RX ADMIN — LOSARTAN POTASSIUM 75 MG: 50 TABLET, FILM COATED ORAL at 08:42

## 2023-05-09 RX ADMIN — INSULIN ASPART 3 UNITS: 100 INJECTION, SOLUTION INTRAVENOUS; SUBCUTANEOUS at 17:28

## 2023-05-09 RX ADMIN — FINASTERIDE 5 MG: 5 TABLET, FILM COATED ORAL at 09:49

## 2023-05-09 RX ADMIN — OLANZAPINE 2.5 MG: 2.5 TABLET, FILM COATED ORAL at 08:42

## 2023-05-09 RX ADMIN — NYSTATIN 500000 UNITS: 100000 SUSPENSION ORAL at 12:16

## 2023-05-09 RX ADMIN — OLANZAPINE 2.5 MG: 2.5 TABLET, FILM COATED ORAL at 21:25

## 2023-05-09 RX ADMIN — INSULIN ASPART 1 UNITS: 100 INJECTION, SOLUTION INTRAVENOUS; SUBCUTANEOUS at 08:47

## 2023-05-09 ASSESSMENT — ACTIVITIES OF DAILY LIVING (ADL)
ADLS_ACUITY_SCORE: 49
ADLS_ACUITY_SCORE: 42
ADLS_ACUITY_SCORE: 49
ADLS_ACUITY_SCORE: 47
ADLS_ACUITY_SCORE: 42
ADLS_ACUITY_SCORE: 47
ADLS_ACUITY_SCORE: 49
ADLS_ACUITY_SCORE: 51
ADLS_ACUITY_SCORE: 47
ADLS_ACUITY_SCORE: 42
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
## 2023-05-09 NOTE — PROGRESS NOTES
[facility]    Medicine Progress Note - Hospitalist Service    Date of Admission:  5/4/2023    Assessment & Plan   Adam Huber is a 87 year old male  With PMH of HTN, CAD, CVA, DM type 2, TBI, dementia- admitted on 5/4/2023 with slurred speech concerning for a stroke.      HEAD CT:  1.  Extensive areas of encephalomalacic change and gliosis are again seen within the anterior aspects of both frontal lobes and right temporal lobe. These are stable compared to prior.   2.  Stable chronic infarcts are seen within both basal ganglia and corona radiata.   3.  Moderate volume loss and presumed sequela chronic microvascular ischemic change.     HEAD CTA:    1. Multifocal areas of moderate or high-grade stenosis are seen throughout the anterior and posterior circulation. Areas of narrowing are nonspecific but favored to be atherosclerotic in etiology. Heavy atherosclerotic calcifications are seen within both carotid siphons. No vascular cutoff.     NECK CTA:  1.  Heavy atherosclerotic plaque left carotid bulb/bifurcation with at least 50% stenosis by modified NASCET criteria. Moderate predominantly noncalcified plaque right carotid bulb/bifurcation without significant stenosis by modified NASCET criteria. No findings for vertebral artery dissection.    MRI brain: IMPRESSION:  1.  Motion limited examination equivocal DWI hyperintensity at the right asia. This may represent artifact or acute ischemia.  2.  Generalized brain atrophy and presumed microvascular ischemic changes as detailed above.  3.  Severe bifrontal encephalomalacia.     Transient alteration of consciousness   - he lives with his wife, has underlying cognitive impairment; has PCA as per family; [place] citizen speaking  - initial concern for stroke  - Stroke Neuro consulted  - imaging as above  - PTA Aspirin was increased from 81 mg to 324 mg po daily  - lipid profile- LDL 90, total cholest 232, HDL 66  - started on Lipitor 40 mg po at  bedtime  - Tele  - Echo - There is mild concentric left ventricular hypertrophy. Proximal septal thickening is noted. EF 60-65%. No clear regional wall motion abnormalities noted. Grade I or early diastolic dysfunction; no thrombus  - EEG- no seizures   - initial plan was to repeat limited MRI brain but deferred at this time because he would require sedation in order to obtain MRI   - mentation improved today, follows commands  - PT/OT- rec TCU; discussed with family 5/8, wife would like him to go to TCU; finding a TCU might be challenging because of language barrier and his intermittent agitation; needs to be without sitter for 24h  - SLP rec pureed diet, mildly thick  Liquids  - SW consult  - Neuro recommends cardiac event monitor after discharge (ordered)  - neuro signed off  - follow up with PCP in 1-2 weeks  - follow up with General Neuro in 6-8 weeks    Hypomagnesemia  - Mag 1.6  - magnesium replacement protocol    HTN  - resumed PTA Coreg 25 mg po BID and losartan 50 mg po daily   - BP on higher side; BP goal- normotension  - increased Losartan to 75 mg po daily and started Norvasc 5 mg po daily on 5/7  - BP better now    BPH   - resume PTA finasteride and Flomax     DM type 2, uncontrolled  - PTA metformin 500 mg qam and 1000 mg qpm held on admission  - Hb A1c 9.6  - sliding scale insulin (med)  - BS in mid 200's  - resumed Metformin 500 mg po BID for now  - Accuchecks before meals and at bedtime  - hypoglycemic protocol    Neuropathy  - resumed PTA gabapentin     Anxiety/depression  Dementia  H/o TBI  - resumed  PTA Zyprexa 2.5 mg po BID and Seroquel 25 mg po at bedtime  - PTA Remeron held on admission, resumed on 5/7   - intermittently agitated and not willing to participate at times; language barrier likely contributing;  - sitter  - Seroquel 25 mg po daily prn for agitation ordered.   - Psych consult    ADDENDUM: upon chart/MAR review-  it seems that he did not get any of his scheduled evening meds last  "evening (Zyprexa, Seroquel, Remeron), he needs to get his medications before making further adjustments of his meds.      Diet: Room Service  Combination Diet Moderate Consistent Carb (60 g CHO per Meal) Diet; Pureed Diet (level 4); Mildly Thick (level 2)    DVT Prophylaxis: Pneumatic Compression Devices  Villa Catheter: Not present  Lines: None     Cardiac Monitoring: ACTIVE order. Indication: CVA  Code Status: No CPR- Pre-arrest intubation OK      Clinically Significant Risk Factors            # Hypomagnesemia: Lowest Mg = 1.6 mg/dL in last 2 days, will replace as needed             # DMII: A1C = 9.6 % (Ref range: <5.7 %) within past 6 months   # Overweight: Estimated body mass index is 29.77 kg/m  as calculated from the following:    Height as of this encounter: 1.778 m (5' 10\").    Weight as of this encounter: 94.1 kg (207 lb 7.3 oz).          Disposition Plan      Expected Discharge Date: 05/12/2023    Discharge Delays: 1:1 Sitter still ordered - MD to assess  Placement - TCU            The patient's care was discussed with bedside RN.    Carmen Barry MD  Hospitalist Service  [facility]  Securely message with [employer] (more info)  Text page via [employer] Paging/Directory   ______________________________________________________________________    Interval History    - remains intermittently agitated and not willing to participate at times, trying to get out of bed; language barrier likely contributing; doing better when family is present  - discussed with wife on 5/8- she would like him to go to TCU, prefers [facility] \"where he heard there are many [place] African-speaking patients\"  - no reported chest pain, no SOB  - no N/V, no abd pain  - discussed with bedside RN.    Physical Exam   Vital Signs: Temp: 98.5  F (36.9  C) Temp src: Oral BP: (!) 150/64 Pulse: 77   Resp: 16 SpO2: 98 % O2 Device: None (Room air)    Weight: 207 lbs 7.25 oz    Constitutional: no distress,  alert, oriented, " "normal mood  Cardiovascular: Regular rate and rhythm, no murmurs  Respiratory: clear bilaterally, no crackles, wheezing or rales  Gastrointestinal: Abdomen soft, non-tender. BS normal. No masses.  Skin: warm, dry, intact, no edema  Neuro: awake, alert, speech seems \"thick\", no other FNDs noted    Medical Decision Making       45 MINUTES SPENT BY ME on the date of service doing chart review, history, exam, documentation & further activities per the note.  MANAGEMENT DISCUSSED with the following over the past 24 hours: wife, nephew, SW       Data     I have personally reviewed the following data over the past 24 hrs:    N/A  \   N/A   / N/A     N/A N/A N/A /  295 (H)   4.2 N/A N/A \       Imaging results reviewed over the past 24 hrs:   Recent Results (from the past 24 hour(s))   XR Video Swallow with SLP or OT    Narrative    VIDEO SWALLOWING EVALUATION   5/9/2023 9:30 AM     HISTORY: dysphagia    COMPARISON: None.    FLUOROSCOPY TIME: 1.9 minutes.  SPOT IMAGES OR CINE RUNS: 7      Impression    IMPRESSION: Penetration to the cords with thin consistency. Refer to  speech pathology report.    FABIOLA MARIN MD         SYSTEM ID:  [id number]     "

## 2023-05-09 NOTE — CODE/RAPID RESPONSE
[facility]    RRT Note:  CODE 21  5/9/2023   Time Called: 3:59 PM    RRT called for: Code 21       Assessment & Plan   Agitation  Delirium with history of dementia  Nursing reports patient has become increasingly agitated, not redirectable.  Patient was attempting to bite and hit staff.  Upon arrival patient is nontoxic-appearing.  He is laying supine in bed calm. He does not appear to be agitated or aggressive towards staff. American  was used during this evaluation.  Patient able to follow commands.  Considered giving one-time dose of Zyprexa, however patient waxes and wanes between being awake and being sleepy.  Patient likely sleepy due to lack of sleep overnight.After reviewing patient's chart. RN reports patient was restless overnight indicating he did not get much sleep.  Patient last received Seroquel around 4 AM this morning.  Patient has scheduled Seroquel, Remeron, and Zyprexa at bedtime however this was not given last evening as patient refused.     INTERVENTIONS:  - Education provided to nursing staff that if patient refuses his evening Zyprexa, Remeron, and/or Seroquel to notify [facility] hospitalist.  Patient should not miss these medications as we would like to promote good sleep hygiene so patient is not sleeping during the day.  - Sitter remains at bedside.    At the end of the evaluation patient is calm and cooperative.  Sitter at bedside. Discussed with and defer further cares to primary hospitalist Dr. Yuen.    Interval History     Adam Huber is a 87 year old male who was admitted on 5/4/2023 for slurred speech concerning for a stroke.    Medical history significant for: Hypertension, CAD, CVA, DM type II, TBI, and dementia.    Code Status: No CPR- Pre-arrest intubation MK Romano NP    Allergies   Allergies   Allergen Reactions     Lisinopril      Morphine Other (See Comments)     confusion       Physical Exam   Vital Signs with  Ranges:  Temp:  [96.4  F (35.8  C)-98.5  F (36.9  C)] 98.5  F (36.9  C)  Pulse:  [58-77] 77  Resp:  [16] 16  BP: (131-150)/(61-70) 150/64  SpO2:  [93 %-98 %] 98 %  I/O last 3 completed shifts:  In: 240 [P.O.:240]  Out: 150 [Urine:150]    Constitutional: Nontoxic-appearing, not in acute distress.  Oriented to self.  Pulmonary: Nonlabored breathing pattern.  SPO2 95% on room air.  Skin/Integumen: No rashes or lesions on exposed skin.  Neuro: Follows commands appropriately.  No new focal neurological deficits.  Psych: Calm, cooperative.        Time Spent on this Encounter   Medical Decision Making       15 MINUTES SPENT BY ME on the date of service doing chart review, history, exam, documentation & further activities per the note.

## 2023-05-09 NOTE — PLAN OF CARE
Shift Note [id number]:   Patient is alert and oriented xUTA- pt agitated and did not want to answer orientation questions. Assist x2 with lift or SS. VS-low HR, slightly elevated b/p. denies pain. Aggression Stop Light red. Tele: SB with occasional PVC. Incontinent of bowel and bladder.     Code 21 called.staff attempted to diffuse the situation and pt attempted to bite and hit staff.    Major Shift Events: video swallow completed. Psych consulted.

## 2023-05-09 NOTE — PLAN OF CARE
Reason for Admission: AMS     Cognitive/Mentation: Alert, d/o x4, not answering questions appropriately throughout shift  Neuros/CMS: Intact ex RLE weakness 3/5, not following commands  VS: Stable on RA.    Tele: SB  GI: BS active, + flatus. Incontinent.  : Voiding. Incontinent.  Pulmonary: LS clear.  Pain: No s/s of pain     Drains/Lines: R PIV SL  Skin: Intact ex scattered bruising and scabs  Activity: Assist x2 Lift  Diet: Pureed with mildly thickened liquids. Takes pills crushed in pudding.      Therapies recs: TCU  Discharge: Pending ability to be without sitter for 24 hours     Aggression Stoplight Tool: Yellow     End of shift summary: Pt increasingly restless throughout night, attempting to pull himself out of bed. Seroquel given once. Pt given bed bath this AM and appears to be more settled now. Plan for event monitor at discharge. ACHS blood sugar checks.

## 2023-05-10 ENCOUNTER — APPOINTMENT (OUTPATIENT)
Dept: SPEECH THERAPY | Facility: CLINIC | Age: 88
DRG: 065 | End: 2023-05-10
Attending: INTERNAL MEDICINE
Payer: COMMERCIAL

## 2023-05-10 LAB
CREAT SERPL-MCNC: 0.78 MG/DL (ref 0.67–1.17)
ERYTHROCYTE [DISTWIDTH] IN BLOOD BY AUTOMATED COUNT: 13 % (ref 10–15)
GFR SERPL CREATININE-BSD FRML MDRD: 86 ML/MIN/1.73M2
GLUCOSE BLDC GLUCOMTR-MCNC: 212 MG/DL (ref 70–99)
GLUCOSE BLDC GLUCOMTR-MCNC: 229 MG/DL (ref 70–99)
GLUCOSE BLDC GLUCOMTR-MCNC: 287 MG/DL (ref 70–99)
GLUCOSE BLDC GLUCOMTR-MCNC: 319 MG/DL (ref 70–99)
GLUCOSE BLDC GLUCOMTR-MCNC: 417 MG/DL (ref 70–99)
HCT VFR BLD AUTO: 49.4 % (ref 40–53)
HGB BLD-MCNC: 17.1 G/DL (ref 13.3–17.7)
MAGNESIUM SERPL-MCNC: 1.5 MG/DL (ref 1.7–2.3)
MAGNESIUM SERPL-MCNC: 2.4 MG/DL (ref 1.7–2.3)
MCH RBC QN AUTO: 31.8 PG (ref 26.5–33)
MCHC RBC AUTO-ENTMCNC: 34.6 G/DL (ref 31.5–36.5)
MCV RBC AUTO: 92 FL (ref 78–100)
PLATELET # BLD AUTO: 181 10E3/UL (ref 150–450)
POTASSIUM SERPL-SCNC: 4.3 MMOL/L (ref 3.4–5.3)
RBC # BLD AUTO: 5.38 10E6/UL (ref 4.4–5.9)
WBC # BLD AUTO: 8.8 10E3/UL (ref 4–11)

## 2023-05-10 PROCEDURE — 250N000011 HC RX IP 250 OP 636: Performed by: HOSPITALIST

## 2023-05-10 PROCEDURE — 83735 ASSAY OF MAGNESIUM: CPT | Performed by: HOSPITALIST

## 2023-05-10 PROCEDURE — 83735 ASSAY OF MAGNESIUM: CPT | Performed by: NURSE PRACTITIONER

## 2023-05-10 PROCEDURE — 250N000013 HC RX MED GY IP 250 OP 250 PS 637: Performed by: INTERNAL MEDICINE

## 2023-05-10 PROCEDURE — 250N000013 HC RX MED GY IP 250 OP 250 PS 637: Performed by: HOSPITALIST

## 2023-05-10 PROCEDURE — 82565 ASSAY OF CREATININE: CPT | Performed by: NURSE PRACTITIONER

## 2023-05-10 PROCEDURE — 250N000012 HC RX MED GY IP 250 OP 636 PS 637: Performed by: INTERNAL MEDICINE

## 2023-05-10 PROCEDURE — 99207 PR NO BILLABLE SERVICE THIS VISIT: CPT | Performed by: NURSE PRACTITIONER

## 2023-05-10 PROCEDURE — 36415 COLL VENOUS BLD VENIPUNCTURE: CPT | Performed by: NURSE PRACTITIONER

## 2023-05-10 PROCEDURE — 36415 COLL VENOUS BLD VENIPUNCTURE: CPT | Performed by: HOSPITALIST

## 2023-05-10 PROCEDURE — 92526 ORAL FUNCTION THERAPY: CPT | Mod: GN | Performed by: SPEECH-LANGUAGE PATHOLOGIST

## 2023-05-10 PROCEDURE — 85027 COMPLETE CBC AUTOMATED: CPT | Performed by: NURSE PRACTITIONER

## 2023-05-10 PROCEDURE — 99233 SBSQ HOSP IP/OBS HIGH 50: CPT | Performed by: INTERNAL MEDICINE

## 2023-05-10 PROCEDURE — 120N000001 HC R&B MED SURG/OB

## 2023-05-10 PROCEDURE — 250N000013 HC RX MED GY IP 250 OP 250 PS 637: Performed by: NURSE PRACTITIONER

## 2023-05-10 PROCEDURE — 84132 ASSAY OF SERUM POTASSIUM: CPT | Performed by: NURSE PRACTITIONER

## 2023-05-10 RX ORDER — MAGNESIUM SULFATE HEPTAHYDRATE 40 MG/ML
4 INJECTION, SOLUTION INTRAVENOUS ONCE
Status: COMPLETED | OUTPATIENT
Start: 2023-05-10 | End: 2023-05-10

## 2023-05-10 RX ADMIN — INSULIN ASPART 2 UNITS: 100 INJECTION, SOLUTION INTRAVENOUS; SUBCUTANEOUS at 08:57

## 2023-05-10 RX ADMIN — INSULIN ASPART 3 UNITS: 100 INJECTION, SOLUTION INTRAVENOUS; SUBCUTANEOUS at 12:34

## 2023-05-10 RX ADMIN — OLANZAPINE 2.5 MG: 2.5 TABLET, FILM COATED ORAL at 08:13

## 2023-05-10 RX ADMIN — NYSTATIN 500000 UNITS: 100000 SUSPENSION ORAL at 08:12

## 2023-05-10 RX ADMIN — AMLODIPINE BESYLATE 5 MG: 5 TABLET ORAL at 08:13

## 2023-05-10 RX ADMIN — NYSTATIN 500000 UNITS: 100000 SUSPENSION ORAL at 17:33

## 2023-05-10 RX ADMIN — DOXAZOSIN 1 MG: 1 TABLET ORAL at 08:13

## 2023-05-10 RX ADMIN — OLANZAPINE 2.5 MG: 2.5 TABLET, FILM COATED ORAL at 21:13

## 2023-05-10 RX ADMIN — INSULIN GLARGINE 10 UNITS: 100 INJECTION, SOLUTION SUBCUTANEOUS at 13:48

## 2023-05-10 RX ADMIN — QUETIAPINE FUMARATE 25 MG: 25 TABLET ORAL at 21:55

## 2023-05-10 RX ADMIN — NYSTATIN 500000 UNITS: 100000 SUSPENSION ORAL at 21:13

## 2023-05-10 RX ADMIN — NYSTATIN 500000 UNITS: 100000 SUSPENSION ORAL at 12:34

## 2023-05-10 RX ADMIN — CARVEDILOL 25 MG: 25 TABLET, FILM COATED ORAL at 17:33

## 2023-05-10 RX ADMIN — METFORMIN HYDROCHLORIDE 500 MG: 500 TABLET, FILM COATED ORAL at 08:13

## 2023-05-10 RX ADMIN — QUETIAPINE FUMARATE 25 MG: 25 TABLET ORAL at 21:13

## 2023-05-10 RX ADMIN — GABAPENTIN 300 MG: 300 CAPSULE ORAL at 21:13

## 2023-05-10 RX ADMIN — METFORMIN HYDROCHLORIDE 500 MG: 500 TABLET, FILM COATED ORAL at 17:33

## 2023-05-10 RX ADMIN — MAGNESIUM SULFATE HEPTAHYDRATE 4 G: 40 INJECTION, SOLUTION INTRAVENOUS at 11:37

## 2023-05-10 RX ADMIN — ATORVASTATIN CALCIUM 40 MG: 40 TABLET, FILM COATED ORAL at 21:13

## 2023-05-10 RX ADMIN — CARVEDILOL 25 MG: 25 TABLET, FILM COATED ORAL at 08:12

## 2023-05-10 RX ADMIN — Medication 1 MG: at 21:55

## 2023-05-10 RX ADMIN — GABAPENTIN 300 MG: 300 CAPSULE ORAL at 08:11

## 2023-05-10 RX ADMIN — FINASTERIDE 5 MG: 5 TABLET, FILM COATED ORAL at 08:13

## 2023-05-10 RX ADMIN — INSULIN ASPART 2 UNITS: 100 INJECTION, SOLUTION INTRAVENOUS; SUBCUTANEOUS at 17:34

## 2023-05-10 RX ADMIN — LOSARTAN POTASSIUM 75 MG: 50 TABLET, FILM COATED ORAL at 08:13

## 2023-05-10 RX ADMIN — MIRTAZAPINE 15 MG: 15 TABLET, FILM COATED ORAL at 21:13

## 2023-05-10 RX ADMIN — ASPIRIN 81 MG CHEWABLE TABLET 324 MG: 81 TABLET CHEWABLE at 08:12

## 2023-05-10 ASSESSMENT — ACTIVITIES OF DAILY LIVING (ADL)
ADLS_ACUITY_SCORE: 46
ADLS_ACUITY_SCORE: 51
ADLS_ACUITY_SCORE: 51
ADLS_ACUITY_SCORE: 46
ADLS_ACUITY_SCORE: 51

## 2023-05-10 NOTE — CODE/RAPID RESPONSE
[facility]    Code 21 Note  5/10/2023   Time Called: 0809     called for: Code 21, patient was sitting at staff    Assessment & Plan   IMPRESSION & PLAN:    Adam Huber is a 87 year old male w/ PMH of pretension, DM2, CAD, stroke, TBI with dementia who was admitted on 5/4/2023 for dysarthria concerning for stroke.  He has been medically managed.  EEG was negative for seizure.  There was a delay in resuming his PTA mood stabilizing medications from the time of admission but they have since been restarted.      On the a.m. of 5/10/2023 patient became agitated and was punching at staff when they came to give the morning meds.  For this reason RRT was activated.  On my arrival I find an elderly man wearing just a disposable briefs sitting on the edge of the bed.  Since he is Dutch-speaking  services were obtained.  Patient denied any specific complaints including pain.  He did not respond if he needed to use the bathroom.  When asked if he is hungry he did not endorse in the affirmative.    Impression  Creasing agitation worsened by language barrier.  Based on limited response thus far appears patient is hungry    INTERVENTIONS:  -We will use  services as much as is possible  - Breakfast tray provided; patient was eating vigorously  - Patient was given his scheduled Zyprexa  - Turn off TV, keep shades up, turn lights on  - Provide sensory aids if they are present  - If it does not exacerbate patient's agitation and obtain vital signs and glucose  - Obtain creatinine magnesium and CBC as patient had positive appearing UA on 5/5/2023.  It is unclear if he had symptoms at that time, does not appear he was treated.  Repeat UA if cytosis present    Working diagnosis: Patient may have been agitated because of hunger and language barrier prohibited effective communication    At the end of the evaluation patient was calm, a.m. Zyprexa had been administered patient was eating  breakfast calm and comfortable appearing    disposition current level of care    Discussed with and defer further cares to hospitalist attending physician Dr. Silva     Code Status: No CPR- Pre-arrest intubation OK    Allergies   Allergies   Allergen Reactions     Lisinopril      Morphine Other (See Comments)     confusion       Physical Exam   Vital Signs with Ranges:  Temp:  [97  F (36.1  C)] (P) 97  F (36.1  C)  Pulse:  [55-72] 72  Resp:  [16-18] (P) 16  BP: (135-174)/(71-92) 174/92  SpO2:  [97 %-99 %] 97 %  I/O last 3 completed shifts:  In: 120 [P.O.:120]  Out: -     Constitutional: vs as above and/or per EMR  General:  adult pt lying in bed without acute distress  Neuro: HERNANDEZ, able to feed self,  speech fluent but brief answers only in Slovak  Eyes defer  Head, ENT & mouth: NC/AT,  mouth moist oral mucosa  Neck: supple  CV defer  resp: no increased WOB  gi:defer  Ext: no edema or mottling  Skin: no rashes on exposed skin  Lymph: defer  Musculoskeletal no bony joint deformities      Data     IMAGING: (X-ray/CT/MRI)   Recent Results (from the past 24 hour(s))   XR Video Swallow with SLP or OT    Narrative    VIDEO SWALLOWING EVALUATION   5/9/2023 9:30 AM     HISTORY: dysphagia    COMPARISON: None.    FLUOROSCOPY TIME: 1.9 minutes.  SPOT IMAGES OR CINE RUNS: 7      Impression    IMPRESSION: Penetration to the cords with thin consistency. Refer to  speech pathology report.    FABIOLA MARIN MD         SYSTEM ID:  [id number]       Comprehensive Metabolic Panel:  Recent Labs   Lab 05/10/23  0834 05/09/23  1126 05/09/23  0850 05/05/23  1005 05/05/23  0836   NA  --   --   --   --  139   POTASSIUM  --   --  4.2   < > 3.9   CHLORIDE  --   --   --   --  104   CO2  --   --   --   --  22   ANIONGAP  --   --   --   --  13   *   < >  --    < > 170*   BUN  --   --   --   --  18.4   CR  --   --   --   --  0.80   GFRESTIMATED  --   --   --   --  86   LUZ  --   --   --   --  9.0   MAG  --   --  1.6*   < > 1.6*    < > =  values in this interval not displayed.     UA:  Recent Labs   Lab 05/05/23 2046   COLOR Light Yellow   APPEARANCE Clear   URINEGLC >=1000*   URINEBILI Negative   URINEKETONE Negative   SG 1.015   UBLD Negative   URINEPH 5.0   PROTEIN Negative   NITRITE Negative   LEUKEST Moderate*   RBCU 0   WBCU 26*       Time Spent on this Encounter   I spent 17 minutes ([id number])on the unit/floor managing the care of Adam Huber. Over 50% of my time was spent counseling the patient and/or coordinating care regarding services listed in this note.      [facility]  Hospitalist Service  [facility]  Securely message with [employer] (more info)  Text page via [employer] Paging/Directory

## 2023-05-10 NOTE — PLAN OF CARE
Goal Outcome Evaluation:         Received Pt at 0700. Pt alert and oriented to self, speaks primarily Haitian ( used). Code green activated this morning for behaviors of agitation, restlessness and attempted to physically punch 1:1 sitter. Behavior subsided w/ scheduled morning medication including zyprexa. On Mg and K protocol. Received Mg sulfate 4g/100mL, recheck tomorrow. Tele shows sinus rhythm and sinus waqas w/ occasional PVC's. Lung sounds clear. Tolerated puree diet, mildly thick liquids. Crushed meds w/ pudding. Ate 100% breakfast, lunch and dinner. Incontinent of both bowel and bladder. Up w/ 2 assist G/W. Accucheck on the 200's, new order of lantus. Currently resting in bed, 1:1 sitter at bedside. Pending TCU placement.

## 2023-05-10 NOTE — PROGRESS NOTES
[facility]    Medicine Progress Note - Hospitalist Service    Date of Admission:  5/4/2023    Assessment & Plan   Adam Huber is a 87 year old male  With PMH of HTN, CAD, CVA, DM type 2, TBI, dementia- admitted on 5/4/2023 with slurred speech concerning for a stroke.       Dementia with behavioral disturbance  Transient alteration of consciousness   - he lives with his wife, has underlying cognitive impairment; has PCA as per family; [place]ankan speaking  - initial concern for stroke  - Stroke Neuro initially followed  - HEAD CT: With extensive areas of encephalomalacic changes and gliosis on frontal and temporal lobe, chronic infarct in basal ganglia and corona radiata.  -CTA head and neck multifocal areas of moderate or high-grade stenosis throughout anterior and posterior circulation.  Heavy atherosclerotic plaque left carotid bulb/bifurcation with at least 50% stenosis by modified NASCET criteria. Moderate predominantly noncalcified plaque right carotid bulb/bifurcation without significant stenosis by modified NASCET criteria. No findings for vertebral artery dissection.   MRI brain:  Motion limited examination equivocal DWI hyperintensity at the right asia. This may represent artifact or acute ischemia.  Severe bifrontal encephalomalacia  - PTA Aspirin was increased from 81 mg to 324 mg po daily  - lipid profile- LDL 90, total cholest 232, HDL 66  - started on Lipitor 40 mg po at bedtime  - Echo - EF 60-65%. No clear regional wall motion abnormalities noted.  No thrombus or cardiac etiology of stroke  - EEG- no seizures   -Continue to monitor in telemetry while in the hospital, neurology recommending cardiac event monitor after discharge  -Patient has had intermittent agitations possibly worsened due to language barrier, RRT called earlier this morning, see note for details.  Continue scheduled Zyprexa, Seroquel and Remeron, as needed Seroquel available.  Use  services as  much as possible, allow/maintain sleep-wake cycle.  Psychiatry has been consulted for medication adjustment  - PT/OT- rec TCU; finding a TCU might be challenging because of language barrier and his intermittent agitation; needs to be without sitter for 24h,  following  - follow up with PCP in 1-2 weeks  - follow up with General Neuro in 6-8 weeks     Hypomagnesemia  -Being replaced per protocol     HTN  - PTA on Coreg 25 mg po BID and losartan 50 mg po daily   - BP on higher side; BP goal- normotension  -Currently on amlodipine 5 mg daily, Coreg 25 mg twice daily losartan 75 mg daily  - BP was better controlled, however this morning is elevated, likely related to agitation  -Monitor for persistently elevated blood pressure and adjust medication as needed     Oral thrush  -Nystatin swish and spit    BPH   -PTA finasteride and Flomax      DM type 2, uncontrolled  - PTA metformin 500 mg qam and 1000 mg qpm   - Hb A1c 9.6  -Currently on metformin 500 mg twice daily with SSI  -Blood sugar mostly more than 200, will add Lantus 10 mg, monitor and adjust medication as needed     Neuropathy  -Continue PTA gabapentin      Anxiety/depression  Dementia  H/o TBI  -Continue PTA Zyprexa 2.5 mg po BID, Remeron 15 mg at bedtime and Seroquel 25 mg po at bedtime  - Seroquel 25 mg po daily prn for agitation ordered.   - Psych consult consulted        Diet: Room Service  Combination Diet Moderate Consistent Carb (60 g CHO per Meal) Diet; Pureed Diet (level 4); Mildly Thick (level 2)    DVT Prophylaxis: Pneumatic Compression Devices  Villa Catheter: Not present  Lines: None     Cardiac Monitoring: ACTIVE order. Indication: Stroke, acute (48 hours)  Code Status: No CPR- Pre-arrest intubation OK      Clinically Significant Risk Factors            # Hypomagnesemia: Lowest Mg = 1.5 mg/dL in last 2 days, will replace as needed            # DMII: A1C = 9.6 % (Ref range: <5.7 %) within past 6 months   # Overweight: Estimated body  "mass index is 29.77 kg/m  as calculated from the following:    Height as of this encounter: 1.778 m (5' 10\").    Weight as of this encounter: 94.1 kg (207 lb 7.3 oz).          Disposition Plan      Expected Discharge Date: 05/12/2023    Discharge Delays: 1:1 Sitter still ordered - MD to assess  Placement - TCU              Graciela Silva MD  Hospitalist Service  [facility]  Securely message with [employer] (more info)  Text page via [employer] Paging/Directory   ______________________________________________________________________    Interval History   Care assumed, chart reviewed.  Patient had RRT earlier this morning.  Discussed with RRT team, improved after breakfast and patient scheduled Zyprexa.  No other nursing concerns.  Patient was sleeping when I went to evaluate him.  After hearing me talk he opened his eyes briefly but then went back to sleep.  Did not try to wake him up due to RRT earlier this morning.    Physical Exam   Vital Signs: Temp: 97  F (36.1  C) Temp src: Axillary BP: (!) 174/92 Pulse: 72   Resp: 16 SpO2: 97 % O2 Device: None (Room air)    Weight: 207 lbs 7.25 oz        Medical Decision Making       40 MINUTES SPENT BY ME on the date of service doing chart review, history, exam, documentation & further activities per the note.      Data     I have personally reviewed the following data over the past 24 hrs:    8.8  \   17.1   / 181     N/A N/A N/A /  287 (H)   4.3 N/A 0.78 \       Imaging results reviewed over the past 24 hrs:   No results found for this or any previous visit (from the past 24 hour(s)).  Recent Labs   Lab 05/10/23  1146 05/10/23  0900 05/10/23  0834 05/09/23  2132 05/09/23  1126 05/09/23  0850 05/08/23  1212 05/08/23  0829 05/05/23  1005 05/05/23  0836 05/04/23 2221 05/04/23  1858   WBC  --  8.8  --   --   --   --   --   --   --  7.5  --  6.9   HGB  --  17.1  --   --   --   --   --   --   --  16.4  --  16.0   MCV  --  92  --   --   --   --   --   --   --  93  " --  95   PLT  --  181  --   --   --   --   --   --   --  192  --  192   INR  --   --   --   --   --   --   --   --   --   --   --  1.07   NA  --   --   --   --   --   --   --   --   --  139  --  138   POTASSIUM  --  4.3  --   --   --  4.2  --  4.2   < > 3.9  --  4.8   CHLORIDE  --   --   --   --   --   --   --   --   --  104  --  105   CO2  --   --   --   --   --   --   --   --   --  22  --  24   BUN  --   --   --   --   --   --   --   --   --  18.4  --  22.4   CR  --  0.78  --   --   --   --   --   --   --  0.80  --  0.91   ANIONGAP  --   --   --   --   --   --   --   --   --  13  --  9   LUZ  --   --   --   --   --   --   --   --   --  9.0  --  9.2   *  --  212* 259*   < >  --    < >  --    < > 170*   < > 232*    < > = values in this interval not displayed.

## 2023-05-10 NOTE — PLAN OF CARE
Pt here with AMS. A&Ox self. Neuros unable to fully assess, pt confused and Egyptian Speaking, did not respond to  during assessments and  unable to understand all of what patient was saying. Pt did not follow commands and generally uncooperative/agitated with cares. VSS on RA. Tele sinus waqas. Puree diet, mildly thick liquids. Takes pills crushed in pudding. Up with Ax2 lift or teresita steady. No nonverbal signs of pain. Pt scoring red on the Aggression Stop Light Tool, attempting to hit or bite staff during cares. Plan for TCU. Discharge pending.

## 2023-05-11 ENCOUNTER — APPOINTMENT (OUTPATIENT)
Dept: SPEECH THERAPY | Facility: CLINIC | Age: 88
DRG: 065 | End: 2023-05-11
Attending: INTERNAL MEDICINE
Payer: COMMERCIAL

## 2023-05-11 LAB
GLUCOSE BLDC GLUCOMTR-MCNC: 145 MG/DL (ref 70–99)
GLUCOSE BLDC GLUCOMTR-MCNC: 165 MG/DL (ref 70–99)
GLUCOSE BLDC GLUCOMTR-MCNC: 203 MG/DL (ref 70–99)
GLUCOSE BLDC GLUCOMTR-MCNC: 220 MG/DL (ref 70–99)
GLUCOSE BLDC GLUCOMTR-MCNC: 229 MG/DL (ref 70–99)
MAGNESIUM SERPL-MCNC: 1.9 MG/DL (ref 1.7–2.3)
POTASSIUM SERPL-SCNC: 4.4 MMOL/L (ref 3.4–5.3)

## 2023-05-11 PROCEDURE — 250N000013 HC RX MED GY IP 250 OP 250 PS 637: Performed by: NURSE PRACTITIONER

## 2023-05-11 PROCEDURE — 250N000009 HC RX 250

## 2023-05-11 PROCEDURE — 250N000013 HC RX MED GY IP 250 OP 250 PS 637: Performed by: HOSPITALIST

## 2023-05-11 PROCEDURE — 83735 ASSAY OF MAGNESIUM: CPT | Performed by: HOSPITALIST

## 2023-05-11 PROCEDURE — 36415 COLL VENOUS BLD VENIPUNCTURE: CPT | Performed by: HOSPITALIST

## 2023-05-11 PROCEDURE — 250N000013 HC RX MED GY IP 250 OP 250 PS 637: Performed by: INTERNAL MEDICINE

## 2023-05-11 PROCEDURE — 99221 1ST HOSP IP/OBS SF/LOW 40: CPT | Performed by: NURSE PRACTITIONER

## 2023-05-11 PROCEDURE — 99232 SBSQ HOSP IP/OBS MODERATE 35: CPT | Performed by: INTERNAL MEDICINE

## 2023-05-11 PROCEDURE — 120N000001 HC R&B MED SURG/OB

## 2023-05-11 PROCEDURE — 84132 ASSAY OF SERUM POTASSIUM: CPT | Performed by: HOSPITALIST

## 2023-05-11 PROCEDURE — 250N000011 HC RX IP 250 OP 636: Performed by: INTERNAL MEDICINE

## 2023-05-11 PROCEDURE — 92526 ORAL FUNCTION THERAPY: CPT | Mod: GN | Performed by: SPEECH-LANGUAGE PATHOLOGIST

## 2023-05-11 RX ORDER — OLANZAPINE 10 MG/2ML
2.5 INJECTION, POWDER, FOR SOLUTION INTRAMUSCULAR DAILY PRN
Status: DISCONTINUED | OUTPATIENT
Start: 2023-05-11 | End: 2023-06-05 | Stop reason: HOSPADM

## 2023-05-11 RX ORDER — RISPERIDONE 0.5 MG/1
0.5 TABLET, ORALLY DISINTEGRATING ORAL
Status: DISCONTINUED | OUTPATIENT
Start: 2023-05-12 | End: 2023-06-05 | Stop reason: HOSPADM

## 2023-05-11 RX ORDER — RISPERIDONE 0.5 MG/1
0.5 TABLET, ORALLY DISINTEGRATING ORAL 2 TIMES DAILY PRN
Status: DISCONTINUED | OUTPATIENT
Start: 2023-05-11 | End: 2023-06-05 | Stop reason: HOSPADM

## 2023-05-11 RX ORDER — BROMFENAC 1.03 MG/ML
1 SOLUTION/ DROPS OPHTHALMIC DAILY
Status: DISCONTINUED | OUTPATIENT
Start: 2023-05-11 | End: 2023-05-27

## 2023-05-11 RX ORDER — RISPERIDONE 1 MG/1
1 TABLET, ORALLY DISINTEGRATING ORAL AT BEDTIME
Status: DISCONTINUED | OUTPATIENT
Start: 2023-05-11 | End: 2023-06-05 | Stop reason: HOSPADM

## 2023-05-11 RX ORDER — DEXTROSE MONOHYDRATE 25 G/50ML
25-50 INJECTION, SOLUTION INTRAVENOUS
Status: DISCONTINUED | OUTPATIENT
Start: 2023-05-11 | End: 2023-05-11

## 2023-05-11 RX ORDER — NICOTINE POLACRILEX 4 MG
15-30 LOZENGE BUCCAL
Status: DISCONTINUED | OUTPATIENT
Start: 2023-05-11 | End: 2023-05-11

## 2023-05-11 RX ORDER — WATER 10 ML/10ML
INJECTION INTRAMUSCULAR; INTRAVENOUS; SUBCUTANEOUS
Status: COMPLETED
Start: 2023-05-11 | End: 2023-05-11

## 2023-05-11 RX ORDER — LANOLIN ALCOHOL/MO/W.PET/CERES
3 CREAM (GRAM) TOPICAL EVERY EVENING
Status: DISCONTINUED | OUTPATIENT
Start: 2023-05-11 | End: 2023-06-05 | Stop reason: HOSPADM

## 2023-05-11 RX ORDER — ACETAMINOPHEN 325 MG/1
650 TABLET ORAL EVERY 8 HOURS
Status: DISCONTINUED | OUTPATIENT
Start: 2023-05-11 | End: 2023-06-05 | Stop reason: HOSPADM

## 2023-05-11 RX ADMIN — ACETAMINOPHEN 650 MG: 325 TABLET ORAL at 19:06

## 2023-05-11 RX ADMIN — LOSARTAN POTASSIUM 75 MG: 50 TABLET, FILM COATED ORAL at 08:00

## 2023-05-11 RX ADMIN — WATER 10 ML: 1 INJECTION INTRAMUSCULAR; INTRAVENOUS; SUBCUTANEOUS at 03:26

## 2023-05-11 RX ADMIN — OLANZAPINE 2.5 MG: 10 INJECTION, POWDER, FOR SOLUTION INTRAMUSCULAR at 03:26

## 2023-05-11 RX ADMIN — ACETAMINOPHEN 650 MG: 325 TABLET ORAL at 20:57

## 2023-05-11 RX ADMIN — ASPIRIN 81 MG CHEWABLE TABLET 325 MG: 81 TABLET CHEWABLE at 07:59

## 2023-05-11 RX ADMIN — NYSTATIN 500000 UNITS: 100000 SUSPENSION ORAL at 21:01

## 2023-05-11 RX ADMIN — DOXAZOSIN 1 MG: 1 TABLET ORAL at 08:01

## 2023-05-11 RX ADMIN — GABAPENTIN 300 MG: 300 CAPSULE ORAL at 20:58

## 2023-05-11 RX ADMIN — AMLODIPINE BESYLATE 5 MG: 5 TABLET ORAL at 08:05

## 2023-05-11 RX ADMIN — MIRTAZAPINE 15 MG: 15 TABLET, FILM COATED ORAL at 20:58

## 2023-05-11 RX ADMIN — FINASTERIDE 5 MG: 5 TABLET, FILM COATED ORAL at 08:00

## 2023-05-11 RX ADMIN — INSULIN ASPART 2 UNITS: 100 INJECTION, SOLUTION INTRAVENOUS; SUBCUTANEOUS at 12:55

## 2023-05-11 RX ADMIN — NYSTATIN 500000 UNITS: 100000 SUSPENSION ORAL at 14:24

## 2023-05-11 RX ADMIN — RISPERIDONE 1 MG: 1 TABLET, ORALLY DISINTEGRATING ORAL at 21:00

## 2023-05-11 RX ADMIN — GABAPENTIN 300 MG: 300 CAPSULE ORAL at 08:01

## 2023-05-11 RX ADMIN — BROMFENAC 1 DROP: 1.03 SOLUTION/ DROPS OPHTHALMIC at 14:28

## 2023-05-11 RX ADMIN — MELATONIN TAB 3 MG 3 MG: 3 TAB at 20:58

## 2023-05-11 RX ADMIN — NYSTATIN 500000 UNITS: 100000 SUSPENSION ORAL at 09:19

## 2023-05-11 RX ADMIN — CARVEDILOL 25 MG: 25 TABLET, FILM COATED ORAL at 08:01

## 2023-05-11 RX ADMIN — INSULIN ASPART 2 UNITS: 100 INJECTION, SOLUTION INTRAVENOUS; SUBCUTANEOUS at 17:40

## 2023-05-11 RX ADMIN — NYSTATIN 500000 UNITS: 100000 SUSPENSION ORAL at 17:09

## 2023-05-11 RX ADMIN — CARVEDILOL 25 MG: 25 TABLET, FILM COATED ORAL at 17:09

## 2023-05-11 RX ADMIN — ATORVASTATIN CALCIUM 40 MG: 40 TABLET, FILM COATED ORAL at 20:58

## 2023-05-11 RX ADMIN — INSULIN ASPART 1 UNITS: 100 INJECTION, SOLUTION INTRAVENOUS; SUBCUTANEOUS at 08:25

## 2023-05-11 RX ADMIN — OLANZAPINE 2.5 MG: 2.5 TABLET, FILM COATED ORAL at 09:19

## 2023-05-11 RX ADMIN — METFORMIN HYDROCHLORIDE 500 MG: 500 TABLET, FILM COATED ORAL at 17:09

## 2023-05-11 RX ADMIN — ACETAMINOPHEN 650 MG: 325 TABLET ORAL at 07:59

## 2023-05-11 RX ADMIN — INSULIN GLARGINE 10 UNITS: 100 INJECTION, SOLUTION SUBCUTANEOUS at 08:25

## 2023-05-11 RX ADMIN — ACETAMINOPHEN 650 MG: 325 TABLET ORAL at 14:24

## 2023-05-11 RX ADMIN — METFORMIN HYDROCHLORIDE 500 MG: 500 TABLET, FILM COATED ORAL at 08:01

## 2023-05-11 ASSESSMENT — ACTIVITIES OF DAILY LIVING (ADL)
ADLS_ACUITY_SCORE: 47
ADLS_ACUITY_SCORE: 51
ADLS_ACUITY_SCORE: 46
ADLS_ACUITY_SCORE: 51
ADLS_ACUITY_SCORE: 46
ADLS_ACUITY_SCORE: 51
ADLS_ACUITY_SCORE: 47
ADLS_ACUITY_SCORE: 51

## 2023-05-11 NOTE — PROGRESS NOTES
[id number]    Continued to be unable to fully assess Pt's neurological/ orientation status r/t language barrier.  unable to understand Pt. Continued on 1:1 sit. Pt became angry w/ CNA and attempted to hit and kick her. Unable to calm Pt down w/ non-pharmacological interventions. Pt attempting to crawl over bed railing. Writer was able to assist Pt to a standing position and Pt was able to stand w/ walker but then became very agitated/ angry and attempted to hit writer and other staff with a closed fist and became very unsteady while doing so. Staff assisted Pt back into a bed. Order obtained from On-Call hospitalist  for IM Zyprexa, which was administered. Pt asleep/ resting comfortably in bed at this time. VSS.

## 2023-05-11 NOTE — PLAN OF CARE
"  Problem: Plan of Care - These are the overarching goals to be used throughout the patient stay.    Goal: Plan of Care Review  Description: The Plan of Care Review/Shift note should be completed every shift.  The Outcome Evaluation is a brief statement about your assessment that the patient is improving, declining, or no change.  This information will be displayed automatically on your shift note.  Outcome: Progressing  Flowsheets (Taken 5/11/2023 1432)  Plan of Care Reviewed With:   patient   significant other   child   Goal Outcome Evaluation:      Plan of Care Reviewed With: patient, significant other, child  Orientations: alert to self and self; patient sleeping most of the shift. No further behaviors from patient. Patient expressing through  that he has chronic headaches; scheduled Tylenol ordered for headache pain. Family at bedside this afternoon asking multiple questions. Family requesting patient please be ordered back to Full Code after noticing on patient's wristband he is No CPR. MD notified and ordered changed back and new wrist band printed and placed on patient. Patient having adequate PO intake; pills crushed in applesauce. Incontinence care and repositioning q 2 hours. Sitter at bedside for safety.   Vitals/Pain: /51 (BP Location: Right arm)   Pulse 53   Temp 97.6  F (36.4  C) (Axillary)   Resp 18   Ht 1.778 m (5' 10\")   Wt 94.1 kg (207 lb 7.3 oz)   SpO2 96%   BMI 29.77 kg/m    Tele: Harinder in the 40s  Resp: Clear and equal bilaterally   Lines/Drains: RUE PIV; patent site    Skin/Wounds: intact; scattered bruising   GI/: incontinent of bowel and bladder   Labs: Abnormal/Trends, Electrolyte Replacement- Mag and K+ at goal   Ambulation/Assist: Assist of two; Marysol Steady   Sleep Quality: sleeping most of the day  Plan: Dispo pending     Taylor Joel RN      "

## 2023-05-11 NOTE — CONSULTS
[facility]  Initial Psychiatric Consult   Consult date: May 11, 2023         Reason for Consult, requesting source:    Dementia, agitation  Requesting source: Bernardo Chatman        HPI:   Adam Huber is a 87 year old male who was admitted to [facility] on 5/4/23 with slurred speech and concern for stroke. He has been evaluated by neurology who did not feel his presentation was consistent with stroke. PMH significant for CAD, HTN, CVA, HLD, TBI, and major neurocognitive disorder.     Per chart review, patient presented to the emergency department after being found by wife with acute weakness at their apartment. Apparently, she observed his eyes moving from side to side. At baseline, patient requires assistance with ADLs and is incontinent of urine and stool. He occasionally dose not recognize family members. Cognition has been declining for the past 2 years. Here in the hospital, patient has been intermittently quite agitated, attempting to hit and kick staff. Crawling over bed railing overnight last night.     He is currently receiving olanzapine 2.5 mg bid as well as quetiapine 25 mg at bedtime. Appears he was on this regimen prior to hospitalization. Nursing staff notes he did not obtain any sleep overnight last night. Unfortunately, patient was asleep upon my arrival today. There was no family available in his room. No interpretor available. Writer elected to allow patient to continue sleeping as communication would have been quite difficult.     Writer called patient's son, Adolfo. Adolfo's son answered the call, stating that Adolfo's English is limited. Spoke with Adolfo's son regarding changes recommended to patient's medication regimen. Reports he would pass this along to Adolfo.         Past Psychiatric History:   Pt dx with Alzheimer's dementia. No known psychiatric hospitalizations or suicide attempts. He was prescribed olanzapine and quetiapine prior to  admission.         Substance Use and History:   No known hx of alcohol or illicit substance abuse or dependence        Past Medical History:   PAST MEDICAL HISTORY:   Past Medical History:   Diagnosis Date     Acute chest pain 10/23/2015     CAD (coronary artery disease)      Essential hypertension 10/23/2015     History of CVA (cerebrovascular accident) 10/23/2015     HLD (hyperlipidemia)      HTN (hypertension)      NSTEMI (non-ST elevated myocardial infarction) (H) 10/23/2015     Post PTCA 10-    Successful PCI of culprit proximal to mid RCA with placement of a     Post PTCA 11-6-2015    pci of complex mid CFX-hector       PAST SURGICAL HISTORY:   Past Surgical History:   Procedure Laterality Date     CHOLECYSTECTOMY       GENITOURINARY SURGERY      prostate removal      HEART CATH LEFT HEART CATH  10/12/2015    PCI w/ HECTOR to RCA     HEART CATH RIGHT AND LEFT HEART CATH  11/6/2015    PCI w/ HECTOR to mid-CFX             Family History:   FAMILY HISTORY:   Family History   Problem Relation Age of Onset     Unknown/Adopted No family hx of            Social History:   SOCIAL HISTORY:   Social History     Tobacco Use     Smoking status: Never     Smokeless tobacco: Never   Vaping Use     Vaping status: Not on file   Substance Use Topics     Alcohol use: No     Alcohol/week: 0.0 standard drinks of alcohol     Pt living at home with spouse prior to hospital stay. Pt is Niuean-speaking.          Physical ROS:   The patient was unable to participate in ROS due to somnolence         PTA Medications:     Medications Prior to Admission   Medication Sig Dispense Refill Last Dose     acetaminophen (TYLENOL) 325 MG tablet Take 3 tablets (975 mg) by mouth every 6 hours as needed for mild pain 100 tablet 0      aspirin 81 MG EC tablet Take one tablet daily 90 tablet 0      carvedilol (COREG) 25 MG tablet Take 1 tablet (25 mg) by mouth 2 times daily (with meals) 180 tablet 3      finasteride (PROSCAR) 5 MG tablet Take 5 mg by  mouth daily        fluticasone (FLONASE) 50 MCG/ACT nasal spray Spray 1 spray into both nostrils daily as needed for rhinitis or allergies        gabapentin (NEURONTIN) 300 MG capsule Take 300 mg by mouth 2 times daily        ibuprofen (ADVIL/MOTRIN) 400 MG tablet Take 1 tablet (400 mg) by mouth every 8 hours as needed for moderate pain 50 tablet 0 Unknown     losartan (COZAAR) 50 MG tablet Take 50 mg by mouth daily        metFORMIN (GLUCOPHAGE) 500 MG tablet Take 500 mg with breakfast and 1000 mg with evening meal        mirtazapine (REMERON) 15 MG tablet Take 15 mg by mouth At Bedtime        nitroglycerin (NITROSTAT) 0.4 MG SL tablet Place 1 tablet (0.4 mg) under the tongue every 5 minutes as needed for chest pain 25 tablet 1 Unknown     OLANZapine (ZYPREXA) 2.5 MG tablet Take 2.5 mg by mouth 2 times daily        QUEtiapine (SEROQUEL) 25 MG tablet Take 25 mg by mouth At Bedtime        tamsulosin (FLOMAX) 0.4 MG capsule Take 0.4 mg by mouth daily             Allergies:     Allergies   Allergen Reactions     Lisinopril      Morphine Other (See Comments)     confusion          Labs:     Recent Results (from the past 48 hour(s))   Glucose by meter    Collection Time: 05/09/23  4:16 PM   Result Value Ref Range    GLUCOSE BY METER POCT 278 (H) 70 - 99 mg/dL   Glucose by meter    Collection Time: 05/09/23  9:32 PM   Result Value Ref Range    GLUCOSE BY METER POCT 259 (H) 70 - 99 mg/dL   Glucose by meter    Collection Time: 05/10/23  8:34 AM   Result Value Ref Range    GLUCOSE BY METER POCT 212 (H) 70 - 99 mg/dL   Creatinine    Collection Time: 05/10/23  9:00 AM   Result Value Ref Range    Creatinine 0.78 0.67 - 1.17 mg/dL    GFR Estimate 86 >60 mL/min/1.73m2   Potassium    Collection Time: 05/10/23  9:00 AM   Result Value Ref Range    Potassium 4.3 3.4 - 5.3 mmol/L   Magnesium    Collection Time: 05/10/23  9:00 AM   Result Value Ref Range    Magnesium 1.5 (L) 1.7 - 2.3 mg/dL   CBC with platelets    Collection Time:  "05/10/23  9:00 AM   Result Value Ref Range    WBC Count 8.8 4.0 - 11.0 10e3/uL    RBC Count 5.38 4.40 - 5.90 10e6/uL    Hemoglobin 17.1 13.3 - 17.7 g/dL    Hematocrit 49.4 40.0 - 53.0 %    MCV 92 78 - 100 fL    MCH 31.8 26.5 - 33.0 pg    MCHC 34.6 31.5 - 36.5 g/dL    RDW 13.0 10.0 - 15.0 %    Platelet Count 181 150 - 450 10e3/uL   Glucose by meter    Collection Time: 05/10/23 11:46 AM   Result Value Ref Range    GLUCOSE BY METER POCT 287 (H) 70 - 99 mg/dL   Glucose by meter    Collection Time: 05/10/23  4:52 PM   Result Value Ref Range    GLUCOSE BY METER POCT 229 (H) 70 - 99 mg/dL   Magnesium    Collection Time: 05/10/23  4:59 PM   Result Value Ref Range    Magnesium 2.4 (H) 1.7 - 2.3 mg/dL   Glucose by meter    Collection Time: 05/10/23  9:12 PM   Result Value Ref Range    GLUCOSE BY METER POCT 417 (H) 70 - 99 mg/dL   Glucose by meter    Collection Time: 05/10/23 10:03 PM   Result Value Ref Range    GLUCOSE BY METER POCT 319 (H) 70 - 99 mg/dL   Glucose by meter    Collection Time: 05/11/23  2:52 AM   Result Value Ref Range    GLUCOSE BY METER POCT 145 (H) 70 - 99 mg/dL   Glucose by meter    Collection Time: 05/11/23  8:07 AM   Result Value Ref Range    GLUCOSE BY METER POCT 165 (H) 70 - 99 mg/dL   Potassium    Collection Time: 05/11/23  8:19 AM   Result Value Ref Range    Potassium 4.4 3.4 - 5.3 mmol/L   Magnesium    Collection Time: 05/11/23  8:19 AM   Result Value Ref Range    Magnesium 1.9 1.7 - 2.3 mg/dL          Physical and Psychiatric Examination:     /60 (BP Location: Left arm)   Pulse 63   Temp 98  F (36.7  C) (Oral)   Resp 18   Ht 1.778 m (5' 10\")   Wt 94.1 kg (207 lb 7.3 oz)   SpO2 93%   BMI 29.77 kg/m    Weight is 207 lbs 7.25 oz  Body mass index is 29.77 kg/m .    Physical Exam:  I have reviewed the physical exam as documented by by the medical team and agree with findings and assessment and have no additional findings to add at this time.    Mental Status Exam:  Pt is an 87 year old " Micronesian-speaking male who is observed to be lying in bed, wearing hospital gown. He is asleep at the time of this visit. Unable to assess remainder of MSE.            DSM-5 Diagnosis:   #1 Major neurocognitive disorder with behavioral disturbance  #2 Likely superimposed delirium          Assessment:   Pt is an 87 year old male with hx of dementia. Presented with concern for stroke; per neurology, presentation not consistent with stroke. EEG demonstrating slowing consistent with mild encephalopathy. Pt has been rather agitated at times during his hospital stay. Appears his days and nights are flip-flopped. Per nursing staff, he was awake all night last night. He was asleep when I arrived in the late morning.     At baseline, appears he is prescribed olanzapine 2.5 mg bid and quetiapine 25 mg at bedtime. We generally avoid giving two scheduled antipsychotics due to increased risk for side effects, especially in the geriatric population. Recommend to switch to monotherapy with risperidone, which generally has better data for treatment of dementia-related agitation. If this fails, we could look at scheduling Depakote, which can sometimes be helpful in decreasing agitation in the setting of delirium and dementia. His platelet count is already on the low side, so this would require careful monitoring. Reviewed EKG from 5/9, QTc was 407 wnl. I attempted to call patient's son, Adolfo. Patient's grandson answered the call and stated that Adolfo's English is not the best. I relayed my recommendations to patient's grandson who would let Adolfo know about these changes.           Summary of Recommendations:   1) Discontinue quetiapine and olanzapine at night. Would not make sense to use 2 low-dose scheduled antipsychotics concurrently.   2) Schedule risperidone ODT 0.5 mg in the morning and 1 mg at bedtime to help reduce dementia-related agitation.   3) Risperidone ODT 0.5 mg bid prn for acute agitation; olanzapine IM 2.5 mg as a  backup option if refusing or unable to take PO in the setting of severe agitation.   4) Schedule melatonin 3 mg at 2000 to help with circadian rhythm maintenance  5) Delirium precautions:    Up during the day with lights on    Lights off at night, avoid interruptions during the night as much as possible    Family visits    Encourage wearing glasses, as applicable    Reorientation    Avoid opioids, benzodiazepines, anticholinergics.      Continue to ensure proper nutrition, fluid and electrolyte balance. Monitor for infections, hypoxia, metabolic derangements, or other causes of delirium.   6) Reconsult psychiatry as needed, thank you        Ra Hardwick, BLAZE-BC, APRN, CNP  Consult/Liaison Psychiatry  [facility]  Provider can be paged via [facility] Paging/Directory  If I am unavailable, please contact [facility] at [phone] to reach the on-call provider.

## 2023-05-11 NOTE — PROGRESS NOTES
[facility]    Medicine Progress Note - Hospitalist Service    Date of Admission:  5/4/2023    Assessment & Plan   Adam Huber is a 87 year old male  With PMH of HTN, CAD, CVA, DM type 2, TBI, dementia- admitted on 5/4/2023 with slurred speech concerning for a stroke.       Dementia with behavioral disturbance  Transient alteration of consciousness   - he lives with his wife, has underlying cognitive impairment; has PCA as per family; Mongolian speaking  - initial concern for stroke  - Stroke Neuro initially followed  - HEAD CT: With extensive areas of encephalomalacic changes and gliosis on frontal and temporal lobe, chronic infarct in basal ganglia and corona radiata.  -CTA head and neck multifocal areas of moderate or high-grade stenosis throughout anterior and posterior circulation.  Heavy atherosclerotic plaque left carotid bulb/bifurcation with at least 50% stenosis by modified NASCET criteria. Moderate predominantly noncalcified plaque right carotid bulb/bifurcation without significant stenosis by modified NASCET criteria. No findings for vertebral artery dissection.   MRI brain:  Motion limited examination equivocal DWI hyperintensity at the right asia. This may represent artifact or acute ischemia.  Severe bifrontal encephalomalacia  - PTA Aspirin was increased from 81 mg to 324 mg po daily  - lipid profile- LDL 90, total cholest 232, HDL 66  - started on Lipitor 40 mg po at bedtime  - Echo - EF 60-65%. No clear regional wall motion abnormalities noted.  No thrombus or cardiac etiology of stroke  - EEG- no seizures   -Continue to monitor in telemetry while in the hospital, neurology recommending cardiac event monitor after discharge  -Patient has had intermittent agitations possibly worsened due to language barrier.   -Currently on scheduled Zyprexa, Seroquel and Remeron, as needed Seroquel available.  -  Appreciate psychiatry input, evaluated on 5/11/2023-recommended  discontinuing Seroquel and Zyprexa and started on risperidone ODT 0.5 mg in the morning and 1 mg at bedtime to help reduce dementia related agitation with risperidone ODT 0.5 mg twice daily as needed for agitation.  Zyprexa IM 2.5 mg as a backup option or unable to take p.o.  -Scheduled melatonin 3 mg at 8:00 to help with circadian rhythm maintenance  -Delirium precautions, avoid interruptions during the night as much as possible, allow family visits, reorientation, use  services as much as possible, allow/maintain sleep-wake cycle.    - PT/OT- rec TCU; finding a TCU might be challenging because of language barrier and his intermittent agitation; needs to be without sitter for 24h,  following  - follow up with PCP in 1-2 weeks  - follow up with General Neuro in 6-8 weeks     Hypomagnesemia  -Being replaced per protocol     Goals of care  -Wife at bedside wants him to be full code, CODE STATUS changed.     HTN  - PTA on Coreg 25 mg po BID and losartan 50 mg po daily   - BP on higher side; BP goal- normotension  -Currently on amlodipine 5 mg daily, Coreg 25 mg twice daily losartan 75 mg daily  - BP mostly better controlled, intermittently elevated, likely related to agitation  -Monitor for persistently elevated blood pressure and adjust medication as needed     Oral thrush  -Nystatin swish and spit    BPH   -PTA finasteride and Flomax      DM type 2, uncontrolled  - PTA metformin 500 mg qam and 1000 mg qpm   - Hb A1c 9.6  -Currently on metformin 500 mg twice daily with medium resistance SSI in addition to Lantus 10 units which was started 5/10  -Will change to high resistance SSI and add Premeal insulin 1 unit per 15 g of carbs  -Monitor and adjust medication as needed     Neuropathy  -Continue PTA gabapentin      Anxiety/depression  Dementia  H/o TBI  -Continue PTA Zyprexa 2.5 mg po BID, Remeron 15 mg at bedtime and Seroquel 25 mg po at bedtime  - Seroquel 25 mg po daily prn for agitation  "ordered.   - Psych consulted, changed antipsychotics to risperidone, see above  -Monitor for response.        Diet: Room Service  Combination Diet Moderate Consistent Carb (60 g CHO per Meal) Diet; Pureed Diet (level 4); Mildly Thick (level 2)  Snacks/Supplements Adult: Other; Ensure with breakfast (RD); With Meals    DVT Prophylaxis: Pneumatic Compression Devices  Villa Catheter: Not present  Lines: None     Cardiac Monitoring: ACTIVE order. Indication: Stroke, acute (48 hours)  Code Status: No CPR- Pre-arrest intubation OK      Clinically Significant Risk Factors            # Hypomagnesemia: Lowest Mg = 1.5 mg/dL in last 2 days, will replace as needed            # DMII: A1C = 9.6 % (Ref range: <5.7 %) within past 6 months   # Overweight: Estimated body mass index is 29.77 kg/m  as calculated from the following:    Height as of this encounter: 1.778 m (5' 10\").    Weight as of this encounter: 94.1 kg (207 lb 7.3 oz).          Disposition Plan      Expected Discharge Date: 05/12/2023    Discharge Delays: 1:1 Sitter still ordered - MD to assess  Placement - TCU              Graciela Silva MD  Hospitalist Service  [facility]  Securely message with [employer] (more info)  Text page via [facility] Paging/Directory   ______________________________________________________________________    Interval History   Patient was given medication last evening but around 3 AM he again became agitated and needing as needed medication.  When I went to see the patient earlier this morning and later when family was around he was still sleeping.  Family at bedside was updated and questions answered.  Family also requested that he be changed to full code from DNR/DNI.  No other nursing concerns.    Physical Exam   Vital Signs: Temp: 97.6  F (36.4  C) Temp src: Axillary BP: 108/51 Pulse: 53   Resp: 18 SpO2: 96 % O2 Device: None (Room air)    Weight: 207 lbs 7.25 oz        Medical Decision Making       45 MINUTES SPENT BY " ME on the date of service doing chart review, history, exam, documentation & further activities per the note.      Data     I have personally reviewed the following data over the past 24 hrs:    N/A  \   N/A   / N/A     N/A N/A N/A /  165 (H)   4.4 N/A N/A \       Imaging results reviewed over the past 24 hrs:   No results found for this or any previous visit (from the past 24 hour(s)).  Recent Labs   Lab 05/11/23  0819 05/11/23  0807 05/11/23  0252 05/10/23  2203 05/10/23  1146 05/10/23  0900 05/09/23  1126 05/09/23  0850 05/05/23  1005 05/05/23  0836 05/04/23  2221 05/04/23  1858   WBC  --   --   --   --   --  8.8  --   --   --  7.5  --  6.9   HGB  --   --   --   --   --  17.1  --   --   --  16.4  --  16.0   MCV  --   --   --   --   --  92  --   --   --  93  --  95   PLT  --   --   --   --   --  181  --   --   --  192  --  192   INR  --   --   --   --   --   --   --   --   --   --   --  1.07   NA  --   --   --   --   --   --   --   --   --  139  --  138   POTASSIUM 4.4  --   --   --   --  4.3  --  4.2   < > 3.9  --  4.8   CHLORIDE  --   --   --   --   --   --   --   --   --  104  --  105   CO2  --   --   --   --   --   --   --   --   --  22  --  24   BUN  --   --   --   --   --   --   --   --   --  18.4  --  22.4   CR  --   --   --   --   --  0.78  --   --   --  0.80  --  0.91   ANIONGAP  --   --   --   --   --   --   --   --   --  13  --  9   LUZ  --   --   --   --   --   --   --   --   --  9.0  --  9.2   GLC  --  165* 145* 319*   < >  --    < >  --    < > 170*   < > 232*    < > = values in this interval not displayed.

## 2023-05-11 NOTE — PROGRESS NOTES
[id number]: Unable to fully assess orientation and neuro's d/t pt's agitation and language barrier. Resistant to nursing cares. Upon waking patient up for RN assessment and medications, pt became very agitated and was swinging at bedside attendant and writer while trying to get OOB. Pt was yelling in Burkinan, worked to redirect pt, called his son to calm down patient and explain cares. Pt was able to take medications w/ pudding. Writer left room, pt's agitation increased and was assisted to his knees by bedside attendant. Pt did not hit his head or endorse any injuries. House AL[facility] paged and updated on incident. Voicemail left with Son Adolfo regarding the event. PRN melatonin and Seroquel given for agitation. Following the event, pt resting comfortably in bed with bedside attendant.

## 2023-05-11 NOTE — PLAN OF CARE
[id number]  Reason for Admission: AMS    Cognitive/Mentation: A/Ox alert.  Oriented to self, date, place.  Disoriented to situation  Neuros/CMS: grossly intact.  MARY.  Tuvaluan  present during assessment.  Pt does not fully participate in assessment.  Per , speech sounded slightly slurrerd  VS: stable.   Tele: SB.  GI: BS audible, passing flatus, . Incontinent.  : incontinent.  Pulmonary: LS clear.  On RA.  Pain: denies.  Denied HA this shift.     Drains/Lines: piv saline locked  Skin: wnl  Activity: Assist x 2 with lift.  Diet: pureed with thicik liquids. Takes pills crushed in pudding.     Therapies recs: pending  Discharge: pending    Aggression Stoplight Tool: yellow, impulsive.  Has 1:1    End of shift summary: no agitation this shift.  And pt participated more than previously documented.  Seen by psych today.  zyprexa and seroquel stopped.  risperdal started.

## 2023-05-11 NOTE — PROVIDER NOTIFICATION
"Paged house ALPHONSO \"Pt had a witness fall - slipped out of bed onto his knees, did not hit his head. Pt is now back in bed.\"  "

## 2023-05-12 ENCOUNTER — APPOINTMENT (OUTPATIENT)
Dept: PHYSICAL THERAPY | Facility: CLINIC | Age: 88
DRG: 065 | End: 2023-05-12
Attending: INTERNAL MEDICINE
Payer: COMMERCIAL

## 2023-05-12 ENCOUNTER — APPOINTMENT (OUTPATIENT)
Dept: SPEECH THERAPY | Facility: CLINIC | Age: 88
DRG: 065 | End: 2023-05-12
Attending: INTERNAL MEDICINE
Payer: COMMERCIAL

## 2023-05-12 ENCOUNTER — APPOINTMENT (OUTPATIENT)
Dept: OCCUPATIONAL THERAPY | Facility: CLINIC | Age: 88
DRG: 065 | End: 2023-05-12
Attending: INTERNAL MEDICINE
Payer: COMMERCIAL

## 2023-05-12 LAB
ATRIAL RATE - MUSE: 56 BPM
DIASTOLIC BLOOD PRESSURE - MUSE: NORMAL MMHG
GLUCOSE BLDC GLUCOMTR-MCNC: 143 MG/DL (ref 70–99)
GLUCOSE BLDC GLUCOMTR-MCNC: 147 MG/DL (ref 70–99)
GLUCOSE BLDC GLUCOMTR-MCNC: 156 MG/DL (ref 70–99)
GLUCOSE BLDC GLUCOMTR-MCNC: 267 MG/DL (ref 70–99)
INTERPRETATION ECG - MUSE: NORMAL
MAGNESIUM SERPL-MCNC: 1.8 MG/DL (ref 1.7–2.3)
P AXIS - MUSE: 60 DEGREES
POTASSIUM SERPL-SCNC: 4.7 MMOL/L (ref 3.4–5.3)
PR INTERVAL - MUSE: 174 MS
QRS DURATION - MUSE: 90 MS
QT - MUSE: 422 MS
QTC - MUSE: 407 MS
R AXIS - MUSE: 80 DEGREES
SYSTOLIC BLOOD PRESSURE - MUSE: NORMAL MMHG
T AXIS - MUSE: 86 DEGREES
VENTRICULAR RATE- MUSE: 56 BPM

## 2023-05-12 PROCEDURE — 250N000013 HC RX MED GY IP 250 OP 250 PS 637: Performed by: NURSE PRACTITIONER

## 2023-05-12 PROCEDURE — 250N000013 HC RX MED GY IP 250 OP 250 PS 637: Performed by: INTERNAL MEDICINE

## 2023-05-12 PROCEDURE — 92526 ORAL FUNCTION THERAPY: CPT | Mod: GN | Performed by: SPEECH-LANGUAGE PATHOLOGIST

## 2023-05-12 PROCEDURE — 36415 COLL VENOUS BLD VENIPUNCTURE: CPT | Performed by: HOSPITALIST

## 2023-05-12 PROCEDURE — 97530 THERAPEUTIC ACTIVITIES: CPT | Mod: GP

## 2023-05-12 PROCEDURE — 120N000001 HC R&B MED SURG/OB

## 2023-05-12 PROCEDURE — 99232 SBSQ HOSP IP/OBS MODERATE 35: CPT | Performed by: INTERNAL MEDICINE

## 2023-05-12 PROCEDURE — 250N000013 HC RX MED GY IP 250 OP 250 PS 637: Performed by: HOSPITALIST

## 2023-05-12 PROCEDURE — 84132 ASSAY OF SERUM POTASSIUM: CPT | Performed by: HOSPITALIST

## 2023-05-12 PROCEDURE — 97530 THERAPEUTIC ACTIVITIES: CPT | Mod: GO

## 2023-05-12 PROCEDURE — 83735 ASSAY OF MAGNESIUM: CPT | Performed by: HOSPITALIST

## 2023-05-12 RX ADMIN — RISPERIDONE 0.5 MG: 0.5 TABLET, ORALLY DISINTEGRATING ORAL at 15:01

## 2023-05-12 RX ADMIN — INSULIN ASPART 3 UNITS: 100 INJECTION, SOLUTION INTRAVENOUS; SUBCUTANEOUS at 09:13

## 2023-05-12 RX ADMIN — METFORMIN HYDROCHLORIDE 500 MG: 500 TABLET, FILM COATED ORAL at 09:30

## 2023-05-12 RX ADMIN — ACETAMINOPHEN 650 MG: 325 TABLET ORAL at 06:14

## 2023-05-12 RX ADMIN — INSULIN GLARGINE 10 UNITS: 100 INJECTION, SOLUTION SUBCUTANEOUS at 09:07

## 2023-05-12 RX ADMIN — ACETAMINOPHEN 650 MG: 325 TABLET ORAL at 13:38

## 2023-05-12 RX ADMIN — AMLODIPINE BESYLATE 5 MG: 5 TABLET ORAL at 09:30

## 2023-05-12 RX ADMIN — ATORVASTATIN CALCIUM 40 MG: 40 TABLET, FILM COATED ORAL at 21:49

## 2023-05-12 RX ADMIN — LOSARTAN POTASSIUM 75 MG: 50 TABLET, FILM COATED ORAL at 09:29

## 2023-05-12 RX ADMIN — DOXAZOSIN 1 MG: 1 TABLET ORAL at 09:29

## 2023-05-12 RX ADMIN — CARVEDILOL 25 MG: 25 TABLET, FILM COATED ORAL at 09:30

## 2023-05-12 RX ADMIN — GABAPENTIN 300 MG: 300 CAPSULE ORAL at 21:49

## 2023-05-12 RX ADMIN — ACETAMINOPHEN 650 MG: 325 TABLET ORAL at 21:48

## 2023-05-12 RX ADMIN — NYSTATIN 500000 UNITS: 100000 SUSPENSION ORAL at 13:38

## 2023-05-12 RX ADMIN — MELATONIN TAB 3 MG 3 MG: 3 TAB at 21:49

## 2023-05-12 RX ADMIN — INSULIN ASPART 4 UNITS: 100 INJECTION, SOLUTION INTRAVENOUS; SUBCUTANEOUS at 13:38

## 2023-05-12 RX ADMIN — RISPERIDONE 1 MG: 1 TABLET, ORALLY DISINTEGRATING ORAL at 21:47

## 2023-05-12 RX ADMIN — MIRTAZAPINE 15 MG: 15 TABLET, FILM COATED ORAL at 21:49

## 2023-05-12 RX ADMIN — RISPERIDONE 0.5 MG: 0.5 TABLET, ORALLY DISINTEGRATING ORAL at 06:14

## 2023-05-12 RX ADMIN — ASPIRIN 81 MG CHEWABLE TABLET 324 MG: 81 TABLET CHEWABLE at 09:24

## 2023-05-12 ASSESSMENT — ACTIVITIES OF DAILY LIVING (ADL)
ADLS_ACUITY_SCORE: 49
ADLS_ACUITY_SCORE: 49
ADLS_ACUITY_SCORE: 47
ADLS_ACUITY_SCORE: 49
ADLS_ACUITY_SCORE: 47
ADLS_ACUITY_SCORE: 49
ADLS_ACUITY_SCORE: 51
ADLS_ACUITY_SCORE: 49

## 2023-05-12 NOTE — PLAN OF CARE
Reason for Admission: AMS, slurred speech    Cognitive/Mentation: Unable to fully assess. Lao speaking, jabber interpretation attempted but unsuccessful. Plan for in-person  during day.   Neuros/CMS: HERNANDEZ. Does not follow commands. Agitation at times.   VS: stable on RA.   Tele: SB.  GI: BS active, passing flatus. Incontinent.  : Incontinent.   Pulmonary: LS clear.  Pain: appears comfortable.     Drains/Lines: PIV SL  Skin: Scattered bruising   Activity: Assist x 2 with lift.  Diet: Pureed diet with mildly liquids. Takes pills crushed in pudding.     Discharge: Pending placement    Aggression Stoplight Tool: yellow for confusion, uncooperative with cares at times.     End of shift summary: Sitter at bedside for patient safety.

## 2023-05-12 NOTE — PROGRESS NOTES
[facility]    Medicine Progress Note - Hospitalist Service    Date of Admission:  5/4/2023    Assessment & Plan   Adam Huber is a 87 year old male  With PMH of HTN, CAD, CVA, DM type 2, TBI, dementia- admitted on 5/4/2023 with slurred speech concerning for a stroke.       Dementia with behavioral disturbance  Transient alteration of consciousness  Anxiety and depression  History of TBI  - he lives with his wife, has underlying cognitive impairment; has PCA as per family; Cymraes speaking  - initial concern for stroke  - Stroke Neuro initially followed  - HEAD CT: With extensive areas of encephalomalacic changes and gliosis on frontal and temporal lobe, chronic infarct in basal ganglia and corona radiata.  -CTA head and neck multifocal areas of moderate or high-grade stenosis throughout anterior and posterior circulation.  Heavy atherosclerotic plaque left carotid bulb/bifurcation with at least 50% stenosis by modified NASCET criteria. Moderate predominantly noncalcified plaque right carotid bulb/bifurcation without significant stenosis by modified NASCET criteria. No findings for vertebral artery dissection.   MRI brain:  Motion limited examination equivocal DWI hyperintensity at the right asia. This may represent artifact or acute ischemia.  Severe bifrontal encephalomalacia  - PTA Aspirin was increased from 81 mg to 324 mg po daily  - lipid profile- LDL 90, total cholest 232, HDL 66  - started on Lipitor 40 mg po at bedtime  - Echo - EF 60-65%. No clear regional wall motion abnormalities noted.  No thrombus or cardiac etiology of stroke  - EEG- no seizures   -Continue to monitor in telemetry while in the hospital, neurology recommending cardiac event monitor after discharge  -Patient has had intermittent agitations possibly worsened due to language barrier.   -PTA was on scheduled Zyprexa, Seroquel and Remeron, as needed Seroquel available.  Due to ongoing agitation psychiatry was  consulted  -  Appreciate psychiatry input, evaluated on 5/11/2023-recommended discontinuing Seroquel and Zyprexa and started on risperidone ODT 0.5 mg in the morning and 1 mg at bedtime to help reduce dementia related agitation with risperidone ODT 0.5 mg twice daily as needed for agitation.  Zyprexa IM 2.5 mg as a backup option or unable to take p.o.  -Scheduled melatonin 3 mg at 8:00 to help with circadian rhythm maintenance  -Delirium precautions, avoid interruptions during the night as much as possible, allow family visits, reorientation, use  services as much as possible, allow/maintain sleep-wake cycle.    -Patient was less agitated last night and did not need any as needed medication until this morning when he started getting agitated again.  Discussed with nursing, as needed risperidone to be given and nursing has already called family member to be in the hospital so patient can calm down  - PT/OT- rec TCU; finding a TCU might be challenging because of language barrier and his intermittent agitation; needs to be without sitter for 24h,  following  - follow up with PCP in 1-2 weeks  - follow up with General Neuro in 6-8 weeks     Hypomagnesemia  -Being replaced per protocol     Goals of care  -Full code as per wife and family's request    HTN  - PTA on Coreg 25 mg po BID and losartan 50 mg po daily   - BP on higher side; BP goal- normotension  -Currently on amlodipine 5 mg daily, Coreg 25 mg twice daily losartan 75 mg daily  - BP mostly better controlled, intermittently elevated, likely related to agitation  -Monitor for persistently elevated blood pressure and adjust medication as needed     Oral thrush  -Nystatin swish and spit    BPH   -PTA finasteride and Flomax      DM type 2, uncontrolled  - PTA metformin 500 mg qam and 1000 mg qpm   - Hb A1c 9.6  -Currently on metformin 500 mg twice daily with high resistance SSI and Premeal insulin 1 unit per 15 g carb in addition to Lantus 10  "units  -Blood sugar today less than 200, will monitor his blood sugar and adjust medication as needed     Neuropathy  -Continue PTA gabapentin         Diet: Room Service  Snacks/Supplements Adult: Other; Ensure with breakfast (RD); With Meals  Combination Diet Moderate Consistent Carb (60 g CHO per Meal) Diet; Minced and Moist Diet (level 5); Mildly Thick (level 2)    DVT Prophylaxis: Pneumatic Compression Devices  Villa Catheter: Not present  Lines: None     Cardiac Monitoring: ACTIVE order. Indication: Stroke, acute (48 hours)  Code Status: Full Code      Clinically Significant Risk Factors                       # DMII: A1C = 9.6 % (Ref range: <5.7 %) within past 6 months   # Overweight: Estimated body mass index is 29.77 kg/m  as calculated from the following:    Height as of this encounter: 1.778 m (5' 10\").    Weight as of this encounter: 94.1 kg (207 lb 7.3 oz).          Disposition Plan      Expected Discharge Date: 05/13/2023    Discharge Delays: 1:1 Sitter still ordered - MD to assess  Placement - TCU              Graciela Silva MD  Hospitalist Service  [facility]  Securely message with [employer] (more info)  Text page via [employer] Paging/Directory   ______________________________________________________________________    Interval History   Per nursing patient was calm and cooperative overnight but that this morning when I was in the patient's room he had started getting agitated.  Nursing had just called family member in to calm the patient down.  Unable to communicate with the patient as he seems agitated.    Physical Exam   Vital Signs: Temp: 98.4  F (36.9  C) Temp src: Oral BP: (!) 149/76 Pulse: 62   Resp: 18 SpO2: 96 % O2 Device: None (Room air)    Weight: 207 lbs 7.25 oz        Medical Decision Making       38 MINUTES SPENT BY ME on the date of service doing chart review, history, exam, documentation & further activities per the note.      Data         Imaging results reviewed over " the past 24 hrs:   No results found for this or any previous visit (from the past 24 hour(s)).  Recent Labs   Lab 05/12/23  0850 05/12/23  0358 05/11/23 2025 05/11/23  1239 05/11/23  0819 05/10/23  1146 05/10/23  0900 05/09/23  1126 05/09/23  0850   WBC  --   --   --   --   --   --  8.8  --   --    HGB  --   --   --   --   --   --  17.1  --   --    MCV  --   --   --   --   --   --  92  --   --    PLT  --   --   --   --   --   --  181  --   --    POTASSIUM  --   --   --   --  4.4  --  4.3  --  4.2   CR  --   --   --   --   --   --  0.78  --   --    * 147* 203*   < >  --    < >  --    < >  --     < > = values in this interval not displayed.

## 2023-05-13 ENCOUNTER — APPOINTMENT (OUTPATIENT)
Dept: CT IMAGING | Facility: CLINIC | Age: 88
DRG: 065 | End: 2023-05-13
Attending: HOSPITALIST
Payer: COMMERCIAL

## 2023-05-13 ENCOUNTER — APPOINTMENT (OUTPATIENT)
Dept: ULTRASOUND IMAGING | Facility: CLINIC | Age: 88
DRG: 065 | End: 2023-05-13
Attending: HOSPITALIST
Payer: COMMERCIAL

## 2023-05-13 ENCOUNTER — APPOINTMENT (OUTPATIENT)
Dept: SPEECH THERAPY | Facility: CLINIC | Age: 88
DRG: 065 | End: 2023-05-13
Attending: HOSPITALIST
Payer: COMMERCIAL

## 2023-05-13 ENCOUNTER — APPOINTMENT (OUTPATIENT)
Dept: MRI IMAGING | Facility: CLINIC | Age: 88
DRG: 065 | End: 2023-05-13
Attending: HOSPITALIST
Payer: COMMERCIAL

## 2023-05-13 LAB
ANION GAP SERPL CALCULATED.3IONS-SCNC: 12 MMOL/L (ref 7–15)
APTT PPP: 31 SECONDS (ref 22–38)
BUN SERPL-MCNC: 26.1 MG/DL (ref 8–23)
CALCIUM SERPL-MCNC: 9.2 MG/DL (ref 8.8–10.2)
CHLORIDE SERPL-SCNC: 104 MMOL/L (ref 98–107)
CREAT SERPL-MCNC: 0.82 MG/DL (ref 0.67–1.17)
DEPRECATED HCO3 PLAS-SCNC: 25 MMOL/L (ref 22–29)
ERYTHROCYTE [DISTWIDTH] IN BLOOD BY AUTOMATED COUNT: 12.9 % (ref 10–15)
FIBRINOGEN PPP-MCNC: 373 MG/DL (ref 170–490)
GFR SERPL CREATININE-BSD FRML MDRD: 85 ML/MIN/1.73M2
GLUCOSE BLDC GLUCOMTR-MCNC: 145 MG/DL (ref 70–99)
GLUCOSE BLDC GLUCOMTR-MCNC: 174 MG/DL (ref 70–99)
GLUCOSE BLDC GLUCOMTR-MCNC: 205 MG/DL (ref 70–99)
GLUCOSE BLDC GLUCOMTR-MCNC: 216 MG/DL (ref 70–99)
GLUCOSE SERPL-MCNC: 148 MG/DL (ref 70–99)
HCT VFR BLD AUTO: 48.1 % (ref 40–53)
HGB BLD-MCNC: 17 G/DL (ref 13.3–17.7)
INR PPP: 1.15 (ref 0.85–1.15)
MAGNESIUM SERPL-MCNC: 1.7 MG/DL (ref 1.7–2.3)
MCH RBC QN AUTO: 31.8 PG (ref 26.5–33)
MCHC RBC AUTO-ENTMCNC: 35.3 G/DL (ref 31.5–36.5)
MCV RBC AUTO: 90 FL (ref 78–100)
PLATELET # BLD AUTO: 164 10E3/UL (ref 150–450)
POTASSIUM SERPL-SCNC: 4.2 MMOL/L (ref 3.4–5.3)
RBC # BLD AUTO: 5.34 10E6/UL (ref 4.4–5.9)
SODIUM SERPL-SCNC: 141 MMOL/L (ref 136–145)
WBC # BLD AUTO: 8.1 10E3/UL (ref 4–11)

## 2023-05-13 PROCEDURE — 250N000009 HC RX 250: Performed by: HOSPITALIST

## 2023-05-13 PROCEDURE — 250N000013 HC RX MED GY IP 250 OP 250 PS 637: Performed by: INTERNAL MEDICINE

## 2023-05-13 PROCEDURE — 70551 MRI BRAIN STEM W/O DYE: CPT

## 2023-05-13 PROCEDURE — 92526 ORAL FUNCTION THERAPY: CPT | Mod: GN

## 2023-05-13 PROCEDURE — 250N000013 HC RX MED GY IP 250 OP 250 PS 637: Performed by: HOSPITALIST

## 2023-05-13 PROCEDURE — 36415 COLL VENOUS BLD VENIPUNCTURE: CPT

## 2023-05-13 PROCEDURE — 250N000013 HC RX MED GY IP 250 OP 250 PS 637

## 2023-05-13 PROCEDURE — 250N000013 HC RX MED GY IP 250 OP 250 PS 637: Performed by: NURSE PRACTITIONER

## 2023-05-13 PROCEDURE — 250N000011 HC RX IP 250 OP 636: Performed by: INTERNAL MEDICINE

## 2023-05-13 PROCEDURE — 99207 PR NO BILLABLE SERVICE THIS VISIT: CPT

## 2023-05-13 PROCEDURE — 250N000011 HC RX IP 250 OP 636: Performed by: HOSPITALIST

## 2023-05-13 PROCEDURE — 0042T CT HEAD PERFUSION W CONTRAST: CPT

## 2023-05-13 PROCEDURE — 70496 CT ANGIOGRAPHY HEAD: CPT

## 2023-05-13 PROCEDURE — 80048 BASIC METABOLIC PNL TOTAL CA: CPT

## 2023-05-13 PROCEDURE — 85384 FIBRINOGEN ACTIVITY: CPT

## 2023-05-13 PROCEDURE — 70450 CT HEAD/BRAIN W/O DYE: CPT

## 2023-05-13 PROCEDURE — 120N000001 HC R&B MED SURG/OB

## 2023-05-13 PROCEDURE — 85730 THROMBOPLASTIN TIME PARTIAL: CPT

## 2023-05-13 PROCEDURE — 99291 CRITICAL CARE FIRST HOUR: CPT | Mod: GC | Performed by: PSYCHIATRY & NEUROLOGY

## 2023-05-13 PROCEDURE — 85610 PROTHROMBIN TIME: CPT

## 2023-05-13 PROCEDURE — 83735 ASSAY OF MAGNESIUM: CPT | Performed by: HOSPITALIST

## 2023-05-13 PROCEDURE — 250N000009 HC RX 250

## 2023-05-13 PROCEDURE — 93880 EXTRACRANIAL BILAT STUDY: CPT

## 2023-05-13 PROCEDURE — 70498 CT ANGIOGRAPHY NECK: CPT

## 2023-05-13 PROCEDURE — 85027 COMPLETE CBC AUTOMATED: CPT

## 2023-05-13 PROCEDURE — 99233 SBSQ HOSP IP/OBS HIGH 50: CPT | Performed by: HOSPITALIST

## 2023-05-13 PROCEDURE — 99418 PROLNG IP/OBS E/M EA 15 MIN: CPT | Performed by: HOSPITALIST

## 2023-05-13 RX ORDER — IOPAMIDOL 755 MG/ML
125 INJECTION, SOLUTION INTRAVASCULAR ONCE
Status: COMPLETED | OUTPATIENT
Start: 2023-05-13 | End: 2023-05-13

## 2023-05-13 RX ORDER — CLOPIDOGREL BISULFATE 75 MG/1
300 TABLET ORAL ONCE
Status: COMPLETED | OUTPATIENT
Start: 2023-05-13 | End: 2023-05-13

## 2023-05-13 RX ORDER — CLOPIDOGREL BISULFATE 75 MG/1
75 TABLET ORAL DAILY
Status: DISCONTINUED | OUTPATIENT
Start: 2023-05-14 | End: 2023-06-05 | Stop reason: HOSPADM

## 2023-05-13 RX ORDER — WATER 10 ML/10ML
INJECTION INTRAMUSCULAR; INTRAVENOUS; SUBCUTANEOUS
Status: COMPLETED
Start: 2023-05-13 | End: 2023-05-13

## 2023-05-13 RX ADMIN — AMLODIPINE BESYLATE 5 MG: 5 TABLET ORAL at 10:25

## 2023-05-13 RX ADMIN — RISPERIDONE 1 MG: 1 TABLET, ORALLY DISINTEGRATING ORAL at 21:16

## 2023-05-13 RX ADMIN — IOPAMIDOL 125 ML: 755 INJECTION, SOLUTION INTRAVENOUS at 09:24

## 2023-05-13 RX ADMIN — BROMFENAC 1 DROP: 1.03 SOLUTION/ DROPS OPHTHALMIC at 10:30

## 2023-05-13 RX ADMIN — OLANZAPINE 2.5 MG: 10 INJECTION, POWDER, FOR SOLUTION INTRAMUSCULAR at 21:31

## 2023-05-13 RX ADMIN — SODIUM CHLORIDE 100 ML: 9 INJECTION, SOLUTION INTRAVENOUS at 09:24

## 2023-05-13 RX ADMIN — ASPIRIN 81 MG CHEWABLE TABLET 325 MG: 81 TABLET CHEWABLE at 10:17

## 2023-05-13 RX ADMIN — NYSTATIN 500000 UNITS: 100000 SUSPENSION ORAL at 13:13

## 2023-05-13 RX ADMIN — GABAPENTIN 300 MG: 300 CAPSULE ORAL at 21:16

## 2023-05-13 RX ADMIN — INSULIN ASPART 3 UNITS: 100 INJECTION, SOLUTION INTRAVENOUS; SUBCUTANEOUS at 10:31

## 2023-05-13 RX ADMIN — ATORVASTATIN CALCIUM 40 MG: 40 TABLET, FILM COATED ORAL at 21:17

## 2023-05-13 RX ADMIN — RISPERIDONE 0.5 MG: 0.5 TABLET, ORALLY DISINTEGRATING ORAL at 10:26

## 2023-05-13 RX ADMIN — ACETAMINOPHEN 650 MG: 325 TABLET ORAL at 13:13

## 2023-05-13 RX ADMIN — WATER 10 ML: 1 INJECTION INTRAMUSCULAR; INTRAVENOUS; SUBCUTANEOUS at 21:35

## 2023-05-13 RX ADMIN — INSULIN ASPART 4 UNITS: 100 INJECTION, SOLUTION INTRAVENOUS; SUBCUTANEOUS at 17:36

## 2023-05-13 RX ADMIN — DOXAZOSIN 1 MG: 1 TABLET ORAL at 10:25

## 2023-05-13 RX ADMIN — CLOPIDOGREL BISULFATE 300 MG: 75 TABLET ORAL at 10:24

## 2023-05-13 RX ADMIN — MIRTAZAPINE 15 MG: 15 TABLET, FILM COATED ORAL at 21:17

## 2023-05-13 RX ADMIN — FINASTERIDE 5 MG: 5 TABLET, FILM COATED ORAL at 10:25

## 2023-05-13 RX ADMIN — MELATONIN TAB 3 MG 3 MG: 3 TAB at 21:17

## 2023-05-13 RX ADMIN — ACETAMINOPHEN 650 MG: 325 TABLET ORAL at 21:17

## 2023-05-13 RX ADMIN — METFORMIN HYDROCHLORIDE 500 MG: 500 TABLET, FILM COATED ORAL at 17:36

## 2023-05-13 RX ADMIN — CARVEDILOL 25 MG: 25 TABLET, FILM COATED ORAL at 17:36

## 2023-05-13 RX ADMIN — INSULIN GLARGINE 10 UNITS: 100 INJECTION, SOLUTION SUBCUTANEOUS at 08:53

## 2023-05-13 RX ADMIN — INSULIN ASPART 4 UNITS: 100 INJECTION, SOLUTION INTRAVENOUS; SUBCUTANEOUS at 13:13

## 2023-05-13 RX ADMIN — GABAPENTIN 300 MG: 300 CAPSULE ORAL at 10:25

## 2023-05-13 RX ADMIN — METFORMIN HYDROCHLORIDE 500 MG: 500 TABLET, FILM COATED ORAL at 10:24

## 2023-05-13 RX ADMIN — ACETAMINOPHEN 650 MG: 325 TABLET ORAL at 05:37

## 2023-05-13 RX ADMIN — CARVEDILOL 25 MG: 25 TABLET, FILM COATED ORAL at 10:25

## 2023-05-13 RX ADMIN — LOSARTAN POTASSIUM 75 MG: 50 TABLET, FILM COATED ORAL at 10:25

## 2023-05-13 RX ADMIN — NYSTATIN 500000 UNITS: 100000 SUSPENSION ORAL at 10:30

## 2023-05-13 ASSESSMENT — ACTIVITIES OF DAILY LIVING (ADL)
ADLS_ACUITY_SCORE: 48
ADLS_ACUITY_SCORE: 48
ADLS_ACUITY_SCORE: 49
ADLS_ACUITY_SCORE: 48
ADLS_ACUITY_SCORE: 49
ADLS_ACUITY_SCORE: 48
ADLS_ACUITY_SCORE: 49
ADLS_ACUITY_SCORE: 49

## 2023-05-13 NOTE — PROGRESS NOTES
Vascular Surgery Note      Full consult note to follow. See my prior note. Patient with dysarthria and facial droop. MRI shows R acute to subacute pontine and L cerebellar stroke. CTA with 50-60 oc stenosis. The strokes in posterior circulation distribution and patient has significant intracranial disease ( including posterior circulation disease). L iCA stenosis source doesn't correlate with stroke location. No role of caroti d intervention.    Maryanne Cooper MD  Fellow

## 2023-05-13 NOTE — PROVIDER NOTIFICATION
"MD Notification    Notified Person: MD    Notified Person Name: Medardo Newell    Notification Date/Time: 5/13/2023 1648    Notification Interaction: Asa    Purpose of Notification: \"Pt son is wondering if pt can be Do not disturb overnight as waking him can cause confusion and increased agitation. Thanks\"    Comments: Per MD \"no\"    "

## 2023-05-13 NOTE — PROGRESS NOTES
[facility]    Medicine Progress Note - Hospitalist Service    Date of Admission:  5/4/2023    Assessment & Plan   Adam Huber is a 87 year old male  With PMH of HTN, CAD, CVA, DM type 2, TBI, dementia- admitted on 5/4/2023 with slurred speech concerning for a stroke.       Dementia with behavioral disturbance  Transient alteration of consciousness  Anxiety and depression  History of TBI  - he lives with his wife, has underlying cognitive impairment; has PCA as per family; Vincentian speaking  - initial concern for stroke  - Stroke Neuro initially followed  - HEAD CT: With extensive areas of encephalomalacic changes and gliosis on frontal and temporal lobe, chronic infarct in basal ganglia and corona radiata.  -CTA head and neck multifocal areas of moderate or high-grade stenosis throughout anterior and posterior circulation.  Heavy atherosclerotic plaque left carotid bulb/bifurcation with at least 50% stenosis by modified NASCET criteria. Moderate predominantly noncalcified plaque right carotid bulb/bifurcation without significant stenosis by modified NASCET criteria. No findings for vertebral artery dissection.   MRI brain:  Motion limited examination equivocal DWI hyperintensity at the right asia. This may represent artifact or acute ischemia.  Severe bifrontal encephalomalacia  - PTA Aspirin was increased from 81 mg to 324 mg po daily  - lipid profile- LDL 90, total cholest 232, HDL 66  - started on Lipitor 40 mg po at bedtime  - Echo - EF 60-65%. No clear regional wall motion abnormalities noted.  No thrombus or cardiac etiology of stroke  - EEG- no seizures   -Continue to monitor in telemetry while in the hospital, neurology recommending cardiac event monitor after discharge  -Code stroke called 5/13, CT head with multiple small infarcts involving the basal cannula, my and cerebellum no convincing acute infarct by CT; CTA head and neck with widespread intracranial arthrosclerotic  plaque, 50-60% stenosis of the left ICA  -Check MRI, consult vascular  -Patient has had intermittent agitations possibly worsened due to language barrier.   -PTA was on scheduled Zyprexa, Seroquel and Remeron, as needed Seroquel available.  Due to ongoing agitation psychiatry was consulted  -  Appreciate psychiatry input, evaluated on 5/11/2023-recommended discontinuing Seroquel and Zyprexa and started on risperidone ODT 0.5 mg in the morning and 1 mg at bedtime to help reduce dementia related agitation with risperidone ODT 0.5 mg twice daily as needed for agitation.  Zyprexa IM 2.5 mg as a backup option or unable to take p.o.  -Scheduled melatonin 3 mg at 8:00 to help with circadian rhythm maintenance  -Delirium precautions, avoid interruptions during the night as much as possible, allow family visits, reorientation, use  services as much as possible, allow/maintain sleep-wake cycle.    - PT/OT- rec TCU; finding a TCU might be challenging because of language barrier and his intermittent agitation; needs to be without sitter for 24h,  following  - follow up with PCP in 1-2 weeks  - follow up with General Neuro in 6-8 weeks     Hypomagnesemia  -Being replaced per protocol     Goals of care  -Full code as per wife and family's request     HTN  - PTA on Coreg 25 mg po BID and losartan 50 mg po daily   - BP on higher side; BP goal- normotension  -Currently on amlodipine 5 mg daily, Coreg 25 mg twice daily losartan 75 mg daily  - BP mostly better controlled, intermittently elevated, likely related to agitation  -Monitor for persistently elevated blood pressure and adjust medication as needed     Oral thrush  -Nystatin swish and spit     BPH   -PTA finasteride and Flomax      DM type 2, uncontrolled  - PTA metformin 500 mg qam and 1000 mg qpm   - Hb A1c 9.6  -Currently on metformin 500 mg twice daily with high resistance SSI and Premeal insulin 1 unit per 15 g carb in addition to Lantus 10  "units  -Blood sugar today less than 200, will monitor his blood sugar and adjust medication as needed     Neuropathy  -Continue PTA gabapentin         Diet: Room Service  Snacks/Supplements Adult: Other; Ensure with breakfast (RD); With Meals  Combination Diet Moderate Consistent Carb (60 g CHO per Meal) Diet; Minced and Moist Diet (level 5); Mildly Thick (level 2)    DVT Prophylaxis: Pneumatic Compression Devices  Villa Catheter: Not present  Lines: None     Cardiac Monitoring: ACTIVE order. Indication: Stroke, acute (48 hours)  Code Status: Full Code      Clinically Significant Risk Factors                  # Hypertension: Noted on problem list     # Dementia: noted on problem list   # DMII: A1C = 9.6 % (Ref range: <5.7 %) within past 6 months   # Overweight: Estimated body mass index is 29.77 kg/m  as calculated from the following:    Height as of this encounter: 1.778 m (5' 10\").    Weight as of this encounter: 94.1 kg (207 lb 7.3 oz).           Disposition Plan      Expected Discharge Date: 05/15/2023    Discharge Delays: 1:1 Sitter still ordered - MD to assess  Placement - TCU              Medardo Newell MD  Hospitalist Service  [facility]  Securely message with [employer] (more info)  Text page via [employer] Paging/Directory   ______________________________________________________________________    Interval History   Patient seen and examined at bedside.  [place] Islander  present.  Apparently earlier today this morning approximately 850 nurse noticed initial left-sided facial droop that progressed to right-sided and eventual right tongue deviation.  Per nurse assistant the symptoms are also new since this morning.  Code stroke was called    Physical Exam   Vital Signs: Temp: 97.6  F (36.4  C) Temp src: Oral BP: (!) 169/82 Pulse: 72   Resp: 18 SpO2: 94 % O2 Device: None (Room air)    Weight: 207 lbs 7.25 oz    Constitutional: no distress, cooperative, alert, oriented, normal " mood  Cardiovascular: Regular rate and rhythm, no murmurs  Respiratory: clear bilaterally, no crackles, wheezing or rales  Gastrointestinal: Abdomen soft, non-tender. BS normal. No masses.  Skin: warm, dry, intact, no edema  Neuro: Somnolent but arousable, slurred speech, right facial droop, right tongue deviation       Medical Decision Making       50 MINUTES SPENT BY ME on the date of service doing chart review, history, exam, documentation & further activities per the note.      Data   NOTE: Data reviewed over the past 24 hrs contributes toward MDM complexity

## 2023-05-13 NOTE — CODE/RAPID RESPONSE
[facility]    CODE STROKE NOTE  5/13/2023   Time Called: 0859 am     RRT called for: Concern for CVA    Mr. Huber is a Gambian speaking 87-year-old male with PMH of hypertension, coronary artery disease, CVA, diabetes type 2, TBI, dementia.  He was admitted on 5/4/2023 with slurred speech concerning for stroke.  Initial stroke work-up revealed heavy atherosclerotic plaque in the left carotid bulb/bifurcation with at least 50% stenosis and moderate predominantly noncalcified plaque of the right carotid bulb bifurcation without significant stenosis.  MRI of the brain showing equivocal DWI hyperintensity at the right asia which may represent artifact or acute ischemia and severe bifrontal encephalomalacia. He has been on Asprin and Lipitor this admission.     At approximately 8 AM nursing staff noted patient to have a left facial droop at rest and right tongue deviation on routine rounding. Patient was lying supine in hospital bed at rest when symptoms noticed. Dr. Oshea of stroke neurology was notified and escalated evaluation to a code stroke.    Assessment and Plan     Neuro:  Left facial droop and tongue deviation to the right (new deficit).  Time:  4 AM am (last known neuro normal).  Symptoms found at 0850 am.   TPA: contraindications - -> beyond conventional window 4.5 hr time window. Risk vs benefits discussed with pt by Dr. Oshea.    On my arrival to patient bedside Dr. Oshea actively evaluating the patient. /64.  Patient actively being prepared for CT scanner including new PIV placement. Lab studies ordered.  Independent exam was deferred until after CT imaging as Stroke Neuro provider performed initial exam.     Interventions:   -code stroke call per Dr. Oshea    *Dr. Oshea notified when CT imaging completed.    *orders placed for 300 mg Plavix (5/13) and 75 mg Plavix starting (5/14)   -PIV  -bmp, cbc, and coagulation studies   -BG    -noted has not received  any of his morning oral medications including amlodipine and losartan   -note last received PTA risperidone and Remeron at 21:49 last evening.   -noted UA/UC no growth   -Noted PT/OTSLP consulted   *given new dysarthria and right droop recommendations to keep patient NPO until SLP evaluation. Should patient fail will need NG tube for medications.       Glucose level: 148    Physical Exam    Physical exam communication assisted with Wallisian  at bedside.     Vital Signs with Ranges:  Temp:  [97.5  F (36.4  C)-98.5  F (36.9  C)] 97.6  F (36.4  C)  Pulse:  [55-81] 72  Resp:  [18] 18  BP: ()/(52-82) 169/82  SpO2:  [94 %-98 %] 94 %  I/O last 3 completed shifts:  In: 300 [P.O.:300]  Out: -      National Institutes of Health Stroke Scale  Exam Interval: 0950 one hour following symptom onset    Score    Level of consciousness: (1)   Not alert; arousable w/ minor stim to obey/answer/respond    LOC questions: (1)   Answers one question correctly    LOC commands: (0)   Performs both tasks correctly    Best gaze: (0)   Normal    Visual: (0)   No visual loss    Facial palsy: (1)   Minor paralysis (flat nasolabial fold, smile asymmetry)    Motor arm (left): (0)   No drift    Motor arm (right): (0)   No drift    Motor leg (left): (0)   No drift    Motor leg (right): (0)   No drift    Limb ataxia: (0)   Absent    Sensory: (0)   Normal- no sensory loss    Best language: (0)   no aphasia    Dysarthria: (1)   Mild to moderate dysarthria    Extinction and inattention: (0)   No abnormality        Total Score:  4         Physical Exam  Constitutional:       General: He is not in acute distress.     Appearance: He is not toxic-appearing or diaphoretic.   HENT:      Mouth/Throat:      Tongue: Tongue deviates from midline.      Comments: White coating to tongue and without dental appliances in place   Eyes:      Conjunctiva/sclera: Conjunctivae normal.      Pupils: Pupils are unequal.      Comments: Right pupil ~4 mm and  left pupil ~2 mm    Cardiovascular:      Rate and Rhythm: Normal rate and regular rhythm.      Pulses: Normal pulses.      Heart sounds: Normal heart sounds.   Pulmonary:      Effort: Pulmonary effort is normal. No respiratory distress.      Breath sounds: Normal breath sounds.   Abdominal:      General: There is no distension.      Palpations: Abdomen is soft.      Tenderness: There is no abdominal tenderness.   Musculoskeletal:         General: Normal range of motion.   Skin:     General: Skin is warm and dry.      Capillary Refill: Capillary refill takes less than 2 seconds.   Neurological:      Mental Status: He is lethargic and confused.      GCS: GCS eye subscore is 3. GCS verbal subscore is 4. GCS motor subscore is 6.      Cranial Nerves: Cranial nerve deficit, dysarthria and facial asymmetry present.      Comments: Dysarthria. Right facial droop. Right tongue deviation. Unequal pupils right > left. Moves all extremities to two step commands against gravity. Left planter flexion 3/5 strength and right dorsal flexion 3/5 strength. Oriented to self and place. States its August and is unable to state why he's in hospital.    Psychiatric:         Behavior: Behavior is cooperative.         Discussed with and defer further cares to Dr. Newell primary hospitalist physician and Dr. Oshea stroke neurology provider    Disposition will stay on neuroscience unit     Code Status: Full Code    Miladys Valdez NP  [facility]  Securely message with the [employer] Web [employer] (learn more here)  Text page via [employer] Paging/Directory      Allergies   Allergies   Allergen Reactions     Lisinopril      Morphine Other (See Comments)     confusion     Data     IMAGING: (X-ray/CT/MRI)   Recent Results (from the past 24 hour(s))   CT Head w/o Contrast    Narrative    EXAM: CT HEAD WITHOUT CONTRAST  LOCATION: [facility]  DATE/TIME: 05/13/2023, 9:29 AM CDT    INDICATION: CODE STROKE,  right-sided facial droop.  COMPARISON: None.  TECHNIQUE: Routine CT Head without IV contrast. Multiplanar reformats. Dose reduction techniques were used.      Impression    IMPRESSION: No evidence of hemorrhage. Bilateral frontotemporal encephalomalacia, likely due to prior trauma. Multiple small infarcts involving the basal ganglia, thalami, and cerebellum. No convincing acute infarct by CT. Background of volume loss and   white matter hypoattenuation likely represents chronic small vessel ischemic change. No acute osseous abnormality.    Findings were discussed by phone between Dr. Chapa and Dr. Oshea at 9:38 AM on 05/13/2023.         CBC with Diff:  Recent Labs   Lab Test 05/13/23 0914   WBC 8.1   HGB 17.0   MCV 90      INR 1.15      Comprehensive Metabolic Panel:  Recent Labs   Lab 05/13/23 0914      POTASSIUM 4.2   CHLORIDE 104   CO2 25   ANIONGAP 12   *   BUN 26.1*   CR 0.82   GFRESTIMATED 85   LUZ 9.2   MAG 1.7       INR:    Recent Labs   Lab Test 05/13/23 0914   INR 1.15         Time Spent on this Encounter   I spent 60 minutes on the unit/floor managing the care of Adam Huber. Over 50% of my time was spent counseling the patient and/or coordinating care regarding services listed in this note.

## 2023-05-13 NOTE — PROGRESS NOTES
Mr. Huber is a Ghanaian speaking 87-year-old male with PMH of hypertension, coronary artery disease, CVA, diabetes type 2, TBI, dementia.  He was admitted on 5/4/2023 with slurred speech concerning for stroke. Now with left facial droop. CTA with 50-60% L ICA stenosis and no R ICA stenosis. Patient has multifocal intracranial stenoses as well.     MRI pending, Will order carotid duplex as well. Neurology note exam refers to mild dysarthria and left facial droop ( L facial droop unlikely to be explained by L ICA stenosis but if his speech centre is in L hemisphere it could be explained by L ICA stenosis.     Will see patient once imaging done. No plans for any emergent intervention today. Patient can eat from our standpoint . Will discuss MRI and duplex findings with neurology and ask their opinion if they think the L ICA lesion is symptomatic. Given his dementia,recent congitive decline and prior TBI  he may be at high risk for traditional carotid endarterectomy (general anesthesia) and may be better served with TCAR under local/MAC or transfemoral stenting if we decide on carotid intervention           Maryanne Cooper MD  Fellow

## 2023-05-13 NOTE — PROGRESS NOTES
Patient remained confused with intermittent agitation during 12 hour day shift. Alert to self and family member at bedside. Requires Argentine interpretor. However, due to slurred speech, interpretor has difficulty with interpretation. Family member at bedside was helpful.     Difficult to redirect. Refused cares and medications at dinnertime. Transfers with a AX2, sera study. Bowel and bladder incontinence.  Pills crushed in pudding.  Sitter at bedside. Attempted to get out of bed on multiple occassions.

## 2023-05-13 NOTE — PLAN OF CARE
PJ orientation due to language barrier.   services cannot understand pt due to garbled/incoherent speech.  Pt restless, sitter at bedside.   VSS on RA; pt refused VS occasionally. Moist/Minced diet, level 2, mild thick liquid, tolerating well. Pills given crushed in pudding, pt tolerated well.  PIV in RUE, SL. Lungs clear. Pt does not appear to be in pain. Incontinent of B/B. Continue plan of care.

## 2023-05-13 NOTE — PLAN OF CARE
"Goal Outcome Evaluation:      Plan of Care Reviewed With: patient    Overall Patient Progress: decliningOverall Patient Progress: declining    Shift: 0110-0539  Reason for Admission: Dementia w/ behavioral disturbance    Cognitive/Mentation: A/Ox self and place. Disoriented x situation and time.  Neuros/CMS: Intact ex generalized weakness, R facial droop, R tongue deviation, slurred speech, R pupil 4mm and L pupil 2mm.   VS: VSS. SBP goal<220.   Tele: SB.  GI: BS active, last BM 5/12. Incontinent.  : WDL. Incontinent.  Pulmonary: LS clear.  Pain: None utilizing rFLACC scale.     Drains/Lines: L/R PIV S.L.  Skin: Intact.   Activity: Assist x 2 with SS.  Diet: Pureed diet with mildly thick liquids. Takes pills crushed in applesauce/pudding.     Therapies recs: TCU  Discharge: Pending    Aggression Stoplight Tool: GREEN    End of shift summary:    @0850 Pt had new L facial droop- neuro stroke notified, code stroke called   @0910 During assessment pt then had R facial droop w/ R tongue deviation and slurred speech- sent down for STAT CT.   @0945 Came back from CT w/ R facial droop, R tongue deviation, slurred speech, R pupil now 4mm and L pupil 2mm- MD ordered STAT MRI- see results. Vascular surg consulted- see note.     1530: Pt oldest son Adolfo told nurse not to get pt out of bed because pt has fallen in the past and stated \"I do not want to have him get a brain bleed\"- nurse educated Adolfo on risks of pt laying in bed and benefits of sitting up in chair and moving around as well as the reason pt has 1:1 sit in room to prevent fall since pt is impulsive at times. Adolfo raised voice and stated \"Do not get him out of the chair, tell all the nurses and doctors I said he can not get out of the bed, he is to big and you all can not stop him from falling.\"    -Son Adolfo also asking if pt can be Do not disturb overnight stating \"waking my dad at night can cause confusion and increase his agitation so it would be easier for him " "to let him sleep\"- MD notified and did not order DND orders due to recent change in health status.    1720: Pt youngest son Mario arrived to visit pt after son Adolfo had left in evening and stated \"my dad needs to get up\". Nurse expressed that other son Adolfo did not want pt to get up and Mario stated \"I am the youngest son, I am saying he can.\"- Pt was then transferred from bed to chair via teresita azul w/ nurse and NA. Pt sat w/ NA, Serge, and wife while up in chair.          "

## 2023-05-13 NOTE — CONSULTS
"[facility]    Stroke Consult Note    Reason for Consult: Stroke Code     Chief Complaint: Slurred Speech      HPI  Adam Huber is a 87 year old male with a PMhx of HTN, CAD, CVA, DM, TBI dementia with slurred speech. He was LKN at 0400 on nurse assessment and was noted to have left face droop at 0850am.   Left face droop at rest  LKN: 0400  Found at 0850am      Imaging Findings  MRI showed a right asia infarct                                                                    IMPRESSION:      HEAD CTA:   1.  No evidence of large vessel occlusion.  2.  Widespread extensive intracranial atherosclerotic plaquing with multiple stenoses as detailed, not appreciably changed.     NECK CTA:  1.  Approximately 50-60% stenosis of the proximal left internal carotid artery, unchanged.  2.  Otherwise, no evidence of large vessel occlusion or high-grade stenosis.    Intravenous Thrombolysis  Not given due to:   - minor/isolated/quickly resolving symptoms  - unclear or unfavorable risk-benefit profile for extended window thrombolysis beyond the conventional 4.5 hour time window  - patient/family declined    Endovascular Treatment  Not initiated due to absence of proximal vessel occlusion    Impression   # Left face droop- has mild left face droop, and mild dysarthria without any other focal deficits. He is outside of the TNK window and is not a good candidate for TNK as the risk outweighs the benefit. Discussed with patient about TNK and patient declined as well.  Symptoms were due koby acute asia stroke on the right that was present on prior MRI but was believed to be artifact vs new stroke at that time and noted to have multifocal stenosis which are likely contributing. Recommend DAPT for 90 days   LDL 90, A1c 9   Recommendations  - Use orderset: \"Ischemic Stroke Routine Admission\" or \"Ischemic Stroke No Thrombolytics/No Thrombectomy ICU Admission\"  - Neurochecks and Vital Signs every Q4H " "  - SBP <180  - Daily aspirin 325 mg for secondary stroke prevention  - Statin: Lipitor 40mg   - Telemetry   - Bedside Glucose Monitoring  - PT/OT/SLP  - Stroke Education  - Euthermia, Euglycemia   - plavix 300mg x1 dose  - Plavix 75mg after that  -DAPT 90 days   -Long term goals, BP <130/80, LDL <70, A1c <7        Patient Follow-up     - final recommendation pending work-up    Thank you for this consult. No further stroke evaluation is recommended, so we will sign off. Please contact us with any additional questions.     The Stroke Staff is Dr. Aquino  .    Vivian Oshea MD  Vascular Neurology Fellow    To page me or covering stroke neurology team member, click here: AMCOM  Choose \"On Call\" tab at top, then select \"NEUROLOGY/ALL SITES\" from middle drop-down box, press Enter, then look for \"stroke\" or \"telestroke\" for your site.    ______________________________________________________    Clinically Significant Risk Factors                  # Hypertension: Noted on problem list     # Dementia: noted on problem list   # DMII: A1C = 9.6 % (Ref range: <5.7 %) within past 6 months   # Overweight: Estimated body mass index is 29.77 kg/m  as calculated from the following:    Height as of this encounter: 1.778 m (5' 10\").    Weight as of this encounter: 94.1 kg (207 lb 7.3 oz).             Past Medical History   Past Medical History:   Diagnosis Date     Acute chest pain 10/23/2015     CAD (coronary artery disease)      Essential hypertension 10/23/2015     History of CVA (cerebrovascular accident) 10/23/2015     HLD (hyperlipidemia)      HTN (hypertension)      NSTEMI (non-ST elevated myocardial infarction) (H) 10/23/2015     Post PTCA 10-    Successful PCI of culprit proximal to mid RCA with placement of a     Post PTCA 11-6-2015    pci of complex mid CFX-hector     Past Surgical History   Past Surgical History:   Procedure Laterality Date     CHOLECYSTECTOMY       GENITOURINARY SURGERY      prostate " removal      HEART CATH LEFT HEART CATH  10/12/2015    PCI w/ LEXI to RCA     HEART CATH RIGHT AND LEFT HEART CATH  11/6/2015    PCI w/ LEXI to mid-CFX     Medications   Home Meds  Prior to Admission medications    Medication Sig Start Date End Date Taking? Authorizing Provider   acetaminophen (TYLENOL) 325 MG tablet Take 3 tablets (975 mg) by mouth every 6 hours as needed for mild pain 9/24/21  Yes Armando Rajan MD   aspirin 81 MG EC tablet Take one tablet daily 5/8/18  Yes Eric Avalos MD   BROMFENAC SODIUM OP Place 1 drop into the right eye daily Patient Rx is 0.09% one drop in Right eye daily   Yes Reported, Patient   carvedilol (COREG) 25 MG tablet Take 1 tablet (25 mg) by mouth 2 times daily (with meals) 11/8/18  Yes Eric Avalos MD   finasteride (PROSCAR) 5 MG tablet Take 5 mg by mouth daily   Yes Unknown, Entered By History   fluticasone (FLONASE) 50 MCG/ACT nasal spray Spray 1 spray into both nostrils daily as needed for rhinitis or allergies   Yes Unknown, Entered By History   gabapentin (NEURONTIN) 300 MG capsule Take 300 mg by mouth 2 times daily   Yes Unknown, Entered By History   ibuprofen (ADVIL/MOTRIN) 400 MG tablet Take 1 tablet (400 mg) by mouth every 8 hours as needed for moderate pain 9/24/21  Yes Armando Rajan MD   losartan (COZAAR) 50 MG tablet Take 50 mg by mouth daily   Yes Unknown, Entered By History   metFORMIN (GLUCOPHAGE) 500 MG tablet Take 500 mg with breakfast and 1000 mg with evening meal   Yes Unknown, Entered By History   mirtazapine (REMERON) 15 MG tablet Take 15 mg by mouth At Bedtime   Yes Unknown, Entered By History   nitroglycerin (NITROSTAT) 0.4 MG SL tablet Place 1 tablet (0.4 mg) under the tongue every 5 minutes as needed for chest pain 10/26/15  Yes Laura Valerio, APRN CNP   OLANZapine (ZYPREXA) 2.5 MG tablet Take 2.5 mg by mouth 2 times daily   Yes Unknown, Entered By History   QUEtiapine (SEROQUEL) 25 MG tablet Take 25 mg by mouth At Bedtime   Yes  Unknown, Entered By History   tamsulosin (FLOMAX) 0.4 MG capsule Take 0.4 mg by mouth daily   Yes Unknown, Entered By History       Scheduled Meds    acetaminophen  650 mg Oral Q8H     amLODIPine  5 mg Oral Daily     aspirin  325 mg Oral Daily    Or     aspirin  324 mg Oral or NG Tube Daily    Or     aspirin  300 mg Rectal Daily     atorvastatin  40 mg Oral QPM     bromfenac  1 drop Right Eye Daily     carvedilol  25 mg Oral BID w/meals     doxazosin  1 mg Oral Daily     finasteride  5 mg Oral Daily     gabapentin  300 mg Oral BID     insulin aspart  1-10 Units Subcutaneous TID AC     insulin aspart  1-7 Units Subcutaneous At Bedtime     insulin aspart   Subcutaneous TID AC     insulin glargine  10 Units Subcutaneous QAM AC     losartan  75 mg Oral Daily     melatonin  3 mg Oral QPM     metFORMIN  500 mg Oral BID w/meals     mirtazapine  15 mg Oral At Bedtime     nystatin  500,000 Units Swish & Spit 4x Daily     risperiDONE  0.5 mg Sublingual QAM AC     risperiDONE  1 mg Sublingual At Bedtime     sodium chloride (PF)  3 mL Intracatheter Q8H       Infusion Meds    - MEDICATION INSTRUCTIONS -       - MEDICATION INSTRUCTIONS -         PRN Meds  acetaminophen **OR** acetaminophen, bisacodyl, glucose **OR** dextrose **OR** glucagon, labetalol **OR** hydrALAZINE, lidocaine 4%, lidocaine (buffered or not buffered), - MEDICATION INSTRUCTIONS -, - MEDICATION INSTRUCTIONS -, OLANZapine, ondansetron **OR** ondansetron, polyethylene glycol, risperiDONE, senna-docusate **OR** senna-docusate, sodium chloride (PF), traZODone    Allergies   Allergies   Allergen Reactions     Lisinopril      Morphine Other (See Comments)     confusion     Family History   Family History   Problem Relation Age of Onset     Unknown/Adopted No family hx of      Social History   Social History     Tobacco Use     Smoking status: Never     Smokeless tobacco: Never   Substance Use Topics     Alcohol use: No     Alcohol/week: 0.0 standard drinks of alcohol      Drug use: No       Review of Systems   The 5 point Review of Systems is negative other than noted in the HPI or here.        PHYSICAL EXAMINATION  Temp:  [97.5  F (36.4  C)-98.5  F (36.9  C)] 97.6  F (36.4  C)  Pulse:  [55-81] 72  Resp:  [18] 18  BP: ()/(52-82) 169/82  SpO2:  [94 %-98 %] 94 %     Neurologic  Mental Status:  alert, follows commands, mild dysarthria, oriented to person, place, not time  Cranial Nerves:  visual fields intact, EOMI with normal smooth pursuit, facial sensation intact and symmetric, hearing not formally tested but intact to conversation, tongue protrusion midline, mild dysarthria, left face droop  Motor:  normal muscle tone and bulk, no abnormal movements, able to move all limbs spontaneously, strength 5/5 throughout upper and lower extremities, no pronator drift  Reflexes:  toes down-going  Sensory:  light touch sensation intact and symmetric throughout upper and lower extremities, no extinction on double simultaneous stimulation   Coordination:  normal finger-to-nose and heel-to-shin bilaterally without dysmetria, rapid alternating movements symmetric  Station/Gait:  deferred    Dysphagia Screen  Per Nursing    Stroke Scales    NIHSS  1a. Level of Consciousness 0-->Alert, keenly responsive   1b. LOC Questions 2-->Answers neither question correctly   1c. LOC Commands 0-->Performs both tasks correctly   2.   Best Gaze 0-->Normal   3.   Visual 0-->No visual loss   4.   Facial Palsy 1-->Minor paralysis (flattened nasolabial fold, asymmetry on smiling)   5a. Motor Arm, Left 0-->No drift, limb holds 90 (or 45) degrees for full 10 secs   5b. Motor Arm, Right 0-->No drift, limb holds 90 (or 45) degrees for full 10 secs   6a. Motor Leg, Left 0-->No drift, leg holds 30 degree position for full 5 secs   6b. Motor Leg, right 0-->No drift, leg holds 30 degree position for full 5 secs   7.   Limb Ataxia 0-->Absent   8.   Sensory 0-->Normal, no sensory loss   9.   Best Language 0-->No  aphasia, normal   10. Dysarthria 0-->Normal   11. Extinction and Inattention  0-->No abnormality   Total 3 (05/13/23 0912)       Modified Nathan Score (Pre-morbid)  2 - Slight disability.  Able to look after own affairs without assistance, but unable to carry out all previous activities.    Imaging  I personally reviewed all imaging; relevant findings per HPI.     Lab Results Data   CBC  Recent Labs   Lab 05/10/23  0900   WBC 8.8   RBC 5.38   HGB 17.1   HCT 49.4        Basic Metabolic Panel    Recent Labs   Lab 05/13/23  0724 05/12/23  2222 05/12/23  1214 05/12/23  1131 05/11/23  1239 05/11/23  0819 05/10/23  1146 05/10/23  0900   POTASSIUM  --   --   --  4.7  --  4.4  --  4.3   CR  --   --   --   --   --   --   --  0.78   * 143* 267*  --    < >  --    < >  --     < > = values in this interval not displayed.     Liver Panel  No results for input(s): PROTTOTAL, ALBUMIN, BILITOTAL, ALKPHOS, AST, ALT, BILIDIRECT in the last 168 hours.  INR    Recent Labs   Lab Test 05/04/23  1858 12/03/19  1817 01/09/19  1008   INR 1.07 1.08 1.01      Lipid Profile    Recent Labs   Lab Test 05/04/23  1858 11/29/22  1330 11/08/18  0913 01/29/16  0831 11/02/15  0959 10/10/15  0625   CHOL 185 189 174   < > Test canceled by PCU/Clinic   Charge credited  CORRECTED ON 11/02 AT 1235: PREVIOUSLY REPORTED  LDL Cholesterol is the   primary guide to therapy.   The NCEP recommends further evaluation of: patients   with cholesterol greater than 200 mg/dL if additional risk factors are present,   cholesterol greater than 240 mg/dL, triglycerides greater than 150 mg/dL, or   HDL less than 40 mg/dL.   168   HDL 66 63 56   < > Test canceled by PCU/Clinic   Charge credited  CORRECTED ON 11/02 AT 1235: PREVIOUSLY REPORTED AS 62   67   LDL 90 89 91   < > Test canceled by PCU/Clinic   Charge credited  CORRECTED ON 11/02 AT 1235: PREVIOUSLY REPORTED AS 40 LDL Cholesterol is the   primary guide to therapy: LDL cholesterol goal in high  risk patients is <100   mg/dL and in very high risk patients is <70 mg/dL.   89   TRIG 146 186* 133   < > Test canceled by PCU/Clinic   Charge credited  CORRECTED ON 11/02 AT 1235: PREVIOUSLY REPORTED AS 59   60   CHOLHDLRATIO  --   --   --   --  Test canceled by PCU/Clinic   Charge credited  CORRECTED ON 11/02 AT 1235: PREVIOUSLY REPORTED AS 1.8   2.5    < > = values in this interval not displayed.     A1C    Recent Labs   Lab Test 05/04/23  1858 11/29/22  1330 09/22/21  2116   A1C 9.6* 8.7* 7.5*     Troponin  No results for input(s): CTROPT, TROPONINIS, TROPONINI, GHTROP in the last 168 hours.       Stroke Code Data Data   Stroke Code Data  (for stroke code without tele)  Stroke code activated 05/13/23   0903   First stroke provider response 05/13/23   0850   Last known normal 05/13/23   0400   Time of discovery   (or onset of symptoms) 05/13/23   0850   Head CT read by Stroke Neuro Dr/Provider 05/13/23   1920   Was stroke code de-escalated? Yes 05/13/23 1940

## 2023-05-13 NOTE — PROVIDER NOTIFICATION
"MD Notification    Notified Person: MD    Notified Person Name: Vivian Oshea    Notification Date/Time: 5/13/2023 0851    Notification Interaction: [employer]    Purpose of Notification: \"Please advise, new L facial droop at rest noted on assessment. Pt unable to communicate. Are you able to come assess patient? Thanks\"    Comments: Call a code stroke    "

## 2023-05-14 ENCOUNTER — APPOINTMENT (OUTPATIENT)
Dept: CT IMAGING | Facility: CLINIC | Age: 88
DRG: 065 | End: 2023-05-14
Attending: HOSPITALIST
Payer: COMMERCIAL

## 2023-05-14 LAB
ALBUMIN SERPL BCG-MCNC: 3.4 G/DL (ref 3.5–5.2)
ALBUMIN UR-MCNC: NEGATIVE MG/DL
ALP SERPL-CCNC: 126 U/L (ref 40–129)
ALT SERPL W P-5'-P-CCNC: 29 U/L (ref 10–50)
AMMONIA PLAS-SCNC: <10 UMOL/L (ref 16–60)
ANION GAP SERPL CALCULATED.3IONS-SCNC: 10 MMOL/L (ref 7–15)
APPEARANCE UR: CLEAR
AST SERPL W P-5'-P-CCNC: 34 U/L (ref 10–50)
BILIRUB SERPL-MCNC: 0.8 MG/DL
BILIRUB UR QL STRIP: NEGATIVE
BUN SERPL-MCNC: 32.7 MG/DL (ref 8–23)
CALCIUM SERPL-MCNC: 8.9 MG/DL (ref 8.8–10.2)
CHLORIDE SERPL-SCNC: 103 MMOL/L (ref 98–107)
COLOR UR AUTO: YELLOW
CREAT SERPL-MCNC: 0.99 MG/DL (ref 0.67–1.17)
DEPRECATED HCO3 PLAS-SCNC: 25 MMOL/L (ref 22–29)
GFR SERPL CREATININE-BSD FRML MDRD: 74 ML/MIN/1.73M2
GLUCOSE BLDC GLUCOMTR-MCNC: 108 MG/DL (ref 70–99)
GLUCOSE BLDC GLUCOMTR-MCNC: 170 MG/DL (ref 70–99)
GLUCOSE BLDC GLUCOMTR-MCNC: 172 MG/DL (ref 70–99)
GLUCOSE BLDC GLUCOMTR-MCNC: 208 MG/DL (ref 70–99)
GLUCOSE BLDC GLUCOMTR-MCNC: 90 MG/DL (ref 70–99)
GLUCOSE SERPL-MCNC: 143 MG/DL (ref 70–99)
GLUCOSE UR STRIP-MCNC: NEGATIVE MG/DL
HGB UR QL STRIP: NEGATIVE
KETONES UR STRIP-MCNC: ABNORMAL MG/DL
LEUKOCYTE ESTERASE UR QL STRIP: ABNORMAL
MAGNESIUM SERPL-MCNC: 1.7 MG/DL (ref 1.7–2.3)
MUCOUS THREADS #/AREA URNS LPF: PRESENT /LPF
NITRATE UR QL: NEGATIVE
PH UR STRIP: 5.5 [PH] (ref 5–7)
POTASSIUM SERPL-SCNC: 4.3 MMOL/L (ref 3.4–5.3)
POTASSIUM SERPL-SCNC: 4.5 MMOL/L (ref 3.4–5.3)
PROT SERPL-MCNC: 6.4 G/DL (ref 6.4–8.3)
RBC URINE: 4 /HPF
SODIUM SERPL-SCNC: 138 MMOL/L (ref 136–145)
SP GR UR STRIP: 1.01 (ref 1–1.03)
SQUAMOUS EPITHELIAL: <1 /HPF
UROBILINOGEN UR STRIP-MCNC: 4 MG/DL
WBC URINE: 60 /HPF

## 2023-05-14 PROCEDURE — 250N000013 HC RX MED GY IP 250 OP 250 PS 637: Performed by: INTERNAL MEDICINE

## 2023-05-14 PROCEDURE — 120N000001 HC R&B MED SURG/OB

## 2023-05-14 PROCEDURE — 83735 ASSAY OF MAGNESIUM: CPT | Performed by: HOSPITALIST

## 2023-05-14 PROCEDURE — 84132 ASSAY OF SERUM POTASSIUM: CPT | Performed by: HOSPITALIST

## 2023-05-14 PROCEDURE — 250N000013 HC RX MED GY IP 250 OP 250 PS 637: Performed by: NURSE PRACTITIONER

## 2023-05-14 PROCEDURE — 81001 URINALYSIS AUTO W/SCOPE: CPT | Performed by: PHYSICIAN ASSISTANT

## 2023-05-14 PROCEDURE — 87088 URINE BACTERIA CULTURE: CPT | Performed by: PHYSICIAN ASSISTANT

## 2023-05-14 PROCEDURE — 80053 COMPREHEN METABOLIC PANEL: CPT | Performed by: HOSPITALIST

## 2023-05-14 PROCEDURE — 250N000009 HC RX 250: Performed by: HOSPITALIST

## 2023-05-14 PROCEDURE — 70450 CT HEAD/BRAIN W/O DYE: CPT

## 2023-05-14 PROCEDURE — 99221 1ST HOSP IP/OBS SF/LOW 40: CPT | Mod: GC | Performed by: SURGERY

## 2023-05-14 PROCEDURE — 99232 SBSQ HOSP IP/OBS MODERATE 35: CPT | Performed by: HOSPITALIST

## 2023-05-14 PROCEDURE — 250N000013 HC RX MED GY IP 250 OP 250 PS 637

## 2023-05-14 PROCEDURE — 82140 ASSAY OF AMMONIA: CPT | Performed by: HOSPITALIST

## 2023-05-14 PROCEDURE — 250N000013 HC RX MED GY IP 250 OP 250 PS 637: Performed by: HOSPITALIST

## 2023-05-14 PROCEDURE — 36415 COLL VENOUS BLD VENIPUNCTURE: CPT | Performed by: HOSPITALIST

## 2023-05-14 PROCEDURE — 250N000011 HC RX IP 250 OP 636: Performed by: HOSPITALIST

## 2023-05-14 PROCEDURE — 99207 PR SERVICE NOT STAFFED W/SUPERV PROV: CPT | Performed by: PSYCHIATRY & NEUROLOGY

## 2023-05-14 PROCEDURE — 70496 CT ANGIOGRAPHY HEAD: CPT

## 2023-05-14 RX ORDER — IOPAMIDOL 755 MG/ML
75 INJECTION, SOLUTION INTRAVASCULAR ONCE
Status: COMPLETED | OUTPATIENT
Start: 2023-05-14 | End: 2023-05-14

## 2023-05-14 RX ADMIN — INSULIN GLARGINE 10 UNITS: 100 INJECTION, SOLUTION SUBCUTANEOUS at 09:10

## 2023-05-14 RX ADMIN — IOPAMIDOL 75 ML: 755 INJECTION, SOLUTION INTRAVENOUS at 18:06

## 2023-05-14 RX ADMIN — INSULIN ASPART 4 UNITS: 100 INJECTION, SOLUTION INTRAVENOUS; SUBCUTANEOUS at 09:11

## 2023-05-14 RX ADMIN — CLOPIDOGREL BISULFATE 75 MG: 75 TABLET ORAL at 09:10

## 2023-05-14 RX ADMIN — FINASTERIDE 5 MG: 5 TABLET, FILM COATED ORAL at 09:10

## 2023-05-14 RX ADMIN — DOXAZOSIN 1 MG: 1 TABLET ORAL at 09:10

## 2023-05-14 RX ADMIN — GABAPENTIN 300 MG: 300 CAPSULE ORAL at 09:10

## 2023-05-14 RX ADMIN — ASPIRIN 81 MG CHEWABLE TABLET 324 MG: 81 TABLET CHEWABLE at 09:09

## 2023-05-14 RX ADMIN — ATORVASTATIN CALCIUM 40 MG: 40 TABLET, FILM COATED ORAL at 20:50

## 2023-05-14 RX ADMIN — ACETAMINOPHEN 650 MG: 325 TABLET ORAL at 13:19

## 2023-05-14 RX ADMIN — LOSARTAN POTASSIUM 75 MG: 50 TABLET, FILM COATED ORAL at 09:09

## 2023-05-14 RX ADMIN — INSULIN ASPART 2 UNITS: 100 INJECTION, SOLUTION INTRAVENOUS; SUBCUTANEOUS at 13:20

## 2023-05-14 RX ADMIN — MIRTAZAPINE 15 MG: 15 TABLET, FILM COATED ORAL at 20:50

## 2023-05-14 RX ADMIN — MELATONIN TAB 3 MG 3 MG: 3 TAB at 20:50

## 2023-05-14 RX ADMIN — GABAPENTIN 300 MG: 300 CAPSULE ORAL at 20:53

## 2023-05-14 RX ADMIN — SODIUM CHLORIDE 100 ML: 9 INJECTION, SOLUTION INTRAVENOUS at 18:06

## 2023-05-14 RX ADMIN — RISPERIDONE 0.5 MG: 0.5 TABLET, ORALLY DISINTEGRATING ORAL at 06:36

## 2023-05-14 RX ADMIN — CARVEDILOL 25 MG: 25 TABLET, FILM COATED ORAL at 09:10

## 2023-05-14 RX ADMIN — BROMFENAC 1 DROP: 1.03 SOLUTION/ DROPS OPHTHALMIC at 09:12

## 2023-05-14 RX ADMIN — AMLODIPINE BESYLATE 5 MG: 5 TABLET ORAL at 09:10

## 2023-05-14 RX ADMIN — METFORMIN HYDROCHLORIDE 500 MG: 500 TABLET, FILM COATED ORAL at 09:10

## 2023-05-14 RX ADMIN — ACETAMINOPHEN 650 MG: 325 TABLET ORAL at 20:50

## 2023-05-14 RX ADMIN — TRAZODONE HYDROCHLORIDE 25 MG: 50 TABLET ORAL at 23:02

## 2023-05-14 ASSESSMENT — ACTIVITIES OF DAILY LIVING (ADL)
ADLS_ACUITY_SCORE: 48
ADLS_ACUITY_SCORE: 47
ADLS_ACUITY_SCORE: 51
ADLS_ACUITY_SCORE: 48
ADLS_ACUITY_SCORE: 44
ADLS_ACUITY_SCORE: 47
ADLS_ACUITY_SCORE: 44
ADLS_ACUITY_SCORE: 48
ADLS_ACUITY_SCORE: 51
ADLS_ACUITY_SCORE: 47
ADLS_ACUITY_SCORE: 44
ADLS_ACUITY_SCORE: 44

## 2023-05-14 NOTE — PROVIDER NOTIFICATION
"MD Notification    Notified Person: MD    Notified Person Name: Kavita Allen    Notification Date/Time: 5/14/2023 5107    Notification Interaction: [facility]    Purpose of Notification: \"Please advise, pt has been much more lethargic this evening, increased weakness, new numbness reported- neuro stroke notified and STAT CT/CTA completed. Thanks\"    Orders Received: STAT UA    Addendum 3319: \"Can we get orders to straight cath too? Pt is unable to say when he need to go to the bathroom and has been incontinent, only speaks Sammarinese and will not keep the external cath on. Thanks\"   Orders: Intermittent straight cath        "

## 2023-05-14 NOTE — PLAN OF CARE
Goal Outcome Evaluation:      Plan of Care Reviewed With: patient    Overall Patient Progress: improvingOverall Patient Progress: improving    Shift: 7984-4252  Reason for Admission: Dementia w/ behavioral disturbance     Cognitive/Mentation: A/Ox self. Disoriented x place, situation, and time.  Neuros/CMS: Intact ex increased in lethargy on-and-off throughout the day- BUE weakness 3/5 weak strength, BLE weakness 2/5 strength, R facial droop, R tongue deviation, slurred speech. Slight ataxia RUE but pt is to weak to reach nose and finger, PJ LUE.  VS: VSS. SBP goal<220.   Tele: NSR.  GI: BS active, last BM 5/12. Incontinent.  : WDL. Incontinent.  Pulmonary: LS clear.  Pain: HA reported utilizing       Drains/Lines: L/R PIV S.L.  Skin: Intact.   Activity: Assist x 2 with lift.  Diet: Pureed diet with mildly thick liquids. Takes pills crushed in applesauce/pudding.      Therapies recs: TCU  Discharge: Pending     Aggression Stoplight Tool: GREEN     End of shift summary:  present from 7806-4343 and [id number].    1330- Increase in lethargy, only would arouse to pain- MD notified and ordered labs, see note. Nurse assisted pt to chair and pt became more alert.   1645- Increase in lethargy, only alert x self. BUE weakness 3/5 weak strength, BLE weakness 2/5 strength, R facial droop, R tongue deviation, slurred speech. Slight ataxia RUE but pt is to weak to reach nose and finger, PJ LUE. Neuro stroke team updated, STAT CT/CTA ordered, on call MD notified.

## 2023-05-14 NOTE — PROGRESS NOTES
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 Limited neuro exam d/t language barrier. Alert to lethargic, arousing to voice. Pt cannot be understood by jabber . Follows some commands. R facial droop, dysarthria. VSS on RA. Tele NSR. Pureed diet, mildly liquids. Takes pills crushed in applesauce. Up with Ax2 and lift/teresita steady. FLACC = 0. Pt scoring yellow on the Aggression Stop Light Tool for agitation, combative with staff and attempting to bite staff. PRN zyprexa given last evening. Discharge to TCU pending placement.

## 2023-05-14 NOTE — PROVIDER NOTIFICATION
"MD Notification    Notified Person: MD    Notified Person Name: Medardo Newell     Notification Date/Time: 5/14/2023 1329    Notification Interaction: [employer]    Purpose of Notification: \"Please advise, pt is much more lethargic, will only arouse to pain and will not stay awake. VSS. Thanks\"    Orders Received: Comprehensive metabolic panel, ammonia lab draw        "

## 2023-05-14 NOTE — PROVIDER NOTIFICATION
"MD Notification    Notified Person: MD    Notified Person Name: Vivian Oshea    Notification Date/Time: 5/14/2023 1700    Notification Interaction: [employer]    Purpose of Notification: \"Please advice, Pt is extremely lethargic today, only alert to self, unable to lift arms and legs at all, he is unable to stay awake, he reports he has numbness but unable to show me where. When testing for BU ataxia it looks positive but to weak to reach my finger or his nose. MD was notified. Thanks\"    Orders Received: STAT CT head w/o contrast & CTA head with contrast        "

## 2023-05-14 NOTE — PROGRESS NOTES
Brief Clinical Note:    Patient with known left asia stroke at 1330 noted to be more lethargic and not moving all 4 ext. Has upper extremity ataxia. /44. Could be lethargic form hospital delirium vs toxic/metabolic. Will rule out hemorrhage or large vessel occlusion. Not candidate for acute therapies given prior stroke.     Plan:  -Repeat head Ct/CTA    Vivian Oshea  Vascular Stroke Fellow      Stroke staff is Dr. Aquino

## 2023-05-14 NOTE — PROGRESS NOTES
[facility]    Medicine Progress Note - Hospitalist Service    Date of Admission:  5/4/2023    Assessment & Plan   Adam Huber is a 87 year old male  With PMH of HTN, CAD, CVA, DM type 2, TBI, dementia- admitted on 5/4/2023 with slurred speech concerning for a stroke.      Acute/subacute CVA of the right asia and left cerebellar   Dementia with behavioral disturbance  Transient alteration of consciousness  Anxiety and depression  History of TBI  - he lives with his wife, has underlying cognitive impairment; has PCA as per family; Vincentian speaking  - initial concern for stroke  - Stroke Neuro initially followed  - HEAD CT: With extensive areas of encephalomalacic changes and gliosis on frontal and temporal lobe, chronic infarct in basal ganglia and corona radiata.  -CTA head and neck multifocal areas of moderate or high-grade stenosis throughout anterior and posterior circulation.  Heavy atherosclerotic plaque left carotid bulb/bifurcation with at least 50% stenosis by modified NASCET criteria. Moderate predominantly noncalcified plaque right carotid bulb/bifurcation without significant stenosis by modified NASCET criteria. No findings for vertebral artery dissection.   MRI brain 5/4:  Motion limited examination equivocal DWI hyperintensity at the right asia. This may represent artifact or acute ischemia.  Severe bifrontal encephalomalacia  - PTA Aspirin was increased from 81 mg to 324 mg po daily  - lipid profile- LDL 90, total cholest 232, HDL 66  - started on Lipitor 40 mg po at bedtime  - Echo - EF 60-65%. No clear regional wall motion abnormalities noted.  No thrombus or cardiac etiology of stroke  - EEG- no seizures   -Continue to monitor in telemetry while in the hospital, neurology recommending cardiac event monitor after discharge  -Code stroke called 5/13, CT head with multiple small infarcts involving the basal cannula, my and cerebellum no convincing acute infarct by CT; CTA  head and neck with widespread intracranial arthrosclerotic plaque, 50-60% stenosis of the left ICA  -MRI 5/13 acute/subacute right pontine infarct, acute medial left cerebellar infarct  -Appreciate neurology recommendations, concern for thromboembolic etiology given known extensive arthrosclerotic disease; will need 30-day cardiac event monitor on discharge with follow-up in 6 to 8 weeks  -Evaluated by vascular surgery for CAD, no role for carotid intervention as current stroke are not in the geographical distribution from left ICA disease  -PTA was on scheduled Zyprexa, Seroquel and Remeron, as needed Seroquel available.  Due to ongoing agitation psychiatry was consulted  -  Appreciate psychiatry input, evaluated on 5/11/2023-recommended discontinuing Seroquel and Zyprexa and started on risperidone ODT 0.5 mg in the morning and 1 mg at bedtime to help reduce dementia related agitation with risperidone ODT 0.5 mg twice daily as needed for agitation.  Zyprexa IM 2.5 mg as a backup option or unable to take p.o.  -Scheduled melatonin 3 mg at 8:00 to help with circadian rhythm maintenance  -Delirium precautions, avoid interruptions during the night as much as possible, allow family visits, reorientation, use  services as much as possible, allow/maintain sleep-wake cycle.    - PT/OT- rec TCU; finding a TCU might be challenging because of language barrier and his intermittent agitation; needs to be without sitter for 24h,  following  - follow up with PCP in 1-2 weeks  - follow up with General Neuro in 6-8 weeks     Hypomagnesemia  -Being replaced per protocol     Goals of care  -Full code as per wife and family's request     HTN  - PTA on Coreg 25 mg po BID and losartan 50 mg po daily   - BP on higher side; BP goal- normotension  -Currently on amlodipine 5 mg daily, Coreg 25 mg twice daily losartan 75 mg daily  - BP mostly better controlled, intermittently elevated, likely related to  "agitation  -Monitor for persistently elevated blood pressure and adjust medication as needed     Oral thrush  -Nystatin swish and spit     BPH   -PTA finasteride and Flomax      DM type 2, uncontrolled  - PTA metformin 500 mg qam and 1000 mg qpm   - Hb A1c 9.6  -Currently on metformin 500 mg twice daily with high resistance SSI and Premeal insulin 1 unit per 15 g carb in addition to Lantus 10 units  -Blood sugar today less than 200, will monitor his blood sugar and adjust medication as needed     Neuropathy  -Continue PTA gabapentin         Diet: Room Service  Snacks/Supplements Adult: Other; Ensure with breakfast (RD); With Meals  Combination Diet Moderate Consistent Carb (60 g CHO per Meal) Diet; Pureed Diet (level 4); Mildly Thick (level 2)    DVT Prophylaxis: Pneumatic Compression Devices  Villa Catheter: Not present  Lines: None     Cardiac Monitoring: ACTIVE order. Indication: Stroke, acute (48 hours)  Code Status: Full Code      Clinically Significant Risk Factors                  # Hypertension: Noted on problem list     # Dementia: noted on problem list   # DMII: A1C = 9.6 % (Ref range: <5.7 %) within past 6 months   # Overweight: Estimated body mass index is 29.86 kg/m  as calculated from the following:    Height as of this encounter: 1.778 m (5' 10\").    Weight as of this encounter: 94.4 kg (208 lb 1.8 oz).           Disposition Plan      Expected Discharge Date: 05/15/2023    Discharge Delays: 1:1 Sitter still ordered - MD to assess  Placement - TCU              Medardo Newell MD  Hospitalist Service  [facility]  Securely message with [employer] (more info)  Text page via [employer] Paging/Directory   ______________________________________________________________________    Interval History   Patient seen and examined at bedside.  Martiniquais  present.  Patient somnolent but arousable apparently had episodes of aggression and agitation last night apparently bit a staff member was " given Zyprexa this morning and is now sleeping    Physical Exam   Vital Signs: Temp: 97.6  F (36.4  C) Temp src: Axillary BP: (!) 141/73 Pulse: 75   Resp: 14 SpO2: 96 % O2 Device: None (Room air)    Weight: 208 lbs 1.83 oz    Constitutional: no distress, cooperative, alert, oriented, normal mood  Cardiovascular: Regular rate and rhythm, no murmurs  Respiratory: clear bilaterally, no crackles, wheezing or rales  Gastrointestinal: Abdomen soft, non-tender. BS normal. No masses.  Skin: warm, dry, intact, no edema  Neuro: Somnolent but arousable, slurred speech, right facial droop, right tongue deviation       Medical Decision Making       40 MINUTES SPENT BY ME on the date of service doing chart review, history, exam, documentation & further activities per the note.      Data   NOTE: Data reviewed over the past 24 hrs contributes toward MDM complexity

## 2023-05-14 NOTE — CONSULTS
Vascular Surgery Consult    Adam Huber MRN# [id number]   YOB: 1935 Age: 87 year old      Date of Admission:  5/4/2023        Consult for:    Consulting physician/team: Medicine         Assessment:    87 year old male  With PMH of HTN, CAD, CVA, DM , TBI and dementia who had an episode of dysarthria and facial droop yesterday. Multiple small posterior circulation ischemic strokes high suspicion for thromboembolism from known extensive atherosclerotic disease.         Plan:      - The strokes in posterior circulation distribution and patient has significant intracranial disease ( including posterior circulation disease). L ICA stenosis source doesn't correlate with stroke location. No role of carotid intervention.         History of Present Illness:    Adam Huber is a 87 year old male  With PMH of HTN, CAD, CVA, DM , TBI and dementia who had an episode of dysarthria and facial droop yesterday. Imaging showed a R pontine and L cerebellar infarct, Also showed 50-60% L ICA stenosis. More importantly , CTA head showed significant intracranial stenoses in both anterior and posterior circulation.  US consistent with 50-69% L ICA stenosis. Neurology assessed patient and agreed with diagnosis of Multiple small posterior circulation ischemic strokes high suspicion for thromboembolism from known extensive atherosclerotic disease.     Past Medical History:  Past Medical History:   Diagnosis Date     Acute chest pain 10/23/2015     CAD (coronary artery disease)      Essential hypertension 10/23/2015     History of CVA (cerebrovascular accident) 10/23/2015     HLD (hyperlipidemia)      HTN (hypertension)      NSTEMI (non-ST elevated myocardial infarction) (H) 10/23/2015     Post PTCA 10-    Successful PCI of culprit proximal to mid RCA with placement of a     Post PTCA 11-6-2015    pci of complex mid CFX-hector       Past Surgical History:  Past Surgical History:   Procedure Laterality Date      CHOLECYSTECTOMY       GENITOURINARY SURGERY      prostate removal      HEART CATH LEFT HEART CATH  10/12/2015    PCI w/ LEXI to RCA     HEART CATH RIGHT AND LEFT HEART CATH  11/6/2015    PCI w/ LEXI to mid-CFX       Allergies:     Allergies   Allergen Reactions     Lisinopril      Morphine Other (See Comments)     confusion       Medications:  No current facility-administered medications on file prior to encounter.  acetaminophen (TYLENOL) 325 MG tablet, Take 3 tablets (975 mg) by mouth every 6 hours as needed for mild pain  aspirin 81 MG EC tablet, Take one tablet daily  BROMFENAC SODIUM OP, Place 1 drop into the right eye daily Patient Rx is 0.09% one drop in Right eye daily  carvedilol (COREG) 25 MG tablet, Take 1 tablet (25 mg) by mouth 2 times daily (with meals)  finasteride (PROSCAR) 5 MG tablet, Take 5 mg by mouth daily  fluticasone (FLONASE) 50 MCG/ACT nasal spray, Spray 1 spray into both nostrils daily as needed for rhinitis or allergies  gabapentin (NEURONTIN) 300 MG capsule, Take 300 mg by mouth 2 times daily  ibuprofen (ADVIL/MOTRIN) 400 MG tablet, Take 1 tablet (400 mg) by mouth every 8 hours as needed for moderate pain  losartan (COZAAR) 50 MG tablet, Take 50 mg by mouth daily  metFORMIN (GLUCOPHAGE) 500 MG tablet, Take 500 mg with breakfast and 1000 mg with evening meal  mirtazapine (REMERON) 15 MG tablet, Take 15 mg by mouth At Bedtime  nitroglycerin (NITROSTAT) 0.4 MG SL tablet, Place 1 tablet (0.4 mg) under the tongue every 5 minutes as needed for chest pain  OLANZapine (ZYPREXA) 2.5 MG tablet, Take 2.5 mg by mouth 2 times daily  QUEtiapine (SEROQUEL) 25 MG tablet, Take 25 mg by mouth At Bedtime  tamsulosin (FLOMAX) 0.4 MG capsule, Take 0.4 mg by mouth daily        Social History:  Social History     Socioeconomic History     Marital status:      Spouse name: Not on file     Number of children: Not on file     Years of education: Not on file     Highest education level: Not on file  "  Occupational History     Not on file   Tobacco Use     Smoking status: Never     Smokeless tobacco: Never   Vaping Use     Vaping status: Not on file   Substance and Sexual Activity     Alcohol use: No     Alcohol/week: 0.0 standard drinks of alcohol     Drug use: No     Sexual activity: Not on file   Other Topics Concern      Service Not Asked     Blood Transfusions Not Asked     Caffeine Concern No     Comment: seldom     Occupational Exposure Not Asked     Hobby Hazards Not Asked     Sleep Concern Yes     Comment: does not sleep well     Stress Concern Not Asked     Weight Concern Not Asked     Special Diet Yes     Comment: lower sodium and fat     Back Care Not Asked     Exercise No     Bike Helmet Not Asked     Seat Belt Not Asked     Self-Exams Not Asked     Parent/sibling w/ CABG, MI or angioplasty before 65F 55M? Not Asked   Social History Narrative     Not on file     Social Determinants of Health     Financial Resource Strain: Not on file   Food Insecurity: Not on file   Transportation Needs: Not on file   Physical Activity: Not on file   Stress: Not on file   Social Connections: Not on file   Intimate Partner Violence: Not on file   Housing Stability: Not on file       Family History:  Family History   Problem Relation Age of Onset     Unknown/Adopted No family hx of        ROS:  The remainder of the complete ROS was negative unless noted in the HPI.    Exam:  BP (!) 141/73   Pulse 75   Temp 97.6  F (36.4  C) (Axillary)   Resp 14   Ht 1.778 m (5' 10\")   Wt 94.4 kg (208 lb 1.8 oz)   SpO2 96%   BMI 29.86 kg/m    General: Somnolent, sleeping  HEENT: NC/AT  Resp: non labored breathing on R A  Cardiac: regular rate and rhythm  Abdomen: Non distended   Extremities: No LE edema,  Skin: Warm and dry,   Neuro: Unable to assess as patient was somnolent     Labs:  Most Recent CBC:   Recent Labs   Lab Test 05/13/23  0914   WBC 8.1   RBC 5.34   HGB 17.0   HCT 48.1   MCV 90   MCH 31.8   MCHC 35.3   RDW " 12.9        Most Recent BMP:   Recent Labs   Lab Test 05/14/23  0917 05/14/23  0844 05/13/23  1247 05/13/23  0914   NA  --   --   --  141   POTASSIUM 4.5  --   --  4.2   CHLORIDE  --   --   --  104   CO2  --   --   --  25   BUN  --   --   --  26.1*   CR  --   --   --  0.82   GLC  --  108*   < > 148*   MAG 1.7  --   --  1.7    < > = values in this interval not displayed.           Imaging:  Recent Results (from the past 24 hour(s))   CT Head Perfusion w Contrast    Narrative    EXAM: CT HEAD PERFUSION W CONTRAST  LOCATION: [facility]  DATE/TIME: 5/13/2023 9:59 AM CDT    INDICATION: CODE STROKE, right facial droop  COMPARISON: None.  TECHNIQUE: CT cerebral perfusion was performed utilizing a second contrast bolus. Perfusion data were post processed with generation of standard perfusion maps and estimation of ischemic/infarcted volumes utilizing standard threshold values. Dose   reduction techniques were used. All stenosis measurements made according to NASCET criteria unless otherwise specified.  CONTRAST: 50 mL Isovue 370      Impression    IMPRESSION: No convincing large segment perfusion mismatch. Scattered posterior circulation transit time asymmetries, presumably artifactual. MRI could be performed if indicated.   MR Brain w/o Contrast    Narrative    EXAM: MR BRAIN WITHOUT CONTRAST  LOCATION: [facility]  DATE/TIME: 05/13/2023, 12:21 PM CDT    INDICATION: Code stroke. Right-sided facial droop.  COMPARISON: 05/13/2023 head CT/head and neck CTA, 05/04/2023 brain MRI.  TECHNIQUE: Routine multiplanar multisequence head MRI without intravenous contrast.    FINDINGS:  INTRACRANIAL CONTENTS: Two small wedge-shaped foci of FLAIR hyperintense diffusion restriction in the right asia are compatible with acute to very early subacute infarct. Two additional punctate foci of diffusion restriction without FLAIR hyperintensity   compatible with acute infarcts in the left  cerebellar hemisphere medially. No intracranial hemorrhage. No extra-axial collection. Multifocal encephalomalacia involving the anterior temporal lobes and anteroinferior frontal lobes bilaterally. Additional   chronic lacunar infarct in the right corona radiata, with a small chronic infarct in the medial inferior left cerebellar hemisphere. Moderate diffuse parenchymal volume loss. Ventricular size is in keeping with this volume loss. Moderate burden FLAIR   hyperintense chronic small vessel ischemic change. Hemosiderin staining from chronic microhemorrhage in the right precentral gyrus. Additional presumed hemosiderin staining at the posterior right lentiform nucleus. Major intracranial vascular flow-voids   are evaluated to much better effect on recent head and neck CTA. Cerebellar tonsils are normally positioned.    SELLA: No abnormality accounting for technique.    OSSEOUS STRUCTURES/SOFT TISSUES: Normal marrow signal.     ORBITS: Prior right cataract surgery. Visualized portions of the orbits are otherwise unremarkable.     SINUSES/MASTOIDS: Mild mucosal thickening scattered about the paranasal sinuses. No middle ear or mastoid effusion.       Impression    IMPRESSION:  1.  Two small foci of acute to very early subacute infarct in the right asia.    2.  Two punctate foci of acute infarct in the medial left cerebellar hemisphere.    3.  Multifocal encephalomalacia at the anteroinferior frontal lobes and the anterior temporal lobes bilaterally. In this location, sequelae of prior trauma is a likely cause.    4.  Background age-related changes as above.       US Carotid Bilateral    Narrative    EXAM: US CAROTID BILATERAL  LOCATION: [facility]  DATE/TIME: 5/13/2023 3:45 PM CDT    INDICATION: L ICA stenosis. Need duplex to confirm degree of stenosis  COMPARISON: None.  TECHNIQUE: Duplex exam performed utilizing 2D gray-scale imaging, Doppler interrogation with color-flow and spectral  waveform analysis. The percent diameter stenosis is determined using NASCET criteria and Society of Radiologists in Ultrasound Consensus   Criteria.    FINDINGS:    There is minimal atherosclerotic plaque at the right carotid bifurcation and proximal internal carotid artery. Antegrade blood flow in the right vertebral artery. There is mild to moderate mixed atherosclerotic plaque at the left carotid arterial   bifurcation and proximal internal carotid artery. Antegrade blood flow in the left vertebral artery.    VELOCITY CHART:  CCA   Right: 82/9 cm/s   Left: 73/10 cm/s  ICA   Right: 65/19 cm/s   Left: 131/35 cm/s  ECA   Right: 106/10 cm/s   Left: 67/0 cm/s  ICA/CCA PSV Ratio   Right: 0.8   Left: 1.8      Impression    IMPRESSION:  1.  Minimal plaque formation, velocities consistent with less than 50% stenosis in the right internal carotid artery.  2.  Mild to moderate plaque formation, velocities consistent with 50-69% stenosis in the left internal carotid artery.  3.  Flow within the vertebral arteries is antegrade.       Assessment/ Plan: See above.     Maryanne Cooper MD     Fellow    Pt reviewed with Dr. Horan

## 2023-05-15 LAB
GLUCOSE BLDC GLUCOMTR-MCNC: 180 MG/DL (ref 70–99)
GLUCOSE BLDC GLUCOMTR-MCNC: 181 MG/DL (ref 70–99)
GLUCOSE BLDC GLUCOMTR-MCNC: 198 MG/DL (ref 70–99)
GLUCOSE BLDC GLUCOMTR-MCNC: 205 MG/DL (ref 70–99)
GLUCOSE BLDC GLUCOMTR-MCNC: 206 MG/DL (ref 70–99)
MAGNESIUM SERPL-MCNC: 1.6 MG/DL (ref 1.7–2.3)
POTASSIUM SERPL-SCNC: 4.1 MMOL/L (ref 3.4–5.3)

## 2023-05-15 PROCEDURE — 250N000013 HC RX MED GY IP 250 OP 250 PS 637: Performed by: INTERNAL MEDICINE

## 2023-05-15 PROCEDURE — 36415 COLL VENOUS BLD VENIPUNCTURE: CPT | Performed by: HOSPITALIST

## 2023-05-15 PROCEDURE — 99233 SBSQ HOSP IP/OBS HIGH 50: CPT | Performed by: HOSPITALIST

## 2023-05-15 PROCEDURE — 250N000013 HC RX MED GY IP 250 OP 250 PS 637: Performed by: NURSE PRACTITIONER

## 2023-05-15 PROCEDURE — 250N000013 HC RX MED GY IP 250 OP 250 PS 637: Performed by: HOSPITALIST

## 2023-05-15 PROCEDURE — 83735 ASSAY OF MAGNESIUM: CPT | Performed by: HOSPITALIST

## 2023-05-15 PROCEDURE — 120N000001 HC R&B MED SURG/OB

## 2023-05-15 PROCEDURE — 84132 ASSAY OF SERUM POTASSIUM: CPT | Performed by: HOSPITALIST

## 2023-05-15 PROCEDURE — 250N000013 HC RX MED GY IP 250 OP 250 PS 637

## 2023-05-15 RX ADMIN — MIRTAZAPINE 15 MG: 15 TABLET, FILM COATED ORAL at 20:36

## 2023-05-15 RX ADMIN — RISPERIDONE 0.5 MG: 0.5 TABLET, ORALLY DISINTEGRATING ORAL at 06:32

## 2023-05-15 RX ADMIN — MELATONIN TAB 3 MG 3 MG: 3 TAB at 20:36

## 2023-05-15 RX ADMIN — GABAPENTIN 300 MG: 300 CAPSULE ORAL at 08:04

## 2023-05-15 RX ADMIN — AMLODIPINE BESYLATE 5 MG: 5 TABLET ORAL at 08:05

## 2023-05-15 RX ADMIN — ATORVASTATIN CALCIUM 40 MG: 40 TABLET, FILM COATED ORAL at 20:36

## 2023-05-15 RX ADMIN — INSULIN GLARGINE 10 UNITS: 100 INJECTION, SOLUTION SUBCUTANEOUS at 10:16

## 2023-05-15 RX ADMIN — RISPERIDONE 0.5 MG: 0.5 TABLET, ORALLY DISINTEGRATING ORAL at 05:01

## 2023-05-15 RX ADMIN — RISPERIDONE 1 MG: 1 TABLET, ORALLY DISINTEGRATING ORAL at 22:16

## 2023-05-15 RX ADMIN — DOXAZOSIN 1 MG: 1 TABLET ORAL at 08:03

## 2023-05-15 RX ADMIN — ASPIRIN 81 MG CHEWABLE TABLET 324 MG: 81 TABLET CHEWABLE at 08:03

## 2023-05-15 RX ADMIN — GABAPENTIN 300 MG: 300 CAPSULE ORAL at 20:36

## 2023-05-15 RX ADMIN — METFORMIN HYDROCHLORIDE 500 MG: 500 TABLET, FILM COATED ORAL at 18:21

## 2023-05-15 RX ADMIN — ACETAMINOPHEN 650 MG: 325 TABLET ORAL at 20:36

## 2023-05-15 RX ADMIN — CARVEDILOL 25 MG: 25 TABLET, FILM COATED ORAL at 08:05

## 2023-05-15 RX ADMIN — CARVEDILOL 25 MG: 25 TABLET, FILM COATED ORAL at 18:21

## 2023-05-15 RX ADMIN — CLOPIDOGREL BISULFATE 75 MG: 75 TABLET ORAL at 08:04

## 2023-05-15 RX ADMIN — LOSARTAN POTASSIUM 75 MG: 50 TABLET, FILM COATED ORAL at 08:05

## 2023-05-15 RX ADMIN — METFORMIN HYDROCHLORIDE 500 MG: 500 TABLET, FILM COATED ORAL at 08:04

## 2023-05-15 RX ADMIN — FINASTERIDE 5 MG: 5 TABLET, FILM COATED ORAL at 08:04

## 2023-05-15 RX ADMIN — INSULIN ASPART 2 UNITS: 100 INJECTION, SOLUTION INTRAVENOUS; SUBCUTANEOUS at 14:13

## 2023-05-15 ASSESSMENT — ACTIVITIES OF DAILY LIVING (ADL)
ADLS_ACUITY_SCORE: 47
ADLS_ACUITY_SCORE: 51
ADLS_ACUITY_SCORE: 47
ADLS_ACUITY_SCORE: 51
ADLS_ACUITY_SCORE: 47
ADLS_ACUITY_SCORE: 51

## 2023-05-15 NOTE — PROGRESS NOTES
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 Limited neuro exam d/t language barrier. Alert but lethargic at times, can be aroused by voice. Pt cannot be understood by jabber . Follows some commands. R facial droop, dysarthria, generalized weakness. VSS on RA. Tele SR w/ 1st deg AV block. Pureed diet, mildly liquids. Takes pills crushed in pudding. Up with Ax2. Incontinent of B&B. FLACC = 0. Pt scoring yellow on the Aggression Stop Light Tool for agitation and restlessness. PRN trazodone given at bedtime to promote sleep/wake cycle, PRN risperidone given for agitation. Discharge to TCU pending placement.

## 2023-05-15 NOTE — PLAN OF CARE
Goal Outcome Evaluation:      Plan of Care Reviewed With: patient, child      Patient here with confusion, stroke. Alert, oriented to self. Patient is American speaking,  used for assessments. Is able to make some needs known. Neuro's with R facial droop, VSS. Up with assist of 2 with CLEM and Marysol Burns, went to bathroom, had BM this afternoon. Incontinent of bladder. On a level 4 diet with thickened liquids, refused breakfast but had a good appetite for lunch and dinner. Family brought food for dinner. Ok for patient to not have IV access. Patient took off telemetry box earlier today. Family at bedside, patient interactive and pleasant. Plan to go to TCU at discharge.

## 2023-05-15 NOTE — PROGRESS NOTES
Care Management Follow Up    Length of Stay (days): 11    Expected Discharge Date: 05/18/2023     Concerns to be Addressed:       Patient plan of care discussed at interdisciplinary rounds: Yes    Anticipated Discharge Disposition:       Anticipated Discharge Services:    Anticipated Discharge DME:      Patient/family educated on Medicare website which has current facility and service quality ratings:    Education Provided on the Discharge Plan:    Patient/Family in Agreement with the Plan:      Referrals Placed by CM/SW:    Private pay costs discussed: Not applicable    Additional Information:  SW spoke with daughter Hattie who indicated we need to get patient to TCU asap and would like [facility]. SW explained that patient will need to be sitter free for 24 hours.      KENNETH Preciado    [facility]

## 2023-05-15 NOTE — PROGRESS NOTES
Care Management Follow Up    Length of Stay (days): 11    Expected Discharge Date: 05/18/2023     Concerns to be Addressed:       Patient plan of care discussed at interdisciplinary rounds: Yes    Anticipated Discharge Disposition:  TCU vs Home w/ HHC     Anticipated Discharge Services:    Anticipated Discharge DME:      Patient/family educated on Medicare website which has current facility and service quality ratings:    Education Provided on the Discharge Plan:    Patient/Family in Agreement with the Plan:      Referrals Placed by CM/SW:    Private pay costs discussed:  Additional Information:  Received call from VICKI Mike  [phone], wanting a status update.  She notes she had received call from Patient's wife Tracee who is concerned she is not getting updates.  Reviewed that notes stated to talk with Adolfo/Hattie.    Cee notes that family now wants Pt to go to [facility] as that is closer to their house and they have been there before.  Reviewed that no referrals have been sent as Pt still has a sitter.  Will update SW to discuss when ready to send referrals.    Bedside nurse updated that family (particular wife) would like update.  If calling wife will need to use .     Cee is available to assist with discharge planning.    CHATA updated         Nidia Becerril, RN

## 2023-05-16 ENCOUNTER — APPOINTMENT (OUTPATIENT)
Dept: OCCUPATIONAL THERAPY | Facility: CLINIC | Age: 88
DRG: 065 | End: 2023-05-16
Attending: HOSPITALIST
Payer: COMMERCIAL

## 2023-05-16 ENCOUNTER — APPOINTMENT (OUTPATIENT)
Dept: PHYSICAL THERAPY | Facility: CLINIC | Age: 88
DRG: 065 | End: 2023-05-16
Attending: HOSPITALIST
Payer: COMMERCIAL

## 2023-05-16 ENCOUNTER — APPOINTMENT (OUTPATIENT)
Dept: SPEECH THERAPY | Facility: CLINIC | Age: 88
DRG: 065 | End: 2023-05-16
Attending: HOSPITALIST
Payer: COMMERCIAL

## 2023-05-16 LAB
GLUCOSE BLDC GLUCOMTR-MCNC: 117 MG/DL (ref 70–99)
GLUCOSE BLDC GLUCOMTR-MCNC: 148 MG/DL (ref 70–99)
GLUCOSE BLDC GLUCOMTR-MCNC: 150 MG/DL (ref 70–99)
GLUCOSE BLDC GLUCOMTR-MCNC: 248 MG/DL (ref 70–99)
MAGNESIUM SERPL-MCNC: 1.6 MG/DL (ref 1.7–2.3)
POTASSIUM SERPL-SCNC: 4.6 MMOL/L (ref 3.4–5.3)

## 2023-05-16 PROCEDURE — 250N000013 HC RX MED GY IP 250 OP 250 PS 637

## 2023-05-16 PROCEDURE — 36415 COLL VENOUS BLD VENIPUNCTURE: CPT | Performed by: HOSPITALIST

## 2023-05-16 PROCEDURE — 250N000013 HC RX MED GY IP 250 OP 250 PS 637: Performed by: NURSE PRACTITIONER

## 2023-05-16 PROCEDURE — 250N000013 HC RX MED GY IP 250 OP 250 PS 637: Performed by: INTERNAL MEDICINE

## 2023-05-16 PROCEDURE — 99232 SBSQ HOSP IP/OBS MODERATE 35: CPT | Performed by: HOSPITALIST

## 2023-05-16 PROCEDURE — 97535 SELF CARE MNGMENT TRAINING: CPT | Mod: GO

## 2023-05-16 PROCEDURE — 97110 THERAPEUTIC EXERCISES: CPT | Mod: GP

## 2023-05-16 PROCEDURE — 92526 ORAL FUNCTION THERAPY: CPT | Mod: GN | Performed by: SPEECH-LANGUAGE PATHOLOGIST

## 2023-05-16 PROCEDURE — 250N000013 HC RX MED GY IP 250 OP 250 PS 637: Performed by: HOSPITALIST

## 2023-05-16 PROCEDURE — 84132 ASSAY OF SERUM POTASSIUM: CPT | Performed by: HOSPITALIST

## 2023-05-16 PROCEDURE — 83735 ASSAY OF MAGNESIUM: CPT | Performed by: HOSPITALIST

## 2023-05-16 PROCEDURE — 120N000001 HC R&B MED SURG/OB

## 2023-05-16 PROCEDURE — 97530 THERAPEUTIC ACTIVITIES: CPT | Mod: GP

## 2023-05-16 PROCEDURE — 97530 THERAPEUTIC ACTIVITIES: CPT | Mod: GO

## 2023-05-16 RX ORDER — WATER 10 ML/10ML
INJECTION INTRAMUSCULAR; INTRAVENOUS; SUBCUTANEOUS
Status: DISPENSED
Start: 2023-05-16 | End: 2023-05-16

## 2023-05-16 RX ADMIN — METFORMIN HYDROCHLORIDE 500 MG: 500 TABLET, FILM COATED ORAL at 09:24

## 2023-05-16 RX ADMIN — METFORMIN HYDROCHLORIDE 500 MG: 500 TABLET, FILM COATED ORAL at 17:53

## 2023-05-16 RX ADMIN — BROMFENAC 1 DROP: 1.03 SOLUTION/ DROPS OPHTHALMIC at 10:39

## 2023-05-16 RX ADMIN — ATORVASTATIN CALCIUM 40 MG: 40 TABLET, FILM COATED ORAL at 20:30

## 2023-05-16 RX ADMIN — LOSARTAN POTASSIUM 75 MG: 50 TABLET, FILM COATED ORAL at 09:24

## 2023-05-16 RX ADMIN — MIRTAZAPINE 15 MG: 15 TABLET, FILM COATED ORAL at 20:30

## 2023-05-16 RX ADMIN — CLOPIDOGREL BISULFATE 75 MG: 75 TABLET ORAL at 09:25

## 2023-05-16 RX ADMIN — GABAPENTIN 300 MG: 300 CAPSULE ORAL at 20:30

## 2023-05-16 RX ADMIN — ASPIRIN 81 MG CHEWABLE TABLET 324 MG: 81 TABLET CHEWABLE at 09:24

## 2023-05-16 RX ADMIN — MELATONIN TAB 3 MG 3 MG: 3 TAB at 20:30

## 2023-05-16 RX ADMIN — CARVEDILOL 25 MG: 25 TABLET, FILM COATED ORAL at 17:53

## 2023-05-16 RX ADMIN — RISPERIDONE 0.5 MG: 0.5 TABLET, ORALLY DISINTEGRATING ORAL at 09:29

## 2023-05-16 RX ADMIN — FINASTERIDE 5 MG: 5 TABLET, FILM COATED ORAL at 09:24

## 2023-05-16 RX ADMIN — ACETAMINOPHEN 650 MG: 325 TABLET ORAL at 20:30

## 2023-05-16 RX ADMIN — INSULIN ASPART 2 UNITS: 100 INJECTION, SOLUTION INTRAVENOUS; SUBCUTANEOUS at 17:54

## 2023-05-16 RX ADMIN — INSULIN ASPART 6 UNITS: 100 INJECTION, SOLUTION INTRAVENOUS; SUBCUTANEOUS at 09:37

## 2023-05-16 RX ADMIN — AMLODIPINE BESYLATE 5 MG: 5 TABLET ORAL at 09:24

## 2023-05-16 RX ADMIN — DOXAZOSIN 1 MG: 1 TABLET ORAL at 09:24

## 2023-05-16 RX ADMIN — INSULIN GLARGINE 10 UNITS: 100 INJECTION, SOLUTION SUBCUTANEOUS at 09:37

## 2023-05-16 RX ADMIN — TRAZODONE HYDROCHLORIDE 25 MG: 50 TABLET ORAL at 01:04

## 2023-05-16 RX ADMIN — CARVEDILOL 25 MG: 25 TABLET, FILM COATED ORAL at 09:24

## 2023-05-16 RX ADMIN — GABAPENTIN 300 MG: 300 CAPSULE ORAL at 09:24

## 2023-05-16 ASSESSMENT — ACTIVITIES OF DAILY LIVING (ADL)
ADLS_ACUITY_SCORE: 47
ADLS_ACUITY_SCORE: 51
ADLS_ACUITY_SCORE: 47
ADLS_ACUITY_SCORE: 51
ADLS_ACUITY_SCORE: 45
ADLS_ACUITY_SCORE: 47
ADLS_ACUITY_SCORE: 47
ADLS_ACUITY_SCORE: 51
ADLS_ACUITY_SCORE: 51
ADLS_ACUITY_SCORE: 45

## 2023-05-16 NOTE — PLAN OF CARE
Reason for Admission: CVA, dementia w behavioral disturbance    Cognitive/Mentation: A/Ox self. Difficult to assess d/t slurred speech  Neuros/CMS: Intact ex R droop, confusion, slurred speech, inconsistent w command following. Nepalese  via jabber used, follows commands when prompted by  but  unable to understand patients slurred speech.   VS: stable on RA.   Tele: patient removed, unable to reapply.  GI: BS active, passing flatus, last BM 5/15. Incontinent.  : Incontinent.   Pulmonary: LS clear.  Pain: denies. No non-verbal indicators of pain.    Drains/Lines: MD ok for no PIV.   Skin: Scattered bruising, scab/abrasion on L buttock.   Activity: Assist x 2 with teresita steady & GB.  Diet: Pureed with mildly thick liquids. Takes pills crushed in pudding.     Therapies recs: LTC vs TCU  Discharge: pending    Aggression Stoplight Tool: yellow d/t confusion/uncooperativeness     End of shift summary: Sitter at bedside for patient safety.

## 2023-05-16 NOTE — PLAN OF CARE
Goal Outcome Evaluation:      Plan of Care Reviewed With: patient, child          Patient here with confusion, stroke. Alert, oriented to self and time knows it is 2023 calm pleasant. Patient is Mexican speaking,  used for assessments. Participating in assesment and able to make needs known. Neuro's with R facial droop, left tongue deviation generalized weakness. VSS. Up with assist of 2 with GB and Marysol Steady. Incontinent of bladder. On a level 4 diet with thickened liquids, good appetite. Takes pills crushed with pudding. Son at bedside this afternoon. He would like for patient to go to TCU and once he is able to transfer with assist of one for patient to go home. Son very involved in patient's care. Educated him on letting staff assist patient with transfer.      Bedside attendant discontinued at 17:45.

## 2023-05-16 NOTE — PROGRESS NOTES
[facility]    Medicine Progress Note - Hospitalist Service    Date of Admission:  5/4/2023    Assessment & Plan   Adam Huber is a 87 year old male  With PMH of HTN, CAD, CVA, DM type 2, TBI, dementia- admitted on 5/4/2023 with slurred speech concerning for a stroke.      Acute/subacute CVA of the right asia and left cerebellar hemisphere [posterior circulation ischemic strokes]  5/13, most likely thromboembolic  Dementia with behavioral disturbance  Transient alteration of consciousness  Anxiety and depression  History of TBI  - he lives with his wife, has underlying cognitive impairment; has PCA as per family; Zambian speaking  - initial concern for stroke  - Stroke Neuro initially followed  - HEAD CT: With extensive areas of encephalomalacic changes and gliosis on frontal and temporal lobe, chronic infarct in basal ganglia and corona radiata.  -CTA head and neck multifocal areas of moderate or high-grade stenosis throughout anterior and posterior circulation.  Heavy atherosclerotic plaque left carotid bulb/bifurcation with at least 50% stenosis by modified NASCET criteria. Moderate predominantly noncalcified plaque right carotid bulb/bifurcation without significant stenosis by modified NASCET criteria. No findings for vertebral artery dissection.   MRI brain 5/4:  Motion limited examination equivocal DWI hyperintensity at the right asia. This may represent artifact or acute ischemia.  Severe bifrontal encephalomalacia  - PTA Aspirin was increased from 81 mg to 325 mg po daily  - lipid profile- LDL 90, total cholest 232, HDL 66  - started on Lipitor 40 mg po at bedtime  - Echo - EF 60-65%. No clear regional wall motion abnormalities noted.  No thrombus or cardiac etiology of stroke  - EEG- no seizures   -Continue to monitor in telemetry while in the hospital, neurology recommending cardiac event monitor after discharge  -Code stroke called 5/13, MRI brain showed multiple infarcts in  right asia, medial left cerebellar hemisphere.  No convincing acute infarct by CT; CTA head and neck with widespread intracranial arthrosclerotic plaque, 50-60% stenosis of the left ICA  -Appreciate neurology recommendations, concern for thromboembolic etiology given known extensive arthrosclerotic disease; started on DAPT with aspirin 325 Mg indefinitely daily, Plavix 75 Mg daily x90 days will need 30-day cardiac event monitor on discharge with follow-up in 6 to 8 weeks  -Evaluated by vascular surgery for CAD, no role for carotid intervention as current stroke are not in the geographical distribution from left ICA disease  -PTA was on scheduled Zyprexa, Seroquel and Remeron, as needed Seroquel available.  Due to ongoing agitation psychiatry was consulted  -  Appreciate psychiatry input, evaluated on 5/11/2023-recommended discontinuing Seroquel and Zyprexa and started on risperidone ODT 0.5 mg in the morning and 1 mg at bedtime to help reduce dementia related agitation with risperidone ODT 0.5 mg twice daily as needed for agitation.  Zyprexa IM 2.5 mg as a backup option or unable to take p.o.  -Scheduled melatonin 3 mg at 8:00 to help with circadian rhythm maintenance  -Delirium precautions, avoid interruptions during the night as much as possible, allow family visits, reorientation, use  services as much as possible, allow/maintain sleep-wake cycle.    - PT/OT- rec TCU; finding a TCU might be challenging because of language barrier and his intermittent agitation; needs to be without sitter for 24h,  following  - follow up with PCP in 1-2 weeks  - follow up with General Neuro in 6-8 weeks     Hypomagnesemia  -Being replaced per protocol     Goals of care  -Full code as per wife and family's request     HTN  - PTA on Coreg 25 mg po BID and losartan 50 mg po daily   - BP on higher side; BP goal- normotension  -Currently on amlodipine 5 mg daily, Coreg 25 mg twice daily losartan 75 mg daily  - BP  "mostly better controlled, intermittently elevated, likely related to agitation  -Monitor for persistently elevated blood pressure and adjust medication as needed     Oral thrush  -Nystatin swish and spit     BPH   -PTA finasteride and Flomax      DM type 2, uncontrolled  - PTA metformin 500 mg qam and 1000 mg qpm   - Hb A1c 9.6  -Currently on metformin 500 mg twice daily with high resistance SSI and Premeal insulin 1 unit per 15 g carb in addition to Lantus 10 units  -Blood sugar today less than 200, will monitor his blood sugar and adjust medication as needed     Neuropathy  -Continue PTA gabapentin         Diet: Room Service  Snacks/Supplements Adult: Other; Ensure with breakfast (RD); With Meals  Combination Diet Moderate Consistent Carb (60 g CHO per Meal) Diet; Pureed Diet (level 4); Mildly Thick (level 2)    DVT Prophylaxis: Pneumatic Compression Devices  Villa Catheter: Not present  Lines: None     Cardiac Monitoring: ACTIVE order. Indication: Tachyarrhythmias, acute (48 hours)  Code Status: Full Code      Clinically Significant Risk Factors            # Hypomagnesemia: Lowest Mg = 1.6 mg/dL in last 2 days, will replace as needed   # Hypoalbuminemia: Lowest albumin = 3.4 g/dL at 5/14/2023  2:21 PM, will monitor as appropriate     # Hypertension: Noted on problem list     # Dementia: noted on problem list   # DMII: A1C = 9.6 % (Ref range: <5.7 %) within past 6 months   # Overweight: Estimated body mass index is 28.53 kg/m  as calculated from the following:    Height as of this encounter: 1.778 m (5' 10\").    Weight as of this encounter: 90.2 kg (198 lb 13.7 oz).           Disposition Plan      Expected Discharge Date: 05/18/2023    Discharge Delays: 1:1 Sitter still ordered - MD to assess  Placement - TCU              Dina Baker MD  Hospitalist Service  [facility]  Securely message with atVenu (more info)  Text page via [facility] Paging/Directory "   ______________________________________________________________________    Interval History   Patient seen and examined at bedside.  He was sitting in chair, seems somewhat sleepy, did not make any meaningful interaction.  Discussed with RN who notes that he did say good morning to her earlier.  He also did eat his meal.  Difficulties communicating given language barrier.  Also has history of dementia at baseline.  Tends to be physically impulsive.    Physical Exam   Vital Signs: Temp: 97.2  F (36.2  C) Temp src: Axillary BP: (!) 145/66 Pulse: 64   Resp: 16 SpO2: 98 % O2 Device: None (Room air)    Weight: 198 lbs 13.68 oz    Constitutional: no distress, cooperative, appears somewhat sleepy  Cardiovascular: Regular rate and rhythm  Respiratory: clear bilaterally, no crackles, wheezing or rales  Gastrointestinal: Abdomen soft, non-tender. BS normal. No masses.  Skin: warm, dry, intact, no edema  Neuro: Somnolent but arousable, slurred speech, right facial droop       Medical Decision Making       40 MINUTES SPENT BY ME on the date of service doing chart review, history, exam, documentation & further activities per the note.      Data   NOTE: Data reviewed over the past 24 hrs contributes toward MDM complexity

## 2023-05-16 NOTE — PROGRESS NOTES
[facility]    Medicine Progress Note - Hospitalist Service    Date of Admission:  5/4/2023    Assessment & Plan   Adam Huber is a 87 year old male  With PMH of HTN, CAD, CVA, DM type 2, TBI, dementia- admitted on 5/4/2023 with slurred speech concerning for a stroke.      Acute/subacute CVA of the right asia and left cerebellar hemisphere [posterior circulation ischemic strokes]  5/13, most likely thromboembolic  Dementia with behavioral disturbance  Transient alteration of consciousness  Anxiety and depression  History of TBI  - he lives with his wife, has underlying cognitive impairment; has PCA as per family; Palauan speaking  - initial concern for stroke  - Stroke Neuro initially followed  - HEAD CT: With extensive areas of encephalomalacic changes and gliosis on frontal and temporal lobe, chronic infarct in basal ganglia and corona radiata.  -CTA head and neck multifocal areas of moderate or high-grade stenosis throughout anterior and posterior circulation.  Heavy atherosclerotic plaque left carotid bulb/bifurcation with at least 50% stenosis by modified NASCET criteria. Moderate predominantly noncalcified plaque right carotid bulb/bifurcation without significant stenosis by modified NASCET criteria. No findings for vertebral artery dissection.   MRI brain 5/4:  Motion limited examination equivocal DWI hyperintensity at the right asia. This may represent artifact or acute ischemia.  Severe bifrontal encephalomalacia  - PTA Aspirin was increased from 81 mg to 325 mg po daily  - lipid profile- LDL 90, total cholest 232, HDL 66  - started on Lipitor 40 mg po at bedtime  - Echo - EF 60-65%. No clear regional wall motion abnormalities noted.  No thrombus or cardiac etiology of stroke  - EEG- no seizures   -Continue to monitor in telemetry while in the hospital, neurology recommending cardiac event monitor after discharge  -Code stroke called 5/13, MRI brain showed multiple infarcts in  right asia, medial left cerebellar hemisphere.  No convincing acute infarct by CT; CTA head and neck with widespread intracranial arthrosclerotic plaque, 50-60% stenosis of the left ICA  -Appreciate neurology recommendations, concern for thromboembolic etiology given known extensive arthrosclerotic disease; started on DAPT with aspirin 325 Mg indefinitely daily, Plavix 75 Mg daily x90 days will need 30-day cardiac event monitor on discharge with follow-up in 6 to 8 weeks  -Evaluated by vascular surgery for CAD, no role for carotid intervention as current stroke are not in the geographical distribution from left ICA disease  -PTA was on scheduled Zyprexa, Seroquel and Remeron, as needed Seroquel available.  Due to ongoing agitation psychiatry was consulted  -  Appreciate psychiatry input, evaluated on 5/11/2023-recommended discontinuing Seroquel and Zyprexa and started on risperidone ODT 0.5 mg in the morning and 1 mg at bedtime to help reduce dementia related agitation with risperidone ODT 0.5 mg twice daily as needed for agitation.  Zyprexa IM 2.5 mg as a backup option or unable to take p.o.  -Scheduled melatonin 3 mg at 8:00 to help with circadian rhythm maintenance  -Delirium precautions, avoid interruptions during the night as much as possible, allow family visits, reorientation, use  services as much as possible, allow/maintain sleep-wake cycle.    - PT/OT- rec TCU; finding a TCU might be challenging because of language barrier and his intermittent agitation; needs to be without sitter for 24h,  following  - follow up with PCP in 1-2 weeks  - follow up with General Neuro in 6-8 weeks  -Discussed at length with patient's son Adolfo at bedside on 5/16.  Adolfo plans to take patient home in the next day or 2 pending progress with therapies.  Patient lives at home with his wife.  Adolfo acknowledges that it would be very challenging for patient to go to TCU secondary to language barrier and is  "afraid that patient will get more confused and depressed in that environment.     Hypomagnesemia  -Being replaced per protocol     Goals of care  -Full code as per wife and family's request     HTN  - PTA on Coreg 25 mg po BID and losartan 50 mg po daily   - BP on higher side; BP goal- normotension  -Currently on amlodipine 5 mg daily, Coreg 25 mg twice daily losartan 75 mg daily  - BP mostly better controlled, intermittently elevated, likely related to agitation  -Monitor for persistently elevated blood pressure and adjust medication as needed     Oral thrush  -Nystatin swish and spit     BPH   -PTA finasteride and Flomax      DM type 2, uncontrolled  - PTA metformin 500 mg qam and 1000 mg qpm   - Hb A1c 9.6  -Currently on metformin 500 mg twice daily with high resistance SSI and Premeal insulin 1 unit per 15 g carb in addition to Lantus 10 units  - monitor his blood sugar and adjust medication as needed     Neuropathy  -Continue PTA gabapentin         Diet: Room Service  Combination Diet Moderate Consistent Carb (60 g CHO per Meal) Diet; Pureed Diet (level 4); Mildly Thick (level 2)    DVT Prophylaxis: Pneumatic Compression Devices  Villa Catheter: Not present  Lines: None     Cardiac Monitoring: ACTIVE order. Indication: Tachyarrhythmias, acute (48 hours)  Code Status: Full Code      Clinically Significant Risk Factors            # Hypomagnesemia: Lowest Mg = 1.6 mg/dL in last 2 days, will replace as needed   # Hypoalbuminemia: Lowest albumin = 3.4 g/dL at 5/14/2023  2:21 PM, will monitor as appropriate     # Hypertension: Noted on problem list     # Dementia: noted on problem list   # DMII: A1C = 9.6 % (Ref range: <5.7 %) within past 6 months   # Overweight: Estimated body mass index is 28.53 kg/m  as calculated from the following:    Height as of this encounter: 1.778 m (5' 10\").    Weight as of this encounter: 90.2 kg (198 lb 13.7 oz).           Disposition Plan     Expected Discharge Date: 05/18/2023  "   Discharge Delays: 1:1 Sitter still ordered - MD to assess  Placement - TCU              Dina Baker MD  Hospitalist Service  [facility]  Securely message with [employer] (more info)  Text page via [employer] Paging/Directory   ______________________________________________________________________    Interval History   Patient seen and examined at bedside.  He was sitting in chair, tried communicating with patient with iPad  but patient was not answering questions appropriately.  Nursing noted that patient seemed to do better with in person  earlier in the day.    Physical Exam   Vital Signs: Temp: (!) 96.4  F (35.8  C) Temp src: Axillary BP: 113/58 Pulse: 66   Resp: 18 SpO2: 97 % O2 Device: None (Room air)    Weight: 198 lbs 13.68 oz    Constitutional: no distress, cooperative, appears somewhat sleepy  Cardiovascular: Regular rate and rhythm  Respiratory: clear bilaterally, no crackles, wheezing or rales  Gastrointestinal: Abdomen soft, non-tender. BS normal. No masses.  Skin: warm, dry, intact, no edema  Neuro: Somnolent but arousable, slurred speech, right facial droop       Medical Decision Making       Data   NOTE: Data reviewed over the past 24 hrs contributes toward MDM complexity

## 2023-05-17 ENCOUNTER — APPOINTMENT (OUTPATIENT)
Dept: PHYSICAL THERAPY | Facility: CLINIC | Age: 88
DRG: 065 | End: 2023-05-17
Attending: HOSPITALIST
Payer: COMMERCIAL

## 2023-05-17 ENCOUNTER — APPOINTMENT (OUTPATIENT)
Dept: SPEECH THERAPY | Facility: CLINIC | Age: 88
DRG: 065 | End: 2023-05-17
Attending: HOSPITALIST
Payer: COMMERCIAL

## 2023-05-17 ENCOUNTER — APPOINTMENT (OUTPATIENT)
Dept: OCCUPATIONAL THERAPY | Facility: CLINIC | Age: 88
DRG: 065 | End: 2023-05-17
Attending: HOSPITALIST
Payer: COMMERCIAL

## 2023-05-17 LAB
GLUCOSE BLDC GLUCOMTR-MCNC: 113 MG/DL (ref 70–99)
GLUCOSE BLDC GLUCOMTR-MCNC: 131 MG/DL (ref 70–99)
GLUCOSE BLDC GLUCOMTR-MCNC: 163 MG/DL (ref 70–99)
GLUCOSE BLDC GLUCOMTR-MCNC: 244 MG/DL (ref 70–99)
MAGNESIUM SERPL-MCNC: 1.5 MG/DL (ref 1.7–2.3)
POTASSIUM SERPL-SCNC: 4.3 MMOL/L (ref 3.4–5.3)

## 2023-05-17 PROCEDURE — 83735 ASSAY OF MAGNESIUM: CPT | Performed by: HOSPITALIST

## 2023-05-17 PROCEDURE — 250N000013 HC RX MED GY IP 250 OP 250 PS 637: Performed by: NURSE PRACTITIONER

## 2023-05-17 PROCEDURE — 36415 COLL VENOUS BLD VENIPUNCTURE: CPT | Performed by: HOSPITALIST

## 2023-05-17 PROCEDURE — 84132 ASSAY OF SERUM POTASSIUM: CPT | Performed by: HOSPITALIST

## 2023-05-17 PROCEDURE — 99232 SBSQ HOSP IP/OBS MODERATE 35: CPT | Performed by: HOSPITALIST

## 2023-05-17 PROCEDURE — 250N000013 HC RX MED GY IP 250 OP 250 PS 637: Performed by: INTERNAL MEDICINE

## 2023-05-17 PROCEDURE — 92526 ORAL FUNCTION THERAPY: CPT | Mod: GN

## 2023-05-17 PROCEDURE — 250N000013 HC RX MED GY IP 250 OP 250 PS 637: Performed by: HOSPITALIST

## 2023-05-17 PROCEDURE — 250N000013 HC RX MED GY IP 250 OP 250 PS 637

## 2023-05-17 PROCEDURE — 97530 THERAPEUTIC ACTIVITIES: CPT | Mod: GO

## 2023-05-17 PROCEDURE — 97530 THERAPEUTIC ACTIVITIES: CPT | Mod: GP

## 2023-05-17 PROCEDURE — 120N000001 HC R&B MED SURG/OB

## 2023-05-17 RX ORDER — MAGNESIUM SULFATE HEPTAHYDRATE 40 MG/ML
4 INJECTION, SOLUTION INTRAVENOUS ONCE
Status: DISCONTINUED | OUTPATIENT
Start: 2023-05-17 | End: 2023-05-17

## 2023-05-17 RX ORDER — MAGNESIUM OXIDE 400 MG/1
400 TABLET ORAL 2 TIMES DAILY
Status: COMPLETED | OUTPATIENT
Start: 2023-05-17 | End: 2023-05-18

## 2023-05-17 RX ADMIN — INSULIN ASPART 4 UNITS: 100 INJECTION, SOLUTION INTRAVENOUS; SUBCUTANEOUS at 12:57

## 2023-05-17 RX ADMIN — MIRTAZAPINE 15 MG: 15 TABLET, FILM COATED ORAL at 20:22

## 2023-05-17 RX ADMIN — LOSARTAN POTASSIUM 75 MG: 50 TABLET, FILM COATED ORAL at 09:56

## 2023-05-17 RX ADMIN — AMLODIPINE BESYLATE 5 MG: 5 TABLET ORAL at 09:56

## 2023-05-17 RX ADMIN — ATORVASTATIN CALCIUM 40 MG: 40 TABLET, FILM COATED ORAL at 20:22

## 2023-05-17 RX ADMIN — METFORMIN HYDROCHLORIDE 500 MG: 500 TABLET, FILM COATED ORAL at 18:05

## 2023-05-17 RX ADMIN — METFORMIN HYDROCHLORIDE 500 MG: 500 TABLET, FILM COATED ORAL at 09:56

## 2023-05-17 RX ADMIN — GABAPENTIN 300 MG: 300 CAPSULE ORAL at 20:22

## 2023-05-17 RX ADMIN — Medication 400 MG: at 20:22

## 2023-05-17 RX ADMIN — Medication 400 MG: at 13:00

## 2023-05-17 RX ADMIN — ACETAMINOPHEN 650 MG: 325 TABLET ORAL at 20:22

## 2023-05-17 RX ADMIN — CARVEDILOL 25 MG: 25 TABLET, FILM COATED ORAL at 09:56

## 2023-05-17 RX ADMIN — RISPERIDONE 0.5 MG: 0.5 TABLET, ORALLY DISINTEGRATING ORAL at 09:57

## 2023-05-17 RX ADMIN — INSULIN GLARGINE 10 UNITS: 100 INJECTION, SOLUTION SUBCUTANEOUS at 09:53

## 2023-05-17 RX ADMIN — CLOPIDOGREL BISULFATE 75 MG: 75 TABLET ORAL at 09:56

## 2023-05-17 RX ADMIN — CARVEDILOL 25 MG: 25 TABLET, FILM COATED ORAL at 18:05

## 2023-05-17 RX ADMIN — DOXAZOSIN 1 MG: 1 TABLET ORAL at 09:56

## 2023-05-17 RX ADMIN — BROMFENAC 1 DROP: 1.03 SOLUTION/ DROPS OPHTHALMIC at 10:13

## 2023-05-17 RX ADMIN — FINASTERIDE 5 MG: 5 TABLET, FILM COATED ORAL at 09:57

## 2023-05-17 RX ADMIN — INSULIN ASPART 4 UNITS: 100 INJECTION, SOLUTION INTRAVENOUS; SUBCUTANEOUS at 18:45

## 2023-05-17 RX ADMIN — INSULIN ASPART 4 UNITS: 100 INJECTION, SOLUTION INTRAVENOUS; SUBCUTANEOUS at 10:03

## 2023-05-17 RX ADMIN — MELATONIN TAB 3 MG 3 MG: 3 TAB at 20:22

## 2023-05-17 RX ADMIN — GABAPENTIN 300 MG: 300 CAPSULE ORAL at 09:56

## 2023-05-17 RX ADMIN — ASPIRIN 81 MG CHEWABLE TABLET 325 MG: 81 TABLET CHEWABLE at 09:56

## 2023-05-17 ASSESSMENT — ACTIVITIES OF DAILY LIVING (ADL)
ADLS_ACUITY_SCORE: 51
ADLS_ACUITY_SCORE: 52

## 2023-05-17 NOTE — PLAN OF CARE
Reason for Admission: CVA, dementia w behavioral disturbance    Cognitive/Mentation: A/Ox self & place, knows it is 2023. Difficult to assess d/t slurred speech/language barrier.   Neuros/CMS: Intact ex R droop, confusion, slurred speech, inconsistent w command following. Palestinian  via jabber used, follows commands when prompted by  but  unable to understand patients slurred speech.   VS: stable on RA.   GI: BS active, passing flatus, last BM 5/16. Incontinent.  : Incontinent.   Pulmonary: LS clear.  Pain: denies. No non-verbal indicators of pain.    Drains/Lines: MD ok for no PIV.   Skin: Scattered bruising, scab/abrasion on L buttock.   Activity: Assist x 2 with teresita steady & GB.  Diet: Pureed with mildly thick liquids. Takes pills crushed in pudding.     Therapies recs: TCU    Aggression Stoplight Tool: yellow d/t confusion/uncooperativeness @ times.    End of shift summary: No bedside attendant needed overnight. Deferred overnight assessments to allow for sleep.

## 2023-05-17 NOTE — PROGRESS NOTES
[facility]    Medicine Progress Note - Hospitalist Service    Date of Admission:  5/4/2023    Assessment & Plan   Adam Huber is a 87 year old male  With PMH of HTN, CAD, CVA, DM type 2, TBI, dementia- admitted on 5/4/2023 with slurred speech concerning for a stroke.      Acute/subacute CVA of the right asia and left cerebellar hemisphere [posterior circulation ischemic strokes]  5/13, most likely thromboembolic  Dementia with behavioral disturbance  Transient alteration of consciousness  Anxiety and depression  History of TBI  - he lives with his wife, has underlying cognitive impairment; has PCA as per family; Kuwaiti speaking  - initial concern for stroke  - Stroke Neuro initially followed  - HEAD CT: With extensive areas of encephalomalacic changes and gliosis on frontal and temporal lobe, chronic infarct in basal ganglia and corona radiata.  -CTA head and neck multifocal areas of moderate or high-grade stenosis throughout anterior and posterior circulation.  Heavy atherosclerotic plaque left carotid bulb/bifurcation with at least 50% stenosis by modified NASCET criteria. Moderate predominantly noncalcified plaque right carotid bulb/bifurcation without significant stenosis by modified NASCET criteria. No findings for vertebral artery dissection.   MRI brain 5/4:  Motion limited examination equivocal DWI hyperintensity at the right asia. This may represent artifact or acute ischemia.  Severe bifrontal encephalomalacia  - PTA Aspirin was increased from 81 mg to 325 mg po daily  - lipid profile- LDL 90, total cholest 232, HDL 66  - started on Lipitor 40 mg po at bedtime  - Echo - EF 60-65%. No clear regional wall motion abnormalities noted.  No thrombus or cardiac etiology of stroke  - EEG- no seizures   -Continue to monitor in telemetry while in the hospital, neurology recommending cardiac event monitor after discharge  -Code stroke called 5/13, MRI brain showed multiple infarcts in  right asia, medial left cerebellar hemisphere.  No convincing acute infarct by CT; CTA head and neck with widespread intracranial arthrosclerotic plaque, 50-60% stenosis of the left ICA  -Appreciate neurology recommendations, concern for thromboembolic etiology given known extensive arthrosclerotic disease; started on DAPT with aspirin 325 Mg indefinitely daily, Plavix 75 Mg daily x90 days will need 30-day cardiac event monitor on discharge with follow-up in 6 to 8 weeks  -Evaluated by vascular surgery for CAD, no role for carotid intervention as current stroke are not in the geographical distribution from left ICA disease  -PTA was on scheduled Zyprexa, Seroquel and Remeron, as needed Seroquel available.  Due to ongoing agitation psychiatry was consulted  -  Appreciate psychiatry input, evaluated on 5/11/2023-recommended discontinuing Seroquel and Zyprexa and started on risperidone ODT 0.5 mg in the morning and 1 mg at bedtime to help reduce dementia related agitation with risperidone ODT 0.5 mg twice daily as needed for agitation.  Zyprexa IM 2.5 mg as a backup option or unable to take p.o.  -Scheduled melatonin 3 mg at 8:00 to help with circadian rhythm maintenance  -Delirium precautions, avoid interruptions during the night as much as possible, allow family visits, reorientation, use  services as much as possible, allow/maintain sleep-wake cycle.    - PT/OT- rec TCU; finding a TCU might be challenging because of language barrier and his intermittent agitation; needs to be without sitter for 24h,  following  - follow up with PCP in 1-2 weeks  - follow up with General Neuro in 6-8 weeks     Hypomagnesemia  -Being replaced per protocol     Goals of care  -Full code as per wife and family's request     HTN  - PTA on Coreg 25 mg po BID and losartan 50 mg po daily   - BP on higher side; BP goal- normotension  -Currently on amlodipine 5 mg daily, Coreg 25 mg twice daily losartan 75 mg daily  - BP  mostly better controlled, intermittently elevated, likely related to agitation  -Monitor for persistently elevated blood pressure and adjust medication as needed     Oral thrush  -Nystatin swish and spit     BPH   -PTA finasteride and Flomax      DM type 2, uncontrolled  - PTA metformin 500 mg qam and 1000 mg qpm   - Hb A1c 9.6  -Currently on metformin 500 mg twice daily with high resistance SSI and Premeal insulin 1 unit per 15 g carb in addition to Lantus 10 units  - monitor his blood sugar and adjust medication as needed     Neuropathy  -Continue PTA gabapentin         Diet: Room Service  Combination Diet Moderate Consistent Carb (60 g CHO per Meal) Diet; Pureed Diet (level 4); Mildly Thick (level 2)    DVT Prophylaxis: Pneumatic Compression Devices  Villa Catheter: Not present  Lines: None     Cardiac Monitoring: ACTIVE order. Indication: Tachyarrhythmias, acute (48 hours)  Code Status: Full Code           Disposition Plan      Expected Discharge Date: 05/17/2023    Placement - TCU. Wife agrees. Off 1:1.          Roberto Martinez MD  Hospitalist Service  [facility]  ______________________________________________________________________    Interval History   Patient seen and examined at bedside. Wife at bedside.  Appears comfortable.no reported pain. Slept well per wife. Sitting on the chair.  IPAD used tough challenging (wife helpful).    Physical Exam   Vital Signs: Temp: 97.3  F (36.3  C) Temp src: Axillary BP: 130/57 Pulse: 62   Resp: 18 SpO2: 96 % O2 Device: None (Room air)    Weight: 198 lbs 13.68 oz    Constitutional: no distress, cooperative, appears somewhat sleepy  Cardiovascular: Regular rate and rhythm  Respiratory: clear bilaterally, no crackles, wheezing or rales  Gastrointestinal: Abdomen soft, non-tender. BS normal. No masses.  Skin: warm, dry, intact, no edema  Neuro: Somnolent but arousable, slurred speech, right facial droop       Data   NOTE: Data reviewed over the past  24 hrs contributes toward MDM complexity

## 2023-05-17 NOTE — PROGRESS NOTES
Shift was unremarkable.  He was up in the chair several times. Up with teresita steady, and help with translation from his wife.   VSS; on RA; comfortable. No sitter was used during my shift.   Magnesium P.O. was added for hypomagnesemia.

## 2023-05-18 ENCOUNTER — APPOINTMENT (OUTPATIENT)
Dept: SPEECH THERAPY | Facility: CLINIC | Age: 88
DRG: 065 | End: 2023-05-18
Attending: HOSPITALIST
Payer: COMMERCIAL

## 2023-05-18 ENCOUNTER — APPOINTMENT (OUTPATIENT)
Dept: OCCUPATIONAL THERAPY | Facility: CLINIC | Age: 88
DRG: 065 | End: 2023-05-18
Attending: HOSPITALIST
Payer: COMMERCIAL

## 2023-05-18 ENCOUNTER — APPOINTMENT (OUTPATIENT)
Dept: PHYSICAL THERAPY | Facility: CLINIC | Age: 88
DRG: 065 | End: 2023-05-18
Attending: HOSPITALIST
Payer: COMMERCIAL

## 2023-05-18 LAB
GLUCOSE BLDC GLUCOMTR-MCNC: 106 MG/DL (ref 70–99)
GLUCOSE BLDC GLUCOMTR-MCNC: 162 MG/DL (ref 70–99)
GLUCOSE BLDC GLUCOMTR-MCNC: 200 MG/DL (ref 70–99)
GLUCOSE BLDC GLUCOMTR-MCNC: 224 MG/DL (ref 70–99)
GLUCOSE BLDC GLUCOMTR-MCNC: 271 MG/DL (ref 70–99)
POTASSIUM SERPL-SCNC: 4.3 MMOL/L (ref 3.4–5.3)

## 2023-05-18 PROCEDURE — 250N000013 HC RX MED GY IP 250 OP 250 PS 637

## 2023-05-18 PROCEDURE — 250N000013 HC RX MED GY IP 250 OP 250 PS 637: Performed by: INTERNAL MEDICINE

## 2023-05-18 PROCEDURE — 36415 COLL VENOUS BLD VENIPUNCTURE: CPT | Performed by: HOSPITALIST

## 2023-05-18 PROCEDURE — 84132 ASSAY OF SERUM POTASSIUM: CPT | Performed by: HOSPITALIST

## 2023-05-18 PROCEDURE — 250N000013 HC RX MED GY IP 250 OP 250 PS 637: Performed by: HOSPITALIST

## 2023-05-18 PROCEDURE — 120N000001 HC R&B MED SURG/OB

## 2023-05-18 PROCEDURE — 97530 THERAPEUTIC ACTIVITIES: CPT | Mod: GP

## 2023-05-18 PROCEDURE — 99231 SBSQ HOSP IP/OBS SF/LOW 25: CPT | Performed by: STUDENT IN AN ORGANIZED HEALTH CARE EDUCATION/TRAINING PROGRAM

## 2023-05-18 PROCEDURE — 97110 THERAPEUTIC EXERCISES: CPT | Mod: GP

## 2023-05-18 PROCEDURE — 97535 SELF CARE MNGMENT TRAINING: CPT | Mod: GO

## 2023-05-18 PROCEDURE — 250N000013 HC RX MED GY IP 250 OP 250 PS 637: Performed by: NURSE PRACTITIONER

## 2023-05-18 PROCEDURE — 92526 ORAL FUNCTION THERAPY: CPT | Mod: GN | Performed by: SPEECH-LANGUAGE PATHOLOGIST

## 2023-05-18 RX ADMIN — ASPIRIN 81 MG CHEWABLE TABLET 324 MG: 81 TABLET CHEWABLE at 08:46

## 2023-05-18 RX ADMIN — ATORVASTATIN CALCIUM 40 MG: 40 TABLET, FILM COATED ORAL at 20:26

## 2023-05-18 RX ADMIN — FINASTERIDE 5 MG: 5 TABLET, FILM COATED ORAL at 08:46

## 2023-05-18 RX ADMIN — GABAPENTIN 300 MG: 300 CAPSULE ORAL at 20:26

## 2023-05-18 RX ADMIN — RISPERIDONE 1 MG: 1 TABLET, ORALLY DISINTEGRATING ORAL at 22:22

## 2023-05-18 RX ADMIN — METFORMIN HYDROCHLORIDE 500 MG: 500 TABLET, FILM COATED ORAL at 08:46

## 2023-05-18 RX ADMIN — INSULIN ASPART 3 UNITS: 100 INJECTION, SOLUTION INTRAVENOUS; SUBCUTANEOUS at 12:50

## 2023-05-18 RX ADMIN — TRAZODONE HYDROCHLORIDE 25 MG: 50 TABLET ORAL at 03:57

## 2023-05-18 RX ADMIN — RISPERIDONE 0.5 MG: 0.5 TABLET, ORALLY DISINTEGRATING ORAL at 17:23

## 2023-05-18 RX ADMIN — METFORMIN HYDROCHLORIDE 500 MG: 500 TABLET, FILM COATED ORAL at 17:38

## 2023-05-18 RX ADMIN — LOSARTAN POTASSIUM 75 MG: 50 TABLET, FILM COATED ORAL at 08:46

## 2023-05-18 RX ADMIN — Medication 400 MG: at 08:46

## 2023-05-18 RX ADMIN — AMLODIPINE BESYLATE 5 MG: 5 TABLET ORAL at 08:46

## 2023-05-18 RX ADMIN — RISPERIDONE 0.5 MG: 0.5 TABLET, ORALLY DISINTEGRATING ORAL at 03:57

## 2023-05-18 RX ADMIN — INSULIN ASPART 4 UNITS: 100 INJECTION, SOLUTION INTRAVENOUS; SUBCUTANEOUS at 08:34

## 2023-05-18 RX ADMIN — INSULIN ASPART 4 UNITS: 100 INJECTION, SOLUTION INTRAVENOUS; SUBCUTANEOUS at 17:35

## 2023-05-18 RX ADMIN — CARVEDILOL 25 MG: 25 TABLET, FILM COATED ORAL at 08:46

## 2023-05-18 RX ADMIN — ACETAMINOPHEN 650 MG: 325 TABLET ORAL at 20:26

## 2023-05-18 RX ADMIN — CARVEDILOL 25 MG: 25 TABLET, FILM COATED ORAL at 17:38

## 2023-05-18 RX ADMIN — CLOPIDOGREL BISULFATE 75 MG: 75 TABLET ORAL at 08:46

## 2023-05-18 RX ADMIN — INSULIN GLARGINE 10 UNITS: 100 INJECTION, SOLUTION SUBCUTANEOUS at 08:32

## 2023-05-18 RX ADMIN — MELATONIN TAB 3 MG 3 MG: 3 TAB at 20:26

## 2023-05-18 RX ADMIN — GABAPENTIN 300 MG: 300 CAPSULE ORAL at 08:46

## 2023-05-18 RX ADMIN — MIRTAZAPINE 15 MG: 15 TABLET, FILM COATED ORAL at 20:26

## 2023-05-18 RX ADMIN — DOXAZOSIN 1 MG: 1 TABLET ORAL at 08:46

## 2023-05-18 RX ADMIN — RISPERIDONE 0.5 MG: 0.5 TABLET, ORALLY DISINTEGRATING ORAL at 08:46

## 2023-05-18 RX ADMIN — Medication 400 MG: at 20:26

## 2023-05-18 RX ADMIN — TRAZODONE HYDROCHLORIDE 25 MG: 50 TABLET ORAL at 22:23

## 2023-05-18 RX ADMIN — BROMFENAC 1 DROP: 1.03 SOLUTION/ DROPS OPHTHALMIC at 08:56

## 2023-05-18 ASSESSMENT — ACTIVITIES OF DAILY LIVING (ADL)
ADLS_ACUITY_SCORE: 51
ADLS_ACUITY_SCORE: 52
ADLS_ACUITY_SCORE: 51
ADLS_ACUITY_SCORE: 52

## 2023-05-18 NOTE — PROGRESS NOTES
[facility]    Medicine Progress Note - Hospitalist Service    Date of Admission:  5/4/2023    Assessment & Plan      Adam Huber is a 87 year old male  With PMH of HTN, CAD, CVA, DM type 2, TBI, dementia- admitted on 5/4/2023 with slurred speech concerning for a stroke.       Acute/subacute CVA of the right asia and left cerebellar hemisphere [posterior circulation ischemic strokes]  5/13, most likely thromboembolic  Dementia with behavioral disturbance  Transient alteration of consciousness  Anxiety and depression  History of TBI  - he lives with his wife, has underlying cognitive impairment; has PCA as per family; Tunisian speaking  - initial concern for stroke  - Stroke Neuro initially followed  - HEAD CT: With extensive areas of encephalomalacic changes and gliosis on frontal and temporal lobe, chronic infarct in basal ganglia and corona radiata.  -CTA head and neck multifocal areas of moderate or high-grade stenosis throughout anterior and posterior circulation.  Heavy atherosclerotic plaque left carotid bulb/bifurcation with at least 50% stenosis by modified NASCET criteria. Moderate predominantly noncalcified plaque right carotid bulb/bifurcation without significant stenosis by modified NASCET criteria. No findings for vertebral artery dissection.   MRI brain 5/4:  Motion limited examination equivocal DWI hyperintensity at the right asia. This may represent artifact or acute ischemia.  Severe bifrontal encephalomalacia  - PTA Aspirin was increased from 81 mg to 325 mg po daily  - lipid profile- LDL 90, total cholest 232, HDL 66  - started on Lipitor 40 mg po at bedtime  - Echo - EF 60-65%. No clear regional wall motion abnormalities noted.  No thrombus or cardiac etiology of stroke  - EEG- no seizures   -Continue to monitor in telemetry while in the hospital, neurology recommending cardiac event monitor after discharge  -Code stroke called 5/13, MRI brain showed multiple  infarcts in right asia, medial left cerebellar hemisphere.  No convincing acute infarct by CT; CTA head and neck with widespread intracranial arthrosclerotic plaque, 50-60% stenosis of the left ICA  -Appreciate neurology recommendations, concern for thromboembolic etiology given known extensive arthrosclerotic disease; started on DAPT with aspirin 325 Mg indefinitely daily, Plavix 75 Mg daily x90 days will need 30-day cardiac event monitor on discharge with follow-up in 6 to 8 weeks  -Evaluated by vascular surgery for CAD, no role for carotid intervention as current stroke are not in the geographical distribution from left ICA disease  -PTA was on scheduled Zyprexa, Seroquel and Remeron, as needed Seroquel available.  Due to ongoing agitation psychiatry was consulted  -  Appreciate psychiatry input, evaluated on 5/11/2023-recommended discontinuing Seroquel and Zyprexa and started on risperidone ODT 0.5 mg in the morning and 1 mg at bedtime to help reduce dementia related agitation with risperidone ODT 0.5 mg twice daily as needed for agitation.  Zyprexa IM 2.5 mg as a backup option or unable to take p.o.  -Scheduled melatonin 3 mg at 8:00 to help with circadian rhythm maintenance  -Delirium precautions, avoid interruptions during the night as much as possible, allow family visits, reorientation, use  services as much as possible, allow/maintain sleep-wake cycle.    - PT/OT- rec TCU; finding a TCU might be challenging because of language barrier and his intermittent agitation; needs to be without sitter for 24h,  following  - follow up with PCP in 1-2 weeks  - follow up with General Neuro in 6-8 weeks     Hypomagnesemia  -Being replaced per protocol     Goals of care  -Full code as per wife and family's request     HTN  - PTA on Coreg 25 mg po BID and losartan 50 mg po daily   - BP on higher side; BP goal- normotension  -Currently on amlodipine 5 mg daily, Coreg 25 mg twice daily losartan 75 mg  "daily  - BP mostly better controlled, intermittently elevated, likely related to agitation  -Monitor for persistently elevated blood pressure and adjust medication as needed     Oral thrush  -Nystatin swish and spit     BPH   -PTA finasteride and Flomax      DM type 2, uncontrolled  - PTA metformin 500 mg qam and 1000 mg qpm   - Hb A1c 9.6  -Currently on metformin 500 mg twice daily with high resistance SSI and Premeal insulin 1 unit per 15 g carb in addition to Lantus 10 units  - monitor his blood sugar and adjust medication as needed     Neuropathy  -Continue PTA gabapentin                     Diet: Room Service  Combination Diet Moderate Consistent Carb (60 g CHO per Meal) Diet; Pureed Diet (level 4); Mildly Thick (level 2)    DVT Prophylaxis: Pneumatic Compression Devices  Villa Catheter: Not present  Lines: None     Cardiac Monitoring: ACTIVE order. Indication: Tachyarrhythmias, acute (48 hours)  Code Status: Full Code      Clinically Significant Risk Factors            # Hypomagnesemia: Lowest Mg = 1.5 mg/dL in last 2 days, will replace as needed   # Hypoalbuminemia: Lowest albumin = 3.4 g/dL at 5/14/2023  2:21 PM, will monitor as appropriate     # Hypertension: Noted on problem list     # Dementia: noted on problem list   # DMII: A1C = 9.6 % (Ref range: <5.7 %) within past 6 months   # Overweight: Estimated body mass index is 28.53 kg/m  as calculated from the following:    Height as of this encounter: 1.778 m (5' 10\").    Weight as of this encounter: 90.2 kg (198 lb 13.7 oz).           Disposition Plan     Expected Discharge Date: 05/19/2023    Discharge Delays: Placement - TCU  *Medically Ready for Discharge              Ena Purcell MD  Hospitalist Service  [facility]  Securely message with [employer] (more info)  Text page via [employer] Paging/Directory   ______________________________________________________________________    Interval History     Patient seen with "   Patient not in any pain, shortness of breath or abdominal pain  He is oriented to himself     Not agiatated      Physical Exam   Vital Signs: Temp: 98  F (36.7  C) Temp src: Axillary BP: (!) 156/67 Pulse: 65   Resp: 16 SpO2: 96 % O2 Device: None (Room air)    Weight: 198 lbs 13.68 oz  Physical Exam  Cardiovascular:      Rate and Rhythm: Normal rate and regular rhythm.   Pulmonary:      Effort: Pulmonary effort is normal. No respiratory distress.      Breath sounds: Normal breath sounds. No wheezing.   Abdominal:      General: There is no distension.      Palpations: Abdomen is soft.      Tenderness: There is no abdominal tenderness.   Neurological:      Comments: Oriented to person not time or place         {Medical Decision Making       Data     I have personally reviewed the following data over the past 24 hrs:    N/A  \   N/A   / N/A     N/A N/A N/A /  224 (H)   4.3 N/A N/A \       Imaging results reviewed over the past 24 hrs:   No results found for this or any previous visit (from the past 24 hour(s)).

## 2023-05-18 NOTE — PLAN OF CARE
Goal Outcome Evaluation: No Change    Reason for Admission: Acute/Sub acute CVA in R asia & L cerbral hemisphere    Cognitive/Mentation: A/Ox 2-3- self and sometimes place. Can get the year  Neuros/CMS: Intact ex slight R droop, inconsistent command following, gen weakness, confusion, slurred speech, & mild-mod. aphasia  VS: VSS  GI: BS + flatus, last BM 5/17- Incontinent.  : Incontinent.  Pulmonary: LS clear equal   Pain: No    Drains/Lines: none- md aware  Skin: scattered bruising  Activity: Assist x 2 with sliding scale/lift  Diet: pureed with mildly thick liquids. Takes pills crushed in apple sauce    Therapies recs: pending TCU  Discharge: pending    Aggression Stoplight Tool: yellow    End of shift summary: Pt had some agitation during shift intermittently, was trying to get out of bed & attempting to hit/kick- prn trazodone & Risperdal given . Overnight assessments attempted, patient did not want to participate, started swinging arms around- documented what I could observe.

## 2023-05-18 NOTE — PROGRESS NOTES
Patient is confused. He gets agitated in the p.m., probably sundowning. Risperdal SL was administered X 1 for agitation.  VSS; on RA. Comfortable.   \Currently he is awaiting placement.

## 2023-05-19 ENCOUNTER — APPOINTMENT (OUTPATIENT)
Dept: SPEECH THERAPY | Facility: CLINIC | Age: 88
DRG: 065 | End: 2023-05-19
Attending: HOSPITALIST
Payer: COMMERCIAL

## 2023-05-19 ENCOUNTER — APPOINTMENT (OUTPATIENT)
Dept: OCCUPATIONAL THERAPY | Facility: CLINIC | Age: 88
DRG: 065 | End: 2023-05-19
Attending: HOSPITALIST
Payer: COMMERCIAL

## 2023-05-19 LAB
ANION GAP SERPL CALCULATED.3IONS-SCNC: 11 MMOL/L (ref 7–15)
BACTERIA UR CULT: ABNORMAL
BACTERIA UR CULT: ABNORMAL
BUN SERPL-MCNC: 30.6 MG/DL (ref 8–23)
CALCIUM SERPL-MCNC: 8.9 MG/DL (ref 8.8–10.2)
CHLORIDE SERPL-SCNC: 106 MMOL/L (ref 98–107)
CREAT SERPL-MCNC: 0.69 MG/DL (ref 0.67–1.17)
DEPRECATED HCO3 PLAS-SCNC: 21 MMOL/L (ref 22–29)
ERYTHROCYTE [DISTWIDTH] IN BLOOD BY AUTOMATED COUNT: 12.6 % (ref 10–15)
GFR SERPL CREATININE-BSD FRML MDRD: 90 ML/MIN/1.73M2
GLUCOSE BLDC GLUCOMTR-MCNC: 133 MG/DL (ref 70–99)
GLUCOSE BLDC GLUCOMTR-MCNC: 183 MG/DL (ref 70–99)
GLUCOSE BLDC GLUCOMTR-MCNC: 187 MG/DL (ref 70–99)
GLUCOSE BLDC GLUCOMTR-MCNC: 215 MG/DL (ref 70–99)
GLUCOSE SERPL-MCNC: 134 MG/DL (ref 70–99)
HCT VFR BLD AUTO: 44.5 % (ref 40–53)
HGB BLD-MCNC: 15.5 G/DL (ref 13.3–17.7)
MCH RBC QN AUTO: 31.7 PG (ref 26.5–33)
MCHC RBC AUTO-ENTMCNC: 34.8 G/DL (ref 31.5–36.5)
MCV RBC AUTO: 91 FL (ref 78–100)
PLATELET # BLD AUTO: 169 10E3/UL (ref 150–450)
POTASSIUM SERPL-SCNC: 4.3 MMOL/L (ref 3.4–5.3)
RBC # BLD AUTO: 4.89 10E6/UL (ref 4.4–5.9)
SODIUM SERPL-SCNC: 138 MMOL/L (ref 136–145)
WBC # BLD AUTO: 8 10E3/UL (ref 4–11)

## 2023-05-19 PROCEDURE — 80048 BASIC METABOLIC PNL TOTAL CA: CPT | Performed by: STUDENT IN AN ORGANIZED HEALTH CARE EDUCATION/TRAINING PROGRAM

## 2023-05-19 PROCEDURE — 250N000013 HC RX MED GY IP 250 OP 250 PS 637: Performed by: INTERNAL MEDICINE

## 2023-05-19 PROCEDURE — 97535 SELF CARE MNGMENT TRAINING: CPT | Mod: GO

## 2023-05-19 PROCEDURE — 250N000013 HC RX MED GY IP 250 OP 250 PS 637: Performed by: NURSE PRACTITIONER

## 2023-05-19 PROCEDURE — 250N000013 HC RX MED GY IP 250 OP 250 PS 637: Performed by: HOSPITALIST

## 2023-05-19 PROCEDURE — 99232 SBSQ HOSP IP/OBS MODERATE 35: CPT | Performed by: STUDENT IN AN ORGANIZED HEALTH CARE EDUCATION/TRAINING PROGRAM

## 2023-05-19 PROCEDURE — 85027 COMPLETE CBC AUTOMATED: CPT | Performed by: STUDENT IN AN ORGANIZED HEALTH CARE EDUCATION/TRAINING PROGRAM

## 2023-05-19 PROCEDURE — 36415 COLL VENOUS BLD VENIPUNCTURE: CPT | Performed by: STUDENT IN AN ORGANIZED HEALTH CARE EDUCATION/TRAINING PROGRAM

## 2023-05-19 PROCEDURE — 92526 ORAL FUNCTION THERAPY: CPT | Mod: GN | Performed by: SPEECH-LANGUAGE PATHOLOGIST

## 2023-05-19 PROCEDURE — 120N000001 HC R&B MED SURG/OB

## 2023-05-19 PROCEDURE — 250N000013 HC RX MED GY IP 250 OP 250 PS 637

## 2023-05-19 PROCEDURE — 97530 THERAPEUTIC ACTIVITIES: CPT | Mod: GO

## 2023-05-19 RX ADMIN — CLOPIDOGREL BISULFATE 75 MG: 75 TABLET ORAL at 08:18

## 2023-05-19 RX ADMIN — GABAPENTIN 300 MG: 300 CAPSULE ORAL at 20:13

## 2023-05-19 RX ADMIN — RISPERIDONE 1 MG: 1 TABLET, ORALLY DISINTEGRATING ORAL at 21:10

## 2023-05-19 RX ADMIN — CARVEDILOL 25 MG: 25 TABLET, FILM COATED ORAL at 17:17

## 2023-05-19 RX ADMIN — CARVEDILOL 25 MG: 25 TABLET, FILM COATED ORAL at 08:18

## 2023-05-19 RX ADMIN — BROMFENAC 1 DROP: 1.03 SOLUTION/ DROPS OPHTHALMIC at 08:26

## 2023-05-19 RX ADMIN — DOXAZOSIN 1 MG: 1 TABLET ORAL at 08:18

## 2023-05-19 RX ADMIN — INSULIN ASPART 4 UNITS: 100 INJECTION, SOLUTION INTRAVENOUS; SUBCUTANEOUS at 11:47

## 2023-05-19 RX ADMIN — ATORVASTATIN CALCIUM 40 MG: 40 TABLET, FILM COATED ORAL at 20:13

## 2023-05-19 RX ADMIN — INSULIN ASPART 4 UNITS: 100 INJECTION, SOLUTION INTRAVENOUS; SUBCUTANEOUS at 08:28

## 2023-05-19 RX ADMIN — GABAPENTIN 300 MG: 300 CAPSULE ORAL at 08:18

## 2023-05-19 RX ADMIN — INSULIN ASPART 4 UNITS: 100 INJECTION, SOLUTION INTRAVENOUS; SUBCUTANEOUS at 17:20

## 2023-05-19 RX ADMIN — RISPERIDONE 0.5 MG: 0.5 TABLET, ORALLY DISINTEGRATING ORAL at 08:17

## 2023-05-19 RX ADMIN — ASPIRIN 81 MG CHEWABLE TABLET 325 MG: 81 TABLET CHEWABLE at 08:17

## 2023-05-19 RX ADMIN — FINASTERIDE 5 MG: 5 TABLET, FILM COATED ORAL at 08:17

## 2023-05-19 RX ADMIN — MELATONIN TAB 3 MG 3 MG: 3 TAB at 20:13

## 2023-05-19 RX ADMIN — AMLODIPINE BESYLATE 5 MG: 5 TABLET ORAL at 08:18

## 2023-05-19 RX ADMIN — METFORMIN HYDROCHLORIDE 500 MG: 500 TABLET, FILM COATED ORAL at 17:17

## 2023-05-19 RX ADMIN — INSULIN GLARGINE 10 UNITS: 100 INJECTION, SOLUTION SUBCUTANEOUS at 08:30

## 2023-05-19 RX ADMIN — METFORMIN HYDROCHLORIDE 500 MG: 500 TABLET, FILM COATED ORAL at 08:18

## 2023-05-19 RX ADMIN — LOSARTAN POTASSIUM 75 MG: 50 TABLET, FILM COATED ORAL at 08:18

## 2023-05-19 RX ADMIN — ACETAMINOPHEN 650 MG: 325 TABLET ORAL at 05:06

## 2023-05-19 RX ADMIN — TRAZODONE HYDROCHLORIDE 25 MG: 50 TABLET ORAL at 21:14

## 2023-05-19 RX ADMIN — MIRTAZAPINE 15 MG: 15 TABLET, FILM COATED ORAL at 20:13

## 2023-05-19 RX ADMIN — ACETAMINOPHEN 650 MG: 325 TABLET ORAL at 21:14

## 2023-05-19 ASSESSMENT — ACTIVITIES OF DAILY LIVING (ADL)
ADLS_ACUITY_SCORE: 50
ADLS_ACUITY_SCORE: 51
ADLS_ACUITY_SCORE: 50
ADLS_ACUITY_SCORE: 50

## 2023-05-19 NOTE — PROGRESS NOTES
[facility]    Medicine Progress Note - Hospitalist Service    Date of Admission:  5/4/2023    Assessment & Plan      Adam Huber is a 87 year old male  With PMH of HTN, CAD, CVA, DM type 2, TBI, dementia- admitted on 5/4/2023 with slurred speech concerning for a stroke.       Acute/subacute CVA of the right asia and left cerebellar hemisphere [posterior circulation ischemic strokes]  5/13, most likely thromboembolic  Dementia with behavioral disturbance  Transient alteration of consciousness  Anxiety and depression  History of TBI  - he lives with his wife, has underlying cognitive impairment; has PCA as per family; Ivorian speaking  - initial concern for stroke  - Stroke Neuro initially followed  - HEAD CT: With extensive areas of encephalomalacic changes and gliosis on frontal and temporal lobe, chronic infarct in basal ganglia and corona radiata.  -CTA head and neck multifocal areas of moderate or high-grade stenosis throughout anterior and posterior circulation.  Heavy atherosclerotic plaque left carotid bulb/bifurcation with at least 50% stenosis by modified NASCET criteria. Moderate predominantly noncalcified plaque right carotid bulb/bifurcation without significant stenosis by modified NASCET criteria. No findings for vertebral artery dissection.   MRI brain 5/4:  Motion limited examination equivocal DWI hyperintensity at the right asia. This may represent artifact or acute ischemia.  Severe bifrontal encephalomalacia  - PTA Aspirin was increased from 81 mg to 325 mg po daily  - lipid profile- LDL 90, total cholest 232, HDL 66  - started on Lipitor 40 mg po at bedtime  - Echo - EF 60-65%. No clear regional wall motion abnormalities noted.  No thrombus or cardiac etiology of stroke  - EEG- no seizures   -Continue to monitor in telemetry while in the hospital, neurology recommending cardiac event monitor after discharge  -Code stroke called 5/13, MRI brain showed multiple  infarcts in right asia, medial left cerebellar hemisphere.  No convincing acute infarct by CT; CTA head and neck with widespread intracranial arthrosclerotic plaque, 50-60% stenosis of the left ICA  -Appreciate neurology recommendations, concern for thromboembolic etiology given known extensive arthrosclerotic disease; started on DAPT with aspirin 325 Mg indefinitely daily, Plavix 75 Mg daily x90 days will need 30-day cardiac event monitor on discharge with follow-up in 6 to 8 weeks  -Evaluated by vascular surgery for CAD, no role for carotid intervention as current stroke are not in the geographical distribution from left ICA disease  -PTA was on scheduled Zyprexa, Seroquel and Remeron, as needed Seroquel available.  Due to ongoing agitation psychiatry was consulted  -  Appreciate psychiatry input, evaluated on 5/11/2023-recommended discontinuing Seroquel and Zyprexa and started on risperidone ODT 0.5 mg in the morning and 1 mg at bedtime to help reduce dementia related agitation with risperidone ODT 0.5 mg twice daily as needed for agitation.  Zyprexa IM 2.5 mg as a backup option or unable to take p.o.  -Scheduled melatonin 3 mg at 8:00 to help with circadian rhythm maintenance  -Delirium precautions, avoid interruptions during the night as much as possible, allow family visits, reorientation, use  services as much as possible, allow/maintain sleep-wake cycle.    - PT/OT- rec TCU; finding a TCU might be challenging because of language barrier and his intermittent agitation; needs to be without sitter for 24h,  following  - follow up with PCP in 1-2 weeks  - follow up with General Neuro in 6-8 weeks     Hypomagnesemia  -Being replaced per protocol     Goals of care  -Full code as per wife and family's request     HTN  - PTA on Coreg 25 mg po BID and losartan 50 mg po daily   - BP on higher side; BP goal- normotension  -Currently on amlodipine 5 mg daily, Coreg 25 mg twice daily losartan 75 mg  "daily  - BP mostly better controlled, intermittently elevated, likely related to agitation  -Monitor for persistently elevated blood pressure and adjust medication as needed     Oral thrush  -Nystatin swish and spit     BPH   -PTA finasteride and Flomax      DM type 2, uncontrolled  - PTA metformin 500 mg qam and 1000 mg qpm   - Hb A1c 9.6  -Currently on metformin 500 mg twice daily with high resistance SSI and Premeal insulin 1 unit per 15 g carb in addition to Lantus 10 units  - monitor his blood sugar and adjust medication as needed     Neuropathy  -Continue PTA gabapentin            Family Wife Tracee updated         Diet: Room Service  Combination Diet Moderate Consistent Carb (60 g CHO per Meal) Diet; Pureed Diet (level 4); Mildly Thick (level 2)    DVT Prophylaxis: Pneumatic Compression Devices  Villa Catheter: Not present  Lines: None     Cardiac Monitoring: ACTIVE order. Indication: Tachyarrhythmias, acute (48 hours)  Code Status: Full Code      Clinically Significant Risk Factors              # Hypoalbuminemia: Lowest albumin = 3.4 g/dL at 5/14/2023  2:21 PM, will monitor as appropriate     # Hypertension: Noted on problem list     # Dementia: noted on problem list   # DMII: A1C = 9.6 % (Ref range: <5.7 %) within past 6 months   # Overweight: Estimated body mass index is 28.53 kg/m  as calculated from the following:    Height as of this encounter: 1.778 m (5' 10\").    Weight as of this encounter: 90.2 kg (198 lb 13.7 oz).           Disposition Plan      Expected Discharge Date: 05/20/2023    Discharge Delays: Placement - TCU  *Medically Ready for Discharge              Ena Purcell MD  Hospitalist Service  [facility]  Securely message with [employer] (more info)  Text page via [facility] Paging/Directory   ______________________________________________________________________    Interval History    service on the phone use  Wife at bedside   Patient does not have " shortness of breath or chest pain  No abdominal pain  Does not appear in any distress    Physical Exam   Vital Signs: Temp: 98.3  F (36.8  C) Temp src: Oral BP: 96/52 Pulse: 54   Resp: 18 SpO2: 94 % O2 Device: None (Room air)    Weight: 198 lbs 13.68 oz  Physical Exam  Cardiovascular:      Rate and Rhythm: Normal rate and regular rhythm.   Pulmonary:      Effort: Pulmonary effort is normal. No respiratory distress.      Breath sounds: Normal breath sounds. No wheezing.   Abdominal:      General: There is no distension.      Palpations: Abdomen is soft.      Tenderness: There is no abdominal tenderness.   Neurological:      Mental Status: He is alert.      Comments: Oriented to person not time or place         {Medical Decision Making       Data     I have personally reviewed the following data over the past 24 hrs:    8.0  \   15.5   / 169     138 106 30.6 (H) /  187 (H)   4.3 21 (L) 0.69 \       Imaging results reviewed over the past 24 hrs:   No results found for this or any previous visit (from the past 24 hour(s)).

## 2023-05-19 NOTE — PLAN OF CARE
[id number]  Pt here with Acute/Subacute CVA. A&Ox2 disoriented to time and situation confused. Neuros R droop slurred speech mild aphasia. VSS.  Pureed diet, mildly thick liquids. Takes pills chrush. Up with A2/lift. Denies pain. Pt scoring yellow on the Aggression Stop Light Tool. Plan for TCU. Discharge pending.

## 2023-05-19 NOTE — PROGRESS NOTES
[facility] and [facility] have called [place]  services 4x for services for patient. Writer has attempted to use ipad and language line for pt care and ineffective. Pt needs in-person services. No  available at this time. Unable to assess pt's cognitive status. [facility] also attempted to set up  services for weekend for pt via [phone]

## 2023-05-19 NOTE — PROGRESS NOTES
[id number]    VSS on RA. Denied pain. Pleasantly confused. Cooperated with assessment. Daughter was bedside.

## 2023-05-19 NOTE — PLAN OF CARE
Shift Note [id number]:   Patient is alert and oriented xUTA, attempted to use ipad/ language line via Mophie but unable to assess cognitive status. Assist x2 with teresita azul. VSS. denies pain. Tele: SR.     Patient takes pills crushed in applesauce. Incontinent of bowel and bladder.

## 2023-05-20 ENCOUNTER — APPOINTMENT (OUTPATIENT)
Dept: INTERPRETER SERVICES | Facility: CLINIC | Age: 88
End: 2023-05-20
Payer: COMMERCIAL

## 2023-05-20 LAB
GLUCOSE BLDC GLUCOMTR-MCNC: 130 MG/DL (ref 70–99)
GLUCOSE BLDC GLUCOMTR-MCNC: 133 MG/DL (ref 70–99)
GLUCOSE BLDC GLUCOMTR-MCNC: 137 MG/DL (ref 70–99)
GLUCOSE BLDC GLUCOMTR-MCNC: 223 MG/DL (ref 70–99)
MAGNESIUM SERPL-MCNC: 1.7 MG/DL (ref 1.7–2.3)
POTASSIUM SERPL-SCNC: 4.4 MMOL/L (ref 3.4–5.3)

## 2023-05-20 PROCEDURE — 99232 SBSQ HOSP IP/OBS MODERATE 35: CPT | Performed by: STUDENT IN AN ORGANIZED HEALTH CARE EDUCATION/TRAINING PROGRAM

## 2023-05-20 PROCEDURE — 250N000013 HC RX MED GY IP 250 OP 250 PS 637: Performed by: NURSE PRACTITIONER

## 2023-05-20 PROCEDURE — 83735 ASSAY OF MAGNESIUM: CPT | Performed by: STUDENT IN AN ORGANIZED HEALTH CARE EDUCATION/TRAINING PROGRAM

## 2023-05-20 PROCEDURE — 250N000013 HC RX MED GY IP 250 OP 250 PS 637: Performed by: INTERNAL MEDICINE

## 2023-05-20 PROCEDURE — 250N000013 HC RX MED GY IP 250 OP 250 PS 637: Performed by: HOSPITALIST

## 2023-05-20 PROCEDURE — 250N000013 HC RX MED GY IP 250 OP 250 PS 637: Performed by: STUDENT IN AN ORGANIZED HEALTH CARE EDUCATION/TRAINING PROGRAM

## 2023-05-20 PROCEDURE — 84132 ASSAY OF SERUM POTASSIUM: CPT | Performed by: STUDENT IN AN ORGANIZED HEALTH CARE EDUCATION/TRAINING PROGRAM

## 2023-05-20 PROCEDURE — 250N000011 HC RX IP 250 OP 636: Performed by: INTERNAL MEDICINE

## 2023-05-20 PROCEDURE — 250N000013 HC RX MED GY IP 250 OP 250 PS 637

## 2023-05-20 PROCEDURE — 250N000009 HC RX 250

## 2023-05-20 PROCEDURE — 120N000001 HC R&B MED SURG/OB

## 2023-05-20 PROCEDURE — 36415 COLL VENOUS BLD VENIPUNCTURE: CPT | Performed by: STUDENT IN AN ORGANIZED HEALTH CARE EDUCATION/TRAINING PROGRAM

## 2023-05-20 RX ORDER — WATER 10 ML/10ML
INJECTION INTRAMUSCULAR; INTRAVENOUS; SUBCUTANEOUS
Status: COMPLETED
Start: 2023-05-20 | End: 2023-05-20

## 2023-05-20 RX ADMIN — AMLODIPINE BESYLATE 5 MG: 5 TABLET ORAL at 08:25

## 2023-05-20 RX ADMIN — OLANZAPINE 2.5 MG: 10 INJECTION, POWDER, FOR SOLUTION INTRAMUSCULAR at 03:28

## 2023-05-20 RX ADMIN — METFORMIN HYDROCHLORIDE 500 MG: 500 TABLET, FILM COATED ORAL at 17:04

## 2023-05-20 RX ADMIN — GABAPENTIN 300 MG: 300 CAPSULE ORAL at 20:37

## 2023-05-20 RX ADMIN — INSULIN ASPART 3 UNITS: 100 INJECTION, SOLUTION INTRAVENOUS; SUBCUTANEOUS at 17:05

## 2023-05-20 RX ADMIN — ASPIRIN 81 MG CHEWABLE TABLET 324 MG: 81 TABLET CHEWABLE at 08:25

## 2023-05-20 RX ADMIN — RISPERIDONE 1 MG: 1 TABLET, ORALLY DISINTEGRATING ORAL at 20:55

## 2023-05-20 RX ADMIN — WATER 10 ML: 1 INJECTION INTRAMUSCULAR; INTRAVENOUS; SUBCUTANEOUS at 03:29

## 2023-05-20 RX ADMIN — CLOPIDOGREL BISULFATE 75 MG: 75 TABLET ORAL at 08:28

## 2023-05-20 RX ADMIN — MELATONIN TAB 3 MG 3 MG: 3 TAB at 20:36

## 2023-05-20 RX ADMIN — LOSARTAN POTASSIUM 75 MG: 50 TABLET, FILM COATED ORAL at 08:25

## 2023-05-20 RX ADMIN — FINASTERIDE 5 MG: 5 TABLET, FILM COATED ORAL at 08:25

## 2023-05-20 RX ADMIN — DOXAZOSIN 1 MG: 1 TABLET ORAL at 08:25

## 2023-05-20 RX ADMIN — CARVEDILOL 25 MG: 25 TABLET, FILM COATED ORAL at 08:28

## 2023-05-20 RX ADMIN — MIRTAZAPINE 15 MG: 15 TABLET, FILM COATED ORAL at 20:36

## 2023-05-20 RX ADMIN — ACETAMINOPHEN 650 MG: 325 TABLET ORAL at 20:36

## 2023-05-20 RX ADMIN — INSULIN ASPART 2 UNITS: 100 INJECTION, SOLUTION INTRAVENOUS; SUBCUTANEOUS at 12:28

## 2023-05-20 RX ADMIN — ACETAMINOPHEN 650 MG: 325 TABLET ORAL at 12:08

## 2023-05-20 RX ADMIN — CARVEDILOL 25 MG: 25 TABLET, FILM COATED ORAL at 17:04

## 2023-05-20 RX ADMIN — ATORVASTATIN CALCIUM 40 MG: 40 TABLET, FILM COATED ORAL at 20:36

## 2023-05-20 RX ADMIN — GABAPENTIN 300 MG: 300 CAPSULE ORAL at 08:25

## 2023-05-20 RX ADMIN — METFORMIN HYDROCHLORIDE 500 MG: 500 TABLET, FILM COATED ORAL at 08:25

## 2023-05-20 RX ADMIN — RISPERIDONE 0.5 MG: 0.5 TABLET, ORALLY DISINTEGRATING ORAL at 08:25

## 2023-05-20 RX ADMIN — INSULIN GLARGINE 10 UNITS: 100 INJECTION, SOLUTION SUBCUTANEOUS at 06:59

## 2023-05-20 ASSESSMENT — ACTIVITIES OF DAILY LIVING (ADL)
ADLS_ACUITY_SCORE: 46
ADLS_ACUITY_SCORE: 50
ADLS_ACUITY_SCORE: 46
ADLS_ACUITY_SCORE: 46
ADLS_ACUITY_SCORE: 50
ADLS_ACUITY_SCORE: 46
ADLS_ACUITY_SCORE: 50
ADLS_ACUITY_SCORE: 50
ADLS_ACUITY_SCORE: 46

## 2023-05-20 NOTE — PROGRESS NOTES
A/O x2, Rusian speaking, but communicated via .  VSS.  Pt denies any c/o pain.  Rt sided droop slight weakness on R side compared to left.  Incontinent of bowel/bladder.  Up with assist of , walker/GB to chair for meals.  Restless, but redirectable. Placement pending.

## 2023-05-20 NOTE — PROGRESS NOTES
"[id number]: Pt Alert not responding to IPAD , will follow some commands from son. Son states his father is confused and unaware he is in the hospital. tolerated pureed diet, took scheduled meds. Continues to slide to edge of bed and want to stand but is unsafe. Son request father be left alone after 2100 so he can sleep or else he could be aggressive. Patient resting comfortably until 0130    0130: pt awake trying to exit bed. Pt assisted to bedside commode for BM, would not allow NA to clean buttocks, pt impulsively transferred to bed. Pt not responding to Tanzanian  but did state,\"I don't want to listen to you\", Linen and eugenio care provided with pauses between pt attempts to punch. Pt resting comfortably after reposition.    0220: Pt exiting at foot of bed. Brief wet. Pt again will not respond to  questions. \"How can I help you, do you need the bathroom, would you like a drink, where would you like to go?\" pt turns head away from monitor with a look of disgust. Brief changed, pt trying to bite staff during turns to remove soiled brief. Pt refused risperidone in applesauce,  Waiving away with his hand. Pt resting.     0320: pt exiting at foot of bed, trying to stand. Walker and gate belt grabbed to assist with a safer stand and allow pt to burn energy. Pt able to stand but legs buckling while trying to take a step. Pt sat back down and pushed away walker but continues attempts to stand, walker returned for support, pt threw walker away. Pt starting to try and punch, kick and bite, pt return to bed with 3 nurse assist. Pt grabbed call light and began swinging light at nurse, call light removed. Behavior continues to escalate, Zyprexa given IM.    [id number]- patient is visually calm but trying to crawl over side railing continuously, starting to allow somecares. VSS. Removes tele when placed.  "

## 2023-05-20 NOTE — PLAN OF CARE
Reason for Admission: CVA    Cognitive/Mentation: A/Ox 2.  Disoriented to time and situation  Neuros/CMS: Intact ex slurred speech.  R droop.  Slight R sided weakness  VS: stable.   Tele: patient refusing tele.  GI: BS audible, passing flatus, last BM today. inContinent.  : incontinent.   Pulmonary: LS diminished, clear.  On RA.  Pain: denies.     Drains/Lines: none  Skin: wnl ex scattered bruising  Activity: Assist x 1 with GBW.  Diet: MCHO pureed with mild thick liquids. Takes pills crushed in pudding.     Therapies recs: TCU  Discharge: looking for placement    Aggression Stoplight Tool: yellow.  Patient impulsive at times.  Has a 1:1 now, but will trial without tonight    End of shift summary: ready for discharge but waiting for placement.

## 2023-05-20 NOTE — PROGRESS NOTES
[facility]    Medicine Progress Note - Hospitalist Service    Date of Admission:  5/4/2023    Assessment & Plan      Adam Huber is a 87 year old male  With PMH of HTN, CAD, CVA, DM type 2, TBI, dementia- admitted on 5/4/2023 with slurred speech concerning for a stroke.       Acute/subacute CVA of the right asia and left cerebellar hemisphere [posterior circulation ischemic strokes]  5/13, most likely thromboembolic  Dementia with behavioral disturbance  Transient alteration of consciousness  Anxiety and depression  History of TBI  - he lives with his wife, has underlying cognitive impairment; has PCA as per family; Tongan speaking  - initial concern for stroke  - Stroke Neuro initially followed  - HEAD CT: With extensive areas of encephalomalacic changes and gliosis on frontal and temporal lobe, chronic infarct in basal ganglia and corona radiata.  -CTA head and neck multifocal areas of moderate or high-grade stenosis throughout anterior and posterior circulation.  Heavy atherosclerotic plaque left carotid bulb/bifurcation with at least 50% stenosis by modified NASCET criteria. Moderate predominantly noncalcified plaque right carotid bulb/bifurcation without significant stenosis by modified NASCET criteria. No findings for vertebral artery dissection.   MRI brain 5/4:  Motion limited examination equivocal DWI hyperintensity at the right asia. This may represent artifact or acute ischemia.  Severe bifrontal encephalomalacia  - PTA Aspirin was increased from 81 mg to 325 mg po daily  - lipid profile- LDL 90, total cholest 232, HDL 66  - started on Lipitor 40 mg po at bedtime  - Echo - EF 60-65%. No clear regional wall motion abnormalities noted.  No thrombus or cardiac etiology of stroke  - EEG- no seizures   -Continue to monitor in telemetry while in the hospital, neurology recommending cardiac event monitor after discharge  -Code stroke called 5/13, MRI brain showed multiple  infarcts in right asia, medial left cerebellar hemisphere.  No convincing acute infarct by CT; CTA head and neck with widespread intracranial arthrosclerotic plaque, 50-60% stenosis of the left ICA  -Appreciate neurology recommendations, concern for thromboembolic etiology given known extensive arthrosclerotic disease; started on DAPT with aspirin 325 Mg indefinitely daily, Plavix 75 Mg daily x90 days will need 30-day cardiac event monitor on discharge with follow-up in 6 to 8 weeks  -Evaluated by vascular surgery for CAD, no role for carotid intervention as current stroke are not in the geographical distribution from left ICA disease  -PTA was on scheduled Zyprexa, Seroquel and Remeron, as needed Seroquel available.  Due to ongoing agitation psychiatry was consulted  -  Appreciate psychiatry input, evaluated on 5/11/2023-recommended discontinuing Seroquel and Zyprexa and started on risperidone ODT 0.5 mg in the morning and 1 mg at bedtime to help reduce dementia related agitation with risperidone ODT 0.5 mg twice daily as needed for agitation.  Zyprexa IM 2.5 mg as a backup option or unable to take p.o.  -Scheduled melatonin 3 mg at 8:00 to help with circadian rhythm maintenance  -Delirium precautions, avoid interruptions during the night as much as possible, allow family visits, reorientation, use  services as much as possible, allow/maintain sleep-wake cycle.    - PT/OT- rec TCU; finding a TCU might be challenging because of language barrier and his intermittent agitation; needs to be without sitter for 24h,  following  - follow up with PCP in 1-2 weeks  - follow up with General Neuro in 6-8 weeks  -Agitated last night and Zyprexa was given prn and sitter at bedside  -     Hypomagnesemia  -Being replaced per protocol     Goals of care  -Full code as per wife and family's request     HTN  - PTA on Coreg 25 mg po BID and losartan 50 mg po daily   - BP on higher side; BP goal-  "normotension  -Currently on amlodipine 5 mg daily, Coreg 25 mg twice daily losartan 75 mg daily  - BP mostly better controlled, intermittently elevated, likely related to agitation  -Monitor for persistently elevated blood pressure and adjust medication as needed     Oral thrush  -Nystatin swish and spit     BPH   -PTA finasteride and Flomax      DM type 2, uncontrolled  - PTA metformin 500 mg qam and 1000 mg qpm   - Hb A1c 9.6  -Currently on metformin 500 mg twice daily with high resistance SSI and Premeal insulin 1 unit per 15 g carb in addition to Lantus 10 units  - monitor his blood sugar and adjust medication as needed     Neuropathy  -Continue PTA gabapentin            Family Wife Tracee updated         Diet: Room Service  Combination Diet Moderate Consistent Carb (60 g CHO per Meal) Diet; Pureed Diet (level 4); Mildly Thick (level 2)    DVT Prophylaxis: Pneumatic Compression Devices  Villa Catheter: Not present  Lines: None     Cardiac Monitoring: ACTIVE order. Indication: Tachyarrhythmias, acute (48 hours)  Code Status: Full Code      Clinically Significant Risk Factors              # Hypoalbuminemia: Lowest albumin = 3.4 g/dL at 5/14/2023  2:21 PM, will monitor as appropriate     # Hypertension: Noted on problem list     # Dementia: noted on problem list   # DMII: A1C = 9.6 % (Ref range: <5.7 %) within past 6 months   # Overweight: Estimated body mass index is 28.53 kg/m  as calculated from the following:    Height as of this encounter: 1.778 m (5' 10\").    Weight as of this encounter: 90.2 kg (198 lb 13.7 oz).           Disposition Plan      Expected Discharge Date: 05/21/2023    Discharge Delays: Placement - TCU  *Medically Ready for Discharge              Ena Purcell MD  Hospitalist Service  [facility]  Securely message with [employer] (more info)  Text page via [facility] Paging/Directory   ______________________________________________________________________    Interval " History    used  Patient does not have shortness of breath or chest pain or abdominal pain  Does not appear in any distress    Physical Exam   Vital Signs: Temp: 96.9  F (36.1  C) Temp src: Oral BP: (!) 140/71 Pulse: 71   Resp: 18 SpO2: 96 % O2 Device: None (Room air)    Weight: 198 lbs 13.68 oz  Physical Exam  Constitutional:       General: He is sleeping.   Pulmonary:      Effort: Pulmonary effort is normal. No respiratory distress.         {Medical Decision Making       Data     I have personally reviewed the following data over the past 24 hrs:    N/A  \   N/A   / N/A     N/A N/A N/A /  133 (H)   4.4 N/A N/A \       Imaging results reviewed over the past 24 hrs:   No results found for this or any previous visit (from the past 24 hour(s)).

## 2023-05-21 ENCOUNTER — APPOINTMENT (OUTPATIENT)
Dept: CT IMAGING | Facility: CLINIC | Age: 88
DRG: 065 | End: 2023-05-21
Attending: HOSPITALIST
Payer: COMMERCIAL

## 2023-05-21 LAB
GLUCOSE BLDC GLUCOMTR-MCNC: 117 MG/DL (ref 70–99)
GLUCOSE BLDC GLUCOMTR-MCNC: 130 MG/DL (ref 70–99)
GLUCOSE BLDC GLUCOMTR-MCNC: 133 MG/DL (ref 70–99)
GLUCOSE BLDC GLUCOMTR-MCNC: 136 MG/DL (ref 70–99)
GLUCOSE BLDC GLUCOMTR-MCNC: 213 MG/DL (ref 70–99)
GLUCOSE BLDC GLUCOMTR-MCNC: 90 MG/DL (ref 70–99)
HOLD SPECIMEN: NORMAL
MAGNESIUM SERPL-MCNC: 1.7 MG/DL (ref 1.7–2.3)
POTASSIUM SERPL-SCNC: 4.3 MMOL/L (ref 3.4–5.3)

## 2023-05-21 PROCEDURE — 250N000013 HC RX MED GY IP 250 OP 250 PS 637: Performed by: NURSE PRACTITIONER

## 2023-05-21 PROCEDURE — 120N000001 HC R&B MED SURG/OB

## 2023-05-21 PROCEDURE — 250N000013 HC RX MED GY IP 250 OP 250 PS 637: Performed by: INTERNAL MEDICINE

## 2023-05-21 PROCEDURE — 250N000013 HC RX MED GY IP 250 OP 250 PS 637: Performed by: STUDENT IN AN ORGANIZED HEALTH CARE EDUCATION/TRAINING PROGRAM

## 2023-05-21 PROCEDURE — 84132 ASSAY OF SERUM POTASSIUM: CPT | Performed by: STUDENT IN AN ORGANIZED HEALTH CARE EDUCATION/TRAINING PROGRAM

## 2023-05-21 PROCEDURE — 250N000013 HC RX MED GY IP 250 OP 250 PS 637: Performed by: HOSPITALIST

## 2023-05-21 PROCEDURE — 36415 COLL VENOUS BLD VENIPUNCTURE: CPT | Performed by: STUDENT IN AN ORGANIZED HEALTH CARE EDUCATION/TRAINING PROGRAM

## 2023-05-21 PROCEDURE — 99232 SBSQ HOSP IP/OBS MODERATE 35: CPT | Performed by: STUDENT IN AN ORGANIZED HEALTH CARE EDUCATION/TRAINING PROGRAM

## 2023-05-21 PROCEDURE — 70450 CT HEAD/BRAIN W/O DYE: CPT

## 2023-05-21 PROCEDURE — 83735 ASSAY OF MAGNESIUM: CPT | Performed by: STUDENT IN AN ORGANIZED HEALTH CARE EDUCATION/TRAINING PROGRAM

## 2023-05-21 PROCEDURE — 250N000013 HC RX MED GY IP 250 OP 250 PS 637

## 2023-05-21 RX ADMIN — METFORMIN HYDROCHLORIDE 500 MG: 500 TABLET, FILM COATED ORAL at 09:26

## 2023-05-21 RX ADMIN — ACETAMINOPHEN 650 MG: 325 TABLET ORAL at 21:03

## 2023-05-21 RX ADMIN — CARVEDILOL 25 MG: 25 TABLET, FILM COATED ORAL at 17:52

## 2023-05-21 RX ADMIN — ACETAMINOPHEN 650 MG: 325 TABLET ORAL at 06:02

## 2023-05-21 RX ADMIN — BROMFENAC 1 DROP: 1.03 SOLUTION/ DROPS OPHTHALMIC at 09:28

## 2023-05-21 RX ADMIN — AMLODIPINE BESYLATE 5 MG: 5 TABLET ORAL at 09:28

## 2023-05-21 RX ADMIN — METFORMIN HYDROCHLORIDE 500 MG: 500 TABLET, FILM COATED ORAL at 17:52

## 2023-05-21 RX ADMIN — MIRTAZAPINE 15 MG: 15 TABLET, FILM COATED ORAL at 21:03

## 2023-05-21 RX ADMIN — GABAPENTIN 300 MG: 300 CAPSULE ORAL at 21:03

## 2023-05-21 RX ADMIN — INSULIN ASPART 4 UNITS: 100 INJECTION, SOLUTION INTRAVENOUS; SUBCUTANEOUS at 17:52

## 2023-05-21 RX ADMIN — ATORVASTATIN CALCIUM 40 MG: 40 TABLET, FILM COATED ORAL at 21:03

## 2023-05-21 RX ADMIN — LOSARTAN POTASSIUM 75 MG: 50 TABLET, FILM COATED ORAL at 09:27

## 2023-05-21 RX ADMIN — INSULIN GLARGINE 10 UNITS: 100 INJECTION, SOLUTION SUBCUTANEOUS at 09:26

## 2023-05-21 RX ADMIN — FINASTERIDE 5 MG: 5 TABLET, FILM COATED ORAL at 09:26

## 2023-05-21 RX ADMIN — GABAPENTIN 300 MG: 300 CAPSULE ORAL at 09:28

## 2023-05-21 RX ADMIN — CLOPIDOGREL BISULFATE 75 MG: 75 TABLET ORAL at 09:28

## 2023-05-21 RX ADMIN — MELATONIN TAB 3 MG 3 MG: 3 TAB at 21:03

## 2023-05-21 RX ADMIN — DOXAZOSIN 1 MG: 1 TABLET ORAL at 09:28

## 2023-05-21 RX ADMIN — CARVEDILOL 25 MG: 25 TABLET, FILM COATED ORAL at 09:28

## 2023-05-21 RX ADMIN — INSULIN ASPART 3 UNITS: 100 INJECTION, SOLUTION INTRAVENOUS; SUBCUTANEOUS at 09:26

## 2023-05-21 RX ADMIN — ASPIRIN 81 MG CHEWABLE TABLET 324 MG: 81 TABLET CHEWABLE at 09:26

## 2023-05-21 RX ADMIN — RISPERIDONE 1 MG: 1 TABLET, ORALLY DISINTEGRATING ORAL at 21:04

## 2023-05-21 RX ADMIN — INSULIN ASPART 4 UNITS: 100 INJECTION, SOLUTION INTRAVENOUS; SUBCUTANEOUS at 13:41

## 2023-05-21 RX ADMIN — RISPERIDONE 0.5 MG: 0.5 TABLET, ORALLY DISINTEGRATING ORAL at 09:27

## 2023-05-21 ASSESSMENT — ACTIVITIES OF DAILY LIVING (ADL)
ADLS_ACUITY_SCORE: 46
ADLS_ACUITY_SCORE: 46
ADLS_ACUITY_SCORE: 50

## 2023-05-21 NOTE — PROGRESS NOTES
Shift Summary      Neuro: The patient's daughter was present in the evening and assisted in interpretation for the patient. Alert to person/place. Able to follow directions. He did follow commands when his daughter was present. He has been resting quietly and has been allowed to rest without disturbance. He was awake around midnight and did not participate in neuro assessment commands. He did not attempt to communicate with  services in use. He had been incontinent and was resistive with hygiene cares. He did allow his vital signs to be obtained with resistance. He has been resting quietly since. Woke his up this am for his scheduled medication. He was cooperative with nursing care and communication utilizing an  over the phone.     Cardiac: Regular apical pulse.     Respiratory: LS diminished.     GI: Incontinent of bowel and bladder. An external catheter has been in place.     Musculoskeletal: Assisted out of bed with 1-2 assist.     Pain: When asked when his daughter was present he denied pain.     Skin: No new skin concerns.

## 2023-05-21 NOTE — PROGRESS NOTES
Paged in regards to MONOCULAR diplopia, encephalopathy.    Has hx of hospital associated delirium during this hospitalization per report. Diplopia is present when closing either eyes.    Diplopia localizes to eye problem (anterior to the optic chiasm/anterior chamber of eye rather than brain problem.    Given wife's concerns, ordered NCCT - anticipate it will show no acute pathology.   Rec trial of lubricating eye drops.    Anjum Murray MD PGY5 Stroke Fellow

## 2023-05-21 NOTE — PROGRESS NOTES
Care Management Follow Up    Length of Stay (days): 17    Expected Discharge Date: 05/23/2023     Concerns to be Addressed:       Patient plan of care discussed at interdisciplinary rounds: Yes    Anticipated Discharge Disposition:       Anticipated Discharge Services:    Anticipated Discharge DME:      Patient/family educated on Medicare website which has current facility and service quality ratings:    Education Provided on the Discharge Plan:    Patient/Family in Agreement with the Plan:      Referrals Placed by CM/SW:    Private pay costs discussed: Not applicable    Additional Information:  SW resent referrals as patient has been off sitter since 7 pm last night.       KENNETH Miller

## 2023-05-21 NOTE — PROVIDER NOTIFICATION
"MD Notification    Notified Person: MD    Notified Person Name:Dr Purcell    Notification Date/Time:5:21 PM      Notification Interaction:marcela    Purpose of Notification:this patient is more confused than earlier in day. was previously able to state he was in the hospital. he now thinks he is in a \"recycling center\". also, the patient is reporting double vision. the patient says this is new one minute and not new the next. stroke neuro has signed off. please advise. thanks!       Orders Received: hospitalist spoke with stroke neuro.  continue to monitor neuros q 4.  Possible imaging tomorrow if symptoms worsen      Addendum:  Pt reports diplopia when each eye is closed.  Per md this likely represents a problem with the eye and not a stroke related problem.  The problem can likely be resolved with eye drops.  MD to order eye drops.      "

## 2023-05-21 NOTE — PROGRESS NOTES
Paged about patient having diplopia also having confusion.   Discussed with Stroke Neuro on call. Patient having  diplopia while closing either of eyes. This is less likely a reccurent stroke and likely due to optic tract or lens. If continues to have symptoms repeat Mri tomorrow . Patient not a Tnka due to recent  stroke . Will continue neurochecks every 4 hours     Patient has been sitter free for confusion

## 2023-05-21 NOTE — PROGRESS NOTES
[facility]    Medicine Progress Note - Hospitalist Service    Date of Admission:  5/4/2023    Assessment & Plan      Adam Huber is a 87 year old male  With PMH of HTN, CAD, CVA, DM type 2, TBI, dementia- admitted on 5/4/2023 with slurred speech concerning for a stroke.       Acute/subacute CVA of the right asia and left cerebellar hemisphere [posterior circulation ischemic strokes]  5/13, most likely thromboembolic  Dementia with behavioral disturbance  Transient alteration of consciousness  Anxiety and depression  History of TBI  - he lives with his wife, has underlying cognitive impairment; has PCA as per family; Rwandan speaking  - initial concern for stroke  - Stroke Neuro initially followed  - HEAD CT: With extensive areas of encephalomalacic changes and gliosis on frontal and temporal lobe, chronic infarct in basal ganglia and corona radiata.  -CTA head and neck multifocal areas of moderate or high-grade stenosis throughout anterior and posterior circulation.  Heavy atherosclerotic plaque left carotid bulb/bifurcation with at least 50% stenosis by modified NASCET criteria. Moderate predominantly noncalcified plaque right carotid bulb/bifurcation without significant stenosis by modified NASCET criteria. No findings for vertebral artery dissection.   MRI brain 5/4:  Motion limited examination equivocal DWI hyperintensity at the right asia. This may represent artifact or acute ischemia.  Severe bifrontal encephalomalacia  - PTA Aspirin was increased from 81 mg to 325 mg po daily  - lipid profile- LDL 90, total cholest 232, HDL 66  - started on Lipitor 40 mg po at bedtime  - Echo - EF 60-65%. No clear regional wall motion abnormalities noted.  No thrombus or cardiac etiology of stroke  - EEG- no seizures   -Continue to monitor in telemetry while in the hospital, neurology recommending cardiac event monitor after discharge  -Code stroke called 5/13, MRI brain showed multiple  infarcts in right asia, medial left cerebellar hemisphere.  No convincing acute infarct by CT; CTA head and neck with widespread intracranial arthrosclerotic plaque, 50-60% stenosis of the left ICA  -Appreciate neurology recommendations, concern for thromboembolic etiology given known extensive arthrosclerotic disease; started on DAPT with aspirin 325 Mg indefinitely daily, Plavix 75 Mg daily x90 days will need 30-day cardiac event monitor on discharge with follow-up in 6 to 8 weeks  -Evaluated by vascular surgery for CAD, no role for carotid intervention as current stroke are not in the geographical distribution from left ICA disease  -PTA was on scheduled Zyprexa, Seroquel and Remeron, as needed Seroquel available.  Due to ongoing agitation psychiatry was consulted  -  Appreciate psychiatry input, evaluated on 5/11/2023-recommended discontinuing Seroquel and Zyprexa and started on risperidone ODT 0.5 mg in the morning and 1 mg at bedtime to help reduce dementia related agitation with risperidone ODT 0.5 mg twice daily as needed for agitation.  Zyprexa IM 2.5 mg as a backup option or unable to take p.o.  -Scheduled melatonin 3 mg at 8:00 to help with circadian rhythm maintenance  -Delirium precautions, avoid interruptions during the night as much as possible, allow family visits, reorientation, use  services as much as possible, allow/maintain sleep-wake cycle.    - PT/OT- rec TCU; finding a TCU might be challenging because of language barrier and his intermittent agitation; needs to be without sitter for 24h,  following  - follow up with PCP in 1-2 weeks  - follow up with General Neuro in 6-8 weeks  --Off Sitter,No agiatation last nighty     Hypomagnesemia  -Being replaced per protocol     Goals of care  -Full code as per wife and family's request     HTN  - PTA on Coreg 25 mg po BID and losartan 50 mg po daily   - -Currently on amlodipine 5 mg daily, Coreg 25 mg twice daily losartan 75 mg  "daily  - BP mostly better controlled, intermittently elevated, likely related to agitation  -Monitor for persistently elevated blood pressure and adjust medication as needed     Oral thrush  -Nystatin swish and spit     BPH   -PTA finasteride and Flomax      DM type 2,   Sugars well controlled  - PTA metformin 500 mg qam and 1000 mg qpm   - Hb A1c 9.6  -Currently on metformin 500 mg twice daily with high resistance SSI and Premeal insulin 1 unit per 15 g carb in addition to Lantus 10 units  - monitor his blood sugar and adjust medication as needed  -Changed HDSSI to MDSSI     Neuropathy  -Continue PTA gabapentin            Family Son Adolfo Updated          Diet: Room Service  Combination Diet Moderate Consistent Carb (60 g CHO per Meal) Diet; Pureed Diet (level 4); Mildly Thick (level 2)    DVT Prophylaxis: Pneumatic Compression Devices  Villa Catheter: Not present  Lines: None     Cardiac Monitoring: ACTIVE order. Indication: Tachyarrhythmias, acute (48 hours)  Code Status: Full Code      Clinically Significant Risk Factors              # Hypoalbuminemia: Lowest albumin = 3.4 g/dL at 5/14/2023  2:21 PM, will monitor as appropriate     # Hypertension: Noted on problem list     # Dementia: noted on problem list   # DMII: A1C = 9.6 % (Ref range: <5.7 %) within past 6 months   # Overweight: Estimated body mass index is 29.13 kg/m  as calculated from the following:    Height as of this encounter: 1.778 m (5' 10\").    Weight as of this encounter: 92.1 kg (203 lb 0.7 oz).           Disposition Plan      Expected Discharge Date: 05/23/2023    Discharge Delays: Placement - TCU  *Medically Ready for Discharge              Ena Purcell MD  Hospitalist Service  [facility]  Securely message with [employer] (more info)  Text page via [facility] Paging/Directory   ______________________________________________________________________    Interval History    at bedside  Patient is sleeping "   Does not appear in any distress  No agitation      Physical Exam   Vital Signs: Temp: 97  F (36.1  C) Temp src: Oral BP: 123/52 Pulse: 59   Resp: 16 SpO2: 97 % O2 Device: None (Room air)    Weight: 203 lbs .7 oz  Physical Exam  Constitutional:       General: He is sleeping.   Pulmonary:      Effort: Pulmonary effort is normal. No respiratory distress.         {Medical Decision Making       Data     I have personally reviewed the following data over the past 24 hrs:    N/A  \   N/A   / N/A     N/A N/A N/A /  117 (H)   4.3 N/A N/A \       Imaging results reviewed over the past 24 hrs:   No results found for this or any previous visit (from the past 24 hour(s)).

## 2023-05-21 NOTE — PLAN OF CARE
Reason for Admission: CVA     Cognitive/Mentation: A/Ox 1  Disoriented to time and situation and place  Neuros/CMS: Intact ex slurred speech.  R droop.  Slight R sided weakness  VS: stable.   Tele: patient refusing tele.  GI: BS audible, passing flatus.  : incontinent.   Pulmonary: LS diminished, clear.  On RA.  Pain: denies.      Drains/Lines: none  Skin: wnl ex scattered bruising  Activity: Assist x 1 with GBW.  Diet: MCHO pureed with mild thick liquids. Takes pills crushed in pudding.      Therapies recs: TCU  Discharge: looking for placement     Aggression Stoplight Tool: green  End of shift summary: pt c/o diplopia on evening exam and also had increased confusion.  md updated and instructed to continue to monitor.  Pt's wife arrived later in evening and she expressed concern that his mental status had changed.  Stroke neuro fellow notified.  No change in pt's previously noted deficits.  MD ordered stat head CT.  Results are pending.

## 2023-05-22 ENCOUNTER — APPOINTMENT (OUTPATIENT)
Dept: OCCUPATIONAL THERAPY | Facility: CLINIC | Age: 88
DRG: 065 | End: 2023-05-22
Attending: HOSPITALIST
Payer: COMMERCIAL

## 2023-05-22 ENCOUNTER — APPOINTMENT (OUTPATIENT)
Dept: SPEECH THERAPY | Facility: CLINIC | Age: 88
DRG: 065 | End: 2023-05-22
Attending: HOSPITALIST
Payer: COMMERCIAL

## 2023-05-22 LAB
GLUCOSE BLDC GLUCOMTR-MCNC: 130 MG/DL (ref 70–99)
GLUCOSE BLDC GLUCOMTR-MCNC: 137 MG/DL (ref 70–99)
GLUCOSE BLDC GLUCOMTR-MCNC: 164 MG/DL (ref 70–99)
GLUCOSE BLDC GLUCOMTR-MCNC: 167 MG/DL (ref 70–99)
MAGNESIUM SERPL-MCNC: 1.6 MG/DL (ref 1.7–2.3)
POTASSIUM SERPL-SCNC: 4.4 MMOL/L (ref 3.4–5.3)

## 2023-05-22 PROCEDURE — 250N000013 HC RX MED GY IP 250 OP 250 PS 637: Performed by: STUDENT IN AN ORGANIZED HEALTH CARE EDUCATION/TRAINING PROGRAM

## 2023-05-22 PROCEDURE — 97530 THERAPEUTIC ACTIVITIES: CPT | Mod: GO

## 2023-05-22 PROCEDURE — 99232 SBSQ HOSP IP/OBS MODERATE 35: CPT | Performed by: HOSPITALIST

## 2023-05-22 PROCEDURE — 120N000001 HC R&B MED SURG/OB

## 2023-05-22 PROCEDURE — 99232 SBSQ HOSP IP/OBS MODERATE 35: CPT | Mod: GC | Performed by: STUDENT IN AN ORGANIZED HEALTH CARE EDUCATION/TRAINING PROGRAM

## 2023-05-22 PROCEDURE — 83735 ASSAY OF MAGNESIUM: CPT | Performed by: STUDENT IN AN ORGANIZED HEALTH CARE EDUCATION/TRAINING PROGRAM

## 2023-05-22 PROCEDURE — 250N000013 HC RX MED GY IP 250 OP 250 PS 637: Performed by: INTERNAL MEDICINE

## 2023-05-22 PROCEDURE — 250N000013 HC RX MED GY IP 250 OP 250 PS 637: Performed by: NURSE PRACTITIONER

## 2023-05-22 PROCEDURE — 92526 ORAL FUNCTION THERAPY: CPT | Mod: GN | Performed by: SPEECH-LANGUAGE PATHOLOGIST

## 2023-05-22 PROCEDURE — 36415 COLL VENOUS BLD VENIPUNCTURE: CPT | Performed by: STUDENT IN AN ORGANIZED HEALTH CARE EDUCATION/TRAINING PROGRAM

## 2023-05-22 PROCEDURE — 84132 ASSAY OF SERUM POTASSIUM: CPT | Performed by: STUDENT IN AN ORGANIZED HEALTH CARE EDUCATION/TRAINING PROGRAM

## 2023-05-22 PROCEDURE — 250N000013 HC RX MED GY IP 250 OP 250 PS 637: Performed by: HOSPITALIST

## 2023-05-22 PROCEDURE — 250N000013 HC RX MED GY IP 250 OP 250 PS 637

## 2023-05-22 RX ADMIN — RISPERIDONE 0.5 MG: 0.5 TABLET, ORALLY DISINTEGRATING ORAL at 16:33

## 2023-05-22 RX ADMIN — LOSARTAN POTASSIUM 75 MG: 50 TABLET, FILM COATED ORAL at 09:26

## 2023-05-22 RX ADMIN — ATORVASTATIN CALCIUM 40 MG: 40 TABLET, FILM COATED ORAL at 20:01

## 2023-05-22 RX ADMIN — ACETAMINOPHEN 650 MG: 325 TABLET ORAL at 21:11

## 2023-05-22 RX ADMIN — RISPERIDONE 1 MG: 1 TABLET, ORALLY DISINTEGRATING ORAL at 21:11

## 2023-05-22 RX ADMIN — AMLODIPINE BESYLATE 5 MG: 5 TABLET ORAL at 09:27

## 2023-05-22 RX ADMIN — DOXAZOSIN 1 MG: 1 TABLET ORAL at 09:27

## 2023-05-22 RX ADMIN — MELATONIN TAB 3 MG 3 MG: 3 TAB at 20:01

## 2023-05-22 RX ADMIN — METFORMIN HYDROCHLORIDE 500 MG: 500 TABLET, FILM COATED ORAL at 09:28

## 2023-05-22 RX ADMIN — GABAPENTIN 300 MG: 300 CAPSULE ORAL at 21:11

## 2023-05-22 RX ADMIN — FINASTERIDE 5 MG: 5 TABLET, FILM COATED ORAL at 09:28

## 2023-05-22 RX ADMIN — CLOPIDOGREL BISULFATE 75 MG: 75 TABLET ORAL at 09:27

## 2023-05-22 RX ADMIN — RISPERIDONE 0.5 MG: 0.5 TABLET, ORALLY DISINTEGRATING ORAL at 09:27

## 2023-05-22 RX ADMIN — CARVEDILOL 25 MG: 25 TABLET, FILM COATED ORAL at 09:27

## 2023-05-22 RX ADMIN — ASPIRIN 81 MG CHEWABLE TABLET 324 MG: 81 TABLET CHEWABLE at 09:28

## 2023-05-22 RX ADMIN — GABAPENTIN 300 MG: 300 CAPSULE ORAL at 09:26

## 2023-05-22 RX ADMIN — INSULIN GLARGINE 10 UNITS: 100 INJECTION, SOLUTION SUBCUTANEOUS at 09:37

## 2023-05-22 RX ADMIN — ACETAMINOPHEN 650 MG: 325 TABLET ORAL at 10:58

## 2023-05-22 RX ADMIN — ACETAMINOPHEN 650 MG: 325 TABLET ORAL at 06:29

## 2023-05-22 RX ADMIN — INSULIN ASPART 2 UNITS: 100 INJECTION, SOLUTION INTRAVENOUS; SUBCUTANEOUS at 10:43

## 2023-05-22 RX ADMIN — CARVEDILOL 25 MG: 25 TABLET, FILM COATED ORAL at 18:23

## 2023-05-22 RX ADMIN — METFORMIN HYDROCHLORIDE 500 MG: 500 TABLET, FILM COATED ORAL at 18:23

## 2023-05-22 RX ADMIN — MIRTAZAPINE 15 MG: 15 TABLET, FILM COATED ORAL at 21:12

## 2023-05-22 ASSESSMENT — ACTIVITIES OF DAILY LIVING (ADL)
ADLS_ACUITY_SCORE: 50

## 2023-05-22 NOTE — PROGRESS NOTES
[facility]    Hospitalist Progress Note    Interval History   Patient sitting up in chair this morning.  Used Kenyan   initially.  Patient was completely nonverbal during that assessment.  Reassess the patient with daughter and in person  at bedside later in the day at which point he was more communicative with the daughter.    Daughter did report that he is significantly less verbal today than before and appears to have more of her right facial droop.  No review of systems could be obtained from the patient.    -Data reviewed today: I reviewed all new labs and imaging results over the last 24 hours. I personally reviewed no images or EKG's today.    Physical Exam   Temp: 96.9  F (36.1  C) Temp src: Oral BP: 114/50 Pulse: 62   Resp: 20 SpO2: 95 % O2 Device: None (Room air)    Vitals:    05/13/23 1100 05/15/23 0533 05/21/23 0612   Weight: 94.4 kg (208 lb 1.8 oz) 90.2 kg (198 lb 13.7 oz) 92.1 kg (203 lb 0.7 oz)     Vital Signs with Ranges  Temp:  [96.9  F (36.1  C)-97.7  F (36.5  C)] 96.9  F (36.1  C)  Pulse:  [60-72] 62  Resp:  [16-20] 20  BP: (114-136)/(50-70) 114/50  SpO2:  [94 %-97 %] 95 %  I/O last 3 completed shifts:  In: 260 [P.O.:260]  Out: -     Physical Exam  Constitutional:       Appearance: He is ill-appearing.   HENT:      Head: Normocephalic.   Cardiovascular:      Rate and Rhythm: Normal rate and regular rhythm.      Pulses: Normal pulses.      Heart sounds: Normal heart sounds.   Pulmonary:      Effort: Pulmonary effort is normal. No respiratory distress.      Breath sounds: Normal breath sounds.   Abdominal:      General: Abdomen is flat. Bowel sounds are normal. There is no distension.      Tenderness: There is no abdominal tenderness. There is no guarding.   Musculoskeletal:         General: Normal range of motion.      Cervical back: Normal range of motion.   Skin:     General: Skin is warm and dry.   Neurological:      Comments: Right facial droop.   Right upper extremity 4 x 5 strength.  Gait not assessed.  Minimally verbal with only yes or no answers to the daughter.  Intermittently follows commands.   Psychiatric:         Mood and Affect: Mood normal.           Medications     - MEDICATION INSTRUCTIONS -       - MEDICATION INSTRUCTIONS -         acetaminophen  650 mg Oral Q8H     amLODIPine  5 mg Oral Daily     aspirin  325 mg Oral Daily    Or     aspirin  324 mg Oral or NG Tube Daily    Or     aspirin  300 mg Rectal Daily     atorvastatin  40 mg Oral QPM     bromfenac  1 drop Right Eye Daily     carvedilol  25 mg Oral BID w/meals     clopidogrel  75 mg Oral Daily     doxazosin  1 mg Oral Daily     finasteride  5 mg Oral Daily     gabapentin  300 mg Oral BID     insulin aspart  1-7 Units Subcutaneous TID AC     insulin aspart  1-5 Units Subcutaneous At Bedtime     insulin aspart   Subcutaneous TID AC     insulin glargine  10 Units Subcutaneous QAM AC     losartan  75 mg Oral Daily     melatonin  3 mg Oral QPM     metFORMIN  500 mg Oral BID w/meals     mirtazapine  15 mg Oral At Bedtime     risperiDONE  0.5 mg Sublingual QAM AC     risperiDONE  1 mg Sublingual At Bedtime       Data   Recent Labs   Lab 05/22/23  1122 05/22/23  0734 05/22/23  0724 05/21/23  2056 05/21/23  0828 05/21/23  0647 05/20/23  1152 05/20/23  0835 05/19/23  1130 05/19/23  0823   WBC  --   --   --   --   --   --   --   --   --  8.0   HGB  --   --   --   --   --   --   --   --   --  15.5   MCV  --   --   --   --   --   --   --   --   --  91   PLT  --   --   --   --   --   --   --   --   --  169   NA  --   --   --   --   --   --   --   --   --  138   POTASSIUM  --  4.4  --   --   --  4.3  --  4.4  --  4.3   CHLORIDE  --   --   --   --   --   --   --   --   --  106   CO2  --   --   --   --   --   --   --   --   --  21*   BUN  --   --   --   --   --   --   --   --   --  30.6*   CR  --   --   --   --   --   --   --   --   --  0.69   ANIONGAP  --   --   --   --   --   --   --   --   --  11   LUZ   --   --   --   --   --   --   --   --   --  8.9   *  --  130* 136*   < >  --    < >  --    < > 134*    < > = values in this interval not displayed.       Recent Results (from the past 24 hour(s))   CT Head w/o Contrast    Narrative    EXAM: CT HEAD W/O CONTRAST  LOCATION: [facility]  DATE/TIME: 5/21/2023 6:43 PM CDT    INDICATION: Eval interval neuro change, increased encephalopathy.  COMPARISON: CT head 5/14/2023. MRI head 5/13/2023.  TECHNIQUE: Routine CT Head without IV contrast. Multiplanar reformats. Dose reduction techniques were used.    FINDINGS:  INTRACRANIAL CONTENTS: Unchanged encephalomalacia in the bilateral frontal poles/gyrus rectus and orbitofrontal regions, which may be sequela of prior trauma. Unchanged gliosis/encephalomalacia involving the right greater than left temporal poles, also   probably sequela of previous trauma. Multiple small foci of hypoattenuation in the bilateral basal ganglia/corona radiata regions, as before, likely due to small chronic infarcts. A few small chronic infarcts in the bilateral cerebellar hemispheres, as   before. The small areas of acute/early subacute infarction in the asia and left cerebellar hemisphere on recent MRI are not well seen on CT. No acute intracranial hemorrhage, extra axial fluid collection, or mass effect/herniation.    Unchanged mild ex vacuo dilatation of the frontal horns/bodies of the lateral ventricles, as well as the third ventricle. No hydrocephalus. Mild to moderate generalized brain parenchymal volume loss. Confluent hypoattenuation in the cerebral white   matter, nonspecific, unchanged. This may represent sequela of chronic small vessel ischemic change. Scattered intracranial atherosclerotic calcifications, as before.    VISUALIZED ORBITS/SINUSES/MASTOIDS: No intraorbital abnormality. No paranasal sinus mucosal disease. No middle ear or mastoid effusion.    BONES/SOFT TISSUES: No acute abnormality.       Impression    IMPRESSION:  1.  No significant interval change compared to 5/14/2023.  2.  Unchanged presumed posttraumatic gliosis/encephalomalacia involving the bilateral frontal and temporal lobes.  3.  Unchanged small chronic infarcts in the bilateral basal ganglia/corona radiata and cerebellar regions.  4.  The small acute/subacute infarcts involving the asia and left cerebellar hemisphere on most recent MRI are not well seen on CT.  5.  Brain atrophy and other chronic findings, as detailed.         Assessment & Plan      Adam Huber is a 87 year old male  With PMH of HTN, CAD, CVA, DM type 2, TBI, dementia- admitted on 5/4/2023 with slurred speech concerning for a stroke.       Acute/subacute CVA of the right asia and left cerebellar hemisphere [posterior circulation ischemic strokes]  5/13, most likely thromboembolic  Dementia with behavioral disturbance  Transient alteration of consciousness  Anxiety and depression  History of TBI  - he lives with his wife, has underlying cognitive impairment; has PCA as per family; Iranian speaking  - initial concern for stroke  - Stroke Neuro initially followed  - HEAD CT: With extensive areas of encephalomalacic changes and gliosis on frontal and temporal lobe, chronic infarct in basal ganglia and corona radiata.  -CTA head and neck multifocal areas of moderate or high-grade stenosis throughout anterior and posterior circulation.  Heavy atherosclerotic plaque left carotid bulb/bifurcation with at least 50% stenosis by modified NASCET criteria. Moderate predominantly noncalcified plaque right carotid bulb/bifurcation without significant stenosis by modified NASCET criteria. No findings for vertebral artery dissection.   MRI brain 5/4:  Motion limited examination equivocal DWI hyperintensity at the right asia. This may represent artifact or acute ischemia.  Severe bifrontal encephalomalacia  - PTA Aspirin was increased from 81 mg to 325 mg po daily  - lipid profile-  LDL 90, total cholest 232, HDL 66  - started on Lipitor 40 mg po at bedtime  - Echo - EF 60-65%. No clear regional wall motion abnormalities noted.  No thrombus or cardiac etiology of stroke  - EEG- no seizures   -Continue to monitor in telemetry while in the hospital, neurology recommending cardiac event monitor after discharge  -Code stroke called 5/13, MRI brain showed multiple infarcts in right asia, medial left cerebellar hemisphere.  No convincing acute infarct by CT; CTA head and neck with widespread intracranial arthrosclerotic plaque, 50-60% stenosis of the left ICA  -Appreciate neurology recommendations, concern for thromboembolic etiology given known extensive arthrosclerotic disease; started on DAPT with aspirin 325 Mg indefinitely daily, Plavix 75 Mg daily x90 days will need 30-day cardiac event monitor on discharge with follow-up in 6 to 8 weeks  -Evaluated by vascular surgery for CAD, no role for carotid intervention as current stroke are not in the geographical distribution from left ICA disease  -PTA was on scheduled Zyprexa, Seroquel and Remeron, as needed Seroquel available.  Due to ongoing agitation psychiatry was consulted  -  Appreciate psychiatry input, evaluated on 5/11/2023-recommended discontinuing Seroquel and Zyprexa and started on risperidone ODT 0.5 mg in the morning and 1 mg at bedtime to help reduce dementia related agitation with risperidone ODT 0.5 mg twice daily as needed for agitation.  Zyprexa IM 2.5 mg as a backup option or unable to take p.o.  -Scheduled melatonin 3 mg at 8:00 to help with circadian rhythm maintenance  -Delirium precautions, avoid interruptions during the night as much as possible, allow family visits, reorientation, use  services as much as possible, allow/maintain sleep-wake cycle.    - PT/OT- rec TCU; finding a TCU might be challenging because of language barrier and his intermittent agitation; needs to be without sitter for 24h,   following  - follow up with PCP in 1-2 weeks  - follow up with General Neuro in 6-8 weeks  --Off Sitter,No agiatation    5/22/2023-patient appears to be more lethargic with increasing right-sided weakness and right facial droop that is new compared to before.  Significantly less communicative.  Also coughing with any oral intake.  Neurology reconsulted for possible repeat stroke.  Did have episode of diplopia per family yesterday evening and head CT was ordered which was negative for any acute stroke.    Speech therapy evaluated him today and recommended n.p.o. status until he is more awake and alert and is able to have better oral intake.  Was currently on puréed diet.    Did have an extensive conversation with the daughter regarding the possibility of repeat strokes in the setting of significant intracranial stenosis and thrombosis.  Unclear if a repeat MRI would change her management in any way.  Will await neurology recommendations.     Hypomagnesemia  -Being replaced per protocol     Goals of care  -Full code as per wife and family's request     HTN  - PTA on Coreg 25 mg po BID and losartan 50 mg po daily   - -Currently on amlodipine 5 mg daily, Coreg 25 mg twice daily losartan 75 mg daily  - BP mostly better controlled, intermittently elevated, likely related to agitation  -Monitor for persistently elevated blood pressure and adjust medication as needed     Oral thrush  -Nystatin swish and spit     BPH   -PTA finasteride and Flomax      DM type 2,   Sugars well controlled  - PTA metformin 500 mg qam and 1000 mg qpm   - Hb A1c 9.6  -Currently on metformin 500 mg twice daily with high resistance SSI and Premeal insulin 1 unit per 15 g carb in addition to Lantus 10 units  - monitor his blood sugar and adjust medication as needed  -Changed HDSSI to MDSSI     Neuropathy  -Continue PTA gabapentin        Diet: Room Service  Combination Diet Moderate Consistent Carb (60 g CHO per Meal) Diet; Pureed Diet (level 4);  "Mildly Thick (level 2)  .  N.p.o. on 5/22/2023 until cleared by speech therapy  DVT Prophylaxis: Pneumatic Compression Devices  Villa Catheter: Not present  Lines: None     Cardiac Monitoring: ACTIVE order. Indication: Tachyarrhythmias, acute (48 hours)  Code Status: Full Code      Clinically Significant Risk Factors            # Hypomagnesemia: Lowest Mg = 1.6 mg/dL in last 2 days, will replace as needed   # Hypoalbuminemia: Lowest albumin = 3.4 g/dL at 5/14/2023  2:21 PM, will monitor as appropriate     # Hypertension: Noted on problem list     # Dementia: noted on problem list   # DMII: A1C = 9.6 % (Ref range: <5.7 %) within past 6 months   # Overweight: Estimated body mass index is 29.13 kg/m  as calculated from the following:    Height as of this encounter: 1.778 m (5' 10\").    Weight as of this encounter: 92.1 kg (203 lb 0.7 oz).               Lovely Bass MD, MD  [phone](p)  "

## 2023-05-22 NOTE — PROGRESS NOTES
Pt participated minimally during this morning's assessment, appeared drowsy upon awakening. Pt up to chair with 2, belt, walker. Moving all extremities spontaneously. Ate breakfast with tray set up and intermittent supervision. While visiting patient mid morning, daughter expressed concern that pt was not talking to her, was more lethargic, and that he complained of a headache. Pt also observed coughing after eating food that his daughter brought him, food was pureed in texture. Tylenol given for HA.  present at bedside and patient was able to complete more of RN's assessment than he had this morning with the remote . More pronounced weakness on R than had been noted during previously charted RNs' assessments, R droop present, slurred speech, and slight R tongue deviation. Hospitalist updated via text page and reassessed pt at bedside with daughter and  present. New consult placed for neurology. Speech therapy saw patient and advised holding off on PO intake until patient is more alert/awake. Pt returned back to bed using lift and assist of 2. Pt sleeping, no apparent distress. Wife at bedside.

## 2023-05-22 NOTE — PROGRESS NOTES
Shift Summary        Neuro: Alert to person and place. Calm and cooperative with cares when awake. Family was present in the evening and assisted with translation. Interpretive services used when family not present. He has been resting well overnight. Have allowed him to rest undisturbed per provider order.      Cardiac: Regular apical pulse.      Respiratory: LS diminished.      GI: Incontinent of bowel and bladder. Denied nausea when asked.    Musculoskeletal: Assistance of 2 with walker and gait belt when out of bed.      Pain: Complaint of a headache at bedtime. Scheduled Tylenol given, see the MAR. No further complaints.      Skin: No new skin concerns.     Discharge: Plans for TCU upon discharge.
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
## 2023-05-23 ENCOUNTER — APPOINTMENT (OUTPATIENT)
Dept: PHYSICAL THERAPY | Facility: CLINIC | Age: 88
DRG: 065 | End: 2023-05-23
Attending: HOSPITALIST
Payer: COMMERCIAL

## 2023-05-23 ENCOUNTER — APPOINTMENT (OUTPATIENT)
Dept: OCCUPATIONAL THERAPY | Facility: CLINIC | Age: 88
DRG: 065 | End: 2023-05-23
Attending: HOSPITALIST
Payer: COMMERCIAL

## 2023-05-23 LAB
GLUCOSE BLDC GLUCOMTR-MCNC: 112 MG/DL (ref 70–99)
GLUCOSE BLDC GLUCOMTR-MCNC: 120 MG/DL (ref 70–99)
GLUCOSE BLDC GLUCOMTR-MCNC: 158 MG/DL (ref 70–99)
GLUCOSE BLDC GLUCOMTR-MCNC: 168 MG/DL (ref 70–99)
GLUCOSE BLDC GLUCOMTR-MCNC: 188 MG/DL (ref 70–99)
MAGNESIUM SERPL-MCNC: 1.7 MG/DL (ref 1.7–2.3)
POTASSIUM SERPL-SCNC: 4.3 MMOL/L (ref 3.4–5.3)

## 2023-05-23 PROCEDURE — 36415 COLL VENOUS BLD VENIPUNCTURE: CPT | Performed by: HOSPITALIST

## 2023-05-23 PROCEDURE — 84132 ASSAY OF SERUM POTASSIUM: CPT | Performed by: HOSPITALIST

## 2023-05-23 PROCEDURE — 97530 THERAPEUTIC ACTIVITIES: CPT | Mod: GP | Performed by: PHYSICAL THERAPIST

## 2023-05-23 PROCEDURE — 250N000013 HC RX MED GY IP 250 OP 250 PS 637: Performed by: INTERNAL MEDICINE

## 2023-05-23 PROCEDURE — 83735 ASSAY OF MAGNESIUM: CPT | Performed by: HOSPITALIST

## 2023-05-23 PROCEDURE — 250N000013 HC RX MED GY IP 250 OP 250 PS 637: Performed by: STUDENT IN AN ORGANIZED HEALTH CARE EDUCATION/TRAINING PROGRAM

## 2023-05-23 PROCEDURE — 250N000013 HC RX MED GY IP 250 OP 250 PS 637: Performed by: NURSE PRACTITIONER

## 2023-05-23 PROCEDURE — 99232 SBSQ HOSP IP/OBS MODERATE 35: CPT | Performed by: HOSPITALIST

## 2023-05-23 PROCEDURE — 120N000001 HC R&B MED SURG/OB

## 2023-05-23 PROCEDURE — 250N000013 HC RX MED GY IP 250 OP 250 PS 637

## 2023-05-23 PROCEDURE — 97530 THERAPEUTIC ACTIVITIES: CPT | Mod: GO

## 2023-05-23 PROCEDURE — 250N000013 HC RX MED GY IP 250 OP 250 PS 637: Performed by: HOSPITALIST

## 2023-05-23 PROCEDURE — 250N000012 HC RX MED GY IP 250 OP 636 PS 637: Performed by: INTERNAL MEDICINE

## 2023-05-23 RX ORDER — LORAZEPAM 2 MG/ML
0.5 INJECTION INTRAMUSCULAR EVERY 4 HOURS PRN
Status: DISCONTINUED | OUTPATIENT
Start: 2023-05-23 | End: 2023-06-05 | Stop reason: HOSPADM

## 2023-05-23 RX ADMIN — ACETAMINOPHEN 650 MG: 325 TABLET ORAL at 21:13

## 2023-05-23 RX ADMIN — METFORMIN HYDROCHLORIDE 500 MG: 500 TABLET, FILM COATED ORAL at 08:15

## 2023-05-23 RX ADMIN — LOSARTAN POTASSIUM 75 MG: 50 TABLET, FILM COATED ORAL at 08:15

## 2023-05-23 RX ADMIN — DOXAZOSIN 1 MG: 1 TABLET ORAL at 08:15

## 2023-05-23 RX ADMIN — GABAPENTIN 300 MG: 300 CAPSULE ORAL at 08:16

## 2023-05-23 RX ADMIN — TRAZODONE HYDROCHLORIDE 25 MG: 50 TABLET ORAL at 00:21

## 2023-05-23 RX ADMIN — AMLODIPINE BESYLATE 5 MG: 5 TABLET ORAL at 08:16

## 2023-05-23 RX ADMIN — CARVEDILOL 25 MG: 25 TABLET, FILM COATED ORAL at 08:15

## 2023-05-23 RX ADMIN — FINASTERIDE 5 MG: 5 TABLET, FILM COATED ORAL at 08:15

## 2023-05-23 RX ADMIN — MIRTAZAPINE 15 MG: 15 TABLET, FILM COATED ORAL at 21:13

## 2023-05-23 RX ADMIN — MELATONIN TAB 3 MG 3 MG: 3 TAB at 21:13

## 2023-05-23 RX ADMIN — RISPERIDONE 0.5 MG: 0.5 TABLET, ORALLY DISINTEGRATING ORAL at 08:15

## 2023-05-23 RX ADMIN — RISPERIDONE 1 MG: 1 TABLET, ORALLY DISINTEGRATING ORAL at 21:13

## 2023-05-23 RX ADMIN — CARVEDILOL 25 MG: 25 TABLET, FILM COATED ORAL at 18:18

## 2023-05-23 RX ADMIN — INSULIN ASPART 3 UNITS: 100 INJECTION, SOLUTION INTRAVENOUS; SUBCUTANEOUS at 17:17

## 2023-05-23 RX ADMIN — ACETAMINOPHEN 650 MG: 325 TABLET ORAL at 18:18

## 2023-05-23 RX ADMIN — INSULIN ASPART 4 UNITS: 100 INJECTION, SOLUTION INTRAVENOUS; SUBCUTANEOUS at 09:27

## 2023-05-23 RX ADMIN — CLOPIDOGREL BISULFATE 75 MG: 75 TABLET ORAL at 08:15

## 2023-05-23 RX ADMIN — GABAPENTIN 300 MG: 300 CAPSULE ORAL at 21:13

## 2023-05-23 RX ADMIN — ATORVASTATIN CALCIUM 40 MG: 40 TABLET, FILM COATED ORAL at 21:13

## 2023-05-23 RX ADMIN — ASPIRIN 81 MG CHEWABLE TABLET 324 MG: 81 TABLET CHEWABLE at 08:14

## 2023-05-23 RX ADMIN — INSULIN GLARGINE 10 UNITS: 100 INJECTION, SOLUTION SUBCUTANEOUS at 08:14

## 2023-05-23 RX ADMIN — METFORMIN HYDROCHLORIDE 500 MG: 500 TABLET, FILM COATED ORAL at 18:18

## 2023-05-23 ASSESSMENT — ACTIVITIES OF DAILY LIVING (ADL)
ADLS_ACUITY_SCORE: 50
ADLS_ACUITY_SCORE: 46
ADLS_ACUITY_SCORE: 50

## 2023-05-23 NOTE — PLAN OF CARE
Goal Outcome Evaluation:  Variable levels of alertness and of stroke symptoms today. Earlier R side weakness has essentially resolved. R facial droop remains although improved. Slurred speech, speaking to wife in full phrases at this time. Follows some directions with use of . Episode of agitation this evening when being changed, received risperidone with good results. More alert this evening, no coughing. Ate well at dinner, on pureed diet with mildly thick liquids. Incontinent of urine. Pt seen by neurology, MRI ordered but not yet done. Wife at bedside, daughter in room earlier this morning. Scoring red on aggression screening tool.

## 2023-05-23 NOTE — PROGRESS NOTES
Care Management Follow Up    Length of Stay (days): 19    Expected Discharge Date: 05/24/2023     Concerns to be Addressed:       Patient plan of care discussed at interdisciplinary rounds: Yes    Anticipated Discharge Disposition:       Anticipated Discharge Services:    Anticipated Discharge DME:      Patient/family educated on Medicare website which has current facility and service quality ratings:    Education Provided on the Discharge Plan:    Patient/Family in Agreement with the Plan:      Referrals Placed by CM/SW:  Referrals have been sent. [facility] is only outstanding TCU referral.  [facility] contacted [facility] and spoke with Taya and Aries regarding referral .  Updated notes sent via fa and by email at  [email or web address]  Private pay costs discussed:     Additional Information:    KENNETH Minaya  [facility]  Care Transitions  [phone]

## 2023-05-23 NOTE — PLAN OF CARE
"BP (!) 149/66   Pulse 75   Temp 97  F (36.1  C) (Axillary)   Resp 18   Ht 1.778 m (5' 10\")   Wt 92.1 kg (203 lb 0.7 oz)   SpO2 97%   BMI 29.13 kg/m      Patient name: Adam Huber    Nursing shift note  Summary: Patient in with CVA  Alertness/orientation: Alert. Speaks Cymraes and refuses  so orientation difficult to assess, seems confused though   Neuro: right sided weakness 4/5, slight right droop  Cardiac: WDL  Resp: WDL   GI: No BM  : Incontinent  IV: None  Mobility: A2 GB walker  CMS: Intact  Pain: Does not seeem to be in pain  Takes meds: crushed in applesauce  Skin: WDL  Plan: Pending ARU transfer  Other Important Info: Was able to sit up in the chair for a while today  Hygiene: Bed bath given  Chartin. Hourly rounding Yes  2. Turns Yes  3. Electrolyte Protocols Yes      Reynaldo Pacheco, RN  [facility]  [phone]    "

## 2023-05-23 NOTE — PLAN OF CARE
[id number];    Pt alert and agitated for most of shift. Pt unable or unwilling to participate in neuro assessments KIMARY RUBIN with Right sided weakness compared to left. Intermittently  follows some commands. Multiple episodes of agitation and attempts to get out of bed. Agitation and aggression with brief changes, Incontinent of urine and bowel. MRI mentioned in notes, has yet to be completed. Pt agitation would not allow safe transfer and administration. No PRN available.    All available PRN were given, Trazadone and risperidone. See MAR for admin information.

## 2023-05-23 NOTE — PROGRESS NOTES
[facility]    Hospitalist Progress Note    Interval History   Patient is awake and alert this morning.  Continues to have right facial droop.  Very slurred speech.  Difficult to understand his verbal responses.  Very restless in the room.  Does not follow commands.    Has been restless overnight as well but did not receive any of his as needed medications.  Redirectable.  Not agitated.    -Data reviewed today: I reviewed all new labs and imaging results over the last 24 hours. I personally reviewed no images or EKG's today.    Physical Exam   Temp: 97  F (36.1  C) Temp src: Oral BP: 120/59 Pulse: 51   Resp: 18 SpO2: 95 % O2 Device: None (Room air)    Vitals:    05/13/23 1100 05/15/23 0533 05/21/23 0612   Weight: 94.4 kg (208 lb 1.8 oz) 90.2 kg (198 lb 13.7 oz) 92.1 kg (203 lb 0.7 oz)     Vital Signs with Ranges  Temp:  [96.8  F (36  C)-97.5  F (36.4  C)] 97  F (36.1  C)  Pulse:  [51-75] 51  Resp:  [16-19] 18  BP: ()/(47-71) 120/59  SpO2:  [95 %-97 %] 95 %  I/O last 3 completed shifts:  In: 360 [P.O.:360]  Out: -     Physical Exam  Constitutional:       Appearance: He is ill-appearing.   HENT:      Head: Normocephalic.   Cardiovascular:      Rate and Rhythm: Normal rate and regular rhythm.      Pulses: Normal pulses.      Heart sounds: Normal heart sounds.   Pulmonary:      Effort: Pulmonary effort is normal. No respiratory distress.      Breath sounds: Normal breath sounds.   Abdominal:      General: Abdomen is flat. Bowel sounds are normal. There is no distension.      Tenderness: There is no abdominal tenderness. There is no guarding.   Musculoskeletal:         General: Normal range of motion.      Cervical back: Normal range of motion.   Skin:     General: Skin is warm and dry.   Neurological:      Comments: Right facial droop.  Right upper extremity 4 x 5 strength.  Gait not assessed.  Minimally verbal with only yes or no answers to the daughter.  Intermittently follows commands.    Psychiatric:         Mood and Affect: Mood normal.           Medications     - MEDICATION INSTRUCTIONS -       - MEDICATION INSTRUCTIONS -         acetaminophen  650 mg Oral Q8H     amLODIPine  5 mg Oral Daily     aspirin  325 mg Oral Daily    Or     aspirin  324 mg Oral or NG Tube Daily    Or     aspirin  300 mg Rectal Daily     atorvastatin  40 mg Oral QPM     bromfenac  1 drop Right Eye Daily     carvedilol  25 mg Oral BID w/meals     clopidogrel  75 mg Oral Daily     doxazosin  1 mg Oral Daily     finasteride  5 mg Oral Daily     gabapentin  300 mg Oral BID     insulin aspart  1-7 Units Subcutaneous TID AC     insulin aspart  1-5 Units Subcutaneous At Bedtime     insulin aspart   Subcutaneous TID AC     insulin glargine  10 Units Subcutaneous QAM AC     losartan  75 mg Oral Daily     melatonin  3 mg Oral QPM     metFORMIN  500 mg Oral BID w/meals     mirtazapine  15 mg Oral At Bedtime     risperiDONE  0.5 mg Sublingual QAM AC     risperiDONE  1 mg Sublingual At Bedtime       Data   Recent Labs   Lab 05/23/23  1119 05/23/23  0824 05/23/23  0804 05/22/23  2110 05/22/23  1122 05/22/23  0734 05/21/23  0828 05/21/23  0647 05/19/23  1130 05/19/23  0823   WBC  --   --   --   --   --   --   --   --   --  8.0   HGB  --   --   --   --   --   --   --   --   --  15.5   MCV  --   --   --   --   --   --   --   --   --  91   PLT  --   --   --   --   --   --   --   --   --  169   NA  --   --   --   --   --   --   --   --   --  138   POTASSIUM  --  4.3  --   --   --  4.4  --  4.3   < > 4.3   CHLORIDE  --   --   --   --   --   --   --   --   --  106   CO2  --   --   --   --   --   --   --   --   --  21*   BUN  --   --   --   --   --   --   --   --   --  30.6*   CR  --   --   --   --   --   --   --   --   --  0.69   ANIONGAP  --   --   --   --   --   --   --   --   --  11   LUZ  --   --   --   --   --   --   --   --   --  8.9   *  --  120* 167*   < >  --    < >  --    < > 134*    < > = values in this interval not  displayed.       No results found for this or any previous visit (from the past 24 hour(s)).      Assessment & Plan      Adam Huber is a 87 year old male  With PMH of HTN, CAD, CVA, DM type 2, TBI, dementia- admitted on 5/4/2023 with slurred speech concerning for a stroke.       Acute/subacute CVA of the right asia and left cerebellar hemisphere [posterior circulation ischemic strokes]  5/13, most likely thromboembolic  Dementia with behavioral disturbance  Transient alteration of consciousness  Anxiety and depression  History of TBI  - he lives with his wife, has underlying cognitive impairment; has PCA as per family; Algerian speaking  - initial concern for stroke  - Stroke Neuro initially followed  - HEAD CT: With extensive areas of encephalomalacic changes and gliosis on frontal and temporal lobe, chronic infarct in basal ganglia and corona radiata.  -CTA head and neck multifocal areas of moderate or high-grade stenosis throughout anterior and posterior circulation.  Heavy atherosclerotic plaque left carotid bulb/bifurcation with at least 50% stenosis by modified NASCET criteria. Moderate predominantly noncalcified plaque right carotid bulb/bifurcation without significant stenosis by modified NASCET criteria. No findings for vertebral artery dissection.   MRI brain 5/4:  Motion limited examination equivocal DWI hyperintensity at the right asia. This may represent artifact or acute ischemia.  Severe bifrontal encephalomalacia  - PTA Aspirin was increased from 81 mg to 325 mg po daily  - lipid profile- LDL 90, total cholest 232, HDL 66  - started on Lipitor 40 mg po at bedtime  - Echo - EF 60-65%. No clear regional wall motion abnormalities noted.  No thrombus or cardiac etiology of stroke  - EEG- no seizures   -Continue to monitor in telemetry while in the hospital, neurology recommending cardiac event monitor after discharge  -Code stroke called 5/13, MRI brain showed multiple infarcts in right asia,  medial left cerebellar hemisphere.  No convincing acute infarct by CT; CTA head and neck with widespread intracranial arthrosclerotic plaque, 50-60% stenosis of the left ICA  -Appreciate neurology recommendations, concern for thromboembolic etiology given known extensive arthrosclerotic disease; started on DAPT with aspirin 325 Mg indefinitely daily, Plavix 75 Mg daily x90 days will need 30-day cardiac event monitor on discharge with follow-up in 6 to 8 weeks  -Evaluated by vascular surgery for CAD, no role for carotid intervention as current stroke are not in the geographical distribution from left ICA disease  -PTA was on scheduled Zyprexa, Seroquel and Remeron, as needed Seroquel available.  Due to ongoing agitation psychiatry was consulted  -  Appreciate psychiatry input, evaluated on 5/11/2023-recommended discontinuing Seroquel and Zyprexa and started on risperidone ODT 0.5 mg in the morning and 1 mg at bedtime to help reduce dementia related agitation with risperidone ODT 0.5 mg twice daily as needed for agitation.  Zyprexa IM 2.5 mg as a backup option or unable to take p.o.  -Scheduled melatonin 3 mg at 8:00 to help with circadian rhythm maintenance  -Delirium precautions, avoid interruptions during the night as much as possible, allow family visits, reorientation, use  services as much as possible, allow/maintain sleep-wake cycle.    - PT/OT- rec TCU; finding a TCU might be challenging because of language barrier and his intermittent agitation; needs to be without sitter for 24h,  following  - follow up with PCP in 1-2 weeks  - follow up with General Neuro in 6-8 weeks  --Off Sitter,No agiatation.     5/22/2023-patient had worsening neurologic status with increased right-sided weakness and right facial droop with slurred speech.  Stroke reevaluated patient and recommended repeat MRI.  If this shows new strokes then will have to evaluate the efficacy of antiplatelet  agents.    5/23/2023-plan on obtaining MRI today once his family gets here.  As needed Ativan ordered for restlessness.  Currently n.p.o. until he is cleared for stroke.  Speech therapy consulted..  If MRI positive for stroke will order P2 Y12 levels.  -Avoid hypotension.  -Continue dual antiplatelet and statin.     Hypomagnesemia  -Being replaced per protocol     Goals of care  -Full code as per wife and family's request     HTN  - PTA on Coreg 25 mg po BID and losartan 50 mg po daily   - -Currently on amlodipine 5 mg daily, Coreg 25 mg twice daily losartan 75 mg daily  - BP mostly better controlled, intermittently elevated, likely related to agitation  -Monitor for persistently elevated blood pressure and adjust medication as needed     Oral thrush  -Nystatin swish and spit     BPH   -PTA finasteride and Flomax      DM type 2,   Sugars well controlled  - PTA metformin 500 mg qam and 1000 mg qpm   - Hb A1c 9.6  -Currently on metformin 500 mg twice daily with high resistance SSI and Premeal insulin 1 unit per 15 g carb in addition to Lantus 10 units  - monitor his blood sugar and adjust medication as needed  -Changed HDSSI to MDSSI     Neuropathy  -Continue PTA gabapentin        Diet: Room Service  Combination Diet Moderate Consistent Carb (60 g CHO per Meal) Diet; Pureed Diet (level 4); Mildly Thick (level 2)  .  N.p.o. on 5/22/2023 until cleared by speech therapy  DVT Prophylaxis: Pneumatic Compression Devices  Villa Catheter: Not present  Lines: None     Cardiac Monitoring: ACTIVE order. Indication: Tachyarrhythmias, acute (48 hours)  Code Status: Full Code      Clinically Significant Risk Factors            # Hypomagnesemia: Lowest Mg = 1.6 mg/dL in last 2 days, will replace as needed   # Hypoalbuminemia: Lowest albumin = 3.4 g/dL at 5/14/2023  2:21 PM, will monitor as appropriate     # Hypertension: Noted on problem list     # Dementia: noted on problem list   # DMII: A1C = 9.6 % (Ref range: <5.7 %) within past  "6 months   # Overweight: Estimated body mass index is 29.13 kg/m  as calculated from the following:    Height as of this encounter: 1.778 m (5' 10\").    Weight as of this encounter: 92.1 kg (203 lb 0.7 oz).               Lovely Bass MD, MD  [phone](p)  "

## 2023-05-24 ENCOUNTER — APPOINTMENT (OUTPATIENT)
Dept: SPEECH THERAPY | Facility: CLINIC | Age: 88
DRG: 065 | End: 2023-05-24
Attending: HOSPITALIST
Payer: COMMERCIAL

## 2023-05-24 ENCOUNTER — APPOINTMENT (OUTPATIENT)
Dept: MRI IMAGING | Facility: CLINIC | Age: 88
DRG: 065 | End: 2023-05-24
Attending: HOSPITALIST
Payer: COMMERCIAL

## 2023-05-24 LAB
GLUCOSE BLDC GLUCOMTR-MCNC: 108 MG/DL (ref 70–99)
GLUCOSE BLDC GLUCOMTR-MCNC: 124 MG/DL (ref 70–99)
GLUCOSE BLDC GLUCOMTR-MCNC: 155 MG/DL (ref 70–99)
GLUCOSE BLDC GLUCOMTR-MCNC: 165 MG/DL (ref 70–99)
GLUCOSE BLDC GLUCOMTR-MCNC: 256 MG/DL (ref 70–99)
MAGNESIUM SERPL-MCNC: 1.6 MG/DL (ref 1.7–2.3)
POTASSIUM SERPL-SCNC: 3.6 MMOL/L (ref 3.4–5.3)

## 2023-05-24 PROCEDURE — 84132 ASSAY OF SERUM POTASSIUM: CPT | Performed by: HOSPITALIST

## 2023-05-24 PROCEDURE — 250N000013 HC RX MED GY IP 250 OP 250 PS 637: Performed by: NURSE PRACTITIONER

## 2023-05-24 PROCEDURE — 250N000013 HC RX MED GY IP 250 OP 250 PS 637: Performed by: INTERNAL MEDICINE

## 2023-05-24 PROCEDURE — 99207 PR SERVICE NOT STAFFED W/SUPERV PROV: CPT | Performed by: STUDENT IN AN ORGANIZED HEALTH CARE EDUCATION/TRAINING PROGRAM

## 2023-05-24 PROCEDURE — 83735 ASSAY OF MAGNESIUM: CPT | Performed by: HOSPITALIST

## 2023-05-24 PROCEDURE — 120N000001 HC R&B MED SURG/OB

## 2023-05-24 PROCEDURE — 250N000013 HC RX MED GY IP 250 OP 250 PS 637: Performed by: HOSPITALIST

## 2023-05-24 PROCEDURE — 92526 ORAL FUNCTION THERAPY: CPT | Mod: GN | Performed by: SPEECH-LANGUAGE PATHOLOGIST

## 2023-05-24 PROCEDURE — 250N000013 HC RX MED GY IP 250 OP 250 PS 637

## 2023-05-24 PROCEDURE — 250N000013 HC RX MED GY IP 250 OP 250 PS 637: Performed by: STUDENT IN AN ORGANIZED HEALTH CARE EDUCATION/TRAINING PROGRAM

## 2023-05-24 PROCEDURE — 99232 SBSQ HOSP IP/OBS MODERATE 35: CPT | Performed by: HOSPITALIST

## 2023-05-24 PROCEDURE — 999N000128 HC STATISTIC PERIPHERAL IV START W/O US GUIDANCE

## 2023-05-24 PROCEDURE — 70551 MRI BRAIN STEM W/O DYE: CPT

## 2023-05-24 PROCEDURE — 36415 COLL VENOUS BLD VENIPUNCTURE: CPT | Performed by: HOSPITALIST

## 2023-05-24 PROCEDURE — 250N000011 HC RX IP 250 OP 636: Performed by: HOSPITALIST

## 2023-05-24 RX ORDER — LORAZEPAM 0.5 MG/1
0.5 TABLET ORAL ONCE
Status: COMPLETED | OUTPATIENT
Start: 2023-05-24 | End: 2023-05-24

## 2023-05-24 RX ADMIN — RISPERIDONE 0.5 MG: 0.5 TABLET, ORALLY DISINTEGRATING ORAL at 18:25

## 2023-05-24 RX ADMIN — LOSARTAN POTASSIUM 75 MG: 50 TABLET, FILM COATED ORAL at 09:57

## 2023-05-24 RX ADMIN — INSULIN ASPART 3 UNITS: 100 INJECTION, SOLUTION INTRAVENOUS; SUBCUTANEOUS at 09:00

## 2023-05-24 RX ADMIN — RISPERIDONE 1 MG: 1 TABLET, ORALLY DISINTEGRATING ORAL at 22:23

## 2023-05-24 RX ADMIN — GABAPENTIN 300 MG: 300 CAPSULE ORAL at 09:59

## 2023-05-24 RX ADMIN — ACETAMINOPHEN 650 MG: 325 TABLET ORAL at 12:26

## 2023-05-24 RX ADMIN — ATORVASTATIN CALCIUM 40 MG: 40 TABLET, FILM COATED ORAL at 20:05

## 2023-05-24 RX ADMIN — MIRTAZAPINE 15 MG: 15 TABLET, FILM COATED ORAL at 20:07

## 2023-05-24 RX ADMIN — DOXAZOSIN 1 MG: 1 TABLET ORAL at 09:58

## 2023-05-24 RX ADMIN — METFORMIN HYDROCHLORIDE 500 MG: 500 TABLET, FILM COATED ORAL at 09:58

## 2023-05-24 RX ADMIN — RISPERIDONE 0.5 MG: 0.5 TABLET, ORALLY DISINTEGRATING ORAL at 12:25

## 2023-05-24 RX ADMIN — ASPIRIN 81 MG CHEWABLE TABLET 324 MG: 81 TABLET CHEWABLE at 10:11

## 2023-05-24 RX ADMIN — LORAZEPAM 0.5 MG: 0.5 TABLET ORAL at 02:22

## 2023-05-24 RX ADMIN — ACETAMINOPHEN 650 MG: 325 TABLET ORAL at 20:06

## 2023-05-24 RX ADMIN — CARVEDILOL 25 MG: 25 TABLET, FILM COATED ORAL at 09:59

## 2023-05-24 RX ADMIN — MELATONIN TAB 3 MG 3 MG: 3 TAB at 20:06

## 2023-05-24 RX ADMIN — LORAZEPAM 0.5 MG: 2 INJECTION INTRAMUSCULAR; INTRAVENOUS at 13:42

## 2023-05-24 RX ADMIN — GABAPENTIN 300 MG: 300 CAPSULE ORAL at 20:06

## 2023-05-24 RX ADMIN — CLOPIDOGREL BISULFATE 75 MG: 75 TABLET ORAL at 09:59

## 2023-05-24 RX ADMIN — INSULIN GLARGINE 10 UNITS: 100 INJECTION, SOLUTION SUBCUTANEOUS at 09:00

## 2023-05-24 RX ADMIN — AMLODIPINE BESYLATE 5 MG: 5 TABLET ORAL at 09:59

## 2023-05-24 RX ADMIN — FINASTERIDE 5 MG: 5 TABLET, FILM COATED ORAL at 09:59

## 2023-05-24 RX ADMIN — RISPERIDONE 0.5 MG: 0.5 TABLET, ORALLY DISINTEGRATING ORAL at 09:58

## 2023-05-24 ASSESSMENT — ACTIVITIES OF DAILY LIVING (ADL)
ADLS_ACUITY_SCORE: 50
ADLS_ACUITY_SCORE: 50
ADLS_ACUITY_SCORE: 46
ADLS_ACUITY_SCORE: 46
ADLS_ACUITY_SCORE: 50
ADLS_ACUITY_SCORE: 46
ADLS_ACUITY_SCORE: 50
ADLS_ACUITY_SCORE: 46

## 2023-05-24 NOTE — PROGRESS NOTES
Brief Clinical Note:    MRI was performed- no new acute stroke on MRI, has evolving right asia stroke.     Impression:   # Right face droop- mild right face droop could be due to the right asia infarct previously seen on MRI due to ICAD.    # Right arm weakness has been note don examinations during hospital course- the right arm weakness was not noted on examination 2 days ago and could be a component of volition/ behavior based. He has had limited participation in examinations combined with delirium and baseline cognitive decline.     Plan:  -Continue DAPT therapy as planned for 90 days  -Continue lipitor 40mg  -PTOT  -Continue Q4h neurochecks can do nighttime Q8 neurochecks 10pm-6am  -Cardiac event monitor at discharge  -Delirium precautions      No further recommendations at this time. We will sign off at this time, please call with any questions or concerns.       Vivian Oshea  Vascular Stroke Fellow      Stroke staff is Dr. Ojeda

## 2023-05-24 NOTE — PLAN OF CARE
[id number];    Pt alert and agitated for most of shift. Pt unable or unwilling to participate in neuro assessments KIMARY RUBIN with Right facial droop and R sided weakness compared to left. Intermittently follows some commands. Multiple episodes of agitation and attempts to get out of bed. VSS, pt incontinent of bowel and bladder. Up A2 GB and walker or ceiling lift.     PRN ativan IV was added to medication list, but no IV access has been available on patient since 5/16. MD notified, given 1x PO 0.5mg ativan for agitation.

## 2023-05-24 NOTE — PLAN OF CARE
"BP (!) 149/81 (BP Location: Left arm)   Pulse 74   Temp 97.8  F (36.6  C) (Axillary)   Resp 18   Ht 1.778 m (5' 10\")   Wt 92.1 kg (203 lb 0.7 oz)   SpO2 96%   BMI 29.13 kg/m      Patient name: Adam Huber    Nursing shift note  Summary: Patient in with CVA  Alertness/orientation: Alert, agitated, disoriented  Neuro: Difficult to assess due to language barrier and confusion, right sided weakness remains the same as yesterday, as well as right facial droop  Cardiac: WWDL  Resp: WDL  GI: BM Smear only  : Incontinent  IV: Now has PIV in hand  Mobility: A2 teresita steady  CMS: Intact  Pain: None  Takes meds: crushed in applesauce  Skin: Redness on coccyx  Plan: Pending placement  Other Important Info:   Hygiene: Given shower in bathroom  Chartin. Hourly rounding Yes  2. Turns Yes  3. Electrolyte Protocols Yes      Reynaldo Pacheco, RN  [facility]  [phone]      "

## 2023-05-24 NOTE — PROGRESS NOTES
[facility]    Hospitalist Progress Note    Interval History   Patient awake.  Quite agitated overnight.  Has right facial droop.  Moderately working.  Used [place]  at bedside.  Follows commands intermittently.  Has not been participating in neuro checks regularly.    Has been restless overnight as well but did not receive any of his as needed medications.  Redirectable.  Not agitated.    -Data reviewed today: I reviewed all new labs and imaging results over the last 24 hours. I personally reviewed no images or EKG's today.    Physical Exam   Temp: 97  F (36.1  C) Temp src: Oral BP: 118/56 Pulse: 61   Resp: 18 SpO2: 94 % O2 Device: None (Room air)    Vitals:    05/13/23 1100 05/15/23 0533 05/21/23 0612   Weight: 94.4 kg (208 lb 1.8 oz) 90.2 kg (198 lb 13.7 oz) 92.1 kg (203 lb 0.7 oz)     Vital Signs with Ranges  Temp:  [97  F (36.1  C)-98.2  F (36.8  C)] 97  F (36.1  C)  Pulse:  [61-74] 61  Resp:  [16-18] 18  BP: (118-149)/(56-81) 118/56  SpO2:  [5 %-96 %] 94 %  I/O last 3 completed shifts:  In: 240 [P.O.:240]  Out: -     Physical Exam  Constitutional:       Appearance: He is ill-appearing.   HENT:      Head: Normocephalic.   Cardiovascular:      Rate and Rhythm: Normal rate and regular rhythm.      Pulses: Normal pulses.      Heart sounds: Normal heart sounds.   Pulmonary:      Effort: Pulmonary effort is normal. No respiratory distress.      Breath sounds: Normal breath sounds.   Abdominal:      General: Abdomen is flat. Bowel sounds are normal. There is no distension.      Tenderness: There is no abdominal tenderness. There is no guarding.   Musculoskeletal:         General: Normal range of motion.      Cervical back: Normal range of motion.   Skin:     General: Skin is warm and dry.   Neurological:      Comments: Right facial droop.  Right upper extremity 4 x 5 strength.  Gait not assessed.  Minimally verbal with only yes or no answers to the daughter.  Intermittently follows  commands.   Psychiatric:         Mood and Affect: Mood normal.           Medications     - MEDICATION INSTRUCTIONS -       - MEDICATION INSTRUCTIONS -         acetaminophen  650 mg Oral Q8H     amLODIPine  5 mg Oral Daily     aspirin  325 mg Oral Daily    Or     aspirin  324 mg Oral or NG Tube Daily    Or     aspirin  300 mg Rectal Daily     atorvastatin  40 mg Oral QPM     bromfenac  1 drop Right Eye Daily     carvedilol  25 mg Oral BID w/meals     clopidogrel  75 mg Oral Daily     doxazosin  1 mg Oral Daily     finasteride  5 mg Oral Daily     gabapentin  300 mg Oral BID     insulin aspart  1-7 Units Subcutaneous TID AC     insulin aspart  1-5 Units Subcutaneous At Bedtime     insulin aspart   Subcutaneous TID AC     insulin glargine  10 Units Subcutaneous QAM AC     losartan  75 mg Oral Daily     melatonin  3 mg Oral QPM     metFORMIN  500 mg Oral BID w/meals     mirtazapine  15 mg Oral At Bedtime     risperiDONE  0.5 mg Sublingual QAM AC     risperiDONE  1 mg Sublingual At Bedtime       Data   Recent Labs   Lab 05/24/23  0857 05/24/23  0740 05/24/23  0147 05/23/23  2127 05/23/23  1119 05/23/23  0824 05/22/23  1122 05/22/23  0734 05/19/23  1130 05/19/23  0823   WBC  --   --   --   --   --   --   --   --   --  8.0   HGB  --   --   --   --   --   --   --   --   --  15.5   MCV  --   --   --   --   --   --   --   --   --  91   PLT  --   --   --   --   --   --   --   --   --  169   NA  --   --   --   --   --   --   --   --   --  138   POTASSIUM  --  3.6  --   --   --  4.3  --  4.4   < > 4.3   CHLORIDE  --   --   --   --   --   --   --   --   --  106   CO2  --   --   --   --   --   --   --   --   --  21*   BUN  --   --   --   --   --   --   --   --   --  30.6*   CR  --   --   --   --   --   --   --   --   --  0.69   ANIONGAP  --   --   --   --   --   --   --   --   --  11   LUZ  --   --   --   --   --   --   --   --   --  8.9   *  --  108* 158*   < >  --    < >  --    < > 134*    < > = values in this interval  not displayed.       No results found for this or any previous visit (from the past 24 hour(s)).      Assessment & Plan      Adam Huber is a 87 year old male  With PMH of HTN, CAD, CVA, DM type 2, TBI, dementia- admitted on 5/4/2023 with slurred speech concerning for a stroke.       Acute/subacute CVA of the right asia and left cerebellar hemisphere [posterior circulation ischemic strokes]  5/13, most likely thromboembolic  Dementia with behavioral disturbance  Transient alteration of consciousness  Anxiety and depression  History of TBI  - he lives with his wife, has underlying cognitive impairment; has PCA as per family; Belgian speaking  - initial concern for stroke  - Stroke Neuro initially followed  - HEAD CT: With extensive areas of encephalomalacic changes and gliosis on frontal and temporal lobe, chronic infarct in basal ganglia and corona radiata.  -CTA head and neck multifocal areas of moderate or high-grade stenosis throughout anterior and posterior circulation.  Heavy atherosclerotic plaque left carotid bulb/bifurcation with at least 50% stenosis by modified NASCET criteria. Moderate predominantly noncalcified plaque right carotid bulb/bifurcation without significant stenosis by modified NASCET criteria. No findings for vertebral artery dissection.   MRI brain 5/4:  Motion limited examination equivocal DWI hyperintensity at the right asia. This may represent artifact or acute ischemia.  Severe bifrontal encephalomalacia  - PTA Aspirin was increased from 81 mg to 325 mg po daily  - lipid profile- LDL 90, total cholest 232, HDL 66  - started on Lipitor 40 mg po at bedtime  - Echo - EF 60-65%. No clear regional wall motion abnormalities noted.  No thrombus or cardiac etiology of stroke  - EEG- no seizures   -Continue to monitor in telemetry while in the hospital, neurology recommending cardiac event monitor after discharge  -Code stroke called 5/13, MRI brain showed multiple infarcts in right asia,  medial left cerebellar hemisphere.  No convincing acute infarct by CT; CTA head and neck with widespread intracranial arthrosclerotic plaque, 50-60% stenosis of the left ICA  -Appreciate neurology recommendations, concern for thromboembolic etiology given known extensive arthrosclerotic disease; started on DAPT with aspirin 325 Mg indefinitely daily, Plavix 75 Mg daily x90 days will need 30-day cardiac event monitor on discharge with follow-up in 6 to 8 weeks  -Evaluated by vascular surgery for CAD, no role for carotid intervention as current stroke are not in the geographical distribution from left ICA disease  -PTA was on scheduled Zyprexa, Seroquel and Remeron, as needed Seroquel available.  Due to ongoing agitation psychiatry was consulted  -  Appreciate psychiatry input, evaluated on 5/11/2023-recommended discontinuing Seroquel and Zyprexa and started on risperidone ODT 0.5 mg in the morning and 1 mg at bedtime to help reduce dementia related agitation with risperidone ODT 0.5 mg twice daily as needed for agitation.  Zyprexa IM 2.5 mg as a backup option or unable to take p.o.  -Scheduled melatonin 3 mg at 8:00 to help with circadian rhythm maintenance  -Delirium precautions, avoid interruptions during the night as much as possible, allow family visits, reorientation, use  services as much as possible, allow/maintain sleep-wake cycle.    - PT/OT- rec TCU; finding a TCU might be challenging because of language barrier and his intermittent agitation; needs to be without sitter for 24h,  following  - follow up with PCP in 1-2 weeks  - follow up with General Neuro in 6-8 weeks  --Continues to have intermittent agitation..     5/22/2023-patient had worsening neurologic status with increased right-sided weakness and right facial droop with slurred speech.  Stroke reevaluated patient and recommended repeat MRI.  If this shows new strokes then will have to evaluate the efficacy of antiplatelet  agents.    5/23/2023-plan on obtaining MRI today once his family gets here.  As needed Ativan ordered for restlessness.  Currently n.p.o. until he is cleared for stroke.  Speech therapy consulted..  If MRI positive for stroke will order P2 Y12 levels.  -Avoid hypotension.  -Continue dual antiplatelet and statin.    5/24/23-MRI not done yesterday due to lack of IV access and being unable to give IV Ativan.  Will try again today after placing an IV.  Patient is going to be difficult placement due to his ongoing restlessness and agitation.  Will reconsult psychiatry to help with his behavioral issues.  Patient will likely end up needing long-term care.  No further changes until he can get an MRI to evaluate if he is having recurrent strokes.     Hypomagnesemia  -Being replaced per protocol     Goals of care  -Full code as per wife and family's request     HTN  - PTA on Coreg 25 mg po BID and losartan 50 mg po daily   - -Currently on amlodipine 5 mg daily, Coreg 25 mg twice daily losartan 75 mg daily  - BP mostly better controlled, intermittently elevated, likely related to agitation  -Monitor for persistently elevated blood pressure and adjust medication as needed     Oral thrush  -Nystatin swish and spit     BPH   -PTA finasteride and Flomax      DM type 2,   Sugars well controlled  - PTA metformin 500 mg qam and 1000 mg qpm   - Hb A1c 9.6  -Currently on metformin 500 mg twice daily with high resistance SSI and Premeal insulin 1 unit per 15 g carb in addition to Lantus 10 units  - monitor his blood sugar and adjust medication as needed  -Changed HDSSI to MDSSI     Neuropathy  -Continue PTA gabapentin        Diet: Room Service  Combination Diet Moderate Consistent Carb (60 g CHO per Meal) Diet; Pureed Diet (level 4); Mildly Thick (level 2)  .  N.p.o. on 5/22/2023 until cleared by speech therapy  DVT Prophylaxis: Pneumatic Compression Devices  Villa Catheter: Not present  Lines: None     Cardiac Monitoring: ACTIVE  "order. Indication: Tachyarrhythmias, acute (48 hours)  Code Status: Full Code      Clinically Significant Risk Factors            # Hypomagnesemia: Lowest Mg = 1.6 mg/dL in last 2 days, will replace as needed   # Hypoalbuminemia: Lowest albumin = 3.4 g/dL at 5/14/2023  2:21 PM, will monitor as appropriate     # Hypertension: Noted on problem list     # Dementia: noted on problem list   # DMII: A1C = 9.6 % (Ref range: <5.7 %) within past 6 months   # Overweight: Estimated body mass index is 29.13 kg/m  as calculated from the following:    Height as of this encounter: 1.778 m (5' 10\").    Weight as of this encounter: 92.1 kg (203 lb 0.7 oz).               Lovely Bass MD, MD  [phone](p)  "

## 2023-05-25 ENCOUNTER — APPOINTMENT (OUTPATIENT)
Dept: SPEECH THERAPY | Facility: CLINIC | Age: 88
DRG: 065 | End: 2023-05-25
Attending: HOSPITALIST
Payer: COMMERCIAL

## 2023-05-25 LAB
ANION GAP SERPL CALCULATED.3IONS-SCNC: 16 MMOL/L (ref 7–15)
BUN SERPL-MCNC: 27.7 MG/DL (ref 8–23)
CALCIUM SERPL-MCNC: 9.2 MG/DL (ref 8.8–10.2)
CHLORIDE SERPL-SCNC: 106 MMOL/L (ref 98–107)
CREAT SERPL-MCNC: 0.83 MG/DL (ref 0.67–1.17)
DEPRECATED HCO3 PLAS-SCNC: 17 MMOL/L (ref 22–29)
GFR SERPL CREATININE-BSD FRML MDRD: 85 ML/MIN/1.73M2
GLUCOSE BLDC GLUCOMTR-MCNC: 104 MG/DL (ref 70–99)
GLUCOSE BLDC GLUCOMTR-MCNC: 125 MG/DL (ref 70–99)
GLUCOSE BLDC GLUCOMTR-MCNC: 156 MG/DL (ref 70–99)
GLUCOSE BLDC GLUCOMTR-MCNC: 244 MG/DL (ref 70–99)
GLUCOSE BLDC GLUCOMTR-MCNC: 81 MG/DL (ref 70–99)
GLUCOSE SERPL-MCNC: 147 MG/DL (ref 70–99)
MAGNESIUM SERPL-MCNC: 1.7 MG/DL (ref 1.7–2.3)
POTASSIUM SERPL-SCNC: 4.4 MMOL/L (ref 3.4–5.3)
POTASSIUM SERPL-SCNC: 4.5 MMOL/L (ref 3.4–5.3)
SODIUM SERPL-SCNC: 139 MMOL/L (ref 136–145)

## 2023-05-25 PROCEDURE — 250N000013 HC RX MED GY IP 250 OP 250 PS 637: Performed by: NURSE PRACTITIONER

## 2023-05-25 PROCEDURE — 80048 BASIC METABOLIC PNL TOTAL CA: CPT | Performed by: HOSPITALIST

## 2023-05-25 PROCEDURE — 250N000013 HC RX MED GY IP 250 OP 250 PS 637: Performed by: HOSPITALIST

## 2023-05-25 PROCEDURE — 250N000013 HC RX MED GY IP 250 OP 250 PS 637: Performed by: INTERNAL MEDICINE

## 2023-05-25 PROCEDURE — 36415 COLL VENOUS BLD VENIPUNCTURE: CPT | Performed by: HOSPITALIST

## 2023-05-25 PROCEDURE — 250N000013 HC RX MED GY IP 250 OP 250 PS 637: Performed by: STUDENT IN AN ORGANIZED HEALTH CARE EDUCATION/TRAINING PROGRAM

## 2023-05-25 PROCEDURE — 120N000001 HC R&B MED SURG/OB

## 2023-05-25 PROCEDURE — 83735 ASSAY OF MAGNESIUM: CPT | Performed by: HOSPITALIST

## 2023-05-25 PROCEDURE — 250N000013 HC RX MED GY IP 250 OP 250 PS 637

## 2023-05-25 PROCEDURE — 84132 ASSAY OF SERUM POTASSIUM: CPT | Performed by: HOSPITALIST

## 2023-05-25 PROCEDURE — 92526 ORAL FUNCTION THERAPY: CPT | Mod: GN | Performed by: SPEECH-LANGUAGE PATHOLOGIST

## 2023-05-25 PROCEDURE — 99232 SBSQ HOSP IP/OBS MODERATE 35: CPT | Performed by: HOSPITALIST

## 2023-05-25 RX ADMIN — ASPIRIN 81 MG CHEWABLE TABLET 324 MG: 81 TABLET CHEWABLE at 09:02

## 2023-05-25 RX ADMIN — DOXAZOSIN 1 MG: 1 TABLET ORAL at 09:03

## 2023-05-25 RX ADMIN — MELATONIN TAB 3 MG 3 MG: 3 TAB at 20:11

## 2023-05-25 RX ADMIN — ATORVASTATIN CALCIUM 40 MG: 40 TABLET, FILM COATED ORAL at 20:12

## 2023-05-25 RX ADMIN — CLOPIDOGREL BISULFATE 75 MG: 75 TABLET ORAL at 09:02

## 2023-05-25 RX ADMIN — CARVEDILOL 25 MG: 25 TABLET, FILM COATED ORAL at 18:43

## 2023-05-25 RX ADMIN — AMLODIPINE BESYLATE 5 MG: 5 TABLET ORAL at 09:02

## 2023-05-25 RX ADMIN — RISPERIDONE 1 MG: 1 TABLET, ORALLY DISINTEGRATING ORAL at 20:12

## 2023-05-25 RX ADMIN — RISPERIDONE 0.5 MG: 0.5 TABLET, ORALLY DISINTEGRATING ORAL at 09:02

## 2023-05-25 RX ADMIN — FINASTERIDE 5 MG: 5 TABLET, FILM COATED ORAL at 09:02

## 2023-05-25 RX ADMIN — INSULIN GLARGINE 10 UNITS: 100 INJECTION, SOLUTION SUBCUTANEOUS at 09:12

## 2023-05-25 RX ADMIN — GABAPENTIN 300 MG: 300 CAPSULE ORAL at 20:12

## 2023-05-25 RX ADMIN — GABAPENTIN 300 MG: 300 CAPSULE ORAL at 09:02

## 2023-05-25 RX ADMIN — CARVEDILOL 25 MG: 25 TABLET, FILM COATED ORAL at 09:02

## 2023-05-25 RX ADMIN — ACETAMINOPHEN 650 MG: 325 TABLET ORAL at 16:31

## 2023-05-25 RX ADMIN — LOSARTAN POTASSIUM 75 MG: 50 TABLET, FILM COATED ORAL at 09:02

## 2023-05-25 RX ADMIN — MIRTAZAPINE 15 MG: 15 TABLET, FILM COATED ORAL at 20:12

## 2023-05-25 RX ADMIN — INSULIN ASPART 4 UNITS: 100 INJECTION, SOLUTION INTRAVENOUS; SUBCUTANEOUS at 09:11

## 2023-05-25 RX ADMIN — METFORMIN HYDROCHLORIDE 500 MG: 500 TABLET, FILM COATED ORAL at 09:02

## 2023-05-25 RX ADMIN — ACETAMINOPHEN 650 MG: 325 TABLET ORAL at 20:11

## 2023-05-25 RX ADMIN — METFORMIN HYDROCHLORIDE 500 MG: 500 TABLET, FILM COATED ORAL at 18:43

## 2023-05-25 ASSESSMENT — ACTIVITIES OF DAILY LIVING (ADL)
ADLS_ACUITY_SCORE: 46
ADLS_ACUITY_SCORE: 46
ADLS_ACUITY_SCORE: 50
ADLS_ACUITY_SCORE: 51
ADLS_ACUITY_SCORE: 47
ADLS_ACUITY_SCORE: 51
ADLS_ACUITY_SCORE: 46
ADLS_ACUITY_SCORE: 51
ADLS_ACUITY_SCORE: 51
ADLS_ACUITY_SCORE: 50

## 2023-05-25 NOTE — PROGRESS NOTES
Shift Summary        Neuro: Agitated and restless at the start of the shift. His daughter arrived and assisted him to eat supper. He became more calm while his daughter was present. Difficult to assess his neuro status due to the language barrier and his agitation. His daughter did assist in translation while she was here. He is alert to person/place. A right facial droop, right upper and lower extremity weakness, and a right drift are present. There are MD orders to allow the patient to rest overnight without interruption for assessment. Attempted to check 0200 blood sugar per order, however, he started to hit out. The effort was stopped and he returned to resting quietly. Awoke him this am for a blood sugar and he allowed it too be taken. Drowsy this morning. Held the AM Tylenol due to his drowsiness.      Cardiac: Regular apical pulse.      Respiratory: LS diminished.      GI: Incontinent of bowel and bladder. Denied nausea when asked.     Musculoskeletal: He has repositioned himself while resting. Assistance of 2 with teresita kim to get out of bed.      Pain: Complaint of a headache at bedtime. Scheduled Tylenol given, see the MAR. Resting quietly throughout the rest of the shift.      Skin: No new skin concerns.      Discharge: No discharge date currently. CHATA is working to arrange placement with a TCU upon discharge.

## 2023-05-25 NOTE — PLAN OF CARE
"BP (!) 143/88   Pulse 67   Temp 98.2  F (36.8  C) (Oral)   Resp 16   Ht 1.778 m (5' 10\")   Wt 90.2 kg (198 lb 13.7 oz)   SpO2 92%   BMI 28.53 kg/m      Patient name: Adam Huber    Nursing shift note  Summary: Patient in with CVA  Alertness/orientation: Somewhat lethargic, eric orientation due to language barrier and confusion  Neuro: No changes, slight right sided weakness  Cardiac: WDL  Resp: WDL  GI: No BM today  : Incontinent  IV: IV is out  Mobility: A2 GB walker, was able to get to the chair 2 times today  CMS: Intact  Pain: Doesn't appear to be in pain  Takes meds: crushed in applesauce  Skin: Some redness on coccyx, powder applied  Plan: Pending placement  Other Important Info: Both daughters visited and were updated  Chartin. Hourly rounding Yes  2. Turns Yes  3. Electrolyte Protocols Yes      Reynaldo Pacheco, RN  [facility]  [phone]    "

## 2023-05-25 NOTE — PROGRESS NOTES
Care Management Follow Up    Length of Stay (days): 21    Expected Discharge Date: 05/29/2023     Concerns to be Addressed:       Patient plan of care discussed at interdisciplinary rounds: Yes    Anticipated Discharge Disposition:       Anticipated Discharge Services:    Anticipated Discharge DME:      Patient/family educated on Medicare website which has current facility and service quality ratings:    Education Provided on the Discharge Plan:    Patient/Family in Agreement with the Plan:      Referrals Placed by CM/SW:    Private pay costs discussed: Not applicable    Additional Information:  Patient has been accepted at [facility] for tomorrow. SW left a voicemail for pt's daughter Gianna to discuss and coordinate.     SW will continue to follow.       KENNETH Goldstein

## 2023-05-25 NOTE — PROGRESS NOTES
[facility]    Hospitalist Progress Note    Interval History   Patient is resting comfortably.  Tends to get intermittently restless but not as agitated.  Underwent MRI yesterday.  No new stroke.  Evolving right pontine stroke.    -Data reviewed today: I reviewed all new labs and imaging results over the last 24 hours. I personally reviewed no images or EKG's today.    Physical Exam   Temp: (!) 96.2  F (35.7  C) Temp src: Axillary BP: 116/56 Pulse: 57   Resp: 20 SpO2: 96 % O2 Device: None (Room air)    Vitals:    05/15/23 0533 05/21/23 0612 05/25/23 0639   Weight: 90.2 kg (198 lb 13.7 oz) 92.1 kg (203 lb 0.7 oz) 90.2 kg (198 lb 13.7 oz)     Vital Signs with Ranges  Temp:  [96.2  F (35.7  C)-98.2  F (36.8  C)] 96.2  F (35.7  C)  Pulse:  [57-80] 57  Resp:  [16-20] 20  BP: ()/(56-88) 116/56  SpO2:  [92 %-96 %] 96 %  I/O last 3 completed shifts:  In: 250 [P.O.:250]  Out: -     Physical Exam  Constitutional:       Appearance: He is ill-appearing.   HENT:      Head: Normocephalic.   Cardiovascular:      Rate and Rhythm: Normal rate and regular rhythm.      Pulses: Normal pulses.      Heart sounds: Normal heart sounds.   Pulmonary:      Effort: Pulmonary effort is normal. No respiratory distress.      Breath sounds: Normal breath sounds.   Abdominal:      General: Abdomen is flat. Bowel sounds are normal. There is no distension.      Tenderness: There is no abdominal tenderness. There is no guarding.   Musculoskeletal:         General: Normal range of motion.      Cervical back: Normal range of motion.   Skin:     General: Skin is warm and dry.   Neurological:      Comments: Right facial droop.  Right upper extremity 4 x 5 strength.  Gait not assessed.  Minimally verbal with only yes or no answers to the daughter.  Intermittently follows commands.   Psychiatric:         Mood and Affect: Mood normal.           Medications     - MEDICATION INSTRUCTIONS -       - MEDICATION INSTRUCTIONS -          acetaminophen  650 mg Oral Q8H     amLODIPine  5 mg Oral Daily     aspirin  325 mg Oral Daily    Or     aspirin  324 mg Oral or NG Tube Daily    Or     aspirin  300 mg Rectal Daily     atorvastatin  40 mg Oral QPM     bromfenac  1 drop Right Eye Daily     carvedilol  25 mg Oral BID w/meals     clopidogrel  75 mg Oral Daily     doxazosin  1 mg Oral Daily     finasteride  5 mg Oral Daily     gabapentin  300 mg Oral BID     insulin aspart  1-7 Units Subcutaneous TID AC     insulin aspart  1-5 Units Subcutaneous At Bedtime     insulin aspart   Subcutaneous TID AC     insulin glargine  10 Units Subcutaneous QAM AC     losartan  75 mg Oral Daily     melatonin  3 mg Oral QPM     metFORMIN  500 mg Oral BID w/meals     mirtazapine  15 mg Oral At Bedtime     risperiDONE  0.5 mg Sublingual QAM AC     risperiDONE  1 mg Sublingual At Bedtime       Data   Recent Labs   Lab 05/25/23  1114 05/25/23  0905 05/25/23  0824 05/24/23  0857 05/24/23  0740 05/23/23  1119 05/23/23  0824 05/19/23  1130 05/19/23  0823   WBC  --   --   --   --   --   --   --   --  8.0   HGB  --   --   --   --   --   --   --   --  15.5   MCV  --   --   --   --   --   --   --   --  91   PLT  --   --   --   --   --   --   --   --  169   NA  --  139  --   --   --   --   --   --  138   POTASSIUM  --  4.5  4.4  --   --  3.6  --  4.3   < > 4.3   CHLORIDE  --  106  --   --   --   --   --   --  106   CO2  --  17*  --   --   --   --   --   --  21*   BUN  --  27.7*  --   --   --   --   --   --  30.6*   CR  --  0.83  --   --   --   --   --   --  0.69   ANIONGAP  --  16*  --   --   --   --   --   --  11   LUZ  --  9.2  --   --   --   --   --   --  8.9   * 147* 104*   < >  --    < >  --    < > 134*    < > = values in this interval not displayed.       No results found for this or any previous visit (from the past 24 hour(s)).      Assessment & Plan      Adam Huber is a 87 year old male  With PMH of HTN, CAD, CVA, DM type 2, TBI, dementia- admitted on  5/4/2023 with slurred speech concerning for a stroke.       Acute/subacute CVA of the right asia and left cerebellar hemisphere [posterior circulation ischemic strokes]  5/13, most likely thromboembolic  Dementia with behavioral disturbance  Transient alteration of consciousness  Anxiety and depression  History of TBI  - he lives with his wife, has underlying cognitive impairment; has PCA as per family; Omani speaking  - initial concern for stroke  - Stroke Neuro initially followed  - HEAD CT: With extensive areas of encephalomalacic changes and gliosis on frontal and temporal lobe, chronic infarct in basal ganglia and corona radiata.  -CTA head and neck multifocal areas of moderate or high-grade stenosis throughout anterior and posterior circulation.  Heavy atherosclerotic plaque left carotid bulb/bifurcation with at least 50% stenosis by modified NASCET criteria. Moderate predominantly noncalcified plaque right carotid bulb/bifurcation without significant stenosis by modified NASCET criteria. No findings for vertebral artery dissection.   MRI brain 5/4:  Motion limited examination equivocal DWI hyperintensity at the right asia. This may represent artifact or acute ischemia.  Severe bifrontal encephalomalacia  - PTA Aspirin was increased from 81 mg to 325 mg po daily.  - lipid profile- LDL 90, total cholest 232, HDL 66  - started on Lipitor 40 mg po at bedtime  - Echo - EF 60-65%. No clear regional wall motion abnormalities noted.  No thrombus or cardiac etiology of stroke  - EEG- no seizures   -Continue to monitor in telemetry while in the hospital, neurology recommending cardiac event monitor after discharge  -Code stroke called 5/13, MRI brain showed multiple infarcts in right asia, medial left cerebellar hemisphere.  No convincing acute infarct by CT; CTA head and neck with widespread intracranial arthrosclerotic plaque, 50-60% stenosis of the left ICA  -Appreciate neurology recommendations, concern for  thromboembolic etiology given known extensive arthrosclerotic disease; started on DAPT with aspirin 325 Mg indefinitely daily, Plavix 75 Mg daily x90 days will need 30-day cardiac event monitor on discharge with follow-up in 6 to 8 weeks  -Evaluated by vascular surgery for CAD, no role for carotid intervention as current stroke are not in the geographical distribution from left ICA disease  -PTA was on scheduled Zyprexa, Seroquel and Remeron, as needed Seroquel available.  Due to ongoing agitation psychiatry was consulted  -  Appreciate psychiatry input, evaluated on 5/11/2023-recommended discontinuing Seroquel and Zyprexa and started on risperidone ODT 0.5 mg in the morning and 1 mg at bedtime to help reduce dementia related agitation with risperidone ODT 0.5 mg twice daily as needed for agitation.  Zyprexa IM 2.5 mg as a backup option or unable to take p.o.  -Scheduled melatonin 3 mg at 8:00 to help with circadian rhythm maintenance  -Delirium precautions, avoid interruptions during the night as much as possible, allow family visits, reorientation, use  services as much as possible, allow/maintain sleep-wake cycle.    - PT/OT- rec TCU;  - follow up with PCP in 1-2 weeks  - follow up with General Neuro in 6-8 weeks  --Continues to have intermittent agitation..   -- Patient did have worsening right facial weakness and droop with right arm weakness on 5/23/2023.  Neurology was reconsulted and repeat MRI was done.  No new stroke.  Felt that this was secondary to evolving pontine stroke.    Hypomagnesemia  -Being replaced per protocol     Goals of care  -Full code as per wife and family's request     HTN  - PTA on Coreg 25 mg po BID and losartan 50 mg po daily   - -Currently on amlodipine 5 mg daily, Coreg 25 mg twice daily losartan 75 mg daily  - BP mostly better controlled, intermittently elevated, likely related to agitation  -Monitor for persistently elevated blood pressure and adjust medication as  "needed     Oral thrush  -Nystatin swish and spit     BPH   -PTA finasteride and Flomax      DM type 2,   Sugars well controlled  - PTA metformin 500 mg qam and 1000 mg qpm   - Hb A1c 9.6  -Currently on metformin 500 mg twice daily with high resistance SSI and Premeal insulin 1 unit per 15 g carb in addition to Lantus 10 units  - monitor his blood sugar and adjust medication as needed  -Changed HDSSI to MDSSI     Neuropathy  -Continue PTA gabapentin        Diet: Room Service  Combination Diet Moderate Consistent Carb (60 g CHO per Meal) Diet; Pureed Diet (level 4); Mildly Thick (level 2)  .   DVT Prophylaxis: Pneumatic Compression Devices  Villa Catheter: Not present  Lines: None     Cardiac Monitoring: ACTIVE order. Indication: Tachyarrhythmias, acute (48 hours)  Code Status: Full Code    Disposition-discharged on 5/26/2023 to TCU    Clinically Significant Risk Factors            # Hypomagnesemia: Lowest Mg = 1.6 mg/dL in last 2 days, will replace as needed   # Hypoalbuminemia: Lowest albumin = 3.4 g/dL at 5/14/2023  2:21 PM, will monitor as appropriate     # Hypertension: Noted on problem list     # Dementia: noted on problem list   # DMII: A1C = 9.6 % (Ref range: <5.7 %) within past 6 months   # Overweight: Estimated body mass index is 28.53 kg/m  as calculated from the following:    Height as of this encounter: 1.778 m (5' 10\").    Weight as of this encounter: 90.2 kg (198 lb 13.7 oz).               Lovely Bass MD, MD  [phone](p)  "

## 2023-05-26 ENCOUNTER — APPOINTMENT (OUTPATIENT)
Dept: SPEECH THERAPY | Facility: CLINIC | Age: 88
DRG: 065 | End: 2023-05-26
Attending: HOSPITALIST
Payer: COMMERCIAL

## 2023-05-26 ENCOUNTER — APPOINTMENT (OUTPATIENT)
Dept: PHYSICAL THERAPY | Facility: CLINIC | Age: 88
DRG: 065 | End: 2023-05-26
Attending: HOSPITALIST
Payer: COMMERCIAL

## 2023-05-26 ENCOUNTER — APPOINTMENT (OUTPATIENT)
Dept: OCCUPATIONAL THERAPY | Facility: CLINIC | Age: 88
DRG: 065 | End: 2023-05-26
Attending: HOSPITALIST
Payer: COMMERCIAL

## 2023-05-26 LAB
GLUCOSE BLDC GLUCOMTR-MCNC: 105 MG/DL (ref 70–99)
GLUCOSE BLDC GLUCOMTR-MCNC: 134 MG/DL (ref 70–99)
GLUCOSE BLDC GLUCOMTR-MCNC: 157 MG/DL (ref 70–99)
GLUCOSE BLDC GLUCOMTR-MCNC: 195 MG/DL (ref 70–99)
MAGNESIUM SERPL-MCNC: 1.8 MG/DL (ref 1.7–2.3)
POTASSIUM SERPL-SCNC: 4.2 MMOL/L (ref 3.4–5.3)

## 2023-05-26 PROCEDURE — 84132 ASSAY OF SERUM POTASSIUM: CPT | Performed by: HOSPITALIST

## 2023-05-26 PROCEDURE — 250N000013 HC RX MED GY IP 250 OP 250 PS 637: Performed by: INTERNAL MEDICINE

## 2023-05-26 PROCEDURE — 250N000013 HC RX MED GY IP 250 OP 250 PS 637: Performed by: STUDENT IN AN ORGANIZED HEALTH CARE EDUCATION/TRAINING PROGRAM

## 2023-05-26 PROCEDURE — 97530 THERAPEUTIC ACTIVITIES: CPT | Mod: GO

## 2023-05-26 PROCEDURE — 36415 COLL VENOUS BLD VENIPUNCTURE: CPT | Performed by: HOSPITALIST

## 2023-05-26 PROCEDURE — 250N000013 HC RX MED GY IP 250 OP 250 PS 637

## 2023-05-26 PROCEDURE — 99232 SBSQ HOSP IP/OBS MODERATE 35: CPT | Mod: GC | Performed by: STUDENT IN AN ORGANIZED HEALTH CARE EDUCATION/TRAINING PROGRAM

## 2023-05-26 PROCEDURE — 92526 ORAL FUNCTION THERAPY: CPT | Mod: GN | Performed by: SPEECH-LANGUAGE PATHOLOGIST

## 2023-05-26 PROCEDURE — 99232 SBSQ HOSP IP/OBS MODERATE 35: CPT | Performed by: HOSPITALIST

## 2023-05-26 PROCEDURE — 83735 ASSAY OF MAGNESIUM: CPT | Performed by: HOSPITALIST

## 2023-05-26 PROCEDURE — 97530 THERAPEUTIC ACTIVITIES: CPT | Mod: GP

## 2023-05-26 PROCEDURE — 250N000013 HC RX MED GY IP 250 OP 250 PS 637: Performed by: HOSPITALIST

## 2023-05-26 PROCEDURE — 120N000001 HC R&B MED SURG/OB

## 2023-05-26 PROCEDURE — 250N000013 HC RX MED GY IP 250 OP 250 PS 637: Performed by: NURSE PRACTITIONER

## 2023-05-26 RX ADMIN — BROMFENAC 1 DROP: 1.03 SOLUTION/ DROPS OPHTHALMIC at 09:16

## 2023-05-26 RX ADMIN — INSULIN ASPART 4 UNITS: 100 INJECTION, SOLUTION INTRAVENOUS; SUBCUTANEOUS at 17:37

## 2023-05-26 RX ADMIN — DOXAZOSIN 1 MG: 1 TABLET ORAL at 09:07

## 2023-05-26 RX ADMIN — RISPERIDONE 1 MG: 1 TABLET, ORALLY DISINTEGRATING ORAL at 22:20

## 2023-05-26 RX ADMIN — METFORMIN HYDROCHLORIDE 500 MG: 500 TABLET, FILM COATED ORAL at 09:07

## 2023-05-26 RX ADMIN — GABAPENTIN 300 MG: 300 CAPSULE ORAL at 22:20

## 2023-05-26 RX ADMIN — AMLODIPINE BESYLATE 5 MG: 5 TABLET ORAL at 09:07

## 2023-05-26 RX ADMIN — INSULIN ASPART 4 UNITS: 100 INJECTION, SOLUTION INTRAVENOUS; SUBCUTANEOUS at 13:18

## 2023-05-26 RX ADMIN — MELATONIN TAB 3 MG 3 MG: 3 TAB at 22:20

## 2023-05-26 RX ADMIN — INSULIN GLARGINE 10 UNITS: 100 INJECTION, SOLUTION SUBCUTANEOUS at 09:08

## 2023-05-26 RX ADMIN — MIRTAZAPINE 15 MG: 15 TABLET, FILM COATED ORAL at 22:20

## 2023-05-26 RX ADMIN — GABAPENTIN 300 MG: 300 CAPSULE ORAL at 09:07

## 2023-05-26 RX ADMIN — FINASTERIDE 5 MG: 5 TABLET, FILM COATED ORAL at 09:08

## 2023-05-26 RX ADMIN — CARVEDILOL 25 MG: 25 TABLET, FILM COATED ORAL at 17:37

## 2023-05-26 RX ADMIN — RISPERIDONE 0.5 MG: 0.5 TABLET, ORALLY DISINTEGRATING ORAL at 09:08

## 2023-05-26 RX ADMIN — LOSARTAN POTASSIUM 75 MG: 50 TABLET, FILM COATED ORAL at 09:07

## 2023-05-26 RX ADMIN — ASPIRIN 81 MG CHEWABLE TABLET 325 MG: 81 TABLET CHEWABLE at 09:07

## 2023-05-26 RX ADMIN — ACETAMINOPHEN 650 MG: 325 TABLET ORAL at 22:19

## 2023-05-26 RX ADMIN — METFORMIN HYDROCHLORIDE 500 MG: 500 TABLET, FILM COATED ORAL at 17:37

## 2023-05-26 RX ADMIN — CLOPIDOGREL BISULFATE 75 MG: 75 TABLET ORAL at 09:07

## 2023-05-26 RX ADMIN — INSULIN ASPART 4 UNITS: 100 INJECTION, SOLUTION INTRAVENOUS; SUBCUTANEOUS at 09:16

## 2023-05-26 RX ADMIN — ATORVASTATIN CALCIUM 40 MG: 40 TABLET, FILM COATED ORAL at 22:20

## 2023-05-26 RX ADMIN — CARVEDILOL 25 MG: 25 TABLET, FILM COATED ORAL at 09:07

## 2023-05-26 ASSESSMENT — ACTIVITIES OF DAILY LIVING (ADL)
ADLS_ACUITY_SCORE: 50
ADLS_ACUITY_SCORE: 50
ADLS_ACUITY_SCORE: 47
ADLS_ACUITY_SCORE: 50
ADLS_ACUITY_SCORE: 47
ADLS_ACUITY_SCORE: 50
ADLS_ACUITY_SCORE: 47

## 2023-05-26 NOTE — PLAN OF CARE
Neuro: Alert and oriented to self and place, Jordanian speaking, does not follow commands at times  Tele/cardiac: N/A  Respiration:on RA  Activity: A2 with teresita stedy  Pain: denies  Drips: none  Drains/tubes: none  Skin: intact  GI/: incontinent  Aggression color: green  Isolation: none  Plan: discharge to TCU pending
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
## 2023-05-26 NOTE — PROVIDER NOTIFICATION
"Text page to Vivian Oshea on stroke team: \"Pt more lethargic and pt having increased RUE weakness, trouble lifting arm off of bed. Speech seems more slurred. VSS RA. Any intervention?\"    Fellow to assess  "

## 2023-05-26 NOTE — PROGRESS NOTES
[facility]    Hospitalist Progress Note    Interval History   Patient is awake and alert sitting up in a chair.  Continues to have right facial droop and right upper extremity weakness.  Following commands well today.  Is more verbal.    We did have a TCU accepting the patient but family initially rejected it.  I had a long phone call with his daughter explaining the need for ongoing rehab placement and the limited availability of TCU spots.  She would like to discuss with the rest of the family prior to making a decision.    -Data reviewed today: I reviewed all new labs and imaging results over the last 24 hours. I personally reviewed no images or EKG's today.    Physical Exam   Temp: 98  F (36.7  C) Temp src: Axillary BP: 114/44 Pulse: 53   Resp: 18 SpO2: 95 % O2 Device: None (Room air)    Vitals:    05/15/23 0533 05/21/23 0612 05/25/23 0639   Weight: 90.2 kg (198 lb 13.7 oz) 92.1 kg (203 lb 0.7 oz) 90.2 kg (198 lb 13.7 oz)     Vital Signs with Ranges  Temp:  [97.3  F (36.3  C)-98  F (36.7  C)] 98  F (36.7  C)  Pulse:  [53-74] 53  Resp:  [15-20] 18  BP: (114-126)/(44-71) 114/44  SpO2:  [94 %-97 %] 95 %  I/O last 3 completed shifts:  In: 250 [P.O.:250]  Out: -     Physical Exam  Constitutional:       Appearance: He is ill-appearing.   HENT:      Head: Normocephalic.   Cardiovascular:      Rate and Rhythm: Normal rate and regular rhythm.      Pulses: Normal pulses.      Heart sounds: Normal heart sounds.   Pulmonary:      Effort: Pulmonary effort is normal. No respiratory distress.      Breath sounds: Normal breath sounds.   Abdominal:      General: Abdomen is flat. Bowel sounds are normal. There is no distension.      Tenderness: There is no abdominal tenderness. There is no guarding.   Musculoskeletal:         General: Normal range of motion.      Cervical back: Normal range of motion.   Skin:     General: Skin is warm and dry.   Neurological:      Comments: Right facial droop.  Right  upper extremity 4 x 5 strength.  Gait not assessed.  Minimally verbal with only yes or no answers to the daughter.  Intermittently follows commands.   Psychiatric:         Mood and Affect: Mood normal.           Medications     - MEDICATION INSTRUCTIONS -       - MEDICATION INSTRUCTIONS -         acetaminophen  650 mg Oral Q8H     amLODIPine  5 mg Oral Daily     aspirin  325 mg Oral Daily    Or     aspirin  324 mg Oral or NG Tube Daily    Or     aspirin  300 mg Rectal Daily     atorvastatin  40 mg Oral QPM     bromfenac  1 drop Right Eye Daily     carvedilol  25 mg Oral BID w/meals     clopidogrel  75 mg Oral Daily     doxazosin  1 mg Oral Daily     finasteride  5 mg Oral Daily     gabapentin  300 mg Oral BID     insulin aspart  1-7 Units Subcutaneous TID AC     insulin aspart  1-5 Units Subcutaneous At Bedtime     insulin aspart   Subcutaneous TID AC     insulin glargine  10 Units Subcutaneous QAM AC     losartan  75 mg Oral Daily     melatonin  3 mg Oral QPM     metFORMIN  500 mg Oral BID w/meals     mirtazapine  15 mg Oral At Bedtime     risperiDONE  0.5 mg Sublingual QAM AC     risperiDONE  1 mg Sublingual At Bedtime       Data   Recent Labs   Lab 05/26/23  1157 05/26/23  0808 05/26/23  0725 05/25/23  2206 05/25/23  1114 05/25/23  0905 05/24/23  0857 05/24/23  0740   NA  --   --   --   --   --  139  --   --    POTASSIUM  --  4.2  --   --   --  4.5  4.4  --  3.6   CHLORIDE  --   --   --   --   --  106  --   --    CO2  --   --   --   --   --  17*  --   --    BUN  --   --   --   --   --  27.7*  --   --    CR  --   --   --   --   --  0.83  --   --    ANIONGAP  --   --   --   --   --  16*  --   --    LUZ  --   --   --   --   --  9.2  --   --    *  --  105* 156*   < > 147*   < >  --     < > = values in this interval not displayed.       No results found for this or any previous visit (from the past 24 hour(s)).      Assessment & Plan      Adam Huber is a 87 year old male  With PMH of HTN, CAD, CVA, DM  type 2, TBI, dementia- admitted on 5/4/2023 with slurred speech concerning for a stroke.       Acute/subacute CVA of the right asia and left cerebellar hemisphere [posterior circulation ischemic strokes]  5/13, most likely thromboembolic  Dementia with behavioral disturbance  Transient alteration of consciousness  Anxiety and depression  History of TBI  - he lives with his wife, has underlying cognitive impairment; has PCA as per family; Surinamese speaking  - initial concern for stroke  - Stroke Neuro initially followed  - HEAD CT: With extensive areas of encephalomalacic changes and gliosis on frontal and temporal lobe, chronic infarct in basal ganglia and corona radiata.  -CTA head and neck multifocal areas of moderate or high-grade stenosis throughout anterior and posterior circulation.  Heavy atherosclerotic plaque left carotid bulb/bifurcation with at least 50% stenosis by modified NASCET criteria. Moderate predominantly noncalcified plaque right carotid bulb/bifurcation without significant stenosis by modified NASCET criteria. No findings for vertebral artery dissection.   MRI brain 5/4:  Motion limited examination equivocal DWI hyperintensity at the right asia. This may represent artifact or acute ischemia.  Severe bifrontal encephalomalacia  - PTA Aspirin was increased from 81 mg to 325 mg po daily.  - lipid profile- LDL 90, total cholest 232, HDL 66  - started on Lipitor 40 mg po at bedtime  - Echo - EF 60-65%. No clear regional wall motion abnormalities noted.  No thrombus or cardiac etiology of stroke  - EEG- no seizures   -Continue to monitor in telemetry while in the hospital, neurology recommending cardiac event monitor after discharge  -Code stroke called 5/13, MRI brain showed multiple infarcts in right asia, medial left cerebellar hemisphere.  No convincing acute infarct by CT; CTA head and neck with widespread intracranial arthrosclerotic plaque, 50-60% stenosis of the left ICA  -Appreciate neurology  recommendations, concern for thromboembolic etiology given known extensive arthrosclerotic disease; started on DAPT with aspirin 325 Mg indefinitely daily, Plavix 75 Mg daily x90 days will need 30-day cardiac event monitor on discharge with follow-up in 6 to 8 weeks  -Evaluated by vascular surgery for CAD, no role for carotid intervention as current stroke are not in the geographical distribution from left ICA disease  -PTA was on scheduled Zyprexa, Seroquel and Remeron, as needed Seroquel available.  Due to ongoing agitation psychiatry was consulted  -  Appreciate psychiatry input, evaluated on 5/11/2023   - Discontinuing Seroquel and Zyprexa and started on risperidone ODT 0.5 mg in the morning and 1 mg at bedtime to                  help reduce dementia related agitation with risperidone ODT 0.5 mg twice daily as needed for agitation.    - Zyprexa IM 2.5 mg as a backup option or unable to take p.o.  -Scheduled melatonin 3 mg at 8:00 to help with circadian rhythm maintenance  -Delirium precautions, avoid interruptions during the night as much as possible, allow family visits, reorientation, use  services as much as possible, allow/maintain sleep-wake cycle.    - PT/OT- rec TCU;  - follow up with PCP in 1-2 weeks  - follow up with General Neuro in 6-8 weeks  --Continues to have intermittent agitation..   -- Patient did have worsening right facial weakness and droop with right arm weakness on 5/23/2023.  Neurology was reconsulted and repeat MRI was done.  No new stroke.  Felt that this was secondary to evolving pontine stroke.    Hypomagnesemia  -Being replaced per protocol     Goals of care  -Full code as per wife and family's request     HTN  - PTA on Coreg 25 mg po BID and losartan 50 mg po daily   - -Currently on amlodipine 5 mg daily, Coreg 25 mg twice daily losartan 75 mg daily  - BP mostly better controlled, intermittently elevated, likely related to agitation  -Monitor for persistently elevated  "blood pressure and adjust medication as needed     Oral thrush  -Nystatin swish and spit     BPH   -PTA finasteride and Flomax      DM type 2,   Sugars well controlled  - PTA metformin 500 mg qam and 1000 mg qpm   - Hb A1c 9.6  -Currently on metformin 500 mg twice daily with high resistance SSI and Premeal insulin 1 unit per 15 g carb in addition to Lantus 10 units  - monitor his blood sugar and adjust medication as needed  -Changed HDSSI to MDSSI     Neuropathy  -Continue PTA gabapentin        Diet: Room Service  Combination Diet Moderate Consistent Carb (60 g CHO per Meal) Diet; Pureed Diet (level 4); Mildly Thick (level 2)  .   DVT Prophylaxis: Pneumatic Compression Devices  Villa Catheter: Not present  Lines: None     Cardiac Monitoring: discontinued.   Code Status: Full Code    Disposition- accepted by TCU.  Family initially rejected this particular TCU but they are planning to have another conversation with the rest of the family members and then decide    Clinically Significant Risk Factors              # Hypoalbuminemia: Lowest albumin = 3.4 g/dL at 5/14/2023  2:21 PM, will monitor as appropriate     # Hypertension: Noted on problem list     # Dementia: noted on problem list   # DMII: A1C = 9.6 % (Ref range: <5.7 %) within past 6 months   # Overweight: Estimated body mass index is 28.53 kg/m  as calculated from the following:    Height as of this encounter: 1.778 m (5' 10\").    Weight as of this encounter: 90.2 kg (198 lb 13.7 oz).               Lovely Bass MD, MD  [phone](p)  "

## 2023-05-26 NOTE — PLAN OF CARE
Pt here with CVA    Alert to self, difficulty following commands with language barrier. R droop. RUE drift, weakness. 3/5. Waxes and wanes. RLE 3-4/5. Speech is slurred. Able to follow simple commands at best. VSS on RA. Incontinent. External catheter in place. A2/GB/teresita steady or pivoting to chair. Regular diet, mildly thick liquids. Pills crushed in applesauce. BG checks. Redness on coccyx, blanchable. M and K recheck in AM. Plan for discharge, pending placement and family compliance. Will need heart monitor at discharge.

## 2023-05-27 LAB
GLUCOSE BLDC GLUCOMTR-MCNC: 118 MG/DL (ref 70–99)
GLUCOSE BLDC GLUCOMTR-MCNC: 193 MG/DL (ref 70–99)
GLUCOSE BLDC GLUCOMTR-MCNC: 195 MG/DL (ref 70–99)
GLUCOSE BLDC GLUCOMTR-MCNC: 248 MG/DL (ref 70–99)
MAGNESIUM SERPL-MCNC: 1.6 MG/DL (ref 1.7–2.3)
POTASSIUM SERPL-SCNC: 4.2 MMOL/L (ref 3.4–5.3)

## 2023-05-27 PROCEDURE — 99233 SBSQ HOSP IP/OBS HIGH 50: CPT | Performed by: INTERNAL MEDICINE

## 2023-05-27 PROCEDURE — 250N000013 HC RX MED GY IP 250 OP 250 PS 637: Performed by: NURSE PRACTITIONER

## 2023-05-27 PROCEDURE — 250N000013 HC RX MED GY IP 250 OP 250 PS 637: Performed by: INTERNAL MEDICINE

## 2023-05-27 PROCEDURE — 250N000013 HC RX MED GY IP 250 OP 250 PS 637: Performed by: STUDENT IN AN ORGANIZED HEALTH CARE EDUCATION/TRAINING PROGRAM

## 2023-05-27 PROCEDURE — 250N000013 HC RX MED GY IP 250 OP 250 PS 637: Performed by: HOSPITALIST

## 2023-05-27 PROCEDURE — 83735 ASSAY OF MAGNESIUM: CPT | Performed by: HOSPITALIST

## 2023-05-27 PROCEDURE — 250N000013 HC RX MED GY IP 250 OP 250 PS 637

## 2023-05-27 PROCEDURE — 36415 COLL VENOUS BLD VENIPUNCTURE: CPT | Performed by: HOSPITALIST

## 2023-05-27 PROCEDURE — 84132 ASSAY OF SERUM POTASSIUM: CPT | Performed by: HOSPITALIST

## 2023-05-27 PROCEDURE — 120N000001 HC R&B MED SURG/OB

## 2023-05-27 RX ORDER — BROMFENAC 1.03 MG/ML
1 SOLUTION/ DROPS OPHTHALMIC DAILY
Status: DISCONTINUED | OUTPATIENT
Start: 2023-05-28 | End: 2023-06-05 | Stop reason: HOSPADM

## 2023-05-27 RX ADMIN — ATORVASTATIN CALCIUM 40 MG: 40 TABLET, FILM COATED ORAL at 20:21

## 2023-05-27 RX ADMIN — RISPERIDONE 0.5 MG: 0.5 TABLET, ORALLY DISINTEGRATING ORAL at 08:47

## 2023-05-27 RX ADMIN — CARVEDILOL 25 MG: 25 TABLET, FILM COATED ORAL at 17:41

## 2023-05-27 RX ADMIN — BROMFENAC 1 DROP: 1.03 SOLUTION/ DROPS OPHTHALMIC at 10:03

## 2023-05-27 RX ADMIN — ACETAMINOPHEN 650 MG: 325 TABLET ORAL at 13:31

## 2023-05-27 RX ADMIN — FINASTERIDE 5 MG: 5 TABLET, FILM COATED ORAL at 08:46

## 2023-05-27 RX ADMIN — DOXAZOSIN 1 MG: 1 TABLET ORAL at 08:46

## 2023-05-27 RX ADMIN — ASPIRIN 81 MG CHEWABLE TABLET 324 MG: 81 TABLET CHEWABLE at 08:44

## 2023-05-27 RX ADMIN — RISPERIDONE 1 MG: 1 TABLET, ORALLY DISINTEGRATING ORAL at 22:03

## 2023-05-27 RX ADMIN — INSULIN ASPART: 100 INJECTION, SOLUTION INTRAVENOUS; SUBCUTANEOUS at 13:30

## 2023-05-27 RX ADMIN — ACETAMINOPHEN 650 MG: 325 TABLET ORAL at 05:39

## 2023-05-27 RX ADMIN — METFORMIN HYDROCHLORIDE 500 MG: 500 TABLET, FILM COATED ORAL at 17:41

## 2023-05-27 RX ADMIN — CLOPIDOGREL BISULFATE 75 MG: 75 TABLET ORAL at 08:45

## 2023-05-27 RX ADMIN — AMLODIPINE BESYLATE 5 MG: 5 TABLET ORAL at 08:47

## 2023-05-27 RX ADMIN — GABAPENTIN 300 MG: 300 CAPSULE ORAL at 20:21

## 2023-05-27 RX ADMIN — LOSARTAN POTASSIUM 75 MG: 50 TABLET, FILM COATED ORAL at 08:46

## 2023-05-27 RX ADMIN — METFORMIN HYDROCHLORIDE 500 MG: 500 TABLET, FILM COATED ORAL at 08:46

## 2023-05-27 RX ADMIN — ACETAMINOPHEN 650 MG: 325 TABLET ORAL at 20:21

## 2023-05-27 RX ADMIN — CARVEDILOL 25 MG: 25 TABLET, FILM COATED ORAL at 08:46

## 2023-05-27 RX ADMIN — MELATONIN TAB 3 MG 3 MG: 3 TAB at 20:21

## 2023-05-27 RX ADMIN — INSULIN GLARGINE 10 UNITS: 100 INJECTION, SOLUTION SUBCUTANEOUS at 08:51

## 2023-05-27 RX ADMIN — GABAPENTIN 300 MG: 300 CAPSULE ORAL at 08:46

## 2023-05-27 RX ADMIN — MIRTAZAPINE 15 MG: 15 TABLET, FILM COATED ORAL at 20:21

## 2023-05-27 ASSESSMENT — ACTIVITIES OF DAILY LIVING (ADL)
ADLS_ACUITY_SCORE: 50

## 2023-05-27 NOTE — PROGRESS NOTES
Oriented to self. Speech slurred. Bahamian speaking, jabber in the room. Family in room throughout day to assist with translation. No pain. VSS on RA. Mag and K redraw in am. Neuros: R droop, R sided weakness, aphasia. Heavy assist of 2 with teresita steady for transfers. Up in chair for meals. Puree diet with mod thick liquids - pills crushed with applesauce or thickened liquids. Small bites with a spoon. Incontinent B&B. TCU placement with 30 day cardiac monitor.

## 2023-05-27 NOTE — PROGRESS NOTES
[facility]    Medicine Progress Note - Hospitalist Service    Date of Admission:  5/4/2023    Assessment & Plan   Adam Huber is a 87 year old male  With PMH of HTN, CAD, CVA, DM type 2, TBI, dementia- admitted on 5/4/2023 with slurred speech concerning for a stroke.       Acute/subacute CVA of the right asia and left cerebellar hemisphere [posterior circulation ischemic strokes]  5/13, most likely thromboembolic  Dementia with behavioral disturbance  Transient alteration of consciousness  Anxiety and depression  History of TBI  - he lives with his wife, has underlying cognitive impairment; has PCA as per family; Malagasy speaking  - initial concern for stroke  - Stroke Neuro initially followed  - HEAD CT: With extensive areas of encephalomalacic changes and gliosis on frontal and temporal lobe, chronic infarct in basal ganglia and corona radiata.  -CTA head and neck multifocal areas of moderate or high-grade stenosis throughout anterior and posterior circulation.  Heavy atherosclerotic plaque left carotid bulb/bifurcation with at least 50% stenosis by modified NASCET criteria. Moderate predominantly noncalcified plaque right carotid bulb/bifurcation without significant stenosis by modified NASCET criteria. No findings for vertebral artery dissection.   MRI brain 5/4:  Motion limited examination equivocal DWI hyperintensity at the right asia. This may represent artifact or acute ischemia.  Severe bifrontal encephalomalacia  - PTA Aspirin was increased from 81 mg to 325 mg po daily.  - lipid profile- LDL 90, total cholest 232, HDL 66  - started on Lipitor 40 mg po at bedtime  - Echo - EF 60-65%. No clear regional wall motion abnormalities noted.  No thrombus or cardiac etiology of stroke  - EEG- no seizures   -Continue to monitor in telemetry while in the hospital, neurology recommending cardiac event monitor after discharge  -Code stroke called 5/13, MRI brain showed multiple infarcts  in right asia, medial left cerebellar hemisphere.  No convincing acute infarct by CT; CTA head and neck with widespread intracranial arthrosclerotic plaque, 50-60% stenosis of the left ICA  -Appreciate neurology recommendations, concern for thromboembolic etiology given known extensive arthrosclerotic disease; started on DAPT with aspirin 325 Mg indefinitely daily, Plavix 75 Mg daily x90 days will need 30-day cardiac event monitor on discharge with follow-up in 6 to 8 weeks  -Evaluated by vascular surgery for CAD, no role for carotid intervention as current stroke are not in the geographical distribution from left ICA disease  -PTA was on scheduled Zyprexa, Seroquel and Remeron, as needed Seroquel available.  Due to ongoing agitation psychiatry was consulted  -  Appreciate psychiatry input, evaluated on 5/11/2023              - Discontinuing Seroquel and Zyprexa and started on risperidone ODT 0.5 mg in the morning and 1 mg at bedtime to                  help reduce dementia related agitation with risperidone ODT 0.5 mg twice daily as needed for agitation.               - Zyprexa IM 2.5 mg as a backup option or unable to take p.o.  -Scheduled melatonin 3 mg at 8:00 to help with circadian rhythm maintenance  -Delirium precautions, avoid interruptions during the night as much as possible, allow family visits, reorientation, use  services as much as possible, allow/maintain sleep-wake cycle.    - PT/OT- rec TCU;  - follow up with PCP in 1-2 weeks  - follow up with General Neuro in 6-8 weeks  --Continues to have intermittent agitation..   -- Patient did have worsening right facial weakness and droop with right arm weakness on 5/23/2023.  Neurology was reconsulted and repeat MRI was done.  No new stroke.  Felt that this was secondary to evolving pontine stroke.     5/27: A/W TCU     Hypomagnesemia  -Being replaced per protocol     Goals of care  -Full code as per wife and family's request     HTN  - PTA on Coreg  "25 mg po BID and losartan 50 mg po daily   - -Currently on amlodipine 5 mg daily, Coreg 25 mg twice daily losartan 75 mg daily  - BP mostly better controlled, intermittently elevated, likely related to agitation  -Monitor for persistently elevated blood pressure and adjust medication as needed     Oral thrush  -Nystatin swish and spit     BPH   -PTA finasteride and Flomax      DM type 2,   Sugars well controlled  - PTA metformin 500 mg qam and 1000 mg qpm   - Hb A1c 9.6  -Currently on metformin 500 mg twice daily with high resistance SSI and Premeal insulin 1 unit per 15 g carb in addition to Lantus 10 units  - monitor his blood sugar and adjust medication as needed  -Changed HDSSI to MDSSI     Neuropathy  -Continue PTA gabapentin      Diet: Room Service  Combination Diet Moderate Consistent Carb (60 g CHO per Meal) Diet; Pureed Diet (level 4); Mildly Thick (level 2)    DVT Prophylaxis: Pneumatic Compression Devices  Villa Catheter: Not present  Lines: None     Cardiac Monitoring: None  Code Status: Full Code      Clinically Significant Risk Factors            # Hypomagnesemia: Lowest Mg = 1.6 mg/dL in last 2 days, will replace as needed   # Hypoalbuminemia: Lowest albumin = 3.4 g/dL at 5/14/2023  2:21 PM, will monitor as appropriate     # Hypertension: Noted on problem list     # Dementia: noted on problem list   # DMII: A1C = 9.6 % (Ref range: <5.7 %) within past 6 months   # Overweight: Estimated body mass index is 28.53 kg/m  as calculated from the following:    Height as of this encounter: 1.778 m (5' 10\").    Weight as of this encounter: 90.2 kg (198 lb 13.7 oz).           Disposition Plan      Expected Discharge Date: 05/27/2023    Discharge Delays: Placement - TCU  *Medically Ready for Discharge              Gabriel Barbour MD  Hospitalist Service  [facility]  Securely message with [employer] (more info)  Text page via [employer] Paging/Directory " "  ______________________________________________________________________    Interval History     No new complaint.    Physical Exam   /62 (BP Location: Right arm)   Pulse 60   Temp 98.2  F (36.8  C) (Axillary)   Resp 16   Ht 1.778 m (5' 10\")   Wt 90.2 kg (198 lb 13.7 oz)   SpO2 94%   BMI 28.53 kg/m    Gen- pleasant   HEENT-right facial droop  Neck- supple  CVS- I+II+ no m/r/g  RS- CTAB  Abdo- soft, no tenderness . No g/r/r    Medical Decision Making       55 MINUTES SPENT BY ME on the date of service doing chart review, history, exam, documentation & further activities per the note.      Data   ------------------------- PAST 24 HR DATA REVIEWED -----------------------------------------------    I have personally reviewed the following data over the past 24 hrs:    N/A  \   N/A   / N/A     N/A N/A N/A /  193 (H)   4.2 N/A N/A \       Imaging results reviewed over the past 24 hrs:   No results found for this or any previous visit (from the past 24 hour(s)).  "

## 2023-05-27 NOTE — PLAN OF CARE
Pt here with CVA. A&Ox to self confused. Pt [place] speaking. Neuros R droop slurred, inconsistent with commands. VSS.  Pureed diet, mildly thick liquids. Takes pills chrush. Up with A2/teresita steady. Denies pain. Pt scoring yellow on the Aggression Stop Light Tool. Plan for TCU. Discharge pending.

## 2023-05-28 LAB
ANION GAP SERPL CALCULATED.3IONS-SCNC: 12 MMOL/L (ref 7–15)
BUN SERPL-MCNC: 29.3 MG/DL (ref 8–23)
CALCIUM SERPL-MCNC: 9.4 MG/DL (ref 8.8–10.2)
CHLORIDE SERPL-SCNC: 105 MMOL/L (ref 98–107)
CREAT SERPL-MCNC: 0.79 MG/DL (ref 0.67–1.17)
DEPRECATED HCO3 PLAS-SCNC: 23 MMOL/L (ref 22–29)
GFR SERPL CREATININE-BSD FRML MDRD: 86 ML/MIN/1.73M2
GLUCOSE BLDC GLUCOMTR-MCNC: 119 MG/DL (ref 70–99)
GLUCOSE BLDC GLUCOMTR-MCNC: 149 MG/DL (ref 70–99)
GLUCOSE BLDC GLUCOMTR-MCNC: 251 MG/DL (ref 70–99)
GLUCOSE SERPL-MCNC: 118 MG/DL (ref 70–99)
MAGNESIUM SERPL-MCNC: 1.7 MG/DL (ref 1.7–2.3)
POTASSIUM SERPL-SCNC: 4 MMOL/L (ref 3.4–5.3)
POTASSIUM SERPL-SCNC: 4.2 MMOL/L (ref 3.4–5.3)
SODIUM SERPL-SCNC: 140 MMOL/L (ref 136–145)

## 2023-05-28 PROCEDURE — 36415 COLL VENOUS BLD VENIPUNCTURE: CPT | Performed by: HOSPITALIST

## 2023-05-28 PROCEDURE — 120N000001 HC R&B MED SURG/OB

## 2023-05-28 PROCEDURE — 99207 PR CDG-CUT & PASTE-POTENTIAL IMPACT ON LEVEL: CPT | Performed by: INTERNAL MEDICINE

## 2023-05-28 PROCEDURE — 83735 ASSAY OF MAGNESIUM: CPT | Performed by: HOSPITALIST

## 2023-05-28 PROCEDURE — 250N000013 HC RX MED GY IP 250 OP 250 PS 637: Performed by: INTERNAL MEDICINE

## 2023-05-28 PROCEDURE — 250N000013 HC RX MED GY IP 250 OP 250 PS 637: Performed by: HOSPITALIST

## 2023-05-28 PROCEDURE — 80048 BASIC METABOLIC PNL TOTAL CA: CPT | Performed by: HOSPITALIST

## 2023-05-28 PROCEDURE — 250N000013 HC RX MED GY IP 250 OP 250 PS 637

## 2023-05-28 PROCEDURE — 80048 BASIC METABOLIC PNL TOTAL CA: CPT | Performed by: INTERNAL MEDICINE

## 2023-05-28 PROCEDURE — 250N000013 HC RX MED GY IP 250 OP 250 PS 637: Performed by: NURSE PRACTITIONER

## 2023-05-28 PROCEDURE — 250N000013 HC RX MED GY IP 250 OP 250 PS 637: Performed by: STUDENT IN AN ORGANIZED HEALTH CARE EDUCATION/TRAINING PROGRAM

## 2023-05-28 PROCEDURE — 99232 SBSQ HOSP IP/OBS MODERATE 35: CPT | Performed by: INTERNAL MEDICINE

## 2023-05-28 RX ADMIN — ATORVASTATIN CALCIUM 40 MG: 40 TABLET, FILM COATED ORAL at 20:13

## 2023-05-28 RX ADMIN — ACETAMINOPHEN 650 MG: 325 TABLET ORAL at 20:13

## 2023-05-28 RX ADMIN — CARVEDILOL 25 MG: 25 TABLET, FILM COATED ORAL at 17:20

## 2023-05-28 RX ADMIN — MELATONIN TAB 3 MG 3 MG: 3 TAB at 20:13

## 2023-05-28 RX ADMIN — GABAPENTIN 300 MG: 300 CAPSULE ORAL at 20:13

## 2023-05-28 RX ADMIN — ACETAMINOPHEN 650 MG: 325 TABLET ORAL at 05:45

## 2023-05-28 RX ADMIN — INSULIN GLARGINE 10 UNITS: 100 INJECTION, SOLUTION SUBCUTANEOUS at 09:23

## 2023-05-28 RX ADMIN — INSULIN ASPART 3 UNITS: 100 INJECTION, SOLUTION INTRAVENOUS; SUBCUTANEOUS at 09:21

## 2023-05-28 RX ADMIN — METFORMIN HYDROCHLORIDE 500 MG: 500 TABLET, FILM COATED ORAL at 17:20

## 2023-05-28 RX ADMIN — LOSARTAN POTASSIUM 75 MG: 50 TABLET, FILM COATED ORAL at 09:17

## 2023-05-28 RX ADMIN — METFORMIN HYDROCHLORIDE 500 MG: 500 TABLET, FILM COATED ORAL at 09:17

## 2023-05-28 RX ADMIN — AMLODIPINE BESYLATE 5 MG: 5 TABLET ORAL at 09:17

## 2023-05-28 RX ADMIN — ASPIRIN 81 MG CHEWABLE TABLET 324 MG: 81 TABLET CHEWABLE at 09:18

## 2023-05-28 RX ADMIN — CARVEDILOL 25 MG: 25 TABLET, FILM COATED ORAL at 09:18

## 2023-05-28 RX ADMIN — INSULIN ASPART 3 UNITS: 100 INJECTION, SOLUTION INTRAVENOUS; SUBCUTANEOUS at 17:54

## 2023-05-28 RX ADMIN — RISPERIDONE 1 MG: 1 TABLET, ORALLY DISINTEGRATING ORAL at 22:52

## 2023-05-28 RX ADMIN — FINASTERIDE 5 MG: 5 TABLET, FILM COATED ORAL at 09:17

## 2023-05-28 RX ADMIN — INSULIN ASPART 3 UNITS: 100 INJECTION, SOLUTION INTRAVENOUS; SUBCUTANEOUS at 13:03

## 2023-05-28 RX ADMIN — RISPERIDONE 0.5 MG: 0.5 TABLET, ORALLY DISINTEGRATING ORAL at 09:17

## 2023-05-28 RX ADMIN — MIRTAZAPINE 15 MG: 15 TABLET, FILM COATED ORAL at 20:13

## 2023-05-28 RX ADMIN — BROMFENAC 1 DROP: 1.03 SOLUTION/ DROPS OPHTHALMIC at 09:21

## 2023-05-28 RX ADMIN — GABAPENTIN 300 MG: 300 CAPSULE ORAL at 09:18

## 2023-05-28 RX ADMIN — DOXAZOSIN 1 MG: 1 TABLET ORAL at 09:17

## 2023-05-28 RX ADMIN — CLOPIDOGREL BISULFATE 75 MG: 75 TABLET ORAL at 09:18

## 2023-05-28 ASSESSMENT — ACTIVITIES OF DAILY LIVING (ADL)
ADLS_ACUITY_SCORE: 50
ADLS_ACUITY_SCORE: 46
ADLS_ACUITY_SCORE: 46
ADLS_ACUITY_SCORE: 50

## 2023-05-28 NOTE — PROGRESS NOTES
[facility]    Medicine Progress Note - Hospitalist Service    Date of Admission:  5/4/2023    Assessment & Plan   Adam Huber is a 87 year old male  With PMH of HTN, CAD, CVA, DM type 2, TBI, dementia- admitted on 5/4/2023 with slurred speech concerning for a stroke.       Acute/subacute CVA of the right asia and left cerebellar hemisphere [posterior circulation ischemic strokes]  5/13, most likely thromboembolic  Dementia with behavioral disturbance  Transient alteration of consciousness  Anxiety and depression  History of TBI  - he lives with his wife, has underlying cognitive impairment; has PCA as per family; Citizen of [place] speaking  - initial concern for stroke  - Stroke Neuro initially followed  - HEAD CT: With extensive areas of encephalomalacic changes and gliosis on frontal and temporal lobe, chronic infarct in basal ganglia and corona radiata.  -CTA head and neck multifocal areas of moderate or high-grade stenosis throughout anterior and posterior circulation.  Heavy atherosclerotic plaque left carotid bulb/bifurcation with at least 50% stenosis by modified NASCET criteria. Moderate predominantly noncalcified plaque right carotid bulb/bifurcation without significant stenosis by modified NASCET criteria. No findings for vertebral artery dissection.   MRI brain 5/4:  Motion limited examination equivocal DWI hyperintensity at the right asia. This may represent artifact or acute ischemia.  Severe bifrontal encephalomalacia  - PTA Aspirin was increased from 81 mg to 325 mg po daily.  - lipid profile- LDL 90, total cholest 232, HDL 66  - started on Lipitor 40 mg po at bedtime  - Echo - EF 60-65%. No clear regional wall motion abnormalities noted.  No thrombus or cardiac etiology of stroke  - EEG- no seizures   -Continue to monitor in telemetry while in the hospital, neurology recommending cardiac event monitor after discharge  -Code stroke called 5/13, MRI brain showed multiple infarcts  in right asia, medial left cerebellar hemisphere.  No convincing acute infarct by CT; CTA head and neck with widespread intracranial arthrosclerotic plaque, 50-60% stenosis of the left ICA  -Appreciate neurology recommendations, concern for thromboembolic etiology given known extensive arthrosclerotic disease; started on DAPT with aspirin 325 Mg indefinitely daily, Plavix 75 Mg daily x90 days will need 30-day cardiac event monitor on discharge with follow-up in 6 to 8 weeks  -Evaluated by vascular surgery for CAD, no role for carotid intervention as current stroke are not in the geographical distribution from left ICA disease  -PTA was on scheduled Zyprexa, Seroquel and Remeron, as needed Seroquel available.  Due to ongoing agitation psychiatry was consulted  -  Appreciate psychiatry input, evaluated on 5/11/2023              - Discontinuing Seroquel and Zyprexa and started on risperidone ODT 0.5 mg in the morning and 1 mg at bedtime to                  help reduce dementia related agitation with risperidone ODT 0.5 mg twice daily as needed for agitation.               - Zyprexa IM 2.5 mg as a backup option or unable to take p.o.  -Scheduled melatonin 3 mg at 8:00 to help with circadian rhythm maintenance  -Delirium precautions, avoid interruptions during the night as much as possible, allow family visits, reorientation, use  services as much as possible, allow/maintain sleep-wake cycle.    - PT/OT- rec TCU;  - follow up with PCP in 1-2 weeks  - follow up with General Neuro in 6-8 weeks  --Continues to have intermittent agitation..   -- Patient did have worsening right facial weakness and droop with right arm weakness on 5/23/2023.  Neurology was reconsulted and repeat MRI was done.  No new stroke.  Felt that this was secondary to evolving pontine stroke.     5/28: A/W TCU .  Discussed with the daughter about availability of TCU @ [facility].  She is waiting to tour the facility and make a  "decision    Hypomagnesemia  -Being replaced per protocol     Goals of care  -Full code as per wife and family's request     HTN  - PTA on Coreg 25 mg po BID and losartan 50 mg po daily   - -Currently on amlodipine 5 mg daily, Coreg 25 mg twice daily losartan 75 mg daily  - BP mostly better controlled, intermittently elevated, likely related to agitation  -Monitor for persistently elevated blood pressure and adjust medication as needed     Oral thrush  -Nystatin swish and spit     BPH   -PTA finasteride and Flomax      DM type 2,   Sugars well controlled  - PTA metformin 500 mg qam and 1000 mg qpm   - Hb A1c 9.6  -Currently on metformin 500 mg twice daily with high resistance SSI and Premeal insulin 1 unit per 15 g carb in addition to Lantus 10 units  - monitor his blood sugar and adjust medication as needed  -Changed HDSSI to MDSSI     Neuropathy  -Continue PTA gabapentin      Diet: Room Service  Combination Diet Moderate Consistent Carb (60 g CHO per Meal) Diet; Pureed Diet (level 4); Mildly Thick (level 2)    DVT Prophylaxis: Pneumatic Compression Devices  Vlila Catheter: Not present  Lines: None     Cardiac Monitoring: None  Code Status: Full Code      Clinically Significant Risk Factors            # Hypomagnesemia: Lowest Mg = 1.6 mg/dL in last 2 days, will replace as needed   # Hypoalbuminemia: Lowest albumin = 3.4 g/dL at 5/14/2023  2:21 PM, will monitor as appropriate     # Hypertension: Noted on problem list     # Dementia: noted on problem list   # DMII: A1C = 9.6 % (Ref range: <5.7 %) within past 6 months   # Overweight: Estimated body mass index is 28.53 kg/m  as calculated from the following:    Height as of this encounter: 1.778 m (5' 10\").    Weight as of this encounter: 90.2 kg (198 lb 13.7 oz).           Disposition Plan      Expected Discharge Date: 05/29/2023    Discharge Delays: Placement - TCU  *Medically Ready for Discharge              Gabriel Barbour MD  Hospitalist Service  [facility] " "[facility]  Securely message with [employer] (more info)  Text page via [employer] Paging/Directory   ______________________________________________________________________    Interval History     No new complaint.    Physical Exam   BP (!) 158/74 (BP Location: Left arm)   Pulse 72   Temp 98.6  F (37  C) (Axillary)   Resp 16   Ht 1.778 m (5' 10\")   Wt 90.2 kg (198 lb 13.7 oz)   SpO2 96%   BMI 28.53 kg/m    Gen- pleasant   HEENT-right facial droop  Neck- supple  CVS- I+II+ no m/r/g  RS- CTAB  Abdo- soft, no tenderness . No g/r/r    Medical Decision Making       55 MINUTES SPENT BY ME on the date of service doing chart review, history, exam, documentation & further activities per the note.      Data   ------------------------- PAST 24 HR DATA REVIEWED -----------------------------------------------    I have personally reviewed the following data over the past 24 hrs:    N/A  \   N/A   / N/A     140 105 29.3 (H) /  251 (H)   4.0; 4.2 23 0.79 \       Imaging results reviewed over the past 24 hrs:   No results found for this or any previous visit (from the past 24 hour(s)).  "

## 2023-05-28 NOTE — PLAN OF CARE
Pt here with CVA. A&Ox to self , confused. Pt Vietnamese speaking. Neuros are R droop, slurred speech, inconsistent with commands. VSS.  Pureed diet, mildly thick liquids. Takes pills chrush. Up with A2/teresita steady. Denies pain. Pt scoring yellow on the Aggression Stop Light Tool. Plan for TCU. Discharge pending.

## 2023-05-28 NOTE — PROGRESS NOTES
Care Management Follow Up    Length of Stay (days): 24    Expected Discharge Date: 05/29/2023     Concerns to be Addressed:       Patient plan of care discussed at interdisciplinary rounds: Yes    Anticipated Discharge Disposition:       Anticipated Discharge Services:    Anticipated Discharge DME:      Patient/family educated on Medicare website which has current facility and service quality ratings:    Education Provided on the Discharge Plan:    Patient/Family in Agreement with the Plan:      Referrals Placed by CM/SW:    Private pay costs discussed: Not applicable    Additional Information:  [facility] is able to take patient today. CHATA paged provider who stated that patient is medically ready. SW has left two voicemails for patient's daughter Hattie regarding discharge planning and asking for calls back with no return calls yet. SW will continue to coordinate discharge.     Addendum:   SW discussed discharge planning with daughter Hattie and validated that the plan for patient to go to [facility] is not their first choice but the best option at this time for patient to get the rehab he needs in order to go home. Hattie wanted to further discuss with family. Physician and CHATA then met with pt's wife, son, and daughter-in-law in room to further discuss. Family asked for patient to stay until tomorrow to allow for more time for family to decide what to do. Physician in agreement with this. SW rescheduled ride time to tomorrow between [id number]. Family to let SW by 1000a tomorrow whether they will have patient go to the facility or work on a plan to bring him home with home care and equipment. SW explained that it will take time for equipment to be acquired and family understood. Family stated that they possibly have people that could come assist pt for 16hrs/day. SW will wait for family's decision tomorrow AM.     SW will continue to follow.       KENNETH Goldstein

## 2023-05-28 NOTE — PLAN OF CARE
"  Problem: Stroke, Ischemic (Includes Transient Ischemic Attack)  Goal: Optimal Coping  Outcome: Progressing   Goal Outcome Evaluation:    Orientations: alert to self and place; Danish speaking patient limited. No acute events this shift and no new neuro deficits. Patient up to reclining chair twice this shift. Patient family at bedside. Patient incontinent of both bowel and bladder this shift. Patient eating and drinking well. No behaviors this shift; patient cooperative with assessment and ADLs Patient to discharge to [facility] tomorrow   Vitals/Pain: BP (!) 158/74 (BP Location: Left arm)   Pulse 72   Temp 98.6  F (37  C) (Axillary)   Resp 16   Ht 1.778 m (5' 10\")   Wt 90.2 kg (198 lb 13.7 oz)   SpO2 96%   BMI 28.53 kg/m    Tele: no tele; radial pulse waqas   Resp: Clear and equal bilaterally   Lines/Drains: none  Skin/Wounds: bruising; no open areas   GI/: incontinent of B&B this shift.   Labs: Abnormal/Trends, Electrolyte Replacement- Mag and K+ at goal   Ambulation/Assist: Gait belt and Marysol Steady to chair   Plan: Discharge to [facility]       Taylor Joel RN    "

## 2023-05-29 LAB
GLUCOSE BLDC GLUCOMTR-MCNC: 134 MG/DL (ref 70–99)
GLUCOSE BLDC GLUCOMTR-MCNC: 144 MG/DL (ref 70–99)
GLUCOSE BLDC GLUCOMTR-MCNC: 147 MG/DL (ref 70–99)
GLUCOSE BLDC GLUCOMTR-MCNC: 174 MG/DL (ref 70–99)
GLUCOSE BLDC GLUCOMTR-MCNC: 232 MG/DL (ref 70–99)
MAGNESIUM SERPL-MCNC: 1.5 MG/DL (ref 1.7–2.3)
MAGNESIUM SERPL-MCNC: 2.2 MG/DL (ref 1.7–2.3)
POTASSIUM SERPL-SCNC: 4.2 MMOL/L (ref 3.4–5.3)

## 2023-05-29 PROCEDURE — 120N000001 HC R&B MED SURG/OB

## 2023-05-29 PROCEDURE — 250N000011 HC RX IP 250 OP 636: Performed by: INTERNAL MEDICINE

## 2023-05-29 PROCEDURE — 250N000013 HC RX MED GY IP 250 OP 250 PS 637: Performed by: HOSPITALIST

## 2023-05-29 PROCEDURE — 99207 PR CDG-CUT & PASTE-POTENTIAL IMPACT ON LEVEL: CPT | Performed by: INTERNAL MEDICINE

## 2023-05-29 PROCEDURE — 250N000013 HC RX MED GY IP 250 OP 250 PS 637: Performed by: INTERNAL MEDICINE

## 2023-05-29 PROCEDURE — 36415 COLL VENOUS BLD VENIPUNCTURE: CPT | Performed by: INTERNAL MEDICINE

## 2023-05-29 PROCEDURE — 83735 ASSAY OF MAGNESIUM: CPT | Performed by: INTERNAL MEDICINE

## 2023-05-29 PROCEDURE — 99207 PR NO BILLABLE SERVICE THIS VISIT: CPT | Performed by: NURSE PRACTITIONER

## 2023-05-29 PROCEDURE — 250N000013 HC RX MED GY IP 250 OP 250 PS 637: Performed by: NURSE PRACTITIONER

## 2023-05-29 PROCEDURE — 250N000009 HC RX 250

## 2023-05-29 PROCEDURE — 250N000013 HC RX MED GY IP 250 OP 250 PS 637: Performed by: STUDENT IN AN ORGANIZED HEALTH CARE EDUCATION/TRAINING PROGRAM

## 2023-05-29 PROCEDURE — 250N000013 HC RX MED GY IP 250 OP 250 PS 637

## 2023-05-29 PROCEDURE — 84132 ASSAY OF SERUM POTASSIUM: CPT | Performed by: INTERNAL MEDICINE

## 2023-05-29 PROCEDURE — 99232 SBSQ HOSP IP/OBS MODERATE 35: CPT | Performed by: INTERNAL MEDICINE

## 2023-05-29 RX ORDER — WATER 10 ML/10ML
INJECTION INTRAMUSCULAR; INTRAVENOUS; SUBCUTANEOUS
Status: COMPLETED
Start: 2023-05-29 | End: 2023-05-29

## 2023-05-29 RX ORDER — UREA 10 %
500 LOTION (ML) TOPICAL DAILY
Status: DISCONTINUED | OUTPATIENT
Start: 2023-05-29 | End: 2023-06-05 | Stop reason: HOSPADM

## 2023-05-29 RX ORDER — MAGNESIUM SULFATE HEPTAHYDRATE 40 MG/ML
4 INJECTION, SOLUTION INTRAVENOUS ONCE
Status: COMPLETED | OUTPATIENT
Start: 2023-05-29 | End: 2023-05-29

## 2023-05-29 RX ADMIN — ACETAMINOPHEN 650 MG: 325 TABLET ORAL at 20:13

## 2023-05-29 RX ADMIN — CARVEDILOL 25 MG: 25 TABLET, FILM COATED ORAL at 09:10

## 2023-05-29 RX ADMIN — OLANZAPINE 2.5 MG: 10 INJECTION, POWDER, FOR SOLUTION INTRAMUSCULAR at 15:33

## 2023-05-29 RX ADMIN — ATORVASTATIN CALCIUM 40 MG: 40 TABLET, FILM COATED ORAL at 20:13

## 2023-05-29 RX ADMIN — DOXAZOSIN 1 MG: 1 TABLET ORAL at 09:10

## 2023-05-29 RX ADMIN — GABAPENTIN 300 MG: 300 CAPSULE ORAL at 09:10

## 2023-05-29 RX ADMIN — INSULIN ASPART 2 UNITS: 100 INJECTION, SOLUTION INTRAVENOUS; SUBCUTANEOUS at 12:44

## 2023-05-29 RX ADMIN — INSULIN ASPART 2 UNITS: 100 INJECTION, SOLUTION INTRAVENOUS; SUBCUTANEOUS at 10:29

## 2023-05-29 RX ADMIN — ASPIRIN 81 MG CHEWABLE TABLET 325 MG: 81 TABLET CHEWABLE at 09:10

## 2023-05-29 RX ADMIN — INSULIN ASPART 4 UNITS: 100 INJECTION, SOLUTION INTRAVENOUS; SUBCUTANEOUS at 17:27

## 2023-05-29 RX ADMIN — MAGNESIUM SULFATE HEPTAHYDRATE 4 G: 40 INJECTION, SOLUTION INTRAVENOUS at 11:01

## 2023-05-29 RX ADMIN — RISPERIDONE 0.5 MG: 0.5 TABLET, ORALLY DISINTEGRATING ORAL at 09:10

## 2023-05-29 RX ADMIN — CARVEDILOL 25 MG: 25 TABLET, FILM COATED ORAL at 17:19

## 2023-05-29 RX ADMIN — ACETAMINOPHEN 650 MG: 325 TABLET ORAL at 06:01

## 2023-05-29 RX ADMIN — AMLODIPINE BESYLATE 5 MG: 5 TABLET ORAL at 09:10

## 2023-05-29 RX ADMIN — METFORMIN HYDROCHLORIDE 500 MG: 500 TABLET, FILM COATED ORAL at 09:10

## 2023-05-29 RX ADMIN — METFORMIN HYDROCHLORIDE 500 MG: 500 TABLET, FILM COATED ORAL at 17:19

## 2023-05-29 RX ADMIN — MIRTAZAPINE 15 MG: 15 TABLET, FILM COATED ORAL at 20:13

## 2023-05-29 RX ADMIN — WATER: 1 INJECTION INTRAMUSCULAR; INTRAVENOUS; SUBCUTANEOUS at 15:54

## 2023-05-29 RX ADMIN — Medication 500 MG: at 11:01

## 2023-05-29 RX ADMIN — FINASTERIDE 5 MG: 5 TABLET, FILM COATED ORAL at 09:10

## 2023-05-29 RX ADMIN — ACETAMINOPHEN 650 MG: 325 TABLET ORAL at 12:41

## 2023-05-29 RX ADMIN — RISPERIDONE 1 MG: 1 TABLET, ORALLY DISINTEGRATING ORAL at 20:13

## 2023-05-29 RX ADMIN — BROMFENAC 1 DROP: 1.03 SOLUTION/ DROPS OPHTHALMIC at 09:16

## 2023-05-29 RX ADMIN — GABAPENTIN 300 MG: 300 CAPSULE ORAL at 20:13

## 2023-05-29 RX ADMIN — MELATONIN TAB 3 MG 3 MG: 3 TAB at 20:13

## 2023-05-29 RX ADMIN — LOSARTAN POTASSIUM 75 MG: 50 TABLET, FILM COATED ORAL at 09:09

## 2023-05-29 RX ADMIN — INSULIN GLARGINE 10 UNITS: 100 INJECTION, SOLUTION SUBCUTANEOUS at 09:08

## 2023-05-29 RX ADMIN — CLOPIDOGREL BISULFATE 75 MG: 75 TABLET ORAL at 09:10

## 2023-05-29 ASSESSMENT — ACTIVITIES OF DAILY LIVING (ADL)
ADLS_ACUITY_SCORE: 46
ADLS_ACUITY_SCORE: 46
ADLS_ACUITY_SCORE: 50
ADLS_ACUITY_SCORE: 46
ADLS_ACUITY_SCORE: 50

## 2023-05-29 NOTE — PLAN OF CARE
Pt confused and not following commands. Combative, hitting/kicking and spitting at staff. Would not answer questioning using Bangladeshi . Needed 4 staff members to transfer him back to bed. Given zyprexa CAPRICE. MD notified.     1700: Daughter arrived at bedside. Pt now resting, agreeable to cares. WCM.

## 2023-05-29 NOTE — PROGRESS NOTES
"Care Management Follow Up    Length of Stay (days): 25    Expected Discharge Date: 05/29/2023     Concerns to be Addressed:       Patient plan of care discussed at interdisciplinary rounds: Yes    Anticipated Discharge Disposition:       Anticipated Discharge Services:    Anticipated Discharge DME:      Patient/family educated on Medicare website which has current facility and service quality ratings:    Education Provided on the Discharge Plan:    Patient/Family in Agreement with the Plan:      Referrals Placed by CM/SW:    Private pay costs discussed: Not applicable    Additional Information:  CHATA spoke with patient's daughter Hattie to discuss discharge planning. Hattie asked if there was a possibility of patient discharging tomorrow to give them the opportunity to tour [facility] tomorrow before patient discharges. She stated they have been trying to set up a tour but it has been difficult. CHATA passed this along to the physician to ask if this would be ok and he stated \"ok\". CHATA also placed call to [facility] to see if they would be able to take patient tomorrow instead of today and they said that would be ok. CHATA called Hattie back to let her know that discharge could be postponed until tomorrow and she was very grateful. CHATA did stress that a plan would need to be made tomorrow for discharge as patient has been ready for discharge since Thursday. Hattie understood. CHATA canceled stretcher ride today. CHATA to contact family tomorrow AM to discuss plan and update [facility] as soon as a plan is made.       KENNETH Goldstein        "

## 2023-05-29 NOTE — PLAN OF CARE
Pt here with CVA. A&Ox to self, confused. Pt Kosovan speaking. Neuros are R droop, slurred speech, inconsistent with commands. VSS.  Pureed diet, mildly thick liquids. Takes pills chrush. Up with A2/teresita steady. Denies pain. Pt scoring yellow on the Aggression Stop Light Tool. Plan for TCU. Discharge pending.

## 2023-05-29 NOTE — PROGRESS NOTES
Here for stroke. Confused.  Neuros intact x r sided hemiparesis. VSS on RA. Up w/ Ax2-3, SS. Incontinent B/B. Tolerating pureed diet w mildly thick liquids. Takes pills crushed in AS. Alarms on. Discharge pending placement

## 2023-05-29 NOTE — PROGRESS NOTES
[facility]    Medicine Progress Note - Hospitalist Service    Date of Admission:  5/4/2023    Assessment & Plan   Adam Huber is a 87 year old male  With PMH of HTN, CAD, CVA, DM type 2, TBI, dementia- admitted on 5/4/2023 with slurred speech concerning for a stroke.       Acute/subacute CVA of the right asia and left cerebellar hemisphere [posterior circulation ischemic strokes]  5/13, most likely thromboembolic  Dementia with behavioral disturbance  Transient alteration of consciousness  Anxiety and depression  History of TBI  - he lives with his wife, has underlying cognitive impairment; has PCA as per family; South African speaking  - initial concern for stroke  - Stroke Neuro initially followed  - HEAD CT: With extensive areas of encephalomalacic changes and gliosis on frontal and temporal lobe, chronic infarct in basal ganglia and corona radiata.  -CTA head and neck multifocal areas of moderate or high-grade stenosis throughout anterior and posterior circulation.  Heavy atherosclerotic plaque left carotid bulb/bifurcation with at least 50% stenosis by modified NASCET criteria. Moderate predominantly noncalcified plaque right carotid bulb/bifurcation without significant stenosis by modified NASCET criteria. No findings for vertebral artery dissection.   MRI brain 5/4:  Motion limited examination equivocal DWI hyperintensity at the right asia. This may represent artifact or acute ischemia.  Severe bifrontal encephalomalacia  - PTA Aspirin was increased from 81 mg to 325 mg po daily.  - lipid profile- LDL 90, total cholest 232, HDL 66  - started on Lipitor 40 mg po at bedtime  - Echo - EF 60-65%. No clear regional wall motion abnormalities noted.  No thrombus or cardiac etiology of stroke  - EEG- no seizures   -Continue to monitor in telemetry while in the hospital, neurology recommending cardiac event monitor after discharge  -Code stroke called 5/13, MRI brain showed multiple infarcts  in right asia, medial left cerebellar hemisphere.  No convincing acute infarct by CT; CTA head and neck with widespread intracranial arthrosclerotic plaque, 50-60% stenosis of the left ICA  -Appreciate neurology recommendations, concern for thromboembolic etiology given known extensive arthrosclerotic disease; started on DAPT with aspirin 325 Mg indefinitely daily, Plavix 75 Mg daily x90 days will need 30-day cardiac event monitor on discharge with follow-up in 6 to 8 weeks  -Evaluated by vascular surgery for CAD, no role for carotid intervention as current stroke are not in the geographical distribution from left ICA disease  -PTA was on scheduled Zyprexa, Seroquel and Remeron, as needed Seroquel available.  Due to ongoing agitation psychiatry was consulted  -  Appreciate psychiatry input, evaluated on 5/11/2023              - Discontinuing Seroquel and Zyprexa and started on risperidone ODT 0.5 mg in the morning and 1 mg at bedtime to                  help reduce dementia related agitation with risperidone ODT 0.5 mg twice daily as needed for agitation.               - Zyprexa IM 2.5 mg as a backup option or unable to take p.o.  -Scheduled melatonin 3 mg at 8:00 to help with circadian rhythm maintenance  -Delirium precautions, avoid interruptions during the night as much as possible, allow family visits, reorientation, use  services as much as possible, allow/maintain sleep-wake cycle.    - PT/OT- rec TCU;  - follow up with PCP in 1-2 weeks  - follow up with General Neuro in 6-8 weeks  --Continues to have intermittent agitation..   -- Patient did have worsening right facial weakness and droop with right arm weakness on 5/23/2023.  Neurology was reconsulted and repeat MRI was done.  No new stroke.  Felt that this was secondary to evolving pontine stroke.     5/28: A/W TCU .  Discussed with the daughter about availability of TCU @ [facility].  She is waiting to tour the facility and make a decision.  5/29: As per social workers discussion with the daughter they cannot to the facility until tomorrow morning.  Await final decision for [facility] versus home with additional support and equipment    Hypomagnesemia  -Being replaced per protocol     Goals of care  -Full code as per wife and family's request     HTN  - PTA on Coreg 25 mg po BID and losartan 50 mg po daily   - -Currently on amlodipine 5 mg daily, Coreg 25 mg twice daily losartan 75 mg daily  - BP mostly better controlled, intermittently elevated, likely related to agitation  -Monitor for persistently elevated blood pressure and adjust medication as needed     Oral thrush  -Nystatin swish and spit     BPH   -PTA finasteride and Flomax      DM type 2,   - PTA metformin 500 mg qam and 1000 mg qpm   - Hb A1c 9.6  -Currently on metformin 500 mg twice daily with high resistance SSI and Premeal insulin 1 unit per 15 g carb in addition to Lantus 10 units  - monitor his blood sugar and adjust medication as needed  -Changed HDSSI to MDSSI     Recent Labs   Lab 05/29/23  0737 05/28/23  2158 05/28/23  1629 05/28/23  1220 05/28/23  0847 05/28/23  0737   * 232* 149* 251* 118* 119*     -5/29: Increase Lantus to 12 U   Neuropathy  -Continue PTA gabapentin      Diet: Room Service  Combination Diet Moderate Consistent Carb (60 g CHO per Meal) Diet; Pureed Diet (level 4); Mildly Thick (level 2)    DVT Prophylaxis: Pneumatic Compression Devices  Villa Catheter: Not present  Lines: None     Cardiac Monitoring: None  Code Status: Full Code      Clinically Significant Risk Factors            # Hypomagnesemia: Lowest Mg = 1.5 mg/dL in last 2 days, will replace as needed   # Hypoalbuminemia: Lowest albumin = 3.4 g/dL at 5/14/2023  2:21 PM, will monitor as appropriate     # Hypertension: Noted on problem list     # Dementia: noted on problem list   # DMII: A1C = 9.6 % (Ref range: <5.7 %) within past 6 months   # Overweight: Estimated body mass index is 28.53 kg/m  as  "calculated from the following:    Height as of this encounter: 1.778 m (5' 10\").    Weight as of this encounter: 90.2 kg (198 lb 13.7 oz).           Disposition Plan      Expected Discharge Date: 05/29/2023    Discharge Delays: Placement - TCU  *Medically Ready for Discharge              Gabriel Barbour MD  Hospitalist Service  [facility]  Securely message with [employer] (more info)  Text page via [employer] Paging/Directory   ______________________________________________________________________    Interval History     No new complaint.  Discussed with son at bedside who mentioned that patient's wife may like him to be discharged home with additional equipment and services    Physical Exam   /49 (BP Location: Right arm)   Pulse 58   Temp 97.9  F (36.6  C) (Axillary)   Resp 18   Ht 1.778 m (5' 10\")   Wt 90.2 kg (198 lb 13.7 oz)   SpO2 94%   BMI 28.53 kg/m    Gen- pleasant   HEENT-right facial droop  Neck- supple  CVS- I+II+ no m/r/g  RS- CTAB  Abdo- soft, no tenderness . No g/r/r    Medical Decision Making       55 MINUTES SPENT BY ME on the date of service doing chart review, history, exam, documentation & further activities per the note.      Data   ------------------------- PAST 24 HR DATA REVIEWED -----------------------------------------------    I have personally reviewed the following data over the past 24 hrs:    N/A  \   N/A   / N/A     N/A N/A N/A /  134 (H)   4.2 N/A N/A \       Imaging results reviewed over the past 24 hrs:   No results found for this or any previous visit (from the past 24 hour(s)).  "

## 2023-05-30 ENCOUNTER — APPOINTMENT (OUTPATIENT)
Dept: OCCUPATIONAL THERAPY | Facility: CLINIC | Age: 88
DRG: 065 | End: 2023-05-30
Attending: HOSPITALIST
Payer: COMMERCIAL

## 2023-05-30 ENCOUNTER — APPOINTMENT (OUTPATIENT)
Dept: PHYSICAL THERAPY | Facility: CLINIC | Age: 88
DRG: 065 | End: 2023-05-30
Attending: HOSPITALIST
Payer: COMMERCIAL

## 2023-05-30 LAB
GLUCOSE BLDC GLUCOMTR-MCNC: 125 MG/DL (ref 70–99)
GLUCOSE BLDC GLUCOMTR-MCNC: 146 MG/DL (ref 70–99)
GLUCOSE BLDC GLUCOMTR-MCNC: 190 MG/DL (ref 70–99)
POTASSIUM SERPL-SCNC: 4 MMOL/L (ref 3.4–5.3)

## 2023-05-30 PROCEDURE — 250N000013 HC RX MED GY IP 250 OP 250 PS 637: Performed by: NURSE PRACTITIONER

## 2023-05-30 PROCEDURE — 120N000001 HC R&B MED SURG/OB

## 2023-05-30 PROCEDURE — 250N000013 HC RX MED GY IP 250 OP 250 PS 637: Performed by: INTERNAL MEDICINE

## 2023-05-30 PROCEDURE — 84132 ASSAY OF SERUM POTASSIUM: CPT | Performed by: INTERNAL MEDICINE

## 2023-05-30 PROCEDURE — 99232 SBSQ HOSP IP/OBS MODERATE 35: CPT | Performed by: HOSPITALIST

## 2023-05-30 PROCEDURE — 97535 SELF CARE MNGMENT TRAINING: CPT | Mod: GO | Performed by: OCCUPATIONAL THERAPIST

## 2023-05-30 PROCEDURE — 97530 THERAPEUTIC ACTIVITIES: CPT | Mod: GP | Performed by: PHYSICAL THERAPIST

## 2023-05-30 PROCEDURE — 36415 COLL VENOUS BLD VENIPUNCTURE: CPT | Performed by: INTERNAL MEDICINE

## 2023-05-30 PROCEDURE — 250N000013 HC RX MED GY IP 250 OP 250 PS 637: Performed by: STUDENT IN AN ORGANIZED HEALTH CARE EDUCATION/TRAINING PROGRAM

## 2023-05-30 PROCEDURE — 250N000013 HC RX MED GY IP 250 OP 250 PS 637

## 2023-05-30 PROCEDURE — 250N000013 HC RX MED GY IP 250 OP 250 PS 637: Performed by: HOSPITALIST

## 2023-05-30 RX ORDER — RISPERIDONE 0.5 MG/1
0.5 TABLET, ORALLY DISINTEGRATING ORAL
Qty: 30 TABLET | Refills: 0 | Status: ON HOLD | OUTPATIENT
Start: 2023-05-31 | End: 2023-09-18

## 2023-05-30 RX ORDER — ATORVASTATIN CALCIUM 40 MG/1
40 TABLET, FILM COATED ORAL EVERY EVENING
Qty: 30 TABLET | Refills: 0 | Status: SHIPPED | OUTPATIENT
Start: 2023-05-30

## 2023-05-30 RX ORDER — QUETIAPINE FUMARATE 25 MG/1
25 TABLET, FILM COATED ORAL
Qty: 30 TABLET | Refills: 0 | Status: ON HOLD | OUTPATIENT
Start: 2023-05-30 | End: 2023-09-18

## 2023-05-30 RX ORDER — RISPERIDONE 1 MG/1
1 TABLET, ORALLY DISINTEGRATING ORAL AT BEDTIME
Qty: 30 TABLET | Refills: 0 | Status: ON HOLD | OUTPATIENT
Start: 2023-05-30 | End: 2023-09-18

## 2023-05-30 RX ORDER — AMLODIPINE BESYLATE 5 MG/1
5 TABLET ORAL DAILY
Qty: 30 TABLET | Refills: 0 | Status: ON HOLD | OUTPATIENT
Start: 2023-05-31 | End: 2023-09-18

## 2023-05-30 RX ORDER — CLOPIDOGREL BISULFATE 75 MG/1
75 TABLET ORAL DAILY
Qty: 30 TABLET | Refills: 0 | Status: ON HOLD | OUTPATIENT
Start: 2023-05-31 | End: 2023-09-18

## 2023-05-30 RX ORDER — RISPERIDONE 0.5 MG/1
0.5 TABLET, ORALLY DISINTEGRATING ORAL 2 TIMES DAILY PRN
Qty: 30 TABLET | Refills: 0 | Status: ON HOLD | OUTPATIENT
Start: 2023-05-30 | End: 2023-09-18

## 2023-05-30 RX ORDER — OLANZAPINE 5 MG/1
5 TABLET, ORALLY DISINTEGRATING ORAL
Qty: 30 TABLET | Refills: 0 | Status: SHIPPED | OUTPATIENT
Start: 2023-05-30 | End: 2023-05-30

## 2023-05-30 RX ORDER — ASPIRIN 325 MG
325 TABLET, DELAYED RELEASE (ENTERIC COATED) ORAL DAILY
Qty: 30 TABLET | Refills: 0 | Status: SHIPPED | OUTPATIENT
Start: 2023-05-30 | End: 2023-06-03

## 2023-05-30 RX ADMIN — CARVEDILOL 25 MG: 25 TABLET, FILM COATED ORAL at 18:40

## 2023-05-30 RX ADMIN — DOXAZOSIN 1 MG: 1 TABLET ORAL at 08:44

## 2023-05-30 RX ADMIN — INSULIN ASPART 2 UNITS: 100 INJECTION, SOLUTION INTRAVENOUS; SUBCUTANEOUS at 18:22

## 2023-05-30 RX ADMIN — CLOPIDOGREL BISULFATE 75 MG: 75 TABLET ORAL at 08:37

## 2023-05-30 RX ADMIN — MIRTAZAPINE 15 MG: 15 TABLET, FILM COATED ORAL at 20:22

## 2023-05-30 RX ADMIN — ATORVASTATIN CALCIUM 40 MG: 40 TABLET, FILM COATED ORAL at 20:23

## 2023-05-30 RX ADMIN — Medication 500 MG: at 08:37

## 2023-05-30 RX ADMIN — INSULIN ASPART 4 UNITS: 100 INJECTION, SOLUTION INTRAVENOUS; SUBCUTANEOUS at 13:58

## 2023-05-30 RX ADMIN — INSULIN ASPART 2 UNITS: 100 INJECTION, SOLUTION INTRAVENOUS; SUBCUTANEOUS at 08:36

## 2023-05-30 RX ADMIN — MELATONIN TAB 3 MG 3 MG: 3 TAB at 20:23

## 2023-05-30 RX ADMIN — METFORMIN HYDROCHLORIDE 500 MG: 500 TABLET, FILM COATED ORAL at 08:37

## 2023-05-30 RX ADMIN — INSULIN GLARGINE 12 UNITS: 100 INJECTION, SOLUTION SUBCUTANEOUS at 08:37

## 2023-05-30 RX ADMIN — LOSARTAN POTASSIUM 75 MG: 50 TABLET, FILM COATED ORAL at 08:44

## 2023-05-30 RX ADMIN — ACETAMINOPHEN 650 MG: 325 TABLET ORAL at 05:56

## 2023-05-30 RX ADMIN — ACETAMINOPHEN 650 MG: 325 TABLET ORAL at 20:22

## 2023-05-30 RX ADMIN — GABAPENTIN 300 MG: 300 CAPSULE ORAL at 20:23

## 2023-05-30 RX ADMIN — TRAZODONE HYDROCHLORIDE 25 MG: 50 TABLET ORAL at 20:23

## 2023-05-30 RX ADMIN — BROMFENAC 1 DROP: 1.03 SOLUTION/ DROPS OPHTHALMIC at 08:36

## 2023-05-30 RX ADMIN — RISPERIDONE 0.5 MG: 0.5 TABLET, ORALLY DISINTEGRATING ORAL at 08:37

## 2023-05-30 RX ADMIN — ASPIRIN 81 MG CHEWABLE TABLET 324 MG: 81 TABLET CHEWABLE at 08:37

## 2023-05-30 RX ADMIN — AMLODIPINE BESYLATE 5 MG: 5 TABLET ORAL at 08:44

## 2023-05-30 RX ADMIN — ACETAMINOPHEN 650 MG: 325 TABLET ORAL at 13:57

## 2023-05-30 RX ADMIN — RISPERIDONE 1 MG: 1 TABLET, ORALLY DISINTEGRATING ORAL at 21:42

## 2023-05-30 RX ADMIN — GABAPENTIN 300 MG: 300 CAPSULE ORAL at 08:37

## 2023-05-30 RX ADMIN — FINASTERIDE 5 MG: 5 TABLET, FILM COATED ORAL at 08:37

## 2023-05-30 RX ADMIN — METFORMIN HYDROCHLORIDE 500 MG: 500 TABLET, FILM COATED ORAL at 18:40

## 2023-05-30 RX ADMIN — CARVEDILOL 25 MG: 25 TABLET, FILM COATED ORAL at 08:44

## 2023-05-30 ASSESSMENT — ACTIVITIES OF DAILY LIVING (ADL)
ADLS_ACUITY_SCORE: 50
ADLS_ACUITY_SCORE: 50
ADLS_ACUITY_SCORE: 53
ADLS_ACUITY_SCORE: 50
ADLS_ACUITY_SCORE: 50
ADLS_ACUITY_SCORE: 51
ADLS_ACUITY_SCORE: 50
ADLS_ACUITY_SCORE: 51
ADLS_ACUITY_SCORE: 51
ADLS_ACUITY_SCORE: 53
ADLS_ACUITY_SCORE: 49
ADLS_ACUITY_SCORE: 49

## 2023-05-30 NOTE — PLAN OF CARE
[id number]    Pt here with CVA. A&Ox to self, confused. Pt Guinean speaking. Neuros are R droop, slurred speech, inconsistent with commands. VSS.  Pureed diet, mildly thick liquids. Takes pills chrush. Up with A2/teresita steady. Denies pain. Pt scoring yellow on the Aggression Stop Light Tool. Plan for TCU. Discharge pending.

## 2023-05-30 NOTE — PROGRESS NOTES
[facility] brief note:    Unwitnessed, suspected mechanical fall without obvious injury.    While present on unit, was contacted by nursing at ~ 1900 noting pt had unwitnessed fall.  Upon arrival, pt kneeling on the floor on bilateral knees, near side of bed toward the door, head looking toward the head of the bed with nonskid socks in place bilaterally.  Nursing notes pt's bed alarm sounded.  Nursing staff promptly entered pt's room to find pt hanging onto bedside railing, sitting in position noted above.  In setting of nursing staff promptly entering pt's room, low suspicion pt hit his head.  Pt's head with no obvious head trauma noted.  Pt moving head side to side without difficulty.  Pt moving all extremities without difficulty; no obvious bony abnormality noted.  Pt required assist of 4 to lift pt off the floor; appears to have overall physical deconditioning.  Pt's VS noting SBP 120s, HR 80s, RR 10s, O2 sats > 92% on RA, afebrile.      Intervention:  - Maintain fall precautions at all times  - Please contact house [facility] if any acute pain or injuries noted      ЮЛИЯ Joshi, CNP  [facility]    No charge.

## 2023-05-30 NOTE — PLAN OF CARE
Goal Outcome Evaluation:    Reason for Admission: R Donna stroke, dementia     Cognitive/Mentation: PJ - pt is Macanese speaking - does not do well with Jabber. Impulsive, restless and agitated this morning but more calm this afternoon  Neuros/CMS: Intact ex R hemiparesis, R droop  VS: Stable on RA  Tele: n/a.  GI: BS active, incontinent of small BM this morning  : Incontinent  Pulmonary: LS clear  Pain: No nonverbal indicators    Drains/Lines: PIV - SL  Skin: Intact  Activity: Assist x2/GB/teresita steady  Diet: Pureed, mildly thick. Pills crushed     Discharge: Pending. Family now wants to take the patient home, SW and Care Coordinator working on getting home equipment set up    Aggression Stoplight Tool: Yellow - sitter at the bedside    End of shift summary: Stable today, no changes. Agitated this morning but more calm this afternoon. Spouse at the bedside. Pt will discharge home once home equipment is all set up.

## 2023-05-30 NOTE — PROGRESS NOTES
[id number]. VSS. Confused, speaks Ukrainian, daughter was at bedside otherwise  in use. Combative times, getting out of bed, R side weakness, slur speech. Total care, incont of bladder. Pill crush with apple sauce. Pending d'c tomorrow.

## 2023-05-30 NOTE — PROGRESS NOTES
Care Management Follow Up    Length of Stay (days): 26    Expected Discharge Date: 05/31/2023     Concerns to be Addressed:       Patient plan of care discussed at interdisciplinary rounds: Yes    Anticipated Discharge Disposition:  Home/HHC     Anticipated Discharge Services:    Anticipated Discharge DME:      Patient/family educated on Medicare website which has current facility and service quality ratings:    Education Provided on the Discharge Plan:    Patient/Family in Agreement with the Plan:      Referrals Placed by CM/SW:    Private pay costs discussed: Not applicable    Additional Information:  [facility] ([phone]) to return call to discuss discharge planning/needs.    Addendum:  Spoke with Cee ([facility]) and she provided information for patient's Cadi worker [phone].  Cee also indicated patient has total 12 hrs daily PCA services.  She stated they work with [employer] or [employer] for DME.    Called both medical supply stores.  [employer] stated only lift that would be covered by insurance would be manual adonis lift.  [employer] stated sometimes sit to stand lifts may be covered and recommended  equipment be ordered by Cadi worker.    Called Cadi worker number and spoke with Baldev  She stated that she took over for previous worker and patient not one of her clients.  She called back later and indicated she found out who his worker is and that person will contact [facility] tomorrow.  She would not provide name or number of this person.    Have spoken to daughter Hattie and she is anxious to get her father home as soon as possible.  Besides equipment (lift) she asked about other supplies he may need such as adult diapers and liquid thickener.  Will need to discuss these requests with Cadi worker.    Addendum @ 1600:  Received call from Cee ([facility]).  She found out patient's Cadi worker is Radha Candace [phone] .    Kimmie Ernst RN, BSN, PHN  Inpatient  Care Coordination  [facility]  Phone: [phone]

## 2023-05-30 NOTE — PROGRESS NOTES
Care Management Follow Up    Length of Stay (days): 26    Expected Discharge Date: 05/31/2023     Concerns to be Addressed:       Patient plan of care discussed at interdisciplinary rounds: yes    Anticipated Discharge Disposition:       Anticipated Discharge Services:    Anticipated Discharge DME:      Patient/family educated on Medicare website which has current facility and service quality ratings:    Education Provided on the Discharge Plan:  SW received call from pt's daughter, Hattie, stating that they want to take pt home as soon as possible. She states that pt does have a hospital bed at the home in the living room. She wonders if a bed with higher railings is a possibility. She is also requesting a teresita steady. CHATA referred equipment to [facility] and expressed to Hattie that SW unsure what equipment is  available for pt in the home.  Patient/Family in Agreement with the Plan:    Family expresses desire to take pt home as soon as arrangements can be made    Referrals Placed by CM/CHATA:  MARY for equipment  Private pay costs discussed:     Additional Information:    KENNETH Minaya  [facility]  Care Transitions  [phone]

## 2023-05-30 NOTE — PROGRESS NOTES
[facility]    Medicine Progress Note - Hospitalist Service    Date of Admission:  5/4/2023    Assessment & Plan   Adam Huber is a 87 year old male  With PMH of HTN, CAD, CVA, DM type 2, TBI, dementia- admitted on 5/4/2023 with slurred speech concerning for a stroke.       Acute/subacute CVA of the right asia and left cerebellar hemisphere [posterior circulation ischemic strokes]  5/13, most likely thromboembolic  Dementia with behavioral disturbance  Transient alteration of consciousness  Anxiety and depression  History of TBI  - he lives with his wife, has underlying cognitive impairment; has PCA as per family; Spanish speaking  - initial concern for stroke  - Stroke Neuro initially followed  - HEAD CT: With extensive areas of encephalomalacic changes and gliosis on frontal and temporal lobe, chronic infarct in basal ganglia and corona radiata.  -CTA head and neck multifocal areas of moderate or high-grade stenosis throughout anterior and posterior circulation.  Heavy atherosclerotic plaque left carotid bulb/bifurcation with at least 50% stenosis by modified NASCET criteria. Moderate predominantly noncalcified plaque right carotid bulb/bifurcation without significant stenosis by modified NASCET criteria. No findings for vertebral artery dissection.   MRI brain 5/4:  Motion limited examination equivocal DWI hyperintensity at the right asia. This may represent artifact or acute ischemia.  Severe bifrontal encephalomalacia  - PTA Aspirin was increased from 81 mg to 325 mg po daily.  - lipid profile- LDL 90, total cholest 232, HDL 66  - started on Lipitor 40 mg po at bedtime  - Echo - EF 60-65%. No clear regional wall motion abnormalities noted.  No thrombus or cardiac etiology of stroke  - EEG- no seizures   -Continue to monitor in telemetry while in the hospital, neurology recommending cardiac event monitor after discharge  -Code stroke called 5/13, MRI brain showed multiple infarcts  in right asia, medial left cerebellar hemisphere.  No convincing acute infarct by CT; CTA head and neck with widespread intracranial arthrosclerotic plaque, 50-60% stenosis of the left ICA  -Appreciate neurology recommendations, concern for thromboembolic etiology given known extensive arthrosclerotic disease; started on DAPT with aspirin 325 Mg indefinitely daily, Plavix 75 Mg daily x90 days will need 30-day cardiac event monitor on discharge with follow-up in 6 to 8 weeks  -Evaluated by vascular surgery for CAD, no role for carotid intervention as current stroke are not in the geographical distribution from left ICA disease  -PTA was on scheduled Zyprexa, Seroquel and Remeron, as needed Seroquel available.  Due to ongoing agitation psychiatry was consulted  -  Appreciate psychiatry input, evaluated on 5/11/2023              - Discontinuing Seroquel and Zyprexa and started on risperidone ODT 0.5 mg in the morning and 1 mg at bedtime to                  help reduce dementia related agitation with risperidone ODT 0.5 mg twice daily as needed for agitation.               - Zyprexa IM 2.5 mg as a backup option or unable to take p.o.  -Scheduled melatonin 3 mg at 8:00 to help with circadian rhythm maintenance  -Delirium precautions, avoid interruptions during the night as much as possible, allow family visits, reorientation, use  services as much as possible, allow/maintain sleep-wake cycle.    - PT/OT- rec TCU;  - follow up with PCP in 1-2 weeks  - follow up with General Neuro in 6-8 weeks  --Continues to have intermittent agitation..   -- Patient did have worsening right facial weakness and droop with right arm weakness on 5/23/2023.  Neurology was reconsulted and repeat MRI was done.  No new stroke.  Felt that this was secondary to evolving pontine stroke.  5/28: A/W TCU .  Discussed with the daughter about availability of TCU @ [facility].  She is waiting to tour the facility and make a decision.  5/29: As per social workers discussion with the daughter they cannot to the facility until tomorrow morning.  Await final decision for [facility] versus home with additional support and equipment  5/30: Spoke with patient and family at length, they wish to take patient home, they are completely uninterested in hospice care, discussed risks and benefits including high level of supportive care required, patient and family request going home as opposed to rehab or transitional care unit at this time.  Verbalized understanding of the plan of care as well as risks including but not limited to development of pressure sores, fall risk, injury, death.  Not limited to these items.    Hypomagnesemia  -Being replaced per protocol     Goals of care  -Full code as per wife and family's request     HTN  - PTA on Coreg 25 mg po BID and losartan 50 mg po daily   - -Currently on amlodipine 5 mg daily, Coreg 25 mg twice daily losartan 75 mg daily  - BP mostly better controlled, intermittently elevated, likely related to agitation  -Monitor for persistently elevated blood pressure and adjust medication as needed     Oral thrush  -Nystatin swish and spit     BPH   -PTA finasteride and Flomax      DM type 2,   - PTA metformin 500 mg qam and 1000 mg qpm   - Hb A1c 9.6  -Currently on metformin 500 mg twice daily with high resistance SSI and Premeal insulin 1 unit per 15 g carb in addition to Lantus 10 units  - monitor his blood sugar and adjust medication as needed  -Changed HDSSI to MDSSI     Recent Labs   Lab 05/29/23  0737 05/28/23  2158 05/28/23  1629 05/28/23  1220 05/28/23  0847 05/28/23  0737   * 232* 149* 251* 118* 119*     -5/29: Increase Lantus to 12 U   Neuropathy  -Continue PTA gabapentin     DVT Prophylaxis: Pneumatic Compression Devices    Disposition:5/30: Spoke with patient and family at length, they wish to take patient home, they are completely uninterested in hospice care, discussed risks and benefits  "including high level of supportive care required, patient and family request going home as opposed to rehab or transitional care unit at this time.  Verbalized understanding of the plan of care as well as risks including but not limited to development of pressure sores, fall risk, injury, death.  Not limited to these items.    Update: Asa message from nursing staff later on May 30 stating that social work will need more time for discharge in order to get necessary equipment at home.       Diet: Room Service  Combination Diet Moderate Consistent Carb (60 g CHO per Meal) Diet; Pureed Diet (level 4); Mildly Thick (level 2)      Villa Catheter: Not present  Lines: None     Cardiac Monitoring: None  Code Status: Full Code      Clinically Significant Risk Factors            # Hypomagnesemia: Lowest Mg = 1.5 mg/dL in last 2 days, will replace as needed   # Hypoalbuminemia: Lowest albumin = 3.4 g/dL at 5/14/2023  2:21 PM, will monitor as appropriate     # Hypertension: Noted on problem list     # Dementia: noted on problem list   # DMII: A1C = 9.6 % (Ref range: <5.7 %) within past 6 months   # Overweight: Estimated body mass index is 28.53 kg/m  as calculated from the following:    Height as of this encounter: 1.778 m (5' 10\").    Weight as of this encounter: 90.2 kg (198 lb 13.7 oz).           Disposition Plan      Expected Discharge Date: 05/31/2023    Discharge Delays: Placement - TCU  *Medically Ready for Discharge              Bernardo Chatman DO  Hospitalist Service  [facility]  Securely message with Asa (more info)  Text page via [employer] Paging/Directory   ______________________________________________________________________    Interval History   Patient family member at bedside, long discussion, desires discharge home with family, discussed at length risks and benefits and other options, continue to desire to go home with patient.    Physical Exam   Vital Signs: Temp: 97.6  F " (36.4  C) Temp src: Oral BP: 123/70 Pulse: 75   Resp: 16 SpO2: 93 % O2 Device: None (Room air)    Weight: 198 lbs 13.68 oz    Gen- pleasant, lying in bed.  HEENT-right facial droop  Neck- supple  CVS- I+II+ no m/r/g  RS- CTAB  Abdo- soft, no tenderness . No g/r/r    Medical Decision Making       29 MINUTES SPENT BY ME on the date of service doing chart review, history, exam, documentation & further activities per the note.      Data     I have personally reviewed the following data over the past 24 hrs:    N/A  \   N/A   / N/A     N/A N/A N/A /  146 (H)   4.0 N/A N/A \       Imaging results reviewed over the past 24 hrs:   No results found for this or any previous visit (from the past 24 hour(s)).

## 2023-05-31 ENCOUNTER — APPOINTMENT (OUTPATIENT)
Dept: OCCUPATIONAL THERAPY | Facility: CLINIC | Age: 88
DRG: 065 | End: 2023-05-31
Attending: HOSPITALIST
Payer: COMMERCIAL

## 2023-05-31 ENCOUNTER — APPOINTMENT (OUTPATIENT)
Dept: SPEECH THERAPY | Facility: CLINIC | Age: 88
DRG: 065 | End: 2023-05-31
Attending: HOSPITALIST
Payer: COMMERCIAL

## 2023-05-31 LAB
GLUCOSE BLDC GLUCOMTR-MCNC: 134 MG/DL (ref 70–99)
GLUCOSE BLDC GLUCOMTR-MCNC: 168 MG/DL (ref 70–99)
GLUCOSE BLDC GLUCOMTR-MCNC: 192 MG/DL (ref 70–99)
GLUCOSE BLDC GLUCOMTR-MCNC: 234 MG/DL (ref 70–99)

## 2023-05-31 PROCEDURE — 97530 THERAPEUTIC ACTIVITIES: CPT | Mod: GO

## 2023-05-31 PROCEDURE — 250N000013 HC RX MED GY IP 250 OP 250 PS 637: Performed by: NURSE PRACTITIONER

## 2023-05-31 PROCEDURE — 120N000001 HC R&B MED SURG/OB

## 2023-05-31 PROCEDURE — 250N000013 HC RX MED GY IP 250 OP 250 PS 637: Performed by: INTERNAL MEDICINE

## 2023-05-31 PROCEDURE — 250N000013 HC RX MED GY IP 250 OP 250 PS 637: Performed by: STUDENT IN AN ORGANIZED HEALTH CARE EDUCATION/TRAINING PROGRAM

## 2023-05-31 PROCEDURE — 250N000013 HC RX MED GY IP 250 OP 250 PS 637: Performed by: HOSPITALIST

## 2023-05-31 PROCEDURE — 250N000013 HC RX MED GY IP 250 OP 250 PS 637

## 2023-05-31 PROCEDURE — 99232 SBSQ HOSP IP/OBS MODERATE 35: CPT | Performed by: HOSPITALIST

## 2023-05-31 PROCEDURE — 92526 ORAL FUNCTION THERAPY: CPT | Mod: GN | Performed by: SPEECH-LANGUAGE PATHOLOGIST

## 2023-05-31 RX ADMIN — INSULIN GLARGINE 12 UNITS: 100 INJECTION, SOLUTION SUBCUTANEOUS at 10:24

## 2023-05-31 RX ADMIN — BROMFENAC 1 DROP: 1.03 SOLUTION/ DROPS OPHTHALMIC at 10:26

## 2023-05-31 RX ADMIN — ATORVASTATIN CALCIUM 40 MG: 40 TABLET, FILM COATED ORAL at 20:33

## 2023-05-31 RX ADMIN — ACETAMINOPHEN 650 MG: 325 TABLET ORAL at 20:33

## 2023-05-31 RX ADMIN — METFORMIN HYDROCHLORIDE 500 MG: 500 TABLET, FILM COATED ORAL at 09:24

## 2023-05-31 RX ADMIN — RISPERIDONE 1 MG: 1 TABLET, ORALLY DISINTEGRATING ORAL at 21:43

## 2023-05-31 RX ADMIN — RISPERIDONE 0.5 MG: 0.5 TABLET, ORALLY DISINTEGRATING ORAL at 09:24

## 2023-05-31 RX ADMIN — CARVEDILOL 25 MG: 25 TABLET, FILM COATED ORAL at 18:21

## 2023-05-31 RX ADMIN — MIRTAZAPINE 15 MG: 15 TABLET, FILM COATED ORAL at 20:34

## 2023-05-31 RX ADMIN — FINASTERIDE 5 MG: 5 TABLET, FILM COATED ORAL at 09:25

## 2023-05-31 RX ADMIN — TRAZODONE HYDROCHLORIDE 25 MG: 50 TABLET ORAL at 20:33

## 2023-05-31 RX ADMIN — INSULIN ASPART 4 UNITS: 100 INJECTION, SOLUTION INTRAVENOUS; SUBCUTANEOUS at 10:25

## 2023-05-31 RX ADMIN — CLOPIDOGREL BISULFATE 75 MG: 75 TABLET ORAL at 09:25

## 2023-05-31 RX ADMIN — MELATONIN TAB 3 MG 3 MG: 3 TAB at 20:33

## 2023-05-31 RX ADMIN — METFORMIN HYDROCHLORIDE 500 MG: 500 TABLET, FILM COATED ORAL at 18:21

## 2023-05-31 RX ADMIN — CARVEDILOL 25 MG: 25 TABLET, FILM COATED ORAL at 09:24

## 2023-05-31 RX ADMIN — ASPIRIN 81 MG CHEWABLE TABLET 324 MG: 81 TABLET CHEWABLE at 09:24

## 2023-05-31 RX ADMIN — ACETAMINOPHEN 650 MG: 325 TABLET ORAL at 06:36

## 2023-05-31 RX ADMIN — GABAPENTIN 300 MG: 300 CAPSULE ORAL at 20:33

## 2023-05-31 RX ADMIN — INSULIN ASPART 4 UNITS: 100 INJECTION, SOLUTION INTRAVENOUS; SUBCUTANEOUS at 18:10

## 2023-05-31 RX ADMIN — ACETAMINOPHEN 650 MG: 325 TABLET ORAL at 13:09

## 2023-05-31 RX ADMIN — LOSARTAN POTASSIUM 75 MG: 50 TABLET, FILM COATED ORAL at 09:25

## 2023-05-31 RX ADMIN — DOXAZOSIN 1 MG: 1 TABLET ORAL at 09:26

## 2023-05-31 RX ADMIN — Medication 500 MG: at 09:25

## 2023-05-31 RX ADMIN — AMLODIPINE BESYLATE 5 MG: 5 TABLET ORAL at 09:24

## 2023-05-31 RX ADMIN — GABAPENTIN 300 MG: 300 CAPSULE ORAL at 09:24

## 2023-05-31 RX ADMIN — INSULIN ASPART 4 UNITS: 100 INJECTION, SOLUTION INTRAVENOUS; SUBCUTANEOUS at 13:10

## 2023-05-31 ASSESSMENT — ACTIVITIES OF DAILY LIVING (ADL)
ADLS_ACUITY_SCORE: 50
ADLS_ACUITY_SCORE: 52
ADLS_ACUITY_SCORE: 49
ADLS_ACUITY_SCORE: 50
ADLS_ACUITY_SCORE: 52
ADLS_ACUITY_SCORE: 49
ADLS_ACUITY_SCORE: 54
ADLS_ACUITY_SCORE: 49

## 2023-05-31 NOTE — PROGRESS NOTES
[facility]  Hospitalist Progress Note   05/31/2023     Emirati-speaking  services used over the telephone t          Assessment and Plan:       Adam Huber is a 87 year old male  With PMH of HTN, CAD, CVA, DM type 2, TBI, dementia- admitted on 5/4/2023 with slurred speech     Acute/subacute CVA of the right asia and left cerebellar hemisphere [posterior circulation ischemic strokes]  5/13, most likely thromboembolic  Dementia with behavioral disturbance  Transient alteration of consciousness  Anxiety and depression  History of TBI  - he lives with his wife, has underlying cognitive impairment; has PCA as per family; Spanish speaking  - initial concern for stroke  - Stroke Neuro initially followed  - HEAD CT: With extensive areas of encephalomalacic changes and gliosis on frontal and temporal lobe, chronic infarct in basal ganglia and corona radiata.  -CTA head and neck multifocal areas of moderate or high-grade stenosis throughout anterior and posterior circulation.  Heavy atherosclerotic plaque left carotid bulb/bifurcation with at least 50% stenosis by modified NASCET criteria. Moderate predominantly noncalcified plaque right carotid bulb/bifurcation without significant stenosis by modified NASCET criteria. No findings for vertebral artery dissection.   MRI brain 5/4:  Motion limited examination equivocal DWI hyperintensity at the right asia. This may represent artifact or acute ischemia.  Severe bifrontal encephalomalacia.   - Echo - EF 60-65%. No clear regional wall motion abnormalities noted.  No thrombus or cardiac etiology of stroke  - EEG- no seizures     -Code stroke called 5/13, MRI brain showed multiple infarcts in right asia, medial left cerebellar hemisphere.  No convincing acute infarct by CT; CTA head and neck with widespread intracranial arthrosclerotic plaque, 50-60% stenosis of the left ICA  -Appreciate neurology recommendations, concern for thromboembolic etiology  given known extensive arthrosclerotic disease; started on DAPT with aspirin 325 Mg indefinitely daily, Plavix 75 Mg daily x90 days  - lipid profile- LDL 90, total cholest 232, HDL 66. Continue Lipitor 40 mg po at bedtime  -Evaluated by vascular surgery for CAD, no role for carotid intervention as current stroke are not in the geographical distribution from left ICA disease    ---- Patient did have worsening right facial weakness and droop with right arm weakness on 5/23/2023.  Neurology was reconsulted and repeat MRI was done.  No new stroke.  Felt that this was secondary to evolving pontine stroke.    -PTA was on scheduled Zyprexa, Seroquel and Remeron, as needed Seroquel available.  Psychiatry followed during hospital stay.   -- Due to ongoing agitation psychiatry was consulted.   Discontinued Seroquel, Zyprexa.  Continue risperidone 0.5 mg sublingual every a.m.  1 mg sublingual at bedtime.  Continue  Remeron 15 mg at bedtime.  Zyprexa IM 2.5 mg as a backup option or unable to take p.o.  Scheduled melatonin 3 mg at 8:00 to help with circadian rhythm maintenance  -Delirium precautions, avoid interruptions during the night as much as possible, allow family visits, reorientation, use  services as much as possible, allow/maintain sleep-wake cycle.    -- Will need 30-day cardiac event monitor on discharge with follow-up in 6 to 8 weeks.  - follow up with PCP in 1-2 weeks  - follow up with General Neuro in 6-8 weeks    Physical deconditioning from medical illness, senile fragility.  Goals of care discussion  Patient requiring assistance of 2 during hospital stay for ambulation.  Having sitter by the bedside.  -- Hospitalist team, including hospitalist on 5/30 had been discussing with patient's family, and uninterested in hospice care.  Understand risk and benefits including high level of supportive care required -opting for discharge home.  Full code at this time     HTN  PTA on Coreg 25 mg po BID and losartan  50 mg po daily   Currently on amlodipine 5 mg daily, Coreg 25 mg twice daily losartan 75 mg daily  Monitor blood pressures, optimize regimen.     Uncontrolled diabetes mellitus with a hemoglobin A1c of 9.6  - PTA metformin 500 mg qam and 1000 mg qpm   -Increase Lantus to 12 units every morning.  Continue insulin aspart carb counting.  1 unit per 15 g of carb.  Continue sliding scale insulin.  Monitor blood sugars, optimize regimen.     Neuropathy  Continue PTA gabapentin     Hypomagnesemia  replaced per protocol    Oral thrush  -Nystatin swish and spit     BPH   -PTA finasteride and Flomax     Orders Placed This Encounter      Combination Diet Moderate Consistent Carb (60 g CHO per Meal) Diet; Pureed Diet (level 4); Mildly Thick (level 2)      Diet      DVT Prophylaxis: SCDs, ambulate  Code Status: Full Code  Disposition: Expected discharge pending safe discharge plan in place    Discussed with patient, bedside RN  >35 minutes spent by me on the date of service doing chart review, history, exam, documentation & further activities per the note.      Nini Orta MD        Interval History:      Patient sitting up in bed.  Calm, cooperative.  Unable to answer any questions.  Having sitter by the bedside.  No acute events overnight.  Report assist of 2 with gait belt.  Receiving diet puréed, pills with applesauce  Utilized Dutch-speaking  services during encounter, despite  on the phone patient unable to answer any questions.         Physical Exam:        Physical Exam   Temp:  [97.3  F (36.3  C)-98.2  F (36.8  C)] 98.2  F (36.8  C)  Pulse:  [52-66] 66  Resp:  [16-17] 16  BP: (122-157)/(61-75) 122/61  SpO2:  [95 %-97 %] 95 %    Intake/Output Summary (Last 24 hours) at 5/31/2023 1600  Last data filed at 5/31/2023 1000  Gross per 24 hour   Intake 180 ml   Output --   Net 180 ml       Admission Weight: 96.7 kg (213 lb 3 oz)  Current Weight: 90.2 kg (198 lb 13.7 oz)    PHYSICAL EXAM  GENERAL:  Patient is in no distress.  Not oriented.  Calm and cooperative  HEENT: Oropharynx pink  HEART: Regular rate and rhythm. S1S2. No murmurs  LUNGS: Clear to auscultation bilaterally.  Respirations unlabored  NEURO moving all extremities.  Unable to evaluate further  EXTREMITIES: No pedal edema.   SKIN: Warm, dry. No rash   PSYCHIATRY Cooperative       Medications:          acetaminophen  650 mg Oral Q8H     amLODIPine  5 mg Oral Daily     aspirin  325 mg Oral Daily    Or     aspirin  324 mg Oral or NG Tube Daily    Or     aspirin  300 mg Rectal Daily     atorvastatin  40 mg Oral QPM     bromfenac  1 drop Both Eyes Daily     carvedilol  25 mg Oral BID w/meals     clopidogrel  75 mg Oral Daily     doxazosin  1 mg Oral Daily     finasteride  5 mg Oral Daily     gabapentin  300 mg Oral BID     insulin aspart  1-7 Units Subcutaneous TID AC     insulin aspart  1-5 Units Subcutaneous At Bedtime     insulin aspart   Subcutaneous TID AC     insulin glargine  12 Units Subcutaneous QAM AC     losartan  75 mg Oral Daily     magnesium gluconate  500 mg Oral Daily     melatonin  3 mg Oral QPM     metFORMIN  500 mg Oral BID w/meals     mirtazapine  15 mg Oral At Bedtime     risperiDONE  0.5 mg Sublingual QAM AC     risperiDONE  1 mg Sublingual At Bedtime     acetaminophen **OR** acetaminophen, bisacodyl, glucose **OR** dextrose **OR** glucagon, labetalol **OR** hydrALAZINE, lidocaine 4%, lidocaine (buffered or not buffered), LORazepam, - MEDICATION INSTRUCTIONS -, - MEDICATION INSTRUCTIONS -, OLANZapine, ondansetron **OR** ondansetron, polyethylene glycol, polyethylene glycol-propylene glycol PF, risperiDONE, senna-docusate **OR** senna-docusate, sodium chloride (PF), traZODone         Data:      All new lab and imaging data was reviewed.

## 2023-05-31 NOTE — PROGRESS NOTES
"Care Management Follow Up    Length of Stay (days): 27    Expected Discharge Date: 05/31/2023     Concerns to be Addressed:       Patient plan of care discussed at interdisciplinary rounds: Yes    Anticipated Discharge Disposition:       Anticipated Discharge Services:    Anticipated Discharge DME:  Patient lift device (\"Marysol Stedy\")    Patient/family educated on Medicare website which has current facility and service quality ratings:    Education Provided on the Discharge Plan:    Patient/Family in Agreement with the Plan:      Referrals Placed by CM/SW:    Private pay costs discussed: Not applicable    Additional Information:  Left VM for Radha Maria ([phone] ) who is patient's cadi waiver  (per  EFRAÍN Mike).  Also followed up with [facility] ([phone]) and [employer] [place] ([phone]) who are approved DME providers for patient's insurance.  Both indicate lift device needs to go through Cadi Waiver for insurance coverage.      Updated patient's daughter Hattie.  Also suggested that a \"generic\" lift could be considered for purchase if she wanted to look into that, but did not recommend.  She would like patient to discharge and let her know delay is due to lift equipment needed.  It may be considered to discharge home before receiving lift depending on how long it would take to get device through the cadi waiver .    Addendum @ 1334:  Contacted [facility] to inquire about sit to stand lift.  They state in stock and are able to bill insurance.  They indicated a manual lift could be covered and if a power lift requested there would be $180/month charge.  Also had checked with [facility] for private rental of lifts and they would be $375 for power or $115 for manual plus charge for the sling.  Left message with Cadi  to see if she would recommend this option or if coverage through waiver could be denied.  Updated Hattie with this information and she needs to speak with " "\"everyone\" and will let CCRN know what they prefer to do.    Kimmie Ernst RN, BSN, PHN  Inpatient Care Coordination  [facility]  Phone: [phone]        " covid, worsening respiratory status

## 2023-05-31 NOTE — PLAN OF CARE
Goal Outcome Evaluation:     Cognitive/Mentation: PJ - pt is Moldovan speaking - does not do well with Jabber. Impulsive, restless and agitated. Does not follow commands.     Neuros/CMS: Intact ex R hemiparesis, R droop  VS: Stable on RA  Tele: n/a.    GI: BS active, incontinent BM x1 for the night  : Incontinent  Pulmonary: LS clear  Pain: No nonverbal indicators     Drains/Lines: PIV - SL  Skin: Intact  Activity: Assist x2/GB/teresita steady  Diet: Pureed, mildly thick. Pills crushed with applesauce     Discharge: Pending.      End of shift summary: Stable, no changes. Continues to be 1:1 with staff or safety

## 2023-05-31 NOTE — PLAN OF CARE
Pt here with R asia stroke, Hx dementia. PJ orientation, Colombian speaking and Jabber does not work well with patient. Alert and sleeping between care, calm and cooperative, no impulsive/restless/agitated episodes today except impulsive when need to have BM. Pt scoring yellow on Aggression Stop Light Tool. VSS, on RA. LS clear. Nonverbal signs indicate no pain. Difficult to complete assessments d/t not following commands/language barrier, Neuro's unchanged, R side hemiparesis, R facial droop. T&R q2hrs, up A2 w/ GB and teresita steady. Tolerating Pureed mildly thick liquid diet, good appetite, assist to total feed, takes pills crushed with applesauce. Incont of B&B, voiding adequately, +BS, BM today. Plans to discharge to home once home equipment in place, possibly tomorrow. See CC noted.

## 2023-06-01 ENCOUNTER — APPOINTMENT (OUTPATIENT)
Dept: PHYSICAL THERAPY | Facility: CLINIC | Age: 88
DRG: 065 | End: 2023-06-01
Attending: HOSPITALIST
Payer: COMMERCIAL

## 2023-06-01 ENCOUNTER — APPOINTMENT (OUTPATIENT)
Dept: OCCUPATIONAL THERAPY | Facility: CLINIC | Age: 88
DRG: 065 | End: 2023-06-01
Attending: HOSPITALIST
Payer: COMMERCIAL

## 2023-06-01 LAB
GLUCOSE BLDC GLUCOMTR-MCNC: 111 MG/DL (ref 70–99)
GLUCOSE BLDC GLUCOMTR-MCNC: 114 MG/DL (ref 70–99)
GLUCOSE BLDC GLUCOMTR-MCNC: 168 MG/DL (ref 70–99)
GLUCOSE BLDC GLUCOMTR-MCNC: 197 MG/DL (ref 70–99)

## 2023-06-01 PROCEDURE — 99232 SBSQ HOSP IP/OBS MODERATE 35: CPT | Performed by: HOSPITALIST

## 2023-06-01 PROCEDURE — 250N000013 HC RX MED GY IP 250 OP 250 PS 637: Performed by: INTERNAL MEDICINE

## 2023-06-01 PROCEDURE — 99207 PR NO BILLABLE SERVICE THIS VISIT: CPT | Performed by: HOSPITALIST

## 2023-06-01 PROCEDURE — 120N000001 HC R&B MED SURG/OB

## 2023-06-01 PROCEDURE — 97530 THERAPEUTIC ACTIVITIES: CPT | Mod: GP

## 2023-06-01 PROCEDURE — 250N000013 HC RX MED GY IP 250 OP 250 PS 637: Performed by: NURSE PRACTITIONER

## 2023-06-01 PROCEDURE — 250N000013 HC RX MED GY IP 250 OP 250 PS 637: Performed by: HOSPITALIST

## 2023-06-01 PROCEDURE — 250N000013 HC RX MED GY IP 250 OP 250 PS 637: Performed by: STUDENT IN AN ORGANIZED HEALTH CARE EDUCATION/TRAINING PROGRAM

## 2023-06-01 PROCEDURE — 97535 SELF CARE MNGMENT TRAINING: CPT | Mod: GO

## 2023-06-01 PROCEDURE — 97530 THERAPEUTIC ACTIVITIES: CPT | Mod: GO

## 2023-06-01 PROCEDURE — 250N000013 HC RX MED GY IP 250 OP 250 PS 637

## 2023-06-01 RX ADMIN — MIRTAZAPINE 15 MG: 15 TABLET, FILM COATED ORAL at 20:31

## 2023-06-01 RX ADMIN — INSULIN ASPART 4 UNITS: 100 INJECTION, SOLUTION INTRAVENOUS; SUBCUTANEOUS at 09:30

## 2023-06-01 RX ADMIN — SENNOSIDES AND DOCUSATE SODIUM 1 TABLET: 50; 8.6 TABLET ORAL at 09:18

## 2023-06-01 RX ADMIN — ACETAMINOPHEN 650 MG: 325 TABLET ORAL at 12:47

## 2023-06-01 RX ADMIN — ACETAMINOPHEN 650 MG: 325 TABLET ORAL at 20:31

## 2023-06-01 RX ADMIN — GABAPENTIN 300 MG: 300 CAPSULE ORAL at 20:31

## 2023-06-01 RX ADMIN — CARVEDILOL 25 MG: 25 TABLET, FILM COATED ORAL at 17:33

## 2023-06-01 RX ADMIN — CLOPIDOGREL BISULFATE 75 MG: 75 TABLET ORAL at 09:18

## 2023-06-01 RX ADMIN — LOSARTAN POTASSIUM 75 MG: 50 TABLET, FILM COATED ORAL at 09:18

## 2023-06-01 RX ADMIN — GABAPENTIN 300 MG: 300 CAPSULE ORAL at 09:18

## 2023-06-01 RX ADMIN — AMLODIPINE BESYLATE 5 MG: 5 TABLET ORAL at 09:18

## 2023-06-01 RX ADMIN — ATORVASTATIN CALCIUM 40 MG: 40 TABLET, FILM COATED ORAL at 20:31

## 2023-06-01 RX ADMIN — CARVEDILOL 25 MG: 25 TABLET, FILM COATED ORAL at 09:18

## 2023-06-01 RX ADMIN — METFORMIN HYDROCHLORIDE 500 MG: 500 TABLET, FILM COATED ORAL at 17:33

## 2023-06-01 RX ADMIN — INSULIN ASPART 4 UNITS: 100 INJECTION, SOLUTION INTRAVENOUS; SUBCUTANEOUS at 12:48

## 2023-06-01 RX ADMIN — RISPERIDONE 1 MG: 1 TABLET, ORALLY DISINTEGRATING ORAL at 21:20

## 2023-06-01 RX ADMIN — METFORMIN HYDROCHLORIDE 500 MG: 500 TABLET, FILM COATED ORAL at 09:17

## 2023-06-01 RX ADMIN — INSULIN GLARGINE 12 UNITS: 100 INJECTION, SOLUTION SUBCUTANEOUS at 09:31

## 2023-06-01 RX ADMIN — DOXAZOSIN 1 MG: 1 TABLET ORAL at 09:19

## 2023-06-01 RX ADMIN — ASPIRIN 81 MG CHEWABLE TABLET 324 MG: 81 TABLET CHEWABLE at 09:17

## 2023-06-01 RX ADMIN — Medication 500 MG: at 09:18

## 2023-06-01 RX ADMIN — BROMFENAC 1 DROP: 1.03 SOLUTION/ DROPS OPHTHALMIC at 09:17

## 2023-06-01 RX ADMIN — MELATONIN TAB 3 MG 3 MG: 3 TAB at 20:31

## 2023-06-01 RX ADMIN — FINASTERIDE 5 MG: 5 TABLET, FILM COATED ORAL at 09:18

## 2023-06-01 RX ADMIN — RISPERIDONE 0.5 MG: 0.5 TABLET, ORALLY DISINTEGRATING ORAL at 09:19

## 2023-06-01 RX ADMIN — TRAZODONE HYDROCHLORIDE 25 MG: 50 TABLET ORAL at 20:31

## 2023-06-01 ASSESSMENT — ACTIVITIES OF DAILY LIVING (ADL)
ADLS_ACUITY_SCORE: 52
ADLS_ACUITY_SCORE: 54
ADLS_ACUITY_SCORE: 54
ADLS_ACUITY_SCORE: 52
ADLS_ACUITY_SCORE: 48
ADLS_ACUITY_SCORE: 54
ADLS_ACUITY_SCORE: 52
ADLS_ACUITY_SCORE: 52
ADLS_ACUITY_SCORE: 54
ADLS_ACUITY_SCORE: 54

## 2023-06-01 NOTE — PROGRESS NOTES
Face to face for DME     Sit to stand lift. Half body Sling. Manual.     Patient is non-weight bearing: No.     I, the undersigned, certify that the above prescribed supplies are medically necessary for this patient and is both reasonable and necessary in reference to accepted standards of medical and necessary in reference to accepted standards of medical practice in the treatment of this patient's condition and is not prescribed as a convenience.    Diagnosis - Acute/subacute CVA of the right asia and left cerebellar hemisphere [posterior circulation ischemic strokes]    Physical deconditioning due to CVA, medical illness, senile fragility.  Dementia     Patient requires a sit to stand lift due to stroke, physical deconditioning, dementia to assist with transfer between bed and chair, wheelchair or commode and without the use of a lift patient would be bed confined.    Patient will need it for 1 month and then reevaluate need for the sit to stand lift depending on progress with rehabilitation.    Nini Orta MD

## 2023-06-01 NOTE — PLAN OF CARE
"  Problem: Stroke, Ischemic (Includes Transient Ischemic Attack)  Goal: Optimal Coping  Outcome: Progressing   Goal Outcome Evaluation:         Problem: Stroke, Ischemic (Includes Transient Ischemic Attack)  Goal: Optimal Coping  Outcome: Progressing   Goal Outcome Evaluation:     Orientations: alert to self and place; Nicaraguan speaking patient limited. No acute events this shift and no new neuro deficits. Patient up to reclining chair twice this shift. Patient family at bedside. Patient incontinent of both bowel and bladder this shift. Patient eating and drinking well. No behaviors this shift; patient cooperative with assessment and ADLs Patient to discharge home with family tomorrow   Vitals/Pain: /47   Pulse 57   Temp 98.1  F (36.7  C) (Oral)   Resp 16   Ht 1.778 m (5' 10\")   Wt 93.1 kg (205 lb 4 oz)   SpO2 100%   BMI 29.45 kg/m    Tele: tele; waqas   Resp: Clear and equal bilaterally   Lines/Drains: none  Skin/Wounds: bruising; no open areas   GI/: incontinent of B&B this shift.   Labs: Abnormal/Trends, Electrolyte Replacement- Mag and K+ at goal   Ambulation/Assist: Gait belt and Marysol Steady to chair   Plan: Discharge to home tomorrow         Taylor Joel RN                  "

## 2023-06-01 NOTE — PROGRESS NOTES
Care Management Follow Up    Length of Stay (days): 28    Expected Discharge Date: 06/01/2023     Concerns to be Addressed:       Patient plan of care discussed at interdisciplinary rounds: Yes    Anticipated Discharge Disposition: Home, Home Care     Anticipated Discharge Services: PCA, County Worker  Anticipated Discharge DME: None (sit to stand lift)    Patient/family educated on Medicare website which has current facility and service quality ratings:    Education Provided on the Discharge Plan:    Patient/Family in Agreement with the Plan: yes    Referrals Placed by CM/SW: Homecare, Durable Medical Equipment (DME)  Private pay costs discussed: monthly fee for power sit to stand lift    Additional Information:  Contacted patient's daughter Hattie.  She is aware of the extra monthly fee of $180 for power sit to stand lift and was wondering if it could be traded in for manual lift if they decide no longer want to pay the fee.  This would be their preference.    Contacted [facility] ([phone]).  They stated they have a lift available.  If he needs authorization from insurance, family would need to sign a waiver for payment if insurance denies coverage.  They also stated family could trade out for a manual lift if they wanted but they do not refund any fees collected.    Once orders and chart note obtained to be faxed to [facility] [phone].    Addendum @ 1129:  Order, facesheet and chart note faxed to [facility].  Daughter Hattie has also decided to go with the manual lift.    Addendum @ 9374:  Confirmed [facility] has received order, but not processed yet.  Requested that need device to be delivered tomorrow.    Addendum @ 9945:  Contacted [facility] again for update and the order/documents still in review.  Requested this be expidiated as patient ready to discharge to home pending this equipment.    Addendum @ 1620:  [facility] is ready to dispense lift but unable  to deliver until Monday.  Updated daughter.  She is hopeful he could still get home because she has 3 strong brothers who could possibly help this weekend.      Updated by RN that Hospitalist will not be discharging patient tonight.  Stretcher transport cancelled.    PCS has been completed and is on patient chart.  Has not been faxed yet.    Left VM for daughter Hattie that discharge will not be today.    Kimmie Ernst RN, BSN, PHN  Inpatient Care Coordination  [facility]  Phone: [phone]

## 2023-06-01 NOTE — PLAN OF CARE
Goal Outcome Evaluation:    [id number] Pt here with R asia stroke, Hx dementia. PJ orientation, Burundian speaking and Jabber does not work well with patient. Alert and sleeping between care, calm and cooperative, no impulsive/restless/agitated episodes today. Pt scoring yellow on Aggression Stop Light Tool, sitter at bedside. VSS, on RA. LS clear. Nonverbal signs indicate no pain. Difficult to complete assessments d/t not following commands/language barrier, Neuro's unchanged, R side hemiparesis, R facial droop. T&R q2hrs, up A2 w/ GB and teresita steady. Tolerating Pureed mildly thick liquid diet, good appetite, assist to total feed, takes pills crushed with applesauce. Incont of B&B, voiding adequately, +BS, no BM today. Discharge with 30 day cardiac event monitor. Plans to discharge to home once home equipment in place, possibly tomorrow. See CC noted.

## 2023-06-01 NOTE — PROGRESS NOTES
[facility]  Hospitalist Progress Note   06/01/2023     Patient unable to participate over Jabber/phone  services.          Assessment and Plan:       Adam Huber is a 87 year old male  With PMH of HTN, CAD, CVA, DM type 2, TBI, dementia- admitted on 5/4/2023 with slurred speech     Acute/subacute CVA of the right asia and left cerebellar hemisphere [posterior circulation ischemic strokes]    Transient alteration of consciousness  Diffuse mod-severe mulfitfocal ICAD. Notably in bilat M1, bilat RANDELL, bilat PCA, and BA  History of chronic lacunar infarct.  --Patient lives at home with his wife, underlying cognitive impairment has PCA services.  South Korean-speaking.  -- Initially admitted with slurred speech, headaches, difficulty walking.  - HEAD CT: With extensive areas of encephalomalacic changes and gliosis on frontal and temporal lobe, chronic infarct in basal ganglia and corona radiata.  -CTA head and neck multifocal areas of moderate or high-grade stenosis throughout anterior and posterior circulation.  Heavy atherosclerotic plaque left carotid bulb/bifurcation with at least 50% stenosis by modified NASCET criteria. Moderate predominantly noncalcified plaque right carotid bulb/bifurcation without significant stenosis by modified NASCET criteria. No findings for vertebral artery dissection.   MRI brain 5/4:  Motion limited examination equivocal DWI hyperintensity at the right asia. This may represent artifact or acute ischemia.  Severe bifrontal encephalomalacia.   - Echo - EF 60-65%. No clear regional wall motion abnormalities noted.  No thrombus or cardiac etiology of stroke  --Stroke service recommended aspirin therapy and signed off.      --- On 5/13 had new facial droop, code stroke called. NCCT without acute pathology, CTA H/N unchanged extensive intracranial/extracranial atherosclerosis, CTP without perfusion deficit. Not an acute therapies candidate. MRI shows R pontine and L  cerebellar ischemic strokes. R pontine lesions correspond to region of asia that was previously called as equivocal stroke vs artifact.  -- Stroke neurology impression multiple small posterior circulation ischemic infarcts likely from atherosclerotic disease.  Recommend aspirin 325 mg daily indefinitely.  Plavix 75 mg daily for 90 days.  --Evaluated by vascular surgery for CAD, no role for carotid intervention as current stroke are not in the geographical distribution from left ICA disease.  ----On 5/23, patient did have worsening right facial weakness and droop with right arm weakness on 5/23/2023.  Neurology was reconsulted and repeat MRI showed no new stroke.  Felt that this was secondary to evolving pontine stroke.    Continue aspirin 325 mg daily indefinitely.  Continue Plavix 75 mg oral daily for 90 days  Continue Lipitor 40 mg at bedtime.  LDL 90, HDL 66.  -- Telemetry monitoring during hospital stay.  Will need 30-day cardiac event monitor on discharge with follow-up in 6 to 8 weeks.  - Follow up with PCP in 1 weeks  - Follow up with General Neuro in 6-8 weeks    Acute on chronic encephalopathy likely multifactorial from stroke, dementia, delirium from hospitalization  Dementia with behavioral disturbance  History of TBI with bifrontal/R temporal encephalomalacia, Alzheimer's dementia.   Anxiety and depression  -Imaging as above. EEG- no seizures    -PTA was on scheduled Zyprexa, Seroquel and Remeron, as needed Seroquel available.  Psychiatry followed during hospital stay for agitation  Discontinued Seroquel, Zyprexa.  Continue risperidone 0.5 mg sublingual every a.m.  1 mg sublingual at bedtime.  Continue  Remeron 15 mg at bedtime.  Zyprexa IM 2.5 mg as a backup option or unable to take p.o.  Scheduled melatonin 3 mg at 8:00 to help with circadian rhythm maintenance.   -Delirium precautions, avoid interruptions during the night as much as possible, allow family visits, reorientation, use  services  as much as possible, allow/maintain sleep-wake cycle.  Fall precautions.     Physical deconditioning from medical illness, senile fragility.  Unwitnessed fall 5/29/2023  Goals of care discussion  Patient requiring assistance for minimal ambulation.  Unwitnessed fall on 5/29.  See RRT notes for details.  Having sitter by the bedside.  -- Hospitalist team, has been discussing with patient's family, and uninterested in hospice care.  Understand risk and benefits including high level of supportive care required -opting for discharge home. Full code at this time     History of coronary artery disease status post PCI with LEXI  HTN, hyperlipidemia  Continue on aspirin and Plavix, statin therapy as above  Continue amlodipine 5 mg daily, Coreg 25 mg twice daily losartan 75 mg daily  Monitor blood pressures, optimize regimen.     Uncontrolled diabetes mellitus with a hemoglobin A1c of 9.6  Continue PTA metformin 500 BID   Increase Lantus to 12 units every morning, and Lantus 10 units at bedtime.  Continue insulin carb counting as patient planning to discharge home with family support and would be hard to administer insulin 4 times a day.  Continue sliding scale insulin.  Monitor blood sugars, optimize regimen.     Neuropathy  Continue PTA gabapentin     Hypomagnesemia  Replace per protocol    Oral thrush  Continue Nystatin swish and spit     BPH   Continue PTA finasteride and Flomax     Orders Placed This Encounter      Combination Diet Moderate Consistent Carb (60 g CHO per Meal) Diet; Pureed Diet (level 4); Mildly Thick (level 2)      Diet      DVT Prophylaxis: SCDs, ambulate  Code Status: Full Code  Disposition: Expected discharge pending safe discharge plan in place    Discussed with patient, bedside RN, care coordinator.  Discussed with patient's daughter Hattie over the telephone 6/1  >35 minutes spent by me on the date of service doing chart review, history, exam, documentation & further activities per the note.       Nini Orta MD        Interval History:      Patient sitting up in bed.  Calm, cooperative.  Unable to answer any questions.  Having sitter by the bedside.  No acute events overnight.  Report assist of 2 with gait belt.  Receiving diet puréed, pills with applesauce  Utilized Somali-speaking  services during encounter, despite  on the phone patient unable to answer any questions.         Physical Exam:        Physical Exam   Temp:  [98.2  F (36.8  C)-98.7  F (37.1  C)] 98.7  F (37.1  C)  Pulse:  [65-66] 65  Resp:  [16] 16  BP: (122-128)/(57-61) 128/57  SpO2:  [95 %] 95 %    Intake/Output Summary (Last 24 hours) at 5/31/2023 1600  Last data filed at 5/31/2023 1000  Gross per 24 hour   Intake 180 ml   Output --   Net 180 ml       Admission Weight: 96.7 kg (213 lb 3 oz)  Current Weight: 90.2 kg (198 lb 13.7 oz)    PHYSICAL EXAM  GENERAL: Patient is in no distress.  Not oriented.  Calm and cooperative  HEENT: Oropharynx pink  HEART: Regular rate and rhythm. S1S2. No murmurs  LUNGS: Clear to auscultation bilaterally.  Respirations unlabored  NEURO moving all extremities.  Unable to evaluate further  EXTREMITIES: No pedal edema.   SKIN: Warm, dry. No rash   PSYCHIATRY Cooperative       Medications:          acetaminophen  650 mg Oral Q8H     amLODIPine  5 mg Oral Daily     aspirin  325 mg Oral Daily    Or     aspirin  324 mg Oral or NG Tube Daily    Or     aspirin  300 mg Rectal Daily     atorvastatin  40 mg Oral QPM     bromfenac  1 drop Both Eyes Daily     carvedilol  25 mg Oral BID w/meals     clopidogrel  75 mg Oral Daily     doxazosin  1 mg Oral Daily     finasteride  5 mg Oral Daily     gabapentin  300 mg Oral BID     insulin aspart  1-7 Units Subcutaneous TID AC     insulin aspart  1-5 Units Subcutaneous At Bedtime     insulin aspart   Subcutaneous TID AC     insulin glargine  12 Units Subcutaneous QAM AC     losartan  75 mg Oral Daily     magnesium gluconate  500 mg Oral Daily      melatonin  3 mg Oral QPM     metFORMIN  500 mg Oral BID w/meals     mirtazapine  15 mg Oral At Bedtime     risperiDONE  0.5 mg Sublingual QAM AC     risperiDONE  1 mg Sublingual At Bedtime     acetaminophen **OR** acetaminophen, bisacodyl, glucose **OR** dextrose **OR** glucagon, labetalol **OR** hydrALAZINE, lidocaine 4%, lidocaine (buffered or not buffered), LORazepam, - MEDICATION INSTRUCTIONS -, - MEDICATION INSTRUCTIONS -, OLANZapine, ondansetron **OR** ondansetron, polyethylene glycol, polyethylene glycol-propylene glycol PF, risperiDONE, senna-docusate **OR** senna-docusate, sodium chloride (PF), traZODone         Data:      All new lab and imaging data was reviewed.

## 2023-06-02 LAB
GLUCOSE BLDC GLUCOMTR-MCNC: 134 MG/DL (ref 70–99)
GLUCOSE BLDC GLUCOMTR-MCNC: 149 MG/DL (ref 70–99)
GLUCOSE BLDC GLUCOMTR-MCNC: 179 MG/DL (ref 70–99)
GLUCOSE BLDC GLUCOMTR-MCNC: 216 MG/DL (ref 70–99)
GLUCOSE BLDC GLUCOMTR-MCNC: 218 MG/DL (ref 70–99)
GLUCOSE BLDC GLUCOMTR-MCNC: 85 MG/DL (ref 70–99)

## 2023-06-02 PROCEDURE — 250N000013 HC RX MED GY IP 250 OP 250 PS 637: Performed by: HOSPITALIST

## 2023-06-02 PROCEDURE — 250N000013 HC RX MED GY IP 250 OP 250 PS 637: Performed by: INTERNAL MEDICINE

## 2023-06-02 PROCEDURE — 250N000013 HC RX MED GY IP 250 OP 250 PS 637: Performed by: NURSE PRACTITIONER

## 2023-06-02 PROCEDURE — 250N000013 HC RX MED GY IP 250 OP 250 PS 637: Performed by: STUDENT IN AN ORGANIZED HEALTH CARE EDUCATION/TRAINING PROGRAM

## 2023-06-02 PROCEDURE — 120N000001 HC R&B MED SURG/OB

## 2023-06-02 PROCEDURE — 250N000013 HC RX MED GY IP 250 OP 250 PS 637

## 2023-06-02 PROCEDURE — 99232 SBSQ HOSP IP/OBS MODERATE 35: CPT | Performed by: HOSPITALIST

## 2023-06-02 RX ADMIN — DOXAZOSIN 1 MG: 1 TABLET ORAL at 08:49

## 2023-06-02 RX ADMIN — RISPERIDONE 1 MG: 1 TABLET, ORALLY DISINTEGRATING ORAL at 21:18

## 2023-06-02 RX ADMIN — GABAPENTIN 300 MG: 300 CAPSULE ORAL at 08:49

## 2023-06-02 RX ADMIN — ASPIRIN 81 MG CHEWABLE TABLET 325 MG: 81 TABLET CHEWABLE at 08:49

## 2023-06-02 RX ADMIN — ACETAMINOPHEN 650 MG: 325 TABLET ORAL at 20:10

## 2023-06-02 RX ADMIN — MELATONIN TAB 3 MG 3 MG: 3 TAB at 20:10

## 2023-06-02 RX ADMIN — METFORMIN HYDROCHLORIDE 500 MG: 500 TABLET, FILM COATED ORAL at 17:55

## 2023-06-02 RX ADMIN — CARVEDILOL 25 MG: 25 TABLET, FILM COATED ORAL at 17:56

## 2023-06-02 RX ADMIN — LOSARTAN POTASSIUM 75 MG: 50 TABLET, FILM COATED ORAL at 08:49

## 2023-06-02 RX ADMIN — AMLODIPINE BESYLATE 5 MG: 5 TABLET ORAL at 08:49

## 2023-06-02 RX ADMIN — RISPERIDONE 0.5 MG: 0.5 TABLET, ORALLY DISINTEGRATING ORAL at 22:55

## 2023-06-02 RX ADMIN — GABAPENTIN 300 MG: 300 CAPSULE ORAL at 20:10

## 2023-06-02 RX ADMIN — ATORVASTATIN CALCIUM 40 MG: 40 TABLET, FILM COATED ORAL at 20:10

## 2023-06-02 RX ADMIN — FINASTERIDE 5 MG: 5 TABLET, FILM COATED ORAL at 09:02

## 2023-06-02 RX ADMIN — Medication 500 MG: at 08:49

## 2023-06-02 RX ADMIN — RISPERIDONE 0.5 MG: 0.5 TABLET, ORALLY DISINTEGRATING ORAL at 09:02

## 2023-06-02 RX ADMIN — CARVEDILOL 25 MG: 25 TABLET, FILM COATED ORAL at 08:49

## 2023-06-02 RX ADMIN — TRAZODONE HYDROCHLORIDE 25 MG: 50 TABLET ORAL at 23:05

## 2023-06-02 RX ADMIN — METFORMIN HYDROCHLORIDE 500 MG: 500 TABLET, FILM COATED ORAL at 08:50

## 2023-06-02 RX ADMIN — ACETAMINOPHEN 650 MG: 325 TABLET ORAL at 08:48

## 2023-06-02 RX ADMIN — MIRTAZAPINE 15 MG: 15 TABLET, FILM COATED ORAL at 20:10

## 2023-06-02 RX ADMIN — CLOPIDOGREL BISULFATE 75 MG: 75 TABLET ORAL at 08:49

## 2023-06-02 RX ADMIN — ACETAMINOPHEN 650 MG: 325 TABLET ORAL at 17:55

## 2023-06-02 ASSESSMENT — ACTIVITIES OF DAILY LIVING (ADL)
ADLS_ACUITY_SCORE: 52

## 2023-06-02 NOTE — PROGRESS NOTES
[facility]  Hospitalist Progress Note   06/02/2023     patient's son by bedside able to assist with .           Assessment and Plan:       Adam Huber is a 87 year old male  With PMH of HTN, CAD, CVA, DM type 2, TBI, dementia- admitted on 5/4/2023 with slurred speech     Acute/subacute CVA of the right asia and left cerebellar hemisphere [posterior circulation ischemic strokes]    Transient alteration of consciousness  Diffuse mod-severe mulfitfocal ICAD. Notably in bilat M1, bilat RANDELL, bilat PCA, and BA  History of chronic lacunar infarct.  --Patient lives at home with his wife, underlying cognitive impairment has PCA services.  Australian-speaking.  -- Initially admitted with slurred speech, headaches, difficulty walking.  - HEAD CT: With extensive areas of encephalomalacic changes and gliosis on frontal and temporal lobe, chronic infarct in basal ganglia and corona radiata.  -CTA head and neck multifocal areas of moderate or high-grade stenosis throughout anterior and posterior circulation.  Heavy atherosclerotic plaque left carotid bulb/bifurcation with at least 50% stenosis by modified NASCET criteria. Moderate predominantly noncalcified plaque right carotid bulb/bifurcation without significant stenosis by modified NASCET criteria. No findings for vertebral artery dissection.   MRI brain 5/4:  Motion limited examination equivocal DWI hyperintensity at the right asia. This may represent artifact or acute ischemia.  Severe bifrontal encephalomalacia.   - Echo - EF 60-65%. No clear regional wall motion abnormalities noted.  No thrombus or cardiac etiology of stroke  --Stroke service recommended aspirin therapy and signed off.      --- On 5/13 had new facial droop, code stroke called. NCCT without acute pathology, CTA H/N unchanged extensive intracranial/extracranial atherosclerosis, CTP without perfusion deficit. Not an acute therapies candidate. MRI shows R pontine and L cerebellar  ischemic strokes. R pontine lesions correspond to region of asia that was previously called as equivocal stroke vs artifact.  -- Stroke neurology impression multiple small posterior circulation ischemic infarcts likely from atherosclerotic disease.  Recommend aspirin 325 mg daily indefinitely.  Plavix 75 mg daily for 90 days.  --Evaluated by vascular surgery for CAD, no role for carotid intervention as current stroke are not in the geographical distribution from left ICA disease.  ----On 5/23, patient did have worsening right facial weakness and droop with right arm weakness on 5/23/2023.  Neurology was reconsulted and repeat MRI showed no new stroke.  Felt that this was secondary to evolving pontine stroke.    Continue aspirin 325 mg daily indefinitely.  Continue Plavix 75 mg oral daily for 90 days  Continue Lipitor 40 mg at bedtime.  LDL 90, HDL 66.  -- Telemetry monitoring during hospital stay.  Will need 30-day cardiac event monitor on discharge with follow-up in 6 to 8 weeks.  - Follow up with PCP in 1 weeks  - Follow up with General Neuro in 6-8 weeks    Acute on chronic encephalopathy likely multifactorial from stroke, dementia, delirium from hospitalization  Dementia with behavioral disturbance  History of TBI with bifrontal/R temporal encephalomalacia, Alzheimer's dementia.   Anxiety and depression  -Imaging as above. EEG- no seizures    -PTA was on scheduled Zyprexa, Seroquel and Remeron, as needed Seroquel available.  Psychiatry followed during hospital stay for agitation  Discontinued Seroquel, Zyprexa.  Continue risperidone 0.5 mg sublingual every a.m.  1 mg sublingual at bedtime.  Continue  Remeron 15 mg at bedtime.  Zyprexa IM 2.5 mg as a backup option or unable to take p.o.  Scheduled melatonin 3 mg at 8:00 to help with circadian rhythm maintenance.   -Delirium precautions, avoid interruptions during the night as much as possible, allow family visits, reorientation, use  services as much as  possible, allow/maintain sleep-wake cycle.  Fall precautions.     Physical deconditioning from medical illness, senile fragility.  Unwitnessed fall 5/29/2023  Goals of care discussion  Patient requiring assistance for minimal ambulation.  Unwitnessed fall on 5/29.  See RRT notes for details.  Having sitter by the bedside.  -- Hospitalist team, has been discussing with patient's family, and uninterested in hospice care.  Understand risk and benefits including high level of supportive care required -opting for discharge home. Full code at this time     History of coronary artery disease status post PCI with LEXI  HTN, hyperlipidemia  Continue on aspirin and Plavix, statin therapy as above  Continue amlodipine 5 mg daily, Coreg 25 mg twice daily losartan 75 mg daily  Monitor blood pressures, optimize regimen.     Uncontrolled diabetes mellitus with a hemoglobin A1c of 9.6  Continue PTA metformin 500 BID   Continue Lantus 12 units in the morning, decrease Lantus to 8 units at bedtime.  Continue sliding scale insulin.  Monitor blood sugars, optimize regimen.     Neuropathy  Continue PTA gabapentin     Hypomagnesemia  Replace per protocol    Oral thrush  Continue Nystatin swish and spit     BPH   Continue PTA finasteride and Flomax     Orders Placed This Encounter      Combination Diet Moderate Consistent Carb (60 g CHO per Meal) Diet; Pureed Diet (level 4); Mildly Thick (level 2)      Diet      DVT Prophylaxis: SCDs, ambulate  Code Status: Full Code  Disposition: Expected discharge pending safe discharge plan in place likely on 6/3/2023.  Pending DME equipment delivery for transition to home.    Discussed with patient, bedside RN, care coordinator.  Discussed with patient's daughter Hattie over the telephone 6/1, son by the bedside on 6/2.  >25 minutes spent by me on the date of service doing chart review, history, exam, documentation & further activities per the note.      Nini Orta MD        Interval History:       Patient sitting up in bed.  Calm, cooperative.  No acute events overnight.  Receiving diet puréed, pills with applesauce  Patient's son by bedside, able to translate.  No headache.  No chest pain.  No abdominal pain.         Physical Exam:        Physical Exam   Temp:  [97  F (36.1  C)-98.1  F (36.7  C)] 97  F (36.1  C)  Pulse:  [57-72] 72  Resp:  [16] 16  BP: (120-137)/(47-63) 135/62  SpO2:  [94 %-100 %] 94 %    Intake/Output Summary (Last 24 hours) at 5/31/2023 1600  Last data filed at 5/31/2023 1000  Gross per 24 hour   Intake 180 ml   Output --   Net 180 ml       Admission Weight: 96.7 kg (213 lb 3 oz)  Current Weight: 90.2 kg (198 lb 13.7 oz)    PHYSICAL EXAM  GENERAL: Patient is in no distress.  Not oriented.  Calm and cooperative  HEENT: Oropharynx pink  HEART: Regular rate and rhythm. S1S2. No murmurs  LUNGS: Clear to auscultation bilaterally.  Respirations unlabored  NEURO moving all extremities.  EXTREMITIES: No pedal edema.   SKIN: Warm, dry. No rash   PSYCHIATRY Cooperative       Medications:          acetaminophen  650 mg Oral Q8H     amLODIPine  5 mg Oral Daily     aspirin  325 mg Oral Daily    Or     aspirin  324 mg Oral or NG Tube Daily    Or     aspirin  300 mg Rectal Daily     atorvastatin  40 mg Oral QPM     bromfenac  1 drop Both Eyes Daily     carvedilol  25 mg Oral BID w/meals     clopidogrel  75 mg Oral Daily     doxazosin  1 mg Oral Daily     finasteride  5 mg Oral Daily     gabapentin  300 mg Oral BID     insulin aspart  1-7 Units Subcutaneous TID AC     insulin aspart  1-5 Units Subcutaneous At Bedtime     insulin glargine  10 Units Subcutaneous At Bedtime     insulin glargine  12 Units Subcutaneous QAM AC     losartan  75 mg Oral Daily     magnesium gluconate  500 mg Oral Daily     melatonin  3 mg Oral QPM     metFORMIN  500 mg Oral BID w/meals     mirtazapine  15 mg Oral At Bedtime     risperiDONE  0.5 mg Sublingual QAM AC     risperiDONE  1 mg Sublingual At Bedtime     acetaminophen  **OR** acetaminophen, bisacodyl, glucose **OR** dextrose **OR** glucagon, labetalol **OR** hydrALAZINE, lidocaine 4%, lidocaine (buffered or not buffered), LORazepam, - MEDICATION INSTRUCTIONS -, - MEDICATION INSTRUCTIONS -, OLANZapine, ondansetron **OR** ondansetron, polyethylene glycol, polyethylene glycol-propylene glycol PF, risperiDONE, senna-docusate **OR** senna-docusate, sodium chloride (PF), traZODone         Data:      All new lab and imaging data was reviewed.

## 2023-06-02 NOTE — PLAN OF CARE
Goal Outcome Evaluation:    Shift Note  Neuro: Alert to self, eric rest of orientation due to language barrier. No new neuro deficits, but hard to assess, not following commands. R-sided facial droop and weakness.   CV: VSS.   Respiratory: WDL, on RA  GI/: Incontinent. 2 soft BMs today.   Skin: Blanchable redness on buttocks, scattered bruising.  Activity: Not OOB this shift, position changed independently in bed. Pulsate mattress   Diet: Moderate Consistent Carb (60 g CHO per Meal) Diet; Pureed Diet (level 4); Mildly Thick (level 2); Pills crushed in applesauce.   IV: PIV, SL  Drips: n/a  BG: AC/HS - 85, 149, 179  Pain: No nonverbal s/sx of pain. Scheduled Tylenol.   Other: Swiss speaking - jabber not effective with patient. 1:1 sitter due to previous fall.   Consults: PT/OT/SLP  Plan: Discharge back home pending obtainment of medical supplies. Possible discharge tomorrow AM. 30 day cardiac event monitor @ discharge.

## 2023-06-02 NOTE — PROGRESS NOTES
Care Management Follow Up    Length of Stay (days): 29    Expected Discharge Date: 06/02/2023     Concerns to be Addressed:       Patient plan of care discussed at interdisciplinary rounds: Yes    Anticipated Discharge Disposition: Home, Home Care     Anticipated Discharge Services: PCA, County Worker  Anticipated Discharge DME: None (sit to stand lift)    Patient/family educated on Medicare website which has current facility and service quality ratings:    Education Provided on the Discharge Plan:    Patient/Family in Agreement with the Plan: yes    Referrals Placed by CM/SW: Homecare, Durable Medical Equipment (DME)  Private pay costs discussed: Not applicable    Additional Information:  F2F chart note faxed to [facility] ([phone]).  Have requested deliver of lift ASAP today as patient ready to discharge once this equipment obtained.    Addendu @ 9747:  Received call from Radha, patient's Cadi CM.  She had spoken with patient's daughter Hattie and stated she is working on getting patient supplies he will need (i.e. adult diapers, chux, etc).    Kimmie Ernst RN, BSN, PHN  Inpatient Care Coordination  [facility]  Phone: [phone]

## 2023-06-02 NOTE — PLAN OF CARE
Goal Outcome Evaluation:    Cognitive/Mentation: Ax2, disoriented to time & situation. Pt is German speaking - does not do well with Jabber. Impulsive, restless and agitated. Follows commands intermittently with family translating     Neuros/CMS: Intact ex R hemiparesis, R droop  VS: Stable on RA  Tele: n/a.     GI: BS active, incontinent BM x1 for the night  : Incontinent  Pulmonary: LS clear  Pain: No nonverbal indicators     Drains/Lines: PIV - SL  Skin: Intact  Activity: Assist x2/GB/teresita steady  Diet: Pureed, mildly thick. Pills crushed with applesauce     Discharge: Pending.      End of shift summary: Stable, no changes. Continues to be 1:1 with staff or safety

## 2023-06-03 LAB
GLUCOSE BLDC GLUCOMTR-MCNC: 102 MG/DL (ref 70–99)
GLUCOSE BLDC GLUCOMTR-MCNC: 117 MG/DL (ref 70–99)
GLUCOSE BLDC GLUCOMTR-MCNC: 164 MG/DL (ref 70–99)

## 2023-06-03 PROCEDURE — 250N000013 HC RX MED GY IP 250 OP 250 PS 637: Performed by: HOSPITALIST

## 2023-06-03 PROCEDURE — 250N000013 HC RX MED GY IP 250 OP 250 PS 637: Performed by: NURSE PRACTITIONER

## 2023-06-03 PROCEDURE — 250N000013 HC RX MED GY IP 250 OP 250 PS 637: Performed by: INTERNAL MEDICINE

## 2023-06-03 PROCEDURE — 250N000012 HC RX MED GY IP 250 OP 636 PS 637: Performed by: INTERNAL MEDICINE

## 2023-06-03 PROCEDURE — 250N000013 HC RX MED GY IP 250 OP 250 PS 637: Performed by: STUDENT IN AN ORGANIZED HEALTH CARE EDUCATION/TRAINING PROGRAM

## 2023-06-03 PROCEDURE — 99232 SBSQ HOSP IP/OBS MODERATE 35: CPT | Performed by: HOSPITALIST

## 2023-06-03 PROCEDURE — 250N000013 HC RX MED GY IP 250 OP 250 PS 637

## 2023-06-03 PROCEDURE — 120N000001 HC R&B MED SURG/OB

## 2023-06-03 RX ORDER — INSULIN GLARGINE 100 [IU]/ML
INJECTION, SOLUTION SUBCUTANEOUS
Qty: 15 ML | Refills: 0 | Status: SHIPPED | OUTPATIENT
Start: 2023-06-03 | End: 2023-06-03

## 2023-06-03 RX ORDER — ASPIRIN 81 MG/1
324 TABLET, CHEWABLE ORAL DAILY
Qty: 120 TABLET | Refills: 0 | Status: SHIPPED | OUTPATIENT
Start: 2023-06-04 | End: 2023-06-05

## 2023-06-03 RX ADMIN — Medication 500 MG: at 08:21

## 2023-06-03 RX ADMIN — AMLODIPINE BESYLATE 5 MG: 5 TABLET ORAL at 08:22

## 2023-06-03 RX ADMIN — ASPIRIN 81 MG CHEWABLE TABLET 324 MG: 81 TABLET CHEWABLE at 08:21

## 2023-06-03 RX ADMIN — ACETAMINOPHEN 650 MG: 325 TABLET ORAL at 12:40

## 2023-06-03 RX ADMIN — LOSARTAN POTASSIUM 75 MG: 50 TABLET, FILM COATED ORAL at 08:22

## 2023-06-03 RX ADMIN — DOXAZOSIN 1 MG: 1 TABLET ORAL at 08:22

## 2023-06-03 RX ADMIN — RISPERIDONE 0.5 MG: 0.5 TABLET, ORALLY DISINTEGRATING ORAL at 08:21

## 2023-06-03 RX ADMIN — MIRTAZAPINE 15 MG: 15 TABLET, FILM COATED ORAL at 20:38

## 2023-06-03 RX ADMIN — FINASTERIDE 5 MG: 5 TABLET, FILM COATED ORAL at 08:22

## 2023-06-03 RX ADMIN — ATORVASTATIN CALCIUM 40 MG: 40 TABLET, FILM COATED ORAL at 20:38

## 2023-06-03 RX ADMIN — GABAPENTIN 300 MG: 300 CAPSULE ORAL at 20:37

## 2023-06-03 RX ADMIN — ACETAMINOPHEN 650 MG: 325 TABLET ORAL at 08:21

## 2023-06-03 RX ADMIN — RISPERIDONE 0.5 MG: 0.5 TABLET, ORALLY DISINTEGRATING ORAL at 18:02

## 2023-06-03 RX ADMIN — ACETAMINOPHEN 650 MG: 325 TABLET ORAL at 17:42

## 2023-06-03 RX ADMIN — CARVEDILOL 25 MG: 25 TABLET, FILM COATED ORAL at 08:22

## 2023-06-03 RX ADMIN — INSULIN GLARGINE 12 UNITS: 100 INJECTION, SOLUTION SUBCUTANEOUS at 08:29

## 2023-06-03 RX ADMIN — METFORMIN HYDROCHLORIDE 500 MG: 500 TABLET, FILM COATED ORAL at 08:22

## 2023-06-03 RX ADMIN — METFORMIN HYDROCHLORIDE 500 MG: 500 TABLET, FILM COATED ORAL at 17:42

## 2023-06-03 RX ADMIN — BROMFENAC 1 DROP: 1.03 SOLUTION/ DROPS OPHTHALMIC at 08:32

## 2023-06-03 RX ADMIN — ACETAMINOPHEN 650 MG: 325 TABLET ORAL at 20:37

## 2023-06-03 RX ADMIN — CARVEDILOL 25 MG: 25 TABLET, FILM COATED ORAL at 17:42

## 2023-06-03 RX ADMIN — GABAPENTIN 300 MG: 300 CAPSULE ORAL at 08:22

## 2023-06-03 RX ADMIN — MELATONIN TAB 3 MG 3 MG: 3 TAB at 20:38

## 2023-06-03 RX ADMIN — RISPERIDONE 1 MG: 1 TABLET, ORALLY DISINTEGRATING ORAL at 20:38

## 2023-06-03 RX ADMIN — CLOPIDOGREL BISULFATE 75 MG: 75 TABLET ORAL at 08:21

## 2023-06-03 ASSESSMENT — ACTIVITIES OF DAILY LIVING (ADL)
ADLS_ACUITY_SCORE: 52
ADLS_ACUITY_SCORE: 54
ADLS_ACUITY_SCORE: 52
ADLS_ACUITY_SCORE: 54

## 2023-06-03 NOTE — PLAN OF CARE
Date & Time: 6/3 4249-7805  Diagnosis: Slurred speech   Procedures: NA  Orientation/Cognitive: AOx1, Bahamian speaking  VS/O2: VSS, RA  Mobility: A2 + lift or teresita steady  Diet: Mod CHO, pureed, mildly thick liquids  Pain Management: Scheduled tylenol  Bowel & Bladder: Incontinent, BMx1  Skin: WDL  Abnormal Labs:   Tele: SB  IV Access/Drips/Fluids: L PIV SL   Drains: NA  Tests: CT/MRI - CVA R asia & L hemisphere  Consults: Neuro, hospitalist   Discharge Plan: Possibly home 6/5  Other: ISIDRO rolle, MARGARITA/RLBRAXTON 4/5, agitated - risperdone given x1

## 2023-06-03 NOTE — PROVIDER NOTIFICATION
EFRAÍN-RN Paged Hospitalist;     Family called, they are interested in discharging this weekend if OK with you, preferring earlier in the day due to sundowning. They will not get their mechanical lift until Monday but feel the extra family support over the weekend would be a good time to discharge.  Main contact is daughter Hattie.      Radha Barajas RN, BSN, PHN  [facility]  Inpatient Care Management - FLOAT  Neuroscience [facility] Mobile: [phone] daily 7:30-4:00

## 2023-06-03 NOTE — PROGRESS NOTES
Shift Note-1900-0700  Neuro: Alert to self, nods yes or no to some simple questions with visual aids. Tracking nurse movements. Strength appears equal with body adjustments. PJ formal neuro exam but there appears to be no No new neuro deficits. Night time medications passed, patient resting. Sitter at bed side   CV: VSS.   Respiratory: WDL, on RA  GI/: Incontinent of urine    Skin: Blanchable redness on buttocks, scattered bruising.  Activity: position changed independently in bed and post incontinence/w eugenio care.Pulsate mattress   Diet: Moderate Consistent Carb (60 g CHO per Meal) Diet; Pureed Diet (level 4); Mildly Thick (level 2); Pills crushed in applesauce.   IV: PIV, SL  Drips: n/a  BG: AC/HS - 218 1unit  Per sliding scale,  in am   Pain: No nonverbal s/sx of pain. Tylenol PRN.   Other: Paraguayan speaking - jabber not effective with patient. 1:1 sitter due to previous fall.   Consults: PT/OT/SLP  Plan: Discharge back home pending obtainment of medical supplies. Possible discharge tomorrow AM. 30 day cardiac event monitor @ discharge.

## 2023-06-03 NOTE — PLAN OF CARE
"BP (!) 151/64 (BP Location: Left arm)   Pulse 75   Temp (!) 96.2  F (35.7  C) (Oral)   Resp 18   Ht 1.778 m (5' 10\")   Wt 93.1 kg (205 lb 4 oz)   SpO2 97%   BMI 29.45 kg/m      Patient name: Adam Huber    Nursing shift note  Summary: Patient in with CVA  Alertness/orientation: Alert, calm, disoriented to time, place, situation  Neuro: No changes, right sided droop and weakness 4/5  Cardiac: WDL  Resp: WDL  GI: WDL  : Incontininent  Mobility: A2 and teresita steady  CMS: Intact  Pain: Reports headache, tylenol given  Takes meds: Crushed in applesauce  Skin: WDL  Plan: Discharge monday  Hygiene: Bed bath given by CNA  Other Important Info: Tearful at times  Chartin. Hourly rounding Yes, patient has a sitter  2. Electrolyte Protocols N/A    Reynaldo Pacheco RN  [facility]  [phone]    "

## 2023-06-03 NOTE — PROGRESS NOTES
[facility]  Hospitalist Progress Note   06/03/2023          Assessment and Plan:       Adam Huber is a 87 year old male  With PMH of HTN, CAD, CVA, DM type 2, TBI, dementia- admitted on 5/4/2023 with slurred speech     Acute/subacute CVA of the right asia and left cerebellar hemisphere [posterior circulation ischemic strokes]    Transient alteration of consciousness  Diffuse mod-severe mulfitfocal ICAD. Notably in bilat M1, bilat RANDELL, bilat PCA, and BA  History of chronic lacunar infarct.  --Patient lives at home with his wife, underlying cognitive impairment has PCA services.  Venezuelan-speaking.  --Initially admitted with slurred speech, headaches, difficulty walking.  --HEAD CT: With extensive areas of encephalomalacic changes and gliosis on frontal and temporal lobe, chronic infarct in basal ganglia and corona radiata.  --CTA head and neck multifocal areas of moderate or high-grade stenosis throughout anterior and posterior circulation.  Heavy atherosclerotic plaque left carotid bulb/bifurcation with at least 50% stenosis by modified NASCET criteria. Moderate predominantly noncalcified plaque right carotid bulb/bifurcation without significant stenosis by modified NASCET criteria. No findings for vertebral artery dissection.   ---MRI brain 5/4:  Motion limited examination equivocal DWI hyperintensity at the right asia. This may represent artifact or acute ischemia.  Severe bifrontal encephalomalacia.   -- Echo - EF 60-65%. No clear regional wall motion abnormalities noted.  No thrombus or cardiac etiology of stroke  --Stroke service recommended aspirin therapy and signed off.       ---On 5/13 had new facial droop, code stroke. NCCT without acute pathology, CTA H/N unchanged extensive intracranial/extracranial atherosclerosis, CTP without perfusion deficit. Not an acute therapies candidate.   MRI showed R pontine and L cerebellar ischemic strokes. R pontine lesions correspond to region of  asia that was previously called as equivocal stroke vs artifact.  --Stroke neurology impression multiple small posterior circulation ischemic infarcts likely from atherosclerotic disease.  Recommend aspirin 325 mg daily indefinitely.  Plavix 75 mg daily for 90 days.  --Evaluated by vascular surgery for CAD, no role for carotid intervention as current stroke are not in the geographical distribution from left ICA disease.  ----On 5/23, patient did have worsening right facial weakness and droop with right arm weakness on 5/23/2023.  Neurology was reconsulted and repeat MRI showed no new stroke.  Felt that this was secondary to evolving pontine stroke.     Continue aspirin 325 mg daily indefinitely.  Continue Plavix 75 mg oral daily for 90 days  Continue Lipitor 40 mg at bedtime.  LDL 90, HDL 66.  -- Telemetry monitoring during hospital stay.  Will need 30-day cardiac event monitor on discharge [has already been 30 days hospital stay, discussed with nursing to reach out to stroke neurology for cardiac event monitor on discharge]  - Follow up with PCP in 1 weeks  - Follow up with General Neuro in 6weeks     Acute on chronic encephalopathy likely multifactorial from stroke, dementia, delirium from hospitalization    Dementia with behavioral disturbance  History of TBI with bifrontal/R temporal encephalomalacia, Alzheimer's dementia.   Anxiety and depression  -Imaging as above. EEG- no seizures    --Psychiatry followed during hospital stay for agitation  --Discontinued PTA Seroquel, Zyprexa.    Continue risperidone 0.5 mg sublingual every a.m. and 1 mg sublingual at bedtime.  Continue PTA Remeron 15 mg at bedtime.    PRN Seroquel as needed for agitation.  Zyprexa IM 2.5 mg as a backup option or unable to take p.o.  Scheduled melatonin 3 mg at 8:00 to help with circadian rhythm maintenance.   Delirium precautions, avoid interruptions during the night as much as possible, allow family visits, reorientation, use   services as much as possible, allow/maintain sleep-wake cycle.  Fall precautions.   PT, OT, speech therapy at home.   Follow diet per speech therapy, aspiration precautions.  Follow-up with psychiatry as outpatient.  Neurocognitive evaluation as outpatient     Physical deconditioning from medical illness, senile fragility.  Unwitnessed fall 5/29/2023  Goals of care discussion  Patient requiring assistance for minimal ambulation.  Unwitnessed fall on 5/29.  See RRT notes for details.  Having sitter by the bedside.  -- Hospitalist team, has been discussing with patient's family, and uninterested in hospice care.  Understand risk and benefits including high level of supportive care required -opting for discharge home. Full code at this time.  Discuss long-term goals of care on PCP visit     History of coronary artery disease status post PCI with LEXI  HTN, hyperlipidemia  Continue on aspirin and Plavix, statin therapy as above  Continue amlodipine 5 mg daily, Coreg 25 mg twice daily losartan 75 mg daily  Monitor blood pressures, optimize regimen.     Uncontrolled diabetes mellitus with a hemoglobin A1c of 9.6  Continue PTA metformin 500 BID   Switch Lantus to 8 units twice daily, optimize regimen.   Continue sliding scale insulin.  Monitor blood sugars, optimize regimen.      Neuropathy  Continue PTA gabapentin      Hypomagnesemia  Replace per protocol     Oral thrush  Continue Nystatin swish and spit     BPH   Continue PTA finasteride and Flomax      Orders Placed This Encounter      Combination Diet Moderate Consistent Carb (60 g CHO per Meal) Diet; Pureed Diet (level 4); Mildly Thick (level 2)      Diet      DVT Prophylaxis: SCDs, ambulate  Code Status: Full Code  Disposition: Expected discharge pending safe discharge plan in place. Pending DME equipment delivery for transition to home.     Discussed with patient, bedside RN, SW  Discussed with patient's son by the bedside on 6/3.  >25 minutes spent by me on the date  of service doing chart review, history, exam, documentation & further activities per the note.       Orders Placed This Encounter      Combination Diet Moderate Consistent Carb (60 g CHO per Meal) Diet; Pureed Diet (level 4); Mildly Thick (level 2)      Diet        Nini Orta MD        Interval History:        Patient drowsy but arousable  No acute events overnight.  Receiving diet puréed, pills with applesauce  Patient's son by bedside, able to translate.    No headache.  No chest pain.  No abdominal pain.  Awaiting Lift delivery at home             Physical Exam:        Physical Exam   Temp:  [96.2  F (35.7  C)-98  F (36.7  C)] 96.2  F (35.7  C)  Pulse:  [57-78] 75  Resp:  [16-18] 18  BP: (138-156)/(64-75) 151/64  SpO2:  [95 %-97 %] 97 %    Intake/Output Summary (Last 24 hours) at 6/3/2023 1307  Last data filed at 6/2/2023 2100  Gross per 24 hour   Intake 30 ml   Output --   Net 30 ml     Admission Weight: 96.7 kg (213 lb 3 oz)  Current Weight: 93.1 kg (205 lb 4 oz)    PHYSICAL EXAM  GENERAL: Patient is in no distress.  Not oriented.  Calm and cooperative  HEENT: Oropharynx pink  HEART: Regular rate and rhythm. S1S2. No murmurs  LUNGS: Clear to auscultation bilaterally.  Respirations unlabored  NEURO moving all extremities.  EXTREMITIES: No pedal edema.   SKIN: Warm, dry. No rash   PSYCHIATRY Cooperative       Medications:          acetaminophen  650 mg Oral Q8H     amLODIPine  5 mg Oral Daily     aspirin  325 mg Oral Daily    Or     aspirin  324 mg Oral or NG Tube Daily    Or     aspirin  300 mg Rectal Daily     atorvastatin  40 mg Oral QPM     bromfenac  1 drop Both Eyes Daily     carvedilol  25 mg Oral BID w/meals     clopidogrel  75 mg Oral Daily     doxazosin  1 mg Oral Daily     finasteride  5 mg Oral Daily     gabapentin  300 mg Oral BID     insulin aspart  1-7 Units Subcutaneous TID AC     insulin aspart  1-5 Units Subcutaneous At Bedtime     insulin glargine  12 Units Subcutaneous QAM AC      insulin glargine  8 Units Subcutaneous At Bedtime     losartan  75 mg Oral Daily     magnesium gluconate  500 mg Oral Daily     melatonin  3 mg Oral QPM     metFORMIN  500 mg Oral BID w/meals     mirtazapine  15 mg Oral At Bedtime     risperiDONE  0.5 mg Sublingual QAM AC     risperiDONE  1 mg Sublingual At Bedtime     acetaminophen **OR** acetaminophen, bisacodyl, glucose **OR** dextrose **OR** glucagon, labetalol **OR** hydrALAZINE, lidocaine 4%, lidocaine (buffered or not buffered), LORazepam, - MEDICATION INSTRUCTIONS -, - MEDICATION INSTRUCTIONS -, OLANZapine, ondansetron **OR** ondansetron, polyethylene glycol, polyethylene glycol-propylene glycol PF, risperiDONE, senna-docusate **OR** senna-docusate, sodium chloride (PF), traZODone         Data:      All new lab and imaging data was reviewed.

## 2023-06-04 LAB
GLUCOSE BLDC GLUCOMTR-MCNC: 102 MG/DL (ref 70–99)
GLUCOSE BLDC GLUCOMTR-MCNC: 131 MG/DL (ref 70–99)
GLUCOSE BLDC GLUCOMTR-MCNC: 155 MG/DL (ref 70–99)
GLUCOSE BLDC GLUCOMTR-MCNC: 196 MG/DL (ref 70–99)
GLUCOSE BLDC GLUCOMTR-MCNC: 216 MG/DL (ref 70–99)
GLUCOSE BLDC GLUCOMTR-MCNC: 83 MG/DL (ref 70–99)

## 2023-06-04 PROCEDURE — 250N000013 HC RX MED GY IP 250 OP 250 PS 637: Performed by: INTERNAL MEDICINE

## 2023-06-04 PROCEDURE — 250N000013 HC RX MED GY IP 250 OP 250 PS 637: Performed by: STUDENT IN AN ORGANIZED HEALTH CARE EDUCATION/TRAINING PROGRAM

## 2023-06-04 PROCEDURE — 250N000013 HC RX MED GY IP 250 OP 250 PS 637: Performed by: NURSE PRACTITIONER

## 2023-06-04 PROCEDURE — 99232 SBSQ HOSP IP/OBS MODERATE 35: CPT | Performed by: HOSPITALIST

## 2023-06-04 PROCEDURE — 120N000001 HC R&B MED SURG/OB

## 2023-06-04 PROCEDURE — 250N000013 HC RX MED GY IP 250 OP 250 PS 637

## 2023-06-04 PROCEDURE — 250N000013 HC RX MED GY IP 250 OP 250 PS 637: Performed by: HOSPITALIST

## 2023-06-04 RX ADMIN — Medication 500 MG: at 09:10

## 2023-06-04 RX ADMIN — RISPERIDONE 1 MG: 1 TABLET, ORALLY DISINTEGRATING ORAL at 21:07

## 2023-06-04 RX ADMIN — MELATONIN TAB 3 MG 3 MG: 3 TAB at 20:12

## 2023-06-04 RX ADMIN — GABAPENTIN 300 MG: 300 CAPSULE ORAL at 20:12

## 2023-06-04 RX ADMIN — ACETAMINOPHEN 650 MG: 325 TABLET ORAL at 17:47

## 2023-06-04 RX ADMIN — LOSARTAN POTASSIUM 75 MG: 50 TABLET, FILM COATED ORAL at 09:12

## 2023-06-04 RX ADMIN — ATORVASTATIN CALCIUM 40 MG: 40 TABLET, FILM COATED ORAL at 20:12

## 2023-06-04 RX ADMIN — METFORMIN HYDROCHLORIDE 500 MG: 500 TABLET, FILM COATED ORAL at 09:13

## 2023-06-04 RX ADMIN — ACETAMINOPHEN 650 MG: 325 TABLET ORAL at 09:11

## 2023-06-04 RX ADMIN — METFORMIN HYDROCHLORIDE 500 MG: 500 TABLET, FILM COATED ORAL at 17:47

## 2023-06-04 RX ADMIN — GABAPENTIN 300 MG: 300 CAPSULE ORAL at 09:12

## 2023-06-04 RX ADMIN — AMLODIPINE BESYLATE 5 MG: 5 TABLET ORAL at 09:10

## 2023-06-04 RX ADMIN — CLOPIDOGREL BISULFATE 75 MG: 75 TABLET ORAL at 09:12

## 2023-06-04 RX ADMIN — CARVEDILOL 25 MG: 25 TABLET, FILM COATED ORAL at 09:13

## 2023-06-04 RX ADMIN — DOXAZOSIN 1 MG: 1 TABLET ORAL at 09:11

## 2023-06-04 RX ADMIN — ASPIRIN 81 MG CHEWABLE TABLET 325 MG: 81 TABLET CHEWABLE at 09:12

## 2023-06-04 RX ADMIN — CARVEDILOL 25 MG: 25 TABLET, FILM COATED ORAL at 17:47

## 2023-06-04 RX ADMIN — BROMFENAC 1 DROP: 1.03 SOLUTION/ DROPS OPHTHALMIC at 10:23

## 2023-06-04 RX ADMIN — MIRTAZAPINE 15 MG: 15 TABLET, FILM COATED ORAL at 20:12

## 2023-06-04 RX ADMIN — RISPERIDONE 0.5 MG: 0.5 TABLET, ORALLY DISINTEGRATING ORAL at 09:11

## 2023-06-04 RX ADMIN — INSULIN GLARGINE 12 UNITS: 100 INJECTION, SOLUTION SUBCUTANEOUS at 09:08

## 2023-06-04 RX ADMIN — FINASTERIDE 5 MG: 5 TABLET, FILM COATED ORAL at 09:13

## 2023-06-04 ASSESSMENT — ACTIVITIES OF DAILY LIVING (ADL)
ADLS_ACUITY_SCORE: 54
ADLS_ACUITY_SCORE: 53
ADLS_ACUITY_SCORE: 51
ADLS_ACUITY_SCORE: 51
ADLS_ACUITY_SCORE: 54
ADLS_ACUITY_SCORE: 53
ADLS_ACUITY_SCORE: 51
ADLS_ACUITY_SCORE: 53
ADLS_ACUITY_SCORE: 53
ADLS_ACUITY_SCORE: 51
ADLS_ACUITY_SCORE: 51
ADLS_ACUITY_SCORE: 53

## 2023-06-04 NOTE — PLAN OF CARE
Date & Time: 6/3-6/4 8795-7412  Diagnosis: Slurred speech   Procedures: NA  Orientation/Cognitive: UTO orientation this shift, Taiwanese speaking  VS/O2: VSS, RA  Mobility: A2 + lift or teresita steady. A3 to turn/change  Diet: Mod CHO, pureed, mildly thick liquids. Meds crushed in apple sauce  Pain Management: Scheduled tylenol  Bowel & Bladder: Incontinent x2,  BMx1-large  Abnormal Labs:   Tele: SB  IV Access/Drips/Fluids: L PIV SL   Drains: NA  Tests: CT/MRI - CVA R asia & L hemisphere  Other: 1:1 sitter.   Yellow/Red Stoplight Tool: Can be aggressive and swing/grab.  Consults: Neuro, hospitalist   Discharge Plan: Possibly home 6/5 w [facility]  Other: R MARGARITA rolle/LANDON 4/5 per report. PJ this shift. PRN risperidone available                         No

## 2023-06-04 NOTE — PROVIDER NOTIFICATION
@ [phone] Paged the stroke team fellow per request from Dr. Orta asking about the need for cardiac monitoring after discharge. Patient has been in hospital on telemetry for the past 30 days. Dr. Orta doesn't think he needs to continue with monitoring at home after discharge. Patient has been sinus waqas.    No cardiac monitor needed upon discharge per stroke team.

## 2023-06-04 NOTE — PROGRESS NOTES
[facility]  Hospitalist Progress Note   06/04/2023          Assessment and Plan:       Adam Huber is a 87 year old male  With PMH of HTN, CAD, CVA, DM type 2, TBI, dementia- admitted on 5/4/2023 with slurred speech     Acute/subacute CVA of the right asia and left cerebellar hemisphere [posterior circulation ischemic strokes]    Transient alteration of consciousness  Diffuse mod-severe mulfitfocal ICAD. Notably in bilat M1, bilat RANDELL, bilat PCA, and BA  History of chronic lacunar infarct.  --Patient lives at home with his wife, underlying cognitive impairment has PCA services.  Estonian-speaking.  --Initially admitted with slurred speech, headaches, difficulty walking.  --HEAD CT: With extensive areas of encephalomalacic changes and gliosis on frontal and temporal lobe, chronic infarct in basal ganglia and corona radiata.  --CTA head and neck multifocal areas of moderate or high-grade stenosis throughout anterior and posterior circulation.  Heavy atherosclerotic plaque left carotid bulb/bifurcation with at least 50% stenosis by modified NASCET criteria. Moderate predominantly noncalcified plaque right carotid bulb/bifurcation without significant stenosis by modified NASCET criteria. No findings for vertebral artery dissection.   ---MRI brain 5/4:  Motion limited examination equivocal DWI hyperintensity at the right asia. This may represent artifact or acute ischemia.  Severe bifrontal encephalomalacia.   -- Echo - EF 60-65%. No clear regional wall motion abnormalities noted.  No thrombus or cardiac etiology of stroke  --Stroke service recommended aspirin therapy and signed off.       ---On 5/13 had new facial droop, code stroke. NCCT without acute pathology, CTA H/N unchanged extensive intracranial/extracranial atherosclerosis, CTP without perfusion deficit. Not an acute therapies candidate.   MRI showed R pontine and L cerebellar ischemic strokes. R pontine lesions correspond to region of  asia that was previously called as equivocal stroke vs artifact.  --Stroke neurology impression multiple small posterior circulation ischemic infarcts likely from atherosclerotic disease.  Recommend aspirin 325 mg daily indefinitely.  Plavix 75 mg daily for 90 days.  --Evaluated by vascular surgery for CAD, no role for carotid intervention as current stroke are not in the geographical distribution from left ICA disease.  ----On 5/23, patient did have worsening right facial weakness and droop with right arm weakness on 5/23/2023.  Neurology was reconsulted and repeat MRI showed no new stroke.  Felt that this was secondary to evolving pontine stroke.     Continue aspirin 325 mg daily indefinitely.  Continue Plavix 75 mg oral daily for 90 days  Continue Lipitor 40 mg at bedtime.  LDL 90, HDL 66.  -- Telemetry monitoring during hospital stay.  Has already completed 30 days of cardiac monitoring, No cardiac monitor needed upon discharge per stroke team [ update on 6/4 ]  - Follow up with PCP in 1 weeks  - Follow up with General Neuro in 6weeks     Acute on chronic encephalopathy likely multifactorial from stroke, dementia, delirium from hospitalization    Dementia with behavioral disturbance  History of TBI with bifrontal/R temporal encephalomalacia, Alzheimer's dementia.   Anxiety and depression  -Imaging as above. EEG- no seizures    --Psychiatry followed during hospital stay for agitation  --Discontinued PTA Seroquel, Zyprexa.    Continue risperidone 0.5 mg sublingual every a.m. and 1 mg sublingual at bedtime.  Continue PTA Remeron 15 mg at bedtime.    PRN Seroquel as needed for agitation.  Scheduled melatonin 3 mg at 8:00 to help with circadian rhythm maintenance.   Delirium precautions, avoid interruptions during the night as much as possible, allow family visits, reorientation, use  services as much as possible, allow/maintain sleep-wake cycle.  Fall precautions.   PT, OT, speech therapy at home with home  care    Follow diet per speech therapy, aspiration precautions.  Follow-up with psychiatry as outpatient.  Neurocognitive evaluation as outpatient     Physical deconditioning from medical illness, senile fragility.  Unwitnessed fall 5/29/2023  Goals of care discussion  Patient requiring assistance for minimal ambulation.  Unwitnessed fall on 5/29.  See RRT notes for details.  Having sitter by the bedside.  -- Hospitalist team, has been discussing with patient's family, and uninterested in hospice care.  Understand risk and benefits including high level of supportive care required -opting for discharge home. Full code at this time.  Discuss long-term goals of care on PCP visit     History of coronary artery disease status post PCI with LEXI  HTN, hyperlipidemia  Continue on aspirin and Plavix, statin therapy as above  Continue amlodipine 5 mg daily, Coreg 25 mg twice daily losartan 75 mg daily  Monitor blood pressures, optimize regimen.     Uncontrolled diabetes mellitus with a hemoglobin A1c of 9.6  Continue PTA metformin 500 BID   Switch Lantus to 8 units twice daily  Continue sliding scale insulin.  Monitor blood sugars, optimize regimen.      Neuropathy  Continue PTA gabapentin      Hypomagnesemia  Replace per protocol     Oral thrush  Continue Nystatin swish and spit     BPH   Continue PTA finasteride and Flomax      Orders Placed This Encounter      Combination Diet Moderate Consistent Carb (60 g CHO per Meal) Diet; Pureed Diet (level 4); Mildly Thick (level 2)      Diet      DVT Prophylaxis: SCDs, ambulate  Code Status: Full Code  Disposition: Expected discharge pending safe discharge plan in place. Pending DME equipment delivery for transition to home likely monday     Discussed with patient, bedside RN, SW  Discussed with patient's son by the bedside on 6/3.  >25 minutes spent by me on the date of service doing chart review, history, exam, documentation & further activities per the note.       Orders Placed  This Encounter      Combination Diet Moderate Consistent Carb (60 g CHO per Meal) Diet; Pureed Diet (level 4); Mildly Thick (level 2)      Diet        Nini Orta MD        Interval History:        Unable to participate in encounter with  services over the telephone.  Patient drowsy but arousable.  Sitter by bedside given fall risk  No acute events overnight.  Receiving diet puréed, pills with applesauce  No headache.  No chest pain.  No abdominal pain.  Awaiting Lift delivery at home          Physical Exam:        Physical Exam   Temp:  [96  F (35.6  C)-97.8  F (36.6  C)] 97.8  F (36.6  C)  Pulse:  [61-70] 65  Resp:  [15-16] 16  BP: (126-155)/(60-70) 139/70  SpO2:  [93 %-98 %] 94 %    Intake/Output Summary (Last 24 hours) at 6/3/2023 1307  Last data filed at 6/2/2023 2100  Gross per 24 hour   Intake 30 ml   Output --   Net 30 ml     Admission Weight: 96.7 kg (213 lb 3 oz)  Current Weight: 93.1 kg (205 lb 4 oz)    PHYSICAL EXAM  GENERAL: Patient is in no distress.    Drowsy but arousable  HEENT: Oropharynx pink  HEART: Regular rate and rhythm. S1S2. No murmurs  LUNGS: Respirations unlabored  NEURO moving all extremities.  EXTREMITIES: No pedal edema.   SKIN: Warm, dry. No rash   PSYCHIATRY Cooperative       Medications:          acetaminophen  650 mg Oral Q8H     amLODIPine  5 mg Oral Daily     aspirin  325 mg Oral Daily    Or     aspirin  324 mg Oral or NG Tube Daily    Or     aspirin  300 mg Rectal Daily     atorvastatin  40 mg Oral QPM     bromfenac  1 drop Both Eyes Daily     carvedilol  25 mg Oral BID w/meals     clopidogrel  75 mg Oral Daily     doxazosin  1 mg Oral Daily     finasteride  5 mg Oral Daily     gabapentin  300 mg Oral BID     insulin aspart  1-7 Units Subcutaneous TID AC     insulin aspart  1-5 Units Subcutaneous At Bedtime     insulin glargine  12 Units Subcutaneous QAM AC     insulin glargine  8 Units Subcutaneous At Bedtime     losartan  75 mg Oral Daily     magnesium  gluconate  500 mg Oral Daily     melatonin  3 mg Oral QPM     metFORMIN  500 mg Oral BID w/meals     mirtazapine  15 mg Oral At Bedtime     risperiDONE  0.5 mg Sublingual QAM AC     risperiDONE  1 mg Sublingual At Bedtime     acetaminophen **OR** acetaminophen, bisacodyl, glucose **OR** dextrose **OR** glucagon, labetalol **OR** hydrALAZINE, lidocaine 4%, lidocaine (buffered or not buffered), LORazepam, - MEDICATION INSTRUCTIONS -, - MEDICATION INSTRUCTIONS -, OLANZapine, ondansetron **OR** ondansetron, polyethylene glycol, polyethylene glycol-propylene glycol PF, risperiDONE, senna-docusate **OR** senna-docusate, sodium chloride (PF), traZODone         Data:      All new lab and imaging data was reviewed.

## 2023-06-05 ENCOUNTER — APPOINTMENT (OUTPATIENT)
Dept: CT IMAGING | Facility: CLINIC | Age: 88
DRG: 065 | End: 2023-06-05
Attending: INTERNAL MEDICINE
Payer: COMMERCIAL

## 2023-06-05 ENCOUNTER — APPOINTMENT (OUTPATIENT)
Dept: SPEECH THERAPY | Facility: CLINIC | Age: 88
DRG: 065 | End: 2023-06-05
Attending: HOSPITALIST
Payer: COMMERCIAL

## 2023-06-05 VITALS
TEMPERATURE: 98.1 F | HEIGHT: 70 IN | WEIGHT: 205.25 LBS | BODY MASS INDEX: 29.38 KG/M2 | HEART RATE: 66 BPM | RESPIRATION RATE: 16 BRPM | DIASTOLIC BLOOD PRESSURE: 64 MMHG | SYSTOLIC BLOOD PRESSURE: 146 MMHG | OXYGEN SATURATION: 97 %

## 2023-06-05 LAB
GLUCOSE BLDC GLUCOMTR-MCNC: 110 MG/DL (ref 70–99)
GLUCOSE BLDC GLUCOMTR-MCNC: 129 MG/DL (ref 70–99)
GLUCOSE BLDC GLUCOMTR-MCNC: 146 MG/DL (ref 70–99)

## 2023-06-05 PROCEDURE — 99239 HOSP IP/OBS DSCHRG MGMT >30: CPT | Performed by: INTERNAL MEDICINE

## 2023-06-05 PROCEDURE — 92526 ORAL FUNCTION THERAPY: CPT | Mod: GN

## 2023-06-05 PROCEDURE — 250N000013 HC RX MED GY IP 250 OP 250 PS 637: Performed by: HOSPITALIST

## 2023-06-05 PROCEDURE — 250N000013 HC RX MED GY IP 250 OP 250 PS 637: Performed by: NURSE PRACTITIONER

## 2023-06-05 PROCEDURE — 250N000013 HC RX MED GY IP 250 OP 250 PS 637: Performed by: INTERNAL MEDICINE

## 2023-06-05 PROCEDURE — 70450 CT HEAD/BRAIN W/O DYE: CPT

## 2023-06-05 RX ORDER — ASPIRIN 325 MG
325 TABLET, DELAYED RELEASE (ENTERIC COATED) ORAL DAILY
Qty: 30 TABLET | Refills: 0 | Status: ON HOLD | OUTPATIENT
Start: 2023-06-05 | End: 2023-09-18

## 2023-06-05 RX ORDER — ACETAMINOPHEN 325 MG/1
650 TABLET ORAL EVERY 8 HOURS
Qty: 100 TABLET | Refills: 0 | Status: ON HOLD | OUTPATIENT
Start: 2023-06-05 | End: 2023-09-18

## 2023-06-05 RX ORDER — DOXAZOSIN 1 MG/1
1 TABLET ORAL DAILY
Qty: 30 TABLET | Refills: 0 | Status: ON HOLD | OUTPATIENT
Start: 2023-06-05 | End: 2023-09-18

## 2023-06-05 RX ORDER — LOSARTAN POTASSIUM 25 MG/1
75 TABLET ORAL DAILY
Qty: 30 TABLET | Refills: 0 | Status: ON HOLD | OUTPATIENT
Start: 2023-06-05 | End: 2023-09-18

## 2023-06-05 RX ORDER — LANCETS
EACH MISCELLANEOUS
Qty: 100 EACH | Refills: 6 | Status: ON HOLD | OUTPATIENT
Start: 2023-06-05 | End: 2023-09-18

## 2023-06-05 RX ADMIN — ASPIRIN 81 MG CHEWABLE TABLET 324 MG: 81 TABLET CHEWABLE at 10:36

## 2023-06-05 RX ADMIN — RISPERIDONE 0.5 MG: 0.5 TABLET, ORALLY DISINTEGRATING ORAL at 08:21

## 2023-06-05 RX ADMIN — ACETAMINOPHEN 650 MG: 325 TABLET ORAL at 00:50

## 2023-06-05 RX ADMIN — BROMFENAC 1 DROP: 1.03 SOLUTION/ DROPS OPHTHALMIC at 10:47

## 2023-06-05 ASSESSMENT — ACTIVITIES OF DAILY LIVING (ADL)
ADLS_ACUITY_SCORE: 47
ADLS_ACUITY_SCORE: 51
ADLS_ACUITY_SCORE: 47
ADLS_ACUITY_SCORE: 51
ADLS_ACUITY_SCORE: 47
ADLS_ACUITY_SCORE: 51
ADLS_ACUITY_SCORE: 51
ADLS_ACUITY_SCORE: 47
ADLS_ACUITY_SCORE: 51

## 2023-06-05 NOTE — PROGRESS NOTES
Care Management Follow Up    Length of Stay (days): 32    Expected Discharge Date: 06/05/2023     Concerns to be Addressed:       Patient plan of care discussed at interdisciplinary rounds: Yes    Anticipated Discharge Disposition: Home, Home Care     Anticipated Discharge Services: PCA, County Worker  Anticipated Discharge DME: None (sit to stand lift)    Patient/family educated on Medicare website which has current facility and service quality ratings:    Education Provided on the Discharge Plan:    Patient/Family in Agreement with the Plan: yes    Referrals Placed by CM/SW: Homecare, Durable Medical Equipment (DME)  Private pay costs discussed: transportation costs    Additional Information:  Following for discharge planning to home.  Updated by bedside nurse Hollie that they have concerns as patient has difficulty swallowing. Testing is being performed.     Per notes plan is to go home w/ HHC and lift.   CC called [facility] medical and lift is being delivered today.  It is out for delivery and they will call Hattie when 30 minutes out.     CC called [phone] (Isabel [phone]) and patient is open to them for Home RN.  They can add PT/OT.  Added in Home care tab and will send orders upon discharge.  Isabel can do start of care tomorrow.     CC called daughter Hattie and she is wanting patient to return today pending speech work up.  She will make Follow-up appointments.      CC to follow and set up stretcher.     Care Management Discharge Note    Discharge Date: 06/05/2023       Discharge Disposition: Home, Home Care    Discharge Services: PCA, County Worker    Discharge DME: None (sit to stand lift)    Discharge Transportation: family or friend will provide    Private pay costs discussed: transportation costs    Does the patient's insurance plan have a 3 day qualifying hospital stay waiver?  No    PAS Confirmation Code:    Patient/family educated on Medicare website which has current facility  and service quality ratings:      Education Provided on the Discharge Plan:    Persons Notified of Discharge Plans: Pt/Dtr  Patient/Family in Agreement with the Plan: yes    Handoff Referral Completed: No    Additional Information:  Pt discharge to home today.  Orders in.  Faxed discharge home care orders to [facility] [employer] Stretcher [facility] set for [phone].  PCS on chart/Faxed.  Daughter updated.     Bedside Nurse to review AVS.        JADEN Werner, RN

## 2023-06-05 NOTE — PROGRESS NOTES
Patient was decent today. Was a little resistant this morning. Does not follow commands well. He only speaks Samoan, but will not use translation services. meds crushed in apple sauce. Pureed diet. Will be going home tomorrow with family. Full code. Has sitter at bedside due to impulsiveness and pulling out lines.

## 2023-06-05 NOTE — PROGRESS NOTES
Date & Time: 6/3-6/4 [id number]  Diagnosis: Slurred speech   Procedures: NA  Orientation/Cognitive: PJ orientation this shift, Andorran speaking  VS/O2: VSS, RA  Mobility: A2 + lift or teresita steady. A3 to turn/change  Diet: Mod CHO, pureed, mildly thick liquids. Meds crushed in apple sauce  Pain Management: Scheduled tylenol  Bowel & Bladder: Incontinent x1  Abnormal Labs:   Tele: SB  IV Access/Drips/Fluids: L PIV SL   Drains: NA  Tests: CT/MRI - CVA R asia & L hemisphere  Yellow Stop Light: Can be aggressive and swing/grab  Consults: Neuro, hospitalist   Discharge Plan: Possibly home 6/5 w [facility]

## 2023-06-05 NOTE — DISCHARGE SUMMARY
[facility]  Hospitalist Discharge Summary      Date of Admission:  5/4/2023  Date of Discharge:  6/5/2023  5:14 PM  Discharging Provider: Carmen Barry MD  Discharge Service: Hospitalist Service    Discharge Diagnoses   Acute/subacute CVA of the right asia and left cerebellar hemisphere [posterior circulation ischemic strokes]    Diffuse mod-severe mulfitfocal ICAD. Notably in bilat M1, bilat RANDELL, bilat PCA, and BA  History of chronic lacunar infarct  Acute on chronic encephalopathy likely multifactorial from stroke, dementia, delirium from hospitalization    Dementia with behavioral disturbance  History of TBI with bifrontal/R temporal encephalomalacia, Alzheimer's dementia.   Anxiety and depression  History of coronary artery disease status post PCI with LEXI  HTN  Hyperlipidemia  DM type 2, uncontrolled  Peripheral neuropathy  BPH    Clinically Significant Risk Factors     # DMII: A1C = 9.6 % (Ref range: <5.7 %) within past 6 months       Follow-ups Needed After Discharge   Follow-up Appointments     Follow-up and recommended labs and tests       Follow up with primary care provider, Oralia Forrest, within 7 days for   hospital follow- up.  The following labs/tests are recommended: CBC. CMP.      Follow-up with general neurology in clinic in 6 weeks.  Continue aspirin 325 mg oral daily indefinitely, Plavix 75 mg oral daily   for total of 90 days.  Follow-up with psychiatry in clinic in 4 weeks or earlier if symptomatic.  Consider neurocognitive evaluation as outpatient.  Age-appropriate health maintenance on PCP visit.  Consider discussion regarding long-term goals of care on PCP visit.             Unresulted Labs Ordered in the Past 30 Days of this Admission     No orders found from 4/4/2023 to 5/5/2023.      None    Discharge Disposition   Discharged to home with [facility].   Condition at discharge: Fair    Hospital Course   Adam Huber is a 87 year old male  With PMH of HTN, CAD,  CVA, DM type 2, TBI, dementia- admitted on 5/4/2023 with slurred speech; for a detailed HPI- please refer to H&P done by Dr Bernardo Chatman on 05/04/2023.      Acute/subacute CVA of the right asia and left cerebellar hemisphere [posterior circulation ischemic strokes]    Diffuse mod-severe mulfitfocal ICAD. Notably in bilat M1, bilat RANDELL, bilat PCA, and BA  History of chronic lacunar infarct.  - Patient lives at home with his wife, underlying cognitive impairment has PCA services.  Ind[place]ian-speaking.  - Initially admitted with slurred speech, headaches, difficulty walking.  -- HEAD CT: With extensive areas of encephalomalacic changes and gliosis on frontal and temporal lobe, chronic infarct in basal ganglia and corona radiata.  - CTA head and neck multifocal areas of moderate or high-grade stenosis throughout anterior and posterior circulation.  Heavy atherosclerotic plaque left carotid bulb/bifurcation with at least 50% stenosis by modified NASCET criteria. Moderate predominantly noncalcified plaque right carotid bulb/bifurcation without significant stenosis by modified NASCET criteria. No findings for vertebral artery dissection.  - MRI brain 5/4:  Motion limited examination equivocal DWI hyperintensity at the right asia. This may represent artifact or acute ischemia.  Severe bifrontal encephalomalacia.   - Echo - EF 60-65%. No clear regional wall motion abnormalities noted.  No thrombus or cardiac etiology of stroke  - Stroke service recommended aspirin therapy and signed off.       - On 5/13 had new facial droop, code stroke. NCCT without acute pathology, CTA H/N unchanged extensive intracranial/extracranial atherosclerosis, CTP without perfusion deficit. Not an acute therapies candidate.   MRI showed R pontine and L cerebellar ischemic strokes. R pontine lesions correspond to region of asia that was previously called as equivocal stroke vs artifact.  - Stroke neurology impression-- multiple small posterior  circulation ischemic infarcts likely from atherosclerotic disease.  Recommend aspirin 325 mg daily indefinitely.  Plavix 75 mg daily for 90 days.  - Evaluated by vascular surgery for CAD, no role for carotid intervention as current stroke are not in the geographical distribution from left ICA disease.  - On 5/23, patient did have worsening right facial weakness and droop with right arm weakness on 5/23/2023.  Neurology was reconsulted and repeat MRI showed no new stroke.  Felt that this was secondary to evolving pontine stroke.     Continue aspirin 325 mg daily indefinitely.  Continue Plavix 75 mg oral daily for 90 days  Continue Lipitor 40 mg at bedtime.  LDL 90, HDL 66.  -- Telemetry monitoring during hospital stay.  Has already completed 30 days of cardiac monitoring, No cardiac monitor needed upon discharge per stroke team [ update on 6/4 ]  - PT/OT- initially recommended TCU; SW involved; prolonged hospitalization because of the placement; he was accepted to [facility] but family refused him to go there and wanted to take him home  - followed by SLP- diet advanced to pureed diet, mildly thick liquids; aspiration precuations  - SW/CC help appreciated  - Edie lift was delivered to his home  - home care- home RN/PT/OT/SLP/SW arranged    - Follow up with PCP in 1 weeks  - Follow up with General Neuro in 6weeks     Acute on chronic encephalopathy likely multifactorial from stroke, dementia, delirium from hospitalization    Dementia with behavioral disturbance  History of TBI with bifrontal/R temporal encephalomalacia, Alzheimer's dementia.   Anxiety and depression  - Imaging as above. EEG- no seizures    - Psychiatry followed during hospital stay for agitation  - Discontinued PTA scheduled Seroquel and Zyprexa.    - Continue risperidone 0.5 mg sublingual every a.m. and 1 mg sublingual at bedtime.  - Continue PTA Remeron 15 mg at bedtime.  - PRN Seroquel as needed for agitation.  - Scheduled melatonin 3 mg at  8:00 to help with circadian rhythm maintenance.   - during hospital stay- maintained delirium precautions, avoid interruptions during the night as much as possible, allow family visits, reorientation, use  services as much as possible, allow/maintain sleep-wake cycle.  Fall precautions.  - Follow-up with psychiatry as outpatient.  - Neurocognitive evaluation as outpatient     Physical deconditioning from medical illness, senile fragility.  Unwitnessed fall 5/29/2023  Goals of care discussion  Patient requiring assistance for minimal ambulation.  Unwitnessed fall on 5/29.  See RRT notes for details.  Having sitter by the bedside.  -- Hospitalist team, has been discussing with patient's family, and uninterested in hospice care.  Understand risk and benefits including high level of supportive care required -opting for discharge home. Full code at this time.  Discuss long-term goals of care on PCP visit     History of coronary artery disease status post PCI with LEXI  HTN  Hyperlipidemia  - Continue on aspirin and Plavix, statin therapy as above  - Continue amlodipine 5 mg daily, Coreg 25 mg twice daily losartan 75 mg daily    Uncontrolled diabetes mellitus with a hemoglobin A1c of 9.6  [PTA on Metformin 500 mg po qam and 1000 mg q evening]  - here started on Lantus  - after discharge- continue Metformin 500 mg po BID and Lantus 8 units BID  - Lantus is new for him; RN discussed with the family; wife knows how to monitor blood sugars and administer lantus  - glucometer kit ordered here  - monitor BS at home  - follow up with PCP    Neuropathy  - Continue PTA gabapentin      Hypomagnesemia  - Replaced per protocol     BPH   - PTA on finasteride and Flomax; Flomax cannot be crushed and switched to Cardura; continue Proscar.     Consultations This Hospital Stay   NEUROLOGY IP STROKE CONSULT  PATIENT LEARNING CENTER IP CONSULT  SPEECH LANGUAGE PATH ADULT IP CONSULT  PHARMACY IP CONSULT  PHARMACY IP  CONSULT  PHARMACY IP CONSULT  PHYSICAL THERAPY ADULT IP CONSULT  OCCUPATIONAL THERAPY ADULT IP CONSULT  REHAB ADMISSIONS LIAISON IP CONSULT  CARE MANAGEMENT / SOCIAL WORK IP CONSULT  PSYCHIATRY IP CONSULT  VASCULAR SURGERY IP CONSULT  NEUROLOGY IP STROKE CONSULT  SPEECH LANGUAGE PATH ADULT IP CONSULT  VASCULAR ACCESS ADULT IP CONSULT  PHYSICAL THERAPY ADULT IP CONSULT  OCCUPATIONAL THERAPY ADULT IP CONSULT  SPEECH LANGUAGE PATH ADULT IP CONSULT  SMOKING CESSATION PROGRAM IP CONSULT    Code Status   Full Code    Time Spent on this Encounter   I, Carmen Barry MD, personally saw the patient today and spent greater than 30 minutes discharging this patient.       Carmen Barry MD  [facility] NEUROSCIENCE UNIT  [address] [place] [address]  Phone: [phone]  ______________________________________________________________________    Physical Exam   Vital Signs: Temp: 98.1  F (36.7  C) Temp src: Oral BP: (!) 146/64 Pulse: 66   Resp: 16 SpO2: 97 % O2 Device: None (Room air)    Weight: 205 lbs 3.97 oz     GENERAL: awake, confused, NAD, generalized weak, seems to have speech difficulties but difficult to assess because of language barrier   HEENT: head- normocephalic, atraumatic  HEART: Regular rate and rhythm. S1S2. No murmurs  LUNGS: Respirations unlabored  NEURO awake, alert, confused, moving all extremities, not following commands at times  EXTREMITIES: No pedal edema.   SKIN: Warm, dry. No rash   PSYCHIATRY - calm at the time of my visit       Primary Care Physician   Oralia Forrest    Discharge Orders      Home Care Referral      Reason for your hospital stay    You were admitted to the hospital with stroke, acute encephalopathy multifactorial.  Followed by stroke neurology, general neurology, psychiatry team.  Plan for discharge to TCU/long-term care center.  Family opting for discharge home with home care.     Activity    Your activity upon discharge: activity as tolerated      Discharge Instructions    Fall precautions.  Delirium precautions.  Aspiration precautions.     Follow-up and recommended labs and tests     Follow up with primary care provider, Oralia Forrest, within 7 days for hospital follow- up.  The following labs/tests are recommended: CBC. CMP.    Follow-up with general neurology in clinic in 6 weeks.  Continue aspirin 325 mg oral daily indefinitely, Plavix 75 mg oral daily for total of 90 days.  Follow-up with psychiatry in clinic in 4 weeks or earlier if symptomatic.  Consider neurocognitive evaluation as outpatient.  Age-appropriate health maintenance on PCP visit.  Consider discussion regarding long-term goals of care on PCP visit.     Monitor and record    blood glucose 4 times a day, before meals and at bedtime     Monitor and record    Monitor daily blood pressure, heart rate review on provider visit and optimize therapy.     Fall precautions     Edie Lift Order    Sit to stand lift.    Patient is non-weight bearing: No.     I, the undersigned, certify that the above prescribed supplies are medically necessary for this patient and is both reasonable and necessary in reference to accepted standards of medical and necessary in reference to accepted standards of medical practice in the treatment of this patient's condition and is not prescribed as a convenience.    Diagnosis - Acute/subacute CVA of the right asia and left cerebellar hemisphere [posterior circulation ischemic strokes]    Dementia   Physical deconditioning due to CVA, medical illness, senile fragility.    Patient will need it for 1 month and then reevaluate need for the sit to stand lift depending on progress with rehabilitation.     Diet    Follow this diet upon discharge: Orders Placed This Encounter      Room Service      Combination Diet Moderate Consistent Carb (60 g CHO per Meal) Diet; Pureed Diet (level 4); Mildly Thick (level 2)       Significant Results and Procedures   Most Recent 3  CBC's:Recent Labs   Lab Test 05/19/23  0823 05/13/23  0914 05/10/23  0900   WBC 8.0 8.1 8.8   HGB 15.5 17.0 17.1   MCV 91 90 92    164 181     Most Recent 3 BMP's:Recent Labs   Lab Test 06/05/23  0742 06/05/23  0626 06/05/23  0218 05/30/23  1248 05/30/23  0814 05/29/23  0737 05/29/23  0641 05/28/23  1220 05/28/23  0847 05/25/23  1114 05/25/23  0905 05/19/23  1130 05/19/23  0823   NA  --   --   --   --   --   --   --   --  140  --  139  --  138   POTASSIUM  --   --   --   --  4.0  --  4.2  --  4.2  4.0   < > 4.5  4.4   < > 4.3   CHLORIDE  --   --   --   --   --   --   --   --  105  --  106  --  106   CO2  --   --   --   --   --   --   --   --  23  --  17*  --  21*   BUN  --   --   --   --   --   --   --   --  29.3*  --  27.7*  --  30.6*   CR  --   --   --   --   --   --   --   --  0.79  --  0.83  --  0.69   ANIONGAP  --   --   --   --   --   --   --   --  12  --  16*  --  11   LUZ  --   --   --   --   --   --   --   --  9.4  --  9.2  --  8.9   * 129* 146*   < >  --    < >  --    < > 118*   < > 147*   < > 134*    < > = values in this interval not displayed.     Most Recent 2 LFT's:Recent Labs   Lab Test 05/14/23  1421 11/29/22  1330   AST 34 48   ALT 29 51*   ALKPHOS 126 173*   BILITOTAL 0.8 0.9     Most Recent 3 INR's:Recent Labs   Lab Test 05/13/23  0914 05/04/23  1858 12/03/19  1817   INR 1.15 1.07 1.08     Most Recent 3 Creatinines:Recent Labs   Lab Test 05/28/23  0847 05/25/23  0905 05/19/23  0823   CR 0.79 0.83 0.69     Most Recent 3 Hemoglobins:Recent Labs   Lab Test 05/19/23  0823 05/13/23  0914 05/10/23  0900   HGB 15.5 17.0 17.1     Most Recent 3 Troponin's:Recent Labs   Lab Test 09/22/21  2116 12/04/19  0737 12/04/19  0206 12/03/19  2225 03/18/17  1700 01/04/17  1542 01/04/17  1338   TROPI  --  <0.015 <0.015 <0.015   < >  --   --    TROPONIN <0.015  --   --   --   --  0.00 0.01    < > = values in this interval not displayed.     Most Recent 3 BNP's:Recent Labs   Lab Test 12/31/18  1647  03/18/17  1700   NTBNPI 129 367     Most Recent Cholesterol Panel:Recent Labs   Lab Test 05/04/23  1858   CHOL 185   LDL 90   HDL 66   TRIG 146     Most Recent TSH and T4:Recent Labs   Lab Test 01/09/19  1008   TSH 4.57*   T4 1.14     Most Recent Hemoglobin A1c:Recent Labs   Lab Test 05/04/23  1858   A1C 9.6*     Most Recent 6 glucoses:Recent Labs   Lab Test 06/05/23  0742 06/05/23  0626 06/05/23  0218 06/04/23  2108 06/04/23  1633 06/04/23  1147   * 129* 146* 216* 83 155*     Most Recent Urinalysis:Recent Labs   Lab Test 05/14/23  1934   COLOR Yellow   APPEARANCE Clear   URINEGLC Negative   URINEBILI Negative   URINEKETONE Trace*   SG 1.015   UBLD Negative   URINEPH 5.5   PROTEIN Negative   NITRITE Negative   LEUKEST Large*   RBCU 4*   WBCU 60*     Most Recent ESR & CRP:No lab results found.,   Results for orders placed or performed during the hospital encounter of 05/04/23   CT Head w/o Contrast    Narrative    EXAM: CT HEAD W/O CONTRAST, CTA HEAD NECK W CONTRAST  LOCATION: [facility]  DATE/TIME: 5/4/2023 7:07 PM CDT    INDICATION: Code Stroke to evaluate for potential thrombolysis and thrombectomy. PLEASE READ IMMEDIATELY.  COMPARISON: CT brain 09/22/2021. MRI brain 12/03/2019.  CONTRAST: 75 mL Isovue 370  TECHNIQUE: Head and neck CT angiogram with IV contrast. Noncontrast head CT followed by axial helical CT images of the head and neck vessels obtained during the arterial phase of intravenous contrast administration. Axial 2D reconstructed images and   multiplanar 3D MIP reconstructed images of the head and neck vessels were performed by the technologist. Dose reduction techniques were used. All stenosis measurements made according to NASCET criteria unless otherwise specified.    FINDINGS:   NONCONTRAST HEAD CT:   INTRACRANIAL CONTENTS: No finding for intracranial hemorrhage, mass, or convincing finding for acute infarct. Extensive areas of encephalomalacic change and gliosis  are seen involving the anterior aspects of both frontal lobes and anterior right temporal   lobe. Distribution of areas of encephalomalacia favors sequela of prior trauma. Chronic infarcts are seen within both basal ganglia and corona radiata.    Moderate prominence lateral and third ventricles and sulci. Mild to moderate presumed sequela chronic microvascular ischemic change.    VISUALIZED ORBITS/SINUSES/MASTOIDS: Prior right-sided cataract surgery. Small retention cyst/mild maxillary sinus mucosal thickening. Middle ear cavities and mastoid air cells are clear.    BONES/SOFT TISSUES: Calvarium is intact, without suspicious lytic or blastic foci.    HEAD CTA:  ANTERIOR CIRCULATION: Heavy atherosclerotic plaque is seen within both carotid siphons with moderate associated luminal narrowing. There is irregularity and moderate to severe narrowing of the M1 segment of the right middle cerebral artery with   multifocal areas of more mild narrowing involving the posterior division of the right MCA. No vascular cutoff. Multifocal areas of high-grade narrowing are seen within the anterior cerebral arteries with good flow more distally within the vessels. No   vascular cutoff. Multifocal areas of moderate to severe narrowing are seen within the left middle cerebral artery without vascular cutoff.    POSTERIOR CIRCULATION: Right vertebral artery is dominant. Atherosclerotic plaque moderately narrows the proximal intradural right vertebral artery. Small caliber left vertebral artery with severe narrowing of the vessel after the takeoff of the   posteroinferior cerebellar artery. Multifocal areas of moderate to severe narrowing are seen within the proximal and mid basilar artery. Multifocal areas of moderate narrowing are seen within both posterior cerebral arteries. P1 segment of the right   posterior cerebral artery is diminutive and not well seen with flow to the right posterior cerebral artery derived primarily from the  right posterior communicating artery. Small caliber incompletely visualized left posterior communicating artery.    DURAL VENOUS SINUSES: No findings for dural venous sinus thrombosis.    NECK CTA:  RIGHT CAROTID: Moderate predominantly noncalcified atherosclerotic plaque right carotid bulb/bifurcation with mild associated luminal narrowing but no significant stenosis by modified NASCET criteria.    LEFT CAROTID: Heavy atherosclerotic plaque left carotid bulb/bifurcation with moderate associated luminal narrowing and at least 50% stenosis by modified NASCET criteria.    VERTEBRAL ARTERIES: Dominant right vertebral artery. Small caliber left vertebral artery. No significant stenosis or findings for dissection.    AORTIC ARCH: Coarse atherosclerotic plaque is seen within the aortic arch and proximal great vessels.    NONVASCULAR STRUCTURES: Visualized lung apices reveal a small amount of dependent atelectasis. Visualized osseous structures are without suspicious lytic or blastic foci.      Impression    IMPRESSION:   HEAD CT:  1.  Extensive areas of encephalomalacic change and gliosis are again seen within the anterior aspects of both frontal lobes and right temporal lobe. These are stable compared to prior.    2.  Stable chronic infarcts are seen within both basal ganglia and corona radiata.    3.  Moderate volume loss and presumed sequela chronic microvascular ischemic change.    HEAD CTA:     1. Multifocal areas of moderate or high-grade stenosis are seen throughout the anterior and posterior circulation. Areas of narrowing are nonspecific but favored to be atherosclerotic in etiology. Heavy atherosclerotic calcifications are seen within both   carotid siphons. No vascular cutoff.    NECK CTA:  1.  Heavy atherosclerotic plaque left carotid bulb/bifurcation with at least 50% stenosis by modified NASCET criteria. Moderate predominantly noncalcified plaque right carotid bulb/bifurcation without significant stenosis by  modified NASCET criteria. No   findings for vertebral artery dissection.      Noncontrast head CT findings and CTA vascular findings were called to Dr. Diaz at 1907 and 1919 hours respectively.   CTA Head Neck with Contrast    Narrative    EXAM: CT HEAD W/O CONTRAST, CTA HEAD NECK W CONTRAST  LOCATION: [facility]  DATE/TIME: 5/4/2023 7:07 PM CDT    INDICATION: Code Stroke to evaluate for potential thrombolysis and thrombectomy. PLEASE READ IMMEDIATELY.  COMPARISON: CT brain 09/22/2021. MRI brain 12/03/2019.  CONTRAST: 75 mL Isovue 370  TECHNIQUE: Head and neck CT angiogram with IV contrast. Noncontrast head CT followed by axial helical CT images of the head and neck vessels obtained during the arterial phase of intravenous contrast administration. Axial 2D reconstructed images and   multiplanar 3D MIP reconstructed images of the head and neck vessels were performed by the technologist. Dose reduction techniques were used. All stenosis measurements made according to NASCET criteria unless otherwise specified.    FINDINGS:   NONCONTRAST HEAD CT:   INTRACRANIAL CONTENTS: No finding for intracranial hemorrhage, mass, or convincing finding for acute infarct. Extensive areas of encephalomalacic change and gliosis are seen involving the anterior aspects of both frontal lobes and anterior right temporal   lobe. Distribution of areas of encephalomalacia favors sequela of prior trauma. Chronic infarcts are seen within both basal ganglia and corona radiata.    Moderate prominence lateral and third ventricles and sulci. Mild to moderate presumed sequela chronic microvascular ischemic change.    VISUALIZED ORBITS/SINUSES/MASTOIDS: Prior right-sided cataract surgery. Small retention cyst/mild maxillary sinus mucosal thickening. Middle ear cavities and mastoid air cells are clear.    BONES/SOFT TISSUES: Calvarium is intact, without suspicious lytic or blastic foci.    HEAD CTA:  ANTERIOR CIRCULATION:  Heavy atherosclerotic plaque is seen within both carotid siphons with moderate associated luminal narrowing. There is irregularity and moderate to severe narrowing of the M1 segment of the right middle cerebral artery with   multifocal areas of more mild narrowing involving the posterior division of the right MCA. No vascular cutoff. Multifocal areas of high-grade narrowing are seen within the anterior cerebral arteries with good flow more distally within the vessels. No   vascular cutoff. Multifocal areas of moderate to severe narrowing are seen within the left middle cerebral artery without vascular cutoff.    POSTERIOR CIRCULATION: Right vertebral artery is dominant. Atherosclerotic plaque moderately narrows the proximal intradural right vertebral artery. Small caliber left vertebral artery with severe narrowing of the vessel after the takeoff of the   posteroinferior cerebellar artery. Multifocal areas of moderate to severe narrowing are seen within the proximal and mid basilar artery. Multifocal areas of moderate narrowing are seen within both posterior cerebral arteries. P1 segment of the right   posterior cerebral artery is diminutive and not well seen with flow to the right posterior cerebral artery derived primarily from the right posterior communicating artery. Small caliber incompletely visualized left posterior communicating artery.    DURAL VENOUS SINUSES: No findings for dural venous sinus thrombosis.    NECK CTA:  RIGHT CAROTID: Moderate predominantly noncalcified atherosclerotic plaque right carotid bulb/bifurcation with mild associated luminal narrowing but no significant stenosis by modified NASCET criteria.    LEFT CAROTID: Heavy atherosclerotic plaque left carotid bulb/bifurcation with moderate associated luminal narrowing and at least 50% stenosis by modified NASCET criteria.    VERTEBRAL ARTERIES: Dominant right vertebral artery. Small caliber left vertebral artery. No significant stenosis or  findings for dissection.    AORTIC ARCH: Coarse atherosclerotic plaque is seen within the aortic arch and proximal great vessels.    NONVASCULAR STRUCTURES: Visualized lung apices reveal a small amount of dependent atelectasis. Visualized osseous structures are without suspicious lytic or blastic foci.      Impression    IMPRESSION:   HEAD CT:  1.  Extensive areas of encephalomalacic change and gliosis are again seen within the anterior aspects of both frontal lobes and right temporal lobe. These are stable compared to prior.    2.  Stable chronic infarcts are seen within both basal ganglia and corona radiata.    3.  Moderate volume loss and presumed sequela chronic microvascular ischemic change.    HEAD CTA:     1. Multifocal areas of moderate or high-grade stenosis are seen throughout the anterior and posterior circulation. Areas of narrowing are nonspecific but favored to be atherosclerotic in etiology. Heavy atherosclerotic calcifications are seen within both   carotid siphons. No vascular cutoff.    NECK CTA:  1.  Heavy atherosclerotic plaque left carotid bulb/bifurcation with at least 50% stenosis by modified NASCET criteria. Moderate predominantly noncalcified plaque right carotid bulb/bifurcation without significant stenosis by modified NASCET criteria. No   findings for vertebral artery dissection.      Noncontrast head CT findings and CTA vascular findings were called to Dr. Diaz at 1907 and 1919 hours respectively.   MR Brain w/o Contrast    Narrative    EXAM: MR BRAIN W/O CONTRAST  LOCATION: [facility]  DATE/TIME: 5/4/2023 9:48 PM CDT    INDICATION: Slurred speech, evaluate for evidence of stroke  COMPARISON: CT head 05/03/2023  TECHNIQUE: Routine multiplanar multisequence head MRI without intravenous contrast. Contrast not administered secondary to patient cooperation.    FINDINGS: Examination is significantly limited secondary to patient motion artifact.  INTRACRANIAL  CONTENTS: Question area of increased midline signal at the right central asia series 1002 image 10 without definite corresponding low ADC for T2 signal. No mass, acute hemorrhage, or extra-axial fluid collections. There is bifrontal and   right temporal encephalomalacia. 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 Confluent nonspecific T2/FLAIR hyperintensities within the cerebral white matter most consistent with advanced microvascular ischemic change. Moderate   generalized cerebral atrophy. No hydrocephalus. Normal position of the cerebellar tonsils.     SELLA: No abnormality accounting for technique.    OSSEOUS STRUCTURES/SOFT TISSUES: Normal marrow signal. The major intracranial vascular flow voids are maintained.     ORBITS: Prior right cataract surgery. Visualized portions of the orbits are otherwise unremarkable.     SINUSES/MASTOIDS: No paranasal sinus mucosal disease. No middle ear or mastoid effusion.       Impression    IMPRESSION:  1.  Motion limited examination equivocal DWI hyperintensity at the right asia. This may represent artifact or acute ischemia.  2.  Generalized brain atrophy and presumed microvascular ischemic changes as detailed above.  3.  Severe bifrontal encephalomalacia.   XR Video Swallow with SLP or OT    Narrative    VIDEO SWALLOWING EVALUATION   5/9/2023 9:30 AM     HISTORY: dysphagia    COMPARISON: None.    FLUOROSCOPY TIME: 1.9 minutes.  SPOT IMAGES OR CINE RUNS: 7      Impression    IMPRESSION: Penetration to the cords with thin consistency. Refer to  speech pathology report.    FABIOLA MARIN MD         SYSTEM ID:  [id number]   CT Head w/o Contrast    Narrative    EXAM: CT HEAD WITHOUT CONTRAST  LOCATION: [facility]  DATE/TIME: 05/13/2023, 9:29 AM CDT    INDICATION: CODE STROKE, right-sided facial droop.  COMPARISON: None.  TECHNIQUE: Routine CT Head without IV contrast. Multiplanar reformats. Dose reduction techniques were  used.      Impression    IMPRESSION: No evidence of hemorrhage. Bilateral frontotemporal encephalomalacia, likely due to prior trauma. Multiple small infarcts involving the basal ganglia, thalami, and cerebellum. No convincing acute infarct by CT. Background of volume loss and   white matter hypoattenuation likely represents chronic small vessel ischemic change. No acute osseous abnormality.    Findings were discussed by phone between Dr. Chapa and Dr. Oshea at 9:38 AM on 05/13/2023.     CTA Head Neck w Contrast    Narrative    EXAM: CTA HEAD NECK WITH CONTRAST  LOCATION: [facility]  DATE/TIME: 05/13/2023, 9:30 AM CDT    INDICATION: CODE STROKE, right facial droop.  COMPARISON: CTA of the head and neck 05/04/2023.  CONTRAST: 75 mL Isovue 370.  TECHNIQUE: Axial helical CT images of the head and neck vessels obtained during the arterial phase of intravenous contrast administration. Axial 2D reconstructed images and multiplanar 3D MIP reconstructed images of the head and neck vessels were   performed by the technologist. Dose reduction techniques were used. All stenosis measurements made according to NASCET criteria unless otherwise specified.    FINDINGS:     HEAD CTA:  Widespread intracranial atherosclerotic plaquing with multiple stenoses, not appreciably changed. Severe stenosis involving the distal right vertebral artery V4 segment. Diminutive left vertebral artery V4 segment with probably some mild stenosis at the   distal segment. Multiple moderate to severe basilar artery stenoses, unchanged. Moderate stenoses involving the bilateral posterior communicating arteries. Multiple moderate posterior cerebral artery stenoses bilaterally. Moderate stenosis involving the   right middle cerebral artery M1 segment. Multiple mild to moderate M2 branch stenoses. Moderate stenosis involving the left middle cerebral artery M1 segment. Moderate to severe stenoses involving multiple left middle  cerebral artery M2 branches, not   appreciably changed. Multilevel moderate anterior cerebral artery stenoses, not appreciably changed.    NECK CTA:  No evidence of large vessel occlusion or dissection. Scattered atherosclerotic plaquing. Approximately 50-60% stenosis of the proximal left internal carotid artery, unchanged.    INCIDENTAL FINDINGS: Presumed atelectasis at the lung apices. 4 mm nodule within the left upper lung with questionable calcification, not appreciably changed compared to the prior chest CT. Cervical spine degenerative changes with multiple stenoses.      Impression    IMPRESSION:     HEAD CTA:   1.  No evidence of large vessel occlusion.  2.  Widespread extensive intracranial atherosclerotic plaquing with multiple stenoses as detailed, not appreciably changed.    NECK CTA:  1.  Approximately 50-60% stenosis of the proximal left internal carotid artery, unchanged.  2.  Otherwise, no evidence of large vessel occlusion or high-grade stenosis.       CT Head Perfusion w Contrast    Narrative    EXAM: CT HEAD PERFUSION W CONTRAST  LOCATION: [facility]  DATE/TIME: 5/13/2023 9:59 AM CDT    INDICATION: CODE STROKE, right facial droop  COMPARISON: None.  TECHNIQUE: CT cerebral perfusion was performed utilizing a second contrast bolus. Perfusion data were post processed with generation of standard perfusion maps and estimation of ischemic/infarcted volumes utilizing standard threshold values. Dose   reduction techniques were used. All stenosis measurements made according to NASCET criteria unless otherwise specified.  CONTRAST: 50 mL Isovue 370      Impression    IMPRESSION: No convincing large segment perfusion mismatch. Scattered posterior circulation transit time asymmetries, presumably artifactual. MRI could be performed if indicated.   MR Brain w/o Contrast    Narrative    EXAM: MR BRAIN WITHOUT CONTRAST  LOCATION: [facility]  DATE/TIME:  05/13/2023, 12:21 PM CDT    INDICATION: Code stroke. Right-sided facial droop.  COMPARISON: 05/13/2023 head CT/head and neck CTA, 05/04/2023 brain MRI.  TECHNIQUE: Routine multiplanar multisequence head MRI without intravenous contrast.    FINDINGS:  INTRACRANIAL CONTENTS: Two small wedge-shaped foci of FLAIR hyperintense diffusion restriction in the right asia are compatible with acute to very early subacute infarct. Two additional punctate foci of diffusion restriction without FLAIR hyperintensity   compatible with acute infarcts in the left cerebellar hemisphere medially. No intracranial hemorrhage. No extra-axial collection. Multifocal encephalomalacia involving the anterior temporal lobes and anteroinferior frontal lobes bilaterally. Additional   chronic lacunar infarct in the right corona radiata, with a small chronic infarct in the medial inferior left cerebellar hemisphere. Moderate diffuse parenchymal volume loss. Ventricular size is in keeping with this volume loss. Moderate burden FLAIR   hyperintense chronic small vessel ischemic change. Hemosiderin staining from chronic microhemorrhage in the right precentral gyrus. Additional presumed hemosiderin staining at the posterior right lentiform nucleus. Major intracranial vascular flow-voids   are evaluated to much better effect on recent head and neck CTA. Cerebellar tonsils are normally positioned.    SELLA: No abnormality accounting for technique.    OSSEOUS STRUCTURES/SOFT TISSUES: Normal marrow signal.     ORBITS: Prior right cataract surgery. Visualized portions of the orbits are otherwise unremarkable.     SINUSES/MASTOIDS: Mild mucosal thickening scattered about the paranasal sinuses. No middle ear or mastoid effusion.       Impression    IMPRESSION:  1.  Two small foci of acute to very early subacute infarct in the right asia.    2.  Two punctate foci of acute infarct in the medial left cerebellar hemisphere.    3.  Multifocal encephalomalacia at the  anteroinferior frontal lobes and the anterior temporal lobes bilaterally. In this location, sequelae of prior trauma is a likely cause.    4.  Background age-related changes as above.       US Carotid Bilateral    Narrative    EXAM: US CAROTID BILATERAL  LOCATION: [facility]  DATE/TIME: 5/13/2023 3:45 PM CDT    INDICATION: L ICA stenosis. Need duplex to confirm degree of stenosis  COMPARISON: None.  TECHNIQUE: Duplex exam performed utilizing 2D gray-scale imaging, Doppler interrogation with color-flow and spectral waveform analysis. The percent diameter stenosis is determined using NASCET criteria and Society of Radiologists in Ultrasound Consensus   Criteria.    FINDINGS:    There is minimal atherosclerotic plaque at the right carotid bifurcation and proximal internal carotid artery. Antegrade blood flow in the right vertebral artery. There is mild to moderate mixed atherosclerotic plaque at the left carotid arterial   bifurcation and proximal internal carotid artery. Antegrade blood flow in the left vertebral artery.    VELOCITY CHART:  CCA   Right: 82/9 cm/s   Left: 73/10 cm/s  ICA   Right: 65/19 cm/s   Left: 131/35 cm/s  ECA   Right: 106/10 cm/s   Left: 67/0 cm/s  ICA/CCA PSV Ratio   Right: 0.8   Left: 1.8      Impression    IMPRESSION:  1.  Minimal plaque formation, velocities consistent with less than 50% stenosis in the right internal carotid artery.  2.  Mild to moderate plaque formation, velocities consistent with 50-69% stenosis in the left internal carotid artery.  3.  Flow within the vertebral arteries is antegrade.   CTA Head with Contrast    Narrative    EXAM: CTA HEAD WITH CONTRAST, CT HEAD W/O CONTRAST  LOCATION: [facility]  DATE/TIME: 5/14/2023 6:08 PM CDT    INDICATION: Stroke.  COMPARISON: Brain MRI 05/13/2023. CTA head and neck 05/13/2023.  CONTRAST: 75 mL Isovue 370.      TECHNIQUE: Head CT angiogram with IV contrast. Noncontrast head CT  followed by axial helical CT images of the intracranial vessels obtained during the arterial phase of intravenous contrast administration. Axial helical 2D reconstructed images and   multiplanar 3D MIP reconstructed images of the intracranial vessels were performed by the technologist. Dose reduction techniques were used.     FINDINGS:   NONCONTRAST HEAD CT:   INTRACRANIAL CONTENTS: No intracranial hemorrhage, extraaxial collection, or mass effect. Bifrontal and anterior temporal encephalomalacia, asymmetric on the right. Chronic lacunar infarctions within the basal ganglia. Small chronic infarctions bilateral   cerebellar hemispheres. Acute infarctions within the right asia seen on previous MRI are not well visualized by CT. Mild to moderate presumed chronic small vessel ischemic changes. Moderate generalized volume loss. No hydrocephalus.     VISUALIZED ORBITS/SINUSES/MASTOIDS: No intraorbital abnormality. No paranasal sinus mucosal disease. No middle ear or mastoid effusion.    BONES/SOFT TISSUES: No acute abnormality.    HEAD CTA:  ANTERIOR CIRCULATION: Diffuse plaque within the carotid siphons causing mild diffuse stenosis. Multifocal stenoses throughout the anterior and middle cerebral arteries. Moderate stenosis of the M1 segments. Moderate to severe stenoses of left M2   segments. Multifocal moderate stenoses within the anterior cerebral artery pericallosal branches. This has not significantly changed. No aneurysm. Standard [place] of [place] anatomy.    POSTERIOR CIRCULATION: Right dominant vertebral artery supplies the basilar artery with minimal contribution from the left. Severe stenosis of the right vertebral artery just proximal to the vertebrobasilar junction. Moderate stenosis of the mid basilar   artery. Severe stenosis at the origin of the right posterior inferior cerebellar artery and at the origins of the superior cerebellar arteries. Multifocal moderate to severe stenoses within the posterior  cerebral artery branches.      DURAL VENOUS SINUSES: Not well evaluated on a technical basis.      Impression    IMPRESSION:   HEAD CT:  1.  No acute hemorrhage.  2.  Chronic infarctions and chronic bifrontal and anterior temporal encephalomalacia.  3.  Acute infarctions right asia seen on previous MRI are not well visualized by CT.    HEAD CTA:   1.  No large artery occlusion.  2.  Extensive intracranial atherosclerosis with multifocal stenoses involving all major vessels and peripheral branches, unchanged.   CT Head w/o Contrast    Narrative    EXAM: CTA HEAD WITH CONTRAST, CT HEAD W/O CONTRAST  LOCATION: [facility]  DATE/TIME: 5/14/2023 6:08 PM CDT    INDICATION: Stroke.  COMPARISON: Brain MRI 05/13/2023. CTA head and neck 05/13/2023.  CONTRAST: 75 mL Isovue 370.      TECHNIQUE: Head CT angiogram with IV contrast. Noncontrast head CT followed by axial helical CT images of the intracranial vessels obtained during the arterial phase of intravenous contrast administration. Axial helical 2D reconstructed images and   multiplanar 3D MIP reconstructed images of the intracranial vessels were performed by the technologist. Dose reduction techniques were used.     FINDINGS:   NONCONTRAST HEAD CT:   INTRACRANIAL CONTENTS: No intracranial hemorrhage, extraaxial collection, or mass effect. Bifrontal and anterior temporal encephalomalacia, asymmetric on the right. Chronic lacunar infarctions within the basal ganglia. Small chronic infarctions bilateral   cerebellar hemispheres. Acute infarctions within the right asia seen on previous MRI are not well visualized by CT. Mild to moderate presumed chronic small vessel ischemic changes. Moderate generalized volume loss. No hydrocephalus.     VISUALIZED ORBITS/SINUSES/MASTOIDS: No intraorbital abnormality. No paranasal sinus mucosal disease. No middle ear or mastoid effusion.    BONES/SOFT TISSUES: No acute abnormality.    HEAD CTA:  ANTERIOR CIRCULATION:  Diffuse plaque within the carotid siphons causing mild diffuse stenosis. Multifocal stenoses throughout the anterior and middle cerebral arteries. Moderate stenosis of the M1 segments. Moderate to severe stenoses of left M2   segments. Multifocal moderate stenoses within the anterior cerebral artery pericallosal branches. This has not significantly changed. No aneurysm. Standard [place] of [place]ick anatomy.    POSTERIOR CIRCULATION: Right dominant vertebral artery supplies the basilar artery with minimal contribution from the left. Severe stenosis of the right vertebral artery just proximal to the vertebrobasilar junction. Moderate stenosis of the mid basilar   artery. Severe stenosis at the origin of the right posterior inferior cerebellar artery and at the origins of the superior cerebellar arteries. Multifocal moderate to severe stenoses within the posterior cerebral artery branches.      DURAL VENOUS SINUSES: Not well evaluated on a technical basis.      Impression    IMPRESSION:   HEAD CT:  1.  No acute hemorrhage.  2.  Chronic infarctions and chronic bifrontal and anterior temporal encephalomalacia.  3.  Acute infarctions right asia seen on previous MRI are not well visualized by CT.    HEAD CTA:   1.  No large artery occlusion.  2.  Extensive intracranial atherosclerosis with multifocal stenoses involving all major vessels and peripheral branches, unchanged.   CT Head w/o Contrast    Narrative    EXAM: CT HEAD W/O CONTRAST  LOCATION: [facility]  DATE/TIME: 5/21/2023 6:43 PM CDT    INDICATION: Eval interval neuro change, increased encephalopathy.  COMPARISON: CT head 5/14/2023. MRI head 5/13/2023.  TECHNIQUE: Routine CT Head without IV contrast. Multiplanar reformats. Dose reduction techniques were used.    FINDINGS:  INTRACRANIAL CONTENTS: Unchanged encephalomalacia in the bilateral frontal poles/gyrus rectus and orbitofrontal regions, which may be sequela of prior trauma. Unchanged  gliosis/encephalomalacia involving the right greater than left temporal poles, also   probably sequela of previous trauma. Multiple small foci of hypoattenuation in the bilateral basal ganglia/corona radiata regions, as before, likely due to small chronic infarcts. A few small chronic infarcts in the bilateral cerebellar hemispheres, as   before. The small areas of acute/early subacute infarction in the asia and left cerebellar hemisphere on recent MRI are not well seen on CT. No acute intracranial hemorrhage, extra axial fluid collection, or mass effect/herniation.    Unchanged mild ex vacuo dilatation of the frontal horns/bodies of the lateral ventricles, as well as the third ventricle. No hydrocephalus. Mild to moderate generalized brain parenchymal volume loss. Confluent hypoattenuation in the cerebral white   matter, nonspecific, unchanged. This may represent sequela of chronic small vessel ischemic change. Scattered intracranial atherosclerotic calcifications, as before.    VISUALIZED ORBITS/SINUSES/MASTOIDS: No intraorbital abnormality. No paranasal sinus mucosal disease. No middle ear or mastoid effusion.    BONES/SOFT TISSUES: No acute abnormality.      Impression    IMPRESSION:  1.  No significant interval change compared to 5/14/2023.  2.  Unchanged presumed posttraumatic gliosis/encephalomalacia involving the bilateral frontal and temporal lobes.  3.  Unchanged small chronic infarcts in the bilateral basal ganglia/corona radiata and cerebellar regions.  4.  The small acute/subacute infarcts involving the asia and left cerebellar hemisphere on most recent MRI are not well seen on CT.  5.  Brain atrophy and other chronic findings, as detailed.   MR Brain w/o Contrast    Narrative    MRI BRAIN WITHOUT CONTRAST  5/24/2023 2:35 PM    HISTORY:  Right arm ataxia    TECHNIQUE:  Multiplanar, multisequence MRI of the brain without  gadolinium IV contrast material.      COMPARISON:  MRI brain May 13,  2023    FINDINGS involving right paramedian pontine infarct is present now in  the subacute stage. No progression. No mass effect or hemorrhagic  transformation. Previously seen, tiny cerebellar infarcts on the left  are not presently identified. Extensive, chronic encephalomalacia  involving the frontal lobes bilaterally and right greater than left  temporal lobes noted presumably sequela from previous closed head  injury. Chronic lacunar infarcts noted in the basal ganglia small,  chronic bilateral cerebellar infarcts. Severe chronic small vessel  vascular changes in hemispheric white matter, basal ganglia and asia.  Chronic microhemorrhages in the frontal lobes and basal ganglia    Normal position of the cerebellar tonsils. Sella..       Impression    IMPRESSION:     1. Evolving, small, subacute infarct in the right paramedian asia. No  evidence for infarct extension or hemorrhagic transformation.  2. Resolving left cerebellar infarcts    3. Atrophy, small vessel vascular change and multifocal  encephalomalacia. Small, chronic lacunar infarcts as above.  4. No acute intracranial process. Specifically, no new or progressive  areas of ischemic change.    ALIE STEVENSON MD         SYSTEM ID:  [id number]   CT Head w/o Contrast    Narrative    CT SCAN OF THE HEAD WITHOUT CONTRAST   6/5/2023 12:12 PM     HISTORY: Recent cerebral vascular accident, worsening dysphagia?, rule  out new cerebral vascular accident      TECHNIQUE:  Axial images of the head and coronal reformations without  IV contrast material. Radiation dose for this scan was reduced using  automated exposure control, adjustment of the mA and/or kV according  to patient size, or iterative reconstruction technique.    COMPARISON: MRI of the brain 5/24/2023. CT head 5/21/2023.    FINDINGS: As before, there are prominent areas of  gliosis/encephalomalacia involving the bilateral frontal poles,  straight gyri, and orbital frontal gyri, and also involving  the right  greater than left temporal poles/right temporal lobe. This appears  most consistent with chronic traumatic parenchymal injury. Unchanged  small chronic infarcts in the bilateral basal ganglia/corona radiata  regions and in the inferior aspect of the left cerebellar hemisphere.  There is unchanged mild ex vacuo dilatation of the supratentorial  ventricular system. No definite CT findings of large acute or subacute  transcortical infarct. Mild to moderate generalized brain parenchymal  volume loss and presumed chronic small vessel ischemic change. No  acute intracranial hemorrhage, extra axial fluid collection, or mass  effect. Scattered intracranial atherosclerotic calcifications. The  paranasal sinuses and mastoids are clear. The calvarium appears  intact.      Impression    IMPRESSION:  1. No CT findings of acute intracranial process. No definite interval  change.  2. 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  3. Chronic small infarcts in the bilateral basal ganglia/corona  radiata and left cerebellar regions.   Echocardiogram Complete - For age > 60 yrs     Value    LVEF  60-65%    Narrative    [id number]  [id number]  [id number]  [id number]^DONTAE^SRINIVASA^DAIN     [facility]  [facility]  [address] [place] [address] [address]  [place] [address]  Phone [phone]  Fax [phone]     Name: CHANI ELENA  MRN: [id number]  : 1935  Study Date: 2023 10:02 AM  Age: 87 yrs  Gender: Male  Patient Location: [facility]  Reason For Study: Cerebrovascular Incident  Ordering Physician: SRINIVASA DIGGS  Referring Physician: SRINIVASA DIGGS  Performed By: CARRIE Hector     BSA: 2.2 m2  Height: 71 in  Weight: 213 lb  HR: 59  BP: 182/85 mmHg  ______________________________________________________________________________  Procedure  Complete Portable Echo Adult. Optison (NDC #0285-7998)  given intravenously.     ______________________________________________________________________________  Interpretation Summary     Proximal septal thickening is noted.  Limited image quality and limited image windows makes analysis difficult. No  clear regional wall motion abnormalities noted  Diastolic Doppler findings (E/E' ratio and/or other parameters) suggest left  ventricular filling pressures are increased.  There is no thrombus seen in the left ventricle.  The mean mitral valve gradient is 3-4mmHg.  IVC diameter >2.1 cm collapsing <50% with sniff suggests a high RA pressure  estimated at 15 mmHg or greater.  Aortic valve is not well seen but it is abnormal and heavily  calcified/sclerotic. Likely no significant aortic valve stenosis. Consider REBECA  if additonal imaging is needed  The study was technically difficult.  ______________________________________________________________________________  Left Ventricle  The left ventricle is normal in size. There is mild concentric left  ventricular hypertrophy. Proximal septal thickening is noted. The visual  ejection fraction is 60-65%. Limited image quality and limited image windows  makes analysis difficult. No clear regional wall motion abnormalities noted.  Grade I or early diastolic dysfunction. Diastolic Doppler findings (E/E' ratio  and/or other parameters) suggest left ventricular filling pressures are  increased. Regional wall motion abnormalities cannot be excluded due to  limited visualization. There is no thrombus seen in the left ventricle.     Right Ventricle  The right ventricle is normal in size and function.     Atria  The left atrium is mildly dilated. Right atrial size is normal.     Mitral Valve  There is mild to moderate mitral annular calcification. The mean mitral valve  gradient is 3-4mmHg.     Tricuspid Valve  Normal tricuspid valve. Right ventricular systolic pressure could not be  approximated due to inadequate tricuspid regurgitation.  IVC diameter >2.1 cm  collapsing <50% with sniff suggests a high RA pressure estimated at 15 mmHg or  greater.     Aortic Valve  The aortic valve is not well visualized. Aortic valve is not well seen but it  is abnormal and heavily calcified/sclerotic. Likely no significant aortic  valve stenosis. Consider REBECA if additonal imaging is needed. There is trace  aortic regurgitation.     Pulmonic Valve  The pulmonic valve is not well seen, but is grossly normal.     Vessels  The ascending aorta is Borderline dilated.     Pericardium  The pericardium appears normal.     Rhythm  Sinus rhythm was noted.     ______________________________________________________________________________  MMode/2D Measurements & Calculations  IVSd: 1.1 cm  LVIDd: 4.8 cm  LVIDs: 2.4 cm  LVPWd: 1.0 cm  FS: 50.3 %  LV mass(C)d: 188.0 grams  LV mass(C)dI: 86.8 grams/m2  Ao root diam: 3.7 cm  LA dimension: 5.0 cm     asc Aorta Diam: 3.8 cm  LA/Ao: 1.3  LVOT diam: 2.2 cm  LVOT area: 3.8 cm2  LA Volume (BP): 75.1 ml  LA Volume Index (BP): 34.6 ml/m2     LA Volume Indexed (AL/bp): 36.9 ml/m2  RV Base: 3.1 cm  RWT: 0.41  TAPSE: 2.3 cm     Doppler Measurements & Calculations  MV E max rian: 107.0 cm/sec  MV A max rian: 133.0 cm/sec  MV E/A: 0.80  MV max P.5 mmHg  MV mean PG: 3.0 mmHg  MV V2 VTI: 33.6 cm  MVA(VTI): 1.9 cm2  MV dec time: 0.38 sec  LV V1 VTI: 16.6 cm  SV(LVOT): 63.2 ml  SI(LVOT): 29.2 ml/m2  PA acc time: 0.13 sec  Pulm Sys Rian: 71.8 cm/sec  Pulm Calloway Rian: 43.4 cm/sec  Pulm A Revs Rian: 28.8 cm/sec  Pulm S/D: 1.7  E/E' av.9  Lateral E/e': 17.5     Medial E/e': 24.3  RV S Rian: 10.5 cm/sec     ______________________________________________________________________________  Report approved by: Jodi García 2023 02:00 PM               Discharge Medications   Current Discharge Medication List      START taking these medications    Details   amLODIPine (NORVASC) 5 MG tablet Take 1 tablet (5 mg) by mouth daily  Qty: 30 tablet,  Refills: 0    Associated Diagnoses: History of CVA (cerebrovascular accident)      atorvastatin (LIPITOR) 40 MG tablet Take 1 tablet (40 mg) by mouth every evening  Qty: 30 tablet, Refills: 0    Associated Diagnoses: History of CVA (cerebrovascular accident)      blood glucose (NO BRAND SPECIFIED) test strip Use to test blood sugar 4 times daily or as directed.  Qty: 100 strip, Refills: 6    Comments: To accompany: glucometer per insurance.  Associated Diagnoses: Type 2 diabetes mellitus without complication, unspecified whether long term insulin use (H)      blood glucose monitoring (NO BRAND SPECIFIED) meter device kit Use to test blood sugar 4 times daily or as directed.  Qty: 1 kit, Refills: 0    Comments: Preferred blood glucose meter OR supplies to accompany: glucometer per insurance  Associated Diagnoses: Type 2 diabetes mellitus without complication, unspecified whether long term insulin use (H)      clopidogrel (PLAVIX) 75 MG tablet Take 1 tablet (75 mg) by mouth daily  Qty: 30 tablet, Refills: 0    Associated Diagnoses: History of CVA (cerebrovascular accident)      doxazosin (CARDURA) 1 MG tablet Take 1 tablet (1 mg) by mouth daily  Qty: 30 tablet, Refills: 0    Comments: Future refills by PCP Dr. Oralia Forrest with phone number [phone].  Associated Diagnoses: Benign prostatic hyperplasia, unspecified whether lower urinary tract symptoms present      insulin glargine (LANTUS PEN) 100 UNIT/ML pen Inject 8 Units Subcutaneous 2 times daily  Qty: 3 mL, Refills: 0    Comments: If Lantus is not covered by insurance, may substitute Basaglar or Semglee or other insulin glargine product per insurance preference at same dose and frequency.    Associated Diagnoses: History of CVA (cerebrovascular accident)      !! risperiDONE (RISPERDAL M-TABS) 0.5 MG ODT Place 1 tablet (0.5 mg) under the tongue every morning (before breakfast)  Qty: 30 tablet, Refills: 0    Associated Diagnoses: History of CVA (cerebrovascular  accident)      !! risperiDONE (RISPERDAL M-TABS) 0.5 MG ODT Place 1 tablet (0.5 mg) under the tongue 2 times daily as needed (agitation)  Qty: 30 tablet, Refills: 0    Associated Diagnoses: History of CVA (cerebrovascular accident)      !! risperiDONE (RISPERDAL M-TABS) 1 MG ODT Place 1 tablet (1 mg) under the tongue At Bedtime  Qty: 30 tablet, Refills: 0    Associated Diagnoses: History of CVA (cerebrovascular accident)      thin (NO BRAND SPECIFIED) lancets Use with lanceting device.  Qty: 100 each, Refills: 6    Comments: To accompany: per insurance.  Associated Diagnoses: Type 2 diabetes mellitus without complication, unspecified whether long term insulin use (H)       !! - Potential duplicate medications found. Please discuss with provider.      CONTINUE these medications which have CHANGED    Details   acetaminophen (TYLENOL) 325 MG tablet Take 2 tablets (650 mg) by mouth every 8 hours  Qty: 100 tablet, Refills: 0    Comments: Future refills by PCP Dr. Oralia Forrest with phone number [phone].  Associated Diagnoses: Pain      aspirin (ASA) 325 MG EC tablet Take 1 tablet (325 mg) by mouth daily  Qty: 30 tablet, Refills: 0    Comments: Future refills by PCP Dr. Oralia Forrest with phone number [phone].  Associated Diagnoses: History of CVA (cerebrovascular accident)      losartan (COZAAR) 25 MG tablet Take 3 tablets (75 mg) by mouth daily  Qty: 30 tablet, Refills: 0    Comments: Future refills by PCP Dr. Oralia Forrest with phone number [phone].  Associated Diagnoses: Essential hypertension      metFORMIN (GLUCOPHAGE) 500 MG tablet Take 1 tablet (500 mg) by mouth 2 times daily (with meals)  Qty: 60 tablet, Refills: 0    Associated Diagnoses: Type 2 diabetes mellitus without complication, unspecified whether long term insulin use (H)      QUEtiapine (SEROQUEL) 25 MG tablet Take 1 tablet (25 mg) by mouth nightly as needed (agitation)  Qty: 30 tablet, Refills: 0    Associated Diagnoses: History of CVA  (cerebrovascular accident)         CONTINUE these medications which have NOT CHANGED    Details   BROMFENAC SODIUM OP Place 1 drop into the right eye daily Patient Rx is 0.09% one drop in Right eye daily      carvedilol (COREG) 25 MG tablet Take 1 tablet (25 mg) by mouth 2 times daily (with meals)  Qty: 180 tablet, Refills: 3    Associated Diagnoses: Essential hypertension      finasteride (PROSCAR) 5 MG tablet Take 5 mg by mouth daily      fluticasone (FLONASE) 50 MCG/ACT nasal spray Spray 1 spray into both nostrils daily as needed for rhinitis or allergies      gabapentin (NEURONTIN) 300 MG capsule Take 300 mg by mouth 2 times daily      mirtazapine (REMERON) 15 MG tablet Take 15 mg by mouth At Bedtime      nitroglycerin (NITROSTAT) 0.4 MG SL tablet Place 1 tablet (0.4 mg) under the tongue every 5 minutes as needed for chest pain  Qty: 25 tablet, Refills: 1    Associated Diagnoses: NSTEMI (non-ST elevated myocardial infarction) (H); Essential hypertension; Postsurgical percutaneous transluminal coronary angioplasty status         STOP taking these medications       ibuprofen (ADVIL/MOTRIN) 400 MG tablet Comments:   Reason for Stopping:         OLANZapine (ZYPREXA) 2.5 MG tablet Comments:   Reason for Stopping:         tamsulosin (FLOMAX) 0.4 MG capsule Comments:   Reason for Stopping:             Allergies   Allergies   Allergen Reactions     Lisinopril      Morphine Other (See Comments)     confusion

## 2023-06-05 NOTE — PLAN OF CARE
Physical Therapy Discharge Summary    Reason for therapy discharge:    Discharged to home with home therapy.    Progress towards therapy goal(s). See goals on Care Plan in [facility] electronic health record for goal details.  Goals not met.  Barriers to achieving goals:   limited tolerance for therapy and discharge from facility.    Therapy recommendation(s):    Continued therapy is recommended.  Rationale/Recommendations:  in order to progress strength, activity tolerance, and increase overall safety and independence with functional mobility while decreasing caregiver burden. .

## 2023-06-05 NOTE — PROGRESS NOTES
Provided discharge education to daughter, wife, with grandson translating, Fermíner was refused. Provided insulin administration education with wife demonstrating teach back x2. Blood glucose monitor was setup, wife felt confident performing blood glucose checks.

## 2023-06-06 NOTE — PROGRESS NOTES
Occupational Therapy Discharge Summary    Reason for therapy discharge:    Discharged to home with home therapy.    Progress towards therapy goal(s). See goals on Care Plan in [facility] electronic health record for goal details.  Goals not met.  Barriers to achieving goals:   discharge from facility.    Therapy recommendation(s):    Continued therapy is recommended.  Rationale/Recommendations:  Recommend TCU, however, family would like to take pt home, recommend 24 hr A with all ADL/IADL's and functional transfesr. Family will need a lift for transfers to w/c for primary mobility, will need assist w/ all ADLs such as bathing sponge bathing w/ cues for increased ind. Depending pt and family preference may benefit from HHOT for increased safety and caregiver training within the home..

## 2023-08-09 NOTE — CONSULTS
Patient not appropriate for stroke education. Attempted to get a hold of family x 3 with no response. Consult cancelled- re consult if needed.     González Godwin RN  [facility]  [phone]     O-T Plasty Text: The defect edges were debeveled with a #15 scalpel blade.  Given the location of the defect, shape of the defect and the proximity to free margins an O-T plasty was deemed most appropriate.  Using a sterile surgical marker, an appropriate O-T plasty was drawn incorporating the defect and placing the expected incisions within the relaxed skin tension lines where possible.    The area thus outlined was incised deep to adipose tissue with a #15 scalpel blade.  The skin margins were undermined to an appropriate distance in all directions utilizing iris scissors.

## 2023-08-31 ENCOUNTER — APPOINTMENT (OUTPATIENT)
Dept: CT IMAGING | Facility: CLINIC | Age: 88
End: 2023-08-31
Attending: EMERGENCY MEDICINE
Payer: COMMERCIAL

## 2023-08-31 ENCOUNTER — HOSPITAL ENCOUNTER (EMERGENCY)
Facility: CLINIC | Age: 88
Discharge: HOME | End: 2023-08-31
Attending: EMERGENCY MEDICINE | Admitting: EMERGENCY MEDICINE
Payer: COMMERCIAL

## 2023-08-31 VITALS
TEMPERATURE: 97.7 F | DIASTOLIC BLOOD PRESSURE: 95 MMHG | SYSTOLIC BLOOD PRESSURE: 175 MMHG | RESPIRATION RATE: 8 BRPM | HEART RATE: 70 BPM | OXYGEN SATURATION: 96 %

## 2023-08-31 DIAGNOSIS — S22.080A T12 COMPRESSION FRACTURE, INITIAL ENCOUNTER (H): ICD-10-CM
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
## 2023-08-31 LAB
ABO/RH(D): NORMAL
ALBUMIN SERPL BCG-MCNC: 3.6 G/DL (ref 3.5–5.2)
ALP SERPL-CCNC: 111 U/L (ref 40–129)
ALT SERPL W P-5'-P-CCNC: 31 U/L (ref 0–70)
ANION GAP SERPL CALCULATED.3IONS-SCNC: 9 MMOL/L (ref 7–15)
ANTIBODY SCREEN: NEGATIVE
APTT PPP: 28 SECONDS (ref 22–38)
AST SERPL W P-5'-P-CCNC: 29 U/L (ref 0–45)
ATRIAL RATE - MUSE: 60 BPM
BASOPHILS # BLD AUTO: 0.1 10E3/UL (ref 0–0.2)
BASOPHILS NFR BLD AUTO: 1 %
BILIRUB SERPL-MCNC: 0.6 MG/DL
BUN SERPL-MCNC: 27.5 MG/DL (ref 8–23)
CALCIUM SERPL-MCNC: 9.2 MG/DL (ref 8.2–9.6)
CHLORIDE SERPL-SCNC: 106 MMOL/L (ref 98–107)
CREAT SERPL-MCNC: 0.67 MG/DL (ref 0.67–1.17)
DEPRECATED HCO3 PLAS-SCNC: 21 MMOL/L (ref 22–29)
DIASTOLIC BLOOD PRESSURE - MUSE: NORMAL MMHG
EOSINOPHIL # BLD AUTO: 0.3 10E3/UL (ref 0–0.7)
EOSINOPHIL NFR BLD AUTO: 5 %
ERYTHROCYTE [DISTWIDTH] IN BLOOD BY AUTOMATED COUNT: 13.2 % (ref 10–15)
GFR SERPL CREATININE-BSD FRML MDRD: 72 ML/MIN/1.73M2
GLUCOSE BLDC GLUCOMTR-MCNC: 162 MG/DL (ref 70–99)
GLUCOSE SERPL-MCNC: 167 MG/DL (ref 70–99)
HCO3 BLDV-SCNC: 21 MMOL/L (ref 21–28)
HCT VFR BLD AUTO: 43 % (ref 40–53)
HGB BLD-MCNC: 14.8 G/DL (ref 13.3–17.7)
HOLD SPECIMEN: NORMAL
IMM GRANULOCYTES # BLD: 0 10E3/UL
IMM GRANULOCYTES NFR BLD: 0 %
INR PPP: 1 (ref 0.85–1.15)
INTERPRETATION ECG - MUSE: NORMAL
LACTATE BLD-SCNC: 1.1 MMOL/L
LIPASE SERPL-CCNC: 14 U/L (ref 13–60)
LYMPHOCYTES # BLD AUTO: 1.9 10E3/UL (ref 0.8–5.3)
LYMPHOCYTES NFR BLD AUTO: 31 %
MAGNESIUM SERPL-MCNC: 1.5 MG/DL (ref 1.7–2.3)
MCH RBC QN AUTO: 32.7 PG (ref 26.5–33)
MCHC RBC AUTO-ENTMCNC: 34.4 G/DL (ref 31.5–36.5)
MCV RBC AUTO: 95 FL (ref 78–100)
MONOCYTES # BLD AUTO: 0.6 10E3/UL (ref 0–1.3)
MONOCYTES NFR BLD AUTO: 10 %
NEUTROPHILS # BLD AUTO: 3.2 10E3/UL (ref 1.6–8.3)
NEUTROPHILS NFR BLD AUTO: 53 %
NRBC # BLD AUTO: 0 10E3/UL
NRBC BLD AUTO-RTO: 0 /100
P AXIS - MUSE: 40 DEGREES
PCO2 BLDV: 40 MM HG (ref 40–50)
PH BLDV: 7.33 [PH] (ref 7.32–7.43)
PLATELET # BLD AUTO: 180 10E3/UL (ref 150–450)
PO2 BLDV: 34 MM HG (ref 25–47)
POTASSIUM SERPL-SCNC: 4.2 MMOL/L (ref 3.4–5.3)
PR INTERVAL - MUSE: 158 MS
PROT SERPL-MCNC: 6.8 G/DL (ref 6.4–8.3)
QRS DURATION - MUSE: 96 MS
QT - MUSE: 454 MS
QTC - MUSE: 454 MS
R AXIS - MUSE: 70 DEGREES
RBC # BLD AUTO: 4.52 10E6/UL (ref 4.4–5.9)
SAO2 % BLDV: 61 % (ref 94–100)
SODIUM SERPL-SCNC: 136 MMOL/L (ref 136–145)
SPECIMEN EXPIRATION DATE: NORMAL
SYSTOLIC BLOOD PRESSURE - MUSE: NORMAL MMHG
T AXIS - MUSE: 92 DEGREES
T4 FREE SERPL-MCNC: 1.1 NG/DL (ref 0.9–1.7)
TROPONIN T SERPL HS-MCNC: 18 NG/L
TROPONIN T SERPL HS-MCNC: 24 NG/L
TSH SERPL DL<=0.005 MIU/L-ACNC: 4.86 UIU/ML (ref 0.3–4.2)
VENTRICULAR RATE- MUSE: 60 BPM
WBC # BLD AUTO: 6 10E3/UL (ref 4–11)

## 2023-08-31 PROCEDURE — 85610 PROTHROMBIN TIME: CPT | Performed by: EMERGENCY MEDICINE

## 2023-08-31 PROCEDURE — 85025 COMPLETE CBC W/AUTO DIFF WBC: CPT | Performed by: EMERGENCY MEDICINE

## 2023-08-31 PROCEDURE — 36415 COLL VENOUS BLD VENIPUNCTURE: CPT | Performed by: EMERGENCY MEDICINE

## 2023-08-31 PROCEDURE — 84443 ASSAY THYROID STIM HORMONE: CPT | Performed by: EMERGENCY MEDICINE

## 2023-08-31 PROCEDURE — 93005 ELECTROCARDIOGRAM TRACING: CPT

## 2023-08-31 PROCEDURE — 71250 CT THORAX DX C-: CPT

## 2023-08-31 PROCEDURE — 86850 RBC ANTIBODY SCREEN: CPT | Performed by: EMERGENCY MEDICINE

## 2023-08-31 PROCEDURE — 86901 BLOOD TYPING SEROLOGIC RH(D): CPT | Performed by: EMERGENCY MEDICINE

## 2023-08-31 PROCEDURE — 82962 GLUCOSE BLOOD TEST: CPT

## 2023-08-31 PROCEDURE — 84439 ASSAY OF FREE THYROXINE: CPT | Performed by: EMERGENCY MEDICINE

## 2023-08-31 PROCEDURE — 83690 ASSAY OF LIPASE: CPT | Performed by: EMERGENCY MEDICINE

## 2023-08-31 PROCEDURE — 99285 EMERGENCY DEPT VISIT HI MDM: CPT | Mod: 25

## 2023-08-31 PROCEDURE — 80053 COMPREHEN METABOLIC PANEL: CPT | Performed by: EMERGENCY MEDICINE

## 2023-08-31 PROCEDURE — 84484 ASSAY OF TROPONIN QUANT: CPT | Performed by: EMERGENCY MEDICINE

## 2023-08-31 PROCEDURE — 83605 ASSAY OF LACTIC ACID: CPT

## 2023-08-31 PROCEDURE — 85730 THROMBOPLASTIN TIME PARTIAL: CPT | Performed by: EMERGENCY MEDICINE

## 2023-08-31 PROCEDURE — 83735 ASSAY OF MAGNESIUM: CPT | Performed by: EMERGENCY MEDICINE

## 2023-08-31 PROCEDURE — 72125 CT NECK SPINE W/O DYE: CPT

## 2023-08-31 PROCEDURE — 82803 BLOOD GASES ANY COMBINATION: CPT

## 2023-08-31 PROCEDURE — 70450 CT HEAD/BRAIN W/O DYE: CPT

## 2023-08-31 ASSESSMENT — ACTIVITIES OF DAILY LIVING (ADL)
ADLS_ACUITY_SCORE: 35
ADLS_ACUITY_SCORE: 35

## 2023-08-31 NOTE — ED NOTES
Spoke to son on the phone, agreed to come to ID the patient, and bring physical ID for the patient, asked about patient baseline orientation and mobility. Patient is normal confused and not fully oriented, uses walker to ambulate short distances at home, lives with wife.

## 2023-08-31 NOTE — DISCHARGE INSTRUCTIONS
Please follow-up with your primary care doctor after today's encounter.  Please discuss your cardiac health and whether or not a stress test should be pursued.

## 2023-08-31 NOTE — ED PROVIDER NOTES
History     Chief Complaint:  Altered Mental Status       The history is limited by the condition of the patient.      Stella Manley is a 89 year old male presenting for altered mental status. Patient was brought in by EMS after being found laying on the ground outside of an apartment complex.    Independent Historian:   None - Patient Only    Review of External Notes:   None      Medications:    No current outpatient medications on file.      Past Medical History:    No past medical history on file.    Past Surgical History:    No past surgical history on file.     Physical Exam   Patient Vitals for the past 24 hrs:   BP Temp Temp src Pulse Resp SpO2   08/31/23 1115 (!) 175/95 -- -- 70 (!) 8 --   08/31/23 1100 (!) 173/88 -- -- 67 12 --   08/31/23 1030 (!) 174/87 -- -- 64 (!) 0 --   08/31/23 1000 (!) 146/64 -- -- 61 26 --   08/31/23 0931 134/54 -- -- 60 23 --   08/31/23 0745 (!) 147/62 -- -- 60 -- 96 %   08/31/23 0729 (!) 154/67 97.7  F (36.5  C) Oral 68 16 98 %        Physical Exam  General: Laying on the ED bed  HEENT: Normocephalic, atraumatic  Cardiac: Radial pulses 2+, regular rate and rhythm  Pulm: Breathing comfortably, no accessory muscle usage, no conversational dyspnea, and lungs clear bilaterally  GI: Abdomen soft, nontender, no rigidity or guarding  MSK: No bony deformities  Skin: Warm and dry  Neuro: Motor intact distal extremities x4  Psych: Awake, alert, looking around the room, nonverbal      Emergency Department Course   ECG  ECG taken at 0743, ECG read at 0754  Normal sinus rhythm   Normal ECG   Rate 60 bpm. WA interval 158 ms. QRS duration 96 ms. QT/QTc 454/454 ms. P-R-T axes 40 70 92.     Imaging:  CT Chest Abdomen Pelvis w/o Contrast   Preliminary Result   IMPRESSION:   1.  No acute process demonstrated.   2.  Question a nodular contour of the liver suggestive of cirrhosis.   3.  Compression deformity of T12 with approximately 50% loss of   height. It is unclear if this is acute or  chronic.        CT Head w/o Contrast   Final Result   IMPRESSION: Large areas of chronic encephalomalacia in the low   anteromedial frontal lobes bilaterally possibly due to prior trauma.   Diffuse cerebral volume loss and cerebral white matter changes   consistent with chronic small vessel ischemic disease. No evidence for   acute intracranial pathology.           Radiation dose for this scan was reduced using automated exposure   control, adjustment of the mA and/or kV according to patient size, or   iterative reconstruction technique.      SARA RUIZ MD            SYSTEM ID:  [id number]      CT Cervical Spine w/o Contrast   Final Result   IMPRESSION: Minimal degenerative anterolisthesis of C5 upon C6 and C6   upon C7. Alignment of the cervical vertebrae is otherwise normal.   Vertebral body heights of the cervical spine are normal.   Craniocervical alignment is normal. There are no fractures of the   cervical spine.  Loss of disc space height and degenerative endplate   spurring at C6-C7. Mild facet arthropathy throughout the cervical   spine. Posterior disc bulging throughout the cervical spine. No   high-grade spinal canal stenosis. No prevertebral soft tissue   swelling.         Radiation dose for this scan was reduced using automated exposure   control, adjustment of the mA and/or kV according to patient size, or   iterative reconstruction technique.      SARA RUIZ MD            SYSTEM ID:  [id number]         Report per radiology    Laboratory:  Labs Ordered and Resulted from Time of ED Arrival to Time of ED Departure   COMPREHENSIVE METABOLIC PANEL - Abnormal       Result Value    Sodium 136      Potassium 4.2      Chloride 106      Carbon Dioxide (CO2) 21 (*)     Anion Gap 9      Urea Nitrogen 27.5 (*)     Creatinine 0.67      Calcium 9.2      Glucose 167 (*)     Alkaline Phosphatase 111      AST 29      ALT 31      Protein Total 6.8      Albumin 3.6      Bilirubin Total 0.6      GFR Estimate 72      TROPONIN T, HIGH SENSITIVITY - Abnormal    Troponin T, High Sensitivity 24 (*)    MAGNESIUM - Abnormal    Magnesium 1.5 (*)    TSH WITH FREE T4 REFLEX - Abnormal    TSH 4.86 (*)    GLUCOSE BY METER - Abnormal    GLUCOSE BY METER POCT 162 (*)    ISTAT GASES LACTATE VENOUS POCT - Abnormal    Lactic Acid POCT 1.1      Bicarbonate Venous POCT 21      O2 Sat, Venous POCT 61 (*)     pCO2 Venous POCT 40      pH Venous POCT 7.33      pO2 Venous POCT 34     LIPASE - Normal    Lipase 14     PARTIAL THROMBOPLASTIN TIME - Normal    aPTT 28     INR - Normal    INR 1.00     T4 FREE - Normal    Free T4 1.10     TROPONIN T, HIGH SENSITIVITY - Normal    Troponin T, High Sensitivity 18     CBC WITH PLATELETS AND DIFFERENTIAL    WBC Count 6.0      RBC Count 4.52      Hemoglobin 14.8      Hematocrit 43.0      MCV 95      MCH 32.7      MCHC 34.4      RDW 13.2      Platelet Count 180      % Neutrophils 53      % Lymphocytes 31      % Monocytes 10      % Eosinophils 5      % Basophils 1      % Immature Granulocytes 0      NRBCs per 100 WBC 0      Absolute Neutrophils 3.2      Absolute Lymphocytes 1.9      Absolute Monocytes 0.6      Absolute Eosinophils 0.3      Absolute Basophils 0.1      Absolute Immature Granulocytes 0.0      Absolute NRBCs 0.0     TYPE AND SCREEN, ADULT    ABO/RH(D) O POS      Antibody Screen Negative      SPECIMEN EXPIRATION DATE [id number]     ABO/RH TYPE AND SCREEN        Emergency Department Course & Assessments:  Interventions:  Medications - No data to display     Independent Interpretation (X-rays, CTs, rhythm strip):  None    Assessments/Consultations/Discussion of Management or Tests:   ED Course as of 08/31/23 1454   Thu Aug 31, 2023   3635 I examined the patient and obtained history as noted above.     0903 I rechecked and updated the patient.         Social Determinants of Health affecting care:   None    Disposition:  The patient was discharged to home.     Impression & Plan      Medical Decision  Making:  Unknown aged male presents with possible fall, reportedly [place]ijani-speaking, but nonverbal on my evaluation even with an .  Nonfocal exam, stable vital signs, grossly neurologically intact.  Given the unclear history, CTs were ordered and showed no acute findings.  Lab work shows a slight elevation of the troponin which normalized on repeat.  EKG nonischemic.  Subsequently received a phone call regarding a missing person consistent with the patient's description, the patient's son arrived to the ED and confirms patient's identity.  Echocardiogram from that chart in May shows no regional wall motion abnormalities.  I do not see a recent catheterization.  Right chest wall tenderness at the location of one of the cardiac leads that is reproducible on exam, no chest pain or dyspnea otherwise, overall low suspicion for ACS.  The patient did have an age-indeterminate T12 fracture on CT.  He has no tenderness on exam, and I do not believe that further consultation for this finding is indicated.  I did advise him to follow-up with spine for this finding as well as discuss it with his primary care doctor.  The patient otherwise had no acute complaints, feeling at baseline and acting at baseline according to son.  No acute findings in the work-up here today.  Plan at this time is discharge home with PCP follow-up.  The patient's son states he will stay with him and try to prevent him from wandering out of the house.    Diagnosis:    ICD-10-CM    1. Fall, initial encounter  W19.XXXA       2. T12 compression fracture, initial encounter (H)  S22.080A Orthopedic  Referral              Scribe Disclosure:  I, Ginger Lagos, am serving as a scribe at 8:14 AM on 8/31/2023 to document services personally performed by Nathan Aponte MD based on my observations and the provider's statements to me.   8/31/2023   Nathan Aponte MD King, Colin, MD  08/31/23 5818       Nathan Aponte MD  08/31/23 7038

## 2023-08-31 NOTE — ED NOTES
Saundra STANLEY contacted assisted living nearby where pt was found, unable to contact. RN also inquired missing persons, pt was not listed.

## 2023-08-31 NOTE — ED NOTES
Spoke with 911 dispatch about a reported missing person matching patients description.     Possible jacki amin, 88  Son, Mario Amin, [phone] [address] [facility] in [place]    Left message for son

## 2023-08-31 NOTE — ED TRIAGE NOTES
"EMS called due to pt in front of apartment building sitting on sidewalk. Pt in street clothing and hospital socks. No residents at apartment know who pt is. Pt [place] speaking, and able to speak but minimal English. PT unable to state name, birthday, or address. Pt only states, \"My wife is waiting for me.\" When using  pt stares blankly. . ABC in tact. A/OX0        "

## 2023-08-31 NOTE — ED NOTES
Pt son verified pt is his son. Pt son requesting update. Writer notified he will be updated once results are completed.

## 2023-09-17 ENCOUNTER — HOSPITAL ENCOUNTER (INPATIENT)
Facility: CLINIC | Age: 88
LOS: 2 days | Discharge: HOME HEALTH | DRG: 871 | End: 2023-09-20
Attending: EMERGENCY MEDICINE | Admitting: INTERNAL MEDICINE
Payer: COMMERCIAL

## 2023-09-17 ENCOUNTER — APPOINTMENT (OUTPATIENT)
Dept: CT IMAGING | Facility: CLINIC | Age: 88
DRG: 871 | End: 2023-09-17
Attending: EMERGENCY MEDICINE
Payer: COMMERCIAL

## 2023-09-17 DIAGNOSIS — R65.20 SEVERE SEPSIS (H): ICD-10-CM

## 2023-09-17 DIAGNOSIS — R53.81 PHYSICAL DECONDITIONING: ICD-10-CM

## 2023-09-17 DIAGNOSIS — K59.00 CONSTIPATION, UNSPECIFIED CONSTIPATION TYPE: ICD-10-CM

## 2023-09-17 DIAGNOSIS — A41.9 SEVERE SEPSIS (H): ICD-10-CM

## 2023-09-17 DIAGNOSIS — N39.0 URINARY TRACT INFECTION WITHOUT HEMATURIA, SITE UNSPECIFIED: Primary | ICD-10-CM

## 2023-09-17 DIAGNOSIS — N30.01 ACUTE CYSTITIS WITH HEMATURIA: ICD-10-CM

## 2023-09-17 LAB
ALBUMIN SERPL BCG-MCNC: 3.8 G/DL (ref 3.5–5.2)
ALBUMIN UR-MCNC: 30 MG/DL
ALP SERPL-CCNC: 221 U/L (ref 40–129)
ALT SERPL W P-5'-P-CCNC: 31 U/L (ref 0–70)
ANION GAP SERPL CALCULATED.3IONS-SCNC: 14 MMOL/L (ref 7–15)
APPEARANCE UR: ABNORMAL
AST SERPL W P-5'-P-CCNC: 47 U/L (ref 0–45)
BACTERIA #/AREA URNS HPF: ABNORMAL /HPF
BASOPHILS # BLD MANUAL: 0 10E3/UL (ref 0–0.2)
BASOPHILS NFR BLD MANUAL: 0 %
BILIRUB SERPL-MCNC: 1.3 MG/DL
BILIRUB UR QL STRIP: NEGATIVE
BUN SERPL-MCNC: 24.8 MG/DL (ref 8–23)
CALCIUM SERPL-MCNC: 9.3 MG/DL (ref 8.8–10.2)
CHLORIDE SERPL-SCNC: 105 MMOL/L (ref 98–107)
COLOR UR AUTO: ABNORMAL
CREAT SERPL-MCNC: 0.72 MG/DL (ref 0.67–1.17)
DEPRECATED HCO3 PLAS-SCNC: 24 MMOL/L (ref 22–29)
EGFRCR SERPLBLD CKD-EPI 2021: 88 ML/MIN/1.73M2
EOSINOPHIL # BLD MANUAL: 0.1 10E3/UL (ref 0–0.7)
EOSINOPHIL NFR BLD MANUAL: 5 %
ERYTHROCYTE [DISTWIDTH] IN BLOOD BY AUTOMATED COUNT: 13.2 % (ref 10–15)
FLUAV RNA SPEC QL NAA+PROBE: NEGATIVE
FLUBV RNA RESP QL NAA+PROBE: NEGATIVE
GLUCOSE SERPL-MCNC: 141 MG/DL (ref 70–99)
GLUCOSE UR STRIP-MCNC: 300 MG/DL
HCT VFR BLD AUTO: 47.7 % (ref 40–53)
HGB BLD-MCNC: 16 G/DL (ref 13.3–17.7)
HGB UR QL STRIP: ABNORMAL
HOLD SPECIMEN: NORMAL
HOLD SPECIMEN: NORMAL
KETONES UR STRIP-MCNC: NEGATIVE MG/DL
LACTATE SERPL-SCNC: 2.7 MMOL/L (ref 0.7–2)
LACTATE SERPL-SCNC: 3.6 MMOL/L (ref 0.7–2)
LEUKOCYTE ESTERASE UR QL STRIP: ABNORMAL
LIPASE SERPL-CCNC: 14 U/L (ref 13–60)
LYMPHOCYTES # BLD MANUAL: 0.4 10E3/UL (ref 0.8–5.3)
LYMPHOCYTES NFR BLD MANUAL: 14 %
MCH RBC QN AUTO: 31.9 PG (ref 26.5–33)
MCHC RBC AUTO-ENTMCNC: 33.5 G/DL (ref 31.5–36.5)
MCV RBC AUTO: 95 FL (ref 78–100)
MONOCYTES # BLD MANUAL: 0 10E3/UL (ref 0–1.3)
MONOCYTES NFR BLD MANUAL: 1 %
MUCOUS THREADS #/AREA URNS LPF: PRESENT /LPF
NEUTROPHILS # BLD MANUAL: 2.2 10E3/UL (ref 1.6–8.3)
NEUTROPHILS NFR BLD MANUAL: 80 %
NITRATE UR QL: NEGATIVE
PH UR STRIP: 5 [PH] (ref 5–7)
PLAT MORPH BLD: ABNORMAL
PLATELET # BLD AUTO: 145 10E3/UL (ref 150–450)
POTASSIUM SERPL-SCNC: 5.4 MMOL/L (ref 3.4–5.3)
PROT SERPL-MCNC: 7.1 G/DL (ref 6.4–8.3)
RBC # BLD AUTO: 5.01 10E6/UL (ref 4.4–5.9)
RBC MORPH BLD: ABNORMAL
RBC URINE: >182 /HPF
RSV RNA SPEC NAA+PROBE: NEGATIVE
SARS-COV-2 RNA RESP QL NAA+PROBE: NEGATIVE
SODIUM SERPL-SCNC: 143 MMOL/L (ref 136–145)
SP GR UR STRIP: 1.01 (ref 1–1.03)
UROBILINOGEN UR STRIP-MCNC: NORMAL MG/DL
WBC # BLD AUTO: 2.8 10E3/UL (ref 4–11)
WBC URINE: >182 /HPF

## 2023-09-17 PROCEDURE — 70450 CT HEAD/BRAIN W/O DYE: CPT

## 2023-09-17 PROCEDURE — 80053 COMPREHEN METABOLIC PANEL: CPT | Performed by: EMERGENCY MEDICINE

## 2023-09-17 PROCEDURE — 83605 ASSAY OF LACTIC ACID: CPT | Performed by: EMERGENCY MEDICINE

## 2023-09-17 PROCEDURE — 36415 COLL VENOUS BLD VENIPUNCTURE: CPT | Performed by: EMERGENCY MEDICINE

## 2023-09-17 PROCEDURE — 85027 COMPLETE CBC AUTOMATED: CPT | Performed by: EMERGENCY MEDICINE

## 2023-09-17 PROCEDURE — 250N000011 HC RX IP 250 OP 636: Performed by: EMERGENCY MEDICINE

## 2023-09-17 PROCEDURE — 250N000009 HC RX 250: Performed by: EMERGENCY MEDICINE

## 2023-09-17 PROCEDURE — 258N000003 HC RX IP 258 OP 636: Performed by: EMERGENCY MEDICINE

## 2023-09-17 PROCEDURE — 83690 ASSAY OF LIPASE: CPT | Performed by: EMERGENCY MEDICINE

## 2023-09-17 PROCEDURE — 99285 EMERGENCY DEPT VISIT HI MDM: CPT | Mod: 25

## 2023-09-17 PROCEDURE — 96365 THER/PROPH/DIAG IV INF INIT: CPT

## 2023-09-17 PROCEDURE — 93005 ELECTROCARDIOGRAM TRACING: CPT

## 2023-09-17 PROCEDURE — 87040 BLOOD CULTURE FOR BACTERIA: CPT | Performed by: EMERGENCY MEDICINE

## 2023-09-17 PROCEDURE — 74177 CT ABD & PELVIS W/CONTRAST: CPT

## 2023-09-17 PROCEDURE — 85007 BL SMEAR W/DIFF WBC COUNT: CPT | Performed by: EMERGENCY MEDICINE

## 2023-09-17 PROCEDURE — 96361 HYDRATE IV INFUSION ADD-ON: CPT

## 2023-09-17 PROCEDURE — 87637 SARSCOV2&INF A&B&RSV AMP PRB: CPT | Performed by: EMERGENCY MEDICINE

## 2023-09-17 PROCEDURE — 81001 URINALYSIS AUTO W/SCOPE: CPT | Performed by: EMERGENCY MEDICINE

## 2023-09-17 PROCEDURE — 87088 URINE BACTERIA CULTURE: CPT | Performed by: EMERGENCY MEDICINE

## 2023-09-17 RX ORDER — IOPAMIDOL 755 MG/ML
500 INJECTION, SOLUTION INTRAVASCULAR ONCE
Status: COMPLETED | OUTPATIENT
Start: 2023-09-17 | End: 2023-09-17

## 2023-09-17 RX ORDER — PIPERACILLIN SODIUM, TAZOBACTAM SODIUM 4; .5 G/20ML; G/20ML
4.5 INJECTION, POWDER, LYOPHILIZED, FOR SOLUTION INTRAVENOUS ONCE
Status: COMPLETED | OUTPATIENT
Start: 2023-09-17 | End: 2023-09-17

## 2023-09-17 RX ADMIN — IOPAMIDOL 70 ML: 755 INJECTION, SOLUTION INTRAVENOUS at 22:39

## 2023-09-17 RX ADMIN — SODIUM CHLORIDE 90 ML: 9 INJECTION, SOLUTION INTRAVENOUS at 22:39

## 2023-09-17 RX ADMIN — PIPERACILLIN AND TAZOBACTAM 4.5 G: 4; .5 INJECTION, POWDER, FOR SOLUTION INTRAVENOUS at 22:22

## 2023-09-17 RX ADMIN — SODIUM CHLORIDE 1000 ML: 9 INJECTION, SOLUTION INTRAVENOUS at 21:19

## 2023-09-17 ASSESSMENT — ACTIVITIES OF DAILY LIVING (ADL)
ADLS_ACUITY_SCORE: 37
ADLS_ACUITY_SCORE: 37

## 2023-09-18 ENCOUNTER — APPOINTMENT (OUTPATIENT)
Dept: CARDIOLOGY | Facility: CLINIC | Age: 88
DRG: 871 | End: 2023-09-18
Attending: INTERNAL MEDICINE
Payer: COMMERCIAL

## 2023-09-18 ENCOUNTER — APPOINTMENT (OUTPATIENT)
Dept: ULTRASOUND IMAGING | Facility: CLINIC | Age: 88
DRG: 871 | End: 2023-09-18
Attending: INTERNAL MEDICINE
Payer: COMMERCIAL

## 2023-09-18 PROBLEM — R65.20 SEVERE SEPSIS (H): Status: ACTIVE | Noted: 2023-09-18

## 2023-09-18 PROBLEM — A41.9 SEVERE SEPSIS (H): Status: ACTIVE | Noted: 2023-09-18

## 2023-09-18 PROBLEM — N30.01 ACUTE CYSTITIS WITH HEMATURIA: Status: ACTIVE | Noted: 2023-09-18

## 2023-09-18 LAB
ALBUMIN SERPL BCG-MCNC: 3.2 G/DL (ref 3.5–5.2)
ALP SERPL-CCNC: 136 U/L (ref 40–129)
ALT SERPL W P-5'-P-CCNC: 35 U/L (ref 0–70)
ANION GAP SERPL CALCULATED.3IONS-SCNC: 12 MMOL/L (ref 7–15)
ANION GAP SERPL CALCULATED.3IONS-SCNC: 16 MMOL/L (ref 7–15)
AST SERPL W P-5'-P-CCNC: 43 U/L (ref 0–45)
ATRIAL RATE - MUSE: 112 BPM
BASOPHILS # BLD MANUAL: 0 10E3/UL (ref 0–0.2)
BASOPHILS NFR BLD MANUAL: 0 %
BILIRUB DIRECT SERPL-MCNC: 1.13 MG/DL (ref 0–0.3)
BILIRUB SERPL-MCNC: 1.8 MG/DL
BUN SERPL-MCNC: 26 MG/DL (ref 8–23)
BUN SERPL-MCNC: 27 MG/DL (ref 8–23)
CALCIUM SERPL-MCNC: 8.4 MG/DL (ref 8.8–10.2)
CALCIUM SERPL-MCNC: 8.5 MG/DL (ref 8.8–10.2)
CHLORIDE SERPL-SCNC: 107 MMOL/L (ref 98–107)
CHLORIDE SERPL-SCNC: 111 MMOL/L (ref 98–107)
CREAT SERPL-MCNC: 0.78 MG/DL (ref 0.67–1.17)
CREAT SERPL-MCNC: 0.95 MG/DL (ref 0.67–1.17)
DEPRECATED HCO3 PLAS-SCNC: 18 MMOL/L (ref 22–29)
DEPRECATED HCO3 PLAS-SCNC: 20 MMOL/L (ref 22–29)
DIASTOLIC BLOOD PRESSURE - MUSE: NORMAL MMHG
EGFRCR SERPLBLD CKD-EPI 2021: 77 ML/MIN/1.73M2
EGFRCR SERPLBLD CKD-EPI 2021: 86 ML/MIN/1.73M2
EOSINOPHIL # BLD MANUAL: 0 10E3/UL (ref 0–0.7)
EOSINOPHIL NFR BLD MANUAL: 0 %
ERYTHROCYTE [DISTWIDTH] IN BLOOD BY AUTOMATED COUNT: 13.3 % (ref 10–15)
ERYTHROCYTE [DISTWIDTH] IN BLOOD BY AUTOMATED COUNT: 13.4 % (ref 10–15)
GLUCOSE BLDC GLUCOMTR-MCNC: 150 MG/DL (ref 70–99)
GLUCOSE BLDC GLUCOMTR-MCNC: 211 MG/DL (ref 70–99)
GLUCOSE BLDC GLUCOMTR-MCNC: 238 MG/DL (ref 70–99)
GLUCOSE BLDC GLUCOMTR-MCNC: 245 MG/DL (ref 70–99)
GLUCOSE BLDC GLUCOMTR-MCNC: 253 MG/DL (ref 70–99)
GLUCOSE SERPL-MCNC: 180 MG/DL (ref 70–99)
GLUCOSE SERPL-MCNC: 250 MG/DL (ref 70–99)
HBA1C MFR BLD: 6.5 %
HCT VFR BLD AUTO: 41.9 % (ref 40–53)
HCT VFR BLD AUTO: 42.1 % (ref 40–53)
HGB BLD-MCNC: 14.1 G/DL (ref 13.3–17.7)
HGB BLD-MCNC: 14.3 G/DL (ref 13.3–17.7)
INTERPRETATION ECG - MUSE: NORMAL
LACTATE SERPL-SCNC: 2.1 MMOL/L (ref 0.7–2)
LACTATE SERPL-SCNC: 2.8 MMOL/L (ref 0.7–2)
LACTATE SERPL-SCNC: 2.9 MMOL/L (ref 0.7–2)
LACTATE SERPL-SCNC: 3.1 MMOL/L (ref 0.7–2)
LACTATE SERPL-SCNC: 3.7 MMOL/L (ref 0.7–2)
LACTATE SERPL-SCNC: 4 MMOL/L (ref 0.7–2)
LACTATE SERPL-SCNC: 4.1 MMOL/L (ref 0.7–2)
LACTATE SERPL-SCNC: 4.2 MMOL/L (ref 0.7–2)
LVEF ECHO: NORMAL
LYMPHOCYTES # BLD MANUAL: 1.2 10E3/UL (ref 0.8–5.3)
LYMPHOCYTES NFR BLD MANUAL: 4 %
MAGNESIUM SERPL-MCNC: 1.3 MG/DL (ref 1.7–2.3)
MAGNESIUM SERPL-MCNC: 2.4 MG/DL (ref 1.7–2.3)
MCH RBC QN AUTO: 31.8 PG (ref 26.5–33)
MCH RBC QN AUTO: 31.8 PG (ref 26.5–33)
MCHC RBC AUTO-ENTMCNC: 33.7 G/DL (ref 31.5–36.5)
MCHC RBC AUTO-ENTMCNC: 34 G/DL (ref 31.5–36.5)
MCV RBC AUTO: 94 FL (ref 78–100)
MCV RBC AUTO: 94 FL (ref 78–100)
METAMYELOCYTES # BLD MANUAL: 1.2 10E3/UL
METAMYELOCYTES NFR BLD MANUAL: 4 %
MONOCYTES # BLD MANUAL: 1.5 10E3/UL (ref 0–1.3)
MONOCYTES NFR BLD MANUAL: 5 %
NEUTROPHILS # BLD MANUAL: 25.3 10E3/UL (ref 1.6–8.3)
NEUTROPHILS NFR BLD MANUAL: 87 %
P AXIS - MUSE: 44 DEGREES
PLAT MORPH BLD: ABNORMAL
PLATELET # BLD AUTO: 134 10E3/UL (ref 150–450)
PLATELET # BLD AUTO: 135 10E3/UL (ref 150–450)
POTASSIUM SERPL-SCNC: 3.8 MMOL/L (ref 3.4–5.3)
POTASSIUM SERPL-SCNC: 3.9 MMOL/L (ref 3.4–5.3)
PR INTERVAL - MUSE: 142 MS
PROCALCITONIN SERPL IA-MCNC: 57.66 NG/ML
PROT SERPL-MCNC: 6 G/DL (ref 6.4–8.3)
QRS DURATION - MUSE: 82 MS
QT - MUSE: 346 MS
QTC - MUSE: 472 MS
R AXIS - MUSE: 74 DEGREES
RBC # BLD AUTO: 4.44 10E6/UL (ref 4.4–5.9)
RBC # BLD AUTO: 4.5 10E6/UL (ref 4.4–5.9)
RBC MORPH BLD: ABNORMAL
SODIUM SERPL-SCNC: 141 MMOL/L (ref 136–145)
SODIUM SERPL-SCNC: 143 MMOL/L (ref 136–145)
SYSTOLIC BLOOD PRESSURE - MUSE: NORMAL MMHG
T AXIS - MUSE: 72 DEGREES
TOXIC GRANULES BLD QL SMEAR: PRESENT
VENTRICULAR RATE- MUSE: 112 BPM
WBC # BLD AUTO: 21.1 10E3/UL (ref 4–11)
WBC # BLD AUTO: 29.1 10E3/UL (ref 4–11)

## 2023-09-18 PROCEDURE — 36415 COLL VENOUS BLD VENIPUNCTURE: CPT | Performed by: EMERGENCY MEDICINE

## 2023-09-18 PROCEDURE — 250N000011 HC RX IP 250 OP 636: Mod: JZ | Performed by: STUDENT IN AN ORGANIZED HEALTH CARE EDUCATION/TRAINING PROGRAM

## 2023-09-18 PROCEDURE — 255N000002 HC RX 255 OP 636: Performed by: INTERNAL MEDICINE

## 2023-09-18 PROCEDURE — 99207 PR APP CREDIT; MD BILLING SHARED VISIT: CPT | Performed by: STUDENT IN AN ORGANIZED HEALTH CARE EDUCATION/TRAINING PROGRAM

## 2023-09-18 PROCEDURE — 250N000012 HC RX MED GY IP 250 OP 636 PS 637: Performed by: INTERNAL MEDICINE

## 2023-09-18 PROCEDURE — 258N000003 HC RX IP 258 OP 636: Performed by: EMERGENCY MEDICINE

## 2023-09-18 PROCEDURE — 36415 COLL VENOUS BLD VENIPUNCTURE: CPT | Performed by: INTERNAL MEDICINE

## 2023-09-18 PROCEDURE — 85007 BL SMEAR W/DIFF WBC COUNT: CPT | Performed by: STUDENT IN AN ORGANIZED HEALTH CARE EDUCATION/TRAINING PROGRAM

## 2023-09-18 PROCEDURE — 120N000001 HC R&B MED SURG/OB

## 2023-09-18 PROCEDURE — 36415 COLL VENOUS BLD VENIPUNCTURE: CPT | Performed by: STUDENT IN AN ORGANIZED HEALTH CARE EDUCATION/TRAINING PROGRAM

## 2023-09-18 PROCEDURE — 83605 ASSAY OF LACTIC ACID: CPT | Performed by: EMERGENCY MEDICINE

## 2023-09-18 PROCEDURE — 76705 ECHO EXAM OF ABDOMEN: CPT

## 2023-09-18 PROCEDURE — 83735 ASSAY OF MAGNESIUM: CPT | Performed by: STUDENT IN AN ORGANIZED HEALTH CARE EDUCATION/TRAINING PROGRAM

## 2023-09-18 PROCEDURE — 83605 ASSAY OF LACTIC ACID: CPT | Performed by: STUDENT IN AN ORGANIZED HEALTH CARE EDUCATION/TRAINING PROGRAM

## 2023-09-18 PROCEDURE — 250N000011 HC RX IP 250 OP 636: Mod: JZ | Performed by: INTERNAL MEDICINE

## 2023-09-18 PROCEDURE — 99223 1ST HOSP IP/OBS HIGH 75: CPT | Performed by: INTERNAL MEDICINE

## 2023-09-18 PROCEDURE — 82310 ASSAY OF CALCIUM: CPT | Performed by: INTERNAL MEDICINE

## 2023-09-18 PROCEDURE — 85027 COMPLETE CBC AUTOMATED: CPT | Performed by: STUDENT IN AN ORGANIZED HEALTH CARE EDUCATION/TRAINING PROGRAM

## 2023-09-18 PROCEDURE — 84145 PROCALCITONIN (PCT): CPT | Performed by: STUDENT IN AN ORGANIZED HEALTH CARE EDUCATION/TRAINING PROGRAM

## 2023-09-18 PROCEDURE — 250N000013 HC RX MED GY IP 250 OP 250 PS 637: Performed by: INTERNAL MEDICINE

## 2023-09-18 PROCEDURE — 82248 BILIRUBIN DIRECT: CPT | Performed by: INTERNAL MEDICINE

## 2023-09-18 PROCEDURE — 999N000208 ECHOCARDIOGRAM COMPLETE

## 2023-09-18 PROCEDURE — 93306 TTE W/DOPPLER COMPLETE: CPT | Mod: 26 | Performed by: INTERNAL MEDICINE

## 2023-09-18 PROCEDURE — 258N000003 HC RX IP 258 OP 636: Performed by: STUDENT IN AN ORGANIZED HEALTH CARE EDUCATION/TRAINING PROGRAM

## 2023-09-18 PROCEDURE — 85027 COMPLETE CBC AUTOMATED: CPT | Performed by: INTERNAL MEDICINE

## 2023-09-18 PROCEDURE — 83036 HEMOGLOBIN GLYCOSYLATED A1C: CPT | Performed by: INTERNAL MEDICINE

## 2023-09-18 PROCEDURE — 250N000013 HC RX MED GY IP 250 OP 250 PS 637: Performed by: STUDENT IN AN ORGANIZED HEALTH CARE EDUCATION/TRAINING PROGRAM

## 2023-09-18 RX ORDER — ASPIRIN 325 MG
325 TABLET, DELAYED RELEASE (ENTERIC COATED) ORAL DAILY
COMMUNITY

## 2023-09-18 RX ORDER — RISPERIDONE 0.5 MG/1
0.5 TABLET, ORALLY DISINTEGRATING ORAL
Status: DISCONTINUED | OUTPATIENT
Start: 2023-09-18 | End: 2023-09-18

## 2023-09-18 RX ORDER — GABAPENTIN 300 MG/1
300 CAPSULE ORAL 2 TIMES DAILY
Status: DISCONTINUED | OUTPATIENT
Start: 2023-09-18 | End: 2023-09-18

## 2023-09-18 RX ORDER — RISPERIDONE 0.5 MG/1
1 TABLET, ORALLY DISINTEGRATING ORAL AT BEDTIME
Status: DISCONTINUED | OUTPATIENT
Start: 2023-09-18 | End: 2023-09-18

## 2023-09-18 RX ORDER — AMOXICILLIN 250 MG
1 CAPSULE ORAL 2 TIMES DAILY
Status: DISCONTINUED | OUTPATIENT
Start: 2023-09-18 | End: 2023-09-20 | Stop reason: HOSPADM

## 2023-09-18 RX ORDER — CARVEDILOL 25 MG/1
25 TABLET ORAL 2 TIMES DAILY WITH MEALS
Status: DISCONTINUED | OUTPATIENT
Start: 2023-09-18 | End: 2023-09-20 | Stop reason: HOSPADM

## 2023-09-18 RX ORDER — SODIUM CHLORIDE, SODIUM LACTATE, POTASSIUM CHLORIDE, CALCIUM CHLORIDE 600; 310; 30; 20 MG/100ML; MG/100ML; MG/100ML; MG/100ML
INJECTION, SOLUTION INTRAVENOUS CONTINUOUS
Status: DISCONTINUED | OUTPATIENT
Start: 2023-09-18 | End: 2023-09-20 | Stop reason: HOSPADM

## 2023-09-18 RX ORDER — FUROSEMIDE 10 MG/ML
20 INJECTION INTRAMUSCULAR; INTRAVENOUS ONCE
Status: COMPLETED | OUTPATIENT
Start: 2023-09-18 | End: 2023-09-18

## 2023-09-18 RX ORDER — ACETAMINOPHEN 325 MG/1
650 TABLET ORAL EVERY 8 HOURS PRN
COMMUNITY

## 2023-09-18 RX ORDER — ATORVASTATIN CALCIUM 40 MG/1
40 TABLET, FILM COATED ORAL EVERY EVENING
Status: DISCONTINUED | OUTPATIENT
Start: 2023-09-18 | End: 2023-09-20 | Stop reason: HOSPADM

## 2023-09-18 RX ORDER — LACTULOSE 10 G/15ML
20 SOLUTION ORAL 2 TIMES DAILY
Status: COMPLETED | OUTPATIENT
Start: 2023-09-18 | End: 2023-09-18

## 2023-09-18 RX ORDER — ASPIRIN 325 MG
325 TABLET, DELAYED RELEASE (ENTERIC COATED) ORAL DAILY
Status: DISCONTINUED | OUTPATIENT
Start: 2023-09-18 | End: 2023-09-18

## 2023-09-18 RX ORDER — FINASTERIDE 5 MG/1
5 TABLET, FILM COATED ORAL DAILY
Status: DISCONTINUED | OUTPATIENT
Start: 2023-09-18 | End: 2023-09-20 | Stop reason: HOSPADM

## 2023-09-18 RX ORDER — ENOXAPARIN SODIUM 100 MG/ML
40 INJECTION SUBCUTANEOUS EVERY 24 HOURS
Status: DISCONTINUED | OUTPATIENT
Start: 2023-09-18 | End: 2023-09-20 | Stop reason: HOSPADM

## 2023-09-18 RX ORDER — DEXTROSE MONOHYDRATE 25 G/50ML
25-50 INJECTION, SOLUTION INTRAVENOUS
Status: DISCONTINUED | OUTPATIENT
Start: 2023-09-18 | End: 2023-09-20 | Stop reason: HOSPADM

## 2023-09-18 RX ORDER — ONDANSETRON 4 MG/1
4 TABLET, ORALLY DISINTEGRATING ORAL EVERY 6 HOURS PRN
Status: DISCONTINUED | OUTPATIENT
Start: 2023-09-18 | End: 2023-09-20 | Stop reason: HOSPADM

## 2023-09-18 RX ORDER — QUETIAPINE FUMARATE 25 MG/1
25 TABLET, FILM COATED ORAL
Status: DISCONTINUED | OUTPATIENT
Start: 2023-09-18 | End: 2023-09-18

## 2023-09-18 RX ORDER — MIRTAZAPINE 15 MG/1
15 TABLET, FILM COATED ORAL AT BEDTIME
Status: DISCONTINUED | OUTPATIENT
Start: 2023-09-18 | End: 2023-09-20 | Stop reason: HOSPADM

## 2023-09-18 RX ORDER — CLOPIDOGREL BISULFATE 75 MG/1
75 TABLET ORAL DAILY
Status: DISCONTINUED | OUTPATIENT
Start: 2023-09-18 | End: 2023-09-18

## 2023-09-18 RX ORDER — AMLODIPINE BESYLATE 5 MG/1
5 TABLET ORAL DAILY
Status: DISCONTINUED | OUTPATIENT
Start: 2023-09-18 | End: 2023-09-18

## 2023-09-18 RX ORDER — NICOTINE POLACRILEX 4 MG
15-30 LOZENGE BUCCAL
Status: DISCONTINUED | OUTPATIENT
Start: 2023-09-18 | End: 2023-09-20 | Stop reason: HOSPADM

## 2023-09-18 RX ORDER — PIPERACILLIN SODIUM, TAZOBACTAM SODIUM 3; .375 G/15ML; G/15ML
3.38 INJECTION, POWDER, LYOPHILIZED, FOR SOLUTION INTRAVENOUS EVERY 6 HOURS
Status: DISCONTINUED | OUTPATIENT
Start: 2023-09-18 | End: 2023-09-20 | Stop reason: HOSPADM

## 2023-09-18 RX ORDER — RISPERIDONE 0.5 MG/1
0.5 TABLET ORAL 2 TIMES DAILY PRN
Status: DISCONTINUED | OUTPATIENT
Start: 2023-09-18 | End: 2023-09-18

## 2023-09-18 RX ORDER — ACETAMINOPHEN 500 MG
1000 TABLET ORAL EVERY 6 HOURS PRN
Status: DISCONTINUED | OUTPATIENT
Start: 2023-09-18 | End: 2023-09-20 | Stop reason: HOSPADM

## 2023-09-18 RX ORDER — LIDOCAINE 40 MG/G
CREAM TOPICAL
Status: DISCONTINUED | OUTPATIENT
Start: 2023-09-18 | End: 2023-09-20 | Stop reason: HOSPADM

## 2023-09-18 RX ORDER — LIDOCAINE 4 G/G
1 PATCH TOPICAL
Status: DISCONTINUED | OUTPATIENT
Start: 2023-09-18 | End: 2023-09-20 | Stop reason: HOSPADM

## 2023-09-18 RX ORDER — RISPERIDONE 0.5 MG/1
0.5 TABLET ORAL 2 TIMES DAILY PRN
COMMUNITY
End: 2024-04-01

## 2023-09-18 RX ORDER — POLYETHYLENE GLYCOL 3350 17 G/17G
17 POWDER, FOR SOLUTION ORAL DAILY PRN
Status: DISCONTINUED | OUTPATIENT
Start: 2023-09-19 | End: 2023-09-20 | Stop reason: HOSPADM

## 2023-09-18 RX ORDER — BROMFENAC 1.03 MG/ML
1 SOLUTION/ DROPS OPHTHALMIC DAILY
Status: DISCONTINUED | OUTPATIENT
Start: 2023-09-18 | End: 2023-09-18

## 2023-09-18 RX ORDER — ASPIRIN 325 MG
325 TABLET, DELAYED RELEASE (ENTERIC COATED) ORAL DAILY
Status: DISCONTINUED | OUTPATIENT
Start: 2023-09-18 | End: 2023-09-20 | Stop reason: HOSPADM

## 2023-09-18 RX ORDER — MAGNESIUM SULFATE HEPTAHYDRATE 40 MG/ML
4 INJECTION, SOLUTION INTRAVENOUS ONCE
Status: COMPLETED | OUTPATIENT
Start: 2023-09-18 | End: 2023-09-18

## 2023-09-18 RX ORDER — RISPERIDONE 0.5 MG/1
0.5 TABLET ORAL 2 TIMES DAILY PRN
Status: DISCONTINUED | OUTPATIENT
Start: 2023-09-18 | End: 2023-09-20 | Stop reason: HOSPADM

## 2023-09-18 RX ORDER — FLUTICASONE PROPIONATE 50 MCG
1 SPRAY, SUSPENSION (ML) NASAL DAILY PRN
Status: DISCONTINUED | OUTPATIENT
Start: 2023-09-18 | End: 2023-09-18

## 2023-09-18 RX ORDER — ONDANSETRON 2 MG/ML
4 INJECTION INTRAMUSCULAR; INTRAVENOUS EVERY 6 HOURS PRN
Status: DISCONTINUED | OUTPATIENT
Start: 2023-09-18 | End: 2023-09-20 | Stop reason: HOSPADM

## 2023-09-18 RX ORDER — PHENOL 1.4 %
10 AEROSOL, SPRAY (ML) MUCOUS MEMBRANE
COMMUNITY

## 2023-09-18 RX ORDER — DOXAZOSIN 1 MG/1
1 TABLET ORAL DAILY
Status: DISCONTINUED | OUTPATIENT
Start: 2023-09-18 | End: 2023-09-18

## 2023-09-18 RX ORDER — MIRTAZAPINE 15 MG/1
15 TABLET, FILM COATED ORAL
COMMUNITY

## 2023-09-18 RX ORDER — POLYETHYLENE GLYCOL 3350 17 G/17G
17 POWDER, FOR SOLUTION ORAL DAILY PRN
Status: ON HOLD | COMMUNITY
End: 2023-09-20

## 2023-09-18 RX ORDER — ACETAMINOPHEN 325 MG/1
650 TABLET ORAL EVERY 8 HOURS
Status: DISCONTINUED | OUTPATIENT
Start: 2023-09-18 | End: 2023-09-20 | Stop reason: HOSPADM

## 2023-09-18 RX ADMIN — HUMAN ALBUMIN MICROSPHERES AND PERFLUTREN 3 ML: 10; .22 INJECTION, SOLUTION INTRAVENOUS at 14:30

## 2023-09-18 RX ADMIN — PIPERACILLIN AND TAZOBACTAM 3.38 G: 3; .375 INJECTION, POWDER, FOR SOLUTION INTRAVENOUS at 23:10

## 2023-09-18 RX ADMIN — INSULIN ASPART 1 UNITS: 100 INJECTION, SOLUTION INTRAVENOUS; SUBCUTANEOUS at 21:14

## 2023-09-18 RX ADMIN — ENOXAPARIN SODIUM 40 MG: 40 INJECTION SUBCUTANEOUS at 14:19

## 2023-09-18 RX ADMIN — MAGNESIUM SULFATE HEPTAHYDRATE 4 G: 4 INJECTION, SOLUTION INTRAVENOUS at 12:23

## 2023-09-18 RX ADMIN — INSULIN ASPART 1 UNITS: 100 INJECTION, SOLUTION INTRAVENOUS; SUBCUTANEOUS at 04:35

## 2023-09-18 RX ADMIN — SODIUM CHLORIDE, POTASSIUM CHLORIDE, SODIUM LACTATE AND CALCIUM CHLORIDE: 600; 310; 30; 20 INJECTION, SOLUTION INTRAVENOUS at 15:41

## 2023-09-18 RX ADMIN — PIPERACILLIN AND TAZOBACTAM 3.38 G: 3; .375 INJECTION, POWDER, FOR SOLUTION INTRAVENOUS at 04:36

## 2023-09-18 RX ADMIN — Medication 10 MG: at 21:09

## 2023-09-18 RX ADMIN — SENNOSIDES AND DOCUSATE SODIUM 1 TABLET: 50; 8.6 TABLET ORAL at 14:19

## 2023-09-18 RX ADMIN — ATORVASTATIN CALCIUM 40 MG: 40 TABLET, FILM COATED ORAL at 20:12

## 2023-09-18 RX ADMIN — CARVEDILOL 25 MG: 25 TABLET, FILM COATED ORAL at 18:31

## 2023-09-18 RX ADMIN — FUROSEMIDE 20 MG: 10 INJECTION, SOLUTION INTRAMUSCULAR; INTRAVENOUS at 04:52

## 2023-09-18 RX ADMIN — ACETAMINOPHEN 1000 MG: 500 TABLET, FILM COATED ORAL at 03:44

## 2023-09-18 RX ADMIN — LIDOCAINE PATCH 4% 1 PATCH: 40 PATCH TOPICAL at 03:45

## 2023-09-18 RX ADMIN — PIPERACILLIN AND TAZOBACTAM 3.38 G: 3; .375 INJECTION, POWDER, FOR SOLUTION INTRAVENOUS at 17:12

## 2023-09-18 RX ADMIN — LIDOCAINE PATCH 4% 1 PATCH: 40 PATCH TOPICAL at 20:12

## 2023-09-18 RX ADMIN — MIRTAZAPINE 15 MG: 15 TABLET, FILM COATED ORAL at 21:09

## 2023-09-18 RX ADMIN — LACTULOSE 20 G: 20 SOLUTION ORAL at 14:19

## 2023-09-18 RX ADMIN — SODIUM CHLORIDE 1000 ML: 9 INJECTION, SOLUTION INTRAVENOUS at 00:16

## 2023-09-18 RX ADMIN — ACETAMINOPHEN 650 MG: 325 TABLET, FILM COATED ORAL at 12:22

## 2023-09-18 RX ADMIN — FINASTERIDE 5 MG: 5 TABLET, FILM COATED ORAL at 12:21

## 2023-09-18 RX ADMIN — PIPERACILLIN AND TAZOBACTAM 3.38 G: 3; .375 INJECTION, POWDER, FOR SOLUTION INTRAVENOUS at 10:32

## 2023-09-18 RX ADMIN — ACETAMINOPHEN 650 MG: 325 TABLET, FILM COATED ORAL at 20:12

## 2023-09-18 RX ADMIN — CARVEDILOL 25 MG: 25 TABLET, FILM COATED ORAL at 12:21

## 2023-09-18 RX ADMIN — ASPIRIN 325 MG: 325 TABLET, COATED ORAL at 12:22

## 2023-09-18 RX ADMIN — SENNOSIDES AND DOCUSATE SODIUM 1 TABLET: 50; 8.6 TABLET ORAL at 20:12

## 2023-09-18 RX ADMIN — LACTULOSE 20 G: 20 SOLUTION ORAL at 20:12

## 2023-09-18 RX ADMIN — SODIUM CHLORIDE 500 ML: 9 INJECTION, SOLUTION INTRAVENOUS at 10:53

## 2023-09-18 RX ADMIN — SODIUM CHLORIDE 500 ML: 9 INJECTION, SOLUTION INTRAVENOUS at 14:20

## 2023-09-18 ASSESSMENT — ACTIVITIES OF DAILY LIVING (ADL)
WALKING_OR_CLIMBING_STAIRS_DIFFICULTY: YES
TOILETING_ASSISTANCE: TOILETING DIFFICULTY, REQUIRES EQUIPMENT
DOING_ERRANDS_INDEPENDENTLY_DIFFICULTY: YES
DRESSING/BATHING_DIFFICULTY: NO
ADLS_ACUITY_SCORE: 41
ADLS_ACUITY_SCORE: 47
DIFFICULTY_EATING/SWALLOWING: YES
TOILETING: 1-->ASSISTANCE (EQUIPMENT/PERSON) NEEDED
TRANSFERRING: 0-->ASSISTANCE NEEDED (DEVELOPMENTALLY APPROPRIATE)
EATING/SWALLOWING: SWALLOWING SOLID FOOD
SWALLOWING: 2-->DIFFICULTY SWALLOWING FOODS
FALL_HISTORY_WITHIN_LAST_SIX_MONTHS: NO
ADLS_ACUITY_SCORE: 41
EATING: 0-->ASSISTANCE NEEDED (DEVELOPMENTALLY APPROPRIATE)
ADLS_ACUITY_SCORE: 47
WEAR_GLASSES_OR_BLIND: NO
EQUIPMENT_CURRENTLY_USED_AT_HOME: WALKER, ROLLING
TRANSFERRING: 1-->ASSISTANCE (EQUIPMENT/PERSON) NEEDED
TOILETING_MANAGEMENT: INCONTINENCE CARE
TOILETING_ISSUES: YES
EATING: 1-->ASSISTANCE (EQUIPMENT/PERSON) NEEDED
CHANGE_IN_FUNCTIONAL_STATUS_SINCE_ONSET_OF_CURRENT_ILLNESS/INJURY: NO
ADLS_ACUITY_SCORE: 37
WALKING_OR_CLIMBING_STAIRS: AMBULATION DIFFICULTY, REQUIRES EQUIPMENT
SWALLOWING: 2-->DIFFICULTY SWALLOWING FOODS
ADLS_ACUITY_SCORE: 37
ADLS_ACUITY_SCORE: 41
ADLS_ACUITY_SCORE: 41
EATING/SWALLOWING_MANAGEMENT: SOFT FOODS
ADLS_ACUITY_SCORE: 41
ADLS_ACUITY_SCORE: 49
ADLS_ACUITY_SCORE: 41
TOILETING: 1-->ASSISTANCE (EQUIPMENT/PERSON) NEEDED (NOT DEVELOPMENTALLY APPROPRIATE)
ADLS_ACUITY_SCORE: 47
CONCENTRATING,_REMEMBERING_OR_MAKING_DECISIONS_DIFFICULTY: YES

## 2023-09-18 NOTE — PROVIDER NOTIFICATION
Lab called at 0859 for critical lactic acid of 4.2. Provider notified via vocera web.    Bolus ordered

## 2023-09-18 NOTE — ED NOTES
Pt does not want to be here, asking to leave as soon as he arrived. Does not remember why he was brought in. Denies pain but moaning out. Denies nausea but vomiting.

## 2023-09-18 NOTE — ED TRIAGE NOTES
Pt BIBA with c/o abdominal pain and N/V. Additionally, pt reports falling today. Denies injuries. Pt is Citizen of the [place] speaking only, difficulty assessing even with interpretor. Pt denies alcohol or drug use. Pt only alert to self and place.

## 2023-09-18 NOTE — PLAN OF CARE
Care from [id number]      Admit Date: 9/17/236  Admitting Diagnosis: Acute Cystitis with hematuria, sepsis  Pertinent History: type 2 diabetes mellitus, hypertension, coronary artery disease, recent stroke, alzheimer's, HLD    Malian speaking  Neuro: Alert to and Self  Activity: are 2 assist with Marysol Steady or Lift. Pt was Not OOB on shift  Telemetry Monitoring: No  Pain: Pts son reported that pt was having pain but was unable to give a rate. Tylenol was provided and Lidocaine patch to the lower back  Labs / Tests: Lactic acid 2.1, 2.9, 2.8. blood glucose: 150  GI: bowel sounds audible. No emesis on shift.   : pt has external cath placed.   Medications: Lasix  LDA's: Peripheral  Fluids: is Saline locked.  Diet: NPO for ultrasound scheduled for later today on 9/18 then can return to diabetic diet  Living Situation:  Lives at home with wife.  Discharge Disposition:  TBD    Pills taken crushed in apple sauce.

## 2023-09-18 NOTE — H&P
[facility]    Hospitalist History and Physical    Name: Adam Huber    MRN: [id number]  YOB: 1935    Age: 88 year old  Date of Admission:  9/17/2023  Date of Service (when I saw the patient): 09/18/23    Assessment & Plan   Adam Huber is a 88 year old Eritrean-speaking male with medical history significant for type 2 diabetes mellitus, hypertension, coronary artery disease, recent stroke, underlying dementia and memory impairment  presented to the emergency room with complaints of abdominal pain nausea vomiting and change in mental status.  Evaluation in the emergency room showed patient has UTI.    In the emergency room patient was noted to be hypoxic requiring 2 L of supplemental O2.  ED work-up included CT chest PE abdomen and pelvis which showed cirrhotic liver changes and constipation.  Without any acute urinary infiltrates or congestion.    Acute metabolic encephalopathy  Acute on chronic mental status changes/confusion  -Likely secondary to UTI  -Resume prior to admission meds including quetiapine  -Supportive care  -Antibiotics for acute infection    UTI  Presented with abdominal pain, Abnormal UA Nausea/ vomiting  Early sepsis: (Tachycardic, temp, lactic acidosis, mental status change)  -Urine culture sent  -In the emergency room patient was started on Zosyn we will continue   -narrow antibiotics once cultures available    Acute respiratory failure with hypoxia  -Requiring 2 L of supplemental O2  -Unable to get clinical history from patient patient is confused  -Patient received fluids?  Congestion ?  History of pulmonary hypertension  - Received 2.5 L of fluid in the emergency room for elevated lactic acidosis  -Hold fluids for now  -Echocardiogram in a.m.  -We will try 1 times of Lasix 20 mg  -Wean supplemental O2  -Echocardiogram  -Monitor on telemetry    Abnormal LFTs  -Mildly elevated elevated alk phos, bili, AST  -Possible congestion  -Recheck  -CT  abdomen pelvis was done in the emergency room with some possible cirrhotic changes of liver  -Right upper quadrant ultrasound in a.m.    Leukopenia  -Recheck CBC in a.m.      Clinically Significant Risk Factors Present on Admission        # Hyperkalemia: Highest K = 5.4 mmol/L in last 2 days, will monitor as appropriate         # Drug Induced Platelet Defect: home medication list includes an antiplatelet medication   # Hypertension: Noted on problem list   # Dementia: noted on problem list   # DMII: A1C = N/A within past 6 months                   DVT Prophylaxis: Pneumatic Compression Devices  Code Status: Full Code    Disposition: Admitted as inpatient    Primary Care Physician   Oralia Forrest    Chief Complaint   Abdominal pain, nausea vomiting today      Unable to obtain a history from the patient due to mental status.  She obtained from patient's son and ED records    History of Present Illness   Adam Huber is a 88 year old male who Uruguayan-speaking male with medical history significant for type 2 diabetes mellitus, hypertension, coronary artery disease, recent stroke, underlying dementia and memory impairment  presented to the emergency room with complaints of abdominal pain nausea vomiting and change in mental status.  Very limited history patient not answering question even with the help of son interpreting for the patient.  Son does not live with patient.  As per son patient at baseline is confused intermittently and was more so.  He is  sometimes able to walk and sometimes does not participate in ADLs.  Patient did complain of abdominal pain prior to coming to the emergency room and vomited once in the emergency room.  Unable to get more than 10 point review of systems from patient.      Evaluation in the emergency room showed patient has UTI.In the emergency room patient was noted to be hypoxic requiring 2 L of supplemental O2.  ED work-up included CT chest PE abdomen and pelvis which showed  cirrhotic liver changes and constipation.  Without any acute urinary infiltrates or congestion.  Patient received IV fluids in the emergency room IV antibiotics and was admitted for further evaluation and treatment.    Past Medical History    Past Medical History:   Diagnosis Date    Acute chest pain 10/23/2015    CAD (coronary artery disease)     Essential hypertension 10/23/2015    History of CVA (cerebrovascular accident) 10/23/2015    HLD (hyperlipidemia)     HTN (hypertension)     NSTEMI (non-ST elevated myocardial infarction) (H) 10/23/2015    Post PTCA 10-    Successful PCI of culprit proximal to mid RCA with placement of a    Post PTCA 11-6-2015    pci of complex mid CFX-hector         Past Surgical History   Past Surgical History:   Procedure Laterality Date    CHOLECYSTECTOMY      GENITOURINARY SURGERY      prostate removal     HEART CATH LEFT HEART CATH  10/12/2015    PCI w/ HECTOR to RCA    HEART CATH RIGHT AND LEFT HEART CATH  11/6/2015    PCI w/ HECTOR to mid-CFX       Prior to Admission Medications   Prior to Admission Medications   Prescriptions Last Dose Informant Patient Reported? Taking?   BROMFENAC SODIUM OP  Spouse/Significant Other Yes No   Sig: Place 1 drop into the right eye daily Patient Rx is 0.09% one drop in Right eye daily   QUEtiapine (SEROQUEL) 25 MG tablet   No No   Sig: Take 1 tablet (25 mg) by mouth nightly as needed (agitation)   acetaminophen (TYLENOL) 325 MG tablet   No No   Sig: Take 2 tablets (650 mg) by mouth every 8 hours   amLODIPine (NORVASC) 5 MG tablet   No No   Sig: Take 1 tablet (5 mg) by mouth daily   aspirin (ASA) 325 MG EC tablet   No No   Sig: Take 1 tablet (325 mg) by mouth daily   atorvastatin (LIPITOR) 40 MG tablet   No No   Sig: Take 1 tablet (40 mg) by mouth every evening   blood glucose (NO BRAND SPECIFIED) test strip   No No   Sig: Use to test blood sugar 4 times daily or as directed.   blood glucose monitoring (NO BRAND SPECIFIED) meter device kit   No No    Sig: Use to test blood sugar 4 times daily or as directed.   carvedilol (COREG) 25 MG tablet   No No   Sig: Take 1 tablet (25 mg) by mouth 2 times daily (with meals)   clopidogrel (PLAVIX) 75 MG tablet   No No   Sig: Take 1 tablet (75 mg) by mouth daily   doxazosin (CARDURA) 1 MG tablet   No No   Sig: Take 1 tablet (1 mg) by mouth daily   finasteride (PROSCAR) 5 MG tablet   Yes No   Sig: Take 5 mg by mouth daily   fluticasone (FLONASE) 50 MCG/ACT nasal spray   Yes No   Sig: Spray 1 spray into both nostrils daily as needed for rhinitis or allergies   gabapentin (NEURONTIN) 300 MG capsule   Yes No   Sig: Take 300 mg by mouth 2 times daily   insulin glargine (LANTUS PEN) 100 UNIT/ML pen   No No   Sig: Inject 8 Units Subcutaneous 2 times daily   losartan (COZAAR) 25 MG tablet   No No   Sig: Take 3 tablets (75 mg) by mouth daily   metFORMIN (GLUCOPHAGE) 500 MG tablet   No No   Sig: Take 1 tablet (500 mg) by mouth 2 times daily (with meals)   mirtazapine (REMERON) 15 MG tablet   Yes No   Sig: Take 15 mg by mouth At Bedtime   nitroglycerin (NITROSTAT) 0.4 MG SL tablet   No No   Sig: Place 1 tablet (0.4 mg) under the tongue every 5 minutes as needed for chest pain   risperiDONE (RISPERDAL M-TABS) 0.5 MG ODT   No No   Sig: Place 1 tablet (0.5 mg) under the tongue every morning (before breakfast)   risperiDONE (RISPERDAL M-TABS) 0.5 MG ODT   No No   Sig: Place 1 tablet (0.5 mg) under the tongue 2 times daily as needed (agitation)   risperiDONE (RISPERDAL M-TABS) 1 MG ODT   No No   Sig: Place 1 tablet (1 mg) under the tongue At Bedtime   thin (NO BRAND SPECIFIED) lancets   No No   Sig: Use with lanceting device.      Facility-Administered Medications: None     Allergies   Allergies   Allergen Reactions    Lisinopril     Morphine Other (See Comments)     confusion       Social History   Social History     Tobacco Use    Smoking status: Never    Smokeless tobacco: Never   Substance Use Topics    Alcohol use: No      Alcohol/week: 0.0 standard drinks of alcohol     Social History     Social History Narrative    Not on file     Lives with his wife.  Denies smoking or alcohol use    Family History   I have reviewed this patient's family history and updated it with pertinent information if needed.   Family History   Problem Relation Age of Onset    Unknown/Adopted No family hx of          Review of Systems   Unable to get more than 10 point review of systems from patient.  Physical Exam   Temp: 99.2  F (37.3  C)   BP: 132/75 Pulse: 101   Resp: 20 SpO2: 95 % O2 Device: Nasal cannula Oxygen Delivery: 2 LPM  Vital Signs with Ranges  Temp:  [98.8  F (37.1  C)-99.2  F (37.3  C)] 99.2  F (37.3  C)  Pulse:  [] 101  Resp:  [12-30] 20  BP: (132-141)/(73-87) 132/75  SpO2:  [89 %-99 %] 95 %  0 lbs 0 oz    GEN:  Alert, unable to assess orientation.  Patient would respond when called but did not answer any questions   HEENT:  Normocephalic/atraumatic, no scleral icterus, no nasal discharge, mouth dry  CV: Tachycardic.  S1 + S2 noted, no S3 or S4.  LUNGS: Bibasilar crackles   ABD:  Active bowel sounds, soft, non-tender/non-distended.  No rebound/guarding/rigidity.  EXT:  No edema.  No cyanosis.  No joint synovitis noted.  SKIN: no exanthems noted in the visualized areas.  NEURO: Admitted exam patient would not cooperate.  Is alert and moving all extremities.  No new focal deficits appreciated.    Data   Data reviewed today:  I personally reviewed the EKG tracing showing sinus tachycardia .    Recent Labs   Lab 09/17/23 1923   WBC 2.8*   HGB 16.0   HCT 47.7   MCV 95   *     Recent Labs   Lab 09/17/23 1923      POTASSIUM 5.4*   CHLORIDE 105   CO2 24   ANIONGAP 14   *   BUN 24.8*   CR 0.72   GFRESTIMATED 88   LUZ 9.3     7-Day Micro Results       Collected Updated Procedure Result Status      09/17/2023 2218 09/17/2023 2224 Blood Culture Arm, Left [[id number]]   Blood from Arm, Left    In process Component Value   No  component results               09/17/2023 2208 09/17/2023 2222 Blood Culture Hand, Right [[id number]]   Blood from Hand, Right    In process Component Value   No component results               09/17/2023 2155 09/17/2023 2218 Urine Culture [[id number]]   Urine, Catheter    In process Component Value   No component results               09/17/2023 2144 09/17/2023 2229 Symptomatic Influenza A/B, RSV, & SARS-CoV2 PCR (COVID-19) Nasopharyngeal [[id number]]    Swab from Nasopharyngeal    Final result Component Value   Influenza A PCR Negative   Influenza B PCR Negative   RSV PCR Negative   SARS CoV2 PCR Negative   NEGATIVE: SARS-CoV-2 (COVID-19) RNA not detected, presumed negative.                  Recent Labs   Lab 09/17/23 1923   *     Recent Labs   Lab 09/17/23 1923   HGB 16.0     Recent Labs   Lab 09/17/23 1923   AST 47*   ALT 31   ALKPHOS 221*   BILITOTAL 1.3*     No results for input(s): INR in the last 168 hours.  Recent Labs   Lab 09/17/23  2301 09/17/23 2027   LACT 2.7* 3.6*     No results for input(s): TROPONIN, TROPI, TROPR, TROPONINIS in the last 168 hours.    Invalid input(s): TROPT, TROP, TROPONINIES, TNIH  Recent Labs   Lab 09/17/23 2155   COLOR Orange*   APPEARANCE Cloudy*   URINEGLC 300*   URINEBILI Negative   URINEKETONE Negative   SG 1.013   UBLD Large*   URINEPH 5.0   PROTEIN 30*   NITRITE Negative   LEUKEST Large*   RBCU >182*   WBCU >182*       Recent Results (from the past 24 hour(s))   CT Head w/o Contrast    Narrative    EXAM: CT HEAD W/O CONTRAST  LOCATION: [facility]  DATE: 9/17/2023    INDICATION: Confusion.  COMPARISON: Previous CT head from 08/31/2023.  TECHNIQUE: Routine CT Head without IV contrast. Multiplanar reformats. Dose reduction techniques were used.    FINDINGS:  INTRACRANIAL CONTENTS: No intracranial hemorrhage, extraaxial collection, or mass effect. No CT evidence of acute infarct. Mild to moderate volume loss and moderate burden presumed  chronic small vessel ischemia. Bifrontal and bitemporal encephalomalacia.   Chronic lacunar infarcts in each basal ganglia again noted.    VISUALIZED ORBITS/SINUSES/MASTOIDS: No intraorbital abnormality. No paranasal sinus mucosal disease. No middle ear or mastoid effusion.    BONES/SOFT TISSUES: No acute abnormality.      Impression    IMPRESSION:  1.  No acute intracranial abnormality or significant change compared to the prior study.   CT Chest (PE) Abdomen Pelvis w Contrast    Narrative    EXAM: CT CHEST PE ABDOMEN PELVIS W CONTRAST  LOCATION: [facility]  DATE: 9/17/2023    INDICATION: Abdominal pain, no BM for 3-4 days.  COMPARISON: 08/31/2023.  TECHNIQUE: CT chest pulmonary angiogram and routine CT abdomen pelvis with IV contrast. Arterial phase through the chest and venous phase through the abdomen and pelvis. Multiplanar reformats and MIP reconstructions were performed. Dose reduction   techniques were used.   CONTRAST: 70 mL Isovue 370.    FINDINGS:  ANGIOGRAM CHEST: The central pulmonary arteries are large in caliber which can be seen with pulmonary artery hypertension. There is no evidence of pulmonary embolus. Images through the lung bases are degraded by motion artifact. Thoracic aorta is   negative for dissection. No CT evidence of right heart strain.     LUNGS AND PLEURA: Mild dependent atelectasis bilaterally. There is no pneumothorax or pleural effusion.    MEDIASTINUM/AXILLAE: Normal.    CORONARY ARTERY CALCIFICATION: Severe.    HEPATOBILIARY: The gallbladder is absent. Nodular contour of the liver suggests cirrhosis. The portal vein is patent.    PANCREAS: Atrophic.    SPLEEN: Normal.    ADRENAL GLANDS: Normal.    KIDNEYS/BLADDER: There is no hydronephrosis. Few renal cysts. The gallbladder wall is trabeculated.    BOWEL: There is a large amount of stool in the rectum and a moderate amount of stool through the remainder of the colon. There are colonic diverticula without  acute diverticulitis. There is no bowel obstruction or inflammation. The appendix is normal. No   free intraperitoneal gas or fluid.    LYMPH NODES: Normal.    VASCULATURE: Atherosclerotic calcification of the aorta and its branches. No aneurysm.    PELVIC ORGANS: Normal.    MUSCULOSKELETAL: Old T12 compression fracture. Degenerative disease throughout the spine.      Impression    IMPRESSION:  1.  There is no pulmonary embolus, aortic aneurysm or dissection. No acute chest abnormality.  2.  Probable cirrhotic configuration of the liver.  3.  Large amount of stool in the rectum. There is no bowel obstruction or inflammation.

## 2023-09-18 NOTE — ED NOTES
[facility]  ED Nurse Handoff Report    ED Chief complaint: Abdominal Pain and Nausea & Vomiting  . ED Diagnosis:   Final diagnoses:   None       Allergies:   Allergies   Allergen Reactions    Lisinopril     Morphine Other (See Comments)     confusion       Code Status: Full Code    Activity level - Baseline/Home:  walker.  Activity Level - Current:   assist of 1.   Lift room needed: Yes.   Bariatric: Yes   Needed: Yes Chinese  Isolation: No.   Infection: Not Applicable.     Respiratory status: Nasal cannula,     Vital Signs (within 30 minutes):   Vitals:    09/17/23 2145 09/17/23 2200 09/17/23 2215 09/17/23 2300   BP:  139/81 (!) 141/87 132/75   Pulse: 105 105 96 101   Resp: 27 20 23 20   Temp:       SpO2: 92% 93% 92% 95%       Cardiac Rhythm:  ,      Pain level:    Patient confused: Yes.   Patient Falls Risk: bed/chair alarm on, nonskid shoes/slippers when out of bed, arm band in place, patient and family education, assistive device/personal items within reach, activity supervised, and room door open.   Elimination Status: Has voided     Patient Report - Initial Complaint: Abdominal pain.   Focused Assessment: Pt is poor historian, oriented only to self, does not answer to questions even with interpretor. Per son, pt has not had BM in 3 days, abdominal pain and N/V.     Abnormal Results:   Labs Ordered and Resulted from Time of ED Arrival to Time of ED Departure   COMPREHENSIVE METABOLIC PANEL - Abnormal       Result Value    Sodium 143      Potassium 5.4 (*)     Chloride 105      Carbon Dioxide (CO2) 24      Anion Gap 14      Urea Nitrogen 24.8 (*)     Creatinine 0.72      Calcium 9.3      Glucose 141 (*)     Alkaline Phosphatase 221 (*)     AST 47 (*)     ALT 31      Protein Total 7.1      Albumin 3.8      Bilirubin Total 1.3 (*)     GFR Estimate 88     LACTIC ACID WHOLE BLOOD - Abnormal    Lactic Acid 3.6 (*)    ROUTINE UA WITH MICROSCOPIC REFLEX TO CULTURE - Abnormal    Color  Urine Orange (*)     Appearance Urine Cloudy (*)     Glucose Urine 300 (*)     Bilirubin Urine Negative      Ketones Urine Negative      Specific Gravity Urine 1.013      Blood Urine Large (*)     pH Urine 5.0      Protein Albumin Urine 30 (*)     Urobilinogen Urine Normal      Nitrite Urine Negative      Leukocyte Esterase Urine Large (*)     Bacteria Urine Many (*)     Mucus Urine Present (*)     RBC Urine >182 (*)     WBC Urine >182 (*)    CBC WITH PLATELETS AND DIFFERENTIAL - Abnormal    WBC Count 2.8 (*)     RBC Count 5.01      Hemoglobin 16.0      Hematocrit 47.7      MCV 95      MCH 31.9      MCHC 33.5      RDW 13.2      Platelet Count 145 (*)    DIFFERENTIAL - Abnormal    % Neutrophils 80      % Lymphocytes 14      % Monocytes 1      % Eosinophils 5      % Basophils 0      Absolute Neutrophils 2.2      Absolute Lymphocytes 0.4 (*)     Absolute Monocytes 0.0      Absolute Eosinophils 0.1      Absolute Basophils 0.0      RBC Morphology Confirmed RBC Indices      Platelet Assessment        Value: Automated Count Confirmed. Platelet morphology is normal.   LACTIC ACID WHOLE BLOOD - Abnormal    Lactic Acid 2.7 (*)    LIPASE - Normal    Lipase 14     INFLUENZA A/B, RSV, & SARS-COV2 PCR - Normal    Influenza A PCR Negative      Influenza B PCR Negative      RSV PCR Negative      SARS CoV2 PCR Negative     LACTIC ACID WHOLE BLOOD   BLOOD CULTURE   BLOOD CULTURE   URINE CULTURE        CT Chest (PE) Abdomen Pelvis w Contrast   Final Result   IMPRESSION:   1.  There is no pulmonary embolus, aortic aneurysm or dissection. No acute chest abnormality.   2.  Probable cirrhotic configuration of the liver.   3.  Large amount of stool in the rectum. There is no bowel obstruction or inflammation.      CT Head w/o Contrast   Final Result   IMPRESSION:   1.  No acute intracranial abnormality or significant change compared to the prior study.          Treatments provided: See MAR  Family Comments: Son at bedside  OBS  brochure/video discussed/provided to patient:  Yes  ED Medications:   Medications   sodium chloride 0.9% BOLUS 1,000 mL (1,000 mLs Intravenous $New Bag 9/18/23 0016)   sodium chloride 0.9% BOLUS 1,000 mL (0 mLs Intravenous Stopped 9/17/23 2222)   piperacillin-tazobactam (ZOSYN) 4.5 g vial to attach to  mL bag (0 g Intravenous Stopped 9/17/23 2302)   iopamidol (ISOVUE-370) solution 500 mL (70 mLs Intravenous $Given 9/17/23 2239)   for CT scan flush use (90 mLs Intravenous $Given 9/17/23 2239)       Drips infusing:  No  For the majority of the shift this patient was Green.   Interventions performed were n/a.    Sepsis treatment initiated: No    Cares/treatment/interventions/medications to be completed following ED care: n/a    ED Nurse Name: Vandana Mcconnell RN  12:27 AM  RECEIVING UNIT ED HANDOFF REVIEW    Above ED Nurse Handoff Report was reviewed: Yes  Reviewed by: Rosanna Roldan RN on September 18, 2023 at 2:33 AM

## 2023-09-18 NOTE — PROGRESS NOTES
[facility]    Medicine Progress Note - Hospitalist Service    Date of Admission:  9/17/2023    Assessment & Plan   88 year old American-speaking male with medical history significant for type 2 diabetes mellitus, hypertension, coronary artery disease, recent stroke, underlying dementia and memory impairment who presented to the emergency room with complaints of abdominal pain, nausea, vomiting and change in mental status.  Evaluation in the emergency room showed patient has UTI.     In the emergency room patient was noted to be hypoxic requiring 2 L of supplemental O2.  ED work-up included CT chest PE abdomen and pelvis which showed cirrhotic liver changes and constipation.  Chest x-ray without any acute urinary infiltrates or congestion.    Initial WBC count of 2.8 which went up to 29,000 this morning.  Lactate peaked at 4.2.  Procalcitonin severely elevated at 57.    Severe sepsis sepsis secondary to UTI.  Lactic acidosis.  No urinary obstruction or any other intra-abdominal source of sepsis on CT scan.  Continue Zosyn, follow-up on blood and urine cultures.  Got a dose of Lasix after he started needing oxygen after getting 2.5 L of IV fluid bolus in the ER.  But patient had worsening lactic acidosis with lactate going up to 4.2.  Given NS bolus 500 mL x 2 and monitor lactate.  Start LR at 75 mill per hour as patient is now off oxygen.    Encephalopathy due to sepsis in setting of underlying dementia.  Patient currently calm and cooperative and sitting up in the chair.  He did not answer any questions when I tried with help of American  on the iPad but imitated simple commands like sticking out his tongue and raising his arms.  He is able to talk to his son and as per the son he usually does not want to talk when he is sick.  Continue nocturnal melatonin and Remeron and as needed Risperdal twice daily for agitation.  Do not wake up the patient at night to check vitals if the  patient's vitals remained stable throughout the evening.    Severe constipation.  No bowel movement for 5 days prior to admission, large amount of stool on CT scan.  Will give lactulose 20 g x 2 doses and start on Senokot.    Transient hypoxia.  Unclear etiology, could have been a probe malfunction or actual hypoxia due to receiving IV fluids which were stopped and was given Lasix 20 mg IV x1.  Echocardiogram was ordered.  As the patient has rising lactic acidosis, severely elevated procalcitonin and leukocytosis consistent with sepsis, will start on IV fluids as discussed above.    Chronic medical conditions.    BPH: Not in urinary retention, continue Proscar.  Hypertension. Continue carvedilol.  Diabetes mellitus. Prior to admission on metformin, currently on hold and cover with sliding scale.  Hyperlipidemia. Continue atorvastatin.         Diet: Combination Diet Moderate Consistent Carb (60 g CHO per Meal) Diet    DVT Prophylaxis: Enoxaparin (Lovenox) SQ  Villa Catheter: Not present  Lines: None     Cardiac Monitoring: None  Code Status: Full Code      Clinically Significant Risk Factors Present on Admission        # Hyperkalemia: Highest K = 5.4 mmol/L in last 2 days, will monitor as appropriate   # Hypocalcemia: Lowest Ca = 8.4 mg/dL in last 2 days, will monitor and replace as appropriate   # Hypomagnesemia: Lowest Mg = 1.3 mg/dL in last 2 days, will replace as needed   # Hypoalbuminemia: Lowest albumin = 3.2 g/dL at 9/18/2023  6:26 AM, will monitor as appropriate   # Drug Induced Platelet Defect: home medication list includes an antiplatelet medication   # Hypertension: Noted on problem list   # Dementia: noted on problem list   # DMII: A1C = 6.5 % (Ref range: <5.7 %) within past 6 months               Disposition Plan      Expected Discharge Date: 09/21/2023                  Frank Ren MD  Hospitalist Service  [facility]  Securely message with NOTIK (more info)  Text page via [employer]  Paging/Directory   ______________________________________________________________________    Interval History   Sitting up in the chair comfortably.  Does not appear to be in any distress.    Physical Exam   Vital Signs: Temp: 96.9  F (36.1  C) Temp src: Temporal BP: 125/65 Pulse: 91   Resp: 18 SpO2: 92 % O2 Device: None (Room air) Oxygen Delivery: 3 LPM  Weight: 195 lbs 12.3 oz    General Appearance: Awake, sitting up in the chair.  Does not answer questions with help of  on iPad but able to talk to the son who provided interpretation.  Respiratory: Clear to auscultation.  Cardiovascular: S1-S2 normal  GI: Soft and nontender  Skin: No rash  Other: Moves all extremities.    Medical Decision Making       MANAGEMENT DISCUSSED with the following over the past 24 hours: Patient, RN and son at bedside       Data     I have personally reviewed the following data over the past 24 hrs:    29.1 (H)  \   14.1   / 134 (L)     143 107 27.0 (H) /  245 (H)   3.8 20 (L) 0.95 \     ALT: 35 AST: 43 AP: 136 (H) TBILI: 1.8 (H)   ALB: 3.2 (L) TOT PROTEIN: 6.0 (L) LIPASE: 14     TSH: N/A T4: N/A A1C: 6.5 (H)     Procal: 57.66 (HH) CRP: N/A Lactic Acid: 4.0 (HH)         Imaging results reviewed over the past 24 hrs:   Recent Results (from the past 24 hour(s))   CT Head w/o Contrast    Narrative    EXAM: CT HEAD W/O CONTRAST  LOCATION: [facility]  DATE: 9/17/2023    INDICATION: Confusion.  COMPARISON: Previous CT head from 08/31/2023.  TECHNIQUE: Routine CT Head without IV contrast. Multiplanar reformats. Dose reduction techniques were used.    FINDINGS:  INTRACRANIAL CONTENTS: No intracranial hemorrhage, extraaxial collection, or mass effect. No CT evidence of acute infarct. Mild to moderate volume loss and moderate burden presumed chronic small vessel ischemia. Bifrontal and bitemporal encephalomalacia.   Chronic lacunar infarcts in each basal ganglia again noted.    VISUALIZED ORBITS/SINUSES/MASTOIDS:  No intraorbital abnormality. No paranasal sinus mucosal disease. No middle ear or mastoid effusion.    BONES/SOFT TISSUES: No acute abnormality.      Impression    IMPRESSION:  1.  No acute intracranial abnormality or significant change compared to the prior study.   CT Chest (PE) Abdomen Pelvis w Contrast    Narrative    EXAM: CT CHEST PE ABDOMEN PELVIS W CONTRAST  LOCATION: [facility]  DATE: 9/17/2023    INDICATION: Abdominal pain, no BM for 3-4 days.  COMPARISON: 08/31/2023.  TECHNIQUE: CT chest pulmonary angiogram and routine CT abdomen pelvis with IV contrast. Arterial phase through the chest and venous phase through the abdomen and pelvis. Multiplanar reformats and MIP reconstructions were performed. Dose reduction   techniques were used.   CONTRAST: 70 mL Isovue 370.    FINDINGS:  ANGIOGRAM CHEST: The central pulmonary arteries are large in caliber which can be seen with pulmonary artery hypertension. There is no evidence of pulmonary embolus. Images through the lung bases are degraded by motion artifact. Thoracic aorta is   negative for dissection. No CT evidence of right heart strain.     LUNGS AND PLEURA: Mild dependent atelectasis bilaterally. There is no pneumothorax or pleural effusion.    MEDIASTINUM/AXILLAE: Normal.    CORONARY ARTERY CALCIFICATION: Severe.    HEPATOBILIARY: The gallbladder is absent. Nodular contour of the liver suggests cirrhosis. The portal vein is patent.    PANCREAS: Atrophic.    SPLEEN: Normal.    ADRENAL GLANDS: Normal.    KIDNEYS/BLADDER: There is no hydronephrosis. Few renal cysts. The gallbladder wall is trabeculated.    BOWEL: There is a large amount of stool in the rectum and a moderate amount of stool through the remainder of the colon. There are colonic diverticula without acute diverticulitis. There is no bowel obstruction or inflammation. The appendix is normal. No   free intraperitoneal gas or fluid.    LYMPH NODES: Normal.    VASCULATURE:  Atherosclerotic calcification of the aorta and its branches. No aneurysm.    PELVIC ORGANS: Normal.    MUSCULOSKELETAL: Old T12 compression fracture. Degenerative disease throughout the spine.      Impression    IMPRESSION:  1.  There is no pulmonary embolus, aortic aneurysm or dissection. No acute chest abnormality.  2.  Probable cirrhotic configuration of the liver.  3.  Large amount of stool in the rectum. There is no bowel obstruction or inflammation.

## 2023-09-18 NOTE — ED PROVIDER NOTES
History     Chief Complaint:  Abdominal Pain and Nausea & Vomiting     History limited by: Patient is a poor historian. A  was used (Uzbek).      Adam Huber is a 88 year old male on Plavix and Aspirin 325 mg with a history of Alzheimer's disease, CVA, type 2 diabetes mellitus, hypertension, hyperlipidemia, and pacemaker who presents with abdominal pain and constipation.    Independent Historian:   Son - They report he came and told his wife he had abdominal pain today. He denies any other pain or concerns. No fever or cough. He has not had a bowel movement in three or four days.     Review of External Notes:   I reviewed the patient's recent home care visit note from 09/06/2023.     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, bilateral, immediate dry eye stage   Pseudoexfoliation lens capsule  Rhabdomyolysis  T12 compression fracture  Late 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 behavioral disturbance   Hyperlipidemia  Type 2 diabetes mellitus without complication  Bleeding of blood vessel  CAD  Chronic headache  Gait instability  Major depressive disorder  Memory disturbance  Dupuytren's contracture   CVA  BPH  Elevated PSA  Convulsions  Pacemaker    Past Surgical History:    Cholecystectomy  Genitourinary surgery  Heart cath left heart cath  Heart cath right and left heart cath   TURP   PCI/LEXI  Cataract removal w/ corneo-scleral     Physical Exam   Patient Vitals for the past 24  hrs:   BP Temp Pulse Resp SpO2   09/17/23 2300 132/75 -- 101 20 95 %   09/17/23 2215 (!) 141/87 -- 96 23 92 %   09/17/23 2200 139/81 -- 105 20 93 %   09/17/23 2145 -- -- 105 27 92 %   09/17/23 2118 -- -- 105 30 90 %   09/17/23 2115 -- -- 111 12 (!) 89 %   09/17/23 2058 -- 99.2  F (37.3  C) -- -- --   09/17/23 1914 136/73 98.8  F (37.1  C) 100 20 99 %      Physical Exam  General: Resting on the bed.  Head: No obvious trauma to head.  Ears, Nose, Throat:  External ears normal.  Nose normal.    Eyes:  Conjunctivae clear.  Pupils are equal, round, and reactive.   Neck: Normal range of motion.  Neck supple.   CV: Regular rate and rhythm.  No murmurs.      Respiratory: Effort normal and breath sounds normal.  No wheezing or crackles.   Gastrointestinal: Soft.  No distension. There is no tenderness.  There is no rigidity, no rebound and no guarding.   Neuro: Alert. Answers questions.   Skin: Skin is warm and dry.  No rash noted.     Emergency Department Course   ECG  ECG taken at 2055  Sinus tachycardia   No significant change as compared to prior, dated 8/31/2023.  Rate 112 bpm. AK interval 142 ms. QRS duration 82 ms. QT/QTc 346/472 ms. P-R-T axes 44 74 72.     Imaging:  CT Chest (PE) Abdomen Pelvis w Contrast   Final Result   IMPRESSION:   1.  There is no pulmonary embolus, aortic aneurysm or dissection. No acute chest abnormality.   2.  Probable cirrhotic configuration of the liver.   3.  Large amount of stool in the rectum. There is no bowel obstruction or inflammation.      CT Head w/o Contrast   Final Result   IMPRESSION:   1.  No acute intracranial abnormality or significant change compared to the prior study.         Report per radiology    Laboratory:  Labs Ordered and Resulted from Time of ED Arrival to Time of ED Departure   COMPREHENSIVE METABOLIC PANEL - Abnormal       Result Value    Sodium 143      Potassium 5.4 (*)     Chloride 105      Carbon Dioxide (CO2) 24      Anion Gap 14      Urea Nitrogen 24.8 (*)      Creatinine 0.72      Calcium 9.3      Glucose 141 (*)     Alkaline Phosphatase 221 (*)     AST 47 (*)     ALT 31      Protein Total 7.1      Albumin 3.8      Bilirubin Total 1.3 (*)     GFR Estimate 88     LACTIC ACID WHOLE BLOOD - Abnormal    Lactic Acid 3.6 (*)    ROUTINE UA WITH MICROSCOPIC REFLEX TO CULTURE - Abnormal    Color Urine Orange (*)     Appearance Urine Cloudy (*)     Glucose Urine 300 (*)     Bilirubin Urine Negative      Ketones Urine Negative      Specific Gravity Urine 1.013      Blood Urine Large (*)     pH Urine 5.0      Protein Albumin Urine 30 (*)     Urobilinogen Urine Normal      Nitrite Urine Negative      Leukocyte Esterase Urine Large (*)     Bacteria Urine Many (*)     Mucus Urine Present (*)     RBC Urine >182 (*)     WBC Urine >182 (*)    CBC WITH PLATELETS AND DIFFERENTIAL - Abnormal    WBC Count 2.8 (*)     RBC Count 5.01      Hemoglobin 16.0      Hematocrit 47.7      MCV 95      MCH 31.9      MCHC 33.5      RDW 13.2      Platelet Count 145 (*)    DIFFERENTIAL - Abnormal    % Neutrophils 80      % Lymphocytes 14      % Monocytes 1      % Eosinophils 5      % Basophils 0      Absolute Neutrophils 2.2      Absolute Lymphocytes 0.4 (*)     Absolute Monocytes 0.0      Absolute Eosinophils 0.1      Absolute Basophils 0.0      RBC Morphology Confirmed RBC Indices      Platelet Assessment        Value: Automated Count Confirmed. Platelet morphology is normal.   LACTIC ACID WHOLE BLOOD - Abnormal    Lactic Acid 2.7 (*)    LIPASE - Normal    Lipase 14     INFLUENZA A/B, RSV, & SARS-COV2 PCR - Normal    Influenza A PCR Negative      Influenza B PCR Negative      RSV PCR Negative      SARS CoV2 PCR Negative     LACTIC ACID WHOLE BLOOD   BLOOD CULTURE   BLOOD CULTURE   URINE CULTURE      Emergency Department Course & Assessments:    Interventions:  Medications   sodium chloride 0.9% BOLUS 1,000 mL (1,000 mLs Intravenous $New Bag 9/18/23 0016)   sodium chloride 0.9% BOLUS 1,000 mL (0 mLs  Intravenous Stopped 9/17/23 2222)   piperacillin-tazobactam (ZOSYN) 4.5 g vial to attach to  mL bag (0 g Intravenous Stopped 9/17/23 2302)   iopamidol (ISOVUE-370) solution 500 mL (70 mLs Intravenous $Given 9/17/23 2239)   for CT scan flush use (90 mLs Intravenous $Given 9/17/23 2239)      Independent Interpretation (X-rays, CTs, rhythm strip):  none    Assessments/Consultations/Discussion of Management or Tests:  ED Course as of 09/18/23 0056   [place] Sep 17, 2023   2008 I obtained the patient's history and examined as noted above.    Mon Sep 18, 2023   0048 I consulted with Dr. Carballo, hospitalist, regarding the patient's history and presentation here in the emergency department who accepted the patient for admission.       Social Determinants of Health affecting care:   Social Connections/Isolation patient live alone with wife and is Sierra Leonean speaking     Disposition:  The patient was admitted to the hospital under the care of Dr. Carballo.     Impression & Plan    CMS Diagnoses: The patient has signs of Severe Sepsis        If one the following conditions is present, a 30 mL/kg bolus is recommended as part of the 6 hour bundle (IBW can be used for BMI >30, or document refusal/contraindication):      1.   Initial hypotension  defined as 2 bps < 90 or map < 65 in the 6hrs before or 3hrs after time zero.     2.  Lactate >4.      The patient has signs of Severe Sepsis as evidenced by:    1. 2 SIRS criteria, AND  2. Suspected infection, AND   3. Organ dysfunction: Lactic Acidosis with value >2.0    Time severe sepsis diagnosis confirmed: 2027 09/18/23 as this was the time when Lactate resulted, and the level was > 2.0    3 Hour Severe Sepsis Bundle Completion:    1. Initial Lactic Acid Result:   Recent Labs   Lab Test 09/17/23 2301 09/17/23 2027 08/31/23  0739   LACT 2.7* 3.6* 1.1     2. Blood Cultures before Antibiotics: Yes  3. Broad Spectrum Antibiotics Administered:  yes       Anti-infectives (From admission  "through now)      Start     Dose/Rate Route Frequency Ordered Stop    23  piperacillin-tazobactam (ZOSYN) 4.5 g vial to attach to  mL bag        Note to Pharmacy: For [facility], [facility] and [facility]: For Zosyn-naive patients, use the \"Zosyn initial dose + extended infusion\" order panel.    4.5 g  over 30 Minutes Intravenous ONCE 23            4. Is initial hypotension present?     No (IV fluid bolus NOT required). IV Fluid volume administered: 2000 mL                  Severe Sepsis reassessment:  1. Repeat Lactic Acid Level within 6 hours of time zero: 2.7  2. MAP>65 after initial IVF bolus, will continue to monitor fluid status and vital signs        and None    Medical Decision Makin-year-old male presents to the ER with abdominal pain and vomiting.  Mild tachycardia noted and low-grade temp.  Otherwise unremarkable vital signs.  Broad differential was pursued include not limited to obstruction, perforation, electrolyte, metabolic, renal dysfunction, severe sepsis, septic shock, UTI, PE, ACS, arrhythmia, pneumonia, pneumothorax, effusion, COVID-19, etc.  CBC shows mild leukopenia but no anemia.  BMP shows no acute electrolyte, metabolic or renal dysfunction.  LFTs and lipase are generally unrevealing not concerned for obstructive biliary process, hepatitis, pancreatitis.  Lactate is elevated at 3.6, improved to 2.7 after 1 L of fluids.  Additional fluids were ordered.  COVID, influenza, RSV negative.  Blood cultures pending.  CT chest abdomen pelvis obtained given tachycardia and showing no evidence of acute PE, pneumonia, pneumothorax, obstruction, perforation etc.  EKG shows sinus tachycardia.  Patient is slightly more confused from baseline, CT head was obtained showing no acute intracranial hemorrhage.  I do not suspect meningitis or encephalitis as son reports that he is acting like his normal self.  During the course of patient's stay he did require oxygen, presumed secondary " to sepsis as no other clear etiology of hypoxia.  Patient was given IV fluids, antibiotics and will be admitted to the hospitalist service    Diagnosis:    ICD-10-CM    1. Severe sepsis (H)  A41.9     R65.20       2. Acute cystitis with hematuria  N30.01          Scribe Disclosure:  I, Pau Gomez, am serving as a scribe at 7:35 PM on 9/17/2023 to document services personally performed by Norah Alan MD based on my observations and the provider's statements to me.      9/17/2023   Norah Alan MD Bennett, Jennifer L, MD  09/18/23 0155

## 2023-09-18 NOTE — ED NOTES
Spoke to son, Adolfo [phone], he will be able to be here later, but not right now. Able to answer any questions.     Per Adolfo, pt lives in an apartment with his wife. He has been having abdominal pain and has not had a bowel movement in 3 days. Although the pt reported a fall, son denies that this happened today.

## 2023-09-18 NOTE — PHARMACY-ADMISSION MEDICATION HISTORY
Pharmacist Admission Medication History    Admission medication history is complete. The information provided in this note is only as accurate as the sources available at the time of the update.    Medication reconciliation/reorder completed by provider prior to medication history? No    Information Source(s): Clinic records via updated list per extensive note from care everywhere [facility] home care visit on 9/6/23.  Appreciate assistance verifying med list by Bryan Way who speaks Fijian and verified list with wife over the phone.    Changes made to PTA medication list:  Added: melatonin, prn miralax  Deleted: amlodipine, bromfenac, plavix, doxazosin, flonase, gabapentin, Lantus, losartan, seroquel  Changed: tylenol to prn, risperidone to prn    Medication Affordability:  Not including over the counter (OTC) medications, was there a time in the past 3 months when you did not take your medications as prescribed because of cost?: No    Allergies reviewed with patient and updates made in EHR: unable to assess    Medication History Completed By: Jace Vences RPH 9/18/2023 9:46 AM    Prior to Admission medications    Medication Sig Last Dose Taking? Auth Provider Long Term End Date   acetaminophen (TYLENOL) 325 MG tablet Take 650 mg by mouth every 8 hours as needed for mild pain  Yes Unknown, Entered By History No    aspirin (ASA) 325 MG EC tablet Take 325 mg by mouth daily 9/17/2023 Yes Unknown, Entered By History No    atorvastatin (LIPITOR) 40 MG tablet Take 1 tablet (40 mg) by mouth every evening 9/16/2023 Yes Bernardo Chatman DO Yes    carvedilol (COREG) 25 MG tablet Take 1 tablet (25 mg) by mouth 2 times daily (with meals) 9/17/2023 at am Yes Eric Avalos MD Yes    finasteride (PROSCAR) 5 MG tablet Take 5 mg by mouth daily 9/17/2023 Yes Unknown, Entered By History     Melatonin 10 MG TABS tablet Take 10 mg by mouth At Bedtime 9/16/2023 Yes Unknown, Entered By History     metFORMIN (GLUCOPHAGE) 500 MG  tablet Take 1 tablet (500 mg) by mouth 2 times daily (with meals) 9/17/2023 at am Yes Nini Orta MD Yes    mirtazapine (REMERON) 15 MG tablet Take 15 mg by mouth At Bedtime 9/16/2023 Yes Unknown, Entered By History Yes    nitroglycerin (NITROSTAT) 0.4 MG SL tablet Place 1 tablet (0.4 mg) under the tongue every 5 minutes as needed for chest pain  Yes Laura Valerio, APRN CNP Yes    polyethylene glycol (MIRALAX) 17 g packet Take 17 g by mouth daily as needed for constipation Past Month Yes Unknown, Entered By History     risperiDONE (RISPERDAL) 0.5 MG tablet Take 0.5 mg by mouth 2 times daily as needed Past Week Yes Unknown, Entered By History No

## 2023-09-19 LAB
ANION GAP SERPL CALCULATED.3IONS-SCNC: 8 MMOL/L (ref 7–15)
BASOPHILS # BLD MANUAL: 0 10E3/UL (ref 0–0.2)
BASOPHILS NFR BLD MANUAL: 0 %
BUN SERPL-MCNC: 31.3 MG/DL (ref 8–23)
BURR CELLS BLD QL SMEAR: SLIGHT
CALCIUM SERPL-MCNC: 8.1 MG/DL (ref 8.8–10.2)
CHLORIDE SERPL-SCNC: 106 MMOL/L (ref 98–107)
CREAT SERPL-MCNC: 0.82 MG/DL (ref 0.67–1.17)
DEPRECATED HCO3 PLAS-SCNC: 23 MMOL/L (ref 22–29)
EGFRCR SERPLBLD CKD-EPI 2021: 84 ML/MIN/1.73M2
EOSINOPHIL # BLD MANUAL: 0 10E3/UL (ref 0–0.7)
EOSINOPHIL NFR BLD MANUAL: 0 %
ERYTHROCYTE [DISTWIDTH] IN BLOOD BY AUTOMATED COUNT: 13.5 % (ref 10–15)
GLUCOSE BLDC GLUCOMTR-MCNC: 136 MG/DL (ref 70–99)
GLUCOSE BLDC GLUCOMTR-MCNC: 160 MG/DL (ref 70–99)
GLUCOSE BLDC GLUCOMTR-MCNC: 205 MG/DL (ref 70–99)
GLUCOSE BLDC GLUCOMTR-MCNC: 232 MG/DL (ref 70–99)
GLUCOSE BLDC GLUCOMTR-MCNC: 256 MG/DL (ref 70–99)
GLUCOSE SERPL-MCNC: 141 MG/DL (ref 70–99)
HCT VFR BLD AUTO: 34.7 % (ref 40–53)
HGB BLD-MCNC: 12.2 G/DL (ref 13.3–17.7)
LACTATE SERPL-SCNC: 1.5 MMOL/L (ref 0.7–2)
LYMPHOCYTES # BLD MANUAL: 0.4 10E3/UL (ref 0.8–5.3)
LYMPHOCYTES NFR BLD MANUAL: 2 %
MAGNESIUM SERPL-MCNC: 2 MG/DL (ref 1.7–2.3)
MCH RBC QN AUTO: 32.5 PG (ref 26.5–33)
MCHC RBC AUTO-ENTMCNC: 35.2 G/DL (ref 31.5–36.5)
MCV RBC AUTO: 93 FL (ref 78–100)
MONOCYTES # BLD MANUAL: 0.8 10E3/UL (ref 0–1.3)
MONOCYTES NFR BLD MANUAL: 4 %
NEUTROPHILS # BLD MANUAL: 19 10E3/UL (ref 1.6–8.3)
NEUTROPHILS NFR BLD MANUAL: 94 %
PLAT MORPH BLD: ABNORMAL
PLATELET # BLD AUTO: 111 10E3/UL (ref 150–450)
POTASSIUM SERPL-SCNC: 3.1 MMOL/L (ref 3.4–5.3)
POTASSIUM SERPL-SCNC: 3.8 MMOL/L (ref 3.4–5.3)
PROCALCITONIN SERPL IA-MCNC: 38.32 NG/ML
RBC # BLD AUTO: 3.75 10E6/UL (ref 4.4–5.9)
RBC MORPH BLD: ABNORMAL
SODIUM SERPL-SCNC: 137 MMOL/L (ref 136–145)
TOXIC GRANULES BLD QL SMEAR: PRESENT
WBC # BLD AUTO: 20.2 10E3/UL (ref 4–11)

## 2023-09-19 PROCEDURE — 36415 COLL VENOUS BLD VENIPUNCTURE: CPT | Performed by: STUDENT IN AN ORGANIZED HEALTH CARE EDUCATION/TRAINING PROGRAM

## 2023-09-19 PROCEDURE — 120N000001 HC R&B MED SURG/OB

## 2023-09-19 PROCEDURE — 84132 ASSAY OF SERUM POTASSIUM: CPT | Performed by: STUDENT IN AN ORGANIZED HEALTH CARE EDUCATION/TRAINING PROGRAM

## 2023-09-19 PROCEDURE — 250N000011 HC RX IP 250 OP 636: Mod: JZ | Performed by: INTERNAL MEDICINE

## 2023-09-19 PROCEDURE — 84145 PROCALCITONIN (PCT): CPT | Performed by: STUDENT IN AN ORGANIZED HEALTH CARE EDUCATION/TRAINING PROGRAM

## 2023-09-19 PROCEDURE — 85007 BL SMEAR W/DIFF WBC COUNT: CPT | Performed by: STUDENT IN AN ORGANIZED HEALTH CARE EDUCATION/TRAINING PROGRAM

## 2023-09-19 PROCEDURE — 250N000013 HC RX MED GY IP 250 OP 250 PS 637: Performed by: STUDENT IN AN ORGANIZED HEALTH CARE EDUCATION/TRAINING PROGRAM

## 2023-09-19 PROCEDURE — 250N000013 HC RX MED GY IP 250 OP 250 PS 637: Performed by: INTERNAL MEDICINE

## 2023-09-19 PROCEDURE — 83735 ASSAY OF MAGNESIUM: CPT | Performed by: STUDENT IN AN ORGANIZED HEALTH CARE EDUCATION/TRAINING PROGRAM

## 2023-09-19 PROCEDURE — 250N000011 HC RX IP 250 OP 636: Mod: JZ | Performed by: STUDENT IN AN ORGANIZED HEALTH CARE EDUCATION/TRAINING PROGRAM

## 2023-09-19 PROCEDURE — 99233 SBSQ HOSP IP/OBS HIGH 50: CPT | Performed by: STUDENT IN AN ORGANIZED HEALTH CARE EDUCATION/TRAINING PROGRAM

## 2023-09-19 PROCEDURE — 258N000003 HC RX IP 258 OP 636: Performed by: STUDENT IN AN ORGANIZED HEALTH CARE EDUCATION/TRAINING PROGRAM

## 2023-09-19 PROCEDURE — 80048 BASIC METABOLIC PNL TOTAL CA: CPT | Performed by: STUDENT IN AN ORGANIZED HEALTH CARE EDUCATION/TRAINING PROGRAM

## 2023-09-19 PROCEDURE — 83605 ASSAY OF LACTIC ACID: CPT | Performed by: STUDENT IN AN ORGANIZED HEALTH CARE EDUCATION/TRAINING PROGRAM

## 2023-09-19 PROCEDURE — 85027 COMPLETE CBC AUTOMATED: CPT | Performed by: STUDENT IN AN ORGANIZED HEALTH CARE EDUCATION/TRAINING PROGRAM

## 2023-09-19 RX ORDER — OLANZAPINE 10 MG/2ML
2.5 INJECTION, POWDER, FOR SOLUTION INTRAMUSCULAR EVERY 6 HOURS PRN
Status: DISCONTINUED | OUTPATIENT
Start: 2023-09-19 | End: 2023-09-20 | Stop reason: HOSPADM

## 2023-09-19 RX ORDER — POTASSIUM CHLORIDE 1500 MG/1
40 TABLET, EXTENDED RELEASE ORAL ONCE
Status: COMPLETED | OUTPATIENT
Start: 2023-09-19 | End: 2023-09-19

## 2023-09-19 RX ADMIN — RISPERIDONE 0.5 MG: 0.5 TABLET ORAL at 19:29

## 2023-09-19 RX ADMIN — OLANZAPINE 2.5 MG: 10 INJECTION, POWDER, FOR SOLUTION INTRAMUSCULAR at 04:51

## 2023-09-19 RX ADMIN — CARVEDILOL 25 MG: 25 TABLET, FILM COATED ORAL at 20:01

## 2023-09-19 RX ADMIN — CARVEDILOL 25 MG: 25 TABLET, FILM COATED ORAL at 09:59

## 2023-09-19 RX ADMIN — OLANZAPINE 2.5 MG: 10 INJECTION, POWDER, FOR SOLUTION INTRAMUSCULAR at 17:32

## 2023-09-19 RX ADMIN — POTASSIUM CHLORIDE 40 MEQ: 1500 TABLET, EXTENDED RELEASE ORAL at 09:59

## 2023-09-19 RX ADMIN — ENOXAPARIN SODIUM 40 MG: 40 INJECTION SUBCUTANEOUS at 13:00

## 2023-09-19 RX ADMIN — SODIUM CHLORIDE, POTASSIUM CHLORIDE, SODIUM LACTATE AND CALCIUM CHLORIDE: 600; 310; 30; 20 INJECTION, SOLUTION INTRAVENOUS at 23:30

## 2023-09-19 RX ADMIN — MIRTAZAPINE 15 MG: 15 TABLET, FILM COATED ORAL at 21:59

## 2023-09-19 RX ADMIN — PIPERACILLIN AND TAZOBACTAM 3.38 G: 3; .375 INJECTION, POWDER, FOR SOLUTION INTRAVENOUS at 22:25

## 2023-09-19 RX ADMIN — SENNOSIDES AND DOCUSATE SODIUM 1 TABLET: 50; 8.6 TABLET ORAL at 19:58

## 2023-09-19 RX ADMIN — FINASTERIDE 5 MG: 5 TABLET, FILM COATED ORAL at 09:59

## 2023-09-19 RX ADMIN — Medication 10 MG: at 21:58

## 2023-09-19 RX ADMIN — ATORVASTATIN CALCIUM 40 MG: 40 TABLET, FILM COATED ORAL at 20:03

## 2023-09-19 RX ADMIN — PIPERACILLIN AND TAZOBACTAM 3.38 G: 3; .375 INJECTION, POWDER, FOR SOLUTION INTRAVENOUS at 10:00

## 2023-09-19 RX ADMIN — ACETAMINOPHEN 650 MG: 325 TABLET, FILM COATED ORAL at 04:34

## 2023-09-19 RX ADMIN — LIDOCAINE PATCH 4% 1 PATCH: 40 PATCH TOPICAL at 19:58

## 2023-09-19 RX ADMIN — RISPERIDONE 0.5 MG: 0.5 TABLET ORAL at 02:12

## 2023-09-19 RX ADMIN — ACETAMINOPHEN 650 MG: 325 TABLET, FILM COATED ORAL at 11:50

## 2023-09-19 RX ADMIN — ACETAMINOPHEN 650 MG: 325 TABLET, FILM COATED ORAL at 19:57

## 2023-09-19 RX ADMIN — PIPERACILLIN AND TAZOBACTAM 3.38 G: 3; .375 INJECTION, POWDER, FOR SOLUTION INTRAVENOUS at 16:37

## 2023-09-19 RX ADMIN — ASPIRIN 325 MG: 325 TABLET, COATED ORAL at 09:59

## 2023-09-19 RX ADMIN — INSULIN ASPART 1 UNITS: 100 INJECTION, SOLUTION INTRAVENOUS; SUBCUTANEOUS at 22:14

## 2023-09-19 ASSESSMENT — ACTIVITIES OF DAILY LIVING (ADL)
ADLS_ACUITY_SCORE: 51
ADLS_ACUITY_SCORE: 47
ADLS_ACUITY_SCORE: 51

## 2023-09-19 NOTE — PLAN OF CARE
Goal Outcome Evaluation:         Unable to determine orientation status. Patient did not speak with . Patient mostly flat, but continuously tries to ambulate. Per notes, patient's son stated he did not walk much if at all at home. A2gbaw today. Nono on IV. No tele per provider.    2 boluses given for critical lactic. Then fluids started for 3rd critical with recheck at 1930.    Patient afebrile. VSS on room air. 3L overnight. Provider recommends no vitals on NoC shift d/t history of aggression at night. PRN risperidone available.    Blood and urine cultures pending.

## 2023-09-19 NOTE — PROGRESS NOTES
Pt is alert only self. Mino pain.on RA VSS, no fever. Pt become combative, trying to get up and remove IV. Pt was  punching sitter . refused to change pull up. PRN Zyprexa intramuscular given.sitter at bed side. Will continue monitoring.(note made By JADEN Miller.)

## 2023-09-19 NOTE — PHARMACY-CONSULT NOTE
Pharmacy Delirium Chart Review    Upon chart review, the following medications may contribute to possible patient delirium: mirtazapine (2% incidence).  Please consult unit pharmacist with further questions.    GRACIELA GARDNER RPH

## 2023-09-19 NOTE — PLAN OF CARE
Pt is restless, agitated, confused. Has a sitter at night. Pulled IV out. Received PRN Risperidone PO, scheduled Tylenol, PRN IM Zyprexa. Takes PO meds crushed with applesauce. VSS, q4. On RA. Denies pain. Lift, assist x2. BG checks. Incontinent of urine and BM. Keeps triggering lactic acid. On potassium and magnesium protocols. Daily weight. From home with wife and daughter.

## 2023-09-19 NOTE — PROGRESS NOTES
Cross cover    Patient agitated and pulling all IV axis and restless  -Started delirium protocol  -Ordered olanzapine per protocol

## 2023-09-19 NOTE — PLAN OF CARE
Confused per baseline. Daughter at bedside. Denies pain however scheduled Tylenol given. Triggered sepsis but no lactic or vitals checked. MD aware. per MD note: do not  wake pt up for vitals when sleeping.  Lost IV access, new one placed by CRNA. Pt on IV antibiotic and continues IV fluid. Had one small and one large BM end of shift. Sitter at bedside. Will continue to provide supportive care

## 2023-09-19 NOTE — PLAN OF CARE
Goal Outcome Evaluation:      Plan of Care Reviewed With: patient      Pt. Alert to self only, pleasantly confused, for mobility use 2 assist with walker, gait belt, ate 75% of his meals, voided 2 times on this shift, PVR-160, has IV line on right arm, LR continuing 75 ml/hr, on BG checked, covered with insuline, potasium were replaced today 3.8., has sitter at bed side, pt. Speaks Somali only.    /62 (BP Location: Left arm)   Pulse 72   Temp 97.4  F (36.3  C) (Temporal)   Resp 24   Wt 88.8 kg (195 lb 12.3 oz)   SpO2 92%   BMI 28.09 kg/m

## 2023-09-19 NOTE — PROGRESS NOTES
[facility]    Medicine Progress Note - Hospitalist Service    Date of Admission:  9/17/2023    Assessment & Plan   88 year old Panamanian-speaking male with medical history significant for type 2 diabetes mellitus, hypertension, coronary artery disease, recent stroke, underlying dementia and memory impairment who presented to the emergency room with complaints of abdominal pain, nausea, vomiting and change in mental status.  Evaluation in the emergency room showed patient has UTI.     In the emergency room patient was noted to be hypoxic requiring 2 L of supplemental O2.  ED work-up included CT chest PE abdomen and pelvis which showed cirrhotic liver changes and constipation.  Chest x-ray without any acute urinary infiltrates or congestion.    Initial WBC count of 2.8 which went up to 29,000 this morning.  Lactate peaked at 4.2.  Procalcitonin severely elevated at 57.    Severe sepsis sepsis secondary to UTI.  Lactic acidosis.  No urinary obstruction or any other intra-abdominal source of sepsis on CT scan.  Continue Zosyn, follow-up on blood.  Urine cultures growing Citrobacter.  Procalcitonin and leukocytosis improving.  Lactic acidosis resolved.  Got a dose of Lasix after he started needing oxygen after getting 2.5 L of IV fluid bolus in the ER.  But patient had worsening lactic acidosis with lactate going up to 4.2.  Given NS bolus 500 mL x 2 and monitor lactate.  Continue LR at 50 mill per hour, patient tolerating..    Encephalopathy due to sepsis in setting of underlying dementia.  Patient currently calm and cooperative and sitting up in the chair.  Son at bedside provided language interpretation.  Patient thought I was from his discharge but this is his baseline status as per the son.  Continue nocturnal melatonin and Remeron and as needed Risperdal twice daily for agitation.  Do not wake up the patient at night to check vitals if the patient's vitals remained stable throughout the  evening.    Severe constipation.  No bowel movement for 5 days prior to admission, large amount of stool on CT scan.  Given lactulose 20 g x 2 doses and ordered on Senokot and MiraLAX, had 2 bowel movements.    Transient hypoxia.  Unclear etiology, could have been a probe malfunction or actual hypoxia due to receiving IV fluids which were stopped and was given Lasix 20 mg IV x1.  Echocardiogram was ordered.  As the patient has rising lactic acidosis, severely elevated procalcitonin and leukocytosis consistent with sepsis, continue on IV fluids as discussed above.    Chronic medical conditions.    BPH: Not in urinary retention, continue Proscar.  Hypertension. Continue carvedilol.  Diabetes mellitus. Prior to admission on metformin, currently on hold and cover with sliding scale.  Hyperlipidemia. Continue atorvastatin.         Diet: Combination Diet Moderate Consistent Carb (60 g CHO per Meal) Diet  Room Service    DVT Prophylaxis: Enoxaparin (Lovenox) SQ  Villa Catheter: Not present  Lines: None     Cardiac Monitoring: None  Code Status: Full Code      Clinically Significant Risk Factors        # Hypokalemia: Lowest K = 3.1 mmol/L in last 2 days, will replace as needed  # Hyperkalemia: Highest K = 5.4 mmol/L in last 2 days, will monitor as appropriate   # Hypocalcemia: Lowest Ca = 8.1 mg/dL in last 2 days, will monitor and replace as appropriate   # Hypomagnesemia: Lowest Mg = 1.3 mg/dL in last 2 days, will replace as needed   # Hypoalbuminemia: Lowest albumin = 3.2 g/dL at 9/18/2023  6:26 AM, will monitor as appropriate     # Thrombocytopenia: Lowest platelets = 111 in last 2 days, will monitor for bleeding     # Hypertension: Noted on problem list     # Dementia: noted on problem list     # DMII: A1C = 6.5 % (Ref range: <5.7 %) within past 6 months  , PRESENT ON ADMISSION             Disposition Plan      Expected Discharge Date: 09/21/2023                  Frank Ren MD  Hospitalist Service  [facility]  [facility]  Securely message with Asa (more info)  Text page via [employer] Paging/Directory   ______________________________________________________________________    Interval History   Sitting up in the chair comfortably.  Does not appear to be in any distress.    Physical Exam   Vital Signs: Temp: 97.6  F (36.4  C) Temp src: Temporal BP: (!) 146/57 Pulse: 66   Resp: 20 SpO2: 96 % O2 Device: None (Room air)    Weight: 195 lbs 12.3 oz    General Appearance: Awake, sitting up in the chair.  Does not answer questions with help of  on iPad but able to talk to the son who provided interpretation.  Respiratory: Clear to auscultation.  Cardiovascular: S1-S2 normal  GI: Soft and nontender  Skin: No rash  Other: Moves all extremities.    Medical Decision Making       MANAGEMENT DISCUSSED with the following over the past 24 hours: Patient, RN and son at bedside       Data     I have personally reviewed the following data over the past 24 hrs:    20.2 (H)  \   12.2 (L)   / 111 (L)     137 106 31.3 (H) /  256 (H)   3.8 23 0.82 \     Procal: 38.32 (HH) CRP: N/A Lactic Acid: 1.5         Imaging results reviewed over the past 24 hrs:   No results found for this or any previous visit (from the past 24 hour(s)).

## 2023-09-19 NOTE — PROVIDER NOTIFICATION
the pt is getting agitated again. a dose of riseriDONE was given to the pt at 0200, helped, but now he is getting agitated again. Anything else to prescribe? pt has no iv access again as he pulled it out. thx

## 2023-09-20 VITALS
HEART RATE: 81 BPM | SYSTOLIC BLOOD PRESSURE: 161 MMHG | RESPIRATION RATE: 24 BRPM | DIASTOLIC BLOOD PRESSURE: 71 MMHG | TEMPERATURE: 97.1 F | BODY MASS INDEX: 28.09 KG/M2 | WEIGHT: 195.77 LBS | OXYGEN SATURATION: 94 %

## 2023-09-20 LAB
ANION GAP SERPL CALCULATED.3IONS-SCNC: 9 MMOL/L (ref 7–15)
BACTERIA UR CULT: ABNORMAL
BACTERIA UR CULT: ABNORMAL
BASOPHILS # BLD AUTO: 0.1 10E3/UL (ref 0–0.2)
BASOPHILS NFR BLD AUTO: 1 %
BUN SERPL-MCNC: 23.4 MG/DL (ref 8–23)
CALCIUM SERPL-MCNC: 8.3 MG/DL (ref 8.8–10.2)
CHLORIDE SERPL-SCNC: 111 MMOL/L (ref 98–107)
CREAT SERPL-MCNC: 0.71 MG/DL (ref 0.67–1.17)
DEPRECATED HCO3 PLAS-SCNC: 21 MMOL/L (ref 22–29)
EGFRCR SERPLBLD CKD-EPI 2021: 88 ML/MIN/1.73M2
EOSINOPHIL # BLD AUTO: 0.6 10E3/UL (ref 0–0.7)
EOSINOPHIL NFR BLD AUTO: 4 %
ERYTHROCYTE [DISTWIDTH] IN BLOOD BY AUTOMATED COUNT: 13.3 % (ref 10–15)
GLUCOSE BLDC GLUCOMTR-MCNC: 133 MG/DL (ref 70–99)
GLUCOSE BLDC GLUCOMTR-MCNC: 145 MG/DL (ref 70–99)
GLUCOSE BLDC GLUCOMTR-MCNC: 213 MG/DL (ref 70–99)
GLUCOSE SERPL-MCNC: 159 MG/DL (ref 70–99)
HCT VFR BLD AUTO: 41.1 % (ref 40–53)
HGB BLD-MCNC: 14 G/DL (ref 13.3–17.7)
IMM GRANULOCYTES # BLD: 0.5 10E3/UL
IMM GRANULOCYTES NFR BLD: 3 %
LYMPHOCYTES # BLD AUTO: 2.1 10E3/UL (ref 0.8–5.3)
LYMPHOCYTES NFR BLD AUTO: 13 %
MAGNESIUM SERPL-MCNC: 1.9 MG/DL (ref 1.7–2.3)
MCH RBC QN AUTO: 31.5 PG (ref 26.5–33)
MCHC RBC AUTO-ENTMCNC: 34.1 G/DL (ref 31.5–36.5)
MCV RBC AUTO: 93 FL (ref 78–100)
MONOCYTES # BLD AUTO: 0.8 10E3/UL (ref 0–1.3)
MONOCYTES NFR BLD AUTO: 5 %
NEUTROPHILS # BLD AUTO: 12.2 10E3/UL (ref 1.6–8.3)
NEUTROPHILS NFR BLD AUTO: 74 %
NRBC # BLD AUTO: 0 10E3/UL
NRBC BLD AUTO-RTO: 0 /100
PLATELET # BLD AUTO: 124 10E3/UL (ref 150–450)
POTASSIUM SERPL-SCNC: 3.9 MMOL/L (ref 3.4–5.3)
RBC # BLD AUTO: 4.44 10E6/UL (ref 4.4–5.9)
SODIUM SERPL-SCNC: 141 MMOL/L (ref 136–145)
WBC # BLD AUTO: 16.3 10E3/UL (ref 4–11)

## 2023-09-20 PROCEDURE — 85025 COMPLETE CBC W/AUTO DIFF WBC: CPT | Performed by: STUDENT IN AN ORGANIZED HEALTH CARE EDUCATION/TRAINING PROGRAM

## 2023-09-20 PROCEDURE — 250N000013 HC RX MED GY IP 250 OP 250 PS 637: Performed by: INTERNAL MEDICINE

## 2023-09-20 PROCEDURE — 36415 COLL VENOUS BLD VENIPUNCTURE: CPT | Performed by: STUDENT IN AN ORGANIZED HEALTH CARE EDUCATION/TRAINING PROGRAM

## 2023-09-20 PROCEDURE — 250N000011 HC RX IP 250 OP 636: Mod: JZ | Performed by: INTERNAL MEDICINE

## 2023-09-20 PROCEDURE — 99239 HOSP IP/OBS DSCHRG MGMT >30: CPT | Performed by: STUDENT IN AN ORGANIZED HEALTH CARE EDUCATION/TRAINING PROGRAM

## 2023-09-20 PROCEDURE — 80048 BASIC METABOLIC PNL TOTAL CA: CPT | Performed by: STUDENT IN AN ORGANIZED HEALTH CARE EDUCATION/TRAINING PROGRAM

## 2023-09-20 PROCEDURE — 250N000011 HC RX IP 250 OP 636: Mod: JZ | Performed by: STUDENT IN AN ORGANIZED HEALTH CARE EDUCATION/TRAINING PROGRAM

## 2023-09-20 PROCEDURE — 83735 ASSAY OF MAGNESIUM: CPT | Performed by: STUDENT IN AN ORGANIZED HEALTH CARE EDUCATION/TRAINING PROGRAM

## 2023-09-20 PROCEDURE — 250N000013 HC RX MED GY IP 250 OP 250 PS 637: Performed by: STUDENT IN AN ORGANIZED HEALTH CARE EDUCATION/TRAINING PROGRAM

## 2023-09-20 RX ORDER — AMOXICILLIN 250 MG
1 CAPSULE ORAL 2 TIMES DAILY PRN
Qty: 100 TABLET | Refills: 0 | Status: ON HOLD | OUTPATIENT
Start: 2023-09-20 | End: 2024-02-04

## 2023-09-20 RX ORDER — POLYETHYLENE GLYCOL 3350 17 G/17G
17 POWDER, FOR SOLUTION ORAL 2 TIMES DAILY
Qty: 510 G | Refills: 4 | Status: SHIPPED | OUTPATIENT
Start: 2023-09-20

## 2023-09-20 RX ORDER — CEFDINIR 300 MG/1
300 CAPSULE ORAL 2 TIMES DAILY
Qty: 20 CAPSULE | Refills: 0 | Status: ON HOLD | OUTPATIENT
Start: 2023-09-20 | End: 2024-02-04

## 2023-09-20 RX ADMIN — OLANZAPINE 2.5 MG: 10 INJECTION, POWDER, FOR SOLUTION INTRAMUSCULAR at 07:28

## 2023-09-20 RX ADMIN — RISPERIDONE 0.5 MG: 0.5 TABLET ORAL at 01:52

## 2023-09-20 RX ADMIN — ACETAMINOPHEN 650 MG: 325 TABLET, FILM COATED ORAL at 11:55

## 2023-09-20 RX ADMIN — ASPIRIN 325 MG: 325 TABLET, COATED ORAL at 08:58

## 2023-09-20 RX ADMIN — ENOXAPARIN SODIUM 40 MG: 40 INJECTION SUBCUTANEOUS at 11:56

## 2023-09-20 RX ADMIN — ACETAMINOPHEN 650 MG: 325 TABLET, FILM COATED ORAL at 04:39

## 2023-09-20 RX ADMIN — PIPERACILLIN AND TAZOBACTAM 3.38 G: 3; .375 INJECTION, POWDER, FOR SOLUTION INTRAVENOUS at 04:36

## 2023-09-20 RX ADMIN — SENNOSIDES AND DOCUSATE SODIUM 1 TABLET: 50; 8.6 TABLET ORAL at 08:58

## 2023-09-20 RX ADMIN — PIPERACILLIN AND TAZOBACTAM 3.38 G: 3; .375 INJECTION, POWDER, FOR SOLUTION INTRAVENOUS at 11:56

## 2023-09-20 RX ADMIN — CARVEDILOL 25 MG: 25 TABLET, FILM COATED ORAL at 08:58

## 2023-09-20 RX ADMIN — OLANZAPINE 2.5 MG: 10 INJECTION, POWDER, FOR SOLUTION INTRAMUSCULAR at 01:01

## 2023-09-20 RX ADMIN — FINASTERIDE 5 MG: 5 TABLET, FILM COATED ORAL at 08:58

## 2023-09-20 ASSESSMENT — ACTIVITIES OF DAILY LIVING (ADL)
ADLS_ACUITY_SCORE: 51
ADLS_ACUITY_SCORE: 55
ADLS_ACUITY_SCORE: 55
ADLS_ACUITY_SCORE: 51

## 2023-09-20 NOTE — DISCHARGE SUMMARY
[facility]  Hospitalist Discharge Summary      Date of Admission:  9/17/2023  Date of Discharge:  No discharge date for patient encounter.  Discharging Provider: Frank Ren MD  Discharge Service: Hospitalist Service    Discharge Diagnoses     Severe sepsis secondary to UTI.  Lactic acidosis  Encephalopathy due to sepsis in setting of underlying dementia  Constipation  Transient hypoxia  BPH  Hypertension  Diabetes mellitus  Hyperlipidemia      Clinically Significant Risk Factors     # DMII: A1C = 6.5 % (Ref range: <5.7 %) within past 6 months       Follow-ups Needed After Discharge   Follow-up Appointments     Follow-up and recommended labs and tests       Follow up with primary care provider, Oralia Forrest, within 7 days for   hospital follow- up.  The following labs/tests are recommended: CBC and   BMP.            Unresulted Labs Ordered in the Past 30 Days of this Admission       Date and Time Order Name Status Description    9/17/2023 10:18 PM Urine Culture Preliminary     9/17/2023  9:21 PM Blood Culture Arm, Left Preliminary     9/17/2023  9:21 PM Blood Culture Hand, Right Preliminary         These results will be followed up by hospitalist team    Discharge Disposition   Discharged to home  Condition at discharge: Stable    Hospital Course   88 year old Jamaican-speaking male with medical history significant for type 2 diabetes mellitus, hypertension, coronary artery disease, recent stroke, underlying dementia and memory impairment who presented to the emergency room with complaints of abdominal pain, nausea, vomiting and change in mental status.  Evaluation in the emergency room showed patient has UTI.     In the emergency room patient was noted to be hypoxic requiring 2 L of supplemental O2.  ED work-up included CT chest PE abdomen and pelvis which showed cirrhotic liver changes and constipation.  Chest x-ray without any acute urinary infiltrates or congestion.    Initial WBC count of  2.8 which went up to 29,000 this morning.  Lactate peaked at 4.2.  Procalcitonin severely elevated at 57.    Severe sepsis sepsis secondary to UTI.  Lactic acidosis.  No urinary obstruction or any other intra-abdominal source of sepsis on CT scan.  Given Zosyn while inpatient, blood cultures remain negative.  Urine cultures growing Citrobacter.  Procalcitonin and leukocytosis improving.  Lactic acidosis resolved.  Will discharge on 10 days of Omnicef.  Got a dose of Lasix after he started needing oxygen after getting 2.5 L of IV fluid bolus in the ER.  But patient had worsening lactic acidosis with lactate went up to 4.2.  Lactate improved to 1.5 after IV fluid bolus and infusion.    Encephalopathy due to sepsis in setting of underlying dementia.  Patient currently calm and cooperative and sitting up in the chair but gets sundowning at night.    Discussed with son, will not do PT evaluation while in the hospital and the patient is weak as they do not want to go to TCU.  Will offer home PT.    Severe constipation.  No bowel movement for 5 days prior to admission, large amount of stool on CT scan.  Given lactulose 20 g x 2 doses and ordered on Senokot and MiraLAX, had 2 bowel movements.  Will discharge on scheduled MiraLAX and as needed Senokot.    Transient hypoxia.  Unclear etiology, could have been a probe malfunction or actual hypoxia due to receiving IV fluids which were stopped and was given Lasix 20 mg IV x1.  Echocardiogram showed normal EF.  Patient has been off oxygen since admission.    Chronic medical conditions.    BPH: Not in urinary retention, continue Proscar.  Hypertension. Continue carvedilol.  Diabetes mellitus. Prior to admission on metformin, currently on hold and cover with sliding scale.  Hyperlipidemia. Continue atorvastatin.      Consultations This Hospital Stay   PHARMACY IP CONSULT    Code Status   Full Code    Time Spent on this Encounter   I, Frank Ren MD, personally saw the patient  today and spent greater than 30 minutes discharging this patient.       Frank Ren MD  [facility]  [address]  [facility] [address]  Phone: [phone]  Fax: [phone]  ______________________________________________________________________    Physical Exam   Vital Signs: Temp: 97.1  F (36.2  C) Temp src: Temporal BP: (!) 161/71 Pulse: 81   Resp: 24 SpO2: 94 % O2 Device: None (Room air)    Weight: 195 lbs 12.3 oz    General Appearance:  Awake, sitting up in the chair.  Does not answer questions with help of  on iPad but able to talk to the son who provided interpretation.  Respiratory: Clear to auscultation.  Cardiovascular: S1-S2 normal  GI: Soft and nontender  Skin: No rash  Other:  Moves all extremities.          Primary Care Physician   Oralia Forrest    Discharge Orders      Home Care Referral      Reason for your hospital stay    Sepsis secondary to UTI     Follow-up and recommended labs and tests     Follow up with primary care provider, Oralia Forrest, within 7 days for hospital follow- up.  The following labs/tests are recommended: CBC and BMP.     Activity    Your activity upon discharge: activity as tolerated     Diet    Follow this diet upon discharge: Orders Placed This Encounter      Room Service      Combination Diet Moderate Consistent Carb (60 g CHO per Meal) Diet       Significant Results and Procedures   Most Recent 3 CBC's:  Recent Labs   Lab Test 09/20/23  0635 09/19/23  0704 09/18/23  1213   WBC 16.3* 20.2* 29.1*   HGB 14.0 12.2* 14.1   MCV 93 93 94   * 111* 134*     Most Recent 3 BMP's:  Recent Labs   Lab Test 09/20/23  1118 09/20/23  0752 09/20/23  0635 09/19/23  1618 09/19/23  1340 09/19/23  0849 09/19/23  0704 09/18/23  1220 09/18/23  1213   NA  --   --  141  --   --   --  137  --  143   POTASSIUM  --   --  3.9  --  3.8  --  3.1*  --  3.8   CHLORIDE  --   --  111*  --   --   --  106  --  107   CO2  --   --  21*  --   --   --  23  --   20*   BUN  --   --  23.4*  --   --   --  31.3*  --  27.0*   CR  --   --  0.71  --   --   --  0.82  --  0.95   ANIONGAP  --   --  9  --   --   --  8  --  16*   LUZ  --   --  8.3*  --   --   --  8.1*  --  8.4*   * 133* 159*   < >  --    < > 141*   < > 250*    < > = values in this interval not displayed.     Most Recent 2 LFT's:  Recent Labs   Lab Test 09/18/23  0626 09/17/23  1923   AST 43 47*   ALT 35 31   ALKPHOS 136* 221*   BILITOTAL 1.8* 1.3*   ,   Results for orders placed or performed during the hospital encounter of 09/17/23   CT Chest (PE) Abdomen Pelvis w Contrast    Narrative    EXAM: CT CHEST PE ABDOMEN PELVIS W CONTRAST  LOCATION: [facility]  DATE: 9/17/2023    INDICATION: Abdominal pain, no BM for 3-4 days.  COMPARISON: 08/31/2023.  TECHNIQUE: CT chest pulmonary angiogram and routine CT abdomen pelvis with IV contrast. Arterial phase through the chest and venous phase through the abdomen and pelvis. Multiplanar reformats and MIP reconstructions were performed. Dose reduction   techniques were used.   CONTRAST: 70 mL Isovue 370.    FINDINGS:  ANGIOGRAM CHEST: The central pulmonary arteries are large in caliber which can be seen with pulmonary artery hypertension. There is no evidence of pulmonary embolus. Images through the lung bases are degraded by motion artifact. Thoracic aorta is   negative for dissection. No CT evidence of right heart strain.     LUNGS AND PLEURA: Mild dependent atelectasis bilaterally. There is no pneumothorax or pleural effusion.    MEDIASTINUM/AXILLAE: Normal.    CORONARY ARTERY CALCIFICATION: Severe.    HEPATOBILIARY: The gallbladder is absent. Nodular contour of the liver suggests cirrhosis. The portal vein is patent.    PANCREAS: Atrophic.    SPLEEN: Normal.    ADRENAL GLANDS: Normal.    KIDNEYS/BLADDER: There is no hydronephrosis. Few renal cysts. The gallbladder wall is trabeculated.    BOWEL: There is a large amount of stool in the rectum and a  moderate amount of stool through the remainder of the colon. There are colonic diverticula without acute diverticulitis. There is no bowel obstruction or inflammation. The appendix is normal. No   free intraperitoneal gas or fluid.    LYMPH NODES: Normal.    VASCULATURE: Atherosclerotic calcification of the aorta and its branches. No aneurysm.    PELVIC ORGANS: Normal.    MUSCULOSKELETAL: Old T12 compression fracture. Degenerative disease throughout the spine.      Impression    IMPRESSION:  1.  There is no pulmonary embolus, aortic aneurysm or dissection. No acute chest abnormality.  2.  Probable cirrhotic configuration of the liver.  3.  Large amount of stool in the rectum. There is no bowel obstruction or inflammation.   CT Head w/o Contrast    Narrative    EXAM: CT HEAD W/O CONTRAST  LOCATION: [facility]  DATE: 9/17/2023    INDICATION: Confusion.  COMPARISON: Previous CT head from 08/31/2023.  TECHNIQUE: Routine CT Head without IV contrast. Multiplanar reformats. Dose reduction techniques were used.    FINDINGS:  INTRACRANIAL CONTENTS: No intracranial hemorrhage, extraaxial collection, or mass effect. No CT evidence of acute infarct. Mild to moderate volume loss and moderate burden presumed chronic small vessel ischemia. Bifrontal and bitemporal encephalomalacia.   Chronic lacunar infarcts in each basal ganglia again noted.    VISUALIZED ORBITS/SINUSES/MASTOIDS: No intraorbital abnormality. No paranasal sinus mucosal disease. No middle ear or mastoid effusion.    BONES/SOFT TISSUES: No acute abnormality.      Impression    IMPRESSION:  1.  No acute intracranial abnormality or significant change compared to the prior study.   US Abdomen Limited    Narrative    US ABDOMEN LIMITED 9/18/2023 11:54 AM    CLINICAL HISTORY: abnormal lfts, abd pain  TECHNIQUE: Limited abdominal ultrasound.    COMPARISON: CT 9/17/2023    FINDINGS:    GALLBLADDER: Cholecystectomy.    BILE DUCTS: There is no  intrahepatic biliary dilatation. The common  duct measures 4 mm.    LIVER: Heterogeneous echogenicity with nodular contour.    RIGHT KIDNEY: No hydronephrosis. Simple-appearing right upper pole  cyst, measuring up to 2.7 cm, no specific follow-up recommended.    PANCREAS: The visualized portions of the pancreas are normal.    No ascites.      Impression    IMPRESSION:  1.  Cirrhotic morphology of the liver without visualized ascites.  2.  Prior cholecystectomy. No evidence of biliary obstruction.    CATHLEEN WAGGONER MD         SYSTEM ID:  [id number]   Echocardiogram Complete     Value    LVEF  50-55%    [facility]    [id number]  [id number]  [id number]  [id number]^JAH^ANAM     [facility]  Echocardiography Laboratory  [address] [place] [address]     Name: CHANI ELENA  MRN: [id number]  : 1935  Study Date: 2023 01:50 PM  Age: 88 yrs  Gender: Male  Patient Location: [facility]  Reason For Study: Dyspnea  Ordering Physician: JAH LOCKWOOD  Performed By: Marcia Iverson     BSA: 2.1 m2  Height: 70 in  Weight: 195 lb  HR: 76  BP: 116/61 mmHg  ______________________________________________________________________________  Procedure  Complete Portable Echo Adult. Optison (NDC #2250-7697) given intravenously.  ______________________________________________________________________________  Interpretation Summary     1. The left ventricle is normal in structure, function and size. The visual  ejection fraction is 50-55%.  2. The right ventricle is normal size. The right ventricular systolic function  is borderline reduced.  3. There is mild mitral stenosis. The mean mitral valve gradient is 4mmHg.     Echo 2023 showed EF 60%, MV mean 3-4mmHg.  ______________________________________________________________________________  Left Ventricle  The left ventricle is normal in structure, function and size. There is normal  left ventricular wall thickness. The visual ejection  fraction is 50-55%. Grade  I or early diastolic dysfunction. Normal left ventricular wall motion.     Right Ventricle  The right ventricle is normal size. The right ventricular systolic function is  borderline reduced.     Atria  Normal left atrial size. Right atrial size is normal. There is no atrial shunt  seen.     Mitral Valve  There is moderate mitral annular calcification. The mitral valve leaflets  appear thickened, but open well. There is mild mitral stenosis. The mean  mitral valve gradient is 4mmHg.     Tricuspid Valve  There is mild (1+) tricuspid regurgitation. The right ventricular systolic  pressure is approximated at 18.4 mmHg plus the right atrial pressure.     Aortic Valve  There is mild trileaflet aortic sclerosis. There is mild (1+) aortic  regurgitation.     Pulmonic Valve  The pulmonic valve is normal in structure and function.     Vessels  The ascending aorta is Borderline dilated. Dilation of the inferior vena cava  is present with abnormal respiratory variation in diameter.     Pericardium  There is no pericardial effusion.     Rhythm  Sinus rhythm was noted.  ______________________________________________________________________________  MMode/2D Measurements & Calculations     IVSd: 1.0 cm  LVIDd: 5.1 cm  LVIDs: 3.6 cm  LVPWd: 1.1 cm  IVC diam: 2.4 cm  FS: 29.0 %  LV mass(C)d: 202.8 grams  LV mass(C)dI: 98.2 grams/m2  Ao root diam: 3.9 cm  LA dimension: 4.3 cm  asc Aorta Diam: 3.9 cm  LA/Ao: 1.1  LVOT diam: 2.1 cm  LVOT area: 3.5 cm2  Ao root diam index Ht(cm/m): 2.2  Ao root diam index BSA (cm/m2): 1.9  Asc Ao diam index BSA (cm/m2): 1.9  Asc Ao diam index Ht(cm/m): 2.2  LA Volume (BP): 67.7 ml     LA Volume Index (BP): 32.9 ml/m2  RV Base: 3.7 cm  RWT: 0.42  TAPSE: 1.6 cm     Doppler Measurements & Calculations  MV E max jeevan: 121.1 cm/sec  MV A max jeevan: 137.3 cm/sec  MV E/A: 0.88  MV max P.7 mmHg  MV mean PG: 3.8 mmHg  MV V2 VTI: 40.4 cm  MVA(VTI): 1.8 cm2  MV P1/2t max jeevan: 124.5  cm/sec  MV P1/2t: 91.1 msec  MVA(P1/2t): 2.4 cm2  MV dec slope: 400.2 cm/sec2  MV dec time: 0.21 sec  Ao V2 max: 163.8 cm/sec  Ao max P.0 mmHg  Ao V2 mean: 111.4 cm/sec  Ao mean P.8 mmHg  Ao V2 VTI: 36.5 cm  MELISSA(I,D): 1.9 cm2  MELISSA(V,D): 2.1 cm2  AI P1/2t: 359.9 msec  LV V1 max P.0 mmHg  LV V1 max: 99.6 cm/sec  LV V1 VTI: 20.4 cm  SV(LVOT): 71.0 ml  SI(LVOT): 34.4 ml/m2  PA V2 max: 66.0 cm/sec  PA max P.7 mmHg  PA mean P.0 mmHg  PA V2 VTI: 16.3 cm  PA acc time: 0.12 sec     TR max rian: 213.6 cm/sec  TR max P.4 mmHg  AV Rian Ratio (DI): 0.61  MELISSA Index (cm2/m2): 0.94  E/E' av.0  Lateral E/e': 16.1  Medial E/e': 31.8  RV S Rian: 11.1 cm/sec     ______________________________________________________________________________  Report approved by: Jodi Don 2023 03:51 PM             Discharge Medications   Current Discharge Medication List        START taking these medications    Details   cefdinir (OMNICEF) 300 MG capsule Take 1 capsule (300 mg) by mouth 2 times daily  Qty: 20 capsule, Refills: 0    Associated Diagnoses: Urinary tract infection without hematuria, site unspecified           CONTINUE these medications which have NOT CHANGED    Details   acetaminophen (TYLENOL) 325 MG tablet Take 650 mg by mouth every 8 hours as needed for mild pain      aspirin (ASA) 325 MG EC tablet Take 325 mg by mouth daily      atorvastatin (LIPITOR) 40 MG tablet Take 1 tablet (40 mg) by mouth every evening  Qty: 30 tablet, Refills: 0    Associated Diagnoses: History of CVA (cerebrovascular accident)      carvedilol (COREG) 25 MG tablet Take 1 tablet (25 mg) by mouth 2 times daily (with meals)  Qty: 180 tablet, Refills: 3    Associated Diagnoses: Essential hypertension      finasteride (PROSCAR) 5 MG tablet Take 5 mg by mouth daily      Melatonin 10 MG TABS tablet Take 10 mg by mouth At Bedtime      metFORMIN (GLUCOPHAGE) 500 MG tablet Take 1 tablet (500 mg) by mouth 2 times daily (with  meals)  Qty: 60 tablet, Refills: 0    Associated Diagnoses: Type 2 diabetes mellitus without complication, unspecified whether long term insulin use (H)      mirtazapine (REMERON) 15 MG tablet Take 15 mg by mouth At Bedtime      nitroglycerin (NITROSTAT) 0.4 MG SL tablet Place 1 tablet (0.4 mg) under the tongue every 5 minutes as needed for chest pain  Qty: 25 tablet, Refills: 1    Associated Diagnoses: NSTEMI (non-ST elevated myocardial infarction) (H); Essential hypertension; Postsurgical percutaneous transluminal coronary angioplasty status      polyethylene glycol (MIRALAX) 17 g packet Take 17 g by mouth daily as needed for constipation      risperiDONE (RISPERDAL) 0.5 MG tablet Take 0.5 mg by mouth 2 times daily as needed           STOP taking these medications       risperiDONE (RISPERDAL M-TABS) 0.5 MG ODT Comments:   Reason for Stopping:             Allergies   Allergies   Allergen Reactions    Lisinopril     Morphine Other (See Comments)     confusion

## 2023-09-20 NOTE — PROVIDER NOTIFICATION
pt has been triggering lactic. do you want to add lactic check to the morning labs as the night before the pt also was triggering lactic and another doctor recommended to ignore the message? last lactic was 1.5 on 9/19 at 0900. thx!

## 2023-09-20 NOTE — PLAN OF CARE
Goal Outcome Evaluation:      Plan of Care Reviewed With: patient      Pt. Alert and confused, for mobility use 2 assist with gait belt, walker, pt. Denied pain, did not speak English, Gabonese only, voided a lot incontinent, on IV fluids continuing, ate 50% of his meals, has no teeth, on BG check, plan to go home today.    BP (!) 161/71 (BP Location: Left arm, Patient Position: Semi-Luong's, Cuff Size: Adult Regular)   Pulse 81   Temp 97.1  F (36.2  C) (Temporal)   Resp 24   Wt 88.8 kg (195 lb 12.3 oz)   SpO2 94%   BMI 28.09 kg/m

## 2023-09-20 NOTE — PLAN OF CARE
Pt is alert, confused, restless, agitated in the beginning of the shift, calms down after receiving PRN IM Zyprexa and PRN PO Risperidone (pharmacy consulted). Sleeps intermittently, cannot stay asleep. Talkative. VSS. On RA. Triggers sepsis, to discard per provider. Denies pain, though mentioned about some pain earlier in his head and back. Received scheduled PO Tylenol, crushed with applesauce. Incontinent of urine and BM. X2 people assist to change. Follows commands, rolls himself from side to side in bed.

## 2023-09-20 NOTE — CONSULTS
Care Management Initial Consult    General Information  Assessment completed with: Patient, Children,    Type of CM/SW Visit: Offer D/C Planning    Primary Care Provider verified and updated as needed: Yes   Readmission within the last 30 days: no previous admission in last 30 days      Reason for Consult: discharge planning, other (see comments) (Elevated RIsk Score)  Advance Care Planning:            Communication Assessment  Patient's communication style: spoken language (non-English), spoken language (English or Bilingual)    Hearing Difficulty or Deaf: yes   Wear Glasses or Blind: no    Cognitive  Cognitive/Neuro/Behavioral: .WDL except, orientation  Level of Consciousness: confused, alert  Arousal Level: opens eyes spontaneously  Orientation: disoriented to, place, time, situation  Mood/Behavior: cooperative  Best Language: 1 - Mild to moderate  Speech: slurred    Living Environment:   People in home: spouse     Current living Arrangements: house      Able to return to prior arrangements: yes       Family/Social Support:  Care provided by: self, spouse/significant other  Provides care for: no one, unable/limited ability to care for self  Marital Status:   Wife, Children          Description of Support System: Supportive, Involved    Support Assessment: Adequate family and caregiver support, Adequate social supports    Current Resources:   Patient receiving home care services: Yes  Skilled Home Care Services: Skilled Nursing  Community Resources: Home Care, [facility]  Equipment currently used at home: lift device, walker, rolling  Supplies currently used at home:      Lifestyle & Psychosocial Needs:  Social Determinants of Health     Food Insecurity: Not on file   Depression: Not on file   Housing Stability: Not on file   Tobacco Use: Low Risk  (5/4/2023)    Patient History     Smoking Tobacco Use: Never     Smokeless Tobacco Use: Never     Passive Exposure: Not on file   Financial Resource Strain:  Not on file   Alcohol Use: Not on file   Transportation Needs: Not on file   Physical Activity: Not on file   Interpersonal Safety: Not on file   Stress: Not on file   Social Connections: Not on file       Additional Information:  Care coordination consult for elevated risk score of 28%. Patient admitted with acute cystitis with hematuria.   Patient will be discharging home today. He lives with his spouse who is primary caregiver and he receives PCA services daily. CM met with son and patient at bedside. Son reports they have all needed equipment at home. States they have home care but did not know name of agency. MD ordering home PT at discharge.   Chart review found previous admission with referral to [facility].Called [facility] and spoke to Isabel who confirmed that they do have patient open for homecare but mostly just for PCA with RN supervision at this time. They are able to add the skilled services of RN, PT, OT. Will fax orders to [phone].  Son will transport patient home.     Care Management Discharge Note    Discharge Date: 09/20/2023       Discharge Disposition: Home Care    Discharge Services: PCA, SN, PT, OT    Discharge DME:  none    Discharge Transportation: family or friend will provide    Private pay costs discussed: Not applicable    Education Provided on the Discharge Plan:  yes  Persons Notified of Discharge Plans: son  Patient/Family in Agreement with the Plan: yes    Handoff Referral Completed:  N/A    Discharge orders sent to [facility] for homecare SN, PT,OT.    Alina Hart RN  Care Coordinator  [facility]

## 2023-09-20 NOTE — PROGRESS NOTES
Pt is  restless, trying to remove IV and NoNo protraction. He removed fall band. Pt was asking to cut it. He said he is at home and no needs to put gate belt. Pt walked in room.he ambulating between chair and bed.  Pull up changed. Ate vanilla pudding, apple sauce, said that good food.sitter at bed side. Writer helping to translate .PRN Risperidone given.

## 2023-09-21 ENCOUNTER — HOSPITAL ENCOUNTER (INPATIENT)
Facility: CLINIC | Age: 88
LOS: 2 days | Discharge: HOME HEALTH | DRG: 280 | End: 2023-09-24
Attending: EMERGENCY MEDICINE | Admitting: INTERNAL MEDICINE
Payer: COMMERCIAL

## 2023-09-21 DIAGNOSIS — I10 ESSENTIAL HYPERTENSION: ICD-10-CM

## 2023-09-21 DIAGNOSIS — I50.9 ACUTE CONGESTIVE HEART FAILURE, UNSPECIFIED HEART FAILURE TYPE (H): Primary | ICD-10-CM

## 2023-09-21 DIAGNOSIS — J81.0 ACUTE PULMONARY EDEMA (H): ICD-10-CM

## 2023-09-21 LAB
ALBUMIN SERPL BCG-MCNC: 3.1 G/DL (ref 3.5–5.2)
ALBUMIN UR-MCNC: 10 MG/DL
ALP SERPL-CCNC: 176 U/L (ref 40–129)
ALT SERPL W P-5'-P-CCNC: 28 U/L (ref 0–70)
ANION GAP SERPL CALCULATED.3IONS-SCNC: 13 MMOL/L (ref 7–15)
APPEARANCE UR: CLEAR
AST SERPL W P-5'-P-CCNC: 38 U/L (ref 0–45)
ATRIAL RATE - MUSE: 81 BPM
BASOPHILS # BLD AUTO: 0.1 10E3/UL (ref 0–0.2)
BASOPHILS NFR BLD AUTO: 1 %
BILIRUB SERPL-MCNC: 0.9 MG/DL
BILIRUB UR QL STRIP: NEGATIVE
BUN SERPL-MCNC: 24.3 MG/DL (ref 8–23)
CALCIUM SERPL-MCNC: 8.4 MG/DL (ref 8.8–10.2)
CHLORIDE SERPL-SCNC: 108 MMOL/L (ref 98–107)
COLOR UR AUTO: YELLOW
CREAT SERPL-MCNC: 0.81 MG/DL (ref 0.67–1.17)
D DIMER PPP FEU-MCNC: 1.01 UG/ML FEU (ref 0–0.5)
DEPRECATED HCO3 PLAS-SCNC: 21 MMOL/L (ref 22–29)
DIASTOLIC BLOOD PRESSURE - MUSE: NORMAL MMHG
EGFRCR SERPLBLD CKD-EPI 2021: 85 ML/MIN/1.73M2
EOSINOPHIL # BLD AUTO: 0 10E3/UL (ref 0–0.7)
EOSINOPHIL NFR BLD AUTO: 0 %
ERYTHROCYTE [DISTWIDTH] IN BLOOD BY AUTOMATED COUNT: 13.2 % (ref 10–15)
FLUAV RNA SPEC QL NAA+PROBE: NEGATIVE
FLUBV RNA RESP QL NAA+PROBE: NEGATIVE
GLUCOSE SERPL-MCNC: 264 MG/DL (ref 70–99)
GLUCOSE UR STRIP-MCNC: 200 MG/DL
HCO3 BLDV-SCNC: 23 MMOL/L (ref 21–28)
HCT VFR BLD AUTO: 41.9 % (ref 40–53)
HGB BLD-MCNC: 13.8 G/DL (ref 13.3–17.7)
HGB UR QL STRIP: NEGATIVE
HOLD SPECIMEN: NORMAL
IMM GRANULOCYTES # BLD: 0.1 10E3/UL
IMM GRANULOCYTES NFR BLD: 1 %
INTERPRETATION ECG - MUSE: NORMAL
KETONES UR STRIP-MCNC: 20 MG/DL
LACTATE BLD-SCNC: 2.5 MMOL/L
LEUKOCYTE ESTERASE UR QL STRIP: NEGATIVE
LYMPHOCYTES # BLD AUTO: 1.6 10E3/UL (ref 0.8–5.3)
LYMPHOCYTES NFR BLD AUTO: 15 %
MCH RBC QN AUTO: 31.4 PG (ref 26.5–33)
MCHC RBC AUTO-ENTMCNC: 32.9 G/DL (ref 31.5–36.5)
MCV RBC AUTO: 95 FL (ref 78–100)
MONOCYTES # BLD AUTO: 0.6 10E3/UL (ref 0–1.3)
MONOCYTES NFR BLD AUTO: 6 %
MUCOUS THREADS #/AREA URNS LPF: PRESENT /LPF
NEUTROPHILS # BLD AUTO: 7.7 10E3/UL (ref 1.6–8.3)
NEUTROPHILS NFR BLD AUTO: 77 %
NITRATE UR QL: NEGATIVE
NRBC # BLD AUTO: 0 10E3/UL
NRBC BLD AUTO-RTO: 0 /100
NT-PROBNP SERPL-MCNC: 9311 PG/ML (ref 0–1800)
P AXIS - MUSE: 64 DEGREES
PCO2 BLDV: 38 MM HG (ref 40–50)
PH BLDV: 7.38 [PH] (ref 7.32–7.43)
PH UR STRIP: 5 [PH] (ref 5–7)
PLATELET # BLD AUTO: 157 10E3/UL (ref 150–450)
PO2 BLDV: 26 MM HG (ref 25–47)
POTASSIUM SERPL-SCNC: 4.3 MMOL/L (ref 3.4–5.3)
PR INTERVAL - MUSE: 172 MS
PROCALCITONIN SERPL IA-MCNC: 8.8 NG/ML
PROT SERPL-MCNC: 5.9 G/DL (ref 6.4–8.3)
QRS DURATION - MUSE: 92 MS
QT - MUSE: 426 MS
QTC - MUSE: 494 MS
R AXIS - MUSE: 81 DEGREES
RBC # BLD AUTO: 4.39 10E6/UL (ref 4.4–5.9)
RBC URINE: <1 /HPF
RSV RNA SPEC NAA+PROBE: NEGATIVE
SAO2 % BLDV: 46 % (ref 94–100)
SARS-COV-2 RNA RESP QL NAA+PROBE: NEGATIVE
SODIUM SERPL-SCNC: 142 MMOL/L (ref 136–145)
SP GR UR STRIP: 1.02 (ref 1–1.03)
SYSTOLIC BLOOD PRESSURE - MUSE: NORMAL MMHG
T AXIS - MUSE: 72 DEGREES
TROPONIN T SERPL HS-MCNC: 98 NG/L
UROBILINOGEN UR STRIP-MCNC: 2 MG/DL
VENTRICULAR RATE- MUSE: 81 BPM
WBC # BLD AUTO: 10 10E3/UL (ref 4–11)
WBC URINE: 1 /HPF

## 2023-09-21 PROCEDURE — 83880 ASSAY OF NATRIURETIC PEPTIDE: CPT | Performed by: EMERGENCY MEDICINE

## 2023-09-21 PROCEDURE — 84484 ASSAY OF TROPONIN QUANT: CPT | Performed by: EMERGENCY MEDICINE

## 2023-09-21 PROCEDURE — 85379 FIBRIN DEGRADATION QUANT: CPT | Performed by: EMERGENCY MEDICINE

## 2023-09-21 PROCEDURE — 99292 CRITICAL CARE ADDL 30 MIN: CPT

## 2023-09-21 PROCEDURE — 96365 THER/PROPH/DIAG IV INF INIT: CPT

## 2023-09-21 PROCEDURE — 85025 COMPLETE CBC W/AUTO DIFF WBC: CPT | Performed by: EMERGENCY MEDICINE

## 2023-09-21 PROCEDURE — 80053 COMPREHEN METABOLIC PANEL: CPT | Performed by: EMERGENCY MEDICINE

## 2023-09-21 PROCEDURE — 81001 URINALYSIS AUTO W/SCOPE: CPT | Performed by: EMERGENCY MEDICINE

## 2023-09-21 PROCEDURE — 83605 ASSAY OF LACTIC ACID: CPT

## 2023-09-21 PROCEDURE — 82803 BLOOD GASES ANY COMBINATION: CPT

## 2023-09-21 PROCEDURE — 93005 ELECTROCARDIOGRAM TRACING: CPT

## 2023-09-21 PROCEDURE — 87637 SARSCOV2&INF A&B&RSV AMP PRB: CPT | Performed by: EMERGENCY MEDICINE

## 2023-09-21 PROCEDURE — 36415 COLL VENOUS BLD VENIPUNCTURE: CPT | Performed by: EMERGENCY MEDICINE

## 2023-09-21 PROCEDURE — 87040 BLOOD CULTURE FOR BACTERIA: CPT | Performed by: EMERGENCY MEDICINE

## 2023-09-21 PROCEDURE — 84145 PROCALCITONIN (PCT): CPT | Performed by: EMERGENCY MEDICINE

## 2023-09-21 PROCEDURE — 99291 CRITICAL CARE FIRST HOUR: CPT | Mod: 25

## 2023-09-21 PROCEDURE — 76604 US EXAM CHEST: CPT

## 2023-09-21 RX ORDER — CEFTRIAXONE 2 G/1
2 INJECTION, POWDER, FOR SOLUTION INTRAMUSCULAR; INTRAVENOUS ONCE
Status: COMPLETED | OUTPATIENT
Start: 2023-09-21 | End: 2023-09-22

## 2023-09-21 ASSESSMENT — ENCOUNTER SYMPTOMS
ABDOMINAL PAIN: 0
COUGH: 1
CONFUSION: 1

## 2023-09-21 ASSESSMENT — ACTIVITIES OF DAILY LIVING (ADL): ADLS_ACUITY_SCORE: 37

## 2023-09-22 ENCOUNTER — APPOINTMENT (OUTPATIENT)
Dept: CT IMAGING | Facility: CLINIC | Age: 88
DRG: 280 | End: 2023-09-22
Attending: INTERNAL MEDICINE
Payer: COMMERCIAL

## 2023-09-22 ENCOUNTER — PATIENT OUTREACH (OUTPATIENT)
Dept: CARE COORDINATION | Facility: CLINIC | Age: 88
End: 2023-09-22
Payer: COMMERCIAL

## 2023-09-22 ENCOUNTER — APPOINTMENT (OUTPATIENT)
Dept: SPEECH THERAPY | Facility: CLINIC | Age: 88
DRG: 280 | End: 2023-09-22
Attending: INTERNAL MEDICINE
Payer: COMMERCIAL

## 2023-09-22 ENCOUNTER — APPOINTMENT (OUTPATIENT)
Dept: CT IMAGING | Facility: CLINIC | Age: 88
DRG: 280 | End: 2023-09-22
Attending: EMERGENCY MEDICINE
Payer: COMMERCIAL

## 2023-09-22 PROBLEM — J81.0 ACUTE PULMONARY EDEMA (H): Status: ACTIVE | Noted: 2023-09-22

## 2023-09-22 PROBLEM — I50.9 ACUTE CONGESTIVE HEART FAILURE, UNSPECIFIED HEART FAILURE TYPE (H): Status: ACTIVE | Noted: 2023-09-22

## 2023-09-22 LAB
AMMONIA PLAS-SCNC: 12 UMOL/L (ref 16–60)
LACTATE SERPL-SCNC: 1.9 MMOL/L (ref 0.7–2)
TROPONIN T SERPL HS-MCNC: 89 NG/L

## 2023-09-22 PROCEDURE — 36415 COLL VENOUS BLD VENIPUNCTURE: CPT

## 2023-09-22 PROCEDURE — 82140 ASSAY OF AMMONIA: CPT | Performed by: INTERNAL MEDICINE

## 2023-09-22 PROCEDURE — 120N000001 HC R&B MED SURG/OB

## 2023-09-22 PROCEDURE — 83605 ASSAY OF LACTIC ACID: CPT

## 2023-09-22 PROCEDURE — 99222 1ST HOSP IP/OBS MODERATE 55: CPT | Performed by: INTERNAL MEDICINE

## 2023-09-22 PROCEDURE — 71275 CT ANGIOGRAPHY CHEST: CPT

## 2023-09-22 PROCEDURE — 250N000013 HC RX MED GY IP 250 OP 250 PS 637: Performed by: INTERNAL MEDICINE

## 2023-09-22 PROCEDURE — 84484 ASSAY OF TROPONIN QUANT: CPT | Performed by: EMERGENCY MEDICINE

## 2023-09-22 PROCEDURE — 250N000009 HC RX 250: Performed by: EMERGENCY MEDICINE

## 2023-09-22 PROCEDURE — 250N000011 HC RX IP 250 OP 636: Mod: JZ | Performed by: EMERGENCY MEDICINE

## 2023-09-22 PROCEDURE — 92610 EVALUATE SWALLOWING FUNCTION: CPT | Mod: GN

## 2023-09-22 PROCEDURE — 250N000011 HC RX IP 250 OP 636: Mod: JZ | Performed by: INTERNAL MEDICINE

## 2023-09-22 PROCEDURE — 99223 1ST HOSP IP/OBS HIGH 75: CPT | Performed by: INTERNAL MEDICINE

## 2023-09-22 PROCEDURE — 70450 CT HEAD/BRAIN W/O DYE: CPT

## 2023-09-22 RX ORDER — IPRATROPIUM BROMIDE AND ALBUTEROL SULFATE 2.5; .5 MG/3ML; MG/3ML
3 SOLUTION RESPIRATORY (INHALATION) ONCE
Status: COMPLETED | OUTPATIENT
Start: 2023-09-22 | End: 2023-09-22

## 2023-09-22 RX ORDER — FUROSEMIDE 10 MG/ML
40 INJECTION INTRAMUSCULAR; INTRAVENOUS EVERY 12 HOURS
Status: DISCONTINUED | OUTPATIENT
Start: 2023-09-22 | End: 2023-09-23

## 2023-09-22 RX ORDER — ASPIRIN 325 MG
325 TABLET, DELAYED RELEASE (ENTERIC COATED) ORAL DAILY
Status: DISCONTINUED | OUTPATIENT
Start: 2023-09-22 | End: 2023-09-24 | Stop reason: HOSPADM

## 2023-09-22 RX ORDER — CARVEDILOL 12.5 MG/1
12.5 TABLET ORAL 2 TIMES DAILY WITH MEALS
Status: DISCONTINUED | OUTPATIENT
Start: 2023-09-22 | End: 2023-09-24

## 2023-09-22 RX ORDER — ATORVASTATIN CALCIUM 40 MG/1
40 TABLET, FILM COATED ORAL EVERY EVENING
Status: DISCONTINUED | OUTPATIENT
Start: 2023-09-22 | End: 2023-09-24 | Stop reason: HOSPADM

## 2023-09-22 RX ORDER — FINASTERIDE 5 MG/1
5 TABLET, FILM COATED ORAL DAILY
Status: DISCONTINUED | OUTPATIENT
Start: 2023-09-22 | End: 2023-09-24 | Stop reason: HOSPADM

## 2023-09-22 RX ORDER — POLYETHYLENE GLYCOL 3350 17 G/17G
17 POWDER, FOR SOLUTION ORAL DAILY PRN
Status: DISCONTINUED | OUTPATIENT
Start: 2023-09-22 | End: 2023-09-24 | Stop reason: HOSPADM

## 2023-09-22 RX ORDER — BISACODYL 10 MG
10 SUPPOSITORY, RECTAL RECTAL DAILY PRN
Status: DISCONTINUED | OUTPATIENT
Start: 2023-09-22 | End: 2023-09-24 | Stop reason: HOSPADM

## 2023-09-22 RX ORDER — IOPAMIDOL 755 MG/ML
71 INJECTION, SOLUTION INTRAVASCULAR ONCE
Status: COMPLETED | OUTPATIENT
Start: 2023-09-22 | End: 2023-09-22

## 2023-09-22 RX ORDER — CEFTRIAXONE 2 G/1
2 INJECTION, POWDER, FOR SOLUTION INTRAMUSCULAR; INTRAVENOUS EVERY 24 HOURS
Status: DISCONTINUED | OUTPATIENT
Start: 2023-09-23 | End: 2023-09-24 | Stop reason: HOSPADM

## 2023-09-22 RX ORDER — ASPIRIN 325 MG
325 TABLET ORAL ONCE
Status: COMPLETED | OUTPATIENT
Start: 2023-09-22 | End: 2023-09-22

## 2023-09-22 RX ORDER — AMOXICILLIN 250 MG
1 CAPSULE ORAL 2 TIMES DAILY
Status: DISCONTINUED | OUTPATIENT
Start: 2023-09-22 | End: 2023-09-24 | Stop reason: HOSPADM

## 2023-09-22 RX ORDER — AMOXICILLIN 250 MG
2 CAPSULE ORAL 2 TIMES DAILY
Status: DISCONTINUED | OUTPATIENT
Start: 2023-09-22 | End: 2023-09-24 | Stop reason: HOSPADM

## 2023-09-22 RX ORDER — FUROSEMIDE 10 MG/ML
20 INJECTION INTRAMUSCULAR; INTRAVENOUS ONCE
Status: COMPLETED | OUTPATIENT
Start: 2023-09-22 | End: 2023-09-22

## 2023-09-22 RX ADMIN — FUROSEMIDE 40 MG: 10 INJECTION, SOLUTION INTRAMUSCULAR; INTRAVENOUS at 08:44

## 2023-09-22 RX ADMIN — FINASTERIDE 5 MG: 5 TABLET, FILM COATED ORAL at 08:41

## 2023-09-22 RX ADMIN — CEFTRIAXONE SODIUM 2 G: 2 INJECTION, POWDER, FOR SOLUTION INTRAMUSCULAR; INTRAVENOUS at 01:40

## 2023-09-22 RX ADMIN — SENNOSIDES AND DOCUSATE SODIUM 1 TABLET: 50; 8.6 TABLET ORAL at 22:48

## 2023-09-22 RX ADMIN — SODIUM CHLORIDE 95 ML: 9 INJECTION, SOLUTION INTRAVENOUS at 01:20

## 2023-09-22 RX ADMIN — ATORVASTATIN CALCIUM 40 MG: 40 TABLET, FILM COATED ORAL at 22:48

## 2023-09-22 RX ADMIN — FUROSEMIDE 40 MG: 10 INJECTION, SOLUTION INTRAMUSCULAR; INTRAVENOUS at 19:39

## 2023-09-22 RX ADMIN — FUROSEMIDE 20 MG: 10 INJECTION, SOLUTION INTRAMUSCULAR; INTRAVENOUS at 02:28

## 2023-09-22 RX ADMIN — Medication 1 MG: at 22:47

## 2023-09-22 RX ADMIN — ASPIRIN 325 MG: 325 TABLET, COATED ORAL at 08:39

## 2023-09-22 RX ADMIN — IPRATROPIUM BROMIDE AND ALBUTEROL SULFATE 3 ML: .5; 3 SOLUTION RESPIRATORY (INHALATION) at 05:47

## 2023-09-22 RX ADMIN — CARVEDILOL 12.5 MG: 12.5 TABLET, FILM COATED ORAL at 19:37

## 2023-09-22 RX ADMIN — IOPAMIDOL 71 ML: 755 INJECTION, SOLUTION INTRAVENOUS at 01:21

## 2023-09-22 ASSESSMENT — ENCOUNTER SYMPTOMS: SHORTNESS OF BREATH: 1

## 2023-09-22 ASSESSMENT — ACTIVITIES OF DAILY LIVING (ADL)
ADLS_ACUITY_SCORE: 37
ADLS_ACUITY_SCORE: 45

## 2023-09-22 NOTE — ED NOTES
[facility]  ED Nurse Handoff Report    ED Chief complaint: Altered Mental Status      ED Diagnosis:   Final diagnoses:   Acute congestive heart failure, unspecified heart failure type (H)   Acute pulmonary edema (H)       Code Status:  Per the admitting provider     Allergies:   Allergies   Allergen Reactions    Lisinopril     Morphine Other (See Comments)     confusion       Patient Story:  Pt was  brought to the emergency department by family after because of  hypoxic increase  coughing  and confusion after  his recent hospitalization with severe sepsis secondary to urinary tract infection at [facility].    Focused Assessment:  Pt A/O  to self,  episode of restlessness audible wheezes, desat to 89% and he attempted to get out of bed x 2.     Treatments and/or interventions provided: Lab, EKG, CT head /chest ,UA,  ASA, Rocephin lasix Duoneb,  Oxygen 2L/NC. Pt with adequate urine output following Lasix.   Results for orders placed or performed during the hospital encounter of 09/21/23   CT Chest Pulmonary Embolism w Contrast     Status: None    Narrative    EXAM: CT CHEST PULMONARY EMBOLISM W CONTRAST  LOCATION: [facility]  DATE: 9/22/2023    INDICATION: Shortness of breath. Chest pain. Elevated d-dimer. Productive cough.  COMPARISON: 09/17/2023  TECHNIQUE: CT chest pulmonary angiogram during arterial phase injection of IV contrast. Multiplanar reformats and MIP reconstructions were performed. Dose reduction techniques were used.   CONTRAST: 71 mL Isovue   370    FINDINGS:  ANGIOGRAM CHEST: Respiratory motion artifact limits evaluation. Pulmonary arteries are evaluated to the proximal segmental level. No evidence of acute pulmonary embolism. The pulmonary arteries are normal caliber. Thoracic aorta is not well opacified and   is indeterminate for dissection. Moderate aortic calcifications. No CT evidence of right heart strain.    LUNGS AND PLEURA: New moderate  bilateral pleural effusions with adjacent compressive atelectasis. Minimal interlobular septal thickening at the apices. No definite focal airspace consolidation. No pneumothorax.    MEDIASTINUM/AXILLAE: No lymphadenopathy. No pericardial effusion.    CORONARY ARTERY CALCIFICATION: Severe.    UPPER ABDOMEN: Redemonstrated renal cysts, no follow-up indicated. Status post cholecystectomy. Atrophic pancreas.    MUSCULOSKELETAL: Multilevel degenerative changes of the spine. Chronic L1 compression fracture.      Impression    IMPRESSION:  1.  No acute pulmonary embolism, within limitations of motion artifact.    2.  New moderate bilateral pleural effusions with adjacent compressive atelectasis. Minimal interstitial pulmonary edema.   CT Head w/o Contrast     Status: None    Narrative    EXAM: CT HEAD W/O CONTRAST  LOCATION: [facility]  DATE: 9/22/2023    INDICATION: AMS.  Likely delirium, rule out hemorrhage.  (prior TBI and encephalomalacia, Alzheimer's dementia, recent hospitalization with sepsis)  COMPARISON: 09/17/2023  TECHNIQUE: Routine CT Head without IV contrast. Multiplanar reformats. Dose reduction techniques were used.    FINDINGS:  INTRACRANIAL CONTENTS: No intracranial hemorrhage, extraaxial collection, or mass effect.  No CT evidence of acute infarct. Moderate presumed chronic small vessel ischemic changes. Mild to moderate generalized volume loss. No hydrocephalus. Chronic   encephalomalacia and gliosis in the bilateral frontal and temporal lobes. Chronic lacunar infarcts in the bilateral basal ganglia.     VISUALIZED ORBITS/SINUSES/MASTOIDS: No intraorbital abnormality. No paranasal sinus mucosal disease. No middle ear or mastoid effusion.    BONES/SOFT TISSUES: No acute abnormality.      Impression    IMPRESSION:  1.  No CT evidence for acute intracranial process.  2.  Stable chronic changes as above.   [place] Draw     Status: None    Narrative    The following orders were  created for panel order [place] Draw.  Procedure                               Abnormality         Status                     ---------                               -----------         ------                     Extra Blood Culture Bottle[[id number]]                       Final result               Extra Blue Top Tube[[id number]]                                                         Extra Green Top (Lithium...[[id number]]                                                 Extra Purple Top Tube[[id number]]                                                         Please view results for these tests on the individual orders.   Extra Blood Culture Bottle     Status: None   Result Value Ref Range    Hold Specimen JI    iStat Gases (lactate) venous, POCT     Status: Abnormal   Result Value Ref Range    Lactic Acid POCT 2.5 (H) <=2.0 mmol/L    Bicarbonate Venous POCT 23 21 - 28 mmol/L    O2 Sat, Venous POCT 46 (L) 94 - 100 %    pCO2 Venous POCT 38 (L) 40 - 50 mm Hg    pH Venous POCT 7.38 7.32 - 7.43    pO2 Venous POCT 26 25 - 47 mm Hg   Comprehensive metabolic panel     Status: Abnormal   Result Value Ref Range    Sodium 142 136 - 145 mmol/L    Potassium 4.3 3.4 - 5.3 mmol/L    Chloride 108 (H) 98 - 107 mmol/L    Carbon Dioxide (CO2) 21 (L) 22 - 29 mmol/L    Anion Gap 13 7 - 15 mmol/L    Urea Nitrogen 24.3 (H) 8.0 - 23.0 mg/dL    Creatinine 0.81 0.67 - 1.17 mg/dL    Calcium 8.4 (L) 8.8 - 10.2 mg/dL    Glucose 264 (H) 70 - 99 mg/dL    Alkaline Phosphatase 176 (H) 40 - 129 U/L    AST 38 0 - 45 U/L    ALT 28 0 - 70 U/L    Protein Total 5.9 (L) 6.4 - 8.3 g/dL    Albumin 3.1 (L) 3.5 - 5.2 g/dL    Bilirubin Total 0.9 <=1.2 mg/dL    GFR Estimate 85 >60 mL/min/1.73m2   Troponin T, High Sensitivity     Status: Abnormal   Result Value Ref Range    Troponin T, High Sensitivity 98 (H) <=22 ng/L   BNP     Status: Abnormal   Result Value Ref Range    N terminal Pro BNP Inpatient 9,311 (H) 0 - 1,800 pg/mL   Procalcitonin     Status: Abnormal    Result Value Ref Range    Procalcitonin 8.80 (HH) <0.05 ng/mL   Symptomatic Influenza A/B, RSV, & SARS-CoV2 PCR (COVID-19) Nasopharyngeal     Status: Normal    Specimen: Nasopharyngeal; Swab   Result Value Ref Range    Influenza A PCR Negative Negative    Influenza B PCR Negative Negative    RSV PCR Negative Negative    SARS CoV2 PCR Negative Negative    Narrative    Testing was performed using the Xpert Xpress CoV2/Flu/RSV Assay on the [employer] GeneXpert Instrument. This test should be ordered for the detection of SARS-CoV-2, influenza, and RSV viruses in individuals who meet clinical and/or epidemiological criteria. Test performance is unknown in asymptomatic patients. This test is for in vitro diagnostic use under the FDA EUA for laboratories certified under CLIA to perform high or moderate complexity testing. This test has not been FDA cleared or approved. A negative result does not rule out the presence of PCR inhibitors in the specimen or target RNA in concentration below the limit of detection for the assay. If only one viral target is positive but coinfection with multiple targets is suspected, the sample should be re-tested with another FDA cleared, approved, or authorized test, if coinfection would change clinical management. This test was validated by the [facility]. These laboratories are certified under the Clinical Laboratory Improvement Amendments of 1988 (CLIA-88) as qualified to perform high complexity laboratory testing.   UA with Microscopic reflex to Culture     Status: Abnormal    Specimen: Urine, Catheter   Result Value Ref Range    Color Urine Yellow Colorless, Straw, Light Yellow, Yellow    Appearance Urine Clear Clear    Glucose Urine 200 (A) Negative mg/dL    Bilirubin Urine Negative Negative    Ketones Urine 20 (A) Negative mg/dL    Specific Gravity Urine 1.023 1.003 - 1.035    Blood Urine Negative Negative    pH Urine 5.0 5.0 - 7.0    Protein Albumin Urine 10 (A)  Negative mg/dL    Urobilinogen Urine 2.0 Normal, 2.0 mg/dL    Nitrite Urine Negative Negative    Leukocyte Esterase Urine Negative Negative    Mucus Urine Present (A) None Seen /LPF    RBC Urine <1 <=2 /HPF    WBC Urine 1 <=5 /HPF    Narrative    Urine Culture not indicated   CBC with platelets and differential     Status: Abnormal   Result Value Ref Range    WBC Count 10.0 4.0 - 11.0 10e3/uL    RBC Count 4.39 (L) 4.40 - 5.90 10e6/uL    Hemoglobin 13.8 13.3 - 17.7 g/dL    Hematocrit 41.9 40.0 - 53.0 %    MCV 95 78 - 100 fL    MCH 31.4 26.5 - 33.0 pg    MCHC 32.9 31.5 - 36.5 g/dL    RDW 13.2 10.0 - 15.0 %    Platelet Count 157 150 - 450 10e3/uL    % Neutrophils 77 %    % Lymphocytes 15 %    % Monocytes 6 %    % Eosinophils 0 %    % Basophils 1 %    % Immature Granulocytes 1 %    NRBCs per 100 WBC 0 <1 /100    Absolute Neutrophils 7.7 1.6 - 8.3 10e3/uL    Absolute Lymphocytes 1.6 0.8 - 5.3 10e3/uL    Absolute Monocytes 0.6 0.0 - 1.3 10e3/uL    Absolute Eosinophils 0.0 0.0 - 0.7 10e3/uL    Absolute Basophils 0.1 0.0 - 0.2 10e3/uL    Absolute Immature Granulocytes 0.1 <=0.4 10e3/uL    Absolute NRBCs 0.0 10e3/uL   D dimer quantitative     Status: Abnormal   Result Value Ref Range    D-Dimer Quantitative 1.01 (H) 0.00 - 0.50 ug/mL FEU    Narrative    This D-dimer assay is intended for use in conjunction with a clinical pretest probability assessment model to exclude pulmonary embolism (PE) and deep venous thrombosis (DVT) in outpatients suspected of PE or DVT. The cut-off value is 0.50 ug/mL FEU.    For patients 50 years of age or older, the application of age-adjusted cut-off values for D-Dimer may increase the specificity without significant effect on sensitivity. The literature suggested calculation age adjusted cut-off in ug/L = age in years x 10 ug/L. The results in this laboratory are reported as ug/mL rather than ug/L. The calculation for age adjusted cut off in ug/mL= age in years x 0.01 ug/mL. For example, the  cut off for a 76 year old male is 76 x 0.01 ug/mL = 0.76 ug/mL (760 ug/L).    M Josh et al. Age adjusted D-dimer cut-off levels to rule out pulmonary embolism: The ADJUST-PE Study. KIMBERLEE 2014;311:4645-2088.; HJ Shiela et al. Diagnostic accuracy of conventional or age adjusted D-dimer cutoff values in older patients with suspected venous thromboembolism. Systemic review and meta-analysis. BMJ 2013:346:f2492.   Lactic acid whole blood     Status: Normal   Result Value Ref Range    Lactic Acid 1.9 0.7 - 2.0 mmol/L   Troponin T, High Sensitivity     Status: Abnormal   Result Value Ref Range    Troponin T, High Sensitivity 89 (H) <=22 ng/L   EKG 12-lead, tracing only     Status: None   Result Value Ref Range    Systolic Blood Pressure  mmHg    Diastolic Blood Pressure  mmHg    Ventricular Rate 81 BPM    Atrial Rate 81 BPM    ID Interval 172 ms    QRS Duration 92 ms     ms    QTc 494 ms    P Axis 64 degrees    R AXIS 81 degrees    T Axis 72 degrees    Interpretation ECG       Sinus rhythm with Premature supraventricular complexes  Prolonged QT  Abnormal ECG  When compared with ECG of 17-SEP-2023 20:55,  Premature supraventricular complexes are now Present  Confirmed by GENERATED REPORT, COMPUTER ([id number]),  KAVON SERRATO ([id number]) on 9/21/2023 10:36:48 PM     CBC with platelets differential     Status: Abnormal    Narrative    The following orders were created for panel order CBC with platelets differential.  Procedure                               Abnormality         Status                     ---------                               -----------         ------                     CBC with platelets and d...[[id number]]  Abnormal            Final result                 Please view results for these tests on the individual orders.      Patient's response to treatments and/or interventions: Audible resolve , lungs with goo air movement on auscultation     To be done/followed up on inpatient unit:  See orders     Does  this patient have any cognitive concerns?: Baseline dementia    Activity level - Baseline/Home:  Stand with assist x2  Activity Level - Current:   Total Care    Patient's Preferred language: Russian   Needed?: Yes Malian     Isolation: None  Infection: Not Applicable  Patient tested for COVID 19 prior to admission: YES  Bariatric?: No    Vital Signs:   Vitals:    09/21/23 2227 09/21/23 2300 09/22/23 0000 09/22/23 0500   BP: (!) 140/70 (!) 140/79 (!) 154/81 (!) 188/142   Pulse:  79 74 92   Resp:  (!) 35 24 12   Temp:       TempSrc:       SpO2: 94% 92%  93%       Cardiac Rhythm:     Was the PSS-3 completed:   Yes  What interventions are required if any?               Family Comments: Went home   OBS brochure/video discussed/provided to patient/family: Yes              Name of person given brochure if not patient: N/A               Relationship to patient: N/A     For the majority of the shift this patient's behavior was Green.   Behavioral interventions performed were TLC .    ED NURSE PHONE NUMBER: *[id number]

## 2023-09-22 NOTE — PROGRESS NOTES
[facility]    Background: Transitional Care Management program identified per system criteria and reviewed by [facility] team for possible outreach.    Assessment: Upon chart review, [facility] Team member will not proceed with patient outreach related to this episode of Transitional Care Management program due to reason below:    Patient has presented to Emergency Department, been readmitted to hospital, or transferred to another hospital.    Plan: Transitional Care Management episode addressed appropriately per reason noted above.      Cary Snog  Community Health Worker  [facility]  [phone]      *Connected Care Resource Team does NOT follow patient ongoing. Referrals are identified based on internal discharge reports and the outreach is to ensure patient has an understanding of their discharge instructions.

## 2023-09-22 NOTE — ED TRIAGE NOTES
Pt present via EMS, per EMS pt is coming from home where he lives with wife. Pt recently admitted for uti and then discharged home. Family now reports that pt is declining in mental status since arrival home but unsure if dx of dementia is progressing and this is pt's new baseline     Pt is Lao speaking

## 2023-09-22 NOTE — CONSULTS
Consult received for standard 2g NA diet teaching in setting of presumed CHF. Pt boarding in ED. RD will plan to follow up with patient for education as needed prior to discharge.

## 2023-09-22 NOTE — H&P
[facility]    History and Physical - Hospitalist Service       Date of Admission:  9/21/2023    Assessment & Plan      Adam Huber is a 88 year old male admitted on 9/21/2023. He is brought to the emergency department by family after becoming hypoxic with cough and more confused after recent hospitalization with severe sepsis secondary to urinary tract infection at [facility].    Encephalopathy: Suspect waxing and waning hospitalization associated delirium with overlap of underlying Alzheimer's dementia, but unable to confirm this currently..  Recently hospitalized at [facility] with sepsis from urinary source, but sepsis appears to be improving/resolved. Procalcitonin, though elevated in the 8 range, is downtrending from initial value greater than 57 9/18/2023.  Previous leukocytosis has normalized.  No longer febrile.  -Noncontrast head CT ordered to rule out acute hemorrhage.  Patient with rightward tongue deviation with protrusion, but I see a history of CVA noted on 2015 cardiology visit note and with acute stroke in May 2023 involving right asia and left cerebellar hemisphere.  Known encephalomalacia of the frontal lobes and dementia.    Non-ST elevation myocardial infarction: Severe coronary artery calcifications known on noted on CT PE study.  Known history of coronary artery disease with last known angiography in 2015.  At that time he had three-vessel coronary artery disease with PCI of mid RCA circumflex, and I believe LAD and a staged procedure.  Acute heart failure exacerbation, diastolic: Elevated BNP, acute hypoxic respiratory failure, pulmonary edema and pleural effusions on CT chest.  Recent echocardiogram with preserved ejection fraction, but patient with a history of some mild diastolic dysfunction on prior echocardiograms.  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  -Limited TTE ordered.  -Continue aspirin 325 mg daily  -Continue prior to admission statin therapy  -IV Lasix 40 mg every 12 hours  -Intake and output, daily weights    Severe sepsis from a urinary source, resolved: Admitted 9/17/2023 - 9/20/2023 at [facility] with leukocytosis, elevated procalcitonin, lactic acidosis with lactic acid of 4.2.  Citrobacter urinary tract infection.  Discharged on 10 days of Omnicef  -Continue ceftriaxone 2 g every 24 hours to complete a 10-day course of cephalosporin    History of TBI: Patient with encephalomalacia of bilateral frontal lobes as well as right temporal lobe noted on imaging since at least 2003 by outside record review.     Cirrhosis: Known cirrhotic liver confirmed on MR imaging.  No varices on EGD as of 2014, uncertain if he has had additional outpatient follow-up.  Abdomen soft and nonsurgical at this time.           Diet:  NPO; awaiting speech pathology for dysphagia screening given patient's encephalopathy  DVT Prophylaxis: Pneumatic Compression Devices  Villa Catheter: Not present  Lines: None     Cardiac Monitoring: None  Code Status: DNR/DNI   Addendum: Patient's son, Adolfo, called me back later in a.m.  We discussed his presentation.  Essentially, patient returned home from [facility] discharge, was more confused, did not sleep all night, did not eat.  At his baseline, he has dementia, but ambulates and will feed himself.  Was not doing so yesterday.  Had a cough and some shortness of breath resulting in hospitalization.  Discussed CODE STATUS including the fact that patient has been DNR at times, full code more recently since May stroke.  In discussion with Adolfo, as patient is not able to communicate his wishes, patient was switched to DNR/DNI.  Adolfo describes some good days and some bad days, but  "with his history of TBI and then subsequent stroke, is having difficulty recognizing family members at time.    Clinically Significant Risk Factors Present on Admission              # Hypoalbuminemia: Lowest albumin = 3.1 g/dL at 9/21/2023  9:57 PM, will monitor as appropriate   # Drug Induced Platelet Defect: home medication list includes an antiplatelet medication   # Hypertension: Noted on problem list  # Acute heart failure with preserved ejection fraction: heart failure noted on problem list, last echo with EF >50%, and receiving IV diuretics  # Dementia: noted on problem list   # DMII: A1C = 6.5 % (Ref range: <5.7 %) within past 6 months               Disposition Plan      Expected Discharge Date: 09/24/2023                  Ben Lew MD  Hospitalist Service  [facility]  Securely message with [employer] (more info)  Text page via [employer] Paging/Directory     ______________________________________________________________________    Chief Complaint   Altered mental status and coughing    History is obtained from chart review, discussion with Dr. Nicholas in the emergency department, review of outside records including prior EGD and head imaging, abdominal MRI confirming cirrhotic liver.  Patient is unable to provide any meaningful history to me.  He is encephalopathic and has underlying Alzheimer's dementia.  The only word he speaks to me is \"good\" when I asked him how he feels.  He does follow some commands.  A professional Turkmen  was utilized over the telephone during this encounter.  Attempted to contact his son, who was apparently in the emergency department initially, and message was left with my callback number.  I have not yet received a call back.    History of Present Illness   Adam Huber is a 88 year old male who was brought to the emergency department by family after recent hospitalization at [facility] for severe sepsis from Citrobacter urinary tract " infection from 9/17 until 9/20/2023 discharge.  He was discharged on Omnicef and returned home with family.  Patient apparently became more confused and hypoxic 9/21/2023, and was brought to to [facility] emergency department.  Found to have an elevated BNP in the 9000 range, hypoxia to the 85% range.  Leukocytosis has resolved.  Procalcitonin still elevated at 8, but much improved from high of 57+.  Afebrile.  Urinalysis improved from 9/17 suggesting appropriate treatment of urinary tract infection resulting in his prior sepsis.    A CT PE study was performed demonstrating small bilateral pleural effusions with pulmonary edema.  BNP elevated as above.  Note recent echocardiogram at [facility] for complaints of dyspnea and patient receiving a dose of Lasix.  As of 9/18/2023 echocardiogram, LVEF of 50 to 55% with normal LV and RV structure, mild RV systolic function reduction.  Grade 1 diastolic dysfunction.  Has a history of diastolic dysfunction over the years.  He also has a history of coronary artery disease with coronary angiogram in 2015 demonstrating multivessel disease.  Since that time he has had strokes, most recently in May 2023.    As noted above, patient can provide no meaningful history.  His family is not available at this time.  History is obtained from discussion with ER provider and chart review including review of outside records.  Note that patient has been full code through 2023 after May 5 hospitalization where he was DNR/DNI.  Was also DNR in 2019, 2021.  Patient unable to confirm CODE STATUS at this time, and again, family not available.  I note documentation through outside system home health care where it was recommended that patient transition to a comfort based approach given his underlying dementia and comorbidities, uncertain if this was ever discussed or finalized with family.    Patient with a mildly elevated troponin.  On recheck, essentially stable/improving.  Lower  suspicion for ACS with this, but possible if this is truly new heart failure.    In terms of confusion, hard to say if this is hospital associated delirium, metabolic encephalopathy associated with hypoxia.  Also with underlying dementia.          Past Medical History    Past Medical History:   Diagnosis Date    Acute chest pain 10/23/2015    CAD (coronary artery disease)     Essential hypertension 10/23/2015    History of CVA (cerebrovascular accident) 10/23/2015    HLD (hyperlipidemia)     HTN (hypertension)     NSTEMI (non-ST elevated myocardial infarction) (H) 10/23/2015    Post PTCA 10-    Successful PCI of culprit proximal to mid RCA with placement of a    Post PTCA 11-6-2015    pci of complex mid CFX-hector       Past Surgical History   Past Surgical History:   Procedure Laterality Date    CHOLECYSTECTOMY      GENITOURINARY SURGERY      prostate removal     HEART CATH LEFT HEART CATH  10/12/2015    PCI w/ HECTOR to RCA    HEART CATH RIGHT AND LEFT HEART CATH  11/6/2015    PCI w/ HECTOR to mid-CFX       Prior to Admission Medications   Prior to Admission Medications   Prescriptions Last Dose Informant Patient Reported? Taking?   Melatonin 10 MG TABS tablet   Yes No   Sig: Take 10 mg by mouth At Bedtime   acetaminophen (TYLENOL) 325 MG tablet   Yes No   Sig: Take 650 mg by mouth every 8 hours as needed for mild pain   aspirin (ASA) 325 MG EC tablet   Yes No   Sig: Take 325 mg by mouth daily   atorvastatin (LIPITOR) 40 MG tablet   No No   Sig: Take 1 tablet (40 mg) by mouth every evening   carvedilol (COREG) 25 MG tablet   No No   Sig: Take 1 tablet (25 mg) by mouth 2 times daily (with meals)   cefdinir (OMNICEF) 300 MG capsule   No No   Sig: Take 1 capsule (300 mg) by mouth 2 times daily   finasteride (PROSCAR) 5 MG tablet   Yes No   Sig: Take 5 mg by mouth daily   metFORMIN (GLUCOPHAGE) 500 MG tablet   No No   Sig: Take 1 tablet (500 mg) by mouth 2 times daily (with meals)   mirtazapine (REMERON) 15 MG tablet    "Yes No   Sig: Take 15 mg by mouth At Bedtime   nitroglycerin (NITROSTAT) 0.4 MG SL tablet   No No   Sig: Place 1 tablet (0.4 mg) under the tongue every 5 minutes as needed for chest pain   polyethylene glycol (MIRALAX) 17 GM/Dose powder   No No   Sig: Take 17 g by mouth 2 times daily   risperiDONE (RISPERDAL) 0.5 MG tablet   Yes No   Sig: Take 0.5 mg by mouth 2 times daily as needed   senna-docusate (SENOKOT-S/PERICOLACE) 8.6-50 MG tablet   No No   Sig: Take 1 tablet by mouth 2 times daily as needed for constipation      Facility-Administered Medications: None           Physical Exam   Vital Signs: Temp: 97.7  F (36.5  C) Temp src: Temporal BP: 130/68 Pulse: 75   Resp: 16 SpO2: 94 % O2 Device: None (Room air)      General Appearance: Habitus appears robust for age of 88, though slumped over and fatigued appearing.  Eyes: No scleral icterus or injection.  Tracks provider.  HEENT: Atraumatic and normocephalic.  Respiratory: Patient with overt increased work of breathing sitting with head of bed elevated.  Rattling breath sounds with no wheezing, but crackles throughout lower two thirds of lung fields.  Cardiovascular: Regular rate and rhythm with mid peaking systolic murmur best appreciated at apex  GI: Abdomen soft, nontender to palpation.  No palpable mass  Lymph/Hematologic: Patient with 1-2+ pitting edema bilateral lower extremities  Skin: Slightly pallorous appearing.  No jaundice.  Neurologic: Patient follows commands, though minimally verbal despite use of professional Venezuelan .  He only tells me that he feels \"good\" when he is asked, but does not provide any other history or answer any other questions during attempts to communicate.  Patient occasionally moving his feet and hands and what appear to be nonpurposeful ways.  Question delirium, but note underlying dementia in chart as well..  Psychiatric: Unable to assess    Medical Decision Making       80 MINUTES SPENT BY ME on the date of service " doing chart review, history, exam, documentation & further activities per the note.      Data     I have personally reviewed the following data over the past 24 hrs:    10.0  \   13.8   / 157     142 108 (H) 24.3 (H) /  264 (H)   4.3 21 (L) 0.81 \     ALT: 28 AST: 38 AP: 176 (H) TBILI: 0.9   ALB: 3.1 (L) TOT PROTEIN: 5.9 (L) LIPASE: N/A     Trop: 89 (H) BNP: 9,311 (H)     Procal: 8.80 (HH) CRP: N/A Lactic Acid: 1.9       INR:  N/A PTT:  N/A   D-dimer:  1.01 (H) Fibrinogen:  N/A       Imaging results reviewed over the past 24 hrs:   Recent Results (from the past 24 hour(s))   CT Chest Pulmonary Embolism w Contrast    Narrative    EXAM: CT CHEST PULMONARY EMBOLISM W CONTRAST  LOCATION: [facility]  DATE: 9/22/2023    INDICATION: Shortness of breath. Chest pain. Elevated d-dimer. Productive cough.  COMPARISON: 09/17/2023  TECHNIQUE: CT chest pulmonary angiogram during arterial phase injection of IV contrast. Multiplanar reformats and MIP reconstructions were performed. Dose reduction techniques were used.   CONTRAST: 71 mL Isovue   370    FINDINGS:  ANGIOGRAM CHEST: Respiratory motion artifact limits evaluation. Pulmonary arteries are evaluated to the proximal segmental level. No evidence of acute pulmonary embolism. The pulmonary arteries are normal caliber. Thoracic aorta is not well opacified and   is indeterminate for dissection. Moderate aortic calcifications. No CT evidence of right heart strain.    LUNGS AND PLEURA: New moderate bilateral pleural effusions with adjacent compressive atelectasis. Minimal interlobular septal thickening at the apices. No definite focal airspace consolidation. No pneumothorax.    MEDIASTINUM/AXILLAE: No lymphadenopathy. No pericardial effusion.    CORONARY ARTERY CALCIFICATION: Severe.    UPPER ABDOMEN: Redemonstrated renal cysts, no follow-up indicated. Status post cholecystectomy. Atrophic pancreas.    MUSCULOSKELETAL: Multilevel degenerative changes of the  spine. Chronic L1 compression fracture.      Impression    IMPRESSION:  1.  No acute pulmonary embolism, within limitations of motion artifact.    2.  New moderate bilateral pleural effusions with adjacent compressive atelectasis. Minimal interstitial pulmonary edema.

## 2023-09-22 NOTE — CONSULTS
[facility]    Cardiology Consultation     Date of Admission:  9/21/2023    Assessment & Plan   Adam Huber is a 88 year old male who was admitted on 9/21/2023.    Acute on chronic heart failure with preserved ejection fraction  Patient presented to the emergency room due to increased shortness of breath and was noted to have mild hypoxia with elevated N-terminal proBNP and CT chest findings of congestive heart failure.  He was started on IV Lasix and has diuresed well.  His recent echocardiogram revealed low normal ejection fraction.    At this time, I would likely continue IV diuretics for a day or 2 and switch to p.o.  This can be managed by the hospitalist.  After that, consider starting oral diuretics in form of Lasix 20 mg twice daily.    2.  Uncontrolled hypertension, this may be contributing to his heart failure.  Will need further optimization of his cardiac meds.  He is on Coreg which has been resumed.  Consider adding ACE inhibitors or amlodipine.      3.  UTI with sepsis, was recently discharged and is still needs to continue pleat the course of antibiotics.  On IV cephalosporins     4.  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  He has had some confusion.  He also is bedridden.  At this time, further neurocognitive testing as well as goals of care need to be discussed with the family.  I would highly recommend palliative care consult and inpatient hospice team to evaluate the patient.      5.  Flat troponins, likely demand ischemia as there is no delta rise    Recommendations  Given recent echocardiogram no need for repeat echocardiogram.  Continue IV Lasix today and perhaps tomorrow and switch to p.o. Lasix  Palliative care consult encouraged if okay with the family to discuss goals of care  Treatment of sepsis  We will sign off.  Recall if questions.  Can follow with  primary care after discharge    Today's medical decision making appears to be of moderate complexity.       Crispin Traylor MD, MD    Primary Care Physician   O[facility]ia Forrest    Reason for Consult   Reason for consult: I was asked by hospitalist to evaluate for congestive heart failure    History of Present Illness   Adam Huber is a 88 year old male who was brought to the emergency room by family for cough and worsening confusion.  He was recently discharged from [facility] to the hospital with sepsis due to urinary tract infection.  In the emergency room he was noted to have hypoxia and elevated N-terminal proBNP.  A CT chest was done which showed bilateral pleural effusions and pulmonary edema.  Due to concerns of congestive heart failure, we were consulted.    Patient had a recent echocardiogram on the 18th at [facility] which revealed low normal ejection fraction with no significant valvular disease.  Mild mitral stenosis was noted.  Mild aortic regurgitation was noted.    Patient is not a good historian.  He can barely speak English although tries to answer some questions and one-word.  He has history of total brain injury and encephalomalacia proved on MRI from in the past.  Apparently, in the past home health care is recommended transitioning him to comfort care although these discussions have not been held.    He had elevated N-terminal proBNP on admission at 9000.  He was given IV Lasix and since then he has diuresed quite well and is looking comfortable.  His EKG was reviewed by me and revealed sinus rhythm without any ischemic changes.  He also had a QTc of 494 ms.  Recent echo images were reviewed.  CT chest images were reviewed.    He has history of coronary artery disease with prior circumflex stent as well as RCA stent in 2015    Patient continues to be on IV antibiotics for recent UTI.    Past Medical History   Past Medical History:   Diagnosis Date    Acute chest pain 10/23/2015    CAD  (coronary artery disease)     Essential hypertension 10/23/2015    History of CVA (cerebrovascular accident) 10/23/2015    HLD (hyperlipidemia)     HTN (hypertension)     NSTEMI (non-ST elevated myocardial infarction) (H) 10/23/2015    Post PTCA 10-    Successful PCI of culprit proximal to mid RCA with placement of a    Post PTCA 11-6-2015    pci of complex mid CFX-hector         Past Surgical History   Past Surgical History:   Procedure Laterality Date    CHOLECYSTECTOMY      GENITOURINARY SURGERY      prostate removal     HEART CATH LEFT HEART CATH  10/12/2015    PCI w/ HECTOR to RCA    HEART CATH RIGHT AND LEFT HEART CATH  11/6/2015    PCI w/ HECTOR to mid-CFX         Prior to Admission Medications   Prior to Admission Medications   Prescriptions Last Dose Informant Patient Reported? Taking?   Melatonin 10 MG TABS tablet   Yes No   Sig: Take 10 mg by mouth At Bedtime   acetaminophen (TYLENOL) 325 MG tablet   Yes No   Sig: Take 650 mg by mouth every 8 hours as needed for mild pain   aspirin (ASA) 325 MG EC tablet   Yes No   Sig: Take 325 mg by mouth daily   atorvastatin (LIPITOR) 40 MG tablet   No No   Sig: Take 1 tablet (40 mg) by mouth every evening   carvedilol (COREG) 25 MG tablet   No No   Sig: Take 1 tablet (25 mg) by mouth 2 times daily (with meals)   cefdinir (OMNICEF) 300 MG capsule   No No   Sig: Take 1 capsule (300 mg) by mouth 2 times daily   finasteride (PROSCAR) 5 MG tablet   Yes No   Sig: Take 5 mg by mouth daily   metFORMIN (GLUCOPHAGE) 500 MG tablet   No No   Sig: Take 1 tablet (500 mg) by mouth 2 times daily (with meals)   mirtazapine (REMERON) 15 MG tablet   Yes No   Sig: Take 15 mg by mouth At Bedtime   nitroglycerin (NITROSTAT) 0.4 MG SL tablet   No No   Sig: Place 1 tablet (0.4 mg) under the tongue every 5 minutes as needed for chest pain   polyethylene glycol (MIRALAX) 17 GM/Dose powder   No No   Sig: Take 17 g by mouth 2 times daily   risperiDONE (RISPERDAL) 0.5 MG tablet   Yes No   Sig: Take  0.5 mg by mouth 2 times daily as needed   senna-docusate (SENOKOT-S/PERICOLACE) 8.6-50 MG tablet   No No   Sig: Take 1 tablet by mouth 2 times daily as needed for constipation      Facility-Administered Medications: None     Current Facility-Administered Medications   Medication Dose Route Frequency    aspirin  325 mg Oral Daily    atorvastatin  40 mg Oral QPM    carvedilol  12.5 mg Oral BID w/meals    [START ON 9/23/2023] cefTRIAXone  2 g Intravenous Q24H    finasteride  5 mg Oral Daily    furosemide  40 mg Intravenous Q12H    senna-docusate  1 tablet Oral BID    Or    senna-docusate  2 tablet Oral BID     Current Facility-Administered Medications   Medication Last Rate    - MEDICATION INSTRUCTIONS -      ACE/ARB/ARNI NOT PRESCRIBED       Allergies   Allergies   Allergen Reactions    Lisinopril     Morphine Other (See Comments)     confusion       Social History    reports that he has never smoked. He has never used smokeless tobacco. He reports that he does not drink alcohol and does not use drugs.      Family History     Unable to obtain from the patient       Review of Systems   Cannot be obtained as patient is poor historian    Physical Exam   Vital Signs with Ranges  Temp:  [97.7  F (36.5  C)] 97.7  F (36.5  C)  Pulse:  [16-92] 76  Resp:  [10-35] 21  BP: (128-196)/() 196/89  SpO2:  [92 %-96 %] 96 %  Wt Readings from Last 4 Encounters:   09/18/23 88.8 kg (195 lb 12.3 oz)   06/01/23 93.1 kg (205 lb 4 oz)   09/22/21 96 kg (211 lb 10.3 oz)   12/04/19 98.8 kg (217 lb 13 oz)     I/O last 3 completed shifts:  In: -   Out: 1600 [Urine:1600]      Vitals: BP (!) 196/89   Pulse 76   Temp 97.7  F (36.5  C) (Temporal)   Resp 21   SpO2 96%     Physical Exam:   General - Alert  Eyes - No scleral icterus  HEENT - Neck supple, moist mucous membranes  Cardiovascular -regular S1 and S2 without murmurs, S4  Extremities - There is no edema  Respiratory -decreased air entry at the bases with scattered rales  Skin - No  pallor or cyanosis  Gastrointestinal - Non tender and non distended without rebound or guarding  Psych -unable to evaluate  Neurological -awake, does not follow commands to move his limbs.  No lab results found in last 7 days.    Invalid input(s): TROPONINIES    Recent Labs   Lab 09/21/23  2157 09/20/23  1118 09/20/23  0752 09/20/23  0635 09/19/23  1618 09/19/23  1340 09/19/23  0849 09/19/23  0704 09/18/23  0804 09/18/23  0626 09/18/23  0413 09/17/23  1923   WBC 10.0  --   --  16.3*  --   --   --  20.2*   < >  --    < > 2.8*   HGB 13.8  --   --  14.0  --   --   --  12.2*   < >  --    < > 16.0   MCV 95  --   --  93  --   --   --  93   < >  --    < > 95     --   --  124*  --   --   --  111*   < >  --    < > 145*     --   --  141  --   --   --  137   < >  --    < > 143   POTASSIUM 4.3  --   --  3.9  --  3.8  --  3.1*   < >  --    < > 5.4*   CHLORIDE 108*  --   --  111*  --   --   --  106   < >  --    < > 105   CO2 21*  --   --  21*  --   --   --  23   < >  --    < > 24   BUN 24.3*  --   --  23.4*  --   --   --  31.3*   < >  --    < > 24.8*   CR 0.81  --   --  0.71  --   --   --  0.82   < >  --    < > 0.72   GFRESTIMATED 85  --   --  88  --   --   --  84   < >  --    < > 88   ANIONGAP 13  --   --  9  --   --   --  8   < >  --    < > 14   LUZ 8.4*  --   --  8.3*  --   --   --  8.1*   < >  --    < > 9.3   * 213* 133* 159*   < >  --    < > 141*   < >  --    < > 141*   ALBUMIN 3.1*  --   --   --   --   --   --   --   --  3.2*  --  3.8   PROTTOTAL 5.9*  --   --   --   --   --   --   --   --  6.0*  --  7.1   BILITOTAL 0.9  --   --   --   --   --   --   --   --  1.8*  --  1.3*   ALKPHOS 176*  --   --   --   --   --   --   --   --  136*  --  221*   ALT 28  --   --   --   --   --   --   --   --  35  --  31   AST 38  --   --   --   --   --   --   --   --  43  --  47*   LIPASE  --   --   --   --   --   --   --   --   --   --   --  14    < > = values in this interval not displayed.     Recent Labs   Lab Test  05/04/23  1858 11/29/22  1330 01/29/16  0831 11/02/15  0959 10/10/15  0625   CHOL 185 189   < > Test canceled by PCU/Clinic   Charge credited  CORRECTED ON 11/02 AT 1235: PREVIOUSLY REPORTED  LDL Cholesterol is the   primary guide to therapy.   The NCEP recommends further evaluation of: patients   with cholesterol greater than 200 mg/dL if additional risk factors are present,   cholesterol greater than 240 mg/dL, triglycerides greater than 150 mg/dL, or   HDL less than 40 mg/dL.   168   HDL 66 63   < > Test canceled by PCU/Clinic   Charge credited  CORRECTED ON 11/02 AT 1235: PREVIOUSLY REPORTED AS 62   67   LDL 90 89   < > Test canceled by PCU/Clinic   Charge credited  CORRECTED ON 11/02 AT 1235: PREVIOUSLY REPORTED AS 40 LDL Cholesterol is the   primary guide to therapy: LDL cholesterol goal in high risk patients is <100   mg/dL and in very high risk patients is <70 mg/dL.   89   TRIG 146 186*   < > Test canceled by PCU/Clinic   Charge credited  CORRECTED ON 11/02 AT 1235: PREVIOUSLY REPORTED AS 59   60   CHOLHDLRATIO  --   --   --  Test canceled by PCU/Clinic   Charge credited  CORRECTED ON 11/02 AT 1235: PREVIOUSLY REPORTED AS 1.8   2.5    < > = values in this interval not displayed.     Recent Labs   Lab 09/21/23 2157 09/20/23 0635 09/19/23  0704   WBC 10.0 16.3* 20.2*   HGB 13.8 14.0 12.2*   HCT 41.9 41.1 34.7*   MCV 95 93 93    124* 111*     Recent Labs   Lab 09/21/23 2159   PHV 7.38   PO2V 26   PCO2V 38*   HCO3V 23     Recent Labs   Lab 09/21/23 2157   NTBNPI 9,311*     Recent Labs   Lab 09/21/23 2157   DD 1.01*     No results for input(s): SED, CRP in the last 168 hours.  Recent Labs   Lab 09/21/23 2157 09/20/23 0635 09/19/23  0704    124* 111*     No results for input(s): TSH in the last 168 hours.  Recent Labs   Lab 09/21/23  2224   COLOR Yellow   APPEARANCE Clear   URINEGLC 200*   URINEBILI Negative   URINEKETONE 20*   SG 1.023   UBLD Negative   URINEPH 5.0   PROTEIN 10*    NITRITE Negative   LEUKEST Negative   RBCU <1   WBCU 1       Imaging:  Recent Results (from the past 48 hour(s))   CT Chest Pulmonary Embolism w Contrast    Narrative    EXAM: CT CHEST PULMONARY EMBOLISM W CONTRAST  LOCATION: [facility]  DATE: 9/22/2023    INDICATION: Shortness of breath. Chest pain. Elevated d-dimer. Productive cough.  COMPARISON: 09/17/2023  TECHNIQUE: CT chest pulmonary angiogram during arterial phase injection of IV contrast. Multiplanar reformats and MIP reconstructions were performed. Dose reduction techniques were used.   CONTRAST: 71 mL Isovue   370    FINDINGS:  ANGIOGRAM CHEST: Respiratory motion artifact limits evaluation. Pulmonary arteries are evaluated to the proximal segmental level. No evidence of acute pulmonary embolism. The pulmonary arteries are normal caliber. Thoracic aorta is not well opacified and   is indeterminate for dissection. Moderate aortic calcifications. No CT evidence of right heart strain.    LUNGS AND PLEURA: New moderate bilateral pleural effusions with adjacent compressive atelectasis. Minimal interlobular septal thickening at the apices. No definite focal airspace consolidation. No pneumothorax.    MEDIASTINUM/AXILLAE: No lymphadenopathy. No pericardial effusion.    CORONARY ARTERY CALCIFICATION: Severe.    UPPER ABDOMEN: Redemonstrated renal cysts, no follow-up indicated. Status post cholecystectomy. Atrophic pancreas.    MUSCULOSKELETAL: Multilevel degenerative changes of the spine. Chronic L1 compression fracture.      Impression    IMPRESSION:  1.  No acute pulmonary embolism, within limitations of motion artifact.    2.  New moderate bilateral pleural effusions with adjacent compressive atelectasis. Minimal interstitial pulmonary edema.   CT Head w/o Contrast    Narrative    EXAM: CT HEAD W/O CONTRAST  LOCATION: [facility]  DATE: 9/22/2023    INDICATION: AMS.  Likely delirium, rule out hemorrhage.   (prior TBI and encephalomalacia, Alzheimer's dementia, recent hospitalization with sepsis)  COMPARISON: 09/17/2023  TECHNIQUE: Routine CT Head without IV contrast. Multiplanar reformats. Dose reduction techniques were used.    FINDINGS:  INTRACRANIAL CONTENTS: No intracranial hemorrhage, extraaxial collection, or mass effect.  No CT evidence of acute infarct. Moderate presumed chronic small vessel ischemic changes. Mild to moderate generalized volume loss. No hydrocephalus. Chronic   encephalomalacia and gliosis in the bilateral frontal and temporal lobes. Chronic lacunar infarcts in the bilateral basal ganglia.     VISUALIZED ORBITS/SINUSES/MASTOIDS: No intraorbital abnormality. No paranasal sinus mucosal disease. No middle ear or mastoid effusion.    BONES/SOFT TISSUES: No acute abnormality.      Impression    IMPRESSION:  1.  No CT evidence for acute intracranial process.  2.  Stable chronic changes as above.       Echo:  No results found for this or any previous visit (from the past 4320 hour(s)).    Clinically Significant Risk Factors Present on Admission              # Hypoalbuminemia: Lowest albumin = 3.1 g/dL at 9/21/2023  9:57 PM, will monitor as appropriate   # Drug Induced Platelet Defect: home medication list includes an antiplatelet medication   # Hypertension: Noted on problem list  # Acute heart failure with preserved ejection fraction: heart failure noted on problem list, last echo with EF >50%, and receiving IV diuretics  # Dementia: noted on problem list   # DMII: A1C = 6.5 % (Ref range: <5.7 %) within past 6 months              Non-Rheumatic Valve Disease: Aortic valve insufficiency  Mitral valve stenosis      Other disorders of electrolyte and fluid balance, not elsewhere classified      Dementia: Unspecified dementia with behavioral disturbance    Chronic Fatigue and Other Debilities: Bed confinement status

## 2023-09-22 NOTE — ED PROVIDER NOTES
History     Chief Complaint:  Altered Mental Status       The history is provided by the patient and a relative (grandson).  used: The patient's grandson served as an ..      Adam Huber is a 88 year old male with a history of NSTEMI, CVA, and dementia who presents due to increased confusion, shortness of breath, and cough. The patient was admitted to hospital on 9/17 due to UTI and severe sepsis. He was discharged yesterday with a prescription of Cefdinir. The patient's grandson notes that the patient's speech was clear and he was eating well upon discharge yesterday. The patient slept poorly but was able to sleep through most of the day. At approximately 1900, the grandson reports that the patient appeared more confused than baseline, experienced bilateral foot swelling, and had labored breathing with a wet productive cough. Grandson reports a temperature of 37.7C. and the patient was given 500mg of Tylenol  Grandson notes that the patient did take his dose of antibiotic tonight and is scheduled to take his next dose at 0900 tomorrow. He ha a history of multiple strokes and head trauma. The patient denies chest pain or abdominal pain.     Of note, on last admission to the hospital, he was given Zosyn and had a negative CT head.     Review of Systems   Respiratory:  Positive for cough and shortness of breath.    Cardiovascular:  Positive for chest pain and leg swelling (bilateral foot swelling).   Gastrointestinal:  Negative for abdominal pain.   Psychiatric/Behavioral:  Positive for confusion.    All other systems reviewed and are negative.      Independent Historian:   Grandson - they serve as  and report primary history.     Review of External Notes:   I reviewed the patient's discharge summary from 9/20/23.      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, bilateral, immediate dry eye stage   Pseudoexfoliation lens capsule  Rhabdomyolysis  T12 compression fracture  Late 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 behavioral disturbance   Hyperlipidemia  Type 2 diabetes mellitus without complication  Bleeding of blood vessel  CAD  Chronic headache  Gait instability  Major depressive disorder  Memory disturbance  Dupuytren's contracture   CVA  BPH  Elevated PSA  Convulsions  Pacemaker    Past Surgical History:    Cholecystectomy  Genitourinary surgery  Heart cath left heart cath  Heart cath right and left heart cath   TURP   PCI/LEXI  Cataract removal w/ corneo-scleral      Physical Exam   Patient Vitals for the past 24 hrs:   BP Temp Temp src Pulse Resp SpO2   09/21/23 2227 -- -- -- -- -- 94 %   09/21/23 2226 -- -- -- -- -- 93 %   09/21/23 2205 -- -- -- 75 -- --   09/21/23 2204 -- -- -- -- 16 --   09/21/23 2200 -- -- -- (!) 16 -- --   09/21/23 2155 -- 97.7  F (36.5  C) Temporal -- -- --   09/21/23 2154 -- -- -- -- -- 94 %   09/21/23 2153 130/68 -- -- 75 -- 93 %   09/21/23 2151 128/68 -- -- 76 -- --        Constitutional:       Appearance: Ill appearance.   HENT:      Head: Normocephalic and atraumatic.   Eyes:      Extraocular Movements: Extraocular movements intact.      Conjunctiva/sclera: Conjunctivae normal.   Cardiovascular:      Rate and Rhythm: Normal rate and regular rhythm.   Pulmonary:      Effort: Pulmonary effort is normal. No respiratory distress.      Breath sounds: Clear to auscultation bilaterally.  Abdominal:      General: Abdomen is flat. There is no distension.       Palpations: Abdomen is soft.      Tenderness: There is no abdominal tenderness.   Musculoskeletal:      Cervical back: Normal range of motion. No rigidity.       Right lower leg: Mild edema.      Left lower leg: Mild edema.   Skin:     General: Skin is warm and dry.   Neurological:      General: No focal deficit present.      Mental Status: Demented at baseline.   Psychiatric:         Mood and Affect: Mood normal.         Behavior: Behavior normal.    Emergency Department Course   ECG   See ED course for independent interpretation.     Imaging:  CT Chest Pulmonary Embolism w Contrast   Final Result   IMPRESSION:   1.  No acute pulmonary embolism, within limitations of motion artifact.      2.  New moderate bilateral pleural effusions with adjacent compressive atelectasis. Minimal interstitial pulmonary edema.         Report per radiology    Laboratory:  Labs Ordered and Resulted from Time of ED Arrival to Time of ED Departure   ISTAT GASES LACTATE VENOUS POCT - Abnormal       Result Value    Lactic Acid POCT 2.5 (*)     Bicarbonate Venous POCT 23      O2 Sat, Venous POCT 46 (*)     pCO2 Venous POCT 38 (*)     pH Venous POCT 7.38      pO2 Venous POCT 26     COMPREHENSIVE METABOLIC PANEL - Abnormal    Sodium 142      Potassium 4.3      Chloride 108 (*)     Carbon Dioxide (CO2) 21 (*)     Anion Gap 13      Urea Nitrogen 24.3 (*)     Creatinine 0.81      Calcium 8.4 (*)     Glucose 264 (*)     Alkaline Phosphatase 176 (*)     AST 38      ALT 28      Protein Total 5.9 (*)     Albumin 3.1 (*)     Bilirubin Total 0.9      GFR Estimate 85     TROPONIN T, HIGH SENSITIVITY - Abnormal    Troponin T, High Sensitivity 98 (*)    NT PROBNP INPATIENT - Abnormal    N terminal Pro BNP Inpatient 9,311 (*)    PROCALCITONIN - Abnormal    Procalcitonin 8.80 (*)    ROUTINE UA WITH MICROSCOPIC REFLEX TO CULTURE - Abnormal    Color Urine Yellow      Appearance Urine Clear      Glucose Urine 200 (*)     Bilirubin Urine Negative       Ketones Urine 20 (*)     Specific Gravity Urine 1.023      Blood Urine Negative      pH Urine 5.0      Protein Albumin Urine 10 (*)     Urobilinogen Urine 2.0      Nitrite Urine Negative      Leukocyte Esterase Urine Negative      Mucus Urine Present (*)     RBC Urine <1      WBC Urine 1     CBC WITH PLATELETS AND DIFFERENTIAL - Abnormal    WBC Count 10.0      RBC Count 4.39 (*)     Hemoglobin 13.8      Hematocrit 41.9      MCV 95      MCH 31.4      MCHC 32.9      RDW 13.2      Platelet Count 157      % Neutrophils 77      % Lymphocytes 15      % Monocytes 6      % Eosinophils 0      % Basophils 1      % Immature Granulocytes 1      NRBCs per 100 WBC 0      Absolute Neutrophils 7.7      Absolute Lymphocytes 1.6      Absolute Monocytes 0.6      Absolute Eosinophils 0.0      Absolute Basophils 0.1      Absolute Immature Granulocytes 0.1      Absolute NRBCs 0.0     D DIMER QUANTITATIVE - Abnormal    D-Dimer Quantitative 1.01 (*)    TROPONIN T, HIGH SENSITIVITY - Abnormal    Troponin T, High Sensitivity 89 (*)    INFLUENZA A/B, RSV, & SARS-COV2 PCR - Normal    Influenza A PCR Negative      Influenza B PCR Negative      RSV PCR Negative      SARS CoV2 PCR Negative     LACTIC ACID WHOLE BLOOD - Normal    Lactic Acid 1.9     BLOOD CULTURE   BLOOD CULTURE        Procedures     Cardiac Ultrasound     Procedure Name: POC Ultrasound Cardiac Exam    Indication: Chest Pain and Shortness of breath    Views: Subxiphoid 4 chamber view , Parasternal long axis view , Parasternal short axis view , and Apical 4 chamber view     Findings: Normal Cardiac Activity , No pericardial effusion , No cardiac tamponade , and No right ventricular strain.  Difficult examination for obtaining EPSS, but diminished ejection fraction by visual estimation.    Impression: Cardiac activity present , No sonographic evidence of significant cardiac dysfunction , No sonographic evidence of significant cardiac tamponade , No sonographic evidence of  significant pericardial Effusion , and No sonographic evidence of RV size dilation  Suspect diminished EF by visual estimation.    Study performed by: Mynor Nicholas DO    Images archived: No    Emergency Department Course & Assessments:       Interventions:  Medications   cefTRIAXone (ROCEPHIN) 2 g vial to attach to  ml bag for ADULTS or NS 50 ml bag for PEDS (2 g Intravenous $New Bag 9/22/23 0140)   furosemide (LASIX) injection 20 mg (has no administration in time range)   aspirin (ASA) tablet 325 mg (has no administration in time range)   iopamidol (ISOVUE-370) solution 71 mL (71 mLs Intravenous $Given 9/22/23 0121)   Saline Flush (95 mLs Intravenous $Given 9/22/23 0120)        Independent Interpretation (X-rays, CTs, rhythm strip):  None    Assessments/Consultations/Discussion of Management or Tests:  ED Course as of 09/22/23 0758   Thu Sep 21, 2023   2205 I obtained history and examined the patient as noted above.    2234 EKG 12-lead, tracing only  Sinus rhythm with PVCs.  Normal MA and QRS.  Mildly prolonged QTc of 494.  No acute ST elevation or depression as compared with 9/17/2023 EKG.   2245 Dimer elevated, will perform CT PE instead of XR   Fri Sep 22, 2023   0045 Troponin T, High Sensitivity(!): 89  Decreased   0157 I spoke with Dr. Lew of the hospitalist team regarding the patient, who accepted the patient for admission.     0544 Notified by nursing staff that patient has some wheezing and is more agitated in bed.  On examination, mild wheezing on expiration.  88% on room air.  Will place patient on 2 L nasal cannula and get a dose of DuoNeb.   0548 Updated Dr. Lew on DuoNeb treatment and nasal cannula placement.     Social Determinants of Health affecting care:   None    Disposition:  The patient was admitted to the hospital under the care of Dr. Lew.     Impression & Plan    CMS Diagnoses: The patient has signs of Severe Sepsis        If one the following conditions is present, a 30 mL/kg bolus  is recommended as part of the 6 hour bundle (IBW can be used for BMI >30, or document refusal/contraindication):      1.   Initial hypotension  defined as 2 bps < 90 or map < 65 in the 6hrs before or 3hrs after time zero.     2.  Lactate >4.      The patient has signs of Severe Sepsis as evidenced by:    1. 2 SIRS criteria, AND  2. Suspected infection, AND   3. Organ dysfunction: Lactic Acidosis with value >2.0    Time severe sepsis diagnosis confirmed: 23 as this was the time when Lactate resulted, and the level was > 2.0    3 Hour Severe Sepsis Bundle Completion:    1. Initial Lactic Acid Result:   Recent Labs   Lab Test 23  0006 23  2159 23  0902   LACT 1.9 2.5* 1.5     2. Blood Cultures before Antibiotics: Yes  3. Broad Spectrum Antibiotics Administered:  yes       Anti-infectives (From admission through now)      Start     Dose/Rate Route Frequency Ordered Stop    23 2250  cefTRIAXone (ROCEPHIN) 2 g vial to attach to  ml bag for ADULTS or NS 50 ml bag for PEDS         2 g  over 30 Minutes Intravenous ONCE 23 2245 23 0220            4. Is initial hypotension present?     No (IV fluid bolus NOT required). IV Fluid volume administered: None.  Concern for volume overload especially given elevated BNP and CT evidence of pulmonary edema.                    Severe Sepsis reassessment:  1. Repeat Lactic Acid Level within 6 hours of time zero: 1.9  2. MAP>65 after initial IVF bolus, will continue to monitor fluid status and vital signs    Medical Decision Makin-year-old male as described above presents to the emergency department for shortness of breath, intermittent hypoxia, and fever at home.  Per grandson at bedside interpreting for the patient in addition to providing most of the history due to patient's dementia, patient was doing well throughout the day today, but this evening, woke up with shortness of breath, audible wheezing, wet sounding cough, and a  temperature of 37.7  C with worsening confusion, so EMS was called for patient to be brought to the ER for further evaluation.  Wife is concerned that patient may have developed pneumonia.  On my examination at bedside, patient complains of mild chest pain, but denies shortness of breath.  Nonetheless, patient is a poor historian.  Patient recently discharged with cefdinir for treatment for UTI sepsis.  We will add on chest x-ray for evaluation for pneumonia at this time.  Viral swabbing for COVID-19/influenza/RSV.  D-dimer for evaluation for pulmonary embolism.  Cardiac enzyme and EKG for screening for ACS.  BNP for evaluation for acute heart failure and fluid overload.  Patient does have mild pitting edema in bilateral lower extremities which family states is new today.  While patient does have mild increased confusion in the setting of baseline dementia, patient underwent CT head imaging on last hospitalization on 9/17 for acute confusion with no abnormalities.  No recent trauma warranting repeat head CT at this time.  However, if mental status worsens, consider repeat head CT.  Patient does have mild lactic acidemia on arrival.  We will consult pharmacy regarding appropriate expansion of antibiotic coverage for sepsis treatment.  Nonetheless, leukocytosis improving as compared with prior.  Shortness exam work-up and infectious work-up. Discussed care plan with son at bedside who voiced understanding and agreement with plan.  Answered all questions.  Additional work-up and orders as listed in chart.     Please refer to ED course above for details on the patient's treatment course and any changes or updates in care plan beyond my initial evaluation and MDM.      Diagnosis:    ICD-10-CM    1. Acute congestive heart failure, unspecified heart failure type (H)  I50.9       2. Acute pulmonary edema (H)  J81.0          Scribe Disclosure:  I, Jewels Cloud, am serving as a scribe at 10:41 PM on 9/21/2023 to document  services personally performed by Mynor Nicholas DO based on my observations and the provider's statements to me.     9/21/2023   Mynor Nicholas DO Yeh, Ferris, DO  09/22/23 0521       Mynor Nicholas DO  09/22/23 0758

## 2023-09-22 NOTE — ED NOTES
Pt grandson arrived informed that pt at home was having a cough labored breathing and having high temperatures.    Bryce also reports oximetry reading at in the 80's and swelling in the feet

## 2023-09-22 NOTE — ED NOTES
Pt bladder scanned with 300 mL present   Pt straight cathed for clean UA sample    Pt tolerated procedure well

## 2023-09-22 NOTE — PROGRESS NOTES
"Clinical Swallow Evaluation (CSE):     09/22/23 1412   Appointment Info   Signing Clinician's Name / Credentials (SLP) Marielena Castro MS CCC-SLP   General Information   Onset of Illness/Injury or Date of Surgery 09/21/23   Referring Physician Dr. Lew   Patient/Family Therapy Goal Statement (SLP) Pt did not state   Pertinent History of Current Problem   Per H&P \"Adam Huber is a 88 year old male admitted on 9/21/2023. He is brought to the emergency department by family after becoming hypoxic with cough and more confused after recent hospitalization with severe sepsis secondary to urinary tract infection at [facility].\"  Currently admitted with encephalopathy, non-ST elevation MI, acute heart failure exacerbation, acute hypoxic respiratory failure -- see providers note for further details. PMHx CVA, TBI. Pt NPO until SLP evaluation.     General Observations   Pt alert, positioned upright in bed, limited verbal responses to phone , able to follow simple directions/demonstration beneficial; RN reported son was here earlier, not present at time of evaluation.         Present yes   Language phone: ID #[phone]   Type of Evaluation   Type of Evaluation Swallow Evaluation   Oral Motor   Oral Musculature anomalies present   Mucosal Quality good   Dentition (Oral Motor)   Dentition (Oral Motor) edentulous  (?unclear if pt has dentures at baseline or not)   Facial Symmetry (Oral Motor)   Facial Symmetry (Oral Motor) right side impairment   Right Side Facial Asymmetry moderate impairment;severe impairment   Lip Function (Oral Motor)   Lip Range of Motion (Oral Motor) protrusion impairment;retraction impairment   Protrusion, Lip Range of Motion right side;moderate impairment;severe impairment   Retraction, Lip Range of Motion right side;moderate impairment;severe impairment   Tongue Function (Oral Motor)   Tongue Strength (Oral Motor) right side;moderate impairment   Tongue ROM (Oral " "Motor) protrusion is impaired;lateralization is impaired   Protrusion, Tongue ROM Impairment (Oral Motor) right side;moderate impairment   Lateralization, Tongue ROM Impairment (Oral Motor) right side;moderate impairment   Jaw Function (Oral Motor)   Jaw Function (Oral Motor) WNL   Cough/Swallow/Gag Reflex (Oral Motor)   Soft Palate/Velum (Oral Motor) unable/difficult to assess   Volitional Throat Clear/Cough (Oral Motor) unable/difficult to assess   Volitional Swallow (Oral Motor) weak;mildly delayed   Vocal Quality/Secretion Management (Oral Motor)   Vocal Quality (Oral Motor) WNL   Secretion Management (Oral Motor) WNL   General Swallowing Observations   Past History of Dysphagia Most recent SLP services per EMR was on 5/9/23, where a video fluoroscopic swallow study was completed. At that time \"Patient presents with moderate oral and pharyngeal dysphagia. Patient demonstrted bolus holding of thin liquids with premature entry to the pyriform sinuses with deep laryngeal penetration to the bottom of the cord (small amount) without a response. Holding of mildly thick liquids with premature entry past the tip of the epiglottis without penetration via the spoon or cup. Holdijng and decreased AP movement of pureed with delayed swallow response and no penetration/aspiration. Mild BOT residue after the swallow. Patient was unable to sufficiently masticate a soft solid and kept falling a sleep so it was removed from his oral cavity for safety.\" Recommend : 1. Continue on the IDDSI level 4 pureed and mildly thick liquids. 2. 1;1 assistance, upright, small bites/sips, slow rate, alternate liquids/solids.   Unclear of baseline diet at home, though regular/thin diet was ordered during recent hospitalization/no SLP services during that time.   Respiratory Support (General Swallowing Observations) nasal cannula   Current Diet/Method of Nutritional Intake (General Swallowing Observations, NIS) NPO   Swallowing Evaluation " Clinical swallow evaluation   Clinical Swallow Evaluation   Feeding Assistance frequent cues/help required   Clinical Swallow Evaluation Textures Trialed thin liquids;mildly thick liquids;pureed   Clinical Swallow Eval: Thin Liquid Texture Trial   Mode of Presentation, Thin Liquids spoon;fed by clinician   Volume of Liquid or Food Presented sips   Oral Phase of Swallow delayed AP movement;effortful AP movement;premature pharyngeal entry   Pharyngeal Phase of Swallow impaired;reduction in laryngeal movement;throat clearing   Diagnostic Statement suspected penetation/aspiration risk: throat clear and increased WOB   Clinical Swallow Eval: Mildly Thick Liquids   Mode of Presentation spoon;cup   Volume Presented 4 oz   Oral Phase delayed AP movement;premature pharyngeal entry   Pharyngeal Phase impaired;reduction in laryngeal movement   Diagnostic Statement improved control/timing and no overt clinical signs/sx aspiration noted, clear vocal quality   Clinical Swallow Evaluation: Puree Solid Texture Trial   Mode of Presentation, Puree spoon;fed by clinician   Volume of Puree Presented 4 oz   Oral Phase, Puree delayed AP movement;effortful AP movement;residue in oral cavity   Oral Residue, Puree   (R buccal space)   Pharyngeal Phase, Puree impaired;reduction in laryngeal movement   Diagnostic Statement oral delay/residue, no overt clinical signs/sx aspiration noted   Swallowing Recommendations   Diet Consistency Recommendations pureed (level 4);mildly thick liquids (level 2)   Supervision Level for Intake 1:1 supervision needed   Mode of Delivery Recommendations slow rate of intake   Swallowing Maneuver Recommendations alternate food and liquid intake  (verify swallows)   Monitoring/Assistance Required (Eating/Swallowing) check mouth frequently for oral residue/pocketing;cue for finger/lingual sweep if oral pocketing present;stop eating activities when fatigue is present;monitor for cough or change in vocal quality with  intake   Recommended Feeding/Eating Techniques (Swallow Eval) maintain upright sitting position for eating;maintain upright posture during/after eating for 30 minutes;minimize distractions during oral intake;provide assist with feeding   Medication Administration Recommendations, Swallowing (SLP) whole as tolerated, crush if needed   Instrumental Assessment Recommendations   (monitor need for updated VFSS)   General Therapy Interventions   Planned Therapy Interventions Dysphagia Treatment   Clinical Impression   Criteria for Skilled Therapeutic Interventions Met (SLP Eval) Yes, treatment indicated   SLP Diagnosis chronic mild-moderate oropharyngeal dysphagia   Risks & Benefits of therapy have been explained evaluation/treatment results reviewed;participants included;patient   Clinical Impression Comments   Clinical swallow evaluation completed with thin liquids, mildly thick liquids, puree solids - chewable solids not trialed given edentulous status, oral phase deficits. Pt currently presents with chronic mild-moderate oropharyngeal dysphagia. Oral phase notable for R sided weakness, asymmetry resulting in prolonged oral prep/propulsion, mild R sided oral residuals with puree. Varrying delay in oral holding/ swallow response, though consistent larygneal elevation achieved. Throat clear/increased WOB wtih thin liquids, highly suspect penetration/aspiration given past VFSS. Improved control/timing with puree solids/mildly thick liquids where no overt clinical signs/sx aspiration noted. Diet modifications indicated at this time.     SLP Total Evaluation Time   Eval: oral/pharyngeal swallow function, clinical swallow Minutes (92093) 13   SLP Goals   Therapy Frequency (SLP Eval) 5 times/wk   SLP Predicted Duration/Target Date for Goal Attainment 09/29/23   SLP Goals Swallow   SLP: Safely tolerate diet without signs/symptoms of aspiration Soft & bite sized diet;Mildly thick liquids;With use of swallow precautions;With  assistance/supervision   SLP Discharge Planning   SLP Plan PO tolerance, ?ADAT, ?VFSS   SLP Discharge Recommendation Transitional Care Facility;home with home care speech therapy   SLP Rationale for DC Rec pt below baseline   SLP Brief overview of current status  Puree solids, mildly thick liquids (IDDSI 4, 2) when alert, upright, 1:1 assist, slow rate, monitor for R sided pocketing - cue for lingual sweep or liquid wash to clear, verify swallows.   Total Session Time   Total Session Time (sum of timed and untimed services) 13

## 2023-09-22 NOTE — UTILIZATION REVIEW
"  Admission Status; Secondary Review Determination         Under the authority of the Utilization Management Committee, the utilization review process indicated a secondary review on the above patient.  The review outcome is based on review of the medical records, discussions with staff, and applying clinical experience noted on the date of the review.        (X)      Inpatient Status Appropriate - This patient's medical care is consistent with medical management for inpatient care and reasonable inpatient medical practice.      () Observation Status Appropriate - This patient does not meet hospital inpatient criteria and is placed in observation status. If this patient's primary payer is Medicare and was admitted as an inpatient, Condition Code 44 should be used and patient status changed to \"observation\".   () Admission Status NOT Appropriate - This patient's medical care is not consistent with medical management for Inpatient or Observation Status.          RATIONALE FOR DETERMINATION     88 year old male who was brought to the emergency department by family after recent hospitalization at [facility] for severe sepsis from Citrobacter urinary tract infection from 9/17 until 9/20/2023 discharge.  He was discharged on Omnicef and returned home with family.  Patient became more confused and hypoxic 9/21/2023, and was brought to to [facility] emergency department.  He was found to have an elevated BNP in the 9000 range and hypoxia to the 85% range.  Patient was placed on supplemental oxygen and given IV diuresis.  Cardiology has been consulted and recommends continued IV diuresis.  He was reinitiated on IV Rocephin for his urinary tract infection.  A multiple day hospitalization is anticipated, and patient is appropriate for inpatient status.    The severity of illness, intensity of service provided, expected LOS and risk for adverse outcome make the care complex, high risk and appropriate for hospital " admission.        The information on this document is developed by the utilization review team in order for the business office to ensure compliance.  This only denotes the appropriateness of proper admission status and does not reflect the quality of care rendered.         The definitions of Inpatient Status and Observation Status used in making the determination above are those provided in the CMS Coverage Manual, Chapter 1 and Chapter 6, section 70.4.      Sincerely,     Reynaldo Lucio MD  Physician Advisor  Utilization Review/ Case Management  [facility].

## 2023-09-22 NOTE — PHARMACY-ADMISSION MEDICATION HISTORY
Pharmacist Admission Medication History    Admission medication history is complete. The information provided in this note is only as accurate as the sources available at the time of the update.    Medication reconciliation/reorder completed by provider prior to medication history? Yes    Information Source(s): Family member, Hospital records, and CareEverywhere/SureScripts via in-person and phone  --> reviewed medication history note from 9/18/23 and discharge summary medication list from 9/20/23  --> attempted to call patient's spouse utilizing  services; able to get in contact with spouse via son at bedside, who called and interpreted.     Pertinent Information: see above. Spouse states patient has not taken Miralax, risperidone, or senna-docusate since hospital discharge.     Changes made to PTA medication list:  Added: None  Deleted: None  Changed: miralax to PRN (per spouse, not taking regularly)     Medication Affordability:  Not including over the counter (OTC) medications, was there a time in the past 3 months when you did not take your medications as prescribed because of cost?:  (not addressed)    Allergies reviewed with patient and updates made in EHR: no - already addressed in ED     Medication History Completed By: Tamiko Carrasco [facility] 9/22/2023 12:51 PM    Prior to Admission medications    Medication Sig Last Dose Taking? Auth Provider   acetaminophen (TYLENOL) 325 MG tablet Take 650 mg by mouth every 8 hours as needed for mild pain Unknown at PRN Yes Unknown, Entered By History   aspirin (ASA) 325 MG EC tablet Take 325 mg by mouth daily 9/21/2023 at AM Yes Unknown, Entered By History   atorvastatin (LIPITOR) 40 MG tablet Take 1 tablet (40 mg) by mouth every evening 9/20/2023 at PM Yes Bernardo Chatman DO   carvedilol (COREG) 25 MG tablet Take 1 tablet (25 mg) by mouth 2 times daily (with meals) 9/21/2023 at PM Yes Eric Avalos MD   cefdinir (OMNICEF) 300 MG capsule Take 1  capsule (300 mg) by mouth 2 times daily 9/21/2023 at PM Yes Frank Ren MD   finasteride (PROSCAR) 5 MG tablet Take 5 mg by mouth daily 9/20/2023 at AM Yes Unknown, Entered By History   Melatonin 10 MG TABS tablet Take 10 mg by mouth At Bedtime 9/20/2023 at PM Yes Unknown, Entered By History   metFORMIN (GLUCOPHAGE) 500 MG tablet Take 1 tablet (500 mg) by mouth 2 times daily (with meals) 9/21/2023 at PM Yes Nini Orta MD   mirtazapine (REMERON) 15 MG tablet Take 15 mg by mouth At Bedtime 9/20/2023 at PM Yes Unknown, Entered By History   nitroglycerin (NITROSTAT) 0.4 MG SL tablet Place 1 tablet (0.4 mg) under the tongue every 5 minutes as needed for chest pain Unknown at PRN Yes Laura Valerio, APRN CNP   polyethylene glycol (MIRALAX) 17 GM/Dose powder Take 17 g by mouth 2 times daily  Patient taking differently: Take 17 g by mouth 2 times daily as needed for constipation Unknown at PRN Yes Frank Ren MD   risperiDONE (RISPERDAL) 0.5 MG tablet Take 0.5 mg by mouth 2 times daily as needed Unknown at PRN Yes Unknown, Entered By History   senna-docusate (SENOKOT-S/PERICOLACE) 8.6-50 MG tablet Take 1 tablet by mouth 2 times daily as needed for constipation Unknown at PRN Yes Frank Ren MD

## 2023-09-23 LAB
ANION GAP SERPL CALCULATED.3IONS-SCNC: 13 MMOL/L (ref 7–15)
BACTERIA BLD CULT: NO GROWTH
BACTERIA BLD CULT: NO GROWTH
BUN SERPL-MCNC: 22.5 MG/DL (ref 8–23)
CALCIUM SERPL-MCNC: 8.6 MG/DL (ref 8.8–10.2)
CHLORIDE SERPL-SCNC: 103 MMOL/L (ref 98–107)
CREAT SERPL-MCNC: 0.74 MG/DL (ref 0.67–1.17)
DEPRECATED HCO3 PLAS-SCNC: 26 MMOL/L (ref 22–29)
EGFRCR SERPLBLD CKD-EPI 2021: 87 ML/MIN/1.73M2
ERYTHROCYTE [DISTWIDTH] IN BLOOD BY AUTOMATED COUNT: 12.9 % (ref 10–15)
GLUCOSE BLDC GLUCOMTR-MCNC: 174 MG/DL (ref 70–99)
GLUCOSE BLDC GLUCOMTR-MCNC: 180 MG/DL (ref 70–99)
GLUCOSE BLDC GLUCOMTR-MCNC: 185 MG/DL (ref 70–99)
GLUCOSE BLDC GLUCOMTR-MCNC: 217 MG/DL (ref 70–99)
GLUCOSE BLDC GLUCOMTR-MCNC: 226 MG/DL (ref 70–99)
GLUCOSE BLDC GLUCOMTR-MCNC: 284 MG/DL (ref 70–99)
GLUCOSE SERPL-MCNC: 232 MG/DL (ref 70–99)
HCT VFR BLD AUTO: 43 % (ref 40–53)
HGB BLD-MCNC: 14.5 G/DL (ref 13.3–17.7)
MAGNESIUM SERPL-MCNC: 1.6 MG/DL (ref 1.7–2.3)
MCH RBC QN AUTO: 31 PG (ref 26.5–33)
MCHC RBC AUTO-ENTMCNC: 33.7 G/DL (ref 31.5–36.5)
MCV RBC AUTO: 92 FL (ref 78–100)
PLATELET # BLD AUTO: 165 10E3/UL (ref 150–450)
POTASSIUM SERPL-SCNC: 3.2 MMOL/L (ref 3.4–5.3)
POTASSIUM SERPL-SCNC: 3.7 MMOL/L (ref 3.4–5.3)
RBC # BLD AUTO: 4.67 10E6/UL (ref 4.4–5.9)
SODIUM SERPL-SCNC: 142 MMOL/L (ref 136–145)
WBC # BLD AUTO: 10.9 10E3/UL (ref 4–11)

## 2023-09-23 PROCEDURE — 99233 SBSQ HOSP IP/OBS HIGH 50: CPT | Performed by: INTERNAL MEDICINE

## 2023-09-23 PROCEDURE — 83735 ASSAY OF MAGNESIUM: CPT | Performed by: INTERNAL MEDICINE

## 2023-09-23 PROCEDURE — 84132 ASSAY OF SERUM POTASSIUM: CPT | Performed by: INTERNAL MEDICINE

## 2023-09-23 PROCEDURE — 250N000013 HC RX MED GY IP 250 OP 250 PS 637: Performed by: INTERNAL MEDICINE

## 2023-09-23 PROCEDURE — 250N000011 HC RX IP 250 OP 636: Mod: JZ | Performed by: INTERNAL MEDICINE

## 2023-09-23 PROCEDURE — 120N000001 HC R&B MED SURG/OB

## 2023-09-23 PROCEDURE — 250N000012 HC RX MED GY IP 250 OP 636 PS 637: Performed by: INTERNAL MEDICINE

## 2023-09-23 PROCEDURE — 36415 COLL VENOUS BLD VENIPUNCTURE: CPT | Performed by: INTERNAL MEDICINE

## 2023-09-23 PROCEDURE — 85014 HEMATOCRIT: CPT | Performed by: INTERNAL MEDICINE

## 2023-09-23 PROCEDURE — 82310 ASSAY OF CALCIUM: CPT | Performed by: INTERNAL MEDICINE

## 2023-09-23 RX ORDER — POTASSIUM CHLORIDE 20MEQ/15ML
40 LIQUID (ML) ORAL ONCE
Status: COMPLETED | OUTPATIENT
Start: 2023-09-23 | End: 2023-09-23

## 2023-09-23 RX ORDER — MIRTAZAPINE 15 MG/1
15 TABLET, FILM COATED ORAL AT BEDTIME
Status: DISCONTINUED | OUTPATIENT
Start: 2023-09-23 | End: 2023-09-24 | Stop reason: HOSPADM

## 2023-09-23 RX ORDER — ACETAMINOPHEN 650 MG/1
650 SUPPOSITORY RECTAL EVERY 4 HOURS PRN
Status: DISCONTINUED | OUTPATIENT
Start: 2023-09-23 | End: 2023-09-24 | Stop reason: HOSPADM

## 2023-09-23 RX ORDER — DEXTROSE MONOHYDRATE 25 G/50ML
25-50 INJECTION, SOLUTION INTRAVENOUS
Status: DISCONTINUED | OUTPATIENT
Start: 2023-09-23 | End: 2023-09-24 | Stop reason: HOSPADM

## 2023-09-23 RX ORDER — ACETAMINOPHEN 325 MG/1
650 TABLET ORAL EVERY 4 HOURS PRN
Status: DISCONTINUED | OUTPATIENT
Start: 2023-09-23 | End: 2023-09-24 | Stop reason: HOSPADM

## 2023-09-23 RX ORDER — NICOTINE POLACRILEX 4 MG
15-30 LOZENGE BUCCAL
Status: DISCONTINUED | OUTPATIENT
Start: 2023-09-23 | End: 2023-09-24 | Stop reason: HOSPADM

## 2023-09-23 RX ADMIN — SENNOSIDES AND DOCUSATE SODIUM 1 TABLET: 50; 8.6 TABLET ORAL at 08:49

## 2023-09-23 RX ADMIN — ASPIRIN 325 MG: 325 TABLET, COATED ORAL at 08:49

## 2023-09-23 RX ADMIN — MIRTAZAPINE 15 MG: 15 TABLET, FILM COATED ORAL at 21:20

## 2023-09-23 RX ADMIN — SENNOSIDES AND DOCUSATE SODIUM 1 TABLET: 50; 8.6 TABLET ORAL at 20:05

## 2023-09-23 RX ADMIN — INSULIN ASPART 1 UNITS: 100 INJECTION, SOLUTION INTRAVENOUS; SUBCUTANEOUS at 08:58

## 2023-09-23 RX ADMIN — INSULIN ASPART 2 UNITS: 100 INJECTION, SOLUTION INTRAVENOUS; SUBCUTANEOUS at 18:28

## 2023-09-23 RX ADMIN — ACETAMINOPHEN 650 MG: 325 TABLET, FILM COATED ORAL at 11:10

## 2023-09-23 RX ADMIN — Medication 1 MG: at 20:05

## 2023-09-23 RX ADMIN — CARVEDILOL 12.5 MG: 12.5 TABLET, FILM COATED ORAL at 08:48

## 2023-09-23 RX ADMIN — ATORVASTATIN CALCIUM 40 MG: 40 TABLET, FILM COATED ORAL at 20:05

## 2023-09-23 RX ADMIN — CARVEDILOL 12.5 MG: 12.5 TABLET, FILM COATED ORAL at 18:30

## 2023-09-23 RX ADMIN — FUROSEMIDE 40 MG: 10 INJECTION, SOLUTION INTRAMUSCULAR; INTRAVENOUS at 08:49

## 2023-09-23 RX ADMIN — CEFTRIAXONE SODIUM 2 G: 2 INJECTION, POWDER, FOR SOLUTION INTRAMUSCULAR; INTRAVENOUS at 02:21

## 2023-09-23 RX ADMIN — POTASSIUM CHLORIDE 40 MEQ: 20 SOLUTION ORAL at 12:27

## 2023-09-23 RX ADMIN — FINASTERIDE 5 MG: 5 TABLET, FILM COATED ORAL at 08:49

## 2023-09-23 RX ADMIN — INSULIN ASPART 3 UNITS: 100 INJECTION, SOLUTION INTRAVENOUS; SUBCUTANEOUS at 12:27

## 2023-09-23 RX ADMIN — ACETAMINOPHEN 650 MG: 325 TABLET, FILM COATED ORAL at 20:13

## 2023-09-23 ASSESSMENT — ACTIVITIES OF DAILY LIVING (ADL)
ADLS_ACUITY_SCORE: 62
ADLS_ACUITY_SCORE: 45
ADLS_ACUITY_SCORE: 62
ADLS_ACUITY_SCORE: 51
ADLS_ACUITY_SCORE: 58
ADLS_ACUITY_SCORE: 62
ADLS_ACUITY_SCORE: 58
ADLS_ACUITY_SCORE: 57
ADLS_ACUITY_SCORE: 51
ADLS_ACUITY_SCORE: 45
ADLS_ACUITY_SCORE: 62
ADLS_ACUITY_SCORE: 62

## 2023-09-23 NOTE — PROVIDER NOTIFICATION
MD Notification    Notified Person: MD    Notified Person Name: Lew    Notification Date/Time: 9/23/23, 0123    Notification Interaction: vocera    Purpose of Notification: Pt DM2, please order BG checks and insulin. Thank you. Sofie, [phone]    Orders Received:     Comments:

## 2023-09-23 NOTE — PLAN OF CARE
"Physical Therapy: Orders received. Chart reviewed and discussed with care team.? Physical Therapy not indicated due to conversation with OT and per OT note \"chart reviewed however per conversation with MD who had seen patient and met with family/caregiver no skilled therapy interventions needed at this time as plan in place for discharge home with resumption of family and PCA care . Therefore, OT will sign off and complete order.\"? Defer discharge recommendations to care team.? Will sign off on PT as well and complete orders.     "

## 2023-09-23 NOTE — PROGRESS NOTES
"CLINICAL NUTRITION SERVICES - BRIEF NOTE    - Consulted for Provider Order: \"Heart Failure - Dietitian to instruct patient on 2 gram sodium diet \"  - Per chart review, pt with increasing confusion, alert only to self, recently threatened \"to kill\"   - Will defer nutrition education for now and see pt prior to discharge as appropriate    Leisa Salvador RD  Clinical Dietitian - [facility]    "

## 2023-09-23 NOTE — PLAN OF CARE
Reason for Admission: acute pulmonary edema, possible CHF exacerbation    Cognitive Concerns/ Orientation : alert, confused, oriented to place. Kitt[place] speaking, son was here. OpenFin  not useful.   BEHAVIOR & AGGRESSION TOOL COLOR: Green  Neuro/CMS baseline right side weakness, slurred speech, right facial droop due to prior CVA  VS/O2: stable on room air except hypertensive  CARDIOPULMONARY: No chest pain and SOB  MOBILITY: up with lift. Turn and repo done  PAIN MANAGMENT: Gave prn Tylenol for HA  DIET: pureed with mildly thick liquid. Needs assist with eating  BOWEL/BLADDER: incontinent of urine. No BM this shift  ABNL LAB/BG: BG 200s. Potassium 3.2-replaced and recheck 3.7.   DRAIN/DEVICES: PIV SL  TELEMETRY RHYTHM: NSR.   SKIN: bruising to right hip/lateral back and arms. Blanchable redness to coccyx.   TESTS/PROCEDURES: None  D/C DATE and PLACEMENT: Tomorrow to home with resumption of home care services.  OTHER IMPORTANT INFO and PLAN: calm and cooperative for most part. Pills crushed with applesauce.

## 2023-09-23 NOTE — PROGRESS NOTES
RECEIVING UNIT ED HANDOFF REVIEW    ED Nurse Handoff Report was reviewed by: Sofie Restrepo RN on September 22, 2023 at 9:06 PM

## 2023-09-23 NOTE — PLAN OF CARE
OT orders received, chart reviewed however per conversation with MD who had seen patient and met with family/caregiver no skilled therapy interventions needed at this time as plan in place for discharge home with resumption of family and PCA care .  Therefore, OT will sign off and complete order.

## 2023-09-23 NOTE — PROGRESS NOTES
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 He was recently admitted to [facility] from 9/17/23 - 9/20/23 for management of severe sepsis dt UTI, with associated lactic acidosis and encephalopathy.  UC ultimately grew citrobacter. Managed with Zosyn during stay and discharged to 10d course of Omnicef. Ultimately discharge to home with home PT (son noted hx of sundowning, did not want TCU stay). He presented back to ED on 9/21/2023 and was admitted with hypoxia and cough, with worsening confusion. Was also found to have an NSTEMI.     Toxic metabolic encephalopathy, suspected delirium  Alzheimer's dementia  Hx of TBI  *Patient with encephalomalacia of bilateral frontal lobes as well as right temporal lobe noted on imaging since at least 2003 by outside record review.   *Son had noted during recent stay that patient sundowns at night. No overt issues with delirium during that stay. Discharged home with home PT.   *Noted worsening confusion prompting re-evaluation in ED.   *In ED, was afebrile, VSS. WBC normalized, procal still elevated but improved from recent labs. Head CT neg. Viral swabs neg. Blood cultures pending (recent labs drawn 9/17 remained neg).  -- mgmt of hypoxia/NSTEMI as below  -- reorient frequently, maintain nl sleep/wake cycles, avoid sedating meds  -- avoid interruptions/VS checks overnight if patient resting comfortably    Acute hypoxic respiratory failure, suspect dt CHF exacerbation  Acute exacerbation of chronic diastolic CHF  CAD with hx of prior PCI in 2015  NSTEMI, suspect secondary to demand ischemia  Hx of CVA in 5/2023  Hypertension  Hyperlipidemia  *Echo done on 9/18/23 while at [facility] for evaluation of dyspnea and transient hypoxia --  showed EF 50-55%, grade I diastolic dysfunction, borderline reduced RVSF, mild mitral stenosis. Given Lasix 20mg IV x1 that stay.   *Presented to the ED this admission with shortness of breath. Was afebrile, hemodynamically stable. O2 sats borderline low 90s on RA. Trops elevated but trend was flat  (98--89), proBNP 9300. CT chest neg for PE, limited by motion artifact but did show new moderate bilateral pleural effusions with adjacent compressive atelectasis and minimal interstitial pulmonary edema. Started on Lasix 40mg IV BID.  *Seen by cardiology on 9/22/23. Recommended trial of diuresis for CHF exacerbation, no need for repeat echo as clinical picture consistent with demand ischemia.   -- good response to IV diuresis per Is/Os, today's AM labs pending  -- given dose of IV Lasix this AM -- will hold additional dosing for now, consider few days of oral Lasix at discharge  -- O2 sats remain stable on RA  -- conts on PTA meds including ASA 325mg daily, statin and Coreg    Recent complicated UTI dt citrobacter, with associated severe sepsis  *Recently hospitalized at [facility] for treatment of UTI as above. Discharged on 10d course of Omnicef.   *Labs all improved this stay, blood cultures remain neg. Abx changed to IV ceftriaxone on admission this stay  -- will resume Omnicef at discharge as previously advised    DM II  *Well managed. A1c 6.5 on 9/18/23. Chronic and stable on metformin.  -- hold meds for now, no need for sliding scale insulin given BG well managed    BPH  *Chronic and stable on Proscar       Cirrhosis  *Known cirrhotic liver confirmed on MR imaging.  No varices on EGD as of 2014, uncertain if he has had additional outpatient follow-up.  Abdomen soft and nonsurgical at this time.    Hypokalemia  *Due to diuresis. Has K replacement protocol ordered    FEN: no IVFs, lytes stable, regular diet  DVT Prophylaxis: PCDs  Code Status: No CPR- Do NOT Intubate    Disposition: Anticipate discharge home with  family later today vs tomorrow, pending continued clinical improvement. Son will be visiting later this morning, will review care plan with him then.     Per recent care coordinator RN note from 9/20/23 during [facility] hospital stay:  He lives with his spouse who is primary caregiver and he receives PCA services daily. CM met with son and patient at bedside. Son reports they have all needed equipment at home. States they have home care but did not know name of agency. MD ordering home PT at discharge. Chart review found previous admission with referral to [facility].Called [facility] and spoke to Isabel who confirmed that they do have patient open for homecare but mostly just for PCA with RN supervision at this time. They are able to add the skilled services of RN, PT, OT.       1048 Addendum:  Met with son this afternoon. Some concern about discharge home today, given lack of PCA services on weekend and wife being primary care giver. Will plan to monitor in hospital overnight, if condition remains stable will discharge home tomorrow. Will plan to resusme home care, PCA services.     Leisa Werner, DO    Medical Decision Making       Please see A&P for additional details of medical decision making.         Interval History   Chart reviewed. No family at bedside this morning and  service not currently available.Had had some agitation overnight but better this AM. Resting comfortably when I stopped by. Breathing appeared non-labored. O2 sats 95% on RA. Does not appear to be in pain.    -Data reviewed today: I reviewed all new labs and imaging results over the last 24 hours. I personally reviewed no images or EKG's today.    Physical Exam   Temp: 98.3  F (36.8  C) Temp src: Oral BP: (!) 154/89 Pulse: 73   Resp: 18 SpO2: 95 % O2 Device: None (Room air)    There were no vitals filed for this visit.  Vital Signs with Ranges  Temp:  [98.3  F (36.8  C)-98.8  F (37.1  C)] 98.3  F (36.8   C)  Pulse:  [72-87] 73  Resp:  [10-28] 18  BP: (154-196)/(88-95) 154/89  SpO2:  [94 %-96 %] 95 %  I/O last 3 completed shifts:  In: 100 [P.O.:100]  Out: 3300 [Urine:3300]    Constitutional: Resting comfortably, NAD  Respiratory: few faint crackles toward R base but otherwise CTA throughout, no wheeze, no increased work of breathing  Cardiovascular: HRRR, no MGR, no LE edema  GI: S, NT, ND, +BS  Skin/Integumen: warm/dry  Other:      Medications    - MEDICATION INSTRUCTIONS -      ACE/ARB/ARNI NOT PRESCRIBED        aspirin  325 mg Oral Daily    atorvastatin  40 mg Oral QPM    carvedilol  12.5 mg Oral BID w/meals    cefTRIAXone  2 g Intravenous Q24H    finasteride  5 mg Oral Daily    furosemide  40 mg Intravenous Q12H    insulin aspart  1-7 Units Subcutaneous TID AC    insulin aspart  1-5 Units Subcutaneous At Bedtime    senna-docusate  1 tablet Oral BID    Or    senna-docusate  2 tablet Oral BID       Data   Recent Labs   Lab 09/23/23  0554 09/23/23  0229 09/21/23  2157 09/20/23  0752 09/20/23  0635 09/19/23  1618 09/19/23  1340 09/19/23  0849 09/19/23  0704 09/18/23  0804 09/18/23  0626 09/18/23  0413 09/17/23  1923   WBC  --   --  10.0  --  16.3*  --   --   --  20.2*   < >  --    < > 2.8*   HGB  --   --  13.8  --  14.0  --   --   --  12.2*   < >  --    < > 16.0   MCV  --   --  95  --  93  --   --   --  93   < >  --    < > 95   PLT  --   --  157  --  124*  --   --   --  111*   < >  --    < > 145*   NA  --   --  142  --  141  --   --   --  137   < >  --    < > 143   POTASSIUM  --   --  4.3  --  3.9  --  3.8  --  3.1*   < >  --    < > 5.4*   CHLORIDE  --   --  108*  --  111*  --   --   --  106   < >  --    < > 105   CO2  --   --  21*  --  21*  --   --   --  23   < >  --    < > 24   BUN  --   --  24.3*  --  23.4*  --   --   --  31.3*   < >  --    < > 24.8*   CR  --   --  0.81  --  0.71  --   --   --  0.82   < >  --    < > 0.72   ANIONGAP  --   --  13  --  9  --   --   --  8   < >  --    < > 14   LUZ  --   --  8.4*  --   8.3*  --   --   --  8.1*   < >  --    < > 9.3   * 174* 264*   < > 159*   < >  --    < > 141*   < >  --    < > 141*   ALBUMIN  --   --  3.1*  --   --   --   --   --   --   --  3.2*  --  3.8   PROTTOTAL  --   --  5.9*  --   --   --   --   --   --   --  6.0*  --  7.1   BILITOTAL  --   --  0.9  --   --   --   --   --   --   --  1.8*  --  1.3*   ALKPHOS  --   --  176*  --   --   --   --   --   --   --  136*  --  221*   ALT  --   --  28  --   --   --   --   --   --   --  35  --  31   AST  --   --  38  --   --   --   --   --   --   --  43  --  47*   LIPASE  --   --   --   --   --   --   --   --   --   --   --   --  14    < > = values in this interval not displayed.       No results found for this or any previous visit (from the past 24 hour(s)).

## 2023-09-23 NOTE — PLAN OF CARE
"Summary:  Pt increased confusion due to hypoxia. Recent admission to [facility] 9/17-20) for UTI sepsis. Hx CVA- R tongue deviation, facial droop, slurred speech, R<L at baseline, TBI, Cirrhosis, DM, [place]  Primary Diagnosis: Altered Mental status due to acute pulmonary edema and acute congestive heart failure   Orientation: alert to self   Aggression Stop Light: green   Mobility: Ax2, GB, walker   Pain Management: denies   Diet: pureed diet and mildly thick liquids, nutrition consulted   Bowel/Bladder: incontinent bladder, smear BM. pt removed external cath   Abnormal Lab/Assessments: ammonia: 12,   Drain/Device/Wound: L AC PIV for abx  Consults: cardiology, OT,PT,SLP, SW  D/C Day/Goals/Place: TBD    Shift Note: Pt needs Brazilian , pt threatened \"to kill\" . Tried different  and granddaughter. Granddaughter suggested to call son, Adolfo, instead  Attempted to weigh patient but he became agitated and resistant to effort. Sets off bed alarm      "

## 2023-09-23 NOTE — PROGRESS NOTES
Care Management Follow Up    Length of Stay (days): 1    Expected Discharge Date: 09/23/2023     Concerns to be Addressed:       Patient plan of care discussed at interdisciplinary rounds: Yes    Anticipated Discharge Disposition:       Anticipated Discharge Services:    Anticipated Discharge DME:      Patient/family educated on Medicare website which has current facility and service quality ratings:    Education Provided on the Discharge Plan:    Patient/Family in Agreement with the Plan:      Referrals Placed by CM/SW:    Private pay costs discussed: Not applicable    Additional Information:  Writer was asked to determine pt's home care agency and services. Writer found it was [facility] ([phone]). Writer contacted agency and spoke with Intake rep Sonia and she states that pt was receiving PT/OT/PCA services. Adding RN services would be available. Sonia states that resumption of home care and adding RN services could be faxed to ([phone]) when pt medically ready for discharge. CM will continue to follow for discharge needs.     Anuj Reynoso RN  [facility]  Inpatient Care Management - FLOAT  [place] Mobile: [phone] daily 7:30-4:00

## 2023-09-24 VITALS
OXYGEN SATURATION: 96 % | HEART RATE: 71 BPM | DIASTOLIC BLOOD PRESSURE: 82 MMHG | SYSTOLIC BLOOD PRESSURE: 154 MMHG | TEMPERATURE: 97.3 F | RESPIRATION RATE: 18 BRPM

## 2023-09-24 LAB
GLUCOSE BLDC GLUCOMTR-MCNC: 174 MG/DL (ref 70–99)
GLUCOSE BLDC GLUCOMTR-MCNC: 183 MG/DL (ref 70–99)
GLUCOSE BLDC GLUCOMTR-MCNC: 197 MG/DL (ref 70–99)
GLUCOSE BLDC GLUCOMTR-MCNC: 368 MG/DL (ref 70–99)
MAGNESIUM SERPL-MCNC: 1.7 MG/DL (ref 1.7–2.3)
POTASSIUM SERPL-SCNC: 3.9 MMOL/L (ref 3.4–5.3)

## 2023-09-24 PROCEDURE — 250N000013 HC RX MED GY IP 250 OP 250 PS 637: Performed by: INTERNAL MEDICINE

## 2023-09-24 PROCEDURE — 250N000011 HC RX IP 250 OP 636: Mod: JZ | Performed by: INTERNAL MEDICINE

## 2023-09-24 PROCEDURE — 83735 ASSAY OF MAGNESIUM: CPT | Performed by: INTERNAL MEDICINE

## 2023-09-24 PROCEDURE — 36415 COLL VENOUS BLD VENIPUNCTURE: CPT | Performed by: INTERNAL MEDICINE

## 2023-09-24 PROCEDURE — 99239 HOSP IP/OBS DSCHRG MGMT >30: CPT | Performed by: INTERNAL MEDICINE

## 2023-09-24 PROCEDURE — 84132 ASSAY OF SERUM POTASSIUM: CPT | Performed by: INTERNAL MEDICINE

## 2023-09-24 RX ORDER — CARVEDILOL 25 MG/1
25 TABLET ORAL 2 TIMES DAILY WITH MEALS
Status: DISCONTINUED | OUTPATIENT
Start: 2023-09-24 | End: 2023-09-24 | Stop reason: HOSPADM

## 2023-09-24 RX ADMIN — INSULIN ASPART 1 UNITS: 100 INJECTION, SOLUTION INTRAVENOUS; SUBCUTANEOUS at 17:02

## 2023-09-24 RX ADMIN — FINASTERIDE 5 MG: 5 TABLET, FILM COATED ORAL at 08:10

## 2023-09-24 RX ADMIN — ASPIRIN 325 MG: 325 TABLET, COATED ORAL at 08:10

## 2023-09-24 RX ADMIN — CARVEDILOL 25 MG: 25 TABLET, FILM COATED ORAL at 08:10

## 2023-09-24 RX ADMIN — CARVEDILOL 25 MG: 25 TABLET, FILM COATED ORAL at 17:02

## 2023-09-24 RX ADMIN — INSULIN ASPART 1 UNITS: 100 INJECTION, SOLUTION INTRAVENOUS; SUBCUTANEOUS at 08:11

## 2023-09-24 RX ADMIN — INSULIN ASPART 5 UNITS: 100 INJECTION, SOLUTION INTRAVENOUS; SUBCUTANEOUS at 12:46

## 2023-09-24 RX ADMIN — SENNOSIDES AND DOCUSATE SODIUM 1 TABLET: 50; 8.6 TABLET ORAL at 08:10

## 2023-09-24 RX ADMIN — CEFTRIAXONE SODIUM 2 G: 2 INJECTION, POWDER, FOR SOLUTION INTRAMUSCULAR; INTRAVENOUS at 02:00

## 2023-09-24 ASSESSMENT — ACTIVITIES OF DAILY LIVING (ADL)
ADLS_ACUITY_SCORE: 61
ADLS_ACUITY_SCORE: 62
ADLS_ACUITY_SCORE: 60
DEPENDENT_IADLS:: TRANSPORTATION;MEAL PREPARATION;SHOPPING;COOKING;CLEANING
ADLS_ACUITY_SCORE: 58
ADLS_ACUITY_SCORE: 62
ADLS_ACUITY_SCORE: 58

## 2023-09-24 NOTE — CONSULTS
Care Management Initial Consult    General Information  Assessment completed with: Children, son Adolfo  Type of CM/SW Visit: Offer D/C Planning    Primary Care Provider verified and updated as needed: Yes   Readmission within the last 30 days: unable to assess   Return Category: New Diagnosis  Reason for Consult: discharge planning  Advance Care Planning:            Communication Assessment  Patient's communication style: spoken language (non-English)    Hearing Difficulty or Deaf: yes   Wear Glasses or Blind: yes    Cognitive  Cognitive/Neuro/Behavioral: .WDL except  Level of Consciousness: confused, alert  Arousal Level: opens eyes spontaneously  Orientation: other (see comments) (did not respond to orientation questions)  Mood/Behavior: cooperative  Best Language: 0 - No aphasia  Speech: slurred    Living Environment:   People in home: spouse     Current living Arrangements: house      Able to return to prior arrangements: yes       Family/Social Support:  Care provided by: spouse/significant other  Provides care for: no one, unable/limited ability to care for self  Marital Status:   Wife, Children          Description of Support System: Supportive, Involved         Current Resources:   Patient receiving home care services: Yes  Skilled Home Care Services: Physical Therapy, Occupational Therapy, Home Health Aid  Community Resources: [facility], Home Care  Equipment currently used at home: cane, straight, grab bar, toilet, grab bar, tub/shower, walker, standard, walker, rolling  Supplies currently used at home: None    Employment/Financial:  Employment Status: retired        Financial Concerns: No concerns identified           Does the patient's insurance plan have a 3 day qualifying hospital stay waiver?  No    Lifestyle & Psychosocial Needs:  Social Determinants of Health     Food Insecurity: Not on file   Depression: Not on file   Housing Stability: Not on file   Tobacco Use: Low Risk  (5/4/2023)     Patient History     Smoking Tobacco Use: Never     Smokeless Tobacco Use: Never     Passive Exposure: Not on file   Financial Resource Strain: Not on file   Alcohol Use: Not on file   Transportation Needs: Not on file   Physical Activity: Not on file   Interpersonal Safety: Not on file   Stress: Not on file   Social Connections: Not on file       Functional Status:  Prior to admission patient needed assistance:   Dependent ADLs:: Bathing, Ambulation-walker  Dependent IADLs:: Transportation, Meal Preparation, Shopping, Cooking, Cleaning  Assesssment of Functional Status: At functional baseline    Mental Health Status:          Chemical Dependency Status:                Values/Beliefs:  Spiritual, Cultural Beliefs, Judaism Practices, Values that affect care: no               Additional Information:  Care coordination consult for discharge planning. Patient admitted with acute pulmonary edema, acute CHF. Patient will be discharging to home later today with resumption of home PT/OT/PCA services with the addition of Skilled Nursing. Chart review found previous admission with referral to [facility] agency. Called [facility] and spoke to Alina who confirmed the they do have patient open for homecare with mainly PCA services but include PT/OT. Orders faxed to [facility] at ([phone]).    Writer able to talk to son Adolfo to get consult completed due to language barrier and intermittent confusion. Adolfo states patient Adam lives in a 1-story house with his wife. He and his adult siblings assist him with driving to Dr garcia or any transportation needs. His PCA assists with ADL's also. PCP was confirmed. Adolfo states he has 2 walkers, (1 FWW) at home that he uses at times and has grabs bars in the shower. Son will transport patient home after 5 pm tonight. CM will continue to follow for further discharge needs should they arise.        Anuj Reynoso, RN  [facility]  Inpatient Care Management - FLOAT  CM  [place] Mobile: [phone] daily 7:30-4:00

## 2023-09-24 NOTE — PROGRESS NOTES
Discharge Note    Patient discharged to home via private vehicle accompanied by son.  IV: Discontinued  Prescriptions N/A.   Belongings reviewed and sent with patient.   Home medications returned to patient: N/A  Equipment sent with: N/A.   Son Adolfo verbalizes understanding of discharge instructions. AVS given to africa Adolfo.

## 2023-09-24 NOTE — PLAN OF CARE
[id number]  Summary:  Pt increased confusion due to hypoxia. Recent admission to [facility] -) for UTI sepsis. Hx CVA- R tongue deviation, facial droop, slurred speech, R<L at baseline, TBI, Cirrhosis, DM, [place]  Primary Diagnosis: Altered Mental status due to acute pulmonary edema and acute congestive heart failure   Orientation: alert to self, Lao speaking only, family often at bedside.   Aggression Stop Light: green   Mobility: Ax2, GB, walker   Pain Management:  had 6/10 headache, relief with tylenol   Diet: pureed diet and mildly thick liquids, nutrition consulted   Bowel/Bladder: incontinent bladder  Abnormal Lab/Assessments: K: 3.7, m.6  Drain/Device/Wound: L AC PIV for abx  Consults: cardiology, OT,PT,SLP, SW  D/C Day/Goals/Place: possibly 23    Shift Note: Pt needs Lao   Family advise to let patient sleep overnight, they refused postioning and vitals signs.

## 2023-09-24 NOTE — DISCHARGE SUMMARY
[facility]    Discharge Summary  Hospitalist    Date of Admission:  9/21/2023  Date of Discharge:  9/24/2023  Discharging Provider: Leisa Werner, DO    Discharge Diagnoses   Toxic metabolic encephalopathy, suspected delirium: Improved  Suspected Alzheimer's dementia  Hx of TBI, encephalomalcia  Acute hypoxic respiratory failure, suspect dt CHF exacerbation: Resolved  Acute exacerbation of chronic diastolic CHF  CAD with hx of prior PCI in 2015  NSTEMI, suspect secondary to demand ischemia  Hx of CVA in 5/2023  Hypertension  Hyperlipidemia  Recent complicated UTI dt citrobacter, with associated severe sepsis  DM II  BPH  Cirrhosis  Hypokalemia    History of Present Illness   Adam Huber is a 88 year old Kyrgyz-speaking male with PMHx of hypertension, hyperlipidemia, CAD, recent CVA in 5/2023 (involving the R asia and L cerebral hemisphere), DM II, BPH and dementia. He was recently admitted to [facility] from 9/17/23 - 9/20/23 for management of severe sepsis dt UTI, with associated lactic acidosis and encephalopathy.  UC ultimately grew citrobacter. Managed with Zosyn during stay and discharged to 10d course of Omnicef. Ultimately discharge to home with home PT (son noted hx of sundowning, did not want TCU stay). He presented back to ED on 9/21/2023 and was admitted with hypoxia and cough, with worsening confusion. Was also found to have an NSTEMI.     Hospital Course   Adam Huber was admitted on 9/21/2023.  The following problems were addressed during his hospitalization:    Toxic metabolic encephalopathy, suspected delirium: Improved  Suspected Alzheimer's dementia  Hx of TBI, encephalomalacia  *Patient with encephalomalacia of bilateral frontal lobes as well as right temporal lobe noted on imaging since at least 2003 by outside record review.   *Son had noted during recent stay that patient sundowns at night. No overt issues with delirium during that stay.  Discharged home with home PT.   *Noted worsening confusion prompting re-evaluation in ED.   *In ED, was afebrile, VSS. WBC normalized, procal still elevated but improved from recent labs. Head CT neg. Viral swabs neg. Blood cultures drawn 9/17 were negative, blood cultures drawn on 9/21 remained neg this stay.   *Mentation remained stable this stay. Minimized interruptions overnight. No issues with agitation.   *Discharged home with family, home care services.     Acute hypoxic respiratory failure, suspect dt CHF exacerbation: Resolved  Acute exacerbation of chronic diastolic CHF  CAD with hx of prior PCI in 2015  NSTEMI, suspect secondary to demand ischemia  Hx of CVA in 5/2023  Hypertension  Hyperlipidemia  *Echo done on 9/18/23 while at [facility] for evaluation of dyspnea and transient hypoxia -- showed EF 50-55%, grade I diastolic dysfunction, borderline reduced RVSF, mild mitral stenosis. Given Lasix 20mg IV x1 that stay.   *Presented to the ED this admission with shortness of breath. Was afebrile, hemodynamically stable. O2 sats borderline low 90s on RA. Trops elevated but trend was flat  (98--89), proBNP 9300. CT chest neg for PE, limited by motion artifact but did show new moderate bilateral pleural effusions with adjacent compressive atelectasis and minimal interstitial pulmonary edema. Started on Lasix 40mg IV BID.  *Seen by cardiology on 9/22/23. Recommended trial of diuresis for CHF exacerbation, no need for repeat echo as clinical picture consistent with demand ischemia.   *Had good response to IV diuresis this stay. Was weaned off O2. No additional oral diuresis needed at discharge.   *Otherwise continued on PTA meds, including ASA, statin and Coreg.   *Home RN to monitor BPs, may need additional antihypertensive med     Recent complicated UTI dt citrobacter, with associated severe sepsis  *Recently hospitalized at [facility] for treatment of UTI as above. Discharged on 10d course of Omnicef.   *Labs all  improved this stay, blood cultures remain neg. Abx changed to IV ceftriaxone on admission this stay. Resumed Omnicef at discharge as previously advised     DM II  *Well managed. A1c 6.5 on 9/18/23. Chronic and stable on metformin.     BPH  *Chronic and stable on Proscar       Cirrhosis  *Known cirrhotic liver confirmed on MR imaging.  No varices on EGD as of 2014, uncertain if he has had additional outpatient follow-up.  Abdomen soft and nonsurgical at this time.     Hypokalemia  *Due to diuresis. Has K replacement protocol ordered     Per recent care coordinator RN note from 9/20/23 during [facility] stay:  He lives with his spouse who is primary caregiver and he receives PCA services daily. CM met with son and patient at bedside. Son reports they have all needed equipment at home. States they have home care but did not know name of agency. MD ordering home PT at discharge. Chart review found previous admission with referral to [facility].Called [facility] and spoke to Isabel who confirmed that they do have patient open for homecare but mostly just for PCA with RN supervision at this time. They are able to add the skilled services of RN, PT, OT.     Resumed home cares at discharge -- PT/OT and RN. Will resume PCA services as per prior to admission.    Leisa Werner,     Pending Results   These results will be followed up by PCP  Unresulted Labs Ordered in the Past 30 Days of this Admission       Date and Time Order Name Status Description    9/21/2023 10:08 PM Blood Culture Peripheral Blood Preliminary     9/21/2023 10:08 PM Blood Culture Peripheral Blood Preliminary             Code Status   DNR / DNI       Primary Care Physician   Oralia Forrest    Physical Exam   Temp: 97.3  F (36.3  C) Temp src: Axillary BP: (!) 154/82 Pulse: 71   Resp: 18 SpO2: 96 % O2 Device: None (Room air)    There were no vitals filed for this visit.  Vital Signs with Ranges  Temp:  [97.3  F (36.3  C)-98.9  F (37.2   C)] 97.3  F (36.3  C)  Pulse:  [71-84] 71  Resp:  [18] 18  BP: (144-164)/(70-90) 154/82  SpO2:  [94 %-97 %] 96 %  I/O last 3 completed shifts:  In: 840 [P.O.:840]  Out: -     General: Resting comfortably, alert, NAD  CVS: HRRR, no MGR, no LE edema  Respiratory: CTAB, no wheeze/rales/rhonchi, no increased work of breathing   GI: S, NT, ND, +BS  Skin: Warm/dry    Discharge Disposition   Discharged to home  Condition at discharge: Stable    Consultations This Hospital Stay   CORE CLINIC EVALUATION IP CONSULT  OCCUPATIONAL THERAPY ADULT IP CONSULT  NUTRITION SERVICES ADULT IP CONSULT  CARE MANAGEMENT / SOCIAL WORK IP CONSULT  SPEECH LANGUAGE PATH ADULT IP CONSULT  PHYSICAL THERAPY ADULT IP CONSULT  CARDIOLOGY IP CONSULT    Time Spent on this Encounter   ILeisa DO, personally saw the patient today and spent greater than 30 minutes discharging this patient.    Discharge Orders      Home Care Referral      Follow-up and recommended labs and tests     Follow up with your home care team (PT, OT and RN) in the next week.   Follow up with your PCP in the next 1-2 weeks for ongoing monitoring of your blood pressure and to discuss whether your medications should be further adjusted.     Activity    Your activity upon discharge: activity as tolerated     Reason for your hospital stay    Management of your shortness of breath and low oxygen levels, which were due to fluid at the base of your lungs. You were given medication (the diuretic Lasix) to help your body get rid of this fluid. Your breathing improved.     Resume Home Care Services     Diet    Follow this diet upon discharge: Pureed Diet (level 4); Mildly Thick (level 2)     Discharge Medications   Current Discharge Medication List        CONTINUE these medications which have NOT CHANGED    Details   acetaminophen (TYLENOL) 325 MG tablet Take 650 mg by mouth every 8 hours as needed for mild pain      aspirin (ASA) 325 MG EC tablet Take 325 mg by mouth  daily      atorvastatin (LIPITOR) 40 MG tablet Take 1 tablet (40 mg) by mouth every evening  Qty: 30 tablet, Refills: 0    Associated Diagnoses: History of CVA (cerebrovascular accident)      carvedilol (COREG) 25 MG tablet Take 1 tablet (25 mg) by mouth 2 times daily (with meals)  Qty: 180 tablet, Refills: 3    Associated Diagnoses: Essential hypertension      cefdinir (OMNICEF) 300 MG capsule Take 1 capsule (300 mg) by mouth 2 times daily  Qty: 20 capsule, Refills: 0    Associated Diagnoses: Urinary tract infection without hematuria, site unspecified      finasteride (PROSCAR) 5 MG tablet Take 5 mg by mouth daily      Melatonin 10 MG TABS tablet Take 10 mg by mouth At Bedtime      metFORMIN (GLUCOPHAGE) 500 MG tablet Take 1 tablet (500 mg) by mouth 2 times daily (with meals)  Qty: 60 tablet, Refills: 0    Associated Diagnoses: Type 2 diabetes mellitus without complication, unspecified whether long term insulin use (H)      mirtazapine (REMERON) 15 MG tablet Take 15 mg by mouth At Bedtime      nitroglycerin (NITROSTAT) 0.4 MG SL tablet Place 1 tablet (0.4 mg) under the tongue every 5 minutes as needed for chest pain  Qty: 25 tablet, Refills: 1    Associated Diagnoses: NSTEMI (non-ST elevated myocardial infarction) (H); Essential hypertension; Postsurgical percutaneous transluminal coronary angioplasty status      polyethylene glycol (MIRALAX) 17 GM/Dose powder Take 17 g by mouth 2 times daily  Qty: 510 g, Refills: 4    Associated Diagnoses: Constipation, unspecified constipation type      risperiDONE (RISPERDAL) 0.5 MG tablet Take 0.5 mg by mouth 2 times daily as needed      senna-docusate (SENOKOT-S/PERICOLACE) 8.6-50 MG tablet Take 1 tablet by mouth 2 times daily as needed for constipation  Qty: 100 tablet, Refills: 0    Associated Diagnoses: Constipation, unspecified constipation type           Allergies   Allergies   Allergen Reactions    Lisinopril     Morphine Other (See Comments)     confusion     Data    Most Recent 3 CBC's:  Recent Labs   Lab Test 09/23/23  1042 09/21/23 2157 09/20/23  0635   WBC 10.9 10.0 16.3*   HGB 14.5 13.8 14.0   MCV 92 95 93    157 124*      Most Recent 3 BMP's:  Recent Labs   Lab Test 09/24/23 0224 09/23/23 2008 09/23/23  1801 09/23/23  1607 09/23/23  1139 09/23/23 1042 09/23/23 0229 09/21/23 2157 09/20/23  0752 09/20/23  0635   NA  --   --   --   --   --  142  --  142  --  141   POTASSIUM  --   --   --  3.7  --  3.2*  --  4.3  --  3.9   CHLORIDE  --   --   --   --   --  103  --  108*  --  111*   CO2  --   --   --   --   --  26  --  21*  --  21*   BUN  --   --   --   --   --  22.5  --  24.3*  --  23.4*   CR  --   --   --   --   --  0.74  --  0.81  --  0.71   ANIONGAP  --   --   --   --   --  13  --  13  --  9   LUZ  --   --   --   --   --  8.6*  --  8.4*  --  8.3*   * 226* 217*  --    < > 232*   < > 264*   < > 159*    < > = values in this interval not displayed.     Most Recent 2 LFT's:  Recent Labs   Lab Test 09/21/23 2157 09/18/23  0626   AST 38 43   ALT 28 35   ALKPHOS 176* 136*   BILITOTAL 0.9 1.8*     Most Recent TSH, T4 and A1c Labs:  Recent Labs   Lab Test 09/18/23  0413 08/31/23  0737   TSH  --  4.86*   T4  --  1.10   A1C 6.5*  --      Results for orders placed or performed during the hospital encounter of 09/21/23   CT Chest Pulmonary Embolism w Contrast    Narrative    EXAM: CT CHEST PULMONARY EMBOLISM W CONTRAST  LOCATION: [facility]  DATE: 9/22/2023    INDICATION: Shortness of breath. Chest pain. Elevated d-dimer. Productive cough.  COMPARISON: 09/17/2023  TECHNIQUE: CT chest pulmonary angiogram during arterial phase injection of IV contrast. Multiplanar reformats and MIP reconstructions were performed. Dose reduction techniques were used.   CONTRAST: 71 mL Isovue   370    FINDINGS:  ANGIOGRAM CHEST: Respiratory motion artifact limits evaluation. Pulmonary arteries are evaluated to the proximal segmental level. No evidence of acute  pulmonary embolism. The pulmonary arteries are normal caliber. Thoracic aorta is not well opacified and   is indeterminate for dissection. Moderate aortic calcifications. No CT evidence of right heart strain.    LUNGS AND PLEURA: New moderate bilateral pleural effusions with adjacent compressive atelectasis. Minimal interlobular septal thickening at the apices. No definite focal airspace consolidation. No pneumothorax.    MEDIASTINUM/AXILLAE: No lymphadenopathy. No pericardial effusion.    CORONARY ARTERY CALCIFICATION: Severe.    UPPER ABDOMEN: Redemonstrated renal cysts, no follow-up indicated. Status post cholecystectomy. Atrophic pancreas.    MUSCULOSKELETAL: Multilevel degenerative changes of the spine. Chronic L1 compression fracture.      Impression    IMPRESSION:  1.  No acute pulmonary embolism, within limitations of motion artifact.    2.  New moderate bilateral pleural effusions with adjacent compressive atelectasis. Minimal interstitial pulmonary edema.   CT Head w/o Contrast    Narrative    EXAM: CT HEAD W/O CONTRAST  LOCATION: [facility]  DATE: 9/22/2023    INDICATION: AMS.  Likely delirium, rule out hemorrhage.  (prior TBI and encephalomalacia, Alzheimer's dementia, recent hospitalization with sepsis)  COMPARISON: 09/17/2023  TECHNIQUE: Routine CT Head without IV contrast. Multiplanar reformats. Dose reduction techniques were used.    FINDINGS:  INTRACRANIAL CONTENTS: No intracranial hemorrhage, extraaxial collection, or mass effect.  No CT evidence of acute infarct. Moderate presumed chronic small vessel ischemic changes. Mild to moderate generalized volume loss. No hydrocephalus. Chronic   encephalomalacia and gliosis in the bilateral frontal and temporal lobes. Chronic lacunar infarcts in the bilateral basal ganglia.     VISUALIZED ORBITS/SINUSES/MASTOIDS: No intraorbital abnormality. No paranasal sinus mucosal disease. No middle ear or mastoid effusion.    BONES/SOFT  TISSUES: No acute abnormality.      Impression    IMPRESSION:  1.  No CT evidence for acute intracranial process.  2.  Stable chronic changes as above.

## 2023-09-25 NOTE — PLAN OF CARE
"Speech Language Therapy Discharge Summary    Reason for therapy discharge:    Discharged to home with home therapy.    Progress towards therapy goal(s). See goals on Care Plan in [facility] electronic health record for goal details.  Goals not met.  Barriers to achieving goals:   discharge from facility.    Therapy recommendation(s):    Continued therapy is recommended.  Rationale/Recommendations:  dysphagia management and family education.    At time of discharge \"Puree solids, mildly thick liquids (IDDSI 4, 2) when alert, upright, 1:1 assist, slow rate, monitor for R sided pocketing - cue for lingual sweep or liquid wash to clear, verify swallows.\"    *Pt not seen by discharging therapist on this date, note written based on previous treating therapist's notes and recommendations     "

## 2023-09-27 LAB
BACTERIA BLD CULT: NO GROWTH
BACTERIA BLD CULT: NO GROWTH

## 2024-02-03 ENCOUNTER — HOSPITAL ENCOUNTER (INPATIENT)
Facility: CLINIC | Age: 89
LOS: 2 days | Discharge: HOME HEALTH | DRG: 280 | End: 2024-02-06
Attending: EMERGENCY MEDICINE | Admitting: INTERNAL MEDICINE
Payer: COMMERCIAL

## 2024-02-03 DIAGNOSIS — N30.00 ACUTE CYSTITIS WITHOUT HEMATURIA: ICD-10-CM

## 2024-02-03 DIAGNOSIS — I50.9 CONGESTIVE HEART FAILURE, UNSPECIFIED HF CHRONICITY, UNSPECIFIED HEART FAILURE TYPE (H): ICD-10-CM

## 2024-02-03 DIAGNOSIS — J18.9 PNEUMONIA OF LEFT LOWER LOBE DUE TO INFECTIOUS ORGANISM: ICD-10-CM

## 2024-02-03 DIAGNOSIS — R09.02 HYPOXIA: ICD-10-CM

## 2024-02-03 DIAGNOSIS — G93.40 ENCEPHALOPATHY: ICD-10-CM

## 2024-02-03 DIAGNOSIS — I50.9 ACUTE CONGESTIVE HEART FAILURE, UNSPECIFIED HEART FAILURE TYPE (H): Primary | ICD-10-CM

## 2024-02-03 PROCEDURE — 99291 CRITICAL CARE FIRST HOUR: CPT | Mod: 25

## 2024-02-03 PROCEDURE — 93005 ELECTROCARDIOGRAM TRACING: CPT

## 2024-02-03 PROCEDURE — 99292 CRITICAL CARE ADDL 30 MIN: CPT

## 2024-02-03 PROCEDURE — 76604 US EXAM CHEST: CPT

## 2024-02-03 NOTE — LETTER
Transition Communication Hand-off for Care Transitions to Next Level of Care Provider    Name: Adam Huber  : 1935  MRN #: [id number]  Primary Care Provider: Oralia Forrest     Primary Clinic: [id number] [place] [id number]     Reason for Hospitalization:  Encephalopathy [G93.40]  Hypoxia [R09.02]  Acute cystitis without hematuria [N30.00]  Pneumonia of left lower lobe due to infectious organism [J18.9]  Congestive heart failure, unspecified HF chronicity, unspecified heart failure type (H) [I50.9]  Admit Date/Time: 2/3/2024 11:52 PM  Discharge Date: 2024  Payor Source: Payor: [email or web address] / Plan: [employer] [facility] / Product Type: POS /            Reason for Communication Hand-off Referral: Other discharge to home with new home care orders for RN, PT and OT    Discharge Plan: Home with family and home care through [facility] for RN, PT and OT.  Close follow up recommended.          Brandy Craven RN    AVS/Discharge Summary is the source of truth; this is a helpful guide for improved communication of patient story

## 2024-02-04 ENCOUNTER — APPOINTMENT (OUTPATIENT)
Dept: GENERAL RADIOLOGY | Facility: CLINIC | Age: 89
DRG: 280 | End: 2024-02-04
Attending: EMERGENCY MEDICINE
Payer: COMMERCIAL

## 2024-02-04 ENCOUNTER — APPOINTMENT (OUTPATIENT)
Dept: CT IMAGING | Facility: CLINIC | Age: 89
DRG: 280 | End: 2024-02-04
Attending: EMERGENCY MEDICINE
Payer: COMMERCIAL

## 2024-02-04 ENCOUNTER — APPOINTMENT (OUTPATIENT)
Dept: SPEECH THERAPY | Facility: CLINIC | Age: 89
DRG: 280 | End: 2024-02-04
Attending: HOSPITALIST
Payer: COMMERCIAL

## 2024-02-04 PROBLEM — G93.40 ENCEPHALOPATHY: Status: ACTIVE | Noted: 2019-12-04

## 2024-02-04 PROBLEM — J18.9 PNEUMONIA OF LEFT LOWER LOBE DUE TO INFECTIOUS ORGANISM: Status: ACTIVE | Noted: 2024-02-04

## 2024-02-04 PROBLEM — I50.9 CONGESTIVE HEART FAILURE, UNSPECIFIED HF CHRONICITY, UNSPECIFIED HEART FAILURE TYPE (H): Status: ACTIVE | Noted: 2024-02-04

## 2024-02-04 PROBLEM — R09.02 HYPOXIA: Status: ACTIVE | Noted: 2024-02-04

## 2024-02-04 LAB
ALBUMIN SERPL BCG-MCNC: 3.6 G/DL (ref 3.5–5.2)
ALBUMIN UR-MCNC: 30 MG/DL
ALP SERPL-CCNC: 167 U/L (ref 40–150)
ALT SERPL W P-5'-P-CCNC: 24 U/L (ref 0–70)
AMMONIA PLAS-SCNC: 23 UMOL/L (ref 16–60)
ANION GAP SERPL CALCULATED.3IONS-SCNC: 10 MMOL/L (ref 7–15)
APPEARANCE UR: ABNORMAL
AST SERPL W P-5'-P-CCNC: 31 U/L (ref 0–45)
ATRIAL RATE - MUSE: 106 BPM
ATRIAL RATE - MUSE: 98 BPM
BASE EXCESS BLDV CALC-SCNC: -0.7 MMOL/L (ref -3–3)
BASE EXCESS BLDV CALC-SCNC: 2 MMOL/L (ref -3–3)
BASOPHILS # BLD AUTO: 0.1 10E3/UL (ref 0–0.2)
BASOPHILS NFR BLD AUTO: 1 %
BILIRUB SERPL-MCNC: 0.7 MG/DL
BILIRUB UR QL STRIP: NEGATIVE
BUN SERPL-MCNC: 20.8 MG/DL (ref 8–23)
CALCIUM SERPL-MCNC: 8.7 MG/DL (ref 8.8–10.2)
CHLORIDE SERPL-SCNC: 106 MMOL/L (ref 98–107)
COLOR UR AUTO: ABNORMAL
CREAT SERPL-MCNC: 0.86 MG/DL (ref 0.67–1.17)
CREAT SERPL-MCNC: 0.88 MG/DL (ref 0.67–1.17)
D DIMER PPP FEU-MCNC: 0.66 UG/ML FEU (ref 0–0.5)
DEPRECATED HCO3 PLAS-SCNC: 23 MMOL/L (ref 22–29)
DIASTOLIC BLOOD PRESSURE - MUSE: NORMAL MMHG
DIASTOLIC BLOOD PRESSURE - MUSE: NORMAL MMHG
EGFRCR SERPLBLD CKD-EPI 2021: 83 ML/MIN/1.73M2
EGFRCR SERPLBLD CKD-EPI 2021: 83 ML/MIN/1.73M2
EOSINOPHIL # BLD AUTO: 0.4 10E3/UL (ref 0–0.7)
EOSINOPHIL NFR BLD AUTO: 3 %
ERYTHROCYTE [DISTWIDTH] IN BLOOD BY AUTOMATED COUNT: 13.3 % (ref 10–15)
FLUAV RNA SPEC QL NAA+PROBE: NEGATIVE
FLUBV RNA RESP QL NAA+PROBE: NEGATIVE
GLUCOSE BLDC GLUCOMTR-MCNC: 150 MG/DL (ref 70–99)
GLUCOSE BLDC GLUCOMTR-MCNC: 202 MG/DL (ref 70–99)
GLUCOSE BLDC GLUCOMTR-MCNC: 220 MG/DL (ref 70–99)
GLUCOSE BLDC GLUCOMTR-MCNC: 252 MG/DL (ref 70–99)
GLUCOSE BLDC GLUCOMTR-MCNC: 278 MG/DL (ref 70–99)
GLUCOSE SERPL-MCNC: 243 MG/DL (ref 70–99)
GLUCOSE UR STRIP-MCNC: 100 MG/DL
HBA1C MFR BLD: 7.5 %
HCO3 BLDV-SCNC: 23 MMOL/L (ref 21–28)
HCO3 BLDV-SCNC: 26 MMOL/L (ref 21–28)
HCO3 BLDV-SCNC: 29 MMOL/L (ref 21–28)
HCT VFR BLD AUTO: 43.6 % (ref 40–53)
HGB BLD-MCNC: 14.6 G/DL (ref 13.3–17.7)
HGB UR QL STRIP: ABNORMAL
HOLD SPECIMEN: NORMAL
HOLD SPECIMEN: NORMAL
HYALINE CASTS: 2 /LPF
IMM GRANULOCYTES # BLD: 0.1 10E3/UL
IMM GRANULOCYTES NFR BLD: 0 %
INTERPRETATION ECG - MUSE: NORMAL
INTERPRETATION ECG - MUSE: NORMAL
KETONES UR STRIP-MCNC: ABNORMAL MG/DL
LACTATE BLD-SCNC: 2 MMOL/L
LACTATE SERPL-SCNC: 1.2 MMOL/L (ref 0.7–2)
LEUKOCYTE ESTERASE UR QL STRIP: ABNORMAL
LYMPHOCYTES # BLD AUTO: 3.2 10E3/UL (ref 0.8–5.3)
LYMPHOCYTES NFR BLD AUTO: 24 %
MAGNESIUM SERPL-MCNC: 1.6 MG/DL (ref 1.7–2.3)
MCH RBC QN AUTO: 30.7 PG (ref 26.5–33)
MCHC RBC AUTO-ENTMCNC: 33.5 G/DL (ref 31.5–36.5)
MCV RBC AUTO: 92 FL (ref 78–100)
MONOCYTES # BLD AUTO: 1.2 10E3/UL (ref 0–1.3)
MONOCYTES NFR BLD AUTO: 9 %
MUCOUS THREADS #/AREA URNS LPF: PRESENT /LPF
NEUTROPHILS # BLD AUTO: 8.4 10E3/UL (ref 1.6–8.3)
NEUTROPHILS NFR BLD AUTO: 63 %
NITRATE UR QL: POSITIVE
NRBC # BLD AUTO: 0 10E3/UL
NRBC BLD AUTO-RTO: 0 /100
NT-PROBNP SERPL-MCNC: 982 PG/ML (ref 0–1800)
O2/TOTAL GAS SETTING VFR VENT: 1 %
O2/TOTAL GAS SETTING VFR VENT: 30 %
OXYHGB MFR BLDV: 40 % (ref 70–75)
OXYHGB MFR BLDV: 50 % (ref 70–75)
P AXIS - MUSE: 52 DEGREES
P AXIS - MUSE: 71 DEGREES
PCO2 BLDV: 50 MM HG (ref 40–50)
PCO2 BLDV: 50 MM HG (ref 40–50)
PCO2 BLDV: 52 MM HG (ref 40–50)
PH BLDV: 7.26 [PH] (ref 7.32–7.43)
PH BLDV: 7.31 [PH] (ref 7.32–7.43)
PH BLDV: 7.37 [PH] (ref 7.32–7.43)
PH UR STRIP: 5 [PH] (ref 5–7)
PLATELET # BLD AUTO: 232 10E3/UL (ref 150–450)
PO2 BLDV: 24 MM HG (ref 25–47)
PO2 BLDV: 30 MM HG (ref 25–47)
PO2 BLDV: 66 MM HG (ref 25–47)
POTASSIUM SERPL-SCNC: 4.3 MMOL/L (ref 3.4–5.3)
POTASSIUM SERPL-SCNC: 4.6 MMOL/L (ref 3.4–5.3)
PR INTERVAL - MUSE: 158 MS
PR INTERVAL - MUSE: 162 MS
PROT SERPL-MCNC: 7 G/DL (ref 6.4–8.3)
QRS DURATION - MUSE: 88 MS
QRS DURATION - MUSE: 92 MS
QT - MUSE: 362 MS
QT - MUSE: 406 MS
QTC - MUSE: 480 MS
QTC - MUSE: 518 MS
R AXIS - MUSE: 95 DEGREES
R AXIS - MUSE: 95 DEGREES
RBC # BLD AUTO: 4.75 10E6/UL (ref 4.4–5.9)
RBC URINE: 22 /HPF
RSV RNA SPEC NAA+PROBE: NEGATIVE
SAO2 % BLDV: 41.2 % (ref 70–75)
SAO2 % BLDV: 51 % (ref 70–75)
SAO2 % BLDV: 89 % (ref 70–75)
SARS-COV-2 RNA RESP QL NAA+PROBE: NEGATIVE
SODIUM SERPL-SCNC: 139 MMOL/L (ref 135–145)
SP GR UR STRIP: 1.01 (ref 1–1.03)
SYSTOLIC BLOOD PRESSURE - MUSE: NORMAL MMHG
SYSTOLIC BLOOD PRESSURE - MUSE: NORMAL MMHG
T AXIS - MUSE: 86 DEGREES
T AXIS - MUSE: 87 DEGREES
TROPONIN T SERPL HS-MCNC: 22 NG/L
TROPONIN T SERPL HS-MCNC: 59 NG/L
TROPONIN T SERPL HS-MCNC: 64 NG/L
TROPONIN T SERPL HS-MCNC: 68 NG/L
UROBILINOGEN UR STRIP-MCNC: NORMAL MG/DL
VENTRICULAR RATE- MUSE: 106 BPM
VENTRICULAR RATE- MUSE: 98 BPM
WBC # BLD AUTO: 13.3 10E3/UL (ref 4–11)
WBC URINE: >182 /HPF

## 2024-02-04 PROCEDURE — 99223 1ST HOSP IP/OBS HIGH 75: CPT | Mod: AI | Performed by: INTERNAL MEDICINE

## 2024-02-04 PROCEDURE — 70450 CT HEAD/BRAIN W/O DYE: CPT

## 2024-02-04 PROCEDURE — 99207 PR NO BILLABLE SERVICE THIS VISIT: CPT | Performed by: PHYSICIAN ASSISTANT

## 2024-02-04 PROCEDURE — 250N000011 HC RX IP 250 OP 636: Performed by: EMERGENCY MEDICINE

## 2024-02-04 PROCEDURE — 82565 ASSAY OF CREATININE: CPT | Performed by: INTERNAL MEDICINE

## 2024-02-04 PROCEDURE — 83880 ASSAY OF NATRIURETIC PEPTIDE: CPT | Performed by: EMERGENCY MEDICINE

## 2024-02-04 PROCEDURE — 84484 ASSAY OF TROPONIN QUANT: CPT | Performed by: EMERGENCY MEDICINE

## 2024-02-04 PROCEDURE — 85379 FIBRIN DEGRADATION QUANT: CPT | Performed by: INTERNAL MEDICINE

## 2024-02-04 PROCEDURE — 36415 COLL VENOUS BLD VENIPUNCTURE: CPT | Performed by: EMERGENCY MEDICINE

## 2024-02-04 PROCEDURE — 87637 SARSCOV2&INF A&B&RSV AMP PRB: CPT | Performed by: EMERGENCY MEDICINE

## 2024-02-04 PROCEDURE — 83735 ASSAY OF MAGNESIUM: CPT | Performed by: HOSPITALIST

## 2024-02-04 PROCEDURE — 999N000185 HC STATISTIC TRANSPORT TIME EA 15 MIN

## 2024-02-04 PROCEDURE — 250N000012 HC RX MED GY IP 250 OP 636 PS 637: Performed by: INTERNAL MEDICINE

## 2024-02-04 PROCEDURE — 80053 COMPREHEN METABOLIC PANEL: CPT | Performed by: EMERGENCY MEDICINE

## 2024-02-04 PROCEDURE — 120N000001 HC R&B MED SURG/OB

## 2024-02-04 PROCEDURE — 36415 COLL VENOUS BLD VENIPUNCTURE: CPT | Performed by: HOSPITALIST

## 2024-02-04 PROCEDURE — 87086 URINE CULTURE/COLONY COUNT: CPT | Performed by: EMERGENCY MEDICINE

## 2024-02-04 PROCEDURE — 87040 BLOOD CULTURE FOR BACTERIA: CPT | Performed by: EMERGENCY MEDICINE

## 2024-02-04 PROCEDURE — 36415 COLL VENOUS BLD VENIPUNCTURE: CPT | Performed by: INTERNAL MEDICINE

## 2024-02-04 PROCEDURE — 82803 BLOOD GASES ANY COMBINATION: CPT

## 2024-02-04 PROCEDURE — 96365 THER/PROPH/DIAG IV INF INIT: CPT | Mod: 59

## 2024-02-04 PROCEDURE — 250N000013 HC RX MED GY IP 250 OP 250 PS 637: Performed by: INTERNAL MEDICINE

## 2024-02-04 PROCEDURE — 99418 PROLNG IP/OBS E/M EA 15 MIN: CPT | Performed by: INTERNAL MEDICINE

## 2024-02-04 PROCEDURE — 250N000009 HC RX 250

## 2024-02-04 PROCEDURE — 81001 URINALYSIS AUTO W/SCOPE: CPT | Performed by: EMERGENCY MEDICINE

## 2024-02-04 PROCEDURE — 5A09357 ASSISTANCE WITH RESPIRATORY VENTILATION, LESS THAN 24 CONSECUTIVE HOURS, CONTINUOUS POSITIVE AIRWAY PRESSURE: ICD-10-PCS | Performed by: INTERNAL MEDICINE

## 2024-02-04 PROCEDURE — 84132 ASSAY OF SERUM POTASSIUM: CPT | Performed by: HOSPITALIST

## 2024-02-04 PROCEDURE — 83036 HEMOGLOBIN GLYCOSYLATED A1C: CPT | Performed by: INTERNAL MEDICINE

## 2024-02-04 PROCEDURE — 82805 BLOOD GASES W/O2 SATURATION: CPT | Performed by: INTERNAL MEDICINE

## 2024-02-04 PROCEDURE — 84484 ASSAY OF TROPONIN QUANT: CPT | Performed by: INTERNAL MEDICINE

## 2024-02-04 PROCEDURE — 83605 ASSAY OF LACTIC ACID: CPT | Performed by: HOSPITALIST

## 2024-02-04 PROCEDURE — 92610 EVALUATE SWALLOWING FUNCTION: CPT | Mod: GN | Performed by: SPEECH-LANGUAGE PATHOLOGIST

## 2024-02-04 PROCEDURE — 94660 CPAP INITIATION&MGMT: CPT

## 2024-02-04 PROCEDURE — 85025 COMPLETE CBC W/AUTO DIFF WBC: CPT | Performed by: EMERGENCY MEDICINE

## 2024-02-04 PROCEDURE — 82140 ASSAY OF AMMONIA: CPT | Performed by: EMERGENCY MEDICINE

## 2024-02-04 PROCEDURE — 99207 PR NO BILLABLE SERVICE THIS VISIT: CPT | Performed by: HOSPITALIST

## 2024-02-04 PROCEDURE — 71045 X-RAY EXAM CHEST 1 VIEW: CPT

## 2024-02-04 PROCEDURE — 250N000011 HC RX IP 250 OP 636: Performed by: INTERNAL MEDICINE

## 2024-02-04 PROCEDURE — 999N000157 HC STATISTIC RCP TIME EA 10 MIN

## 2024-02-04 RX ORDER — OXYCODONE HYDROCHLORIDE 5 MG/1
5 TABLET ORAL EVERY 4 HOURS PRN
Status: DISCONTINUED | OUTPATIENT
Start: 2024-02-04 | End: 2024-02-06 | Stop reason: HOSPADM

## 2024-02-04 RX ORDER — RISPERIDONE 0.5 MG/1
0.5 TABLET ORAL 2 TIMES DAILY PRN
Status: DISCONTINUED | OUTPATIENT
Start: 2024-02-04 | End: 2024-02-06 | Stop reason: HOSPADM

## 2024-02-04 RX ORDER — NITROGLYCERIN 0.4 MG/1
0.4 TABLET SUBLINGUAL EVERY 5 MIN PRN
Status: DISCONTINUED | OUTPATIENT
Start: 2024-02-04 | End: 2024-02-06 | Stop reason: HOSPADM

## 2024-02-04 RX ORDER — ACETAMINOPHEN 650 MG/1
650 SUPPOSITORY RECTAL EVERY 4 HOURS PRN
Status: DISCONTINUED | OUTPATIENT
Start: 2024-02-04 | End: 2024-02-06 | Stop reason: HOSPADM

## 2024-02-04 RX ORDER — NICOTINE POLACRILEX 4 MG
15-30 LOZENGE BUCCAL
Status: DISCONTINUED | OUTPATIENT
Start: 2024-02-04 | End: 2024-02-06 | Stop reason: HOSPADM

## 2024-02-04 RX ORDER — ENOXAPARIN SODIUM 100 MG/ML
40 INJECTION SUBCUTANEOUS EVERY 24 HOURS
Status: DISCONTINUED | OUTPATIENT
Start: 2024-02-04 | End: 2024-02-06 | Stop reason: HOSPADM

## 2024-02-04 RX ORDER — CEFTRIAXONE 2 G/1
2 INJECTION, POWDER, FOR SOLUTION INTRAMUSCULAR; INTRAVENOUS EVERY 24 HOURS
Qty: 140 ML | Refills: 0 | Status: DISCONTINUED | OUTPATIENT
Start: 2024-02-04 | End: 2024-02-06 | Stop reason: HOSPADM

## 2024-02-04 RX ORDER — DEXTROSE MONOHYDRATE 25 G/50ML
25-50 INJECTION, SOLUTION INTRAVENOUS
Status: DISCONTINUED | OUTPATIENT
Start: 2024-02-04 | End: 2024-02-06 | Stop reason: HOSPADM

## 2024-02-04 RX ORDER — LIDOCAINE 40 MG/G
CREAM TOPICAL
Status: DISCONTINUED | OUTPATIENT
Start: 2024-02-04 | End: 2024-02-06 | Stop reason: HOSPADM

## 2024-02-04 RX ORDER — NALOXONE HYDROCHLORIDE 0.4 MG/ML
0.2 INJECTION, SOLUTION INTRAMUSCULAR; INTRAVENOUS; SUBCUTANEOUS
Status: DISCONTINUED | OUTPATIENT
Start: 2024-02-04 | End: 2024-02-06 | Stop reason: HOSPADM

## 2024-02-04 RX ORDER — NALOXONE HYDROCHLORIDE 0.4 MG/ML
0.4 INJECTION, SOLUTION INTRAMUSCULAR; INTRAVENOUS; SUBCUTANEOUS
Status: DISCONTINUED | OUTPATIENT
Start: 2024-02-04 | End: 2024-02-06 | Stop reason: HOSPADM

## 2024-02-04 RX ORDER — NITROGLYCERIN 0.4 MG/1
0.4 TABLET SUBLINGUAL EVERY 5 MIN PRN
Status: CANCELLED | OUTPATIENT
Start: 2024-02-04

## 2024-02-04 RX ORDER — MIRTAZAPINE 15 MG/1
15 TABLET, FILM COATED ORAL AT BEDTIME
Status: DISCONTINUED | OUTPATIENT
Start: 2024-02-04 | End: 2024-02-06 | Stop reason: HOSPADM

## 2024-02-04 RX ORDER — ATORVASTATIN CALCIUM 40 MG/1
40 TABLET, FILM COATED ORAL EVERY EVENING
Status: DISCONTINUED | OUTPATIENT
Start: 2024-02-04 | End: 2024-02-06 | Stop reason: HOSPADM

## 2024-02-04 RX ORDER — LIDOCAINE 40 MG/G
CREAM TOPICAL
Status: CANCELLED | OUTPATIENT
Start: 2024-02-04

## 2024-02-04 RX ORDER — ACETAMINOPHEN 325 MG/1
650 TABLET ORAL EVERY 4 HOURS PRN
Status: DISCONTINUED | OUTPATIENT
Start: 2024-02-04 | End: 2024-02-06 | Stop reason: HOSPADM

## 2024-02-04 RX ORDER — FUROSEMIDE 10 MG/ML
20 INJECTION INTRAMUSCULAR; INTRAVENOUS 2 TIMES DAILY
Status: COMPLETED | OUTPATIENT
Start: 2024-02-04 | End: 2024-02-05

## 2024-02-04 RX ORDER — OLANZAPINE 10 MG/2ML
5 INJECTION, POWDER, FOR SOLUTION INTRAMUSCULAR DAILY PRN
Status: DISCONTINUED | OUTPATIENT
Start: 2024-02-04 | End: 2024-02-06 | Stop reason: HOSPADM

## 2024-02-04 RX ORDER — PROCHLORPERAZINE MALEATE 5 MG
5 TABLET ORAL EVERY 6 HOURS PRN
Status: DISCONTINUED | OUTPATIENT
Start: 2024-02-04 | End: 2024-02-06 | Stop reason: HOSPADM

## 2024-02-04 RX ORDER — PROCHLORPERAZINE 25 MG
12.5 SUPPOSITORY, RECTAL RECTAL EVERY 12 HOURS PRN
Status: DISCONTINUED | OUTPATIENT
Start: 2024-02-04 | End: 2024-02-06 | Stop reason: HOSPADM

## 2024-02-04 RX ORDER — AMOXICILLIN 250 MG
1 CAPSULE ORAL 2 TIMES DAILY PRN
Status: DISCONTINUED | OUTPATIENT
Start: 2024-02-04 | End: 2024-02-06 | Stop reason: HOSPADM

## 2024-02-04 RX ORDER — CEFTRIAXONE 2 G/1
2 INJECTION, POWDER, FOR SOLUTION INTRAMUSCULAR; INTRAVENOUS ONCE
Status: COMPLETED | OUTPATIENT
Start: 2024-02-04 | End: 2024-02-04

## 2024-02-04 RX ORDER — POLYETHYLENE GLYCOL 3350 17 G/17G
17 POWDER, FOR SOLUTION ORAL 2 TIMES DAILY
Status: DISCONTINUED | OUTPATIENT
Start: 2024-02-04 | End: 2024-02-06 | Stop reason: HOSPADM

## 2024-02-04 RX ORDER — WATER 10 ML/10ML
INJECTION INTRAMUSCULAR; INTRAVENOUS; SUBCUTANEOUS
Status: COMPLETED
Start: 2024-02-04 | End: 2024-02-04

## 2024-02-04 RX ORDER — HYDRALAZINE HYDROCHLORIDE 10 MG/1
10 TABLET, FILM COATED ORAL EVERY 4 HOURS PRN
Status: DISCONTINUED | OUTPATIENT
Start: 2024-02-04 | End: 2024-02-06 | Stop reason: HOSPADM

## 2024-02-04 RX ORDER — CARVEDILOL 25 MG/1
25 TABLET ORAL 2 TIMES DAILY WITH MEALS
Status: DISCONTINUED | OUTPATIENT
Start: 2024-02-04 | End: 2024-02-06 | Stop reason: HOSPADM

## 2024-02-04 RX ORDER — CALCIUM CARBONATE 500 MG/1
1000 TABLET, CHEWABLE ORAL 4 TIMES DAILY PRN
Status: CANCELLED | OUTPATIENT
Start: 2024-02-04

## 2024-02-04 RX ORDER — AZITHROMYCIN 500 MG/1
500 INJECTION, POWDER, LYOPHILIZED, FOR SOLUTION INTRAVENOUS ONCE
Status: COMPLETED | OUTPATIENT
Start: 2024-02-04 | End: 2024-02-04

## 2024-02-04 RX ORDER — ASPIRIN 325 MG
325 TABLET, DELAYED RELEASE (ENTERIC COATED) ORAL DAILY
Status: DISCONTINUED | OUTPATIENT
Start: 2024-02-04 | End: 2024-02-06 | Stop reason: HOSPADM

## 2024-02-04 RX ORDER — HYDRALAZINE HYDROCHLORIDE 20 MG/ML
10 INJECTION INTRAMUSCULAR; INTRAVENOUS EVERY 4 HOURS PRN
Status: DISCONTINUED | OUTPATIENT
Start: 2024-02-04 | End: 2024-02-06 | Stop reason: HOSPADM

## 2024-02-04 RX ORDER — DOXYCYCLINE 100 MG/1
100 CAPSULE ORAL EVERY 12 HOURS SCHEDULED
Status: DISCONTINUED | OUTPATIENT
Start: 2024-02-04 | End: 2024-02-06 | Stop reason: HOSPADM

## 2024-02-04 RX ORDER — ONDANSETRON 4 MG/1
4 TABLET, ORALLY DISINTEGRATING ORAL EVERY 6 HOURS PRN
Status: DISCONTINUED | OUTPATIENT
Start: 2024-02-04 | End: 2024-02-06 | Stop reason: HOSPADM

## 2024-02-04 RX ORDER — FINASTERIDE 5 MG/1
5 TABLET, FILM COATED ORAL DAILY
Status: DISCONTINUED | OUTPATIENT
Start: 2024-02-04 | End: 2024-02-04

## 2024-02-04 RX ORDER — ONDANSETRON 2 MG/ML
4 INJECTION INTRAMUSCULAR; INTRAVENOUS EVERY 6 HOURS PRN
Status: DISCONTINUED | OUTPATIENT
Start: 2024-02-04 | End: 2024-02-06 | Stop reason: HOSPADM

## 2024-02-04 RX ADMIN — DOXYCYCLINE HYCLATE 100 MG: 100 CAPSULE ORAL at 21:36

## 2024-02-04 RX ADMIN — MIRTAZAPINE 15 MG: 15 TABLET, FILM COATED ORAL at 21:35

## 2024-02-04 RX ADMIN — METFORMIN HYDROCHLORIDE 500 MG: 500 TABLET ORAL at 18:54

## 2024-02-04 RX ADMIN — WATER: 1 INJECTION INTRAMUSCULAR; INTRAVENOUS; SUBCUTANEOUS at 03:50

## 2024-02-04 RX ADMIN — FUROSEMIDE 20 MG: 10 INJECTION, SOLUTION INTRAMUSCULAR; INTRAVENOUS at 21:37

## 2024-02-04 RX ADMIN — POLYETHYLENE GLYCOL 3350 17 G: 17 POWDER, FOR SOLUTION ORAL at 21:37

## 2024-02-04 RX ADMIN — CEFTRIAXONE SODIUM 2 G: 2 INJECTION, POWDER, FOR SOLUTION INTRAMUSCULAR; INTRAVENOUS at 22:33

## 2024-02-04 RX ADMIN — CARVEDILOL 25 MG: 25 TABLET, FILM COATED ORAL at 18:54

## 2024-02-04 RX ADMIN — FUROSEMIDE 20 MG: 10 INJECTION, SOLUTION INTRAMUSCULAR; INTRAVENOUS at 03:07

## 2024-02-04 RX ADMIN — AZITHROMYCIN MONOHYDRATE 500 MG: 500 INJECTION, POWDER, LYOPHILIZED, FOR SOLUTION INTRAVENOUS at 01:14

## 2024-02-04 RX ADMIN — INSULIN ASPART 3 UNITS: 100 INJECTION, SOLUTION INTRAVENOUS; SUBCUTANEOUS at 16:04

## 2024-02-04 RX ADMIN — CEFTRIAXONE SODIUM 2 G: 2 INJECTION, POWDER, FOR SOLUTION INTRAMUSCULAR; INTRAVENOUS at 00:50

## 2024-02-04 RX ADMIN — OLANZAPINE 5 MG: 10 INJECTION, POWDER, FOR SOLUTION INTRAMUSCULAR at 03:50

## 2024-02-04 RX ADMIN — ATORVASTATIN CALCIUM 40 MG: 40 TABLET, FILM COATED ORAL at 21:35

## 2024-02-04 RX ADMIN — FUROSEMIDE 20 MG: 10 INJECTION, SOLUTION INTRAMUSCULAR; INTRAVENOUS at 10:34

## 2024-02-04 RX ADMIN — ENOXAPARIN SODIUM 40 MG: 40 INJECTION SUBCUTANEOUS at 10:35

## 2024-02-04 ASSESSMENT — ACTIVITIES OF DAILY LIVING (ADL)
ADLS_ACUITY_SCORE: 43
ADLS_ACUITY_SCORE: 37
ADLS_ACUITY_SCORE: 43
ADLS_ACUITY_SCORE: 37
ADLS_ACUITY_SCORE: 37
ADLS_ACUITY_SCORE: 51
ADLS_ACUITY_SCORE: 51
ADLS_ACUITY_SCORE: 55
ADLS_ACUITY_SCORE: 51
ADLS_ACUITY_SCORE: 43
ADLS_ACUITY_SCORE: 51
ADLS_ACUITY_SCORE: 53

## 2024-02-04 NOTE — ED NOTES
Bed: RUST  Expected date: 2/3/24  Expected time: 11:50 PM  Means of arrival: Ambulance  Comments:  [place] 85M resp. Distress; poss. STEMI

## 2024-02-04 NOTE — PLAN OF CARE
Goal Outcome Evaluation:         Pt arrived to unit ~625am. Pt settled. Bilateral wrist restraints in place.

## 2024-02-04 NOTE — ED PROVIDER NOTES
History     Chief Complaint:  Chest Pain (Pt. Having CP today since this am, + SOB and concurrent generalized headache headache. JX=756, Given nitroglycerine SL time 3 and  mg PO. 70% RA at home. CHF HX. Pt. Is Micronesian speaking. 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 )     The history is provided by the patient and the EMS personnel. A  was used.      Adam Huber is a 88 year old male with a history of stroke presenting with respiratory distress. EMS reports that all of their heart tests report posterior STEMI. Patient endorses a headache that started this morning. He additionally endorses chest pain around the heart. Upon examination, patient reports shortness of breath and difficulty breathing. Adam was found on the floor of his apartment. His wife called EMS. Patient's grandson eventually arrived at the scene and was able to interpret for EMS. En route, Adam was 70% on room air. He was not initially tolerating the mask, but he was eventually able to get up to 90% on high flow O2. EMS reports a decreased respiratory rate. He was given 3 nitroglycerin en route, which provided visible improvement. BG was 222. Last blood pressure was in the 150s. He was given a full dose of aspirin by EMS. He typically takes aspirin daily, but is unclear if he has taken it yet today.     Independent Historian:   History provided by patient and EMS.     Review of External Notes:   I reviewed the discharge summary note from 9/21/23 for CHF and encephalopathy.     Medications:    Aspirin 325 mg   Atorvastatin   Carvedilol   Cefdinir   Finasteride   Insulin   Lancets  Metformin   Mirtazapine   Nitroglycerin   Miralax  Risperidone   Senna-docusate     Past Medical History:    Alzheimer's disease   CAD   CVA   Cataract   Dermatochalasis   Diabetes mellitus   Depressive disorder   IFIS  HLD   Heart disease   HTN   Hyperplasia   Hearing loss   Macular degeneration   Memory disturbance   NSTEMI   NS  Pacemaker   Post  PTCA  Pulmonary nodules   Pseudoexfoliation lens capsule   Rhabdomyolysis  T12 fracture      Past Surgical History:    Cholecystectomy   Prostate removal   L heart cath   R heart cath      Physical Exam   Patient Vitals for the past 24 hrs:   BP Temp Temp src Pulse Resp SpO2 Weight   02/04/24 0308 -- -- -- 77 20 97 % --   02/04/24 0129 127/68 -- -- 84 27 96 % --   02/04/24 0115 124/71 -- -- 84 (!) 31 98 % --   02/04/24 0059 130/74 -- -- 89 28 98 % --   02/04/24 0050 115/64 -- -- 93 29 -- --   02/04/24 0024 -- -- -- -- -- 100 % --   02/04/24 0020 110/63 -- -- 97 (!) 39 100 % --   02/04/24 0014 113/67 -- -- 98 (!) 36 100 % --   02/03/24 2359 (!) 143/86 -- -- 107 (!) 49 91 % --   02/03/24 2358 -- 98.3  F (36.8  C) Temporal 106 28 99 % 92.2 kg (203 lb 4.2 oz)      Physical Exam  Head: No signs of trauma.   Mouth/Throat: Oropharynx is clear and moist.   Eyes: Conjunctivae are normal.   Neck: Normal range of motion. No nuchal rigidity. No cervical adenopathy  CV: Mild tachycardia   Resp: Moderate increased respiratory rate and coarse breath sounds  GI: Soft. There is no tenderness.  No rebound or guarding.  Normal bowel sounds.   MSK: Normal range of motion.  No Calf tenderness.  Neuro: The patient is alert but seems confused and provides limited history.  Strength in upper/lower extremities normal and symmetrical.   Skin: Skin is warm and dry. No rash noted.         Emergency Department Course   ECG  ECG results from 02/03/24   EKG 12-lead, tracing only     Value    Systolic Blood Pressure     Diastolic Blood Pressure     Ventricular Rate 106    Atrial Rate 106    NJ Interval 158    QRS Duration 88        QTc 480    P Axis 71    R AXIS 95    T Axis 87    Interpretation ECG      Sinus tachycardia  Possible Lateral infarct , age undetermined  Abnormal ECG  When compared with ECG of 21-SEP-2023 22:25,  Premature supraventricular complexes are no longer Present  Nonspecific T wave abnormality now evident in Lateral  leads     EKG 12-lead, tracing only     Value    Systolic Blood Pressure     Diastolic Blood Pressure     Ventricular Rate 98    Atrial Rate 98    PA Interval 162    QRS Duration 92        QTc 518    P Axis 52    R AXIS 95    T Axis 86    Interpretation ECG      Sinus rhythm with Premature atrial complexes  Rightward axis  Nonspecific ST and T wave abnormality  Prolonged QT  Abnormal ECG  When compared with ECG of 03-FEB-2024 23:53, (unconfirmed)  Premature atrial complexes are now Present  Nonspecific T wave abnormality now evident in Anterior leads       ECG  ECG taken at 2358, ECG read at 2358  Sinus tachycardia   Anterolateral infarct, age undetermined   Abnormal ECG   Rate 108 bpm. PA interval 154 ms. QRS duration 86 ms. QT/QTc 364/487 ms. P-R-T axes 70 98 92.     Imaging:  CT Head w/o Contrast   Final Result   IMPRESSION:   1.  No acute intracranial process.      XR Chest Port 1 View   Final Result   IMPRESSION: Normal heart size with pulmonary vascular congestion and mild pulmonary edema. A mildly confluent opacities seen in the left lung base, suspicious for superimposed pneumonia. Recommend clinical correlation. Small left pleural effusion is    present. No right pleural effusion. No pneumothorax.      POC US CHEST B-SCAN   Final Result   [facility] Procedure Note        Limited Bedside ED Ultrasound of Thorax:      PROCEDURE: PERFORMED BY: Dr. Enrique Winchester MD   INDICATIONS/SYMPTOM:  shortness of breath   PROBE: High frequency linear probe   BODY LOCATION: Chest   FINDINGS:   Images of both lung hemithoracies taken in 2D in multiple rib spaces          Right side:  Lung sliding artifact  Present      Comet tail artifacts  Present    Left side:  Lung sliding artifact  Present      Comet tail artifacts  Present      INTERPRETATION: Diffuse B-lines bilaterally consistent with CHF.   IMAGE DOCUMENTATION: Images were archived to PACs system.                  Laboratory:  Labs Ordered and  Resulted from Time of ED Arrival to Time of ED Departure   COMPREHENSIVE METABOLIC PANEL - Abnormal       Result Value    Sodium 139      Potassium 4.3      Carbon Dioxide (CO2) 23      Anion Gap 10      Urea Nitrogen 20.8      Creatinine 0.88      GFR Estimate 83      Calcium 8.7 (*)     Chloride 106      Glucose 243 (*)     Alkaline Phosphatase 167 (*)     AST 31      ALT 24      Protein Total 7.0      Albumin 3.6      Bilirubin Total 0.7     ISTAT GASES LACTATE VENOUS POCT - Abnormal    Lactic Acid POCT 2.0      Bicarbonate Venous POCT 23      O2 Sat, Venous POCT 89 (*)     pCO2 Venous POCT 50      pH Venous POCT 7.26 (*)     pO2 Venous POCT 66 (*)    CBC WITH PLATELETS AND DIFFERENTIAL - Abnormal    WBC Count 13.3 (*)     RBC Count 4.75      Hemoglobin 14.6      Hematocrit 43.6      MCV 92      MCH 30.7      MCHC 33.5      RDW 13.3      Platelet Count 232      % Neutrophils 63      % Lymphocytes 24      % Monocytes 9      % Eosinophils 3      % Basophils 1      % Immature Granulocytes 0      NRBCs per 100 WBC 0      Absolute Neutrophils 8.4 (*)     Absolute Lymphocytes 3.2      Absolute Monocytes 1.2      Absolute Eosinophils 0.4      Absolute Basophils 0.1      Absolute Immature Granulocytes 0.1      Absolute NRBCs 0.0     D DIMER QUANTITATIVE - Abnormal    D-Dimer Quantitative 0.66 (*)    TROPONIN T, HIGH SENSITIVITY - Abnormal    Troponin T, High Sensitivity 59 (*)    BLOOD GAS VENOUS - Abnormal    pH Venous 7.31 (*)     pCO2 Venous 52 (*)     pO2 Venous 30      Bicarbonate Venous 26      Base Excess/Deficit Venous -0.7      FIO2 30      Oxyhemoglobin Venous 50 (*)     O2 Sat, Venous 51.0 (*)    TROPONIN T, HIGH SENSITIVITY - Normal    Troponin T, High Sensitivity 22     INFLUENZA A/B, RSV, & SARS-COV2 PCR - Normal    Influenza A PCR Negative      Influenza B PCR Negative      RSV PCR Negative      SARS CoV2 PCR Negative     NT PROBNP INPATIENT - Normal    N terminal Pro BNP Inpatient 982     AMMONIA - Normal     Ammonia 23     ROUTINE UA WITH MICROSCOPIC REFLEX TO CULTURE   TROPONIN T, HIGH SENSITIVITY   BLOOD CULTURE   BLOOD CULTURE      Emergency Department Course & Assessments:    Interventions:  Medications   furosemide (LASIX) injection 20 mg (20 mg Intravenous $Given 2/4/24 0307)   OLANZapine (zyPREXA) injection 5 mg (5 mg Intramuscular $Given 2/4/24 0350)   cefTRIAXone (ROCEPHIN) 2 g vial to attach to  ml bag for ADULTS or NS 50 ml bag for PEDS (0 g Intravenous Stopped 2/4/24 0125)   azithromycin (ZITHROMAX) 500 mg vial to attach to  mL bag (0 mg Intravenous Stopped 2/4/24 0220)   sterile water (preservative free) injection (  $Given 2/4/24 0350)      Independent Interpretation (X-rays, CTs, rhythm strip):  On my independent interpretation of CXR there is no pneumothorax.  Increased vascular markings and LLL infiltrate noted    Assessments/Consultations/Discussion of Management or Tests:  ED Course as of 02/04/24 0423   Sat Feb 03, 2024   2351 I obtained the history and examined the patient as noted above.      Sun Feb 04, 2024   0005 I performed an ultrasound on the patient.    0045 I rechecked and updated the patient. I spoke with the patient's grandson. He reported the patient had a fall earlier today. He was complaining of a headache earlier today as well.      0154 I rechecked and updated the patient.      0208 I spoke with Dr. Lew from the hospitalist service regarding the patient's presentation, findings here in the ED, and plan of care.       Social Determinants of Health affecting care:   None    Disposition:  The patient was admitted to the hospital under the care of Dr. Lew.     Impression & Plan      Medical Decision Making:  Adam Huber presents due to shortness of breath and significant hypoxia at home.  History is somewhat limited as the patient seems confused.  On chart review, there is discussion of possible Alzheimer's during recent hospitalization.  EMS reported that there  EKG had reported a possible STEMI.  I was able to review their EKGs, and did not see any clear signs of STEMI.  I did obtain serial troponins in the emergency department along with a posterior EKG, and these did not show STEMI either.  Blood work was obtained that showed a negative troponin.  I did do a bedside ultrasound which did show diffuse B-lines bilaterally concerning for CHF which the patient does have a history of.  Chest x-ray also showed increased vasculature along with left lower lobe infiltrate.  Patient was placed on BiPAP which she tolerated well and did improve his overall clinical picture.  He was also given antibiotics for possible pneumonia.  Blood work was overall reassuring and the patient had a negative lactic acid.  Over time, the BiPAP settings were titrated down and ultimately stopped.  Patient was admitted to the hospitalist service for continued management.    Diagnosis:    ICD-10-CM    1. Pneumonia of left lower lobe due to infectious organism  J18.9       2. Congestive heart failure, unspecified HF chronicity, unspecified heart failure type (H)  I50.9       3. Hypoxia  R09.02       4. Encephalopathy  G93.40            Scribe Disclosure:  I, Susu cSanlon, am serving as a scribe at 11:55 PM on 2/3/2024 to document services personally performed by Enrique Winchester MD based on my observations and the provider's statements to me.  2/3/2024   Enrique Winchester MD Bergenstal, John A, MD  02/04/24 0426

## 2024-02-04 NOTE — PROGRESS NOTES
During assessment writer noticed right sided facial droop. Unable to find previous documentation of droop. Pt follows some commands with use of . RRT called.     Hospitalist also paged regarding pt failing bed side swallow eval. Per son pt is not on any specialty diet at home.     Pt calm in bed. Restraints removed at 0930.

## 2024-02-04 NOTE — ED NOTES
[facility]  ED Nurse Handoff Report    ED Chief complaint: Chest Pain (Pt. Having CP today since this am, + SOB and concurrent generalized headache headache. TQ=695, Given nitroglycerine SL time 3 and  mg PO. 70% RA at home. CHF HX. Pt. Is Japanese speaking. 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 )      ED Diagnosis:   Final diagnoses:   None       Code Status: to be addrerssed    Allergies:   Allergies   Allergen Reactions    Lisinopril     Morphine Other (See Comments)     confusion       Patient Story: presented due to chest pain ,SOB,headache,desaturation at 70%hospital/o CHF,stroke with residual right sided weakness and possible alzheimer's.  Focused Assessment:  on full face BiPAP fio2  30% 12/6 rate 14,chest clear with good air entry bilaterally,saturating @ 97%,confused and mumbles.    Treatments and/or interventions provided: iv access,labs,see MAR.  Patient's response to treatments and/or interventions: tolerated    To be done/followed up on inpatient unit:  as per admission order    Does this patient have any cognitive concerns?: possible  Alzheimer's    Activity level - Baseline/Home:  Walker  Activity Level - Current:   Total Care/confined to bed    Patient's Preferred language: Russian   Needed?: Yes    Isolation: None  Infection: Not Applicable  Patient tested for COVID 19 prior to admission: YES  Bariatric?: Yes    Vital Signs:   Vitals:    02/04/24 0050 02/04/24 0059 02/04/24 0115 02/04/24 0129   BP: 115/64 130/74 124/71 127/68   Pulse: 93 89 84 84   Resp: 29 28 (!) 31 27   Temp:       TempSrc:       SpO2:  98% 98% 96%   Weight:           Cardiac Rhythm:Cardiac Rhythm: Normal sinus rhythm    Was the PSS-3 completed:   Yes  What interventions are required if any?               Family Comments: updated of plan  OBS brochure/video discussed/provided to patient/family: No              Name of person given brochure if not patient:               Relationship to patient:     For the  majority of the shift this patient's behavior was Green.   Behavioral interventions performed were none.    ED NURSE PHONE NUMBER: [phone]

## 2024-02-04 NOTE — PROGRESS NOTES
02/04/24 1427   Appointment Info   Signing Clinician's Name / Credentials (SLP) Brynn Soria MS CCC SLP   General Information   Onset of Illness/Injury or Date of Surgery 02/03/24   Referring Physician Dr. Arias   Patient/Family Therapy Goal Statement (SLP) Patient not able to state.   Pertinent History of Current Problem Per notes: Adam Huber is a 88 year old male admitted on 2/3/2024. He presents to the emergency department for evaluation of falls, chest pain, shortness of breath.  He is found to have acute hypoxic respiratory failure.Major neurocognitive disorder: Alzheimer's dementia with behavioral disturbance, TBI history, posterior circulation CVA 2015, acute right asia and left cerebellar hemisphere stroke 2023.  As of my September discussion with his son, patient has difficulty remembering family members names. Lives with his wife in an apartment.  They receive an estimated 16 hours of PCA care per day (per grandson at bedside). Initially calm and cooperative, but now is picking at lines and tubing, pulling IVs here in the emergency department.   General Observations Alert in the chair word word answers at times.       Present yes   Language Belizean via the phone.   Type of Evaluation   Type of Evaluation Swallow Evaluation   Oral Motor   Oral Musculature unable to assess due to poor participation/comprehension   Dentition (Oral Motor)   Comment, Dentition (Oral Motor) No dentures at bedside.   Dentition (Oral Motor) edentulous   General Swallowing Observations   Past History of Dysphagia Patient with a past history of dysphagia. Last video on 5 9/23 woth moderate dysphagia. Fdeep laryngeal penetration to the cords without a cough response. Pureed and mildly thick liquids recommended. Then seen 9/22/23 for a bedside swallow evaluation with mild to moderate dysphagia and puree diet and mildly thick liquids recommended.   Respiratory Support room air   Current Diet/Method  of Nutritional Intake (General Swallowing Observations, NIS) regular diet;thin liquids (level 0)   Swallowing Evaluation Clinical swallow evaluation   Clinical Swallow Evaluation   Feeding Assistance frequent cues/help required   Clinical Swallow Evaluation Textures Trialed thin liquids;mildly thick liquids;pureed   Clinical Swallow Eval: Thin Liquid Texture Trial   Mode of Presentation, Thin Liquids cup;fed by clinician   Volume of Liquid or Food Presented 2 sios of water via the cup   Oral Phase of Swallow delayed AP movement;premature pharyngeal entry   Pharyngeal Phase of Swallow impaired;reduction in laryngeal movement;repeated swallows;throat clearing   Diagnostic Statement Suspect penetration given premature entry and throat clearing.   Clinical Swallow Eval: Mildly Thick Liquids   Mode of Presentation cup;spoon;self-fed;fed by clinician   Volume Presented 4 oz of juice   Oral Phase delayed AP movement;premature pharyngeal entry   Pharyngeal Phase impaired;reduction in laryngeal movement;repeated swallows   Diagnostic Statement Improved timing continues to swish liquids in his mouth prior to swallow via the cup. No swishing via the spoon. No overt sx of aspiration.   Clinical Swallow Evaluation: Puree Solid Texture Trial   Mode of Presentation, Puree spoon;fed by clinician   Volume of Puree Presented 4 oz of pudding   Oral Phase, Puree delayed AP movement;residue in oral cavity;premature pharyngeal entry   Oral Residue, Puree left anterior lateral sulci   Pharyngeal Phase, Puree impaired;reduction in laryngeal movement;repeated swallows   Diagnostic Statement Patient with delayed AP movement, tongue pumping to propel the bolus posteriorly and oral residue on the left lateral tongue and sucli   Swallowing Recommendations   Diet Consistency Recommendations pureed (level 4);mildly thick liquids (level 2)   Supervision Level for Intake 1:1 supervision needed   Mode of Delivery Recommendations bolus size,  small;food moistened;no straws;slow rate of intake   Postural Recommendations none   Swallowing Maneuver Recommendations alternate food and liquid intake   Monitoring/Assistance Required (Eating/Swallowing) check mouth frequently for oral residue/pocketing;stop eating activities when fatigue is present;monitor for cough or change in vocal quality with intake   Recommended Feeding/Eating Techniques (Swallow Eval) maintain upright sitting position for eating;maintain upright posture during/after eating for 30 minutes;minimize distractions during oral intake;provide assist with feeding;set-up and prepare tray   Medication Administration Recommendations, Swallowing (SLP) Crush and place in puree.   General Therapy Interventions   Planned Therapy Interventions Dysphagia Treatment   Clinical Impression   Criteria for Skilled Therapeutic Interventions Met (SLP Eval) Yes, treatment indicated   SLP Diagnosis Moderate dysphagia   Risks & Benefits of therapy have been explained evaluation/treatment results reviewed;care plan/treatment goals reviewed;risks/benefits reviewed;current/potential barriers reviewed;participants voiced agreement with care plan;participants included;patient   Clinical Impression Comments Patient presents with chronic dysphagia due to s/p strokes, TBI andAlzheimers dementia. Patient was upright in a chair and cooperative.  via the phone. He demonstrated bolus holding and swishing with thin liquids with premature entry and suspected penetration and silent aspiration can not be rule out. Improved timing of mildly thick liquids with bolus holding and swishing no overt sx of aspiration. Pudding he had tongue pumping to propel the bolus posteriorly with delayed swallow, reduce laryngeal elevation and oral residue. Residue was cleared if given extra time to swallow again or liquid rinse.     Recommend: 1. Downgrade diet to IDDSI level 4 puree and  mildly thick liquids. 2. 1:1  supervision/assistance, slow rate, look for pocketing, alternate liquids/solids at a slow pace. Hold diet if sx of aspiration present or changes in his respiratory status.   SLP Total Evaluation Time   Eval: oral/pharyngeal swallow function, clinical swallow Minutes (51968) 22   SLP Discharge Planning   SLP Plan Meal or snack for tolerance. level 4 and mildly thick.   SLP Discharge Recommendation skilled nursing facility;home with home health   SLP Rationale for DC Rec Patient's swallow function is below his baseline. If family can support then return home with home care.   SLP Brief overview of current status  Moderate dysphagia rec puree and mildly thick liquids.   Total Session Time   Total Session Time (sum of timed and untimed services) 22

## 2024-02-04 NOTE — ED TRIAGE NOTES
Triage Assessment (Adult)       Row Name 02/04/24 0001          Triage Assessment    Airway WDL WDL        Respiratory WDL    Respiratory WDL X;rhythm/pattern     Rhythm/Pattern, Respiratory shallow;gasping        Skin Circulation/Temperature WDL    Skin Circulation/Temperature WDL WDL        Cardiac WDL    Cardiac WDL X  Pt. complaining of SOB and CP at home.        Peripheral/Neurovascular WDL    Peripheral Neurovascular WDL WDL        Cognitive/Neuro/Behavioral WDL    Cognitive/Neuro/Behavioral WDL WDL                   Pt. Having CP today since this am, + SOB and concurrent generalized headache headache. KS=628, Given nitroglycerine SL time 3 and  mg PO. 70% RA at home. CHF HX. Pt. Is Kyrgyz speaking. 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

## 2024-02-04 NOTE — PROGRESS NOTES
RECEIVING UNIT ED HANDOFF REVIEW    ED Nurse Handoff Report was reviewed by: Akin العلي RN on February 4, 2024 at 5:18 AM

## 2024-02-04 NOTE — PROGRESS NOTES
Patient came to ED and was placed on BiPAP 14, 12/6, 40%. Patient tolerated being placed on BiPAP. BS coarse. Patient was transported to CT on BiPAP with no respiratory issues. Patient was placed on Oxymask 5L after being on BiPAP for a few hours. VBG was drawn before being taken off BiPAP. RT will continue to follow.     Latest Reference Range & Units 02/04/24 03:04   Ph Venous 7.32 - 7.43  7.31 (L)   PCO2 Venous 40 - 50 mm Hg 52 (H)   PO2 Venous 25 - 47 mm Hg 30   O2 Sat, Venous 70.0 - 75.0 % 51.0 (L)   Bicarbonate Venous 21 - 28 mmol/L 26   (L): Data is abnormally low  (H): Data is abnormally high

## 2024-02-04 NOTE — ED NOTES
At 0340 -started to be agitated-pulled out his 2 IV ,pure wick and EKG leads attached to him.  Attended by hospitalist-advised to restraint the patient.  Made aware that he is aphasic,mumbles-baseline as stated.      Soft restraints applied on both wrist- CMS intact pre and post application.

## 2024-02-04 NOTE — PHARMACY-ADMISSION MEDICATION HISTORY
Pharmacy Intern Admission Medication History    Admission medication history is complete. The information provided in this note is only as accurate as the sources available at the time of the update.    Information Source(s): Family member and CareEverywhere/SureScripts via phone    Pertinent Information:   -The patient's son and spouse verified the medication list.  -Lantus was filled on 12/8/23 for a 28 day supply. They state that he is not currently taking Lantus due to his blood sugar being controlled. It is unclear if this was at the decision of the doctor or the patient's choice.     Changes made to PTA medication list:  Added: None  Deleted:   Cefdinir  Nitroglycerin tablets  Changed:   Melatonin 10 mg at bedtime --> 10 mg at bedtime PRN  Mirtazepine 15 mg at bedtime --> 15 mg at bedtime PRN    Medication Affordability:  Not including over the counter (OTC) medications, was there a time in the past 3 months when you did not take your medications as prescribed because of cost?: Unable to Assess    Allergies reviewed with patient and updates made in EHR: unable to assess    Medication History Completed By: Carmen Carrillo 2/4/2024 9:54 AM    PTA Med List   Medication Sig Last Dose    acetaminophen (TYLENOL) 325 MG tablet Take 650 mg by mouth every 8 hours as needed for mild pain  at PRN    aspirin (ASA) 325 MG EC tablet Take 325 mg by mouth daily 2/3/2024 at AM    atorvastatin (LIPITOR) 40 MG tablet Take 1 tablet (40 mg) by mouth every evening 2/3/2024 at PM    carvedilol (COREG) 25 MG tablet Take 1 tablet (25 mg) by mouth 2 times daily (with meals) 2/3/2024 at PM    Melatonin 10 MG TABS tablet Take 10 mg by mouth nightly as needed for sleep  at PRN    metFORMIN (GLUCOPHAGE) 500 MG tablet Take 1 tablet (500 mg) by mouth 2 times daily (with meals) 2/3/2024 at PM    mirtazapine (REMERON) 15 MG tablet Take 15 mg by mouth nightly as needed  at PRN    risperiDONE (RISPERDAL) 0.5 MG tablet Take 0.5 mg by mouth 2 times  daily as needed  at PRN

## 2024-02-04 NOTE — H&P
[facility]    History and Physical - Hospitalist Service       Date of Admission:  2/3/2024    Assessment & Plan      Adam Huber is a 88 year old male admitted on 2/3/2024. He presents to the emergency department for evaluation of falls, chest pain, shortness of breath.  He is found to have acute hypoxic respiratory failure    Acute respiratory failure with hypoxia: Patient with some chest discomfort and shortness of breath this evening.  Was found to have oxygen saturations in the 75% range by EMS.  Appears to have had some acute diastolic heart failure exacerbation.  History of the same.  BNP elevated, chest x-ray with pulmonary edema.  Exam with bibasilar crackles through mid lung fields  Left lower lobe pneumonia: Patient with hazy appearance of left lower lung field on chest x-ray.  Clear change from prior imaging.  Suspicious for left lower lobe infiltrate with overlap of pulmonary edema.  -Weaned from BiPAP in the emergency department.  -Oximetry, oxygen as needed.  Wean as able.  -2 g ceftriaxone every 24 hours  -Received initial azithromycin dose in the emergency department, will continue on doxycycline 100 mg twice daily given borderline QTc.  -Intake and output, daily weights  -IV Lasix 20 mg twice daily  -Not repeating echocardiogram at this time unless significant elevation in troponin noted.  -At last admission in September, cardiology recommended inpatient hospice evaluation.  Discussed palliative care and if hospice evaluation was ever pursued as an outpatient with grandson at bedside.  He does not believe that this was the case.  Tells me that his father, Adolfo, recognizes the decline in quality of life and might be on board with this, but his aunts are less likely to pursue this.  At last hospitalization was not appropriate for GIP evaluation.    Non-ST elevation myocardial infarction: Suspect secondary to demand ischemia with systemic hypoxia.  Initial troponin  within normal limits, repeat in the 50s range.  Patient reported some chest discomfort in the setting of systemic hypoxia while at home starting at approximately 10 PM.  As of arrival here in the emergency department and correction of hypoxia, he reports no chest pain, but his history may be unreliable with his dementia and TBI.  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  As of my September discussion with his son, patient has difficulty remembering family members names.  Lives with his wife in an apartment.  They receive an estimated 16 hours of PCA care per day (per grandson at bedside).  Initially calm and cooperative, but now is picking at lines and tubing, pulling IVs here in the emergency department.    -Bedside sitter  -Soft restraints for now as patient is pulling at his lines and has removed his IVs  -IM Zyprexa 5 mg daily as needed for agitation; note prolonged QT on EKG  -Resume prior to admission Risperdal 0.5 mg twice daily as needed  -Continue aspirin 325 mg daily  -Continue Remeron 15 mg at bedtime  -Continue Risperdal twice daily    Urinary tract infection: Patient with encephalopathy, foul-smelling urine.  At this juncture he is not able to provide a narrative of symptom history.  Grossly abnormal urinalysis  -Bladder management protocol in place  -On ceftriaxone and azithromycin as above  -Urine culture pending    Type 2 diabetes: Last hemoglobin A1c September 2023 of 6.5.  Appears relatively well-controlled on metformin alone.  -Medium dose sliding scale insulin  -Prandial insulin at 1 unit per 20 g carbohydrate  -Continue prior to  admission metformin 500 mg twice daily  -Hemoglobin A1c pending    Frequent falls: Per grandson, no reported falls in the past months and patient does not have frequent hospitalizations for falls.  Last hospitalized in September.  2 falls today, however, both unwitnessed.  Head CT negative for acute intracranial pathology or bleed.  -Physical therapy consulted  -Care management consultation for disposition planning.  As noted elsewhere, patient lives with his wife, and they received 16 hours of PCA care per day as estimated by grandson.  -Fall precautions  -Ambulate with assist    Cirrhosis: Noted by imaging historically.  Does not appear decompensated.  No history of varices.  Normal platelet count          Diet:  Advance diet as tolerated  DVT Prophylaxis: Subcutaneous Lovenox  Villa Catheter: Not present  Lines: None     Cardiac Monitoring: None  Code Status:  DNR/DNI.  Discussed with patient's grandson at bedside.  Have confirmed with patient's son over the telephone in the past as well.    Clinically Significant Risk Factors Present on Admission                # Drug Induced Platelet Defect: home medication list includes an antiplatelet medication   # Hypertension: Noted on problem list  # Chronic heart failure with preserved ejection fraction: heart failure noted on problem list and last echo with EF >50%  # Non-Invasive mechanical ventilation: current O2 Device: BiPAP/CPAP  # Acute hypoxic respiratory failure: continue supplemental O2 as needed  # Dementia: noted on problem list   # DMII: A1C = N/A within past 6 months               Disposition Plan      Expected Discharge Date: 02/06/2024                  Ben Lew MD  Hospitalist Service  [facility]  Securely message with [employer] (more info)  Text page via [facility] Paging/Directory     ______________________________________________________________________    Chief Complaint   Falls, headache, shortness of breath, chest  pain    History is obtained from chart review, patient's grandson at bedside, discussion with Dr. Winchester in the emergency department, the patient is unable to provide any narrative history, but answers no when grandson asks him if he has any pain, no discomfort with deep inspiration, no pain with CVA percussion      History of Present Illness   Adam Huber is a 88 year old male who is brought to the emergency department by EMS after complaining of chest discomfort and having an unwitnessed fall at home.  Found to be hypoxic to 75% on room air on their arrival.    Patient has a history of recurrent CVA, TBI, Alzheimer's dementia.  He lives with his wife, and together they receive an estimated 16 hours of PCA service per day per patient's grandson at bedside.  I have met Adam in the past, actually at his last hospitalization in September.  At that time he was also unable to provide any narrative history.  I discussed with his son over the telephone at that time he told me that the patient has dementia, his quality of life has been declining, and that he has difficulty recognizing family members, even as of last fall.  His grandson at bedside confirms that he does have difficulty identifying family members.  Family visits with him intermittently.  Grandson tells me that he was with the patient this morning.  When grandson arrived, patient had actually had a fall and was sitting in the hallway with his legs forward.  Wife apparently did not witness this fall.  Patient was helped up.  Complained of a headache, but this is not uncommon for patient.    This evening, around 11:30 PM, grandson was called because patient fell again, again unwitnessed.  Apparently, per patient's wife reporting to family member, patient had been complaining of feeling short of breath and some chest discomfort from around 10 PM preceding his fall at 1130.  EMS responded and patient was found to be hypoxic to 75% on room air.   Transported to the emergency department where oxygen corrected with nasal cannula, but was started on BiPAP given B-lines on ultrasound, elevated BNP, crackles, and some increased work of breathing.      Patient tolerated BiPAP well, but did appear to be developing some hypercarbia with twitching while oxygen saturations were in the high 90% range.  Grandson unaware of any recent medication changes, with the exception of patient's wife had increased his melatonin to 10 mg at bedtime as he was having trouble sleeping.    Patient is unable to provide any narrative history.  This is somewhat similar to prior hospitalization.  He does appear sedate currently.    He has no further chest discomfort now that his hypoxia is corrected.    I cannot appreciate any focal consolidation on pulmonary auscultation while on BiPAP, but he does have fine crackles throughout lower half of lung fields bilaterally.  No wheezing.  No coughing.  No reported fevers or chills      Past Medical History    Past Medical History:   Diagnosis Date    Acute chest pain 10/23/2015    CAD (coronary artery disease)     Essential hypertension 10/23/2015    History of CVA (cerebrovascular accident) 10/23/2015    HLD (hyperlipidemia)     HTN (hypertension)     NSTEMI (non-ST elevated myocardial infarction) (H) 10/23/2015    Post PTCA 10-    Successful PCI of culprit proximal to mid RCA with placement of a    Post PTCA 11-6-2015    pci of complex mid CFX-hector       Past Surgical History   Past Surgical History:   Procedure Laterality Date    CHOLECYSTECTOMY      GENITOURINARY SURGERY      prostate removal     HEART CATH LEFT HEART CATH  10/12/2015    PCI w/ HECTOR to RCA    HEART CATH RIGHT AND LEFT HEART CATH  11/6/2015    PCI w/ HECTOR to mid-CFX       Prior to Admission Medications   Prior to Admission Medications   Prescriptions Last Dose Informant Patient Reported? Taking?   Melatonin 10 MG TABS tablet  Spouse/Significant Other Yes No   Sig: Take 10  mg by mouth At Bedtime   acetaminophen (TYLENOL) 325 MG tablet  Spouse/Significant Other Yes No   Sig: Take 650 mg by mouth every 8 hours as needed for mild pain   aspirin (ASA) 325 MG EC tablet  Spouse/Significant Other Yes No   Sig: Take 325 mg by mouth daily   atorvastatin (LIPITOR) 40 MG tablet  Spouse/Significant Other No No   Sig: Take 1 tablet (40 mg) by mouth every evening   carvedilol (COREG) 25 MG tablet  Spouse/Significant Other No No   Sig: Take 1 tablet (25 mg) by mouth 2 times daily (with meals)   cefdinir (OMNICEF) 300 MG capsule  Spouse/Significant Other No No   Sig: Take 1 capsule (300 mg) by mouth 2 times daily   finasteride (PROSCAR) 5 MG tablet  Spouse/Significant Other Yes No   Sig: Take 5 mg by mouth daily   metFORMIN (GLUCOPHAGE) 500 MG tablet  Spouse/Significant Other No No   Sig: Take 1 tablet (500 mg) by mouth 2 times daily (with meals)   mirtazapine (REMERON) 15 MG tablet  Spouse/Significant Other Yes No   Sig: Take 15 mg by mouth At Bedtime   nitroglycerin (NITROSTAT) 0.4 MG SL tablet  Spouse/Significant Other No No   Sig: Place 1 tablet (0.4 mg) under the tongue every 5 minutes as needed for chest pain   polyethylene glycol (MIRALAX) 17 GM/Dose powder  Spouse/Significant Other No No   Sig: Take 17 g by mouth 2 times daily   risperiDONE (RISPERDAL) 0.5 MG tablet  Spouse/Significant Other Yes No   Sig: Take 0.5 mg by mouth 2 times daily as needed   senna-docusate (SENOKOT-S/PERICOLACE) 8.6-50 MG tablet  Spouse/Significant Other No No   Sig: Take 1 tablet by mouth 2 times daily as needed for constipation      Facility-Administered Medications: None           Physical Exam   Vital Signs: Temp: 98.3  F (36.8  C) Temp src: Temporal BP: 127/68 Pulse: 84   Resp: 27 SpO2: 96 % O2 Device: BiPAP/CPAP Oxygen Delivery: 15 LPM  Weight: 203 lbs 4.23 oz    General Appearance: 88-year-old male who appears robust for age.  Sleepy.  Comfortable.  Eyes: No scleral icterus or injection.  Respiratory: On  BiPAP.  Inspiratory crackles throughout lower and mid lung fields bilaterally without clear area of consolidation.  No wheezing.  Good air movement.  Good effort on deep inspiration.  Cardiovascular: Regular rate and rhythm.  No appreciable murmur over BiPAP.  GI: Abdomen obese, soft, no guarding or grimace on palpation  Lymph/Hematologic: Patient with slightly asymmetric right greater than left lower extremity edema.  Trace-1+ left foot pedal edema, 1-2+ right foot pedal edema  Genitourinary: External catheter in place.  Somewhat foul-smelling maude urine.  Musculoskeletal: Muscular tone and bulk generally appears intact in extremities.  Perhaps some mild wasting in the thigh musculature.  Neurologic: Patient sleepy.  Mumbles occasionally to answer questions in [place] and has some myoclonic twitching suggestive of hypercarbia while on BiPAP.  Able to answer that he has no pain when stimulated more and more alert.  He works with assist to sit himself upright for pulmonary auscultation    Medical Decision Making       75 MINUTES SPENT BY ME on the date of service doing chart review, history, exam, documentation & further activities per the note.      Data     I have personally reviewed the following data over the past 24 hrs:    13.3 (H)  \   14.6   / 232     139 106 20.8 /  243 (H)   4.3 23 0.88 \     ALT: 24 AST: 31 AP: 167 (H) TBILI: 0.7   ALB: 3.6 TOT PROTEIN: 7.0 LIPASE: N/A     Trop: 59 (H) BNP: 982     Procal: N/A CRP: N/A Lactic Acid: 2.0       INR:  N/A PTT:  N/A   D-dimer:  0.66 (H) Fibrinogen:  N/A       Imaging results reviewed over the past 24 hrs:   Recent Results (from the past 24 hour(s))   POC US CHEST B-SCAN    Impression    [facility] Procedure Note      Limited Bedside ED Ultrasound of Thorax:    PROCEDURE: PERFORMED BY: Dr. Enrique Winchester MD  INDICATIONS/SYMPTOM:  shortness of breath  PROBE: High frequency linear probe  BODY LOCATION: Chest  FINDINGS:  Images of both lung  hemithoracies taken in 2D in multiple rib spaces        Right side:  Lung sliding artifact  Present     Comet tail artifacts  Present   Left side:  Lung sliding artifact  Present     Comet tail artifacts  Present    INTERPRETATION: Diffuse B-lines bilaterally consistent with CHF.  IMAGE DOCUMENTATION: Images were archived to PACs system.         XR Chest Port 1 View    Narrative    EXAM: XR CHEST PORT 1 VIEW  LOCATION: [facility]  DATE: 2/4/2024    INDICATION: chest pain  COMPARISON: Chest x-ray on 04/05/2021; on 09/22/2023      Impression    IMPRESSION: Normal heart size with pulmonary vascular congestion and mild pulmonary edema. A mildly confluent opacities seen in the left lung base, suspicious for superimposed pneumonia. Recommend clinical correlation. Small left pleural effusion is   present. No right pleural effusion. No pneumothorax.   CT Head w/o Contrast    Narrative    EXAM: CT HEAD W/O CONTRAST  LOCATION: [facility]  DATE: 2/4/2024    INDICATION: headache, confusion  COMPARISON: CT head 09/22/2023  TECHNIQUE: Routine CT Head without IV contrast. Multiplanar reformats. Dose reduction techniques were used.    FINDINGS:  INTRACRANIAL CONTENTS: No intracranial hemorrhage, extraaxial collection, or mass effect.  Moderate areas of chronic encephalomalacia anterior inferior aspects of both frontal lobes right more than left and anterior aspect of right temporal lobe; similar   to prior. Moderate presumed chronic small vessel ischemic changes. Small chronic infarctions basal ganglia bilaterally. Moderate generalized volume loss. No hydrocephalus.     VISUALIZED ORBITS/SINUSES/MASTOIDS: No intraorbital abnormality. No paranasal sinus mucosal disease. No middle ear or mastoid effusion.    BONES/SOFT TISSUES: No acute abnormality.      Impression    IMPRESSION:  1.  No acute intracranial process.

## 2024-02-04 NOTE — PLAN OF CARE
Goal Outcome Evaluation:    Pt is alert to self.  used. Pt responds minimally, follows some commands. Assist of 2 with teresita azul. Pt more active in and out of bed this afternoon, able to redirect. Speech evaluated, updated diet.

## 2024-02-04 NOTE — PROGRESS NOTES
[facility]    Hospitalist Progress Note      Assessment & Plan   Adam Huber is a 88 year old [place] specking male admitted on 2/3/2024 presents to the emergency department for evaluation of falls, chest pain, shortness of breath.  He is found to have acute hypoxic respiratory failure     Note: see admission H/P per Dr Lew from earlier this AM.     Acute respiratory failure with hypoxia: Patient with some chest discomfort and shortness of breath on admission,  found to have oxygen saturations in the 75% range by EMS.  Appears to have had some acute diastolic heart failure exacerbation.  History of the same.  BNP elevated, chest x-ray with pulmonary edema.  Exam with bibasilar crackles through mid lung fields  Left lower lobe pneumonia: Patient with hazy appearance of left lower lung field on chest x-ray.  Clear change from prior imaging.  Suspicious for left lower lobe infiltrate with overlap of pulmonary edema.  -Weaned from BiPAP in the emergency department.  -Oximetry,  Wean O2  as able. In AM on 2 L NC.   -continue 2 g ceftriaxone every 24 hours  -Received initial azithromycin dose in the emergency department, will continue on doxycycline 100 mg twice daily given borderline QTc.  -Intake and output, daily weights  -IV Lasix 20 mg twice daily  -Not repeating echocardiogram at this time last 10/23; HEpEF; troponins flat and downtrending. .   -At last admission in September, cardiology recommended inpatient hospice evaluation.  Discussed palliative care and if hospice evaluation was ever pursued as an outpatient with grandson at bedside on admission.  He does not believe that this was the case.  Tells admitting Hospitalist that his father, Adolfo, recognizes the decline in quality of life and might be on board with this, but his aunts are less likely to pursue this.  At last hospitalization was not appropriate for GIP evaluation.     Non-ST elevation myocardial infarction:   Coronary  artery disease: History of mid RCA, circumflex, and LAD stenting 2015. Suspect secondary to demand ischemia with systemic hypoxia.    Patient reported some chest discomfort in the setting of systemic hypoxia while at home starting at approximately 10 PM.  As of arrival here in the emergency department and correction of hypoxia, he reports no chest pain. troponins flat and downtrending. No acute EKG ST-T elevation.   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 Admitting Hospitalist discussion with his son, patient has difficulty remembering family members names.  Lives with his wife in an apartment.  They receive an estimated 16 hours of PCA care per day (per grandson at bedside on admission).  picking at lines and tubing, pulling IVs here in the emergency department.    -Bedside sitter  -Soft restraints for Patient safety;  pulling at his lines and has removed his IVs  -IM Zyprexa 5 mg daily as needed for agitation; note prolonged QT on EKG  -Resume prior to admission Risperdal 0.5 mg twice daily as needed  -Continue aspirin 325 mg daily  -Continue Remeron 15 mg at bedtime  -Continue Risperdal twice daily     Urinary tract infection: Patient with encephalopathy, foul-smelling urine.  on admission not able to provide a narrative of symptom history.  Grossly abnormal urinalysis  -Bladder management protocol in place  -On ceftriaxone and azithromycin as above  -Urine culture pending     Type 2 diabetes: Last hemoglobin A1c September 2023 of 6.5.  Appears relatively well-controlled on metformin alone.  -Medium dose sliding scale insulin  -Prandial insulin at 1 unit per 20 g carbohydrate  -Continue prior to admission metformin 500 mg twice  daily  -Hemoglobin A1c 7.5.   -Monitor glucoses, adjust insulin accordingly.     Frequent falls: Per grandson, no reported falls in the past months and patient does not have frequent hospitalizations for falls.  Last hospitalized in September.  2 falls today, however, both unwitnessed.  Head CT negative for acute intracranial pathology or bleed.  -Physical therapy consult  -Care management consultation for disposition planning.  As noted elsewhere, patient lives with his wife, and they received 16 hours of PCA care per day as estimated by grandson.  -Fall precautions  -Ambulate with assist     Cirrhosis: Noted by imaging historically.  Does not appear decompensated.  No history of varices.  Normal platelet count    DVT Prophylaxis: Enoxaparin (Lovenox) SQ  Code Status: No CPR- Do NOT Intubate     Expected Discharge Date: > 2 days; pending clinical improvement, would likely benefit from Palliative care conference to discuss goals of care.     Calderon Arias MD  Text Page (7am - 6pm, M-F)    Interval History   Assumed care. Limited history from patient due to language barrier.  Does not appear in distress, on O2 per NC.  Denies chest pain.  Displays significant cognitive impairment.    -Data reviewed today: I reviewed all new labs and imaging results over the last 24 hours.  Physical Exam   Temp: 97.3  F (36.3  C) Temp src: Axillary BP: 132/73 Pulse: 76   Resp: 20 SpO2: 94 % O2 Device: None (Room air) Oxygen Delivery: 2 LPM  Vitals:    02/03/24 2358   Weight: 92.2 kg (203 lb 4.2 oz)     General/Constitutional:   NAD, language barrier, calm chest/Respiratory: Respirations nonlabored O2 per NC.  Cardiovascular:  regular, no murmur appreciated. Gastrointestinal/Abdomen:  soft, nontender,  Neuro.  Gross motor tested, nonfocal,  Psych  affect calm      Medications      aspirin  325 mg Oral Daily    atorvastatin  40 mg Oral QPM    carvedilol  25 mg Oral BID w/meals    cefTRIAXone  2 g Intravenous Q24H    doxycycline  hyclate  100 mg Oral Q12H [facility] (08/20)    enoxaparin ANTICOAGULANT  40 mg Subcutaneous Q24H    furosemide  20 mg Intravenous BID    insulin aspart  1-7 Units Subcutaneous TID AC    insulin aspart  1-5 Units Subcutaneous At Bedtime    insulin aspart   Subcutaneous TID w/meals    metFORMIN  500 mg Oral BID w/meals    mirtazapine  15 mg Oral At Bedtime    polyethylene glycol  17 g Oral BID    sodium chloride (PF)  3 mL Intracatheter Q8H       Data   Recent Labs   Lab 02/04/24  1524 02/04/24  1126 02/04/24  0926 02/04/24  0824 02/04/24  0005   WBC  --   --   --   --  13.3*   HGB  --   --   --   --  14.6   MCV  --   --   --   --  92   PLT  --   --   --   --  232   NA  --   --   --   --  139   POTASSIUM  --   --  4.6  --  4.3   CHLORIDE  --   --   --   --  106   CO2  --   --   --   --  23   BUN  --   --   --   --  20.8   CR  --   --  0.86  --  0.88   ANIONGAP  --   --   --   --  10   LUZ  --   --   --   --  8.7*   * 202*  --  252* 243*   ALBUMIN  --   --   --   --  3.6   PROTTOTAL  --   --   --   --  7.0   BILITOTAL  --   --   --   --  0.7   ALKPHOS  --   --   --   --  167*   ALT  --   --   --   --  24   AST  --   --   --   --  31       Recent Results (from the past 24 hour(s))   POC US CHEST B-SCAN    [facility] Procedure Note      Limited Bedside ED Ultrasound of Thorax:    PROCEDURE: PERFORMED BY: Dr. Enrique Winchester MD  INDICATIONS/SYMPTOM:  shortness of breath  PROBE: High frequency linear probe  BODY LOCATION: Chest  FINDINGS:  Images of both lung hemithoracies taken in 2D in multiple rib spaces        Right side:  Lung sliding artifact  Present     Comet tail artifacts  Present   Left side:  Lung sliding artifact  Present     Comet tail artifacts  Present    INTERPRETATION: Diffuse B-lines bilaterally consistent with CHF.  IMAGE DOCUMENTATION: Images were archived to PACs system.         XR Chest Port 1 View    Narrative    EXAM: XR CHEST PORT 1 VIEW  LOCATION: [facility]  DATE: 2/4/2024    INDICATION: chest pain  COMPARISON: Chest x-ray on 04/05/2021; on 09/22/2023      Impression    IMPRESSION: Normal heart size with pulmonary vascular congestion and mild pulmonary edema. A mildly confluent opacities seen in the left lung base, suspicious for superimposed pneumonia. Recommend clinical correlation. Small left pleural effusion is   present. No right pleural effusion. No pneumothorax.   CT Head w/o Contrast    Narrative    EXAM: CT HEAD W/O CONTRAST  LOCATION: [facility]  DATE: 2/4/2024    INDICATION: headache, confusion  COMPARISON: CT head 09/22/2023  TECHNIQUE: Routine CT Head without IV contrast. Multiplanar reformats. Dose reduction techniques were used.    FINDINGS:  INTRACRANIAL CONTENTS: No intracranial hemorrhage, extraaxial collection, or mass effect.  Moderate areas of chronic encephalomalacia anterior inferior aspects of both frontal lobes right more than left and anterior aspect of right temporal lobe; similar   to prior. Moderate presumed chronic small vessel ischemic changes. Small chronic infarctions basal ganglia bilaterally. Moderate generalized volume loss. No hydrocephalus.     VISUALIZED ORBITS/SINUSES/MASTOIDS: No intraorbital abnormality. No paranasal sinus mucosal disease. No middle ear or mastoid effusion.    BONES/SOFT TISSUES: No acute abnormality.      Impression    IMPRESSION:  1.  No acute intracranial process.

## 2024-02-04 NOTE — CODE/RAPID RESPONSE
[facility]   RRT/House Officer ALPHONSO Progress Note  Date of Service: 2/4/2024   Time Called: 0902  RRT called for (per RN): neuro deficits    IMPRESSION & PLAN:  Assessment & Plan     Adam Huber is a 88 year old male w/ PMH of TBI, multiple past CVAs including chronic encephalomalacia frontal/right temporal, lacunar infarct, basal ganglia infarct, most recent CVA 5/2023 (right asia,/left cerebral hemisphere) with chronic facial droop, right hemiparesis, HTN, HLD, CAD, DM2, BPH, cirrhosis, UTI, advanced dementia, [place]-speaking, who was admitted on 2/3/2024 for follow-up of chest pain and shortness of breath    H&P reviewed-acute respiratory failure with hypoxia thought to have acute diastolic heart failure plus left lower lobe pneumonia, treated with BiPAP, Lasix, ABX, NSTEMI, trending troponin, falls x 2 with PT consult, UTI, otherwise stable.    RRT called this morning by day shift RN as she noted right facial droop and slurred speech.  Initially it was not clear but on chart review this has been previously noted.  And seems associated with last CVA 5/2023, per 9/2023 RN notes had right eye deviation right facial droop slurred speech and right-sided greater than left-sided weakness at baseline.  It appears that he has all the same deficit this time there is no acute change.  Patient was interviewed with a [place]  with only partial success likely due to cognitive impairment.  He was able to follow some commands.  Patient did not endorse any new symptoms when asked.  Overall it appears that the current deficits were previously noted and are not worsened, thus we deferred on a code stroke or any additional CT head MRI. Continue treatment as per H&P noted.  I did discuss this with oncoming day rounder who will continue to monitor.    Working diagnosis/Impression:  Chronic neuro deficits -R facial droop/slurred speech/R-sided weakness- secondary to past CVAs (5/2023,  earlier)  Alzheimer's  See H&P for additional dx including heart failure, NSTEMI, UTI.    Differential diagnosis considered following (not exclusive):  -Acute CVA seems unlikely given the description of past deficits are the same to what we see now and there is not any acute change.  -Hypoglycemia -ruled out    INTERVENTIONS:  -Chart reviewed to confirm current right-sided neuro deficits were previously noted/documented by RN.  -FYI to day rounder  -Blood sugar recheck  -Add on lactate (WNL), VBG (pCO2 okay)  -No additional neuroimaging indicated at this time.  At the end of the RRT, pt appears to be stable with stable right-sided deficits.      Disposition- remains in room w monitoring as per above    Discussed with and defer future cares to rounder/hospitalist attending provider    Code Status: No CPR- Do NOT Intubate    Allergies   Allergies   Allergen Reactions    Lisinopril     Morphine Other (See Comments)     confusion       Last 24H PRN:     OLANZapine (zyPREXA) injection 5 mg, 5 mg at 02/04/24 0350    Subjective:  Interval History     Interviewed with  he does not offer a lot.  He is able to follow some commands.  It is likely secondary to his cognitive impairment chronically.     Exam::  Physical Exam   Vital Signs with Ranges:  Vitals:    02/04/24 0513 02/04/24 0558 02/04/24 0625 02/04/24 0822   BP: 124/78  (!) 140/78 132/73   BP Location:    Right arm   Patient Position:    Supine   Pulse: 86   76   Resp:  17 18 20   Temp:   97.7  F (36.5  C) 97.3  F (36.3  C)   TempSrc:   Axillary Axillary   SpO2: 93% 95% 95% 96%   Weight:         Temp:  [97.3  F (36.3  C)-98.3  F (36.8  C)] 97.3  F (36.3  C)  Pulse:  [] 76  Resp:  [11-49] 20  BP: (110-143)/(63-86) 132/73  FiO2 (%):  [40 %] 40 %  SpO2:  [91 %-100 %] 96 %  No intake/output data recorded.    Lines, PIV ,  Constitutional: vs as above and/or per EMR  General:  adult pt lying in bed without acute distress  HEENT: NC/AT,  mouth moist oral  mucosa,    Neck: w/o JVD, supple  CV: S1S2, w/o obvious murmur  Resp: on RA chest rise unlabored, lungs clr   w/o rhonchi, rales, whz upper and lower lobes  GI  soft, nontender, nondistended  Musculoskeletal: MAEW, no bony joint deformities no lower edema or mottling  Skin: no obvious rashes on exposed skin  Lymph: defer  Neuro: Right-sided facial droop mainly around the mouth, slight flattening of nasolabial fold on the right, right tongue deviation excursion, all previously noted in past notes.  Mild right leg weakness.  Right arm appears normal.  No drift.  Somewhat difficult to follow commands in detail.  Psych: calm, seems confused, language barrier and comprehension barrier despite  use (via [employer])    Labs/Imaging  Data     CBC with Diff:  Recent Labs   Lab Test 02/04/24  0005 09/23/23  1042 09/21/23  2157 09/17/23  1923 08/31/23  0737 05/19/23  0823 05/13/23  0914 05/05/23  0836 05/04/23  1858   WBC 13.3* 10.9 10.0   < > 6.0   < > 8.1   < > 6.9   HGB 14.6 14.5 13.8   < > 14.8   < > 17.0   < > 16.0   MCV 92 92 95   < > 95   < > 90   < > 95    165 157   < > 180   < > 164   < > 192   INR  --   --   --   --  1.00  --  1.15  --  1.07    < > = values in this interval not displayed.      Comprehensive Metabolic Panel:  Recent Labs   Lab 02/04/24  0824 02/04/24  0005   NA  --  139   POTASSIUM  --  4.3   CHLORIDE  --  106   CO2  --  23   ANIONGAP  --  10   * 243*   BUN  --  20.8   CR  --  0.88   GFRESTIMATED  --  83   LUZ  --  8.7*   PROTTOTAL  --  7.0   ALBUMIN  --  3.6   BILITOTAL  --  0.7   ALKPHOS  --  167*   AST  --  31   ALT  --  24         VBG/ABG:    Recent Labs   Lab 02/04/24  0304 02/04/24  0001   PHV 7.31* 7.26*   PCO2V 52* 50   PO2V 30 66*   HCO3V 26 23   ARASH -0.7  --    O2PER 30  --       Recent Labs   Lab 02/04/24  0304   O2PER 30       Lab Results   Component Value Date    LACT 2.0 02/04/2024    LACT 1.9 09/22/2023    LACT 2.5 09/21/2023    LACT 1.5 09/19/2023    LACT 3.1  09/18/2023    LACT 2.4 12/03/2019    LACT 1.2 10/11/2015    LACT 1.3 10/09/2015           UA:  Recent Labs   Lab 02/04/24  0432   COLOR Straw   APPEARANCE Slightly Cloudy*   URINEGLC 100*   URINEBILI Negative   URINEKETONE Trace*   SG 1.012   UBLD Small*   URINEPH 5.0   PROTEIN 30*   NITRITE Positive*   LEUKEST Large*   RBCU 22*   WBCU >182*       IMAGING recent:     CT Head w/o Contrast: 2/4/2024  EXAM: CT HEAD W/O CONTRAST LOCATION: [facility] DATE: 2/4/2024 INDICATION: headache, confusion COMPARISON: CT head 09/22/2023 TECHNIQUE: Routine CT Head without IV contrast. Multiplanar reformats. Dose reduction techniques were used. FINDINGS: INTRACRANIAL CONTENTS: No intracranial hemorrhage, extraaxial collection, or mass effect.  Moderate areas of chronic encephalomalacia anterior inferior aspects of both frontal lobes right more than left and anterior aspect of right temporal lobe; similar  to prior. Moderate presumed chronic small vessel ischemic changes. Small chronic infarctions basal ganglia bilaterally. Moderate generalized volume loss. No hydrocephalus. VISUALIZED ORBITS/SINUSES/MASTOIDS: No intraorbital abnormality. No paranasal sinus mucosal disease. No middle ear or mastoid effusion. BONES/SOFT TISSUES: No acute abnormality.   IMPRESSION: 1.  No acute intracranial process.    XR Chest Port 1 View 2/4/2024  IMPRESSION: Normal heart size with pulmonary vascular congestion and mild pulmonary edema. A mildly confluent opacities seen in the left lung base, suspicious for superimposed pneumonia. Recommend clinical correlation. Small left pleural effusion is present. No right pleural effusion. No pneumothorax.    POC US CHEST B-SCAN    [facility] Procedure Note   Limited Bedside ED Ultrasound of Thorax: PROCEDURE: PERFORMED BY: Dr. Enrique Winchester MD INDICATIONS/SYMPTOM:  shortness of breath PROBE: High frequency linear probe BODY LOCATION: Chest FINDINGS: Images of both lung  hemithoracies taken in 2D in multiple rib spaces      Right side:  Lung sliding artifact  Present    Comet tail artifacts  Present  Left side:  Lung sliding artifact  Present    Comet tail artifacts  Present INTERPRETATION: Diffuse B-lines bilaterally consistent with CHF. IMAGE DOCUMENTATION: Images were archived to PACs system.       Time Spent on this Encounter   I spent 30 minutes on the unit/floor managing the care of this patient. Over 50% of my time was spent counseling the patient and/or coordinating care regarding services listed in this note.      [facility] House Officer/ALPHONSO  Joel Quintero PA-C    [facility]  Securely message with the Vocera Web Console (learn more here)  Text page via [employer] Paging/Directory

## 2024-02-05 ENCOUNTER — APPOINTMENT (OUTPATIENT)
Dept: PHYSICAL THERAPY | Facility: CLINIC | Age: 89
DRG: 280 | End: 2024-02-05
Attending: INTERNAL MEDICINE
Payer: COMMERCIAL

## 2024-02-05 ENCOUNTER — APPOINTMENT (OUTPATIENT)
Dept: SPEECH THERAPY | Facility: CLINIC | Age: 89
DRG: 280 | End: 2024-02-05
Payer: COMMERCIAL

## 2024-02-05 LAB
ANION GAP SERPL CALCULATED.3IONS-SCNC: 12 MMOL/L (ref 7–15)
BACTERIA UR CULT: NORMAL
BUN SERPL-MCNC: 23.1 MG/DL (ref 8–23)
CALCIUM SERPL-MCNC: 9.4 MG/DL (ref 8.8–10.2)
CHLORIDE SERPL-SCNC: 102 MMOL/L (ref 98–107)
CREAT SERPL-MCNC: 0.81 MG/DL (ref 0.67–1.17)
DEPRECATED HCO3 PLAS-SCNC: 26 MMOL/L (ref 22–29)
EGFRCR SERPLBLD CKD-EPI 2021: 85 ML/MIN/1.73M2
ERYTHROCYTE [DISTWIDTH] IN BLOOD BY AUTOMATED COUNT: 13.3 % (ref 10–15)
GLUCOSE BLDC GLUCOMTR-MCNC: 135 MG/DL (ref 70–99)
GLUCOSE BLDC GLUCOMTR-MCNC: 151 MG/DL (ref 70–99)
GLUCOSE BLDC GLUCOMTR-MCNC: 167 MG/DL (ref 70–99)
GLUCOSE BLDC GLUCOMTR-MCNC: 168 MG/DL (ref 70–99)
GLUCOSE BLDC GLUCOMTR-MCNC: 177 MG/DL (ref 70–99)
GLUCOSE BLDC GLUCOMTR-MCNC: 303 MG/DL (ref 70–99)
GLUCOSE SERPL-MCNC: 176 MG/DL (ref 70–99)
HCT VFR BLD AUTO: 46.9 % (ref 40–53)
HGB BLD-MCNC: 15.6 G/DL (ref 13.3–17.7)
MAGNESIUM SERPL-MCNC: 1.6 MG/DL (ref 1.7–2.3)
MCH RBC QN AUTO: 30.3 PG (ref 26.5–33)
MCHC RBC AUTO-ENTMCNC: 33.3 G/DL (ref 31.5–36.5)
MCV RBC AUTO: 91 FL (ref 78–100)
PLATELET # BLD AUTO: 229 10E3/UL (ref 150–450)
POTASSIUM SERPL-SCNC: 3.7 MMOL/L (ref 3.4–5.3)
RBC # BLD AUTO: 5.15 10E6/UL (ref 4.4–5.9)
SODIUM SERPL-SCNC: 140 MMOL/L (ref 135–145)
WBC # BLD AUTO: 10.3 10E3/UL (ref 4–11)

## 2024-02-05 PROCEDURE — 250N000011 HC RX IP 250 OP 636: Performed by: INTERNAL MEDICINE

## 2024-02-05 PROCEDURE — 97116 GAIT TRAINING THERAPY: CPT | Mod: GP

## 2024-02-05 PROCEDURE — 97530 THERAPEUTIC ACTIVITIES: CPT | Mod: GP

## 2024-02-05 PROCEDURE — 97161 PT EVAL LOW COMPLEX 20 MIN: CPT | Mod: GP

## 2024-02-05 PROCEDURE — 250N000013 HC RX MED GY IP 250 OP 250 PS 637: Performed by: INTERNAL MEDICINE

## 2024-02-05 PROCEDURE — 120N000001 HC R&B MED SURG/OB

## 2024-02-05 PROCEDURE — 85014 HEMATOCRIT: CPT | Performed by: HOSPITALIST

## 2024-02-05 PROCEDURE — 36415 COLL VENOUS BLD VENIPUNCTURE: CPT | Performed by: INTERNAL MEDICINE

## 2024-02-05 PROCEDURE — 99233 SBSQ HOSP IP/OBS HIGH 50: CPT | Performed by: INTERNAL MEDICINE

## 2024-02-05 PROCEDURE — 83735 ASSAY OF MAGNESIUM: CPT | Performed by: HOSPITALIST

## 2024-02-05 PROCEDURE — 92526 ORAL FUNCTION THERAPY: CPT | Mod: GN | Performed by: SPEECH-LANGUAGE PATHOLOGIST

## 2024-02-05 PROCEDURE — 80048 BASIC METABOLIC PNL TOTAL CA: CPT | Performed by: HOSPITALIST

## 2024-02-05 PROCEDURE — 99207 PR NO BILLABLE SERVICE THIS VISIT: CPT | Performed by: NURSE PRACTITIONER

## 2024-02-05 RX ORDER — FUROSEMIDE 20 MG
20 TABLET ORAL DAILY
Status: DISCONTINUED | OUTPATIENT
Start: 2024-02-06 | End: 2024-02-06 | Stop reason: HOSPADM

## 2024-02-05 RX ORDER — OLANZAPINE 10 MG/2ML
2.5 INJECTION, POWDER, FOR SOLUTION INTRAMUSCULAR ONCE
Status: COMPLETED | OUTPATIENT
Start: 2024-02-05 | End: 2024-02-05

## 2024-02-05 RX ADMIN — POLYETHYLENE GLYCOL 3350 17 G: 17 POWDER, FOR SOLUTION ORAL at 08:43

## 2024-02-05 RX ADMIN — ASPIRIN 325 MG: 325 TABLET, COATED ORAL at 08:43

## 2024-02-05 RX ADMIN — FUROSEMIDE 20 MG: 10 INJECTION, SOLUTION INTRAMUSCULAR; INTRAVENOUS at 20:18

## 2024-02-05 RX ADMIN — MIRTAZAPINE 15 MG: 15 TABLET, FILM COATED ORAL at 21:01

## 2024-02-05 RX ADMIN — CEFTRIAXONE SODIUM 2 G: 2 INJECTION, POWDER, FOR SOLUTION INTRAMUSCULAR; INTRAVENOUS at 22:26

## 2024-02-05 RX ADMIN — CARVEDILOL 25 MG: 25 TABLET, FILM COATED ORAL at 08:43

## 2024-02-05 RX ADMIN — OLANZAPINE 5 MG: 10 INJECTION, POWDER, FOR SOLUTION INTRAMUSCULAR at 19:32

## 2024-02-05 RX ADMIN — INSULIN ASPART 4 UNITS: 100 INJECTION, SOLUTION INTRAVENOUS; SUBCUTANEOUS at 12:45

## 2024-02-05 RX ADMIN — FUROSEMIDE 20 MG: 10 INJECTION, SOLUTION INTRAMUSCULAR; INTRAVENOUS at 08:42

## 2024-02-05 RX ADMIN — ENOXAPARIN SODIUM 40 MG: 40 INJECTION SUBCUTANEOUS at 08:43

## 2024-02-05 RX ADMIN — DOXYCYCLINE HYCLATE 100 MG: 100 CAPSULE ORAL at 08:43

## 2024-02-05 RX ADMIN — CARVEDILOL 25 MG: 25 TABLET, FILM COATED ORAL at 18:28

## 2024-02-05 RX ADMIN — INSULIN ASPART 1 UNITS: 100 INJECTION, SOLUTION INTRAVENOUS; SUBCUTANEOUS at 08:43

## 2024-02-05 RX ADMIN — ACETAMINOPHEN 650 MG: 325 TABLET, FILM COATED ORAL at 10:20

## 2024-02-05 RX ADMIN — METFORMIN HYDROCHLORIDE 500 MG: 500 TABLET ORAL at 18:28

## 2024-02-05 RX ADMIN — METFORMIN HYDROCHLORIDE 500 MG: 500 TABLET ORAL at 08:43

## 2024-02-05 ASSESSMENT — ACTIVITIES OF DAILY LIVING (ADL)
ADLS_ACUITY_SCORE: 61
ADLS_ACUITY_SCORE: 51
ADLS_ACUITY_SCORE: 51
ADLS_ACUITY_SCORE: 61
ADLS_ACUITY_SCORE: 51
DEPENDENT_IADLS:: TRANSPORTATION;MEAL PREPARATION;SHOPPING;COOKING;CLEANING
ADLS_ACUITY_SCORE: 57
ADLS_ACUITY_SCORE: 56
ADLS_ACUITY_SCORE: 51
ADLS_ACUITY_SCORE: 61
ADLS_ACUITY_SCORE: 57
ADLS_ACUITY_SCORE: 51
ADLS_ACUITY_SCORE: 57

## 2024-02-05 NOTE — PROGRESS NOTES
"   02/05/24 1000   Appointment Info   Signing Clinician's Name / Credentials (PT) Denia Thompson DPT   Rehab Comments (PT) Attempt to see pt while son is present for interpretation       Present yes  (Son present and interpreting as pt refuses  services)   Language Finnish   Living Environment   People in Home spouse   Current Living Arrangements apartment   Home Accessibility no concerns   Transportation Anticipated family or friend will provide   Living Environment Comments Son present throughout session and able to give information on living environment and PLOF. Pt lives in an apartment with his spouse, receives ~16 hours of PCA support a day as well as son and grandson visit often.   Self-Care   Usual Activity Tolerance good   Current Activity Tolerance moderate   Regular Exercise No   Equipment Currently Used at Home walker, rolling   Fall history within last six months yes   Number of times patient has fallen within last six months 1   Activity/Exercise/Self-Care Comment Pt is typically Mod I w/ FWW, son reports he walks ~100 ft w/ FWW at home with him at baseline.   General Information   Onset of Illness/Injury or Date of Surgery 02/03/24   Referring Physician Analia Rodriguez MD   Patient/Family Therapy Goals Statement (PT) \"To go back home\"   Pertinent History of Current Problem (include personal factors and/or comorbidities that impact the POC) Pt is a 88 year old male w/ PMH of TBI, multiple past CVAs including chronic encephalomalacia frontal/right temporal, lacunar infarct, basal ganglia infarct, most recent CVA 5/2023 (right asia,/left cerebral hemisphere) with chronic facial droop, right hemiparesis, HTN, HLD, CAD, DM2, BPH, cirrhosis, UTI, advanced dementia, Finnish-speaking, who was admitted on 2/3/2024 for follow-up of chest pain and shortness of breath   Existing Precautions/Restrictions fall   Weight-Bearing Status - LUE full weight-bearing   Weight-Bearing Status " - RUE full weight-bearing   Weight-Bearing Status - LLE full weight-bearing   Weight-Bearing Status - RLE full weight-bearing   Cognition   Affect/Mental Status (Cognition) confused;flat/blunted affect   Orientation Status (Cognition) oriented to;person   Follows Commands (Cognition) follows one-step commands;verbal cues/prompting required;repetition of directions required;50-74% accuracy   Cognitive Status Comments Pt required multiple verbal cues from son in order to follow one step commands throughout session.   Pain Assessment   Patient Currently in Pain No   Integumentary/Edema   Integumentary/Edema no deficits were identifed   Posture    Posture Forward head position;Protracted shoulders   Range of Motion (ROM)   Range of Motion ROM deficits secondary to weakness   Strength (Manual Muscle Testing)   Strength (Manual Muscle Testing) Able to perform R SLR;Able to perform L SLR;Deficits observed during functional mobility   Bed Mobility   Comment, (Bed Mobility) Pt appraoched sitting up in chair, unable to asses bed mobility at this time   Transfers   Comment, (Transfers) Sit>stand completed w/ CGA-Min Ax1   Gait/Stairs (Locomotion)   Comment, (Gait/Stairs) Pt ambulated w/ FWW and Min Ax1   Balance   Balance other (describe)   Balance Comments Pt demonstrated good sitting balance, required FWW for all standing and dynamic balance   Sensory Examination   Sensory Perception patient reports no sensory changes   Coordination   Coordination no deficits were identified   Muscle Tone   Muscle Tone no deficits were identified   Clinical Impression   Criteria for Skilled Therapeutic Intervention Yes, treatment indicated   PT Diagnosis (PT) Impaired functional mobility   Influenced by the following impairments Weakness, impaired activity tolerance, impaired balance   Functional limitations due to impairments Impaired gait and inability to complete ADL's   Clinical Presentation (PT Evaluation Complexity) stable   Clinical  Presentation Rationale Clinical judgment   Clinical Decision Making (Complexity) low complexity   Planned Therapy Interventions (PT) balance training;bed mobility training;gait training;home exercise program;motor coordination training;neuromuscular re-education;patient/family education;postural re-education;ROM (range of motion);stair training;strengthening;stretching;transfer training;progressive activity/exercise;risk factor education;home program guidelines   Risk & Benefits of therapy have been explained evaluation/treatment results reviewed;care plan/treatment goals reviewed;risks/benefits reviewed;current/potential barriers reviewed;participants voiced agreement with care plan;participants included;patient;son   PT Total Evaluation Time   PT Eval, Low Complexity Minutes (48652) 10   Physical Therapy Goals   PT Frequency Daily   PT Predicted Duration/Target Date for Goal Attainment 02/12/24   PT Goals Bed Mobility;Transfers;Gait   PT: Bed Mobility Independent;Supine to/from sit;Rolling;Bridging   PT: Transfers Modified independent;Sit to/from stand;Bed to/from chair;Assistive device   PT: Gait Supervision/stand-by assist;Rolling walker;150 feet   Interventions   Interventions Quick Adds Gait Training;Therapeutic Activity   Therapeutic Activity   Therapeutic Activities: dynamic activities to improve functional performance Minutes (21073) 15   Symptoms Noted During/After Treatment Fatigue   Treatment Detail/Skilled Intervention Evaluation completed and treatment indicated. Son present throughout session for interpretation as pt refuses  services. Repeated sit>stand completed x 5 reps w/ FWW and CGA-Min Ax1, cues given for UE support on chair armrests rather than FWW. Pt had soiled brief, required Ax1 to alireza/doff brief in standing/sitting and for pericares. Post-ambulation, pt and son educated on TCU recommendation. Son reported that pt has good support at home and will most likely discharge home w/  home care PT. Pt left sitting up in chair with chair alarm on and sitter present in room.   Gait Training   Gait Training Minutes (21937) 8   Symptoms Noted During/After Treatment (Gait Training) fatigue   Treatment Detail/Skilled Intervention Pt completed pre gait marching in place w/ FWW x 5 reps each LE. in standing w/ FWW. Pt ambulated w/ FWW and Min-Mod Ax1 x 30 ft in room, cues given for walker proximity and upright posture throughout. Pt demonstrated slow, shuffling gait speed throughout.   Distance in Feet ~30 ft   PT Discharge Planning   PT Plan Attempt to see pt when son is present for interpretation. Progress gait distance and monitor VS, repeated sit>stands, LE exercises   PT Discharge Recommendation (DC Rec) Transitional Care Facility;home with assist;home with home care physical therapy   PT Rationale for DC Rec Pt is moving below baseline mobility, currently requiring Min Ax1 w/ FWW for all functional mobility at this time which places pt at a high falls risk. Recommend TCU at this time in order to address deficits in strength, activity tolerance, and balance. Per discussion w/ pt's son, plan is to take pt home as they have PCA services and pt's son reports he can check in more often. If pt were to return home, pt would need 24/7 assist and HCPT in order to complete home safety evaluation and to continue to address deficits.   PT Brief overview of current status CGA-Min Ax1 sit>stand, Min-Mod Ax1 ambulation w/ FWW   PT Equipment Needed at Discharge walker, rolling  (Pt has FWW at home already per son report)   Total Session Time   Timed Code Treatment Minutes 23   Total Session Time (sum of timed and untimed services) 33

## 2024-02-05 NOTE — PLAN OF CARE
Goal Outcome Evaluation:    Pt alert but only oriented to self. He is up with assist of 2, GB, and marion steady. Pt ate late lunch so did not eat dinner. Coverage given by AM nurse for meal. Pt incont of urin x3. He was up to the chair and back to bed. Pt will not use  services but son came and was able to have fathers needs met. Pt denied any pain. Pt still impulsive and not able to use call light so sitter continued. VSS.

## 2024-02-05 NOTE — PROGRESS NOTES
[facility]    HOSPITALIST PROGRESS NOTE :   --------------------------------------------------    Date of Admission:  2/3/2024    Cumulative Summary: Adam Huber is a 88 year old [place] speaking male who was admitted on 2/3/2024 when he presented to emergency department for evaluation of falls, chest pain, shortness of breath.  He is found to have acute hypoxic respiratory failure      Assessment & Plan     Acute respiratory failure with hypoxia: Most likely sec to combination of CHF and PNA  Patient with some chest discomfort and shortness of breath on admission,  found to have oxygen saturations in the 75% range by EMS.  Appears to have had some .History of diastolic Heart Failure,  BNP elevated, chest x-ray with pulmonary edema. Exam with bibasilar crackles through mid lung fields  -- See management as below     Acute diastolic heart failure exacerbation  -- Patient care was assumed this morning, patient was seen and examined.  -- Continue aspirin 325 mg p.o. daily.  --Continue atorvastatin 40 mg p.o. daily.  --Continue carvedilol 25 mg p.o. twice daily.  --Continue Lasix 20 mg IV twice daily, will assess if it can be changed to oral tomorrow morning.  --Previous echocardiogram results were reviewed  --Echo from 09/17/2023 showing normal LV function, EF of 50 to 55%, mild mitral stenosis LV wall motion.  --Patient was evaluated by Dr. Traylor from cardiology on 09/22/2023.  Current small concern about acute on chronic heart failure with preserved EF.  --Patient did not have any cardiology follow-up after the previous discharge.  --Goals of care discussion was encouraged last time due to the concern for brain injury encephalomalacia , advanced dementia and poor quality of life.  So far patient and family has not pursued outpatient palliative care.  -- Updated son on phone     Left lower lobe pneumonia: Patient with hazy appearance of left lower lung field on chest x-ray.  Clear change  from prior imaging.  Suspicious for left lower lobe infiltrate with overlap of pulmonary edema.    --Oximetry, Wean O2  as able. In AM on 2 L NC.   --continue 2 g ceftriaxone every 24 hours  --Received initial azithromycin dose in the emergency department, will continue on doxycycline 100 mg twice daily given borderline QTc.  --Physical and Occupational Therapy is pending, we will follow-up on discharge recommendation if patient can be discharged home or will require TCU.    Non-ST elevation myocardial infarction:   Coronary artery disease: History of mid RCA, circumflex, and LAD stenting 2015. Suspect secondary to demand ischemia with systemic hypoxia.    Patient reported some chest discomfort in the setting of systemic hypoxia while at home starting at approximately 10 PM.  As of arrival here in the emergency department and correction of hypoxia, he reports no chest pain. troponins flat and downtrending. No acute EKG ST-T elevation.   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 Admitting Hospitalist discussion with his son, patient has difficulty remembering family members names.  Lives with his wife in an apartment.  They receive an estimated 16 hours of PCA care per day (per grandson at bedside on admission).  picking at lines and tubing, pulling IVs here in the emergency department.      -- Bedside sitter  -- Off soft restraints   --IM Zyprexa 5 mg daily as needed for agitation; note prolonged QT on EKG  --Resume prior to admission Risperdal 0.5 mg twice daily as needed  --Continue aspirin 325 mg daily  --Continue Remeron 15 mg at bedtime  --Continue Risperdal twice daily     Urinary tract infection: Patient with encephalopathy,  foul-smelling urine.  on admission not able to provide a narrative of symptom history.  Grossly abnormal urinalysis  --Bladder management protocol in place  --On ceftriaxone and azithromycin as above  --Urine culture pending     Type 2 diabetes: Last hemoglobin A1c September 2023 of 6.5.  Appears relatively well-controlled on metformin alone.  --Medium dose sliding scale insulin  --Prandial insulin at 1 unit per 20 g carbohydrate  --Continue prior to admission metformin 500 mg twice daily  --Hemoglobin A1c 7.5.   --Monitor glucoses, adjust insulin accordingly.     Frequent falls: Per grandson, no reported falls in the past months and patient does not have frequent hospitalizations for falls.  Last hospitalized in September.  2 falls today, however, both unwitnessed.  Head CT negative for acute intracranial pathology or bleed.  --Physical therapy consult  --Care management consultation for disposition planning.  As noted elsewhere, patient lives with his wife, and they received 16 hours of PCA care per day as estimated by grandson.  -Fall precautions  --Ambulate with assist  -- family is very supportive and think that patient will do better in his home environment due to dementia and language barrier       Cirrhosis: Noted by imaging historically.  Does not appear decompensated.  No history of varices.  Normal platelet count    Clinically Significant Risk Factors            # Hypomagnesemia: Lowest Mg = 1.6 mg/dL in last 2 days, will replace as needed       # Hypertension: Noted on problem list  # Acute heart failure with preserved ejection fraction: heart failure noted on problem list, last echo with EF >50%, and receiving IV diuretics    # Dementia: noted on problem list   # DMII: A1C = 7.5 % (Ref range: <5.7 %) within past 6 months, PRESENT ON ADMISSION           Diet: Combination Diet Pureed Diet (level 4); Mildly Thick (level 2) (No straws); Low Saturated Fat Na <2400mg Diet, No Caffeine Diet    Villa Catheter:  Not present  DVT Prophylaxis: Enoxaparin (Lovenox) SQ  Code Status: No CPR- Do NOT Intubate    The patient's care was discussed with the Bedside Nurse, Patient, and Patient's Family.    Medical Decision Making       55 MINUTES SPENT BY ME on the date of service doing chart review, history, exam, documentation & further activities per the note.        Disposition Plan     Expected Discharge Date: 02/06/2024             Entered: Analia Rodriguez MD 02/05/2024, 7:42 AM       Analia Rodriguez MD, MD, FACP  Text Page (7am - 6pm)    ----------------------------------------------------------------------------------------------------------------------      Interval History   Patient care was assumed this morning , discussed the case with son, bedside nursing and patient , patient is cooperative with exam this morning , does follow commands but does have advanced dementia , son is hoping to take him home tomorrow as he thinks patient does more better in his home environment due to dementia and language barrier      -Data reviewed today: I reviewed all new labs and imaging results over the last 24 hours.    I personally reviewed no images or EKG's today.    Physical Exam   Temp: 97.9  F (36.6  C) Temp src: Axillary BP: 126/64 Pulse: 75   Resp: 18 SpO2: 93 % O2 Device: None (Room air) Oxygen Delivery: 2 LPM  Vitals:    02/03/24 2358 02/05/24 0500   Weight: 92.2 kg (203 lb 4.2 oz) 88.2 kg (194 lb 7.1 oz)     Vital Signs with Ranges  Temp:  [97.2  F (36.2  C)-97.9  F (36.6  C)] 97.9  F (36.6  C)  Pulse:  [75-80] 75  Resp:  [18-20] 18  BP: (126-161)/(64-76) 126/64  SpO2:  [92 %-96 %] 93 %  I/O last 3 completed shifts:  In: 120 [P.O.:120]  Out: -     GENERAL: Alert , awake and oriented. NAD. Conversational, appropriate.   HEENT: Normocephalic. EOMI. No icterus or injection. Nares normal.   LUNGS: Clear to auscultation. No dyspnea at rest.   HEART: Regular rate. Extremities perfused.   ABDOMEN: Soft, nontender, and nondistended. Positive  bowel sounds.   EXTREMITIES: No LE edema noted.   NEUROLOGIC: Moves extremities x4 on command. No acute focal neurologic abnormalities noted.     Medications      aspirin  325 mg Oral Daily    atorvastatin  40 mg Oral QPM    carvedilol  25 mg Oral BID w/meals    cefTRIAXone  2 g Intravenous Q24H    doxycycline hyclate  100 mg Oral Q12H On license of [facility] (08/20)    enoxaparin ANTICOAGULANT  40 mg Subcutaneous Q24H    furosemide  20 mg Intravenous BID    insulin aspart  1-7 Units Subcutaneous TID AC    insulin aspart  1-5 Units Subcutaneous At Bedtime    insulin aspart   Subcutaneous TID w/meals    metFORMIN  500 mg Oral BID w/meals    mirtazapine  15 mg Oral At Bedtime    polyethylene glycol  17 g Oral BID    sodium chloride (PF)  3 mL Intracatheter Q8H       Data   Recent Labs   Lab 02/05/24  0229 02/04/24  2137 02/04/24  1837 02/04/24  1126 02/04/24  0926 02/04/24  0824 02/04/24  0005   WBC  --   --   --   --   --   --  13.3*   HGB  --   --   --   --   --   --  14.6   MCV  --   --   --   --   --   --  92   PLT  --   --   --   --   --   --  232   NA  --   --   --   --   --   --  139   POTASSIUM  --   --   --   --  4.6  --  4.3   CHLORIDE  --   --   --   --   --   --  106   CO2  --   --   --   --   --   --  23   BUN  --   --   --   --   --   --  20.8   CR  --   --   --   --  0.86  --  0.88   ANIONGAP  --   --   --   --   --   --  10   LUZ  --   --   --   --   --   --  8.7*   * 150* 220*   < >  --    < > 243*   ALBUMIN  --   --   --   --   --   --  3.6   PROTTOTAL  --   --   --   --   --   --  7.0   BILITOTAL  --   --   --   --   --   --  0.7   ALKPHOS  --   --   --   --   --   --  167*   ALT  --   --   --   --   --   --  24   AST  --   --   --   --   --   --  31    < > = values in this interval not displayed.       Imaging:   No results found for this or any previous visit (from the past 24 hour(s)).

## 2024-02-05 NOTE — PROVIDER NOTIFICATION
MD Notification    Notified Person: MD    Notified Person Name: Jennifer    Notification Date/Time: 2/5/24; 1049    Notification Interaction: vocera    Purpose of Notification: The family is in the room and would like to touch base with you. Could you possibly swing up to the floor, to see them? TY    Orders Received: awaiting     Comments:

## 2024-02-05 NOTE — PLAN OF CARE
Goal Outcome Evaluation:         Pt is alert to self. Botswanan speaking, refuses interpretor use. A2 w GB & teresita steady. Tele NSR. Follows commands intermittently. No signs of pain/ discomfort. Sitter at bedside. JAKE DORAN.

## 2024-02-05 NOTE — CONSULTS
Care Management Initial Consult    General Information  Assessment completed with: Patient (), Adam  Type of CM/SW Visit: Initial Assessment    Primary Care Provider verified and updated as needed: Yes   Readmission within the last 30 days: no previous admission in last 30 days      Reason for Consult: discharge planning  Advance Care Planning:            Communication Assessment  Patient's communication style: spoken language (non-English)             Cognitive  Cognitive/Neuro/Behavioral: .WDL except, orientation  Level of Consciousness: confused  Arousal Level: arouses to voice, arouses to repeated stimulation  Orientation: disoriented to, place, time, situation  Mood/Behavior: calm  Best Language: 1 - Mild to moderate  Speech: slurred    Living Environment:   People in home: alone     Current living Arrangements: house      Able to return to prior arrangements: yes       Family/Social Support:  Care provided by: child(isabella), other (see comments) (PCA services 16hrs)  Provides care for: no one, no one, unable/limited ability to care for self  Marital Status:   Children          Description of Support System: Supportive, Involved    Support Assessment: Adequate family and caregiver support    Current Resources:   Patient receiving home care services: Yes     Community Resources: County Programs, Home Care  Equipment currently used at home: walker, rolling  Supplies currently used at home: None    Employment/Financial:  Employment Status: retired        Financial Concerns: none   Referral to Financial Worker: No       Does the patient's insurance plan have a 3 day qualifying hospital stay waiver?  No    Lifestyle & Psychosocial Needs:  Social Determinants of Health     Food Insecurity: Not on file   Depression: Not on file   Housing Stability: Not on file   Tobacco Use: Low Risk  (5/4/2023)    Patient History     Smoking Tobacco Use: Never     Smokeless Tobacco Use: Never     Passive Exposure:  Not on file   Financial Resource Strain: Not on file   Alcohol Use: Not on file   Transportation Needs: Not on file   Physical Activity: Not on file   Interpersonal Safety: Not on file   Stress: Not on file   Social Connections: Not on file       Functional Status:  Prior to admission patient needed assistance:   Dependent ADLs:: Bathing, Ambulation-walker  Dependent IADLs:: Transportation, Meal Preparation, Shopping, Cooking, Cleaning       Mental Health Status:          Chemical Dependency Status:                Values/Beliefs:  Spiritual, Cultural Beliefs, Zoroastrianism Practices, Values that affect care: no               Additional Information:  Writer met with Pt and  at bedside. Introduced self and role in discharge planning. Pt lives alone in Apt. Pt uses a walker at baseline. Pt gets 16hrs of PCA services and his Son and Grandson help with most things as well. Pt is currently getting HHC through [facility] (PCA services) and are able to add PT/OT services as well. Fax over HHC orders via inbox. Family will  at discharge.         Lindy Greene, RN, BSN, Care Coordinator

## 2024-02-06 ENCOUNTER — APPOINTMENT (OUTPATIENT)
Dept: CARDIOLOGY | Facility: CLINIC | Age: 89
DRG: 280 | End: 2024-02-06
Attending: INTERNAL MEDICINE
Payer: COMMERCIAL

## 2024-02-06 VITALS
BODY MASS INDEX: 27.9 KG/M2 | SYSTOLIC BLOOD PRESSURE: 145 MMHG | RESPIRATION RATE: 20 BRPM | HEART RATE: 74 BPM | OXYGEN SATURATION: 92 % | WEIGHT: 194.45 LBS | TEMPERATURE: 97.4 F | DIASTOLIC BLOOD PRESSURE: 80 MMHG

## 2024-02-06 LAB
ANION GAP SERPL CALCULATED.3IONS-SCNC: 11 MMOL/L (ref 7–15)
BUN SERPL-MCNC: 29.9 MG/DL (ref 8–23)
CALCIUM SERPL-MCNC: 9.2 MG/DL (ref 8.8–10.2)
CHLORIDE SERPL-SCNC: 101 MMOL/L (ref 98–107)
CREAT SERPL-MCNC: 0.74 MG/DL (ref 0.67–1.17)
DEPRECATED HCO3 PLAS-SCNC: 26 MMOL/L (ref 22–29)
EGFRCR SERPLBLD CKD-EPI 2021: 87 ML/MIN/1.73M2
GLUCOSE BLDC GLUCOMTR-MCNC: 188 MG/DL (ref 70–99)
GLUCOSE BLDC GLUCOMTR-MCNC: 221 MG/DL (ref 70–99)
GLUCOSE SERPL-MCNC: 191 MG/DL (ref 70–99)
MAGNESIUM SERPL-MCNC: 1.6 MG/DL (ref 1.7–2.3)
POTASSIUM SERPL-SCNC: 3.9 MMOL/L (ref 3.4–5.3)
SODIUM SERPL-SCNC: 138 MMOL/L (ref 135–145)

## 2024-02-06 PROCEDURE — 250N000013 HC RX MED GY IP 250 OP 250 PS 637: Performed by: INTERNAL MEDICINE

## 2024-02-06 PROCEDURE — 255N000002 HC RX 255 OP 636: Performed by: INTERNAL MEDICINE

## 2024-02-06 PROCEDURE — 250N000009 HC RX 250

## 2024-02-06 PROCEDURE — 36415 COLL VENOUS BLD VENIPUNCTURE: CPT | Performed by: INTERNAL MEDICINE

## 2024-02-06 PROCEDURE — 250N000011 HC RX IP 250 OP 636: Performed by: INTERNAL MEDICINE

## 2024-02-06 PROCEDURE — 93306 TTE W/DOPPLER COMPLETE: CPT | Mod: 26 | Performed by: INTERNAL MEDICINE

## 2024-02-06 PROCEDURE — 83735 ASSAY OF MAGNESIUM: CPT | Performed by: INTERNAL MEDICINE

## 2024-02-06 PROCEDURE — 999N000208 ECHOCARDIOGRAM COMPLETE

## 2024-02-06 PROCEDURE — 80048 BASIC METABOLIC PNL TOTAL CA: CPT | Performed by: INTERNAL MEDICINE

## 2024-02-06 PROCEDURE — 99239 HOSP IP/OBS DSCHRG MGMT >30: CPT | Performed by: INTERNAL MEDICINE

## 2024-02-06 RX ORDER — ACETAMINOPHEN 325 MG/1
650 TABLET ORAL EVERY 4 HOURS PRN
COMMUNITY
Start: 2024-02-06 | End: 2024-04-01

## 2024-02-06 RX ORDER — WATER 10 ML/10ML
INJECTION INTRAMUSCULAR; INTRAVENOUS; SUBCUTANEOUS
Status: COMPLETED
Start: 2024-02-06 | End: 2024-02-06

## 2024-02-06 RX ORDER — FUROSEMIDE 20 MG
20 TABLET ORAL DAILY
Qty: 30 TABLET | Refills: 0 | Status: SHIPPED | OUTPATIENT
Start: 2024-02-07 | End: 2024-04-01

## 2024-02-06 RX ORDER — POTASSIUM CHLORIDE 750 MG/1
10 TABLET, EXTENDED RELEASE ORAL DAILY
Qty: 30 TABLET | Refills: 0 | Status: SHIPPED | OUTPATIENT
Start: 2024-02-06 | End: 2024-04-01

## 2024-02-06 RX ADMIN — INSULIN ASPART 1 UNITS: 100 INJECTION, SOLUTION INTRAVENOUS; SUBCUTANEOUS at 08:12

## 2024-02-06 RX ADMIN — WATER 10 ML: 1 INJECTION INTRAMUSCULAR; INTRAVENOUS; SUBCUTANEOUS at 06:03

## 2024-02-06 RX ADMIN — OLANZAPINE 5 MG: 10 INJECTION, POWDER, FOR SOLUTION INTRAMUSCULAR at 06:03

## 2024-02-06 RX ADMIN — HUMAN ALBUMIN MICROSPHERES AND PERFLUTREN 3 ML: 10; .22 INJECTION, SOLUTION INTRAVENOUS at 09:09

## 2024-02-06 RX ADMIN — CARVEDILOL 25 MG: 25 TABLET, FILM COATED ORAL at 08:12

## 2024-02-06 RX ADMIN — DOXYCYCLINE HYCLATE 100 MG: 100 CAPSULE ORAL at 08:12

## 2024-02-06 RX ADMIN — POLYETHYLENE GLYCOL 3350 17 G: 17 POWDER, FOR SOLUTION ORAL at 08:12

## 2024-02-06 RX ADMIN — METFORMIN HYDROCHLORIDE 500 MG: 500 TABLET ORAL at 08:12

## 2024-02-06 RX ADMIN — ENOXAPARIN SODIUM 40 MG: 40 INJECTION SUBCUTANEOUS at 08:11

## 2024-02-06 RX ADMIN — FUROSEMIDE 20 MG: 20 TABLET ORAL at 08:12

## 2024-02-06 RX ADMIN — ASPIRIN 325 MG: 325 TABLET, COATED ORAL at 08:12

## 2024-02-06 ASSESSMENT — ACTIVITIES OF DAILY LIVING (ADL)
ADLS_ACUITY_SCORE: 61
ADLS_ACUITY_SCORE: 63
ADLS_ACUITY_SCORE: 59

## 2024-02-06 NOTE — PROGRESS NOTES
Care Management Discharge Note    Discharge Date: 02/06/2024       Discharge Disposition: Home, Home Care    Discharge Services:  [facility] home care for RN, PT and OT    Discharge DME: None    Discharge Transportation: family or friend will provide    Private pay costs discussed: Not applicable    Does the patient's insurance plan have a 3 day qualifying hospital stay waiver?  No    PAS Confirmation Code:    Patient/family educated on Medicare website which has current facility and service quality ratings:      Education Provided on the Discharge Plan:    Persons Notified of Discharge Plans: bedside RN, home care agency, pt  Patient/Family in Agreement with the Plan: yes    Handoff Referral Completed: Yes    Additional Information:  Discharging to home.  Orders for home care RN, PT and OT at discharge.  Pt open to [facility] for PCA services prior to admission.  Sent orders via Epic for addition of new RN, PT and OT.    Brandy Craven RN, BS  Care Coordinator  [email or web address]

## 2024-02-06 NOTE — PROGRESS NOTES
The patient is discharging to home, via personal transportation, accompanied by family. His son Adolfo has received a copy of the AVS and attests to having all of the patient's belongings.

## 2024-02-06 NOTE — PROGRESS NOTES
[facility] brief note:    While present on unit, was asked to order 2-point soft restraints as pt with recurrent restless, agitation, swinging at nursing staff.  Presented to pt's bedside.  Bedside attendant present at pt's bedside notes pt became acutely agitated at ~ 2030 swinging and hitting nursing staff.  Will order 2-point soft restraints in setting of noted pt with Alzheimer's dementia with behavioral disturbance, TBI, CVA and now with acute UTI.      ЮЛИЯ Joshi, CNP  [facility]    No charge.

## 2024-02-06 NOTE — PLAN OF CARE
Date/shift: 02/05 1900 - 2300    Orientation: Alert to self only  Vitals/tele: VSS RA - Tele NSR  Activity: Ax1 GBW  Pain: Denies  BG checks: ACHS  Diet: Puree, mild thick liquids  Skin: Intact, scattered bruising/scabbing  GI/: Incontinent bowel and bladder  Drains/devices: PIV SL  Info for next shift: Pt has 1:1 for impulsivity. Currently in 2 point soft restraints for violent behavior toward staff. Japanese speaking, only responsive to female interpreters. Zyprexa given for agitation.   Discharge plan: Will discharge home when medically ready.

## 2024-02-06 NOTE — PLAN OF CARE
Physical Therapy Discharge Summary    Reason for therapy discharge:    Discharged to home with home therapy.    Progress towards therapy goal(s). See goals on Care Plan in [facility] electronic health record for goal details.  Goals partially met.  Barriers to achieving goals:   discharge from facility.    Therapy recommendation(s):    Continued therapy is recommended.  Rationale/Recommendations:   .     PT Discharge Planning:    PT Plan: Attempt to see pt when son is present for interpretation. Progress gait distance and monitor VS, repeated sit>stands, LE exercises  PT Discharge Recommendation (DC Rec): Transitional Care Facility, home with assist, home with home care physical therapy  PT Rationale for DC Rec: Pt is moving below baseline mobility, currently requiring Min Ax1 w/ FWW for all functional mobility at this time which places pt at a high falls risk. Recommend TCU at this time in order to address deficits in strength, activity tolerance, and balance. Per discussion w/ pt's son, plan is to take pt home as they have PCA services and pt's son reports he can check in more often. If pt were to return home, pt would need 24/7 assist and HCPT in order to complete home safety evaluation and to continue to address deficits.  PT Brief overview of current status: CGA-Min Ax1 sit>stand, Min-Mod Ax1 ambulation w/ FWW  PT Equipment Needed at Discharge: walker, rolling (Pt has FWW at home already per son report)    Recommendation above provided by last treating therapist.

## 2024-02-06 NOTE — PLAN OF CARE
Goal Outcome Evaluation:    Date/Shift: 02/05/24 [id number]    Orientation:Alert to self    Vitals/Tele:NSR BBB- took off tele this AM, VSS RA.     IV Access/drains: L PIV SL    Pain: Denies    DietPuree, mild thick liquid    Mobility: Ax1BGW    GI/: Incontinent bowel and bladder.     Wound/Skin: Intact, scattered bruise/scabbing    Info for next shift: Pt has 1:1 and 2 point restraints due to violent behavior towards staff.  Cameroonian speaking only, only been responsive to female interpreters. IM Zyprexa given for agitation this AM.     Discharge Plan: Discharge home when medically ready

## 2024-02-06 NOTE — PROGRESS NOTES
2/5/24; [id number]    Togolese speaking with FEMALE interpretor; A&O X1; A1 gb/walker; had one episode of aggressive behavior at the end of the night; was eventually redirectable; awaiting ECHO. Plan for discharge to home, when medically ready.

## 2024-02-06 NOTE — DISCHARGE SUMMARY
[facility]  Hospitalist Discharge Summary      Date of Admission:  2/3/2024  Date of Discharge:  2/6/2024  Discharging Provider: Analia Rodriguez MD  Discharge Service: Hospitalist Service    Discharge Diagnoses   Acute respiratory failure with hypoxia, secondary to combination of CHF and pneumonia, resolved.  Acute diastolic heart failure, improved.  Left lower lobe pneumonia, most likely bacterial, unknown organism, improving.  Coronary artery disease.  Type II NSTEMI, secondary to pneumonia and CHF.  Major neurocognitive disorder, secondary to Alzheimer's dementia , TBI and stroke.  Pyuria, cultures came back negative for UTI.  Type 2 diabetes mellitus.  History of frequent falls.  Cirrhosis noted by imaging.  Next    Clinically Significant Risk Factors     # DMII: A1C = 7.5 % (Ref range: <5.7 %) within past 6 months       Follow-ups Needed After Discharge   Follow-up Appointments     Follow-up and recommended labs and tests       Follow up with primary care provider, Oralia Forrest, within 7 days for   hospital follow- up.  The following labs/tests are recommended: BMP in 5   to 7 days.            Unresulted Labs Ordered in the Past 30 Days of this Admission       Date and Time Order Name Status Description    2/4/2024 12:24 AM Blood Culture Peripheral Blood Preliminary     2/4/2024 12:24 AM Blood Culture Arm, Right Preliminary         These results will be followed up by PCP    Discharge Disposition   Discharged to home  Condition at discharge: Stable    Hospital Course     Cumulative Summary: Adam Huber is a 88 year old Bruneian speaking male who was admitted on 2/3/2024 when he presented to emergency department for evaluation of falls, chest pain, shortness of breath.  He is found to have acute hypoxic respiratory failure .Here are further details regarding his current hospitalization.        Assessment & Plan    Acute respiratory failure with hypoxia: Most likely sec to combination  of CHF and PNA  Patient with some chest discomfort and shortness of breath on admission,  found to have oxygen saturations in the 75% range by EMS.  Appears to have had some .History of diastolic Heart Failure,  BNP elevated, chest x-ray with pulmonary edema. Exam with bibasilar crackles through mid lung fields  -- See management as below      Acute diastolic heart failure exacerbation  Previous echocardiogram results were reviewed  Echo from 09/17/2023 showing normal LV function, EF of 50 to 55%, mild mitral stenosis LV wall motion.  Patient was evaluated by Dr. Traylor from cardiology on 09/22/2023.  Current small concern about acute on chronic heart failure with preserved EF.  Patient did not have any cardiology follow-up after the previous discharge.    -- Patient was admitted for further evaluation and management.  -- Continue aspirin 325 mg p.o. daily.  -- Continue atorvastatin 40 mg p.o. daily.  -- Continue carvedilol 25 mg p.o. twice daily.  -- Patient initially received Lasix 20 mg IV twice daily, patient will be discharged on Lasix 20 mg p.o. daily with KCl 10 mEq p.o. daily  -- Plan to recheck BMP in 1 week  --Goals of care discussion was discussed last time due to the concern for brain injury encephalomalacia , advanced dementia and poor quality of life.  So far patient and family has not pursued outpatient palliative care.  -- Updated son at bedside, family would prefer to take patient home after discharge, agree as prolonged hospitalization does add up to episodes of agitation due to significant advanced neurocognitive disorder and language barrier.     Left lower lobe pneumonia: Patient with hazy appearance of left lower lung field on chest x-ray.  Clear change from prior imaging.  Suspicious for left lower lobe infiltrate with overlap of pulmonary edema.     --Patient has been weaned off from oxygen  --Patient has received IV ceftriaxone and IV doxycycline during the hospitalization will be discharged  on oral Augmentin to complete course  --Patient was also evaluated by therapies, recommended to be discharged to TCU, family went like to take patient home due to his advanced neurocognitive disorder and language barrier  --According to son, they have been able to help patient recover after previous hospitalizations when he was even more debilitated than his current status.  --Family does have 16 hours of PCA services available at home also.     Type II NSTEMI, secondary to supply demand mismatch, secondary to pneumonia and CHF  Coronary artery disease: History of mid RCA, circumflex, and LAD stenting 2015. Suspect secondary to demand ischemia with systemic hypoxia.    Patient reported some chest discomfort in the setting of systemic hypoxia while at home starting at approximately 10 PM.  As of arrival here in the emergency department and correction of hypoxia, he reports no chest pain. troponins flat and downtrending. No acute EKG ST-T elevation.     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 Admitting Hospitalist discussion with his son, patient has difficulty remembering family members names.  Lives with his wife in an apartment.  They receive an estimated 16 hours of PCA care per day (per grandson at bedside on admission).  picking at lines and tubing, pulling IVs here in the emergency department.       -- Patient did require bedside sitter during the hospitalization  -- Off soft restraints   -- IM Zyprexa 5 mg daily as needed for agitation; note prolonged QT on EKG  -- Patient will be continued on PTA Risperdal 0.5 mg twice daily as needed  -- Continue aspirin 325 mg daily  -- Continue Remeron 15 mg at bedtime  -- Continue  Risperdal twice daily     Pyuria: Culture negative for UTI patient with encephalopathy, foul-smelling urine.  on admission not able to provide a narrative of symptom history.  Grossly abnormal urinalysis    --Bladder management protocol in place  --On ceftriaxone and azithromycin as above  --Urine culture growing normal madhu     Type 2 diabetes: Last hemoglobin A1c September 2023 of 6.5.  Appears relatively well-controlled on metformin alone.  --Medium dose sliding scale insulin  --Prandial insulin at 1 unit per 20 g carbohydrate  --Continue prior to admission metformin 500 mg twice daily  --Hemoglobin A1c 7.5.      Frequent falls: Per grandson, no reported falls in the past months and patient does not have frequent hospitalizations for falls.  Last hospitalized in September.  2 falls today, however, both unwitnessed.  Head CT negative for acute intracranial pathology or bleed.    -- Physical therapy consult  -- Care management consultation for disposition planning.  As noted elsewhere, patient lives with his wife, and they received 16 hours of PCA care per day as estimated by grandson.  -- Fall precautions  -- Ambulate with assist  -- family is very supportive and think that patient will do better in his home environment due to dementia and language barrier       Cirrhosis: Noted by imaging historically.  Does not appear decompensated.  No history of varices.  Normal platelet count    Patient was seen and examined on the day of discharge , he remains at his baseline, did have episode of agitation last night, medically doing well, alert awake and oriented to person this morning, does not have any complaints , I did review the discharge medications and instructions with familyand plan for him to follow up with the PCP after the hospitalization .patient and family were in agreement , he is discharged in stable condition back to his home with [facility] and therapies     Consultations This Hospital Stay   PHYSICAL THERAPY  ADULT IP CONSULT  CARE MANAGEMENT / SOCIAL WORK IP CONSULT  PHYSICAL THERAPY ADULT IP CONSULT  SPEECH LANGUAGE PATH ADULT IP CONSULT    Code Status   No CPR- Pre-arrest intubation OK    Time Spent on this Encounter   I, Analia Rodriguez MD, personally saw the patient today and spent greater than 30 minutes discharging this patient.       Analia Rodriguez MD  [facility]  [address] [place] [address]  Phone: [phone]  Fax: [phone]  ______________________________________________________________________    Physical Exam   Vital Signs: Temp: 97.4  F (36.3  C) Temp src: Oral BP: (!) 145/80 Pulse: 74   Resp: 20 SpO2: 92 % O2 Device: None (Room air)    Weight: 194 lbs 7.13 oz    Physical Exam  Vitals and nursing note reviewed.   Constitutional:       Appearance: He is well-developed.   HENT:      Head: Normocephalic and atraumatic.   Eyes:      Pupils: Pupils are equal, round, and reactive to light.   Neck:      Thyroid: No thyromegaly.   Cardiovascular:      Rate and Rhythm: Normal rate and regular rhythm.      Heart sounds: Normal heart sounds.   Pulmonary:      Effort: Pulmonary effort is normal. No respiratory distress.      Breath sounds: Normal breath sounds.   Abdominal:      General: Bowel sounds are normal. There is no distension.      Palpations: Abdomen is soft.   Musculoskeletal:         General: No tenderness. Normal range of motion.      Cervical back: Normal range of motion and neck supple.   Skin:     General: Skin is warm and dry.   Neurological:      Mental Status: He is alert and oriented to person, place, and time.   Psychiatric:         Behavior: Behavior normal.            Primary Care Physician   Oralia Forrest    Discharge Orders      Home Care Referral      Reason for your hospital stay    You were admitted to the hospital secondary to acute diastolic CHF exacerbation and left lower lobe pneumonia.  You have been treated with IV antibiotics and you have  also been started on diuretic.     Follow-up and recommended labs and tests     Follow up with primary care provider, Oralia Forrest, within 7 days for hospital follow- up.  The following labs/tests are recommended: BMP in 5 to 7 days.     Activity    Your activity upon discharge: activity as tolerated and no driving for today     Discharge Instructions    You were admitted to the hospital secondary to acute diastolic CHF exacerbation.  You were also found to have left lower lobe pneumonia.  You are started on Lasix 20 mg p.o. daily which is a diuretic.  You will also be taking potassium 10 mill EQ [place] p.o. daily along with Lasix as Lasix can cause potassium loss.  You are highly recommended to get your electrolytes and kidney function tested in 5 to 7 days and have a follow-up appointment with primary care physician.  Currently you are given 30-day prescription you are going to need further refills from your primary care physician.  You were also found to have community-acquired left lower lobe pneumonia, you have also been discharged on 7 more days of oral antibiotics which she will be completing after the discharge.     No CPR- Pre-arrest intubation OK     Diet    Follow this diet upon discharge: Orders Placed This Encounter      Combination Diet Pureed Diet (level 4); Mildly Thick (level 2) (No straws); Low Saturated Fat Na <2400mg Diet, No Caffeine Diet         Significant Results and Procedures   Results for orders placed or performed during the hospital encounter of 02/03/24   XR Chest Port 1 View    Narrative    EXAM: XR CHEST PORT 1 VIEW  LOCATION: [facility]  DATE: 2/4/2024    INDICATION: chest pain  COMPARISON: Chest x-ray on 04/05/2021; on 09/22/2023      Impression    IMPRESSION: Normal heart size with pulmonary vascular congestion and mild pulmonary edema. A mildly confluent opacities seen in the left lung base, suspicious for superimposed pneumonia. Recommend clinical  correlation. Small left pleural effusion is   present. No right pleural effusion. No pneumothorax.   POC US CHEST B-SCAN    Impression    [facility] Procedure Note      Limited Bedside ED Ultrasound of Thorax:    PROCEDURE: PERFORMED BY: Dr. Enrique Winchester MD  INDICATIONS/SYMPTOM:  shortness of breath  PROBE: High frequency linear probe  BODY LOCATION: Chest  FINDINGS:  Images of both lung hemithoracies taken in 2D in multiple rib spaces        Right side:  Lung sliding artifact  Present     Comet tail artifacts  Present   Left side:  Lung sliding artifact  Present     Comet tail artifacts  Present    INTERPRETATION: Diffuse B-lines bilaterally consistent with CHF.  IMAGE DOCUMENTATION: Images were archived to PACs system.         CT Head w/o Contrast    Narrative    EXAM: CT HEAD W/O CONTRAST  LOCATION: [facility]  DATE: 2/4/2024    INDICATION: headache, confusion  COMPARISON: CT head 09/22/2023  TECHNIQUE: Routine CT Head without IV contrast. Multiplanar reformats. Dose reduction techniques were used.    FINDINGS:  INTRACRANIAL CONTENTS: No intracranial hemorrhage, extraaxial collection, or mass effect.  Moderate areas of chronic encephalomalacia anterior inferior aspects of both frontal lobes right more than left and anterior aspect of right temporal lobe; similar   to prior. Moderate presumed chronic small vessel ischemic changes. Small chronic infarctions basal ganglia bilaterally. Moderate generalized volume loss. No hydrocephalus.     VISUALIZED ORBITS/SINUSES/MASTOIDS: No intraorbital abnormality. No paranasal sinus mucosal disease. No middle ear or mastoid effusion.    BONES/SOFT TISSUES: No acute abnormality.      Impression    IMPRESSION:  1.  No acute intracranial process.   Echocardiogram Complete    Narrative    [id number]  [id number]  [id number]  [id number]^PEPE^[facility]  Echocardiography Laboratory  [address] [place] [address]      Name: CHNAI ELENA  MRN: [id number]  : 1935  Study Date: 2024 08:49 AM  Age: 88 yrs  Gender: Male  Patient Location: [facility]  Reason For Study: SOB  Ordering Physician: PING PEPE  Performed By: JOSEPHINE Hector     BSA: 2.1 m2  Height: 70 in  Weight: 194 lb  HR: 75  BP: 145/80 mmHg  ______________________________________________________________________________  Procedure  Complete Portable Echo Adult. Optison (NDC #4390-1892) given intravenously.  ______________________________________________________________________________  Interpretation Summary     Technically extremely difficult study with poor images. Majority of the  cardiac structures not well-visualized.     Normal biventricular size and systolic function.  IVC is poorly visualized but appears collapsible.  No pericardial effusion.  Visually the aortic valve appears significantly calcified. Not well-seen in  short axis. Mean gradient is about 9 to 10 mmHg consistent with mild aortic  stenosis.  There is probably moderate to severe mitral annular calcification. Likely mild  MS. Prior study reported mild mitral stenosis in 2023.  ______________________________________________________________________________  Left Ventricle  Left ventricular systolic function is normal.     Right Ventricle  The right ventricle is normal in size and function.     Atria  The left atrium is not well visualized. Right atrium not well visualized.     Mitral Valve  There is moderate to severe mitral annular calcification. The mean mitral  valve gradient is 3.5 mmHg. The peak mitral valve gradient is 8.7 mmHg. There  is mild mitral stenosis.     Tricuspid Valve  Right ventricular systolic pressure could not be approximated due to  inadequate tricuspid regurgitation.     Aortic Valve  The aortic valve is not well visualized. Mild valvular aortic stenosis.     Pulmonic Valve  The pulmonic valve is not well visualized.     Vessels  The aortic root is  normal size. Inferior vena cava not well visualized for  estimation of right atrial pressure. The inferior vena cava was normal in size  with preserved respiratory variability.     Pericardium  There is no pericardial effusion.     ______________________________________________________________________________  MMode/2D Measurements & Calculations  IVSd: 1.3 cm  LVIDd: 4.1 cm  LVIDs: 2.3 cm  LVPWd: 1.2 cm  FS: 44.6 %     LV mass(C)d: 188.3 grams  LV mass(C)dI: 91.4 grams/m2  Ao root diam: 3.8 cm  asc Aorta Diam: 3.7 cm  LVOT diam: 2.1 cm  LVOT area: 3.6 cm2  Ao root diam index Ht(cm/m): 2.1  Ao root diam index BSA (cm/m2): 1.8  Asc Ao diam index BSA (cm/m2): 1.8  Asc Ao diam index Ht(cm/m): 2.1  LA Volume Indexed (AL/bp): 26.1 ml/m2  RWT: 0.60     Doppler Measurements & Calculations  MV E max rian: 98.5 cm/sec  MV A max rian: 147.7 cm/sec  MV E/A: 0.67  MV max P.7 mmHg  MV mean PG: 3.5 mmHg  MV V2 VTI: 26.6 cm  MVA(VTI): 2.6 cm2  MV dec time: 0.21 sec  Ao V2 max: 201.5 cm/sec  Ao max P.0 mmHg  Ao V2 mean: 138.8 cm/sec  Ao mean P.2 mmHg  Ao V2 VTI: 37.1 cm  MELISSA(I,D): 1.8 cm2  MELISSA(V,D): 2.0 cm2  LV V1 max P.1 mmHg  LV V1 max: 113.2 cm/sec  LV V1 VTI: 19.1 cm  SV(LVOT): 68.5 ml  SI(LVOT): 33.2 ml/m2  PA acc time: 0.09 sec  AV Rian Ratio (DI): 0.56  MELISSA Index (cm2/m2): 0.89  E/E' av.1  Lateral E/e': 14.1  Medial E/e': 20.1     ______________________________________________________________________________  Report approved by: Jodi Chery 2024 10:14 AM             Discharge Medications   Current Discharge Medication List        START taking these medications    Details   !! acetaminophen (TYLENOL) 325 MG tablet Take 2 tablets (650 mg) by mouth every 4 hours as needed for mild pain or other (and adjunct with moderate or severe pain or per patient request)    Associated Diagnoses: Pneumonia of left lower lobe due to infectious organism; Acute congestive heart failure, unspecified heart failure  type (H)      amoxicillin-clavulanate (AUGMENTIN) 875-125 MG tablet Take 1 tablet by mouth 2 times daily for 7 days  Qty: 14 tablet, Refills: 0    Associated Diagnoses: Pneumonia of left lower lobe due to infectious organism; Acute congestive heart failure, unspecified heart failure type (H)      furosemide (LASIX) 20 MG tablet Take 1 tablet (20 mg) by mouth daily  Qty: 30 tablet, Refills: 0    Comments: Future refills by PCP Dr. Oralia Forrest with phone number [phone].  Associated Diagnoses: Pneumonia of left lower lobe due to infectious organism; Acute congestive heart failure, unspecified heart failure type (H)      potassium chloride ER (KLOR-CON M) 10 MEQ CR tablet Take 1 tablet (10 mEq) by mouth daily  Qty: 30 tablet, Refills: 0    Comments: Future refills by PCP Dr. Oralia Forrest with phone number [phone].  Associated Diagnoses: Acute congestive heart failure, unspecified heart failure type (H)       !! - Potential duplicate medications found. Please discuss with provider.        CONTINUE these medications which have NOT CHANGED    Details   !! acetaminophen (TYLENOL) 325 MG tablet Take 650 mg by mouth every 8 hours as needed for mild pain      aspirin (ASA) 325 MG EC tablet Take 325 mg by mouth daily      atorvastatin (LIPITOR) 40 MG tablet Take 1 tablet (40 mg) by mouth every evening  Qty: 30 tablet, Refills: 0    Associated Diagnoses: History of CVA (cerebrovascular accident)      carvedilol (COREG) 25 MG tablet Take 1 tablet (25 mg) by mouth 2 times daily (with meals)  Qty: 180 tablet, Refills: 3    Associated Diagnoses: Essential hypertension      Melatonin 10 MG TABS tablet Take 10 mg by mouth nightly as needed for sleep      metFORMIN (GLUCOPHAGE) 500 MG tablet Take 1 tablet (500 mg) by mouth 2 times daily (with meals)  Qty: 60 tablet, Refills: 0    Associated Diagnoses: Type 2 diabetes mellitus without complication, unspecified whether long term insulin use (H)      mirtazapine (REMERON) 15 MG  tablet Take 15 mg by mouth nightly as needed      risperiDONE (RISPERDAL) 0.5 MG tablet Take 0.5 mg by mouth 2 times daily as needed      polyethylene glycol (MIRALAX) 17 GM/Dose powder Take 17 g by mouth 2 times daily  Qty: 510 g, Refills: 4    Associated Diagnoses: Constipation, unspecified constipation type       !! - Potential duplicate medications found. Please discuss with provider.        STOP taking these medications       nitroglycerin (NITROSTAT) 0.4 MG SL tablet Comments:   Reason for Stopping:         senna-docusate (SENOKOT-S/PERICOLACE) 8.6-50 MG tablet Comments:   Reason for Stopping:             Allergies   Allergies   Allergen Reactions    Lisinopril     Morphine Other (See Comments)     confusion

## 2024-02-07 ENCOUNTER — PATIENT OUTREACH (OUTPATIENT)
Dept: CARE COORDINATION | Facility: CLINIC | Age: 89
End: 2024-02-07
Payer: COMMERCIAL

## 2024-02-07 NOTE — PROGRESS NOTES
[facility]    Background: Transitional Care Management program identified per system criteria and reviewed by [facility] [place] team for possible outreach.    Assessment: Upon chart review, [facility] Team member will not proceed with patient outreach related to this episode of Transitional Care Management program due to reason below:    Patient has a follow up appointment with an appropriate provider today for hospital discharge.    Plan: Transitional Care Management episode addressed appropriately per reason noted above.      Mari Landaverde MA  [facility] [place], [facility]    *Connected Care Resource Team does NOT follow patient ongoing. Referrals are identified based on internal discharge reports and the outreach is to ensure patient has an understanding of their discharge instructions.

## 2024-02-09 LAB
BACTERIA BLD CULT: NO GROWTH
BACTERIA BLD CULT: NO GROWTH

## 2024-04-01 ENCOUNTER — HOSPITAL ENCOUNTER (OUTPATIENT)
Facility: CLINIC | Age: 89
Setting detail: OBSERVATION
Discharge: HOME | End: 2024-04-02
Attending: EMERGENCY MEDICINE | Admitting: INTERNAL MEDICINE
Payer: COMMERCIAL

## 2024-04-01 ENCOUNTER — APPOINTMENT (OUTPATIENT)
Dept: CT IMAGING | Facility: CLINIC | Age: 89
End: 2024-04-01
Attending: EMERGENCY MEDICINE
Payer: COMMERCIAL

## 2024-04-01 ENCOUNTER — APPOINTMENT (OUTPATIENT)
Dept: GENERAL RADIOLOGY | Facility: CLINIC | Age: 89
End: 2024-04-01
Attending: EMERGENCY MEDICINE
Payer: COMMERCIAL

## 2024-04-01 DIAGNOSIS — N40.1 BENIGN PROSTATIC HYPERPLASIA WITH INCOMPLETE BLADDER EMPTYING: Primary | ICD-10-CM

## 2024-04-01 DIAGNOSIS — N12 PYELONEPHRITIS: ICD-10-CM

## 2024-04-01 DIAGNOSIS — R39.14 BENIGN PROSTATIC HYPERPLASIA WITH INCOMPLETE BLADDER EMPTYING: Primary | ICD-10-CM

## 2024-04-01 DIAGNOSIS — E11.65 UNCONTROLLED TYPE 2 DIABETES MELLITUS WITH HYPERGLYCEMIA (H): ICD-10-CM

## 2024-04-01 LAB
ALBUMIN SERPL BCG-MCNC: 3.5 G/DL (ref 3.5–5.2)
ALBUMIN UR-MCNC: NEGATIVE MG/DL
ALP SERPL-CCNC: 154 U/L (ref 40–150)
ALT SERPL W P-5'-P-CCNC: 23 U/L (ref 0–70)
ANION GAP SERPL CALCULATED.3IONS-SCNC: 12 MMOL/L (ref 7–15)
APPEARANCE UR: ABNORMAL
AST SERPL W P-5'-P-CCNC: 36 U/L (ref 0–45)
BASOPHILS # BLD AUTO: 0.1 10E3/UL (ref 0–0.2)
BASOPHILS NFR BLD AUTO: 1 %
BILIRUB DIRECT SERPL-MCNC: <0.2 MG/DL (ref 0–0.3)
BILIRUB SERPL-MCNC: 0.5 MG/DL
BILIRUB UR QL STRIP: NEGATIVE
BUN SERPL-MCNC: 22.8 MG/DL (ref 8–23)
CALCIUM SERPL-MCNC: 8.7 MG/DL (ref 8.8–10.2)
CHLORIDE SERPL-SCNC: 98 MMOL/L (ref 98–107)
COLOR UR AUTO: ABNORMAL
CREAT SERPL-MCNC: 0.92 MG/DL (ref 0.67–1.17)
D DIMER PPP FEU-MCNC: 0.57 UG/ML FEU (ref 0–0.5)
DEPRECATED HCO3 PLAS-SCNC: 25 MMOL/L (ref 22–29)
EGFRCR SERPLBLD CKD-EPI 2021: 80 ML/MIN/1.73M2
EOSINOPHIL # BLD AUTO: 0.2 10E3/UL (ref 0–0.7)
EOSINOPHIL NFR BLD AUTO: 3 %
ERYTHROCYTE [DISTWIDTH] IN BLOOD BY AUTOMATED COUNT: 13.2 % (ref 10–15)
GLUCOSE BLDC GLUCOMTR-MCNC: 251 MG/DL (ref 70–99)
GLUCOSE BLDC GLUCOMTR-MCNC: 308 MG/DL (ref 70–99)
GLUCOSE SERPL-MCNC: 489 MG/DL (ref 70–99)
GLUCOSE UR STRIP-MCNC: >=1000 MG/DL
HBA1C MFR BLD: 10.2 %
HCT VFR BLD AUTO: 40.1 % (ref 40–53)
HGB BLD-MCNC: 13.7 G/DL (ref 13.3–17.7)
HGB UR QL STRIP: ABNORMAL
IMM GRANULOCYTES # BLD: 0 10E3/UL
IMM GRANULOCYTES NFR BLD: 0 %
KETONES UR STRIP-MCNC: NEGATIVE MG/DL
LACTATE SERPL-SCNC: 1.7 MMOL/L (ref 0.7–2)
LEUKOCYTE ESTERASE UR QL STRIP: ABNORMAL
LIPASE SERPL-CCNC: 38 U/L (ref 13–60)
LYMPHOCYTES # BLD AUTO: 2.1 10E3/UL (ref 0.8–5.3)
LYMPHOCYTES NFR BLD AUTO: 27 %
MCH RBC QN AUTO: 30.9 PG (ref 26.5–33)
MCHC RBC AUTO-ENTMCNC: 34.2 G/DL (ref 31.5–36.5)
MCV RBC AUTO: 90 FL (ref 78–100)
MONOCYTES # BLD AUTO: 0.8 10E3/UL (ref 0–1.3)
MONOCYTES NFR BLD AUTO: 10 %
NEUTROPHILS # BLD AUTO: 4.8 10E3/UL (ref 1.6–8.3)
NEUTROPHILS NFR BLD AUTO: 59 %
NITRATE UR QL: NEGATIVE
NRBC # BLD AUTO: 0 10E3/UL
NRBC BLD AUTO-RTO: 0 /100
PH UR STRIP: 5.5 [PH] (ref 5–7)
PLATELET # BLD AUTO: 232 10E3/UL (ref 150–450)
POTASSIUM SERPL-SCNC: 3.8 MMOL/L (ref 3.4–5.3)
PROT SERPL-MCNC: 7 G/DL (ref 6.4–8.3)
RBC # BLD AUTO: 4.44 10E6/UL (ref 4.4–5.9)
RBC URINE: 18 /HPF
SODIUM SERPL-SCNC: 135 MMOL/L (ref 135–145)
SP GR UR STRIP: 1.01 (ref 1–1.03)
UROBILINOGEN UR STRIP-MCNC: NORMAL MG/DL
WBC # BLD AUTO: 8 10E3/UL (ref 4–11)
WBC URINE: >182 /HPF
YEAST #/AREA URNS HPF: ABNORMAL /HPF

## 2024-04-01 PROCEDURE — 96375 TX/PRO/DX INJ NEW DRUG ADDON: CPT

## 2024-04-01 PROCEDURE — 120N000001 HC R&B MED SURG/OB

## 2024-04-01 PROCEDURE — 96374 THER/PROPH/DIAG INJ IV PUSH: CPT

## 2024-04-01 PROCEDURE — 83605 ASSAY OF LACTIC ACID: CPT | Performed by: EMERGENCY MEDICINE

## 2024-04-01 PROCEDURE — 87086 URINE CULTURE/COLONY COUNT: CPT | Performed by: EMERGENCY MEDICINE

## 2024-04-01 PROCEDURE — 96361 HYDRATE IV INFUSION ADD-ON: CPT

## 2024-04-01 PROCEDURE — 85379 FIBRIN DEGRADATION QUANT: CPT | Performed by: EMERGENCY MEDICINE

## 2024-04-01 PROCEDURE — 82962 GLUCOSE BLOOD TEST: CPT

## 2024-04-01 PROCEDURE — 250N000009 HC RX 250: Performed by: EMERGENCY MEDICINE

## 2024-04-01 PROCEDURE — 71046 X-RAY EXAM CHEST 2 VIEWS: CPT

## 2024-04-01 PROCEDURE — 99285 EMERGENCY DEPT VISIT HI MDM: CPT | Mod: 25

## 2024-04-01 PROCEDURE — 99222 1ST HOSP IP/OBS MODERATE 55: CPT | Performed by: STUDENT IN AN ORGANIZED HEALTH CARE EDUCATION/TRAINING PROGRAM

## 2024-04-01 PROCEDURE — 250N000012 HC RX MED GY IP 250 OP 636 PS 637: Performed by: STUDENT IN AN ORGANIZED HEALTH CARE EDUCATION/TRAINING PROGRAM

## 2024-04-01 PROCEDURE — 250N000011 HC RX IP 250 OP 636: Performed by: EMERGENCY MEDICINE

## 2024-04-01 PROCEDURE — 82248 BILIRUBIN DIRECT: CPT | Performed by: EMERGENCY MEDICINE

## 2024-04-01 PROCEDURE — 87040 BLOOD CULTURE FOR BACTERIA: CPT | Performed by: EMERGENCY MEDICINE

## 2024-04-01 PROCEDURE — 83036 HEMOGLOBIN GLYCOSYLATED A1C: CPT | Performed by: STUDENT IN AN ORGANIZED HEALTH CARE EDUCATION/TRAINING PROGRAM

## 2024-04-01 PROCEDURE — 81001 URINALYSIS AUTO W/SCOPE: CPT | Performed by: EMERGENCY MEDICINE

## 2024-04-01 PROCEDURE — 36415 COLL VENOUS BLD VENIPUNCTURE: CPT | Performed by: EMERGENCY MEDICINE

## 2024-04-01 PROCEDURE — 85025 COMPLETE CBC W/AUTO DIFF WBC: CPT | Performed by: EMERGENCY MEDICINE

## 2024-04-01 PROCEDURE — 258N000003 HC RX IP 258 OP 636: Performed by: EMERGENCY MEDICINE

## 2024-04-01 PROCEDURE — 250N000013 HC RX MED GY IP 250 OP 250 PS 637: Performed by: EMERGENCY MEDICINE

## 2024-04-01 PROCEDURE — 74177 CT ABD & PELVIS W/CONTRAST: CPT

## 2024-04-01 PROCEDURE — 83690 ASSAY OF LIPASE: CPT | Performed by: EMERGENCY MEDICINE

## 2024-04-01 PROCEDURE — 80048 BASIC METABOLIC PNL TOTAL CA: CPT | Performed by: EMERGENCY MEDICINE

## 2024-04-01 RX ORDER — NALOXONE HYDROCHLORIDE 0.4 MG/ML
0.4 INJECTION, SOLUTION INTRAMUSCULAR; INTRAVENOUS; SUBCUTANEOUS
Status: DISCONTINUED | OUTPATIENT
Start: 2024-04-01 | End: 2024-04-02 | Stop reason: HOSPADM

## 2024-04-01 RX ORDER — LIDOCAINE 40 MG/G
CREAM TOPICAL
Status: DISCONTINUED | OUTPATIENT
Start: 2024-04-01 | End: 2024-04-02 | Stop reason: HOSPADM

## 2024-04-01 RX ORDER — ATORVASTATIN CALCIUM 40 MG/1
40 TABLET, FILM COATED ORAL EVERY EVENING
Status: DISCONTINUED | OUTPATIENT
Start: 2024-04-01 | End: 2024-04-02 | Stop reason: HOSPADM

## 2024-04-01 RX ORDER — CEFTRIAXONE 2 G/1
2 INJECTION, POWDER, FOR SOLUTION INTRAMUSCULAR; INTRAVENOUS ONCE
Status: COMPLETED | OUTPATIENT
Start: 2024-04-01 | End: 2024-04-01

## 2024-04-01 RX ORDER — CEFTRIAXONE 1 G/1
1 INJECTION, POWDER, FOR SOLUTION INTRAMUSCULAR; INTRAVENOUS EVERY 24 HOURS
Qty: 70 ML | Refills: 0 | Status: DISCONTINUED | OUTPATIENT
Start: 2024-04-02 | End: 2024-04-02

## 2024-04-01 RX ORDER — ONDANSETRON 4 MG/1
4 TABLET, ORALLY DISINTEGRATING ORAL EVERY 6 HOURS PRN
Status: DISCONTINUED | OUTPATIENT
Start: 2024-04-01 | End: 2024-04-02 | Stop reason: HOSPADM

## 2024-04-01 RX ORDER — NICOTINE POLACRILEX 4 MG
15-30 LOZENGE BUCCAL
Status: DISCONTINUED | OUTPATIENT
Start: 2024-04-01 | End: 2024-04-02 | Stop reason: HOSPADM

## 2024-04-01 RX ORDER — AMOXICILLIN 250 MG
2 CAPSULE ORAL 2 TIMES DAILY
Status: DISCONTINUED | OUTPATIENT
Start: 2024-04-01 | End: 2024-04-02 | Stop reason: HOSPADM

## 2024-04-01 RX ORDER — PROCHLORPERAZINE 25 MG
12.5 SUPPOSITORY, RECTAL RECTAL EVERY 12 HOURS PRN
Status: DISCONTINUED | OUTPATIENT
Start: 2024-04-01 | End: 2024-04-02 | Stop reason: HOSPADM

## 2024-04-01 RX ORDER — AMOXICILLIN 250 MG
2 CAPSULE ORAL 2 TIMES DAILY PRN
Status: DISCONTINUED | OUTPATIENT
Start: 2024-04-01 | End: 2024-04-02 | Stop reason: HOSPADM

## 2024-04-01 RX ORDER — KETOROLAC TROMETHAMINE 15 MG/ML
15 INJECTION, SOLUTION INTRAMUSCULAR; INTRAVENOUS ONCE
Status: COMPLETED | OUTPATIENT
Start: 2024-04-01 | End: 2024-04-01

## 2024-04-01 RX ORDER — CARVEDILOL 12.5 MG/1
25 TABLET ORAL 2 TIMES DAILY WITH MEALS
Status: DISCONTINUED | OUTPATIENT
Start: 2024-04-02 | End: 2024-04-02 | Stop reason: HOSPADM

## 2024-04-01 RX ORDER — FINASTERIDE 5 MG/1
5 TABLET, FILM COATED ORAL DAILY
COMMUNITY

## 2024-04-01 RX ORDER — ONDANSETRON 2 MG/ML
4 INJECTION INTRAMUSCULAR; INTRAVENOUS EVERY 6 HOURS PRN
Status: DISCONTINUED | OUTPATIENT
Start: 2024-04-01 | End: 2024-04-02 | Stop reason: HOSPADM

## 2024-04-01 RX ORDER — HYDROMORPHONE HYDROCHLORIDE 2 MG/1
2 TABLET ORAL EVERY 4 HOURS PRN
Status: DISCONTINUED | OUTPATIENT
Start: 2024-04-01 | End: 2024-04-02 | Stop reason: HOSPADM

## 2024-04-01 RX ORDER — AMOXICILLIN 250 MG
1 CAPSULE ORAL 2 TIMES DAILY
Status: DISCONTINUED | OUTPATIENT
Start: 2024-04-01 | End: 2024-04-02 | Stop reason: HOSPADM

## 2024-04-01 RX ORDER — ASPIRIN 325 MG
325 TABLET, DELAYED RELEASE (ENTERIC COATED) ORAL DAILY
Status: DISCONTINUED | OUTPATIENT
Start: 2024-04-02 | End: 2024-04-02 | Stop reason: HOSPADM

## 2024-04-01 RX ORDER — IOPAMIDOL 755 MG/ML
500 INJECTION, SOLUTION INTRAVASCULAR ONCE
Status: COMPLETED | OUTPATIENT
Start: 2024-04-01 | End: 2024-04-01

## 2024-04-01 RX ORDER — ACETAMINOPHEN 500 MG
1000 TABLET ORAL ONCE
Status: COMPLETED | OUTPATIENT
Start: 2024-04-01 | End: 2024-04-01

## 2024-04-01 RX ORDER — DEXTROSE MONOHYDRATE 25 G/50ML
25-50 INJECTION, SOLUTION INTRAVENOUS
Status: DISCONTINUED | OUTPATIENT
Start: 2024-04-01 | End: 2024-04-01

## 2024-04-01 RX ORDER — NALOXONE HYDROCHLORIDE 0.4 MG/ML
0.2 INJECTION, SOLUTION INTRAMUSCULAR; INTRAVENOUS; SUBCUTANEOUS
Status: DISCONTINUED | OUTPATIENT
Start: 2024-04-01 | End: 2024-04-02 | Stop reason: HOSPADM

## 2024-04-01 RX ORDER — RISPERIDONE 0.5 MG/1
0.5 TABLET ORAL 2 TIMES DAILY PRN
Status: DISCONTINUED | OUTPATIENT
Start: 2024-04-01 | End: 2024-04-01

## 2024-04-01 RX ORDER — AMOXICILLIN 250 MG
1 CAPSULE ORAL 2 TIMES DAILY PRN
Status: DISCONTINUED | OUTPATIENT
Start: 2024-04-01 | End: 2024-04-02 | Stop reason: HOSPADM

## 2024-04-01 RX ORDER — NICOTINE POLACRILEX 4 MG
15-30 LOZENGE BUCCAL
Status: DISCONTINUED | OUTPATIENT
Start: 2024-04-01 | End: 2024-04-01

## 2024-04-01 RX ORDER — SODIUM CHLORIDE, SODIUM LACTATE, POTASSIUM CHLORIDE, CALCIUM CHLORIDE 600; 310; 30; 20 MG/100ML; MG/100ML; MG/100ML; MG/100ML
INJECTION, SOLUTION INTRAVENOUS CONTINUOUS
Status: DISCONTINUED | OUTPATIENT
Start: 2024-04-01 | End: 2024-04-02 | Stop reason: HOSPADM

## 2024-04-01 RX ORDER — MIRTAZAPINE 15 MG/1
15 TABLET, FILM COATED ORAL
Status: DISCONTINUED | OUTPATIENT
Start: 2024-04-01 | End: 2024-04-02 | Stop reason: HOSPADM

## 2024-04-01 RX ORDER — PROCHLORPERAZINE MALEATE 5 MG
5 TABLET ORAL EVERY 6 HOURS PRN
Status: DISCONTINUED | OUTPATIENT
Start: 2024-04-01 | End: 2024-04-02 | Stop reason: HOSPADM

## 2024-04-01 RX ORDER — TAMSULOSIN HYDROCHLORIDE 0.4 MG/1
0.4 CAPSULE ORAL DAILY
Status: DISCONTINUED | OUTPATIENT
Start: 2024-04-02 | End: 2024-04-02 | Stop reason: HOSPADM

## 2024-04-01 RX ORDER — ACETAMINOPHEN 325 MG/1
650 TABLET ORAL EVERY 8 HOURS PRN
Status: DISCONTINUED | OUTPATIENT
Start: 2024-04-01 | End: 2024-04-02 | Stop reason: HOSPADM

## 2024-04-01 RX ORDER — DEXTROSE MONOHYDRATE 25 G/50ML
25-50 INJECTION, SOLUTION INTRAVENOUS
Status: DISCONTINUED | OUTPATIENT
Start: 2024-04-01 | End: 2024-04-02 | Stop reason: HOSPADM

## 2024-04-01 RX ORDER — CALCIUM CARBONATE 500 MG/1
1000 TABLET, CHEWABLE ORAL 4 TIMES DAILY PRN
Status: DISCONTINUED | OUTPATIENT
Start: 2024-04-01 | End: 2024-04-02 | Stop reason: HOSPADM

## 2024-04-01 RX ADMIN — SODIUM CHLORIDE 250 ML: 9 INJECTION, SOLUTION INTRAVENOUS at 15:28

## 2024-04-01 RX ADMIN — KETOROLAC TROMETHAMINE 15 MG: 15 INJECTION, SOLUTION INTRAMUSCULAR; INTRAVENOUS at 15:24

## 2024-04-01 RX ADMIN — SODIUM CHLORIDE 250 ML: 9 INJECTION, SOLUTION INTRAVENOUS at 18:48

## 2024-04-01 RX ADMIN — SODIUM CHLORIDE 64 ML: 9 INJECTION, SOLUTION INTRAVENOUS at 17:19

## 2024-04-01 RX ADMIN — IOPAMIDOL 94 ML: 755 INJECTION, SOLUTION INTRAVENOUS at 17:19

## 2024-04-01 RX ADMIN — CEFTRIAXONE 2 G: 2 INJECTION, POWDER, FOR SOLUTION INTRAMUSCULAR; INTRAVENOUS at 20:15

## 2024-04-01 RX ADMIN — INSULIN ASPART 5 UNITS: 100 INJECTION, SOLUTION INTRAVENOUS; SUBCUTANEOUS at 20:30

## 2024-04-01 RX ADMIN — ACETAMINOPHEN 1000 MG: 500 TABLET, FILM COATED ORAL at 18:47

## 2024-04-01 ASSESSMENT — ACTIVITIES OF DAILY LIVING (ADL)
ADLS_ACUITY_SCORE: 41

## 2024-04-01 NOTE — ED PROVIDER NOTES
History     Chief Complaint:  Flank Pain     The history is provided by the patient and a relative. The history is limited by a language barrier. No  was used (Declines and prefers son to interpret).      Adam Huber is an 88 year old male with history of dementia, diabetes, and heart failure who presents with his son for evaluation of flank pain.  He describes 3 to 4 days of right-sided flank pain for which his son gave him Tylenol about 5 hours prior to arrival.  The pain was preventing him from sleeping last night.  He currently denies dysuria although his son tells me he did endorse this earlier.  He has not had fever, cough, vomiting, or dyspnea as far as the son is aware.  He seems to be at his baseline mental status.  He has not had any known falls.    Independent Historian:   Son - They report as above and serve as an .    Review of External Notes:   [facility] for CHF, pneumonia of left lower lobe, hypoxia, and encephalopathy.     Medications:    Aspirin 325 mg  Atorvastatin  Carvedilol  Finasteride  Insulin glargine  Metformin  Furosemide  Mirtazapine    Past Medical History:    Pulmonary nodules  T12 compression fracture  Late onset Alzheimer's disease  Hyperlipidemia  Type 2 diabetes mellitus  CAD  Hypertension  Chronic headache   Gait instability  Insomnia  MDD  Dupuytren's contracture  CVA x2  NSTEMI  BPH  Syncope  Hearing loss  Heart failure  Hemiplegia, right dominant side  ASHD    Past Surgical History:    TURP  Cholecystectomy  PCI w/ LEXI to RCA and mid-CFX  Prostatectomy  Cataract removal, right    Physical Exam   Patient Vitals for the past 24 hrs:   BP Temp Temp src Pulse Resp SpO2   04/01/24 1457 127/66 -- -- -- -- 92 %   04/01/24 1349 -- 98  F (36.7  C) Oral -- 24 --   04/01/24 1348 -- -- -- -- -- 97 %   04/01/24 1347 -- -- -- -- -- 95 %   04/01/24 1346 (!) 172/73 -- -- 62 -- --     Physical  Exam  General: Well-developed and well-nourished. Well appearing elderly Afghan man. Cooperative.  Head:  Atraumatic.  Eyes:  Conjunctivae, lids, and sclerae are normal.  ENT:    Normal nose. Moist mucous membranes.  Neck:  Supple. Normal range of motion.  CV:  Regular rate and rhythm. Normal heart sounds with no murmurs, rubs, or gallops detected.  Resp:  No respiratory distress. Clear to auscultation bilaterally without decreased breath sounds, wheezing, rales, or rhonchi.  GI:  Soft. Non-distended. Right CVA tenderness.  Mild right lower quadrant tenderness.    MS:  Normal ROM.   Skin:  Warm. Non-diaphoretic. No pallor.  Neuro:  Awake and alert. Normal strength.  Psych: Normal mood and affect. Normal speech.  Vitals reviewed.    Emergency Department Course   Imaging:  CT Abdomen Pelvis w Contrast   Final Result   IMPRESSION:    1.  There is bladder wall hyperenhancement and perivesicular fat stranding consistent with cystitis.      2.  Numerous bladder diverticula consistent with chronic outlet obstruction.      3.  Cirrhotic liver morphology.      4.  Patchy right lower lobe opacities, likely infectious or inflammatory.      XR Chest 2 Views    (Results Pending)      Report per radiology    Laboratory:  Labs Ordered and Resulted from Time of ED Arrival to Time of ED Departure   ROUTINE UA WITH MICROSCOPIC REFLEX TO CULTURE - Abnormal       Result Value    Color Urine Light Yellow      Appearance Urine Slightly Cloudy (*)     Glucose Urine >=1000 (*)     Bilirubin Urine Negative      Ketones Urine Negative      Specific Gravity Urine 1.012      Blood Urine Moderate (*)     pH Urine 5.5      Protein Albumin Urine Negative      Urobilinogen Urine Normal      Nitrite Urine Negative      Leukocyte Esterase Urine Large (*)     Budding Yeast Urine Many (*)     RBC Urine 18 (*)     WBC Urine >182 (*)    BASIC METABOLIC PANEL - Abnormal    Sodium 135      Potassium 3.8      Chloride 98      Carbon Dioxide (CO2) 25       Anion Gap 12      Urea Nitrogen 22.8      Creatinine 0.92      GFR Estimate 80      Calcium 8.7 (*)     Glucose 489 (*)    HEPATIC FUNCTION PANEL - Abnormal    Protein Total 7.0      Albumin 3.5      Bilirubin Total 0.5      Alkaline Phosphatase 154 (*)     AST 36      ALT 23      Bilirubin Direct <0.20     D DIMER QUANTITATIVE - Abnormal    D-Dimer Quantitative 0.57 (*)    GLUCOSE BY METER - Abnormal    GLUCOSE BY METER POCT 308 (*)    LIPASE - Normal    Lipase 38     CBC WITH PLATELETS AND DIFFERENTIAL    WBC Count 8.0      RBC Count 4.44      Hemoglobin 13.7      Hematocrit 40.1      MCV 90      MCH 30.9      MCHC 34.2      RDW 13.2      Platelet Count 232      % Neutrophils 59      % Lymphocytes 27      % Monocytes 10      % Eosinophils 3      % Basophils 1      % Immature Granulocytes 0      NRBCs per 100 WBC 0      Absolute Neutrophils 4.8      Absolute Lymphocytes 2.1      Absolute Monocytes 0.8      Absolute Eosinophils 0.2      Absolute Basophils 0.1      Absolute Immature Granulocytes 0.0      Absolute NRBCs 0.0     LACTIC ACID WHOLE BLOOD   URINE CULTURE   BLOOD CULTURE   BLOOD CULTURE      Emergency Department Course & Assessments:  Assessments/Consultations/Discussion of Management or Tests:  1430 My medical student, Madelyn, obtained history and examined the patient.   1446 I obtained history and examined the patient as noted above.   1820 I updated the patient and his son. They are comfortable with observation.  1903 I consulted with Dr. Ren, hospitalist, who accepts admission and has no further orders.    Interventions:  Medications   cefTRIAXone (ROCEPHIN) 2 g vial to attach to  ml bag for ADULTS or NS 50 ml bag for PEDS (2 g Intravenous Not Given 4/1/24 1911)   ketorolac (TORADOL) injection 15 mg (15 mg Intravenous $Given 4/1/24 1524)   sodium chloride 0.9% BOLUS 250 mL (0 mLs Intravenous Stopped 4/1/24 1615)   sodium chloride 0.9% BOLUS 250 mL (250 mLs Intravenous $New Bag 4/1/24 1848)    acetaminophen (TYLENOL) tablet 1,000 mg (1,000 mg Oral $Given 4/1/24 6381)      Independent Interpretation (X-rays, CTs, rhythm strip):  I independently interpreted the CT and do not see evidence of hydronephrosis.    Social Determinants of Health affecting care:   Does not speak English  Supportive son    Disposition:  The patient was admitted to the hospital under the care of Dr. Ren.     Impression & Plan    Medical Decision Making:  Adam is an 88 year old man with dementia brought in by his son for right flank pain.  At 1 point he endorsed dysuria but no other symptoms are noted although history is limited by dementia and language barrier.  On exam he has tenderness in the right CVA as well as mild right lower quadrant tenderness.    I considered pulmonary embolism as a cause for his pain but D-dimer is negative when adjusted for age.  He was sent for CT of the abdomen and pelvis which reveals bladder wall hyperenhancement and perivesicular fat stranding as well as diverticula consistent with chronic outlet obstruction and cystitis.  Incidental cirrhotic liver is noted.  I have also ordered a chest x-ray which is pending at the end of my shift as the CT shows patchy right lower lobe opacities.  I am not convinced he has pneumonia as his son denies any cough.  As expected his urinalysis is abnormal with greater than 182 white blood cells per high-powered field.  He does not have leukocytosis, kidney injury, or other concerning features outside of significant hyperglycemia to 489 without DKA.  I have ordered a lactic acid as well as blood and urine cultures.  We are having a hard time obtaining these studies but will initiate Rocephin after they are obtained.  He is getting gentle fluid boluses as his most recent admission was thought to be complicated by heart failure.  He was otherwise given Tylenol and Toradol for pain.    I discussed the patient's case with Dr. Ren, hospitalist, who accepts admission  and has no further orders.  The patient's son was updated on findings and plan and all of his questions were answered.    Diagnosis:    ICD-10-CM    1. Pyelonephritis  N12       2. Uncontrolled type 2 diabetes mellitus with hyperglycemia (H)  E11.65           Scribe Disclosure:  ESPERANZA, Keya Regan, am serving as a scribe at 2:27 PM on 4/1/2024 to document services personally performed by Ana Rachel MD based on my observations and the provider's statements to me.  4/1/2024   Ana Rachel MD Dixson, Kylie S, MD  04/02/24 1041

## 2024-04-01 NOTE — ED TRIAGE NOTES
"Pt comes from home, BIBA. Pt is complaining of right sided flank pain that has been ongoing over a week. Pt called it \"liver problems.\" Pt also stated it hurt to pee.      was used, at times, Pt just didn't answer .         "

## 2024-04-01 NOTE — H&P
[facility]    History and Physical - Hospitalist Service       Date of Admission:  4/1/2024    Assessment & Plan      Adam Huber is a 88 year old male admitted on 4/1/2024.     He is Austrian-speaking and history is provided by his son who is at bedside.  He has history of diabetes, hypertension, hyperlipidemia, dementia and coronary artery disease who lives independently here with his wife.  He was brought in by his son today because of 3 days history of right flank pain.  He also has noted occasional problem emptying his bladder but denies any dysuria.  He denies any fevers, rigors or chills.  He denies any cough, expectoration or chest pain.    Vitals on arrival: Temperature 98  F, heart rate 62/min, blood pressure 172/73 which improved to 127/66, respiratory rate of 24 and oxygen saturation of 95% on room air.    CMP was unremarkable except glucose of 489, anion gap was 12.  CBC was unremarkable.  D-dimer was 0.57 which age-adjusted is within normal limits.  UA showed more than 182 WBCs with large leukocyte esterase and negative nitrite.  There are also many yeast.  CT of the abdomen showed bladder wall hyperenhancement and eugenio-vesicular fat stranding consistent with cystitis and numerous bladder diverticula consistent with chronic outlet obstruction.  He also has cirrhotic looking liver and patchy right lower lobe opacities.      Right flank pain.  Likely BPH.  Likely has pyelonephritis.  CT did not show any perinephric stranding or ureteric stone but showed evidence of cystitis as well as bladder diverticula suggesting chronic bladder outlet obstruction.  Will treat with ceftriaxone 2 g daily.  Follow-up on blood and urine cultures.  Chest x-ray is pending.  Abdominal CT showed right basilar infiltrates but patient does not have any respiratory symptoms.  Nevertheless the patient is on ceftriaxone.  Start Flomax 0.4 mg daily.  Check postvoid residuals.  Follow-up with urology if  symptoms persist in spite of being on Flomax.    Diabetes mellitus type II with hyperglycemia.  Initial blood glucose of 489 with anion gap of 12.  HbA1c 1 month ago was 7.5.  Hyperglycemia is likely due to acute last.  Prior to admission on metformin which will be held.  Will cover with sliding scale insulin while inpatient.    History of coronary artery disease.  Hypertension.  Had PCI of mid circumflex with drug-eluting stent in 2015.  Continue prior to admission medications including aspirin, Lipitor and carvedilol.  Hold Lasix.  Will give cautious IV fluids at 50 mill per hour.    Dementia.  At baseline patient needs help with his ADLs like showering and dressing from his son.  He usually gets agitated if woken up at night and as per son's request, will not check vitals at night.          Diet:  Diabetic diet  DVT Prophylaxis: Pneumatic Compression Devices  Villa Catheter: Not present  Lines: None     Cardiac Monitoring: None  Code Status:  DNR/DNI I discussed this with the patient's son at bedside.    Clinically Significant Risk Factors Present on Admission                # Drug Induced Platelet Defect: home medication list includes an antiplatelet medication   # Hypertension: Noted on problem list  # Chronic heart failure with preserved ejection fraction: heart failure noted on problem list and last echo with EF >50%  # Dementia: noted on problem list   # DMII: A1C = 7.5 % (Ref range: <5.7 %) within past 6 months        # Financial/Environmental Concerns:           Disposition Plan           Frank Ren MD  Hospitalist Service  [facility]  Securely message with [employer] (more info)  Text page via [employer] Paging/Directory     ______________________________________________________________________    Chief Complaint   Right flank pain.    History is obtained from the patient's son at bedside.    History of Present Illness   Adam Huber is a 88 year old male admitted on 4/1/2024.     He  is [place]-speaking and history is provided by his son who is at bedside.  He has history of diabetes, hypertension, hyperlipidemia, dementia and coronary artery disease who lives independently here with his wife.  He was brought in by his son today because of 3 days history of right flank pain.  He also has noted occasional problem emptying his bladder but denies any dysuria.  He denies any fevers, rigors or chills.  He denies any cough, expectoration or chest pain.    Vitals on arrival: Temperature 98  F, heart rate 62/min, blood pressure 172/73 which improved to 127/66, respiratory rate of 24 and oxygen saturation of 95% on room air.    CMP was unremarkable except glucose of 489, anion gap was 12.  CBC was unremarkable.  D-dimer was 0.57 which age-adjusted is within normal limits.  UA showed more than 182 WBCs with large leukocyte esterase and negative nitrite.  There are also many yeast.  CT of the abdomen showed bladder wall hyperenhancement and eugenio-vesicular fat stranding consistent with cystitis and numerous bladder diverticula consistent with chronic outlet obstruction.  He also has cirrhotic looking liver and patchy right lower lobe opacities.      Past Medical History    Past Medical History:   Diagnosis Date    Acute chest pain 10/23/2015    CAD (coronary artery disease)     Essential hypertension 10/23/2015    History of CVA (cerebrovascular accident) 10/23/2015    HLD (hyperlipidemia)     HTN (hypertension)     NSTEMI (non-ST elevated myocardial infarction) (H) 10/23/2015    Post PTCA 10-    Successful PCI of culprit proximal to mid RCA with placement of a    Post PTCA 11-6-2015    pci of complex mid CFX-hector       Past Surgical History   Past Surgical History:   Procedure Laterality Date    CHOLECYSTECTOMY      GENITOURINARY SURGERY      prostate removal     HEART CATH LEFT HEART CATH  10/12/2015    PCI w/ HECTOR to RCA    HEART CATH RIGHT AND LEFT HEART CATH  11/6/2015    PCI w/ HECTOR to mid-CFX        Prior to Admission Medications   Prior to Admission Medications   Prescriptions Last Dose Informant Patient Reported? Taking?   Melatonin 10 MG TABS tablet  Spouse/Significant Other Yes No   Sig: Take 10 mg by mouth nightly as needed for sleep   acetaminophen (TYLENOL) 325 MG tablet  Spouse/Significant Other Yes No   Sig: Take 650 mg by mouth every 8 hours as needed for mild pain   acetaminophen (TYLENOL) 325 MG tablet   No No   Sig: Take 2 tablets (650 mg) by mouth every 4 hours as needed for mild pain or other (and adjunct with moderate or severe pain or per patient request)   aspirin (ASA) 325 MG EC tablet  Spouse/Significant Other Yes No   Sig: Take 325 mg by mouth daily   atorvastatin (LIPITOR) 40 MG tablet  Spouse/Significant Other No No   Sig: Take 1 tablet (40 mg) by mouth every evening   carvedilol (COREG) 25 MG tablet  Spouse/Significant Other No No   Sig: Take 1 tablet (25 mg) by mouth 2 times daily (with meals)   furosemide (LASIX) 20 MG tablet   No No   Sig: Take 1 tablet (20 mg) by mouth daily   metFORMIN (GLUCOPHAGE) 500 MG tablet  Spouse/Significant Other No No   Sig: Take 1 tablet (500 mg) by mouth 2 times daily (with meals)   mirtazapine (REMERON) 15 MG tablet  Spouse/Significant Other Yes No   Sig: Take 15 mg by mouth nightly as needed   polyethylene glycol (MIRALAX) 17 GM/Dose powder  Spouse/Significant Other No No   Sig: Take 17 g by mouth 2 times daily   Patient not taking: Reported on 2/4/2024   potassium chloride ER (KLOR-CON M) 10 MEQ CR tablet   No No   Sig: Take 1 tablet (10 mEq) by mouth daily   risperiDONE (RISPERDAL) 0.5 MG tablet  Spouse/Significant Other Yes No   Sig: Take 0.5 mg by mouth 2 times daily as needed      Facility-Administered Medications: None        Review of Systems    The 10 point Review of Systems is negative other than noted in the HPI or here.      Physical Exam   Vital Signs: Temp: 98  F (36.7  C) Temp src: Oral BP: 127/66 Pulse: 62   Resp: 24 SpO2: 92 %       Weight: 0 lbs 0 oz    General Appearance: Patient sitting up in the bed.  Awake and appears comfortable.  Answers questions to the son and [place].  Respiratory: Clear to auscultation bilaterally.  Cardiovascular: S1-S2 normal.  GI: Mild suprapubic tenderness.  No tenderness in the right flank.  Skin: No rash.  Other: No edema.    Medical Decision Making       MANAGEMENT DISCUSSED with the following over the past 24 hours: Patient and son       Data     I have personally reviewed the following data over the past 24 hrs:    8.0  \   13.7   / 232     135 98 22.8 /  308 (H)   3.8 25 0.92 \     ALT: 23 AST: 36 AP: 154 (H) TBILI: 0.5   ALB: 3.5 TOT PROTEIN: 7.0 LIPASE: 38     TSH: N/A T4: N/A A1C: 10.2 (H)     Procal: N/A CRP: N/A Lactic Acid: 1.7       INR:  N/A PTT:  N/A   D-dimer:  0.57 (H) Fibrinogen:  N/A       Imaging results reviewed over the past 24 hrs:   Recent Results (from the past 24 hour(s))   CT Abdomen Pelvis w Contrast    Narrative    EXAM: CT ABDOMEN PELVIS W CONTRAST  LOCATION: [facility]  DATE: 4/1/2024    INDICATION: right flank pain, rlq tenderness  COMPARISON: CT abdomen and pelvis 09/17/2023, 09/22/2021  TECHNIQUE: CT scan of the abdomen and pelvis was performed following injection of IV contrast. Multiplanar reformats were obtained. Dose reduction techniques were used.  CONTRAST: 94mL Isovue 370    FINDINGS:   LOWER CHEST: Patchy opacities in the right lung base.    HEPATOBILIARY: Cirrhotic liver morphology. Cholecystectomy. A small hyperenhancing lesion in the left hepatic lobe is unchanged since 2021 and likely benign.    PANCREAS: Atrophic.    SPLEEN: Normal.    ADRENAL GLANDS: Normal.    KIDNEYS/BLADDER: No significant mass, stones, or hydronephrosis. There are simple or benign cysts. No follow up is needed. There are numerous bladder diverticula consistent with chronic outlet obstruction. There is bladder wall hyperenhancement and   perivesicular fat stranding  consistent with cystitis.    BOWEL: Diverticulosis of the colon. No acute inflammatory change. No obstruction.     LYMPH NODES: Normal.    VASCULATURE: Extensive atherosclerotic calcifications.    PELVIC ORGANS: Prostatomegaly.    MUSCULOSKELETAL: Unremarkable      Impression    IMPRESSION:   1.  There is bladder wall hyperenhancement and perivesicular fat stranding consistent with cystitis.    2.  Numerous bladder diverticula consistent with chronic outlet obstruction.    3.  Cirrhotic liver morphology.    4.  Patchy right lower lobe opacities, likely infectious or inflammatory.

## 2024-04-02 VITALS
DIASTOLIC BLOOD PRESSURE: 64 MMHG | SYSTOLIC BLOOD PRESSURE: 113 MMHG | BODY MASS INDEX: 22.97 KG/M2 | WEIGHT: 184.75 LBS | HEART RATE: 74 BPM | TEMPERATURE: 97.6 F | RESPIRATION RATE: 20 BRPM | OXYGEN SATURATION: 93 % | HEIGHT: 75 IN

## 2024-04-02 PROBLEM — E11.65 UNCONTROLLED TYPE 2 DIABETES MELLITUS WITH HYPERGLYCEMIA (H): Status: ACTIVE | Noted: 2024-04-02

## 2024-04-02 LAB
ANION GAP SERPL CALCULATED.3IONS-SCNC: 14 MMOL/L (ref 7–15)
BACTERIA UR CULT: NO GROWTH
BASOPHILS # BLD AUTO: 0.1 10E3/UL (ref 0–0.2)
BASOPHILS NFR BLD AUTO: 1 %
BUN SERPL-MCNC: 23.1 MG/DL (ref 8–23)
CALCIUM SERPL-MCNC: 8.7 MG/DL (ref 8.8–10.2)
CHLORIDE SERPL-SCNC: 102 MMOL/L (ref 98–107)
CREAT SERPL-MCNC: 0.87 MG/DL (ref 0.67–1.17)
DEPRECATED HCO3 PLAS-SCNC: 25 MMOL/L (ref 22–29)
EGFRCR SERPLBLD CKD-EPI 2021: 83 ML/MIN/1.73M2
EOSINOPHIL # BLD AUTO: 0.3 10E3/UL (ref 0–0.7)
EOSINOPHIL NFR BLD AUTO: 3 %
ERYTHROCYTE [DISTWIDTH] IN BLOOD BY AUTOMATED COUNT: 13.3 % (ref 10–15)
GLUCOSE BLDC GLUCOMTR-MCNC: 218 MG/DL (ref 70–99)
GLUCOSE BLDC GLUCOMTR-MCNC: 223 MG/DL (ref 70–99)
GLUCOSE BLDC GLUCOMTR-MCNC: 306 MG/DL (ref 70–99)
GLUCOSE SERPL-MCNC: 221 MG/DL (ref 70–99)
HCT VFR BLD AUTO: 43.8 % (ref 40–53)
HGB BLD-MCNC: 14.9 G/DL (ref 13.3–17.7)
IMM GRANULOCYTES # BLD: 0.1 10E3/UL
IMM GRANULOCYTES NFR BLD: 0 %
LYMPHOCYTES # BLD AUTO: 1.8 10E3/UL (ref 0.8–5.3)
LYMPHOCYTES NFR BLD AUTO: 16 %
MAGNESIUM SERPL-MCNC: 1.5 MG/DL (ref 1.7–2.3)
MCH RBC QN AUTO: 31.1 PG (ref 26.5–33)
MCHC RBC AUTO-ENTMCNC: 34 G/DL (ref 31.5–36.5)
MCV RBC AUTO: 91 FL (ref 78–100)
MONOCYTES # BLD AUTO: 1 10E3/UL (ref 0–1.3)
MONOCYTES NFR BLD AUTO: 8 %
NEUTROPHILS # BLD AUTO: 8.1 10E3/UL (ref 1.6–8.3)
NEUTROPHILS NFR BLD AUTO: 72 %
NRBC # BLD AUTO: 0 10E3/UL
NRBC BLD AUTO-RTO: 0 /100
PLATELET # BLD AUTO: 236 10E3/UL (ref 150–450)
POTASSIUM SERPL-SCNC: 3.7 MMOL/L (ref 3.4–5.3)
RBC # BLD AUTO: 4.79 10E6/UL (ref 4.4–5.9)
SODIUM SERPL-SCNC: 141 MMOL/L (ref 135–145)
WBC # BLD AUTO: 11.3 10E3/UL (ref 4–11)

## 2024-04-02 PROCEDURE — 80048 BASIC METABOLIC PNL TOTAL CA: CPT | Performed by: STUDENT IN AN ORGANIZED HEALTH CARE EDUCATION/TRAINING PROGRAM

## 2024-04-02 PROCEDURE — 85025 COMPLETE CBC W/AUTO DIFF WBC: CPT | Performed by: STUDENT IN AN ORGANIZED HEALTH CARE EDUCATION/TRAINING PROGRAM

## 2024-04-02 PROCEDURE — 83735 ASSAY OF MAGNESIUM: CPT | Performed by: STUDENT IN AN ORGANIZED HEALTH CARE EDUCATION/TRAINING PROGRAM

## 2024-04-02 PROCEDURE — 258N000003 HC RX IP 258 OP 636: Performed by: STUDENT IN AN ORGANIZED HEALTH CARE EDUCATION/TRAINING PROGRAM

## 2024-04-02 PROCEDURE — 250N000013 HC RX MED GY IP 250 OP 250 PS 637: Performed by: INTERNAL MEDICINE

## 2024-04-02 PROCEDURE — 82962 GLUCOSE BLOOD TEST: CPT

## 2024-04-02 PROCEDURE — G0378 HOSPITAL OBSERVATION PER HR: HCPCS

## 2024-04-02 PROCEDURE — 250N000013 HC RX MED GY IP 250 OP 250 PS 637: Performed by: STUDENT IN AN ORGANIZED HEALTH CARE EDUCATION/TRAINING PROGRAM

## 2024-04-02 PROCEDURE — 36415 COLL VENOUS BLD VENIPUNCTURE: CPT | Performed by: STUDENT IN AN ORGANIZED HEALTH CARE EDUCATION/TRAINING PROGRAM

## 2024-04-02 PROCEDURE — 99239 HOSP IP/OBS DSCHRG MGMT >30: CPT | Performed by: INTERNAL MEDICINE

## 2024-04-02 RX ORDER — CEFDINIR 300 MG/1
300 CAPSULE ORAL EVERY 12 HOURS SCHEDULED
Status: DISCONTINUED | OUTPATIENT
Start: 2024-04-02 | End: 2024-04-02 | Stop reason: HOSPADM

## 2024-04-02 RX ORDER — TAMSULOSIN HYDROCHLORIDE 0.4 MG/1
0.4 CAPSULE ORAL DAILY
Qty: 30 CAPSULE | Refills: 0 | Status: SHIPPED | OUTPATIENT
Start: 2024-04-02 | End: 2024-05-02

## 2024-04-02 RX ORDER — CEFDINIR 300 MG/1
300 CAPSULE ORAL EVERY 12 HOURS
Qty: 14 CAPSULE | Refills: 0 | Status: SHIPPED | OUTPATIENT
Start: 2024-04-02 | End: 2024-04-09

## 2024-04-02 RX ADMIN — SODIUM CHLORIDE, POTASSIUM CHLORIDE, SODIUM LACTATE AND CALCIUM CHLORIDE: 600; 310; 30; 20 INJECTION, SOLUTION INTRAVENOUS at 00:36

## 2024-04-02 RX ADMIN — CARVEDILOL 25 MG: 12.5 TABLET, FILM COATED ORAL at 10:12

## 2024-04-02 RX ADMIN — ASPIRIN 325 MG: 325 TABLET ORAL at 10:12

## 2024-04-02 RX ADMIN — SENNOSIDES AND DOCUSATE SODIUM 1 TABLET: 8.6; 5 TABLET ORAL at 00:30

## 2024-04-02 RX ADMIN — SENNOSIDES AND DOCUSATE SODIUM 1 TABLET: 8.6; 5 TABLET ORAL at 10:11

## 2024-04-02 RX ADMIN — CEFDINIR 300 MG: 300 CAPSULE ORAL at 10:11

## 2024-04-02 RX ADMIN — TAMSULOSIN HYDROCHLORIDE 0.4 MG: 0.4 CAPSULE ORAL at 10:12

## 2024-04-02 RX ADMIN — ATORVASTATIN CALCIUM 40 MG: 40 TABLET, FILM COATED ORAL at 00:31

## 2024-04-02 ASSESSMENT — ACTIVITIES OF DAILY LIVING (ADL)
ADLS_ACUITY_SCORE: 58
ADLS_ACUITY_SCORE: 43
ADLS_ACUITY_SCORE: 58
ADLS_ACUITY_SCORE: 43
ADLS_ACUITY_SCORE: 58
ADLS_ACUITY_SCORE: 43
ADLS_ACUITY_SCORE: 57
ADLS_ACUITY_SCORE: 41
ADLS_ACUITY_SCORE: 43

## 2024-04-02 NOTE — PLAN OF CARE
"Goal Outcome Evaluation    Plan of Care Reviewed With: patient    Overall Patient Progress: no change    Outcome Evaluation: Non verbal indication of pain absent. Rest / Sleep promoted.    Arrived around 2100 PM. A/O with intermittent confusion. VSS on RA. Burmese speaking. Denied pain. Encouraged to ask help; reoriented. Not OOB yet; urinal @ bedside. Voided before settling in the room.  helped with communication. @ 5000 AM; patient became agitated, got his IV off, and refused nursing management to be done.    Problem: Adult Inpatient Plan of Care  Goal: Plan of Care Review  Description: The Plan of Care Review/Shift note should be completed every shift.  The Outcome Evaluation is a brief statement about your assessment that the patient is improving, declining, or no change.  This information will be displayed automatically on your shift  note.  4/2/2024 0342 by Niki Elizabeth RN  Outcome: Progressing  Flowsheets (Taken 4/2/2024 0341)  Outcome Evaluation: Non verbal indication of pain absent. Rest / Sleep promoted.  Plan of Care Reviewed With: patient  Overall Patient Progress: no change  4/2/2024 0340 by Niki Elizabeth, RN  Outcome: Progressing  Flowsheets (Taken 4/2/2024 0340)  Plan of Care Reviewed With: patient  Overall Patient Progress: no change  4/2/2024 0339 by Niki Elizabeth, RN  Outcome: Progressing  Flowsheets (Taken 4/2/2024 0339)  Plan of Care Reviewed With: patient  Overall Patient Progress: no change  Goal: Patient-Specific Goal (Individualized)  Description: You can add care plan individualizations to a care plan. Examples of Individualization might be:  \"Parent requests to be called daily at 9am for status\", \"I have a hard time hearing out of my right ear\", or \"Do not touch me to wake me up as it startles  me\".  4/2/2024 0342 by Niki Elizabeth, RN  Outcome: Progressing  Goal: Absence of Hospital-Acquired Illness or Injury  4/2/2024 0342 by " Niki Elizabeth RN  Outcome: Progressing  Intervention: Identify and Manage Fall Risk  Recent Flowsheet Documentation  Taken 4/2/2024 0000 by Niki Elizabeth RN  Safety Promotion/Fall Prevention: safety round/check completed  Intervention: Prevent and Manage VTE (Venous Thromboembolism) Risk  Recent Flowsheet Documentation  Taken 4/2/2024 0000 by Niki Elizabeth RN  VTE Prevention/Management: SCDs (sequential compression devices) off  Intervention: Prevent Infection  Recent Flowsheet Documentation  Taken 4/2/2024 0000 by Niki Elizabeth RN  Infection Prevention:   hand hygiene promoted   rest/sleep promoted   single patient room provided  Goal: Optimal Comfort and Wellbeing  4/2/2024 0342 by Niki Elizabeth RN  Outcome: Progressing  Goal: Readiness for Transition of Care  4/2/2024 0342 by Niki Elizabeth RN  Outcome: Progressing    Problem: Fall Injury Risk  Goal: Absence of Fall and Fall-Related Injury  4/2/2024 0342 by Niki Elizabeth RN  Outcome: Progressing  Intervention: Identify and Manage Contributors  Recent Flowsheet Documentation  Taken 4/2/2024 0000 by Niki Elizabeth RN  Medication Review/Management: medications reviewed  Intervention: Promote Injury-Free Environment  Recent Flowsheet Documentation  Taken 4/2/2024 0000 by Niki Elizabeth RN  Safety Promotion/Fall Prevention: safety round/check completed     Problem: Pain Acute  Goal: Optimal Pain Control and Function  4/2/2024 0342 by Niki Elizabeth RN  Outcome: Progressing  4/2/2024 0340 by Niki Elizabeth RN  Outcome: Progressing  Intervention: Prevent or Manage Pain  Recent Flowsheet Documentation  Taken 4/2/2024 0000 by Niki Elizabeth RN  Medication Review/Management: medications reviewed     Problem: Urinary Retention  Goal: Effective Urinary Elimination  Outcome: Progressing

## 2024-04-02 NOTE — PHARMACY-ADMISSION MEDICATION HISTORY
Pharmacist Admission Medication History    Admission medication history is complete. The information provided in this note is only as accurate as the sources available at the time of the update.    Information Source(s): Family member, Prescription bottles, and CareEverywhere/SureScripts via phone    Pertinent Information: called son Adolfo, who texted picture of pts medication and give information about last doses    Changes made to PTA medication list:  Added: finasteride  Deleted: furosemide. Potassium, risperidone  Changed: None    Allergies reviewed with patient and updates made in EHR: unable to assess    Medication History Completed By: Kermit Severson, RP 4/1/2024 9:16 PM    PTA Med List   Medication Sig Last Dose    aspirin (ASA) 325 MG EC tablet Take 325 mg by mouth daily 4/1/2024 at am    atorvastatin (LIPITOR) 40 MG tablet Take 1 tablet (40 mg) by mouth every evening 4/1/2024 at am    carvedilol (COREG) 25 MG tablet Take 1 tablet (25 mg) by mouth 2 times daily (with meals) 4/1/2024 at am    finasteride (PROSCAR) 5 MG tablet Take 5 mg by mouth daily 4/1/2024 at am    Melatonin 10 MG TABS tablet Take 10 mg by mouth nightly as needed for sleep 3/31/2024 at pm    metFORMIN (GLUCOPHAGE) 500 MG tablet Take 1 tablet (500 mg) by mouth 2 times daily (with meals) 4/1/2024 at am

## 2024-04-02 NOTE — DISCHARGE SUMMARY
[facility]  Hospitalist Discharge Summary      Date of Admission:  4/1/2024  Date of Discharge:  4/2/2024  Discharging Provider: Randall Gutierrez MD  Discharge Service: Hospitalist Service  Primary Care Physician   Oralia Forrest    Discharge Diagnoses   Right flank pain  Possible UTI vs pyelonephritis  Type 2 diabetes with hyperglycemia  BPH  Coronary artery disease  Hypertension  Dementia with behaviors  Agitation     Hospital Course     Adam Huber is a 88 year old male admitted on 4/1/2024.  He is Belarusian-speaking and history on admisison was provided by his son who is at bedside.  He has history of diabetes, hypertension, hyperlipidemia, dementia and coronary artery disease who lives independently here with his wife.  He was brought in by his son today because of 3 days history of right flank pain.  He also has noted occasional problem emptying his bladder but denies any dysuria.  He denies any fevers, rigors or chills.  He denies any cough, expectoration or chest pain. In the emergency room Vitals on arrival: Temperature 98  F, heart rate 62/min, blood pressure 172/73 which improved to 127/66, respiratory rate of 24 and oxygen saturation of 95% on room air. CMP was unremarkable except glucose of 489, anion gap was 12.  CBC was unremarkable.  D-dimer was 0.57 which age-adjusted is within normal limits.  UA showed more than 182 WBCs with large leukocyte esterase and negative nitrite.  There are also many yeast.  CT of the abdomen showed bladder wall hyperenhancement and eugenio-vesicular fat stranding consistent with cystitis and numerous bladder diverticula consistent with chronic outlet obstruction.  He also has cirrhotic looking liver and patchy right lower lobe opacities.        Right flank pain.  UTI vs early pyelonephritis, Likely BPH.  Likely has pyelonephritis.  CT did not show any perinephric stranding or ureteric stone but showed evidence of cystitis as well as bladder  diverticula suggesting chronic bladder outlet obstruction.  Started on IV ceftriaxone 2 g daily. With blood and urine cultures pending  Patient pulled out IV, transitioned to oral omnicef, symptoms improved quickly.  Chest x-ray is not impressive,  Abdominal CT showed right basilar infiltrates but patient does not have any respiratory symptoms.  Nevertheless the patient is on omnicef  Start Flomax 0.4 mg daily and continues on proscar. Follow-up with urology if symptoms persist in spite of being on Flomax.  Patient was seen with  on the phone, states he was ready to go home and doing better.      Diabetes mellitus type II with hyperglycemia.  Initial blood glucose of 489 with anion gap of 12.  HbA1c 1 month ago was 7.5.  Not sure if patient took medications prior to admission, sugars down to 218-251    Prior to admission on metformin which resumed, was covered with insulin during his stay.     History of coronary artery disease.  Hypertension.  Had PCI of mid circumflex with drug-eluting stent in 2015.  Continue prior to admission medications including aspirin, Lipitor, lasix,  and carvedilol.     Consider goals of care and polypharmacy, would recommend cutting down on his medications.  Will leave for his primary care provider      Dementia. With aggressive behaviors  At baseline patient needs help with his ADLs like showering and dressing from his son.  He usually gets agitated if woken up at night and as per son's request, will not check vitals at night.  Patient was agitated while here with more confusion, consistent with sun downing  Would recommend cutting medications, focus on polypharmcy, to prevent side effects.  Will leave to primary care provider.    Patient will do better with staying at home, in familiar surroundings    Clinically Significant Risk Factors     # DMII: A1C = 10.2 % (Ref range: <5.7 %) within past 6 months       Significant Results and Procedures   Most Recent 3 CBC's:  Recent  Labs   Lab Test 04/02/24  0614 04/01/24  1400 02/05/24  0746   WBC 11.3* 8.0 10.3   HGB 14.9 13.7 15.6   MCV 91 90 91    232 229     Most Recent 3 BMP's:  Recent Labs   Lab Test 04/02/24  0731 04/02/24  0614 04/02/24  0200 04/01/24  1839 04/01/24  1400 02/06/24  0805 02/06/24  0714   NA  --  141  --   --  135  --  138   POTASSIUM  --  3.7  --   --  3.8  --  3.9   CHLORIDE  --  102  --   --  98  --  101   CO2  --  25  --   --  25  --  26   BUN  --  23.1*  --   --  22.8  --  29.9*   CR  --  0.87  --   --  0.92  --  0.74   ANIONGAP  --  14  --   --  12  --  11   LUZ  --  8.7*  --   --  8.7*  --  9.2   * 221* 223*   < > 489*   < > 191*    < > = values in this interval not displayed.     Most Recent Urinalysis:  Recent Labs   Lab Test 04/01/24  1505   COLOR Light Yellow   APPEARANCE Slightly Cloudy*   URINEGLC >=1000*   URINEBILI Negative   URINEKETONE Negative   SG 1.012   UBLD Moderate*   URINEPH 5.5   PROTEIN Negative   NITRITE Negative   LEUKEST Large*   RBCU 18*   WBCU >182*   ,   Results for orders placed or performed during the hospital encounter of 04/01/24   CT Abdomen Pelvis w Contrast    Narrative    EXAM: CT ABDOMEN PELVIS W CONTRAST  LOCATION: [facility]  DATE: 4/1/2024    INDICATION: right flank pain, rlq tenderness  COMPARISON: CT abdomen and pelvis 09/17/2023, 09/22/2021  TECHNIQUE: CT scan of the abdomen and pelvis was performed following injection of IV contrast. Multiplanar reformats were obtained. Dose reduction techniques were used.  CONTRAST: 94mL Isovue 370    FINDINGS:   LOWER CHEST: Patchy opacities in the right lung base.    HEPATOBILIARY: Cirrhotic liver morphology. Cholecystectomy. A small hyperenhancing lesion in the left hepatic lobe is unchanged since 2021 and likely benign.    PANCREAS: Atrophic.    SPLEEN: Normal.    ADRENAL GLANDS: Normal.    KIDNEYS/BLADDER: No significant mass, stones, or hydronephrosis. There are simple or benign cysts. No follow  up is needed. There are numerous bladder diverticula consistent with chronic outlet obstruction. There is bladder wall hyperenhancement and   perivesicular fat stranding consistent with cystitis.    BOWEL: Diverticulosis of the colon. No acute inflammatory change. No obstruction.     LYMPH NODES: Normal.    VASCULATURE: Extensive atherosclerotic calcifications.    PELVIC ORGANS: Prostatomegaly.    MUSCULOSKELETAL: Unremarkable      Impression    IMPRESSION:   1.  There is bladder wall hyperenhancement and perivesicular fat stranding consistent with cystitis.    2.  Numerous bladder diverticula consistent with chronic outlet obstruction.    3.  Cirrhotic liver morphology.    4.  Patchy right lower lobe opacities, likely infectious or inflammatory.   XR Chest 2 Views    Narrative    EXAM: XR CHEST 2 VIEWS  LOCATION: [facility]  DATE: 4/1/2024    INDICATION: RLL findings on CT with right flank pain   eval pna  COMPARISON: 04/01/2024      Impression    IMPRESSION: Mild opacity at the right base, correlate with recent CT, may represent pneumonia please correlate for infection. Lungs otherwise clear. No pneumothorax. Normal heart size.            Follow up/instructions: should see his primary care provider in a week making sure he is improved    Pending test results at discharge:      Unresulted Labs Ordered in the Past 30 Days of this Admission       Date and Time Order Name Status Description    4/1/2024  6:38 PM Blood Culture Arm, Right In process     4/1/2024  6:38 PM Blood Culture Hand, Right In process     4/1/2024  3:58 PM Urine Culture In process              Discharge Orders      Reason for your hospital stay    UTI     Follow-up and recommended labs and tests     Follow up with primary care provider, Oralia Forrest, within 7 days for hospital follow- up.  The following labs/tests are recommended: cbc, urine analysis/UC .     Activity    Your activity upon discharge: activity as tolerated      Diet    Follow this diet upon discharge: Orders Placed This Encounter      Combination Diet Regular Diet Adult; Moderate Consistent Carb (60 g CHO per Meal) Diet       Discharge Disposition   Discharged to home  Condition at discharge: Stable      Consultations This Hospital Stay   None    Code Status   No CPR- Do NOT Intubate    Time Spent on this Encounter   I, Randall Gutierrez MD, personally saw the patient today and spent greater than 30 minutes discharging this patient.  Patient new to me today, seen patient using  by phone, updated son Adolfo by phone, discussed with nursing, social work, case management           This document was created using voice recognition technology.  Please excuse any typographical errors that may have occurred.  Please call with any questions.       Randall Gutierrez MD  [facility]  [address] [place] [address]  Phone: [phone]  Fax: [phone]  ______________________________________________________________________    Physical Exam   Vital Signs: Temp: 97.6  F (36.4  C) Temp src: Temporal BP: (!) 161/71 Pulse: 67   Resp: 20 SpO2: 93 % O2 Device: None (Room air)    Weight: 184 lbs 11.93 oz    Exam is improved from admission  General Appearance:  Patient lying in the bed.  speaking [place] Islander, is sleepy, seems confused, Awake and appears comfortable.  HEENT: MMM  Respiratory: Clear to auscultation bilaterally. Good air movement  Cardiovascular:RRR,  S1-S2 normal.  GI: no current tenderness.  No tenderness in the right flank.         Discharge Medications   Current Discharge Medication List        START taking these medications    Details   cefdinir (OMNICEF) 300 MG capsule Take 1 capsule (300 mg) by mouth every 12 hours for 7 days  Qty: 14 capsule, Refills: 0    Associated Diagnoses: Pyelonephritis      tamsulosin (FLOMAX) 0.4 MG capsule Take 1 capsule (0.4 mg) by mouth daily for 30 days  Qty: 30 capsule, Refills: 0     Associated Diagnoses: Pyelonephritis; Benign prostatic hyperplasia with incomplete bladder emptying           CONTINUE these medications which have NOT CHANGED    Details   aspirin (ASA) 325 MG EC tablet Take 325 mg by mouth daily      atorvastatin (LIPITOR) 40 MG tablet Take 1 tablet (40 mg) by mouth every evening  Qty: 30 tablet, Refills: 0    Associated Diagnoses: History of CVA (cerebrovascular accident)      carvedilol (COREG) 25 MG tablet Take 1 tablet (25 mg) by mouth 2 times daily (with meals)  Qty: 180 tablet, Refills: 3    Associated Diagnoses: Essential hypertension      finasteride (PROSCAR) 5 MG tablet Take 5 mg by mouth daily      Melatonin 10 MG TABS tablet Take 10 mg by mouth nightly as needed for sleep      metFORMIN (GLUCOPHAGE) 500 MG tablet Take 1 tablet (500 mg) by mouth 2 times daily (with meals)  Qty: 60 tablet, Refills: 0    Associated Diagnoses: Type 2 diabetes mellitus without complication, unspecified whether long term insulin use (H)      acetaminophen (TYLENOL) 325 MG tablet Take 650 mg by mouth every 8 hours as needed for mild pain      mirtazapine (REMERON) 15 MG tablet Take 15 mg by mouth nightly as needed      polyethylene glycol (MIRALAX) 17 GM/Dose powder Take 17 g by mouth 2 times daily  Qty: 510 g, Refills: 4    Associated Diagnoses: Constipation, unspecified constipation type           Allergies   Allergies   Allergen Reactions    Lisinopril     Morphine Other (See Comments)     confusion

## 2024-04-02 NOTE — CONSULTS
Care Management Discharge Note    Discharge Date: 04/03/2024       Additional Information:  Pt admitted with pyelonephritis, noted to have unplanned readmission risk of 26%. Per chart review and discussion with MD patient will be discharging back home with his son today. No care management needs.     Alina Hart RN  Care Coordinator  [facility]

## 2024-04-02 NOTE — ED NOTES
[facility]  ED Nurse Handoff Report    ED Chief complaint: Flank Pain  . ED Diagnosis:   Final diagnoses:   None       Allergies:   Allergies   Allergen Reactions    Lisinopril     Morphine Other (See Comments)     confusion       Code Status: Full Code    Activity level - Baseline/Home:  walker.  Activity Level - Current:   in bed.   Lift room needed: No.   Bariatric: No   Needed: Yes - Gabonese  Isolation: No.   Infection: Not Applicable.     Respiratory status: Room air    Vital Signs (within 30 minutes):   Vitals:    04/01/24 1347 04/01/24 1348 04/01/24 1349 04/01/24 1457   BP:    127/66   Pulse:       Resp:   24    Temp:   98  F (36.7  C)    TempSrc:   Oral    SpO2: 95% 97%  92%       Cardiac Rhythm:  ,      Pain level:    Patient confused: Yes. - baseline  Patient Falls Risk: nonskid shoes/slippers when out of bed, patient and family education, and activity supervised.   Elimination Status: Has voided     Patient Report - Initial Complaint: Back pain.   Focused Assessment: Per provider note: Adam Huber is an 88 year old male with history of dementia, diabetes, and heart failure who presents with his son for evaluation of flank pain.  He describes 3 to 4 days of right-sided flank pain for which his son gave him Tylenol about 5 hours prior to arrival.  The pain was preventing him from sleeping last night.  He currently denies dysuria although his son tells me he did endorse this earlier.  He has not had fever, cough, vomiting, or dyspnea as far as the son is aware.  He seems to be at his baseline mental status.  He has not had any known falls.      Abnormal Results:   Labs Ordered and Resulted from Time of ED Arrival to Time of ED Departure   ROUTINE UA WITH MICROSCOPIC REFLEX TO CULTURE - Abnormal       Result Value    Color Urine Light Yellow      Appearance Urine Slightly Cloudy (*)     Glucose Urine >=1000 (*)     Bilirubin Urine Negative      Ketones Urine Negative       Specific Gravity Urine 1.012      Blood Urine Moderate (*)     pH Urine 5.5      Protein Albumin Urine Negative      Urobilinogen Urine Normal      Nitrite Urine Negative      Leukocyte Esterase Urine Large (*)     Budding Yeast Urine Many (*)     RBC Urine 18 (*)     WBC Urine >182 (*)    BASIC METABOLIC PANEL - Abnormal    Sodium 135      Potassium 3.8      Chloride 98      Carbon Dioxide (CO2) 25      Anion Gap 12      Urea Nitrogen 22.8      Creatinine 0.92      GFR Estimate 80      Calcium 8.7 (*)     Glucose 489 (*)    HEPATIC FUNCTION PANEL - Abnormal    Protein Total 7.0      Albumin 3.5      Bilirubin Total 0.5      Alkaline Phosphatase 154 (*)     AST 36      ALT 23      Bilirubin Direct <0.20     D DIMER QUANTITATIVE - Abnormal    D-Dimer Quantitative 0.57 (*)    GLUCOSE BY METER - Abnormal    GLUCOSE BY METER POCT 308 (*)    LIPASE - Normal    Lipase 38     CBC WITH PLATELETS AND DIFFERENTIAL    WBC Count 8.0      RBC Count 4.44      Hemoglobin 13.7      Hematocrit 40.1      MCV 90      MCH 30.9      MCHC 34.2      RDW 13.2      Platelet Count 232      % Neutrophils 59      % Lymphocytes 27      % Monocytes 10      % Eosinophils 3      % Basophils 1      % Immature Granulocytes 0      NRBCs per 100 WBC 0      Absolute Neutrophils 4.8      Absolute Lymphocytes 2.1      Absolute Monocytes 0.8      Absolute Eosinophils 0.2      Absolute Basophils 0.1      Absolute Immature Granulocytes 0.0      Absolute NRBCs 0.0     GLUCOSE MONITOR NURSING POCT   LACTIC ACID WHOLE BLOOD   HEMOGLOBIN A1C   URINE CULTURE   BLOOD CULTURE   BLOOD CULTURE        CT Abdomen Pelvis w Contrast   Final Result   IMPRESSION:    1.  There is bladder wall hyperenhancement and perivesicular fat stranding consistent with cystitis.      2.  Numerous bladder diverticula consistent with chronic outlet obstruction.      3.  Cirrhotic liver morphology.      4.  Patchy right lower lobe opacities, likely infectious or inflammatory.      XR  Chest 2 Views    (Results Pending)       Treatments provided: See MAR  Family Comments: Son at bedside  OBS brochure/video discussed/provided to patient:  No  ED Medications:   Medications   cefTRIAXone (ROCEPHIN) 2 g vial to attach to  ml bag for ADULTS or NS 50 ml bag for PEDS (2 g Intravenous Not Given 4/1/24 1911)   insulin aspart (NovoLOG) injection (RAPID ACTING) (has no administration in time range)   ketorolac (TORADOL) injection 15 mg (15 mg Intravenous $Given 4/1/24 1524)   sodium chloride 0.9% BOLUS 250 mL (0 mLs Intravenous Stopped 4/1/24 1615)   iopamidol (ISOVUE-370) solution 500 mL (94 mLs Intravenous $Given 4/1/24 1719)   CT scan flush (64 mLs Intravenous $Given 4/1/24 1719)   sodium chloride 0.9% BOLUS 250 mL (250 mLs Intravenous $New Bag 4/1/24 1848)   acetaminophen (TYLENOL) tablet 1,000 mg (1,000 mg Oral $Given 4/1/24 1847)       Drips infusing:  No  For the majority of the shift this patient was Green.   Interventions performed were N/A.    Sepsis treatment initiated: No    Cares/treatment/interventions/medications to be completed following ED care: ED cares complete. See inpatient orders.    ED Nurse Name: Kiya Loya RN  7:21 PM     RECEIVING UNIT ED HANDOFF REVIEW    Above ED Nurse Handoff Report was reviewed: Yes  Reviewed by: Niki Elizabeth RN on April 1, 2024 at 9:13 PM   Asa called the ED to inform them the note was read: Yes

## 2024-04-02 NOTE — ED NOTES
Pt refuses lab draw for second set of blood cultures. MD notified. RN received ok to start antibiotics.

## 2024-04-02 NOTE — PLAN OF CARE
"Provided cares for pt from [id number]. Pt alert to self, confused.  used, pt did not respond well with .  Denied pain.  and 306. Assist of 1 gait belt and walker. Tolerated diet. Discharge medications and instructions given to pt [place]. Pt discharged home with fredy.     /64   Pulse 74   Temp 97.6  F (36.4  C) (Temporal)   Resp 20   Ht 1.905 m (6' 3\")   Wt 83.8 kg (184 lb 11.9 oz)   SpO2 93%   BMI 23.09 kg/m       Problem: Adult Inpatient Plan of Care  Goal: Plan of Care Review  Description: The Plan of Care Review/Shift note should be completed every shift.  The Outcome Evaluation is a brief statement about your assessment that the patient is improving, declining, or no change.  This information will be displayed automatically on your shift  note.  Outcome: Met  Goal: Patient-Specific Goal (Individualized)  Description: You can add care plan individualizations to a care plan. Examples of Individualization might be:  \"Parent requests to be called daily at 9am for status\", \"I have a hard time hearing out of my right ear\", or \"Do not touch me to wake me up as it startles  me\".  Outcome: Met  Goal: Absence of Hospital-Acquired Illness or Injury  Outcome: Met  Intervention: Identify and Manage Fall Risk  Recent Flowsheet Documentation  Taken 4/2/2024 1010 by Ashia Gilbert RN  Safety Promotion/Fall Prevention:   activity supervised   assistive device/personal items within reach   increased rounding and observation   clutter free environment maintained   increase visualization of patient   nonskid shoes/slippers when out of bed   room door open   room near nurse's station   room organization consistent   safety round/check completed   supervised activity  Intervention: Prevent Skin Injury  Recent Flowsheet Documentation  Taken 4/2/2024 1010 by Ashia Gilbert RN  Body Position:   position changed independently   sitting up in bed   weight shifting  Device Skin " Pressure Protection: absorbent pad utilized/changed  Intervention: Prevent and Manage VTE (Venous Thromboembolism) Risk  Recent Flowsheet Documentation  Taken 4/2/2024 1010 by Ashia Gilbert RN  VTE Prevention/Management: SCDs (sequential compression devices) off  Goal: Optimal Comfort and Wellbeing  Outcome: Met  Goal: Readiness for Transition of Care  Outcome: Met   Goal Outcome Evaluation:

## 2024-04-04 ENCOUNTER — PATIENT OUTREACH (OUTPATIENT)
Dept: CARE COORDINATION | Facility: CLINIC | Age: 89
End: 2024-04-04
Payer: COMMERCIAL

## 2024-04-04 NOTE — PROGRESS NOTES
[facility]: [facility]: Post-Discharge Note  SITUATION                                                      Admission:    Admission Date: 04/01/24   Reason for Admission: UTI  Discharge:   Discharge Date: 04/02/24  Discharge Diagnosis: UTI    BACKGROUND                                                      Per hospital discharge summary and inpatient provider notes:    Adam Huber is a 88 year old male admitted on 4/1/2024.      He is [place] Islander-speaking and history is provided by his son who is at bedside.  He has history of diabetes, hypertension, hyperlipidemia, dementia and coronary artery disease who lives independently here with his wife.  He was brought in by his son today because of 3 days history of right flank pain.  He also has noted occasional problem emptying his bladder but denies any dysuria.  He denies any fevers, rigors or chills.  He denies any cough, expectoration or chest pain.     Vitals on arrival: Temperature 98  F, heart rate 62/min, blood pressure 172/73 which improved to 127/66, respiratory rate of 24 and oxygen saturation of 95% on room air.     CMP was unremarkable except glucose of 489, anion gap was 12.  CBC was unremarkable.  D-dimer was 0.57 which age-adjusted is within normal limits.  UA showed more than 182 WBCs with large leukocyte esterase and negative nitrite.  There are also many yeast.  CT of the abdomen showed bladder wall hyperenhancement and eugenio-vesicular fat stranding consistent with cystitis and numerous bladder diverticula consistent with chronic outlet obstruction.  He also has cirrhotic looking liver and patchy right lower lobe opacities.    ASSESSMENT           Discharge Assessment  How are you doing now that you are home?: Pt's wife said his back hurts near his kidneys. He is still having a hard time urinating. Pt's wife said his medications are not helping. CHW recommened they call his PCP.  How are your symptoms? (Red Flag symptoms  escalate to triage hotline per guidelines): Unchanged  Do you feel your condition is stable enough to be safe at home until your provider visit?: Yes  Does the patient have their discharge instructions? : Yes  Does the patient have questions regarding their discharge instructions? : No  Were you started on any new medications or were there changes to any of your previous medications? : Yes  Does the patient have all of their medications?: Yes  Do you have questions regarding any of your medications? : No  Discharge follow-up appointment scheduled within 14 calendar days? : No  Is patient agreeable to assistance with scheduling? : No                PLAN                                                      Outpatient Plan: Follow up with primary care provider, Oralia Forrest, within 7 days for hospital follow- up. The following labs/tests are  recommended: cbc, urine analysis/UC .    No future appointments.      For any urgent concerns, please contact our 24 hour nurse triage line: [phone] ([phone])         MAMADOU Escoto  [phone]  [facility]

## 2024-04-06 LAB — BACTERIA BLD CULT: NO GROWTH

## 2024-04-07 LAB — BACTERIA BLD CULT: NO GROWTH

## 2024-06-02 ENCOUNTER — HOSPITAL ENCOUNTER (EMERGENCY)
Facility: CLINIC | Age: 89
Discharge: HOME | End: 2024-06-02
Attending: EMERGENCY MEDICINE | Admitting: EMERGENCY MEDICINE
Payer: COMMERCIAL

## 2024-06-02 VITALS
HEART RATE: 74 BPM | RESPIRATION RATE: 20 BRPM | SYSTOLIC BLOOD PRESSURE: 148 MMHG | OXYGEN SATURATION: 92 % | DIASTOLIC BLOOD PRESSURE: 84 MMHG

## 2024-06-02 DIAGNOSIS — Z79.4 TYPE 2 DIABETES MELLITUS WITH HYPERGLYCEMIA, WITH LONG-TERM CURRENT USE OF INSULIN (H): ICD-10-CM

## 2024-06-02 DIAGNOSIS — Z86.59 HISTORY OF DEMENTIA: ICD-10-CM

## 2024-06-02 DIAGNOSIS — R03.0 ELEVATED BLOOD PRESSURE READING: ICD-10-CM

## 2024-06-02 DIAGNOSIS — E11.65 TYPE 2 DIABETES MELLITUS WITH HYPERGLYCEMIA, WITH LONG-TERM CURRENT USE OF INSULIN (H): ICD-10-CM

## 2024-06-02 LAB — GLUCOSE BLDC GLUCOMTR-MCNC: 372 MG/DL (ref 70–99)

## 2024-06-02 PROCEDURE — 82962 GLUCOSE BLOOD TEST: CPT

## 2024-06-02 PROCEDURE — 99283 EMERGENCY DEPT VISIT LOW MDM: CPT

## 2024-06-02 ASSESSMENT — ACTIVITIES OF DAILY LIVING (ADL)
ADLS_ACUITY_SCORE: 43

## 2024-06-02 ASSESSMENT — COLUMBIA-SUICIDE SEVERITY RATING SCALE - C-SSRS: IS THE PATIENT NOT ABLE TO COMPLETE C-SSRS: UNABLE TO VERBALIZE

## 2024-06-02 NOTE — ED PROVIDER NOTES
Emergency Department Note      History of Present Illness     Chief Complaint  Wandering    BRUNA Huber is a 88 year old male with history of late onset Alzheimer's disease, type 2 diabetes mellitus, congestive heart failure, coronary artery disease, NSTEMI, stroke, hypertension, hyperlipidemia, who presents to the ED for wandering. Triage note reports the patient was found wandering near his apartment. It notes family was called and denied the patient getting treatment; they plan to pick him up. Son reports he does not have any concerns for the patient.    Independent Historian  Son as detailed above.    Review of External Notes  Previous note dated 4/1/2024 reviewed  Past Medical History   Medical History and Problem List  CAD (coronary artery disease)  Essential hypertension  History of CVA (cerebrovascular accident)  HLD (hyperlipidemia)  HTN (hypertension)  Post PTCA  ACS (acute coronary syndrome)  NSTEMI (non-ST elevated myocardial infarction)   Physical deconditioning  Acute metabolic encephalopathy  Acute delirium  Late 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 behavioral disturbance   Type 2 diabetes mellitus without complication   Closed wedge compression fracture of twelfth thoracic vertebra, initial encounter  Rhabdomyolysis  Altered mental status  Encephalopathy  Pulmonary nodules  Closed fracture of multiple ribs of right side, initial encounter  Syncope, unspecified syncope type  Slurred speech  Acute cystitis with hematuria  Severe sepsis   Acute pulmonary edema   Acute congestive heart failure, unspecified heart failure type   Hypoxia  Pneumonia of left lower lobe due to infectious organism  Congestive heart failure, unspecified HF chronicity, unspecified heart failure type   Pyelonephritis  NS (nuclear sclerosis), right   Intraoperative floppy iris syndrome (IFIS)   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   Pulmonary nodules   Nuclear sclerotic cataract of both eyes   Dermatochalasis  of both upper eyelids   Pseudoexfoliation lens capsule   Nonexudative age-related macular degeneration, bilateral, intermediate dry stage   Rhabdomyolysis   T12 compression fracture   Hyperlipidemia   Chronic headache   Insomnia   Gait instability   Memory disturbance   Dupuytren's contracture   Vertigo   Hemiplegia, unspecified affecting right dominant side     Medications  Fluticasone propionate   Polyethylene glycol   Nitroglycerin  Aspirin 325 mg   Atorvastatin  Carvedilol  Finasteride  Insulin glargine   Risperidone  Amoxicillin-clavulanate   Furosemide  Potassium chloride   Mirtazapine  Cefdinir  Metformin     Surgical History   Heart cath right and left heart cath   Heart cath left heart cath   Cholecystectomy  Genitourinary surgery    Physical Exam   Patient Vitals for the past 24 hrs:   BP Pulse Resp SpO2   06/02/24 1246 (!) 181/139 81 20 95 %     Physical Exam  General:  No respiratory distress    Cardiovascular: Good cap refill.    Respiratory: Breathing non labored.     Musculoskeletal: No tenderness. No bony deformity.     Skin: No rashes or petechiae     Neurologic: non focal      Psychiatric: Appropriate    Diagnostics   Lab Results   Labs Ordered and Resulted from Time of ED Arrival to Time of ED Departure   GLUCOSE BY METER - Abnormal       Result Value    GLUCOSE BY METER POCT 372 (*)    GLUCOSE MONITOR NURSING POCT     Independent Interpretation  None  ED Course    Medications Administered  Medications - No data to display    Procedures  Procedures     Discussion of Management  None    Social Determinants of Health adding to complexity of care  None    ED Course  ED Course as of 06/02/24 1355   Sun Jun 02, 2024   1314 I obtained history and examined the patient as noted above.    1352 I spoke to the son of the patient who reported he does not have any concerns for the patient.     Medical Decision Making / Diagnosis   CMS Diagnoses: None    MIPS     None    [facility]  Adam Huber is a 88 year old  male who was found wandering near his apartment EMS was able to unable to get a hold of family he does have a history of dementia the patient was found to be hyperglycemic did start IV fluids the patient's son did come to pick him up did not have any concerns the patient had no signs of any respiratory distress no focal weakness and was discharged in the son's custody.  I did consider the possibly of stroke intoxicant or other issue do not place likely in DKA the patient was discharged home in good condition.  Family did request no further workup or labs.    Disposition  The patient was discharged.     ICD-10 Codes:    ICD-10-CM    1. History of dementia  Z86.59       2. Type 2 diabetes mellitus with hyperglycemia, with long-term current use of insulin (H)  E11.65     Z79.4       3. Elevated blood pressure reading  R03.0            Discharge Medications  New Prescriptions    No medications on file       Scribe Disclosure:  Ktaiuska MATA, am serving as a scribe at 1:40 PM on 6/2/2024 to document services personally performed by Hank Olvera MD based on my observations and the provider's statements to me.        Hank Olvera MD  06/02/24 1526       Hank Olvera MD  06/02/24 1526

## 2024-06-02 NOTE — ED TRIAGE NOTES
Pt biba - was wandering near his apartment and EMS was unable to get a hold of family to safely leave pt. Hx of dementia. Noted to be hyperglycemic and IVF were started. On arrival, family has called and stated that pt does not need to be seen and they can come and pick pt up.

## 2024-06-02 NOTE — ED NOTES
Writer brought pt to bathroom and back to room via walker and A1. Pt's soiled depends changed. Pt tolerated well. Returned pt to bed. Side rails up, call light within reach.

## 2024-11-07 ENCOUNTER — APPOINTMENT (OUTPATIENT)
Dept: CT IMAGING | Facility: CLINIC | Age: 89
DRG: 689 | End: 2024-11-07
Attending: STUDENT IN AN ORGANIZED HEALTH CARE EDUCATION/TRAINING PROGRAM
Payer: COMMERCIAL

## 2024-11-07 ENCOUNTER — VIRTUAL VISIT (OUTPATIENT)
Dept: INTERPRETER SERVICES | Facility: CLINIC | Age: 89
End: 2024-11-07

## 2024-11-07 ENCOUNTER — HOSPITAL ENCOUNTER (INPATIENT)
Facility: CLINIC | Age: 89
LOS: 5 days | Discharge: HOME | DRG: 689 | End: 2024-11-12
Attending: STUDENT IN AN ORGANIZED HEALTH CARE EDUCATION/TRAINING PROGRAM | Admitting: STUDENT IN AN ORGANIZED HEALTH CARE EDUCATION/TRAINING PROGRAM
Payer: COMMERCIAL

## 2024-11-07 DIAGNOSIS — A41.9 SEPSIS, DUE TO UNSPECIFIED ORGANISM, UNSPECIFIED WHETHER ACUTE ORGAN DYSFUNCTION PRESENT (H): ICD-10-CM

## 2024-11-07 DIAGNOSIS — E11.9 TYPE 2 DIABETES MELLITUS WITHOUT COMPLICATION, UNSPECIFIED WHETHER LONG TERM INSULIN USE (H): ICD-10-CM

## 2024-11-07 DIAGNOSIS — L89.152 PRESSURE INJURY OF SACRAL REGION, STAGE 2 (H): ICD-10-CM

## 2024-11-07 DIAGNOSIS — L89.313 PRESSURE INJURY OF RIGHT BUTTOCK, STAGE 3 (H): Primary | ICD-10-CM

## 2024-11-07 DIAGNOSIS — N39.0 URINARY TRACT INFECTION WITHOUT HEMATURIA, SITE UNSPECIFIED: ICD-10-CM

## 2024-11-07 LAB
ALBUMIN SERPL BCG-MCNC: 3 G/DL (ref 3.5–5.2)
ALBUMIN UR-MCNC: 50 MG/DL
ALP SERPL-CCNC: 210 U/L (ref 40–150)
ALT SERPL W P-5'-P-CCNC: 23 U/L (ref 0–70)
ANION GAP SERPL CALCULATED.3IONS-SCNC: 15 MMOL/L (ref 7–15)
APPEARANCE UR: ABNORMAL
AST SERPL W P-5'-P-CCNC: 27 U/L (ref 0–45)
ATRIAL RATE - MUSE: NORMAL BPM
B-OH-BUTYR SERPL-SCNC: 0.21 MMOL/L
BACTERIA #/AREA URNS HPF: ABNORMAL /HPF
BASE EXCESS BLDV CALC-SCNC: -0.4 MMOL/L (ref -3–3)
BASOPHILS # BLD AUTO: 0.1 10E3/UL (ref 0–0.2)
BASOPHILS NFR BLD AUTO: 0 %
BILIRUB SERPL-MCNC: 0.6 MG/DL
BILIRUB UR QL STRIP: NEGATIVE
BUN SERPL-MCNC: 18.7 MG/DL (ref 8–23)
CALCIUM SERPL-MCNC: 8.4 MG/DL (ref 8.8–10.4)
CHLORIDE SERPL-SCNC: 98 MMOL/L (ref 98–107)
COLOR UR AUTO: YELLOW
CREAT SERPL-MCNC: 0.66 MG/DL (ref 0.67–1.17)
DIASTOLIC BLOOD PRESSURE - MUSE: NORMAL MMHG
EGFRCR SERPLBLD CKD-EPI 2021: 90 ML/MIN/1.73M2
EOSINOPHIL # BLD AUTO: 0.1 10E3/UL (ref 0–0.7)
EOSINOPHIL NFR BLD AUTO: 1 %
ERYTHROCYTE [DISTWIDTH] IN BLOOD BY AUTOMATED COUNT: 13.9 % (ref 10–15)
GLUCOSE BLDC GLUCOMTR-MCNC: 288 MG/DL (ref 70–99)
GLUCOSE BLDC GLUCOMTR-MCNC: 336 MG/DL (ref 70–99)
GLUCOSE BLDC GLUCOMTR-MCNC: 427 MG/DL (ref 70–99)
GLUCOSE BLDC GLUCOMTR-MCNC: 487 MG/DL (ref 70–99)
GLUCOSE SERPL-MCNC: 485 MG/DL (ref 70–99)
GLUCOSE UR STRIP-MCNC: >=1000 MG/DL
HCO3 BLDV-SCNC: 25 MMOL/L (ref 21–28)
HCO3 SERPL-SCNC: 20 MMOL/L (ref 22–29)
HCT VFR BLD AUTO: 40.1 % (ref 40–53)
HGB BLD-MCNC: 13.4 G/DL (ref 13.3–17.7)
HGB UR QL STRIP: ABNORMAL
HOLD SPECIMEN: NORMAL
HOLD SPECIMEN: NORMAL
IMM GRANULOCYTES # BLD: 0 10E3/UL
IMM GRANULOCYTES NFR BLD: 0 %
INTERPRETATION ECG - MUSE: NORMAL
KETONES UR STRIP-MCNC: NEGATIVE MG/DL
LACTATE SERPL-SCNC: 2.9 MMOL/L (ref 0.7–2)
LACTATE SERPL-SCNC: 3.2 MMOL/L (ref 0.7–2)
LEUKOCYTE ESTERASE UR QL STRIP: ABNORMAL
LYMPHOCYTES # BLD AUTO: 2 10E3/UL (ref 0.8–5.3)
LYMPHOCYTES NFR BLD AUTO: 18 %
MCH RBC QN AUTO: 29.2 PG (ref 26.5–33)
MCHC RBC AUTO-ENTMCNC: 33.4 G/DL (ref 31.5–36.5)
MCV RBC AUTO: 87 FL (ref 78–100)
MONOCYTES # BLD AUTO: 0.8 10E3/UL (ref 0–1.3)
MONOCYTES NFR BLD AUTO: 7 %
NEUTROPHILS # BLD AUTO: 8.4 10E3/UL (ref 1.6–8.3)
NEUTROPHILS NFR BLD AUTO: 74 %
NITRATE UR QL: NEGATIVE
NRBC # BLD AUTO: 0 10E3/UL
NRBC BLD AUTO-RTO: 0 /100
O2/TOTAL GAS SETTING VFR VENT: 0 %
OXYHGB MFR BLDV: 64 % (ref 70–75)
P AXIS - MUSE: NORMAL DEGREES
PCO2 BLDV: 43 MM HG (ref 40–50)
PH BLDV: 7.37 [PH] (ref 7.32–7.43)
PH UR STRIP: 5.5 [PH] (ref 5–7)
PLATELET # BLD AUTO: 217 10E3/UL (ref 150–450)
PO2 BLDV: 36 MM HG (ref 25–47)
POTASSIUM SERPL-SCNC: 4.4 MMOL/L (ref 3.4–5.3)
PR INTERVAL - MUSE: NORMAL MS
PROCALCITONIN SERPL IA-MCNC: 0.11 NG/ML
PROT SERPL-MCNC: 6.6 G/DL (ref 6.4–8.3)
QRS DURATION - MUSE: 94 MS
QT - MUSE: 444 MS
QTC - MUSE: 486 MS
R AXIS - MUSE: 92 DEGREES
RBC # BLD AUTO: 4.59 10E6/UL (ref 4.4–5.9)
RBC URINE: >182 /HPF
SAO2 % BLDV: 64.9 % (ref 70–75)
SODIUM SERPL-SCNC: 133 MMOL/L (ref 135–145)
SP GR UR STRIP: 1.03 (ref 1–1.03)
SYSTOLIC BLOOD PRESSURE - MUSE: NORMAL MMHG
T AXIS - MUSE: 97 DEGREES
UROBILINOGEN UR STRIP-MCNC: NORMAL MG/DL
VENTRICULAR RATE- MUSE: 72 BPM
WBC # BLD AUTO: 11.3 10E3/UL (ref 4–11)
WBC CLUMPS #/AREA URNS HPF: PRESENT /HPF
WBC URINE: >182 /HPF
YEAST #/AREA URNS HPF: ABNORMAL /HPF

## 2024-11-07 PROCEDURE — 250N000011 HC RX IP 250 OP 636: Performed by: STUDENT IN AN ORGANIZED HEALTH CARE EDUCATION/TRAINING PROGRAM

## 2024-11-07 PROCEDURE — 250N000013 HC RX MED GY IP 250 OP 250 PS 637: Performed by: STUDENT IN AN ORGANIZED HEALTH CARE EDUCATION/TRAINING PROGRAM

## 2024-11-07 PROCEDURE — 74177 CT ABD & PELVIS W/CONTRAST: CPT

## 2024-11-07 PROCEDURE — 82040 ASSAY OF SERUM ALBUMIN: CPT | Performed by: STUDENT IN AN ORGANIZED HEALTH CARE EDUCATION/TRAINING PROGRAM

## 2024-11-07 PROCEDURE — 250N000009 HC RX 250: Performed by: STUDENT IN AN ORGANIZED HEALTH CARE EDUCATION/TRAINING PROGRAM

## 2024-11-07 PROCEDURE — 82010 KETONE BODYS QUAN: CPT | Performed by: STUDENT IN AN ORGANIZED HEALTH CARE EDUCATION/TRAINING PROGRAM

## 2024-11-07 PROCEDURE — 70450 CT HEAD/BRAIN W/O DYE: CPT

## 2024-11-07 PROCEDURE — 84145 PROCALCITONIN (PCT): CPT | Performed by: STUDENT IN AN ORGANIZED HEALTH CARE EDUCATION/TRAINING PROGRAM

## 2024-11-07 PROCEDURE — 81001 URINALYSIS AUTO W/SCOPE: CPT | Performed by: STUDENT IN AN ORGANIZED HEALTH CARE EDUCATION/TRAINING PROGRAM

## 2024-11-07 PROCEDURE — 87088 URINE BACTERIA CULTURE: CPT | Performed by: STUDENT IN AN ORGANIZED HEALTH CARE EDUCATION/TRAINING PROGRAM

## 2024-11-07 PROCEDURE — 258N000003 HC RX IP 258 OP 636: Performed by: STUDENT IN AN ORGANIZED HEALTH CARE EDUCATION/TRAINING PROGRAM

## 2024-11-07 PROCEDURE — 36415 COLL VENOUS BLD VENIPUNCTURE: CPT | Performed by: STUDENT IN AN ORGANIZED HEALTH CARE EDUCATION/TRAINING PROGRAM

## 2024-11-07 PROCEDURE — 82805 BLOOD GASES W/O2 SATURATION: CPT | Performed by: STUDENT IN AN ORGANIZED HEALTH CARE EDUCATION/TRAINING PROGRAM

## 2024-11-07 PROCEDURE — 87040 BLOOD CULTURE FOR BACTERIA: CPT | Performed by: STUDENT IN AN ORGANIZED HEALTH CARE EDUCATION/TRAINING PROGRAM

## 2024-11-07 PROCEDURE — 96361 HYDRATE IV INFUSION ADD-ON: CPT

## 2024-11-07 PROCEDURE — 85049 AUTOMATED PLATELET COUNT: CPT | Performed by: STUDENT IN AN ORGANIZED HEALTH CARE EDUCATION/TRAINING PROGRAM

## 2024-11-07 PROCEDURE — 82947 ASSAY GLUCOSE BLOOD QUANT: CPT | Performed by: STUDENT IN AN ORGANIZED HEALTH CARE EDUCATION/TRAINING PROGRAM

## 2024-11-07 PROCEDURE — 99291 CRITICAL CARE FIRST HOUR: CPT | Mod: 25

## 2024-11-07 PROCEDURE — 83605 ASSAY OF LACTIC ACID: CPT | Performed by: STUDENT IN AN ORGANIZED HEALTH CARE EDUCATION/TRAINING PROGRAM

## 2024-11-07 PROCEDURE — 99292 CRITICAL CARE ADDL 30 MIN: CPT

## 2024-11-07 PROCEDURE — 51702 INSERT TEMP BLADDER CATH: CPT

## 2024-11-07 PROCEDURE — 85025 COMPLETE CBC W/AUTO DIFF WBC: CPT | Performed by: STUDENT IN AN ORGANIZED HEALTH CARE EDUCATION/TRAINING PROGRAM

## 2024-11-07 PROCEDURE — 120N000001 HC R&B MED SURG/OB

## 2024-11-07 PROCEDURE — 96365 THER/PROPH/DIAG IV INF INIT: CPT

## 2024-11-07 PROCEDURE — 93005 ELECTROCARDIOGRAM TRACING: CPT

## 2024-11-07 PROCEDURE — 99222 1ST HOSP IP/OBS MODERATE 55: CPT | Performed by: STUDENT IN AN ORGANIZED HEALTH CARE EDUCATION/TRAINING PROGRAM

## 2024-11-07 PROCEDURE — T1013 SIGN LANG/ORAL INTERPRETER: HCPCS | Mod: GT,TEL,95 | Performed by: INTERPRETER

## 2024-11-07 PROCEDURE — 82962 GLUCOSE BLOOD TEST: CPT

## 2024-11-07 RX ORDER — CARVEDILOL 25 MG/1
25 TABLET ORAL 2 TIMES DAILY WITH MEALS
Status: DISCONTINUED | OUTPATIENT
Start: 2024-11-07 | End: 2024-11-12 | Stop reason: HOSPADM

## 2024-11-07 RX ORDER — AMOXICILLIN 250 MG
1 CAPSULE ORAL 2 TIMES DAILY PRN
Status: DISCONTINUED | OUTPATIENT
Start: 2024-11-07 | End: 2024-11-12 | Stop reason: HOSPADM

## 2024-11-07 RX ORDER — FINASTERIDE 5 MG/1
5 TABLET, FILM COATED ORAL DAILY
Status: DISCONTINUED | OUTPATIENT
Start: 2024-11-07 | End: 2024-11-12 | Stop reason: HOSPADM

## 2024-11-07 RX ORDER — ATORVASTATIN CALCIUM 40 MG/1
40 TABLET, FILM COATED ORAL DAILY
Status: DISCONTINUED | OUTPATIENT
Start: 2024-11-08 | End: 2024-11-12 | Stop reason: HOSPADM

## 2024-11-07 RX ORDER — ONDANSETRON 2 MG/ML
4 INJECTION INTRAMUSCULAR; INTRAVENOUS EVERY 6 HOURS PRN
Status: DISCONTINUED | OUTPATIENT
Start: 2024-11-07 | End: 2024-11-12 | Stop reason: HOSPADM

## 2024-11-07 RX ORDER — AMOXICILLIN 250 MG
2 CAPSULE ORAL 2 TIMES DAILY PRN
Status: DISCONTINUED | OUTPATIENT
Start: 2024-11-07 | End: 2024-11-12 | Stop reason: HOSPADM

## 2024-11-07 RX ORDER — HALOPERIDOL 5 MG/ML
2 INJECTION INTRAMUSCULAR EVERY 6 HOURS PRN
Status: DISCONTINUED | OUTPATIENT
Start: 2024-11-07 | End: 2024-11-07

## 2024-11-07 RX ORDER — HALOPERIDOL 5 MG/ML
INJECTION INTRAMUSCULAR
Status: COMPLETED
Start: 2024-11-07 | End: 2024-11-07

## 2024-11-07 RX ORDER — IOPAMIDOL 755 MG/ML
120 INJECTION, SOLUTION INTRAVASCULAR ONCE
Status: COMPLETED | OUTPATIENT
Start: 2024-11-07 | End: 2024-11-07

## 2024-11-07 RX ORDER — CEFTRIAXONE 2 G/1
2 INJECTION, POWDER, FOR SOLUTION INTRAMUSCULAR; INTRAVENOUS EVERY 24 HOURS
Status: DISCONTINUED | OUTPATIENT
Start: 2024-11-07 | End: 2024-11-12

## 2024-11-07 RX ORDER — LIDOCAINE 40 MG/G
CREAM TOPICAL
Status: DISCONTINUED | OUTPATIENT
Start: 2024-11-07 | End: 2024-11-12 | Stop reason: HOSPADM

## 2024-11-07 RX ORDER — TAMSULOSIN HYDROCHLORIDE 0.4 MG/1
0.4 CAPSULE ORAL DAILY
Status: DISCONTINUED | OUTPATIENT
Start: 2024-11-07 | End: 2024-11-12 | Stop reason: HOSPADM

## 2024-11-07 RX ORDER — ACETAMINOPHEN 325 MG/1
650 TABLET ORAL EVERY 8 HOURS PRN
Status: DISCONTINUED | OUTPATIENT
Start: 2024-11-07 | End: 2024-11-12 | Stop reason: HOSPADM

## 2024-11-07 RX ORDER — CALCIUM CARBONATE 500 MG/1
1000 TABLET, CHEWABLE ORAL 4 TIMES DAILY PRN
Status: DISCONTINUED | OUTPATIENT
Start: 2024-11-07 | End: 2024-11-12 | Stop reason: HOSPADM

## 2024-11-07 RX ORDER — ONDANSETRON 4 MG/1
4 TABLET, ORALLY DISINTEGRATING ORAL EVERY 6 HOURS PRN
Status: DISCONTINUED | OUTPATIENT
Start: 2024-11-07 | End: 2024-11-12 | Stop reason: HOSPADM

## 2024-11-07 RX ORDER — HYDRALAZINE HYDROCHLORIDE 20 MG/ML
10 INJECTION INTRAMUSCULAR; INTRAVENOUS EVERY 4 HOURS PRN
Status: DISCONTINUED | OUTPATIENT
Start: 2024-11-07 | End: 2024-11-12

## 2024-11-07 RX ORDER — NICOTINE POLACRILEX 4 MG
15-30 LOZENGE BUCCAL
Status: DISCONTINUED | OUTPATIENT
Start: 2024-11-07 | End: 2024-11-12 | Stop reason: HOSPADM

## 2024-11-07 RX ORDER — FENTANYL CITRATE 50 UG/ML
25 INJECTION, SOLUTION INTRAMUSCULAR; INTRAVENOUS ONCE
Status: COMPLETED | OUTPATIENT
Start: 2024-11-07 | End: 2024-11-07

## 2024-11-07 RX ORDER — VANCOMYCIN HYDROCHLORIDE 1 G/200ML
1000 INJECTION, SOLUTION INTRAVENOUS EVERY 12 HOURS
Status: DISCONTINUED | OUTPATIENT
Start: 2024-11-08 | End: 2024-11-09

## 2024-11-07 RX ORDER — LIDOCAINE HYDROCHLORIDE 20 MG/ML
6 JELLY TOPICAL ONCE
Status: COMPLETED | OUTPATIENT
Start: 2024-11-07 | End: 2024-11-07

## 2024-11-07 RX ORDER — OLANZAPINE 5 MG/1
5 TABLET, ORALLY DISINTEGRATING ORAL EVERY 12 HOURS PRN
Status: DISCONTINUED | OUTPATIENT
Start: 2024-11-07 | End: 2024-11-07

## 2024-11-07 RX ORDER — DEXTROSE MONOHYDRATE 25 G/50ML
25-50 INJECTION, SOLUTION INTRAVENOUS
Status: DISCONTINUED | OUTPATIENT
Start: 2024-11-07 | End: 2024-11-12 | Stop reason: HOSPADM

## 2024-11-07 RX ORDER — HALOPERIDOL 5 MG/ML
1 INJECTION INTRAMUSCULAR EVERY 6 HOURS PRN
Status: DISCONTINUED | OUTPATIENT
Start: 2024-11-07 | End: 2024-11-12

## 2024-11-07 RX ORDER — ASPIRIN 325 MG
325 TABLET, DELAYED RELEASE (ENTERIC COATED) ORAL DAILY
Status: DISCONTINUED | OUTPATIENT
Start: 2024-11-08 | End: 2024-11-12 | Stop reason: HOSPADM

## 2024-11-07 RX ORDER — PIPERACILLIN SODIUM, TAZOBACTAM SODIUM 4; .5 G/20ML; G/20ML
4.5 INJECTION, POWDER, LYOPHILIZED, FOR SOLUTION INTRAVENOUS ONCE
Status: COMPLETED | OUTPATIENT
Start: 2024-11-07 | End: 2024-11-07

## 2024-11-07 RX ORDER — OLANZAPINE 10 MG/2ML
5 INJECTION, POWDER, FOR SOLUTION INTRAMUSCULAR EVERY 12 HOURS PRN
Status: DISCONTINUED | OUTPATIENT
Start: 2024-11-07 | End: 2024-11-07

## 2024-11-07 RX ORDER — SODIUM CHLORIDE, SODIUM LACTATE, POTASSIUM CHLORIDE, CALCIUM CHLORIDE 600; 310; 30; 20 MG/100ML; MG/100ML; MG/100ML; MG/100ML
INJECTION, SOLUTION INTRAVENOUS CONTINUOUS
Status: DISCONTINUED | OUTPATIENT
Start: 2024-11-07 | End: 2024-11-09

## 2024-11-07 RX ORDER — MIRTAZAPINE 15 MG/1
15 TABLET, FILM COATED ORAL
Status: DISCONTINUED | OUTPATIENT
Start: 2024-11-07 | End: 2024-11-12 | Stop reason: HOSPADM

## 2024-11-07 RX ORDER — HYDRALAZINE HYDROCHLORIDE 10 MG/1
10 TABLET, FILM COATED ORAL EVERY 4 HOURS PRN
Status: DISCONTINUED | OUTPATIENT
Start: 2024-11-07 | End: 2024-11-12 | Stop reason: HOSPADM

## 2024-11-07 RX ADMIN — HALOPERIDOL LACTATE 1 MG: 5 INJECTION, SOLUTION INTRAMUSCULAR at 22:48

## 2024-11-07 RX ADMIN — SODIUM CHLORIDE 2100 ML: 9 INJECTION, SOLUTION INTRAVENOUS at 17:58

## 2024-11-07 RX ADMIN — LIDOCAINE HYDROCHLORIDE 6 ML: 20 JELLY TOPICAL at 18:15

## 2024-11-07 RX ADMIN — ONDANSETRON 4 MG: 4 TABLET, ORALLY DISINTEGRATING ORAL at 23:07

## 2024-11-07 RX ADMIN — TAMSULOSIN HYDROCHLORIDE 0.4 MG: 0.4 CAPSULE ORAL at 20:28

## 2024-11-07 RX ADMIN — SODIUM CHLORIDE 500 ML: 9 INJECTION, SOLUTION INTRAVENOUS at 14:42

## 2024-11-07 RX ADMIN — CEFTRIAXONE 2 G: 2 INJECTION, POWDER, FOR SOLUTION INTRAMUSCULAR; INTRAVENOUS at 17:44

## 2024-11-07 RX ADMIN — SODIUM CHLORIDE, POTASSIUM CHLORIDE, SODIUM LACTATE AND CALCIUM CHLORIDE: 600; 310; 30; 20 INJECTION, SOLUTION INTRAVENOUS at 20:28

## 2024-11-07 RX ADMIN — PIPERACILLIN AND TAZOBACTAM 4.5 G: 4; .5 INJECTION, POWDER, FOR SOLUTION INTRAVENOUS at 16:29

## 2024-11-07 RX ADMIN — SODIUM CHLORIDE 63 ML: 9 INJECTION, SOLUTION INTRAVENOUS at 15:59

## 2024-11-07 RX ADMIN — FENTANYL CITRATE 25 MCG: 50 INJECTION, SOLUTION INTRAMUSCULAR; INTRAVENOUS at 18:21

## 2024-11-07 RX ADMIN — MIRTAZAPINE 15 MG: 15 TABLET, FILM COATED ORAL at 23:07

## 2024-11-07 RX ADMIN — VANCOMYCIN HYDROCHLORIDE 2000 MG: 5 INJECTION, POWDER, LYOPHILIZED, FOR SOLUTION INTRAVENOUS at 18:32

## 2024-11-07 RX ADMIN — IOPAMIDOL 92 ML: 755 INJECTION, SOLUTION INTRAVENOUS at 15:57

## 2024-11-07 ASSESSMENT — COLUMBIA-SUICIDE SEVERITY RATING SCALE - C-SSRS
IS THE PATIENT NOT ABLE TO COMPLETE C-SSRS: UNABLE TO VERBALIZE
2. HAVE YOU ACTUALLY HAD ANY THOUGHTS OF KILLING YOURSELF IN THE PAST MONTH?: NO
1. IN THE PAST MONTH, HAVE YOU WISHED YOU WERE DEAD OR WISHED YOU COULD GO TO SLEEP AND NOT WAKE UP?: NO
6. HAVE YOU EVER DONE ANYTHING, STARTED TO DO ANYTHING, OR PREPARED TO DO ANYTHING TO END YOUR LIFE?: NO

## 2024-11-07 ASSESSMENT — ACTIVITIES OF DAILY LIVING (ADL)
ADLS_ACUITY_SCORE: 0

## 2024-11-07 NOTE — H&P
[facility]    History and Physical - Hospitalist Service       Date of Admission:  11/7/2024    Assessment & Plan      Adam Huber is a 89 year old male with past medical history significant for dementia, CAD, type 2 diabetes mellitus, hypertension, BPH with chronic urinary obstruction who presented to [facility] on 11/17/2024 with 1 week history of 11/7/2024 abdominal/flank pain and was found to have probable urinary tract infection.    Probable Complicated Urinary Tract Infection  Chronic Urinary Bladder Outlet Obstruction  BPH  Hx of recurrent urinary tract infections. CT abdomen/pelvis without evidence of stones, but does reveal diffuse wall thickening and innumerable diverticula of urinary bladder associated with significant fat stranding, as well as increased urothelial hyperenhancement. Imaging findings consistent with chronic bladder outlet obstruction, which is likely contributing to recurrent UTIs. Villa placed in emergency department. Patient does appear to be on finasteride, but tamsulosin was not in PTA medications. Initiated tamsulosin therapy and recommend urology consultation.  -Urology consulted, appreciate recommendations  -Continue PTA finasteride  -Tamsulosin 0.4mg PO daily  -Ceftriaxone 2g IV daily  -Ucx pending  -Continue Villa catheter    Non-Anion Gap Metabolic Acidosis  Lactic Acid Elevation  Mildly acidotic on admission with bicarb 20; anion gap normal at 15. Lactic acid 2.9, but increasing to 3.2. Does not appear to have sepsis based on normal vital signs and only mildly elevated leukocytosis of 11, but providing sepsis dose IV fluids. Trend lactic acid until normalized.    Type 2 Diabetes Mellitus w/ Hyperglycemia  Hx of type 2 diabetes mellitus on metformin. Presented with severe hyperglycemia; glucose 485. Starting MDSSI during admission.    Hepatic Cirrhosis  Elevated Alkaline Phosphatase  Posterior Right 2.9 cm Hepatic Lesion  Cirrhotic  appearance to liver noted on CT abdomen/pelvis. LFTs within normal limits, with exception of isolated elevated of alkaline phosphatase to 210.  Also with incidentally noted hepatic lesion to 2.9cm on right posterior lobe. Will defer to outpatient PCP on whether to follow lesion with imaging given his comorbidities and underlying dementia.    Dementia w/ Behavioral Disturbances  Hx CVA  Patient chronically with slurred speech. As per last admission )4/2024 patient needs help with most of his ADLs. He lives at home independently with assistance from his wife. Reportedly becomes agitated and has sundowning behaviors. Continue PTA remeron at bedtime PRN. Added Zyprexa 5MG ODT Q12H PRN for agitation.    Decubitus Ulcers: Noted by emergency department on admission. Vancomycin ordered due to lactic acid climbing. Continuing on admission, but suspect can be stopped if MRSA nares negative. WOCN consulted.    CAD s/p LEXI (2015): Had PCI of mid circumflex with drug-eluting stent in 2015. Med rec pending.  Hypertension: Med rec pending; patient may be on carvedilol as per chart review. Resume after med rec.  Hyperlipidemia: Patient may be on statin medication as per chart review; med rec pending. Consider stopping as likely unnecessary and contributing to pill burden.        Diet:  Regular  DVT Prophylaxis: Pneumatic Compression Devices  Villa Catheter: Not present  Lines: None     Cardiac Monitoring: None  Code Status:  DNR/DNI    Clinically Significant Risk Factors Present on Admission         # Hyponatremia: Lowest Na = 133 mmol/L in last 2 days, will monitor as appropriate       # Hypoalbuminemia: Lowest albumin = 3 g/dL at 11/7/2024  2:41 PM, will monitor as appropriate   # Drug Induced Platelet Defect: home medication list includes an antiplatelet medication   # Hypertension: Noted on problem list  # Chronic heart failure with preserved ejection fraction: heart failure noted on problem list and last echo with EF >50%    #  "Dementia: noted on problem list           # Financial/Environmental Concerns:           Disposition Plan     Medically Ready for Discharge: Anticipated in 2-4 Days           Levy Zapata MD  Hospitalist Service  [facility]  Securely message with [employer] (more info)  Text page via [employer] Paging/Directory     ______________________________________________________________________    Chief Complaint   Abdominal / Flank Pain    History is obtained from the patient    History of Present Illness   Adam Huber is a 89 year old male with past medical history significant for dementia, CAD, type 2 diabetes mellitus, hypertension, BPH with chronic urinary obstruction who presented to [facility] on 11/7/2024 with three day history of abdominal/flank pain.     Patient stated he came to hospital because \"I did not want my testicles to be removed.\" Spoke with patient via ipad . Did not provide much additional history.     Per chart review and discussion with emergency department physician, his son was present when initially presenting to emergency department and provided history. Patient had been reporting abdominal pain for approximately 1 week. No other symptoms. Is reportedly confused at baseline and slurs speech at baseline. Does have a history of recurrent urinary tract infections.    In the emergency department he was vitally stable; he was afebrile & normotensive with regular heart rate and normal oxygen saturations on room air. Laboratory studies were notable for sodium 133, bicarb 20, calcium 8.4, albumin 3.0, alkaline phosphatase 210, glucose 485, WBC 11.3, absolute neutrophils 8.4. His lactic acid was 2.9-->3.2. Urinalysis with >1000 glucose, moderate blood, large leukocyte esterase, >182 WBC, >182 RBC, and moderate bacteria. Bcx and Ucx pending. CT head without acute intracranial findings. CT abdomen/pelvis with diffuse wall thickening and innumerable diverticula of " urinary bladder associated with significant fat stranding, as well as increased urothelial hyperenhancement. Patient was started on vancomycin + zosyn and IV fluids for complicated urinary tract infection and admitted for further cares.      Past Medical History    Past Medical History:   Diagnosis Date    Acute chest pain 10/23/2015    CAD (coronary artery disease)     Essential hypertension 10/23/2015    History of CVA (cerebrovascular accident) 10/23/2015    HLD (hyperlipidemia)     HTN (hypertension)     NSTEMI (non-ST elevated myocardial infarction) (H) 10/23/2015    Post PTCA 10-    Successful PCI of culprit proximal to mid RCA with placement of a    Post PTCA 11-6-2015    pci of complex mid CFX-hector       Past Surgical History   Past Surgical History:   Procedure Laterality Date    CHOLECYSTECTOMY      GENITOURINARY SURGERY      prostate removal     HEART CATH LEFT HEART CATH  10/12/2015    PCI w/ HECTOR to RCA    HEART CATH RIGHT AND LEFT HEART CATH  11/6/2015    PCI w/ HECTOR to mid-CFX       Prior to Admission Medications   Prior to Admission Medications   Prescriptions Last Dose Informant Patient Reported? Taking?   Melatonin 10 MG TABS tablet  Spouse/Significant Other Yes No   Sig: Take 10 mg by mouth nightly as needed for sleep   acetaminophen (TYLENOL) 325 MG tablet  Spouse/Significant Other Yes No   Sig: Take 650 mg by mouth every 8 hours as needed for mild pain   aspirin (ASA) 325 MG EC tablet  Spouse/Significant Other Yes No   Sig: Take 325 mg by mouth daily   atorvastatin (LIPITOR) 40 MG tablet  Spouse/Significant Other No No   Sig: Take 1 tablet (40 mg) by mouth every evening   carvedilol (COREG) 25 MG tablet  Spouse/Significant Other No No   Sig: Take 1 tablet (25 mg) by mouth 2 times daily (with meals)   finasteride (PROSCAR) 5 MG tablet   Yes No   Sig: Take 5 mg by mouth daily   metFORMIN (GLUCOPHAGE) 500 MG tablet  Spouse/Significant Other No No   Sig: Take 1 tablet (500 mg) by mouth 2 times  "daily (with meals)   mirtazapine (REMERON) 15 MG tablet  Spouse/Significant Other Yes No   Sig: Take 15 mg by mouth nightly as needed   polyethylene glycol (MIRALAX) 17 GM/Dose powder  Spouse/Significant Other No No   Sig: Take 17 g by mouth 2 times daily      Facility-Administered Medications: None        Physical Exam   Temp: 97.4  F (36.3  C) Temp src: Oral BP: 123/55 Pulse: 70   Resp: 18 SpO2: 97 %          Estimated body mass index is 23.09 kg/m  as calculated from the following:    Height as of 4/2/24: 1.905 m (6' 3\").    Weight as of 4/2/24: 83.8 kg (184 lb 11.9 oz).    General: Very pleasant male resting comfortably in hospital bed.  Awake, alert, interactive.   HEENT: Normocephalic, atraumatic.  PERRL, EOMI.  Conjunctiva clear, sclerae anicteric.  Mucous membranes moist.  Cardiac: Regular rate and rhythm without murmur, gallop, or rub.  No peripheral edema.  Respiratory: Normal work of breathing.  Clear to auscultation bilaterally without wheezing, rales, or rhonchi.  GI: Normal, active bowel sounds.  Abdomen soft, nontender, nondistended.  : Deferred.  Musculoskeletal: Moving all extremities appropriately.  Skin: No rashes or abrasions on exposed skin.  Neurologic: Alert and oriented x2. Did not assess orientation to time. Not oriented to situation.  Cranial nerves II through XII grossly intact.  Psychologic: Appropriate mood and affect.      Medical Decision Making       65 MINUTES SPENT BY ME on the date of service doing chart review, history, exam, documentation & further activities per the note.      Data     I have personally reviewed the following data over the past 24 hrs:    11.3 (H)  \   13.4   / 217     133 (L) 98 18.7 /  427 (H)   4.4 20 (L) 0.66 (L) \     ALT: 23 AST: 27 AP: 210 (H) TBILI: 0.6   ALB: 3.0 (L) TOT PROTEIN: 6.6 LIPASE: N/A     Procal: 0.11 CRP: N/A Lactic Acid: 3.2 (H)         Imaging results reviewed over the past 24 hrs:   Recent Results (from the past 24 hours)   Head CT w/o " contrast    Narrative    CT SCAN OF THE HEAD WITHOUT CONTRAST   11/7/2024 4:12 PM     HISTORY: AMS    TECHNIQUE:  Axial images of the head and coronal reformations without  IV contrast material. Radiation dose for this scan was reduced using  automated exposure control, adjustment of the mA and/or kV according  to patient size, or iterative reconstruction technique.    COMPARISON: 2/4/2024    FINDINGS: Extensive encephalomalacia and gliosis particularly in the  bifrontal and bitemporal distributions right greater than left in the  temporal lobes is again noted. Stable moderate to severe global  cortical volume loss with ex vacuo dilatation of the ventricular  system. No acute intracranial hemorrhage. No hydrocephalus or  extra-axial hemorrhage. Remote ischemic findings involving the  bilateral basal ganglia. Remote lacunar infarcts are noted in these  regions.    The osseous calvarium is intact.      Impression    IMPRESSION:   No acute findings. Extensive chronic changes are stable.      CUAUHTEMOC MA MD         SYSTEM ID:  [id number]   CT Abdomen Pelvis w Contrast    Narrative    CT ABDOMEN PELVIS W CONTRAST 11/7/2024 4:14 PM    CLINICAL HISTORY: right flank pain    TECHNIQUE: CT scan of the abdomen and pelvis was performed following  injection of IV contrast. Multiplanar reformats were obtained. Dose  reduction techniques were used.  CONTRAST: 92 mL [id number]    COMPARISON: CT abdomen/pelvis 4/1/2024    FINDINGS:   LOWER CHEST: No definite airspace consolidation or pleural effusion.  Severe coronary artery calcifications and/or stents.    HEPATOBILIARY: Nodular contour of the liver with widening of the  fissures. Subtle 2.9 cm hyperattenuating lesion in the posterior right  lobe of the liver, increased in conspicuity but may have been present  in hindsight on prior CT. Cholecystectomy. No evidence of biliary  obstruction.    PANCREAS: Atrophic but otherwise unremarkable.    SPLEEN: Normal.    ADRENAL GLANDS:  Normal.    KIDNEYS/BLADDER: Bilateral renal cysts, no specific follow-up  recommended. No nephroureterolithiasis or hydronephrosis. Urinary  bladder demonstrates diffuse wall thickening, innumerable diverticula  and significant surrounding fat stranding, increased in conspicuity  since prior exam. Urothelial hyperenhancement is also noted.    BOWEL: No obstruction or inflammatory change. Moderate volume formed  stool throughout the colon. Normal appendix    PELVIC ORGANS: Mild prostatomegaly. Otherwise unremarkable.    ADDITIONAL FINDINGS: No lymphadenopathy or ascites. Moderate calcified  and noncalcified atherosclerosis.    MUSCULOSKELETAL: No acute bony normality. Stable T12 compression  deformity.      Impression    IMPRESSION:   1.  Findings of chronic bladder outlet obstruction with significant  increase in urothelial hyperenhancement and surrounding fat stranding,  could indicate superimposed cystitis, correlate with urinalysis.  2.  No nephroureterolithiasis or hydronephrosis. No definite  radiographic evidence of pyelonephritis.  3.  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    CATHLEEN WAGGONER MD         SYSTEM ID:  [id number]

## 2024-11-07 NOTE — ED NOTES
Pt has a stage 2 pressure ulcer on right buttock and bruising noted to right inner thigh.  notified.

## 2024-11-07 NOTE — ED TRIAGE NOTES
Triage Assessment (Adult)       Row Name 11/07/24 1348 11/07/24 1347       Triage Assessment    Airway WDL WDL WDL       Respiratory WDL    Respiratory WDL WDL WDL       Skin Circulation/Temperature WDL    Skin Circulation/Temperature WDL X WDL       Cardiac WDL    Cardiac WDL WDL WDL       Peripheral/Neurovascular WDL    Peripheral Neurovascular WDL WDL WDL       Cognitive/Neuro/Behavioral WDL    Cognitive/Neuro/Behavioral WDL X;arousability;orientation X;arousability    Level of Consciousness lethargic lethargic    Arousal Level arouses to voice --    Speech slurred slurred  At baseline    Mood/Behavior calm calm;cooperative       Bharathi Coma Scale    Best Eye Response 3-->(E3) to speech 4-->(E4) spontaneous    Best Motor Response 6-->(M6) obeys commands 6-->(M6) obeys commands    Best Verbal Response 4-->(V4) confused 4-->(V4) confused    Kelly Coma Scale Score 13 14

## 2024-11-07 NOTE — ED NOTES
Bed: [facility]  Expected date: 11/7/24  Expected time:   Means of arrival:   Comments:  Triage [facility] 66yo F, R foot pain.

## 2024-11-07 NOTE — ED TRIAGE NOTES
Pt arrives by EMS from home where he lives with his wife independently for c/o right lower abd pain. Pt has slurred speech at baseline. Pt appears lethargic, GCS 13, confused and open eyes to verbal stimulation. BS: 487, upon arrival.  VSS. Kazakh  used for triage.

## 2024-11-07 NOTE — ED PROVIDER NOTES
Emergency Department Note      History of Present Illness     Chief Complaint   Abdominal Pain      BRUNA Huber is a 89 year old male with a history of hyperlipidemia, hypertension, DM2, CHF, CAD, NSTEMI, stroke, and late onset Alzheimer's disease who presents to the ED for abdominal pain. The patient's son reports that the patient has been experiencing intermittent abdominal pain and flank pain on the right side for a week. His pain then worsened today at 1200. Son reports a history of 3 previous UTIs. Son states that patient has no fever or changes in speech as he typically has slurred speech. Patient takes insulin pills everyday, and had his daily pill today. He is also currently on aspirin, carvedilol, atorvastatin, mirtazapine, finasteride, and metformin.    Independent Historian   Son as detailed above.    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, intermediate dry stage  Nuclear sclerotic cataract of both eyes  Pseudoexfoliation lens capsule  Vertigo  Acute coronary syndrome  NSTEMI  Acute ischemic heart disease  Acute metabolic encephalopathy  CAD  Acute CHF  Elevated troponin  Elevated prostate specific antigen  Essential hypertension  Generalized weakness  Influenza B  DM2  UTI  Acute cystitis with hematuria  Acute delirium  Acute pulmonary edema  Altered mental status  Bleeding of blood vessel  Hypoxia  Late onset Alzheimer's disease  Pneumonia of left lower lobe  Pulmonary nodules  Rhabdomyolysis  Pyelonephritis  Severe  sepsis  Slurred speech  Post PTCA    Medications   Lipitor  Coreg  Glucophage  Miralax  Tylenol  Aspirin  Proscar  Remeron     Surgical History   Past Surgical History:   Procedure Laterality Date    CHOLECYSTECTOMY      GENITOURINARY SURGERY      prostate removal     HEART CATH LEFT HEART CATH  10/12/2015    PCI w/ LEXI to RCA    HEART CATH RIGHT AND LEFT HEART CATH  11/6/2015    PCI w/ LEXI to mid-CFX       Physical Exam     Patient Vitals for the past 24 hrs:   BP Temp Temp src Pulse Resp SpO2   11/07/24 1527 123/55 -- -- 70 -- 97 %   11/07/24 1417 -- -- -- 73 -- --   11/07/24 1412 119/56 -- -- -- -- 95 %   11/07/24 1356 118/56 -- -- -- -- 97 %   11/07/24 1344 113/56 97.4  F (36.3  C) Oral 70 18 97 %     Physical Exam  General: Eyes closed, opening to stimulation.  HEENT: Atraumatic   EOM normal   External ears normal   Trachea midline  Neck: Supple, normal ROM  CV: Regular rate, regular rhythm   No lower extremity edema  2+ radial and DP pulses  PULM: Breath sounds normal bilaterally  No wheezes or rales  ABD: Soft, non-tender, non-distended  Normal bowel sounds   No rebound or guarding   Right CVA tenderness  MSK: No gross deformities  NEURO: Mumbling words, cranial nerves II through XII grossly intact.  Moving all 4 extremities, rolling over in bed when asked  Skin: Warm, dry and intact      Diagnostics     Lab Results   Labs Ordered and Resulted from Time of ED Arrival to Time of ED Departure   GLUCOSE BY METER - Abnormal       Result Value    GLUCOSE BY METER POCT 487 (*)    COMPREHENSIVE METABOLIC PANEL - Abnormal    Sodium 133 (*)     Potassium 4.4      Carbon Dioxide (CO2) 20 (*)     Anion Gap 15      Urea Nitrogen 18.7      Creatinine 0.66 (*)     GFR Estimate 90      Calcium 8.4 (*)     Chloride 98      Glucose 485 (*)     Alkaline Phosphatase 210 (*)     AST 27      ALT 23      Protein Total 6.6      Albumin 3.0 (*)     Bilirubin Total 0.6     LACTIC ACID WHOLE BLOOD WITH 1X REPEAT IN 2 HR WHEN >2 -  Abnormal    Lactic Acid, Initial 2.9 (*)    ROUTINE UA WITH MICROSCOPIC REFLEX TO CULTURE - Abnormal    Color Urine Yellow      Appearance Urine Cloudy (*)     Glucose Urine >=1000 (*)     Bilirubin Urine Negative      Ketones Urine Negative      Specific Gravity Urine 1.028      Blood Urine Moderate (*)     pH Urine 5.5      Protein Albumin Urine 50 (*)     Urobilinogen Urine Normal      Nitrite Urine Negative      Leukocyte Esterase Urine Large (*)     Bacteria Urine Moderate (*)     WBC Clumps Urine Present (*)     Budding Yeast Urine Many (*)     RBC Urine >182 (*)     WBC Urine >182 (*)    BLOOD GAS VENOUS - Abnormal    pH Venous 7.37      pCO2 Venous 43      pO2 Venous 36      Bicarbonate Venous 25      Base Excess/Deficit Venous -0.4      FIO2 0      Oxyhemoglobin Venous 64 (*)     O2 Sat, Venous 64.9 (*)    CBC WITH PLATELETS AND DIFFERENTIAL - Abnormal    WBC Count 11.3 (*)     RBC Count 4.59      Hemoglobin 13.4      Hematocrit 40.1      MCV 87      MCH 29.2      MCHC 33.4      RDW 13.9      Platelet Count 217      % Neutrophils 74      % Lymphocytes 18      % Monocytes 7      % Eosinophils 1      % Basophils 0      % Immature Granulocytes 0      NRBCs per 100 WBC 0      Absolute Neutrophils 8.4 (*)     Absolute Lymphocytes 2.0      Absolute Monocytes 0.8      Absolute Eosinophils 0.1      Absolute Basophils 0.1      Absolute Immature Granulocytes 0.0      Absolute NRBCs 0.0     LACTIC ACID WHOLE BLOOD - Abnormal    Lactic Acid 3.2 (*)    GLUCOSE BY METER - Abnormal    GLUCOSE BY METER POCT 427 (*)    PROCALCITONIN - Normal    Procalcitonin 0.11     KETONE BETA-HYDROXYBUTYRATE QUANTITATIVE, RAPID - Normal    Ketone (Beta-Hydroxybutyrate) Quantitative 0.21     BLOOD CULTURE   URINE CULTURE       Imaging   CT Abdomen Pelvis w Contrast   Final Result   IMPRESSION:    1.  Findings of chronic bladder outlet obstruction with significant   increase in urothelial hyperenhancement and surrounding fat stranding,    could indicate superimposed cystitis, correlate with urinalysis.   2.  No nephroureterolithiasis or hydronephrosis. No definite   radiographic evidence of pyelonephritis.   3.  Cirrhotic morphology of the liver with likely incidental 2.9 cm   posterior right hepatic lesion, recommend further evaluation with   contrast-enhanced MRI, can be performed on a nonemergent/outpatient   basis.      CATHLEEN WAGGONER MD            SYSTEM ID:  [id number]      Head CT w/o contrast   Final Result   IMPRESSION:   No acute findings. Extensive chronic changes are stable.         CUAUHTEMOC MA MD            SYSTEM ID:  [id number]          Independent Interpretation   None    ED Course      Medications Administered   Medications   sodium chloride 0.9% BOLUS 2,100 mL (has no administration in time range)   vancomycin (VANCOCIN) 2,000 mg in 0.9% NaCl 520 mL intermittent infusion (has no administration in time range)   cefTRIAXone (ROCEPHIN) 2 g vial to attach to  ml bag for ADULTS or NS 50 ml bag for PEDS (2 g Intravenous $New Bag 11/7/24 1744)   OLANZapine zydis (zyPREXA) ODT tab 5 mg (has no administration in time range)   sodium chloride 0.9% BOLUS 500 mL (0 mLs Intravenous Stopped 11/7/24 1633)   piperacillin-tazobactam (ZOSYN) 4.5 g vial to attach to  mL bag (4.5 g Intravenous $New Bag 11/7/24 1629)   iopamidol (ISOVUE-370) solution 120 mL (92 mLs Intravenous $Given 11/7/24 1557)   Saline CT scan flush (63 mLs Intravenous $Given 11/7/24 1559)       Procedures   Procedures     Discussion of Management   None    ED Course   ED Course as of 11/07/24 1747   Thu Nov 07, 2024   1401 I obtained history and examined the patient as noted above.     1658 Spoke to Dr. Zapata who accepts admission   1730 Patient agitated, pulling at lines, trying to bite staff.  Not easily redirected.  Will order Haldol as needed for agitation.       Additional Documentation  None    Medical Decision Making / Diagnosis     CMS Diagnoses: The patient has  "signs of sepsis   Sepsis ED evaluation   The patient has signs of sepsis as evidenced by:  1. Presence of 2 SIRS criteria, suspected infection, AND  2. Organ dysfunction: Lactic Acidosis with value >2.0 due to infection and Acute encephalopathy due to infection    Time zero:  1441  on 11/07/24 as this was the time when Lactate was resulted and the level was greater than 2.    Note: Due to a national blood culture bottle shortage, reduced blood cultures may have been drawn on this patient.    Lactic Acid Results:  Recent Labs   Lab Test 11/07/24  1635 11/07/24  1441 04/01/24  1945   LACT 3.2* 2.9* 1.7       3 Hour Bundle 6 Hour Bundle (Reassessment)   Blood Cultures before IV Antibiotics: Yes  Antibiotics given: see below  Prehospital fluid volume (mL):                     Total fluids given (ED +Pre-hospital):  Full 30 mL/kg bolus given based on weight: Patient actual weight not available.   Repeat Lactic Acid Level: Ordered by reflex for 2 hours after initial lactic acid collection.  Vasopressors: MAP>65 after initial IVF bolus, will continue to monitor fluid status and vital signs.  Repeat perfusion exam: I attest to having performed a repeat sepsis exam and assessment of perfusion at  1730 .   BMI Readings from Last 1 Encounters:   04/02/24 23.09 kg/m        Anti-infectives (From admission through now)      Start     Dose/Rate Route Frequency Ordered Stop    11/07/24 1800  cefTRIAXone (ROCEPHIN) 2 g vial to attach to  ml bag for ADULTS or NS 50 ml bag for PEDS         2 g  over 30 Minutes Intravenous EVERY 24 HOURS 11/07/24 1708      11/07/24 1650  vancomycin (VANCOCIN) 2,000 mg in 0.9% NaCl 520 mL intermittent infusion         2,000 mg  over 2 Hours Intravenous ONCE 11/07/24 1647      11/07/24 1525  piperacillin-tazobactam (ZOSYN) 4.5 g vial to attach to  mL bag        Note to Pharmacy: For RUDY, LACY and GIULIANO: For Zosyn-naive patients, use the \"Zosyn initial dose + extended infusion\" order panel.    " 4.5 g  over 30 Minutes Intravenous ONCE 11/07/24 1520 11/07/24 1659                MIPS       None    [facility]   Adam Huber is a 89 year old male who presents with flank pain and right lower quadrant pain.  Is at his reported baseline per family though seems encephalopathic on my exam.  Initial lactic is elevated and has leukocytosis raising suspicion for sepsis.  Initially treated with 500 cc bolus of fluids and Zosyn for likely intra-abdominal cause.  Is also found to have a stage II sacral decubitus ulcer and with rising lactic despite fluid resuscitation, will complete 30 cc/kg bolus fluids and broaden to include vancomycin for skin madhu.     CT scan shows likely chronic bladder outlet obstruction, will place Villa catheter.  Is having some degree of agitation, unclear whether this is sundowning or encephalopathy from urosepsis.  Has a history of prolonged QTc so we will order Zyprexa as needed for agitation and check EKG today.  Has significant hyperglycemia which I think likely is from an acute infection.  His anion gap and beta hydroxybutyrate are WNL making starvation ketoacidosis or diabetic emergency less likely.  Will allow some hyperglycemia in the setting of acute illness.    Son understands plan for hospitalization. Blood pressure is stable without vasopressors.  Will admit to medical telemetry bed.     Disposition   The patient was admitted to the hospital.     Diagnosis     ICD-10-CM    1. Sepsis, due to unspecified organism, unspecified whether acute organ dysfunction present (H)  A41.9       2. Pressure injury of sacral region, stage 2 (H)  L89.152           Scribe Disclosure:  I, Candida Goetz, am serving as a scribe at 2:10 PM on 11/7/2024 to document services personally performed by Shauna Banegas DO based on my observations and the provider's statements to me.        Shauna Banegas DO  11/07/24 4642

## 2024-11-08 LAB
ALBUMIN SERPL BCG-MCNC: 2.7 G/DL (ref 3.5–5.2)
ALP SERPL-CCNC: 163 U/L (ref 40–150)
ALT SERPL W P-5'-P-CCNC: 19 U/L (ref 0–70)
ANION GAP SERPL CALCULATED.3IONS-SCNC: 11 MMOL/L (ref 7–15)
AST SERPL W P-5'-P-CCNC: 24 U/L (ref 0–45)
ATRIAL RATE - MUSE: 71 BPM
BACTERIA UR CULT: ABNORMAL
BILIRUB SERPL-MCNC: 0.6 MG/DL
BUN SERPL-MCNC: 11.1 MG/DL (ref 8–23)
CALCIUM SERPL-MCNC: 8.1 MG/DL (ref 8.8–10.4)
CHLORIDE SERPL-SCNC: 103 MMOL/L (ref 98–107)
CREAT SERPL-MCNC: 0.52 MG/DL (ref 0.67–1.17)
CREAT SERPL-MCNC: 0.52 MG/DL (ref 0.67–1.17)
DIASTOLIC BLOOD PRESSURE - MUSE: NORMAL MMHG
EGFRCR SERPLBLD CKD-EPI 2021: >90 ML/MIN/1.73M2
EGFRCR SERPLBLD CKD-EPI 2021: >90 ML/MIN/1.73M2
ERYTHROCYTE [DISTWIDTH] IN BLOOD BY AUTOMATED COUNT: 14.2 % (ref 10–15)
EST. AVERAGE GLUCOSE BLD GHB EST-MCNC: 315 MG/DL
GLUCOSE BLDC GLUCOMTR-MCNC: 253 MG/DL (ref 70–99)
GLUCOSE BLDC GLUCOMTR-MCNC: 272 MG/DL (ref 70–99)
GLUCOSE BLDC GLUCOMTR-MCNC: 275 MG/DL (ref 70–99)
GLUCOSE BLDC GLUCOMTR-MCNC: 302 MG/DL (ref 70–99)
GLUCOSE BLDC GLUCOMTR-MCNC: 379 MG/DL (ref 70–99)
GLUCOSE SERPL-MCNC: 251 MG/DL (ref 70–99)
HBA1C MFR BLD: 12.6 %
HCO3 SERPL-SCNC: 18 MMOL/L (ref 22–29)
HCT VFR BLD AUTO: 38.7 % (ref 40–53)
HGB BLD-MCNC: 13.2 G/DL (ref 13.3–17.7)
INTERPRETATION ECG - MUSE: NORMAL
MAGNESIUM SERPL-MCNC: 1.4 MG/DL (ref 1.7–2.3)
MAGNESIUM SERPL-MCNC: 2.3 MG/DL (ref 1.7–2.3)
MCH RBC QN AUTO: 29.9 PG (ref 26.5–33)
MCHC RBC AUTO-ENTMCNC: 34.1 G/DL (ref 31.5–36.5)
MCV RBC AUTO: 88 FL (ref 78–100)
MRSA DNA SPEC QL NAA+PROBE: NEGATIVE
P AXIS - MUSE: 88 DEGREES
PLATELET # BLD AUTO: 200 10E3/UL (ref 150–450)
POTASSIUM SERPL-SCNC: 3.9 MMOL/L (ref 3.4–5.3)
PR INTERVAL - MUSE: 170 MS
PROT SERPL-MCNC: 6.4 G/DL (ref 6.4–8.3)
QRS DURATION - MUSE: 98 MS
QT - MUSE: 466 MS
QTC - MUSE: 506 MS
R AXIS - MUSE: 92 DEGREES
RBC # BLD AUTO: 4.42 10E6/UL (ref 4.4–5.9)
SA TARGET DNA: POSITIVE
SODIUM SERPL-SCNC: 132 MMOL/L (ref 135–145)
SYSTOLIC BLOOD PRESSURE - MUSE: NORMAL MMHG
T AXIS - MUSE: 80 DEGREES
VENTRICULAR RATE- MUSE: 71 BPM
WBC # BLD AUTO: 8.2 10E3/UL (ref 4–11)

## 2024-11-08 PROCEDURE — 99222 1ST HOSP IP/OBS MODERATE 55: CPT | Mod: FS | Performed by: STUDENT IN AN ORGANIZED HEALTH CARE EDUCATION/TRAINING PROGRAM

## 2024-11-08 PROCEDURE — G0463 HOSPITAL OUTPT CLINIC VISIT: HCPCS

## 2024-11-08 PROCEDURE — 250N000011 HC RX IP 250 OP 636: Performed by: STUDENT IN AN ORGANIZED HEALTH CARE EDUCATION/TRAINING PROGRAM

## 2024-11-08 PROCEDURE — 83735 ASSAY OF MAGNESIUM: CPT | Performed by: INTERNAL MEDICINE

## 2024-11-08 PROCEDURE — 99232 SBSQ HOSP IP/OBS MODERATE 35: CPT | Performed by: INTERNAL MEDICINE

## 2024-11-08 PROCEDURE — 250N000012 HC RX MED GY IP 250 OP 636 PS 637: Performed by: STUDENT IN AN ORGANIZED HEALTH CARE EDUCATION/TRAINING PROGRAM

## 2024-11-08 PROCEDURE — 120N000001 HC R&B MED SURG/OB

## 2024-11-08 PROCEDURE — 82310 ASSAY OF CALCIUM: CPT | Performed by: STUDENT IN AN ORGANIZED HEALTH CARE EDUCATION/TRAINING PROGRAM

## 2024-11-08 PROCEDURE — 83735 ASSAY OF MAGNESIUM: CPT | Performed by: STUDENT IN AN ORGANIZED HEALTH CARE EDUCATION/TRAINING PROGRAM

## 2024-11-08 PROCEDURE — 82565 ASSAY OF CREATININE: CPT | Performed by: STUDENT IN AN ORGANIZED HEALTH CARE EDUCATION/TRAINING PROGRAM

## 2024-11-08 PROCEDURE — 258N000003 HC RX IP 258 OP 636: Performed by: STUDENT IN AN ORGANIZED HEALTH CARE EDUCATION/TRAINING PROGRAM

## 2024-11-08 PROCEDURE — 250N000011 HC RX IP 250 OP 636: Performed by: INTERNAL MEDICINE

## 2024-11-08 PROCEDURE — 82040 ASSAY OF SERUM ALBUMIN: CPT | Performed by: STUDENT IN AN ORGANIZED HEALTH CARE EDUCATION/TRAINING PROGRAM

## 2024-11-08 PROCEDURE — 83036 HEMOGLOBIN GLYCOSYLATED A1C: CPT | Performed by: INTERNAL MEDICINE

## 2024-11-08 PROCEDURE — 87641 MR-STAPH DNA AMP PROBE: CPT | Performed by: STUDENT IN AN ORGANIZED HEALTH CARE EDUCATION/TRAINING PROGRAM

## 2024-11-08 PROCEDURE — 84520 ASSAY OF UREA NITROGEN: CPT | Performed by: STUDENT IN AN ORGANIZED HEALTH CARE EDUCATION/TRAINING PROGRAM

## 2024-11-08 PROCEDURE — 85014 HEMATOCRIT: CPT | Performed by: STUDENT IN AN ORGANIZED HEALTH CARE EDUCATION/TRAINING PROGRAM

## 2024-11-08 PROCEDURE — 250N000013 HC RX MED GY IP 250 OP 250 PS 637: Performed by: STUDENT IN AN ORGANIZED HEALTH CARE EDUCATION/TRAINING PROGRAM

## 2024-11-08 PROCEDURE — 36415 COLL VENOUS BLD VENIPUNCTURE: CPT | Performed by: INTERNAL MEDICINE

## 2024-11-08 PROCEDURE — 36415 COLL VENOUS BLD VENIPUNCTURE: CPT | Performed by: STUDENT IN AN ORGANIZED HEALTH CARE EDUCATION/TRAINING PROGRAM

## 2024-11-08 PROCEDURE — 87640 STAPH A DNA AMP PROBE: CPT | Performed by: STUDENT IN AN ORGANIZED HEALTH CARE EDUCATION/TRAINING PROGRAM

## 2024-11-08 RX ORDER — MAGNESIUM SULFATE HEPTAHYDRATE 40 MG/ML
4 INJECTION, SOLUTION INTRAVENOUS ONCE
Status: COMPLETED | OUTPATIENT
Start: 2024-11-08 | End: 2024-11-08

## 2024-11-08 RX ADMIN — MAGNESIUM SULFATE HEPTAHYDRATE 4 G: 40 INJECTION, SOLUTION INTRAVENOUS at 13:10

## 2024-11-08 RX ADMIN — CARVEDILOL 25 MG: 25 TABLET, FILM COATED ORAL at 09:10

## 2024-11-08 RX ADMIN — ATORVASTATIN CALCIUM 40 MG: 40 TABLET, FILM COATED ORAL at 09:10

## 2024-11-08 RX ADMIN — CEFTRIAXONE 2 G: 2 INJECTION, POWDER, FOR SOLUTION INTRAMUSCULAR; INTRAVENOUS at 17:27

## 2024-11-08 RX ADMIN — VANCOMYCIN HYDROCHLORIDE 1000 MG: 1 INJECTION, SOLUTION INTRAVENOUS at 18:11

## 2024-11-08 RX ADMIN — SODIUM CHLORIDE, POTASSIUM CHLORIDE, SODIUM LACTATE AND CALCIUM CHLORIDE: 600; 310; 30; 20 INJECTION, SOLUTION INTRAVENOUS at 13:13

## 2024-11-08 RX ADMIN — FINASTERIDE 5 MG: 5 TABLET, FILM COATED ORAL at 09:09

## 2024-11-08 RX ADMIN — VANCOMYCIN HYDROCHLORIDE 1000 MG: 1 INJECTION, SOLUTION INTRAVENOUS at 06:15

## 2024-11-08 RX ADMIN — INSULIN ASPART 3 UNITS: 100 INJECTION, SOLUTION INTRAVENOUS; SUBCUTANEOUS at 17:40

## 2024-11-08 RX ADMIN — INSULIN ASPART 3 UNITS: 100 INJECTION, SOLUTION INTRAVENOUS; SUBCUTANEOUS at 09:12

## 2024-11-08 RX ADMIN — CARVEDILOL 25 MG: 25 TABLET, FILM COATED ORAL at 17:27

## 2024-11-08 RX ADMIN — Medication 3 MG: at 22:03

## 2024-11-08 RX ADMIN — ASPIRIN 325 MG: 325 TABLET, COATED ORAL at 09:10

## 2024-11-08 RX ADMIN — INSULIN ASPART 5 UNITS: 100 INJECTION, SOLUTION INTRAVENOUS; SUBCUTANEOUS at 13:13

## 2024-11-08 RX ADMIN — TAMSULOSIN HYDROCHLORIDE 0.4 MG: 0.4 CAPSULE ORAL at 09:09

## 2024-11-08 RX ADMIN — HALOPERIDOL LACTATE 1 MG: 5 INJECTION, SOLUTION INTRAMUSCULAR at 07:03

## 2024-11-08 ASSESSMENT — ACTIVITIES OF DAILY LIVING (ADL)
ADLS_ACUITY_SCORE: 26.75
ADLS_ACUITY_SCORE: 0
ADLS_ACUITY_SCORE: 0
ADLS_ACUITY_SCORE: 21.25
ADLS_ACUITY_SCORE: 0
ADLS_ACUITY_SCORE: 21.25
ADLS_ACUITY_SCORE: 21.25
ADLS_ACUITY_SCORE: 0
ADLS_ACUITY_SCORE: 21.25
ADLS_ACUITY_SCORE: 21.25
ADLS_ACUITY_SCORE: 0
ADLS_ACUITY_SCORE: 27.25
ADLS_ACUITY_SCORE: 27.25
ADLS_ACUITY_SCORE: 22.25
ADLS_ACUITY_SCORE: 0
ADLS_ACUITY_SCORE: 21.25
ADLS_ACUITY_SCORE: 22.25
ADLS_ACUITY_SCORE: 26.75
ADLS_ACUITY_SCORE: 27.25

## 2024-11-08 NOTE — PHARMACY-VANCOMYCIN DOSING SERVICE
Pharmacy Vancomycin Initial Note  Date of Service 2024  Patient's  1935  89 year old, male    Indication: Skin and Soft Tissue Infection    Current estimated CrCl = Estimated Creatinine Clearance: 93 mL/min (A) (based on SCr of 0.66 mg/dL (L)).    Creatinine for last 3 days  2024:  2:41 PM Creatinine 0.66 mg/dL    Recent Vancomycin Level(s) for last 3 days  No results found for requested labs within last 3 days.      Vancomycin IV Administrations (past 72 hours)                     vancomycin (VANCOCIN) 2,000 mg in 0.9% NaCl 520 mL intermittent infusion (mg) 2,000 mg New Bag 24 1832                    Nephrotoxins and other renal medications (From now, onward)      Start     Dose/Rate Route Frequency Ordered Stop    24 1650  vancomycin (VANCOCIN) 2,000 mg in 0.9% NaCl 520 mL intermittent infusion         2,000 mg  over 2 Hours Intravenous ONCE 24 1647              Contrast Orders - past 72 hours (72h ago, onward)      Start     Dose/Rate Route Frequency Stop    24 1600  iopamidol (ISOVUE-370) solution 120 mL         120 mL Intravenous ONCE 24 1557            [employer] Prediction of Planned Initial Vancomycin Regimen  Loading dose: N/A  Regimen: 1000 mg IV every 12 hours.  Start time: 06:32 on 2024  Exposure target: AUC24 (range)400-600 mg/L.hr   AUC24,ss: 538 mg/L.hr  Probability of AUC24 > 400: 79 %  Ctrough,ss: 17.6 mg/L  Probability of Ctrough,ss > 20: 39 %  Probability of nephrotoxicity (Lodise DERRICK ): 13 %        Plan:  Start vancomycin  1000 mg IV q12h after LD.   Vancomycin monitoring method: AUC  Vancomycin therapeutic monitoring goal: 400-600 mg*h/L  Pharmacy will check vancomycin levels as appropriate in 1-3 Days.    Serum creatinine levels will be ordered a minimum of twice weekly.      Tanesha Harris [facility]

## 2024-11-08 NOTE — PROVIDER NOTIFICATION
Paged crosscover regarding agitating triggers during night.     2343: Pt had code green around 2230, was very agitated. PRNs given. Hitting staff. History of dementia. Sitter at bedside. Per son who is also here at bedside for now, he says that interruptions at night cause increased agitation and confusion. I know his sugars have been high and lactic acid is high. Is there any way we can place a patient care order to minimize interruptions overnight to try to limit agitated episodes? Any advice would be appreciated.     2345: Dr. Carballo acknowledged and added the delirium protocol.

## 2024-11-08 NOTE — ED NOTES
[facility]  ED Nurse Handoff Report    ED Chief complaint: Abdominal Pain  . ED Diagnosis:   Final diagnoses:   Sepsis, due to unspecified organism, unspecified whether acute organ dysfunction present (H)   Pressure injury of sacral region, stage 2 (H)       Allergies:   Allergies   Allergen Reactions    Lisinopril     Morphine Other (See Comments)     confusion       Code Status: DNR / DNI    Activity level - Baseline/Home:  walker.  Activity Level - Current:   assist of 1.   Lift room needed: No.   Bariatric: No   Needed: Yes [place]  Isolation: No.   Infection: Not Applicable.     Respiratory status: Room air    Vital Signs (within 30 minutes):   Vitals:    11/07/24 1356 11/07/24 1412 11/07/24 1417 11/07/24 1527   BP: 118/56 119/56  123/55   Pulse:   73 70   Resp:       Temp:       TempSrc:       SpO2: 97% 95%  97%       Cardiac Rhythm:  ,      Pain level:    Patient confused: Yes.   Patient Falls Risk: nonskid shoes/slippers when out of bed, arm band in place, and patient and family education.   Elimination Status: Urethral catheter (parmar) in place; refer to orders to discontinue as per protocol      Patient Report - Initial Complaint: Abd pain.   Focused Assessment: Adam Huber is a 89 year old male with a history of hyperlipidemia, hypertension, DM2, CHF, CAD, NSTEMI, stroke, and late onset Alzheimer's disease who presents to the ED for abdominal pain. The patient's son reports that the patient has been experiencing intermittent abdominal pain and flank pain on the right side for a week. His pain then worsened today at 1200. Son reports a history of 3 previous UTIs. Son states that patient has no fever or changes in speech as he typically has slurred speech. Patient takes insulin pills everyday, and had his daily pill today. He is also currently on aspirin, carvedilol, atorvastatin, mirtazapine, finasteride, and metformin.      Abnormal Results:   Labs Ordered and  Resulted from Time of ED Arrival to Time of ED Departure   GLUCOSE BY METER - Abnormal       Result Value    GLUCOSE BY METER POCT 487 (*)    COMPREHENSIVE METABOLIC PANEL - Abnormal    Sodium 133 (*)     Potassium 4.4      Carbon Dioxide (CO2) 20 (*)     Anion Gap 15      Urea Nitrogen 18.7      Creatinine 0.66 (*)     GFR Estimate 90      Calcium 8.4 (*)     Chloride 98      Glucose 485 (*)     Alkaline Phosphatase 210 (*)     AST 27      ALT 23      Protein Total 6.6      Albumin 3.0 (*)     Bilirubin Total 0.6     LACTIC ACID WHOLE BLOOD WITH 1X REPEAT IN 2 HR WHEN >2 - Abnormal    Lactic Acid, Initial 2.9 (*)    ROUTINE UA WITH MICROSCOPIC REFLEX TO CULTURE - Abnormal    Color Urine Yellow      Appearance Urine Cloudy (*)     Glucose Urine >=1000 (*)     Bilirubin Urine Negative      Ketones Urine Negative      Specific Gravity Urine 1.028      Blood Urine Moderate (*)     pH Urine 5.5      Protein Albumin Urine 50 (*)     Urobilinogen Urine Normal      Nitrite Urine Negative      Leukocyte Esterase Urine Large (*)     Bacteria Urine Moderate (*)     WBC Clumps Urine Present (*)     Budding Yeast Urine Many (*)     RBC Urine >182 (*)     WBC Urine >182 (*)    BLOOD GAS VENOUS - Abnormal    pH Venous 7.37      pCO2 Venous 43      pO2 Venous 36      Bicarbonate Venous 25      Base Excess/Deficit Venous -0.4      FIO2 0      Oxyhemoglobin Venous 64 (*)     O2 Sat, Venous 64.9 (*)    CBC WITH PLATELETS AND DIFFERENTIAL - Abnormal    WBC Count 11.3 (*)     RBC Count 4.59      Hemoglobin 13.4      Hematocrit 40.1      MCV 87      MCH 29.2      MCHC 33.4      RDW 13.9      Platelet Count 217      % Neutrophils 74      % Lymphocytes 18      % Monocytes 7      % Eosinophils 1      % Basophils 0      % Immature Granulocytes 0      NRBCs per 100 WBC 0      Absolute Neutrophils 8.4 (*)     Absolute Lymphocytes 2.0      Absolute Monocytes 0.8      Absolute Eosinophils 0.1      Absolute Basophils 0.1      Absolute Immature  Granulocytes 0.0      Absolute NRBCs 0.0     LACTIC ACID WHOLE BLOOD - Abnormal    Lactic Acid 3.2 (*)    GLUCOSE BY METER - Abnormal    GLUCOSE BY METER POCT 427 (*)    PROCALCITONIN - Normal    Procalcitonin 0.11     KETONE BETA-HYDROXYBUTYRATE QUANTITATIVE, RAPID - Normal    Ketone (Beta-Hydroxybutyrate) Quantitative 0.21     GLUCOSE MONITOR NURSING POCT   GLUCOSE MONITOR NURSING POCT   BLOOD CULTURE   URINE CULTURE        CT Abdomen Pelvis w Contrast   Final Result   IMPRESSION:    1.  Findings of chronic bladder outlet obstruction with significant   increase in urothelial hyperenhancement and surrounding fat stranding,   could indicate superimposed cystitis, correlate with urinalysis.   2.  No nephroureterolithiasis or hydronephrosis. No definite   radiographic evidence of pyelonephritis.   3.  Cirrhotic morphology of the liver with likely incidental 2.9 cm   posterior right hepatic lesion, recommend further evaluation with   contrast-enhanced MRI, can be performed on a nonemergent/outpatient   basis.      CATHLEEN WAGGONER MD            SYSTEM ID:  [id number]      Head CT w/o contrast   Final Result   IMPRESSION:   No acute findings. Extensive chronic changes are stable.         CUAUHTEMOC MA MD            SYSTEM ID:  [id number]          Treatments provided: IVF, IV ABX  Family Comments: Son Adoflo was at bedside earlier.   OBS brochure/video discussed/provided to patient:  N/A  ED Medications:   Medications   sodium chloride 0.9% BOLUS 2,100 mL (2,100 mLs Intravenous $New Bag 11/7/24 1758)   vancomycin (VANCOCIN) 2,000 mg in 0.9% NaCl 520 mL intermittent infusion (2,000 mg Intravenous $New Bag 11/7/24 1832)   cefTRIAXone (ROCEPHIN) 2 g vial to attach to  ml bag for ADULTS or NS 50 ml bag for PEDS (0 g Intravenous Stopped 11/7/24 1832)   OLANZapine (zyPREXA) injection 5 mg (has no administration in time range)   glucose gel 15-30 g (has no administration in time range)     Or   dextrose 50 % injection 25-50 mL  (has no administration in time range)     Or   glucagon injection 1 mg (has no administration in time range)   insulin aspart (NovoLOG) injection (RAPID ACTING) (has no administration in time range)   insulin aspart (NovoLOG) injection (RAPID ACTING) (has no administration in time range)   sodium chloride 0.9% BOLUS 500 mL (0 mLs Intravenous Stopped 11/7/24 1633)   piperacillin-tazobactam (ZOSYN) 4.5 g vial to attach to  mL bag (0 g Intravenous Stopped 11/7/24 1832)   iopamidol (ISOVUE-370) solution 120 mL (92 mLs Intravenous $Given 11/7/24 1557)   Saline CT scan flush (63 mLs Intravenous $Given 11/7/24 1559)   lidocaine (XYLOCAINE) 2 % external gel 6 mL (6 mLs Urethral $Given by Other 11/7/24 1815)   fentaNYL (PF) (SUBLIMAZE) injection 25 mcg (25 mcg Intravenous $Given 11/7/24 1821)       Drips infusing:  No  For the majority of the shift this patient was Yellow.   Interventions performed were 1:1 sitter, use of , security. HX: dementia, combative at times.     Sepsis treatment initiated: Yes    Per the ED Provider, Time Zero for severe sepsis or septic shock is:  1441    3 Hour Severe Sepsis Bundle Completion:  1. Initial Lactic Acid Result:   Recent Labs   Lab Test 11/07/24  1635 11/07/24  1441 04/01/24  1945   LACT 3.2* 2.9* 1.7     2. Blood Cultures before Antibiotics: Yes  3. Broad Spectrum Antibiotics Administered:     Anti-infectives (From now, onward)      Start     Dose/Rate Route Frequency Ordered Stop    11/07/24 1800  cefTRIAXone (ROCEPHIN) 2 g vial to attach to  ml bag for ADULTS or NS 50 ml bag for PEDS         2 g  over 30 Minutes Intravenous EVERY 24 HOURS 11/07/24 1708      11/07/24 1650  vancomycin (VANCOCIN) 2,000 mg in 0.9% NaCl 520 mL intermittent infusion         2,000 mg  over 2 Hours Intravenous ONCE 11/07/24 1647            4. 2600 ml of IV fluids have been given so far      6 Hour Severe Sepsis Bundle Completion:    1. Repeat Lactic Acid Level: Last result   Lab  Results   Component Value Date    LACT 3.2 (H) 11/07/2024     2. Patient currently on Vasopressors =  No    Cares/treatment/interventions/medications to be completed following ED care: See Orders    ED Nurse Name: Miladys Rosa RN  6:43 PM

## 2024-11-08 NOTE — PROGRESS NOTES
[facility]    Hospitalist Progress Note  Name: Adam Huber    MRN: [id number]  Provider:  Raghavendra Snell DO  Date of Service: 11/08/2024    Summary of Stay: Adam Huber is a 89 year old male with a history of dementia with behavioral disturbances, history of CVA, type 2 diabetes mellitus, coronary artery disease status post LEXI in 2015, hypertension, hyperlipidemia, HFpEF, new hepatic cirrhosis, BPH admitted on 11/7/2024 with abdominal pain and flank pain.  In the emergency department, the patient was found to have a temperature of 97.4  F, blood pressure 123/55, heart rate 70, respiratory rate 18, SpO2 97% on room air.  Initial lab work showed mild leukocytosis of 11.3, sodium 133, bicarb 20, BUN/creatinine 18.7/0.66, glucose 427, alkaline phosphatase 210, albumin 3.0, lactic acid 3.2.  CT head showed extensive chronic changes but otherwise negative for acute issues.  CT abdomen and pelvis showed findings of chronic bladder outlet obstruction with significant increase in urothelial hyperenhancement and surrounding fat stranding possibly indicating cystitis, cirrhotic morphology of the liver with likely incidental 2.9 cm posterior right hepatic lesion.  The patient was started on IV fluids, a Villa catheter was placed, started on IV ceftriaxone, and urology was consulted to see the patient in the setting of complicated urinary tract infection with bladder outlet obstruction.  The patient was also noted to have a decubitus ulcer on exam.  Wound care was consulted to see the patient.    TODAY'S PLAN:  Appreciate Urology recommendations.  Continue IV fluids for now.  Continue IV ceftriaxone.  Urine culture pending.  Continue Villa for now.  Blood sugars improved this morning as well.  Updated son Adolfo via phone.  He states the patient currently lives with his wife who is his primary caretaker.  They do have a PCA that stops by on a daily basis.  We discussed the incidental finding of  cirrhosis/end-stage liver disease noted on CT scan, as well as the nearly 3 cm lesion seen in the liver.  We discussed the recommendation for an outpatient MRI for better definition.  Given the patient's advanced age, chronic comorbidities, and advanced dementia he would likely be a poor candidate for any chemotherapy.  Anticipate 2 to 3 days in the hospital pending antibiotic plan and urology evaluation.    Problem List:   Complicated Urinary Tract Infection  Chronic Bladder Outlet Obstruction  BPH  - Appreciate Urology recommendations  - Flomax  - IVF  - IV ceftriaxone  - Urine culture pending  - Villa per Urology    Lactic Acidosis  - IVF  - Recheck in AM    Chronic Decubitus Ulcer - POA  - Appreciate WOC recommendations  - Vancomycin for now pending MRSA swab    Hepatic Cirrhosis  Posterior R 2.9 cm Hepatic Lesion  - Discussed with son Adolfo.  Recommend outpatient MRI liver.  If noted to be concerning for malignancy, would discuss with family options as pt likely poor candidate for chemotherapy given his advanced age, comorbidities, and advanced dementia    Hypomagnesemia  - Electrolyte replacement protocol    Chronic Medical Problems:  Dementia with Behavioral Disturbances  Hx of CVA  Type 2 Diabetes Mellitus with Hyperglycemia  Coronary Artery Disease s/p LEXI in 2015  Hypertension  Hyperlipidemia  HFpEF    I spent 47 minutes in reviewing this patient's labs, imaging, medications, medical history.  In addition time was spent interviewing the patient, communicating with family, and medical decision making.     DVT Prophylaxis: Pneumatic Compression Devices  Code Status: No CPR- Do NOT Intubate  Diet: Combination Diet Regular Diet Adult    Villa Catheter: PRESENT, indication:      Disposition: Medically Ready for Discharge: Anticipated in 2-4 Days    Goals to discharge include: abx plan established, acidosis improved, urology work up complete, WOC evaluation complete  Family updated today: Yes      Interval  History   Pt seen and examined with assistance of inperson .  No pain.  Unsure where he is.    -Data reviewed today: I personally reviewed all new labs and imaging results over the last 24 hours.     Physical Exam   Temp: 96.8  F (36  C) Temp src: Axillary BP: (!) 143/58 Pulse: 80   Resp: 20 SpO2: (!) 89 % O2 Device: None (Room air)    Vitals:    11/07/24 2009   Weight: 86.7 kg (191 lb 2.2 oz)     Vital Signs with Ranges  Temp:  [96.8  F (36  C)-97.7  F (36.5  C)] 96.8  F (36  C)  Pulse:  [70-80] 80  Resp:  [18-24] 20  BP: (113-155)/(55-85) 143/58  SpO2:  [89 %-97 %] 89 %  I/O last 3 completed shifts:  In: -   Out: 1050 [Urine:1050]    GENERAL: No apparent distress. Awake, alert  HEENT: Normocephalic, atraumatic. Extraocular movements intact.  CARDIOVASCULAR: Regular rate and rhythm without murmurs or rubs. No S3.  PULMONARY: Clear bilaterally.  GASTROINTESTINAL: Soft, non-tender, non-distended. Bowel sounds normoactive.   EXTREMITIES: No cyanosis or clubbing. No edema.  NEUROLOGICAL: CN 2-12 grossly intact, no focal neurological deficits.  DERMATOLOGICAL: No rash, ulcer, bruising, nor jaundice.    Medications   Current Facility-Administered Medications   Medication Dose Route Frequency Provider Last Rate Last Admin    lactated ringers infusion   Intravenous Continuous Levy Zapata MD 75 mL/hr at 11/08/24 1009 Rate Verify at 11/08/24 1009     Current Facility-Administered Medications   Medication Dose Route Frequency Provider Last Rate Last Admin    aspirin (ASA) EC tablet 325 mg  325 mg Oral Daily Levy Zapata MD   325 mg at 11/08/24 0910    atorvastatin (LIPITOR) tablet 40 mg  40 mg Oral Daily Levy Zapata MD   40 mg at 11/08/24 0910    carvedilol (COREG) tablet 25 mg  25 mg Oral BID w/meals Levy Zapata MD   25 mg at 11/08/24 0910    cefTRIAXone (ROCEPHIN) 2 g vial to attach to  ml bag for ADULTS or NS 50 ml bag for PEDS  2 g Intravenous Q24H Levy Zapata MD   Stopped at 11/07/24 1112    finasteride  "(PROSCAR) tablet 5 mg  5 mg Oral Daily Levy Zapata MD   5 mg at 11/08/24 0909    insulin aspart (NovoLOG) injection (RAPID ACTING)  1-7 Units Subcutaneous TID AC Levy Zapata MD   3 Units at 11/08/24 0912    insulin aspart (NovoLOG) injection (RAPID ACTING)  1-5 Units Subcutaneous At Bedtime Levy Zapata MD   2 Units at 11/07/24 2154    magnesium sulfate 4 g in 100 mL sterile water intermittent infusion  4 g Intravenous Once Raghavendra Snell,         sodium chloride (PF) 0.9% PF flush 3 mL  3 mL Intracatheter Q8H Levy Zapata MD        tamsulosin (FLOMAX) capsule 0.4 mg  0.4 mg Oral Daily Levy Zapata MD   0.4 mg at 11/08/24 0909    vancomycin (VANCOCIN) 1,000 mg in 200 mL dextrose intermittent infusion  1,000 mg Intravenous Q12H Levy Zapata  mL/hr at 11/08/24 0615 1,000 mg at 11/08/24 0615     Data     Laboratory:  Recent Labs   Lab 11/08/24  0811 11/07/24  1441   WBC 8.2 11.3*   HGB 13.2* 13.4   HCT 38.7* 40.1   MCV 88 87    217     Recent Labs   Lab 11/08/24  0811 11/08/24  0355 11/08/24  0121 11/07/24  1635 11/07/24  1441   *  --   --   --  133*   POTASSIUM 3.9  --   --   --  4.4   CHLORIDE 103  --   --   --  98   CO2 18*  --   --   --  20*   ANIONGAP 11  --   --   --  15   * 253* 302*   < > 485*   BUN 11.1  --   --   --  18.7   CR 0.52*  0.52*  --   --   --  0.66*   GFRESTIMATED >90  >90  --   --   --  90   LUZ 8.1*  --   --   --  8.4*    < > = values in this interval not displayed.     No results for input(s): \"CULT\" in the last 168 hours.  Troponin I ES   Date Value Ref Range Status   12/04/2019 <0.015 0.000 - 0.045 ug/L Final     Comment:     The 99th percentile for upper reference range is 0.045 ug/L.  Troponin values   in the range of 0.045 - 0.120 ug/L may be associated with risks of adverse   clinical events.         Imaging:  Recent Results (from the past 24 hours)   Head CT w/o contrast    Narrative    CT SCAN OF THE HEAD WITHOUT CONTRAST   11/7/2024 4:12 PM     HISTORY: " AMS    TECHNIQUE:  Axial images of the head and coronal reformations without  IV contrast material. Radiation dose for this scan was reduced using  automated exposure control, adjustment of the mA and/or kV according  to patient size, or iterative reconstruction technique.    COMPARISON: 2/4/2024    FINDINGS: Extensive encephalomalacia and gliosis particularly in the  bifrontal and bitemporal distributions right greater than left in the  temporal lobes is again noted. Stable moderate to severe global  cortical volume loss with ex vacuo dilatation of the ventricular  system. No acute intracranial hemorrhage. No hydrocephalus or  extra-axial hemorrhage. Remote ischemic findings involving the  bilateral basal ganglia. Remote lacunar infarcts are noted in these  regions.    The osseous calvarium is intact.      Impression    IMPRESSION:   No acute findings. Extensive chronic changes are stable.      CUAUHTEMOC MA MD         SYSTEM ID:  [id number]   CT Abdomen Pelvis w Contrast    Narrative    CT ABDOMEN PELVIS W CONTRAST 11/7/2024 4:14 PM    CLINICAL HISTORY: right flank pain    TECHNIQUE: CT scan of the abdomen and pelvis was performed following  injection of IV contrast. Multiplanar reformats were obtained. Dose  reduction techniques were used.  CONTRAST: 92 mL [id number]    COMPARISON: CT abdomen/pelvis 4/1/2024    FINDINGS:   LOWER CHEST: No definite airspace consolidation or pleural effusion.  Severe coronary artery calcifications and/or stents.    HEPATOBILIARY: Nodular contour of the liver with widening of the  fissures. Subtle 2.9 cm hyperattenuating lesion in the posterior right  lobe of the liver, increased in conspicuity but may have been present  in hindsight on prior CT. Cholecystectomy. No evidence of biliary  obstruction.    PANCREAS: Atrophic but otherwise unremarkable.    SPLEEN: Normal.    ADRENAL GLANDS: Normal.    KIDNEYS/BLADDER: Bilateral renal cysts, no specific follow-up  recommended. No  nephroureterolithiasis or hydronephrosis. Urinary  bladder demonstrates diffuse wall thickening, innumerable diverticula  and significant surrounding fat stranding, increased in conspicuity  since prior exam. Urothelial hyperenhancement is also noted.    BOWEL: No obstruction or inflammatory change. Moderate volume formed  stool throughout the colon. Normal appendix    PELVIC ORGANS: Mild prostatomegaly. Otherwise unremarkable.    ADDITIONAL FINDINGS: No lymphadenopathy or ascites. Moderate calcified  and noncalcified atherosclerosis.    MUSCULOSKELETAL: No acute bony normality. Stable T12 compression  deformity.      Impression    IMPRESSION:   1.  Findings of chronic bladder outlet obstruction with significant  increase in urothelial hyperenhancement and surrounding fat stranding,  could indicate superimposed cystitis, correlate with urinalysis.  2.  No nephroureterolithiasis or hydronephrosis. No definite  radiographic evidence of pyelonephritis.  3.  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    CATHLEEN WAGGONER MD         SYSTEM ID:  [id number]         Raghavendra Snell DO  [facility] Hospitalist  [address] [facility] [place] [address]  Securely message with [employer] (more info)  Text page via [facility] Paging/Directory   11/08/2024

## 2024-11-08 NOTE — PLAN OF CARE
"Cared for [id number]    Language = Kyrgyz    PSC - confused/aggressive/pulling at lines (IV, Villa)    Villa placed for obstruction    Pt alert to self, VSS; Pt denies pain, headache, dizziness, N/V & SOB. Pt up Asst: 2 w/gait belt & walker. Lung sounds diminished, RA. Potassium within parameters - recheck in AM; Mag 1.4 - replacing - recheck at 1800. Blood glucose levels: 253, 379. Wounds/dressings changed per plan of care. [facility], WOC & Social Work following. Plan to discharge 1-2 days. Will continue with plan of care.    ++++++++++++++++++++++++++++    Goal Outcome Evaluation:      Plan of Care Reviewed With: patient    Overall Patient Progress: no changeOverall Patient Progress: no change    Outcome Evaluation: Confused, but redirectable with inperson interperter.    Problem: Adult Inpatient Plan of Care  Goal: Plan of Care Review  Description: The Plan of Care Review/Shift note should be completed every shift.  The Outcome Evaluation is a brief statement about your assessment that the patient is improving, declining, or no change.  This information will be displayed automatically on your shift  note.  Outcome: Not Progressing  Flowsheets (Taken 11/8/2024 5012)  Outcome Evaluation: Confused, but redirectable with inperson interperter.  Plan of Care Reviewed With: patient  Overall Patient Progress: no change  Goal: Patient-Specific Goal (Individualized)  Description: You can add care plan individualizations to a care plan. Examples of Individualization might be:  \"Parent requests to be called daily at 9am for status\", \"I have a hard time hearing out of my right ear\", or \"Do not touch me to wake me up as it startles  me\".  Outcome: Not Progressing  Goal: Absence of Hospital-Acquired Illness or Injury  Outcome: Not Progressing  Intervention: Identify and Manage Fall Risk  Recent Flowsheet Documentation  Taken 11/8/2024 0900 by Isabel Hoffmann RN  Safety Promotion/Fall Prevention:   bedside " attendant   activity supervised   assistive device/personal items within reach   mobility aid in reach   nonskid shoes/slippers when out of bed   safety round/check completed   supervised activity  Intervention: Prevent Skin Injury  Recent Flowsheet Documentation  Taken 11/8/2024 0900 by Isabel Hoffmann RN  Body Position: position changed independently  Intervention: Prevent and Manage VTE (Venous Thromboembolism) Risk  Recent Flowsheet Documentation  Taken 11/8/2024 0900 by Isabel Hoffmann RN  VTE Prevention/Management: patient refused intervention  Intervention: Prevent Infection  Recent Flowsheet Documentation  Taken 11/8/2024 0900 by Isabel Hoffmann RN  Infection Prevention:   hand hygiene promoted   single patient room provided  Goal: Optimal Comfort and Wellbeing  Outcome: Not Progressing  Goal: Readiness for Transition of Care  Outcome: Not Progressing  Intervention: Mutually Develop Transition Plan  Recent Flowsheet Documentation  Taken 11/8/2024 1400 by Isabel Hoffmann RN  Equipment Currently Used at Home: walker, rolling     Problem: UTI (Urinary Tract Infection)  Goal: Improved Infection Symptoms  Outcome: Not Progressing     Problem: Comorbidity Management  Goal: Maintenance of Behavioral Health Symptom Control  Outcome: Not Progressing  Intervention: Maintain Behavioral Health Symptom Control  Recent Flowsheet Documentation  Taken 11/8/2024 0900 by Isabel Hoffmann RN  Medication Review/Management: medications reviewed  Goal: Blood Glucose Levels Within Targeted Range  Outcome: Not Progressing  Intervention: Monitor and Manage Glycemia  Recent Flowsheet Documentation  Taken 11/8/2024 0900 by Isabel Hoffmann RN  Medication Review/Management: medications reviewed

## 2024-11-08 NOTE — PLAN OF CARE
"Temp: 97.7  F (36.5  C) Temp src: Oral BP: (!) 155/61 Pulse: 77   Resp: 20 SpO2: 90 % O2 Device: Nasal cannula       Confused. A2 Lift. Regular diet. PIV SL. Code green called. Pt agitated. PRN meds given. Effective. Has a sitter in place.      Problem: Adult Inpatient Plan of Care  Goal: Plan of Care Review  Description: The Plan of Care Review/Shift note should be completed every shift.  The Outcome Evaluation is a brief statement about your assessment that the patient is improving, declining, or no change.  This information will be displayed automatically on your shift  note.  Outcome: Not Progressing  Flowsheets (Taken 11/7/2024 2356)  Outcome Evaluation: De-escalate pt during shift.  Plan of Care Reviewed With:   patient   child  Overall Patient Progress: declining  Goal: Patient-Specific Goal (Individualized)  Description: You can add care plan individualizations to a care plan. Examples of Individualization might be:  \"Parent requests to be called daily at 9am for status\", \"I have a hard time hearing out of my right ear\", or \"Do not touch me to wake me up as it startles  me\".  Outcome: Not Progressing  Goal: Absence of Hospital-Acquired Illness or Injury  Outcome: Not Progressing  Intervention: Identify and Manage Fall Risk  Recent Flowsheet Documentation  Taken 11/7/2024 2000 by Sherry Bailey RN  Safety Promotion/Fall Prevention: safety round/check completed  Intervention: Prevent Infection  Recent Flowsheet Documentation  Taken 11/7/2024 2000 by Sherry Bailey RN  Infection Prevention: rest/sleep promoted  Goal: Optimal Comfort and Wellbeing  Outcome: Not Progressing  Goal: Readiness for Transition of Care  Outcome: Not Progressing       Goal Outcome Evaluation:      Plan of Care Reviewed With: patient, child    Overall Patient Progress: decliningOverall Patient Progress: declining    Outcome Evaluation: De-escalate pt during shift.      "

## 2024-11-08 NOTE — ED NOTES
Updated Granddaughter Dilia over the phone. ([phone]). Left voicemail for Son Adolfo [phone].

## 2024-11-08 NOTE — CONSULTS
[facility] Consultation by [facility]    Adam Huber MRN# [id number]   Age: 89 year old YOB: 1935     Date of Admission:  11/7/2024    Reason for consult: Bladder outlet obstruction on CT with recurrent UTIs and now Villa       Requesting PA/MD: Dr. Snell       Level of consult: Consult, follow and place orders           Assessment and Plan:   Assessment:   BPH with chronic outlet obstruction, recurrent urinary tract infection, multiple bladder diverticuli, and history of incomplete emptying on UDS in 2019, with previous outlet procedures.  Dementia  Urinary tract infection  Diabetes mellitus type 2  Hepatic cirrhosis  Coronary artery disease  Hypertension  Dyslipidemia      Plan:   -Continue with IV antibiotics for possible urinary tract infection.  Transition to culture specific as available and oral as available and when clinically appropriate.  -Continue prior to admission finasteride 5 mg once daily.  Agree with adding on Flomax 0.4 mg.  Patient has have previous longstanding incomplete emptying.  His CT imaging shows sequela of persistent bladder outlet obstruction with diverticuli forming after bladder straining against obstruction, which has been longstanding.  -Patient will likely not be able to pass a trial of void.  However, given his dementia, I have concern with longstanding Villa catheter as he has required a sitter and I am worried that he may pull this out.  He is not a candidate for intermittent catheterization, and I suspect his family would not be able to do this as well with his dementia.  -For the time being, recommend leaving Villa catheter in place for maximum decompression.  -May benefit from outpatient cystoscopy to evaluate the bladder.  However, he is not the best candidate for an outlet procedure either given his overall health.  Somewhat of a difficult situation.  -Remainder per Dr. Murray.  -They are trying to coordinate an in person , as  patient only speaks Swiss, but has slurred speech at baseline and phone and video interpreters have not been able to understand him enough to be helpful.    RENETTA Benites [facility]  [phone]               Chief Complaint:   Abdominal pain     History is obtained from the patient (minimal due to dementia and Swiss  unable to understand him via phone/video due to slurred speech), RN, and EMR.         History of Present Illness:   This patient is a 89 year old male who presented to the ER yesterday with a history of hyperlipidemia, hypertension, DM2, CHF, CAD, NSTEMI, stroke, and late onset Alzheimer's disease who presents to the ED for abdominal pain.  It sounds as though patient had been having intermittent abdominal pain and flank pain on the right side for approximately a week.  He has a history of recurrent urinary tract infections.      Sounds as though he was afebrile and had no changes in speech.  He has slurred speech at baseline with dementia and history of CVA and brain injury.    Patient underwent CT imaging which show is a bladder with innumerable bladder diverticuli, likely the result of bladder outlet obstruction.  Patient was on finasteride 5 mg daily.  It appears that he was not on Flomax prior to being seen in the ER.  He was started on IV Rocephin for concern for urinary tract infection.  Blood cultures have no growth to date.  Urine culture is in process.    Patient was seen by [facility] urology from [id number].  He did have a urodynamic study in 2019, which showed poor bladder squeeze and incomplete emptying.  His wife deferred outlet procedure at that time and elected medication management.  He was to follow-up in 6 months, and does not appear that he was seen since then.  He had previously had issues with urgency and urge incontinence, incomplete emptying, and recurrent infections.  He previously had TURPs in 1998 in 2003.    Evaluation is complicated by  the fact the patient speaks Burmese, has slurred speech, and the interpreters could not understand him.  Much of the history is taken from his nurse and his chart.  He also is very confused and required a sitter and was combative overnight.     WBC 8.2 (11.3).  Hemoglobin 13.2.  Creatinine 0.52 EGFR greater than 90.    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, intermediate dry stage  Nuclear sclerotic cataract of both eyes  Pseudoexfoliation lens capsule  Vertigo  Acute coronary syndrome  NSTEMI  Acute ischemic heart disease  Acute metabolic encephalopathy  CAD  Acute CHF  Elevated troponin  Elevated prostate specific antigen  Essential hypertension  Generalized weakness  Influenza B  DM2  UTI  Acute cystitis with hematuria  Acute delirium  Acute pulmonary edema  Altered mental status  Bleeding of blood vessel  Hypoxia  Late onset Alzheimer's disease  Pneumonia of left lower lobe  Pulmonary nodules  Rhabdomyolysis  Pyelonephritis  Severe sepsis  Slurred speech  Post PTCA         Past Surgical History:     URP 1998, 2003       CHOLECYSTECTOMY 1/1/1995 - 12/31/1995       PCI/LEXI 10/1/2015 - 10/31/2015   RCA-10/12/15, Circ-11/6/15   CATARACT REMOVALT W/CORNEO-SCLERAL 04/25/2023 Right Dr. Pineda           Social History:     Never smoker.  .         Family History:     Family History   Problem Relation Age of Onset    Unknown/Adopted No family hx of      Family history reviewed.          Allergies:     Allergies   Allergen Reactions    Lisinopril     Morphine Other (See Comments)     confusion           Medications:     Current Facility-Administered Medications   Medication Dose Route Frequency Provider Last Rate Last Admin    acetaminophen (TYLENOL) tablet 650 mg  650 mg Oral Q8H PRN Levy Zapata MD        aspirin (ASA) EC tablet 325 mg  325 mg Oral Daily Levy Zapata MD   325 mg at 11/08/24 0910    atorvastatin (LIPITOR) tablet 40 mg  40 mg Oral Daily Levy Zapata MD   40 mg at 11/08/24 0910    calcium carbonate (TUMS) chewable tablet 1,000 mg  1,000 mg Oral 4x Daily PRN Levy Zapata MD        carvedilol (COREG) tablet 25 mg  25 mg Oral BID w/meals Levy Zapata MD   25 mg at 11/08/24 0910    cefTRIAXone (ROCEPHIN) 2 g vial to attach to  ml bag for ADULTS or NS 50 ml bag for PEDS  2 g Intravenous Q24H Levy Zapata MD   Stopped at 11/07/24 1832    glucose gel 15-30 g  15-30 g Oral Q15 Min PRN Levy Zapata MD        Or    dextrose 50 % injection 25-50 mL  25-50 mL Intravenous Q15 Min PRN Levy Zapata MD        Or    glucagon injection 1 mg  1 mg Subcutaneous Q15 Min PRN Levy Zapata MD        finasteride (PROSCAR) tablet 5 mg  5 mg Oral Daily Levy Zapata MD   5 mg at 11/08/24 0909    haloperidol lactate (HALDOL) injection 1 mg  1 mg Intravenous Q6H PRN Levy Zapata MD   1 mg at 11/08/24 0703    hydrALAZINE (APRESOLINE) tablet 10 mg  10 mg Oral Q4H PRN Levy Zapata MD        Or    hydrALAZINE (APRESOLINE) injection 10 mg  10 mg Intravenous Q4H PRN Levy Zapata MD        insulin aspart (NovoLOG) injection (RAPID ACTING)  1-7 Units Subcutaneous TID AC Levy Zapata MD   3 Units at 11/08/24 0912    insulin aspart (NovoLOG) injection (RAPID ACTING)  1-5 Units Subcutaneous At Bedtime Levy Zapata MD   2 Units at 11/07/24 2154    lactated ringers infusion   Intravenous Continuous Levy Zapata MD 75 mL/hr at 11/08/24 1009 Rate Verify at 11/08/24 1009    lidocaine (LMX4) cream   Topical Q1H PRN Levy Zapata MD        lidocaine 1 % 0.1-1 mL  0.1-1 mL Other Q1H PRN Levy Zapata MD        magnesium sulfate 4 g in 100 mL sterile water intermittent infusion   4 g Intravenous Once Raghavendra Snell DO        mirtazapine (REMERON) tablet 15 mg  15 mg Oral At Bedtime PRN Levy Zapata MD   15 mg at 11/07/24 2307    ondansetron (ZOFRAN ODT) ODT tab 4 mg  4 mg Oral Q6H PRN Levy Zapata MD   4 mg at 11/07/24 2307    Or    ondansetron (ZOFRAN) injection 4 mg  4 mg Intravenous Q6H PRN Levy Zapata MD        senna-docusate (SENOKOT-S/PERICOLACE) 8.6-50 MG per tablet 1 tablet  1 tablet Oral BID PRN Levy Zapata MD        Or    senna-docusate (SENOKOT-S/PERICOLACE) 8.6-50 MG per tablet 2 tablet  2 tablet Oral BID PRN Levy Zapata MD        sodium chloride (PF) 0.9% PF flush 3 mL  3 mL Intracatheter Q8H Levy Zapata MD        sodium chloride (PF) 0.9% PF flush 3 mL  3 mL Intracatheter q1 min prn Levy Zapata MD        tamsulosin (FLOMAX) capsule 0.4 mg  0.4 mg Oral Daily Levy Zapata MD   0.4 mg at 11/08/24 0909    vancomycin (VANCOCIN) 1,000 mg in 200 mL dextrose intermittent infusion  1,000 mg Intravenous Q12H Levy Zapata  mL/hr at 11/08/24 0615 1,000 mg at 11/08/24 0615             Review of Systems:   A comprehensive 10-point review of systems was performed and found to be negative except as described in the HPI.     BP (!) 143/58 (BP Location: Right arm)   Pulse 80   Temp 96.8  F (36  C) (Axillary)   Resp 20   Wt 86.7 kg (191 lb 2.2 oz)   SpO2 (!) 89%   BMI 23.89 kg/m    PSYCH: NAD, up to chair  EYES: EOMI  MOUTH: MMM  NECK: Supple, no notable adenopathy  RESP: Unlabored breathing  CARDIAC: Regular radial pulse  SKIN: Warm, no rashes  ABD: soft, Nontender  NEURO: confused  URO: Villa catheter in place draining slightly cloudy yellow urine with occasional sediment and pus.         Data:     Lab Results   Component Value Date    WBC 8.2 11/08/2024    HGB 13.2 (L) 11/08/2024    HCT 38.7 (L) 11/08/2024    MCV 88 11/08/2024     11/08/2024     Lab Results   Component Value Date    CR 0.52 (L) 11/08/2024    CR 0.52 (L) 11/08/2024     Recent Labs   Lab 11/07/24  1504   COLOR  Yellow   APPEARANCE Cloudy*   URINEGLC >=1000*   URINEBILI Negative   URINEKETONE Negative   SG 1.028   URINEPH 5.5   PROTEIN 50*   NITRITE Negative   LEUKEST Large*   RBCU >182*   WBCU >182*     All cultures:  Recent Labs   Lab 11/07/24  1441   CULTURE No growth after 12 hours     Urine culture: in process.    CT Abdomen Pelvis w Contrast    Result Date: 11/7/2024  CT ABDOMEN PELVIS W CONTRAST 11/7/2024 4:14 PM CLINICAL HISTORY: right flank pain TECHNIQUE: CT scan of the abdomen and pelvis was performed following injection of IV contrast. Multiplanar reformats were obtained. Dose reduction techniques were used. CONTRAST: 92 mL Isovue-370 COMPARISON: CT abdomen/pelvis 4/1/2024 FINDINGS: LOWER CHEST: No definite airspace consolidation or pleural effusion. Severe coronary artery calcifications and/or stents. HEPATOBILIARY: Nodular contour of the liver with widening of the fissures. Subtle 2.9 cm hyperattenuating lesion in the posterior right lobe of the liver, increased in conspicuity but may have been present in hindsight on prior CT. Cholecystectomy. No evidence of biliary obstruction. PANCREAS: Atrophic but otherwise unremarkable. SPLEEN: Normal. ADRENAL GLANDS: Normal. KIDNEYS/BLADDER: Bilateral renal cysts, no specific follow-up recommended. No nephroureterolithiasis or hydronephrosis. Urinary bladder demonstrates diffuse wall thickening, innumerable diverticula and significant surrounding fat stranding, increased in conspicuity since prior exam. Urothelial hyperenhancement is also noted. BOWEL: No obstruction or inflammatory change. Moderate volume formed stool throughout the colon. Normal appendix PELVIC ORGANS: Mild prostatomegaly. Otherwise unremarkable. ADDITIONAL FINDINGS: No lymphadenopathy or ascites. Moderate calcified and noncalcified atherosclerosis. MUSCULOSKELETAL: No acute bony normality. Stable T12 compression deformity.     IMPRESSION: 1.  Findings of chronic bladder outlet obstruction with  significant increase in urothelial hyperenhancement and surrounding fat stranding, could indicate superimposed cystitis, correlate with urinalysis. 2.  No nephroureterolithiasis or hydronephrosis. No definite radiographic evidence of pyelonephritis. 3.  Cirrhotic morphology of the liver with likely incidental 2.9 cm posterior right hepatic lesion, recommend further evaluation with contrast-enhanced MRI, can be performed on a nonemergent/outpatient basis. CATHLEEN WAGGONER MD   SYSTEM ID:  [id number]    Head CT w/o contrast    Result Date: 11/7/2024  CT SCAN OF THE HEAD WITHOUT CONTRAST   11/7/2024 4:12 PM HISTORY: AMS TECHNIQUE:  Axial images of the head and coronal reformations without IV contrast material. Radiation dose for this scan was reduced using automated exposure control, adjustment of the mA and/or kV according to patient size, or iterative reconstruction technique. COMPARISON: 2/4/2024 FINDINGS: Extensive encephalomalacia and gliosis particularly in the bifrontal and bitemporal distributions right greater than left in the temporal lobes is again noted. Stable moderate to severe global cortical volume loss with ex vacuo dilatation of the ventricular system. No acute intracranial hemorrhage. No hydrocephalus or extra-axial hemorrhage. Remote ischemic findings involving the bilateral basal ganglia. Remote lacunar infarcts are noted in these regions. The osseous calvarium is intact.     IMPRESSION:   No acute findings. Extensive chronic changes are stable. CUAUHTEMOC MA MD   SYSTEM ID:  [id number]

## 2024-11-08 NOTE — CONSULTS
[facility] Nurse Inpatient Assessment     Consulted for: Right buttock    Patient History (according to provider note(s):      Adam Huber is a 89 year old male with a history of dementia with behavioral disturbances, history of CVA, type 2 diabetes mellitus, coronary artery disease status post LEXI in 2015, hypertension, hyperlipidemia, HFpEF, new hepatic cirrhosis, BPH admitted on 11/7/2024 with abdominal pain and flank pain.     Assessment:      Areas visualized during today's visit: Focused:    Pressure Injury Location: Right buttock  Last photo: 11/8/24    Wound type: Pressure Injury     Pressure Injury Stage: 3, present on admission   Wound history/plan of care:   Spoke with patient via  but patient has dementia and did not know how long he has had this wound or what it has been treated with.  Given the surrounding tissue wound appears to be chronic.    Wound base: 100 % Granulation tissue     Palpation of the wound bed: normal      Drainage: small     Description of drainage: serous     Measurements (length x width x depth, in cm) 1.8  x 1  x  0.1 cm      Tunneling N/A     Undermining N/A  Periwound skin: Scar tissue and hyperpigmentation      Color: pink and purple      Temperature: normal   Odor: none  Pain: denies   Pain intervention prior to dressing change: N/A  Treatment goal: Heal   STATUS: initial assessment  Supplies ordered: at bedside    My PI Risk Assessment     Sensory Perception: 2 - Very Limited     Moisture: 3 - Occasionally moist      Activity: 2 - Chairfast     Mobility: 2 - Very limited     Nutrition: 3 - Adequate     Friction/Shear: 2 - Potential problem      TOTAL: 14       Treatment Plan:     Right buttock wound(s): Every 3 days  Cleanse with wound cleanser and dry  Apply Mepilex 4x4  No briefs in bed    Pressure Injury Prevention (PIP) Plan:  If patient is declining pressure injury prevention interventions: Explore reason why and address  "patient's concerns, Educate on pressure injury risk and prevention intervention(s), and If patient is still declining, document \"informed refusal\"   Mattress: Follow bed algorithm, add Low Air Loss (Air+) mattress pump if skin is very moist or constantly moist.   HOB: Maintain at or below 30 degrees, unless contraindicated  Repositioning in bed: Every 1-2 hours  and Left/right positioning; avoid supine  Heels: Pillows under calves  Protective Dressing: Sacral Mepilex for prevention (#[id number]),  especially for the agitated patient   Chair positioning: Chair cushion (#[id number])    If patient has a buttock pressure injury, or high risk for PI use chair cushion or SPS.  Moisture Management: Avoid brief in bed  Under Devices: Inspect skin under all medical devices during skin inspection , Ensure tubes are stabilized without tension, and Ensure patient is not lying on medical devices or equipment when repositioned  Ask provider to discontinue device when no longer needed.       Orders: Written    RECOMMEND PRIMARY TEAM ORDER: None, at this time  Education provided: importance of repositioning, plan of care, and Off-loading pressure  Discussed plan of care with: Patient and Nurse  WOC nurse follow-up plan: weekly  Notify WOC if wound(s) deteriorate.  Nursing to notify the Provider(s) and re-consult the WOC Nurse if new skin concern.    DATA:     Current support surface: Standard  Standard gel mattress (Isoflex)  Containment of urine/stool: Indwelling catheter  BMI: Body mass index is 23.89 kg/m .   Active diet order: Orders Placed This Encounter      Combination Diet Regular Diet Adult     Output: I/O last 3 completed shifts:  In: -   Out: 1050 [Urine:1050]     Labs:   Recent Labs   Lab 11/08/24  0811   ALBUMIN 2.7*   HGB 13.2*   WBC 8.2     Pressure injury risk assessment:   Sensory Perception: 3-->slightly limited  Moisture: 3-->occasionally moist  Activity: 2-->chairfast  Mobility: 2-->very limited  Nutrition: " 3-->adequate  Friction and Shear: 1-->problem  Russ Score: 14    Chidi Robin RN CWOCN  Contact [facility] Nurse (Jessica)  Dept. Office Number: [phone]

## 2024-11-08 NOTE — PHARMACY-ADMISSION MEDICATION HISTORY
Pharmacist Admission Medication History    Admission medication history is complete. The information provided in this note is only as accurate as the sources available at the time of the update.    Information Source(s): Family member (son Adolfo) via phone    Pertinent Information: Metformin 500mg - pt takes once daily with breakfast    Changes made to PTA medication list:  Added: None  Deleted: Miralax  Changed: Metformin 500mg - from bid to daily    Medication History Completed By: Gabe Jalloh RPH 11/7/2024 7:00 PM    PTA Med List   Medication Sig Note Last Dose/Taking    acetaminophen (TYLENOL) 325 MG tablet Take 650 mg by mouth every 8 hours as needed for mild pain  Taking As Needed    aspirin (ASA) 325 MG EC tablet Take 325 mg by mouth daily  11/7/2024 Morning    atorvastatin (LIPITOR) 40 MG tablet Take 1 tablet (40 mg) by mouth every evening 11/7/2024: am 11/7/2024 Morning    carvedilol (COREG) 25 MG tablet Take 1 tablet (25 mg) by mouth 2 times daily (with meals)  11/7/2024 Morning    finasteride (PROSCAR) 5 MG tablet Take 5 mg by mouth daily  11/7/2024 Morning    Melatonin 10 MG TABS tablet Take 10 mg by mouth nightly as needed for sleep  Taking As Needed    metFORMIN (GLUCOPHAGE) 500 MG tablet Take 1 tablet (500 mg) by mouth 2 times daily (with meals) (Patient taking differently: Take 500 mg by mouth daily (with breakfast).)  11/7/2024 Morning    mirtazapine (REMERON) 15 MG tablet Take 15 mg by mouth nightly as needed (agitation).  Taking As Needed

## 2024-11-08 NOTE — PROGRESS NOTES
Notified provider about indwelling parmar catheter discussed removal or continued need.    Did provider choose to remove indwelling parmar catheter? NO    Provider's parmar indication for keeping indwelling parmar catheter: Indication for continued use: Obstruction    Is there an order for indwelling aprmar catheter? YES    *If there is a plan to keep parmar catheter in place at discharge daily notification with provider is not necessary, but please add a notation in the treatment team sticky note that the patient will be discharging with the catheter.

## 2024-11-08 NOTE — PROVIDER NOTIFICATION
Paged crosscover regarding pts' blood sugar.     0128: FYI: patient blood sugar, 302. Received 2 units at bedtime.     Dr. Carballo acknowledged.

## 2024-11-08 NOTE — PHARMACY-CONSULT NOTE
Pharmacy Delirium Chart Review    Upon chart review, the following medications may contribute to possible patient delirium: none.  Please consult unit pharmacist with further questions.    Edward Plasencia, [facility]  Phone/Pager: [phone]

## 2024-11-08 NOTE — PLAN OF CARE
"Disoriented to place, time, and situation. Assist of 2, GB, walker. Sitter at bedside. On K and mg protocols, Am recheck. B, 253. L. PIV infusing w/ LR at 75 mL/hr. On vancomycin q12 hr. PRN haldol given for agitation. Villa in place, patent. Still needs MRSA swab completed. Plan to continue antibiotics and have a urology consult.     Goal Outcome Evaluation:      Plan of Care Reviewed With: patient, child    Overall Patient Progress: no changeOverall Patient Progress: no change    Outcome Evaluation: Bg remains elevated: 302, 253. Tends to sundown, can be impulsive. Confused. Can refuse cares.      Problem: Adult Inpatient Plan of Care  Goal: Plan of Care Review  Description: The Plan of Care Review/Shift note should be completed every shift.  The Outcome Evaluation is a brief statement about your assessment that the patient is improving, declining, or no change.  This information will be displayed automatically on your shift  note.  2024 by Miladys Washington RN  Outcome: Not Progressing  Flowsheets (Taken 2024)  Outcome Evaluation: Bg remains elevated: 302, 253. Tends to sundown, can be impulsive. Confused. Can refuse cares.  Plan of Care Reviewed With:   patient   child  Overall Patient Progress: no change  2024 by Miladys Washington, RN  Outcome: Not Progressing  Flowsheets (Taken 2024)  Outcome Evaluation: Bg remain elevated: 302, 253.  Plan of Care Reviewed With:   patient   child  Overall Patient Progress: no change  2024 by Miladys Washington, RN  Outcome: Not Progressing  Flowsheets (Taken 2024)  Outcome Evaluation: Bg remain elevated: 302, 253.  Plan of Care Reviewed With:   patient   child  Overall Patient Progress: no change  Goal: Patient-Specific Goal (Individualized)  Description: You can add care plan individualizations to a care plan. Examples of Individualization might be:  \"Parent requests to be called daily at 9am for status\", \"I have a hard time " "hearing out of my right ear\", or \"Do not touch me to wake me up as it startles  me\".  11/8/2024 0415 by Miladys Washington RN  Outcome: Not Progressing  11/8/2024 0413 by Miladys Washington RN  Outcome: Not Progressing  11/8/2024 0411 by Miladys Washington RN  Outcome: Not Progressing  Goal: Absence of Hospital-Acquired Illness or Injury  11/8/2024 0415 by Miladys Washington RN  Outcome: Not Progressing  11/8/2024 0413 by Miladys Washington RN  Outcome: Not Progressing  11/8/2024 0411 by Miladys Washington RN  Outcome: Not Progressing  Intervention: Identify and Manage Fall Risk  Recent Flowsheet Documentation  Taken 11/8/2024 0000 by Miladys Washington RN  Safety Promotion/Fall Prevention: safety round/check completed  Intervention: Prevent Skin Injury  Recent Flowsheet Documentation  Taken 11/8/2024 0000 by Miladys Washington RN  Body Position: position changed independently  Intervention: Prevent Infection  Recent Flowsheet Documentation  Taken 11/8/2024 0000 by Miladys Washington RN  Infection Prevention: rest/sleep promoted  Goal: Optimal Comfort and Wellbeing  11/8/2024 0415 by Miladys Washington RN  Outcome: Not Progressing  11/8/2024 0413 by Miladys Washington RN  Outcome: Not Progressing  11/8/2024 0411 by Miladys Washington RN  Outcome: Not Progressing  Goal: Readiness for Transition of Care  11/8/2024 0415 by Miladys Washington RN  Outcome: Not Progressing  11/8/2024 0413 by Miladys Washington RN  Outcome: Not Progressing  11/8/2024 0411 by Miladys Washington RN  Outcome: Not Progressing     Problem: UTI (Urinary Tract Infection)  Goal: Improved Infection Symptoms  11/8/2024 0415 by Miladys Washington RN  Outcome: Not Progressing  11/8/2024 0413 by Miladys Washington RN  Outcome: Not Progressing  11/8/2024 0411 by Miladys Washington RN  Outcome: Not Progressing     Problem: Comorbidity Management  Goal: Maintenance of Behavioral Health Symptom Control  11/8/2024 0415 by Miladys Washington RN  Outcome: Not Progressing  11/8/2024 0413 by Miladys Washington RN  Outcome: Not Progressing  Intervention: " Maintain Behavioral Health Symptom Control  Recent Flowsheet Documentation  Taken 11/8/2024 0000 by Miladys Washington, RN  Medication Review/Management: medications reviewed  Goal: Blood Glucose Levels Within Targeted Range  11/8/2024 0415 by Miladys Washington, RN  Outcome: Not Progressing  11/8/2024 0413 by Miladys Washington, RN  Outcome: Not Progressing  Intervention: Monitor and Manage Glycemia  Recent Flowsheet Documentation  Taken 11/8/2024 0000 by Miladys Washington, RN  Medication Review/Management: medications reviewed

## 2024-11-08 NOTE — ED NOTES
Patient combative, attempting to bite and swing at staff at times,  notified. Pt confused, Fentanyl given prior to parmar catheter placement. HX: dementia. 1:1 sitter at bedside. Redirection used.  used.

## 2024-11-08 NOTE — CONSULTS
[facility] - Consult Note   Med 3    Referral Source: staff consult request for emotional support.    The patient is understands that he is at , but cannot explain why.  Sitter is present beside the bed.  The pt. only speaks Romanian.  Pt. to be lost in thought.  The Pt. family is support him.  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      Plan: [facility] remains available upon request.    Rosales Oviedo M.Div.  Intern    Pager: [phone]

## 2024-11-08 NOTE — DISCHARGE INSTRUCTIONS
Right buttock wound(s): Every 3 days  1. Cleanse with wound cleanser and dry  2. Apply Mepilex 4x4

## 2024-11-09 ENCOUNTER — APPOINTMENT (OUTPATIENT)
Dept: GENERAL RADIOLOGY | Facility: CLINIC | Age: 89
DRG: 689 | End: 2024-11-09
Attending: INTERNAL MEDICINE
Payer: COMMERCIAL

## 2024-11-09 LAB
ANION GAP SERPL CALCULATED.3IONS-SCNC: 10 MMOL/L (ref 7–15)
BUN SERPL-MCNC: 12.4 MG/DL (ref 8–23)
CALCIUM SERPL-MCNC: 8 MG/DL (ref 8.8–10.4)
CHLORIDE SERPL-SCNC: 99 MMOL/L (ref 98–107)
CREAT SERPL-MCNC: 0.58 MG/DL (ref 0.67–1.17)
EGFRCR SERPLBLD CKD-EPI 2021: >90 ML/MIN/1.73M2
ERYTHROCYTE [DISTWIDTH] IN BLOOD BY AUTOMATED COUNT: 14.4 % (ref 10–15)
GLUCOSE BLDC GLUCOMTR-MCNC: 244 MG/DL (ref 70–99)
GLUCOSE BLDC GLUCOMTR-MCNC: 282 MG/DL (ref 70–99)
GLUCOSE BLDC GLUCOMTR-MCNC: 298 MG/DL (ref 70–99)
GLUCOSE BLDC GLUCOMTR-MCNC: 334 MG/DL (ref 70–99)
GLUCOSE BLDC GLUCOMTR-MCNC: 412 MG/DL (ref 70–99)
GLUCOSE SERPL-MCNC: 399 MG/DL (ref 70–99)
HCO3 SERPL-SCNC: 19 MMOL/L (ref 22–29)
HCT VFR BLD AUTO: 39.8 % (ref 40–53)
HGB BLD-MCNC: 13.2 G/DL (ref 13.3–17.7)
LACTATE SERPL-SCNC: 1.2 MMOL/L (ref 0.7–2)
MAGNESIUM SERPL-MCNC: 1.7 MG/DL (ref 1.7–2.3)
MCH RBC QN AUTO: 29.2 PG (ref 26.5–33)
MCHC RBC AUTO-ENTMCNC: 33.2 G/DL (ref 31.5–36.5)
MCV RBC AUTO: 88 FL (ref 78–100)
NT-PROBNP SERPL-MCNC: 941 PG/ML (ref 0–1800)
PLATELET # BLD AUTO: 218 10E3/UL (ref 150–450)
POTASSIUM SERPL-SCNC: 4.6 MMOL/L (ref 3.4–5.3)
RBC # BLD AUTO: 4.52 10E6/UL (ref 4.4–5.9)
SODIUM SERPL-SCNC: 128 MMOL/L (ref 135–145)
WBC # BLD AUTO: 12.4 10E3/UL (ref 4–11)

## 2024-11-09 PROCEDURE — 36415 COLL VENOUS BLD VENIPUNCTURE: CPT | Performed by: INTERNAL MEDICINE

## 2024-11-09 PROCEDURE — 85014 HEMATOCRIT: CPT | Performed by: INTERNAL MEDICINE

## 2024-11-09 PROCEDURE — 83735 ASSAY OF MAGNESIUM: CPT | Performed by: INTERNAL MEDICINE

## 2024-11-09 PROCEDURE — 120N000001 HC R&B MED SURG/OB

## 2024-11-09 PROCEDURE — 250N000013 HC RX MED GY IP 250 OP 250 PS 637: Performed by: INTERNAL MEDICINE

## 2024-11-09 PROCEDURE — 83605 ASSAY OF LACTIC ACID: CPT | Performed by: INTERNAL MEDICINE

## 2024-11-09 PROCEDURE — 250N000011 HC RX IP 250 OP 636: Performed by: STUDENT IN AN ORGANIZED HEALTH CARE EDUCATION/TRAINING PROGRAM

## 2024-11-09 PROCEDURE — 71045 X-RAY EXAM CHEST 1 VIEW: CPT

## 2024-11-09 PROCEDURE — 250N000012 HC RX MED GY IP 250 OP 636 PS 637: Performed by: INTERNAL MEDICINE

## 2024-11-09 PROCEDURE — 94640 AIRWAY INHALATION TREATMENT: CPT

## 2024-11-09 PROCEDURE — 250N000013 HC RX MED GY IP 250 OP 250 PS 637: Performed by: STUDENT IN AN ORGANIZED HEALTH CARE EDUCATION/TRAINING PROGRAM

## 2024-11-09 PROCEDURE — 250N000009 HC RX 250: Performed by: INTERNAL MEDICINE

## 2024-11-09 PROCEDURE — 83880 ASSAY OF NATRIURETIC PEPTIDE: CPT | Performed by: INTERNAL MEDICINE

## 2024-11-09 PROCEDURE — 250N000011 HC RX IP 250 OP 636: Performed by: INTERNAL MEDICINE

## 2024-11-09 PROCEDURE — 99232 SBSQ HOSP IP/OBS MODERATE 35: CPT | Performed by: INTERNAL MEDICINE

## 2024-11-09 PROCEDURE — 82565 ASSAY OF CREATININE: CPT | Performed by: INTERNAL MEDICINE

## 2024-11-09 PROCEDURE — 999N000157 HC STATISTIC RCP TIME EA 10 MIN

## 2024-11-09 PROCEDURE — 80048 BASIC METABOLIC PNL TOTAL CA: CPT | Performed by: INTERNAL MEDICINE

## 2024-11-09 RX ORDER — IPRATROPIUM BROMIDE AND ALBUTEROL SULFATE 2.5; .5 MG/3ML; MG/3ML
3 SOLUTION RESPIRATORY (INHALATION) ONCE
Status: COMPLETED | OUTPATIENT
Start: 2024-11-09 | End: 2024-11-09

## 2024-11-09 RX ORDER — GLIPIZIDE 5 MG/1
5 TABLET ORAL
Status: DISCONTINUED | OUTPATIENT
Start: 2024-11-09 | End: 2024-11-12 | Stop reason: HOSPADM

## 2024-11-09 RX ORDER — FUROSEMIDE 10 MG/ML
40 INJECTION INTRAMUSCULAR; INTRAVENOUS ONCE
Status: COMPLETED | OUTPATIENT
Start: 2024-11-09 | End: 2024-11-09

## 2024-11-09 RX ORDER — MAGNESIUM OXIDE 400 MG/1
400 TABLET ORAL EVERY 4 HOURS
Status: COMPLETED | OUTPATIENT
Start: 2024-11-09 | End: 2024-11-09

## 2024-11-09 RX ORDER — QUETIAPINE FUMARATE 25 MG/1
25 TABLET, FILM COATED ORAL AT BEDTIME
Status: DISCONTINUED | OUTPATIENT
Start: 2024-11-09 | End: 2024-11-09

## 2024-11-09 RX ADMIN — Medication 400 MG: at 12:00

## 2024-11-09 RX ADMIN — FUROSEMIDE 40 MG: 10 INJECTION, SOLUTION INTRAVENOUS at 09:27

## 2024-11-09 RX ADMIN — HALOPERIDOL LACTATE 1 MG: 5 INJECTION, SOLUTION INTRAMUSCULAR at 03:26

## 2024-11-09 RX ADMIN — INSULIN GLARGINE 10 UNITS: 100 INJECTION, SOLUTION SUBCUTANEOUS at 16:35

## 2024-11-09 RX ADMIN — ATORVASTATIN CALCIUM 40 MG: 40 TABLET, FILM COATED ORAL at 09:29

## 2024-11-09 RX ADMIN — VANCOMYCIN HYDROCHLORIDE 1000 MG: 1 INJECTION, SOLUTION INTRAVENOUS at 05:07

## 2024-11-09 RX ADMIN — INSULIN ASPART 4 UNITS: 100 INJECTION, SOLUTION INTRAVENOUS; SUBCUTANEOUS at 09:25

## 2024-11-09 RX ADMIN — HALOPERIDOL LACTATE 1 MG: 5 INJECTION, SOLUTION INTRAMUSCULAR at 08:33

## 2024-11-09 RX ADMIN — QUETIAPINE FUMARATE 12.5 MG: 25 TABLET ORAL at 12:00

## 2024-11-09 RX ADMIN — GLIPIZIDE 5 MG: 5 TABLET ORAL at 09:28

## 2024-11-09 RX ADMIN — INSULIN ASPART 6 UNITS: 100 INJECTION, SOLUTION INTRAVENOUS; SUBCUTANEOUS at 12:01

## 2024-11-09 RX ADMIN — ACETAMINOPHEN 650 MG: 325 TABLET, FILM COATED ORAL at 12:00

## 2024-11-09 RX ADMIN — IPRATROPIUM BROMIDE AND ALBUTEROL SULFATE 3 ML: .5; 3 SOLUTION RESPIRATORY (INHALATION) at 06:10

## 2024-11-09 RX ADMIN — ASPIRIN 325 MG: 325 TABLET, COATED ORAL at 09:29

## 2024-11-09 RX ADMIN — QUETIAPINE FUMARATE 12.5 MG: 25 TABLET ORAL at 20:05

## 2024-11-09 RX ADMIN — FINASTERIDE 5 MG: 5 TABLET, FILM COATED ORAL at 09:28

## 2024-11-09 RX ADMIN — TAMSULOSIN HYDROCHLORIDE 0.4 MG: 0.4 CAPSULE ORAL at 09:29

## 2024-11-09 RX ADMIN — CARVEDILOL 25 MG: 25 TABLET, FILM COATED ORAL at 09:29

## 2024-11-09 RX ADMIN — INSULIN ASPART 3 UNITS: 100 INJECTION, SOLUTION INTRAVENOUS; SUBCUTANEOUS at 17:35

## 2024-11-09 RX ADMIN — CARVEDILOL 25 MG: 25 TABLET, FILM COATED ORAL at 20:05

## 2024-11-09 RX ADMIN — CEFTRIAXONE 2 G: 2 INJECTION, POWDER, FOR SOLUTION INTRAMUSCULAR; INTRAVENOUS at 17:29

## 2024-11-09 ASSESSMENT — ACTIVITIES OF DAILY LIVING (ADL)
ADLS_ACUITY_SCORE: 28.25
ADLS_ACUITY_SCORE: 29.75
ADLS_ACUITY_SCORE: 28.25
ADLS_ACUITY_SCORE: 29.25
ADLS_ACUITY_SCORE: 0
ADLS_ACUITY_SCORE: 29.25
ADLS_ACUITY_SCORE: 29.75
ADLS_ACUITY_SCORE: 29.25
ADLS_ACUITY_SCORE: 29.25
DEPENDENT_IADLS:: CLEANING;COOKING;LAUNDRY;SHOPPING;MEAL PREPARATION;MEDICATION MANAGEMENT;MONEY MANAGEMENT;TRANSPORTATION
ADLS_ACUITY_SCORE: 29.25
ADLS_ACUITY_SCORE: 0
ADLS_ACUITY_SCORE: 29.25
ADLS_ACUITY_SCORE: 0
ADLS_ACUITY_SCORE: 29.25
ADLS_ACUITY_SCORE: 29.25
ADLS_ACUITY_SCORE: 28.25
ADLS_ACUITY_SCORE: 29.25
ADLS_ACUITY_SCORE: 0
ADLS_ACUITY_SCORE: 0
ADLS_ACUITY_SCORE: 28.25
ADLS_ACUITY_SCORE: 0

## 2024-11-09 NOTE — PROGRESS NOTES
"Cross Cover    Called for new hypoxia with increased work of breathing.  Sats in the mid 60's rebounded to low 90's with 4 L of O2, previously on RA     BP (!) 142/62 (BP Location: Right arm)   Pulse 95   Temp 97.3  F (36.3  C) (Axillary)   Resp 18   Wt 86.7 kg (191 lb 2.2 oz)   SpO2 91%   BMI 23.89 kg/m     I went to see Adam.  He is in bed and the bed has him in a sitting position.  He looks SOB and seems to be breathing hard with some \"forced\" wheezing.  Lungs are the same     Here with complicated UTI but with hx of dementia and HFpEF     Ddx is aspiration vs pulmonary edema.  PE seems low on ddx given lung findings   -stat CXR  -trial nebs   -ck weight     Follow-up   Wt looks to be down making pulmonary edema less likely  Still waiting for cxr and neb          "

## 2024-11-09 NOTE — PLAN OF CARE
"  Pt agitated and restless at night, haldol given. Pt trying to kick at staff when attempting to help. Haldol given, with moderate relief. Then an hour later or so SOB noted, checked O2 it was at 67% on RA. Started NC at 1L: 83%, 2L:83%, 3L:85%, 4.5L:91%. Baseline RA. Restlessness and agitation stopped after being put on O2. Labored breathing and wheezing noted. Provider notified and gave orders to obtain weight, X-ray, and duoneb. All interventions applied. Pt currently looking comfortable in bed.    Goal Outcome Evaluation:      Plan of Care Reviewed With: patient    Overall Patient Progress: decliningOverall Patient Progress: declining    Outcome Evaluation: New onset SOB, wheezing, O2 67% on RA. Currently on 4.5L NC.      Problem: Adult Inpatient Plan of Care  Goal: Plan of Care Review  Description: The Plan of Care Review/Shift note should be completed every shift.  The Outcome Evaluation is a brief statement about your assessment that the patient is improving, declining, or no change.  This information will be displayed automatically on your shift  note.  Outcome: Not Progressing  Flowsheets (Taken 11/9/2024 0640)  Outcome Evaluation: New onset SOB, wheezing, O2 67% on RA. Currently on 4.5L NC.  Plan of Care Reviewed With: patient  Overall Patient Progress: declining  Goal: Patient-Specific Goal (Individualized)  Description: You can add care plan individualizations to a care plan. Examples of Individualization might be:  \"Parent requests to be called daily at 9am for status\", \"I have a hard time hearing out of my right ear\", or \"Do not touch me to wake me up as it startles  me\".  Outcome: Not Progressing  Goal: Absence of Hospital-Acquired Illness or Injury  Outcome: Not Progressing  Intervention: Prevent Skin Injury  Recent Flowsheet Documentation  Taken 11/9/2024 0300 by Miladys Stover RN  Body Position:   heels elevated   legs elevated  Taken 11/9/2024 0230 by Miladys Stover, RN  Body Position: legs " elevated  Taken 11/8/2024 2322 by Miladys Stover RN  Skin Protection: adhesive use limited  Intervention: Prevent Infection  Recent Flowsheet Documentation  Taken 11/8/2024 2322 by Miladys Stover RN  Infection Prevention: hand hygiene promoted  Goal: Optimal Comfort and Wellbeing  Outcome: Not Progressing  Goal: Readiness for Transition of Care  Outcome: Not Progressing     Problem: UTI (Urinary Tract Infection)  Goal: Improved Infection Symptoms  Outcome: Not Progressing     Problem: Comorbidity Management  Goal: Maintenance of Behavioral Health Symptom Control  Outcome: Not Progressing  Intervention: Maintain Behavioral Health Symptom Control  Recent Flowsheet Documentation  Taken 11/8/2024 2322 by Miladys Stover RN  Medication Review/Management: medications reviewed  Goal: Blood Glucose Levels Within Targeted Range  Outcome: Not Progressing  Intervention: Monitor and Manage Glycemia  Recent Flowsheet Documentation  Taken 11/8/2024 2322 by Miladys Stover RN  Medication Review/Management: medications reviewed

## 2024-11-09 NOTE — PLAN OF CARE
"Goal Outcome Evaluation:      Plan of Care Reviewed With: patient    Overall Patient Progress: improvingOverall Patient Progress: improving    Outcome Evaluation: denies pain, sitter at bedside.  Alert/confused, VSS, O2 95% RA, BG: AC/HS, 275 & 272, K+ & Mg+ protocol, recheck AM. Mod carb soft dental diet with thicken liquid. IVF infusing.  SW following. Will continue POC  and monitoring.   Problem: Adult Inpatient Plan of Care  Goal: Plan of Care Review  Description: The Plan of Care Review/Shift note should be completed every shift.  The Outcome Evaluation is a brief statement about your assessment that the patient is improving, declining, or no change.  This information will be displayed automatically on your shift  note.  Outcome: Progressing  Flowsheets (Taken 11/8/2024 7107)  Outcome Evaluation: denies pain, sitter at bedside.  Plan of Care Reviewed With: patient  Overall Patient Progress: improving  Goal: Patient-Specific Goal (Individualized)  Description: You can add care plan individualizations to a care plan. Examples of Individualization might be:  \"Parent requests to be called daily at 9am for status\", \"I have a hard time hearing out of my right ear\", or \"Do not touch me to wake me up as it startles  me\".  Outcome: Progressing  Goal: Absence of Hospital-Acquired Illness or Injury  Outcome: Progressing  Intervention: Identify and Manage Fall Risk  Recent Flowsheet Documentation  Taken 11/8/2024 1550 by Geraldo Mcnamara RN  Safety Promotion/Fall Prevention:   treat underlying cause   treat reversible contributory factors   activity supervised   bedside attendant  Intervention: Prevent Skin Injury  Recent Flowsheet Documentation  Taken 11/8/2024 1550 by Geraldo Mcnamara RN  Body Position:   heels elevated   legs elevated  Skin Protection: adhesive use limited  Intervention: Prevent and Manage VTE (Venous Thromboembolism) Risk  Recent Flowsheet Documentation  Taken 11/8/2024 1550 by Geraldo Mcnamara" RN  VTE Prevention/Management: patient refused intervention  Intervention: Prevent Infection  Recent Flowsheet Documentation  Taken 11/8/2024 1550 by Geraldo Mcnamara RN  Infection Prevention: hand hygiene promoted  Goal: Optimal Comfort and Wellbeing  Outcome: Progressing  Goal: Readiness for Transition of Care  Outcome: Progressing     Problem: UTI (Urinary Tract Infection)  Goal: Improved Infection Symptoms  Outcome: Progressing     Problem: Comorbidity Management  Goal: Maintenance of Behavioral Health Symptom Control  Outcome: Progressing  Intervention: Maintain Behavioral Health Symptom Control  Recent Flowsheet Documentation  Taken 11/8/2024 1550 by Geraldo Mcnamara RN  Medication Review/Management: medications reviewed  Goal: Blood Glucose Levels Within Targeted Range  Outcome: Progressing  Intervention: Monitor and Manage Glycemia  Recent Flowsheet Documentation  Taken 11/8/2024 1550 by Geraldo Mcnamara RN  Medication Review/Management: medications reviewed

## 2024-11-09 NOTE — PROGRESS NOTES
[facility]    Hospitalist Progress Note  Name: Adam Huber    MRN: [id number]  Provider:  Raghavendra Snell DO  Date of Service: 11/09/2024    Summary of Stay: Adam Huber is a 89 year old male with a history of dementia with behavioral disturbances, history of CVA, type 2 diabetes mellitus, coronary artery disease status post LEXI in 2015, hypertension, hyperlipidemia, HFpEF, new hepatic cirrhosis, BPH admitted on 11/7/2024 with abdominal pain and flank pain.  In the emergency department, the patient was found to have a temperature of 97.4  F, blood pressure 123/55, heart rate 70, respiratory rate 18, SpO2 97% on room air.  Initial lab work showed mild leukocytosis of 11.3, sodium 133, bicarb 20, BUN/creatinine 18.7/0.66, glucose 427, alkaline phosphatase 210, albumin 3.0, lactic acid 3.2.  CT head showed extensive chronic changes but otherwise negative for acute issues.  CT abdomen and pelvis showed findings of chronic bladder outlet obstruction with significant increase in urothelial hyperenhancement and surrounding fat stranding possibly indicating cystitis, cirrhotic morphology of the liver with likely incidental 2.9 cm posterior right hepatic lesion.  The patient was started on IV fluids, a Villa catheter was placed, started on IV ceftriaxone, and urology was consulted to see the patient in the setting of complicated urinary tract infection with bladder outlet obstruction.  The patient was also noted to have a decubitus ulcer on exam.  Wound care was consulted to see the patient.  Urine culture returned positive for streptococcus agalactiae.  The patient's hospital course was complicated by an episode of hypoxia in the early morning hours of 11/9 as well as restlessness and agitation in the setting of his dementia.  Chest x-ray showed pulmonary edema and IV fluids were discontinued and a dose of Lasix was given.    TODAY'S PLAN:  Appreciate Urology recommendations.  Urine culture  positive for Streptococcus agalactiae.  Continue IV ceftriaxone for now.  Patient quite restless and agitated overnight so we will add Seroquel nightly.  In addition, the patient became hypoxic.  Chest x-ray shows pulmonary edema.  Stop IV fluids and will give 1 dose of Lasix 40 mg IV later this morning as patient appears to be resting comfortably right now.  Tamsulosin and finasteride added during hospitalization in the setting of chronic bladder outlet obstruction.  It is very possible the patient may need a chronic Villa catheter going forward.  This was discussed with the patient's son Adolfo via phone.  Ultimately time will tell.  Anticipate another 1 to 2 days in the hospital pending improvement in hypoxia and antibiotic plan.  Adolfo was updated via phone.  All questions answered.  ADDENDUM:  Lunchtime BS 400s.  Start lantus 10 units qAM with dose now.    Problem List:   Complicated Urinary Tract Infection  Chronic Bladder Outlet Obstruction  BPH  - Appreciate Urology recommendations  - Flomax + Finasteride  - Stop IVF  - IV ceftriaxone  - Urine culture positive for strep agalactiae  - Villa per Urology - may need long term as bladder outlet obstruction chronic issue     Lactic Acidosis  - Improved with IVF     Chronic Decubitus Ulcer - POA  - Appreciate [facility] recommendations  - Vanco discontinued as MRSA negative     Hepatic Cirrhosis  Posterior R 2.9 cm Hepatic Lesion  - Discussed with son Adolfo.  Recommend outpatient MRI liver.  If noted to be concerning for malignancy, would discuss with family options as pt likely poor candidate for chemotherapy given his advanced age, comorbidities, and advanced dementia     Hypomagnesemia  - Electrolyte replacement protocol    Uncontrolled Type 2 Diabetes Mellitus with Hyperglycemia  - A1C = 12.6 and has been elevated since at least 4/2024  - Ideally pt would start insulin therapy.  Given his dementia and living situation unclear if he would do well with insulin.  For now,  add glipizide and resume metformin at discharge.  Plan for frequent glucometer checks and follow up with PCP in 1 week.  If persistent hyperglycemia, would likely benefit from starting once daily lantus.     Chronic Medical Problems:  Dementia with Behavioral Disturbances  Hx of CVA  Type 2 Diabetes Mellitus with Hyperglycemia  Coronary Artery Disease s/p LEXI in 2015  Hypertension  Hyperlipidemia  HFpEF    I spent 47 minutes in reviewing this patient's labs, imaging, medications, medical history.  In addition time was spent interviewing the patient, communicating with family, and medical decision making.     DVT Prophylaxis: Pneumatic Compression Devices  Code Status: No CPR- Do NOT Intubate  Diet: Mechanical/Dental Soft Diet    Villa Catheter: PRESENT, indication: Acute retention or obstruction    Disposition: Medically Ready for Discharge: Anticipated Tomorrow (1-2 days)    Goals to discharge include: abx plan established, hypoxia improved  Family updated today: Yes      Interval History   Pt seen and examined.  Pt resting comfortably upon arrival.  Easily arousable.    -Data reviewed today: I personally reviewed all new labs and imaging results over the last 24 hours.     Physical Exam   Temp: 97.4  F (36.3  C) Temp src: Axillary BP: (!) 158/78 Pulse: 97   Resp: 22 SpO2: 97 % O2 Device: Nasal cannula Oxygen Delivery: 4 LPM  Vitals:    11/07/24 2009 11/09/24 0553   Weight: 86.7 kg (191 lb 2.2 oz) 82.9 kg (182 lb 12.2 oz)     Vital Signs with Ranges  Temp:  [97.3  F (36.3  C)-97.4  F (36.3  C)] 97.4  F (36.3  C)  Pulse:  [] 97  Resp:  [16-22] 22  BP: (142-158)/(62-78) 158/78  SpO2:  [67 %-98 %] 97 %  I/O last 3 completed shifts:  In: 2718 [P.O.:1470; I.V.:1248]  Out: 2775 [Urine:2775]    GENERAL: No apparent distress. Awake, alert  HEENT: Normocephalic, atraumatic. Extraocular movements intact.  CARDIOVASCULAR: Regular rate and rhythm without murmurs or rubs. No S3.  PULMONARY: Crackles BL  GASTROINTESTINAL:  Soft, non-tender, non-distended. Bowel sounds normoactive.   EXTREMITIES: No cyanosis or clubbing. No edema.  NEUROLOGICAL: CN 2-12 grossly intact, no focal neurological deficits.  DERMATOLOGICAL: No rash, ulcer, bruising, nor jaundice.    Medications   Current Facility-Administered Medications   Medication Dose Route Frequency Provider Last Rate Last Admin     Current Facility-Administered Medications   Medication Dose Route Frequency Provider Last Rate Last Admin    aspirin (ASA) EC tablet 325 mg  325 mg Oral Daily Levy Zapata MD   325 mg at 11/08/24 0910    atorvastatin (LIPITOR) tablet 40 mg  40 mg Oral Daily Levy Zapata MD   40 mg at 11/08/24 0910    carvedilol (COREG) tablet 25 mg  25 mg Oral BID w/meals Levy Zapata MD   25 mg at 11/08/24 1727    cefTRIAXone (ROCEPHIN) 2 g vial to attach to  ml bag for ADULTS or NS 50 ml bag for PEDS  2 g Intravenous Q24H Levy Zapata MD 0 mL/hr at 11/07/24 1832 2 g at 11/08/24 1727    finasteride (PROSCAR) tablet 5 mg  5 mg Oral Daily Levy Zapata MD   5 mg at 11/08/24 0909    furosemide (LASIX) injection 40 mg  40 mg Intravenous Once Raghavendra Snell DO        glipiZIDE (GLUCOTROL) tablet 5 mg  5 mg Oral QAM AC Raghavendra Snell,         insulin aspart (NovoLOG) injection (RAPID ACTING)  1-7 Units Subcutaneous TID AC Levy Zapata MD   3 Units at 11/08/24 1740    insulin aspart (NovoLOG) injection (RAPID ACTING)  1-5 Units Subcutaneous At Bedtime Levy Zapata MD   2 Units at 11/08/24 2203    sodium chloride (PF) 0.9% PF flush 3 mL  3 mL Intracatheter Q8H Levy Zapata MD   3 mL at 11/09/24 0216    tamsulosin (FLOMAX) capsule 0.4 mg  0.4 mg Oral Daily Levy Zapata MD   0.4 mg at 11/08/24 0909     Data     Laboratory:  Recent Labs   Lab 11/09/24  0622 11/08/24  0811 11/07/24  1441   WBC 12.4* 8.2 11.3*   HGB 13.2* 13.2* 13.4   HCT 39.8* 38.7* 40.1   MCV 88 88 87    200 217     Recent Labs   Lab 11/09/24  0622 11/09/24  0215 11/08/24  2135 11/08/24  1302 11/08/24  0811  "11/07/24  1635 11/07/24  1441   *  --   --   --  132*  --  133*   POTASSIUM 4.6  --   --   --  3.9  --  4.4   CHLORIDE 99  --   --   --  103  --  98   CO2 19*  --   --   --  18*  --  20*   ANIONGAP 10  --   --   --  11  --  15   * 282* 272*   < > 251*   < > 485*   BUN 12.4  --   --   --  11.1  --  18.7   CR 0.58*  --   --   --  0.52*  0.52*  --  0.66*   GFRESTIMATED >90  --   --   --  >90  >90  --  90   LUZ 8.0*  --   --   --  8.1*  --  8.4*    < > = values in this interval not displayed.     No results for input(s): \"CULT\" in the last 168 hours.  Troponin I ES   Date Value Ref Range Status   12/04/2019 <0.015 0.000 - 0.045 ug/L Final     Comment:     The 99th percentile for upper reference range is 0.045 ug/L.  Troponin values   in the range of 0.045 - 0.120 ug/L may be associated with risks of adverse   clinical events.         Imaging:  Recent Results (from the past 24 hours)   XR Chest Port 1 View    Narrative    EXAM: XR CHEST PORT 1 VIEW  LOCATION: [facility]  DATE: 11/9/2024    INDICATION: new hypoxia  COMPARISON: None.      Impression    IMPRESSION: The heart is normal in size. There is central pulmonary venous congestion with interstitial edema seen throughout the lungs bilaterally.         Raghavendra Snell DO  [facility] Hospitalist  [address] [place] [address]  Securely message with [employer] (more info)  Text page via [employer] Paging/Directory   11/09/2024   "

## 2024-11-09 NOTE — PLAN OF CARE
Goal Outcome Evaluation:      Plan of Care Reviewed With: patient, grandchild(isabella)    Overall Patient Progress: improvingOverall Patient Progress: improving    Outcome Evaluation: Patient comes from home with spouse, good family support and PCA services. He will discharge back home with spouse on discharge.

## 2024-11-09 NOTE — CONSULTS
Care Management Initial Consult    General Information  Assessment completed with: Patient, Other (grandson),    Type of CM/SW Visit: Initial Assessment    Primary Care Provider verified and updated as needed: No   Readmission within the last 30 days: no previous admission in last 30 days      Reason for Consult: care coordination/care conference, discharge planning     Communication Assessment  Patient's communication style: spoken language (non-English)    Hearing Difficulty or Deaf: no   Wear Glasses or Blind: no    Cognitive  Cognitive/Neuro/Behavioral: .WDL except, level of consciousness, mood/behavior, orientation, speech  Level of Consciousness: confused  Arousal Level: opens eyes spontaneously  Orientation: disoriented to, situation, place, time  Mood/Behavior: restless     Speech: slurred    Living Environment:   People in home: spouse     Current living Arrangements: apartment      Able to return to prior arrangements: yes       Family/Social Support:  Care provided by: child(isabella), other (see comments) (PCA)  Provides care for: no one, unable/limited ability to care for self  Marital Status:   Support system: Wife, Children (grandchildren)          Description of Support System: Supportive, Involved    Support Assessment: Adequate family and caregiver support    Current Resources:   Patient receiving home care services: Yes      Community Resources: County Programs, County Worker, PCA  Equipment currently used at home: walker, rolling  Supplies currently used at home: None    Employment/Financial:  Employment Status: retired       Lifestyle & Psychosocial Needs:  Social Drivers of Health     Food Insecurity: Low Risk  (11/8/2024)    Food Insecurity     Within the past 12 months, did you worry that your food would run out before you got money to buy more?: No     Within the past 12 months, did the food you bought just not last and you didn t have money to get more?: No   Depression: Not on file    Housing Stability: Low Risk  (11/8/2024)    Housing Stability     Do you have housing? : Yes     Are you worried about losing your housing?: No   Tobacco Use: Low Risk  (11/7/2024)    Patient History     Smoking Tobacco Use: Never     Smokeless Tobacco Use: Never     Passive Exposure: Not on file   Financial Resource Strain: Low Risk  (11/8/2024)    Financial Resource Strain     Within the past 12 months, have you or your family members you live with been unable to get utilities (heat, electricity) when it was really needed?: No   Alcohol Use: Not on file   Transportation Needs: Low Risk  (11/8/2024)    Transportation Needs     Within the past 12 months, has lack of transportation kept you from medical appointments, getting your medicines, non-medical meetings or appointments, work, or from getting things that you need?: No   Physical Activity: Not on file   Interpersonal Safety: High Risk (11/8/2024)    Interpersonal Safety     Do you feel physically and emotionally safe where you currently live?: No     Within the past 12 months, have you been hit, slapped, kicked or otherwise physically hurt by someone?: No     Within the past 12 months, have you been humiliated or emotionally abused in other ways by your partner or ex-partner?: No   Stress: Not on file   Social Connections: Not on file   Health Literacy: Not on file       Functional Status:  Prior to admission patient needed assistance:   Dependent ADLs:: Ambulation-walker, Bathing, Toileting, Dressing  Dependent IADLs:: Cleaning, Cooking, Laundry, Shopping, Meal Preparation, Medication Management, Money Management, Transportation                  Additional Information:  CM consulted for elevated risk score of 23% and discharge planning. Patient was admitted with abdominal pain and UTI. He has a parmar catheter and is being terated with IV antibiotics. Met with patient and grandson at bedside to discuss home services and discharge planning. Grandson was  agreeable to discussion without  and spoke English. Frank verified that patient lives at home with his wife. Patient's son and grandson also provide a lot of assistance when needed. Patient has PCA services through [employer] for almost 24 hrs/day assistance. The help with housekeeping, bathing, toileting, etc. His wife manages his mediations. He uses a walker for ambulating at home and moves well with the use of walker. We discussed possibility of patient discharging home with parmar catheter and that staff would teach/educate family before discharge if that is the plan. Frank is agreeable to the current plan of care. He anticipates patient will discharge back home with wife and same services. He does not have any questions or concerns at this time. Explained CM role and that we would continue to follow for discharge plan of care and any needs that arise.     Next Steps: Continue to follow for discharge planning. No anticipated needs at this time.           Luciana Palomo RN BSN CM  Inpatient Care Coordination  [facility]  [phone]

## 2024-11-09 NOTE — PROVIDER NOTIFICATION
FYI: NS was agitated and restless at night. Trying to kick at staff when attempting to help. Haldol given, with moderate relief. Then an 1h later or so SOB noted, checked O2 it was at 67% on RA. Started NC at 1L: 83%, 2L:83%, 3L:85%, 4.5L:91%. Baseline RA. Restlessness and agitation stopped after being put on O2. Cross cover provider (Milana HUERTA) notified.     Wheezing noted.    Provider: ordered X-ray. Obtain weight. Duoneb.

## 2024-11-10 ENCOUNTER — APPOINTMENT (OUTPATIENT)
Dept: SPEECH THERAPY | Facility: CLINIC | Age: 89
DRG: 689 | End: 2024-11-10
Attending: INTERNAL MEDICINE
Payer: COMMERCIAL

## 2024-11-10 LAB
ANION GAP SERPL CALCULATED.3IONS-SCNC: 10 MMOL/L (ref 7–15)
BUN SERPL-MCNC: 11.7 MG/DL (ref 8–23)
CALCIUM SERPL-MCNC: 8.1 MG/DL (ref 8.8–10.4)
CHLORIDE SERPL-SCNC: 103 MMOL/L (ref 98–107)
CREAT SERPL-MCNC: 0.61 MG/DL (ref 0.67–1.17)
CREAT SERPL-MCNC: 0.61 MG/DL (ref 0.67–1.17)
EGFRCR SERPLBLD CKD-EPI 2021: >90 ML/MIN/1.73M2
EGFRCR SERPLBLD CKD-EPI 2021: >90 ML/MIN/1.73M2
ERYTHROCYTE [DISTWIDTH] IN BLOOD BY AUTOMATED COUNT: 14.3 % (ref 10–15)
GLUCOSE BLDC GLUCOMTR-MCNC: 170 MG/DL (ref 70–99)
GLUCOSE BLDC GLUCOMTR-MCNC: 245 MG/DL (ref 70–99)
GLUCOSE BLDC GLUCOMTR-MCNC: 266 MG/DL (ref 70–99)
GLUCOSE BLDC GLUCOMTR-MCNC: 319 MG/DL (ref 70–99)
GLUCOSE SERPL-MCNC: 176 MG/DL (ref 70–99)
HCO3 SERPL-SCNC: 25 MMOL/L (ref 22–29)
HCT VFR BLD AUTO: 41.2 % (ref 40–53)
HGB BLD-MCNC: 13.6 G/DL (ref 13.3–17.7)
MAGNESIUM SERPL-MCNC: 1.6 MG/DL (ref 1.7–2.3)
MCH RBC QN AUTO: 28.9 PG (ref 26.5–33)
MCHC RBC AUTO-ENTMCNC: 33 G/DL (ref 31.5–36.5)
MCV RBC AUTO: 88 FL (ref 78–100)
PLATELET # BLD AUTO: 225 10E3/UL (ref 150–450)
POTASSIUM SERPL-SCNC: 3.9 MMOL/L (ref 3.4–5.3)
RBC # BLD AUTO: 4.7 10E6/UL (ref 4.4–5.9)
SODIUM SERPL-SCNC: 138 MMOL/L (ref 135–145)
WBC # BLD AUTO: 8.6 10E3/UL (ref 4–11)

## 2024-11-10 PROCEDURE — 250N000011 HC RX IP 250 OP 636: Performed by: STUDENT IN AN ORGANIZED HEALTH CARE EDUCATION/TRAINING PROGRAM

## 2024-11-10 PROCEDURE — 250N000013 HC RX MED GY IP 250 OP 250 PS 637: Performed by: INTERNAL MEDICINE

## 2024-11-10 PROCEDURE — 120N000001 HC R&B MED SURG/OB

## 2024-11-10 PROCEDURE — 99232 SBSQ HOSP IP/OBS MODERATE 35: CPT | Performed by: INTERNAL MEDICINE

## 2024-11-10 PROCEDURE — 80048 BASIC METABOLIC PNL TOTAL CA: CPT | Performed by: INTERNAL MEDICINE

## 2024-11-10 PROCEDURE — 80051 ELECTROLYTE PANEL: CPT | Performed by: INTERNAL MEDICINE

## 2024-11-10 PROCEDURE — 85014 HEMATOCRIT: CPT | Performed by: INTERNAL MEDICINE

## 2024-11-10 PROCEDURE — 92610 EVALUATE SWALLOWING FUNCTION: CPT | Mod: GN

## 2024-11-10 PROCEDURE — 250N000013 HC RX MED GY IP 250 OP 250 PS 637: Performed by: STUDENT IN AN ORGANIZED HEALTH CARE EDUCATION/TRAINING PROGRAM

## 2024-11-10 PROCEDURE — 36415 COLL VENOUS BLD VENIPUNCTURE: CPT | Performed by: INTERNAL MEDICINE

## 2024-11-10 PROCEDURE — 250N000012 HC RX MED GY IP 250 OP 636 PS 637: Performed by: INTERNAL MEDICINE

## 2024-11-10 PROCEDURE — 82565 ASSAY OF CREATININE: CPT | Performed by: STUDENT IN AN ORGANIZED HEALTH CARE EDUCATION/TRAINING PROGRAM

## 2024-11-10 PROCEDURE — 83735 ASSAY OF MAGNESIUM: CPT | Performed by: INTERNAL MEDICINE

## 2024-11-10 RX ORDER — MAGNESIUM OXIDE 400 MG/1
400 TABLET ORAL EVERY 4 HOURS
Status: COMPLETED | OUTPATIENT
Start: 2024-11-10 | End: 2024-11-10

## 2024-11-10 RX ADMIN — QUETIAPINE FUMARATE 12.5 MG: 25 TABLET ORAL at 16:15

## 2024-11-10 RX ADMIN — FINASTERIDE 5 MG: 5 TABLET, FILM COATED ORAL at 09:22

## 2024-11-10 RX ADMIN — GLIPIZIDE 5 MG: 5 TABLET ORAL at 09:22

## 2024-11-10 RX ADMIN — ASPIRIN 325 MG: 325 TABLET, COATED ORAL at 09:23

## 2024-11-10 RX ADMIN — INSULIN GLARGINE 15 UNITS: 100 INJECTION, SOLUTION SUBCUTANEOUS at 09:24

## 2024-11-10 RX ADMIN — QUETIAPINE FUMARATE 12.5 MG: 25 TABLET ORAL at 23:16

## 2024-11-10 RX ADMIN — CARVEDILOL 25 MG: 25 TABLET, FILM COATED ORAL at 09:22

## 2024-11-10 RX ADMIN — INSULIN ASPART 3 UNITS: 100 INJECTION, SOLUTION INTRAVENOUS; SUBCUTANEOUS at 12:54

## 2024-11-10 RX ADMIN — CARVEDILOL 25 MG: 25 TABLET, FILM COATED ORAL at 17:44

## 2024-11-10 RX ADMIN — INSULIN ASPART 1 UNITS: 100 INJECTION, SOLUTION INTRAVENOUS; SUBCUTANEOUS at 09:24

## 2024-11-10 RX ADMIN — Medication 400 MG: at 10:50

## 2024-11-10 RX ADMIN — QUETIAPINE FUMARATE 12.5 MG: 25 TABLET ORAL at 01:09

## 2024-11-10 RX ADMIN — ATORVASTATIN CALCIUM 40 MG: 40 TABLET, FILM COATED ORAL at 09:22

## 2024-11-10 RX ADMIN — TAMSULOSIN HYDROCHLORIDE 0.4 MG: 0.4 CAPSULE ORAL at 09:22

## 2024-11-10 RX ADMIN — CEFTRIAXONE 2 G: 2 INJECTION, POWDER, FOR SOLUTION INTRAMUSCULAR; INTRAVENOUS at 17:44

## 2024-11-10 RX ADMIN — QUETIAPINE FUMARATE 12.5 MG: 25 TABLET ORAL at 09:23

## 2024-11-10 RX ADMIN — HALOPERIDOL LACTATE 1 MG: 5 INJECTION, SOLUTION INTRAMUSCULAR at 16:30

## 2024-11-10 RX ADMIN — INSULIN ASPART 3 UNITS: 100 INJECTION, SOLUTION INTRAVENOUS; SUBCUTANEOUS at 17:47

## 2024-11-10 RX ADMIN — Medication 400 MG: at 16:15

## 2024-11-10 ASSESSMENT — ACTIVITIES OF DAILY LIVING (ADL)
ADLS_ACUITY_SCORE: 26.75
ADLS_ACUITY_SCORE: 25.75
ADLS_ACUITY_SCORE: 0
ADLS_ACUITY_SCORE: 26.25
ADLS_ACUITY_SCORE: 25.75
ADLS_ACUITY_SCORE: 26.25
ADLS_ACUITY_SCORE: 25.75
ADLS_ACUITY_SCORE: 26.25
ADLS_ACUITY_SCORE: 0
ADLS_ACUITY_SCORE: 26.25
ADLS_ACUITY_SCORE: 26.25
ADLS_ACUITY_SCORE: 0
ADLS_ACUITY_SCORE: 0
ADLS_ACUITY_SCORE: 26.25
ADLS_ACUITY_SCORE: 26.75
ADLS_ACUITY_SCORE: 26.75
ADLS_ACUITY_SCORE: 0
ADLS_ACUITY_SCORE: 25.75
ADLS_ACUITY_SCORE: 26.25
ADLS_ACUITY_SCORE: 25.75
ADLS_ACUITY_SCORE: 0
ADLS_ACUITY_SCORE: 0
ADLS_ACUITY_SCORE: 27.75

## 2024-11-10 NOTE — PLAN OF CARE
"  Problem: Adult Inpatient Plan of Care  Goal: Plan of Care Review  Description: The Plan of Care Review/Shift note should be completed every shift.  The Outcome Evaluation is a brief statement about your assessment that the patient is improving, declining, or no change.  This information will be displayed automatically on your shift  note.  Outcome: Progressing  Flowsheets (Taken 11/9/2024 2228)  Outcome Evaluation: VSS, 95% on RA. Denies pain. Cont abx for UTI  and Lantus added for increased BG's. BG's were 244 & 334. Daughter at bedside updated on POC. No extreme behaviors this shift. Plan to d/c back to home when improved.  Plan of Care Reviewed With:   patient   family  Goal: Patient-Specific Goal (Individualized)  Description: You can add care plan individualizations to a care plan. Examples of Individualization might be:  \"Parent requests to be called daily at 9am for status\", \"I have a hard time hearing out of my right ear\", or \"Do not touch me to wake me up as it startles  me\".  Outcome: Progressing  Goal: Absence of Hospital-Acquired Illness or Injury  Outcome: Progressing  Intervention: Identify and Manage Fall Risk  Recent Flowsheet Documentation  Taken 11/9/2024 1633 by Karo Saeed RN  Safety Promotion/Fall Prevention: activity supervised  Intervention: Prevent Skin Injury  Recent Flowsheet Documentation  Taken 11/9/2024 1633 by Karo Saeed RN  Body Position:   position changed independently   side-lying 30 degrees  Intervention: Prevent Infection  Recent Flowsheet Documentation  Taken 11/9/2024 1633 by Karo Saeed RN  Infection Prevention: rest/sleep promoted  Goal: Optimal Comfort and Wellbeing  Outcome: Progressing  Goal: Readiness for Transition of Care  Outcome: Progressing     Problem: UTI (Urinary Tract Infection)  Goal: Improved Infection Symptoms  Outcome: Progressing     Problem: Comorbidity Management  Goal: Maintenance of Behavioral Health Symptom Control  Outcome: " Progressing  Intervention: Maintain Behavioral Health Symptom Control  Recent Flowsheet Documentation  Taken 11/9/2024 1633 by Karo Saeed RN  Medication Review/Management: medications reviewed  Goal: Blood Glucose Levels Within Targeted Range  Outcome: Progressing  Intervention: Monitor and Manage Glycemia  Recent Flowsheet Documentation  Taken 11/9/2024 1633 by Karo Saeed RN  Medication Review/Management: medications reviewed   Goal Outcome Evaluation:      Plan of Care Reviewed With: patient, family          Outcome Evaluation: VSS, 95% on RA. Denies pain. Cont abx for UTI  and Lantus added for increased BG's. BG's were 244 & 334. Daughter at bedside updated on POC. No extreme behaviors this shift. Plan to d/c back to home when improved.

## 2024-11-10 NOTE — PROGRESS NOTES
Bedside Swallow Evaluation      11/10/24 1100   Appointment Info   Signing Clinician's Name / Credentials (SLP) Erica Chambers MA, CCC-SLP   General Information   Onset of Illness/Injury or Date of Surgery 11/07/24   Referring Physician Raghavendra Snell,    Patient/Family Therapy Goal Statement (SLP) Could not state   Pertinent History of Current Problem Per MD note: Adam Huber is a 89 year old male with a history of dementia with behavioral disturbances, history of CVA, type 2 diabetes mellitus, coronary artery disease status post LEXI in 2015, hypertension, hyperlipidemia, HFpEF, new hepatic cirrhosis, BPH admitted on 11/7/2024 with abdominal pain and flank pain.  In the emergency department, the patient was found to have a temperature of 97.4  F, blood pressure 123/55, heart rate 70, respiratory rate 18, SpO2 97% on room air.  Initial lab work showed mild leukocytosis of 11.3, sodium 133, bicarb 20, BUN/creatinine 18.7/0.66, glucose 427, alkaline phosphatase 210, albumin 3.0, lactic acid 3.2.  CT head showed extensive chronic changes but otherwise negative for acute issues.  CT abdomen and pelvis showed findings of chronic bladder outlet obstruction with significant increase in urothelial hyperenhancement and surrounding fat stranding possibly indicating cystitis, cirrhotic morphology of the liver with likely incidental 2.9 cm posterior right hepatic lesion.  The patient was started on IV fluids, a Villa catheter was placed, started on IV ceftriaxone, and urology was consulted to see the patient in the setting of complicated urinary tract infection with bladder outlet obstruction.  The patient was also noted to have a decubitus ulcer on exam.  Wound care was consulted to see the patient.  Urine culture returned positive for streptococcus agalactiae.  The patient's hospital course was complicated by an episode of hypoxia in the early morning hours of 11/9 as well as restlessness and agitation in the  "setting of his dementia.  Chest x-ray showed pulmonary edema and IV fluids were discontinued and a dose of Lasix was give   General Observations Sitting in chair, does not respond to any questions.       Present yes   Language [place]  via iPad. Did not respond to any questions or attempt to communicate.   Type of Evaluation   Type of Evaluation Swallow Evaluation   Oral Motor   Oral Musculature unable to assess due to poor participation/comprehension   Dentition (Oral Motor)   Comment, Dentition (Oral Motor) NO dentures   Dentition (Oral Motor) edentulous   Facial Symmetry (Oral Motor)   Facial Symmetry (Oral Motor) WNL   Lip Function (Oral Motor)   Lip Range of Motion (Oral Motor) unable/difficult to assess   Tongue Function (Oral Motor)   Tongue ROM (Oral Motor) unable/difficult to assess   Cough/Swallow/Gag Reflex (Oral Motor)   Soft Palate/Velum (Oral Motor) unable/difficult to assess   Volitional Throat Clear/Cough (Oral Motor) unable/difficult to assess   Volitional Swallow (Oral Motor) unable/difficult to assess   Vocal Quality/Secretion Management (Oral Motor)   Vocal Quality (Oral Motor) unable/difficult to assess   Secretion Management (Oral Motor) WNL   General Swallowing Observations   Past History of Dysphagia Pt has history of participation in dysphagia tx. Most recently seen on February of 2024 and discharged on pureed with diet mildly thick liquid. Last VFSS May of 2023. Pt unable to state what he was eating at home prior to admission and no family at bedside. Per RN, pt had difficulty with eggs and sausage at breakfast (on mechanical soft), no teeth for mastication. Noted to be coughing with thin liquids per RN. RN reports order says \"thickened\" but no specification on thickness and kitchen sending thin liquid.   Respiratory Support room air   Current Diet/Method of Nutritional Intake (General Swallowing Observations, NIS) Mechanical soft, thin liquids   Swallowing " Evaluation Clinical swallow evaluation   Clinical Swallow Evaluation   Feeding Assistance frequent cues/help required   Additional evaluation(s) completed today No   Clinical Swallow Evaluation Textures Trialed thin liquids;mildly thick liquids;pureed   Clinical Swallow Eval: Thin Liquid Texture Trial   Mode of Presentation, Thin Liquids straw;self-fed   Volume of Liquid or Food Presented <1oz   Oral Phase of Swallow premature pharyngeal entry   Pharyngeal Phase of Swallow impaired;coughing/choking   Diagnostic Statement RN present as SLP entered giving thin water and pt observed to be coughing.   Clinical Swallow Eval: Mildly Thick Liquids   Mode of Presentation cup;straw;self-fed   Volume Presented 8oz   Oral Phase premature pharyngeal entry   Pharyngeal Phase impaired;coughing/choking   Diagnostic Statement Pt tolerated >4oz of mildly thick via large and sequential cup sips. Overt coughing following mildly thick consumption from straw   Clinical Swallow Evaluation: Puree Solid Texture Trial   Mode of Presentation, Puree spoon;fed by clinician   Volume of Puree Presented 4oz   Oral Phase, Puree delayed AP movement   Pharyngeal Phase, Puree intact   Diagnostic Statement Noted to masticate puree with and without pill present. Delay in swallow initiation. No overt s/sx of aspiration once swallow was initiated.   Esophageal Phase of Swallow   Patient reports or presents with symptoms of esophageal dysphagia No   Swallowing Recommendations   Diet Consistency Recommendations pureed (level 4);mildly thick liquids (level 2)   Supervision Level for Intake 1:1 supervision needed   Mode of Delivery Recommendations bolus size, small;no straws;slow rate of intake   Postural Recommendations none   Swallowing Maneuver Recommendations alternate food and liquid intake   Monitoring/Assistance Required (Eating/Swallowing) monitor for cough or change in vocal quality with intake;stop eating activities when fatigue is present    Recommended Feeding/Eating Techniques (Swallow Eval) maintain upright sitting position for eating;provide assist with feeding;set-up and prepare tray   Medication Administration Recommendations, Swallowing (SLP) Whole in puree as tolerated. Suspect pt may benefit from meds crushed in purees   Instrumental Assessment Recommendations reassess via non-instrumental clinical swallow evaluation   Comment, Swallowing Recommendations Patient demonstrated overt coughing with thin liquid and mildly thick via straw. Improved with mildly thick via cup sip. Oral phase deficits noted with purees, masticating and delay in swallow initiation.   General Therapy Interventions   Planned Therapy Interventions Dysphagia Treatment   Dysphagia treatment Modified diet education;Instruction of safe swallow strategies;Compensatory strategies for swallowing   Clinical Impression   Criteria for Skilled Therapeutic Interventions Met (SLP Eval) Yes, treatment indicated   SLP Diagnosis mild-moderate oral pharyngeal dysphagia   Risks & Benefits of therapy have been explained evaluation/treatment results reviewed;care plan/treatment goals reviewed;risks/benefits reviewed;current/potential barriers reviewed;participants voiced agreement with care plan;participants included;patient   Clinical Impression Comments Patient presents with mild-moderate oral pharyngeal dysphagia suspected baseline but unable to detemine diet prior to hospitalization as pt unable to answer (with use of ) and no family at bedside. Recommend pureed diet with thin liquids. Strict swallow precautions: direct assist/supervision, NO straws, slow rate, small bites/sips, slow rate and verify swallow, and alternate consistencies. Considering crushing meds.   SLP Total Evaluation Time   Eval: oral/pharyngeal swallow function, clinical swallow Minutes (16076) 18   SLP Goals   Therapy Frequency (SLP Eval) 5 times/week   SLP Predicted Duration/Target Date for Goal  Attainment 11/24/24   SLP Goals Swallow   SLP: Safely tolerate diet without signs/symptoms of aspiration Minced & moist diet;Slightly thick liquids;With assistance/supervision   SLP Discharge Planning   SLP Plan determine baseline diet, meal f/u   SLP Discharge Recommendation home with home health   SLP Rationale for DC Rec Pt may benefit from  SLP pending baseline diet recommendations, pt may be at/near baseline diet   SLP Brief overview of current status  Clinical swallow evaluation completed. Recommend pureed diet with thin liquids. Strict swallow precautions: direct assist/supervision, NO straws, slow rate, small bites/sips, slow rate and verify swallow, and alternate consistencies. Considering crushing meds.

## 2024-11-10 NOTE — PLAN OF CARE
"Pt is alert to self. Pt has 1:1 siter at bedside. Pt speaks Samoan and needs an . Pt denies pain or discomfort. Vitals stable. Pt is on potassium. Magnesium and phosphorus protocol. Pt is on blood sugar checks. Pt has Villa catheter which is intact and patent. Pt takes pills whole in chocolate pudding. Pt is on Rocephin for UTI. Pt is assist of two in transfer and cares. Pt pt is sleeping in bed. Bed alarm in place and call light within reach.         Goal Outcome Evaluation:      Plan of Care Reviewed With: patient    Overall Patient Progress: improvingOverall Patient Progress: improving    Outcome Evaluation: pt has 1:1 sitter at bedside. pt has Villa catheter which is intact and patent. pt is on blood sugar checks. pt is on Rocephin for UTI.      Problem: Adult Inpatient Plan of Care  Goal: Plan of Care Review  Description: The Plan of Care Review/Shift note should be completed every shift.  The Outcome Evaluation is a brief statement about your assessment that the patient is improving, declining, or no change.  This information will be displayed automatically on your shift  note.  Outcome: Progressing  Flowsheets (Taken 11/10/2024 0228)  Outcome Evaluation: pt has 1:1 sitter at bedside. pt has Villa catheter which is intact and patent. pt is on blood sugar checks. pt is on Rocephin for UTI.  Plan of Care Reviewed With: patient  Overall Patient Progress: improving  Goal: Patient-Specific Goal (Individualized)  Description: You can add care plan individualizations to a care plan. Examples of Individualization might be:  \"Parent requests to be called daily at 9am for status\", \"I have a hard time hearing out of my right ear\", or \"Do not touch me to wake me up as it startles  me\".  Outcome: Progressing  Goal: Absence of Hospital-Acquired Illness or Injury  Outcome: Progressing  Intervention: Identify and Manage Fall Risk  Recent Flowsheet Documentation  Taken 11/10/2024 0022 by Fede Guerra, " RN  Safety Promotion/Fall Prevention:   bedside attendant   assistive device/personal items within reach   activity supervised   clutter free environment maintained   increased rounding and observation   increase visualization of patient   nonskid shoes/slippers when out of bed   patient and family education   safety round/check completed  Intervention: Prevent Skin Injury  Recent Flowsheet Documentation  Taken 11/10/2024 0022 by Fede Guerra RN  Body Position: position changed independently  Intervention: Prevent and Manage VTE (Venous Thromboembolism) Risk  Recent Flowsheet Documentation  Taken 11/10/2024 0022 by Fede Guerra RN  VTE Prevention/Management: SCDs off (sequential compression devices)  Intervention: Prevent Infection  Recent Flowsheet Documentation  Taken 11/10/2024 0022 by Fede Guerra RN  Infection Prevention:   single patient room provided   rest/sleep promoted   hand hygiene promoted  Goal: Optimal Comfort and Wellbeing  Outcome: Progressing  Goal: Readiness for Transition of Care  Outcome: Progressing     Problem: UTI (Urinary Tract Infection)  Goal: Improved Infection Symptoms  Outcome: Progressing     Problem: Comorbidity Management  Goal: Maintenance of Behavioral Health Symptom Control  Outcome: Progressing  Intervention: Maintain Behavioral Health Symptom Control  Recent Flowsheet Documentation  Taken 11/10/2024 0022 by Fede Guerra RN  Medication Review/Management: medications reviewed  Goal: Blood Glucose Levels Within Targeted Range  Outcome: Progressing  Intervention: Monitor and Manage Glycemia  Recent Flowsheet Documentation  Taken 11/10/2024 0022 by Fede Guerra RN  Medication Review/Management: medications reviewed     Problem: Fall Injury Risk  Goal: Absence of Fall and Fall-Related Injury  Outcome: Progressing  Intervention: Identify and Manage Contributors  Recent Flowsheet Documentation  Taken 11/10/2024 0022 by  Fede Guerra RN  Medication Review/Management: medications reviewed  Intervention: Promote Injury-Free Environment  Recent Flowsheet Documentation  Taken 11/10/2024 0022 by Fede Guerra RN  Safety Promotion/Fall Prevention:   bedside attendant   assistive device/personal items within reach   activity supervised   clutter free environment maintained   increased rounding and observation   increase visualization of patient   nonskid shoes/slippers when out of bed   patient and family education   safety round/check completed     Problem: Pain Acute  Goal: Optimal Pain Control and Function  Outcome: Progressing  Intervention: Prevent or Manage Pain  Recent Flowsheet Documentation  Taken 11/10/2024 0022 by Fede Guerra RN  Medication Review/Management: medications reviewed     Problem: Adult Inpatient Plan of Care  Goal: Plan of Care Review  Description: The Plan of Care Review/Shift note should be completed every shift.  The Outcome Evaluation is a brief statement about your assessment that the patient is improving, declining, or no change.  This information will be displayed automatically on your shift  note.  Outcome: Progressing  Flowsheets (Taken 11/10/2024 0228)  Outcome Evaluation: pt has 1:1 sitter at bedside. pt has Villa catheter which is intact and patent. pt is on blood sugar checks. pt is on Rocephin for UTI.  Plan of Care Reviewed With: patient  Overall Patient Progress: improving  Goal: Absence of Hospital-Acquired Illness or Injury  Intervention: Identify and Manage Fall Risk  Recent Flowsheet Documentation  Taken 11/10/2024 0022 by Fede Guerra RN  Safety Promotion/Fall Prevention:   bedside attendant   assistive device/personal items within reach   activity supervised   clutter free environment maintained   increased rounding and observation   increase visualization of patient   nonskid shoes/slippers when out of bed   patient and family education    safety round/check completed  Intervention: Prevent Skin Injury  Recent Flowsheet Documentation  Taken 11/10/2024 0022 by Fede Guerra RN  Body Position: position changed independently  Intervention: Prevent and Manage VTE (Venous Thromboembolism) Risk  Recent Flowsheet Documentation  Taken 11/10/2024 0022 by Fede Guerra RN  VTE Prevention/Management: SCDs off (sequential compression devices)  Intervention: Prevent Infection  Recent Flowsheet Documentation  Taken 11/10/2024 0022 by Fede Guerra RN  Infection Prevention:   single patient room provided   rest/sleep promoted   hand hygiene promoted     Problem: Comorbidity Management  Goal: Maintenance of Behavioral Health Symptom Control  Intervention: Maintain Behavioral Health Symptom Control  Recent Flowsheet Documentation  Taken 11/10/2024 0022 by Fede Guerra RN  Medication Review/Management: medications reviewed  Goal: Blood Glucose Levels Within Targeted Range  Intervention: Monitor and Manage Glycemia  Recent Flowsheet Documentation  Taken 11/10/2024 0022 by Fede Guerra RN  Medication Review/Management: medications reviewed     Problem: Fall Injury Risk  Goal: Absence of Fall and Fall-Related Injury  Intervention: Identify and Manage Contributors  Recent Flowsheet Documentation  Taken 11/10/2024 0022 by Fede Guerra RN  Medication Review/Management: medications reviewed  Intervention: Promote Injury-Free Environment  Recent Flowsheet Documentation  Taken 11/10/2024 0022 by Fede Guerra RN  Safety Promotion/Fall Prevention:   bedside attendant   assistive device/personal items within reach   activity supervised   clutter free environment maintained   increased rounding and observation   increase visualization of patient   nonskid shoes/slippers when out of bed   patient and family education   safety round/check completed     Problem: Pain Acute  Goal: Optimal Pain  Control and Function  Intervention: Prevent or Manage Pain  Recent Flowsheet Documentation  Taken 11/10/2024 0022 by Fede Guerra RN  Medication Review/Management: medications reviewed

## 2024-11-10 NOTE — PROGRESS NOTES
[facility]    Hospitalist Progress Note  Name: Adam Huber    MRN: [id number]  Provider:  Raghavendra Snell DO  Date of Service: 11/10/2024    Summary of Stay: Adam Huber is a 89 year old male with a history of dementia with behavioral disturbances, history of CVA, type 2 diabetes mellitus, coronary artery disease status post LEXI in 2015, hypertension, hyperlipidemia, HFpEF, new hepatic cirrhosis, BPH admitted on 11/7/2024 with abdominal pain and flank pain.  In the emergency department, the patient was found to have a temperature of 97.4  F, blood pressure 123/55, heart rate 70, respiratory rate 18, SpO2 97% on room air.  Initial lab work showed mild leukocytosis of 11.3, sodium 133, bicarb 20, BUN/creatinine 18.7/0.66, glucose 427, alkaline phosphatase 210, albumin 3.0, lactic acid 3.2.  CT head showed extensive chronic changes but otherwise negative for acute issues.  CT abdomen and pelvis showed findings of chronic bladder outlet obstruction with significant increase in urothelial hyperenhancement and surrounding fat stranding possibly indicating cystitis, cirrhotic morphology of the liver with likely incidental 2.9 cm posterior right hepatic lesion.  The patient was started on IV fluids, a Villa catheter was placed, started on IV ceftriaxone, and urology was consulted to see the patient in the setting of complicated urinary tract infection with bladder outlet obstruction.  The patient was also noted to have a decubitus ulcer on exam.  Wound care was consulted to see the patient.  Urine culture returned positive for streptococcus agalactiae.  The patient's hospital course was complicated by an episode of hypoxia in the early morning hours of 11/9 as well as restlessness and agitation in the setting of his dementia.  Chest x-ray showed pulmonary edema and IV fluids were discontinued and a dose of Lasix was given.  Speech therapy was consulted to see the patient.    TODAY'S PLAN:  Appreciate urology recommendations.  Urine positive for Streptococcus agalactiae.  Anticipate transition to oral cephalosporin tomorrow and recommend 7 days total.  Patient still with Villa catheter and given his chronic bladder outlet obstruction plan is to maintain Villa catheter at discharge and to follow-up with urology for possible trial of void versus permanent Villa.  Patient much more calm this morning.  Seen with iPhone  but patient did not interact with the .  Patient also hyperglycemic and ultimately started Lantus yesterday and will increase dose to 15 units every morning today.  Plan for speech therapy evaluation today and appreciate recommendations.  Left voicemail for africa Mata.  Patient does have a 24-hour PCA and lives with his wife.  Has son and grandson that are heavily involved as well.  Possible discharge home in the next 1 to 2 days.    Problem List:   Complicated Urinary Tract Infection  Chronic Bladder Outlet Obstruction  BPH  - Appreciate Urology recommendations  - Flomax + Finasteride  - Stop IVF  - IV ceftriaxone  - Urine culture positive for strep agalactiae  - Villa per Urology - may need long term as bladder outlet obstruction chronic issue.  Discussed with africa Mata.  Pt has 24-hr PCA at home     Lactic Acidosis  - Improved with IVF     Chronic Decubitus Ulcer - POA  - Appreciate WOC recommendations  - Vanco discontinued as MRSA negative     Hepatic Cirrhosis  Posterior R 2.9 cm Hepatic Lesion  - Discussed with africa Mata.  Recommend outpatient MRI liver.  If noted to be concerning for malignancy, would discuss with family options as pt likely poor candidate for chemotherapy given his advanced age, comorbidities, and advanced dementia     Hypomagnesemia  - Electrolyte replacement protocol     Uncontrolled Type 2 Diabetes Mellitus with Hyperglycemia  - A1C = 12.6 and has been elevated since at least 4/2024  - Started lantus 15 units daily  - Start glipizide     Chronic  Medical Problems:  Dementia with Behavioral Disturbances  Hx of CVA  Type 2 Diabetes Mellitus with Hyperglycemia  Coronary Artery Disease s/p LEXI in 2015  Hypertension  Hyperlipidemia  HFpEF    I spent 46 minutes in reviewing this patient's labs, imaging, medications, medical history.  In addition time was spent interviewing the patient, communicating with family, and medical decision making.     DVT Prophylaxis: Pneumatic Compression Devices  Code Status: No CPR- Do NOT Intubate  Diet: Combination Diet Pureed Diet (level 4); Mildly Thick (level 2) (NO straws, go slow)    Villa Catheter: PRESENT, indication: Acute retention or obstruction    Disposition: Medically Ready for Discharge: Anticipated Tomorrow    Goals to discharge include: BS improved, SLP evaluation complete  Family updated today:  Left voicemail for son Adolfo     Interval History   Pt seen and examined.  Pt seen with iphone .  Pt did not respond to questions.    -Data reviewed today: I personally reviewed all new labs and imaging results over the last 24 hours.     Physical Exam   Temp: 97.8  F (36.6  C) Temp src: Axillary BP: (!) 124/94 Pulse: 72   Resp: 18 SpO2: 94 % O2 Device: None (Room air)    Vitals:    11/07/24 2009 11/09/24 0553 11/10/24 0540   Weight: 86.7 kg (191 lb 2.2 oz) 82.9 kg (182 lb 12.2 oz) 82.2 kg (181 lb 3.5 oz)     Vital Signs with Ranges  Temp:  [97.4  F (36.3  C)-97.9  F (36.6  C)] 97.8  F (36.6  C)  Pulse:  [62-74] 72  Resp:  [16-18] 18  BP: (124-135)/(67-94) 124/94  SpO2:  [94 %-97 %] 94 %  I/O last 3 completed shifts:  In: 540 [P.O.:540]  Out: 4050 [Urine:4050]    GENERAL: No apparent distress. Awake, drowsy  HEENT: Normocephalic, atraumatic. Extraocular movements intact.  CARDIOVASCULAR: Regular rate and rhythm without murmurs or rubs. No S3.  PULMONARY: Clear bilaterally.  GASTROINTESTINAL: Soft, non-tender, non-distended. Bowel sounds normoactive.   EXTREMITIES: No cyanosis or clubbing. No edema.  NEUROLOGICAL:  CN 2-12 grossly intact, no focal neurological deficits.  DERMATOLOGICAL: No rash, ulcer, bruising, nor jaundice.    Medications   Current Facility-Administered Medications   Medication Dose Route Frequency Provider Last Rate Last Admin     Current Facility-Administered Medications   Medication Dose Route Frequency Provider Last Rate Last Admin    aspirin (ASA) EC tablet 325 mg  325 mg Oral Daily Levy Zapata MD   325 mg at 11/10/24 0923    atorvastatin (LIPITOR) tablet 40 mg  40 mg Oral Daily Levy Zapata MD   40 mg at 11/10/24 0922    carvedilol (COREG) tablet 25 mg  25 mg Oral BID w/meals Levy Zapata MD   25 mg at 11/10/24 0922    cefTRIAXone (ROCEPHIN) 2 g vial to attach to  ml bag for ADULTS or NS 50 ml bag for PEDS  2 g Intravenous Q24H Levy Zapata MD 0 mL/hr at 11/07/24 1832 2 g at 11/09/24 1729    finasteride (PROSCAR) tablet 5 mg  5 mg Oral Daily Levy Zapata MD   5 mg at 11/10/24 0922    glipiZIDE (GLUCOTROL) tablet 5 mg  5 mg Oral QAM AC Raghavendra Snell DO   5 mg at 11/10/24 0922    insulin aspart (NovoLOG) injection (RAPID ACTING)  1-7 Units Subcutaneous TID Levy Hyatt MD   1 Units at 11/10/24 0924    insulin aspart (NovoLOG) injection (RAPID ACTING)  1-5 Units Subcutaneous At Bedtime Levy Zapata MD   3 Units at 11/09/24 2159    insulin glargine (LANTUS PEN) injection 15 Units  15 Units Subcutaneous QAM Raghavendra Knox DO   15 Units at 11/10/24 0924    magnesium oxide (MAG-OX) tablet 400 mg  400 mg Oral Q4H Levy Zapata MD   400 mg at 11/10/24 1050    QUEtiapine (SEROquel) half-tab 12.5 mg  12.5 mg Oral At Bedtime Raghavendra Snell DO   12.5 mg at 11/10/24 0109    QUEtiapine (SEROquel) half-tab 12.5 mg  12.5 mg Oral BID Raghavendra Snell DO   12.5 mg at 11/10/24 0923    sodium chloride (PF) 0.9% PF flush 3 mL  3 mL Intracatheter Q8H Levy Zapata MD   3 mL at 11/10/24 0158    tamsulosin (FLOMAX) capsule 0.4 mg  0.4 mg Oral Daily Levy Zapata MD   0.4 mg at 11/10/24 0922     Data  "    Laboratory:  Recent Labs   Lab 11/10/24  0555 11/09/24  0622 11/08/24  0811   WBC 8.6 12.4* 8.2   HGB 13.6 13.2* 13.2*   HCT 41.2 39.8* 38.7*   MCV 88 88 88    218 200     Recent Labs   Lab 11/10/24  0758 11/10/24  0555 11/10/24  0216 11/09/24  0918 11/09/24  0622 11/08/24  1302 11/08/24  0811   NA  --  138  --   --  128*  --  132*   POTASSIUM  --  3.9  --   --  4.6  --  3.9   CHLORIDE  --  103  --   --  99  --  103   CO2  --  25  --   --  19*  --  18*   ANIONGAP  --  10  --   --  10  --  11   * 176* 245*   < > 399*   < > 251*   BUN  --  11.7  --   --  12.4  --  11.1   CR  --  0.61*  0.61*  --   --  0.58*  --  0.52*  0.52*   GFRESTIMATED  --  >90  >90  --   --  >90  --  >90  >90   LUZ  --  8.1*  --   --  8.0*  --  8.1*    < > = values in this interval not displayed.     No results for input(s): \"CULT\" in the last 168 hours.  Troponin I ES   Date Value Ref Range Status   12/04/2019 <0.015 0.000 - 0.045 ug/L Final     Comment:     The 99th percentile for upper reference range is 0.045 ug/L.  Troponin values   in the range of 0.045 - 0.120 ug/L may be associated with risks of adverse   clinical events.         Imaging:  No results found for this or any previous visit (from the past 24 hours).      Raghavendra Snell DO  [facility]  [address] [place] [address]  Securely message with [employer] (more info)  Text page via [employer] Paging/Directory   11/10/2024   "

## 2024-11-10 NOTE — PROVIDER NOTIFICATION
patient is having trouble swallowing his bite size food? has he ever had speech consult?  should he be on thick liquids?

## 2024-11-10 NOTE — PLAN OF CARE
"Confused and impulsive. Psc at bedside. Pt is Malaysian speaking.  does not understand pt when he speaks or pt will not speak with . Appears comfortable. Pt was coughing with water and bite size diet. Speech eval and changed diet to  pureed with mild thick liquids. Pt tolerating well. New iv placed. Mg 1.6 replacing.  and 319  Problem: Adult Inpatient Plan of Care  Goal: Plan of Care Review  Description: The Plan of Care Review/Shift note should be completed every shift.  The Outcome Evaluation is a brief statement about your assessment that the patient is improving, declining, or no change.  This information will be displayed automatically on your shift  note.  Outcome: Progressing  Flowsheets (Taken 11/10/2024 0375)  Outcome Evaluation: psc at bedside.  Goal: Patient-Specific Goal (Individualized)  Description: You can add care plan individualizations to a care plan. Examples of Individualization might be:  \"Parent requests to be called daily at 9am for status\", \"I have a hard time hearing out of my right ear\", or \"Do not touch me to wake me up as it startles  me\".  Outcome: Progressing  Goal: Absence of Hospital-Acquired Illness or Injury  Outcome: Progressing  Intervention: Identify and Manage Fall Risk  Recent Flowsheet Documentation  Taken 11/10/2024 0943 by James Pradhan RN  Safety Promotion/Fall Prevention:   activity supervised   assistive device/personal items within reach   clutter free environment maintained   increased rounding and observation   increase visualization of patient   lighting adjusted   mobility aid in reach   nonskid shoes/slippers when out of bed   patient and family education   room organization consistent   safety round/check completed  Goal: Optimal Comfort and Wellbeing  Outcome: Progressing  Goal: Readiness for Transition of Care  Outcome: Progressing     Problem: UTI (Urinary Tract Infection)  Goal: Improved Infection Symptoms  Outcome: Progressing   "   Problem: Comorbidity Management  Goal: Maintenance of Behavioral Health Symptom Control  Outcome: Progressing  Intervention: Maintain Behavioral Health Symptom Control  Recent Flowsheet Documentation  Taken 11/10/2024 0950 by James Pradhan RN  Medication Review/Management: medications reviewed  Goal: Blood Glucose Levels Within Targeted Range  Outcome: Progressing  Intervention: Monitor and Manage Glycemia  Recent Flowsheet Documentation  Taken 11/10/2024 0950 by James Pradhan RN  Medication Review/Management: medications reviewed     Problem: Fall Injury Risk  Goal: Absence of Fall and Fall-Related Injury  Outcome: Progressing  Intervention: Identify and Manage Contributors  Recent Flowsheet Documentation  Taken 11/10/2024 0950 by James Pradhan RN  Medication Review/Management: medications reviewed  Intervention: Promote Injury-Free Environment  Recent Flowsheet Documentation  Taken 11/10/2024 0950 by James Pradhan RN  Safety Promotion/Fall Prevention:   activity supervised   assistive device/personal items within reach   clutter free environment maintained   increased rounding and observation   increase visualization of patient   lighting adjusted   mobility aid in reach   nonskid shoes/slippers when out of bed   patient and family education   room organization consistent   safety round/check completed     Problem: Pain Acute  Goal: Optimal Pain Control and Function  Outcome: Progressing  Intervention: Prevent or Manage Pain  Recent Flowsheet Documentation  Taken 11/10/2024 0950 by James Pradhan RN  Medication Review/Management: medications reviewed   Goal Outcome Evaluation:                 Outcome Evaluation: psc at bedside.

## 2024-11-11 ENCOUNTER — APPOINTMENT (OUTPATIENT)
Dept: SPEECH THERAPY | Facility: CLINIC | Age: 89
DRG: 689 | End: 2024-11-11
Payer: COMMERCIAL

## 2024-11-11 LAB
ANION GAP SERPL CALCULATED.3IONS-SCNC: 10 MMOL/L (ref 7–15)
BUN SERPL-MCNC: 11.7 MG/DL (ref 8–23)
CALCIUM SERPL-MCNC: 8.2 MG/DL (ref 8.8–10.4)
CHLORIDE SERPL-SCNC: 101 MMOL/L (ref 98–107)
CREAT SERPL-MCNC: 0.61 MG/DL (ref 0.67–1.17)
EGFRCR SERPLBLD CKD-EPI 2021: >90 ML/MIN/1.73M2
ERYTHROCYTE [DISTWIDTH] IN BLOOD BY AUTOMATED COUNT: 14.4 % (ref 10–15)
GLUCOSE BLDC GLUCOMTR-MCNC: 107 MG/DL (ref 70–99)
GLUCOSE BLDC GLUCOMTR-MCNC: 133 MG/DL (ref 70–99)
GLUCOSE BLDC GLUCOMTR-MCNC: 170 MG/DL (ref 70–99)
GLUCOSE BLDC GLUCOMTR-MCNC: 288 MG/DL (ref 70–99)
GLUCOSE BLDC GLUCOMTR-MCNC: 74 MG/DL (ref 70–99)
GLUCOSE SERPL-MCNC: 174 MG/DL (ref 70–99)
HCO3 SERPL-SCNC: 26 MMOL/L (ref 22–29)
HCT VFR BLD AUTO: 38.9 % (ref 40–53)
HGB BLD-MCNC: 13 G/DL (ref 13.3–17.7)
MAGNESIUM SERPL-MCNC: 1.6 MG/DL (ref 1.7–2.3)
MCH RBC QN AUTO: 29.3 PG (ref 26.5–33)
MCHC RBC AUTO-ENTMCNC: 33.4 G/DL (ref 31.5–36.5)
MCV RBC AUTO: 88 FL (ref 78–100)
PLATELET # BLD AUTO: 229 10E3/UL (ref 150–450)
POTASSIUM SERPL-SCNC: 4 MMOL/L (ref 3.4–5.3)
RBC # BLD AUTO: 4.43 10E6/UL (ref 4.4–5.9)
SODIUM SERPL-SCNC: 137 MMOL/L (ref 135–145)
WBC # BLD AUTO: 8.4 10E3/UL (ref 4–11)

## 2024-11-11 PROCEDURE — 250N000013 HC RX MED GY IP 250 OP 250 PS 637: Performed by: STUDENT IN AN ORGANIZED HEALTH CARE EDUCATION/TRAINING PROGRAM

## 2024-11-11 PROCEDURE — 99232 SBSQ HOSP IP/OBS MODERATE 35: CPT | Performed by: INTERNAL MEDICINE

## 2024-11-11 PROCEDURE — 120N000001 HC R&B MED SURG/OB

## 2024-11-11 PROCEDURE — 92526 ORAL FUNCTION THERAPY: CPT | Mod: GN

## 2024-11-11 PROCEDURE — 85014 HEMATOCRIT: CPT | Performed by: INTERNAL MEDICINE

## 2024-11-11 PROCEDURE — 80048 BASIC METABOLIC PNL TOTAL CA: CPT | Performed by: INTERNAL MEDICINE

## 2024-11-11 PROCEDURE — 85041 AUTOMATED RBC COUNT: CPT | Performed by: INTERNAL MEDICINE

## 2024-11-11 PROCEDURE — 250N000011 HC RX IP 250 OP 636: Performed by: STUDENT IN AN ORGANIZED HEALTH CARE EDUCATION/TRAINING PROGRAM

## 2024-11-11 PROCEDURE — 84520 ASSAY OF UREA NITROGEN: CPT | Performed by: INTERNAL MEDICINE

## 2024-11-11 PROCEDURE — 250N000013 HC RX MED GY IP 250 OP 250 PS 637: Performed by: INTERNAL MEDICINE

## 2024-11-11 PROCEDURE — 99231 SBSQ HOSP IP/OBS SF/LOW 25: CPT | Performed by: PHYSICIAN ASSISTANT

## 2024-11-11 PROCEDURE — 36415 COLL VENOUS BLD VENIPUNCTURE: CPT | Performed by: INTERNAL MEDICINE

## 2024-11-11 PROCEDURE — 83735 ASSAY OF MAGNESIUM: CPT | Performed by: STUDENT IN AN ORGANIZED HEALTH CARE EDUCATION/TRAINING PROGRAM

## 2024-11-11 RX ADMIN — QUETIAPINE FUMARATE 12.5 MG: 25 TABLET ORAL at 09:05

## 2024-11-11 RX ADMIN — QUETIAPINE FUMARATE 12.5 MG: 25 TABLET ORAL at 23:33

## 2024-11-11 RX ADMIN — QUETIAPINE FUMARATE 12.5 MG: 25 TABLET ORAL at 17:20

## 2024-11-11 RX ADMIN — ACETAMINOPHEN 650 MG: 325 TABLET, FILM COATED ORAL at 09:05

## 2024-11-11 RX ADMIN — CARVEDILOL 25 MG: 25 TABLET, FILM COATED ORAL at 17:21

## 2024-11-11 RX ADMIN — CARVEDILOL 25 MG: 25 TABLET, FILM COATED ORAL at 09:06

## 2024-11-11 RX ADMIN — CEFTRIAXONE 2 G: 2 INJECTION, POWDER, FOR SOLUTION INTRAMUSCULAR; INTRAVENOUS at 17:27

## 2024-11-11 RX ADMIN — FINASTERIDE 5 MG: 5 TABLET, FILM COATED ORAL at 09:05

## 2024-11-11 RX ADMIN — HALOPERIDOL LACTATE 1 MG: 5 INJECTION, SOLUTION INTRAMUSCULAR at 17:08

## 2024-11-11 RX ADMIN — GLIPIZIDE 5 MG: 5 TABLET ORAL at 09:05

## 2024-11-11 RX ADMIN — INSULIN ASPART 1 UNITS: 100 INJECTION, SOLUTION INTRAVENOUS; SUBCUTANEOUS at 12:46

## 2024-11-11 RX ADMIN — INSULIN GLARGINE 15 UNITS: 100 INJECTION, SOLUTION SUBCUTANEOUS at 09:06

## 2024-11-11 RX ADMIN — ATORVASTATIN CALCIUM 40 MG: 40 TABLET, FILM COATED ORAL at 09:06

## 2024-11-11 RX ADMIN — TAMSULOSIN HYDROCHLORIDE 0.4 MG: 0.4 CAPSULE ORAL at 09:06

## 2024-11-11 RX ADMIN — ASPIRIN 325 MG: 325 TABLET, COATED ORAL at 09:06

## 2024-11-11 NOTE — PLAN OF CARE
"Temp: 97.6  F (36.4  C) Temp src: Axillary BP: (!) 166/87 Pulse: 72   Resp: 20 SpO2: 96 % O2 Device: None (Room air)       Orientation:  A&O xSelf  VS: VSS  Pain: No pain noted  Activity:  A1-2 SBA  Resp:  RA  GI:  No nausea or vomiting noted  : Villa present  Skin:  WOC followed  Lines: PIV SL  Diet: Pureed and Mildly thick  Plan: Home with family  Discharge:  Tomorrow    Problem: Adult Inpatient Plan of Care  Goal: Plan of Care Review  Description: The Plan of Care Review/Shift note should be completed every shift.  The Outcome Evaluation is a brief statement about your assessment that the patient is improving, declining, or no change.  This information will be displayed automatically on your shift  note.  Outcome: Progressing  Flowsheets (Taken 11/11/2024 1323)  Outcome Evaluation: Pt off sitter  Plan of Care Reviewed With: patient  Overall Patient Progress: improving  Goal: Patient-Specific Goal (Individualized)  Description: You can add care plan individualizations to a care plan. Examples of Individualization might be:  \"Parent requests to be called daily at 9am for status\", \"I have a hard time hearing out of my right ear\", or \"Do not touch me to wake me up as it startles  me\".  Outcome: Progressing  Goal: Absence of Hospital-Acquired Illness or Injury  Outcome: Progressing  Intervention: Identify and Manage Fall Risk  Recent Flowsheet Documentation  Taken 11/11/2024 1000 by Sherry Bailey RN  Safety Promotion/Fall Prevention: safety round/check completed  Intervention: Prevent Infection  Recent Flowsheet Documentation  Taken 11/11/2024 1000 by Sherry Bailey RN  Infection Prevention: rest/sleep promoted  Goal: Optimal Comfort and Wellbeing  Outcome: Progressing  Goal: Readiness for Transition of Care  Outcome: Progressing       Goal Outcome Evaluation:      Plan of Care Reviewed With: patient    Overall Patient Progress: improvingOverall Patient Progress: improving    Outcome Evaluation: " Pt off sitter

## 2024-11-11 NOTE — PROGRESS NOTES
[facility]    Medicine Progress Note - Hospitalist Service    Date of Admission:  11/7/2024    Assessment & Plan     Adam Huber is a 89 year old male with a history of dementia with behavioral disturbances, history of CVA, type 2 diabetes mellitus, coronary artery disease status post LEXI in 2015, hypertension, hyperlipidemia, HFpEF, new hepatic cirrhosis, BPH admitted on 11/7/2024 with abdominal pain and flank pain.  In the emergency department, the patient was found to have a temperature of 97.4  F, blood pressure 123/55, heart rate 70, respiratory rate 18, SpO2 97% on room air.  Initial lab work showed mild leukocytosis of 11.3, sodium 133, bicarb 20, BUN/creatinine 18.7/0.66, glucose 427, alkaline phosphatase 210, albumin 3.0, lactic acid 3.2.  CT head showed extensive chronic changes but otherwise negative for acute issues.  CT abdomen and pelvis showed findings of chronic bladder outlet obstruction with significant increase in urothelial hyperenhancement and surrounding fat stranding possibly indicating cystitis, cirrhotic morphology of the liver with likely incidental 2.9 cm posterior right hepatic lesion.  The patient was started on IV fluids, a Villa catheter was placed, started on IV ceftriaxone, and urology was consulted to see the patient in the setting of complicated urinary tract infection with bladder outlet obstruction.  The patient was also noted to have a decubitus ulcer on exam.  Wound care was consulted to see the patient.  Urine culture returned positive for streptococcus agalactiae.  The patient's hospital course was complicated by an episode of hypoxia in the early morning hours of 11/9 as well as restlessness and agitation in the setting of his dementia.  Chest x-ray showed pulmonary edema and IV fluids were discontinued and a dose of Lasix was given.  Speech therapy was consulted.     Complicated urinary tract infection due to group B streptococcus.  Chronic  bladder outlet obstruction.  Lactic acidosis.  BPH.  -Urine culture growing group B strep.  -Continue IV ceftriaxone.  -Appreciate urology input.  -Continue Villa catheter at discharge with follow-up in urology clinic.  -Continue tamsulosin 0.4 mg a day.  -Continue finasteride 5 mg a day.  -Lactic acidosis resolved with IV fluids.  -Metformin stopped.    Diabetes mellitus type 2.  -Continue glargine insulin 15 units a day.  -Continue aspart insulin sliding scale as needed.  -Continue glipizide 5 mg a day.  -Metformin stopped due to lactic acidosis.    Hyponatremia.  Hypomagnesemia.  -Sodium now back to normal range.  -Magnesium replacement protocol.    Hypertension.  Hyperlipidemia.  Coronary artery disease.  Previous stroke.  -Continue aspirin 325 mg a day.  -Continue atorvastatin 40 mg a day.  -Continue carvedilol 25 mg twice a day.    Dementia.  -Likely at baseline.  -Continue scheduled quetiapine 12.5 mg 3 times a day.    Chronic decubitus ulcer present on admission.  -Wound nurse following.    Cirrhosis.  Incidentally noted 2.9 cm hepatic lesion.  -Previously discussed with family.  -Consider liver MRI in the outpatient setting if desired for further evaluation.    Dysphagia.  -Dysphagia diet.            Diet: Combination Diet Pureed Diet (level 4); Mildly Thick (level 2) (NO straws, go slow)    DVT Prophylaxis: Pneumatic Compression Devices  Villa Catheter: PRESENT, indication: Acute retention or obstruction  Lines: None     Cardiac Monitoring: None  Code Status: No CPR- Do NOT Intubate      Clinically Significant Risk Factors             # Hypomagnesemia: Lowest Mg = 1.6 mg/dL in last 2 days, will replace as needed   # Hypoalbuminemia: Lowest albumin = 2.7 g/dL at 11/8/2024  8:11 AM, will monitor as appropriate     # Hypertension: Noted on problem list  # Acute heart failure with preserved ejection fraction: heart failure noted on problem list, last echo with EF >50%, and receiving IV diuretics    # Dementia:  noted on problem list       # DMII: A1C = 12.6 % (Ref range: <5.7 %) within past 6 months, PRESENT ON ADMISSION      # Financial/Environmental Concerns:           Disposition Plan     Medically Ready for Discharge: Anticipated Tomorrow      Updated son, Adolfo, by phone on 11/11/2024.         Jose Manuel Wade,   Hospitalist Service  [facility]  Securely message with [employer] (more info)  Text page via [facility] Paging/Directory   ______________________________________________________________________    Interval History   Communication limited due to dementia and language barrier.  Does not really seem to answer questions appropriately even with use of Japanese .  Seems to understand at least some English.  Seems to answer some questions with yes and no answers.    Physical Exam   Vital Signs: Temp: 97.6  F (36.4  C) Temp src: Axillary BP: (!) 166/87 Pulse: 72   Resp: 20 SpO2: 96 % O2 Device: None (Room air)    Weight: 181 lbs 3.49 oz    Gen:  NAD, awake.  Very poor medical historian.  Eyes:  PERRL, sclera anicteric.  OP:  MMM, no lesions.  Neck:  Supple.  CV:  Regular, +1/6 murmur.  Lung:  CTA b/l, normal effort.  Ab:  +BS, soft.  Skin:  Warm, dry to touch.  No rash.  Ext:  No pitting edema LE b/l.      Medical Decision Making       45 MINUTES SPENT BY ME on the date of service doing chart review, history, exam, documentation & further activities per the note.      Data     I have personally reviewed the following data over the past 24 hrs:    8.4  \   13.0 (L)   / 229     137 101 11.7 /  170 (H)   4.0 26 0.61 (L) \       Imaging results reviewed over the past 24 hrs:   No results found for this or any previous visit (from the past 24 hours).

## 2024-11-11 NOTE — PLAN OF CARE
"  Problem: Adult Inpatient Plan of Care  Goal: Plan of Care Review  Description: The Plan of Care Review/Shift note should be completed every shift.  The Outcome Evaluation is a brief statement about your assessment that the patient is improving, declining, or no change.  This information will be displayed automatically on your shift  note.  Outcome: Progressing  Flowsheets (Taken 11/10/2024 2156)  Outcome Evaluation: Pt ambulated to BR, very difficult ambulating, increased confusion, and wcould not follow directions. Pt became very combative as staff attempted to direct pt either to chair or BR, pt would grab staff arms and squeeze. Haldol given @ 1630. Pt slept for about an hour. Pt son came to visit and was updated on POC. Pt  to d/c in 1-2 days.  Plan of Care Reviewed With:   patient   family  Overall Patient Progress: improving  Goal: Patient-Specific Goal (Individualized)  Description: You can add care plan individualizations to a care plan. Examples of Individualization might be:  \"Parent requests to be called daily at 9am for status\", \"I have a hard time hearing out of my right ear\", or \"Do not touch me to wake me up as it startles  me\".  Outcome: Progressing  Goal: Absence of Hospital-Acquired Illness or Injury  Outcome: Progressing  Intervention: Identify and Manage Fall Risk  Recent Flowsheet Documentation  Taken 11/10/2024 1605 by Karo Saeed RN  Safety Promotion/Fall Prevention:   activity supervised   assistive device/personal items within reach   clutter free environment maintained   increased rounding and observation   increase visualization of patient   lighting adjusted   mobility aid in reach   nonskid shoes/slippers when out of bed   patient and family education   room organization consistent   safety round/check completed  Intervention: Prevent Skin Injury  Recent Flowsheet Documentation  Taken 11/10/2024 1605 by Karo Saeed RN  Body Position: position changed " independently  Intervention: Prevent and Manage VTE (Venous Thromboembolism) Risk  Recent Flowsheet Documentation  Taken 11/10/2024 1605 by Karo Saeed RN  VTE Prevention/Management: SCDs off (sequential compression devices)  Goal: Optimal Comfort and Wellbeing  Outcome: Progressing  Goal: Readiness for Transition of Care  Outcome: Progressing     Problem: UTI (Urinary Tract Infection)  Goal: Improved Infection Symptoms  Outcome: Progressing     Problem: Comorbidity Management  Goal: Maintenance of Behavioral Health Symptom Control  Outcome: Progressing  Intervention: Maintain Behavioral Health Symptom Control  Recent Flowsheet Documentation  Taken 11/10/2024 1605 by Karo Saeed RN  Medication Review/Management: medications reviewed  Goal: Blood Glucose Levels Within Targeted Range  Outcome: Progressing  Intervention: Monitor and Manage Glycemia  Recent Flowsheet Documentation  Taken 11/10/2024 1605 by Karo Saeed RN  Medication Review/Management: medications reviewed     Problem: Fall Injury Risk  Goal: Absence of Fall and Fall-Related Injury  Outcome: Progressing  Intervention: Identify and Manage Contributors  Recent Flowsheet Documentation  Taken 11/10/2024 1605 by Karo Saeed RN  Medication Review/Management: medications reviewed  Intervention: Promote Injury-Free Environment  Recent Flowsheet Documentation  Taken 11/10/2024 1605 by Karo Saeed RN  Safety Promotion/Fall Prevention:   activity supervised   assistive device/personal items within reach   clutter free environment maintained   increased rounding and observation   increase visualization of patient   lighting adjusted   mobility aid in reach   nonskid shoes/slippers when out of bed   patient and family education   room organization consistent   safety round/check completed     Problem: Pain Acute  Goal: Optimal Pain Control and Function  Outcome: Progressing  Intervention: Prevent or Manage Pain  Recent Flowsheet  Documentation  Taken 11/10/2024 1605 by Karo Saeed RN  Medication Review/Management: medications reviewed   Goal Outcome Evaluation:      Plan of Care Reviewed With: patient, family    Overall Patient Progress: improvingOverall Patient Progress: improving    Outcome Evaluation: Pt ambulated to BR, very difficult ambulating, increased confusion, and wcould not follow directions. Pt became very combative as staff attempted to direct pt either to chair or BR, pt would grab staff arms and squeeze. Haldol given @ 1630. Pt slept for about an hour. Pt son came to visit and was updated on POC. Pt  to d/c in 1-2 days.

## 2024-11-11 NOTE — PROGRESS NOTES
[facility] Progress Note          Assessment and Plan:     Assessment:    Urinary retention    BPH with chronic outlet obstruction, recurrent urinary tract infection, multiple bladder diverticuli, and history of incomplete emptying on UDS in 2019, with previous outlet procedures.    Dementia    Urinary tract infection    Diabetes mellitus type 2    Hepatic cirrhosis    Coronary artery disease    Hypertension    Dyslipidemia    Pressure injury of sacral region, stage 2 (H)    Sepsis, due to unspecified organism, unspecified whether acute organ dysfunction present (H)      Plan:   -Continue with antibiotics for strep agalactiae UTI.  -Reasonable to continue finasteride 5 mg once daily and Flomax 0.4 mg.  CT shows sequela of persistent bladder obstruction with multiple diverticuli and cellules.  This is likely been a longstanding issue, and medications alone will likely not be sufficient for emptying his bladder.  -Continue with Villa catheter while patient is inpatient.  -Patient is not a particularly good candidate for an outlet procedure and likely would not be successful given her longstanding obstruction has been.  -If patient truly has a 24-hour PCA at home who can try to make sure the patient does not remove Villa catheter or talk on this, he may be better suited for chronic Villa catheter with monthly exchanges.  -Okay to discharge from urology perspective on antibiotics, BPH medication, and posterior minus Villa catheter depending upon home situation.    Shakira Stevenson PA-C   [facility]  [phone]               Interval History:     Patient resting.  On IV Rocephin for >100,000 Group B Strep UTI.  Creatinine 0.91 eGFR >90.  Afebrile without tachycardia.  Sitter 1:1.  Villa catheter with clear maude urine.  Sounds as though patient typically has 24-hour PCA at home.  WBC 8.4 (8.6).              Review of Systems:     The 5 point Review of Systems is negative other than noted  in the HPI             Medications:     Current Facility-Administered Medications   Medication Dose Route Frequency Provider Last Rate Last Admin    acetaminophen (TYLENOL) tablet 650 mg  650 mg Oral Q8H PRN Levy Zapata MD   650 mg at 11/11/24 0905    aspirin (ASA) EC tablet 325 mg  325 mg Oral Daily Levy Zapata MD   325 mg at 11/11/24 0906    atorvastatin (LIPITOR) tablet 40 mg  40 mg Oral Daily Levy Zapata MD   40 mg at 11/11/24 0906    calcium carbonate (TUMS) chewable tablet 1,000 mg  1,000 mg Oral 4x Daily PRN eLvy Zapata MD        carvedilol (COREG) tablet 25 mg  25 mg Oral BID w/meals Levy Zapata MD   25 mg at 11/11/24 0906    cefTRIAXone (ROCEPHIN) 2 g vial to attach to  ml bag for ADULTS or NS 50 ml bag for PEDS  2 g Intravenous Q24H Levy Zapata MD 0 mL/hr at 11/07/24 1832 2 g at 11/10/24 1744    glucose gel 15-30 g  15-30 g Oral Q15 Min PRN Levy Zapata MD        Or    dextrose 50 % injection 25-50 mL  25-50 mL Intravenous Q15 Min PRN Levy Zapata MD        Or    glucagon injection 1 mg  1 mg Subcutaneous Q15 Min PRN Levy Zapata MD        finasteride (PROSCAR) tablet 5 mg  5 mg Oral Daily Levy Zapata MD   5 mg at 11/11/24 0905    glipiZIDE (GLUCOTROL) tablet 5 mg  5 mg Oral QAM AC Raghavendra Snell DO   5 mg at 11/11/24 0905    haloperidol lactate (HALDOL) injection 1 mg  1 mg Intravenous Q6H PRN Levy Zapata MD   1 mg at 11/10/24 1630    hydrALAZINE (APRESOLINE) tablet 10 mg  10 mg Oral Q4H PRN Levy Zapata MD        Or    hydrALAZINE (APRESOLINE) injection 10 mg  10 mg Intravenous Q4H PRN Levy Zapata MD        insulin aspart (NovoLOG) injection (RAPID ACTING)  1-7 Units Subcutaneous TID AC Levy Zapata MD   3 Units at 11/10/24 1747    insulin aspart (NovoLOG) injection (RAPID ACTING)  1-5 Units Subcutaneous At Bedtime Levy Zapata MD   3 Units at 11/09/24 2159    insulin glargine (LANTUS PEN) injection 15 Units  15 Units Subcutaneous TIMM Raghavendra Knox DO   15 Units at 11/11/24 0906    lidocaine (LMX4)  cream   Topical Q1H PRN Levy Zapata MD        lidocaine 1 % 0.1-1 mL  0.1-1 mL Other Q1H PRN Levy Zapata MD        melatonin tablet 3 mg  3 mg Oral At Bedtime PRN Frank Rne MD   3 mg at 11/08/24 2203    mirtazapine (REMERON) tablet 15 mg  15 mg Oral At Bedtime PRN Levy Zapata MD   15 mg at 11/07/24 2307    ondansetron (ZOFRAN ODT) ODT tab 4 mg  4 mg Oral Q6H PRN Levy Zapata MD   4 mg at 11/07/24 2307    Or    ondansetron (ZOFRAN) injection 4 mg  4 mg Intravenous Q6H PRN Levy Zapata MD        QUEtiapine (SEROquel) half-tab 12.5 mg  12.5 mg Oral At Bedtime Raghavendra Snell DO   12.5 mg at 11/10/24 2316    QUEtiapine (SEROquel) half-tab 12.5 mg  12.5 mg Oral BID Raghavendra Snell DO   12.5 mg at 11/11/24 0905    senna-docusate (SENOKOT-S/PERICOLACE) 8.6-50 MG per tablet 1 tablet  1 tablet Oral BID PRN Levy Zapata MD        Or    senna-docusate (SENOKOT-S/PERICOLACE) 8.6-50 MG per tablet 2 tablet  2 tablet Oral BID PRN Levy Zapata MD        sodium chloride (PF) 0.9% PF flush 3 mL  3 mL Intracatheter Q8H Levy Zapata MD   3 mL at 11/11/24 0150    sodium chloride (PF) 0.9% PF flush 3 mL  3 mL Intracatheter q1 min prn Levy Zapata MD   3 mL at 11/09/24 1731    tamsulosin (FLOMAX) capsule 0.4 mg  0.4 mg Oral Daily Levy Zapata MD   0.4 mg at 11/11/24 0906                  Physical Exam:   Vitals were reviewed  Patient Vitals for the past 8 hrs:   BP Temp Temp src Pulse Resp SpO2   11/11/24 0901 (!) 166/87 97.6  F (36.4  C) Axillary 72 20 96 %     GEN: NAD, lying in bed, resting  NECK: Supple  RESP: Unlabored breathing  URO: Villa catheter in place draining clear maude urine.           Data:     Lab Results   Component Value Date    NTBNPI 941 11/09/2024    NTBNPI 982 02/04/2024    NTBNPI 9,311 (H) 09/21/2023     Lab Results   Component Value Date    WBC 8.4 11/11/2024    WBC 8.6 11/10/2024    WBC 12.4 (H) 11/09/2024    HGB 13.0 (L) 11/11/2024    HGB 13.6 11/10/2024    HGB 13.2 (L) 11/09/2024    HCT 38.9 (L) 11/11/2024     HCT 41.2 11/10/2024    HCT 39.8 (L) 11/09/2024    MCV 88 11/11/2024    MCV 88 11/10/2024    MCV 88 11/09/2024     11/11/2024     11/10/2024     11/09/2024     Lab Results   Component Value Date    INR 1.00 08/31/2023    INR 1.15 05/13/2023    INR 1.07 05/04/2023      All cultures:  Recent Labs   Lab 11/07/24  1504 11/07/24  1441   CULTURE >100,000 CFU/mL Streptococcus agalactiae (Group B Streptococcus)* No growth after 3 days

## 2024-11-11 NOTE — PROGRESS NOTES
Pt bed alarm began to alarm, support staff and this RN entered to see Pt climbing out of bed. We placed his legs back on the bed, but pt agressively threw them over the bed, and twisted into a kneeling position on the floor. We attempted to gently persuade him to climb back into bed, but pt became angry and started kicking and grabbing at us. A code 21 was called, and we got more assistance to get pt back into bed, also had an RN who spoke [place], and had had this pt before. Pt calmed and relaxed. Given IV Haldol and scheduled seroquel.  A PSC was requested at 1500. PSC now requested for 1900. Will cont to monitor for pt safety.

## 2024-11-11 NOTE — PLAN OF CARE
"Pt is alert to self. Pt has 1:1 siter at bedside. Pt speaks Senegalese and needs an . Pt denies pain or discomfort. Vitals stable. Pt is on potassium. Magnesium and phosphorus protocol. Pt is on blood sugar checks. Pt has Villa catheter which is intact and patent. Pt takes pills whole in chocolate pudding. Pt is on Rocephin for UTI. Pt was given scheduled Seroquel during the shift. Pt is assist of two in transfer and cares. Pt is sleeping in bed. Bed alarm in place and call light within reach.         Goal Outcome Evaluation:      Plan of Care Reviewed With: patient    Overall Patient Progress: improvingOverall Patient Progress: improving    Outcome Evaluation: pt has 1:1 sitter at bedside. pt has Villa catheter which is intact and patent. pt is on blood sugar checks. pt is on Rocephin for UTI.      Problem: Adult Inpatient Plan of Care  Goal: Plan of Care Review  Description: The Plan of Care Review/Shift note should be completed every shift.  The Outcome Evaluation is a brief statement about your assessment that the patient is improving, declining, or no change.  This information will be displayed automatically on your shift  note.  Outcome: Progressing  Flowsheets (Taken 11/11/2024 0133)  Outcome Evaluation: pt has 1:1 sitter at bedside. pt has Villa catheter which is intact and patent. pt is on blood sugar checks. pt is on Rocephin for UTI.  Plan of Care Reviewed With: patient  Overall Patient Progress: improving  Goal: Patient-Specific Goal (Individualized)  Description: You can add care plan individualizations to a care plan. Examples of Individualization might be:  \"Parent requests to be called daily at 9am for status\", \"I have a hard time hearing out of my right ear\", or \"Do not touch me to wake me up as it startles  me\".  Outcome: Progressing  Goal: Absence of Hospital-Acquired Illness or Injury  Outcome: Progressing  Intervention: Identify and Manage Fall Risk  Recent Flowsheet Documentation  Taken " 11/11/2024 0035 by Fede Guerra RN  Safety Promotion/Fall Prevention:   assistive device/personal items within reach   activity supervised   clutter free environment maintained   increased rounding and observation   increase visualization of patient   nonskid shoes/slippers when out of bed   patient and family education   safety round/check completed  Intervention: Prevent Skin Injury  Recent Flowsheet Documentation  Taken 11/11/2024 0035 by Fede Guerra RN  Body Position: position changed independently  Intervention: Prevent and Manage VTE (Venous Thromboembolism) Risk  Recent Flowsheet Documentation  Taken 11/11/2024 0035 by Fede Guerra RN  VTE Prevention/Management: SCDs off (sequential compression devices)  Intervention: Prevent Infection  Recent Flowsheet Documentation  Taken 11/11/2024 0035 by Fede Guerra RN  Infection Prevention:   rest/sleep promoted   single patient room provided   hand hygiene promoted  Goal: Optimal Comfort and Wellbeing  Outcome: Progressing  Goal: Readiness for Transition of Care  Outcome: Progressing     Problem: UTI (Urinary Tract Infection)  Goal: Improved Infection Symptoms  Outcome: Progressing     Problem: Comorbidity Management  Goal: Maintenance of Behavioral Health Symptom Control  Outcome: Progressing  Intervention: Maintain Behavioral Health Symptom Control  Recent Flowsheet Documentation  Taken 11/11/2024 0035 by Fede Guerra RN  Medication Review/Management: medications reviewed  Goal: Blood Glucose Levels Within Targeted Range  Outcome: Progressing  Intervention: Monitor and Manage Glycemia  Recent Flowsheet Documentation  Taken 11/11/2024 0035 by Fede Guerra RN  Medication Review/Management: medications reviewed     Problem: Fall Injury Risk  Goal: Absence of Fall and Fall-Related Injury  Outcome: Progressing  Intervention: Identify and Manage Contributors  Recent Flowsheet  Documentation  Taken 11/11/2024 0035 by Fede Guerra RN  Medication Review/Management: medications reviewed  Intervention: Promote Injury-Free Environment  Recent Flowsheet Documentation  Taken 11/11/2024 0035 by Fede Guerra RN  Safety Promotion/Fall Prevention:   assistive device/personal items within reach   activity supervised   clutter free environment maintained   increased rounding and observation   increase visualization of patient   nonskid shoes/slippers when out of bed   patient and family education   safety round/check completed     Problem: Pain Acute  Goal: Optimal Pain Control and Function  Outcome: Progressing  Intervention: Optimize Psychosocial Wellbeing  Recent Flowsheet Documentation  Taken 11/11/2024 0035 by Fede Guerra RN  Diversional Activities: television  Intervention: Prevent or Manage Pain  Recent Flowsheet Documentation  Taken 11/11/2024 0035 by Fede Guerra RN  Medication Review/Management: medications reviewed     Problem: Adult Inpatient Plan of Care  Goal: Plan of Care Review  Description: The Plan of Care Review/Shift note should be completed every shift.  The Outcome Evaluation is a brief statement about your assessment that the patient is improving, declining, or no change.  This information will be displayed automatically on your shift  note.  Outcome: Progressing  Flowsheets (Taken 11/11/2024 0133)  Outcome Evaluation: pt has 1:1 sitter at bedside. pt has Villa catheter which is intact and patent. pt is on blood sugar checks. pt is on Rocephin for UTI.  Plan of Care Reviewed With: patient  Overall Patient Progress: improving  Goal: Absence of Hospital-Acquired Illness or Injury  Intervention: Identify and Manage Fall Risk  Recent Flowsheet Documentation  Taken 11/11/2024 0035 by Fede Guerra RN  Safety Promotion/Fall Prevention:   assistive device/personal items within reach   activity supervised   clutter free  environment maintained   increased rounding and observation   increase visualization of patient   nonskid shoes/slippers when out of bed   patient and family education   safety round/check completed  Intervention: Prevent Skin Injury  Recent Flowsheet Documentation  Taken 11/11/2024 0035 by Fede Guerra RN  Body Position: position changed independently  Intervention: Prevent and Manage VTE (Venous Thromboembolism) Risk  Recent Flowsheet Documentation  Taken 11/11/2024 0035 by Fede Guerra RN  VTE Prevention/Management: SCDs off (sequential compression devices)  Intervention: Prevent Infection  Recent Flowsheet Documentation  Taken 11/11/2024 0035 by Fede Guerra RN  Infection Prevention:   rest/sleep promoted   single patient room provided   hand hygiene promoted     Problem: Comorbidity Management  Goal: Maintenance of Behavioral Health Symptom Control  Intervention: Maintain Behavioral Health Symptom Control  Recent Flowsheet Documentation  Taken 11/11/2024 0035 by Fede Guerra RN  Medication Review/Management: medications reviewed  Goal: Blood Glucose Levels Within Targeted Range  Intervention: Monitor and Manage Glycemia  Recent Flowsheet Documentation  Taken 11/11/2024 0035 by Fede Guerra RN  Medication Review/Management: medications reviewed     Problem: Fall Injury Risk  Goal: Absence of Fall and Fall-Related Injury  Intervention: Identify and Manage Contributors  Recent Flowsheet Documentation  Taken 11/11/2024 0035 by Fede Guerra RN  Medication Review/Management: medications reviewed  Intervention: Promote Injury-Free Environment  Recent Flowsheet Documentation  Taken 11/11/2024 0035 by Fede Guerra RN  Safety Promotion/Fall Prevention:   assistive device/personal items within reach   activity supervised   clutter free environment maintained   increased rounding and observation   increase visualization of patient    nonskid shoes/slippers when out of bed   patient and family education   safety round/check completed     Problem: Pain Acute  Goal: Optimal Pain Control and Function  Intervention: Optimize Psychosocial Wellbeing  Recent Flowsheet Documentation  Taken 11/11/2024 0035 by Fede Guerra, RN  Diversional Activities: television  Intervention: Prevent or Manage Pain  Recent Flowsheet Documentation  Taken 11/11/2024 0035 by Fede Guerra, RN  Medication Review/Management: medications reviewed

## 2024-11-12 VITALS
OXYGEN SATURATION: 96 % | WEIGHT: 181.22 LBS | HEART RATE: 64 BPM | BODY MASS INDEX: 22.65 KG/M2 | DIASTOLIC BLOOD PRESSURE: 68 MMHG | RESPIRATION RATE: 16 BRPM | TEMPERATURE: 97.9 F | SYSTOLIC BLOOD PRESSURE: 123 MMHG

## 2024-11-12 LAB
BACTERIA BLD CULT: NO GROWTH
GLUCOSE BLDC GLUCOMTR-MCNC: 109 MG/DL (ref 70–99)
GLUCOSE BLDC GLUCOMTR-MCNC: 193 MG/DL (ref 70–99)
GLUCOSE BLDC GLUCOMTR-MCNC: 201 MG/DL (ref 70–99)
MAGNESIUM SERPL-MCNC: 1.7 MG/DL (ref 1.7–2.3)

## 2024-11-12 PROCEDURE — 36415 COLL VENOUS BLD VENIPUNCTURE: CPT | Performed by: INTERNAL MEDICINE

## 2024-11-12 PROCEDURE — 83735 ASSAY OF MAGNESIUM: CPT | Performed by: INTERNAL MEDICINE

## 2024-11-12 PROCEDURE — 99239 HOSP IP/OBS DSCHRG MGMT >30: CPT | Performed by: INTERNAL MEDICINE

## 2024-11-12 PROCEDURE — 250N000013 HC RX MED GY IP 250 OP 250 PS 637: Performed by: INTERNAL MEDICINE

## 2024-11-12 PROCEDURE — 99212 OFFICE O/P EST SF 10 MIN: CPT | Performed by: PHYSICIAN ASSISTANT

## 2024-11-12 PROCEDURE — 250N000013 HC RX MED GY IP 250 OP 250 PS 637: Performed by: STUDENT IN AN ORGANIZED HEALTH CARE EDUCATION/TRAINING PROGRAM

## 2024-11-12 RX ORDER — HALOPERIDOL 5 MG/ML
2 INJECTION INTRAMUSCULAR EVERY 6 HOURS PRN
Status: DISCONTINUED | OUTPATIENT
Start: 2024-11-12 | End: 2024-11-12 | Stop reason: HOSPADM

## 2024-11-12 RX ORDER — CEPHALEXIN 500 MG/1
500 CAPSULE ORAL EVERY 12 HOURS
Qty: 4 CAPSULE | Refills: 0 | Status: ON HOLD | OUTPATIENT
Start: 2024-11-12 | End: 2024-11-15

## 2024-11-12 RX ORDER — HALOPERIDOL 0.5 MG/1
.5-1 TABLET ORAL EVERY 6 HOURS PRN
Status: DISCONTINUED | OUTPATIENT
Start: 2024-11-12 | End: 2024-11-12 | Stop reason: HOSPADM

## 2024-11-12 RX ORDER — TAMSULOSIN HYDROCHLORIDE 0.4 MG/1
0.4 CAPSULE ORAL DAILY
Qty: 30 CAPSULE | Refills: 0 | Status: SHIPPED | OUTPATIENT
Start: 2024-11-13 | End: 2024-12-13

## 2024-11-12 RX ORDER — CEPHALEXIN 500 MG/1
500 CAPSULE ORAL EVERY 12 HOURS SCHEDULED
Status: DISCONTINUED | OUTPATIENT
Start: 2024-11-12 | End: 2024-11-12 | Stop reason: HOSPADM

## 2024-11-12 RX ORDER — MAGNESIUM OXIDE 400 MG/1
400 TABLET ORAL EVERY 4 HOURS
Status: DISCONTINUED | OUTPATIENT
Start: 2024-11-12 | End: 2024-11-12 | Stop reason: HOSPADM

## 2024-11-12 RX ORDER — GLIPIZIDE 5 MG/1
5 TABLET ORAL
Qty: 30 TABLET | Refills: 0 | Status: SHIPPED | OUTPATIENT
Start: 2024-11-13 | End: 2024-12-13

## 2024-11-12 RX ADMIN — FINASTERIDE 5 MG: 5 TABLET, FILM COATED ORAL at 08:53

## 2024-11-12 RX ADMIN — INSULIN ASPART 2 UNITS: 100 INJECTION, SOLUTION INTRAVENOUS; SUBCUTANEOUS at 12:17

## 2024-11-12 RX ADMIN — ATORVASTATIN CALCIUM 40 MG: 40 TABLET, FILM COATED ORAL at 08:53

## 2024-11-12 RX ADMIN — TAMSULOSIN HYDROCHLORIDE 0.4 MG: 0.4 CAPSULE ORAL at 08:53

## 2024-11-12 RX ADMIN — INSULIN GLARGINE 15 UNITS: 100 INJECTION, SOLUTION SUBCUTANEOUS at 08:54

## 2024-11-12 RX ADMIN — ASPIRIN 325 MG: 325 TABLET, COATED ORAL at 08:53

## 2024-11-12 RX ADMIN — QUETIAPINE FUMARATE 12.5 MG: 25 TABLET ORAL at 08:52

## 2024-11-12 RX ADMIN — GLIPIZIDE 5 MG: 5 TABLET ORAL at 08:53

## 2024-11-12 RX ADMIN — CARVEDILOL 25 MG: 25 TABLET, FILM COATED ORAL at 08:52

## 2024-11-12 ASSESSMENT — ACTIVITIES OF DAILY LIVING (ADL)
ADLS_ACUITY_SCORE: 0
ADLS_ACUITY_SCORE: 29.75
ADLS_ACUITY_SCORE: 0

## 2024-11-12 NOTE — PROVIDER NOTIFICATION
"RN to Mauro: \"pt is agitated scratching and attempting to bite. I am not able to give scheduled seroquel until 1500. not that it was working anyway but he pulled his IV out as well. can you reschedule the seroquel or add an oral PRN or IM\"    Mauro: \"Will do\"  "

## 2024-11-12 NOTE — DISCHARGE SUMMARY
[facility]  Hospitalist Discharge Summary      Date of Admission:  11/7/2024  Date of Discharge:  11/12/2024  Discharging Provider: Jose Manuel Wade DO  Discharge Service: Hospitalist Service    Discharge Diagnoses   Complicated urinary tract infection due to group B streptococcus.  Chronic bladder outlet obstruction.  Lactic acidosis.  Benign prostatic hyperplasia.  Diabetes mellitus type 2.  Hyponatremia.  Hypomagnesemia.  Hypertension.  Hyperlipidemia.  Coronary artery disease.  History of previous stroke.  Dementia.  Chronic decubitus ulcer present on admission.  Cirrhosis.  Incidentally noted 2.9 cm hepatic lesion.  Dysphagia.    Clinically Significant Risk Factors     # DMII: A1C = 12.6 % (Ref range: <5.7 %) within past 6 months       Follow-ups Needed After Discharge   Follow-up Appointments       Follow-up and recommended labs and tests       Follow up with primary care provider, Oralia Forrest, within 7 days for hospital follow- up.  The following labs/tests are recommended: BMP in 7 days.  Follow-up with urology within 1 week.            Primary care provider to further discuss MRI of the liver to evaluate cirrhosis and incidental posterior hepatic lesion.      Discharge Disposition   Discharged to home  Condition at discharge: Stable    Hospital Course     Adam Huber is a 89 year old male with a history of dementia with behavioral disturbances, history of CVA, type 2 diabetes mellitus, coronary artery disease status post LEXI in 2015, hypertension, hyperlipidemia, HFpEF, new hepatic cirrhosis, BPH admitted on 11/7/2024 with abdominal pain and flank pain.  In the emergency department, the patient was found to have a temperature of 97.4  F, blood pressure 123/55, heart rate 70, respiratory rate 18, SpO2 97% on room air.  Initial lab work showed mild leukocytosis of 11.3, sodium 133, bicarb 20, BUN/creatinine 18.7/0.66, glucose 427, alkaline phosphatase 210, albumin 3.0, lactic  acid 3.2.  CT head showed extensive chronic changes but otherwise negative for acute issues.  CT abdomen and pelvis showed findings of chronic bladder outlet obstruction with significant increase in urothelial hyperenhancement and surrounding fat stranding possibly indicating cystitis, cirrhotic morphology of the liver with likely incidental 2.9 cm posterior right hepatic lesion.  The patient was started on IV fluids, a Villa catheter was placed, started on IV ceftriaxone, and urology was consulted to see the patient in the setting of complicated urinary tract infection with bladder outlet obstruction.  The patient was also noted to have a decubitus ulcer on exam.  Wound care was consulted to see the patient.  Urine culture returned positive for streptococcus agalactiae.  The patient's hospital course was complicated by an episode of hypoxia in the early morning hours of 11/9 as well as restlessness and agitation in the setting of his dementia.  Chest x-ray showed pulmonary edema and IV fluids were discontinued and a dose of Lasix was given.  Speech therapy was consulted.  He is on a dysphagia diet.  Urinary tract infection improving with IV ceftriaxone.  At discharge, changed to cephalexin 500 mg twice a day to complete a 7-day course.  He did have a Villa catheter placed during hospital stay.  Seen in consultation by urology.  Villa catheter going to remain in place at time of discharge.  Follow-up with urology within 1 week in the outpatient setting.  Planning for likely trial of void at that time.  He does need to also follow-up with his primary care provider within 1 week.  Because of lactic acidosis at presentation, metformin was stopped.  He was started on glipizide and glargine insulin.  Continue glipizide 5 mg a day and glargine insulin 10 units daily for now.  Follow-up with primary care provider for further diabetic medication adjustment.  Recheck metabolic panel in 1 week.  Primary care provider to  further discuss possible MRI of the liver to further evaluate cirrhosis and incidentally noted posterior right hepatic lesion.      Consultations This Hospital Stay   PHARMACY TO DOSE VANCO  PHARMACY TO DOSE VANCO  WOUND OSTOMY CONTINENCE NURSE  IP CONSULT  PHARMACY IP CONSULT  CARE MANAGEMENT / SOCIAL WORK IP CONSULT  UROLOGY IP CONSULT  SPIRITUAL HEALTH SERVICES IP CONSULT  CARE MANAGEMENT / SOCIAL WORK IP CONSULT  SPEECH LANGUAGE PATH ADULT IP CONSULT    Code Status   No CPR- Do NOT Intubate    Time Spent on this Encounter   I spent 35 minutes with Mr. Huber and working on discharge on 11/12/2024.       Jose Manuel Wade, [place] [address] [facility]  [address] [place] [address]  Phone: [phone]  Fax: [phone]  ______________________________________________________________________    Physical Exam   Vital Signs: Temp: 97.9  F (36.6  C) Temp src: Oral BP: 123/68 Pulse: 64   Resp: 16 SpO2: 96 % O2 Device: None (Room air)    Weight: 181 lbs 3.49 oz  Gen:  NAD, Awake  OP:  MMM, no lesions.  CV: Fairly regular, +1/6 murmur.  Lung:  CTA b/l, normal effort.  Ab:  +BS, soft.  Skin:  Warm, dry to touch.  No rash.  Ext:  Mild non pitting edema LE b/l.         Primary Care Physician   Oralia Forrest    Discharge Orders      Reason for your hospital stay    Urinary tract infection.     Follow-up and recommended labs and tests     Follow up with primary care provider, Oralia Forrest, within 7 days for hospital follow- up.  The following labs/tests are recommended: BMP in 7 days.  Follow-up with urology within 1 week.     Activity    Your activity upon discharge: activity as tolerated     Diet    Follow this diet upon discharge:       Combination Diet Pureed Diet (level 4); Mildly Thick (level 2) (NO straws, go slow)         Discharge Medications   Current Discharge Medication List        START taking these medications    Details   cephALEXin (KEFLEX) 500 MG capsule Take 1 capsule  (500 mg) by mouth every 12 hours for 2 days.  Qty: 4 capsule, Refills: 0    Associated Diagnoses: Urinary tract infection without hematuria, site unspecified      glipiZIDE (GLUCOTROL) 5 MG tablet Take 1 tablet (5 mg) by mouth every morning (before breakfast).  Qty: 30 tablet, Refills: 0    Associated Diagnoses: Urinary tract infection without hematuria, site unspecified      insulin glargine (LANTUS PEN) 100 UNIT/ML pen Inject 10 Units subcutaneously every morning (before breakfast).  Qty: 3 mL, Refills: 0    Comments: If Lantus is not covered by insurance, may substitute Basaglar or Semglee or other insulin glargine product per insurance preference at same dose and frequency.    Associated Diagnoses: Urinary tract infection without hematuria, site unspecified      tamsulosin (FLOMAX) 0.4 MG capsule Take 1 capsule (0.4 mg) by mouth daily.  Qty: 30 capsule, Refills: 0    Associated Diagnoses: Urinary tract infection without hematuria, site unspecified           CONTINUE these medications which have NOT CHANGED    Details   acetaminophen (TYLENOL) 325 MG tablet Take 650 mg by mouth every 8 hours as needed for mild pain      aspirin (ASA) 325 MG EC tablet Take 325 mg by mouth daily      atorvastatin (LIPITOR) 40 MG tablet Take 1 tablet (40 mg) by mouth every evening  Qty: 30 tablet, Refills: 0    Associated Diagnoses: History of CVA (cerebrovascular accident)      carvedilol (COREG) 25 MG tablet Take 1 tablet (25 mg) by mouth 2 times daily (with meals)  Qty: 180 tablet, Refills: 3    Associated Diagnoses: Essential hypertension      finasteride (PROSCAR) 5 MG tablet Take 5 mg by mouth daily      Melatonin 10 MG TABS tablet Take 10 mg by mouth nightly as needed for sleep      mirtazapine (REMERON) 15 MG tablet Take 15 mg by mouth nightly as needed (agitation).           STOP taking these medications       metFORMIN (GLUCOPHAGE) 500 MG tablet Comments:   Reason for Stopping:             Allergies   Allergies   Allergen  Reactions    Lisinopril     Morphine Other (See Comments)     confusion

## 2024-11-12 NOTE — PROGRESS NOTES
[facility] Progress Note          Assessment and Plan:     Assessment:    Urinary retention    BPH with chronic outlet obstruction, recurrent urinary tract infection, multiple bladder diverticuli, and history of incomplete emptying on UDS in 2019, with previous outlet procedures.    Dementia    Urinary tract infection    Diabetes mellitus type 2    Hepatic cirrhosis    Coronary artery disease    Hypertension    Dyslipidemia    Pressure injury of sacral region, stage 2 (H)    Sepsis, due to unspecified organism, unspecified whether acute organ dysfunction present (H)      Plan:   -Continue with antibiotics for strep agalactiae UTI.  -Reasonable to continue finasteride 5 mg once daily and Flomax 0.4 mg.  CT shows sequela of persistent bladder obstruction with multiple diverticuli and cellules.  This is likely been a longstanding issue, and medications alone will likely not be sufficient for emptying his bladder.  -Patient is not a particularly good candidate for an outlet procedure and likely would not be successful given her longstanding obstruction has been.  -If patient truly has a 24-hour PCA at home who can try to make sure the patient does not remove Villa catheter or talk on this, he may be better suited for chronic Villa catheter with monthly exchanges.  Our office will call to coordinate monthly Villa catheter exchange.    -Okay to discharge from urology perspective on antibiotics, BPH medication, and with Villa catheter.  If we continue with long term Villa catheter, may discontinue BPH medication in the future.    Shakira Stevenson PA-C   [facility]  [phone]               Interval History:     Villa catheter in place draining slightly cloudy yellow urine.  Patient had code 21 called last night.  Afebrile without tachycardia.  Continues on IV Rocephin with plan to discharge on Keflex for urinary tract infection.  On IV Rocephin for >100,000 Group B Strep UTI.                 Review of Systems:     The 5 point Review of Systems is negative other than noted in the HPI             Medications:     Current Facility-Administered Medications   Medication Dose Route Frequency Provider Last Rate Last Admin    acetaminophen (TYLENOL) tablet 650 mg  650 mg Oral Q8H PRN Levy Zapata MD   650 mg at 11/11/24 0905    aspirin (ASA) EC tablet 325 mg  325 mg Oral Daily Levy Zapata MD   325 mg at 11/12/24 0853    atorvastatin (LIPITOR) tablet 40 mg  40 mg Oral Daily Levy Zapata MD   40 mg at 11/12/24 0853    calcium carbonate (TUMS) chewable tablet 1,000 mg  1,000 mg Oral 4x Daily PRN Levy Zapata MD        carvedilol (COREG) tablet 25 mg  25 mg Oral BID w/meals Levy Zapata MD   25 mg at 11/12/24 0852    cephALEXin (KEFLEX) capsule 500 mg  500 mg Oral Q12H [facility] (08/20) Jose Manuel Wade,         glucose gel 15-30 g  15-30 g Oral Q15 Min PRN Levy Zapata MD        Or    dextrose 50 % injection 25-50 mL  25-50 mL Intravenous Q15 Min PRN Levy Zapata MD        Or    glucagon injection 1 mg  1 mg Subcutaneous Q15 Min PRN Levy Zapata MD        finasteride (PROSCAR) tablet 5 mg  5 mg Oral Daily Levy Zapata MD   5 mg at 11/12/24 0853    glipiZIDE (GLUCOTROL) tablet 5 mg  5 mg Oral QAM AC Raghavendra Snell DO   5 mg at 11/12/24 0853    haloperidol lactate (HALDOL) injection 1 mg  1 mg Intravenous Q6H PRN Levy Zapata MD   1 mg at 11/11/24 1708    hydrALAZINE (APRESOLINE) tablet 10 mg  10 mg Oral Q4H PRN Levy Zapata MD        Or    hydrALAZINE (APRESOLINE) injection 10 mg  10 mg Intravenous Q4H PRN Levy Zapata MD        insulin aspart (NovoLOG) injection (RAPID ACTING)  1-7 Units Subcutaneous TID AC Levy Zapata MD   2 Units at 11/12/24 1217    insulin aspart (NovoLOG) injection (RAPID ACTING)  1-5 Units Subcutaneous At Bedtime Levy Zapata MD   2 Units at 11/11/24 2111    insulin glargine (LANTUS PEN) injection 15 Units  15 Units Subcutaneous QAM Raghavendra Knox,    15 Units at 11/12/24 0854    lidocaine (LMX4)  cream   Topical Q1H PRN Levy Zapata MD        lidocaine 1 % 0.1-1 mL  0.1-1 mL Other Q1H PRN Levy Zapata MD        melatonin tablet 3 mg  3 mg Oral At Bedtime PRN Frank Ren MD   3 mg at 11/08/24 2203    mirtazapine (REMERON) tablet 15 mg  15 mg Oral At Bedtime PRN Levy Zapata MD   15 mg at 11/07/24 2307    ondansetron (ZOFRAN ODT) ODT tab 4 mg  4 mg Oral Q6H PRN Levy Zapata MD   4 mg at 11/07/24 2307    Or    ondansetron (ZOFRAN) injection 4 mg  4 mg Intravenous Q6H PRN Levy Zapata MD        QUEtiapine (SEROquel) half-tab 12.5 mg  12.5 mg Oral At Bedtime Raghavendra Snell DO   12.5 mg at 11/11/24 2333    QUEtiapine (SEROquel) half-tab 12.5 mg  12.5 mg Oral BID Raghavendra Snell DO   12.5 mg at 11/12/24 0852    senna-docusate (SENOKOT-S/PERICOLACE) 8.6-50 MG per tablet 1 tablet  1 tablet Oral BID PRN Levy Zapata MD        Or    senna-docusate (SENOKOT-S/PERICOLACE) 8.6-50 MG per tablet 2 tablet  2 tablet Oral BID PRN Levy Zapata MD        sodium chloride (PF) 0.9% PF flush 3 mL  3 mL Intracatheter Q8H Levy Zapata MD   3 mL at 11/12/24 0408    sodium chloride (PF) 0.9% PF flush 3 mL  3 mL Intracatheter q1 min prn Levy Zapata MD   3 mL at 11/11/24 1727    tamsulosin (FLOMAX) capsule 0.4 mg  0.4 mg Oral Daily Levy Zapata MD   0.4 mg at 11/12/24 0853                  Physical Exam:   Vitals were reviewed  Patient Vitals for the past 8 hrs:   BP Temp Temp src Pulse Resp SpO2   11/12/24 0843 123/68 -- -- 64 -- --   11/12/24 0711 (!) 147/70 97.9  F (36.6  C) Oral 74 16 96 %     GEN: NAD, up to chair  NECK: Supple  RESP: Unlabored breathing  URO: Villa catheter in place draining slightly cloudy yellow urine           Data:     Lab Results   Component Value Date    NTBNPI 941 11/09/2024    NTBNPI 982 02/04/2024    NTBNPI 9,311 (H) 09/21/2023     Lab Results   Component Value Date    WBC 8.4 11/11/2024    WBC 8.6 11/10/2024    WBC 12.4 (H) 11/09/2024    HGB 13.0 (L) 11/11/2024    HGB 13.6 11/10/2024    HGB 13.2 (L)  11/09/2024    HCT 38.9 (L) 11/11/2024    HCT 41.2 11/10/2024    HCT 39.8 (L) 11/09/2024    MCV 88 11/11/2024    MCV 88 11/10/2024    MCV 88 11/09/2024     11/11/2024     11/10/2024     11/09/2024     Lab Results   Component Value Date    INR 1.00 08/31/2023    INR 1.15 05/13/2023    INR 1.07 05/04/2023      All cultures:  Recent Labs   Lab 11/07/24  1504 11/07/24  1441   CULTURE >100,000 CFU/mL Streptococcus agalactiae (Group B Streptococcus)* No growth after 4 days

## 2024-11-12 NOTE — PLAN OF CARE
"  Problem: Adult Inpatient Plan of Care  Goal: Plan of Care Review  Description: The Plan of Care Review/Shift note should be completed every shift.  The Outcome Evaluation is a brief statement about your assessment that the patient is improving, declining, or no change.  This information will be displayed automatically on your shift  note.  Outcome: Adequate for Care Transition  Flowsheets (Taken 11/11/2024 2239)  Outcome Evaluation: Pt has sitter at this time, did have a code 21 incident , see prior notes. Pt back to baseline per family. Denies pain. Confused but cooperative most of shift aftre this incident. Good appetite, ate 100% dinner. Staff assisted with feeding. PLan for pt to d/c home tomorrow, and will keep parmar catheter.  Plan of Care Reviewed With:   patient   family  Overall Patient Progress: improving  Goal: Patient-Specific Goal (Individualized)  Description: You can add care plan individualizations to a care plan. Examples of Individualization might be:  \"Parent requests to be called daily at 9am for status\", \"I have a hard time hearing out of my right ear\", or \"Do not touch me to wake me up as it startles  me\".  Outcome: Adequate for Care Transition  Goal: Absence of Hospital-Acquired Illness or Injury  Outcome: Adequate for Care Transition  Intervention: Identify and Manage Fall Risk  Recent Flowsheet Documentation  Taken 11/11/2024 1633 by Karo Saeed RN  Safety Promotion/Fall Prevention: safety round/check completed  Intervention: Prevent Skin Injury  Recent Flowsheet Documentation  Taken 11/11/2024 1633 by Karo Saeed RN  Body Position: position changed independently  Intervention: Prevent Infection  Recent Flowsheet Documentation  Taken 11/11/2024 1633 by Karo Saeed RN  Infection Prevention: rest/sleep promoted  Goal: Optimal Comfort and Wellbeing  Outcome: Adequate for Care Transition  Goal: Readiness for Transition of Care  Outcome: Adequate for Care Transition   "   Problem: UTI (Urinary Tract Infection)  Goal: Improved Infection Symptoms  Outcome: Adequate for Care Transition     Problem: Comorbidity Management  Goal: Maintenance of Behavioral Health Symptom Control  Outcome: Adequate for Care Transition  Intervention: Maintain Behavioral Health Symptom Control  Recent Flowsheet Documentation  Taken 11/11/2024 1633 by Karo Saeed RN  Medication Review/Management: medications reviewed  Goal: Blood Glucose Levels Within Targeted Range  Outcome: Adequate for Care Transition  Intervention: Monitor and Manage Glycemia  Recent Flowsheet Documentation  Taken 11/11/2024 1633 by Karo Saeed RN  Medication Review/Management: medications reviewed     Problem: Fall Injury Risk  Goal: Absence of Fall and Fall-Related Injury  Outcome: Adequate for Care Transition  Intervention: Identify and Manage Contributors  Recent Flowsheet Documentation  Taken 11/11/2024 1633 by Karo Saeed RN  Medication Review/Management: medications reviewed  Intervention: Promote Injury-Free Environment  Recent Flowsheet Documentation  Taken 11/11/2024 1633 by Karo Saeed RN  Safety Promotion/Fall Prevention: safety round/check completed     Problem: Pain Acute  Goal: Optimal Pain Control and Function  Outcome: Adequate for Care Transition  Intervention: Prevent or Manage Pain  Recent Flowsheet Documentation  Taken 11/11/2024 1633 by Karo Saeed RN  Medication Review/Management: medications reviewed   Goal Outcome Evaluation:      Plan of Care Reviewed With: patient, family    Overall Patient Progress: improvingOverall Patient Progress: improving    Outcome Evaluation: Pt has sitter at this time, did have a code 21 incident , see prior notes. Pt back to baseline per family. Denies pain. Confused but cooperative most of shift aftre this incident. Good appetite, ate 100% dinner. Staff assisted with feeding. PLan for pt to d/c home tomorrow, and will keep parmar catheter.

## 2024-11-12 NOTE — PLAN OF CARE
"Shift: 5532-6678  A&Ox1   VSS on RA  Takes pills whole with pudding  Activity: A2   Elimination: parmar, discharging with parmar. Catheter cleaned this morning  Protocols: mg recheck in am  Consults: speech, urology  Plan to discharge home today       Patient discharged home via private car, transported via wheelchair with nursing staff.  Patient verbalized and received copy of discharge instructions.  Patient received medications filled by discharge Pharmacy.  Personal belongings gathered and sent with patient.  VSS. IV removed prior to discharge.      Goal Outcome Evaluation:      Plan of Care Reviewed With: patient    Overall Patient Progress: improvingOverall Patient Progress: improving    Outcome Evaluation: discharged. became agitated once this afternoon. by the time medication was ready he was calm      Problem: Adult Inpatient Plan of Care  Goal: Plan of Care Review  Description: The Plan of Care Review/Shift note should be completed every shift.  The Outcome Evaluation is a brief statement about your assessment that the patient is improving, declining, or no change.  This information will be displayed automatically on your shift  note.  Outcome: Adequate for Care Transition  Flowsheets (Taken 11/12/2024 2156)  Outcome Evaluation: discharged. became agitated once this afternoon. by the time medication was ready he was calm  Plan of Care Reviewed With: patient  Overall Patient Progress: improving  Goal: Patient-Specific Goal (Individualized)  Description: You can add care plan individualizations to a care plan. Examples of Individualization might be:  \"Parent requests to be called daily at 9am for status\", \"I have a hard time hearing out of my right ear\", or \"Do not touch me to wake me up as it startles  me\".  Outcome: Adequate for Care Transition  Goal: Absence of Hospital-Acquired Illness or Injury  Outcome: Adequate for Care Transition  Goal: Optimal Comfort and Wellbeing  Outcome: Adequate for Care " Transition  Goal: Readiness for Transition of Care  Outcome: Adequate for Care Transition     Problem: UTI (Urinary Tract Infection)  Goal: Improved Infection Symptoms  Outcome: Adequate for Care Transition     Problem: Fall Injury Risk  Goal: Absence of Fall and Fall-Related Injury  Outcome: Adequate for Care Transition     Problem: Comorbidity Management  Goal: Maintenance of Behavioral Health Symptom Control  Outcome: Adequate for Care Transition

## 2024-11-12 NOTE — PLAN OF CARE
"Disoriented to place, time, and situation. Assist of 1-2, GB, walker. Parmar in place. Sitter at bedside. On rocephin q24 hr. Plan to potentially discharge home today. Pt will discharge with parmar in place.     Goal Outcome Evaluation:      Plan of Care Reviewed With: patient    Overall Patient Progress: improvingOverall Patient Progress: improving    Outcome Evaluation: Sitter at bedside. Can be restless at times, but not agitated on shift. Confused. Parmar in place, will discharge with it. On cetriaxone q24 hr.      Problem: Adult Inpatient Plan of Care  Goal: Plan of Care Review  Description: The Plan of Care Review/Shift note should be completed every shift.  The Outcome Evaluation is a brief statement about your assessment that the patient is improving, declining, or no change.  This information will be displayed automatically on your shift  note.  Outcome: Progressing  Flowsheets (Taken 11/12/2024 3022)  Outcome Evaluation: Sitter at bedside. Can be restless at times, but not agitated on shift. Confused. Parmar in place, will discharge with it. On cetriaxone q24 hr.  Plan of Care Reviewed With: patient  Overall Patient Progress: improving  Goal: Patient-Specific Goal (Individualized)  Description: You can add care plan individualizations to a care plan. Examples of Individualization might be:  \"Parent requests to be called daily at 9am for status\", \"I have a hard time hearing out of my right ear\", or \"Do not touch me to wake me up as it startles  me\".  Outcome: Progressing  Goal: Absence of Hospital-Acquired Illness or Injury  Outcome: Progressing  Intervention: Identify and Manage Fall Risk  Recent Flowsheet Documentation  Taken 11/11/2024 2330 by Miladys Washington, RN  Safety Promotion/Fall Prevention:   safety round/check completed   bedside attendant  Intervention: Prevent Skin Injury  Recent Flowsheet Documentation  Taken 11/11/2024 2330 by Miladys Washington, RN  Body Position: position changed independently  Goal: " Optimal Comfort and Wellbeing  Outcome: Progressing  Goal: Readiness for Transition of Care  Outcome: Progressing     Problem: UTI (Urinary Tract Infection)  Goal: Improved Infection Symptoms  Outcome: Progressing     Problem: Fall Injury Risk  Goal: Absence of Fall and Fall-Related Injury  Outcome: Progressing  Intervention: Identify and Manage Contributors  Recent Flowsheet Documentation  Taken 11/11/2024 2330 by Miladys Washington RN  Medication Review/Management: medications reviewed  Intervention: Promote Injury-Free Environment  Recent Flowsheet Documentation  Taken 11/11/2024 2330 by Miladys Washington RN  Safety Promotion/Fall Prevention:   safety round/check completed   bedside attendant     Problem: Comorbidity Management  Goal: Maintenance of Behavioral Health Symptom Control  Outcome: Progressing  Intervention: Maintain Behavioral Health Symptom Control  Recent Flowsheet Documentation  Taken 11/11/2024 2330 by Miladys Washington RN  Medication Review/Management: medications reviewed

## 2024-11-13 ENCOUNTER — TELEPHONE (OUTPATIENT)
Dept: UROLOGY | Facility: CLINIC | Age: 89
End: 2024-11-13
Payer: COMMERCIAL

## 2024-11-13 ENCOUNTER — PATIENT OUTREACH (OUTPATIENT)
Dept: CARE COORDINATION | Facility: CLINIC | Age: 89
End: 2024-11-13
Payer: COMMERCIAL

## 2024-11-13 ENCOUNTER — HOSPITAL ENCOUNTER (OUTPATIENT)
Facility: CLINIC | Age: 89
Setting detail: OBSERVATION
Discharge: HOME | End: 2024-11-15
Attending: EMERGENCY MEDICINE | Admitting: INTERNAL MEDICINE
Payer: COMMERCIAL

## 2024-11-13 DIAGNOSIS — G30.1 LATE ONSET ALZHEIMER'S DISEASE WITHOUT BEHAVIORAL DISTURBANCE (H): Primary | ICD-10-CM

## 2024-11-13 DIAGNOSIS — R31.9 HEMATURIA, UNSPECIFIED TYPE: ICD-10-CM

## 2024-11-13 DIAGNOSIS — F02.80 LATE ONSET ALZHEIMER'S DISEASE WITHOUT BEHAVIORAL DISTURBANCE (H): Primary | ICD-10-CM

## 2024-11-13 DIAGNOSIS — Z97.8 FOLEY CATHETER IN PLACE: ICD-10-CM

## 2024-11-13 DIAGNOSIS — R45.1 AGITATION: ICD-10-CM

## 2024-11-13 DIAGNOSIS — Z87.440 HISTORY OF UTI: ICD-10-CM

## 2024-11-13 PROCEDURE — 51700 IRRIGATION OF BLADDER: CPT

## 2024-11-13 PROCEDURE — 99285 EMERGENCY DEPT VISIT HI MDM: CPT | Mod: 25

## 2024-11-13 PROCEDURE — 96374 THER/PROPH/DIAG INJ IV PUSH: CPT

## 2024-11-13 PROCEDURE — 51702 INSERT TEMP BLADDER CATH: CPT

## 2024-11-13 RX ORDER — LIDOCAINE HYDROCHLORIDE 20 MG/ML
6 JELLY TOPICAL ONCE
Status: COMPLETED | OUTPATIENT
Start: 2024-11-13 | End: 2024-11-14

## 2024-11-13 ASSESSMENT — ACTIVITIES OF DAILY LIVING (ADL): ADLS_ACUITY_SCORE: 21.25

## 2024-11-13 NOTE — PROGRESS NOTES
"Speech Language Therapy Discharge Summary    Reason for therapy discharge:    Discharged to home with home therapy.    Progress towards therapy goal(s). See goals on Care Plan in [facility] electronic health record for goal details.  Goals partially met.  Barriers to achieving goals:   discharge from facility.    Therapy recommendation(s):    Continued therapy is recommended.  Rationale/Recommendations:  SLP tx for dysphagia if swallowing ability & diet level are below baseline.    Recommendation after last SLP visit:     \"Continue pureed diet with thin liquids. Strict swallow precautions: direct assist/supervision, NO straws, slow rate, small bites/sips, slow rate and verify swallow, and alternate consistencies. Considering crushing meds.\"      "

## 2024-11-13 NOTE — PROGRESS NOTES
Connected Care Resource Center: [facility]    Background: Transitional Care Management program identified per system criteria and reviewed by [facility] team for possible outreach.    Assessment: Upon chart review, CCR Team member will not proceed with patient outreach related to this episode of Transitional Care Management program due to reason below:    Patient has active communication with a nurse, provider or care team for reason of post-hospital follow up plan.  Outreach call by CCRC team not indicated to minimize duplicative efforts.     Plan: Transitional Care Management episode addressed appropriately per reason noted above.      Cary Song  Community Health Worker  [facility] [phone]      *Connected Care Resource Team does NOT follow patient ongoing. Referrals are identified based on internal discharge reports and the outreach is to ensure patient has an understanding of their discharge instructions.

## 2024-11-13 NOTE — TELEPHONE ENCOUNTER
Patients son called and his dads catheter bag has blood, catheter is draining and he is on a blood thinner.He will have him push fluids and call if his catheter stops drainingor sxs's rise.  Mahi Bull LPN

## 2024-11-14 PROBLEM — Z87.440 HISTORY OF UTI: Status: ACTIVE | Noted: 2024-11-14

## 2024-11-14 PROBLEM — Z97.8 FOLEY CATHETER IN PLACE: Status: ACTIVE | Noted: 2024-11-14

## 2024-11-14 PROBLEM — R31.9 HEMATURIA, UNSPECIFIED TYPE: Status: ACTIVE | Noted: 2024-11-14

## 2024-11-14 LAB
ALBUMIN SERPL BCG-MCNC: 2.8 G/DL (ref 3.5–5.2)
ALBUMIN UR-MCNC: 70 MG/DL
ALP SERPL-CCNC: 237 U/L (ref 40–150)
ALT SERPL W P-5'-P-CCNC: 32 U/L (ref 0–70)
ANION GAP SERPL CALCULATED.3IONS-SCNC: 11 MMOL/L (ref 7–15)
ANION GAP SERPL CALCULATED.3IONS-SCNC: 9 MMOL/L (ref 7–15)
APPEARANCE UR: ABNORMAL
AST SERPL W P-5'-P-CCNC: ABNORMAL U/L
BASOPHILS # BLD AUTO: 0.1 10E3/UL (ref 0–0.2)
BASOPHILS NFR BLD AUTO: 1 %
BILIRUB SERPL-MCNC: 0.5 MG/DL
BILIRUB UR QL STRIP: NEGATIVE
BUN SERPL-MCNC: 18.4 MG/DL (ref 8–23)
BUN SERPL-MCNC: 20.3 MG/DL (ref 8–23)
CALCIUM SERPL-MCNC: 8.2 MG/DL (ref 8.8–10.4)
CALCIUM SERPL-MCNC: 8.7 MG/DL (ref 8.8–10.4)
CHLORIDE SERPL-SCNC: 105 MMOL/L (ref 98–107)
CHLORIDE SERPL-SCNC: 105 MMOL/L (ref 98–107)
COLOR UR AUTO: ABNORMAL
CREAT SERPL-MCNC: 0.68 MG/DL (ref 0.67–1.17)
CREAT SERPL-MCNC: 0.73 MG/DL (ref 0.67–1.17)
EGFRCR SERPLBLD CKD-EPI 2021: 87 ML/MIN/1.73M2
EGFRCR SERPLBLD CKD-EPI 2021: 89 ML/MIN/1.73M2
EOSINOPHIL # BLD AUTO: 0.3 10E3/UL (ref 0–0.7)
EOSINOPHIL NFR BLD AUTO: 4 %
ERYTHROCYTE [DISTWIDTH] IN BLOOD BY AUTOMATED COUNT: 14.7 % (ref 10–15)
ERYTHROCYTE [DISTWIDTH] IN BLOOD BY AUTOMATED COUNT: 14.7 % (ref 10–15)
GLUCOSE BLDC GLUCOMTR-MCNC: 143 MG/DL (ref 70–99)
GLUCOSE BLDC GLUCOMTR-MCNC: 190 MG/DL (ref 70–99)
GLUCOSE BLDC GLUCOMTR-MCNC: 279 MG/DL (ref 70–99)
GLUCOSE SERPL-MCNC: 159 MG/DL (ref 70–99)
GLUCOSE SERPL-MCNC: 222 MG/DL (ref 70–99)
GLUCOSE UR STRIP-MCNC: >=1000 MG/DL
HCO3 SERPL-SCNC: 23 MMOL/L (ref 22–29)
HCO3 SERPL-SCNC: 23 MMOL/L (ref 22–29)
HCT VFR BLD AUTO: 39 % (ref 40–53)
HCT VFR BLD AUTO: 39.6 % (ref 40–53)
HGB BLD-MCNC: 12.7 G/DL (ref 13.3–17.7)
HGB BLD-MCNC: 12.9 G/DL (ref 13.3–17.7)
HGB UR QL STRIP: ABNORMAL
IMM GRANULOCYTES # BLD: 0 10E3/UL
IMM GRANULOCYTES NFR BLD: 1 %
KETONES UR STRIP-MCNC: NEGATIVE MG/DL
LEUKOCYTE ESTERASE UR QL STRIP: ABNORMAL
LYMPHOCYTES # BLD AUTO: 2.5 10E3/UL (ref 0.8–5.3)
LYMPHOCYTES NFR BLD AUTO: 31 %
MCH RBC QN AUTO: 29 PG (ref 26.5–33)
MCH RBC QN AUTO: 29.1 PG (ref 26.5–33)
MCHC RBC AUTO-ENTMCNC: 32.6 G/DL (ref 31.5–36.5)
MCHC RBC AUTO-ENTMCNC: 32.6 G/DL (ref 31.5–36.5)
MCV RBC AUTO: 89 FL (ref 78–100)
MCV RBC AUTO: 89 FL (ref 78–100)
MONOCYTES # BLD AUTO: 0.9 10E3/UL (ref 0–1.3)
MONOCYTES NFR BLD AUTO: 11 %
NEUTROPHILS # BLD AUTO: 4.2 10E3/UL (ref 1.6–8.3)
NEUTROPHILS NFR BLD AUTO: 53 %
NITRATE UR QL: NEGATIVE
NRBC # BLD AUTO: 0 10E3/UL
NRBC BLD AUTO-RTO: 0 /100
PH UR STRIP: 6 [PH] (ref 5–7)
PLATELET # BLD AUTO: 210 10E3/UL (ref 150–450)
PLATELET # BLD AUTO: 216 10E3/UL (ref 150–450)
POTASSIUM SERPL-SCNC: 4.2 MMOL/L (ref 3.4–5.3)
POTASSIUM SERPL-SCNC: 4.9 MMOL/L (ref 3.4–5.3)
PROT SERPL-MCNC: 6.4 G/DL (ref 6.4–8.3)
RBC # BLD AUTO: 4.38 10E6/UL (ref 4.4–5.9)
RBC # BLD AUTO: 4.44 10E6/UL (ref 4.4–5.9)
RBC URINE: >182 /HPF
SODIUM SERPL-SCNC: 137 MMOL/L (ref 135–145)
SODIUM SERPL-SCNC: 139 MMOL/L (ref 135–145)
SP GR UR STRIP: 1.02 (ref 1–1.03)
UROBILINOGEN UR STRIP-MCNC: NORMAL MG/DL
WBC # BLD AUTO: 7.7 10E3/UL (ref 4–11)
WBC # BLD AUTO: 7.9 10E3/UL (ref 4–11)
WBC URINE: 93 /HPF

## 2024-11-14 PROCEDURE — 250N000009 HC RX 250: Performed by: EMERGENCY MEDICINE

## 2024-11-14 PROCEDURE — 250N000012 HC RX MED GY IP 250 OP 636 PS 637: Performed by: HOSPITALIST

## 2024-11-14 PROCEDURE — 84155 ASSAY OF PROTEIN SERUM: CPT | Performed by: EMERGENCY MEDICINE

## 2024-11-14 PROCEDURE — 51700 IRRIGATION OF BLADDER: CPT

## 2024-11-14 PROCEDURE — 82565 ASSAY OF CREATININE: CPT | Performed by: INTERNAL MEDICINE

## 2024-11-14 PROCEDURE — 82310 ASSAY OF CALCIUM: CPT | Performed by: EMERGENCY MEDICINE

## 2024-11-14 PROCEDURE — 51702 INSERT TEMP BLADDER CATH: CPT | Mod: 59

## 2024-11-14 PROCEDURE — 85004 AUTOMATED DIFF WBC COUNT: CPT | Performed by: EMERGENCY MEDICINE

## 2024-11-14 PROCEDURE — 250N000013 HC RX MED GY IP 250 OP 250 PS 637: Performed by: HOSPITALIST

## 2024-11-14 PROCEDURE — G0378 HOSPITAL OBSERVATION PER HR: HCPCS

## 2024-11-14 PROCEDURE — 85027 COMPLETE CBC AUTOMATED: CPT | Performed by: INTERNAL MEDICINE

## 2024-11-14 PROCEDURE — 85014 HEMATOCRIT: CPT | Performed by: EMERGENCY MEDICINE

## 2024-11-14 PROCEDURE — 87086 URINE CULTURE/COLONY COUNT: CPT | Performed by: EMERGENCY MEDICINE

## 2024-11-14 PROCEDURE — 250N000012 HC RX MED GY IP 250 OP 636 PS 637: Performed by: INTERNAL MEDICINE

## 2024-11-14 PROCEDURE — 258N000001 HC RX 258: Performed by: EMERGENCY MEDICINE

## 2024-11-14 PROCEDURE — 96374 THER/PROPH/DIAG INJ IV PUSH: CPT | Mod: 59

## 2024-11-14 PROCEDURE — 80048 BASIC METABOLIC PNL TOTAL CA: CPT | Performed by: INTERNAL MEDICINE

## 2024-11-14 PROCEDURE — 250N000013 HC RX MED GY IP 250 OP 250 PS 637: Performed by: INTERNAL MEDICINE

## 2024-11-14 PROCEDURE — 99223 1ST HOSP IP/OBS HIGH 75: CPT | Performed by: INTERNAL MEDICINE

## 2024-11-14 PROCEDURE — 82962 GLUCOSE BLOOD TEST: CPT

## 2024-11-14 PROCEDURE — 258N000001 HC RX 258: Performed by: INTERNAL MEDICINE

## 2024-11-14 PROCEDURE — 36415 COLL VENOUS BLD VENIPUNCTURE: CPT | Performed by: EMERGENCY MEDICINE

## 2024-11-14 PROCEDURE — 250N000011 HC RX IP 250 OP 636: Performed by: EMERGENCY MEDICINE

## 2024-11-14 PROCEDURE — 81001 URINALYSIS AUTO W/SCOPE: CPT | Performed by: EMERGENCY MEDICINE

## 2024-11-14 PROCEDURE — 99214 OFFICE O/P EST MOD 30 MIN: CPT | Performed by: STUDENT IN AN ORGANIZED HEALTH CARE EDUCATION/TRAINING PROGRAM

## 2024-11-14 PROCEDURE — 99207 PR NO BILLABLE SERVICE THIS VISIT: CPT | Performed by: HOSPITALIST

## 2024-11-14 PROCEDURE — 36415 COLL VENOUS BLD VENIPUNCTURE: CPT | Performed by: INTERNAL MEDICINE

## 2024-11-14 PROCEDURE — 82435 ASSAY OF BLOOD CHLORIDE: CPT | Performed by: INTERNAL MEDICINE

## 2024-11-14 RX ORDER — GLIPIZIDE 5 MG/1
5 TABLET ORAL
Status: DISCONTINUED | OUTPATIENT
Start: 2024-11-14 | End: 2024-11-15 | Stop reason: HOSPADM

## 2024-11-14 RX ORDER — CEPHALEXIN 500 MG/1
500 CAPSULE ORAL EVERY 12 HOURS
Status: DISCONTINUED | OUTPATIENT
Start: 2024-11-14 | End: 2024-11-14

## 2024-11-14 RX ORDER — TAMSULOSIN HYDROCHLORIDE 0.4 MG/1
0.4 CAPSULE ORAL DAILY
Status: DISCONTINUED | OUTPATIENT
Start: 2024-11-14 | End: 2024-11-15 | Stop reason: HOSPADM

## 2024-11-14 RX ORDER — NICOTINE POLACRILEX 4 MG
15-30 LOZENGE BUCCAL
Status: DISCONTINUED | OUTPATIENT
Start: 2024-11-14 | End: 2024-11-15 | Stop reason: HOSPADM

## 2024-11-14 RX ORDER — PROCHLORPERAZINE MALEATE 5 MG/1
5 TABLET ORAL EVERY 6 HOURS PRN
Status: DISCONTINUED | OUTPATIENT
Start: 2024-11-14 | End: 2024-11-15 | Stop reason: HOSPADM

## 2024-11-14 RX ORDER — ACETAMINOPHEN 325 MG/1
650 TABLET ORAL EVERY 8 HOURS PRN
Status: DISCONTINUED | OUTPATIENT
Start: 2024-11-14 | End: 2024-11-15 | Stop reason: HOSPADM

## 2024-11-14 RX ORDER — ATORVASTATIN CALCIUM 40 MG/1
40 TABLET, FILM COATED ORAL EVERY EVENING
Status: DISCONTINUED | OUTPATIENT
Start: 2024-11-14 | End: 2024-11-15 | Stop reason: HOSPADM

## 2024-11-14 RX ORDER — ASPIRIN 325 MG
325 TABLET, DELAYED RELEASE (ENTERIC COATED) ORAL DAILY
Status: DISCONTINUED | OUTPATIENT
Start: 2024-11-14 | End: 2024-11-15 | Stop reason: HOSPADM

## 2024-11-14 RX ORDER — AMOXICILLIN 250 MG
2 CAPSULE ORAL 2 TIMES DAILY PRN
Status: DISCONTINUED | OUTPATIENT
Start: 2024-11-14 | End: 2024-11-15 | Stop reason: HOSPADM

## 2024-11-14 RX ORDER — AMOXICILLIN 250 MG
1 CAPSULE ORAL 2 TIMES DAILY PRN
Status: DISCONTINUED | OUTPATIENT
Start: 2024-11-14 | End: 2024-11-15 | Stop reason: HOSPADM

## 2024-11-14 RX ORDER — CEPHALEXIN 500 MG/1
500 CAPSULE ORAL EVERY 12 HOURS
Status: COMPLETED | OUTPATIENT
Start: 2024-11-14 | End: 2024-11-15

## 2024-11-14 RX ORDER — OLANZAPINE 10 MG/2ML
5 INJECTION, POWDER, FOR SOLUTION INTRAMUSCULAR ONCE
Status: COMPLETED | OUTPATIENT
Start: 2024-11-14 | End: 2024-11-14

## 2024-11-14 RX ORDER — FINASTERIDE 5 MG/1
5 TABLET, FILM COATED ORAL DAILY
Status: DISCONTINUED | OUTPATIENT
Start: 2024-11-14 | End: 2024-11-15 | Stop reason: HOSPADM

## 2024-11-14 RX ORDER — ONDANSETRON 2 MG/ML
4 INJECTION INTRAMUSCULAR; INTRAVENOUS EVERY 6 HOURS PRN
Status: DISCONTINUED | OUTPATIENT
Start: 2024-11-14 | End: 2024-11-15 | Stop reason: HOSPADM

## 2024-11-14 RX ORDER — CARVEDILOL 25 MG/1
25 TABLET ORAL 2 TIMES DAILY WITH MEALS
Status: DISCONTINUED | OUTPATIENT
Start: 2024-11-14 | End: 2024-11-15 | Stop reason: HOSPADM

## 2024-11-14 RX ORDER — OLANZAPINE 5 MG/1
5 TABLET, ORALLY DISINTEGRATING ORAL 2 TIMES DAILY PRN
Status: DISCONTINUED | OUTPATIENT
Start: 2024-11-14 | End: 2024-11-15 | Stop reason: HOSPADM

## 2024-11-14 RX ORDER — MIRTAZAPINE 15 MG/1
15 TABLET, FILM COATED ORAL
Status: DISCONTINUED | OUTPATIENT
Start: 2024-11-14 | End: 2024-11-15 | Stop reason: HOSPADM

## 2024-11-14 RX ORDER — CEFTRIAXONE 1 G/1
1 INJECTION, POWDER, FOR SOLUTION INTRAMUSCULAR; INTRAVENOUS ONCE
Status: COMPLETED | OUTPATIENT
Start: 2024-11-14 | End: 2024-11-14

## 2024-11-14 RX ORDER — DEXTROSE MONOHYDRATE 25 G/50ML
25-50 INJECTION, SOLUTION INTRAVENOUS
Status: DISCONTINUED | OUTPATIENT
Start: 2024-11-14 | End: 2024-11-15 | Stop reason: HOSPADM

## 2024-11-14 RX ORDER — ONDANSETRON 4 MG/1
4 TABLET, ORALLY DISINTEGRATING ORAL EVERY 6 HOURS PRN
Status: DISCONTINUED | OUTPATIENT
Start: 2024-11-14 | End: 2024-11-15 | Stop reason: HOSPADM

## 2024-11-14 RX ADMIN — GLIPIZIDE 5 MG: 5 TABLET ORAL at 09:49

## 2024-11-14 RX ADMIN — FINASTERIDE 5 MG: 5 TABLET, FILM COATED ORAL at 09:49

## 2024-11-14 RX ADMIN — OLANZAPINE 5 MG: 5 TABLET, ORALLY DISINTEGRATING ORAL at 22:10

## 2024-11-14 RX ADMIN — ACETAMINOPHEN 650 MG: 325 TABLET, FILM COATED ORAL at 10:46

## 2024-11-14 RX ADMIN — LIDOCAINE HYDROCHLORIDE 6 ML: 20 JELLY TOPICAL at 02:16

## 2024-11-14 RX ADMIN — INSULIN ASPART 2 UNITS: 100 INJECTION, SOLUTION INTRAVENOUS; SUBCUTANEOUS at 17:25

## 2024-11-14 RX ADMIN — SODIUM CHLORIDE: 900 IRRIGANT IRRIGATION at 02:38

## 2024-11-14 RX ADMIN — CARVEDILOL 25 MG: 25 TABLET, FILM COATED ORAL at 17:27

## 2024-11-14 RX ADMIN — CEPHALEXIN 500 MG: 500 CAPSULE ORAL at 13:15

## 2024-11-14 RX ADMIN — INSULIN GLARGINE 10 UNITS: 100 INJECTION, SOLUTION SUBCUTANEOUS at 17:25

## 2024-11-14 RX ADMIN — INSULIN ASPART 3 UNITS: 100 INJECTION, SOLUTION INTRAVENOUS; SUBCUTANEOUS at 13:13

## 2024-11-14 RX ADMIN — SODIUM CHLORIDE 3000 ML: 900 IRRIGANT IRRIGATION at 04:22

## 2024-11-14 RX ADMIN — OLANZAPINE 5 MG: 5 TABLET, ORALLY DISINTEGRATING ORAL at 17:27

## 2024-11-14 RX ADMIN — ATORVASTATIN CALCIUM 40 MG: 40 TABLET, FILM COATED ORAL at 19:53

## 2024-11-14 RX ADMIN — CEFTRIAXONE 1 G: 1 INJECTION, POWDER, FOR SOLUTION INTRAMUSCULAR; INTRAVENOUS at 03:16

## 2024-11-14 RX ADMIN — SODIUM CHLORIDE 3000 ML: 900 IRRIGANT IRRIGATION at 09:49

## 2024-11-14 RX ADMIN — TAMSULOSIN HYDROCHLORIDE 0.4 MG: 0.4 CAPSULE ORAL at 09:50

## 2024-11-14 RX ADMIN — SODIUM CHLORIDE 3000 ML: 900 IRRIGANT IRRIGATION at 22:18

## 2024-11-14 ASSESSMENT — ACTIVITIES OF DAILY LIVING (ADL)
ADLS_ACUITY_SCORE: 32.25
ADLS_ACUITY_SCORE: 0
ADLS_ACUITY_SCORE: 28.75
ADLS_ACUITY_SCORE: 20.25
ADLS_ACUITY_SCORE: 32.25
ADLS_ACUITY_SCORE: 32.75
ADLS_ACUITY_SCORE: 32.25
ADLS_ACUITY_SCORE: 21.25
ADLS_ACUITY_SCORE: 28.25
ADLS_ACUITY_SCORE: 32.25
ADLS_ACUITY_SCORE: 21.25
ADLS_ACUITY_SCORE: 0
ADLS_ACUITY_SCORE: 0
ADLS_ACUITY_SCORE: 32.25
ADLS_ACUITY_SCORE: 32.25
ADLS_ACUITY_SCORE: 0
ADLS_ACUITY_SCORE: 32.25
ADLS_ACUITY_SCORE: 32.25
ADLS_ACUITY_SCORE: 20.25

## 2024-11-14 NOTE — PROGRESS NOTES
Pt set their bed alarm off and got out of bed. After standing for few seconds, a big clot noted coming out and going back the penis. unable to clear the clot with CBI so hand irrigation performed. After 200cc saline irrigation, quarter size clot bypassed Villa catheter and came out of from the penis. With more irrigation, multiple small size clots came out.  CBI rate increased to moderate. Output color watermelon to clear. However pt keep getting up and continue bypassing more. Pt fights and kicks staff when care provided. Cross cover notified. Per cross cover wait until 0630 and consult will be placed for urology to exchange Villa.

## 2024-11-14 NOTE — PHARMACY-ADMISSION MEDICATION HISTORY
Pharmacy Intern Admission Medication History    Admission medication history is complete. The information provided in this note is only as accurate as the sources available at the time of the update.    Information Source(s): Family member, Patient's pharmacy, and CareEverywhere/SureScripts via phone    Pertinent Information: Patient was recently discharged on 11/12 and was prescribed Glipizide, Tamsulosin, and Lantus. The patients son was not sure if he stared the tamsulosin or glipizide (called pharmacy and confirmed medications were sent to the patients floor at time of discharge). The son did confirm he has not started the lantus     Changes made to PTA medication list:  Added: None  Deleted: Lantus   Changed: None    Allergies reviewed with patient and updates made in EHR: no    Medication History Completed By: Katie Dodd RPH 11/14/2024 9:12 AM    PTA Med List   Medication Sig Last Dose/Taking    acetaminophen (TYLENOL) 325 MG tablet Take 650 mg by mouth every 8 hours as needed for mild pain Unknown    aspirin (ASA) 325 MG EC tablet Take 325 mg by mouth daily 11/13/2024    atorvastatin (LIPITOR) 40 MG tablet Take 1 tablet (40 mg) by mouth every evening 11/13/2024    carvedilol (COREG) 25 MG tablet Take 1 tablet (25 mg) by mouth 2 times daily (with meals) 11/13/2024    cephALEXin (KEFLEX) 500 MG capsule Take 1 capsule (500 mg) by mouth every 12 hours for 2 days. 11/13/2024    finasteride (PROSCAR) 5 MG tablet Take 5 mg by mouth daily 11/13/2024    glipiZIDE (GLUCOTROL) 5 MG tablet Take 1 tablet (5 mg) by mouth every morning (before breakfast). Unknown    Melatonin 10 MG TABS tablet Take 10 mg by mouth nightly as needed for sleep Unknown    mirtazapine (REMERON) 15 MG tablet Take 15 mg by mouth nightly as needed (agitation). Unknown    tamsulosin (FLOMAX) 0.4 MG capsule Take 1 capsule (0.4 mg) by mouth daily. Unknown

## 2024-11-14 NOTE — PLAN OF CARE
PRIMARY DIAGNOSIS: Gross Hematuria   OUTPATIENT/OBSERVATION GOALS TO BE MET BEFORE DISCHARGE:  Diagnostic test and consults (if applicable) complete: Yes    Vitals signs stable or return to baseline: Yes    Tolerate oral intake to maintain hydration: Yes    Pain status: PAINAD score of 3    Tolerating oral antibiotics or has plans for home infusion setup:  Yes    Return to near baseline physical activity: No    Discharge Planner Nurse   Safe discharge environment identified: No  Barriers to discharge: Yes       Entered by: Ben Giles RN 11/14/2024 2:51 PM     Patient is a Ecuadorean speaking male - Lethargic but does open eyes spontaneously. Unable to assess orientation but seems like baseline is confused and disoriented. His VS are stable on room air. Has a left PIV SL and functional. PAINAD assessment of 3 and given PRN acetaminophen.     CBI has had to be hand irrigated 2x this shift (1539-7185). First time had lots of flaky clots of first irrigation then clear. 1230 CBI clamped per urology. 1430 hand irrigated again with some flakiness but output clear - some resistance when pulling back. See progress notes.     On a purred and level 2 mildly thick diet - aspiration precautions - has history of dysphagia. Incontinent of feces/urine. Has 3-way indwelling catheter placed with CBI. Unknown when last BM was although passing flatus and bowel sounds are active.     Up with Ax2 with gait belt and teresita steady. After speaking with family, appears he is moving here below baseline.     /70 (BP Location: Left arm)   Pulse 75   Temp 97.7  F (36.5  C) (Oral)   Resp 18   Wt 80.5 kg (177 lb 7.5 oz)   SpO2 96%   BMI 22.18 kg/m     Please review provider order for any additional goals.   Nurse to notify provider when observation goals have been met and patient is ready for discharge.  Problem: Adult Inpatient Plan of Care  Goal: Plan of Care Review  Description: The Plan of Care Review/Shift note should be completed  "every shift.  The Outcome Evaluation is a brief statement about your assessment that the patient is improving, declining, or no change.  This information will be displayed automatically on your shift  note.  Outcome: Progressing  Flowsheets (Taken 11/14/2024 1130)  Outcome Evaluation: CBI variable - Clear at rest - occasional clots - More pink/red when mobile  Plan of Care Reviewed With: patient  Overall Patient Progress: no change  Goal: Patient-Specific Goal (Individualized)  Description: You can add care plan individualizations to a care plan. Examples of Individualization might be:  \"Parent requests to be called daily at 9am for status\", \"I have a hard time hearing out of my right ear\", or \"Do not touch me to wake me up as it startles  me\".  Outcome: Progressing  Goal: Absence of Hospital-Acquired Illness or Injury  Outcome: Progressing  Intervention: Identify and Manage Fall Risk  Recent Flowsheet Documentation  Taken 11/14/2024 1125 by Ben Giles RN  Safety Promotion/Fall Prevention:   activity supervised   assistive device/personal items within reach   bedside attendant   clutter free environment maintained   lighting adjusted   mobility aid in reach   nonskid shoes/slippers when out of bed   patient and family education   room organization consistent   safety round/check completed   supervised activity  Intervention: Prevent Skin Injury  Recent Flowsheet Documentation  Taken 11/14/2024 1125 by Ben Giles RN  Body Position: position changed independently  Intervention: Prevent and Manage VTE (Venous Thromboembolism) Risk  Recent Flowsheet Documentation  Taken 11/14/2024 1125 by Ben Giles RN  VTE Prevention/Management: SCDs off (sequential compression devices)  Goal: Optimal Comfort and Wellbeing  Outcome: Progressing  Goal: Readiness for Transition of Care  Outcome: Progressing  Intervention: Mutually Develop Transition Plan  Recent Flowsheet Documentation  Taken 11/14/2024 1009 by Chan" Ben FELIX, RN  Equipment Currently Used at Home: (Sit-to-Stand)   walker, standard   other (see comments)   Goal Outcome Evaluation:      Plan of Care Reviewed With: patient    Overall Patient Progress: no changeOverall Patient Progress: no change    Outcome Evaluation: CBI variable - Clear at rest - occasional clots - More pink/red when mobile

## 2024-11-14 NOTE — PROGRESS NOTES
[facility]    Medicine Progress Note - Hospitalist Service    Date of Admission:  11/13/2024    Assessment & Plan   Adam Huber is a 89 year old male  with past medical history significant for dementia, CAD, type 2 diabetes mellitus, hypertension, BPH with chronic urinary obstruction recently hospitalized from 11/7/2024 to 11/12/2024 for chronic bladder outlet obstruction and complicated UTI with group B strep.  Patient was brought back to the emergency room as he was noted to have louisa hematuria.  Patient has underlying dementia offers no complaints per family he may have mentioned some pain.  Hematuria resolved  Waiting for plan from Urology    Gross hematuria    - suspect traumatic patient may have pulled his catheter    - started on CBI in the emergency room: urine now clear    - waiting for Urology to see    - Hb stable    UTI    - group B strep UTI (just hospitalized and discharged 11/12)    - complete oral treatment with keflex (course was to end today/tomorrow)    - will put stop date on keflex dosing    Chronic Urinary Bladder Outlet Obstruction  BPH    - history of recurrent urinary tract infections    - plan per urology    - original plan was chronic parmar with monthly exchanges but this may not work due to his dementia    - continue PTA finasteride    - cont tamsulosin 0.4mg PO daily      Type 2 Diabetes Mellitus w/ Hyperglycemia  Hx of type 2 diabetes mellitus on lantus. Presented with severe hyperglycemia; glucose 485. Starting MDSSI during admission.     Hepatic Cirrhosis  Elevated Alkaline Phosphatase  Posterior Right 2.9 cm Hepatic Lesion    - cirrhotic appearance to liver noted on CT abdomen/pelvis    - LFTs within normal limits, with exception of isolated elevated of alkaline phosphatase to 210    - incidentally noted hepatic lesion to 2.9cm on right posterior lobe    - defer to outpatient PCP on whether to follow lesion with imaging given his comorbidities and  underlying dementia     Dementia w/ history of behavioral Disturbances  Hx CVA    - patient chronically with slurred speech    - on dysphagia diet    - needs help with most of his ADLs    - lives at home independently with assistance from his wife/family    - patient does not do well with ipad      Decubitus Ulcers    - wound care by nurse     CAD s/p LEXI (2015)    - had PCI of mid circumflex with drug-eluting stent in 2015    - holding ASA    Hypertension  Hyperlipidemia    - cont prior to admission meds       Diabetes    - cont lantus, glipizide    - ISS    Called and updated son. Indicated I would like to speak to/examine the patient once he is here to interpret     Observation Goals: -diagnostic tests and consults completed and resulted, -vital signs normal or at patient baseline, Nurse to notify provider when observation goals have been met and patient is ready for discharge.  Diet: Pureed Diet (level 4) Mildly Thick (level 2)    DVT Prophylaxis: Pneumatic Compression Devices  Villa Catheter: PRESENT, indication: Gross hematuria  Lines: None     Cardiac Monitoring: None  Code Status: No CPR- Do NOT Intubate      Clinically Significant Risk Factors Present on Admission               # Hypoalbuminemia: Lowest albumin = 2.8 g/dL at 11/14/2024 12:03 AM, will monitor as appropriate   # Drug Induced Platelet Defect: home medication list includes an antiplatelet medication   # Hypertension: Noted on problem list  # Chronic heart failure with preserved ejection fraction: heart failure noted on problem list and last echo with EF >50%    # Dementia: noted on problem list      # DMII: A1C = 12.6 % (Ref range: <5.7 %) within past 6 months        # Financial/Environmental Concerns:           Disposition Plan     Medically Ready for Discharge: Anticipated Tomorrow  Remy Jacob MD  Hospitalist Service  [facility]  Securely message with AMT (more info)  Text page via [employer]  Paging/Directory   ______________________________________________________________________    Interval History   Patient sitting in chair. Sitter in room. Patient is unable to use the  iPad appropriately. Indicated to son I would wait for him to arrive to interpret.  Patient is comfortable. Eating/drinking    Physical Exam   Vital Signs: Temp: 97.7  F (36.5  C) Temp src: Oral BP: 107/70 Pulse: 75   Resp: 18 SpO2: 96 % O2 Device: None (Room air)    Weight: 177 lbs 7.52 oz    Constitutional: awake, alert, cooperative, no apparent distress, and appears stated age  Eyes: Lids and lashes normal, pupils equal, round and reactive to light, extra ocular muscles intact, sclera clear, conjunctiva normal  ENT: Normocephalic, without obvious abnormality, atraumatic, sinuses nontender on palpation, external ears without lesions, oral pharynx with moist mucous membranes, tonsils without erythema or exudates, gums normal and good dentition.    Medical Decision Making       30 MINUTES SPENT BY ME on the date of service doing chart review, history, exam, documentation & further activities per the note.      Data     I have personally reviewed the following data over the past 24 hrs:    7.7  \   12.9 (L)   / 216     137 105 18.4 /  143 (H)   4.2 23 0.68 \     ALT: 32 AST: N/A AP: 237 (H) TBILI: 0.5   ALB: 2.8 (L) TOT PROTEIN: 6.4 LIPASE: N/A       Imaging results reviewed over the past 24 hrs:   No results found for this or any previous visit (from the past 24 hours).

## 2024-11-14 NOTE — CONSULTS
Urology Consult    Name:  Adam Huber  MRN:  [id number]  Age/: 89 year old, 1935    CC: Gross hematuria     HPI: Adam Huber is a(n) 89 year old male admitted overnight with gross hematuria.  He was discharged on a Villa catheter after being treated for complicated UTI with chronic bladder outlet obstruction.  He was admitted between  to 2024.  He has underlying dementia and does not speak English for a very difficult to communicate and understand what really happened to start the hematuria  Currently is on low CBI and urine appears very clear at around grade 1  on the hematuria scale  Not in any pain or discomfort  Past Medical History:  Past Medical History:   Diagnosis Date    Acute chest pain 10/23/2015    CAD (coronary artery disease)     Essential hypertension 10/23/2015    History of CVA (cerebrovascular accident) 10/23/2015    HLD (hyperlipidemia)     HTN (hypertension)     NSTEMI (non-ST elevated myocardial infarction) (H) 10/23/2015    Post PTCA 10-    Successful PCI of culprit proximal to mid RCA with placement of a    Post PTCA 2015    pci of complex mid CFX-hector       Past Surgical History:  Past Surgical History:   Procedure Laterality Date    CHOLECYSTECTOMY      GENITOURINARY SURGERY      prostate removal     HEART CATH LEFT HEART CATH  10/12/2015    PCI w/ HECTOR to RCA    HEART CATH RIGHT AND LEFT HEART CATH  2015    PCI w/ HECTOR to mid-CFX       Allergies:     Allergies   Allergen Reactions    Lisinopril     Morphine Other (See Comments)     confusion       Medications:  Current Facility-Administered Medications   Medication Dose Route Frequency Provider Last Rate Last Admin    acetaminophen (TYLENOL) tablet 650 mg  650 mg Oral Q8H PRN Елена Carballo MD   650 mg at 24 1046    [Held by provider] aspirin (ASA) EC tablet 325 mg  325 mg Oral Daily Елена Carballo MD        atorvastatin (LIPITOR) tablet 40 mg  40 mg Oral QPM Елена Carballo  MD Nitesh        carvedilol (COREG) tablet 25 mg  25 mg Oral BID w/meals Remy Jacob MD        cephALEXin (KEFLEX) capsule 500 mg  500 mg Oral Q12H Remy Jacob MD        glucose gel 15-30 g  15-30 g Oral Q15 Min PRN Remy Jacob MD        Or    dextrose 50 % injection 25-50 mL  25-50 mL Intravenous Q15 Min PRN Remy Jacob MD        Or    glucagon injection 1 mg  1 mg Subcutaneous Q15 Min PRN Remy Jacob MD        finasteride (PROSCAR) tablet 5 mg  5 mg Oral Daily Елена Carballo MD   5 mg at 11/14/24 0949    glipiZIDE (GLUCOTROL) tablet 5 mg  5 mg Oral QAM AC Елена Carballo MD   5 mg at 11/14/24 0949    insulin aspart (NovoLOG) injection (RAPID ACTING)  1-7 Units Subcutaneous TID AC Remy Jacob MD        insulin aspart (NovoLOG) injection (RAPID ACTING)  1-5 Units Subcutaneous At Bedtime Remy Jacob MD        insulin glargine (LANTUS PEN) injection 10 Units  10 Units Subcutaneous QAM AC Елена Carballo MD        mirtazapine (REMERON) tablet 15 mg  15 mg Oral At Bedtime PRN Елена Carballo MD        OLANZapine zydis (zyPREXA) ODT tab 5 mg  5 mg Oral BID PRN Remy Jacob MD        ondansetron (ZOFRAN ODT) ODT tab 4 mg  4 mg Oral Q6H PRN Елена Carballo MD        Or    ondansetron (ZOFRAN) injection 4 mg  4 mg Intravenous Q6H PRN Елена Carballo MD        prochlorperazine (COMPAZINE) injection 5 mg  5 mg Intravenous Q6H PRN Елена Carballo MD        Or    prochlorperazine (COMPAZINE) tablet 5 mg  5 mg Oral Q6H PRN Елена Carballo MD        senna-docusate (SENOKOT-S/PERICOLACE) 8.6-50 MG per tablet 1 tablet  1 tablet Oral BID PRN Елена Carballo MD        Or    senna-docusate (SENOKOT-S/PERICOLACE) 8.6-50 MG per tablet 2 tablet  2 tablet Oral BID PRN Елена Carballo MD        sodium chloride 0.9% irrigation (bag)   Irrigation Continuous Елена Carballo MD   3,000 mL at 11/14/24 0949    tamsulosin (FLOMAX)  capsule 0.4 mg  0.4 mg Oral Daily Елена Carballo MD   0.4 mg at 11/14/24 0950       Social History:  Social History     Socioeconomic History    Marital status:      Spouse name: Not on file    Number of children: Not on file    Years of education: Not on file    Highest education level: Not on file   Occupational History    Not on file   Tobacco Use    Smoking status: Never    Smokeless tobacco: Never   Substance and Sexual Activity    Alcohol use: No     Alcohol/week: 0.0 standard drinks of alcohol    Drug use: No    Sexual activity: Not on file   Other Topics Concern     Service Not Asked    Blood Transfusions Not Asked    Caffeine Concern No     Comment: seldom    Occupational Exposure Not Asked    Hobby Hazards Not Asked    Sleep Concern Yes     Comment: does not sleep well    Stress Concern Not Asked    Weight Concern Not Asked    Special Diet Yes     Comment: lower sodium and fat    Back Care Not Asked    Exercise No    Bike Helmet Not Asked    Seat Belt Not Asked    Self-Exams Not Asked    Parent/sibling w/ CABG, MI or angioplasty before 65F 55M? Not Asked   Social History Narrative    Not on file     Social Drivers of Health     Financial Resource Strain: Unknown (11/14/2024)    Financial Resource Strain     Within the past 12 months, have you or your family members you live with been unable to get utilities (heat, electricity) when it was really needed?: Patient unable to answer   Food Insecurity: Unknown (11/14/2024)    Food Insecurity     Within the past 12 months, did you worry that your food would run out before you got money to buy more?: Patient unable to answer     Within the past 12 months, did the food you bought just not last and you didn t have money to get more?: Patient unable to answer   Transportation Needs: Unknown (11/14/2024)    Transportation Needs     Within the past 12 months, has lack of transportation kept you from medical appointments, getting your medicines,  non-medical meetings or appointments, work, or from getting things that you need?: Patient unable to answer   Physical Activity: Not on file   Stress: Not on file   Social Connections: Not on file   Interpersonal Safety: High Risk (11/8/2024)    Interpersonal Safety     Do you feel physically and emotionally safe where you currently live?: No     Within the past 12 months, have you been hit, slapped, kicked or otherwise physically hurt by someone?: No     Within the past 12 months, have you been humiliated or emotionally abused in other ways by your partner or ex-partner?: No   Housing Stability: Unknown (11/14/2024)    Housing Stability     Do you have housing? : Patient unable to answer     Are you worried about losing your housing?: Patient unable to answer       Family History:  Family History   Problem Relation Age of Onset    Unknown/Adopted No family hx of        ROS:  The remainder of the complete ROS was negative unless noted in the HPI.    Exam:  /70 (BP Location: Left arm)   Pulse 75   Temp 97.7  F (36.5  C) (Oral)   Resp 18   Wt 80.5 kg (177 lb 7.5 oz)   SpO2 96%   BMI 22.18 kg/m    General: Alert, lying in the bed and noncommunicative, has a bedside attendant  Resp: CTAB, no crackles or wheezes  Cardiac: Regular rate; extremities warm;   Abdomen: Soft, nontender, nondistended. .  : three-way Villa catheter in draining clear urine on low CBI  Extremities: No LE edema or obvious joint abnormalities  Skin: Warm and dry, no jaundice or rash    Labs:  Lab Results   Component Value Date    WBC 7.7 11/14/2024    HGB 12.9 (L) 11/14/2024    HCT 39.6 (L) 11/14/2024     11/14/2024     11/14/2024    POTASSIUM 4.2 11/14/2024    CHLORIDE 105 11/14/2024    CO2 23 11/14/2024    BUN 18.4 11/14/2024    CR 0.68 11/14/2024     (H) 11/14/2024    DD 0.57 (H) 04/01/2024    NTBNPI 941 11/09/2024    TROPONIN <0.015 09/22/2021    TROPI <0.015 12/04/2019    AST  11/14/2024      Comment:       Unsatisfactory specimen - hemolyzed      ALT 32 11/14/2024    ALKPHOS 237 (H) 11/14/2024    BILITOTAL 0.5 11/14/2024    PALMA 23 02/04/2024    INR 1.00 08/31/2023       Imaging: CT ABDOMEN PELVIS W CONTRAST 11/7/2024 4:14 PM     CLINICAL HISTORY: right flank pain     TECHNIQUE: CT scan of the abdomen and pelvis was performed following  injection of IV contrast. Multiplanar reformats were obtained. Dose  reduction techniques were used.  CONTRAST: 92 mL Isovue-370     COMPARISON: CT abdomen/pelvis 4/1/2024     FINDINGS:   LOWER CHEST: No definite airspace consolidation or pleural effusion.  Severe coronary artery calcifications and/or stents.     HEPATOBILIARY: Nodular contour of the liver with widening of the  fissures. Subtle 2.9 cm hyperattenuating lesion in the posterior right  lobe of the liver, increased in conspicuity but may have been present  in hindsight on prior CT. Cholecystectomy. No evidence of biliary  obstruction.     PANCREAS: Atrophic but otherwise unremarkable.     SPLEEN: Normal.     ADRENAL GLANDS: Normal.     KIDNEYS/BLADDER: Bilateral renal cysts, no specific follow-up  recommended. No nephroureterolithiasis or hydronephrosis. Urinary  bladder demonstrates diffuse wall thickening, innumerable diverticula  and significant surrounding fat stranding, increased in conspicuity  since prior exam. Urothelial hyperenhancement is also noted.     BOWEL: No obstruction or inflammatory change. Moderate volume formed  stool throughout the colon. Normal appendix     PELVIC ORGANS: Mild prostatomegaly. Otherwise unremarkable.     ADDITIONAL FINDINGS: No lymphadenopathy or ascites. Moderate calcified  and noncalcified atherosclerosis.     MUSCULOSKELETAL: No acute bony normality. Stable T12 compression  deformity.                                                                      IMPRESSION:   1.  Findings of chronic bladder outlet obstruction with significant  increase in urothelial hyperenhancement and  surrounding fat stranding,  could indicate superimposed cystitis, correlate with urinalysis.  2.  No nephroureterolithiasis or hydronephrosis. No definite  radiographic evidence of pyelonephritis.  3.  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     CATHLEEN WAGGONER MD            Assessment and Plan: Adam Huber is a(n) 89 year old male with chronic urinary retention and has evidence of gross hematuria possibly related to some catheter related trauma currently running near clear on CBI.  He will need a full hematuria workup as an outpatient including an outpatient cystoscopy.  CT done on previous admission did not show any significant concerning lesions though there were multiple small diverticuli consistent with chronic outflow obstruction.  The recent onset hematuria may be related to a UTI exacerbation or catheter trauma.  I was called by the nursing team after initially clamping the CBI of the changing nature of the urine to light pink with some clots that were aspirated during hand irrigation    Recommendations  1.  Continue low rate CBI for the overnight  2.  Will reassess in the morning and consider for changes in his CBI plan  3.  He will need an outpatient workup for gross hematuria with one of the providers in the urology clinic including a cystoscopy  4.  Urology will continue to follow    Nik Brown MD  [facility]

## 2024-11-14 NOTE — PLAN OF CARE
PRIMARY DIAGNOSIS: Gross Hematuria   OUTPATIENT/OBSERVATION GOALS TO BE MET BEFORE DISCHARGE:  Diagnostic test and consults (if applicable) complete: Yes    Vitals signs stable or return to baseline: Yes    Tolerate oral intake to maintain hydration: Yes    Pain status: PAINAD score of 3    Tolerating oral antibiotics or has plans for home infusion setup:  Yes    Return to near baseline physical activity: No    Discharge Planner Nurse   Safe discharge environment identified: No  Barriers to discharge: Yes       Entered by: Ben Giles RN 11/14/2024 1:25 PM     Patient is a Qatari speaking male - Lethargic but does open eyes spontaneously. Unable to assess orientation but seems like baseline is confused and disoriented. His VS are stable on room air. Has a left PIV SL and functional. PAINAD assessment of 3 and given PRN acetaminophen.     CBI has had to be hand irrigated 2x this shift (5286-8113). First time had lots of flaky clots of first irrigation then clear. 1230 CBI clamped per urology. 1430 hand irrigated again with some flakiness but output clear - some resistance when pulling back. See progress notes.     On a purred and level 2 mildly thick diet - aspiration precautions - has history of dysphagia. Incontinent of feces/urine. Has 3-way indwelling catheter placed with CBI. Unknown when last BM was although passing flatus and bowel sounds are active.     Up with Ax2 with gait belt and teresita steady. After speaking with family, appears he is moving here below baseline.     /70 (BP Location: Left arm)   Pulse 75   Temp 97.7  F (36.5  C) (Oral)   Resp 18   Wt 80.5 kg (177 lb 7.5 oz)   SpO2 96%   BMI 22.18 kg/m     Please review provider order for any additional goals.   Nurse to notify provider when observation goals have been met and patient is ready for discharge.  Problem: Adult Inpatient Plan of Care  Goal: Plan of Care Review  Description: The Plan of Care Review/Shift note should be completed  "every shift.  The Outcome Evaluation is a brief statement about your assessment that the patient is improving, declining, or no change.  This information will be displayed automatically on your shift  note.  Outcome: Progressing  Flowsheets (Taken 11/14/2024 1130)  Outcome Evaluation: CBI variable - Clear at rest - occasional clots - More pink/red when mobile  Plan of Care Reviewed With: patient  Overall Patient Progress: no change  Goal: Patient-Specific Goal (Individualized)  Description: You can add care plan individualizations to a care plan. Examples of Individualization might be:  \"Parent requests to be called daily at 9am for status\", \"I have a hard time hearing out of my right ear\", or \"Do not touch me to wake me up as it startles  me\".  Outcome: Progressing  Goal: Absence of Hospital-Acquired Illness or Injury  Outcome: Progressing  Intervention: Identify and Manage Fall Risk  Recent Flowsheet Documentation  Taken 11/14/2024 1125 by Ben Giles RN  Safety Promotion/Fall Prevention:   activity supervised   assistive device/personal items within reach   bedside attendant   clutter free environment maintained   lighting adjusted   mobility aid in reach   nonskid shoes/slippers when out of bed   patient and family education   room organization consistent   safety round/check completed   supervised activity  Intervention: Prevent Skin Injury  Recent Flowsheet Documentation  Taken 11/14/2024 1125 by Ben Giles RN  Body Position: position changed independently  Intervention: Prevent and Manage VTE (Venous Thromboembolism) Risk  Recent Flowsheet Documentation  Taken 11/14/2024 1125 by Ben Giles RN  VTE Prevention/Management: SCDs off (sequential compression devices)  Goal: Optimal Comfort and Wellbeing  Outcome: Progressing  Goal: Readiness for Transition of Care  Outcome: Progressing  Intervention: Mutually Develop Transition Plan  Recent Flowsheet Documentation  Taken 11/14/2024 1009 by Chan" Ben FELIX, RN  Equipment Currently Used at Home: (Sit-to-Stand)   walker, standard   other (see comments)   Goal Outcome Evaluation:      Plan of Care Reviewed With: patient    Overall Patient Progress: no changeOverall Patient Progress: no change    Outcome Evaluation: CBI variable - Clear at rest - occasional clots - More pink/red when mobile

## 2024-11-14 NOTE — ED TRIAGE NOTES
Discharged from hospital with parmar catheter.  Son states red bloody urine.  Has had very little output since noon today.  Only what is in bag which appears less than 25 mL bright red.      Triage Assessment (Adult)       Row Name 11/13/24 2249          Triage Assessment    Airway WDL WDL        Respiratory WDL    Respiratory WDL WDL

## 2024-11-14 NOTE — DISCHARGE INSTRUCTIONS
Continue antibiotics as prescribed. Monitor for fever, increasing abdominal pain, persistent hematuria.

## 2024-11-14 NOTE — PLAN OF CARE
Goal Outcome Evaluation:      Plan of Care Reviewed With: patient    Overall Patient Progress: no changeOverall Patient Progress: no change     BP (!) 149/75 (BP Location: Left arm, Patient Position: Semi-Luong's, Cuff Size: Adult Regular)   Pulse 64   Temp 97.7  F (36.5  C) (Oral)   Resp 18   Wt 80.5 kg (177 lb 7.5 oz)   SpO2 98%   BMI 22.18 kg/m      PRIMARY DIAGNOSIS: Gross Hematuria     OUTPATIENT/OBSERVATION GOALS TO BE MET BEFORE DISCHARGE  1. Orthostatic performed: No    2. Tolerating PO medications: Yes    3. Return to near baseline physical activity: No    4. Cleared for discharge by consultants (if involved): No    Discharge Planner Nurse   Safe discharge environment identified: No  Barriers to discharge: Yes       Entered by: Luis Fernando Villafana RN 11/14/2024 4:24 PM   Pt is Trinidadian speaking, Pt could be impulsive at a times, has sitter at bed side, assist of two for transfers, gave PRN Zyprexa for agitation, CBI running in slow rate at this moment, will be running in slow rate for the rest of night per urology, the parmar out put seems to be very clear except some clot in it, plan is outpatient workup for gross hematuria with one of the providers in the urology clinic including a cystoscopy, urology following.   Please review provider order for any additional goals.   Nurse to notify provider when observation goals have been met and patient is ready for discharge.    Problem: Adult Inpatient Plan of Care  Goal: Plan of Care Review  Description: The Plan of Care Review/Shift note should be completed every shift.  The Outcome Evaluation is a brief statement about your assessment that the patient is improving, declining, or no change.  This information will be displayed automatically on your shift  note.  Outcome: Progressing  Flowsheets (Taken 11/14/2024 9826)  Plan of Care Reviewed With: patient  Overall Patient Progress: no change  Goal: Patient-Specific Goal (Individualized)  Description: You can  "add care plan individualizations to a care plan. Examples of Individualization might be:  \"Parent requests to be called daily at 9am for status\", \"I have a hard time hearing out of my right ear\", or \"Do not touch me to wake me up as it startles  me\".  Outcome: Progressing  Goal: Absence of Hospital-Acquired Illness or Injury  Outcome: Progressing  Intervention: Identify and Manage Fall Risk  Recent Flowsheet Documentation  Taken 11/14/2024 1500 by Luis Fernando Villafana RN  Safety Promotion/Fall Prevention:   activity supervised   assistive device/personal items within reach   bedside attendant   clutter free environment maintained   lighting adjusted   mobility aid in reach   nonskid shoes/slippers when out of bed   patient and family education   room organization consistent   safety round/check completed   supervised activity  Intervention: Prevent Skin Injury  Recent Flowsheet Documentation  Taken 11/14/2024 1500 by Luis Fernando Villafana RN  Body Position: position changed independently  Goal: Optimal Comfort and Wellbeing  Outcome: Progressing  Goal: Readiness for Transition of Care  Outcome: Progressing     "

## 2024-11-14 NOTE — PLAN OF CARE
[facility]    ED Boarding Nurse Handoff Addendum Report:    Date/time: 11/14/2024, 2:57 AM    Activity Level: in bed    Fall Risk: Yes:  nonskid shoes/slippers when out of bed, arm band in place, patient and family education, assistive device/personal items within reach, activity supervised, and room door open    Active Infusions: None    Current Meds Due: Ceftriaxone    Current care needs: Safety, abx - ceftriaxone, bladder irrigation    Oxygen requirements (liters/min and/or FiO2): None    Respiratory status: Room air    Vital signs (within last 30 minutes):    Vitals:    11/13/24 2251 11/14/24 0146 11/14/24 0247   BP: (!) 166/66 (!) 185/85 (!) 181/81   BP Location:   Right arm   Patient Position:   Supine   Pulse: 61 68 70   Resp: 16  16   Temp: 98.2  F (36.8  C)  98.5  F (36.9  C)   TempSrc: Temporal  Oral   SpO2: 95% 94%        Focused assessment within last 30 minutes:    *[place]orese speaker -   Neuro: Disorientated x4, advanced dementia  Activity: have not been out of bed yet  Telemetry Monitoring: No  Pain: not showing any nonverbal signs of being in pain.  : Chronic parmar with red/clotted output, continuous bladder irrigation  LDA's: Peripheral   Fluids: is Saline locked.  Diet: No orders  Discharge Disposition:  TBD    Plan of Care:    Bladder irrigation, abx - ceftriaxone    ED Boarding Nurse name: Lois Montoya RN

## 2024-11-14 NOTE — ED PROVIDER NOTES
Emergency Department Note      History of Present Illness     Chief Complaint   Hematuria    History is provided by the patient, translated from Fijian by patient son.     BRUNA Huber is a 89 year old male with a history of dementia, DM2, PE, hypertension, CAD, CHF, NSTEMI, and CVA amongst others who presents to the ED with hematuria. Patient had a parmar catheter placed during hospital stay with recent discharge 11/12 following admission for complicated Group B streptococcus UTI. He presents to the ED accompanied by his son who explains that earlier this evening he observed red blood in the patients parmar catheter bag. His son denies observing any new or worsening confusion. Denies any vomiting, fever, abdominal pain, or dysuria. Patients son is unsure if the patient may have pulled or otherwise disrupted the parmar catheter. Patient is anticoagulated on Aspirin 325mg. Patient is still taking his Keflex as prescribed.     Independent Historian   Son as detailed above.    Review of External Notes   I reviewed ED discharge summary from 11/12. Grew group B strep on urine culture. Discharged on Keflex    Past Medical History     Medical History and Problem List   Acute chest pain  CAD  Hypertension  Hyperlipidemia  NSTEMI  Post PTCA  CVA  Elevated troponin  Physical deconditioning  Acute metabolic encephalopathy  T12 compression fracture  Acute delirium   Late onset Alzheimer's disease  Diabetes mellitus, type 2  Rhabdomyolysis  Altered mental status  Encephalopathy  Pulmonary nodules  Acute cystitis with hematuria  Severe sepsis  Pulmonary edema  Congestive heart failure  Hypoxia  Pyelonephritis  Pressure injury to sacral region  Macular degeneration  Chronic headache  Insomnia  Dupuytren's contracture  Hemiplegia    Medications   Tylenol  Aspirin 325mg  Lipitor  Coreg  Keflex  Proscar  Glucotrol  Lantus  Remeron  Flomax  Nitrostat  Risperdal  Klor-con  Glucophage    Surgical History   Past Surgical  History:   Procedure Laterality Date    CHOLECYSTECTOMY      GENITOURINARY SURGERY      prostate removal     HEART CATH LEFT HEART CATH  10/12/2015    PCI w/ LEXI to RCA    HEART CATH RIGHT AND LEFT HEART CATH  11/6/2015    PCI w/ LEXI to mid-CFX       Physical Exam     Patient Vitals for the past 24 hrs:   BP Temp Temp src Pulse Resp SpO2   11/14/24 0146 (!) 185/85 -- -- 68 -- 94 %   11/13/24 2251 (!) 166/66 98.2  F (36.8  C) Temporal 61 16 95 %     Physical Exam  Nursing note and vitals reviewed.  Constitutional: Well nourished.   Eyes: Conjunctiva normal.   ENT: Nose normal. Mucous membranes pink and moist.    Neck: Normal range of motion.  CVS: Normal rate, regular rhythm.  Normal heart sounds.   Pulmonary: Lungs clear to auscultation bilaterally. No wheezes/rales/rhonchi.  GI: Abdomen soft. Nontender, nondistended. No rigidity or guarding. No CVA tenderness  : 16F parmar catheter in place, no blood at ureteral meatus. Parmar bag with red tinged urine. No testicular tenderness or masses. Normal penis, no penile discharge.  Chaperone RN Clara  MSK: No calf tenderness or swelling.  Neuro: Alert. Follows simple commands.  Skin: Skin is warm and dry. Stage 3 R buttock pressure injury 2x1cm, no erythema/warmth/exudate/crepitance  Psychiatric: Flat affect    Diagnostics     Lab Results   Labs Ordered and Resulted from Time of ED Arrival to Time of ED Departure   ROUTINE UA WITH MICROSCOPIC - Abnormal       Result Value    Color Urine Red (*)     Appearance Urine Cloudy (*)     Glucose Urine >=1000 (*)     Bilirubin Urine Negative      Ketones Urine Negative      Specific Gravity Urine 1.016      Blood Urine Large (*)     pH Urine 6.0      Protein Albumin Urine 70 (*)     Urobilinogen Urine Normal      Nitrite Urine Negative      Leukocyte Esterase Urine Moderate (*)     RBC Urine >182 (*)     WBC Urine 93 (*)    COMPREHENSIVE METABOLIC PANEL - Abnormal    Sodium 139      Potassium 4.9      Carbon Dioxide (CO2) 23       Anion Gap 11      Urea Nitrogen 20.3      Creatinine 0.73      GFR Estimate 87      Calcium 8.2 (*)     Chloride 105      Glucose 222 (*)     Alkaline Phosphatase 237 (*)     AST        ALT 32      Protein Total 6.4      Albumin 2.8 (*)     Bilirubin Total 0.5     CBC WITH PLATELETS AND DIFFERENTIAL - Abnormal    WBC Count 7.9      RBC Count 4.38 (*)     Hemoglobin 12.7 (*)     Hematocrit 39.0 (*)     MCV 89      MCH 29.0      MCHC 32.6      RDW 14.7      Platelet Count 210      % Neutrophils 53      % Lymphocytes 31      % Monocytes 11      % Eosinophils 4      % Basophils 1      % Immature Granulocytes 1      NRBCs per 100 WBC 0      Absolute Neutrophils 4.2      Absolute Lymphocytes 2.5      Absolute Monocytes 0.9      Absolute Eosinophils 0.3      Absolute Basophils 0.1      Absolute Immature Granulocytes 0.0      Absolute NRBCs 0.0     URINE CULTURE     Imaging   No orders to display     Independent Interpretation   N/A    ED Course      Medications Administered   Medications   lidocaine (XYLOCAINE) 2 % external gel 6 mL (has no administration in time range)   sodium chloride 0.9% irrigation (bag) (has no administration in time range)   cefTRIAXone (ROCEPHIN) 1 g vial to attach to  mL bag for ADULTS or NS 50 mL bag for PEDS (has no administration in time range)     Procedures   Procedures     Discussion of Management   See ED course    ED Course   ED Course as of 11/14/24 0152   Wed Nov 13, 2024   2315 I obtained history and examined the patient as noted above.     Thu Nov 14, 2024   0141 I rechecked the patient and explained findings.   0152 I consulted with Dr. Carballo of the hospitalist service and discussed patient admission. They accepted care of the patient.       Additional Documentation  None    Medical Decision Making / Diagnosis     MIPS       None    [facility]   Adam Huber is a 89 year old male with recent hospitalization for complicated UTI with Villa placement presenting with hematuria.   Patient otherwise hemodynamically stable on arrival, in no significant distress.  Villa catheter was exchanged without complication.  There was no evidence to suggest obvious urethral trauma.  Unfortunately patient continued to have notable hematuria.  UA still appears infected.  He was given a dose of IV Rocephin.  Plan for continuous bladder irrigation at this point in time.  He has no significant abdominal tenderness/back pain and I do not feel emergent imaging is warranted at this point in time.  Labs without evidence to suggest underlying coagulopathy or underlying sepsis.  He remained hemodynamically stable, accepted by hospitalist for admission.    Disposition   The patient was admitted to the hospital.     Diagnosis     ICD-10-CM    1. Hematuria, unspecified type  R31.9       2. History of UTI  Z87.440       3. Villa catheter in place  Z97.8            Discharge Medications   New Prescriptions    No medications on file     Scribe Disclosure:  ELENA MATA, am serving as a scribe at 1:33 AM on 11/14/2024 to document services personally performed by Kiya Borja DO based on my observations and the provider's statements to me.        Kiya Borja DO  11/14/24 0202     No

## 2024-11-14 NOTE — PROGRESS NOTES
Noticed 2 clots in Villa bag (each the size of nickel) - Noticed output had decreased. Hand irrigated with approximately 150 ml's total - first 50 ml had return of small clots/urine - next irrigations had clear saline return.

## 2024-11-14 NOTE — PROGRESS NOTES
Notified provider about indwelling parmar catheter discussed removal or continued need.    Did provider choose to remove indwelling pramar catheter? NO    Provider's parmar indication for keeping indwelling parmar catheter: Indication for continued use: Gross Hematuria    Is there an order for indwelling parmar catheter? YES    *If there is a plan to keep parmar catheter in place at discharge daily notification with provider is not necessary, but please add a notation in the treatment team sticky note that the patient will be discharging with the catheter.

## 2024-11-14 NOTE — PLAN OF CARE
"PRIMARY DIAGNOSIS: HEMATURIA   OUTPATIENT/OBSERVATION GOALS TO BE MET BEFORE DISCHARGE:  ADLs back to baseline: No    Activity and level of assistance: Up with maximum assistance. Consider SW and/or PT evaluation.     Pain status: no nonverbal sign and symptom of pain noted    Return to near baseline physical activity: No     Discharge Planner Nurse   Safe discharge environment identified: Yes  Barriers to discharge: Yes       Entered by: SERGE NAIK RN 11/14/2024 4:43 AM   Vital signs:  Temp: 97.7  F (36.5  C) Temp src: Oral BP: 103/73 Pulse: 69   Resp: 16 SpO2: 97 % O2 Device: None (Room air)     Weight: 80.5 kg (177 lb 7.5 oz)  Estimated body mass index is 22.18 kg/m  as calculated from the following:    Height as of 4/2/24: 1.905 m (6' 3\").    Weight as of this encounter: 80.5 kg (177 lb 7.5 oz).Please review provider order for any additional goals.   Nurse to notify provider when observation goals have been met and patient is ready for discharge.Goal Outcome Evaluation:     Overall Patient Progress: improvingOverall Patient Progress: improving    Outcome Evaluation: urine/CBI out put improving to clear pink   PJ orientation. Pt not responding for questions even if video  used. No family at bedside. Unable to complete admission questions. IV saline locked after Rocephin completed. CBI was running mid to moderate rate upon arrival to the unit with watermelon color output. Villa output improved to clear pink when pt rest in bed. CBI rate decreased to slow. Pt unsteady on feet, not following direction. Marysol steady used for transferring to bed. Urology consulted. On oral abx for UTI. UC pending. On Mod carb diet. Bed alarm on. Call light in reach.     Problem: Adult Inpatient Plan of Care  Goal: Plan of Care Review  Description: The Plan of Care Review/Shift note should be completed every shift.  The Outcome Evaluation is a brief statement about your assessment that the patient is improving, " "declining, or no change.  This information will be displayed automatically on your shift  note.  Outcome: Progressing  Flowsheets (Taken 11/14/2024 0442)  Outcome Evaluation: urine/CBI out put improving to clear pink  Overall Patient Progress: improving  Goal: Patient-Specific Goal (Individualized)  Description: You can add care plan individualizations to a care plan. Examples of Individualization might be:  \"Parent requests to be called daily at 9am for status\", \"I have a hard time hearing out of my right ear\", or \"Do not touch me to wake me up as it startles  me\".  Outcome: Progressing  Goal: Absence of Hospital-Acquired Illness or Injury  Outcome: Progressing  Intervention: Identify and Manage Fall Risk  Recent Flowsheet Documentation  Taken 11/14/2024 0431 by Quique Hwang RN  Safety Promotion/Fall Prevention:   activity supervised   clutter free environment maintained   nonskid shoes/slippers when out of bed   room near nurse's station   room organization consistent   safety round/check completed  Intervention: Prevent Skin Injury  Recent Flowsheet Documentation  Taken 11/14/2024 0431 by Quique Hwang RN  Body Position: position changed independently  Goal: Optimal Comfort and Wellbeing  Outcome: Progressing  Goal: Readiness for Transition of Care  Outcome: Progressing       "

## 2024-11-14 NOTE — H&P
[facility]    Hospitalist History and Physical    Name: Adam Huber    MRN: [id number]  YOB: 1935    Age: 89 year old  Date of Admission:  11/13/2024  Date of Service (when I saw the patient): 11/14/24    Assessment & Plan   Adam Huber is a 89 year old male  with past medical history significant for dementia, CAD, type 2 diabetes mellitus, hypertension, BPH with chronic urinary obstruction recently hospitalized from 11/7/2024 to 11/12/2024 for chronic bladder outlet obstruction and complicated UTI with group B strep.  Patient was brought back to the emergency room as he was noted to have louisa hematuria.  Patient has underlying dementia offers no complaints per family he may have mentioned some pain.    Gross hematuria  -Suspect traumatic patient may have pulled his catheter  -Started on CBI in the emergency room  -Admitted for observation  -Reconsult urology  -Received a dose of IV ceftriaxone in the emergency room  -UA has 93 WBC better than more recent hospitalization and significant hematuria.  No nitrite and moderate leuk esterase.  -Urine culture sent  -Will hold off any changes in antibiotic was discharged home on Keflex/will resume once reconciled  -Monitor in observation unit    Chronic Urinary Bladder Outlet Obstruction  BPH  -Hx of recurrent urinary tract infections.   -Recent hospitalization for complicated UTI discharged 11/12/2024  -Continue PTA finasteride  -Tamsulosin 0.4mg PO daily  -Resume oral antibiotics as prescribed  -Ucx pending  -Continue Villa catheter.  Continue CBI for hematuria        Type 2 Diabetes Mellitus w/ Hyperglycemia  Hx of type 2 diabetes mellitus on metformin. Presented with severe hyperglycemia; glucose 485. Starting MDSSI during admission.     Hepatic Cirrhosis  Elevated Alkaline Phosphatase  Posterior Right 2.9 cm Hepatic Lesion  Cirrhotic appearance to liver noted on CT abdomen/pelvis. LFTs within normal limits, with  exception of isolated elevated of alkaline phosphatase to 210.  Also with incidentally noted hepatic lesion to 2.9cm on right posterior lobe.   - defer to outpatient PCP on whether to follow lesion with imaging given his comorbidities and underlying dementia.     Dementia w/ history of behavioral Disturbances  Hx CVA  Patient chronically with slurred speech.  -patient needs help with most of his ADLs.   -He lives at home independently with assistance from his wife.   -Patient calm.  Not communicating     Decubitus Ulcers:   -WOCN consulted.     CAD s/p LEXI (2015): Had PCI of mid circumflex with drug-eluting stent in 2015. Med rec pending.  Hypertension:   Hyperlipidemia:   -Resume prior to admission meds once reconciled    Clinically Significant Risk Factors Present on Admission               # Hypoalbuminemia: Lowest albumin = 2.8 g/dL at 11/14/2024 12:03 AM, will monitor as appropriate   # Drug Induced Platelet Defect: home medication list includes an antiplatelet medication   # Hypertension: Noted on problem list  # Chronic heart failure with preserved ejection fraction: heart failure noted on problem list and last echo with EF >50%    # Dementia: noted on problem list      # DMII: A1C = 12.6 % (Ref range: <5.7 %) within past 6 months       # Financial/Environmental Concerns:             DVT Prophylaxis: Pneumatic Compression Devices  Code Status: DNR / DNI as shared by patient's son    Disposition: Admitted for observation    Primary Care Physician   Oralia Forrest    Chief Complaint   Hematuria  Unable to obtain a history from the patient due to mental status.  History obtained from patient's son    History of Present Illness   Adam Huber is a 89 year old male  with past medical history significant for dementia, CAD, type 2 diabetes mellitus, hypertension, BPH with chronic urinary obstruction recently hospitalized from 11/7/2024 to 11/12/2024 for chronic bladder outlet obstruction and complicated UTI with  group B strep.  Patient was brought back to the emergency room as he was noted to have louisa hematuria.  Patient has underlying dementia offers no complaints per family he may have mentioned some pain.    Patient is quite unable to participate in history.  When asked or called he just looks up.  Patient's son is at bedside.  Patient is Montserratian-speaking.  I asked if he would interpret for review of systems and history.  Patient son noted that he would not answer he usually does not he has underlying dementia.  Unable to get more than 10 point review of system.  Patient's son however noted that he did say he had some pain and then later when asked he said he has no pain.  He seems to be generally confused.    The emergency room CBI was initiated.  Patient received a dose of IV antibiotics and is being admitted for further evaluation and treatment    Past Medical History    Past Medical History:   Diagnosis Date    Acute chest pain 10/23/2015    CAD (coronary artery disease)     Essential hypertension 10/23/2015    History of CVA (cerebrovascular accident) 10/23/2015    HLD (hyperlipidemia)     HTN (hypertension)     NSTEMI (non-ST elevated myocardial infarction) (H) 10/23/2015    Post PTCA 10-    Successful PCI of culprit proximal to mid RCA with placement of a    Post PTCA 11-6-2015    pci of complex mid CFX-hector         Past Surgical History   Past Surgical History:   Procedure Laterality Date    CHOLECYSTECTOMY      GENITOURINARY SURGERY      prostate removal     HEART CATH LEFT HEART CATH  10/12/2015    PCI w/ HECTOR to RCA    HEART CATH RIGHT AND LEFT HEART CATH  11/6/2015    PCI w/ HECTOR to mid-CFX       Prior to Admission Medications   Prior to Admission Medications   Prescriptions Last Dose Informant Patient Reported? Taking?   Melatonin 10 MG TABS tablet   Yes No   Sig: Take 10 mg by mouth nightly as needed for sleep   acetaminophen (TYLENOL) 325 MG tablet   Yes No   Sig: Take 650 mg by mouth every 8 hours  as needed for mild pain   aspirin (ASA) 325 MG EC tablet   Yes No   Sig: Take 325 mg by mouth daily   atorvastatin (LIPITOR) 40 MG tablet  Spouse/Significant Other No No   Sig: Take 1 tablet (40 mg) by mouth every evening   carvedilol (COREG) 25 MG tablet  Spouse/Significant Other No No   Sig: Take 1 tablet (25 mg) by mouth 2 times daily (with meals)   cephALEXin (KEFLEX) 500 MG capsule   No No   Sig: Take 1 capsule (500 mg) by mouth every 12 hours for 2 days.   finasteride (PROSCAR) 5 MG tablet   Yes No   Sig: Take 5 mg by mouth daily   glipiZIDE (GLUCOTROL) 5 MG tablet   No No   Sig: Take 1 tablet (5 mg) by mouth every morning (before breakfast).   insulin glargine (LANTUS PEN) 100 UNIT/ML pen   No No   Sig: Inject 10 Units subcutaneously every morning (before breakfast).   mirtazapine (REMERON) 15 MG tablet   Yes No   Sig: Take 15 mg by mouth nightly as needed (agitation).   tamsulosin (FLOMAX) 0.4 MG capsule   No No   Sig: Take 1 capsule (0.4 mg) by mouth daily.      Facility-Administered Medications: None     Allergies   Allergies   Allergen Reactions    Lisinopril     Morphine Other (See Comments)     confusion       Social History   Social History     Tobacco Use    Smoking status: Never    Smokeless tobacco: Never   Substance Use Topics    Alcohol use: No     Alcohol/week: 0.0 standard drinks of alcohol     Social History     Social History Narrative    Not on file     Lives independently with his wife  Family History   I have reviewed this patient's family history and updated it with pertinent information if needed.   Family History   Problem Relation Age of Onset    Unknown/Adopted No family hx of          Review of Systems   Unable to get more than 10 point review of systems patient would not participate or answer questions.  Is [place]er-speaking.  Tried through patient's son who was at bedside    Physical Exam   Temp: 98.2  F (36.8  C) Temp src: Temporal BP: (!) 166/66 Pulse: 61   Resp: 16 SpO2: 95 % O2  Device: None (Room air)    Vital Signs with Ranges  Temp:  [98.2  F (36.8  C)] 98.2  F (36.8  C)  Pulse:  [61] 61  Resp:  [16] 16  BP: (166)/(66) 166/66  SpO2:  [95 %] 95 %  0 lbs 0 oz    GEN: Awake, responds when called.  Unable to get him to follow instructions not communicating at this time.  Unable to assess orientation.  Per family this is baseline   HEENT:  Normocephalic/atraumatic, no scleral icterus, no nasal discharge, mouth dry  CV:  Regular rate and rhythm, no murmur or JVD.  S1 + S2 noted, no S3 or S4.  LUNGS: Poor inspiratory effort otherwise clear to auscultation bilaterally without rales/rhonchi/wheezing/retractions.  Symmetric chest rise on inhalation noted.  ABD:  Active bowel sounds, soft, non-tender/non-distended.  No rebound/guarding/rigidity.  EXT:  No edema.  No cyanosis.  No joint synovitis noted.  SKIN:  Dry to touch, decubiti ulcer  NEURO: Awake but sleepy Limited exam.  Moving all extremities    Data   Data reviewed today:  I personally reviewed no images or EKG's today.    Recent Labs   Lab 11/14/24  0003 11/11/24  0624 11/10/24  0555   WBC 7.9 8.4 8.6   HGB 12.7* 13.0* 13.6   HCT 39.0* 38.9* 41.2   MCV 89 88 88    229 225     Recent Labs   Lab 11/14/24  0003 11/12/24  1133 11/12/24  0735 11/11/24  0905 11/11/24  0624 11/10/24  0758 11/10/24  0555     --   --   --  137  --  138   POTASSIUM 4.9  --   --   --  4.0  --  3.9   CHLORIDE 105  --   --   --  101  --  103   CO2 23  --   --   --  26  --  25   ANIONGAP 11  --   --   --  10  --  10   * 201* 109*   < > 174*   < > 176*   BUN 20.3  --   --   --  11.7  --  11.7   CR 0.73  --   --   --  0.61*  --  0.61*  0.61*   GFRESTIMATED 87  --   --   --  >90  --  >90  >90   LUZ 8.2*  --   --   --  8.2*  --  8.1*    < > = values in this interval not displayed.     7-Day Micro Results       Collected Updated Procedure Result Status      11/14/2024 0030 11/14/2024 0151 Urine Culture [[id number]]   Urine, Villa Catheter    In  "process Component Value   No component results               11/08/2024 1026 11/08/2024 1554 MRSA MSSA PCR, Nasal Swab [[id number]]    Swab from Nares, Bilateral    Final result Component Value   MRSA Target DNA Negative   SA Target DNA Positive            11/07/2024 1504 11/08/2024 1529 Urine Culture [[id number]]   (Abnormal)   Urine, Catheter    Final result Component Value   Culture >100,000 CFU/mL Streptococcus agalactiae (Group B Streptococcus)    This organism is susceptible to ampicillin, penicillin, vancomycin and the cephalosporins. If treatment is required AND your patient is allergic to penicillin, contact the Microbiology Lab within 5 days to request susceptibility testing.               11/07/2024 1441 11/12/2024 1605 Blood Culture Line, venous [[id number]]   Blood from Line, venous    Final result Component Value   Culture No Growth                     Recent Labs   Lab 11/09/24  0622   NTBNPI 941     Recent Labs   Lab 11/14/24 0003 11/11/24  0624 11/10/24  0555   HGB 12.7* 13.0* 13.6     Recent Labs   Lab 11/14/24  0003 11/08/24  0811 11/07/24  1441   AST  --  24 27   ALT 32 19 23   ALKPHOS 237* 163* 210*   BILITOTAL 0.5 0.6 0.6     No results for input(s): \"INR\" in the last 168 hours.  Recent Labs   Lab 11/09/24  0622 11/07/24  1635 11/07/24  1441   LACT 1.2 3.2* 2.9*     No results for input(s): \"LIPASE\" in the last 168 hours.  Recent Labs   Lab 11/14/24  0003 11/11/24  0624 11/10/24  0555    229 225     Recent Labs   Lab 11/14/24  0003 11/11/24  0624 11/10/24  0555   BUN 20.3 11.7 11.7   CR 0.73 0.61* 0.61*  0.61*     No results for input(s): \"TSH\" in the last 168 hours.  No results for input(s): \"TROPONIN\", \"TROPI\", \"TROPR\", \"TROPONINIS\" in the last 168 hours.    Invalid input(s): \"TROPT\", \"TROP\", \"TROPONINIES\", \"TNIH\"    No results found for this or any previous visit (from the past 24 hours).     "

## 2024-11-14 NOTE — ED NOTES
[facility]  ED Nurse Handoff Report    ED Chief complaint: Hematuria  . ED Diagnosis:   Final diagnoses:   Hematuria, unspecified type   History of UTI   Parmar catheter in place       Allergies:   Allergies   Allergen Reactions    Lisinopril     Morphine Other (See Comments)     confusion       Code Status: Full Code    Activity level - Baseline/Home:  independent.  Activity Level - Current:   assist of 1.   Lift room needed: No.   Bariatric: No   Needed: No   Isolation: No.   Infection: Not Applicable.     Respiratory status: Room air    Vital Signs (within 30 minutes):   Vitals:    11/13/24 2251 11/14/24 0146 11/14/24 0247   BP: (!) 166/66 (!) 185/85 (!) 181/81   BP Location:   Right arm   Patient Position:   Supine   Pulse: 61 68 70   Resp: 16  16   Temp: 98.2  F (36.8  C)  98.5  F (36.9  C)   TempSrc: Temporal  Oral   SpO2: 95% 94%        Cardiac Rhythm:  ,      Pain level:    Patient confused: Yes. Baseline- dementia  Patient Falls Risk: patient and family education and assistive device/personal items within reach.   Elimination Status: Urethral catheter (parmar) in place; refer to orders to discontinue as per protocol      Patient Report - Initial Complaint: Discharged from hospital with parmar catheter. Son states red bloody urine. Has had very little output since noon today. Only what is in bag which appears less than 25 mL bright red. .   Focused Assessment: Genitourinary (Adult)Genitourinary WDL: .WDL except; urine characteristicsUreteral Catheter Urine Characteristics: red; clots; dark      Abnormal Results:   Labs Ordered and Resulted from Time of ED Arrival to Time of ED Departure   ROUTINE UA WITH MICROSCOPIC - Abnormal       Result Value    Color Urine Red (*)     Appearance Urine Cloudy (*)     Glucose Urine >=1000 (*)     Bilirubin Urine Negative      Ketones Urine Negative      Specific Gravity Urine 1.016      Blood Urine Large (*)     pH Urine 6.0      Protein  Albumin Urine 70 (*)     Urobilinogen Urine Normal      Nitrite Urine Negative      Leukocyte Esterase Urine Moderate (*)     RBC Urine >182 (*)     WBC Urine 93 (*)    COMPREHENSIVE METABOLIC PANEL - Abnormal    Sodium 139      Potassium 4.9      Carbon Dioxide (CO2) 23      Anion Gap 11      Urea Nitrogen 20.3      Creatinine 0.73      GFR Estimate 87      Calcium 8.2 (*)     Chloride 105      Glucose 222 (*)     Alkaline Phosphatase 237 (*)     AST        ALT 32      Protein Total 6.4      Albumin 2.8 (*)     Bilirubin Total 0.5     CBC WITH PLATELETS AND DIFFERENTIAL - Abnormal    WBC Count 7.9      RBC Count 4.38 (*)     Hemoglobin 12.7 (*)     Hematocrit 39.0 (*)     MCV 89      MCH 29.0      MCHC 32.6      RDW 14.7      Platelet Count 210      % Neutrophils 53      % Lymphocytes 31      % Monocytes 11      % Eosinophils 4      % Basophils 1      % Immature Granulocytes 1      NRBCs per 100 WBC 0      Absolute Neutrophils 4.2      Absolute Lymphocytes 2.5      Absolute Monocytes 0.9      Absolute Eosinophils 0.3      Absolute Basophils 0.1      Absolute Immature Granulocytes 0.0      Absolute NRBCs 0.0     URINE CULTURE        No orders to display       Treatments provided: see MAR  Family Comments: sonAdolfo, reachable by phone  OBS brochure/video discussed/provided to patient:  Yes  ED Medications:   Medications   sodium chloride 0.9% irrigation (bag) ( Irrigation $New Bag 11/14/24 0232)   cefTRIAXone (ROCEPHIN) 1 g vial to attach to  mL bag for ADULTS or NS 50 mL bag for PEDS (has no administration in time range)   lidocaine (XYLOCAINE) 2 % external gel 6 mL (6 mLs Urethral $Given 11/14/24 0216)       Drips infusing:  Yes  For the majority of the shift this patient was Green.   Interventions performed were n/a.    Sepsis treatment initiated: No    Cares/treatment/interventions/medications to be completed following ED care: see orders, CBI    ED Nurse Name: Abbey Rocha RN  3:00 AM  RECEIVING UNIT  ED HANDOFF REVIEW    Above ED Nurse Handoff Report was reviewed: Yes  Reviewed by: SERGE NAIK RN on November 14, 2024 at 3:17 AM

## 2024-11-14 NOTE — PROGRESS NOTES
CBI clamped at 1230 by urology team. Emptied 300 mL output - pink in color with a few small clots at 1430. Performed hand-irrigation as requested by urology team. A few small/flaky clots present and irrigant clear. Some resistance when pulling back but relieved by manipulation of catheter. Approximately 300 mL of irrigant used instilling 50 mL at a time.     CBI is still clamped - upon reconnecting drainage bag, a few small clots (about size of pea) were found again in bag.     1503: Messaged urology - Start CBI at slow rate.     Awaiting urology recommendations.

## 2024-11-15 VITALS
TEMPERATURE: 96.9 F | WEIGHT: 177.47 LBS | SYSTOLIC BLOOD PRESSURE: 134 MMHG | DIASTOLIC BLOOD PRESSURE: 68 MMHG | OXYGEN SATURATION: 90 % | RESPIRATION RATE: 16 BRPM | HEART RATE: 68 BPM | BODY MASS INDEX: 22.18 KG/M2

## 2024-11-15 LAB
BACTERIA UR CULT: NORMAL
GLUCOSE BLDC GLUCOMTR-MCNC: 222 MG/DL (ref 70–99)
GLUCOSE BLDC GLUCOMTR-MCNC: 232 MG/DL (ref 70–99)
GLUCOSE BLDC GLUCOMTR-MCNC: 89 MG/DL (ref 70–99)

## 2024-11-15 PROCEDURE — 250N000013 HC RX MED GY IP 250 OP 250 PS 637: Performed by: INTERNAL MEDICINE

## 2024-11-15 PROCEDURE — 99239 HOSP IP/OBS DSCHRG MGMT >30: CPT | Performed by: HOSPITALIST

## 2024-11-15 PROCEDURE — 258N000001 HC RX 258: Performed by: INTERNAL MEDICINE

## 2024-11-15 PROCEDURE — 82962 GLUCOSE BLOOD TEST: CPT

## 2024-11-15 PROCEDURE — 99231 SBSQ HOSP IP/OBS SF/LOW 25: CPT | Performed by: STUDENT IN AN ORGANIZED HEALTH CARE EDUCATION/TRAINING PROGRAM

## 2024-11-15 PROCEDURE — 250N000013 HC RX MED GY IP 250 OP 250 PS 637: Performed by: HOSPITALIST

## 2024-11-15 PROCEDURE — G0378 HOSPITAL OBSERVATION PER HR: HCPCS

## 2024-11-15 RX ORDER — ASPIRIN 325 MG
325 TABLET, DELAYED RELEASE (ENTERIC COATED) ORAL DAILY
COMMUNITY
Start: 2024-11-15

## 2024-11-15 RX ORDER — OLANZAPINE 5 MG/1
5 TABLET, ORALLY DISINTEGRATING ORAL
Qty: 20 TABLET | Refills: 0 | Status: SHIPPED | OUTPATIENT
Start: 2024-11-15

## 2024-11-15 RX ADMIN — INSULIN ASPART 2 UNITS: 100 INJECTION, SOLUTION INTRAVENOUS; SUBCUTANEOUS at 13:59

## 2024-11-15 RX ADMIN — TAMSULOSIN HYDROCHLORIDE 0.4 MG: 0.4 CAPSULE ORAL at 10:28

## 2024-11-15 RX ADMIN — INSULIN GLARGINE 10 UNITS: 100 INJECTION, SOLUTION SUBCUTANEOUS at 10:58

## 2024-11-15 RX ADMIN — SODIUM CHLORIDE 3000 ML: 900 IRRIGANT IRRIGATION at 06:41

## 2024-11-15 RX ADMIN — INSULIN ASPART 2 UNITS: 100 INJECTION, SOLUTION INTRAVENOUS; SUBCUTANEOUS at 17:33

## 2024-11-15 RX ADMIN — FINASTERIDE 5 MG: 5 TABLET, FILM COATED ORAL at 10:28

## 2024-11-15 RX ADMIN — CARVEDILOL 25 MG: 25 TABLET, FILM COATED ORAL at 17:32

## 2024-11-15 RX ADMIN — SODIUM CHLORIDE 3000 ML: 900 IRRIGANT IRRIGATION at 03:27

## 2024-11-15 RX ADMIN — CARVEDILOL 25 MG: 25 TABLET, FILM COATED ORAL at 10:28

## 2024-11-15 ASSESSMENT — ACTIVITIES OF DAILY LIVING (ADL)
ADLS_ACUITY_SCORE: 30.25
ADLS_ACUITY_SCORE: 30.25
ADLS_ACUITY_SCORE: 32.25
ADLS_ACUITY_SCORE: 28.25
ADLS_ACUITY_SCORE: 30.75
ADLS_ACUITY_SCORE: 0
ADLS_ACUITY_SCORE: 32.25
ADLS_ACUITY_SCORE: 28.75
ADLS_ACUITY_SCORE: 32.25
ADLS_ACUITY_SCORE: 30.75
ADLS_ACUITY_SCORE: 28.75
ADLS_ACUITY_SCORE: 32.25
DEPENDENT_IADLS:: CLEANING;COOKING;LAUNDRY;SHOPPING;MEAL PREPARATION;MEDICATION MANAGEMENT;MONEY MANAGEMENT;TRANSPORTATION
ADLS_ACUITY_SCORE: 32.25

## 2024-11-15 NOTE — CONSULTS
Care Management Initial Consult    General Information  Assessment completed with: Children, Son Adolfo via phone  Type of CM/SW Visit: Initial Assessment    Primary Care Provider verified and updated as needed: Yes   Readmission within the last 30 days: no previous admission in last 30 days      Reason for Consult: discharge planning    Communication Assessment  Patient's communication style: spoken language (non-English)    Hearing Difficulty or Deaf: yes   Wear Glasses or Blind: no    Cognitive  Cognitive/Neuro/Behavioral: .WDL except, orientation  Level of Consciousness: confused  Arousal Level: opens eyes spontaneously  Orientation: disoriented to, situation, time, place  Mood/Behavior: combative     Speech: other (see comments) (quite)    Living Environment:   People in home: spouse     Current living Arrangements: apartment      Able to return to prior arrangements: yes     Family/Social Support:  Care provided by: spouse/significant other, child(isabella), homecare agency  Provides care for: no one, unable/limited ability to care for self  Marital Status:   Support system: Wife, Children          Description of Support System: Supportive, Involved       Current Resources:   Patient receiving home care services: Yes  Skilled Home Care Services: Home Health Aid  Community Resources: County Programs, County Worker, PCA  Equipment currently used at home: walker, standard    Lifestyle & Psychosocial Needs:  Social Drivers of Health     Food Insecurity: Unknown (11/14/2024)    Food Insecurity     Within the past 12 months, did you worry that your food would run out before you got money to buy more?: Patient unable to answer     Within the past 12 months, did the food you bought just not last and you didn t have money to get more?: Patient unable to answer   Depression: Not on file   Housing Stability: Unknown (11/14/2024)    Housing Stability     Do you have housing? : Patient unable to answer     Are you worried  about losing your housing?: Patient unable to answer   Tobacco Use: Low Risk  (11/7/2024)    Patient History     Smoking Tobacco Use: Never     Smokeless Tobacco Use: Never     Passive Exposure: Not on file   Financial Resource Strain: Unknown (11/14/2024)    Financial Resource Strain     Within the past 12 months, have you or your family members you live with been unable to get utilities (heat, electricity) when it was really needed?: Patient unable to answer   Alcohol Use: Not on file   Transportation Needs: Unknown (11/14/2024)    Transportation Needs     Within the past 12 months, has lack of transportation kept you from medical appointments, getting your medicines, non-medical meetings or appointments, work, or from getting things that you need?: Patient unable to answer   Physical Activity: Not on file   Interpersonal Safety: High Risk (11/8/2024)    Interpersonal Safety     Do you feel physically and emotionally safe where you currently live?: No     Within the past 12 months, have you been hit, slapped, kicked or otherwise physically hurt by someone?: No     Within the past 12 months, have you been humiliated or emotionally abused in other ways by your partner or ex-partner?: No   Stress: Not on file   Social Connections: Not on file   Health Literacy: Not on file       Functional Status:  Prior to admission patient needed assistance:   Dependent ADLs:: Ambulation-walker, Bathing, Toileting, Dressing  Dependent IADLs:: Cleaning, Cooking, Laundry, Shopping, Meal Preparation, Medication Management, Money Management, Transportation  Assesssment of Functional Status: At functional baseline  Care Management Discharge Note    Discharge Date: 11/15/2024     Discharge Disposition: Home    Discharge Services: None    Discharge DME: None    Discharge Transportation: family or friend will provide    Patient/family educated on Medicare website which has current facility and service quality ratings:  (NA)    Education  Provided on the Discharge Plan: Yes  Persons Notified of Discharge Plans: Pt's son Adolfo   Patient/Family in Agreement with the Plan: yes    Handoff Referral Completed: No, handoff not indicated or clinically appropriate    Additional Information:  CM consulted for unplanned readmission risk of 23%. Spoke with son Adolfo via phone, pt lives in an apartment with his wife and has 24 hr/day PCA services through [employer]. He reports that pt ambulates with WW at baseline. Family is very involved in pt's care and denies having any additional needs at this time. Adolfo will pick-up today at 1800. Updated bedside RN.     Janis Givens RN BSN   Inpatient Care Coordination  [facility]   Phone [phone]

## 2024-11-15 NOTE — PLAN OF CARE
PRIMARY DIAGNOSIS: CBI  OUTPATIENT/OBSERVATION GOALS TO BE MET BEFORE DISCHARGE:  1. Pain Status: Pain free.    2. Return to near baseline physical activity: No    3. Cleared for discharge by consultants (if involved): No    Discharge Planner Nurse   Safe discharge environment identified: Yes  Barriers to discharge: Yes  Vitals are Temp: 97.8  F (36.6  C) Temp src: Oral BP: (!) 154/77 Pulse: 69   Resp: 16 SpO2: 94 %.  Patient is Alert to and Self, Turkish Speaking. They are 2 assist with Marysol Steady.  Pt is a purred and mildly thick diet.  They are denying pain.  Patient has CBI running. Pt was restless and agitated throughout the night pulling at parmar, kicking,punching,and trying to bite staff. Sitter 1:1 PRN Zyprexa in apple sauce was given. Refusing Blood sugar checks, refusing to take Keflex later in the night, tried to give it twice. Incontinent. Urology following- continue CBI overnight and reassess in morning.         Please review provider order for any additional goals.   Nurse to notify provider when observation goals have been met and patient is ready for discharge.  Goal Outcome Evaluation:      Plan of Care Reviewed With: patient    Overall Patient Progress: no changeOverall Patient Progress: no change    Outcome Evaluation: CBI running- clear/pink. Pt very agitated through shift- pulling on cath/kicking sitter/biting

## 2024-11-15 NOTE — PROGRESS NOTES
[facility] Urology Consult Progress Note         Assessment and Plan:    Assessment:   Hospital day #2 admission for gross hematuria     He continues to pull at his catheter overnight requiring sitter. After discussion with hospitalist and patient's son Adolfo, we are in agreement that parmar catheterization for this patient is not a viable long term solution given his dementia. He is not a good surgical candidate for bladder outlet procedure. He is also not likely to tolerate an awake procedure in the office. Thus at this point the best course of action would be removal of parmar and continued medical therapy with alpha blocker and 5ARI      Plan:   Take parmar out  Continue finasteride 5 mg daily  Continue tamsulosin 0.4 mg dailyl, consider increasing dose to 0.8 mg daily if he can tolerate side effects  Known incomplete bladder emptying likely overflow incontinence. As long as patient is voiding spontaneously without discomfort, would not plan replacement of Parmar      Javy Murray MD   [facility]rology  [phone] clinic phone             Interval History:   Urine has cleared overnight          Significant Problems:      Past Medical History:   Diagnosis Date    Acute chest pain 10/23/2015    CAD (coronary artery disease)     Essential hypertension 10/23/2015    History of CVA (cerebrovascular accident) 10/23/2015    HLD (hyperlipidemia)     HTN (hypertension)     NSTEMI (non-ST elevated myocardial infarction) (H) 10/23/2015    Post PTCA 10-    Successful PCI of culprit proximal to mid RCA with placement of a    Post PTCA 11-6-2015    pci of complex mid CFX-hector             Review of Systems:    The Review of Systems is negative other than noted in the HPI          Medications:     Current Facility-Administered Medications   Medication Dose Route Frequency Provider Last Rate Last Admin    acetaminophen (TYLENOL) tablet 650 mg  650 mg Oral Q8H PRN Елена Carballo MD   650 mg at 11/14/24 7965     [Held by provider] aspirin (ASA) EC tablet 325 mg  325 mg Oral Daily Елена Carballo MD        atorvastatin (LIPITOR) tablet 40 mg  40 mg Oral QPM Елена Carballo MD   40 mg at 11/14/24 1953    carvedilol (COREG) tablet 25 mg  25 mg Oral BID w/meals Remy Jacob MD   25 mg at 11/15/24 1028    glucose gel 15-30 g  15-30 g Oral Q15 Min PRN Remy Jacob MD        Or    dextrose 50 % injection 25-50 mL  25-50 mL Intravenous Q15 Min PRN Remy Jacob MD        Or    glucagon injection 1 mg  1 mg Subcutaneous Q15 Min PRN Remy Jacob MD        finasteride (PROSCAR) tablet 5 mg  5 mg Oral Daily Елена Carballo MD   5 mg at 11/15/24 1028    [Held by provider] glipiZIDE (GLUCOTROL) tablet 5 mg  5 mg Oral QAM AC Елена Carballo MD   5 mg at 11/14/24 0949    insulin aspart (NovoLOG) injection (RAPID ACTING)  1-7 Units Subcutaneous TID AC Remy Jacob MD   2 Units at 11/14/24 1725    insulin aspart (NovoLOG) injection (RAPID ACTING)  1-5 Units Subcutaneous At Bedtime Remy Jacob MD        insulin glargine (LANTUS PEN) injection 10 Units  10 Units Subcutaneous QAM AC Елена Carballo MD   10 Units at 11/15/24 1058    mirtazapine (REMERON) tablet 15 mg  15 mg Oral At Bedtime PRN Елена Carballo MD        OLANZapine zydis (zyPREXA) ODT tab 5 mg  5 mg Oral BID PRN Remy Jacob MD   5 mg at 11/14/24 2210    ondansetron (ZOFRAN ODT) ODT tab 4 mg  4 mg Oral Q6H PRN Елена Carballo MD        Or    ondansetron (ZOFRAN) injection 4 mg  4 mg Intravenous Q6H PRN Елена Carballo MD        prochlorperazine (COMPAZINE) injection 5 mg  5 mg Intravenous Q6H PRN Елена Carballo MD        Or    prochlorperazine (COMPAZINE) tablet 5 mg  5 mg Oral Q6H PRN Елена Carballo MD        senna-docusate (SENOKOT-S/PERICOLACE) 8.6-50 MG per tablet 1 tablet  1 tablet Oral BID PRN Елена Carballo MD        Or    senna-docusate (SENOKOT-S/PERICOLACE) 8.6-50  MG per tablet 2 tablet  2 tablet Oral BID PRN Елена Carballo MD        sodium chloride 0.9% irrigation (bag)   Irrigation Continuous Елена Carballo MD   3,000 mL at 11/15/24 0641    tamsulosin (FLOMAX) capsule 0.4 mg  0.4 mg Oral Daily Елена Carballo MD   0.4 mg at 11/15/24 1028             Physical Exam:   All vitals stable  Temp: 97.8  F (36.6  C) Temp src: Oral BP: 129/72 Pulse: 79   Resp: 16 SpO2: 95 % O2 Device: None (Room air)    Villa clear yellow          Data:   All laboratory data related to this surgery reviewed  Results for orders placed or performed during the hospital encounter of 11/13/24 (from the past 24 hours)   Glucose by meter   Result Value Ref Range    GLUCOSE BY METER POCT 279 (H) 70 - 99 mg/dL   Glucose by meter   Result Value Ref Range    GLUCOSE BY METER POCT 190 (H) 70 - 99 mg/dL   Glucose by meter   Result Value Ref Range    GLUCOSE BY METER POCT 89 70 - 99 mg/dL     No imaging studies have been ordered    Javy Murray MD

## 2024-11-15 NOTE — PLAN OF CARE
Goal Outcome Evaluation:      Plan of Care Reviewed With: child    Overall Patient Progress: improvingOverall Patient Progress: improving    Outcome Evaluation: Planning for discharge to home with wife + PCA

## 2024-11-15 NOTE — PLAN OF CARE
Goal Outcome Evaluation:      Plan of Care Reviewed With: patient    Overall Patient Progress: improvingOverall Patient Progress: improving        PRIMARY DIAGNOSIS: Parmar Catheter   OUTPATIENT/OBSERVATION GOALS TO BE MET BEFORE DISCHARGE:  ADLs back to baseline: Yes    Activity and level of assistance: Up with standby assistance.    Pain status: Pain free.    Return to near baseline physical activity: Yes     Discharge Planner Nurse   Safe discharge environment identified: Yes  Barriers to discharge: No       Entered by: Marielena Verduzco RN 11/15/2024 11:46 AM    Please review provider order for any additional goals.   Nurse to notify provider when observation goals have been met and patient is ready for discharge.      Pt is alert but unable to make his needs known, speaks Indonesian hx of dementia. VSS. Sitter remains at bedside. Calm and cooperative with cares. Seen by Urology, parmar removed. Due to void.

## 2024-11-15 NOTE — DISCHARGE SUMMARY
[facility]  Hospitalist Discharge Summary      Date of Admission:  11/13/2024  Date of Discharge:  11/15/2024  Discharging Provider: Remy Jacob MD  Discharge Service: Hospitalist Service    Discharge Diagnoses   Hematuria  Traumatic parmar  Urinary retention    Clinically Significant Risk Factors     # DMII: A1C = 12.6 % (Ref range: <5.7 %) within past 6 months       Follow-ups Needed After Discharge   Follow-up Appointments       Follow-up and recommended labs and tests       Follow up with primary care provider, Oralia Forrest, within 7 days for hospital follow- up.  No follow up labs or test are needed.  Follow-up with Urology as previously scheduled              Unresulted Labs Ordered in the Past 30 Days of this Admission       No orders found from 10/14/2024 to 11/14/2024.        These results will be followed up by NA    Discharge Disposition   Discharged to home  Condition at discharge: Stable    Hospital Course   Adam Huber is a 89 year old male  with past medical history significant for dementia, CAD, type 2 diabetes mellitus, hypertension, BPH with chronic urinary obstruction recently hospitalized from 11/7/2024 to 11/12/2024 for chronic bladder outlet obstruction and complicated UTI with group B strep.  Patient was brought back to the emergency room as he was noted to have louisa hematuria.  Patient has underlying dementia offers no complaints per family he may have mentioned some pain.     Patient is quite unable to participate in history.  When asked or called he just looks up.  Patient's son is at bedside.  Patient is Grenadian-speaking.  I asked if he would interpret for review of systems and history.  Patient son noted that he would not answer he usually does not he has underlying dementia.  Unable to get more than 10 point review of system.  Patient's son however noted that he did say he had some pain and then later when asked he said he has no pain.  He seems to be  generally confused.     The emergency room CBI was initiated.  Patient received a dose of IV antibiotics and is being admitted for further evaluation and treatment    Gross hematuria    - suspect traumatic patient may have pulled his catheter    - started on CBI in the emergency room: urine now clear    - waiting for Urology to see    - Hb stable     UTI    - group B strep UTI (just hospitalized and discharged 11/12)    - complete oral treatment with keflex (course was to end today/tomorrow)    - will put stop date on keflex dosing     Chronic Urinary Bladder Outlet Obstruction  BPH    - history of recurrent urinary tract infections    - plan per urology    - original plan was chronic parmar with monthly exchanges but this may not work due to his dementia    - continue PTA finasteride    - cont tamsulosin 0.4mg PO daily      Type 2 Diabetes Mellitus w/ Hyperglycemia  Hx of type 2 diabetes mellitus on lantus. Presented with severe hyperglycemia; glucose 485. Starting MDSSI during admission.     Hepatic Cirrhosis  Elevated Alkaline Phosphatase  Posterior Right 2.9 cm Hepatic Lesion    - cirrhotic appearance to liver noted on CT abdomen/pelvis    - LFTs within normal limits, with exception of isolated elevated of alkaline phosphatase to 210    - incidentally noted hepatic lesion to 2.9cm on right posterior lobe    - defer to outpatient PCP on whether to follow lesion with imaging given his comorbidities and underlying dementia     Dementia w/ history of behavioral Disturbances  Hx CVA    - patient chronically with slurred speech    - on dysphagia diet    - needs help with most of his ADLs    - lives at home independently with assistance from his wife/family    - patient does not do well with ipad      Decubitus Ulcers    - wound care by nurse     CAD s/p LEXI (2015)    - had PCI of mid circumflex with drug-eluting stent in 2015    - holding ASA     Hypertension  Hyperlipidemia    - cont prior to admission meds         Diabetes    - cont lantus, glipizide    - ISS    I spoke with Dr. Murray from neurology.  He agrees that the Villa catheter needs to come out.  Given the patient's dementia, he is most likely to pull it.  Villa catheter was removed.  The patient has already urinated a large amount on his own.  No further bleeding.  At this point the patient is able to discharge home.  I did speak with his son.  They are asking for an as needed medication for agitation at night.  I will send him with a small supply of Zyprexa they can use as needed at night.  We will ambulate the patient.  No other new events or issues today.  Patient will discharge home.    Consultations This Hospital Stay   UROLOGY IP CONSULT  CARE MANAGEMENT / SOCIAL WORK IP CONSULT    Code Status   No CPR- Do NOT Intubate    Time Spent on this Encounter   I, Remy Jacob MD, personally saw the patient today and spent greater than 30 minutes discharging this patient.       Remy Jacob MD  [facility]BSERV[facility] DEPT  [address] [place] [address]  Phone: [phone]  ______________________________________________________________________    Physical Exam   Vital Signs: Temp: 97.8  F (36.6  C) Temp src: Oral BP: 129/72 Pulse: 79   Resp: 16 SpO2: 95 % O2 Device: None (Room air)    Weight: 177 lbs 7.52 oz  Constitutional: awake, alert, cooperative, no apparent distress, and appears stated age  Eyes: Lids and lashes normal, pupils equal, round and reactive to light, extra ocular muscles intact, sclera clear, conjunctiva normal  ENT: Normocephalic, without obvious abnormality, atraumatic, sinuses nontender on palpation, external ears without lesions, oral pharynx with moist mucous membranes, tonsils without erythema or exudates, gums normal and good dentition.  Respiratory: No increased work of breathing, good air exchange, clear to auscultation bilaterally, no crackles or wheezing  Cardiovascular: Normal apical impulse,  regular rate and rhythm, normal S1 and S2, no S3 or S4, and no murmur noted  GI: No scars, normal bowel sounds, soft, non-distended, non-tender, no masses palpated, no hepatosplenomegally       Primary Care Physician   Oralia Forrest    Discharge Orders      Reason for your hospital stay    Hematuria (blood in urine) due to trauma from parmar catheter.  Catheter now removed. No further blood in urine     Follow-up and recommended labs and tests     Follow up with primary care provider, Oralia Forrest, within 7 days for hospital follow- up.  No follow up labs or test are needed.  Follow-up with Urology as previously scheduled     Activity    Your activity upon discharge: activity as tolerated     Diet    Follow this diet upon discharge: Current Diet:Orders Placed This Encounter      Pureed Diet (level 4) Mildly Thick (level 2)       Significant Results and Procedures   Most Recent 3 CBC's:  Recent Labs   Lab Test 11/14/24  0638 11/14/24 0003 11/11/24 0624   WBC 7.7 7.9 8.4   HGB 12.9* 12.7* 13.0*   MCV 89 89 88    210 229     Most Recent 3 BMP's:  Recent Labs   Lab Test 11/15/24  1314 11/15/24  0858 11/14/24  1722 11/14/24  0909 11/14/24  0638 11/14/24 0003 11/11/24  0905 11/11/24  0624   NA  --   --   --   --  137 139  --  137   POTASSIUM  --   --   --   --  4.2 4.9  --  4.0   CHLORIDE  --   --   --   --  105 105  --  101   CO2  --   --   --   --  23 23  --  26   BUN  --   --   --   --  18.4 20.3  --  11.7   CR  --   --   --   --  0.68 0.73  --  0.61*   ANIONGAP  --   --   --   --  9 11  --  10   LUZ  --   --   --   --  8.7* 8.2*  --  8.2*   * 89 190*   < > 159* 222*   < > 174*    < > = values in this interval not displayed.     Most Recent 2 LFT's:  Recent Labs   Lab Test 11/14/24 0003 11/08/24  0811 11/07/24  1441   AST  --  24 27   ALT 32 19 23   ALKPHOS 237* 163* 210*   BILITOTAL 0.5 0.6 0.6   ,   Results for orders placed or performed during the hospital encounter of 11/07/24   CT Abdomen Pelvis w  Contrast    Narrative    CT ABDOMEN PELVIS W CONTRAST 11/7/2024 4:14 PM    CLINICAL HISTORY: right flank pain    TECHNIQUE: CT scan of the abdomen and pelvis was performed following  injection of IV contrast. Multiplanar reformats were obtained. Dose  reduction techniques were used.  CONTRAST: 92 mL Isovue-370    COMPARISON: CT abdomen/pelvis 4/1/2024    FINDINGS:   LOWER CHEST: No definite airspace consolidation or pleural effusion.  Severe coronary artery calcifications and/or stents.    HEPATOBILIARY: Nodular contour of the liver with widening of the  fissures. Subtle 2.9 cm hyperattenuating lesion in the posterior right  lobe of the liver, increased in conspicuity but may have been present  in hindsight on prior CT. Cholecystectomy. No evidence of biliary  obstruction.    PANCREAS: Atrophic but otherwise unremarkable.    SPLEEN: Normal.    ADRENAL GLANDS: Normal.    KIDNEYS/BLADDER: Bilateral renal cysts, no specific follow-up  recommended. No nephroureterolithiasis or hydronephrosis. Urinary  bladder demonstrates diffuse wall thickening, innumerable diverticula  and significant surrounding fat stranding, increased in conspicuity  since prior exam. Urothelial hyperenhancement is also noted.    BOWEL: No obstruction or inflammatory change. Moderate volume formed  stool throughout the colon. Normal appendix    PELVIC ORGANS: Mild prostatomegaly. Otherwise unremarkable.    ADDITIONAL FINDINGS: No lymphadenopathy or ascites. Moderate calcified  and noncalcified atherosclerosis.    MUSCULOSKELETAL: No acute bony normality. Stable T12 compression  deformity.      Impression    IMPRESSION:   1.  Findings of chronic bladder outlet obstruction with significant  increase in urothelial hyperenhancement and surrounding fat stranding,  could indicate superimposed cystitis, correlate with urinalysis.  2.  No nephroureterolithiasis or hydronephrosis. No definite  radiographic evidence of pyelonephritis.  3.  Cirrhotic  morphology of the liver with likely incidental 2.9 cm  posterior right hepatic lesion, recommend further evaluation with  contrast-enhanced MRI, can be performed on a nonemergent/outpatient  basis.    CATHLEEN WAGGONER MD         SYSTEM ID:  [id number]   Head CT w/o contrast    Narrative    CT SCAN OF THE HEAD WITHOUT CONTRAST   11/7/2024 4:12 PM     HISTORY: AMS    TECHNIQUE:  Axial images of the head and coronal reformations without  IV contrast material. Radiation dose for this scan was reduced using  automated exposure control, adjustment of the mA and/or kV according  to patient size, or iterative reconstruction technique.    COMPARISON: 2/4/2024    FINDINGS: Extensive encephalomalacia and gliosis particularly in the  bifrontal and bitemporal distributions right greater than left in the  temporal lobes is again noted. Stable moderate to severe global  cortical volume loss with ex vacuo dilatation of the ventricular  system. No acute intracranial hemorrhage. No hydrocephalus or  extra-axial hemorrhage. Remote ischemic findings involving the  bilateral basal ganglia. Remote lacunar infarcts are noted in these  regions.    The osseous calvarium is intact.      Impression    IMPRESSION:   No acute findings. Extensive chronic changes are stable.      CUAUHTEMOC MA MD         SYSTEM ID:  [id number]   XR Chest Port 1 View    Narrative    EXAM: XR CHEST PORT 1 VIEW  LOCATION: [facility]  DATE: 11/9/2024    INDICATION: new hypoxia  COMPARISON: None.      Impression    IMPRESSION: The heart is normal in size. There is central pulmonary venous congestion with interstitial edema seen throughout the lungs bilaterally.       Discharge Medications   Current Discharge Medication List        START taking these medications    Details   OLANZapine zydis (ZYPREXA) 5 MG ODT Take 1 tablet (5 mg) by mouth nightly as needed for agitation.  Qty: 20 tablet, Refills: 0    Associated Diagnoses: Late onset Alzheimer's  disease without behavioral disturbance (H); Agitation           CONTINUE these medications which have CHANGED    Details   aspirin (ASA) 325 MG EC tablet Take 1 tablet (325 mg) by mouth daily. Hold for 1 week           CONTINUE these medications which have NOT CHANGED    Details   acetaminophen (TYLENOL) 325 MG tablet Take 650 mg by mouth every 8 hours as needed for mild pain      atorvastatin (LIPITOR) 40 MG tablet Take 1 tablet (40 mg) by mouth every evening  Qty: 30 tablet, Refills: 0    Associated Diagnoses: History of CVA (cerebrovascular accident)      carvedilol (COREG) 25 MG tablet Take 1 tablet (25 mg) by mouth 2 times daily (with meals)  Qty: 180 tablet, Refills: 3    Associated Diagnoses: Essential hypertension      finasteride (PROSCAR) 5 MG tablet Take 5 mg by mouth daily      glipiZIDE (GLUCOTROL) 5 MG tablet Take 1 tablet (5 mg) by mouth every morning (before breakfast).  Qty: 30 tablet, Refills: 0    Associated Diagnoses: Urinary tract infection without hematuria, site unspecified      Melatonin 10 MG TABS tablet Take 10 mg by mouth nightly as needed for sleep      mirtazapine (REMERON) 15 MG tablet Take 15 mg by mouth nightly as needed (agitation).      tamsulosin (FLOMAX) 0.4 MG capsule Take 1 capsule (0.4 mg) by mouth daily.  Qty: 30 capsule, Refills: 0    Associated Diagnoses: Urinary tract infection without hematuria, site unspecified           STOP taking these medications       cephALEXin (KEFLEX) 500 MG capsule Comments:   Reason for Stopping:         insulin glargine (LANTUS PEN) 100 UNIT/ML pen Comments:   Reason for Stopping:             Allergies   Allergies   Allergen Reactions    Lisinopril     Morphine Other (See Comments)     confusion

## 2024-11-15 NOTE — PLAN OF CARE
PRIMARY DIAGNOSIS: CBI  OUTPATIENT/OBSERVATION GOALS TO BE MET BEFORE DISCHARGE:  1. Pain Status: Pain free.     2. Return to near baseline physical activity: No     3. Cleared for discharge by consultants (if involved): No        Discharge Planner Nurse  Safe discharge environment identified: Yes  Barriers to discharge: Yes  BP (!) 154/77 (BP Location: Left arm)   Pulse 69   Temp 97.8  F (36.6  C) (Oral)   Resp 16   Wt 80.5 kg (177 lb 7.5 oz)   SpO2 94%   BMI 22.18 kg/m     Patient is Alert to and Self, Stateless Speaking. They are 2 assist with Marysol Steady.  Pt is a purred and mildly thick diet.  They are denying pain.  Patient has CBI running. Pt was restless and agitated throughout the night pulling at parmar, kicking,punching,and trying to bite staff. Sitter 1:1 PRN Zyprexa in apple sauce was given. Refusing Blood sugar checks, refusing to take Keflex later in the night, tried to give it twice. Incontinent. Urology following- continue CBI overnight and reassess in morning.          Please review provider order for any additional goals.   Nurse to notify provider when observation goals have been met and patient is ready for discharge.  Goal Outcome Evaluation:      Plan of Care Reviewed With: patient    Overall Patient Progress: no changeOverall Patient Progress: no change    Outcome Evaluation: CBI running- clear/pink. Pt very agitated through shift- pulling on cath/kicking sitter/biting

## 2024-11-15 NOTE — PLAN OF CARE
PRIMARY DIAGNOSIS: CBI  OUTPATIENT/OBSERVATION GOALS TO BE MET BEFORE DISCHARGE:  1. Pain Status: Pain free.     2. Return to near baseline physical activity: No     3. Cleared for discharge by consultants (if involved): No        Discharge Planner Nurse  Safe discharge environment identified: Yes  Barriers to discharge: Yes  Vitals are Temp: 97.8  F (36.6  C) Temp src: Oral BP: (!) 154/77 Pulse: 69   Resp: 16 SpO2: 94 %.  Patient is Alert to and Self, [place]ian Speaking. They are 2 assist with Marysol Steady.  Pt is a purred and mildly thick diet.  They are denying pain.  Patient has CBI running. Pt was restless and agitated throughout the night pulling at parmar, kicking,punching,and trying to bite staff. Sitter 1:1 PRN Zyprexa in apple sauce was given. Refusing Blood sugar checks, refusing to take Keflex later in the night, tried to give it twice. Incontinent. Urology following- continue CBI overnight and reassess in morning.          Please review provider order for any additional goals.   Nurse to notify provider when observation goals have been met and patient is ready for discharge.  Goal Outcome Evaluation:       Plan of Care Reviewed With: patient     Overall Patient Progress: no changeOverall Patient Progress: no change     Outcome Evaluation: CBI running- clear/pink. Pt very agitated through shift- pulling on cath/kicking sitter/biting

## 2024-11-15 NOTE — PLAN OF CARE
PRIMARY DIAGNOSIS: Parmar Catheter  OUTPATIENT/OBSERVATION GOALS TO BE MET BEFORE DISCHARGE:  ADLs back to baseline: Yes    Activity and level of assistance: assist of 1 w/ teresita lift    Pain status: Pain free.    Return to near baseline physical activity: Yes     Discharge Planner Nurse   Safe discharge environment identified: Yes  Barriers to discharge: No       Entered by: Marielena Verduzco RN 11/15/2024 5:09 PM     Please review provider order for any additional goals.   Nurse to notify provider when observation goals have been met and patient is ready for discharge.    Goal Outcome Evaluation:      Plan of Care Reviewed With: patient    Overall Patient Progress: improvingOverall Patient Progress: improving    Pt is alert, does not speak english, PJ orientation status. VSS. Pt voided large amt via brief s/p parmar removal. Pt with episodes of impulsiveness, needs attended to. Sitter remains at bedside. Potential discharge scheduled for tonight 11/15/2024 if transfer and ambulation trial go well.

## 2024-11-15 NOTE — PLAN OF CARE
Clamped at 0920 due to urine being completely clear even with it running at a slow rate, will recheck to see if urine is getting darker. Still waiting on urology to see patient.

## 2024-11-15 NOTE — PLAN OF CARE
Goal Outcome Evaluation:      Plan of Care Reviewed With: patient    Overall Patient Progress: no changeOverall Patient Progress: no change      PRIMARY DIAGNOSIS: Villa Catheter  OUTPATIENT/OBSERVATION GOALS TO BE MET BEFORE DISCHARGE:  ADLs back to baseline: Yes    Activity and level of assistance: Up with standby assistance.    Pain status: Pain free.    Return to near baseline physical activity: Yes     Discharge Planner Nurse   Safe discharge environment identified: Yes  Barriers to discharge: Yes       Entered by: Marielena Verduzco RN 11/15/2024 10:15 AM    Received pt sleeping comfortably in bed, sitter at bedside. Villa remains intact and draining clear pink tinged urine. VSS.    Please review provider order for any additional goals.   Nurse to notify provider when observation goals have been met and patient is ready for discharge.

## 2024-11-16 NOTE — PLAN OF CARE
Patient's After Visit Summary was reviewed with patient and/or family.   Patient verbalized understanding of After Visit Summary, recommended follow up and was given an opportunity to ask questions.   Discharge medications sent home with patient/family: YES  Discharged with son      Goal Outcome Evaluation:      Plan of Care Reviewed With: patient    Overall Patient Progress: improvingOverall Patient Progress: improving

## 2024-12-08 ENCOUNTER — HOSPITAL ENCOUNTER (INPATIENT)
Facility: CLINIC | Age: 89
Discharge: HOME | DRG: 280 | End: 2024-12-08
Attending: EMERGENCY MEDICINE | Admitting: STUDENT IN AN ORGANIZED HEALTH CARE EDUCATION/TRAINING PROGRAM
Payer: COMMERCIAL

## 2024-12-08 ENCOUNTER — APPOINTMENT (OUTPATIENT)
Dept: GENERAL RADIOLOGY | Facility: CLINIC | Age: 89
DRG: 280 | End: 2024-12-08
Attending: EMERGENCY MEDICINE
Payer: COMMERCIAL

## 2024-12-08 ENCOUNTER — APPOINTMENT (OUTPATIENT)
Dept: CT IMAGING | Facility: CLINIC | Age: 89
DRG: 280 | End: 2024-12-08
Attending: EMERGENCY MEDICINE
Payer: COMMERCIAL

## 2024-12-08 DIAGNOSIS — R00.0 TACHYCARDIA: ICD-10-CM

## 2024-12-08 DIAGNOSIS — R52 PAIN: ICD-10-CM

## 2024-12-08 DIAGNOSIS — R41.82 ALTERED MENTAL STATUS, UNSPECIFIED ALTERED MENTAL STATUS TYPE: ICD-10-CM

## 2024-12-08 DIAGNOSIS — E11.65 UNCONTROLLED TYPE 2 DIABETES MELLITUS WITH HYPERGLYCEMIA (H): ICD-10-CM

## 2024-12-08 DIAGNOSIS — I21.4 NSTEMI (NON-ST ELEVATED MYOCARDIAL INFARCTION) (H): Primary | ICD-10-CM

## 2024-12-08 DIAGNOSIS — R79.89 ELEVATED TROPONIN: ICD-10-CM

## 2024-12-08 DIAGNOSIS — G30.9 ALZHEIMER DEMENTIA WITH BEHAVIORAL DISTURBANCE (H): Chronic | ICD-10-CM

## 2024-12-08 DIAGNOSIS — N39.0 URINARY TRACT INFECTION WITHOUT HEMATURIA, SITE UNSPECIFIED: ICD-10-CM

## 2024-12-08 DIAGNOSIS — Z87.440 HISTORY OF UTI: ICD-10-CM

## 2024-12-08 DIAGNOSIS — F02.818 ALZHEIMER DEMENTIA WITH BEHAVIORAL DISTURBANCE (H): Chronic | ICD-10-CM

## 2024-12-08 DIAGNOSIS — I21.4 NSTEMI (NON-ST ELEVATED MYOCARDIAL INFARCTION) (H): ICD-10-CM

## 2024-12-08 LAB
ALBUMIN SERPL BCG-MCNC: 3.3 G/DL (ref 3.5–5.2)
ALBUMIN UR-MCNC: 50 MG/DL
ALP SERPL-CCNC: 267 U/L (ref 40–150)
ALT SERPL W P-5'-P-CCNC: 39 U/L (ref 0–70)
ANION GAP SERPL CALCULATED.3IONS-SCNC: 14 MMOL/L (ref 7–15)
APPEARANCE UR: ABNORMAL
AST SERPL W P-5'-P-CCNC: 37 U/L (ref 0–45)
BASE EXCESS BLDV CALC-SCNC: -1.4 MMOL/L (ref -3–3)
BASOPHILS # BLD AUTO: 0.1 10E3/UL (ref 0–0.2)
BASOPHILS NFR BLD AUTO: 1 %
BILIRUB DIRECT SERPL-MCNC: 0.23 MG/DL (ref 0–0.3)
BILIRUB SERPL-MCNC: 0.6 MG/DL
BILIRUB UR QL STRIP: NEGATIVE
BUN SERPL-MCNC: 28.7 MG/DL (ref 8–23)
CALCIUM SERPL-MCNC: 8.4 MG/DL (ref 8.8–10.4)
CHLORIDE SERPL-SCNC: 103 MMOL/L (ref 98–107)
COLOR UR AUTO: YELLOW
CREAT SERPL-MCNC: 0.68 MG/DL (ref 0.67–1.17)
EGFRCR SERPLBLD CKD-EPI 2021: 89 ML/MIN/1.73M2
EOSINOPHIL # BLD AUTO: 0.1 10E3/UL (ref 0–0.7)
EOSINOPHIL NFR BLD AUTO: 1 %
ERYTHROCYTE [DISTWIDTH] IN BLOOD BY AUTOMATED COUNT: 14.4 % (ref 10–15)
FLUAV RNA SPEC QL NAA+PROBE: NEGATIVE
FLUBV RNA RESP QL NAA+PROBE: NEGATIVE
GLUCOSE SERPL-MCNC: 183 MG/DL (ref 70–99)
GLUCOSE UR STRIP-MCNC: 50 MG/DL
HCO3 BLDV-SCNC: 23 MMOL/L (ref 21–28)
HCO3 SERPL-SCNC: 19 MMOL/L (ref 22–29)
HCT VFR BLD AUTO: 39.4 % (ref 40–53)
HGB BLD-MCNC: 12.9 G/DL (ref 13.3–17.7)
HGB UR QL STRIP: ABNORMAL
HOLD SPECIMEN: NORMAL
IMM GRANULOCYTES # BLD: 0 10E3/UL
IMM GRANULOCYTES NFR BLD: 0 %
KETONES UR STRIP-MCNC: NEGATIVE MG/DL
LACTATE SERPL-SCNC: 1.3 MMOL/L (ref 0.7–2)
LEUKOCYTE ESTERASE UR QL STRIP: ABNORMAL
LIPASE SERPL-CCNC: 14 U/L (ref 13–60)
LYMPHOCYTES # BLD AUTO: 0.9 10E3/UL (ref 0.8–5.3)
LYMPHOCYTES NFR BLD AUTO: 9 %
MAGNESIUM SERPL-MCNC: 1.5 MG/DL (ref 1.7–2.3)
MCH RBC QN AUTO: 28.8 PG (ref 26.5–33)
MCHC RBC AUTO-ENTMCNC: 32.7 G/DL (ref 31.5–36.5)
MCV RBC AUTO: 88 FL (ref 78–100)
MONOCYTES # BLD AUTO: 0.8 10E3/UL (ref 0–1.3)
MONOCYTES NFR BLD AUTO: 8 %
NEUTROPHILS # BLD AUTO: 8.5 10E3/UL (ref 1.6–8.3)
NEUTROPHILS NFR BLD AUTO: 81 %
NITRATE UR QL: NEGATIVE
NRBC # BLD AUTO: 0 10E3/UL
NRBC BLD AUTO-RTO: 0 /100
NT-PROBNP SERPL-MCNC: 912 PG/ML (ref 0–1800)
O2/TOTAL GAS SETTING VFR VENT: 21 %
OXYHGB MFR BLDV: 85 % (ref 70–75)
PCO2 BLDV: 37 MM HG (ref 40–50)
PH BLDV: 7.4 [PH] (ref 7.32–7.43)
PH UR STRIP: 5.5 [PH] (ref 5–7)
PLATELET # BLD AUTO: 288 10E3/UL (ref 150–450)
PO2 BLDV: 52 MM HG (ref 25–47)
POTASSIUM SERPL-SCNC: 4.6 MMOL/L (ref 3.4–5.3)
PROCALCITONIN SERPL IA-MCNC: 0.06 NG/ML
PROT SERPL-MCNC: 7.7 G/DL (ref 6.4–8.3)
RBC # BLD AUTO: 4.48 10E6/UL (ref 4.4–5.9)
RBC URINE: 20 /HPF
RSV RNA SPEC NAA+PROBE: NEGATIVE
SAO2 % BLDV: 86.3 % (ref 70–75)
SARS-COV-2 RNA RESP QL NAA+PROBE: NEGATIVE
SODIUM SERPL-SCNC: 136 MMOL/L (ref 135–145)
SP GR UR STRIP: 1.02 (ref 1–1.03)
SQUAMOUS EPITHELIAL: 1 /HPF
T4 FREE SERPL-MCNC: 1.06 NG/DL (ref 0.9–1.7)
TROPONIN T SERPL HS-MCNC: 241 NG/L
TROPONIN T SERPL HS-MCNC: 28 NG/L
TROPONIN T SERPL HS-MCNC: 289 NG/L
TROPONIN T SERPL HS-MCNC: 33 NG/L
TROPONIN T SERPL HS-MCNC: 47 NG/L
TROPONIN T SERPL HS-MCNC: 74 NG/L
TSH SERPL DL<=0.005 MIU/L-ACNC: 5.68 UIU/ML (ref 0.3–4.2)
UROBILINOGEN UR STRIP-MCNC: NORMAL MG/DL
WBC # BLD AUTO: 10.5 10E3/UL (ref 4–11)
WBC CLUMPS #/AREA URNS HPF: PRESENT /HPF
WBC URINE: >182 /HPF
YEAST #/AREA URNS HPF: ABNORMAL /HPF

## 2024-12-08 PROCEDURE — 71275 CT ANGIOGRAPHY CHEST: CPT

## 2024-12-08 PROCEDURE — 250N000011 HC RX IP 250 OP 636: Performed by: EMERGENCY MEDICINE

## 2024-12-08 PROCEDURE — 250N000013 HC RX MED GY IP 250 OP 250 PS 637: Performed by: STUDENT IN AN ORGANIZED HEALTH CARE EDUCATION/TRAINING PROGRAM

## 2024-12-08 PROCEDURE — 96361 HYDRATE IV INFUSION ADD-ON: CPT

## 2024-12-08 PROCEDURE — 84484 ASSAY OF TROPONIN QUANT: CPT | Performed by: EMERGENCY MEDICINE

## 2024-12-08 PROCEDURE — 250N000011 HC RX IP 250 OP 636: Performed by: STUDENT IN AN ORGANIZED HEALTH CARE EDUCATION/TRAINING PROGRAM

## 2024-12-08 PROCEDURE — 82805 BLOOD GASES W/O2 SATURATION: CPT | Performed by: EMERGENCY MEDICINE

## 2024-12-08 PROCEDURE — 96376 TX/PRO/DX INJ SAME DRUG ADON: CPT

## 2024-12-08 PROCEDURE — 96366 THER/PROPH/DIAG IV INF ADDON: CPT

## 2024-12-08 PROCEDURE — 96375 TX/PRO/DX INJ NEW DRUG ADDON: CPT

## 2024-12-08 PROCEDURE — 84484 ASSAY OF TROPONIN QUANT: CPT | Performed by: STUDENT IN AN ORGANIZED HEALTH CARE EDUCATION/TRAINING PROGRAM

## 2024-12-08 PROCEDURE — 84439 ASSAY OF FREE THYROXINE: CPT | Performed by: EMERGENCY MEDICINE

## 2024-12-08 PROCEDURE — 70450 CT HEAD/BRAIN W/O DYE: CPT

## 2024-12-08 PROCEDURE — 80053 COMPREHEN METABOLIC PANEL: CPT | Performed by: EMERGENCY MEDICINE

## 2024-12-08 PROCEDURE — 96365 THER/PROPH/DIAG IV INF INIT: CPT

## 2024-12-08 PROCEDURE — 84443 ASSAY THYROID STIM HORMONE: CPT | Performed by: EMERGENCY MEDICINE

## 2024-12-08 PROCEDURE — 99291 CRITICAL CARE FIRST HOUR: CPT | Mod: 25

## 2024-12-08 PROCEDURE — 85014 HEMATOCRIT: CPT | Performed by: EMERGENCY MEDICINE

## 2024-12-08 PROCEDURE — 83735 ASSAY OF MAGNESIUM: CPT | Performed by: EMERGENCY MEDICINE

## 2024-12-08 PROCEDURE — 96372 THER/PROPH/DIAG INJ SC/IM: CPT | Performed by: EMERGENCY MEDICINE

## 2024-12-08 PROCEDURE — 81001 URINALYSIS AUTO W/SCOPE: CPT | Performed by: EMERGENCY MEDICINE

## 2024-12-08 PROCEDURE — 83880 ASSAY OF NATRIURETIC PEPTIDE: CPT | Performed by: EMERGENCY MEDICINE

## 2024-12-08 PROCEDURE — 71046 X-RAY EXAM CHEST 2 VIEWS: CPT

## 2024-12-08 PROCEDURE — 83605 ASSAY OF LACTIC ACID: CPT | Performed by: EMERGENCY MEDICINE

## 2024-12-08 PROCEDURE — 87637 SARSCOV2&INF A&B&RSV AMP PRB: CPT | Performed by: EMERGENCY MEDICINE

## 2024-12-08 PROCEDURE — 93005 ELECTROCARDIOGRAM TRACING: CPT

## 2024-12-08 PROCEDURE — 84145 PROCALCITONIN (PCT): CPT | Performed by: EMERGENCY MEDICINE

## 2024-12-08 PROCEDURE — 85004 AUTOMATED DIFF WBC COUNT: CPT | Performed by: EMERGENCY MEDICINE

## 2024-12-08 PROCEDURE — G0378 HOSPITAL OBSERVATION PER HR: HCPCS

## 2024-12-08 PROCEDURE — 36415 COLL VENOUS BLD VENIPUNCTURE: CPT | Performed by: EMERGENCY MEDICINE

## 2024-12-08 PROCEDURE — 258N000003 HC RX IP 258 OP 636: Performed by: EMERGENCY MEDICINE

## 2024-12-08 PROCEDURE — 87086 URINE CULTURE/COLONY COUNT: CPT | Performed by: EMERGENCY MEDICINE

## 2024-12-08 PROCEDURE — 82248 BILIRUBIN DIRECT: CPT | Performed by: EMERGENCY MEDICINE

## 2024-12-08 PROCEDURE — 80048 BASIC METABOLIC PNL TOTAL CA: CPT | Performed by: EMERGENCY MEDICINE

## 2024-12-08 PROCEDURE — 120N000001 HC R&B MED SURG/OB

## 2024-12-08 PROCEDURE — 36415 COLL VENOUS BLD VENIPUNCTURE: CPT | Performed by: STUDENT IN AN ORGANIZED HEALTH CARE EDUCATION/TRAINING PROGRAM

## 2024-12-08 PROCEDURE — 99223 1ST HOSP IP/OBS HIGH 75: CPT | Performed by: STUDENT IN AN ORGANIZED HEALTH CARE EDUCATION/TRAINING PROGRAM

## 2024-12-08 PROCEDURE — 83690 ASSAY OF LIPASE: CPT | Performed by: EMERGENCY MEDICINE

## 2024-12-08 RX ORDER — ASPIRIN 325 MG
325 TABLET ORAL ONCE
Status: COMPLETED | OUTPATIENT
Start: 2024-12-08 | End: 2024-12-08

## 2024-12-08 RX ORDER — PROCHLORPERAZINE MALEATE 5 MG/1
5 TABLET ORAL EVERY 6 HOURS PRN
Status: DISCONTINUED | OUTPATIENT
Start: 2024-12-08 | End: 2024-12-13 | Stop reason: HOSPADM

## 2024-12-08 RX ORDER — CEFTRIAXONE 1 G/1
1 INJECTION, POWDER, FOR SOLUTION INTRAMUSCULAR; INTRAVENOUS ONCE
Status: COMPLETED | OUTPATIENT
Start: 2024-12-08 | End: 2024-12-08

## 2024-12-08 RX ORDER — ATORVASTATIN CALCIUM 40 MG/1
40 TABLET, FILM COATED ORAL EVERY EVENING
Status: DISCONTINUED | OUTPATIENT
Start: 2024-12-08 | End: 2024-12-13 | Stop reason: HOSPADM

## 2024-12-08 RX ORDER — OLANZAPINE 10 MG/2ML
10 INJECTION, POWDER, FOR SOLUTION INTRAMUSCULAR DAILY PRN
Status: DISCONTINUED | OUTPATIENT
Start: 2024-12-08 | End: 2024-12-08

## 2024-12-08 RX ORDER — CEFTRIAXONE 1 G/1
1 INJECTION, POWDER, FOR SOLUTION INTRAMUSCULAR; INTRAVENOUS EVERY 24 HOURS
Status: DISCONTINUED | OUTPATIENT
Start: 2024-12-09 | End: 2024-12-10

## 2024-12-08 RX ORDER — OLANZAPINE 5 MG/1
5 TABLET, ORALLY DISINTEGRATING ORAL
Status: DISCONTINUED | OUTPATIENT
Start: 2024-12-08 | End: 2024-12-11

## 2024-12-08 RX ORDER — HALOPERIDOL 5 MG/ML
2 INJECTION INTRAMUSCULAR EVERY 6 HOURS PRN
Status: DISCONTINUED | OUTPATIENT
Start: 2024-12-08 | End: 2024-12-13 | Stop reason: HOSPADM

## 2024-12-08 RX ORDER — FINASTERIDE 5 MG/1
5 TABLET, FILM COATED ORAL DAILY
Status: DISCONTINUED | OUTPATIENT
Start: 2024-12-08 | End: 2024-12-13 | Stop reason: HOSPADM

## 2024-12-08 RX ORDER — TAMSULOSIN HYDROCHLORIDE 0.4 MG/1
0.4 CAPSULE ORAL DAILY
Status: DISCONTINUED | OUTPATIENT
Start: 2024-12-08 | End: 2024-12-13 | Stop reason: HOSPADM

## 2024-12-08 RX ORDER — AMOXICILLIN 250 MG
2 CAPSULE ORAL 2 TIMES DAILY PRN
Status: DISCONTINUED | OUTPATIENT
Start: 2024-12-08 | End: 2024-12-13 | Stop reason: HOSPADM

## 2024-12-08 RX ORDER — ONDANSETRON 2 MG/ML
4 INJECTION INTRAMUSCULAR; INTRAVENOUS EVERY 6 HOURS PRN
Status: DISCONTINUED | OUTPATIENT
Start: 2024-12-08 | End: 2024-12-13 | Stop reason: HOSPADM

## 2024-12-08 RX ORDER — IOPAMIDOL 755 MG/ML
500 INJECTION, SOLUTION INTRAVASCULAR ONCE
Status: COMPLETED | OUTPATIENT
Start: 2024-12-08 | End: 2024-12-08

## 2024-12-08 RX ORDER — ACETAMINOPHEN 325 MG/1
650 TABLET ORAL EVERY 8 HOURS PRN
Status: DISCONTINUED | OUTPATIENT
Start: 2024-12-08 | End: 2024-12-13 | Stop reason: HOSPADM

## 2024-12-08 RX ORDER — CARVEDILOL 25 MG/1
25 TABLET ORAL 2 TIMES DAILY WITH MEALS
Status: DISCONTINUED | OUTPATIENT
Start: 2024-12-08 | End: 2024-12-13 | Stop reason: HOSPADM

## 2024-12-08 RX ORDER — MIRTAZAPINE 15 MG/1
15 TABLET, FILM COATED ORAL
Status: DISCONTINUED | OUTPATIENT
Start: 2024-12-08 | End: 2024-12-11

## 2024-12-08 RX ORDER — AMOXICILLIN 250 MG
1 CAPSULE ORAL 2 TIMES DAILY PRN
Status: DISCONTINUED | OUTPATIENT
Start: 2024-12-08 | End: 2024-12-13 | Stop reason: HOSPADM

## 2024-12-08 RX ORDER — HEPARIN SODIUM 10000 [USP'U]/100ML
0-5000 INJECTION, SOLUTION INTRAVENOUS CONTINUOUS
Status: DISCONTINUED | OUTPATIENT
Start: 2024-12-08 | End: 2024-12-10

## 2024-12-08 RX ORDER — ASPIRIN 325 MG
325 TABLET, DELAYED RELEASE (ENTERIC COATED) ORAL DAILY
Status: DISCONTINUED | OUTPATIENT
Start: 2024-12-09 | End: 2024-12-13 | Stop reason: HOSPADM

## 2024-12-08 RX ORDER — ONDANSETRON 4 MG/1
4 TABLET, ORALLY DISINTEGRATING ORAL EVERY 6 HOURS PRN
Status: DISCONTINUED | OUTPATIENT
Start: 2024-12-08 | End: 2024-12-13 | Stop reason: HOSPADM

## 2024-12-08 RX ADMIN — OLANZAPINE 10 MG: 10 INJECTION, POWDER, FOR SOLUTION INTRAMUSCULAR at 05:17

## 2024-12-08 RX ADMIN — SODIUM CHLORIDE 1000 ML: 9 INJECTION, SOLUTION INTRAVENOUS at 08:00

## 2024-12-08 RX ADMIN — CARVEDILOL 25 MG: 25 TABLET, FILM COATED ORAL at 17:52

## 2024-12-08 RX ADMIN — MICONAZOLE NITRATE ANTIFUNGAL POWDER: 2 POWDER TOPICAL at 21:19

## 2024-12-08 RX ADMIN — CEFTRIAXONE 1 G: 1 INJECTION, POWDER, FOR SOLUTION INTRAMUSCULAR; INTRAVENOUS at 05:31

## 2024-12-08 RX ADMIN — ASPIRIN 325 MG ORAL TABLET 325 MG: 325 PILL ORAL at 17:16

## 2024-12-08 RX ADMIN — TAMSULOSIN HYDROCHLORIDE 0.4 MG: 0.4 CAPSULE ORAL at 17:52

## 2024-12-08 RX ADMIN — HEPARIN SODIUM 950 UNITS/HR: 10000 INJECTION, SOLUTION INTRAVENOUS at 17:15

## 2024-12-08 RX ADMIN — ATORVASTATIN CALCIUM 40 MG: 40 TABLET, FILM COATED ORAL at 21:19

## 2024-12-08 RX ADMIN — HALOPERIDOL LACTATE 2 MG: 5 INJECTION, SOLUTION INTRAMUSCULAR at 14:58

## 2024-12-08 RX ADMIN — IOPAMIDOL 72 ML: 755 INJECTION, SOLUTION INTRAVENOUS at 06:36

## 2024-12-08 RX ADMIN — FINASTERIDE 5 MG: 5 TABLET, FILM COATED ORAL at 17:52

## 2024-12-08 ASSESSMENT — COLUMBIA-SUICIDE SEVERITY RATING SCALE - C-SSRS
1. IN THE PAST MONTH, HAVE YOU WISHED YOU WERE DEAD OR WISHED YOU COULD GO TO SLEEP AND NOT WAKE UP?: NO
6. HAVE YOU EVER DONE ANYTHING, STARTED TO DO ANYTHING, OR PREPARED TO DO ANYTHING TO END YOUR LIFE?: NO
2. HAVE YOU ACTUALLY HAD ANY THOUGHTS OF KILLING YOURSELF IN THE PAST MONTH?: NO

## 2024-12-08 ASSESSMENT — ACTIVITIES OF DAILY LIVING (ADL)
ADLS_ACUITY_SCORE: 63
ADLS_ACUITY_SCORE: 63
ADLS_ACUITY_SCORE: 60
ADLS_ACUITY_SCORE: 60
ADLS_ACUITY_SCORE: 63
ADLS_ACUITY_SCORE: 63
ADLS_ACUITY_SCORE: 60
ADLS_ACUITY_SCORE: 60
ADLS_ACUITY_SCORE: 63
ADLS_ACUITY_SCORE: 60
ADLS_ACUITY_SCORE: 68
ADLS_ACUITY_SCORE: 60
ADLS_ACUITY_SCORE: 68
ADLS_ACUITY_SCORE: 60
ADLS_ACUITY_SCORE: 60
ADLS_ACUITY_SCORE: 63
ADLS_ACUITY_SCORE: 63
ADLS_ACUITY_SCORE: 60
ADLS_ACUITY_SCORE: 60
ADLS_ACUITY_SCORE: 63
ADLS_ACUITY_SCORE: 63
ADLS_ACUITY_SCORE: 60
ADLS_ACUITY_SCORE: 60

## 2024-12-08 NOTE — ED NOTES
Pt was throwing his legs over the side rails of the bed and continuously trying to pull at cords. MD notified. Soft restraints and Haldol ordered and given.

## 2024-12-08 NOTE — PLAN OF CARE
"PRIMARY DIAGNOSIS: \"GENERIC\" NURSING  OUTPATIENT/OBSERVATION GOALS TO BE MET BEFORE DISCHARGE:  ADLs back to baseline: No    Activity and level of assistance: Ax1    Pain status: Improved with use of alternative comfort measures i.e.: positioning    Return to near baseline physical activity: No     Discharge Planner Nurse   Safe discharge environment identified: Yes  Barriers to discharge: Yes       Entered by: Diane Limon RN 12/08/2024 9:41 AM   Alert. Disoriented to situation, time and place. On tele. Breakfast tray delivered. No further needs at this time.   Please review provider order for any additional goals.   Nurse to notify provider when observation goals have been met and patient is ready for discharge.  Goal Outcome Evaluation:      Plan of Care Reviewed With: patient    Overall Patient Progress: improvingOverall Patient Progress: improving           "

## 2024-12-08 NOTE — ED NOTES
[facility]  ED Nurse Handoff Report    ED Chief complaint: Chest Pain  . ED Diagnosis:   Final diagnoses:   None       Allergies:   Allergies   Allergen Reactions    Lisinopril     Morphine Other (See Comments)     confusion       Code Status: See previous code order    Activity level - Baseline/Home:  standby.  Activity Level - Current:   assist of 1.   Lift room needed: No.   Bariatric: No   Needed: Yes   Isolation: No.   Infection: Not Applicable.     Respiratory status: Nasal cannula    Vital Signs (within 30 minutes):   Vitals:    12/08/24 0215 12/08/24 0230 12/08/24 0400 12/08/24 0544   BP: (!) 162/98 (!) 172/63 (!) 142/86 114/69   Pulse: (!) 121 (!) 122 115 113   Resp: (!) 36 30 (!) 41 22   SpO2:           Cardiac Rhythm:  ,      Pain level:    Patient confused: Yes.   Patient Falls Risk: bed/chair alarm on, patient and family education, and activity supervised.   Elimination Status: Has voided     Patient Report - Initial Complaint: Chest Pain.   Focused Assessment: Confused, Egyptian Speaking     Abnormal Results:   Labs Ordered and Resulted from Time of ED Arrival to Time of ED Departure   BASIC METABOLIC PANEL - Abnormal       Result Value    Sodium 136      Potassium 4.6      Chloride 103      Carbon Dioxide (CO2) 19 (*)     Anion Gap 14      Urea Nitrogen 28.7 (*)     Creatinine 0.68      GFR Estimate 89      Calcium 8.4 (*)     Glucose 183 (*)    TROPONIN T, HIGH SENSITIVITY - Abnormal    Troponin T, High Sensitivity 28 (*)    CBC WITH PLATELETS AND DIFFERENTIAL - Abnormal    WBC Count 10.5      RBC Count 4.48      Hemoglobin 12.9 (*)     Hematocrit 39.4 (*)     MCV 88      MCH 28.8      MCHC 32.7      RDW 14.4      Platelet Count 288      % Neutrophils 81      % Lymphocytes 9      % Monocytes 8      % Eosinophils 1      % Basophils 1      % Immature Granulocytes 0      NRBCs per 100 WBC 0      Absolute Neutrophils 8.5 (*)     Absolute Lymphocytes 0.9      Absolute  Monocytes 0.8      Absolute Eosinophils 0.1      Absolute Basophils 0.1      Absolute Immature Granulocytes 0.0      Absolute NRBCs 0.0     HEPATIC FUNCTION PANEL - Abnormal    Protein Total 7.7      Albumin 3.3 (*)     Bilirubin Total 0.6      Alkaline Phosphatase 267 (*)     AST 37      ALT 39      Bilirubin Direct 0.23     MAGNESIUM - Abnormal    Magnesium 1.5 (*)    BLOOD GAS VENOUS - Abnormal    pH Venous 7.40      pCO2 Venous 37 (*)     pO2 Venous 52 (*)     Bicarbonate Venous 23      Base Excess/Deficit Venous -1.4      FIO2 21      Oxyhemoglobin Venous 85 (*)     O2 Sat, Venous 86.3 (*)    TSH WITH FREE T4 REFLEX - Abnormal    TSH 5.68 (*)    ROUTINE UA WITH MICROSCOPIC REFLEX TO CULTURE - Abnormal    Color Urine Yellow      Appearance Urine Cloudy (*)     Glucose Urine 50 (*)     Bilirubin Urine Negative      Ketones Urine Negative      Specific Gravity Urine 1.018      Blood Urine Small (*)     pH Urine 5.5      Protein Albumin Urine 50 (*)     Urobilinogen Urine Normal      Nitrite Urine Negative      Leukocyte Esterase Urine Large (*)     WBC Clumps Urine Present (*)     Budding Yeast Urine Few (*)     RBC Urine 20 (*)     WBC Urine >182 (*)     Squamous Epithelials Urine 1     TROPONIN T, HIGH SENSITIVITY - Abnormal    Troponin T, High Sensitivity 33 (*)    TROPONIN T, HIGH SENSITIVITY - Abnormal    Troponin T, High Sensitivity 47 (*)    INFLUENZA A/B, RSV AND SARS-COV2 PCR - Normal    Influenza A PCR Negative      Influenza B PCR Negative      RSV PCR Negative      SARS CoV2 PCR Negative     NT PROBNP INPATIENT - Normal    N terminal Pro BNP Inpatient 912     LACTIC ACID WHOLE BLOOD WITH 1X REPEAT IN 2 HR WHEN >2 - Normal    Lactic Acid, Initial 1.3     PROCALCITONIN - Normal    Procalcitonin 0.06     LIPASE - Normal    Lipase 14     T4 FREE - Normal    Free T4 1.06     TROPONIN T, HIGH SENSITIVITY   URINE CULTURE        CT Chest Pulmonary Embolism w Contrast   Final Result   IMPRESSION:   *  No  pulmonary embolism.   *  Moderate degree of pulmonary edema and small to moderate-sized bilateral pleural effusions.   *  Cirrhosis.   *  Age-indeterminate superior endplate T2 fracture, new from September 2023 with no interval relevant studies since that time. Correlate with upper back/low neck pain and consider dedicated thoracic spine imaging as clinically warranted.            XR Chest 2 Views   Final Result   IMPRESSION: Stable cardiomediastinal silhouette. Pulmonary vascular congestion with bilateral infiltrates. Trace pleural effusions. Coronary stent.      CT Head w/o Contrast   Final Result   IMPRESSION:   1.  No acute intracranial hemorrhage or mass effect.   2.  Chronic intracranial findings as above.           Treatments provided: SEE MAR  Family Comments: NA  OBS brochure/video discussed/provided to patient:  Yes  ED Medications:   Medications   OLANZapine (zyPREXA) injection 10 mg (10 mg Intramuscular $Given 12/8/24 0517)   cefTRIAXone (ROCEPHIN) 1 g vial to attach to  mL bag for ADULTS or NS 50 mL bag for PEDS (0 g Intravenous Stopped 12/8/24 0703)   iopamidol (ISOVUE-370) solution 500 mL (72 mLs Intravenous $Given 12/8/24 0636)   sodium chloride (PF) 0.9% PF flush 100 mL (91 mLs Intravenous $Given 12/8/24 0636)       Drips infusing:  No  For the majority of the shift this patient was Green.   Interventions performed were NA.    Sepsis treatment initiated: No    Cares/treatment/interventions/medications to be completed following ED care: NA    ED Nurse Name: Lorene Girard RN  7:29 AM     RECEIVING UNIT ED HANDOFF REVIEW    Above ED Nurse Handoff Report was reviewed: Yes  Reviewed by: Tara Bernardo RN on December 8, 2024 at 2:49 PM   I Asa called the ED to inform them the note was read: Yes   RECEIVING UNIT ED HANDOFF REVIEW    Above ED Nurse Handoff Report was reviewed: Yes  Reviewed by: Geraldo Mcnamara RN on December 8, 2024 at 6:11 PM   I Asa called the ED to inform them the note was  read: Yes

## 2024-12-08 NOTE — ED TRIAGE NOTES
89 year old male arriving by ambulance c/o chest pain. Per EMS patient was at home with family and only speaks Iranian. Per EMS patient started to have chest pain one hour ago per family.  line called but patient did not speak.     Triage Assessment (Adult)       Row Name 12/08/24 0047          Triage Assessment    Airway WDL WDL        Respiratory WDL    Respiratory WDL WDL        Skin Circulation/Temperature WDL    Skin Circulation/Temperature WDL WDL        Cardiac WDL    Cardiac WDL WDL        Peripheral/Neurovascular WDL    Peripheral Neurovascular WDL WDL        Cognitive/Neuro/Behavioral WDL    Cognitive/Neuro/Behavioral WDL WDL

## 2024-12-08 NOTE — ED PROVIDER NOTES
"  Emergency Department Note      History of Present Illness     Chief Complaint   Chest Pain    History limited due to patient's baseline dementia.     HPI   Adam Huber is a 89 year old male with a complex past medical history of hypertension, hyperlipidemia, CAD, CHF, NSTEMI, CVA, type 2 diabetes mellitus, encephalopathy, sepsis, UTI, and Alzheimer's disease who presents to the ED via EMS for evaluation of chest pain. Nurse present reports a [place]  was called. Patient has a history of dementia and was not speaking, no facial expression. Per EMS, family reported Adam developed chest pain one hour prior to arrival at the ED.     Patient's son arrived at bedside. Son states Adam had a coughing fit at 2230 earlier tonight (about 3 hours prior) and developed left sided chest pain right after. He has been eating drinking normally. Endorses shortness of breath. No fevers. Son notes a history of head trauma \"a long time ago\" that causes some occasional dizziness and increased confusion. He states patient is responding to him normally and acting at his baseline.       Independent Historian   Nurse present and EMS report as detailed above.    Review of External Notes   Reviewed recent hospitalization including most recent from 11/13/2024.    Past Medical History     Medical History and Problem List   CAD   CVA  Hyperlipidemia   Hypertension   NSTEMI  Post PTCA  Acute coronary syndrome  Acute chest pain  Bleeding of blood vessel  Physical deconditioning  Acute metabolic encephalopathy  UTI   Acute delirium  Late onset Alzheimer's disease   Type 2 diabetes mellitus   Rhabdomyolysis  Pulmonary nodules  Syncope  Slurred speech  Acute cystitis with hematuria  Severe sepsis   Acute pulmonary edema   Acute congestive heart failure  Hypoxia  Pneumonia  Congestive heart failure  Pyelonephritis  Pressure injury of sacral region, stage 2  Sepsis  Villa catheter in place  BPH  Chronic headache  Dupuytren's " contracture  Dorsalgia  Hemiplegia affecting right dominant side  Insomnia  IFIS  MDD  Dementia  Macular degeneration  Nuclear sclerotic cataract of both eyes  Vertigo    Medications   Aspirin 325 mg  Atorvastatin  Carvedilol  Finasteride  Glipizide  Melatonin  Mirtazapine  Olanzapine zydis  Tamsulosin   Nitroglycerin   Insulin glargine  Risperidone  Furosemide  Metformin    Surgical History   Cholecystectomy  TURP  PCI w/ LEXI to RCA  PCI w/ LEXI to mid-CFX  Cataract removal     Physical Exam     Patient Vitals for the past 24 hrs:   BP Temp Temp src Pulse Resp SpO2   12/08/24 0733 -- 97.5  F (36.4  C) Temporal -- -- --   12/08/24 0544 114/69 -- -- 113 22 --   12/08/24 0400 (!) 142/86 -- -- 115 (!) 41 --   12/08/24 0230 (!) 172/63 -- -- (!) 122 30 --   12/08/24 0215 (!) 162/98 -- -- (!) 121 (!) 36 --   12/08/24 0200 -- -- -- 120 29 --   12/08/24 0159 118/69 -- -- -- -- 99 %     Physical Exam  General: Elderly Liberian-speaking gentleman, chronically ill-appearing.  Head: The scalp, face, and head appear normal  Eyes: The pupils are equal, round, and reactive to light. Conjunctivae and sclerae are normal  ENT: External acoustic canals are normal. The oropharynx is normal without erythema. Uvula is in the midline  Neck: Normal range of motion.   CV: Tachycardic but regular no  Resp: Lungs are clear without wheezes or rales. No respiratory distress.   GI: Abdomen is soft, no rigidity, guarding, or rebound. No distension. No tenderness to palpation in any quadrant.    MS: Normal tone.   Skin: No rash or lesions noted. Normal capillary refill noted  Neuro:   Moving all extremities.   Psych:  Flat affect/        Diagnostics     Lab Results   Labs Ordered and Resulted from Time of ED Arrival to Time of ED Departure   BASIC METABOLIC PANEL - Abnormal       Result Value    Sodium 136      Potassium 4.6      Chloride 103      Carbon Dioxide (CO2) 19 (*)     Anion Gap 14      Urea Nitrogen 28.7 (*)     Creatinine 0.68      GFR  Estimate 89      Calcium 8.4 (*)     Glucose 183 (*)    TROPONIN T, HIGH SENSITIVITY - Abnormal    Troponin T, High Sensitivity 28 (*)    CBC WITH PLATELETS AND DIFFERENTIAL - Abnormal    WBC Count 10.5      RBC Count 4.48      Hemoglobin 12.9 (*)     Hematocrit 39.4 (*)     MCV 88      MCH 28.8      MCHC 32.7      RDW 14.4      Platelet Count 288      % Neutrophils 81      % Lymphocytes 9      % Monocytes 8      % Eosinophils 1      % Basophils 1      % Immature Granulocytes 0      NRBCs per 100 WBC 0      Absolute Neutrophils 8.5 (*)     Absolute Lymphocytes 0.9      Absolute Monocytes 0.8      Absolute Eosinophils 0.1      Absolute Basophils 0.1      Absolute Immature Granulocytes 0.0      Absolute NRBCs 0.0     HEPATIC FUNCTION PANEL - Abnormal    Protein Total 7.7      Albumin 3.3 (*)     Bilirubin Total 0.6      Alkaline Phosphatase 267 (*)     AST 37      ALT 39      Bilirubin Direct 0.23     MAGNESIUM - Abnormal    Magnesium 1.5 (*)    BLOOD GAS VENOUS - Abnormal    pH Venous 7.40      pCO2 Venous 37 (*)     pO2 Venous 52 (*)     Bicarbonate Venous 23      Base Excess/Deficit Venous -1.4      FIO2 21      Oxyhemoglobin Venous 85 (*)     O2 Sat, Venous 86.3 (*)    TSH WITH FREE T4 REFLEX - Abnormal    TSH 5.68 (*)    ROUTINE UA WITH MICROSCOPIC REFLEX TO CULTURE - Abnormal    Color Urine Yellow      Appearance Urine Cloudy (*)     Glucose Urine 50 (*)     Bilirubin Urine Negative      Ketones Urine Negative      Specific Gravity Urine 1.018      Blood Urine Small (*)     pH Urine 5.5      Protein Albumin Urine 50 (*)     Urobilinogen Urine Normal      Nitrite Urine Negative      Leukocyte Esterase Urine Large (*)     WBC Clumps Urine Present (*)     Budding Yeast Urine Few (*)     RBC Urine 20 (*)     WBC Urine >182 (*)     Squamous Epithelials Urine 1     TROPONIN T, HIGH SENSITIVITY - Abnormal    Troponin T, High Sensitivity 33 (*)    TROPONIN T, HIGH SENSITIVITY - Abnormal    Troponin T, High Sensitivity  47 (*)    INFLUENZA A/B, RSV AND SARS-COV2 PCR - Normal    Influenza A PCR Negative      Influenza B PCR Negative      RSV PCR Negative      SARS CoV2 PCR Negative     NT PROBNP INPATIENT - Normal    N terminal Pro BNP Inpatient 912     LACTIC ACID WHOLE BLOOD WITH 1X REPEAT IN 2 HR WHEN >2 - Normal    Lactic Acid, Initial 1.3     PROCALCITONIN - Normal    Procalcitonin 0.06     LIPASE - Normal    Lipase 14     T4 FREE - Normal    Free T4 1.06     TROPONIN T, HIGH SENSITIVITY   URINE CULTURE       Imaging   CT Chest Pulmonary Embolism w Contrast   Final Result   IMPRESSION:   *  No pulmonary embolism.   *  Moderate degree of pulmonary edema and small to moderate-sized bilateral pleural effusions.   *  Cirrhosis.   *  Age-indeterminate superior endplate T2 fracture, new from September 2023 with no interval relevant studies since that time. Correlate with upper back/low neck pain and consider dedicated thoracic spine imaging as clinically warranted.            XR Chest 2 Views   Final Result   IMPRESSION: Stable cardiomediastinal silhouette. Pulmonary vascular congestion with bilateral infiltrates. Trace pleural effusions. Coronary stent.      CT Head w/o Contrast   Final Result   IMPRESSION:   1.  No acute intracranial hemorrhage or mass effect.   2.  Chronic intracranial findings as above.           EKG   ECG taken at 0103, ECG read at 0107  Sinus rhythm with occasional premature ventricular complexes  Left posterior fascicular block  Nonspecific ST abnormality  Abnormal ECG   Rate 99 bpm. NH interval 148 ms. QRS duration 90 ms. QT/QTc 370/474 ms. P-R-T axes 84 119 49.    ED Course      Medications Administered   Medications   OLANZapine (zyPREXA) injection 10 mg (10 mg Intramuscular $Given 12/8/24 0517)   sodium chloride 0.9% BOLUS 1,000 mL (has no administration in time range)   cefTRIAXone (ROCEPHIN) 1 g vial to attach to  mL bag for ADULTS or NS 50 mL bag for PEDS (0 g Intravenous Stopped 12/8/24 0703)    iopamidol (ISOVUE-370) solution 500 mL (72 mLs Intravenous $Given 12/8/24 0636)   sodium chloride (PF) 0.9% PF flush 100 mL (91 mLs Intravenous $Given 12/8/24 0636)       ED Course   ED Course as of 12/08/24 0756   Sun Dec 08, 2024   0058 I obtained history and examined the patient as noted above.        Additional Documentation  None    Medical Decision Making / Diagnosis   MIPS   CT for PE was ordered because the patient is high risk for pulmonary embolism.    MARK Huber is a 89 year old male with complex past medical history including dementia, recurrent UTIs, hypertension, hyperlipidemia, coronary artery disease, CHF and type 2 diabetes who presents to the emergency department with altered mental status and chest pain.  Patient initially presented without his son and was unable to give us a history even with [place]  present.  EMS reported that the patient had had chest pain which prompted his visit to the emergency department.  However I was concerned about the patient's altered mental status in the emergency department therefore very broad workup was initiated.  CT of the head was obtained which shows no signs of intracranial hemorrhage or mass.  Broad infectious workup was undertaken.  UA is concerning for signs infection.  Started on antibiotics.  Lactate was not significantly elevated.  However patient was persistently tachycardic.  Given his complaint of chest pain and CT of the chest with PE studies was obtained.  Thankfully was negative for any evidence of pulmonary embolism.  Furthermore there was no evidence of any pneumonia.  Blood work revealed a mildly elevated troponin that did display some rise in multiple repeats.  EKG was not ischemic.  More suspicion that this is due to demand due to his persistent tachycardia.  Patient was agitated in the emergency department requiring Zyprexa to facilitate cares.  Given his overall clinical presentation of tachycardia, UTI and  elevated troponin, I believe he requires workup for further evaluation and treatment.  Would hold off on heparin administration at this time.    Disposition   Admitted    Diagnosis     ICD-10-CM    1. Altered mental status, unspecified altered mental status type  R41.82       2. Urinary tract infection without hematuria, site unspecified  N39.0       3. NSTEMI due to demand   I21.4       4. Tachycardia  R00.0            Scribe Disclosure:  I, Liza Barnett, am serving as a scribe at 1:02 AM on 12/8/2024 to document services personally performed by Hank Doherty MD based on my observations and the provider's statements to me.        Hank Doherty MD  12/08/24 4896

## 2024-12-08 NOTE — PHARMACY-ADMISSION MEDICATION HISTORY
Pharmacy Intern Admission Medication History    Admission medication history is complete. The information provided in this note is only as accurate as the sources available at the time of the update.    Information Source(s): Family member and CareEverywhere/SureScripts via phone    Pertinent Information: Spoke with son (Adolfo) to verify medication and last doses.     Changes made to PTA medication list:  Added: None  Deleted: None  Changed: None    Allergies reviewed with patient and updates made in EHR: unable to assess    Medication History Completed By: Orlando Garvey 12/8/2024 8:09 AM    PTA Med List   Medication Sig Last Dose/Taking    acetaminophen (TYLENOL) 325 MG tablet Take 650 mg by mouth every 8 hours as needed for mild pain Unknown    aspirin (ASA) 325 MG EC tablet Take 325 mg by mouth daily. 12/7/2024 Morning    atorvastatin (LIPITOR) 40 MG tablet Take 1 tablet (40 mg) by mouth every evening 12/7/2024 Morning    carvedilol (COREG) 25 MG tablet Take 1 tablet (25 mg) by mouth 2 times daily (with meals) 12/7/2024 Morning    finasteride (PROSCAR) 5 MG tablet Take 5 mg by mouth daily 12/7/2024 Morning    glipiZIDE (GLUCOTROL) 5 MG tablet Take 1 tablet (5 mg) by mouth every morning (before breakfast). 12/7/2024 Morning    Melatonin 10 MG TABS tablet Take 10 mg by mouth nightly as needed for sleep Unknown    mirtazapine (REMERON) 15 MG tablet Take 15 mg by mouth nightly as needed (agitation). Unknown    OLANZapine zydis (ZYPREXA) 5 MG ODT Take 1 tablet (5 mg) by mouth nightly as needed for agitation. Unknown    tamsulosin (FLOMAX) 0.4 MG capsule Take 1 capsule (0.4 mg) by mouth daily. 12/7/2024

## 2024-12-08 NOTE — H&P
[facility]    History and Physical - Hospitalist Service       Date of Admission:  12/8/2024    Assessment & Plan      Adam Huber is a 89 year old male admitted on 12/8/2024. He has a history of dementia, CAD, type 2 diabetes mellitus, hypertension, BPH with chronic urinary obstruction presents to the ED with concerns for chest pain.  Obtaining history is difficult given patient's baseline confusion, but did mention chest pain at some point.  Mildly elevated troponin, does have history of CAD.  EKG not concerning for ACS, troponin appears to be chronically elevated.  Obtaining TTE.  Also found to have UTI, being treated with ceftriaxone.    Urinary tract infection  History of recurrent urinary tract infections  History of bladder outlet obstruction and BPH with recurrent urinary tract infections.  Presented to the ED because patient was complaining of chest discomfort, however during workup urinalysis showed large leukocyte esterase, pyuria, concern for recurrent UTI.  Does not seem to have a history of resistant organisms in urine, started on IV ceftriaxone which we will continue for now.  Follow-up urine culture.  - Continue IV ceftriaxone for now  - Follow-up urine culture  - Monitor urine output, at risk of retention given history of bladder outlet obstruction    Chest pain  Elevated troponin  Patient is a difficult historian given his underlying confusion, however at some point mentioned that he was having chest discomfort.  On arrival EKG does not show any acute signs of ischemia, initial troponin was elevated at 28, repeats minesh to 33 and 47.  Will trend to peak, however upon review of previous troponin levels has had pretty consistently elevated troponins over the last year or so.  Will repeat TTE to assess cardiac function and to look for new wall motion abnormalities.  - Trend troponin to peak  - Cardiac telemetry  - Repeat TTE  - Will consider Lexiscan stress test  tomorrow    Update: While trending troponin most recent is now up to 241.  Again, history difficult to obtain given language barrier on top of patient's baseline confusion.  To be safe starting on heparin drip, giving full dose aspirin.  Consulting cardiology, awaiting TTE results.  Will also repeat EKG. Monitor for signs of clinical heart failure and diurese as needed.     Chronic Urinary Bladder Outlet Obstruction  BPH  History of recurrent urinary tract infections.  Has had previous hospitalizations with gross hematuria, including about 3 weeks ago at this hospital.  No hematuria this admission, but urine again appears infected.  Started on IV ceftriaxone, follow-up culture results.  - Follow-up urine culture  - Continue IV ceftriaxone for now  - Monitor urine output, consider urologic consultation if urinary retention  - Has had difficulty with Villa catheters in the past, concerned that patient will just pull Villa out given his dementia which is why he does not have a chronic catheter  -Continue PTA finasteride, tamsulosin      Type 2 Diabetes Mellitus w/ Hyperglycemia  Hx of type 2 diabetes mellitus on lantus. Presented with severe hyperglycemia; glucose 485. Starting MDSSI during admission.     Hepatic Cirrhosis  Elevated Alkaline Phosphatase  Posterior Right 2.9 cm Hepatic Lesion    - cirrhotic appearance to liver noted on CT abdomen/pelvis    - LFTs within normal limits, with exception of isolated elevated of alkaline phosphatase to 210    - incidentally noted hepatic lesion to 2.9cm on right posterior lobe    - defer to outpatient PCP on whether to follow lesion with imaging given his comorbidities and underlying dementia     Dementia w/ history of behavioral Disturbances  Hx CVA  - patient chronically with slurred speech  - on dysphagia diet  - needs help with most of his ADLs  - lives at home independently with assistance from his wife/family  - patient does not do well with ipad       Decubitus Ulcers, present on admission  - wound care by nurse     CAD s/p LEXI (2015)  Had PCI of mid circumflex with drug-eluting stent in 2015.  As above, seems to have chronically elevated troponin.  Obtaining repeat TTE, not treating for active ACS for now.     Hypertension  Hyperlipidemia  Continue PTA medications.     Diabetes  PTA medication includes Lantus, glipizide.  Continue Lantus and sliding scale insulin during admission.         Observation Goals: -diagnostic tests and consults completed and resulted, -vital signs normal or at patient baseline, -adequate pain control on oral analgesics, -infection is improving, -returns to baseline functional status, Nurse to notify provider when observation goals have been met and patient is ready for discharge.  Diet: Regular Diet Adult  DVT Prophylaxis: Heparin SQ  Villa Catheter: Not present  Lines: None     Cardiac Monitoring: ACTIVE order. Indication: Chest pain/ ACS rule out (24 hours)  Code Status: No CPR- Do NOT Intubate    Clinically Significant Risk Factors Present on Admission           # Hypocalcemia: Lowest Ca = 8.4 mg/dL in last 2 days, will monitor and replace as appropriate   # Hypomagnesemia: Lowest Mg = 1.5 mg/dL in last 2 days, will replace as needed   # Hypoalbuminemia: Lowest albumin = 3.3 g/dL at 12/8/2024 12:57 AM, will monitor as appropriate   # Drug Induced Platelet Defect: home medication list includes an antiplatelet medication   # Hypertension: Noted on problem list    # Chronic heart failure with preserved ejection fraction: heart failure noted on problem list and last echo with EF >50%    # Dementia: noted on problem list      # DMII: A1C = 12.6 % (Ref range: <5.7 %) within past 6 months          # Financial/Environmental Concerns:           Disposition Plan     Medically Ready for Discharge: Anticipated Tomorrow           Pal Mack MD  Hospitalist Service  [facility]  Securely message with [employer]  info)  Text page via [facility] Paging/Directory     ______________________________________________________________________    Chief Complaint   Chest pain    History is obtained from the patient    History of Present Illness   Adam Huber is a 89 year old male who has a history of dementia, CAD, type 2 diabetes mellitus, hypertension, BPH with chronic urinary obstruction presents to the ED with concerns for chest pain.  Patient was at home with his family, began complaining of chest pain prompting call to EMS.  Does have a history of ACS and had a stent placed in 2015.  Was not having any shortness of breath, nausea, or other abnormal symptoms.    On arrival to ED hemodynamically stable, but with sinus tachycardia in the 120s.  Slightly hypertensive.  BMP and CBC relatively unremarkable.  Urinalysis appears grossly infected.  Initial troponin elevated, slow upward trend on repeat troponins.  EKG showed sinus tachycardia with PVCs but no acute signs of ischemia.  Was not endorsing additional chest pain in the ED, initially seemed somewhat confused but per family this is his baseline.  Started on antibiotics for presumed recurrent urinary tract infection.  Trending troponin to peak, admitting to observation.      Past Medical History    Past Medical History:   Diagnosis Date    Acute chest pain 10/23/2015    CAD (coronary artery disease)     Essential hypertension 10/23/2015    History of CVA (cerebrovascular accident) 10/23/2015    HLD (hyperlipidemia)     HTN (hypertension)     NSTEMI (non-ST elevated myocardial infarction) (H) 10/23/2015    Post PTCA 10-    Successful PCI of culprit proximal to mid RCA with placement of a    Post PTCA 11-6-2015    pci of complex mid CFX-hector       Past Surgical History   Past Surgical History:   Procedure Laterality Date    CHOLECYSTECTOMY      GENITOURINARY SURGERY      prostate removal     HEART CATH LEFT HEART CATH  10/12/2015    PCI w/ HECTOR to RCA    HEART CATH RIGHT AND  LEFT HEART CATH  11/6/2015    PCI w/ LEXI to mid-CFX       Prior to Admission Medications   Prior to Admission Medications   Prescriptions Last Dose Informant Patient Reported? Taking?   Melatonin 10 MG TABS tablet Unknown  Yes Yes   Sig: Take 10 mg by mouth nightly as needed for sleep   OLANZapine zydis (ZYPREXA) 5 MG ODT Unknown  No Yes   Sig: Take 1 tablet (5 mg) by mouth nightly as needed for agitation.   acetaminophen (TYLENOL) 325 MG tablet Unknown  Yes Yes   Sig: Take 650 mg by mouth every 8 hours as needed for mild pain   aspirin (ASA) 325 MG EC tablet 12/7/2024 Morning  Yes Yes   Sig: Take 325 mg by mouth daily.   atorvastatin (LIPITOR) 40 MG tablet 12/7/2024 Morning Spouse/Significant Other No Yes   Sig: Take 1 tablet (40 mg) by mouth every evening   carvedilol (COREG) 25 MG tablet 12/7/2024 Morning Spouse/Significant Other No Yes   Sig: Take 1 tablet (25 mg) by mouth 2 times daily (with meals)   finasteride (PROSCAR) 5 MG tablet 12/7/2024 Morning  Yes Yes   Sig: Take 5 mg by mouth daily   glipiZIDE (GLUCOTROL) 5 MG tablet 12/7/2024 Morning  No Yes   Sig: Take 1 tablet (5 mg) by mouth every morning (before breakfast).   mirtazapine (REMERON) 15 MG tablet Unknown  Yes Yes   Sig: Take 15 mg by mouth nightly as needed (agitation).   tamsulosin (FLOMAX) 0.4 MG capsule 12/7/2024  No Yes   Sig: Take 1 capsule (0.4 mg) by mouth daily.      Facility-Administered Medications: None        Review of Systems    The 10 point Review of Systems is negative other than noted in the HPI or here.      Physical Exam   Vital Signs: Temp: 97.8  F (36.6  C) Temp src: Axillary BP: 117/77 Pulse: 88   Resp: 16 SpO2: 99 % O2 Device: Nasal cannula Oxygen Delivery: 4 LPM  Weight: 0 lbs 0 oz    Constitutional: Lying in bed, no acute distress  Respiratory: Lungs clear, normal work of breathing  Cardiovascular: Regular rhythm, tachycardia, no MRG's, no peripheral edema  GI: Abdomen soft, nontender to palpation, nondistended  Neurologic:  Awake, difficult to communicate given language barrier, but patient does appear confused, some intermittent agitation trying to climb out of bed and remove pulse oximeter, other lines    Medical Decision Making       65 MINUTES SPENT BY ME on the date of service doing chart review, history, exam, documentation & further activities per the note.      Data     I have personally reviewed the following data over the past 24 hrs:    10.5  \   12.9 (L)   / 288     136 103 28.7 (H) /  183 (H)   4.6 19 (L) 0.68 \     ALT: 39 AST: 37 AP: 267 (H) TBILI: 0.6   ALB: 3.3 (L) TOT PROTEIN: 7.7 LIPASE: 14     Trop: 74 (H) BNP: 912     TSH: 5.68 (H) T4: 1.06 A1C: N/A     Procal: 0.06 CRP: N/A Lactic Acid: 1.3         Imaging results reviewed over the past 24 hrs:   Recent Results (from the past 24 hours)   CT Head w/o Contrast    Narrative    EXAM: CT HEAD W/O CONTRAST  LOCATION: [facility]  DATE: 12/8/2024    INDICATION: AMS  COMPARISON: November 7, 2024   TECHNIQUE: Routine CT Head without IV contrast. Multiplanar reformats. Dose reduction techniques were used.    FINDINGS:  INTRACRANIAL CONTENTS: No intracranial hemorrhage, extraaxial collection, or mass effect.  No CT evidence of acute infarct. Extensive bifrontal and bilateral anterior temporal lobe encephalomalacia in keeping with a prior traumatic insult. Remote   bilateral basal ganglia lacunar infarcts.  Moderate generalized volume loss. No hydrocephalus.     VISUALIZED ORBITS/SINUSES/MASTOIDS: No intraorbital abnormality. No paranasal sinus mucosal disease. No middle ear or mastoid effusion.    BONES/SOFT TISSUES: No acute abnormality.      Impression    IMPRESSION:  1.  No acute intracranial hemorrhage or mass effect.  2.  Chronic intracranial findings as above.    XR Chest 2 Views    Narrative    EXAM: XR CHEST 2 VIEWS  LOCATION: [facility]  DATE: 12/8/2024    INDICATION: chest pain  COMPARISON: 11/09/2024      Impression     IMPRESSION: Stable cardiomediastinal silhouette. Pulmonary vascular congestion with bilateral infiltrates. Trace pleural effusions. Coronary stent.   CT Chest Pulmonary Embolism w Contrast    Narrative    EXAM: CT CHEST PULMONARY EMBOLISM WITH CONTRAST  LOCATION: [facility]  DATE: 12/08/2024    INDICATION: Chest pain and persistent tachycardia.  COMPARISON: Chest CT from 09/17/2023. CT abdomen and pelvis from 09/22/2021.  TECHNIQUE: CT chest pulmonary angiogram during arterial phase injection of IV contrast. Multiplanar reformats and MIP reconstructions were performed. Dose reduction techniques were used.   CONTRAST: 72 mL Isovue 370.    FINDINGS:    The patient was unable to place their arms above their head.  This results in streak artifact and reduces image quality.     ANGIOGRAM CHEST: No pulmonary embolism. Normal heart size without pericardial effusion. Three-vessel coronary artery disease. Thoracic aortic atherosclerosis without aneurysm or evidence of dissection.    LUNGS AND PLEURA: Although degraded by breathing motion, there is a moderate degree of interstitial pulmonary edema (smooth interlobular septal thickening and peribronchial cuffing) with scattered dependent atelectasis. No pneumonia. Moderate right and   small left simple density pleural effusions.    MEDIASTINUM/AXILLAE: Several mildly enlarged hilar or mediastinal lymph nodes, not unexpected in the setting of pulmonary edema.    UPPER ABDOMEN: Cirrhosis in the upper abdomen, manifest with nodular hepatic contour, similar to prior imaging in 2021. No splenomegaly or significant upper abdominal ascites. Simple and hyperdense benign upper pole renal cysts are unchanged since at   least September 2021. No follow-up needed.    MUSCULOSKELETAL: Although degraded by breathing motion, there are likely several old bilateral rib fractures. Benign sclerotic lesion in the superior endplate T10 is unchanged from 2021. Superior  endplate T2 fracture is age-indeterminate, but new from   September 2023. Chronic appearing compression fracture of L1.      Impression    IMPRESSION:  *  No pulmonary embolism.  *  Moderate degree of pulmonary edema and small to moderate-sized bilateral pleural effusions.  *  Cirrhosis.  *  Age-indeterminate superior endplate T2 fracture, new from September 2023 with no interval relevant studies since that time. Correlate with upper back/low neck pain and consider dedicated thoracic spine imaging as clinically warranted.

## 2024-12-09 ENCOUNTER — APPOINTMENT (OUTPATIENT)
Dept: SPEECH THERAPY | Facility: CLINIC | Age: 89
DRG: 280 | End: 2024-12-09
Attending: INTERNAL MEDICINE
Payer: COMMERCIAL

## 2024-12-09 LAB
ANION GAP SERPL CALCULATED.3IONS-SCNC: 9 MMOL/L (ref 7–15)
ATRIAL RATE - MUSE: 100 BPM
ATRIAL RATE - MUSE: 91 BPM
BACTERIA UR CULT: NORMAL
BUN SERPL-MCNC: 25.2 MG/DL (ref 8–23)
CALCIUM SERPL-MCNC: 8.3 MG/DL (ref 8.8–10.4)
CHLORIDE SERPL-SCNC: 107 MMOL/L (ref 98–107)
CREAT SERPL-MCNC: 0.75 MG/DL (ref 0.67–1.17)
DIASTOLIC BLOOD PRESSURE - MUSE: NORMAL MMHG
DIASTOLIC BLOOD PRESSURE - MUSE: NORMAL MMHG
EGFRCR SERPLBLD CKD-EPI 2021: 86 ML/MIN/1.73M2
ERYTHROCYTE [DISTWIDTH] IN BLOOD BY AUTOMATED COUNT: 14.7 % (ref 10–15)
GLUCOSE SERPL-MCNC: 115 MG/DL (ref 70–99)
HCO3 SERPL-SCNC: 23 MMOL/L (ref 22–29)
HCT VFR BLD AUTO: 38.4 % (ref 40–53)
HGB BLD-MCNC: 12.3 G/DL (ref 13.3–17.7)
INTERPRETATION ECG - MUSE: NORMAL
INTERPRETATION ECG - MUSE: NORMAL
MCH RBC QN AUTO: 28.9 PG (ref 26.5–33)
MCHC RBC AUTO-ENTMCNC: 32 G/DL (ref 31.5–36.5)
MCV RBC AUTO: 90 FL (ref 78–100)
P AXIS - MUSE: 37 DEGREES
P AXIS - MUSE: 84 DEGREES
PLATELET # BLD AUTO: 261 10E3/UL (ref 150–450)
POTASSIUM SERPL-SCNC: 4.2 MMOL/L (ref 3.4–5.3)
PR INTERVAL - MUSE: 148 MS
PR INTERVAL - MUSE: 150 MS
QRS DURATION - MUSE: 90 MS
QRS DURATION - MUSE: 92 MS
QT - MUSE: 370 MS
QT - MUSE: 396 MS
QTC - MUSE: 474 MS
QTC - MUSE: 487 MS
R AXIS - MUSE: 119 DEGREES
R AXIS - MUSE: 77 DEGREES
RBC # BLD AUTO: 4.26 10E6/UL (ref 4.4–5.9)
SODIUM SERPL-SCNC: 139 MMOL/L (ref 135–145)
SYSTOLIC BLOOD PRESSURE - MUSE: NORMAL MMHG
SYSTOLIC BLOOD PRESSURE - MUSE: NORMAL MMHG
T AXIS - MUSE: 49 DEGREES
T AXIS - MUSE: 61 DEGREES
UFH PPP CHRO-ACNC: 0.17 IU/ML
UFH PPP CHRO-ACNC: 0.22 IU/ML
UFH PPP CHRO-ACNC: 0.27 IU/ML
UFH PPP CHRO-ACNC: 0.55 IU/ML
VENTRICULAR RATE- MUSE: 91 BPM
VENTRICULAR RATE- MUSE: 99 BPM
WBC # BLD AUTO: 8.3 10E3/UL (ref 4–11)

## 2024-12-09 PROCEDURE — 85520 HEPARIN ASSAY: CPT | Performed by: INTERNAL MEDICINE

## 2024-12-09 PROCEDURE — 85018 HEMOGLOBIN: CPT | Performed by: STUDENT IN AN ORGANIZED HEALTH CARE EDUCATION/TRAINING PROGRAM

## 2024-12-09 PROCEDURE — 99233 SBSQ HOSP IP/OBS HIGH 50: CPT | Performed by: INTERNAL MEDICINE

## 2024-12-09 PROCEDURE — 84132 ASSAY OF SERUM POTASSIUM: CPT | Performed by: STUDENT IN AN ORGANIZED HEALTH CARE EDUCATION/TRAINING PROGRAM

## 2024-12-09 PROCEDURE — 36415 COLL VENOUS BLD VENIPUNCTURE: CPT | Performed by: INTERNAL MEDICINE

## 2024-12-09 PROCEDURE — 80048 BASIC METABOLIC PNL TOTAL CA: CPT | Performed by: STUDENT IN AN ORGANIZED HEALTH CARE EDUCATION/TRAINING PROGRAM

## 2024-12-09 PROCEDURE — 120N000001 HC R&B MED SURG/OB

## 2024-12-09 PROCEDURE — 85520 HEPARIN ASSAY: CPT | Performed by: STUDENT IN AN ORGANIZED HEALTH CARE EDUCATION/TRAINING PROGRAM

## 2024-12-09 PROCEDURE — 99222 1ST HOSP IP/OBS MODERATE 55: CPT | Performed by: INTERNAL MEDICINE

## 2024-12-09 PROCEDURE — 92610 EVALUATE SWALLOWING FUNCTION: CPT | Mod: GN

## 2024-12-09 PROCEDURE — 250N000011 HC RX IP 250 OP 636: Performed by: STUDENT IN AN ORGANIZED HEALTH CARE EDUCATION/TRAINING PROGRAM

## 2024-12-09 PROCEDURE — 36415 COLL VENOUS BLD VENIPUNCTURE: CPT | Performed by: STUDENT IN AN ORGANIZED HEALTH CARE EDUCATION/TRAINING PROGRAM

## 2024-12-09 PROCEDURE — 250N000013 HC RX MED GY IP 250 OP 250 PS 637: Performed by: STUDENT IN AN ORGANIZED HEALTH CARE EDUCATION/TRAINING PROGRAM

## 2024-12-09 RX ORDER — HYDRALAZINE HYDROCHLORIDE 20 MG/ML
10 INJECTION INTRAMUSCULAR; INTRAVENOUS EVERY 4 HOURS PRN
Status: DISCONTINUED | OUTPATIENT
Start: 2024-12-09 | End: 2024-12-13 | Stop reason: HOSPADM

## 2024-12-09 RX ORDER — CLONIDINE HYDROCHLORIDE 0.1 MG/1
0.1 TABLET ORAL EVERY 6 HOURS PRN
Status: DISCONTINUED | OUTPATIENT
Start: 2024-12-09 | End: 2024-12-13 | Stop reason: HOSPADM

## 2024-12-09 RX ADMIN — HALOPERIDOL LACTATE 2 MG: 5 INJECTION, SOLUTION INTRAMUSCULAR at 13:30

## 2024-12-09 RX ADMIN — CEFTRIAXONE 1 G: 1 INJECTION, POWDER, FOR SOLUTION INTRAMUSCULAR; INTRAVENOUS at 05:13

## 2024-12-09 RX ADMIN — HEPARIN SODIUM 1100 UNITS/HR: 10000 INJECTION, SOLUTION INTRAVENOUS at 09:58

## 2024-12-09 RX ADMIN — ATORVASTATIN CALCIUM 40 MG: 40 TABLET, FILM COATED ORAL at 19:55

## 2024-12-09 RX ADMIN — OLANZAPINE 5 MG: 5 TABLET, ORALLY DISINTEGRATING ORAL at 21:56

## 2024-12-09 RX ADMIN — FINASTERIDE 5 MG: 5 TABLET, FILM COATED ORAL at 08:45

## 2024-12-09 RX ADMIN — CARVEDILOL 25 MG: 25 TABLET, FILM COATED ORAL at 17:59

## 2024-12-09 RX ADMIN — TAMSULOSIN HYDROCHLORIDE 0.4 MG: 0.4 CAPSULE ORAL at 08:45

## 2024-12-09 RX ADMIN — MIRTAZAPINE 15 MG: 15 TABLET, FILM COATED ORAL at 19:55

## 2024-12-09 RX ADMIN — ASPIRIN 325 MG: 325 TABLET, COATED ORAL at 08:44

## 2024-12-09 RX ADMIN — MICONAZOLE NITRATE ANTIFUNGAL POWDER: 2 POWDER TOPICAL at 08:46

## 2024-12-09 RX ADMIN — HEPARIN SODIUM 1100 UNITS/HR: 10000 INJECTION, SOLUTION INTRAVENOUS at 08:41

## 2024-12-09 RX ADMIN — CARVEDILOL 25 MG: 25 TABLET, FILM COATED ORAL at 08:45

## 2024-12-09 RX ADMIN — MICONAZOLE NITRATE ANTIFUNGAL POWDER: 2 POWDER TOPICAL at 19:55

## 2024-12-09 ASSESSMENT — ACTIVITIES OF DAILY LIVING (ADL)
ADLS_ACUITY_SCORE: 74
ADLS_ACUITY_SCORE: 73
ADLS_ACUITY_SCORE: 78
ADLS_ACUITY_SCORE: 78
ADLS_ACUITY_SCORE: 77
ADLS_ACUITY_SCORE: 78
ADLS_ACUITY_SCORE: 74
ADLS_ACUITY_SCORE: 77
ADLS_ACUITY_SCORE: 78
ADLS_ACUITY_SCORE: 74
ADLS_ACUITY_SCORE: 77
ADLS_ACUITY_SCORE: 73
ADLS_ACUITY_SCORE: 77
ADLS_ACUITY_SCORE: 77
ADLS_ACUITY_SCORE: 78
ADLS_ACUITY_SCORE: 73
ADLS_ACUITY_SCORE: 72
ADLS_ACUITY_SCORE: 74
ADLS_ACUITY_SCORE: 74
ADLS_ACUITY_SCORE: 78
ADLS_ACUITY_SCORE: 72

## 2024-12-09 NOTE — ED NOTES
[facility]    ED Boarding Nurse Handoff Addendum Report:    Date/time: 12/8/2024, 6:30 PM    Activity Level: assist of 1    Fall Risk: Yes:  nonskid shoes/slippers when out of bed, arm band in place, patient and family education, assistive device/personal items within reach, and activity supervised    Active Infusions: Heparin 950 units/hr    Current Meds Due:  None    Current care needs: None    Oxygen requirements (liters/min and/or FiO2): 3 LPM    Respiratory status: Nasal cannula    Vital signs (within last 30 minutes):    Vitals:    12/08/24 1715 12/08/24 1730 12/08/24 1745 12/08/24 1800   BP: (!) 158/92 (!) 157/84 (!) 154/87 (!) 151/117   Pulse: 87 86 87 104   Resp:       Temp:       TempSrc:       SpO2: 97% 98% 98% 97%       Focused assessment within last 30 minutes:    Alert but confused. 1:1 sitter. VSS. DNR/DNI. Regular diet. Hx of dementia. Pt is [place] speaking and doesn't respond to , so he is difficult to communicate with. MD spoke with family member tonight who stated someone in the family will try to be here tomorrow. Pt was continuously attempting to throw legs over the side rails of the bed and trying to get out while pulling at cords. Soft restraints used at 1518 and haldol given per MD. Pt came out of restraints at 1602 and has been cooperative since. On Tele, NSR. Pt had troponin 241 at 1647 and tele showed afib. MD notified, EKG obtained. Heparin drip started, aspirin given. Retractions noted during respirations. Cardiology consulted. Echo ordered. Repeat troponin ordered for 1757. Pt has brief on and is incontinent. Red and inflamed in groin area, MD notified. Micatin powder ordered, waiting for pharmacy to verify and send. VS Q4. Anti xa check scheduled for 2315. No further needs at this time.     ED Boarding Nurse name: Diane Limon RN

## 2024-12-09 NOTE — ED NOTES
DATE/TIME OF CALL RECEIVED FROM LAB:  12/08/24 at 4:47 PM   LAB TEST:  Troponin  LAB VALUE:  241  PROVIDER NOTIFIED?: Yes  PROVIDER NAME: Dr. Pal Mack  DATE/TIME LAB VALUE REPORTED TO PROVIDER: 1009  MECHANISM OF PROVIDER NOTIFICATION: Page  PROVIDER RESPONSE: Yes, Heparin drip, EKG and aspirin ordered and given/obtained.

## 2024-12-09 NOTE — PROGRESS NOTES
Attempted echocardiogram. Patient uncooperative; started to hit, kick, pinch, bite, etc. writer and RN. Test was terminated. Writer informed RN echo will not be performed due to patient and staff safety concerns.

## 2024-12-09 NOTE — PLAN OF CARE
"PRIMARY DIAGNOSIS: \"GENERIC\" NURSING  OUTPATIENT/OBSERVATION GOALS TO BE MET BEFORE DISCHARGE:  ADLs back to baseline: No    Activity and level of assistance: Ax1    Pain status: Improved with use of alternative comfort measures i.e.: positioning    Return to near baseline physical activity: No     Discharge Planner Nurse   Safe discharge environment identified: Yes  Barriers to discharge: Yes       Entered by: Diane Limon RN 12/08/2024 6:17 PM   Alert but confused. [place] speaking, difficult to communicate with pt as he doesn't respond to phone or ipad . No further needs at this time.   Please review provider order for any additional goals.   Nurse to notify provider when observation goals have been met and patient is ready for discharge.    Problem: Adult Inpatient Plan of Care  Goal: Plan of Care Review  Description: The Plan of Care Review/Shift note should be completed every shift.  The Outcome Evaluation is a brief statement about your assessment that the patient is improving, declining, or no change.  This information will be displayed automatically on your shift  note.  12/8/2024 1816 by Diane Limon RN  Outcome: Not Progressing  Flowsheets (Taken 12/8/2024 1816)  Plan of Care Reviewed With: patient  Overall Patient Progress: no change  12/8/2024 1815 by Diane Limon RN  Outcome: Not Progressing  Flowsheets (Taken 12/8/2024 1815)  Plan of Care Reviewed With: patient  Overall Patient Progress: no change  12/8/2024 0941 by Diane Limon RN  Outcome: Progressing  Flowsheets (Taken 12/8/2024 0941)  Plan of Care Reviewed With: patient  Overall Patient Progress: improving  Goal: Patient-Specific Goal (Individualized)  Description: You can add care plan individualizations to a care plan. Examples of Individualization might be:  \"Parent requests to be called daily at 9am for status\", \"I have a hard time hearing out of my right ear\", or \"Do not touch me to wake me up as it " "startles  me\".  12/8/2024 1816 by Diane Limon RN  Outcome: Not Progressing  12/8/2024 1815 by Diane Limon RN  Outcome: Not Progressing  12/8/2024 0941 by Diane Limon RN  Outcome: Progressing  Goal: Absence of Hospital-Acquired Illness or Injury  12/8/2024 1816 by Diane iLmon RN  Outcome: Not Progressing  12/8/2024 1815 by Diane Limon RN  Outcome: Not Progressing  12/8/2024 0941 by Diane Limon RN  Outcome: Progressing  Intervention: Identify and Manage Fall Risk  Recent Flowsheet Documentation  Taken 12/8/2024 0900 by Diane Limon RN  Safety Promotion/Fall Prevention:   activity supervised   assistive device/personal items within reach   clutter free environment maintained   lighting adjusted   mobility aid in reach   nonskid shoes/slippers when out of bed   patient and family education   room organization consistent   safety round/check completed   treat reversible contributory factors   treat underlying cause  Intervention: Prevent Skin Injury  Recent Flowsheet Documentation  Taken 12/8/2024 0900 by Diane Limon RN  Body Position: position changed independently  Skin Protection: adhesive use limited  Intervention: Prevent and Manage VTE (Venous Thromboembolism) Risk  Recent Flowsheet Documentation  Taken 12/8/2024 0900 by Diane Limon RN  VTE Prevention/Management: SCDs off (sequential compression devices)  Intervention: Prevent Infection  Recent Flowsheet Documentation  Taken 12/8/2024 0900 by Diane Limon RN  Infection Prevention:   equipment surfaces disinfected   hand hygiene promoted   personal protective equipment utilized   rest/sleep promoted  Goal: Optimal Comfort and Wellbeing  12/8/2024 1816 by Diane Limon RN  Outcome: Not Progressing  12/8/2024 1815 by Diane Limon RN  Outcome: Not Progressing  12/8/2024 0941 by Diane Limon RN  Outcome: Progressing  Goal: Readiness for Transition of Care  12/8/2024 1816 by Diane Limon" RN  Outcome: Not Progressing  12/8/2024 1815 by Diane Limon RN  Outcome: Not Progressing  12/8/2024 0941 by Diane Limon RN  Outcome: Progressing     Problem: Restraint, Nonviolent  Goal: Absence of Harm or Injury  12/8/2024 1816 by Diane Limon RN  Outcome: Not Progressing  12/8/2024 1815 by Diane Limon RN  Outcome: Not Progressing  Intervention: Protect Skin and Joint Integrity  Recent Flowsheet Documentation  Taken 12/8/2024 0900 by Diane Limon RN  Body Position: position changed independently  Skin Protection: adhesive use limited   Goal Outcome Evaluation:      Plan of Care Reviewed With: patient    Overall Patient Progress: no changeOverall Patient Progress: no change

## 2024-12-09 NOTE — CONSULTS
[facility]    Cardiology Consultation     Date of Admission:  12/8/2024    Assessment & Plan   Adam Huber is a 89 year old male who was admitted on 12/8/2024.    Impression:  1.  Troponin rise consistent with non-ST elevation myocardial infarct.  However this is in the setting of a urinary tract infection caused by chronic urinary obstruction.  Patient has known coronary artery disease.  This is in the setting also of significant dementia and in a quite elderly patient.  2.  Urinary tract infection.  3.  Dementia.    Plan:  1.  Await the results of the echocardiogram to see if new wall motion abnormalities are present.  2.  Goals of care need to be identified for this patient who is demented with evidence of cirrhosis of the liver with a 2.9 cm posterior right hepatic mass,  and recent urinary tract infection.  3.  Will need to discuss with family how aggressive they wish to be with his care.      Papo Valerio MD, FACC, FRCP I    Primary Care Physician   Oralia Forrest    Reason for Consult   Reason for consult: I was asked by hospitalist service to evaluate this patient for raised troponin and possible chest discomfort.    History of Present Illness   Adam Huber is a 89 year old Russian speaking male who presents with dementia, type 2 diabetes mellitus chronic urinary obstruction with evidence of a urinary tract infection on this admission who also has a history of cirrhosis and who has a mass in his liver who gave a vague history of chest discomfort.  He is twelve-lead electrocardiogram was normal on admission and subsequently.  However, his cardiac enzymes are rising.  The first troponin was 28, the second 33, the third 47, the fourth 74, the fifth 241 and the sixth 289.  The patient cannot give a good history and has slurred speech.  This patient previously had a stent to the proximal and mid right coronary artery and complex stenting of the mid circumflex.  This patient  has been seen  by Dr. Avalos in November 2018 from our group.  He had moderate left anterior descending artery disease with severe disease in both diagonal arteries with a 99% ostial first diagonal artery stenosis, 70% diagonal stenosis followed by another 80%.  Both vessels were felt to be too small to intervene upon.  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  Blood pressure is well-controlled at present.  He appears to be pain-free.  He was placed on intravenous heparin and aspirin.  He is on statin therapy.  He is beta-blockade with carvedilol.  Last echocardiogram in February 2024 was extremely technically difficult.  Overall systolic function appeared to be normal.  In 2023 no regional wall motion abnormalities were noted with an EF of 50 to 55%.    Past Medical History   Past Medical History:   Diagnosis Date    Acute chest pain 10/23/2015    CAD (coronary artery disease)     Essential hypertension 10/23/2015    History of CVA (cerebrovascular accident) 10/23/2015    HLD (hyperlipidemia)     HTN (hypertension)     NSTEMI (non-ST elevated myocardial infarction) (H) 10/23/2015    Post PTCA 10-    Successful PCI of culprit proximal to mid RCA with placement of a    Post PTCA 11-6-2015    pci of complex mid CFX-hector         Past Surgical History   Past Surgical History:   Procedure Laterality Date    CHOLECYSTECTOMY      GENITOURINARY SURGERY      prostate removal     HEART CATH LEFT HEART CATH  10/12/2015    PCI w/ HECTOR to RCA    HEART CATH RIGHT AND LEFT HEART CATH  11/6/2015    PCI w/ HECTOR to mid-CFX         Prior to Admission Medications   Prior to Admission Medications   Prescriptions Last Dose Informant Patient Reported? Taking?   Melatonin 10 MG TABS tablet Unknown  Yes Yes   Sig: Take 10 mg by mouth nightly as needed for sleep   OLANZapine zydis (ZYPREXA) 5 MG ODT Unknown  No Yes   Sig: Take 1 tablet (5 mg) by mouth nightly as needed for agitation.   acetaminophen (TYLENOL) 325 MG  tablet Unknown  Yes Yes   Sig: Take 650 mg by mouth every 8 hours as needed for mild pain   aspirin (ASA) 325 MG EC tablet 12/7/2024 Morning  Yes Yes   Sig: Take 325 mg by mouth daily.   atorvastatin (LIPITOR) 40 MG tablet 12/7/2024 Morning Spouse/Significant Other No Yes   Sig: Take 1 tablet (40 mg) by mouth every evening   carvedilol (COREG) 25 MG tablet 12/7/2024 Morning Spouse/Significant Other No Yes   Sig: Take 1 tablet (25 mg) by mouth 2 times daily (with meals)   finasteride (PROSCAR) 5 MG tablet 12/7/2024 Morning  Yes Yes   Sig: Take 5 mg by mouth daily   glipiZIDE (GLUCOTROL) 5 MG tablet 12/7/2024 Morning  No Yes   Sig: Take 1 tablet (5 mg) by mouth every morning (before breakfast).   mirtazapine (REMERON) 15 MG tablet Unknown  Yes Yes   Sig: Take 15 mg by mouth nightly as needed (agitation).   tamsulosin (FLOMAX) 0.4 MG capsule 12/7/2024  No Yes   Sig: Take 1 capsule (0.4 mg) by mouth daily.      Facility-Administered Medications: None     Current Facility-Administered Medications   Medication Dose Route Frequency Provider Last Rate Last Admin    acetaminophen (TYLENOL) tablet 650 mg  650 mg Oral Q8H PRN Pal Mack MD        aspirin (ASA) EC tablet 325 mg  325 mg Oral Daily Pal Mack MD        atorvastatin (LIPITOR) tablet 40 mg  40 mg Oral QPM Pal Mack MD   40 mg at 12/08/24 2119    carvedilol (COREG) tablet 25 mg  25 mg Oral BID w/meals Pal Mack MD   25 mg at 12/08/24 1752    cefTRIAXone (ROCEPHIN) 1 g vial to attach to  mL bag for ADULTS or NS 50 mL bag for PEDS  1 g Intravenous Q24H Pal Mack MD   1 g at 12/09/24 0513    finasteride (PROSCAR) tablet 5 mg  5 mg Oral Daily Pal Mack MD   5 mg at 12/08/24 1752    haloperidol lactate (HALDOL) injection 2 mg  2 mg Intravenous Q6H PRN Pal Mack MD   2 mg at 12/08/24 1458    heparin 25,000 units in 0.45% NaCl 250 mL ANTICOAGULANT infusion  0-5,000 Units/hr Intravenous Continuous Rigad, MD Pal 9.5 mL/hr at  12/08/24 1921 950 Units/hr at 12/08/24 1921    miconazole (MICATIN) 2 % powder   Topical BID Pal Mack MD   Given at 12/08/24 2119    mirtazapine (REMERON) tablet 15 mg  15 mg Oral At Bedtime PRN aPl Mack MD        OLANZapine zydis (zyPREXA) ODT tab 5 mg  5 mg Oral At Bedtime PRN Pal Mack MD        ondansetron (ZOFRAN ODT) ODT tab 4 mg  4 mg Oral Q6H PRN Pal Mack MD        Or    ondansetron (ZOFRAN) injection 4 mg  4 mg Intravenous Q6H PRN Pal Mack MD        prochlorperazine (COMPAZINE) injection 5 mg  5 mg Intravenous Q6H PRN Pal Mack MD        Or    prochlorperazine (COMPAZINE) tablet 5 mg  5 mg Oral Q6H PRN Pal Mack MD        senna-docusate (SENOKOT-S/PERICOLACE) 8.6-50 MG per tablet 1 tablet  1 tablet Oral BID PRN Pal Mack MD        Or    senna-docusate (SENOKOT-S/PERICOLACE) 8.6-50 MG per tablet 2 tablet  2 tablet Oral BID PRN Pal Mcak MD        tamsulosin (FLOMAX) capsule 0.4 mg  0.4 mg Oral Daily Pal Mack MD   0.4 mg at 12/08/24 1752     Current Facility-Administered Medications   Medication Dose Route Frequency Provider Last Rate Last Admin    acetaminophen (TYLENOL) tablet 650 mg  650 mg Oral Q8H PRN Pal Mack MD        aspirin (ASA) EC tablet 325 mg  325 mg Oral Daily Pal Mack MD        atorvastatin (LIPITOR) tablet 40 mg  40 mg Oral QPM Pal Mack MD   40 mg at 12/08/24 2119    carvedilol (COREG) tablet 25 mg  25 mg Oral BID w/meals Pal Mack MD   25 mg at 12/08/24 1752    cefTRIAXone (ROCEPHIN) 1 g vial to attach to  mL bag for ADULTS or NS 50 mL bag for PEDS  1 g Intravenous Q24H Pal Mack MD   1 g at 12/09/24 0513    finasteride (PROSCAR) tablet 5 mg  5 mg Oral Daily Pal Mack MD   5 mg at 12/08/24 1752    haloperidol lactate (HALDOL) injection 2 mg  2 mg Intravenous Q6H PRN Pal Mack MD   2 mg at 12/08/24 1458    heparin 25,000 units in 0.45% NaCl 250 mL ANTICOAGULANT infusion  0-5,000 Units/hr  Intravenous Continuous Pal Mack MD 9.5 mL/hr at 12/08/24 1921 950 Units/hr at 12/08/24 1921    miconazole (MICATIN) 2 % powder   Topical BID Pal Mack MD   Given at 12/08/24 2119    mirtazapine (REMERON) tablet 15 mg  15 mg Oral At Bedtime PRN Pal Mack MD        OLANZapine zydis (zyPREXA) ODT tab 5 mg  5 mg Oral At Bedtime PRN Pal Mack MD        ondansetron (ZOFRAN ODT) ODT tab 4 mg  4 mg Oral Q6H PRN Pal Mack MD        Or    ondansetron (ZOFRAN) injection 4 mg  4 mg Intravenous Q6H PRN Pal Mack MD        prochlorperazine (COMPAZINE) injection 5 mg  5 mg Intravenous Q6H PRN Pal Mack MD        Or    prochlorperazine (COMPAZINE) tablet 5 mg  5 mg Oral Q6H PRN Pal Mack MD        senna-docusate (SENOKOT-S/PERICOLACE) 8.6-50 MG per tablet 1 tablet  1 tablet Oral BID PRN Pal Mack MD        Or    senna-docusate (SENOKOT-S/PERICOLACE) 8.6-50 MG per tablet 2 tablet  2 tablet Oral BID PRN Pal Mack MD        tamsulosin (FLOMAX) capsule 0.4 mg  0.4 mg Oral Daily Pal Mack MD   0.4 mg at 12/08/24 1752     Allergies   Allergies   Allergen Reactions    Lisinopril     Morphine Other (See Comments)     confusion       Social History    reports that he has never smoked. He has never used smokeless tobacco. He reports that he does not drink alcohol and does not use drugs.      Family History     Positive family history for coronary artery disease.       Review of Systems   A comprehensive review of system was performed and is negative other than that noted in the HPI or here.     Physical Exam   Vital Signs with Ranges  Temp:  [97  F (36.1  C)-97.8  F (36.6  C)] 97  F (36.1  C)  Pulse:  [] 78  Resp:  [16-20] 18  BP: (101-164)/() 124/45  SpO2:  [95 %-99 %] 96 %  Wt Readings from Last 4 Encounters:   12/08/24 82.7 kg (182 lb 5.1 oz)   11/14/24 80.5 kg (177 lb 7.5 oz)   11/10/24 82.2 kg (181 lb 3.5 oz)   04/02/24 83.8 kg (184 lb 11.9 oz)     I/O last 3 completed  "shifts:  In: 10 [IV Piggyback:10]  Out: 400 [Urine:400]      Vitals: /45 (BP Location: Left arm)   Pulse 78   Temp 97  F (36.1  C) (Oral)   Resp 18   Wt 82.7 kg (182 lb 5.1 oz)   SpO2 96%   BMI 22.79 kg/m      Physical Exam:   General - Alert and oriented to person in no acute distress.  Slurred speech  Eyes - No scleral icterus  HEENT - Neck supple, moist mucous membranes  Cardiovascular -heart sounds 1 and 2 are normal.  1/6 systolic ejection murmur heard in the aortic area.  S4 noted.  Jugular venous pulse is not raised and carotids are normal with no bruits.  Extremities - There is trace bilateral edema  Respiratory -chest is clear to percussion auscultation  Skin - No pallor or cyanosis  Gastrointestinal - Non tender and non distended without rebound or guarding  Psych - Appropriate affect   Neurological - No gross motor neurological focal deficits.  Slurred speech.    No lab results found in last 7 days.    Invalid input(s): \"TROPONINIES\"    Recent Labs   Lab 12/09/24  0705 12/08/24  0057   WBC 8.3 10.5   HGB 12.3* 12.9*   MCV 90 88    288    136   POTASSIUM 4.2 4.6   CHLORIDE 107 103   CO2 23 19*   BUN 25.2* 28.7*   CR 0.75 0.68   GFRESTIMATED 86 89   ANIONGAP 9 14   LUZ 8.3* 8.4*   * 183*   ALBUMIN  --  3.3*   PROTTOTAL  --  7.7   BILITOTAL  --  0.6   ALKPHOS  --  267*   ALT  --  39   AST  --  37   LIPASE  --  14     Recent Labs   Lab Test 05/04/23  1858 11/29/22  1330   CHOL 185 189   HDL 66 63   LDL 90 89   TRIG 146 186*     Recent Labs   Lab 12/09/24  0705 12/08/24 0057   WBC 8.3 10.5   HGB 12.3* 12.9*   HCT 38.4* 39.4*   MCV 90 88    288     Recent Labs   Lab 12/08/24 0057   PHV 7.40   PO2V 52*   PCO2V 37*   HCO3V 23     Recent Labs   Lab 12/08/24  0057   NTBNPI 912     No results for input(s): \"DD\" in the last 168 hours.  No results for input(s): \"SED\", \"CRP\" in the last 168 hours.  Recent Labs   Lab 12/09/24  0705 12/08/24  0057    288     Recent Labs "   Lab 12/08/24  0057   TSH 5.68*     Recent Labs   Lab 12/08/24  0158   COLOR Yellow   APPEARANCE Cloudy*   URINEGLC 50*   URINEBILI Negative   URINEKETONE Negative   SG 1.018   UBLD Small*   URINEPH 5.5   PROTEIN 50*   NITRITE Negative   LEUKEST Large*   RBCU 20*   WBCU >182*       Imaging:  Recent Results (from the past 48 hours)   CT Head w/o Contrast    Narrative    EXAM: CT HEAD W/O CONTRAST  LOCATION: [facility]  DATE: 12/8/2024    INDICATION: AMS  COMPARISON: November 7, 2024   TECHNIQUE: Routine CT Head without IV contrast. Multiplanar reformats. Dose reduction techniques were used.    FINDINGS:  INTRACRANIAL CONTENTS: No intracranial hemorrhage, extraaxial collection, or mass effect.  No CT evidence of acute infarct. Extensive bifrontal and bilateral anterior temporal lobe encephalomalacia in keeping with a prior traumatic insult. Remote   bilateral basal ganglia lacunar infarcts.  Moderate generalized volume loss. No hydrocephalus.     VISUALIZED ORBITS/SINUSES/MASTOIDS: No intraorbital abnormality. No paranasal sinus mucosal disease. No middle ear or mastoid effusion.    BONES/SOFT TISSUES: No acute abnormality.      Impression    IMPRESSION:  1.  No acute intracranial hemorrhage or mass effect.  2.  Chronic intracranial findings as above.    XR Chest 2 Views    Narrative    EXAM: XR CHEST 2 VIEWS  LOCATION: [facility]  DATE: 12/8/2024    INDICATION: chest pain  COMPARISON: 11/09/2024      Impression    IMPRESSION: Stable cardiomediastinal silhouette. Pulmonary vascular congestion with bilateral infiltrates. Trace pleural effusions. Coronary stent.   CT Chest Pulmonary Embolism w Contrast    Narrative    EXAM: CT CHEST PULMONARY EMBOLISM WITH CONTRAST  LOCATION: [facility]  DATE: 12/08/2024    INDICATION: Chest pain and persistent tachycardia.  COMPARISON: Chest CT from 09/17/2023. CT abdomen and pelvis from 09/22/2021.  TECHNIQUE: CT  chest pulmonary angiogram during arterial phase injection of IV contrast. Multiplanar reformats and MIP reconstructions were performed. Dose reduction techniques were used.   CONTRAST: 72 mL Isovue 370.    FINDINGS:    The patient was unable to place their arms above their head.  This results in streak artifact and reduces image quality.     ANGIOGRAM CHEST: No pulmonary embolism. Normal heart size without pericardial effusion. Three-vessel coronary artery disease. Thoracic aortic atherosclerosis without aneurysm or evidence of dissection.    LUNGS AND PLEURA: Although degraded by breathing motion, there is a moderate degree of interstitial pulmonary edema (smooth interlobular septal thickening and peribronchial cuffing) with scattered dependent atelectasis. No pneumonia. Moderate right and   small left simple density pleural effusions.    MEDIASTINUM/AXILLAE: Several mildly enlarged hilar or mediastinal lymph nodes, not unexpected in the setting of pulmonary edema.    UPPER ABDOMEN: Cirrhosis in the upper abdomen, manifest with nodular hepatic contour, similar to prior imaging in 2021. No splenomegaly or significant upper abdominal ascites. Simple and hyperdense benign upper pole renal cysts are unchanged since at   least September 2021. No follow-up needed.    MUSCULOSKELETAL: Although degraded by breathing motion, there are likely several old bilateral rib fractures. Benign sclerotic lesion in the superior endplate T10 is unchanged from 2021. Superior endplate T2 fracture is age-indeterminate, but new from   September 2023. Chronic appearing compression fracture of L1.      Impression    IMPRESSION:  *  No pulmonary embolism.  *  Moderate degree of pulmonary edema and small to moderate-sized bilateral pleural effusions.  *  Cirrhosis.  *  Age-indeterminate superior endplate T2 fracture, new from September 2023 with no interval relevant studies since that time. Correlate with upper back/low neck pain and consider  dedicated thoracic spine imaging as clinically warranted.           Echo:  No results found for this or any previous visit (from the past 4320 hours).    Clinically Significant Risk Factors Present on Admission           # Hypocalcemia: Lowest Ca = 8.3 mg/dL in last 2 days, will monitor and replace as appropriate   # Hypomagnesemia: Lowest Mg = 1.5 mg/dL in last 2 days, will replace as needed   # Hypoalbuminemia: Lowest albumin = 3.3 g/dL at 12/8/2024 12:57 AM, will monitor as appropriate   # Drug Induced Platelet Defect: home medication list includes an antiplatelet medication   # Hypertension: Noted on problem list  # Chronic heart failure with preserved ejection fraction: heart failure noted on problem list and last echo with EF >50%    # Dementia: noted on problem list      # DMII: A1C = 12.6 % (Ref range: <5.7 %) within past 6 months        # Financial/Environmental Concerns:          Native coronary artery disease with prior stenting as described above            Chronic Liver Disease: Other cirrhosis of liver            Dementia: Alzheimer's disease, unspecified            Chronic Fatigue and Other Debilities: Age-related physical debility

## 2024-12-09 NOTE — PLAN OF CARE
"Goal Outcome Evaluation:      Plan of Care Reviewed With: patient    Overall Patient Progress: no changeOverall Patient Progress: no change       Sitter remains at bedside for line protection. VSS, heparin gtt infusing at 950 Units/hr. Next hep Xa draw at 0800 this am.     Problem: Adult Inpatient Plan of Care  Goal: Plan of Care Review  Description: The Plan of Care Review/Shift note should be completed every shift.  The Outcome Evaluation is a brief statement about your assessment that the patient is improving, declining, or no change.  This information will be displayed automatically on your shift  note.  Outcome: Progressing  Flowsheets (Taken 12/9/2024 0526)  Plan of Care Reviewed With: patient  Overall Patient Progress: no change  Goal: Patient-Specific Goal (Individualized)  Description: You can add care plan individualizations to a care plan. Examples of Individualization might be:  \"Parent requests to be called daily at 9am for status\", \"I have a hard time hearing out of my right ear\", or \"Do not touch me to wake me up as it startles  me\".  Outcome: Progressing  Goal: Absence of Hospital-Acquired Illness or Injury  Outcome: Progressing  Intervention: Identify and Manage Fall Risk  Recent Flowsheet Documentation  Taken 12/9/2024 0100 by Juan F Temple RN  Safety Promotion/Fall Prevention:   bedside attendant   safety round/check completed   nonskid shoes/slippers when out of bed  Intervention: Prevent Skin Injury  Recent Flowsheet Documentation  Taken 12/9/2024 0100 by Juan F Temple RN  Body Position: position changed independently  Skin Protection: adhesive use limited  Intervention: Prevent and Manage VTE (Venous Thromboembolism) Risk  Recent Flowsheet Documentation  Taken 12/9/2024 0100 by Juan F Temple RN  VTE Prevention/Management: SCDs off (sequential compression devices)  Intervention: Prevent Infection  Recent Flowsheet Documentation  Taken 12/9/2024 0100 by Juan F Temple RN  Infection Prevention: " equipment surfaces disinfected  Goal: Optimal Comfort and Wellbeing  Outcome: Progressing  Goal: Readiness for Transition of Care  Outcome: Progressing

## 2024-12-09 NOTE — PROGRESS NOTES
"Clinical Swallow Evaluation (CSE):     12/09/24 1414   Appointment Info   Signing Clinician's Name / Credentials (SLP) Marielena Castro MS CCC-SLP   General Information   Onset of Illness/Injury or Date of Surgery 12/08/24   Referring Physician Reynaldo Zee MD   Patient/Family Therapy Goal Statement (SLP) Could not state   Pertinent History of Current Problem   Per provider \"Adam Huber is a 89 year old male admitted on 12/8/2024. He has a history of dementia, CAD, type 2 diabetes mellitus, hypertension, BPH with chronic urinary obstruction presents to the ED with concerns for chest pain.  Obtaining history is difficult given patient's baseline confusion, but did mention chest pain at some point.  Mildly elevated troponin, does have history of CAD.  EKG not concerning for ACS, troponin appears to be chronically elevated.  Obtaining TTE.  Also found to have UTI, being treated with ceftriaxone.\"     SLP swallow eval given coughing with pills.     General Observations Pt alert, upright in bed, cooperative with SLP      Language pt not responding to remote interpreters   Type of Evaluation   Type of Evaluation Swallow Evaluation   Oral Motor   Oral Musculature unable to assess due to poor participation/comprehension   Dentition (Oral Motor)   Dentition (Oral Motor) edentulous   Comment, Dentition (Oral Motor)   (NO dentures)   General Swallowing Observations   Past History of Dysphagia Pt has history of participation in dysphagia tx. Most recently seen on Novermber of 2024 and discharged on pureed with diet with thin liquids, prior hx of needing mildly thick liquids in Feb 2023. Last VFSS May of 2023. Family present earlier per RN and report puree/thickened is baseline (lives at home with family).   Swallowing Evaluation Clinical swallow evaluation   Clinical Swallow Evaluation   Clinical Swallow Evaluation Textures Trialed thin liquids;mildly thick liquids;pureed   Clinical Swallow Eval: Thin Liquid " Texture Trial   Mode of Presentation, Thin Liquids cup;self-fed   Volume of Liquid or Food Presented 4 oz   Oral Phase of Swallow premature pharyngeal entry   Pharyngeal Phase of Swallow impaired;reduction in laryngeal movement;coughing/choking   Diagnostic Statement very impulsive; initially tolerating singles sips but overt harsh coughing with sequential sips: pt not following for single sips and resistive to taking away cup   Clinical Swallow Eval: Mildly Thick Liquids   Mode of Presentation cup;self-fed   Volume Presented 8 oz   Oral Phase WFL   Pharyngeal Phase reduction in laryngeal movement   Diagnostic Statement no overt clinical signs/sx aspiration noted   Clinical Swallow Evaluation: Puree Solid Texture Trial   Mode of Presentation, Puree spoon;self-fed;fed by clinician   Volume of Puree Presented 8 oz   Oral Phase, Puree delayed AP movement   Pharyngeal Phase, Puree reduction in laryngeal movement   Diagnostic Statement no overt clinical signs/sx aspiration noted   Esophageal Phase of Swallow   Patient reports or presents with symptoms of esophageal dysphagia No   Swallowing Recommendations   Diet Consistency Recommendations pureed (level 4);mildly thick liquids (level 2)   Supervision Level for Intake 1:1 supervision needed   Instrumental Assessment Recommendations reassess via non-instrumental clinical swallow evaluation   General Therapy Interventions   Planned Therapy Interventions Dysphagia Treatment   Dysphagia treatment Modified diet education;Instruction of safe swallow strategies;Compensatory strategies for swallowing   Clinical Impression   Criteria for Skilled Therapeutic Interventions Met (SLP Eval) Yes, treatment indicated   SLP Diagnosis chronic mild oropharyngeal dysphagia   Risks & Benefits of therapy have been explained evaluation/treatment results reviewed;care plan/treatment goals reviewed;risks/benefits reviewed;current/potential barriers reviewed;participants voiced agreement with  care plan;participants included;patient   Clinical Impression Comments   Clinical swallow evaluation completed with thin liquids, mildly thick liquids, puree solids. Pt demonstrated frequent impulsivity with PO trials, but was able to self-feed for ~75% of trials (some difficutly with spoon use at end of puree). Overt harsh coughing with sequential sips of thin liquids, and pt with difficulty following commands and/or resistive when SLP taking cup away from him. Improved control/tolerance wtih mildly thick liquids- no overt clinical signs/sx apiration noted. Puree is pt's long-standing baseline given dentition.     SLP Total Evaluation Time   Eval: oral/pharyngeal swallow function, clinical swallow Minutes (35052) 16   SLP Goals   Therapy Frequency (SLP Eval) 3 times/week   SLP Predicted Duration/Target Date for Goal Attainment 12/16/24   SLP Goals Swallow   SLP: Safely tolerate diet without signs/symptoms of aspiration Pureed diet;Thin liquids;With use of swallow precautions;With assistance/supervision   SLP Discharge Planning   SLP Plan determine baseline diet, meal f/u, education/strategies   SLP Discharge Recommendation home with home health   SLP Rationale for DC Rec Pt may benefit from HH SLP pending baseline diet recommendations, pt may be at/near baseline diet   SLP Brief overview of current status  Pureed diet with mildly thick liquids. Strict swallow precautions: direct assist/supervision, NO straws, slow rate, small bites/sips, slow rate and verify swallow, and alternate consistencies. Considering crushing meds if needed, but try whole/puree if appropriate.   SLP Time and Intention   Total Session Time (sum of timed and untimed services) 16

## 2024-12-09 NOTE — PLAN OF CARE
"  Problem: Adult Inpatient Plan of Care  Goal: Plan of Care Review  Description: The Plan of Care Review/Shift note should be completed every shift.  The Outcome Evaluation is a brief statement about your assessment that the patient is improving, declining, or no change.  This information will be displayed automatically on your shift  note.  12/8/2024 1815 by Diane Limon RN  Outcome: Not Progressing  Flowsheets (Taken 12/8/2024 1815)  Plan of Care Reviewed With: patient  Overall Patient Progress: no change  12/8/2024 0941 by Diane Limon RN  Outcome: Progressing  Flowsheets (Taken 12/8/2024 0941)  Plan of Care Reviewed With: patient  Overall Patient Progress: improving  Goal: Patient-Specific Goal (Individualized)  Description: You can add care plan individualizations to a care plan. Examples of Individualization might be:  \"Parent requests to be called daily at 9am for status\", \"I have a hard time hearing out of my right ear\", or \"Do not touch me to wake me up as it startles  me\".  12/8/2024 1815 by Diane Limon RN  Outcome: Not Progressing  12/8/2024 0941 by Diane Limon RN  Outcome: Progressing  Goal: Absence of Hospital-Acquired Illness or Injury  12/8/2024 1815 by Diane Limon RN  Outcome: Not Progressing  12/8/2024 0941 by Diane Limon RN  Outcome: Progressing  Intervention: Identify and Manage Fall Risk  Recent Flowsheet Documentation  Taken 12/8/2024 0900 by Diane Limon RN  Safety Promotion/Fall Prevention:   activity supervised   assistive device/personal items within reach   clutter free environment maintained   lighting adjusted   mobility aid in reach   nonskid shoes/slippers when out of bed   patient and family education   room organization consistent   safety round/check completed   treat reversible contributory factors   treat underlying cause  Intervention: Prevent Skin Injury  Recent Flowsheet Documentation  Taken 12/8/2024 0900 by Diane Limon RN  Body " "Position: position changed independently  Skin Protection: adhesive use limited  Intervention: Prevent and Manage VTE (Venous Thromboembolism) Risk  Recent Flowsheet Documentation  Taken 12/8/2024 0900 by Diane Limon RN  VTE Prevention/Management: SCDs off (sequential compression devices)  Intervention: Prevent Infection  Recent Flowsheet Documentation  Taken 12/8/2024 0900 by Diane Limon RN  Infection Prevention:   equipment surfaces disinfected   hand hygiene promoted   personal protective equipment utilized   rest/sleep promoted  Goal: Optimal Comfort and Wellbeing  12/8/2024 1815 by Diane Limon RN  Outcome: Not Progressing  12/8/2024 0941 by Diane Limon RN  Outcome: Progressing  Goal: Readiness for Transition of Care  12/8/2024 1815 by Diane Limon RN  Outcome: Not Progressing  12/8/2024 0941 by Diane Limon RN  Outcome: Progressing     Problem: Restraint, Nonviolent  Goal: Absence of Harm or Injury  Outcome: Not Progressing  Intervention: Protect Skin and Joint Integrity  Recent Flowsheet Documentation  Taken 12/8/2024 0900 by Diane Limon RN  Body Position: position changed independently  Skin Protection: adhesive use limited   PRIMARY DIAGNOSIS: \"GENERIC\" NURSING  OUTPATIENT/OBSERVATION GOALS TO BE MET BEFORE DISCHARGE:  ADLs back to baseline: No    Activity and level of assistance: Ax1    Pain status: Improved with use of alternative comfort measures i.e.: positioning    Return to near baseline physical activity: No     Discharge Planner Nurse   Safe discharge environment identified: Yes  Barriers to discharge: Yes       Entered by: Diane Limon RN 12/08/2024 6:15 PM   Alert but confused. Pt resting in bed, regular respirations noted. VSS. 1:1 sitter. Lunch tray delivered. No further needs at this time.   Please review provider order for any additional goals.   Nurse to notify provider when observation goals have been met and patient is ready for discharge.Goal " Outcome Evaluation:      Plan of Care Reviewed With: patient    Overall Patient Progress: no changeOverall Patient Progress: no change

## 2024-12-09 NOTE — PLAN OF CARE
"Goal Outcome Evaluation:      Plan of Care Reviewed With: patient    Overall Patient Progress: improvingOverall Patient Progress: improving    Outcome Evaluation: Hep infusing, sitter at bedside.    Alert/confused, Iranian speaking, denies pain, Tele SR, LS clear, Regular diet, external catheter in  place, K+ & Mg+ protocol, recheck AM. Regular diet, Transfer/ lift  IVF infusing.  Will continue POC and monitoring.   Problem: Adult Inpatient Plan of Care  Goal: Plan of Care Review  Description: The Plan of Care Review/Shift note should be completed every shift.  The Outcome Evaluation is a brief statement about your assessment that the patient is improving, declining, or no change.  This information will be displayed automatically on your shift  note.  Outcome: Progressing  Flowsheets (Taken 12/8/2024 2020)  Outcome Evaluation: Hep infusing, sitter at bedside.  Plan of Care Reviewed With: patient  Overall Patient Progress: improving  Goal: Patient-Specific Goal (Individualized)  Description: You can add care plan individualizations to a care plan. Examples of Individualization might be:  \"Parent requests to be called daily at 9am for status\", \"I have a hard time hearing out of my right ear\", or \"Do not touch me to wake me up as it startles  me\".  Outcome: Progressing  Goal: Absence of Hospital-Acquired Illness or Injury  Outcome: Progressing  Intervention: Identify and Manage Fall Risk  Recent Flowsheet Documentation  Taken 12/8/2024 1900 by Geraldo Mcnamara RN  Safety Promotion/Fall Prevention: bedside attendant  Intervention: Prevent Skin Injury  Recent Flowsheet Documentation  Taken 12/8/2024 1900 by Geraldo Mcnamara RN  Body Position: position changed independently  Skin Protection: adhesive use limited  Intervention: Prevent and Manage VTE (Venous Thromboembolism) Risk  Recent Flowsheet Documentation  Taken 12/8/2024 1900 by Geraldo Mcnamara RN  VTE Prevention/Management: SCDs off (sequential compression " devices)  Intervention: Prevent Infection  Recent Flowsheet Documentation  Taken 12/8/2024 1900 by Geraldo Mcnamara RN  Infection Prevention: equipment surfaces disinfected  Goal: Optimal Comfort and Wellbeing  Outcome: Progressing  Goal: Readiness for Transition of Care  Outcome: Progressing     Problem: Restraint, Nonviolent  Goal: Absence of Harm or Injury  Outcome: Progressing  Intervention: Implement Least Restrictive Safety Strategies  Recent Flowsheet Documentation  Taken 12/8/2024 1900 by Geraldo Mcnamara RN  Diversional Activities: television  Intervention: Protect Skin and Joint Integrity  Recent Flowsheet Documentation  Taken 12/8/2024 1900 by Geraldo Mcnamara RN  Body Position: position changed independently  Skin Protection: adhesive use limited

## 2024-12-10 LAB
ANION GAP SERPL CALCULATED.3IONS-SCNC: 14 MMOL/L (ref 7–15)
BUN SERPL-MCNC: 27.3 MG/DL (ref 8–23)
CALCIUM SERPL-MCNC: 8.5 MG/DL (ref 8.8–10.4)
CHLORIDE SERPL-SCNC: 100 MMOL/L (ref 98–107)
CREAT SERPL-MCNC: 0.75 MG/DL (ref 0.67–1.17)
EGFRCR SERPLBLD CKD-EPI 2021: 86 ML/MIN/1.73M2
GLUCOSE SERPL-MCNC: 220 MG/DL (ref 70–99)
HCO3 SERPL-SCNC: 24 MMOL/L (ref 22–29)
HOLD SPECIMEN: NORMAL
POTASSIUM SERPL-SCNC: 3.6 MMOL/L (ref 3.4–5.3)
SODIUM SERPL-SCNC: 138 MMOL/L (ref 135–145)
UFH PPP CHRO-ACNC: 0.4 IU/ML

## 2024-12-10 PROCEDURE — 250N000011 HC RX IP 250 OP 636: Performed by: STUDENT IN AN ORGANIZED HEALTH CARE EDUCATION/TRAINING PROGRAM

## 2024-12-10 PROCEDURE — 80048 BASIC METABOLIC PNL TOTAL CA: CPT | Performed by: INTERNAL MEDICINE

## 2024-12-10 PROCEDURE — 99233 SBSQ HOSP IP/OBS HIGH 50: CPT | Performed by: INTERNAL MEDICINE

## 2024-12-10 PROCEDURE — 85520 HEPARIN ASSAY: CPT | Performed by: STUDENT IN AN ORGANIZED HEALTH CARE EDUCATION/TRAINING PROGRAM

## 2024-12-10 PROCEDURE — 84132 ASSAY OF SERUM POTASSIUM: CPT | Performed by: INTERNAL MEDICINE

## 2024-12-10 PROCEDURE — 99232 SBSQ HOSP IP/OBS MODERATE 35: CPT | Mod: FS | Performed by: INTERNAL MEDICINE

## 2024-12-10 PROCEDURE — 250N000011 HC RX IP 250 OP 636: Performed by: INTERNAL MEDICINE

## 2024-12-10 PROCEDURE — 120N000001 HC R&B MED SURG/OB

## 2024-12-10 PROCEDURE — 36415 COLL VENOUS BLD VENIPUNCTURE: CPT | Performed by: STUDENT IN AN ORGANIZED HEALTH CARE EDUCATION/TRAINING PROGRAM

## 2024-12-10 PROCEDURE — 250N000013 HC RX MED GY IP 250 OP 250 PS 637: Performed by: STUDENT IN AN ORGANIZED HEALTH CARE EDUCATION/TRAINING PROGRAM

## 2024-12-10 RX ORDER — FUROSEMIDE 10 MG/ML
40 INJECTION INTRAMUSCULAR; INTRAVENOUS ONCE
Status: COMPLETED | OUTPATIENT
Start: 2024-12-10 | End: 2024-12-10

## 2024-12-10 RX ADMIN — FUROSEMIDE 40 MG: 10 INJECTION, SOLUTION INTRAMUSCULAR; INTRAVENOUS at 01:28

## 2024-12-10 RX ADMIN — CARVEDILOL 25 MG: 25 TABLET, FILM COATED ORAL at 09:46

## 2024-12-10 RX ADMIN — CEFTRIAXONE 1 G: 1 INJECTION, POWDER, FOR SOLUTION INTRAMUSCULAR; INTRAVENOUS at 05:46

## 2024-12-10 RX ADMIN — MICONAZOLE NITRATE ANTIFUNGAL POWDER: 2 POWDER TOPICAL at 20:20

## 2024-12-10 RX ADMIN — HALOPERIDOL LACTATE 2 MG: 5 INJECTION, SOLUTION INTRAMUSCULAR at 00:47

## 2024-12-10 RX ADMIN — ATORVASTATIN CALCIUM 40 MG: 40 TABLET, FILM COATED ORAL at 20:20

## 2024-12-10 RX ADMIN — HALOPERIDOL LACTATE 2 MG: 5 INJECTION, SOLUTION INTRAMUSCULAR at 18:30

## 2024-12-10 RX ADMIN — CARVEDILOL 25 MG: 25 TABLET, FILM COATED ORAL at 18:03

## 2024-12-10 RX ADMIN — MIRTAZAPINE 15 MG: 15 TABLET, FILM COATED ORAL at 21:27

## 2024-12-10 RX ADMIN — FINASTERIDE 5 MG: 5 TABLET, FILM COATED ORAL at 09:46

## 2024-12-10 RX ADMIN — TAMSULOSIN HYDROCHLORIDE 0.4 MG: 0.4 CAPSULE ORAL at 09:47

## 2024-12-10 RX ADMIN — MICONAZOLE NITRATE ANTIFUNGAL POWDER: 2 POWDER TOPICAL at 09:47

## 2024-12-10 RX ADMIN — OLANZAPINE 5 MG: 5 TABLET, ORALLY DISINTEGRATING ORAL at 21:27

## 2024-12-10 RX ADMIN — ASPIRIN 325 MG: 325 TABLET, COATED ORAL at 09:46

## 2024-12-10 ASSESSMENT — ACTIVITIES OF DAILY LIVING (ADL)
ADLS_ACUITY_SCORE: 81
ADLS_ACUITY_SCORE: 81
ADLS_ACUITY_SCORE: 78
ADLS_ACUITY_SCORE: 86
ADLS_ACUITY_SCORE: 78
ADLS_ACUITY_SCORE: 77
ADLS_ACUITY_SCORE: 77
ADLS_ACUITY_SCORE: 86
ADLS_ACUITY_SCORE: 81
ADLS_ACUITY_SCORE: 77
ADLS_ACUITY_SCORE: 90
ADLS_ACUITY_SCORE: 90
ADLS_ACUITY_SCORE: 81
ADLS_ACUITY_SCORE: 77
ADLS_ACUITY_SCORE: 81
ADLS_ACUITY_SCORE: 86
ADLS_ACUITY_SCORE: 81
ADLS_ACUITY_SCORE: 81

## 2024-12-10 NOTE — PROVIDER NOTIFICATION
12/10/24 0040   Fall Event   Patient Assessed By nurse   Name of Provider Notified Haris   Family/Designated Caregiver Notified of Fall Yes  (voicemail left)   Fall Prevention Plan Updated Yes   Name of Family/Designated Caregiver Notified Adolfo       Attempted to call son Adolfo with no answer. Voicemail left.

## 2024-12-10 NOTE — CARE PLAN
12/10/24 0040   Fall Event   Patient Assessed By nurse   Name of Provider Notified Haris   Family/Designated Caregiver Notified of Fall Yes  (voicemail left)   Fall Prevention Plan Updated Yes   Name of Family/Designated Caregiver Notified Adolfo

## 2024-12-10 NOTE — PROGRESS NOTES
[facility]  Cardiology Progress Note  Date of Service: 12/10/2024  Primary Cardiologist: will be Dr. Paulino Valerio     Assessment & Plan    Adam Huber is a 89 year old male admitted on 12/8/2024.    Assessment:  NSTEMI, with troponin up to 289. Echocardiogram today  Chronic urinary obstruction now with acute urinary tract infection, on abx.   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   Medications   -discontinue heparin gtt, would not recommend DAPT given above  Cardiology will continue to follow and await results of echo    Mervat Isaac PA-C  [facility]  Pager: [phone]    Interval History   Confused, sitter at bedside      Physical Exam   Temp: (!) 96.5  F (35.8  C) Temp src: Axillary BP: 116/72 Pulse: 83   Resp: 16 SpO2: 93 % O2 Device: Nasal cannula Oxygen Delivery: 2 LPM  Vitals:    12/08/24 1932 12/10/24 0055 12/10/24 0642   Weight: 82.7 kg (182 lb 5.1 oz) 85.8 kg (189 lb 2.5 oz) 74.8 kg (164 lb 14.5 oz)       GEN: well nourished, in no acute distress.  HEENT:  Pupils equal, round. Sclerae nonicteric.   NECK: Supple, no masses appreciated.no JVD  C/V:  Regular rate and rhythm, no murmur  RESP: Respirations are unlabored. Clear to auscultation bilaterally without wheezing, rales, or rhonchi.  GI: Abdomen soft, nontender.  EXTREM: no LE edema.  NEURO: Alert and oriented, cooperative.  SKIN: Warm and dry.     Medications   Current Facility-Administered Medications   Medication Dose Route Frequency Provider Last Rate Last Admin    heparin 25,000 units in 0.45% NaCl 250 mL ANTICOAGULANT infusion  0-5,000 Units/hr Intravenous Continuous Pal Mack MD 10.5 mL/hr at 12/09/24 2323 1,050 Units/hr at 12/09/24 2323     Current Facility-Administered  Medications   Medication Dose Route Frequency Provider Last Rate Last Admin    aspirin (ASA) EC tablet 325 mg  325 mg Oral Daily Pal Mack MD   325 mg at 12/09/24 0844    atorvastatin (LIPITOR) tablet 40 mg  40 mg Oral QPM Pal Mack MD   40 mg at 12/09/24 1955    carvedilol (COREG) tablet 25 mg  25 mg Oral BID w/meals Pal Mack MD   25 mg at 12/09/24 1759    cefTRIAXone (ROCEPHIN) 1 g vial to attach to  mL bag for ADULTS or NS 50 mL bag for PEDS  1 g Intravenous Q24H Pal Mack MD   1 g at 12/10/24 0546    finasteride (PROSCAR) tablet 5 mg  5 mg Oral Daily Pal Mack MD   5 mg at 12/09/24 0845    miconazole (MICATIN) 2 % powder   Topical BID Pal Mack MD   Given at 12/09/24 1955    tamsulosin (FLOMAX) capsule 0.4 mg  0.4 mg Oral Daily Pal Mack MD   0.4 mg at 12/09/24 0845       Data   Last 24 hours labs reviewed     Tele:      Echo 2/6/2024  Normal biventricular size and systolic function.  IVC is poorly visualized but appears collapsible.  No pericardial effusion.  Visually the aortic valve appears significantly calcified. Not well-seen in  short axis. Mean gradient is about 9 to 10 mmHg consistent with mild aortic  stenosis.  There is probably moderate to severe mitral annular calcification. Likely mild  MS. Prior study reported mild mitral stenosis in September 2023.

## 2024-12-10 NOTE — PROGRESS NOTES
[facility]    Medicine Progress Note - Hospitalist Service    Date of Admission:  12/8/2024    Assessment & Plan     Adam Huber is a pleasant 89 year old gentleman admitted on 12/8/2024.  He has a history of dementia, CAD, type 2 diabetes mellitus, hypertension, and BPH with chronic urinary obstruction.  Per prior: he presented to the ED for evaluation of chest pain.  Obtaining history was difficult given patient's baseline confusion, but he did mention chest pain at some point.  Mildly elevated troponin, does have history of CAD.  EKG not concerning for ACS, troponin appears to be chronically elevated.  Was also found to have UTI, being treated with ceftriaxone.  Current problems include:     Chest pain; no recurrence  Elevated troponin  - per prior: difficult historian given his underlying confusion  - on arrival EKG did not show any acute signs of ischemia, initial troponin was elevated at 28, repeats minesh to 33 and 47.  - will trend to peak; upon review of previous troponin levels has had pretty consistently elevated troponins over the last year or so  - telemetry  - 12/8: was started on heparin drip, and given full dose aspirin.   - appreciate Cardiology consult 12/9 and follow up  - Echo was attempted 12/9, but patient became aggressive and study could not be done  -> re-attempt Echo 12/11, with son Adolfo present, and PRN meds 15-30 minutes prior to Echo  - per prior: considering Palliative consult to help define goals, and how to proceed    Dementia w/ history of behavioral disturbances  Aggressive behavior 12/9  Hx CVA  - per prior: chronic slurred speech  - on dysphagia diet  - needs help with most of his ADLs  - lives at home independently with assistance from his wife/family  - patient does not do well with iPad   -> Psychiatry consult 12/11    Concern for possible urinary tract infection  History of recurrent urinary tract infections  History of bladder outlet  obstruction and BPH with recurrent urinary tract infections.  Presented to the ED because patient was complaining of chest discomfort, however during workup urinalysis showed large leukocyte esterase, pyuria, concern for recurrent UTI.  Does not seem to have a history of resistant organisms in urine, started on IV ceftriaxone which we will continue for now.    - continue IV ceftriaxone for now for 3 days total, then discontinue  - follow-up 12/8 urine culture: 10-50K mixture of urogenital madhu  - monitor urine output, at risk of retention given history of bladder outlet obstruction     Chronic Urinary Bladder Outlet Obstruction  BPH  History of recurrent urinary tract infections.  - prior hospitalizations with gross hematuria, including about 3 weeks ago at this hospital.  No hematuria this admission, but urine again appears infected.  - continue IV ceftriaxone  - follow-up urine culture  - monitor urine output, consider Urology consult if urinary retention  - has had difficulty with Villa catheters in the past, concerned that patient will just pull Villa out given his dementia which is why he does not have a chronic catheter  - continue PTA finasteride, tamsulosin      Diabetes Mellitus type II, w/ Hyperglycemia  - presented with severe hyperglycemia; glucose 485.  - continue MDSSI  - PTA medication includes Lantus, glipizide.  - continue Lantus     Hepatic Cirrhosis  Elevated Alkaline Phosphatase  Posterior Right 2.9 cm Hepatic Lesion  - cirrhotic appearance to liver noted on CT abdomen/pelvis  - LFTs within normal limits, with exception of isolated elevated of alkaline phosphatase to 210  - incidentally noted hepatic lesion to 2.9cm on right posterior lobe  - defer to outpatient PCP on whether to follow lesion with imaging given his comorbidities and underlying dementia     Decubitus ulcers, present on admission  - awaiting WOCN eval.    Chronic mild oropharyngeal dysphagia   - appreciate Speech eval; continue  pureed diet w/ thin liquids, and safe swallow precautions     CAD s/p LEXI (2015)  - had PCI of mid circumflex with drug-eluting stent in 2015.    - seems to have chronically elevated troponin.  - incomplete Echo done in 2/2024  - attempted repeat TTE 12/9  - re-attempt Echo 12/11     Essential hypertension  - pressures improved today  - continue PRN hydralazine and PRN clonidine  Dyslipidemia  - continue PTA medications.      Diet: Regular Diet Adult  DVT Prophylaxis: Heparin SQ  Villa Catheter: Not present  Lines: None     Cardiac Monitoring: ACTIVE order. Indication: Chest pain/ ACS rule out (24 hours)  Code Status: No CPR- Do NOT Intubate      Clinically Significant Risk Factors               # Hypoalbuminemia: Lowest albumin = 3.3 g/dL at 12/8/2024 12:57 AM, will monitor as appropriate     # Hypertension: Noted on problem list    # Acute heart failure with preserved ejection fraction: heart failure noted on problem list, last echo with EF >50%, and receiving IV diuretics    # Dementia: noted on problem list       # DMII: A1C = 12.6 % (Ref range: <5.7 %) within past 6 months, PRESENT ON ADMISSION        # Financial/Environmental Concerns:             Disposition Plan     Medically Ready for Discharge: Anticipated in 2-4 Days  -> need to confirm w/ his son Adolfo: IF he is back to baseline, and Echo can be done w/ help from Adolfo, patient could be returned to home 12/11 late afternoon or 12/12.      Reynaldo Zee MD  Hospitalist Service  [facility]  Securely message with [employer] (more info)  Text page via [employer] Paging/Directory   ______________________________________________________________________    Interval History   Reviewed care RN and sitter; had sliding from bed/fall earlier this morning, without apparent injury.  Late last night, needed 02 at 2L per n/c; was seen by night staff and given Lasix 40 mg x 1 IV.  No new respiratory Sx today.  Unclear if attempts are being made to titrate 02  down.    Physical Exam   Vital Signs: Temp: 97.5  F (36.4  C) Temp src: Axillary BP: 124/75 Pulse: 76   Resp: 16 SpO2: 91 % O2 Device: Nasal cannula Oxygen Delivery: 2 LPM  Weight: 164 lbs 14.47 oz    Constitutional: Lying in bed, no acute distress; calls out for his wife on/off  Respiratory:  fair a/e bilaterally; decreased at bases; no wheezing or rhonchi  Cardiovascular: regular rhythm, tachycardia, no m/r/g; no peripheral edema  GI: abdomen flat; + BS, soft, NT/ND  Neurologic: awake; RN was interpreting; follows some directions w/ coaxing; no focal deficits; needs assist of 1-2 to sit up in bed;      Data   Recent Labs   Lab 12/10/24  0537 12/09/24  0705 12/08/24  0057   WBC  --  8.3 10.5   HGB  --  12.3* 12.9*   MCV  --  90 88   PLT  --  261 288    139 136   POTASSIUM 3.6 4.2 4.6   CHLORIDE 100 107 103   CO2 24 23 19*   BUN 27.3* 25.2* 28.7*   CR 0.75 0.75 0.68   ANIONGAP 14 9 14   LUZ 8.5* 8.3* 8.4*   * 115* 183*   ALBUMIN  --   --  3.3*   PROTTOTAL  --   --  7.7   BILITOTAL  --   --  0.6   ALKPHOS  --   --  267*   ALT  --   --  39   AST  --   --  37   LIPASE  --   --  14

## 2024-12-10 NOTE — PROGRESS NOTES
Cross cover  -Patient is congested requiring supplemental O2  -On review of records has pulmonary congestion on x-ray  -Weight up since admission  -Will give one-time dose of IV Lasix 40 mg  Check electrolytes in a.m.-  -Daily weights  -I's and O's  -Echo was completed earlier today results pending

## 2024-12-10 NOTE — PROGRESS NOTES
[facility]    Medicine Progress Note - Hospitalist Service    Date of Admission:  12/8/2024    Assessment & Plan     Adam Huber is a pleasant 89 year old gentleman admitted on 12/8/2024.  He has a history of dementia, CAD, type 2 diabetes mellitus, hypertension, and BPH with chronic urinary obstruction.  Per prior: he presented to the ED for evaluation of chest pain.  Obtaining history was difficult given patient's baseline confusion, but he did mention chest pain at some point.  Mildly elevated troponin, does have history of CAD.  EKG not concerning for ACS, troponin appears to be chronically elevated.  Was also found to have UTI, being treated with ceftriaxone.  Current problems include:     Urinary tract infection  History of recurrent urinary tract infections  History of bladder outlet obstruction and BPH with recurrent urinary tract infections.  Presented to the ED because patient was complaining of chest discomfort, however during workup urinalysis showed large leukocyte esterase, pyuria, concern for recurrent UTI.  Does not seem to have a history of resistant organisms in urine, started on IV ceftriaxone which we will continue for now.  Follow-up urine culture.  - continue IV ceftriaxone for now  - follow-up urine culture  - monitor urine output, at risk of retention given history of bladder outlet obstruction    Chest pain; no recurrence  Elevated troponin  - per prior: difficult historian given his underlying confusion  - on arrival EKG did not show any acute signs of ischemia, initial troponin was elevated at 28, repeats minesh to 33 and 47.  - will trend to peak, however upon review of previous troponin levels has had pretty consistently elevated troponins over the last year or so  - repeat TTE to assess cardiac function and to look for new wall motion abnormalities.  - telemetry  - 12/8: was started on heparin drip, and given full dose aspirin.   - appreciate Cardiology  consult today, 12/9  - Echo was attempted today (12/9), but patient became aggressive and study could not be done  - re-assess 12/10; considering Palliative consult to help define goals, and how to proceed     Chronic Urinary Bladder Outlet Obstruction  BPH  History of recurrent urinary tract infections.  - prior hospitalizations with gross hematuria, including about 3 weeks ago at this hospital.  No hematuria this admission, but urine again appears infected.  - continue IV ceftriaxone  - follow-up urine culture  - monitor urine output, consider Urology consult if urinary retention  - has had difficulty with Villa catheters in the past, concerned that patient will just pull Villa out given his dementia which is why he does not have a chronic catheter  - continue PTA finasteride, tamsulosin      Diabetes Mellitus type II, w/ Hyperglycemia  - presented with severe hyperglycemia; glucose 485.  - continue MDSSI  - PTA medication includes Lantus, glipizide.  - continue Lantus     Hepatic Cirrhosis  Elevated Alkaline Phosphatase  Posterior Right 2.9 cm Hepatic Lesion  - cirrhotic appearance to liver noted on CT abdomen/pelvis  - LFTs within normal limits, with exception of isolated elevated of alkaline phosphatase to 210  - incidentally noted hepatic lesion to 2.9cm on right posterior lobe  - defer to outpatient PCP on whether to follow lesion with imaging given his comorbidities and underlying dementia     Dementia w/ history of behavioral Disturbances  Hx CVA  - patient chronically with slurred speech  - on dysphagia diet  - needs help with most of his ADLs  - lives at home independently with assistance from his wife/family  - patient does not do well with ipad      Decubitus ulcers, present on admission  - WOCN eval.    Chronic mild oropharyngeal dysphagia   - appreciate Speech eval. today; continue pureed diet w/ thin liquids, precautions     CAD s/p LEXI (2015)  - had PCI of mid circumflex with drug-eluting  stent in 2015.    - seems to have chronically elevated troponin.  - repeat TTE.     Essential hypertension  - pressures higher at times  - add PRN hydralazine and PRN clonidine  Dyslipidemia  - continue PTA medications.      Diet: Regular Diet Adult  DVT Prophylaxis: Heparin SQ  Villa Catheter: Not present  Lines: None     Cardiac Monitoring: ACTIVE order. Indication: Chest pain/ ACS rule out (24 hours)  Code Status: No CPR- Do NOT Intubate      Clinically Significant Risk Factors           # Hypocalcemia: Lowest Ca = 8.3 mg/dL in last 2 days, will monitor and replace as appropriate   # Hypomagnesemia: Lowest Mg = 1.5 mg/dL in last 2 days, will replace as needed   # Hypoalbuminemia: Lowest albumin = 3.3 g/dL at 12/8/2024 12:57 AM, will monitor as appropriate     # Hypertension: Noted on problem list  # Chronic heart failure with preserved ejection fraction: heart failure noted on problem list and last echo with EF >50%    # Dementia: noted on problem list       # DMII: A1C = 12.6 % (Ref range: <5.7 %) within past 6 months, PRESENT ON ADMISSION      # Financial/Environmental Concerns:             Disposition Plan     Medically Ready for Discharge: Anticipated in 2-4 Days          Reynaldo Zee MD  Hospitalist Service  [facility]  Securely message with [employer] (more info)  Text page via "[employer] Paging/Directory   ______________________________________________________________________    Interval History   Reviewed care RN; no new issues.    Physical Exam   Vital Signs: Temp: 97.8  F (36.6  C) Temp src: Oral BP: (!) 147/72 Pulse: 89   Resp: 16 SpO2: 94 % O2 Device: None (Room air) Oxygen Delivery: 1 LPM  Weight: 182 lbs 5.13 oz    Constitutional: Lying in bed, no acute distress  Respiratory: Lungs clear; no wheezing or rhonchi  Cardiovascular: regular rhythm, tachycardia, no m/r/g; no peripheral edema  GI: abdomen soft, nontender to palpation, nondistended  Neurologic: Awake, difficult to  communicate given language barrier, but patient does appear confused, some intermittent agitation trying to climb out of bed and remove pulse oximeter, other lines      Data   Recent Labs   Lab 12/09/24  0705 12/08/24  0057   WBC 8.3 10.5   HGB 12.3* 12.9*   MCV 90 88    288    136   POTASSIUM 4.2 4.6   CHLORIDE 107 103   CO2 23 19*   BUN 25.2* 28.7*   CR 0.75 0.68   ANIONGAP 9 14   LUZ 8.3* 8.4*   * 183*   ALBUMIN  --  3.3*   PROTTOTAL  --  7.7   BILITOTAL  --  0.6   ALKPHOS  --  267*   ALT  --  39   AST  --  37   LIPASE  --  14

## 2024-12-10 NOTE — PLAN OF CARE
"Goal Outcome Evaluation:      Plan of Care Reviewed With: patient        Pt. Alert to self only, incontinent of urine, voided adequately,  ate 75% of his meal, setter at bed side, has PIV, on tele monitor SR-73 per tele staff.    /75 (BP Location: Right arm)   Pulse 76   Temp 97.5  F (36.4  C) (Axillary)   Resp 16   Wt 74.8 kg (164 lb 14.5 oz)   SpO2 91%   BMI 20.61 kg/m     Problem: Adult Inpatient Plan of Care  Goal: Plan of Care Review  Description: The Plan of Care Review/Shift note should be completed every shift.  The Outcome Evaluation is a brief statement about your assessment that the patient is improving, declining, or no change.  This information will be displayed automatically on your shift  note.  Outcome: Not Progressing  Flowsheets (Taken 12/10/2024 1420)  Outcome Evaluation: pt confused on 2 l oxygen, for mobility use 2 assist, pt refused to get up, stayed in bed  Plan of Care Reviewed With: patient  Goal: Patient-Specific Goal (Individualized)  Description: You can add care plan individualizations to a care plan. Examples of Individualization might be:  \"Parent requests to be called daily at 9am for status\", \"I have a hard time hearing out of my right ear\", or \"Do not touch me to wake me up as it startles  me\".  Outcome: Not Progressing  Goal: Absence of Hospital-Acquired Illness or Injury  Outcome: Not Progressing  Intervention: Identify and Manage Fall Risk  Recent Flowsheet Documentation  Taken 12/10/2024 1400 by Rosy Collado RN  Safety Promotion/Fall Prevention:   safety round/check completed   room organization consistent   activity supervised   increase visualization of patient   clutter free environment maintained   increased rounding and observation   lighting adjusted  Intervention: Prevent Skin Injury  Recent Flowsheet Documentation  Taken 12/10/2024 1400 by Rosy Collado RN  Body Position:   weight shifting   upper extremity elevated   neutral body alignment   " neutral head position  Skin Protection: adhesive use limited  Taken 12/10/2024 1300 by Rosy Collado RN  Body Position:   weight shifting   upper extremity elevated   neutral body alignment   neutral head position  Intervention: Prevent and Manage VTE (Venous Thromboembolism) Risk  Recent Flowsheet Documentation  Taken 12/10/2024 1400 by Rosy Collado RN  VTE Prevention/Management: SCDs off (sequential compression devices)  Intervention: Prevent Infection  Recent Flowsheet Documentation  Taken 12/10/2024 1400 by Rosy Collado RN  Infection Prevention: rest/sleep promoted  Goal: Optimal Comfort and Wellbeing  Outcome: Not Progressing  Intervention: Monitor Pain and Promote Comfort  Recent Flowsheet Documentation  Taken 12/10/2024 1300 by Rosy Collado RN  Pain Management Interventions: repositioned  Goal: Readiness for Transition of Care  Outcome: Not Progressing  Flowsheets (Taken 12/10/2024 1420)  Anticipated Changes Related to Illness: inability to care for self  Transportation Anticipated: family or friend will provide  Concerns to be Addressed:   compliance issue   coping/stress   decision making   cognitive/perceptual  Barriers to Discharge: 2 l oxygen, confusion  Intervention: Mutually Develop Transition Plan  Recent Flowsheet Documentation  Taken 12/10/2024 1420 by Rosy Collado RN  Anticipated Changes Related to Illness: inability to care for self  Transportation Anticipated: family or friend will provide  Concerns to be Addressed:   compliance issue   coping/stress   decision making   cognitive/perceptual     Problem: UTI (Urinary Tract Infection)  Goal: Improved Infection Symptoms  Outcome: Not Progressing     Problem: Restraint, Nonviolent  Goal: Absence of Harm or Injury  Outcome: Not Progressing  Intervention: Implement Least Restrictive Safety Strategies  Recent Flowsheet Documentation  Taken 12/10/2024 1400 by Rosy Collado RN  Diversional Activities:  television  Intervention: Protect Skin and Joint Integrity  Recent Flowsheet Documentation  Taken 12/10/2024 1400 by Rosy Collado RN  Body Position:   weight shifting   upper extremity elevated   neutral body alignment   neutral head position  Skin Protection: adhesive use limited  Taken 12/10/2024 1300 by Rosy Collado RN  Body Position:   weight shifting   upper extremity elevated   neutral body alignment   neutral head position     Problem: Fall Injury Risk  Goal: Absence of Fall and Fall-Related Injury  Outcome: Not Progressing  Intervention: Identify and Manage Contributors  Recent Flowsheet Documentation  Taken 12/10/2024 1400 by Rosy Collado RN  Medication Review/Management: medications reviewed  Intervention: Promote Injury-Free Environment  Recent Flowsheet Documentation  Taken 12/10/2024 1400 by Rosy Collado RN  Safety Promotion/Fall Prevention:   safety round/check completed   room organization consistent   activity supervised   increase visualization of patient   clutter free environment maintained   increased rounding and observation   lighting adjusted     Outcome Evaluation: pt confused on 2 l oxygen, for mobility use 2 assist, pt refused to get up, stayed in bed

## 2024-12-10 NOTE — PROGRESS NOTES
Care Management Follow Up    Length of Stay (days): 2    Expected Discharge Date: 12/12/2024     Concerns to be Addressed: compliance issue, coping/stress, decision making, cognitive/perceptual     Patient plan of care discussed at interdisciplinary rounds: Yes    Discussed  Partnership in Safe Discharge Planning  document with patient/family: No     Handoff Completed: No, handoff not indicated or clinically appropriate    Additional Information:  Sw attempted to meet with the pt, but he was sound asleep and the bedside nurse reported he has not gotten much sleep recently.  Sw left a vm for the pt's son Adolfo, requesting a call back to discuss pt's baseline status and discharge planning.    Next Steps:   Sw will continue to follow the  pt for discharge planning and complete assessment when patient and/or family are available.    KOFI Dias, [facility]  Inpatient Care Coordination  [facility]  [phone]

## 2024-12-10 NOTE — PROGRESS NOTES
Presentation/Diagnosis:NSTEMI  Vitals:/54 (BP Location: Left arm)   Pulse 76   Temp (!) 96.6  F (35.9  C) (Axillary)   Resp 22   Wt 74.8 kg (164 lb 14.5 oz)   SpO2 97%   BMI 20.61 kg/m      Telemetry: SR  Respiratory:2L NC  Neuro:Confused  GI/:INC  LDAs:PIV  Diet:Nectar thick liquids  Activity:A2  Plan: still needs echo,

## 2024-12-10 NOTE — CARE PLAN
12/10/24 0040   Fall Event   Patient Assessed By nurse   Name of Provider Notified Haris   Family/Designated Caregiver Notified of Fall Yes  (voicemail left)   Fall Prevention Plan Updated Yes   Name of Family/Designated Caregiver Notified Adoflo

## 2024-12-10 NOTE — PLAN OF CARE
"Alert/confused. VSS. Placed on 2 L O2 NC. LS coarse and expiratory wheezes. IV lasix given x1. Pt had fall/ slid out of bed onto knees with staff assist. Pt continued to try to get out of bed and pulling at lines. Needing constant re direction. IV Haldol given x1. Bilateral soft restraints initiated at 0128. Sitter at bedside. Tele SR. Heparin gtt infusing. Unable to get accurate I&O's as pt incontinent and will not keep an external cathter in place. Incontinent of stool. IV Rocephin.    0625: Pt more calm and not pulling at lines or trying to get out of bed. Soft wrist restraints discontinued as pt met criteria. Sitter remains at bedside for safety.     Goal Outcome Evaluation:           Overall Patient Progress: no changeOverall Patient Progress: no change    Outcome Evaluation: Had fall with no injury, soft restraints initiated this shift      Problem: Adult Inpatient Plan of Care  Goal: Plan of Care Review  Description: The Plan of Care Review/Shift note should be completed every shift.  The Outcome Evaluation is a brief statement about your assessment that the patient is improving, declining, or no change.  This information will be displayed automatically on your shift  note.  Outcome: Progressing  Flowsheets (Taken 12/10/2024 1137)  Outcome Evaluation: Had fall with no injury, soft restraints initiated this shift  Overall Patient Progress: no change  Goal: Patient-Specific Goal (Individualized)  Description: You can add care plan individualizations to a care plan. Examples of Individualization might be:  \"Parent requests to be called daily at 9am for status\", \"I have a hard time hearing out of my right ear\", or \"Do not touch me to wake me up as it startles  me\".  Outcome: Progressing  Goal: Absence of Hospital-Acquired Illness or Injury  Outcome: Progressing  Intervention: Identify and Manage Fall Risk  Recent Flowsheet Documentation  Taken 12/10/2024 0430 by Leisa John RN  Safety Promotion/Fall " Prevention: safety round/check completed  Taken 12/10/2024 0231 by Leisa John RN  Safety Promotion/Fall Prevention: safety round/check completed  Taken 12/10/2024 0040 by Leisa John RN  Safety Promotion/Fall Prevention: safety round/check completed  Taken 12/9/2024 2330 by Leisa John RN  Safety Promotion/Fall Prevention:   activity supervised   clutter free environment maintained   nonskid shoes/slippers when out of bed   safety round/check completed  Intervention: Prevent Skin Injury  Recent Flowsheet Documentation  Taken 12/10/2024 0430 by Leisa John RN  Body Position:   supine, head elevated   weight shifting  Taken 12/10/2024 0231 by Leisa John RN  Body Position: supine, head elevated  Taken 12/9/2024 2330 by Leisa John RN  Body Position: position changed independently  Intervention: Prevent and Manage VTE (Venous Thromboembolism) Risk  Recent Flowsheet Documentation  Taken 12/9/2024 2330 by Leisa John RN  VTE Prevention/Management: SCDs off (sequential compression devices)  Intervention: Prevent Infection  Recent Flowsheet Documentation  Taken 12/9/2024 2330 by Leisa John RN  Infection Prevention: rest/sleep promoted  Goal: Optimal Comfort and Wellbeing  Outcome: Progressing  Goal: Readiness for Transition of Care  Outcome: Progressing

## 2024-12-10 NOTE — PROVIDER NOTIFICATION
Pt had assisted fall out of bed. No injuries noted. MD Notified.  Pt incontinent of urine and changed. Pt still agitated and constantly getting out of bed. Attempting to bite and hit staff. IV Haldol given with no help in agitation. MD ordered soft wrist restraints. Pt LS very coarse/ expiratory wheezes. IV Lasix given x1.

## 2024-12-10 NOTE — PLAN OF CARE
"Goal Outcome Evaluation:      Plan of Care Reviewed With: patient, family          Outcome Evaluation: Continue Heparin gtt, denied chest pain. NA reported , recheck 146. Incontinent of bladder and bowel. Pulling IV and telemetry. MD notified. Refused to have tele on, very agitated and aggresive when to try put on.      Problem: Adult Inpatient Plan of Care  Goal: Plan of Care Review  Description: The Plan of Care Review/Shift note should be completed every shift.  The Outcome Evaluation is a brief statement about your assessment that the patient is improving, declining, or no change.  This information will be displayed automatically on your shift  note.  Outcome: Progressing  Flowsheets (Taken 12/9/2024 2330)  Outcome Evaluation: Continue Heparin gtt, denied chest pain. NA reported , recheck 146. Incontinent of bladder and bowel. Pulling IV and telemetry. MD notified. Refused to have tele on, very agitated and aggresive when to try put on.  Plan of Care Reviewed With:   patient   family  Goal: Patient-Specific Goal (Individualized)  Description: You can add care plan individualizations to a care plan. Examples of Individualization might be:  \"Parent requests to be called daily at 9am for status\", \"I have a hard time hearing out of my right ear\", or \"Do not touch me to wake me up as it startles  me\".  Outcome: Progressing  Goal: Absence of Hospital-Acquired Illness or Injury  Outcome: Progressing  Intervention: Identify and Manage Fall Risk  Recent Flowsheet Documentation  Taken 12/9/2024 1700 by Anya Almanza, RN  Safety Promotion/Fall Prevention:   activity supervised   assistive device/personal items within reach   mobility aid in reach   nonskid shoes/slippers when out of bed   supervised activity  Intervention: Prevent Skin Injury  Recent Flowsheet Documentation  Taken 12/9/2024 1700 by Anya Almanza, RN  Body Position: position changed independently  Intervention: Prevent and Manage VTE " "(Venous Thromboembolism) Risk  Recent Flowsheet Documentation  Taken 12/9/2024 1700 by Anya Almanza, RN  VTE Prevention/Management: SCDs off (sequential compression devices)  Goal: Optimal Comfort and Wellbeing  Outcome: Progressing  Goal: Readiness for Transition of Care  Outcome: Progressing     Problem: UTI (Urinary Tract Infection)  Goal: Improved Infection Symptoms  Outcome: Progressing     Problem: Adult Inpatient Plan of Care  Goal: Patient-Specific Goal (Individualized)  Description: You can add care plan individualizations to a care plan. Examples of Individualization might be:  \"Parent requests to be called daily at 9am for status\", \"I have a hard time hearing out of my right ear\", or \"Do not touch me to wake me up as it startles  me\".  Outcome: Progressing     Problem: Restraint, Nonviolent  Goal: Absence of Harm or Injury  Intervention: Protect Skin and Joint Integrity  Recent Flowsheet Documentation  Taken 12/9/2024 1700 by Anya Almanza, RN  Body Position: position changed independently     "

## 2024-12-11 ENCOUNTER — APPOINTMENT (OUTPATIENT)
Dept: SPEECH THERAPY | Facility: CLINIC | Age: 89
DRG: 280 | End: 2024-12-11
Payer: COMMERCIAL

## 2024-12-11 ENCOUNTER — APPOINTMENT (OUTPATIENT)
Dept: CARDIOLOGY | Facility: CLINIC | Age: 89
DRG: 280 | End: 2024-12-11
Attending: INTERNAL MEDICINE
Payer: COMMERCIAL

## 2024-12-11 PROBLEM — G30.9 ALZHEIMER DEMENTIA WITH BEHAVIORAL DISTURBANCE (H): Chronic | Status: ACTIVE | Noted: 2018-05-30

## 2024-12-11 PROBLEM — F02.818 ALZHEIMER DEMENTIA WITH BEHAVIORAL DISTURBANCE (H): Chronic | Status: ACTIVE | Noted: 2018-05-30

## 2024-12-11 LAB
GLUCOSE BLDC GLUCOMTR-MCNC: 131 MG/DL (ref 70–99)
GLUCOSE BLDC GLUCOMTR-MCNC: 137 MG/DL (ref 70–99)
GLUCOSE BLDC GLUCOMTR-MCNC: 141 MG/DL (ref 70–99)
GLUCOSE BLDC GLUCOMTR-MCNC: 87 MG/DL (ref 70–99)
LVEF ECHO: NORMAL

## 2024-12-11 PROCEDURE — 999N000208 ECHOCARDIOGRAM COMPLETE

## 2024-12-11 PROCEDURE — 250N000013 HC RX MED GY IP 250 OP 250 PS 637: Performed by: STUDENT IN AN ORGANIZED HEALTH CARE EDUCATION/TRAINING PROGRAM

## 2024-12-11 PROCEDURE — G0463 HOSPITAL OUTPT CLINIC VISIT: HCPCS

## 2024-12-11 PROCEDURE — 99222 1ST HOSP IP/OBS MODERATE 55: CPT | Performed by: PSYCHIATRY & NEUROLOGY

## 2024-12-11 PROCEDURE — 255N000002 HC RX 255 OP 636: Performed by: INTERNAL MEDICINE

## 2024-12-11 PROCEDURE — 250N000011 HC RX IP 250 OP 636: Performed by: STUDENT IN AN ORGANIZED HEALTH CARE EDUCATION/TRAINING PROGRAM

## 2024-12-11 PROCEDURE — 120N000001 HC R&B MED SURG/OB

## 2024-12-11 PROCEDURE — 250N000013 HC RX MED GY IP 250 OP 250 PS 637: Performed by: PSYCHIATRY & NEUROLOGY

## 2024-12-11 PROCEDURE — 99232 SBSQ HOSP IP/OBS MODERATE 35: CPT | Mod: FS | Performed by: PHYSICIAN ASSISTANT

## 2024-12-11 PROCEDURE — 92526 ORAL FUNCTION THERAPY: CPT | Mod: GN

## 2024-12-11 PROCEDURE — 99232 SBSQ HOSP IP/OBS MODERATE 35: CPT | Performed by: STUDENT IN AN ORGANIZED HEALTH CARE EDUCATION/TRAINING PROGRAM

## 2024-12-11 PROCEDURE — 93306 TTE W/DOPPLER COMPLETE: CPT | Mod: 26 | Performed by: INTERNAL MEDICINE

## 2024-12-11 RX ORDER — OLANZAPINE 5 MG/1
5 TABLET, ORALLY DISINTEGRATING ORAL 2 TIMES DAILY PRN
Status: DISCONTINUED | OUTPATIENT
Start: 2024-12-11 | End: 2024-12-13 | Stop reason: HOSPADM

## 2024-12-11 RX ORDER — RISPERIDONE 0.5 MG/1
1 TABLET, ORALLY DISINTEGRATING ORAL AT BEDTIME
Status: DISCONTINUED | OUTPATIENT
Start: 2024-12-11 | End: 2024-12-13 | Stop reason: HOSPADM

## 2024-12-11 RX ORDER — MIRTAZAPINE 15 MG/1
15 TABLET, FILM COATED ORAL
Status: DISCONTINUED | OUTPATIENT
Start: 2024-12-11 | End: 2024-12-13 | Stop reason: HOSPADM

## 2024-12-11 RX ORDER — DEXTROSE MONOHYDRATE 25 G/50ML
25-50 INJECTION, SOLUTION INTRAVENOUS
Status: DISCONTINUED | OUTPATIENT
Start: 2024-12-11 | End: 2024-12-13 | Stop reason: HOSPADM

## 2024-12-11 RX ORDER — OLANZAPINE 10 MG/2ML
5 INJECTION, POWDER, FOR SOLUTION INTRAMUSCULAR 2 TIMES DAILY PRN
Status: DISCONTINUED | OUTPATIENT
Start: 2024-12-11 | End: 2024-12-13 | Stop reason: HOSPADM

## 2024-12-11 RX ORDER — NICOTINE POLACRILEX 4 MG
15-30 LOZENGE BUCCAL
Status: DISCONTINUED | OUTPATIENT
Start: 2024-12-11 | End: 2024-12-13 | Stop reason: HOSPADM

## 2024-12-11 RX ORDER — MEMANTINE HYDROCHLORIDE 5 MG/1
5 TABLET ORAL DAILY
Status: DISCONTINUED | OUTPATIENT
Start: 2024-12-12 | End: 2024-12-13 | Stop reason: HOSPADM

## 2024-12-11 RX ORDER — MEMANTINE HYDROCHLORIDE 5 MG/1
5 TABLET ORAL 2 TIMES DAILY
Status: DISCONTINUED | OUTPATIENT
Start: 2024-12-19 | End: 2024-12-13 | Stop reason: HOSPADM

## 2024-12-11 RX ORDER — RISPERIDONE 0.5 MG/1
0.5 TABLET, ORALLY DISINTEGRATING ORAL DAILY
Status: DISCONTINUED | OUTPATIENT
Start: 2024-12-11 | End: 2024-12-13 | Stop reason: HOSPADM

## 2024-12-11 RX ADMIN — RISPERIDONE 0.5 MG: 0.5 TABLET, ORALLY DISINTEGRATING ORAL at 13:15

## 2024-12-11 RX ADMIN — HALOPERIDOL LACTATE 2 MG: 5 INJECTION, SOLUTION INTRAMUSCULAR at 00:38

## 2024-12-11 RX ADMIN — CARVEDILOL 25 MG: 25 TABLET, FILM COATED ORAL at 18:20

## 2024-12-11 RX ADMIN — RISPERIDONE 1 MG: 0.5 TABLET, ORALLY DISINTEGRATING ORAL at 21:22

## 2024-12-11 RX ADMIN — FINASTERIDE 5 MG: 5 TABLET, FILM COATED ORAL at 08:57

## 2024-12-11 RX ADMIN — CARVEDILOL 25 MG: 25 TABLET, FILM COATED ORAL at 08:57

## 2024-12-11 RX ADMIN — MICONAZOLE NITRATE ANTIFUNGAL POWDER: 2 POWDER TOPICAL at 21:32

## 2024-12-11 RX ADMIN — HALOPERIDOL LACTATE 2 MG: 5 INJECTION, SOLUTION INTRAMUSCULAR at 14:39

## 2024-12-11 RX ADMIN — ATORVASTATIN CALCIUM 40 MG: 40 TABLET, FILM COATED ORAL at 21:22

## 2024-12-11 RX ADMIN — ACETAMINOPHEN 650 MG: 325 TABLET, FILM COATED ORAL at 14:04

## 2024-12-11 RX ADMIN — TAMSULOSIN HYDROCHLORIDE 0.4 MG: 0.4 CAPSULE ORAL at 08:57

## 2024-12-11 RX ADMIN — ASPIRIN 325 MG: 325 TABLET, COATED ORAL at 08:57

## 2024-12-11 RX ADMIN — HUMAN ALBUMIN MICROSPHERES AND PERFLUTREN 4 ML: 10; .22 INJECTION, SOLUTION INTRAVENOUS at 15:20

## 2024-12-11 ASSESSMENT — ACTIVITIES OF DAILY LIVING (ADL)
ADLS_ACUITY_SCORE: 85
ADLS_ACUITY_SCORE: 94
ADLS_ACUITY_SCORE: 89
ADLS_ACUITY_SCORE: 86
ADLS_ACUITY_SCORE: 94
ADLS_ACUITY_SCORE: 85
ADLS_ACUITY_SCORE: 93
ADLS_ACUITY_SCORE: 86
ADLS_ACUITY_SCORE: 94
ADLS_ACUITY_SCORE: 85
ADLS_ACUITY_SCORE: 93
ADLS_ACUITY_SCORE: 85
ADLS_ACUITY_SCORE: 86
ADLS_ACUITY_SCORE: 85
ADLS_ACUITY_SCORE: 89

## 2024-12-11 NOTE — PROGRESS NOTES
[facility]  Cardiology Progress Note  Date of Service: 12/11/2024  Primary Cardiologist: will be Dr. Paulino Valerio     Assessment & Plan    Adam Huber is a 89 year old male admitted on 12/8/2024.    Assessment:  NSTEMI, with troponin up to 289.   Chronic urinary obstruction now with acute urinary tract infection, on abx.   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 Unable to complete an echocardiogram as patient became aggressive. Reattempting today with son present. Conservative management recommended.   Medications   -would not recommend DAPT given above        Magdiel Reynoso PA-C            Interval History   Confused, sitter at bedside      Physical Exam   Temp: 97.7  F (36.5  C) Temp src: Oral BP: (!) 149/80 Pulse: 70   Resp: 20 SpO2: 95 % O2 Device: Nasal cannula Oxygen Delivery: 1 LPM  Vitals:    12/08/24 1932 12/10/24 0055 12/10/24 0642   Weight: 82.7 kg (182 lb 5.1 oz) 85.8 kg (189 lb 2.5 oz) 74.8 kg (164 lb 14.5 oz)       GEN: well nourished, in no acute distress.  HEENT:  Pupils equal, round. Sclerae nonicteric.   NECK: Supple, no masses appreciated.no JVD  C/V:  Regular rate and rhythm, no murmur  RESP: Respirations are unlabored. Clear to auscultation bilaterally without wheezing, rales, or rhonchi.  GI: Abdomen soft, nontender.  EXTREM: no LE edema.  NEURO: Sleeping   SKIN: Warm and dry.     Medications   Current Facility-Administered Medications   Medication Dose Route Frequency Provider Last Rate Last Admin     Current Facility-Administered Medications   Medication Dose Route Frequency Provider Last Rate Last Admin    aspirin (ASA) EC tablet 325 mg  325 mg Oral Daily Pal Mack MD   325 mg at 12/11/24 0857    atorvastatin (LIPITOR) tablet 40 mg  40 mg Oral QPM aPl Mack MD   40  mg at 12/10/24 2020    carvedilol (COREG) tablet 25 mg  25 mg Oral BID w/meals Pal Mack MD   25 mg at 12/11/24 0857    finasteride (PROSCAR) tablet 5 mg  5 mg Oral Daily Pal Mack MD   5 mg at 12/11/24 0857    insulin aspart (NovoLOG) injection (RAPID ACTING)  1-7 Units Subcutaneous TID AC Frank Ren MD        insulin aspart (NovoLOG) injection (RAPID ACTING)  1-5 Units Subcutaneous At Bedtime Frank Ren MD        miconazole (MICATIN) 2 % powder   Topical BID Pal Mack MD   Given at 12/10/24 2020    tamsulosin (FLOMAX) capsule 0.4 mg  0.4 mg Oral Daily Pal Mack MD   0.4 mg at 12/11/24 0857       Data   Last 24 hours labs reviewed     Tele: SR. Short run PSVT, rare PVCs      Echo 2/6/2024  Normal biventricular size and systolic function.  IVC is poorly visualized but appears collapsible.  No pericardial effusion.  Visually the aortic valve appears significantly calcified. Not well-seen in  short axis. Mean gradient is about 9 to 10 mmHg consistent with mild aortic  stenosis.  There is probably moderate to severe mitral annular calcification. Likely mild  MS. Prior study reported mild mitral stenosis in September 2023.

## 2024-12-11 NOTE — CONSULTS
[facility]     INITIAL PSYCHIATRY   CONSULT     DATE OF SERVICE   12/11/2024       IDENTIFICATION   Adam Huber  Age: 89 year old  MRN# [id number]   YOB: 1935   LOS: 3       CHIEF COMPLAINT   Recurrent agitation in setting of dementia.       CONSULT REQUEST BY   Pal Mack re: history of dementia with confusion, and intermittent behaviors to include aggression       HISTORY OF PRESENT ILLNESS   This is a 89 year old male with history of dementia.  Past psychiatric history does not include prior psychiatric hospitalization, suicide attempt, nor WALDEMAR history.  Does have prior mental health contact to include consult by psychiatry on 5/11 or similar presentation of dementia and agitation.  Past medical history significant for CAD, type II DM, hypertension, and BPH.  Now, patient admitted on 12/8 to this facility's ED for evaluation of chest pain.  EKG not concerning, troponin appeared to be chronically elevated.  Admitted and found to have UTI and started on ceftriaxone.    Now, consult placed to psychiatry regarding her current behavior of agitated behavior while hospitalized on 12/9.    Further care coordination performed.  Reviewed self doubt hospitalization dated 5/4 through 6/5/2023.  Admit secondary to slurred speech and concerns for stroke.  After neurology evaluation, presentation was not felt to be consistent with stroke.  PTA medications included olanzapine 2.5 mg twice daily and quetiapine 25 mg at bedtime.  Care is coordinated with patient's family to include recommendation to discontinue quetiapine and olanzapine and schedule risperidone ODT 0.5 mg in the morning and 1 mg at bedtime to decrease dementia related agitation.  For backup, risperidone ODT 0.5 mg twice daily as needed and olanzapine IM 2.5 mg.  Further chart reviewed, patient had subsequent medical hospitalizations in September 2023 x 2.  Scheduled risperidone changed to as needed dosing, and later  "discontinued.  On further chart review, formal cognitive assessments such as SLUMS by OT not located for review.    Upon interview, the patient is calm on approach with sitter.  Documented that patient has no chronically slurred speech.  Does not do well with iPad .  Visit attempted in patient's room on the unit but does not engage meaningfully for evaluation. Patient is voluntary.  Patient is made aware of plan to coordinate cares with treatment team and patient's family.    Due to presentation, unable to obtain meaningful information directly from patient.  Information gathered by means of chart review, nursing/physician/ report and patient observation.  Does not appear in acute distress.    Unable to obtain psychiatric review of symptoms directly from patient.    According to nursing, major concerns related to behavior include agitation as documented to include aggressive behavior of 12/9 leading to psychiatry consult.  Occurring in setting of ECHO attempt on 12/9 where patient became aggressive and stating could not be done.  Rescheduled for today, 12/11, 1 son is able to be present and as needed medications ordered 15 to 30 minutes prior to ECHO.     Treatment plan discussed with staff and with son directly.    Care coordination attempted with sonAdolfo ([phone]).  Questions and concerns discussed in detail.  Including, review of risperidone management after hospitalization in May of last year and possible reasons for discontinuation.  Describes father is doing, \"fine,\" while on medication and unaware of reasons for discontinuation.  Does not have further knowledge of formal efforts of cognitive testing and discussed treatment as patient presents with signs and symptoms consistent on chart review of dementia with behavioral disturbance.  Reviewed possible medications as means of intervention with further consent given.  No further questions or concerns.    Review of external notes " and/or information:  I personally reviewed notes from the patient's intake` note dated 12/8. This provided me with information regarding patient's recent clinical course.     I personally reviewed the patient's chart, including available medication list and available past medical history, past surgical history, family history, and social history.        CHEMICAL DEPENDENCY HISTORY   History   Drug Use No     Social History    Substance and Sexual Activity      Alcohol use: No        Alcohol/week: 0.0 standard drinks of alcohol    History   Smoking Status    Never   Smokeless Tobacco    Never          PAST PSYCHIATRIC HISTORY   Psychiatrist: None reported  Hospitalizations: No prior psychiatric hospitalizations  Most recent: Prior psychiatry consult dated 5/11/2023 [facility]  Past Medications:   Quetiapine, olanzapine --> switched to risperidone ODT on 5/11/2023.  Mirtazapine  Melatonin  Suicide Attempts/Gun Access: No prior suicide attempts.  No gun access reported.       PAST MEDICAL HISTORY   Past Medical History:   Diagnosis Date    Acute chest pain 10/23/2015    CAD (coronary artery disease)     Essential hypertension 10/23/2015    History of CVA (cerebrovascular accident) 10/23/2015    HLD (hyperlipidemia)     HTN (hypertension)     NSTEMI (non-ST elevated myocardial infarction) (H) 10/23/2015    Post PTCA 10-    Successful PCI of culprit proximal to mid RCA with placement of a    Post PTCA 11-6-2015    pci of complex mid CFX-hector     Past Surgical History:   Procedure Laterality Date    CHOLECYSTECTOMY      GENITOURINARY SURGERY      prostate removal     HEART CATH LEFT HEART CATH  10/12/2015    PCI w/ HECTOR to RCA    HEART CATH RIGHT AND LEFT HEART CATH  11/6/2015    PCI w/ HECTOR to mid-CFX     Primary Care Provider: Oralia Forrest  Medications:   Current Facility-Administered Medications   Medication Dose Route Frequency Provider Last Rate Last Admin    aspirin (ASA) EC tablet 325 mg   325 mg Oral Daily Pal Mack MD   325 mg at 12/11/24 0857    atorvastatin (LIPITOR) tablet 40 mg  40 mg Oral QPM Pal Mack MD   40 mg at 12/10/24 2020    carvedilol (COREG) tablet 25 mg  25 mg Oral BID w/meals Pal Mack MD   25 mg at 12/11/24 0857    finasteride (PROSCAR) tablet 5 mg  5 mg Oral Daily Pal Mack MD   5 mg at 12/11/24 0857    insulin aspart (NovoLOG) injection (RAPID ACTING)  1-7 Units Subcutaneous TID AC Frank Ren MD        insulin aspart (NovoLOG) injection (RAPID ACTING)  1-5 Units Subcutaneous At Bedtime Frank Ren MD        miconazole (MICATIN) 2 % powder   Topical BID Pal Mack MD   Given at 12/10/24 2020    tamsulosin (FLOMAX) capsule 0.4 mg  0.4 mg Oral Daily Pal Mack MD   0.4 mg at 12/11/24 0857     Medications as needed:   Current Facility-Administered Medications   Medication Dose Route Frequency Provider Last Rate Last Admin    acetaminophen (TYLENOL) tablet 650 mg  650 mg Oral Q8H PRN Pal Mack MD        cloNIDine (CATAPRES) tablet 0.1 mg  0.1 mg Oral Q6H PRN Reynaldo Zee MD        glucose gel 15-30 g  15-30 g Oral Q15 Min PRN Frank Ren MD        Or    dextrose 50 % injection 25-50 mL  25-50 mL Intravenous Q15 Min PRN Frank Ren MD        Or    glucagon injection 1 mg  1 mg Subcutaneous Q15 Min PRN Frank Ren MD        haloperidol lactate (HALDOL) injection 2 mg  2 mg Intravenous Q6H PRN Pal Mack MD   2 mg at 12/11/24 0038    hydrALAZINE (APRESOLINE) injection 10 mg  10 mg Intravenous Q4H PRN Reynaldo Zee MD        mirtazapine (REMERON) tablet 15 mg  15 mg Oral At Bedtime PRN Pal Mack MD   15 mg at 12/10/24 2127    OLANZapine zydis (zyPREXA) ODT tab 5 mg  5 mg Oral At Bedtime PRN Pal Mack MD   5 mg at 12/10/24 2127    ondansetron (ZOFRAN ODT) ODT tab 4 mg  4 mg Oral Q6H PRN Pal Mack MD        Or    ondansetron (ZOFRAN) injection 4 mg  4 mg Intravenous Q6H PRN Pal Mack MD        prochlorperazine  (COMPAZINE) injection 5 mg  5 mg Intravenous Q6H PRN Pal Mack MD        Or    prochlorperazine (COMPAZINE) tablet 5 mg  5 mg Oral Q6H PRN Pal Mack MD        senna-docusate (SENOKOT-S/PERICOLACE) 8.6-50 MG per tablet 1 tablet  1 tablet Oral BID PRN Pal Mack MD        Or    senna-docusate (SENOKOT-S/PERICOLACE) 8.6-50 MG per tablet 2 tablet  2 tablet Oral BID PRN Pal Mack MD         ALLERGIES: Lisinopril and Morphine    Reviewed in detail and see ED and Intake for full details and history.       MEDICATIONS   Medications Prior to Admission   Medication Sig Dispense Refill Last Dose/Taking    acetaminophen (TYLENOL) 325 MG tablet Take 650 mg by mouth every 8 hours as needed for mild pain   Unknown    aspirin (ASA) 325 MG EC tablet Take 325 mg by mouth daily.   12/7/2024 Morning    atorvastatin (LIPITOR) 40 MG tablet Take 1 tablet (40 mg) by mouth every evening 30 tablet 0 12/7/2024 Morning    carvedilol (COREG) 25 MG tablet Take 1 tablet (25 mg) by mouth 2 times daily (with meals) 180 tablet 3 12/7/2024 Morning    finasteride (PROSCAR) 5 MG tablet Take 5 mg by mouth daily   12/7/2024 Morning    glipiZIDE (GLUCOTROL) 5 MG tablet Take 1 tablet (5 mg) by mouth every morning (before breakfast). 30 tablet 0 12/7/2024 Morning    Melatonin 10 MG TABS tablet Take 10 mg by mouth nightly as needed for sleep   Unknown    mirtazapine (REMERON) 15 MG tablet Take 15 mg by mouth nightly as needed (agitation).   Unknown    OLANZapine zydis (ZYPREXA) 5 MG ODT Take 1 tablet (5 mg) by mouth nightly as needed for agitation. 20 tablet 0 Unknown    tamsulosin (FLOMAX) 0.4 MG capsule Take 1 capsule (0.4 mg) by mouth daily. 30 capsule 0 12/7/2024        ROS   The 10 point Review of Systems is negative other than noted in the HPI or here.       FAMILY HISTORY   Family History   Problem Relation Age of Onset    Unknown/Adopted No family hx of            SOCIAL HISTORY   Social History     Socioeconomic History    Marital  status:      Spouse name: Not on file    Number of children: Not on file    Years of education: Not on file    Highest education level: Not on file   Occupational History    Not on file   Tobacco Use    Smoking status: Never    Smokeless tobacco: Never   Substance and Sexual Activity    Alcohol use: No     Alcohol/week: 0.0 standard drinks of alcohol    Drug use: No    Sexual activity: Not on file   Other Topics Concern     Service Not Asked    Blood Transfusions Not Asked    Caffeine Concern No     Comment: seldom    Occupational Exposure Not Asked    Hobby Hazards Not Asked    Sleep Concern Yes     Comment: does not sleep well    Stress Concern Not Asked    Weight Concern Not Asked    Special Diet Yes     Comment: lower sodium and fat    Back Care Not Asked    Exercise No    Bike Helmet Not Asked    Seat Belt Not Asked    Self-Exams Not Asked    Parent/sibling w/ CABG, MI or angioplasty before 65F 55M? Not Asked   Social History Narrative    Not on file     Social Drivers of Health     Financial Resource Strain: Unknown (11/14/2024)    Financial Resource Strain     Within the past 12 months, have you or your family members you live with been unable to get utilities (heat, electricity) when it was really needed?: Patient unable to answer   Food Insecurity: Unknown (11/14/2024)    Food Insecurity     Within the past 12 months, did you worry that your food would run out before you got money to buy more?: Patient unable to answer     Within the past 12 months, did the food you bought just not last and you didn t have money to get more?: Patient unable to answer   Transportation Needs: Unknown (11/14/2024)    Transportation Needs     Within the past 12 months, has lack of transportation kept you from medical appointments, getting your medicines, non-medical meetings or appointments, work, or from getting things that you need?: Patient unable to answer   Physical Activity: Not on file   Stress: Not on  file   Social Connections: Not on file   Interpersonal Safety: High Risk (11/8/2024)    Interpersonal Safety     Do you feel physically and emotionally safe where you currently live?: No     Within the past 12 months, have you been hit, slapped, kicked or otherwise physically hurt by someone?: No     Within the past 12 months, have you been humiliated or emotionally abused in other ways by your partner or ex-partner?: No   Housing Stability: Unknown (11/14/2024)    Housing Stability     Do you have housing? : Patient unable to answer     Are you worried about losing your housing?: Patient unable to answer          MENTAL STATUS EXAM   Appearance:  Does not engage meaningfully for evaluation  Mood:  Mood: Ambivalence  Affect: flat  was congruent to speech (mute)  Suicidal Ideation: PRESENT / ABSENT: absent   Homicidal Ideation: PRESENT / ABSENT: absent   Thought process:  PJ    Thought content: devoid of  suicidal ideation and violent ideation.   Fund of Knowledge: PJ  Attention/Concentration: Poor  Language ability:  Aphasic  Memory:  PJ  Insight:  limited.  Judgement: limited  Orientation: PJ  Psychomotor Behavior: slowed    Muscle Strength and Tone: MuscleStrength: Normal  Gait and Station:  In bed       PHYSICAL EXAM   Vitals: BP (!) 149/80 (BP Location: Left arm)   Pulse 70   Temp 97.7  F (36.5  C) (Oral)   Resp 20   Wt 74.8 kg (164 lb 14.5 oz)   SpO2 95%   BMI 20.61 kg/m    Weight:   164 lbs 14.47 oz    Body mass index is 20.61 kg/m .    Physical exam as per Hospitalist, Dr. Reynaldo Zee MD. Dated 12/10/2024:    Constitutional: Lying in bed, no acute distress; calls out for his wife on/off  Respiratory:  fair a/e bilaterally; decreased at bases; no wheezing or rhonchi  Cardiovascular: regular rhythm, tachycardia, no m/r/g; no peripheral edema  GI: abdomen flat; + BS, soft, NT/ND  Neurologic: awake; RN was interpreting; follows some directions w/ coaxing; no focal deficits; needs assist of 1-2 to sit up  "in bed;     I have reviewed the physical exam as documented by by the medical team and agree with findings and assessment and have no additional findings to add at this time.       LABS   personally reviewed.   Recent Results (from the past 48 hours)   Heparin Unfractionated Anti Xa Level    Collection Time: 12/09/24  2:21 PM   Result Value Ref Range    Anti Xa Unfractionated Heparin 0.55 For Reference Range, See Comment IU/mL   Heparin Unfractionated Anti Xa Level    Collection Time: 12/09/24 10:31 PM   Result Value Ref Range    Anti Xa Unfractionated Heparin 0.17 For Reference Range, See Comment IU/mL   Heparin Unfractionated Anti Xa Level    Collection Time: 12/10/24  5:37 AM   Result Value Ref Range    Anti Xa Unfractionated Heparin 0.40 For Reference Range, See Comment IU/mL   Basic metabolic panel    Collection Time: 12/10/24  5:37 AM   Result Value Ref Range    Sodium 138 135 - 145 mmol/L    Potassium 3.6 3.4 - 5.3 mmol/L    Chloride 100 98 - 107 mmol/L    Carbon Dioxide (CO2) 24 22 - 29 mmol/L    Anion Gap 14 7 - 15 mmol/L    Urea Nitrogen 27.3 (H) 8.0 - 23.0 mg/dL    Creatinine 0.75 0.67 - 1.17 mg/dL    GFR Estimate 86 >60 mL/min/1.73m2    Calcium 8.5 (L) 8.8 - 10.4 mg/dL    Glucose 220 (H) 70 - 99 mg/dL   Extra Purple Top EDTA (LAB USE ONLY)    Collection Time: 12/10/24  5:37 AM   Result Value Ref Range    Hold Specimen JIC    Glucose by meter    Collection Time: 12/11/24  9:07 AM   Result Value Ref Range    GLUCOSE BY METER POCT 87 70 - 99 mg/dL     No results found for: \"PHENYTOIN\", \"PHENOBARB\", \"VALPROATE\", \"CBMZ\"       FOCUSED ASSESSMENT   Consult placed to psychiatry regarding recurrent agitation in the setting of dementia.  Patient has known history of target behaviors involving psychiatry consultation at [facility] in May 2023 of similar presentation.  Current behaviors of agitation impairing medical evaluation of current admission associated with chest pain.  Care is discussed in detail " with son who accepts recommendation to resume prior treatment plan of efficacy, tolerability and further medication optimization to slow progression.       DIAGNOSIS   Principal Problem:    Alzheimer dementia with behavioral disturbance (H)    Active Problem List:  Patient Active Problem List   Diagnosis    ACS (acute coronary syndrome) (H)    NSTEMI (non-ST elevated myocardial infarction) (H)    History of CVA (cerebrovascular accident)    Essential hypertension    Acute chest pain    CAD (coronary artery disease)~LEXI x2 placed proximal to mid RCA    Bleeding of blood vessel    Dehydration    Elevated troponin    Generalized muscle weakness    Generalized weakness    Influenza B    Physical deconditioning    Acute metabolic encephalopathy    UTI (urinary tract infection)    Pain    T12 compression fracture (H)    Acute delirium    Alzheimer dementia with behavioral disturbance (H)    Type 2 diabetes mellitus without complication (H)    Closed wedge compression fracture of twelfth thoracic vertebra, initial encounter    Rhabdomyolysis    Altered mental status    Encephalopathy    Pulmonary nodules    Closed fracture of multiple ribs of right side, initial encounter    Syncope, unspecified syncope type    Slurred speech    Acute cystitis with hematuria    Severe sepsis (H)    Acute pulmonary edema (H)    Acute congestive heart failure, unspecified heart failure type (H)    Hypoxia    Pneumonia of left lower lobe due to infectious organism    Congestive heart failure, unspecified HF chronicity, unspecified heart failure type (H)    Pyelonephritis    Uncontrolled type 2 diabetes mellitus with hyperglycemia (H)    Pressure injury of sacral region, stage 2 (H)    Sepsis, due to unspecified organism, unspecified whether acute organ dysfunction present (H)    Villa catheter in place    History of UTI    Hematuria, unspecified type    Tachycardia    Urinary tract infection without hematuria, site unspecified    Altered  mental status, unspecified altered mental status type          RECOMMENDATIONS   Target psychiatric symptoms and interventions:  Education:  Care coordination with education performed with Son, Adolfo ([phone]).  Consent given for psychiatric medication management.    Safety/Supervision:  Legal Status:  Voluntary  Capacity Screen:  At time of consult, felt to be not decisional for complex decision making based on available information/evaluation.  Based on clinical history, chart review, without formal testing which is complicated by issues of assessment.  Recommend care team to review [facility]'s policy for decision makers on particular decisions or tasks.  Precautions placed:   Delirium; Sitter as per Care Team; Routine as per Unit .   Monitor target symptoms:  Agitation    Medication Recommendations:   PTA Psychiatric Medications reviewed.    Namenda: Coordinate trial of Namenda 5 mg twice daily x 1 week, then Namenda 5 mg twice daily to target signs and symptoms of cognitive impairment with behaviors.  Recommendation to continue titration Namenda 5 mg weekly, target dose Namenda 10 mg twice daily.  Risperidone ODT: Retrial risperidone ODT 0.5 mg every morning and risperidone ODT 1 mg at bedtime to target agitation associated with dementia.  Documented and reported history of efficacy, tolerability.  Risperidone ODT: Retrial risperidone ODT 0.5 mg twice daily, mild to moderate agitation.  Olanzapine ODT: Continue PTA as needed olanzapine ODT 5 mg nightly and increase frequency to 5 mg twice daily, severe agitation with as needed olanzapine IM 5 mg twice daily, severe agitation, aggression.  Mirtazapine: Continue PTA of mirtazapine 15 mg at bedtime, changed medication to sleep.  Haldol: Continue previously ordered as needed Haldol 2 mg x 1 prior to procedure today.  Discontinue after procedure.  Melatonin: As needed melatonin 1 mg nightly, may repeat x 1, sleep.    Acute Medical Problems and  Treatments:  History and Physical (12/6):  Reviewed   Consults:  Cardiology, reviewed   Qtc 487  Cr 0.75.    Care Coordination:  Treatment plan discussed directly with bedside RN.  Care coordination attempt with son, Adolfo.  Consent given to retrial/change medications as discussed.  Please reconsult with Psychiatry as needed.         Risk Assessment: IP MHAC RISK ASSESSMENT: Patient on precautions    Total encounter time:  A total of  67  minutes spent related to chart review, history and exam, documentation   and further activities as noted above    This note was created with help of Dragon dictation system. Grammatical / typing errors are not intentional.        Nicolás Torres MD - 12/11/2024  - 9:55 AM  Consult/Liaison Psychiatry   [facility]    Please call the [facility] CL line ([phone]) with questions and to determine consult service coverage.

## 2024-12-11 NOTE — PROGRESS NOTES
[facility]    Medicine Progress Note - Hospitalist Service    Date of Admission:  12/8/2024    Assessment & Plan   89-year-old male with history of dementia, CAD, diabetes, hypertension, cirrhosis with hepatic lesion and BPH who presented to ED on 12/8 with chest pain.  Initial troponin was 28 and went up to 289.  EKG did not show changes of acute ischemia.      NSTEMI.  Awaiting echo to be read to look for new wall motion abnormalities, will family will come by today to assist with completing echo as he failed the first attempt at echocardiogram due to agitation.  Not a candidate for coronary angiogram due to severe dementia.  Heparin drip given 12/8 to 12/10.  Continue aspirin and not planning to go to antiplatelet therapy.  Has history of CAD status post mid circumflex stenting in 2015.    Concern for UTI.  History of BPH.  UA on presentation showed pyuria and large leukocyte esterase and empirically given 3 days of IV ceftriaxone.  Urine cultures showed 10-50 K CFU of urogenital madhu.    Chronic medical conditions  Dementia with behavioral disturbance: Aggressive behavior on 12/9.  Lives at home with assistance from wife and family and needs help with most ADLs.  Currently has a sitter getting as needed Haldol, Zyprexa and Remeron for agitation.  Diabetes mellitus type 2 with hyperglycemia.:  Presented with blood glucose of 485.  HbA1c a month ago was 12.6 and at home he is on glipizide 5 mg every morning.  Will start sliding scale insulin.  BPH: Continue Flomax and finasteride.  Decubitus ulcer: Awaiting wound care nurse evaluation..  Chronic mild oropharyngeal dysphagia: Continues.  Diet with thin liquids as per speech evaluation.  Essential hypertension: Pressures are controlled on carvedilol.  Hyperlipidemia: On atorvastatin.    Family communication.  Discussed with son Adolfo who will come today to assess with echocardiogram.    Disposition  Planning discharging home on 12/12.           Diet: Combination Diet Pureed Diet (level 4); Mildly Thick (level 2)    DVT Prophylaxis: Pneumatic Compression Devices  Villa Catheter: Not present  Lines: None     Cardiac Monitoring: ACTIVE order. Indication: Chest pain/ ACS rule out (24 hours)  Code Status: No CPR- Do NOT Intubate      Clinically Significant Risk Factors               # Hypoalbuminemia: Lowest albumin = 3.3 g/dL at 12/8/2024 12:57 AM, will monitor as appropriate     # Hypertension: Noted on problem list  # Acute heart failure with preserved ejection fraction: heart failure noted on problem list, last echo with EF >50%, and receiving IV diuretics    # Dementia: noted on problem list       # DMII: A1C = 12.6 % (Ref range: <5.7 %) within past 6 months, PRESENT ON ADMISSION      # Financial/Environmental Concerns:           Social Drivers of Health    Food Insecurity: Unknown (11/14/2024)    Food Insecurity     Within the past 12 months, did you worry that your food would run out before you got money to buy more?: Patient unable to answer     Within the past 12 months, did the food you bought just not last and you didn t have money to get more?: Patient unable to answer   Housing Stability: Unknown (11/14/2024)    Housing Stability     Do you have housing? : Patient unable to answer     Are you worried about losing your housing?: Patient unable to answer   Financial Resource Strain: Unknown (11/14/2024)    Financial Resource Strain     Within the past 12 months, have you or your family members you live with been unable to get utilities (heat, electricity) when it was really needed?: Patient unable to answer   Transportation Needs: Unknown (11/14/2024)    Transportation Needs     Within the past 12 months, has lack of transportation kept you from medical appointments, getting your medicines, non-medical meetings or appointments, work, or from getting things that you need?: Patient unable to answer   Interpersonal Safety: High Risk (11/8/2024)     Interpersonal Safety     Do you feel physically and emotionally safe where you currently live?: No     Within the past 12 months, have you been hit, slapped, kicked or otherwise physically hurt by someone?: No     Within the past 12 months, have you been humiliated or emotionally abused in other ways by your partner or ex-partner?: No          Disposition Plan     Medically Ready for Discharge: Anticipated Tomorrow             Frank Ren MD  Hospitalist Service  [facility]  Securely message with i[employer] (more info)  Text page via [facility] Paging/Directory   ______________________________________________________________________    Interval History   Appears comfortable.  Did not use Kenyan  as it is hard due to his dementia.    Physical Exam   Vital Signs: Temp: 97.5  F (36.4  C) Temp src: Axillary BP: 120/51 Pulse: 72   Resp: 20 SpO2: 97 % O2 Device: Nasal cannula Oxygen Delivery: 1 LPM  Weight: 164 lbs 14.47 oz    General Appearance: Appears comfortable.  Sitter at bedside.  Respiratory: Clear to auscultation  Cardiovascular: S1-S2 normal  GI: Soft and nontender  Skin: No rash  Other: No edema      Medical Decision Making       MANAGEMENT DISCUSSED with the following over the past 24 hours: Patient raven, RN and son Adolfo over the phone       Data         Imaging results reviewed over the past 24 hrs:   No results found for this or any previous visit (from the past 24 hours).   flank pain

## 2024-12-11 NOTE — CONSULTS
"[facility] Nurse Inpatient Assessment     Consulted for: Decubitus ulcers    Patient History (according to provider note(s):      89-year-old male with history of dementia, CAD, diabetes, hypertension, cirrhosis with hepatic lesion and BPH who presented to ED on 12/8 with chest pain.  Initial troponin was 28 and went up to 289.  EKG did not show changes of acute ischemia.     Assessment:      Areas visualized during today's visit: Focused:    Skin Location: Right buttock  Last photo: none  Patient with history of stage 3 pressure injury to right buttock.  Area is now healed, scar tissue present but no open area.      Mild perianal erythema due to incontinence.  Continue Critic Aid paste.       Treatment Plan:     Perianal area: BID  Cleanse with Lucy Cleanse and Protect spray and soft dry wipe  Apply Critic Aid paste     Pressure Injury Prevention (PIP) Plan:  If patient is declining pressure injury prevention interventions: Explore reason why and address patient's concerns, Educate on pressure injury risk and prevention intervention(s), and If patient is still declining, document \"informed refusal\"   Mattress: Follow bed algorithm, add Low Air Loss (Air+) mattress pump if skin is very moist or constantly moist.   HOB: Maintain at or below 30 degrees, unless contraindicated  Repositioning in bed: Every 1-2 hours  and Left/right positioning; avoid supine  Heels: Pillows under calves  Protective Dressing: None  Moisture Management: Avoid brief in bed  Under Devices: Inspect skin under all medical devices during skin inspection , Ensure tubes are stabilized without tension, and Ensure patient is not lying on medical devices or equipment when repositioned  Ask provider to discontinue device when no longer needed.      Orders: Written    RECOMMEND PRIMARY TEAM ORDER: None, at this time  Education provided: importance of repositioning and plan of care  Discussed plan of care with: Patient and " Nurse  WOC nurse follow-up plan: signing off  Notify WOC if wound(s) deteriorate.  Nursing to notify the Provider(s) and re-consult the WOC Nurse if new skin concern.    DATA:     Current support surface: Standard  Standard gel mattress (Isoflex)  Containment of urine/stool: Incontinence Protocol and Suction based external urinary catheter   BMI: Body mass index is 20.61 kg/m .   Active diet order: Orders Placed This Encounter      Combination Diet Pureed Diet (level 4); Mildly Thick (level 2)     Output: I/O last 3 completed shifts:  In: 1560 [P.O.:1560]  Out: 275 [Urine:275]     Labs:   Recent Labs   Lab 12/09/24  0705 12/08/24  0057   ALBUMIN  --  3.3*   HGB 12.3* 12.9*   WBC 8.3 10.5     Pressure injury risk assessment:   Sensory Perception: 3-->slightly limited  Moisture: 3-->occasionally moist  Activity: 3-->walks occasionally  Mobility: 2-->very limited  Nutrition: 3-->adequate  Friction and Shear: 2-->potential problem  Russ Score: 16    Chidi Robin, JADEN CWOCN  Contact [facility] Nurse (Jessica)  Dept. Office Number: [phone]

## 2024-12-11 NOTE — PLAN OF CARE
Goal Outcome Evaluation:      Plan of Care Reviewed With: patient, family        Patient confused, yelling, Remeron and Zyprexa given, Sitter in room.      Problem: Adult Inpatient Plan of Care  Goal: Readiness for Transition of Care  Outcome: Not Progressing  Flowsheets (Taken 12/10/2024 2200)  Barriers to Discharge: Patient on RA, confused, agitated, Remeron and Zyprexa given.     Problem: UTI (Urinary Tract Infection)  Goal: Improved Infection Symptoms  Outcome: Progressing     Problem: Restraint, Nonviolent  Goal: Absence of Harm or Injury  Outcome: Progressing  Intervention: Protect Skin and Joint Integrity  Recent Flowsheet Documentation  Taken 12/10/2024 2100 by Anya Almanza RN  Body Position: position changed independently     Problem: Fall Injury Risk  Goal: Absence of Fall and Fall-Related Injury  Outcome: Progressing  Intervention: Identify and Manage Contributors  Recent Flowsheet Documentation  Taken 12/10/2024 2100 by Anya Almanza RN  Medication Review/Management:   medications reviewed   high-risk medications identified  Intervention: Promote Injury-Free Environment  Recent Flowsheet Documentation  Taken 12/10/2024 2100 by Anya Almanza RN  Safety Promotion/Fall Prevention:   activity supervised   bedside attendant   patient and family education     Problem: Adult Inpatient Plan of Care  Goal: Absence of Hospital-Acquired Illness or Injury  Intervention: Identify and Manage Fall Risk  Recent Flowsheet Documentation  Taken 12/10/2024 2100 by Anya Almanza RN  Safety Promotion/Fall Prevention:   activity supervised   bedside attendant   patient and family education  Intervention: Prevent Skin Injury  Recent Flowsheet Documentation  Taken 12/10/2024 2100 by Anya Almanza RN  Body Position: position changed independently     Problem: Restraint, Nonviolent  Goal: Absence of Harm or Injury  Intervention: Protect Skin and Joint Integrity  Recent Flowsheet Documentation  Taken  12/10/2024 2100 by Anya Almanza, RN  Body Position: position changed independently     Problem: Restraint, Nonviolent  Goal: Absence of Harm or Injury  Intervention: Protect Skin and Joint Integrity  Recent Flowsheet Documentation  Taken 12/10/2024 2100 by Anya Almanza, RN  Body Position: position changed independently

## 2024-12-11 NOTE — PLAN OF CARE
"Pt is alert to self. Pt is confused trying to get out of bed. Pt was given Haldol. Pt has 1:1 sitter at bedside. Pt is on Tele monitoring, SR. Pt tolerating pureed diet. Pt is incontinent of bowel and bladder. Pt is on 02 1L NC. Plan for Echocardiogram in the morning. Pt is assist of two in transfer with a lift. Pt is sleeping in bed. Bed alarm in place and call light within reach..           Goal Outcome Evaluation:      Plan of Care Reviewed With: patient    Overall Patient Progress: improvingOverall Patient Progress: improving    Outcome Evaluation: pt is confused trying to get out of bed. pt was given Hardol. pt has 1:1 sitter at bedside.      Problem: Adult Inpatient Plan of Care  Goal: Plan of Care Review  Description: The Plan of Care Review/Shift note should be completed every shift.  The Outcome Evaluation is a brief statement about your assessment that the patient is improving, declining, or no change.  This information will be displayed automatically on your shift  note.  Outcome: Progressing  Flowsheets (Taken 12/11/2024 0112)  Outcome Evaluation: pt is confused trying to get out of bed. pt was given Hardol. pt has 1:1 sitter at bedside.  Plan of Care Reviewed With: patient  Overall Patient Progress: improving  Goal: Patient-Specific Goal (Individualized)  Description: You can add care plan individualizations to a care plan. Examples of Individualization might be:  \"Parent requests to be called daily at 9am for status\", \"I have a hard time hearing out of my right ear\", or \"Do not touch me to wake me up as it startles  me\".  Outcome: Progressing  Goal: Absence of Hospital-Acquired Illness or Injury  Outcome: Progressing  Intervention: Identify and Manage Fall Risk  Recent Flowsheet Documentation  Taken 12/10/2024 4005 by Fede Guerra RN  Safety Promotion/Fall Prevention:   bedside attendant   assistive device/personal items within reach   activity supervised   nonskid shoes/slippers when out " of bed   mobility aid in reach   lighting adjusted   increase visualization of patient   patient and family education   safety round/check completed  Intervention: Prevent Skin Injury  Recent Flowsheet Documentation  Taken 12/10/2024 2343 by Fede Guerra RN  Body Position:   weight shifting   turned   right   left  Intervention: Prevent and Manage VTE (Venous Thromboembolism) Risk  Recent Flowsheet Documentation  Taken 12/10/2024 2343 by Fede Guerra RN  VTE Prevention/Management: SCDs off (sequential compression devices)  Intervention: Prevent Infection  Recent Flowsheet Documentation  Taken 12/10/2024 2343 by eFde Guerra RN  Infection Prevention:   single patient room provided   rest/sleep promoted   hand hygiene promoted  Goal: Optimal Comfort and Wellbeing  Outcome: Progressing  Goal: Readiness for Transition of Care  Outcome: Progressing     Problem: UTI (Urinary Tract Infection)  Goal: Improved Infection Symptoms  Outcome: Progressing     Problem: Restraint, Nonviolent  Goal: Absence of Harm or Injury  Outcome: Progressing  Intervention: Implement Least Restrictive Safety Strategies  Recent Flowsheet Documentation  Taken 12/10/2024 2343 by Fede Guerra RN  Diversional Activities: television  Intervention: Protect Skin and Joint Integrity  Recent Flowsheet Documentation  Taken 12/10/2024 2343 by Fede Guerra RN  Body Position:   weight shifting   turned   right   left     Problem: Fall Injury Risk  Goal: Absence of Fall and Fall-Related Injury  Outcome: Progressing  Intervention: Identify and Manage Contributors  Recent Flowsheet Documentation  Taken 12/10/2024 2343 by Fede Guerra RN  Medication Review/Management:   medications reviewed   high-risk medications identified  Intervention: Promote Injury-Free Environment  Recent Flowsheet Documentation  Taken 12/10/2024 2343 by Fede Guerra RN  Safety Promotion/Fall  Prevention:   bedside attendant   assistive device/personal items within reach   activity supervised   nonskid shoes/slippers when out of bed   mobility aid in reach   lighting adjusted   increase visualization of patient   patient and family education   safety round/check completed     Problem: Comorbidity Management  Goal: Maintenance of Asthma Control  Outcome: Progressing  Intervention: Maintain Asthma Symptom Control  Recent Flowsheet Documentation  Taken 12/10/2024 2343 by Fede Guerra RN  Medication Review/Management:   medications reviewed   high-risk medications identified  Goal: Maintenance of Behavioral Health Symptom Control  Outcome: Progressing  Intervention: Maintain Behavioral Health Symptom Control  Recent Flowsheet Documentation  Taken 12/10/2024 2343 by Fede Guerra RN  Medication Review/Management:   medications reviewed   high-risk medications identified  Goal: Maintenance of COPD Symptom Control  Outcome: Progressing  Intervention: Maintain COPD Symptom Control  Recent Flowsheet Documentation  Taken 12/10/2024 2343 by Fede Guerra RN  Medication Review/Management:   medications reviewed   high-risk medications identified  Goal: Blood Glucose Levels Within Targeted Range  Outcome: Progressing  Intervention: Monitor and Manage Glycemia  Recent Flowsheet Documentation  Taken 12/10/2024 2343 by Fede Guerra RN  Medication Review/Management:   medications reviewed   high-risk medications identified  Goal: Maintenance of Heart Failure Symptom Control  Outcome: Progressing  Intervention: Maintain Heart Failure Management  Recent Flowsheet Documentation  Taken 12/10/2024 2343 by Fede Guerra RN  Medication Review/Management:   medications reviewed   high-risk medications identified  Goal: Blood Pressure in Desired Range  Outcome: Progressing  Intervention: Maintain Blood Pressure Management  Recent Flowsheet Documentation  Taken  12/10/2024 2343 by Fede Guerra RN  Medication Review/Management:   medications reviewed   high-risk medications identified  Goal: Maintenance of Osteoarthritis Symptom Control  Outcome: Progressing  Intervention: Maintain Osteoarthritis Symptom Control  Recent Flowsheet Documentation  Taken 12/10/2024 2343 by Fede Guerra RN  Assistive Device Utilized: lift device  Activity Management:   activity adjusted per tolerance   activity encouraged  Medication Review/Management:   medications reviewed   high-risk medications identified  Goal: Bariatric Home Regimen Maintained  Outcome: Progressing  Intervention: Maintain and Manage Postbariatric Surgery Care  Recent Flowsheet Documentation  Taken 12/10/2024 2343 by Fede Guerra RN  Medication Review/Management:   medications reviewed   high-risk medications identified  Goal: Maintenance of Seizure Control  Outcome: Progressing  Intervention: Maintain Seizure Symptom Control  Recent Flowsheet Documentation  Taken 12/10/2024 2343 by Fede Guerra RN  Medication Review/Management:   medications reviewed   high-risk medications identified     Problem: Pain Acute  Goal: Optimal Pain Control and Function  Outcome: Progressing  Intervention: Optimize Psychosocial Wellbeing  Recent Flowsheet Documentation  Taken 12/10/2024 2343 by Fede Guerra RN  Diversional Activities: television  Intervention: Prevent or Manage Pain  Recent Flowsheet Documentation  Taken 12/10/2024 2343 by Fede Guerra RN  Medication Review/Management:   medications reviewed   high-risk medications identified     Problem: Adult Inpatient Plan of Care  Goal: Plan of Care Review  Description: The Plan of Care Review/Shift note should be completed every shift.  The Outcome Evaluation is a brief statement about your assessment that the patient is improving, declining, or no change.  This information will be displayed automatically on your  shift  note.  Outcome: Progressing  Flowsheets (Taken 12/11/2024 0119)  Outcome Evaluation: pt is confused trying to get out of bed. pt was given Hardol. pt has 1:1 sitter at bedside.  Plan of Care Reviewed With: patient  Overall Patient Progress: improving  Goal: Absence of Hospital-Acquired Illness or Injury  Intervention: Identify and Manage Fall Risk  Recent Flowsheet Documentation  Taken 12/10/2024 2343 by Fede Guerra RN  Safety Promotion/Fall Prevention:   bedside attendant   assistive device/personal items within reach   activity supervised   nonskid shoes/slippers when out of bed   mobility aid in reach   lighting adjusted   increase visualization of patient   patient and family education   safety round/check completed  Intervention: Prevent Skin Injury  Recent Flowsheet Documentation  Taken 12/10/2024 2343 by Fede Guerra RN  Body Position:   weight shifting   turned   right   left  Intervention: Prevent and Manage VTE (Venous Thromboembolism) Risk  Recent Flowsheet Documentation  Taken 12/10/2024 2343 by Fede Guerra RN  VTE Prevention/Management: SCDs off (sequential compression devices)  Intervention: Prevent Infection  Recent Flowsheet Documentation  Taken 12/10/2024 2343 by Fede Guerra RN  Infection Prevention:   single patient room provided   rest/sleep promoted   hand hygiene promoted     Problem: Restraint, Nonviolent  Goal: Absence of Harm or Injury  Intervention: Implement Least Restrictive Safety Strategies  Recent Flowsheet Documentation  Taken 12/10/2024 2343 by Fede Guerra RN  Diversional Activities: television  Intervention: Protect Skin and Joint Integrity  Recent Flowsheet Documentation  Taken 12/10/2024 2343 by Fede Guerra RN  Body Position:   weight shifting   turned   right   left     Problem: Restraint, Nonviolent  Goal: Absence of Harm or Injury  Intervention: Implement Least Restrictive Safety  Strategies  Recent Flowsheet Documentation  Taken 12/10/2024 2343 by Fede Guerra RN  Diversional Activities: television  Intervention: Protect Skin and Joint Integrity  Recent Flowsheet Documentation  Taken 12/10/2024 2343 by Fede Guerra RN  Body Position:   weight shifting   turned   right   left     Problem: Fall Injury Risk  Goal: Absence of Fall and Fall-Related Injury  Intervention: Identify and Manage Contributors  Recent Flowsheet Documentation  Taken 12/10/2024 2343 by Fede Guerra RN  Medication Review/Management:   medications reviewed   high-risk medications identified  Intervention: Promote Injury-Free Environment  Recent Flowsheet Documentation  Taken 12/10/2024 2343 by Fede Guerra RN  Safety Promotion/Fall Prevention:   bedside attendant   assistive device/personal items within reach   activity supervised   nonskid shoes/slippers when out of bed   mobility aid in reach   lighting adjusted   increase visualization of patient   patient and family education   safety round/check completed     Problem: Comorbidity Management  Goal: Maintenance of Asthma Control  Intervention: Maintain Asthma Symptom Control  Recent Flowsheet Documentation  Taken 12/10/2024 2343 by Fede Guerra RN  Medication Review/Management:   medications reviewed   high-risk medications identified  Goal: Maintenance of Behavioral Health Symptom Control  Intervention: Maintain Behavioral Health Symptom Control  Recent Flowsheet Documentation  Taken 12/10/2024 2343 by Fede Guerra RN  Medication Review/Management:   medications reviewed   high-risk medications identified  Goal: Maintenance of COPD Symptom Control  Intervention: Maintain COPD Symptom Control  Recent Flowsheet Documentation  Taken 12/10/2024 2343 by Fede Guerra RN  Medication Review/Management:   medications reviewed   high-risk medications identified  Goal: Blood Glucose Levels  Within Targeted Range  Intervention: Monitor and Manage Glycemia  Recent Flowsheet Documentation  Taken 12/10/2024 2343 by Fede Guerra RN  Medication Review/Management:   medications reviewed   high-risk medications identified  Goal: Maintenance of Heart Failure Symptom Control  Intervention: Maintain Heart Failure Management  Recent Flowsheet Documentation  Taken 12/10/2024 2343 by Fede Guerra RN  Medication Review/Management:   medications reviewed   high-risk medications identified  Goal: Blood Pressure in Desired Range  Intervention: Maintain Blood Pressure Management  Recent Flowsheet Documentation  Taken 12/10/2024 2343 by Fede Guerra RN  Medication Review/Management:   medications reviewed   high-risk medications identified  Goal: Maintenance of Osteoarthritis Symptom Control  Intervention: Maintain Osteoarthritis Symptom Control  Recent Flowsheet Documentation  Taken 12/10/2024 2343 by Fede Guerra RN  Assistive Device Utilized: lift device  Activity Management:   activity adjusted per tolerance   activity encouraged  Medication Review/Management:   medications reviewed   high-risk medications identified  Goal: Bariatric Home Regimen Maintained  Intervention: Maintain and Manage Postbariatric Surgery Care  Recent Flowsheet Documentation  Taken 12/10/2024 2343 by Fede Guerra RN  Medication Review/Management:   medications reviewed   high-risk medications identified  Goal: Maintenance of Seizure Control  Intervention: Maintain Seizure Symptom Control  Recent Flowsheet Documentation  Taken 12/10/2024 2343 by Fede Guerra RN  Medication Review/Management:   medications reviewed   high-risk medications identified     Problem: Pain Acute  Goal: Optimal Pain Control and Function  Intervention: Optimize Psychosocial Wellbeing  Recent Flowsheet Documentation  Taken 12/10/2024 2343 by Fede Guerra RN  Diversional Activities:  television  Intervention: Prevent or Manage Pain  Recent Flowsheet Documentation  Taken 12/10/2024 2343 by Fede Guerra, RN  Medication Review/Management:   medications reviewed   high-risk medications identified

## 2024-12-12 VITALS
TEMPERATURE: 98.2 F | SYSTOLIC BLOOD PRESSURE: 157 MMHG | BODY MASS INDEX: 20.61 KG/M2 | OXYGEN SATURATION: 99 % | WEIGHT: 164.9 LBS | DIASTOLIC BLOOD PRESSURE: 85 MMHG | RESPIRATION RATE: 20 BRPM | HEART RATE: 77 BPM

## 2024-12-12 LAB
GLUCOSE BLDC GLUCOMTR-MCNC: 118 MG/DL (ref 70–99)
GLUCOSE BLDC GLUCOMTR-MCNC: 120 MG/DL (ref 70–99)
GLUCOSE BLDC GLUCOMTR-MCNC: 144 MG/DL (ref 70–99)
GLUCOSE BLDC GLUCOMTR-MCNC: 153 MG/DL (ref 70–99)
GLUCOSE BLDC GLUCOMTR-MCNC: 198 MG/DL (ref 70–99)

## 2024-12-12 PROCEDURE — 250N000013 HC RX MED GY IP 250 OP 250 PS 637: Performed by: STUDENT IN AN ORGANIZED HEALTH CARE EDUCATION/TRAINING PROGRAM

## 2024-12-12 PROCEDURE — 99239 HOSP IP/OBS DSCHRG MGMT >30: CPT | Performed by: STUDENT IN AN ORGANIZED HEALTH CARE EDUCATION/TRAINING PROGRAM

## 2024-12-12 PROCEDURE — 250N000013 HC RX MED GY IP 250 OP 250 PS 637: Performed by: PSYCHIATRY & NEUROLOGY

## 2024-12-12 PROCEDURE — 250N000013 HC RX MED GY IP 250 OP 250 PS 637: Performed by: INTERNAL MEDICINE

## 2024-12-12 PROCEDURE — 250N000011 HC RX IP 250 OP 636: Performed by: STUDENT IN AN ORGANIZED HEALTH CARE EDUCATION/TRAINING PROGRAM

## 2024-12-12 PROCEDURE — 120N000001 HC R&B MED SURG/OB

## 2024-12-12 PROCEDURE — 99232 SBSQ HOSP IP/OBS MODERATE 35: CPT | Mod: FS | Performed by: PHYSICIAN ASSISTANT

## 2024-12-12 RX ORDER — RISPERIDONE 0.5 MG/1
0.5 TABLET, ORALLY DISINTEGRATING ORAL DAILY
Qty: 30 TABLET | Refills: 1 | Status: SHIPPED | OUTPATIENT
Start: 2024-12-13 | End: 2024-12-13

## 2024-12-12 RX ORDER — HALOPERIDOL 5 MG/ML
5 INJECTION INTRAMUSCULAR EVERY 8 HOURS PRN
Status: DISCONTINUED | OUTPATIENT
Start: 2024-12-12 | End: 2024-12-13 | Stop reason: HOSPADM

## 2024-12-12 RX ORDER — RISPERIDONE 1 MG/1
1 TABLET, ORALLY DISINTEGRATING ORAL AT BEDTIME
Qty: 30 TABLET | Refills: 1 | Status: SHIPPED | OUTPATIENT
Start: 2024-12-12 | End: 2024-12-13

## 2024-12-12 RX ORDER — LOSARTAN POTASSIUM 25 MG/1
25 TABLET ORAL DAILY
Status: DISCONTINUED | OUTPATIENT
Start: 2024-12-12 | End: 2024-12-13 | Stop reason: HOSPADM

## 2024-12-12 RX ORDER — MEMANTINE HYDROCHLORIDE 5 MG/1
TABLET ORAL
Qty: 180 TABLET | Refills: 1 | Status: SHIPPED | OUTPATIENT
Start: 2024-12-13 | End: 2024-12-12

## 2024-12-12 RX ORDER — MEMANTINE HYDROCHLORIDE 5 MG/1
TABLET ORAL
Qty: 180 TABLET | Refills: 1 | Status: SHIPPED | OUTPATIENT
Start: 2024-12-12

## 2024-12-12 RX ORDER — FUROSEMIDE 40 MG/1
40 TABLET ORAL DAILY
Qty: 30 TABLET | Refills: 0 | Status: SHIPPED | OUTPATIENT
Start: 2024-12-13

## 2024-12-12 RX ORDER — LOSARTAN POTASSIUM 25 MG/1
25 TABLET ORAL DAILY
Qty: 30 TABLET | Refills: 0 | Status: SHIPPED | OUTPATIENT
Start: 2024-12-13

## 2024-12-12 RX ORDER — FUROSEMIDE 40 MG/1
40 TABLET ORAL DAILY
Status: DISCONTINUED | OUTPATIENT
Start: 2024-12-12 | End: 2024-12-13 | Stop reason: HOSPADM

## 2024-12-12 RX ORDER — GLIPIZIDE 5 MG/1
5 TABLET, FILM COATED, EXTENDED RELEASE ORAL
Qty: 30 TABLET | Refills: 0 | Status: SHIPPED | OUTPATIENT
Start: 2024-12-12

## 2024-12-12 RX ADMIN — MICONAZOLE NITRATE ANTIFUNGAL POWDER: 2 POWDER TOPICAL at 11:21

## 2024-12-12 RX ADMIN — OLANZAPINE 5 MG: 5 TABLET, ORALLY DISINTEGRATING ORAL at 22:28

## 2024-12-12 RX ADMIN — ASPIRIN 325 MG: 325 TABLET, COATED ORAL at 08:47

## 2024-12-12 RX ADMIN — MEMANTINE 5 MG: 5 TABLET ORAL at 08:48

## 2024-12-12 RX ADMIN — Medication 1 MG: at 23:58

## 2024-12-12 RX ADMIN — TAMSULOSIN HYDROCHLORIDE 0.4 MG: 0.4 CAPSULE ORAL at 08:47

## 2024-12-12 RX ADMIN — OLANZAPINE 5 MG: 5 TABLET, ORALLY DISINTEGRATING ORAL at 15:02

## 2024-12-12 RX ADMIN — ACETAMINOPHEN 650 MG: 325 TABLET, FILM COATED ORAL at 23:58

## 2024-12-12 RX ADMIN — HALOPERIDOL LACTATE 5 MG: 5 INJECTION, SOLUTION INTRAMUSCULAR at 16:10

## 2024-12-12 RX ADMIN — RISPERIDONE 1 MG: 0.5 TABLET, ORALLY DISINTEGRATING ORAL at 21:13

## 2024-12-12 RX ADMIN — MICONAZOLE NITRATE ANTIFUNGAL POWDER: 2 POWDER TOPICAL at 21:14

## 2024-12-12 RX ADMIN — RISPERIDONE 0.5 MG: 0.5 TABLET, ORALLY DISINTEGRATING ORAL at 08:48

## 2024-12-12 RX ADMIN — CARVEDILOL 25 MG: 25 TABLET, FILM COATED ORAL at 08:48

## 2024-12-12 RX ADMIN — ATORVASTATIN CALCIUM 40 MG: 40 TABLET, FILM COATED ORAL at 21:13

## 2024-12-12 RX ADMIN — LOSARTAN POTASSIUM 25 MG: 25 TABLET, FILM COATED ORAL at 13:02

## 2024-12-12 RX ADMIN — FINASTERIDE 5 MG: 5 TABLET, FILM COATED ORAL at 08:47

## 2024-12-12 RX ADMIN — FUROSEMIDE 40 MG: 40 TABLET ORAL at 13:02

## 2024-12-12 RX ADMIN — MIRTAZAPINE 15 MG: 15 TABLET, FILM COATED ORAL at 23:58

## 2024-12-12 RX ADMIN — CARVEDILOL 25 MG: 25 TABLET, FILM COATED ORAL at 18:07

## 2024-12-12 ASSESSMENT — ACTIVITIES OF DAILY LIVING (ADL)
ADLS_ACUITY_SCORE: 86
ADLS_ACUITY_SCORE: 89
ADLS_ACUITY_SCORE: 86
DEPENDENT_IADLS:: CLEANING;COOKING;LAUNDRY;SHOPPING;MEAL PREPARATION;MEDICATION MANAGEMENT;MONEY MANAGEMENT;TRANSPORTATION
ADLS_ACUITY_SCORE: 88
ADLS_ACUITY_SCORE: 86
ADLS_ACUITY_SCORE: 88
ADLS_ACUITY_SCORE: 86
ADLS_ACUITY_SCORE: 89
ADLS_ACUITY_SCORE: 86
ADLS_ACUITY_SCORE: 88
ADLS_ACUITY_SCORE: 88
ADLS_ACUITY_SCORE: 84
ADLS_ACUITY_SCORE: 86
ADLS_ACUITY_SCORE: 86

## 2024-12-12 NOTE — PLAN OF CARE
"Pt is alert to self. Pt is confused trying to get out of bed. Pt has 1:1 sitter at bedside. Pt is on Tele monitoring, SR. Pt tolerating pureed diet. Pt is incontinent of bowel and bladder. Pt is on 02 1L NC. Pt was calm most of shift. No behavior noted during the shift. Pt is assist of two in transfer with a lift. Pt is sleeping in bed. Bed alarm in place and call light within reach..           Goal Outcome Evaluation:      Plan of Care Reviewed With: patient    Overall Patient Progress: improvingOverall Patient Progress: improving    Outcome Evaluation: pt is confused trying to get out of bed. pt reorinted. pt has 1:1 sitter at bedside.      Problem: Adult Inpatient Plan of Care  Goal: Plan of Care Review  Description: The Plan of Care Review/Shift note should be completed every shift.  The Outcome Evaluation is a brief statement about your assessment that the patient is improving, declining, or no change.  This information will be displayed automatically on your shift  note.  Outcome: Progressing  Flowsheets (Taken 12/12/2024 0300)  Outcome Evaluation: pt is confused trying to get out of bed. pt reorinted. pt has 1:1 sitter at bedside.  Plan of Care Reviewed With: patient  Overall Patient Progress: improving  Goal: Patient-Specific Goal (Individualized)  Description: You can add care plan individualizations to a care plan. Examples of Individualization might be:  \"Parent requests to be called daily at 9am for status\", \"I have a hard time hearing out of my right ear\", or \"Do not touch me to wake me up as it startles  me\".  Outcome: Progressing  Goal: Absence of Hospital-Acquired Illness or Injury  Outcome: Progressing  Intervention: Identify and Manage Fall Risk  Recent Flowsheet Documentation  Taken 12/11/2024 2042 by Fede Guerra RN  Safety Promotion/Fall Prevention:   bedside attendant   assistive device/personal items within reach   activity supervised   clutter free environment maintained   " increased rounding and observation   increase visualization of patient   nonskid shoes/slippers when out of bed   patient and family education   safety round/check completed  Intervention: Prevent Skin Injury  Recent Flowsheet Documentation  Taken 12/11/2024 2042 by Fede Guerra RN  Body Position: position changed independently  Intervention: Prevent and Manage VTE (Venous Thromboembolism) Risk  Recent Flowsheet Documentation  Taken 12/11/2024 2042 by Fede Guerra RN  VTE Prevention/Management: SCDs off (sequential compression devices)  Intervention: Prevent Infection  Recent Flowsheet Documentation  Taken 12/11/2024 2042 by Fede Guerra RN  Infection Prevention:   rest/sleep promoted   hand hygiene promoted   single patient room provided  Goal: Optimal Comfort and Wellbeing  Outcome: Progressing  Goal: Readiness for Transition of Care  Outcome: Progressing     Problem: UTI (Urinary Tract Infection)  Goal: Improved Infection Symptoms  Outcome: Progressing     Problem: Fall Injury Risk  Goal: Absence of Fall and Fall-Related Injury  Outcome: Progressing  Intervention: Identify and Manage Contributors  Recent Flowsheet Documentation  Taken 12/11/2024 2042 by Fede Guerra RN  Medication Review/Management:   medications reviewed   high-risk medications identified  Intervention: Promote Injury-Free Environment  Recent Flowsheet Documentation  Taken 12/11/2024 2042 by Fede Guerra RN  Safety Promotion/Fall Prevention:   bedside attendant   assistive device/personal items within reach   activity supervised   clutter free environment maintained   increased rounding and observation   increase visualization of patient   nonskid shoes/slippers when out of bed   patient and family education   safety round/check completed     Problem: Comorbidity Management  Goal: Maintenance of Behavioral Health Symptom Control  Outcome: Progressing  Intervention: Maintain  Behavioral Health Symptom Control  Recent Flowsheet Documentation  Taken 12/11/2024 2042 by Fede Guerra RN  Medication Review/Management:   medications reviewed   high-risk medications identified  Goal: Blood Glucose Levels Within Targeted Range  Outcome: Progressing  Intervention: Monitor and Manage Glycemia  Recent Flowsheet Documentation  Taken 12/11/2024 2042 by Fede Guerra RN  Medication Review/Management:   medications reviewed   high-risk medications identified  Goal: Blood Pressure in Desired Range  Outcome: Progressing  Intervention: Maintain Blood Pressure Management  Recent Flowsheet Documentation  Taken 12/11/2024 2042 by Fede Guerra RN  Medication Review/Management:   medications reviewed   high-risk medications identified     Problem: Pain Acute  Goal: Optimal Pain Control and Function  Outcome: Progressing  Intervention: Optimize Psychosocial Wellbeing  Recent Flowsheet Documentation  Taken 12/11/2024 2042 by Fede Guerra RN  Diversional Activities: television  Intervention: Prevent or Manage Pain  Recent Flowsheet Documentation  Taken 12/11/2024 2042 by Fede Guerra RN  Medication Review/Management:   medications reviewed   high-risk medications identified     Problem: Adult Inpatient Plan of Care  Goal: Plan of Care Review  Description: The Plan of Care Review/Shift note should be completed every shift.  The Outcome Evaluation is a brief statement about your assessment that the patient is improving, declining, or no change.  This information will be displayed automatically on your shift  note.  Outcome: Progressing  Flowsheets (Taken 12/12/2024 0300)  Outcome Evaluation: pt is confused trying to get out of bed. pt reorinted. pt has 1:1 sitter at bedside.  Plan of Care Reviewed With: patient  Overall Patient Progress: improving  Goal: Absence of Hospital-Acquired Illness or Injury  Intervention: Identify and Manage Fall Risk  Recent  Flowsheet Documentation  Taken 12/11/2024 2042 by Fede Guerra RN  Safety Promotion/Fall Prevention:   bedside attendant   assistive device/personal items within reach   activity supervised   clutter free environment maintained   increased rounding and observation   increase visualization of patient   nonskid shoes/slippers when out of bed   patient and family education   safety round/check completed  Intervention: Prevent Skin Injury  Recent Flowsheet Documentation  Taken 12/11/2024 2042 by Fede Guerra RN  Body Position: position changed independently  Intervention: Prevent and Manage VTE (Venous Thromboembolism) Risk  Recent Flowsheet Documentation  Taken 12/11/2024 2042 by Fede Guerra RN  VTE Prevention/Management: SCDs off (sequential compression devices)  Intervention: Prevent Infection  Recent Flowsheet Documentation  Taken 12/11/2024 2042 by Fede Guerra RN  Infection Prevention:   rest/sleep promoted   hand hygiene promoted   single patient room provided     Problem: Restraint, Nonviolent  Goal: Absence of Harm or Injury  Intervention: Implement Least Restrictive Safety Strategies  Recent Flowsheet Documentation  Taken 12/11/2024 2042 by Fede Guerra RN  Diversional Activities: television  Intervention: Protect Skin and Joint Integrity  Recent Flowsheet Documentation  Taken 12/11/2024 2042 by Fede Guerra RN  Body Position: position changed independently     Problem: Restraint, Nonviolent  Goal: Absence of Harm or Injury  Intervention: Implement Least Restrictive Safety Strategies  Recent Flowsheet Documentation  Taken 12/11/2024 2042 by Fede Guerra RN  Diversional Activities: television  Intervention: Protect Skin and Joint Integrity  Recent Flowsheet Documentation  Taken 12/11/2024 2042 by Fede Guerra RN  Body Position: position changed independently     Problem: Fall Injury Risk  Goal: Absence of  Fall and Fall-Related Injury  Intervention: Identify and Manage Contributors  Recent Flowsheet Documentation  Taken 12/11/2024 2042 by Fede Guerra RN  Medication Review/Management:   medications reviewed   high-risk medications identified  Intervention: Promote Injury-Free Environment  Recent Flowsheet Documentation  Taken 12/11/2024 2042 by Fede Guerra RN  Safety Promotion/Fall Prevention:   bedside attendant   assistive device/personal items within reach   activity supervised   clutter free environment maintained   increased rounding and observation   increase visualization of patient   nonskid shoes/slippers when out of bed   patient and family education   safety round/check completed     Problem: Comorbidity Management  Goal: Maintenance of Behavioral Health Symptom Control  Intervention: Maintain Behavioral Health Symptom Control  Recent Flowsheet Documentation  Taken 12/11/2024 2042 by Fede Guerra RN  Medication Review/Management:   medications reviewed   high-risk medications identified  Goal: Blood Glucose Levels Within Targeted Range  Intervention: Monitor and Manage Glycemia  Recent Flowsheet Documentation  Taken 12/11/2024 2042 by Fede Guerra RN  Medication Review/Management:   medications reviewed   high-risk medications identified  Goal: Blood Pressure in Desired Range  Intervention: Maintain Blood Pressure Management  Recent Flowsheet Documentation  Taken 12/11/2024 2042 by Fede Guerra RN  Medication Review/Management:   medications reviewed   high-risk medications identified     Problem: Pain Acute  Goal: Optimal Pain Control and Function  Intervention: Optimize Psychosocial Wellbeing  Recent Flowsheet Documentation  Taken 12/11/2024 2042 by Fede Guerra RN  Diversional Activities: television  Intervention: Prevent or Manage Pain  Recent Flowsheet Documentation  Taken 12/11/2024 2042 by Fede Guerra  RN  Medication Review/Management:   medications reviewed   high-risk medications identified

## 2024-12-12 NOTE — PLAN OF CARE
"VSS, PAINAD negative, A2 SeraSteady if cooperative, pureed diet mildly thick liquids no straws, feeds himself well may need encouragement, meds in applesause/pudding ACHS BG.  Alert to self only, [place] speaking speech garbled/difficult to understand per  and baseline per son, RA but requested 1L O2 sats stable on both, tele discontinued, incontinent of bowel and bladder external cath in place.   Woundcare on anus done, R PIV leaking unable to give IV haldol so OTD zyprexa given, agitation and attempts to get out of bed frequently uncooperative with staff and combative at times, IM haldol ordered.   Plan to discharge 11a tomorrow morning      Goal Outcome Evaluation:    Plan of Care Reviewed With: child  Overall Patient Progress: decliningOverall Patient Progress: declining  Outcome Evaluation: pt became combative around 15:00 uncooperative with staff refusing  safty measures    Problem: Adult Inpatient Plan of Care  Goal: Plan of Care Review  Description: The Plan of Care Review/Shift note should be completed every shift.  The Outcome Evaluation is a brief statement about your assessment that the patient is improving, declining, or no change.  This information will be displayed automatically on your shift  note.  Outcome: Not Progressing  Flowsheets (Taken 12/12/2024 2173)  Outcome Evaluation: pt became combative around 15:00 uncooperative with staff refusing  safty measures  Plan of Care Reviewed With: child  Overall Patient Progress: declining  Goal: Patient-Specific Goal (Individualized)  Description: You can add care plan individualizations to a care plan. Examples of Individualization might be:  \"Parent requests to be called daily at 9am for status\", \"I have a hard time hearing out of my right ear\", or \"Do not touch me to wake me up as it startles  me\".  Outcome: Not Progressing  Goal: Absence of Hospital-Acquired Illness or Injury  Outcome: Not Progressing  Intervention: Identify and Manage Fall " Risk  Recent Flowsheet Documentation  Taken 12/12/2024 0900 by Diana David, RN  Safety Promotion/Fall Prevention:   safety round/check completed   supervised activity  Goal: Optimal Comfort and Wellbeing  Outcome: Not Progressing  Goal: Readiness for Transition of Care  Outcome: Not Progressing     Problem: UTI (Urinary Tract Infection)  Goal: Improved Infection Symptoms  Outcome: Not Progressing     Problem: Fall Injury Risk  Goal: Absence of Fall and Fall-Related Injury  Outcome: Not Progressing  Intervention: Promote Injury-Free Environment  Recent Flowsheet Documentation  Taken 12/12/2024 0900 by Diana David RN  Safety Promotion/Fall Prevention:   safety round/check completed   supervised activity     Problem: Comorbidity Management  Goal: Maintenance of Behavioral Health Symptom Control  Outcome: Not Progressing  Goal: Blood Glucose Levels Within Targeted Range  Outcome: Not Progressing  Goal: Blood Pressure in Desired Range  Outcome: Not Progressing     Problem: Pain Acute  Goal: Optimal Pain Control and Function  Outcome: Not Progressing

## 2024-12-12 NOTE — DISCHARGE SUMMARY
[facility]  Hospitalist Discharge Summary      Date of Admission:  12/8/2024  Date of Discharge:  12/12/2024  Discharging Provider: Frakn Ren MD  Discharge Service: Hospitalist Service    Discharge Diagnoses     NSTEMI.  Dementia with behavioral disturbances  Questionable UTI.  BPH.  Diabetes mellitus type 2  Essential hypertension.  Hyperlipidemia.  Chronic mild oropharyngeal dysphagia.  Decubitus ulcer.      Clinically Significant Risk Factors     # DMII: A1C = 12.6 % (Ref range: <5.7 %) within past 6 months       Follow-ups Needed After Discharge   Follow-up Appointments       Follow-up and recommended labs and tests       Follow up with primary care provider, Oralia Forrest, within 7 days for hospital follow- up.  The following labs/tests are recommended: BMP.            Discharge Disposition   Discharged to home  Condition at discharge: Stable    Hospital Course   89-year-old male with history of dementia, CAD, diabetes, hypertension, cirrhosis with hepatic lesion and BPH who presented to ED on 12/8 with chest pain.  Initial troponin was 28 and went up to 289.  EKG did not show changes of acute ischemia.      NSTEMI.  Echo showed normal EF with aortic sclerosis..  Not a candidate for coronary angiogram due to severe dementia.  Heparin drip given 12/8 to 12/10.  Continue aspirin and not planning to start dual antiplatelet therapy.  Has history of CAD status post mid circumflex stenting in 2015.    Concern for UTI.  History of BPH.  UA on presentation showed pyuria and large leukocyte esterase and empirically given 3 days of IV ceftriaxone.  Urine cultures showed 10-50 K CFU of urogenital madhu.    Chronic medical conditions  Dementia with behavioral disturbance: Aggressive behavior on 12/9.  Lives at home with assistance from wife and family and needs help with most ADLs.  He has a PCA.  Psychiatry consult appreciated and advised to continue Zyprexa, titrate up Namenda and also started on  scheduled Risperdal.  Continue Remeron.  Diabetes mellitus type 2 with hyperglycemia.:  Presented with blood glucose of 485.  HbA1c a month ago was 12.6 and at home he is on glipizide 5 mg every morning which will be continued as his blood glucose control at the hospital was acceptable with just sliding scale.  He will follow-up with his primary doctor within a week for any further adjustments.  BPH: Continue Flomax and finasteride.  Decubitus ulcer:   Chronic mild oropharyngeal dysphagia: Continues.  Diet with thin liquids as per speech evaluation.  Essential hypertension: Pressures are controlled on carvedilol.  Hyperlipidemia: On atorvastatin.    Family communication.  Discussed with son Adolfo who will try to arrange pickup today..    Disposition  Planning discharging home on 12/12.    Consultations This Hospital Stay   PHARMACY IP CONSULT  CARDIOLOGY IP CONSULT  CARE MANAGEMENT / SOCIAL WORK IP CONSULT  SPEECH LANGUAGE PATH ADULT IP CONSULT  PSYCHIATRY IP CONSULT  WOUND OSTOMY CONTINENCE NURSE  IP CONSULT    Code Status   No CPR- Do NOT Intubate    Time Spent on this Encounter   I, Frank Ren MD, personally saw the patient today and spent greater than 30 minutes discharging this patient.       Frank Ren MD  [place] [facility] [id number] [facility]  [address] [place] [address]  Phone: [phone]  Fax: [phone]  ______________________________________________________________________    Physical Exam   Vital Signs: Temp: 97  F (36.1  C) Temp src: Axillary BP: 137/68 Pulse: 67   Resp: 20 SpO2: 97 % O2 Device: None (Room air) Oxygen Delivery: 1 LPM  Weight: 164 lbs 14.47 oz  General Appearance: Appears comfortable, sitter at bedside.  Respiratory: Clear to auscultation  Cardiovascular: S1-S2 normal  GI: Soft and nontender  Skin: No rash  Other: No edema         Primary Care Physician   Oralia Forrest    Discharge Orders      Reason for your hospital stay    Non-ST segment elevation  myocardial infarction.     Follow-up and recommended labs and tests     Follow up with primary care provider, Oralia Forrest, within 7 days for hospital follow- up.  The following labs/tests are recommended: BMP.     Activity    Your activity upon discharge: activity as tolerated     Resume Home Care Services     Diet    Follow this diet upon discharge: Current Diet:Orders Placed This Encounter      Combination Diet Pureed Diet (level 4); Mildly Thick (level 2)       Significant Results and Procedures   Most Recent 3 CBC's:  Recent Labs   Lab Test 12/09/24  0705 12/08/24  0057 11/14/24  0638   WBC 8.3 10.5 7.7   HGB 12.3* 12.9* 12.9*   MCV 90 88 89    288 216     Most Recent 3 BMP's:  Recent Labs   Lab Test 12/12/24  1302 12/12/24  0801 12/12/24  0214 12/11/24  0907 12/10/24  0537 12/09/24  0705 12/08/24  0057   NA  --   --   --   --  138 139 136   POTASSIUM  --   --   --   --  3.6 4.2 4.6   CHLORIDE  --   --   --   --  100 107 103   CO2  --   --   --   --  24 23 19*   BUN  --   --   --   --  27.3* 25.2* 28.7*   CR  --   --   --   --  0.75 0.75 0.68   ANIONGAP  --   --   --   --  14 9 14   LUZ  --   --   --   --  8.5* 8.3* 8.4*   * 120* 118*   < > 220* 115* 183*    < > = values in this interval not displayed.   ,   Results for orders placed or performed during the hospital encounter of 12/08/24   CT Head w/o Contrast    Narrative    EXAM: CT HEAD W/O CONTRAST  LOCATION: [facility]  DATE: 12/8/2024    INDICATION: AMS  COMPARISON: November 7, 2024   TECHNIQUE: Routine CT Head without IV contrast. Multiplanar reformats. Dose reduction techniques were used.    FINDINGS:  INTRACRANIAL CONTENTS: No intracranial hemorrhage, extraaxial collection, or mass effect.  No CT evidence of acute infarct. Extensive bifrontal and bilateral anterior temporal lobe encephalomalacia in keeping with a prior traumatic insult. Remote   bilateral basal ganglia lacunar infarcts.  Moderate generalized volume  loss. No hydrocephalus.     VISUALIZED ORBITS/SINUSES/MASTOIDS: No intraorbital abnormality. No paranasal sinus mucosal disease. No middle ear or mastoid effusion.    BONES/SOFT TISSUES: No acute abnormality.      Impression    IMPRESSION:  1.  No acute intracranial hemorrhage or mass effect.  2.  Chronic intracranial findings as above.    XR Chest 2 Views    Narrative    EXAM: XR CHEST 2 VIEWS  LOCATION: [facility]  DATE: 12/8/2024    INDICATION: chest pain  COMPARISON: 11/09/2024      Impression    IMPRESSION: Stable cardiomediastinal silhouette. Pulmonary vascular congestion with bilateral infiltrates. Trace pleural effusions. Coronary stent.   CT Chest Pulmonary Embolism w Contrast    Narrative    EXAM: CT CHEST PULMONARY EMBOLISM WITH CONTRAST  LOCATION: [facility]  DATE: 12/08/2024    INDICATION: Chest pain and persistent tachycardia.  COMPARISON: Chest CT from 09/17/2023. CT abdomen and pelvis from 09/22/2021.  TECHNIQUE: CT chest pulmonary angiogram during arterial phase injection of IV contrast. Multiplanar reformats and MIP reconstructions were performed. Dose reduction techniques were used.   CONTRAST: 72 mL Isovue 370.    FINDINGS:    The patient was unable to place their arms above their head.  This results in streak artifact and reduces image quality.     ANGIOGRAM CHEST: No pulmonary embolism. Normal heart size without pericardial effusion. Three-vessel coronary artery disease. Thoracic aortic atherosclerosis without aneurysm or evidence of dissection.    LUNGS AND PLEURA: Although degraded by breathing motion, there is a moderate degree of interstitial pulmonary edema (smooth interlobular septal thickening and peribronchial cuffing) with scattered dependent atelectasis. No pneumonia. Moderate right and   small left simple density pleural effusions.    MEDIASTINUM/AXILLAE: Several mildly enlarged hilar or mediastinal lymph nodes, not unexpected in the  setting of pulmonary edema.    UPPER ABDOMEN: Cirrhosis in the upper abdomen, manifest with nodular hepatic contour, similar to prior imaging in . No splenomegaly or significant upper abdominal ascites. Simple and hyperdense benign upper pole renal cysts are unchanged since at   least 2021. No follow-up needed.    MUSCULOSKELETAL: Although degraded by breathing motion, there are likely several old bilateral rib fractures. Benign sclerotic lesion in the superior endplate T10 is unchanged from . Superior endplate T2 fracture is age-indeterminate, but new from   2023. Chronic appearing compression fracture of L1.      Impression    IMPRESSION:  *  No pulmonary embolism.  *  Moderate degree of pulmonary edema and small to moderate-sized bilateral pleural effusions.  *  Cirrhosis.  *  Age-indeterminate superior endplate T2 fracture, new from 2023 with no interval relevant studies since that time. Correlate with upper back/low neck pain and consider dedicated thoracic spine imaging as clinically warranted.       Echocardiogram Complete     Value    LVEF  60-65%    Narrative    [id number]  [id number]  [id number]  [id number]^SUNNY^CONNOR^FABIOLA     [facility]  Echocardiography Laboratory  [address] [place] [address]     Name: CHANI ELENA  MRN: [id number]  : 1935  Study Date: 2024 02:29 PM  Age: 89 yrs  Gender: Male  Patient Location: [facility]  Reason For Study: Tachycardia, Dyspnea  Ordering Physician: CONNOR CORREA  Referring Physician: Oralia Forrest  Performed By: Sunita Valerio     BSA: 2.0 m2  Height: 75 in  Weight: 164 lb  HR: 72  BP: 120/51 mmHg  ______________________________________________________________________________  Procedure  Complete Portable Echo Adult. Optison (NDC #3243-3654) given intravenously.  Technically difficult study.Extremely difficult acoustic windows despite the  use of contrast for endcardial border  definition.  ______________________________________________________________________________  Interpretation Summary     There is severe trileaflet aortic sclerosis.  The aortic valve is not well visualized. Doppler study does not suggest  significant stenosis.  The visual ejection fraction is 60-65%.  The left atrium is mild to moderately dilated.  The study was technically difficult. Contrast was used without apparent  complications. Compared to prior study, there is no significant change.  ______________________________________________________________________________  Left Ventricle  The left ventricle is normal in size. There is normal left ventricular wall  thickness. A sigmoid septum is present. The visual ejection fraction is 60-  65%. Grade II or moderate diastolic dysfunction.     Right Ventricle  The right ventricle is normal in structure, function and size.     Atria  The left atrium is mild to moderately dilated. Right atrial size is normal.     Mitral Valve  There is moderate to severe mitral annular calcification. The mitral valve  leaflets appear thickened, but open well.     Tricuspid Valve  The tricuspid valve is not well visualized, but is grossly normal.     Aortic Valve  There is severe trileaflet aortic sclerosis. The aortic valve is not well  visualized.     Pulmonic Valve  The pulmonic valve is not well visualized.     Vessels  The aortic root is normal size. Normal size ascending aorta.     Pericardium  There is no pericardial effusion.     Rhythm  Sinus rhythm was noted.  ______________________________________________________________________________  MMode/2D Measurements & Calculations  IVSd: 1.1 cm     LVIDd: 5.5 cm  LVIDs: 3.8 cm  LVPWd: 0.98 cm  IVC diam: 2.7 cm  FS: 31.3 %  LV mass(C)d: 218.4 grams  LV mass(C)dI: 108.3 grams/m2  Ao root diam: 3.9 cm  asc Aorta Diam: 3.4 cm  LVOT diam: 2.1 cm  LVOT area: 3.4 cm2  Ao root diam index Ht(cm/m): 2.0  Ao root diam index BSA (cm/m2): 1.9  Asc  Ao diam index BSA (cm/m2): 1.7  Asc Ao diam index Ht(cm/m): 1.8  LA Volume (BP): 80.5 ml     LA Volume Index (BP): 39.9 ml/m2  RV Base: 3.2 cm  RWT: 0.36  TAPSE: 2.1 cm     Doppler Measurements & Calculations  MV E max rian: 118.0 cm/sec  MV A max rian: 112.7 cm/sec  MV E/A: 1.0  MV max P.5 mmHg  MV mean P.0 mmHg  MV V2 VTI: 44.0 cm  MVA(VTI): 1.4 cm2  MV dec slope: 500.5 cm/sec2  MV dec time: 0.24 sec  Ao V2 max: 148.9 cm/sec  Ao max P.0 mmHg  Ao V2 mean: 109.6 cm/sec  Ao mean P.2 mmHg  Ao V2 VTI: 35.6 cm  MELISSA(I,D): 1.8 cm2  MELISSA(V,D): 1.9 cm2  LV V1 max P.7 mmHg  LV V1 max: 81.8 cm/sec  LV V1 VTI: 18.2 cm  SV(LVOT): 62.6 ml  SI(LVOT): 31.1 ml/m2  PA V2 max: 74.3 cm/sec  PA max P.2 mmHg  PA acc time: 0.14 sec  AV Rian Ratio (DI): 0.55  MELISSA Index (cm2/m2): 0.87     E/E' av.0  Lateral E/e': 14.5  Medial E/e': 17.5  RV S Rian: 13.4 cm/sec     ______________________________________________________________________________  Report approved by: Kai Elliott MD on 2024 03:55 PM             Discharge Medications   Current Discharge Medication List        START taking these medications    Details   furosemide (LASIX) 40 MG tablet Take 1 tablet (40 mg) by mouth daily.  Qty: 30 tablet, Refills: 0    Associated Diagnoses: NSTEMI (non-ST elevated myocardial infarction) (H)      losartan (COZAAR) 25 MG tablet Take 1 tablet (25 mg) by mouth daily.  Qty: 30 tablet, Refills: 0    Associated Diagnoses: NSTEMI (non-ST elevated myocardial infarction) (H)      memantine (NAMENDA) 5 MG tablet Use 5 mg orally nightly for 1 week followed by 5 mg p.o. twice daily in the second week, followed by 5 mg in the morning and 30 and 10 mg in the evening followed by 10 mg p.o. twice daily thereafter.  Qty: 180 tablet, Refills: 1    Associated Diagnoses: Alzheimer dementia with behavioral disturbance (H)      !! risperiDONE (RISPERDAL M-TABS) 0.5 MG ODT Take 1 tablet (0.5 mg) by mouth daily.  Qty: 30 tablet, Refills: 1     Associated Diagnoses: Alzheimer dementia with behavioral disturbance (H)      !! risperiDONE (RISPERDAL M-TABS) 1 MG ODT Take 1 tablet (1 mg) by mouth at bedtime.  Qty: 30 tablet, Refills: 1    Associated Diagnoses: Alzheimer dementia with behavioral disturbance (H)       !! - Potential duplicate medications found. Please discuss with provider.        CONTINUE these medications which have NOT CHANGED    Details   acetaminophen (TYLENOL) 325 MG tablet Take 650 mg by mouth every 8 hours as needed for mild pain      aspirin (ASA) 325 MG EC tablet Take 325 mg by mouth daily.      atorvastatin (LIPITOR) 40 MG tablet Take 1 tablet (40 mg) by mouth every evening  Qty: 30 tablet, Refills: 0    Associated Diagnoses: History of CVA (cerebrovascular accident)      carvedilol (COREG) 25 MG tablet Take 1 tablet (25 mg) by mouth 2 times daily (with meals)  Qty: 180 tablet, Refills: 3    Associated Diagnoses: Essential hypertension      finasteride (PROSCAR) 5 MG tablet Take 5 mg by mouth daily      Melatonin 10 MG TABS tablet Take 10 mg by mouth nightly as needed for sleep      mirtazapine (REMERON) 15 MG tablet Take 15 mg by mouth nightly as needed (agitation).      OLANZapine zydis (ZYPREXA) 5 MG ODT Take 1 tablet (5 mg) by mouth nightly as needed for agitation.  Qty: 20 tablet, Refills: 0    Associated Diagnoses: Late 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 behavioral disturbance (H); Agitation      tamsulosin (FLOMAX) 0.4 MG capsule Take 1 capsule (0.4 mg) by mouth daily.  Qty: 30 capsule, Refills: 0    Associated Diagnoses: Urinary tract infection without hematuria, site unspecified           STOP taking these medications       glipiZIDE (GLUCOTROL) 5 MG tablet Comments:   Reason for Stopping:             Allergies   Allergies   Allergen Reactions    Lisinopril     Morphine Other (See Comments)     confusion

## 2024-12-12 NOTE — PLAN OF CARE
"VSS, PAINAD negative (APAP given per son request), A2 serasteady/lift, pureed mildly thick diet ACHS BG.  Alert to self only per son's translations, 1L NC, tele SR slight ST depression, incontinent of bowel and bladder external cath in place. Haldol IV PRN given x1 for echo, risperidal started.  Echo complete (see results), psych WOC and cardio consulted (see notes), plan to discharge as soon as tomorrow      Goal Outcome Evaluation:    Plan of Care Reviewed With: patient, child  Overall Patient Progress: improvingOverall Patient Progress: improving  Outcome Evaluation: pt mostly cooperative all day, echo cmplete with son present and psych meds on board, sitter at bedside    Problem: Adult Inpatient Plan of Care  Goal: Plan of Care Review  Description: The Plan of Care Review/Shift note should be completed every shift.  The Outcome Evaluation is a brief statement about your assessment that the patient is improving, declining, or no change.  This information will be displayed automatically on your shift  note.  Outcome: Progressing  Flowsheets (Taken 12/11/2024 1902)  Outcome Evaluation: pt mostly cooperative all day, echo cmplete with son present and psych meds on board, sitter at bedside  Plan of Care Reviewed With:   patient   child  Overall Patient Progress: improving  Goal: Patient-Specific Goal (Individualized)  Description: You can add care plan individualizations to a care plan. Examples of Individualization might be:  \"Parent requests to be called daily at 9am for status\", \"I have a hard time hearing out of my right ear\", or \"Do not touch me to wake me up as it startles  me\".  Outcome: Progressing  Goal: Absence of Hospital-Acquired Illness or Injury  Outcome: Progressing  Intervention: Identify and Manage Fall Risk  Recent Flowsheet Documentation  Taken 12/11/2024 9852 by Diana David, RN  Safety Promotion/Fall Prevention:   bedside attendant   safety round/check completed  Goal: Optimal Comfort and " Wellbeing  Outcome: Progressing  Goal: Readiness for Transition of Care  Outcome: Progressing  Intervention: Mutually Develop Transition Plan  Recent Flowsheet Documentation  Taken 12/11/2024 1500 by Diana David, RN  Equipment Currently Used at Home:   walker, standard   grab bar, tub/shower   tub bench     Problem: UTI (Urinary Tract Infection)  Goal: Improved Infection Symptoms  Outcome: Progressing     Problem: Fall Injury Risk  Goal: Absence of Fall and Fall-Related Injury  Outcome: Progressing  Intervention: Promote Injury-Free Environment  Recent Flowsheet Documentation  Taken 12/11/2024 0939 by Diana David, RN  Safety Promotion/Fall Prevention:   bedside attendant   safety round/check completed     Problem: Comorbidity Management  Goal: Maintenance of Behavioral Health Symptom Control  Outcome: Progressing  Goal: Blood Glucose Levels Within Targeted Range  Outcome: Progressing  Goal: Blood Pressure in Desired Range  Outcome: Progressing     Problem: Pain Acute  Goal: Optimal Pain Control and Function  Outcome: Progressing      occluded with wax

## 2024-12-12 NOTE — CONSULTS
Care Management Initial Consult    General Information  Assessment completed with: Children, Vera  Type of CM/SW Visit: Initial Assessment    Primary Care Provider verified and updated as needed: Yes   Readmission within the last 30 days: no previous admission in last 30 days      Reason for Consult: discharge planning, care coordination/care conference  Advance Care Planning:            Communication Assessment  Patient's communication style: spoken language (non-English)    Hearing Difficulty or Deaf: yes   Wear Glasses or Blind: no    Cognitive  Cognitive/Neuro/Behavioral: .WDL except  Level of Consciousness: confused  Arousal Level: opens eyes spontaneously  Orientation: disoriented to, place, time, situation  Mood/Behavior: calm  Best Language: 0 - No aphasia  Speech: garbled    Living Environment:   People in home: spouse     Current living Arrangements: apartment      Able to return to prior arrangements: yes       Family/Social Support:  Care provided by: child(isabella), spouse/significant other, self  Provides care for: no one, unable/limited ability to care for self  Marital Status:   Support system: Wife, Children          Description of Support System: Involved, Supportive    Support Assessment: Adequate social supports    Current Resources:   Patient receiving home care services: Yes  Skilled Home Care Services: Skilled Nursing     Community Resources: PCA  Equipment currently used at home: walker, standard, grab bar, tub/shower, grab bar, toilet, tub bench  Supplies currently used at home: None           Does the patient's insurance plan have a 3 day qualifying hospital stay waiver?  No    Lifestyle & Psychosocial Needs:  Social Drivers of Health     Food Insecurity: Unknown (12/11/2024)    Food Insecurity     Within the past 12 months, did you worry that your food would run out before you got money to buy more?: Patient unable to answer     Within the past 12 months, did the food you bought just  not last and you didn t have money to get more?: Patient unable to answer   Depression: Not on file   Housing Stability: Unknown (12/11/2024)    Housing Stability     Do you have housing? : Patient unable to answer     Are you worried about losing your housing?: Patient unable to answer   Tobacco Use: Low Risk  (11/7/2024)    Patient History     Smoking Tobacco Use: Never     Smokeless Tobacco Use: Never     Passive Exposure: Not on file   Financial Resource Strain: Unknown (12/11/2024)    Financial Resource Strain     Within the past 12 months, have you or your family members you live with been unable to get utilities (heat, electricity) when it was really needed?: Patient unable to answer   Alcohol Use: Not on file   Transportation Needs: Unknown (12/11/2024)    Transportation Needs     Within the past 12 months, has lack of transportation kept you from medical appointments, getting your medicines, non-medical meetings or appointments, work, or from getting things that you need?: Patient unable to answer   Physical Activity: Not on file   Interpersonal Safety: Unknown (12/11/2024)    Interpersonal Safety     Do you feel physically and emotionally safe where you currently live?: Patient unable to answer     Within the past 12 months, have you been hit, slapped, kicked or otherwise physically hurt by someone?: Patient unable to answer     Within the past 12 months, have you been humiliated or emotionally abused in other ways by your partner or ex-partner?: Patient unable to answer   Recent Concern: Interpersonal Safety - High Risk (11/8/2024)    Interpersonal Safety     Do you feel physically and emotionally safe where you currently live?: No     Within the past 12 months, have you been hit, slapped, kicked or otherwise physically hurt by someone?: No     Within the past 12 months, have you been humiliated or emotionally abused in other ways by your partner or ex-partner?: No   Stress: Not on file   Social  Connections: Not on file   Health Literacy: Not on file       Functional Status:  Prior to admission patient needed assistance:   Dependent ADLs:: Bathing, Dressing, Ambulation-walker, Wheelchair-independent  Dependent IADLs:: Cleaning, Cooking, Laundry, Shopping, Meal Preparation, Medication Management, Money Management, Transportation  Assesssment of Functional Status: At functional baseline              Discussed  Partnership in Safe Discharge Planning  document with patient/family: No    Additional Information:  Patient was admitted for UTI and NSTEMI. He is followed by Cardiology and the Hospitalist.  Patient lives in an apartment with his spouse and 23.75 of PCA services through [employer] which his children do the PCA services. He receives assistance with all cares. He ambulates with a walker or w/c. His wife sets up his medications.    Next Steps: monitor for discharge and send orders to HC agency.    Anita Macias, RN, BSN, CM  Inpatient Care Coordination  [facility]  [phone]

## 2024-12-12 NOTE — PROGRESS NOTES
[facility]  Cardiology Progress Note  Date of Service: 12/12/2024  Primary Cardiologist: will be Dr. Paulino Valerio     Assessment & Plan    Adam Huber is a 89 year old male admitted on 12/8/2024.    Assessment:  NSTEMI, with troponin up to 289. Echo shows EF preserved at 60-65%.   Chronic urinary obstruction now with acute urinary tract infection, on abx.   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 Conservative management recommended.   Medications   -would not recommend DAPT given above      Cardiology will sign off.       Magdiel Reynoso PA-C            Interval History   Confused, sitter at bedside    No obvious chest pain or SOB      Physical Exam   Temp: 97  F (36.1  C) Temp src: Axillary BP: (!) 159/85 Pulse: 76   Resp: 18 SpO2: 96 % O2 Device: Nasal cannula Oxygen Delivery: 1 LPM  Vitals:    12/08/24 1932 12/10/24 0055 12/10/24 0642   Weight: 82.7 kg (182 lb 5.1 oz) 85.8 kg (189 lb 2.5 oz) 74.8 kg (164 lb 14.5 oz)       GEN: in no acute distress.  HEENT:  Pupils equal, round. Sclerae nonicteric.   NECK: Supple, no masses appreciated.no JVD  C/V:  Regular rate and rhythm, no murmur  RESP: Respirations are unlabored. Clear to auscultation bilaterally without wheezing, rales, or rhonchi.  GI: Abdomen soft, nontender.  EXTREM: no LE edema.  NEURO: Alert  SKIN: Warm and dry.     Medications   Current Facility-Administered Medications   Medication Dose Route Frequency Provider Last Rate Last Admin     Current Facility-Administered Medications   Medication Dose Route Frequency Provider Last Rate Last Admin    aspirin (ASA) EC tablet 325 mg  325 mg Oral Daily Pal Mack MD   325 mg at 12/11/24 0857    atorvastatin (LIPITOR) tablet 40 mg  40 mg Oral QPM Pal Mack MD   40 mg at 12/11/24 2122     carvedilol (COREG) tablet 25 mg  25 mg Oral BID w/meals Pal Mack MD   25 mg at 12/11/24 1820    finasteride (PROSCAR) tablet 5 mg  5 mg Oral Daily Pal Mack MD   5 mg at 12/11/24 0857    insulin aspart (NovoLOG) injection (RAPID ACTING)  1-7 Units Subcutaneous TID AC Frank Ren MD        insulin aspart (NovoLOG) injection (RAPID ACTING)  1-5 Units Subcutaneous At Bedtime Frakn Ren MD        memantine (NAMENDA) tablet 5 mg  5 mg Oral Daily Nicolás Torres MD        Followed by    [START ON 12/19/2024] memantine (NAMENDA) tablet 5 mg  5 mg Oral BID Nicolás Torres MD        miconazole (MICATIN) 2 % powder   Topical BID Pal Mack MD   Given at 12/11/24 2132    risperiDONE (risperDAL M-TABS) ODT tab 0.5 mg  0.5 mg Oral Daily Nicolás Torres MD   0.5 mg at 12/11/24 1315    risperiDONE (risperDAL M-TABS) ODT tab 1 mg  1 mg Oral At Bedtime Nicolás Torres MD   1 mg at 12/11/24 2122    tamsulosin (FLOMAX) capsule 0.4 mg  0.4 mg Oral Daily Pal Mack MD   0.4 mg at 12/11/24 0857       Data   Last 24 hours labs reviewed     Tele: SR. Short run PSVT, rare PVCs      Echo 2/6/2024  Normal biventricular size and systolic function.  IVC is poorly visualized but appears collapsible.  No pericardial effusion.  Visually the aortic valve appears significantly calcified. Not well-seen in  short axis. Mean gradient is about 9 to 10 mmHg consistent with mild aortic  stenosis.  There is probably moderate to severe mitral annular calcification. Likely mild  MS. Prior study reported mild mitral stenosis in September 2023.

## 2024-12-12 NOTE — PROGRESS NOTES
Care Management Follow Up    Length of Stay (days): 4    Expected Discharge Date: 12/12/2024     Concerns to be Addressed: compliance issue, coping/stress, decision making, cognitive/perceptual     Patient plan of care discussed at interdisciplinary rounds: Yes    Anticipated Discharge Disposition:  ?home   Patient/Family in Agreement with the Plan: yes     Discussed  Partnership in Safe Discharge Planning  document with patient/family: No     Handoff Completed: No, handoff not indicated or clinically appropriate    Additional Information:  CM left messages for sons Adolfo and Mario to return call to complete the assessment.    Anita Macias, RN, BSN, CM  Inpatient Care Coordination  [facility]  [phone]      Brittanie Macias RN

## 2024-12-13 VITALS
SYSTOLIC BLOOD PRESSURE: 150 MMHG | RESPIRATION RATE: 20 BRPM | DIASTOLIC BLOOD PRESSURE: 70 MMHG | TEMPERATURE: 97 F | OXYGEN SATURATION: 97 % | BODY MASS INDEX: 21.47 KG/M2 | HEART RATE: 67 BPM | WEIGHT: 171.74 LBS

## 2024-12-13 LAB
GLUCOSE BLDC GLUCOMTR-MCNC: 113 MG/DL (ref 70–99)
GLUCOSE BLDC GLUCOMTR-MCNC: 143 MG/DL (ref 70–99)

## 2024-12-13 PROCEDURE — 250N000013 HC RX MED GY IP 250 OP 250 PS 637: Performed by: STUDENT IN AN ORGANIZED HEALTH CARE EDUCATION/TRAINING PROGRAM

## 2024-12-13 PROCEDURE — 250N000013 HC RX MED GY IP 250 OP 250 PS 637: Performed by: PSYCHIATRY & NEUROLOGY

## 2024-12-13 PROCEDURE — 250N000013 HC RX MED GY IP 250 OP 250 PS 637: Performed by: INTERNAL MEDICINE

## 2024-12-13 RX ORDER — RISPERIDONE 1 MG/1
1 TABLET ORAL AT BEDTIME
Qty: 30 TABLET | Refills: 1 | Status: SHIPPED | OUTPATIENT
Start: 2024-12-13

## 2024-12-13 RX ORDER — RISPERIDONE 0.5 MG/1
0.5 TABLET ORAL DAILY
Qty: 30 TABLET | Refills: 1 | Status: SHIPPED | OUTPATIENT
Start: 2024-12-13

## 2024-12-13 RX ADMIN — LOSARTAN POTASSIUM 25 MG: 25 TABLET, FILM COATED ORAL at 08:20

## 2024-12-13 RX ADMIN — ASPIRIN 325 MG: 325 TABLET, COATED ORAL at 08:20

## 2024-12-13 RX ADMIN — MEMANTINE 5 MG: 5 TABLET ORAL at 08:20

## 2024-12-13 RX ADMIN — ACETAMINOPHEN 650 MG: 325 TABLET, FILM COATED ORAL at 08:21

## 2024-12-13 RX ADMIN — CARVEDILOL 25 MG: 25 TABLET, FILM COATED ORAL at 08:20

## 2024-12-13 RX ADMIN — TAMSULOSIN HYDROCHLORIDE 0.4 MG: 0.4 CAPSULE ORAL at 08:20

## 2024-12-13 RX ADMIN — FUROSEMIDE 40 MG: 40 TABLET ORAL at 08:20

## 2024-12-13 RX ADMIN — FINASTERIDE 5 MG: 5 TABLET, FILM COATED ORAL at 08:20

## 2024-12-13 RX ADMIN — RISPERIDONE 0.5 MG: 0.5 TABLET, ORALLY DISINTEGRATING ORAL at 08:22

## 2024-12-13 RX ADMIN — MICONAZOLE NITRATE ANTIFUNGAL POWDER: 2 POWDER TOPICAL at 08:21

## 2024-12-13 ASSESSMENT — ACTIVITIES OF DAILY LIVING (ADL)
ADLS_ACUITY_SCORE: 91
ADLS_ACUITY_SCORE: 89
ADLS_ACUITY_SCORE: 91
ADLS_ACUITY_SCORE: 91
ADLS_ACUITY_SCORE: 84
ADLS_ACUITY_SCORE: 84
ADLS_ACUITY_SCORE: 89

## 2024-12-13 NOTE — PROGRESS NOTES
Pt discharged at 1130 with son  Writer went over discharge summary with pt. All concerns addressed. Pt verbalized understanding of discharge summary. Pt discharge with all belongings.

## 2024-12-13 NOTE — PROGRESS NOTES
Patient was seen before discharge this morning.  Patient to could not be picked up by family yesterday and ended up staying overnight.  No change in clinical condition.  See discharge summary from 12/12/2024 for details.    Frank Ren

## 2024-12-13 NOTE — PLAN OF CARE
"Goal Outcome Evaluation:  Care from [id number]    Inpatient Progress Note:  For complete assessment see flow sheet documentation.    BP (!) 157/85 (BP Location: Left arm)   Pulse 77   Temp 98.2  F (36.8  C) (Axillary)   Resp 20   Wt 74.8 kg (164 lb 14.5 oz)   SpO2 99%   BMI 20.61 kg/m     Oriented to self. VSS. No indicators of pain noted.  On $ point soft restraints due to behaviors. Trying to pull on lines and attempting to get oob tonight. Zyprexa administered x 1. On Puree diet with mildly thick fluids. On 0.5 L 02 NC for comfort. BG's ACHS. Possible discharge home tomorrow. Sitter @ bedside. Ongoing plan of care.      Plan of Care Reviewed With: patient    Problem: Adult Inpatient Plan of Care  Goal: Plan of Care Review  Description: The Plan of Care Review/Shift note should be completed every shift.  The Outcome Evaluation is a brief statement about your assessment that the patient is improving, declining, or no change.  This information will be displayed automatically on your shift  note.  Outcome: Progressing  Flowsheets (Taken 12/12/2024 2308)  Plan of Care Reviewed With: patient  Overall Patient Progress: no change  Goal: Patient-Specific Goal (Individualized)  Description: You can add care plan individualizations to a care plan. Examples of Individualization might be:  \"Parent requests to be called daily at 9am for status\", \"I have a hard time hearing out of my right ear\", or \"Do not touch me to wake me up as it startles  me\".  Outcome: Progressing  Goal: Absence of Hospital-Acquired Illness or Injury  Outcome: Progressing  Goal: Optimal Comfort and Wellbeing  Outcome: Progressing  Goal: Readiness for Transition of Care  Outcome: Progressing  Flowsheets (Taken 12/12/2024 2308)  Anticipated Changes Related to Illness: inability to care for self  Concerns to be Addressed:   care coordination/care conferences   discharge planning  Intervention: Mutually Develop Transition Plan  Recent Flowsheet " Documentation  Taken 12/12/2024 2301 by Josefa Narvaez RN  Anticipated Changes Related to Illness: inability to care for self  Concerns to be Addressed:   care coordination/care conferences   discharge planning     Problem: UTI (Urinary Tract Infection)  Goal: Improved Infection Symptoms  Outcome: Progressing     Problem: Fall Injury Risk  Goal: Absence of Fall and Fall-Related Injury  Outcome: Progressing     Problem: Comorbidity Management  Goal: Maintenance of Behavioral Health Symptom Control  Outcome: Progressing  Goal: Blood Glucose Levels Within Targeted Range  Outcome: Progressing  Goal: Blood Pressure in Desired Range  Outcome: Progressing     Problem: Pain Acute  Goal: Optimal Pain Control and Function  Outcome: Progressing     Problem: Restraint for Non-Violent/Non-Self-Destructive Behavior  Goal: Prevent/Manage Potential Problems  Description: Maintain safety of patient and others during period of restraint.  Promote psychological and physical wellbeing.  Prevent injury to skin and involved body parts.  Promote nutrition, hydration, and elimination.  Outcome: Progressing       Overall Patient Progress: no changeOverall Patient Progress: no change

## 2024-12-13 NOTE — PLAN OF CARE
"CARE FROM [id number]    BP (!) 148/72 (BP Location: Left arm)   Pulse 67   Temp 97.6  F (36.4  C) (Axillary)   Resp 18   Wt 77.9 kg (171 lb 11.8 oz)   SpO2 97%   BMI 21.47 kg/m      Goal Outcome Evaluation:      Plan of Care Reviewed With: patient    Overall Patient Progress: no changeOverall Patient Progress: no change    Outcome Evaluation: Continues to have intermittent episodes of restlessness/attempting to get oob, and mild line pulling. 4pt soft restraints continued at this time. Repos done regularly, fluids offered, and breaks from restraints given as well. External cath. Moldovan speaking-- can be inconsistent w/ communicating w/ . Recieved PRN tyl for headache. PRN remeron and melatonin also given at bedtime. Plan to discharge back home today with family.    Problem: Adult Inpatient Plan of Care  Goal: Plan of Care Review  Description: The Plan of Care Review/Shift note should be completed every shift.  The Outcome Evaluation is a brief statement about your assessment that the patient is improving, declining, or no change.  This information will be displayed automatically on your shift  note.  Outcome: Progressing  Flowsheets (Taken 12/13/2024 0442)  Outcome Evaluation: Continues to have intermittent episodes of restlessness/attempting to get oob, and mild pulling at lines. 4pt soft restraints continued at this time. Repos done throughout the night, fluids offered, and breaks from restraints given. External cath. Moldovan speaking-- can be inconsistent w/ communicating w/ , but able to get some communication done. Recieved PRN tyl for headache. PRN remeron and melatonin also given at bedtime. Plan to discharge back home today with family.  Plan of Care Reviewed With: patient  Overall Patient Progress: no change  Goal: Patient-Specific Goal (Individualized)  Description: You can add care plan individualizations to a care plan. Examples of Individualization might be:  \"Parent requests " "to be called daily at 9am for status\", \"I have a hard time hearing out of my right ear\", or \"Do not touch me to wake me up as it startles  me\".  Outcome: Progressing  Goal: Absence of Hospital-Acquired Illness or Injury  Outcome: Progressing  Intervention: Identify and Manage Fall Risk  Recent Flowsheet Documentation  Taken 12/13/2024 0000 by Graciela Ma, RN  Safety Promotion/Fall Prevention:   bedside attendant   clutter free environment maintained   nonskid shoes/slippers when out of bed   increased rounding and observation   safety round/check completed  Intervention: Prevent Infection  Recent Flowsheet Documentation  Taken 12/13/2024 0000 by Graciela Ma, RN  Infection Prevention:   rest/sleep promoted   hand hygiene promoted   single patient room provided  Goal: Optimal Comfort and Wellbeing  Outcome: Progressing  Goal: Readiness for Transition of Care  Outcome: Progressing     "

## 2024-12-13 NOTE — PROGRESS NOTES
Care Management Discharge Note    Discharge Date: 12/13/2024       Discharge Disposition: Home, Home Care, Other (Comments) (PCA)    Discharge Services: PCA    Discharge DME:  None    Discharge Transportation: family or friend will provide - Pt's [place]    Private pay costs discussed: Not applicable    Does the patient's insurance plan have a 3 day qualifying hospital stay waiver?  No    PAS Confirmation Code:  N/A  Patient/family educated on Medicare website which has current facility and service quality ratings:  N/A    Education Provided on the Discharge Plan:  yes  Persons Notified of Discharge Plans: Pt's son Adolfo and [employer].  Patient/Family in Agreement with the Plan: yes    Handoff Referral Completed: No, handoff not indicated or clinically appropriate    Additional Information:  The pt will discharge home today with [employer]. HC/PCA services.  The pt has 23.75 hours/day PCA.    Sw updated [employer] via phone P: [phone].    The pt's son Adolfo will provide transport at 1100 today.    Sw will continue to be available as needed until discharge.    KOFI Dias, [facility]  Inpatient Care Coordination  [facility]  [phone]

## 2024-12-13 NOTE — PLAN OF CARE
"[id number]    Patient agitated and combative, kicking and biting, put in 4pt restraints + 1:1, given IM haldol. Ax2 + sliding scale, Pills crushed in applesauce, Pureed and mildly thick, Tele discontinued, incontinent of B&B external cath in place. Tunisian speaking, plans to discharge home with family at 11am tomorrow     Goal Outcome Evaluation:      Plan of Care Reviewed With: patient    Overall Patient Progress: decliningOverall Patient Progress: declining    Outcome Evaluation: combative. 4pt restraints applied, IM haldol, d/c tomorrow at 11      Problem: Adult Inpatient Plan of Care  Goal: Plan of Care Review  Description: The Plan of Care Review/Shift note should be completed every shift.  The Outcome Evaluation is a brief statement about your assessment that the patient is improving, declining, or no change.  This information will be displayed automatically on your shift  note.  Outcome: Progressing  Flowsheets (Taken 12/12/2024 1817)  Outcome Evaluation: combative. 4pt restraints applied, IM haldol, d/c tomorrow at 11  Plan of Care Reviewed With: patient  Overall Patient Progress: declining  Goal: Patient-Specific Goal (Individualized)  Description: You can add care plan individualizations to a care plan. Examples of Individualization might be:  \"Parent requests to be called daily at 9am for status\", \"I have a hard time hearing out of my right ear\", or \"Do not touch me to wake me up as it startles  me\".  Outcome: Progressing  Goal: Absence of Hospital-Acquired Illness or Injury  Outcome: Progressing  Intervention: Prevent Skin Injury  Recent Flowsheet Documentation  Taken 12/12/2024 1807 by Diane Briscoe, RN  Body Position: position changed independently  Taken 12/12/2024 1630 by Diane Briscoe, RN  Body Position: position changed independently  Goal: Optimal Comfort and Wellbeing  Outcome: Progressing  Goal: Readiness for Transition of Care  Outcome: Progressing     Problem: UTI (Urinary Tract " Infection)  Goal: Improved Infection Symptoms  Outcome: Progressing     Problem: Fall Injury Risk  Goal: Absence of Fall and Fall-Related Injury  Outcome: Progressing     Problem: Comorbidity Management  Goal: Maintenance of Behavioral Health Symptom Control  Outcome: Progressing  Goal: Blood Glucose Levels Within Targeted Range  Outcome: Progressing  Goal: Blood Pressure in Desired Range  Outcome: Progressing     Problem: Pain Acute  Goal: Optimal Pain Control and Function  Outcome: Progressing

## 2025-01-01 ENCOUNTER — APPOINTMENT (OUTPATIENT)
Dept: CT IMAGING | Facility: CLINIC | Age: OVER 89
DRG: 871 | End: 2025-01-01
Attending: HOSPITALIST
Payer: COMMERCIAL

## 2025-01-01 ENCOUNTER — APPOINTMENT (OUTPATIENT)
Dept: CT IMAGING | Facility: CLINIC | Age: OVER 89
DRG: 871 | End: 2025-01-01
Attending: EMERGENCY MEDICINE
Payer: COMMERCIAL

## 2025-01-01 ENCOUNTER — APPOINTMENT (OUTPATIENT)
Dept: GENERAL RADIOLOGY | Facility: CLINIC | Age: OVER 89
DRG: 871 | End: 2025-01-01
Attending: EMERGENCY MEDICINE
Payer: COMMERCIAL

## 2025-01-01 PROCEDURE — 74176 CT ABD & PELVIS W/O CONTRAST: CPT

## 2025-01-01 PROCEDURE — 70450 CT HEAD/BRAIN W/O DYE: CPT

## 2025-01-01 PROCEDURE — 71045 X-RAY EXAM CHEST 1 VIEW: CPT

## 2025-01-23 ENCOUNTER — HOSPITAL ENCOUNTER (INPATIENT)
Facility: CLINIC | Age: OVER 89
DRG: 871 | End: 2025-01-23
Attending: EMERGENCY MEDICINE | Admitting: HOSPITALIST
Payer: COMMERCIAL

## 2025-01-23 VITALS
DIASTOLIC BLOOD PRESSURE: 49 MMHG | TEMPERATURE: 96.6 F | BODY MASS INDEX: 17.99 KG/M2 | SYSTOLIC BLOOD PRESSURE: 94 MMHG | WEIGHT: 143.96 LBS | HEART RATE: 75 BPM | OXYGEN SATURATION: 98 % | RESPIRATION RATE: 19 BRPM

## 2025-01-23 DIAGNOSIS — R73.9 HYPERGLYCEMIA: ICD-10-CM

## 2025-01-23 DIAGNOSIS — J18.9 HCAP (HEALTHCARE-ASSOCIATED PNEUMONIA): ICD-10-CM

## 2025-01-23 DIAGNOSIS — E87.0 HYPERNATREMIA: ICD-10-CM

## 2025-01-23 DIAGNOSIS — R41.82 ALTERED MENTAL STATUS, UNSPECIFIED ALTERED MENTAL STATUS TYPE: ICD-10-CM

## 2025-01-23 LAB
ALBUMIN SERPL BCG-MCNC: 2.1 G/DL (ref 3.5–5.2)
ALBUMIN SERPL BCG-MCNC: 3 G/DL (ref 3.5–5.2)
ALP SERPL-CCNC: 457 U/L (ref 40–150)
ALP SERPL-CCNC: 547 U/L (ref 40–150)
ALT SERPL W P-5'-P-CCNC: 110 U/L (ref 0–70)
ALT SERPL W P-5'-P-CCNC: 123 U/L (ref 0–70)
ANION GAP BLD CALCULATED.3IONS-SCNC: 17 MMOL/L (ref 3–14)
ANION GAP SERPL CALCULATED.3IONS-SCNC: 13 MMOL/L (ref 7–15)
ANION GAP SERPL CALCULATED.3IONS-SCNC: 15 MMOL/L (ref 7–15)
ANION GAP SERPL CALCULATED.3IONS-SCNC: 17 MMOL/L (ref 7–15)
AST SERPL W P-5'-P-CCNC: 82 U/L (ref 0–45)
AST SERPL W P-5'-P-CCNC: 90 U/L (ref 0–45)
ATRIAL RATE - MUSE: 111 BPM
BACTERIA BLD CULT: NORMAL
BACTERIA BLD CULT: NORMAL
BASOPHILS # BLD AUTO: 0 10E3/UL (ref 0–0.2)
BASOPHILS NFR BLD AUTO: 0 %
BILIRUB SERPL-MCNC: 0.4 MG/DL
BILIRUB SERPL-MCNC: 0.4 MG/DL
BUN SERPL-MCNC: 86.6 MG/DL (ref 8–23)
BUN SERPL-MCNC: 88 MG/DL (ref 7–30)
BUN SERPL-MCNC: 88.5 MG/DL (ref 8–23)
BUN SERPL-MCNC: 91.6 MG/DL (ref 8–23)
CA-I BLD-MCNC: 4.6 MG/DL (ref 4.4–5.2)
CALCIUM SERPL-MCNC: 8.1 MG/DL (ref 8.8–10.4)
CALCIUM SERPL-MCNC: 8.4 MG/DL (ref 8.8–10.4)
CALCIUM SERPL-MCNC: 9.1 MG/DL (ref 8.8–10.4)
CHLORIDE BLD-SCNC: 134 MMOL/L (ref 94–109)
CHLORIDE SERPL-SCNC: 129 MMOL/L (ref 98–107)
CHLORIDE SERPL-SCNC: 132 MMOL/L (ref 98–107)
CHLORIDE SERPL-SCNC: 136 MMOL/L (ref 98–107)
CO2 BLD-SCNC: 24 MMOL/L (ref 20–32)
CREAT BLD-MCNC: 3.2 MG/DL (ref 0.7–1.3)
CREAT SERPL-MCNC: 2.77 MG/DL (ref 0.67–1.17)
CREAT SERPL-MCNC: 2.99 MG/DL (ref 0.67–1.17)
CREAT SERPL-MCNC: 3.09 MG/DL (ref 0.67–1.17)
DIASTOLIC BLOOD PRESSURE - MUSE: NORMAL MMHG
EGFRCR SERPLBLD CKD-EPI 2021: 19 ML/MIN/1.73M2
EGFRCR SERPLBLD CKD-EPI 2021: 19 ML/MIN/1.73M2
EGFRCR SERPLBLD CKD-EPI 2021: 21 ML/MIN/1.73M2
EOSINOPHIL # BLD AUTO: 0.1 10E3/UL (ref 0–0.7)
EOSINOPHIL NFR BLD AUTO: 1 %
ERYTHROCYTE [DISTWIDTH] IN BLOOD BY AUTOMATED COUNT: 16.4 % (ref 10–15)
ERYTHROCYTE [DISTWIDTH] IN BLOOD BY AUTOMATED COUNT: 16.9 % (ref 10–15)
EST. AVERAGE GLUCOSE BLD GHB EST-MCNC: 235 MG/DL
FLUAV RNA SPEC QL NAA+PROBE: NEGATIVE
FLUBV RNA RESP QL NAA+PROBE: NEGATIVE
GLUCOSE BLD-MCNC: 524 MG/DL (ref 70–99)
GLUCOSE BLDC GLUCOMTR-MCNC: 109 MG/DL (ref 70–99)
GLUCOSE BLDC GLUCOMTR-MCNC: 128 MG/DL (ref 70–99)
GLUCOSE BLDC GLUCOMTR-MCNC: 147 MG/DL (ref 70–99)
GLUCOSE BLDC GLUCOMTR-MCNC: 152 MG/DL (ref 70–99)
GLUCOSE BLDC GLUCOMTR-MCNC: 181 MG/DL (ref 70–99)
GLUCOSE BLDC GLUCOMTR-MCNC: 206 MG/DL (ref 70–99)
GLUCOSE BLDC GLUCOMTR-MCNC: 280 MG/DL (ref 70–99)
GLUCOSE BLDC GLUCOMTR-MCNC: 309 MG/DL (ref 70–99)
GLUCOSE BLDC GLUCOMTR-MCNC: 317 MG/DL (ref 70–99)
GLUCOSE BLDC GLUCOMTR-MCNC: 368 MG/DL (ref 70–99)
GLUCOSE BLDC GLUCOMTR-MCNC: 517 MG/DL (ref 70–99)
GLUCOSE BLDC GLUCOMTR-MCNC: 99 MG/DL (ref 70–99)
GLUCOSE SERPL-MCNC: 135 MG/DL (ref 70–99)
GLUCOSE SERPL-MCNC: 358 MG/DL (ref 70–99)
GLUCOSE SERPL-MCNC: 574 MG/DL (ref 70–99)
HBA1C MFR BLD: 9.8 %
HCO3 BLDV-SCNC: 25 MMOL/L (ref 21–28)
HCO3 SERPL-SCNC: 15 MMOL/L (ref 22–29)
HCO3 SERPL-SCNC: 21 MMOL/L (ref 22–29)
HCO3 SERPL-SCNC: 23 MMOL/L (ref 22–29)
HCT VFR BLD AUTO: 50.5 % (ref 40–53)
HCT VFR BLD AUTO: 50.7 % (ref 40–53)
HCT VFR BLD CALC: 46 % (ref 40–53)
HGB BLD-MCNC: 15.2 G/DL (ref 13.3–17.7)
HGB BLD-MCNC: 15.4 G/DL (ref 13.3–17.7)
HGB BLD-MCNC: 15.6 G/DL (ref 13.3–17.7)
HOLD SPECIMEN: NORMAL
IMM GRANULOCYTES # BLD: 0.1 10E3/UL
IMM GRANULOCYTES NFR BLD: 1 %
INTERPRETATION ECG - MUSE: NORMAL
LACTATE BLD-SCNC: 4.3 MMOL/L
LACTATE SERPL-SCNC: 4.4 MMOL/L (ref 0.7–2)
LACTATE SERPL-SCNC: 6.1 MMOL/L (ref 0.7–2)
LYMPHOCYTES # BLD AUTO: 1.6 10E3/UL (ref 0.8–5.3)
LYMPHOCYTES NFR BLD AUTO: 12 %
MCH RBC QN AUTO: 28.8 PG (ref 26.5–33)
MCH RBC QN AUTO: 29 PG (ref 26.5–33)
MCHC RBC AUTO-ENTMCNC: 30 G/DL (ref 31.5–36.5)
MCHC RBC AUTO-ENTMCNC: 30.5 G/DL (ref 31.5–36.5)
MCV RBC AUTO: 95 FL (ref 78–100)
MCV RBC AUTO: 97 FL (ref 78–100)
MONOCYTES # BLD AUTO: 0.5 10E3/UL (ref 0–1.3)
MONOCYTES NFR BLD AUTO: 4 %
MRSA DNA SPEC QL NAA+PROBE: NEGATIVE
NEUTROPHILS # BLD AUTO: 11 10E3/UL (ref 1.6–8.3)
NEUTROPHILS NFR BLD AUTO: 83 %
NRBC # BLD AUTO: 0 10E3/UL
NRBC BLD AUTO-RTO: 0 /100
P AXIS - MUSE: 62 DEGREES
PCO2 BLDV: 43 MM HG (ref 40–50)
PH BLDV: 7.37 [PH] (ref 7.32–7.43)
PLATELET # BLD AUTO: 162 10E3/UL (ref 150–450)
PLATELET # BLD AUTO: 211 10E3/UL (ref 150–450)
PO2 BLDV: 22 MM HG (ref 25–47)
POTASSIUM BLD-SCNC: 5.1 MMOL/L (ref 3.4–5.3)
POTASSIUM SERPL-SCNC: 4.4 MMOL/L (ref 3.4–5.3)
POTASSIUM SERPL-SCNC: 4.8 MMOL/L (ref 3.4–5.3)
POTASSIUM SERPL-SCNC: 5.2 MMOL/L (ref 3.4–5.3)
PR INTERVAL - MUSE: 138 MS
PROT SERPL-MCNC: 6.3 G/DL (ref 6.4–8.3)
PROT SERPL-MCNC: 7.2 G/DL (ref 6.4–8.3)
QRS DURATION - MUSE: 82 MS
QT - MUSE: 372 MS
QTC - MUSE: 505 MS
R AXIS - MUSE: 76 DEGREES
RBC # BLD AUTO: 5.25 10E6/UL (ref 4.4–5.9)
RBC # BLD AUTO: 5.34 10E6/UL (ref 4.4–5.9)
RSV RNA SPEC NAA+PROBE: NEGATIVE
SA TARGET DNA: POSITIVE
SAO2 % BLDV: 34 % (ref 70–75)
SARS-COV-2 RNA RESP QL NAA+PROBE: NEGATIVE
SODIUM BLD-SCNC: 169 MMOL/L (ref 135–145)
SODIUM SERPL-SCNC: 166 MMOL/L (ref 135–145)
SODIUM SERPL-SCNC: 167 MMOL/L (ref 135–145)
SODIUM SERPL-SCNC: 168 MMOL/L (ref 135–145)
SYSTOLIC BLOOD PRESSURE - MUSE: NORMAL MMHG
T AXIS - MUSE: 82 DEGREES
TROPONIN T SERPL HS-MCNC: 77 NG/L
TROPONIN T SERPL HS-MCNC: 78 NG/L
VENTRICULAR RATE- MUSE: 111 BPM
WBC # BLD AUTO: 13.3 10E3/UL (ref 4–11)
WBC # BLD AUTO: 14.2 10E3/UL (ref 4–11)

## 2025-01-23 PROCEDURE — 96374 THER/PROPH/DIAG INJ IV PUSH: CPT

## 2025-01-23 PROCEDURE — 84155 ASSAY OF PROTEIN SERUM: CPT | Performed by: EMERGENCY MEDICINE

## 2025-01-23 PROCEDURE — 99223 1ST HOSP IP/OBS HIGH 75: CPT | Performed by: HOSPITALIST

## 2025-01-23 PROCEDURE — 87641 MR-STAPH DNA AMP PROBE: CPT | Performed by: HOSPITALIST

## 2025-01-23 PROCEDURE — 84450 TRANSFERASE (AST) (SGOT): CPT | Performed by: HOSPITALIST

## 2025-01-23 PROCEDURE — 85041 AUTOMATED RBC COUNT: CPT | Performed by: HOSPITALIST

## 2025-01-23 PROCEDURE — 87640 STAPH A DNA AMP PROBE: CPT | Performed by: HOSPITALIST

## 2025-01-23 PROCEDURE — 258N000003 HC RX IP 258 OP 636: Performed by: EMERGENCY MEDICINE

## 2025-01-23 PROCEDURE — 3E033XZ INTRODUCTION OF VASOPRESSOR INTO PERIPHERAL VEIN, PERCUTANEOUS APPROACH: ICD-10-PCS | Performed by: HOSPITALIST

## 2025-01-23 PROCEDURE — 83605 ASSAY OF LACTIC ACID: CPT

## 2025-01-23 PROCEDURE — 258N000001 HC RX 258: Performed by: HOSPITALIST

## 2025-01-23 PROCEDURE — 80047 BASIC METABLC PNL IONIZED CA: CPT

## 2025-01-23 PROCEDURE — 83036 HEMOGLOBIN GLYCOSYLATED A1C: CPT | Performed by: EMERGENCY MEDICINE

## 2025-01-23 PROCEDURE — 200N000001 HC R&B ICU

## 2025-01-23 PROCEDURE — 96361 HYDRATE IV INFUSION ADD-ON: CPT

## 2025-01-23 PROCEDURE — 93005 ELECTROCARDIOGRAM TRACING: CPT

## 2025-01-23 PROCEDURE — 36415 COLL VENOUS BLD VENIPUNCTURE: CPT | Performed by: HOSPITALIST

## 2025-01-23 PROCEDURE — 87637 SARSCOV2&INF A&B&RSV AMP PRB: CPT | Performed by: HOSPITALIST

## 2025-01-23 PROCEDURE — 250N000009 HC RX 250: Performed by: EMERGENCY MEDICINE

## 2025-01-23 PROCEDURE — 93010 ELECTROCARDIOGRAM REPORT: CPT | Performed by: INTERNAL MEDICINE

## 2025-01-23 PROCEDURE — 36415 COLL VENOUS BLD VENIPUNCTURE: CPT | Performed by: EMERGENCY MEDICINE

## 2025-01-23 PROCEDURE — 99222 1ST HOSP IP/OBS MODERATE 55: CPT | Performed by: NURSE PRACTITIONER

## 2025-01-23 PROCEDURE — 82803 BLOOD GASES ANY COMBINATION: CPT

## 2025-01-23 PROCEDURE — 250N000009 HC RX 250: Performed by: HOSPITALIST

## 2025-01-23 PROCEDURE — 250N000011 HC RX IP 250 OP 636: Mod: JZ | Performed by: EMERGENCY MEDICINE

## 2025-01-23 PROCEDURE — 83605 ASSAY OF LACTIC ACID: CPT | Performed by: HOSPITALIST

## 2025-01-23 PROCEDURE — 82962 GLUCOSE BLOOD TEST: CPT

## 2025-01-23 PROCEDURE — 85025 COMPLETE CBC W/AUTO DIFF WBC: CPT | Performed by: EMERGENCY MEDICINE

## 2025-01-23 PROCEDURE — 84484 ASSAY OF TROPONIN QUANT: CPT | Performed by: EMERGENCY MEDICINE

## 2025-01-23 PROCEDURE — 87040 BLOOD CULTURE FOR BACTERIA: CPT | Performed by: EMERGENCY MEDICINE

## 2025-01-23 PROCEDURE — 82947 ASSAY GLUCOSE BLOOD QUANT: CPT | Performed by: HOSPITALIST

## 2025-01-23 PROCEDURE — 99291 CRITICAL CARE FIRST HOUR: CPT | Mod: 25

## 2025-01-23 PROCEDURE — 258N000003 HC RX IP 258 OP 636: Performed by: HOSPITALIST

## 2025-01-23 PROCEDURE — 85048 AUTOMATED LEUKOCYTE COUNT: CPT | Performed by: HOSPITALIST

## 2025-01-23 PROCEDURE — 250N000011 HC RX IP 250 OP 636: Performed by: HOSPITALIST

## 2025-01-23 PROCEDURE — 96375 TX/PRO/DX INJ NEW DRUG ADDON: CPT

## 2025-01-23 PROCEDURE — 999N000157 HC STATISTIC RCP TIME EA 10 MIN

## 2025-01-23 RX ORDER — MORPHINE SULFATE 2 MG/ML
2 INJECTION, SOLUTION INTRAMUSCULAR; INTRAVENOUS ONCE
Status: COMPLETED | OUTPATIENT
Start: 2025-01-23 | End: 2025-01-23

## 2025-01-23 RX ORDER — NICOTINE POLACRILEX 4 MG
15-30 LOZENGE BUCCAL
Status: DISCONTINUED | OUTPATIENT
Start: 2025-01-23 | End: 2025-01-23

## 2025-01-23 RX ORDER — FENTANYL CITRATE 50 UG/ML
INJECTION, SOLUTION INTRAMUSCULAR; INTRAVENOUS
Status: COMPLETED
Start: 2025-01-23 | End: 2025-01-23

## 2025-01-23 RX ORDER — DEXTROSE MONOHYDRATE 100 MG/ML
INJECTION, SOLUTION INTRAVENOUS CONTINUOUS PRN
Status: DISCONTINUED | OUTPATIENT
Start: 2025-01-23 | End: 2025-01-23

## 2025-01-23 RX ORDER — NALOXONE HYDROCHLORIDE 0.4 MG/ML
0.2 INJECTION, SOLUTION INTRAMUSCULAR; INTRAVENOUS; SUBCUTANEOUS
Status: DISCONTINUED | OUTPATIENT
Start: 2025-01-23 | End: 2025-01-24 | Stop reason: HOSPADM

## 2025-01-23 RX ORDER — ROPIVACAINE IN 0.9% SOD CHL/PF 0.1 %
.01-.125 PLASTIC BAG, INJECTION (ML) EPIDURAL CONTINUOUS
Status: DISCONTINUED | OUTPATIENT
Start: 2025-01-23 | End: 2025-01-23

## 2025-01-23 RX ORDER — ONDANSETRON 4 MG/1
4 TABLET, ORALLY DISINTEGRATING ORAL EVERY 6 HOURS PRN
Status: DISCONTINUED | OUTPATIENT
Start: 2025-01-23 | End: 2025-01-24 | Stop reason: HOSPADM

## 2025-01-23 RX ORDER — MINERAL OIL/HYDROPHIL PETROLAT
OINTMENT (GRAM) TOPICAL
Status: DISCONTINUED | OUTPATIENT
Start: 2025-01-23 | End: 2025-01-24 | Stop reason: HOSPADM

## 2025-01-23 RX ORDER — HYDROMORPHONE HYDROCHLORIDE 1 MG/ML
0.5 INJECTION, SOLUTION INTRAMUSCULAR; INTRAVENOUS; SUBCUTANEOUS
Status: DISCONTINUED | OUTPATIENT
Start: 2025-01-23 | End: 2025-01-24 | Stop reason: HOSPADM

## 2025-01-23 RX ORDER — HYDROMORPHONE HYDROCHLORIDE 1 MG/ML
0.3 INJECTION, SOLUTION INTRAMUSCULAR; INTRAVENOUS; SUBCUTANEOUS
Status: DISCONTINUED | OUTPATIENT
Start: 2025-01-23 | End: 2025-01-24 | Stop reason: HOSPADM

## 2025-01-23 RX ORDER — NALOXONE HYDROCHLORIDE 0.4 MG/ML
0.1 INJECTION, SOLUTION INTRAMUSCULAR; INTRAVENOUS; SUBCUTANEOUS
Status: DISCONTINUED | OUTPATIENT
Start: 2025-01-23 | End: 2025-01-24 | Stop reason: HOSPADM

## 2025-01-23 RX ORDER — HALOPERIDOL 5 MG/ML
2 INJECTION INTRAMUSCULAR EVERY 6 HOURS PRN
Status: DISCONTINUED | OUTPATIENT
Start: 2025-01-23 | End: 2025-01-24 | Stop reason: HOSPADM

## 2025-01-23 RX ORDER — OXYCODONE HCL 20 MG/ML
5 CONCENTRATE, ORAL ORAL
Status: DISCONTINUED | OUTPATIENT
Start: 2025-01-23 | End: 2025-01-24 | Stop reason: HOSPADM

## 2025-01-23 RX ORDER — ONDANSETRON 2 MG/ML
4 INJECTION INTRAMUSCULAR; INTRAVENOUS ONCE
Status: COMPLETED | OUTPATIENT
Start: 2025-01-23 | End: 2025-01-23

## 2025-01-23 RX ORDER — ACETAMINOPHEN 325 MG/1
650 TABLET ORAL EVERY 6 HOURS PRN
Status: DISCONTINUED | OUTPATIENT
Start: 2025-01-23 | End: 2025-01-24 | Stop reason: HOSPADM

## 2025-01-23 RX ORDER — OXYCODONE HCL 20 MG/ML
10 CONCENTRATE, ORAL ORAL
Status: DISCONTINUED | OUTPATIENT
Start: 2025-01-23 | End: 2025-01-24 | Stop reason: HOSPADM

## 2025-01-23 RX ORDER — SENNOSIDES 8.6 MG
1 TABLET ORAL 2 TIMES DAILY PRN
Status: DISCONTINUED | OUTPATIENT
Start: 2025-01-23 | End: 2025-01-24 | Stop reason: HOSPADM

## 2025-01-23 RX ORDER — ATROPINE SULFATE 10 MG/ML
2 SOLUTION/ DROPS OPHTHALMIC EVERY 4 HOURS PRN
Status: DISCONTINUED | OUTPATIENT
Start: 2025-01-23 | End: 2025-01-23

## 2025-01-23 RX ORDER — DEXTROSE MONOHYDRATE 25 G/50ML
25-50 INJECTION, SOLUTION INTRAVENOUS
Status: DISCONTINUED | OUTPATIENT
Start: 2025-01-23 | End: 2025-01-23

## 2025-01-23 RX ORDER — BISACODYL 10 MG
10 SUPPOSITORY, RECTAL RECTAL
Status: DISCONTINUED | OUTPATIENT
Start: 2025-01-26 | End: 2025-01-24 | Stop reason: HOSPADM

## 2025-01-23 RX ORDER — PIPERACILLIN SODIUM, TAZOBACTAM SODIUM 2; .25 G/10ML; G/10ML
2.25 INJECTION, POWDER, LYOPHILIZED, FOR SOLUTION INTRAVENOUS EVERY 6 HOURS
Status: DISCONTINUED | OUTPATIENT
Start: 2025-01-23 | End: 2025-01-23

## 2025-01-23 RX ORDER — FENTANYL CITRATE 50 UG/ML
50 INJECTION, SOLUTION INTRAMUSCULAR; INTRAVENOUS ONCE
Status: COMPLETED | OUTPATIENT
Start: 2025-01-23 | End: 2025-01-23

## 2025-01-23 RX ORDER — PIPERACILLIN SODIUM, TAZOBACTAM SODIUM 3; .375 G/15ML; G/15ML
3.38 INJECTION, POWDER, LYOPHILIZED, FOR SOLUTION INTRAVENOUS ONCE
Status: COMPLETED | OUTPATIENT
Start: 2025-01-23 | End: 2025-01-23

## 2025-01-23 RX ORDER — TAMSULOSIN HYDROCHLORIDE 0.4 MG/1
0.4 CAPSULE ORAL DAILY
COMMUNITY

## 2025-01-23 RX ORDER — LORAZEPAM 2 MG/ML
1 INJECTION INTRAMUSCULAR
Status: DISCONTINUED | OUTPATIENT
Start: 2025-01-23 | End: 2025-01-24 | Stop reason: HOSPADM

## 2025-01-23 RX ORDER — HYDROMORPHONE HYDROCHLORIDE 2 MG/1
2 TABLET ORAL
Status: DISCONTINUED | OUTPATIENT
Start: 2025-01-23 | End: 2025-01-23

## 2025-01-23 RX ORDER — ONDANSETRON 2 MG/ML
4 INJECTION INTRAMUSCULAR; INTRAVENOUS EVERY 6 HOURS PRN
Status: DISCONTINUED | OUTPATIENT
Start: 2025-01-23 | End: 2025-01-24 | Stop reason: HOSPADM

## 2025-01-23 RX ORDER — DEXTROSE MONOHYDRATE 50 MG/ML
INJECTION, SOLUTION INTRAVENOUS CONTINUOUS
Status: DISCONTINUED | OUTPATIENT
Start: 2025-01-23 | End: 2025-01-23

## 2025-01-23 RX ORDER — ATROPINE SULFATE 10 MG/ML
2 SOLUTION/ DROPS OPHTHALMIC
Status: DISCONTINUED | OUTPATIENT
Start: 2025-01-23 | End: 2025-01-24 | Stop reason: HOSPADM

## 2025-01-23 RX ORDER — ACETAMINOPHEN 650 MG/1
650 SUPPOSITORY RECTAL EVERY 6 HOURS PRN
Status: DISCONTINUED | OUTPATIENT
Start: 2025-01-23 | End: 2025-01-24 | Stop reason: HOSPADM

## 2025-01-23 RX ORDER — CARBOXYMETHYLCELLULOSE SODIUM 5 MG/ML
1-2 SOLUTION/ DROPS OPHTHALMIC
Status: DISCONTINUED | OUTPATIENT
Start: 2025-01-23 | End: 2025-01-24 | Stop reason: HOSPADM

## 2025-01-23 RX ORDER — HYDROMORPHONE HYDROCHLORIDE 1 MG/ML
1 SOLUTION ORAL
Status: DISCONTINUED | OUTPATIENT
Start: 2025-01-23 | End: 2025-01-23

## 2025-01-23 RX ORDER — HYDROMORPHONE HYDROCHLORIDE 1 MG/ML
2 SOLUTION ORAL
Status: DISCONTINUED | OUTPATIENT
Start: 2025-01-23 | End: 2025-01-23

## 2025-01-23 RX ORDER — LORAZEPAM 2 MG/ML
1 CONCENTRATE ORAL
Status: DISCONTINUED | OUTPATIENT
Start: 2025-01-23 | End: 2025-01-24 | Stop reason: HOSPADM

## 2025-01-23 RX ADMIN — PIPERACILLIN AND TAZOBACTAM 2.25 G: 2; .25 INJECTION, POWDER, FOR SOLUTION INTRAVENOUS at 07:46

## 2025-01-23 RX ADMIN — DEXTROSE MONOHYDRATE: 50 INJECTION, SOLUTION INTRAVENOUS at 05:32

## 2025-01-23 RX ADMIN — MORPHINE SULFATE 2 MG: 2 INJECTION, SOLUTION INTRAMUSCULAR; INTRAVENOUS at 00:55

## 2025-01-23 RX ADMIN — ONDANSETRON 4 MG: 2 INJECTION INTRAMUSCULAR; INTRAVENOUS at 00:55

## 2025-01-23 RX ADMIN — NOREPINEPHRINE BITARTRATE 0.03 MCG/KG/MIN: 16 SOLUTION INTRAVENOUS at 07:41

## 2025-01-23 RX ADMIN — FENTANYL CITRATE 50 MCG: 50 INJECTION, SOLUTION INTRAMUSCULAR; INTRAVENOUS at 00:32

## 2025-01-23 RX ADMIN — SODIUM CHLORIDE 1000 ML: 4.5 INJECTION, SOLUTION INTRAVENOUS at 05:34

## 2025-01-23 RX ADMIN — INSULIN HUMAN 5.5 UNITS/HR: 1 INJECTION, SOLUTION INTRAVENOUS at 02:12

## 2025-01-23 RX ADMIN — SODIUM CHLORIDE, POTASSIUM CHLORIDE, SODIUM LACTATE AND CALCIUM CHLORIDE 1000 ML: 600; 310; 30; 20 INJECTION, SOLUTION INTRAVENOUS at 01:24

## 2025-01-23 RX ADMIN — NOREPINEPHRINE BITARTRATE 0.03 MCG/KG/MIN: 16 SOLUTION INTRAVENOUS at 07:47

## 2025-01-23 RX ADMIN — PIPERACILLIN AND TAZOBACTAM 3.38 G: 3; .375 INJECTION, POWDER, FOR SOLUTION INTRAVENOUS at 02:07

## 2025-01-23 RX ADMIN — SODIUM CHLORIDE 1000 ML: 9 INJECTION, SOLUTION INTRAVENOUS at 00:32

## 2025-01-23 ASSESSMENT — COLUMBIA-SUICIDE SEVERITY RATING SCALE - C-SSRS: IS THE PATIENT NOT ABLE TO COMPLETE C-SSRS: UNABLE TO VERBALIZE

## 2025-01-23 ASSESSMENT — ACTIVITIES OF DAILY LIVING (ADL)
ADLS_ACUITY_SCORE: 83
ADLS_ACUITY_SCORE: 83
ADLS_ACUITY_SCORE: 63
ADLS_ACUITY_SCORE: 83
ADLS_ACUITY_SCORE: 63
ADLS_ACUITY_SCORE: 83
ADLS_ACUITY_SCORE: 63
ADLS_ACUITY_SCORE: 83
ADLS_ACUITY_SCORE: 63
ADLS_ACUITY_SCORE: 63
ADLS_ACUITY_SCORE: 83
ADLS_ACUITY_SCORE: 63
ADLS_ACUITY_SCORE: 83
ADLS_ACUITY_SCORE: 71
ADLS_ACUITY_SCORE: 83

## 2025-01-23 NOTE — PLAN OF CARE
"RN end of shift summary    Pt admitted from ED. Remains mostly unresponsive to verbal and repeated stimuli. Will withdraw from pain. Dementia at baseline.    Sinus rhythm/sinus tachycardic. No edema. Weak pedal pulses. Cold to the touch. Hypotensive. Holding off on norepi until 1L bolus of 1/2 NS completed per Dr. Reddy. MAPs ranging from 63 to 70.     LS diminished. Remains on 15L via oxymask. Difficult to get accurate O2 saturations d/t pt's poor perfusion and cold extremities. Pt with gaping open mouth breathing, use of accessory muscles.    PJ last bowel movement, prior to admission. Active BS. Concave abdomen    External catheter in place.    PIV x2.  Insulin gtt; see flowsheets and MAR for titration  D5 @ 150 ml/hr  1L bolus of 1/2 NS over 2 hours             Goal Outcome Evaluation:           Overall Patient Progress: no changeOverall Patient Progress: no change    Outcome Evaluation: Pt remains unresponsive to verbal stimuli, will withdraw from pain. Poor perfusion so labile O2 saturations, remains 15L via oxymask      Problem: Adult Inpatient Plan of Care  Goal: Plan of Care Review  Description: The Plan of Care Review/Shift note should be completed every shift.  The Outcome Evaluation is a brief statement about your assessment that the patient is improving, declining, or no change.  This information will be displayed automatically on your shift  note.  Outcome: Progressing  Flowsheets (Taken 1/23/2025 0753)  Outcome Evaluation: Pt remains unresponsive to verbal stimuli, will withdraw from pain. Poor perfusion so labile O2 saturations, remains 15L via oxymask  Overall Patient Progress: no change  Goal: Patient-Specific Goal (Individualized)  Description: You can add care plan individualizations to a care plan. Examples of Individualization might be:  \"Parent requests to be called daily at 9am for status\", \"I have a hard time hearing out of my right ear\", or \"Do not touch me to wake me up as it " "startles  me\".  Outcome: Progressing  Goal: Absence of Hospital-Acquired Illness or Injury  Outcome: Progressing  Intervention: Identify and Manage Fall Risk  Recent Flowsheet Documentation  Taken 1/23/2025 0400 by Adelaide Quiñones RN  Safety Promotion/Fall Prevention:   activity supervised   clutter free environment maintained   increased rounding and observation   increase visualization of patient   lighting adjusted   room organization consistent   safety round/check completed  Intervention: Prevent Skin Injury  Recent Flowsheet Documentation  Taken 1/23/2025 0630 by Adelaide Quiñones RN  Body Position:   turned   left   weight shifting  Taken 1/23/2025 0400 by Adelaide Quiñones RN  Body Position:   turned   right   weight shifting  Goal: Optimal Comfort and Wellbeing  Outcome: Progressing  Intervention: Provide Person-Centered Care  Recent Flowsheet Documentation  Taken 1/23/2025 0400 by Adelaide Quiñones RN  Trust Relationship/Rapport:   care explained   choices provided   emotional support provided   reassurance provided   thoughts/feelings acknowledged  Goal: Readiness for Transition of Care  Outcome: Progressing     Problem: Comorbidity Management  Goal: Blood Pressure in Desired Range  Outcome: Progressing  Intervention: Maintain Blood Pressure Management  Recent Flowsheet Documentation  Taken 1/23/2025 0400 by Adelaide Quiñones RN  Medication Review/Management: medications reviewed     Problem: Gas Exchange Impaired  Goal: Optimal Gas Exchange  Outcome: Progressing  Intervention: Optimize Oxygenation and Ventilation  Recent Flowsheet Documentation  Taken 1/23/2025 0630 by Adelaide Quiñones RN  Head of Bed (HOB) Positioning: HOB at 20 degrees  Taken 1/23/2025 0400 by Adelaide Quiñones RN  Head of Bed (HOB) Positioning: HOB at 20 degrees     "

## 2025-01-23 NOTE — CONSULTS
Palliative Care Consultation Note  [facility]      Patient: Adam Huber  Date of Admission:  1/23/2025    Requesting Clinician / Team: hospitalist  Reason for consult:   Goals of care  Decisional support  Patient and family support       Recommendations & Counseling     GOALS OF CARE:   Comfort focused   Symptom management  Ok to stop pressors     ADVANCE CARE PLANNING:  No health care directive on file. Per system policy, Surrogate Decision-makers for Patients With Diminished Decision-making Capacity offers guidance on possible decision-makers. Norman Mata has been identified as a surrogate decision maker.   There is no POLST form on file, defer to patient and/or next of kin for decisions   Code status: No CPR- Do NOT Intubate    MEDICAL MANAGEMENT:   Comfort Care   #Pain  1st choice: Oxycodone 5-10 mg q 2 hour as needed.   2nd choice: hydromorphone IV 0.3-0.5 mg q 15 minutes as needed     #Dyspnea   1st choice: Oxycodone 5-10 mg q 2 hour as needed.   2nd choice: hydromorphone IV 0.3-0.5 mg q 15 minutes as needed     #Anxiety    1st choice: Lorazepam PO/SL 1 mg  q 3 hour as needed.   2nd choice: Lorazepam IV 1 mg q 3 hour as needed    #Secretion burden   Atropine 2 drops every 2 hours PRN     #Nausea   Zofran 4 mg q 6 hours as needed. Can increase to 8 mg   Zyprexa 5 mg q 6 hours as needed     #Agitation  Aggressive control of other symptoms first, then  1st choice: Haloperidol  2nd choice: Lorazepam as above     PSYCHOSOCIAL/SPIRITUAL SUPPORT:  Family 5 adult children and 13-15 grandchildren, lives with wife  [place] community: [employer],  3 sons are [employer] pastors  Declined Spiritual Health Services    Palliative Care will continue to follow. Thank you for the consult and allowing us to aid in the care of Adam Huber.    These recommendations have been discussed with medical team.    Mckenzie Farnsworth NP  [facility], Palliative Care  Securely message with the Vocera Web Console  (learn more here) or  Text page via [facility] Paging/Directory         Assessment      Adam Huber is a 89 year old male with a past medical history of dementia, CAD, DM2, HTN, BPH who presented on 1/23 weakness and high blood sugars. He was found to have metabolic encephalopathy due to severe hypernatremia due to dehydration.     Today, the patient was seen for:  Goals of care    History of Present Illness   Met with Adolfo at the bedside..   I introduced our role as an extra layer of support and how we help patients and families dealing with serious, potentially life-limiting illnesses. I explained the composition of the palliative care team.  Palliative care helps patients and families navigate their care while focusing on the whole person; providing emotional, social and spiritual support  Palliative care often assists with symptom management, information sharing about what to expect from the illness, available treatment options and what effect those options may have on the disease course, and provide effective communication and caring support.    Prognosis, Goals, & Planning:   Functional Status just prior to this current hospitalization:  Overall decline    Prognosis, Goals, and/or Advance Care Planning:  Education provided on transition to comfort-focused goals of care would be including discontinuation of IV fluids, cardiac monitoring, labs, tube feeding, TPN and other interventions that do not directly promote comfort.  Anticipatory guidance was given regarding feeding, hunger, fluids at end of life. We discussed utilization of medications to ease air hunger, agitation and restlessness at the time that ventilator is compassionately withdrawn.  Discussed that this process is very purposeful in terms of ensuring patient is as comfortable as possible and that family wishes are honored.    Code Status was addressed today:   yes        Patient has decision-making capacity today for complex decisions:  Unreliable          Coping, Meaning, & Spirituality:   Mood, coping, and/or meaning in the context of serious illness were addressed today: Yes    Social:   Living situation:lives with significant other/spouse    Medications:  Reviewed this patient's medication profile and medications from this hospitalization. Notable medications: none   [place] Board of Pharmacy Data Base Reviewed: Yes:   reviewed - no record of controlled substances prescribed.    ROS:  Comprehensive ROS is reviewed and is negative except as here & per HPI:     Physical Exam   Vital Signs with Ranges  Temp:  [97.4  F (36.3  C)] 97.4  F (36.3  C)  Pulse:  [] 82  Resp:  [0-42] 16  BP: ()/(47-72) 104/55  SpO2:  [60 %-100 %] 94 %  Wt Readings from Last 10 Encounters:   01/23/25 65.3 kg (143 lb 15.4 oz)   12/13/24 77.9 kg (171 lb 11.8 oz)   11/14/24 80.5 kg (177 lb 7.5 oz)   11/10/24 82.2 kg (181 lb 3.5 oz)   04/02/24 83.8 kg (184 lb 11.9 oz)   02/05/24 88.2 kg (194 lb 7.1 oz)   09/18/23 88.8 kg (195 lb 12.3 oz)   06/01/23 93.1 kg (205 lb 4 oz)   09/22/21 96 kg (211 lb 10.3 oz)   12/04/19 98.8 kg (217 lb 13 oz)     143 lbs 15.37 oz    PHYSICAL EXAM:  Constitutional: no distress, unresponsive  Cardiovascular: negative  Respiratory: positive findings: diminished  Psychiatric: unresponsive  Abdomen: Abdomen soft, non-tender. BS normal. No masses, organomegaly  Pain: no s/s of pain    Data reviewed:  Results for orders placed or performed during the hospital encounter of 01/23/25 (from the past 24 hours)   Extra Tube ([place] Draw)    Narrative    The following orders were created for panel order Extra Tube ([place] Draw).  Procedure                               Abnormality         Status                     ---------                               -----------         ------                     Extra Blue Top Tube[[id number]]                              Final result               Extra Red Top Tube[[id number]]                                Final result               Extra Green Top (Lithium...[[id number]]                      Final result               Extra Purple Top Tube[[id number]]                            Final result               Extra Blood Bank Purple ...[[id number]]                      Final result               Extra Blood Bank Purple ...[[id number]]                      Final result               Extra Yellow Top ACD Tube[[id number]]                                                     Please view results for these tests on the individual orders.   Extra Blue Top Tube   Result Value Ref Range    Hold Specimen JIC    Extra Red Top Tube   Result Value Ref Range    Hold Specimen JIC    Extra Green Top (Lithium Heparin) Tube   Result Value Ref Range    Hold Specimen JIC    Extra Purple Top Tube   Result Value Ref Range    Hold Specimen JIC    Extra Blood Bank Purple Top Tube   Result Value Ref Range    Hold Specimen JIC    Extra Blood Bank Purple Top Tube   Result Value Ref Range    Hold Specimen JIC    CBC with platelets differential    Narrative    The following orders were created for panel order CBC with platelets differential.  Procedure                               Abnormality         Status                     ---------                               -----------         ------                     CBC with platelets and d...[[id number]]  Abnormal            Final result                 Please view results for these tests on the individual orders.   Comprehensive metabolic panel   Result Value Ref Range    Sodium 167 (HH) 135 - 145 mmol/L    Potassium 5.2 3.4 - 5.3 mmol/L    Carbon Dioxide (CO2) 23 22 - 29 mmol/L    Anion Gap 15 7 - 15 mmol/L    Urea Nitrogen 91.6 (H) 8.0 - 23.0 mg/dL    Creatinine 3.09 (H) 0.67 - 1.17 mg/dL    GFR Estimate 19 (L) >60 mL/min/1.73m2    Calcium 9.1 8.8 - 10.4 mg/dL    Chloride 129 (H) 98 - 107 mmol/L    Glucose 574 (HH) 70 - 99 mg/dL    Alkaline Phosphatase 547 (H) 40 - 150 U/L    AST 90 (H) 0 - 45 U/L    ALT  123 (H) 0 - 70 U/L    Protein Total 7.2 6.4 - 8.3 g/dL    Albumin 3.0 (L) 3.5 - 5.2 g/dL    Bilirubin Total 0.4 <=1.2 mg/dL   Glucose by meter   Result Value Ref Range    GLUCOSE BY METER POCT 517 (HH) 70 - 99 mg/dL   Troponin T, High Sensitivity   Result Value Ref Range    Troponin T, High Sensitivity 77 (H) <=22 ng/L   CBC with platelets and differential   Result Value Ref Range    WBC Count 13.3 (H) 4.0 - 11.0 10e3/uL    RBC Count 5.34 4.40 - 5.90 10e6/uL    Hemoglobin 15.4 13.3 - 17.7 g/dL    Hematocrit 50.5 40.0 - 53.0 %    MCV 95 78 - 100 fL    MCH 28.8 26.5 - 33.0 pg    MCHC 30.5 (L) 31.5 - 36.5 g/dL    RDW 16.4 (H) 10.0 - 15.0 %    Platelet Count 211 150 - 450 10e3/uL    % Neutrophils 83 %    % Lymphocytes 12 %    % Monocytes 4 %    % Eosinophils 1 %    % Basophils 0 %    % Immature Granulocytes 1 %    NRBCs per 100 WBC 0 <1 /100    Absolute Neutrophils 11.0 (H) 1.6 - 8.3 10e3/uL    Absolute Lymphocytes 1.6 0.8 - 5.3 10e3/uL    Absolute Monocytes 0.5 0.0 - 1.3 10e3/uL    Absolute Eosinophils 0.1 0.0 - 0.7 10e3/uL    Absolute Basophils 0.0 0.0 - 0.2 10e3/uL    Absolute Immature Granulocytes 0.1 <=0.4 10e3/uL    Absolute NRBCs 0.0 10e3/uL   Hemoglobin A1c   Result Value Ref Range    Estimated Average Glucose 235 (H) <117 mg/dL    Hemoglobin A1C 9.8 (H) <5.7 %   iStat Basic Chem ICA Hematocrit, POCT   Result Value Ref Range    Chloride POCT 134 (H) 94 - 109 mmol/L    Potassium POCT 5.1 3.4 - 5.3 mmol/L    Sodium POCT 169 (HH) 135 - 145 mmol/L    UREA NITROGEN POCT 88 (H) 7 - 30 mg/dL    Calcium, Ionized Whole Blood POCT 4.6 4.4 - 5.2 mg/dL    Glucose Whole Blood POCT 524 (HH) 70 - 99 mg/dL    Anion Gap POCT 17.0 (H) 3.0 - 14.0 mmol/L    Hemoglobin POCT 15.6 13.3 - 17.7 g/dL    Hematocrit POCT 46 40 - 53 %    Creatinine POCT 3.2 (H) 0.7 - 1.3 mg/dL    TOTAL CO2 POCT 24 20 - 32 mmol/L   Extra Tube ([place] Draw)    Narrative    The following orders were created for panel order Extra Tube ([place]  Draw).  Procedure                               Abnormality         Status                     ---------                               -----------         ------                     Extra Heparinized Syringe[[id number]]                        Final result                 Please view results for these tests on the individual orders.   Extra Heparinized Syringe   Result Value Ref Range    Hold Specimen JIC    iStat Gases (lactate) venous, POCT   Result Value Ref Range    Lactic Acid POCT 4.3 (HH) <=2.0 mmol/L    Bicarbonate Venous POCT 25 21 - 28 mmol/L    O2 Sat, Venous POCT 34 (L) 70 - 75 %    pCO2 Venous POCT 43 40 - 50 mm Hg    pH Venous POCT 7.37 7.32 - 7.43    pO2 Venous POCT 22 (L) 25 - 47 mm Hg   EKG 12-lead, tracing only   Result Value Ref Range    Systolic Blood Pressure  mmHg    Diastolic Blood Pressure  mmHg    Ventricular Rate 111 BPM    Atrial Rate 111 BPM    RI Interval 138 ms    QRS Duration 82 ms     ms    QTc 505 ms    P Axis 62 degrees    R AXIS 76 degrees    T Axis 82 degrees    Interpretation ECG       Sinus tachycardia  Nonspecific ST and T wave abnormality  Prolonged QT  Abnormal ECG  When compared with ECG of 08-Dec-2024 17:01,  No significant change was found     CT Head w/o Contrast    Narrative    EXAM: CT HEAD W/O CONTRAST  LOCATION: [facility]  DATE: 1/23/2025    INDICATION: Altered mental status  COMPARISON:  CT head 12/8/2024.  TECHNIQUE: Routine CT Head without IV contrast. Multiplanar reformats. Dose reduction techniques were used.    FINDINGS:  INTRACRANIAL CONTENTS: No intracranial hemorrhage, extraaxial collection, or mass effect.  No CT evidence of acute infarct. 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  Small lacunes to the deep gray nuclei and   cerebellar hemispheres, unchanged. Moderate generalized volume loss. No hydrocephalus.     VISUALIZED ORBITS/SINUSES/MASTOIDS: Prior right cataract surgery.  Visualized portions of the orbits are otherwise unremarkable. No significant paranasal sinus mucosal disease. No middle ear or mastoid effusion.    BONES/SOFT TISSUES: No acute abnormality.      Impression    IMPRESSION:  1.  No acute intracranial process or significant change since 12/8/2024.   XR Chest Port 1 View    Narrative    EXAM: XR CHEST PORT 1 VIEW  LOCATION: [facility]  DATE: 1/23/2025    INDICATION: AMS  COMPARISON: 12/08/2024      Impression    IMPRESSION: Stable cardiomediastinal silhouette. Mild atelectasis or infiltrate right midlung and lung bases. No significant effusion. Degenerative osseous structures. Atherosclerotic aorta   Influenza A/B, RSV and SARS-CoV2 PCR (COVID-19) Nasopharyngeal    Specimen: Nasopharyngeal; Swab   Result Value Ref Range    Influenza A PCR Negative Negative    Influenza B PCR Negative Negative    RSV PCR Negative Negative    SARS CoV2 PCR Negative Negative    Narrative    Testing was performed using the Xpert Xpress CoV2/Flu/RSV Assay on the [employer] Gene[employer]pert Instrument. This test should be ordered for the detection of SARS-CoV2, influenza, and RSV viruses in individuals with signs and symptoms of respiratory tract infection. This test is for in vitro diagnostic use under the [place] FDA for laboratories certified under CLIA to perform high or moderate complexity testing. This test has been [place] FDA cleared. A negative result does not rule out the presence of PCR inhibitors in the specimen or target RNA in concentration below the limit of detection for the assay. If only one viral target is positive but coinfection with multiple targets is suspected, the sample should be re-tested with another FDA cleared, approved, or authorized test, if coninfection would change clinical management. This test was validated by the [facility]. These laboratories are certified under the Clinical Laboratory Improvement Amendments of 1988 (CLIA-88) as  qualified to perfom high complexity laboratory testing.   Troponin T, High Sensitivity   Result Value Ref Range    Troponin T, High Sensitivity 78 (H) <=22 ng/L   Glucose by meter   Result Value Ref Range    GLUCOSE BY METER POCT 368 (H) 70 - 99 mg/dL   CT Abdomen Pelvis w/o Contrast    Narrative    EXAM: CT ABDOMEN PELVIS W/O CONTRAST  LOCATION: [facility]  DATE: 1/23/2025    INDICATION: elevated LFT's, AMS.  COMPARISON: 11/7/2024, CT chest 12/8/2024  TECHNIQUE: CT scan of the abdomen and pelvis was performed without IV contrast. Multiplanar reformats were obtained. Dose reduction techniques were used.  CONTRAST: None.    FINDINGS:   LOWER CHEST: Bronchial wall thickening, mucous plugging, and patchy airspace opacities.    HEPATOBILIARY: Cirrhotic liver morphology. Cholecystectomy.    PANCREAS: Atrophic    SPLEEN: Normal.    ADRENAL GLANDS: Normal.    KIDNEYS/BLADDER: There are again numerous bladder diverticula. There is bladder wall thickening. There is perivesicular fat stranding suspicious for cystitis. There is new mild right hydroureteronephrosis to the level of the bladder.    BOWEL: Diverticulosis of the colon. No acute inflammatory change. No obstruction.     LYMPH NODES: Normal.    VASCULATURE: Extensive atherosclerotic calcifications.    PELVIC ORGANS: Prostatomegaly    MUSCULOSKELETAL: Unremarkable      Impression    IMPRESSION:   1.  Cirrhosis.    2.  There is perivesicular fat stranding suspicious for cystitis.    3.  New mild right hydroureteronephrosis to the level of the bladder.    4.  Bibasilar bronchial wall thickening, mucous plugging, and patchy airspace opacities.     Glucose by meter   Result Value Ref Range    GLUCOSE BY METER POCT 317 (H) 70 - 99 mg/dL   Lactic acid whole blood   Result Value Ref Range    Lactic Acid 6.1 (HH) 0.7 - 2.0 mmol/L   CBC with platelets   Result Value Ref Range    WBC Count 14.2 (H) 4.0 - 11.0 10e3/uL    RBC Count 5.25 4.40 - 5.90 10e6/uL     Hemoglobin 15.2 13.3 - 17.7 g/dL    Hematocrit 50.7 40.0 - 53.0 %    MCV 97 78 - 100 fL    MCH 29.0 26.5 - 33.0 pg    MCHC 30.0 (L) 31.5 - 36.5 g/dL    RDW 16.9 (H) 10.0 - 15.0 %    Platelet Count 162 150 - 450 10e3/uL   Comprehensive metabolic panel   Result Value Ref Range    Sodium 168 (HH) 135 - 145 mmol/L    Potassium 4.8 3.4 - 5.3 mmol/L    Carbon Dioxide (CO2) 15 (L) 22 - 29 mmol/L    Anion Gap 17 (H) 7 - 15 mmol/L    Urea Nitrogen 88.5 (H) 8.0 - 23.0 mg/dL    Creatinine 2.77 (H) 0.67 - 1.17 mg/dL    GFR Estimate 21 (L) >60 mL/min/1.73m2    Calcium 8.4 (L) 8.8 - 10.4 mg/dL    Chloride 136 (H) 98 - 107 mmol/L    Glucose 358 (H) 70 - 99 mg/dL    Alkaline Phosphatase 457 (H) 40 - 150 U/L    AST 82 (H) 0 - 45 U/L     (H) 0 - 70 U/L    Protein Total 6.3 (L) 6.4 - 8.3 g/dL    Albumin 2.1 (L) 3.5 - 5.2 g/dL    Bilirubin Total 0.4 <=1.2 mg/dL   Glucose by meter   Result Value Ref Range    GLUCOSE BY METER POCT 309 (H) 70 - 99 mg/dL   Glucose by meter   Result Value Ref Range    GLUCOSE BY METER POCT 280 (H) 70 - 99 mg/dL   MRSA MSSA PCR, Nasal Swab    Specimen: Nares, Bilateral; Swab   Result Value Ref Range    MRSA Target DNA Negative Negative    SA Target DNA Positive     Narrative    The [employer]  Xpert SA Nasal Complete assay performed in the [employer]  Dx System is a qualitative in vitro diagnostic test designed for rapid detection of Staphylococcus aureus (SA) and methicillin-resistant Staphylococcus aureus (MRSA) from nasal swabs in patients at risk for nasal colonization. The test utilizes automated real-time polymerase chain reaction (PCR) to detect MRSA/SA DNA. The Xpert SA Nasal Complete assay is intended to aid in the prevention and control of MRSA/SA infections in healthcare settings. The assay is not intended to diagnose, guide or monitor treatment for MRSA/SA infections, or provide results of susceptibility to methicillin. A negative result does not preclude MRSA/SA nasal colonization.     Glucose by meter   Result Value Ref Range    GLUCOSE BY METER POCT 206 (H) 70 - 99 mg/dL   Glucose by meter   Result Value Ref Range    GLUCOSE BY METER POCT 181 (H) 70 - 99 mg/dL   Glucose by meter   Result Value Ref Range    GLUCOSE BY METER POCT 152 (H) 70 - 99 mg/dL   Glucose by meter   Result Value Ref Range    GLUCOSE BY METER POCT 147 (H) 70 - 99 mg/dL   Basic metabolic panel   Result Value Ref Range    Sodium 166 (HH) 135 - 145 mmol/L    Potassium 4.4 3.4 - 5.3 mmol/L    Chloride 132 (H) 98 - 107 mmol/L    Carbon Dioxide (CO2) 21 (L) 22 - 29 mmol/L    Anion Gap 13 7 - 15 mmol/L    Urea Nitrogen 86.6 (H) 8.0 - 23.0 mg/dL    Creatinine 2.99 (H) 0.67 - 1.17 mg/dL    GFR Estimate 19 (L) >60 mL/min/1.73m2    Calcium 8.1 (L) 8.8 - 10.4 mg/dL    Glucose 135 (H) 70 - 99 mg/dL   Lactic acid whole blood   Result Value Ref Range    Lactic Acid 4.4 (HH) 0.7 - 2.0 mmol/L   Glucose by meter   Result Value Ref Range    GLUCOSE BY METER POCT 128 (H) 70 - 99 mg/dL   Glucose by meter   Result Value Ref Range    GLUCOSE BY METER POCT 109 (H) 70 - 99 mg/dL       Medical Decision Making     70 MINUTES SPENT BY ME on the date of service doing chart review, history, exam, documentation & further activities per the note.

## 2025-01-23 NOTE — PROGRESS NOTES
Patient was seen and examined by me at bedside.  History and physical completed by my partner Dr. Barry Silva was reviewed.  Please review his history and physical and care plan.  Briefly Abhishek is 89-year-old male with complex medical problems including dementia, coronary artery disease, diabetes, hypertension, BPH who presents with generalized weakness and hyperglycemia.  He has acute toxic metabolic encephalopathy secondary to severe hypernatremia, acute hypoxic respiratory failure.  Patient to have multiorgan dysfunction.  I spoke with patient's son Mario over the phone and we discussed about goals of care.Mario stated that his father had several terminal problems and would like to be comfortable rather than to continue restorative care.  Palliative care team consulted.  Will stop restorative intervention at this point and will initiate comfort measures only protocol.  Will consult  for hospice plan.

## 2025-01-23 NOTE — PLAN OF CARE
"Major shift events: Patient transitioned to comfort care per orders. Son at bedside.     Neuro: Remains obtunded and withdrawing from pain, otherwise unresponsive.   Cardiac: Patient NSR until tele discontinued this afternoon for comfort care.    Respiratory: on 4L NC for comfort.   GI/: Anuric. No BM for day shift.   Activity:  turned and repositioned frequently.   Pain: At acceptable level on current regimen.   Skin: No new deficits noted.  LDA's: PIV x2 intact.    Plan: Continue with comfort care. Notify primary team with changes.      Goal Outcome Evaluation:      Plan of Care Reviewed With: spouse    Overall Patient Progress: decliningOverall Patient Progress: declining    Outcome Evaluation: Patient transitioned to comfort care.      Problem: Adult Inpatient Plan of Care  Goal: Plan of Care Review  Description: The Plan of Care Review/Shift note should be completed every shift.  The Outcome Evaluation is a brief statement about your assessment that the patient is improving, declining, or no change.  This information will be displayed automatically on your shift  note.  1/23/2025 1452 by Ian Eastman RN  Outcome: Unable to Meet  Flowsheets (Taken 1/23/2025 1452)  Outcome Evaluation: Patient transitioned to comfort care.  Plan of Care Reviewed With: spouse  Overall Patient Progress: declining  1/23/2025 1452 by Ian Eastman RN  Outcome: Progressing  Flowsheets (Taken 1/23/2025 1452)  Outcome Evaluation: Patient transitioned to comfort care.  Plan of Care Reviewed With: spouse  Overall Patient Progress: declining  Goal: Patient-Specific Goal (Individualized)  Description: You can add care plan individualizations to a care plan. Examples of Individualization might be:  \"Parent requests to be called daily at 9am for status\", \"I have a hard time hearing out of my right ear\", or \"Do not touch me to wake me up as it startles  me\".  1/23/2025 1452 by Ian Eastman RN  Outcome: Unable to Meet  1/23/2025 " 1452 by Ian aEstman RN  Outcome: Progressing  Goal: Absence of Hospital-Acquired Illness or Injury  1/23/2025 1452 by Ian Eastman RN  Outcome: Unable to Meet  1/23/2025 1452 by Ian Eastman RN  Outcome: Progressing  Intervention: Identify and Manage Fall Risk  Recent Flowsheet Documentation  Taken 1/23/2025 0800 by Ian Eastman RN  Safety Promotion/Fall Prevention:   activity supervised   clutter free environment maintained   increased rounding and observation   increase visualization of patient   lighting adjusted   room organization consistent   safety round/check completed  Intervention: Prevent Skin Injury  Recent Flowsheet Documentation  Taken 1/23/2025 0800 by Ian Eastman RN  Body Position: turned  Intervention: Prevent and Manage VTE (Venous Thromboembolism) Risk  Recent Flowsheet Documentation  Taken 1/23/2025 0800 by Ian Eastman RN  VTE Prevention/Management: SCDs on (sequential compression devices)  Goal: Optimal Comfort and Wellbeing  1/23/2025 1452 by Ian Eastman RN  Outcome: Unable to Meet  1/23/2025 1452 by Ian Eastman RN  Outcome: Progressing  Intervention: Provide Person-Centered Care  Recent Flowsheet Documentation  Taken 1/23/2025 0800 by Ian Eastman RN  Trust Relationship/Rapport:   care explained   emotional support provided  Goal: Readiness for Transition of Care  1/23/2025 1452 by Ian Eastman RN  Outcome: Unable to Meet  1/23/2025 1452 by Ian Eastman RN  Outcome: Progressing     Problem: Comorbidity Management  Goal: Blood Pressure in Desired Range  1/23/2025 1452 by Ian Eastman RN  Outcome: Unable to Meet  1/23/2025 1452 by Ian Eastman RN  Outcome: Progressing  Intervention: Maintain Blood Pressure Management  Recent Flowsheet Documentation  Taken 1/23/2025 0800 by Ian Eastman RN  Medication Review/Management: medications reviewed     Problem: Gas Exchange Impaired  Goal: Optimal Gas Exchange  1/23/2025 1452 by Raiza  JADEN Sosa  Outcome: Unable to Meet  1/23/2025 1452 by Ian Eastman RN  Outcome: Progressing  Intervention: Optimize Oxygenation and Ventilation  Recent Flowsheet Documentation  Taken 1/23/2025 0800 by Ian Eastman RN  Head of Bed (HOB) Positioning: HOB at 30 degrees     Problem: Skin Injury Risk Increased  Goal: Skin Health and Integrity  1/23/2025 1452 by Ian Eastman RN  Outcome: Unable to Meet  1/23/2025 1452 by Ian Eastman RN  Outcome: Progressing  Intervention: Plan: Nurse Driven Intervention: Moisture Management  Recent Flowsheet Documentation  Taken 1/23/2025 0800 by Ian Eastman RN  Moisture Interventions:   No brief in bed   Incontinence pad   Urinary collection device   Perineal cleanser  Intervention: Plan: Nurse Driven Intervention: Friction and Shear  Recent Flowsheet Documentation  Taken 1/23/2025 0800 by Ian Eastman RN  Friction/Shear Interventions:   HOB 30 degrees or less   Silicone foam sacral dressing   Assistive lifting device (portable/ceiling lift, etc.)  Intervention: Optimize Skin Protection  Recent Flowsheet Documentation  Taken 1/23/2025 0800 by Ian Eastman RN  Activity Management: activity adjusted per tolerance  Head of Bed (HOB) Positioning: HOB at 30 degrees   Ian Eastman RN

## 2025-01-23 NOTE — ED PROVIDER NOTES
Emergency Department Note      History of Present Illness     Chief Complaint  Loss of Consciousness    BRUNA Huber is a 89 year old male who is a DNR/DNI, who presents from home for altered mental status and very high glucose readings at home.  Reportedly patient was more altered than normal or less responsive per family, they checked his sugar and it was over 500 and they did give him a dose of his home insulin at his residence and called 911.  EMS arrived and noted that he had shallow breathing, confirmed hyperglycemia, establish an IV and placed nasal cannula for comfort.  Patient is really unable to participate in the interview.  Of note, the medic team that did bring him and also brought him in last week they said, and he was essentially in the same condition at that time.  No reported vomiting or diarrhea or falls.      Independent Historian  No    Review of External Notes  Yes I have reviewed the patient's last admission on 12-8-2024 where the patient was admitted for an NSTEMI along with additional issues, does have Alzheimer's dementia.  On this hospitalization he was admitted for 5 days.      Past Medical History   Medical History and Problem List  Past Medical History:   Diagnosis Date    Acute chest pain 10/23/2015    CAD (coronary artery disease)     Essential hypertension 10/23/2015    History of CVA (cerebrovascular accident) 10/23/2015    HLD (hyperlipidemia)     HTN (hypertension)     NSTEMI (non-ST elevated myocardial infarction) (H) 10/23/2015    Post PTCA 10-    Post PTCA 11-6-2015       Medications  acetaminophen (TYLENOL) 325 MG tablet  aspirin (ASA) 325 MG EC tablet  atorvastatin (LIPITOR) 40 MG tablet  carvedilol (COREG) 25 MG tablet  finasteride (PROSCAR) 5 MG tablet  furosemide (LASIX) 40 MG tablet  glipiZIDE (GLUCOTROL XL) 5 MG 24 hr tablet  losartan (COZAAR) 25 MG tablet  Melatonin 10 MG TABS tablet  memantine (NAMENDA) 5 MG tablet  mirtazapine (REMERON) 15 MG  tablet  OLANZapine zydis (ZYPREXA) 5 MG ODT  risperiDONE (RISPERDAL) 0.5 MG tablet  risperiDONE (RISPERDAL) 1 MG tablet        Surgical History   Past Surgical History:   Procedure Laterality Date    CHOLECYSTECTOMY      GENITOURINARY SURGERY      prostate removal     HEART CATH LEFT HEART CATH  10/12/2015    PCI w/ LEXI to RCA    HEART CATH RIGHT AND LEFT HEART CATH  11/6/2015    PCI w/ LEXI to mid-CFX         Physical Exam   Patient Vitals for the past 24 hrs:   BP Pulse Resp SpO2   01/23/25 0042 (!) 145/72 (!) 110 21 99 %   01/23/25 0035 129/65 (!) 108 22 100 %   01/23/25 0030 122/67 (!) 110 16 (!) 85 %   01/23/25 0025 107/60 (!) 113 (!) 0 (!) 81 %   01/23/25 0024 122/70 (!) 121 (!) 37 (!) 86 %   01/23/25 0022 -- (!) 113 28 (!) 60 %       Physical Exam  Vitals: reviewed by me  General: Pt seen on [facility], essentially comatose but does arouse to a sternal rub, eyes are closed mouth open and extremely dry mucous membranes.  Chronically ill appearing, very pale and thin.  Eyes: Eyes are closed, not tracking  ENT: Extremely dry mucous membranes, midline trachea.   Lungs: No tachypnea, no accessory muscle use. No respiratory distress.   CV: Rate as above  Abd: Soft, non tender, no guarding, no rebound. Non distended   MSK: no joint effusion.  No evidence of trauma  Skin: No rash  Neuro: Moans to sternal rub only  Psych: Not RIS, no e/o AH/VH      Diagnostics   Lab Results   Labs Ordered and Resulted from Time of ED Arrival to Time of ED Departure   GLUCOSE BY METER - Abnormal       Result Value    GLUCOSE BY METER POCT 517 (*)    TROPONIN T, HIGH SENSITIVITY - Abnormal    Troponin T, High Sensitivity 77 (*)    ISTAT GASES LACTATE VENOUS POCT - Abnormal    Lactic Acid POCT 4.3 (*)     Bicarbonate Venous POCT 25      O2 Sat, Venous POCT 34 (*)     pCO2 Venous POCT 43      pH Venous POCT 7.37      pO2 Venous POCT 22 (*)    CBC WITH PLATELETS AND DIFFERENTIAL - Abnormal    WBC Count 13.3 (*)     RBC Count 5.34       Hemoglobin 15.4      Hematocrit 50.5      MCV 95      MCH 28.8      MCHC 30.5 (*)     RDW 16.4 (*)     Platelet Count 211      % Neutrophils 83      % Lymphocytes 12      % Monocytes 4      % Eosinophils 1      % Basophils 0      % Immature Granulocytes 1      NRBCs per 100 WBC 0      Absolute Neutrophils 11.0 (*)     Absolute Lymphocytes 1.6      Absolute Monocytes 0.5      Absolute Eosinophils 0.1      Absolute Basophils 0.0      Absolute Immature Granulocytes 0.1      Absolute NRBCs 0.0     ISTAT BASIC CHEM ICA HEMATOCRIT POCT - Abnormal    Chloride POCT 134 (*)     Potassium POCT 5.1      Sodium POCT 169 (*)     UREA NITROGEN POCT 88 (*)     Calcium, Ionized Whole Blood POCT 4.6      Glucose Whole Blood POCT 524 (*)     Anion Gap POCT 17.0 (*)     Hemoglobin POCT 15.6      Hematocrit POCT 46      Creatinine POCT 3.2 (*)     TOTAL CO2 POCT 24     COMPREHENSIVE METABOLIC PANEL   ROUTINE UA WITH MICROSCOPIC REFLEX TO CULTURE   TROPONIN T, HIGH SENSITIVITY   GLUCOSE MONITOR NURSING POCT   GLUCOSE MONITOR NURSING POCT   HEMOGLOBIN A1C       Imaging  CT Head w/o Contrast   Final Result   IMPRESSION:   1.  No acute intracranial process or significant change since 12/8/2024.      XR Chest Port 1 View    (Results Pending)               Independent Interpretation  Yes I have independently reviewed the patient's CT scan of the head, no obvious hemorrhage noted      ED Course      Medications Administered   Medications   lactated ringers BOLUS 1,000 mL (1,000 mLs Intravenous $New Bag 1/23/25 0124)   dextrose 10% infusion (has no administration in time range)   insulin regular (MYXREDLIN) 1 unit/mL infusion (has no administration in time range)   glucose gel 15-30 g (has no administration in time range)     Or   dextrose 50 % injection 25-50 mL (has no administration in time range)     Or   glucagon injection 1 mg (has no administration in time range)   sodium chloride 0.9% BOLUS 1,000 mL (1,000 mLs Intravenous $New Bag  1/23/25 0032)   fentaNYL (PF) (SUBLIMAZE) injection 50 mcg (50 mcg Intravenous $Given 1/23/25 0032)   morphine (PF) injection 2 mg (2 mg Intravenous $Given 1/23/25 0055)   ondansetron (ZOFRAN) injection 4 mg (4 mg Intravenous $Given 1/23/25 0055)                  Optional/Additional Documentation  Healthcare Access/Compliance       Medical Decision Making / Diagnosis     CMS Diagnoses: Lactic acid is elevated due to dehydration, we are reassessing and on my repeat perfusion exam he is improving.  I suspect lactic acid is most significantly elevated due to dehydration, as he does have significant evidence to support this including significant hyponatremia and hyperglycemia.  However given chronic illness and x-ray findings, and possible risk of aspiration he will be covered with antibiotics as well        MDM  This is an 89-year-old male who presents to the emergency room with what appears to be failure to thrive, significant dehydration, hyperglycemia and hypernatremia.  I do think that he needs to go to the ICU for his level of care even though he is DNR/DNI.  He has no tachypnea or acidosis or anything to evidence of DKA, and it does look like he has not been getting adequate oral liquid or nutrition for some time.  I am told that the son was here, but I been unable to speak with him about possibly considering comfort care given the patient's advanced dementia and frequent hospitalizations.  The patient will continue to receive standard care here, we will treat supportively as he does like to be intermittently in pain and for this reason I did give a dose of morphine as well.  He may benefit from a palliative care consult, and after my discussion with the hospitalist team I do think he needs to be admitted to the ICU for careful monitoring of his critically high sodium.  His x-ray is also concerning for possible infiltrate and given his altered mental status he certainly could have aspirated and he will be started  on antibiotics for this.  He is actually not in any severe respiratory distress however, but again is critically ill and I do think antibiotics are warranted at the outset in the emergency room.  After discussing the case with the hospitalist, we will also start an insulin drip here and he will be monitored very carefully until next ICU bed is available.      Critical care time was 30 minutes excluding procedures.      ICD-10 Codes:    ICD-10-CM    1. Hypernatremia  E87.0       2. Hyperglycemia  R73.9       3. Altered mental status, unspecified altered mental status type  R41.82       4. HCAP (healthcare-associated pneumonia)  J18.9                         Clint Pereira MD  01/23/25 0139       Clint Pereira MD  01/23/25 0142

## 2025-01-23 NOTE — ED TRIAGE NOTES
Pt BIBA after being found down at home. Pt's BG was >500. Insulin given by family. Pt is minimally responsive to sternal rub. Pt tachycardic and hypotensive. Arrives on 15L O2

## 2025-01-23 NOTE — H&P
[facility]    History and Physical  Hospitalist       Date of Admission:  1/23/2025    Assessment & Plan   Adam Huber is a 89 year old male with dementia, CAD, DM2, HTN, BPH who presents with weakness, high blood sugar.    #Acute toxic metabolic encephalopathy secondary to severe hypernatremia due to dehydration.  Hyperglycemia in the setting of type 2 diabetes mellitus. YOVANA with baseline nl renal function: Patient reportedly lives at home with his wife.  He has baseline severe dementia with behavioral disturbances.  Per son, patient has not been eating or drinking much over the last 3 to 4 days.  He has been less interactive and more weak.  No clear pain.  No fevers at home that he is aware of.  They checked his blood sugar and it was over 500.  They gave him 24 units of insulin but his blood sugar was still elevated over 500 so they called EMS.  No clear sick contacts.  No clear inciting event.  -ED, patient afebrile tachycardic in the 100-110 range.  Normotensive.  He was placed on oxime mask at 10 L to maintain saturations.  His CT head showed no acute intracranial process.  His chest x-ray showed possible infiltrate in the right midlung and lung bases but no other acute process.  CBC had a mild leukocytosis to 13.  BMP showed a sodium of 169 with BUN/creatinine 91/3.09.  Potassium within normal limits and bicarb within normal limits.  His LFTs were elevated with alk phos of 547, , AST 90.  His glucose was elevated at 574.  His lactic acid was elevated at 4.3.  VBG 7.37/43.  Patient was reportedly agitated and was given 50 mcg of fentanyl and 2 mg of morphine.  He was also given 2 L of IV fluid and Zosyn and vancomycin.  -On my exam, patient is very somnolent.  Does not arouse to stimulation but appears comfortable.  I expressed to the patient's son, Adolfo by phone that my concern is patient is in the dying process.  He has advanced dementia and has not been eating or drinking  for several days.  The severity of his YOVANA, hypernatremia is likely an indication of the advanced nature of his Alzheimer's.  -He is corrected sodium due to his hyperglycemia is more like 179.  -We will obtain a CT abdomen pelvis to rule out any urinary obstruction causing his YVOANA though suspect this is mainly prerenal in the setting of severe dehydration.  -He has about 8 L free water deficit.  We will start him on D5W at 150 cc/h.  Check BMP every 4 hours aiming to bring down the sodium by about 10 mmol/L over 24 hours.  -We will have him on an insulin drip while on D5W.  -Check UA  -He reportedly had some agitation in the ED.  We will have a bedside sitter ordered.    Addendum: repeat lactic acid over 6.  CT A/P reviewed.  He is on zosyn.  He is also requiring high flow for oxygenation.  Starting maintenance IVF at 150 ml/hr (he received 2L thus far).  Will order peripheral norepi as well.    -Discussed again with nursing.  They are having difficulty getting 2nd IV.  Will pause insulin gtt and give bolus of 0.45 NS for resuscitation as he is likely very dry.  Hopefully that will also help ability to get another IV.        #Possible severe sepsis and acute hypoxemic respiratory failure secondary to possible community-acquired pneumonia versus aspiration pneumonia given altered mental status: CXR with possible infiltrate in the right midlung and lung bases.  He is needing quite a bit of oxygen support via oxime mask but this also may be due to pain medications he received in the ED.  VBG without evidence of CO2 retention.  -Continue zosyn.  Hold on vanco given YOVANA; no history of MRSA.  Obtain MRSA swab.  Follow cultures    #Lactic acidosis: Suspect secondary to severe dehydration.  Repeat lactic acid ordered.  IV fluids as above.  IV antibiotics as above.    #NSTEMI: ECG without clear ischemic changes. Repeat troponin. Monitor on telemetry.  Recent TTE from 1 month prior showed EF 60-65% but moderate diastolic  dysfunction.      #Elevated LFT's: Has had elevation of his alk phos recently.  We will obtain a CT abdomen pelvis as above.  Trend LFTs.    #Goals of care: Patient has evidence of advanced dementia with behavioral disturbances.  He has not been eating or drinking.  He is severely dehydrated and I suspect his prognosis is on the order of weeks to days even with hydration as above.  We will consult palliative care to discuss hospice with family.    #Dementia with behavioral disturbances: Prn meds ordered if he becomes agitated  #HTN, BPH, HL: Holding meds as he will be NPO given AMS.  IVF as above.      DVT Prophylaxis: Pneumatic Compression Devices  Code Status: DNR / DNI.  Consistent with prior admissions and discussed with son Adolfo by phone.  He would be appropriate for hospice.  Medically Ready for Discharge: Anticipated in 5+ Days    Ricardo Reddy MD    Primary Care Physician   Oralia Forrest    Chief Complaint   Loss of consciousness    History is obtained from the patient's chart and d/w ER physician.  I called son, Adolfo, who provided some history.      History of Present Illness   Adam Huber is a 89 year old male with dementia, CAD, DM2, HTN, BPH who presents with weakness, high blood sugar.    Patient reportedly lives at home with his wife.  He has baseline severe dementia with behavioral disturbances.  Per son, patient has not been eating or drinking much over the last 3 to 4 days.  He has been less interactive and more weak.  No clear pain.  No fevers at home that he is aware of.  They checked his blood sugar and it was over 500.  They gave him 24 units of insulin but his blood sugar was still elevated over 500 so they called EMS.  No clear sick contacts.  No clear inciting event.    In the ED, patient afebrile tachycardic in the 100-110 range.  Normotensive.  He was placed on oxime mask at 10 L to maintain saturations.  His CT head showed no acute intracranial process.  His chest x-ray showed possible  infiltrate in the right midlung and lung bases but no other acute process.  CBC had a mild leukocytosis to 13.  BMP showed a sodium of 169 with BUN/creatinine 91/3.09.  Potassium within normal limits and bicarb within normal limits.  His LFTs were elevated with alk phos of 547, , AST 90.  His glucose was elevated at 574.  His lactic acid was elevated at 4.3.  VBG 7.37/43.  Patient was reportedly agitated and was given 50 mcg of fentanyl and 2 mg of morphine.  He was also given 2 L of IV fluid and Zosyn and vancomycin.    Past Medical History    I have reviewed this patient's medical history and updated it with pertinent information if needed.   Past Medical History:   Diagnosis Date    Acute chest pain 10/23/2015    CAD (coronary artery disease)     Essential hypertension 10/23/2015    History of CVA (cerebrovascular accident) 10/23/2015    HLD (hyperlipidemia)     HTN (hypertension)     NSTEMI (non-ST elevated myocardial infarction) (H) 10/23/2015    Post PTCA 10-    Successful PCI of culprit proximal to mid RCA with placement of a    Post PTCA 11-6-2015    pci of complex mid CFX-hector       Past Surgical History   I have reviewed this patient's surgical history and updated it with pertinent information if needed.  Past Surgical History:   Procedure Laterality Date    CHOLECYSTECTOMY      GENITOURINARY SURGERY      prostate removal     HEART CATH LEFT HEART CATH  10/12/2015    PCI w/ HECTOR to RCA    HEART CATH RIGHT AND LEFT HEART CATH  11/6/2015    PCI w/ HECTOR to mid-CFX       Prior to Admission Medications   Prior to Admission Medications   Prescriptions Last Dose Informant Patient Reported? Taking?   Melatonin 10 MG TABS tablet   Yes No   Sig: Take 10 mg by mouth nightly as needed for sleep   OLANZapine zydis (ZYPREXA) 5 MG ODT   No No   Sig: Take 1 tablet (5 mg) by mouth nightly as needed for agitation.   acetaminophen (TYLENOL) 325 MG tablet   Yes No   Sig: Take 650 mg by mouth every 8 hours as needed  for mild pain   aspirin (ASA) 325 MG EC tablet   Yes No   Sig: Take 325 mg by mouth daily.   atorvastatin (LIPITOR) 40 MG tablet  Spouse/Significant Other No No   Sig: Take 1 tablet (40 mg) by mouth every evening   carvedilol (COREG) 25 MG tablet  Spouse/Significant Other No No   Sig: Take 1 tablet (25 mg) by mouth 2 times daily (with meals)   finasteride (PROSCAR) 5 MG tablet   Yes No   Sig: Take 5 mg by mouth daily   furosemide (LASIX) 40 MG tablet   No No   Sig: Take 1 tablet (40 mg) by mouth daily.   glipiZIDE (GLUCOTROL XL) 5 MG 24 hr tablet   No No   Sig: Take 1 tablet (5 mg) by mouth every morning (before breakfast).   losartan (COZAAR) 25 MG tablet   No No   Sig: Take 1 tablet (25 mg) by mouth daily.   memantine (NAMENDA) 5 MG tablet   No No   Sig: Use 5 mg orally nightly for 1 week followed by 5 mg p.o. twice daily in the second week, followed by 5 mg in the morning and 10 mg in the evening followed by 10 mg p.o. twice daily thereafter.   mirtazapine (REMERON) 15 MG tablet   Yes No   Sig: Take 15 mg by mouth nightly as needed (agitation).   risperiDONE (RISPERDAL) 0.5 MG tablet   No No   Sig: Take 1 tablet (0.5 mg) by mouth daily.   risperiDONE (RISPERDAL) 1 MG tablet   No No   Sig: Take 1 tablet (1 mg) by mouth at bedtime.      Facility-Administered Medications: None     Allergies   Allergies   Allergen Reactions    Lisinopril     Morphine Other (See Comments)     confusion       Social History   I have reviewed this patient's social history and updated it with pertinent information if needed. Adam Huber  reports that he has never smoked. He has never used smokeless tobacco. He reports that he does not drink alcohol and does not use drugs.    Family History   I have reviewed this patient's family history and updated it with pertinent information if needed.   Family History   Problem Relation Age of Onset    Unknown/Adopted No family hx of        Review of Systems   The 10 point Review of Systems is  negative other than noted in the HPI or here.     Physical Exam       BP: (!) 145/72 Pulse: (!) 110   Resp: 21 SpO2: 99 % O2 Device: Oxymask Oxygen Delivery: 10 LPM  Vital Signs with Ranges  Pulse:  [108-121] 110  Resp:  [0-37] 21  BP: (107-145)/(60-72) 145/72  SpO2:  [60 %-100 %] 99 %  0 lbs 0 oz    Constitutional: Cachectic, somnolent.  Does not arouse to stimulation.  Normal work of breathing.  HEENT: Normocephalic, membranes are dry.  No elevation of JVD.  Respiratory: Nl WOB, some crackles noted at the bases bilaterally.  Cardiovascular: Mild tachycardia, regular, no murmur  GI: Soft, no clear tenderness to palpation.  Bowel sounds appreciated.  Lymph/Hematologic: No bruising. No cervical LAD  Skin: No rash  Musculoskeletal: Nl Tone, thin extremities.  Neurologic: Somnolent.  Does not arouse to stimulation or voice.  No tremor.      Data   Data reviewed today:  I personally reviewed   Recent Labs   Lab 01/23/25  0027 01/23/25  0025   WBC  --  13.3*   HGB 15.6 15.4   MCV  --  95   PLT  --  211   * 167*   POTASSIUM 5.1 5.2   CHLORIDE 134* 129*   CO2  --  23   BUN 88* 91.6*   CR 3.2* 3.09*   ANIONGAP  --  15   LUZ  --  9.1   * 517*  574*   ALBUMIN  --  3.0*   PROTTOTAL  --  7.2   BILITOTAL  --  0.4   ALKPHOS  --  547*   ALT  --  123*   AST  --  90*       Recent Results (from the past 24 hours)   CT Head w/o Contrast    Narrative    EXAM: CT HEAD W/O CONTRAST  LOCATION: [facility]  DATE: 1/23/2025    INDICATION: Altered mental status  COMPARISON:  CT head 12/8/2024.  TECHNIQUE: Routine CT Head without IV contrast. Multiplanar reformats. Dose reduction techniques were used.    FINDINGS:  INTRACRANIAL CONTENTS: No intracranial hemorrhage, extraaxial collection, or mass effect.  No CT evidence of acute infarct. 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  Small lacunes to the deep gray nuclei and   cerebellar hemispheres, unchanged.  Moderate generalized volume loss. No hydrocephalus.     VISUALIZED ORBITS/SINUSES/MASTOIDS: Prior right cataract surgery. Visualized portions of the orbits are otherwise unremarkable. No significant paranasal sinus mucosal disease. No middle ear or mastoid effusion.    BONES/SOFT TISSUES: No acute abnormality.      Impression    IMPRESSION:  1.  No acute intracranial process or significant change since 12/8/2024.   XR Chest Port 1 View    Narrative    EXAM: XR CHEST PORT 1 VIEW  LOCATION: [facility]  DATE: 1/23/2025    INDICATION: AMS  COMPARISON: 12/08/2024      Impression    IMPRESSION: Stable cardiomediastinal silhouette. Mild atelectasis or infiltrate right midlung and lung bases. No significant effusion. Degenerative osseous structures. Atherosclerotic aorta       Clinically Significant Risk Factors Present on Admission         # Hypernatremia: Highest Na = 169 mmol/L in last 2 days, will monitor as appropriate  # Hyperchloremia: Highest Cl = 134 mmol/L in last 2 days, will monitor as appropriate          # Hypoalbuminemia: Lowest albumin = 3 g/dL at 1/23/2025 12:25 AM, will monitor as appropriate   # Drug Induced Platelet Defect: home medication list includes an antiplatelet medication    # Acute Kidney Injury, unspecified: based on a >150% or 0.3 mg/dL increase in last creatinine compared to past 90 day average, will monitor renal function  # Hypertension: Noted on problem list  # Chronic heart failure with preserved ejection fraction: heart failure noted on problem list and last echo with EF >50%    # Acute Hypoxic Respiratory Failure: Documented O2 saturation < 90%. Continue supplemental oxygen as needed  # Dementia: noted on problem list      # DMII: A1C = 9.8 % (Ref range: <5.7 %) within past 6 months          # Financial/Environmental Concerns:

## 2025-01-23 NOTE — CONSULTS
"SPIRITUAL HEALTH SERVICES - Consult Note  [facility] ICU    Referral Source: [facility] consult; staff requested emotional support for family as pt has transitioned to comfort care.    Pt Adam's son Adolfo was at the bedside.  He shared that Adam has five children (three sons and two daughters) and thirteen grandchildren.  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  He expressed no needs at this time.    Plan: Informed pt's son how he can request further  support.  This author and other chaplains remain available per family request.     Adilson De La Vega M.Div., [facility]  Staff     SHS available 24/7 for emergent requests/referrals, either by paging the on-call  or by entering an ASAP/STAT consult in [facility], which will also page the on-call .   "

## 2025-01-23 NOTE — PHARMACY-ADMISSION MEDICATION HISTORY
Pharmacist Admission Medication History    Admission medication history is complete. The information provided in this note is only as accurate as the sources available at the time of the update.    Information Source(s): Family member via in-person.    Pertinent Information: son had a picture of med bottles on his phone and knew patient's meds.    Changes made to PTA medication list:  Added: flomax  Deleted: none  Changed: memantine to 10mg bid    Allergies reviewed with patient and updates made in EHR: no.    Medication History Completed By: [facility] 1/23/2025 2:35 PM    PTA Med List   Medication Sig Last Dose/Taking    acetaminophen (TYLENOL) 325 MG tablet Take 650 mg by mouth every 8 hours as needed for mild pain Taking As Needed    aspirin (ASA) 325 MG EC tablet Take 325 mg by mouth daily. Taking    atorvastatin (LIPITOR) 40 MG tablet Take 1 tablet (40 mg) by mouth every evening Taking    carvedilol (COREG) 25 MG tablet Take 1 tablet (25 mg) by mouth 2 times daily (with meals) Taking    finasteride (PROSCAR) 5 MG tablet Take 5 mg by mouth daily Taking    furosemide (LASIX) 40 MG tablet Take 1 tablet (40 mg) by mouth daily. Taking    glipiZIDE (GLUCOTROL XL) 5 MG 24 hr tablet Take 1 tablet (5 mg) by mouth every morning (before breakfast). Taking    losartan (COZAAR) 25 MG tablet Take 1 tablet (25 mg) by mouth daily. Taking    Melatonin 10 MG TABS tablet Take 10 mg by mouth nightly as needed for sleep Taking As Needed    memantine (NAMENDA) 5 MG tablet Take 10 mg by mouth 2 times daily.  Taking Differently    mirtazapine (REMERON) 15 MG tablet Take 15 mg by mouth nightly as needed (agitation). Taking As Needed    OLANZapine zydis (ZYPREXA) 5 MG ODT Take 1 tablet (5 mg) by mouth nightly as needed for agitation. Taking As Needed    risperiDONE (RISPERDAL) 0.5 MG tablet Take 1 tablet (0.5 mg) by mouth daily. Taking    risperiDONE (RISPERDAL) 1 MG tablet Take 1 tablet (1 mg) by mouth at bedtime.  Taking    tamsulosin (FLOMAX) 0.4 MG capsule Take 0.4 mg by mouth daily. Taking

## 2025-01-24 VITALS
WEIGHT: 143.96 LBS | SYSTOLIC BLOOD PRESSURE: 94 MMHG | DIASTOLIC BLOOD PRESSURE: 49 MMHG | BODY MASS INDEX: 17.99 KG/M2 | OXYGEN SATURATION: 98 % | TEMPERATURE: 96.6 F

## 2025-01-24 PROCEDURE — 99238 HOSP IP/OBS DSCHRG MGMT 30/<: CPT | Performed by: INTERNAL MEDICINE

## 2025-01-24 PROCEDURE — 250N000011 HC RX IP 250 OP 636: Performed by: INTERNAL MEDICINE

## 2025-01-24 RX ADMIN — HYDROMORPHONE HYDROCHLORIDE 0.5 MG: 1 INJECTION, SOLUTION INTRAMUSCULAR; INTRAVENOUS; SUBCUTANEOUS at 08:40

## 2025-01-24 RX ADMIN — HYDROMORPHONE HYDROCHLORIDE 0.3 MG: 1 INJECTION, SOLUTION INTRAMUSCULAR; INTRAVENOUS; SUBCUTANEOUS at 04:18

## 2025-01-24 RX ADMIN — HYDROMORPHONE HYDROCHLORIDE 0.5 MG: 1 INJECTION, SOLUTION INTRAMUSCULAR; INTRAVENOUS; SUBCUTANEOUS at 05:29

## 2025-01-24 ASSESSMENT — ACTIVITIES OF DAILY LIVING (ADL)
ADLS_ACUITY_SCORE: 83

## 2025-01-24 NOTE — PROGRESS NOTES
Patient on comfort cares. On NC for comfort. Turned q2h.     Son at bedside with patient.     PRN Dilaudid given for tachycardia, tachypnea, and air hunger/SOB.

## 2025-01-24 NOTE — PROGRESS NOTES
Patient transported to [facility] by Dmitriy and [facility] (after speaking with family) Flowers sent with patient

## 2025-01-24 NOTE — PROGRESS NOTES
Patient noted to not have a heart beat or respiratory effort. Dr. Boswell notified to pronounce patient. Son's Adolfo and Fernandezmonserrat notified.

## 2025-01-24 NOTE — DISCHARGE SUMMARY
[facility]  Hospitalist Discharge Summary      Date of Admission:  2025  Date of Discharge:  2025 10:15 AM  Discharging Provider: Elias Boswell MD  Discharge Service: Hospitalist Service    Discharge Diagnoses   Acute toxic metabolic encephalopathy  Severe hypernatremia   Acute hypoxic respiratory failure  Sepsis with Associated Organ dysfunction of acute respiratory failure, YOVANA, Lactic acidosis, NSTEMI Type II and toxic metabolic encephalopathy     Clinically Significant Risk Factors     # DMII: A1C = 9.8 % (Ref range: <5.7 %) within past 6 months       Follow-ups Needed After Discharge       Unresulted Labs Ordered in the Past 30 Days of this Admission       Date and Time Order Name Status Description    2025  1:40 AM Blood Culture Peripheral Blood Preliminary     2025  1:40 AM Blood Culture Peripheral Blood Preliminary             Discharge Disposition   Discharged to [facility]  Condition at discharge:     Hospital Course   Adam Huber is a 89 year old male with dementia, CAD, DM2, HTN, BPH who presents with weakness, high blood sugar.     Patient reportedly lives at home with his wife.  He has baseline severe dementia with behavioral disturbances.  Per son, patient has not been eating or drinking much over the last 3 to 4 days.  He has been less interactive and more weak.  No clear pain.  No fevers at home that he is aware of.  They checked his blood sugar and it was over 500.  They gave him 24 units of insulin but his blood sugar was still elevated over 500 so they called EMS.  No clear sick contacts.  No clear inciting event.     In the ED, patient afebrile tachycardic in the 100-110 range.  Normotensive.  He was placed on oxime mask at 10 L to maintain saturations.  His CT head showed no acute intracranial process.  His chest x-ray showed possible infiltrate in the right midlung and lung bases but no other acute process.  CBC had a mild leukocytosis  to 13.  BMP showed a sodium of 169 with BUN/creatinine 91/3.09.  Potassium within normal limits and bicarb within normal limits.  His LFTs were elevated with alk phos of 547, , AST 90.  His glucose was elevated at 574.  His lactic acid was elevated at 4.3.  VBG 7.37/43.  Patient was reportedly agitated and was given 50 mcg of fentanyl and 2 mg of morphine.  He was also given 2 L of IV fluid and Zosyn and vancomycin.     Patient was admitted to ICU and closely monitored.  Patient was treated for hyponatremia, suspected sepsis and hypoxic respiratory failure.  His condition deteriorated and patient  on morning of  at 8:58 AM.    Consultations This Hospital Stay   PHARMACY IP CONSULT  PHARMACY TO DOSE VANCO  CARE MANAGEMENT / SOCIAL WORK IP CONSULT  PALLIATIVE CARE ADULT IP CONSULT  SPIRITUAL HEALTH SERVICES IP CONSULT  SOCIAL WORK IP CONSULT    Code Status   Prior    Time Spent on this Encounter   I, Elias Boswell MD, personally saw the patient today and spent less than or equal to 30 minutes discharging this patient.       Elias Boswell MD  [facility] ICU  [address] [place] [address]  Phone: [phone]  Fax: [phone]  ______________________________________________________________________    Physical Exam   Vital Signs:     BP: 94/49 Pulse: (!) 0   Resp: (!) 0   O2 Device: Nasal cannula Oxygen Delivery: 4 LPM  Weight: 143 lbs 15.37 oz       Primary Care Physician   Oralia Forrest    Discharge Orders   No discharge procedures on file.    Significant Results and Procedures       Discharge Medications   Discharge Medication List as of 2025 10:41 AM        CONTINUE these medications which have NOT CHANGED    Details   acetaminophen (TYLENOL) 325 MG tablet Take 650 mg by mouth every 8 hours as needed for mild pain, Historical      aspirin (ASA) 325 MG EC tablet Take 325 mg by mouth daily., Historical      atorvastatin (LIPITOR) 40 MG tablet Take 1  tablet (40 mg) by mouth every evening, Disp-30 tablet, R-0, E-Prescribe      carvedilol (COREG) 25 MG tablet Take 1 tablet (25 mg) by mouth 2 times daily (with meals), Disp-180 tablet, R-3, E-Prescribe      finasteride (PROSCAR) 5 MG tablet Take 5 mg by mouth daily, Historical      furosemide (LASIX) 40 MG tablet Take 1 tablet (40 mg) by mouth daily., Disp-30 tablet, R-0, E-Prescribe      glipiZIDE (GLUCOTROL XL) 5 MG 24 hr tablet Take 1 tablet (5 mg) by mouth every morning (before breakfast)., Disp-30 tablet, R-0, E-Prescribe      losartan (COZAAR) 25 MG tablet Take 1 tablet (25 mg) by mouth daily., Disp-30 tablet, R-0, E-Prescribe      Melatonin 10 MG TABS tablet Take 10 mg by mouth nightly as needed for sleep, Historical      memantine (NAMENDA) 5 MG tablet Use 5 mg orally nightly for 1 week followed by 5 mg p.o. twice daily in the second week, followed by 5 mg in the morning and 10 mg in the evening followed by 10 mg p.o. twice daily thereafter., Disp-180 tablet, R-1, E-Prescribe      mirtazapine (REMERON) 15 MG tablet Take 15 mg by mouth nightly as needed (agitation)., Historical      OLANZapine zydis (ZYPREXA) 5 MG ODT Take 1 tablet (5 mg) by mouth nightly as needed for agitation., Disp-20 tablet, R-0, E-Prescribe      !! risperiDONE (RISPERDAL) 0.5 MG tablet Take 1 tablet (0.5 mg) by mouth daily., Disp-30 tablet, R-1, E-Prescribe      !! risperiDONE (RISPERDAL) 1 MG tablet Take 1 tablet (1 mg) by mouth at bedtime., Disp-30 tablet, R-1, E-Prescribe      tamsulosin (FLOMAX) 0.4 MG capsule Take 0.4 mg by mouth daily., Historical       !! - Potential duplicate medications found. Please discuss with provider.        Allergies   Allergies   Allergen Reactions    Lisinopril     Morphine Other (See Comments)     confusion

## 2025-01-28 LAB
BACTERIA BLD CULT: NO GROWTH
BACTERIA BLD CULT: NO GROWTH